# Patient Record
Sex: FEMALE | Race: WHITE | Employment: OTHER | URBAN - METROPOLITAN AREA
[De-identification: names, ages, dates, MRNs, and addresses within clinical notes are randomized per-mention and may not be internally consistent; named-entity substitution may affect disease eponyms.]

---

## 2017-01-11 ENCOUNTER — TELEPHONE (OUTPATIENT)
Dept: DERMATOLOGY | Age: 82
End: 2017-01-11

## 2017-01-25 ENCOUNTER — OFFICE VISIT (OUTPATIENT)
Dept: INTERNAL MEDICINE CLINIC | Age: 82
End: 2017-01-25

## 2017-01-25 VITALS
RESPIRATION RATE: 16 BRPM | HEIGHT: 61 IN | WEIGHT: 121 LBS | SYSTOLIC BLOOD PRESSURE: 130 MMHG | HEART RATE: 74 BPM | DIASTOLIC BLOOD PRESSURE: 56 MMHG | BODY MASS INDEX: 22.84 KG/M2

## 2017-01-25 DIAGNOSIS — E78.5 HYPERLIPIDEMIA, UNSPECIFIED HYPERLIPIDEMIA TYPE: ICD-10-CM

## 2017-01-25 DIAGNOSIS — I87.2 VENOUS INSUFFICIENCY OF BOTH LOWER EXTREMITIES: ICD-10-CM

## 2017-01-25 DIAGNOSIS — T14.8XXA SKIN ABRASION: ICD-10-CM

## 2017-01-25 DIAGNOSIS — I25.10 CORONARY ARTERY DISEASE INVOLVING NATIVE CORONARY ARTERY OF NATIVE HEART WITHOUT ANGINA PECTORIS: ICD-10-CM

## 2017-01-25 DIAGNOSIS — E11.649 TYPE 2 DIABETES MELLITUS WITH HYPOGLYCEMIA WITHOUT COMA, WITHOUT LONG-TERM CURRENT USE OF INSULIN (HCC): ICD-10-CM

## 2017-01-25 DIAGNOSIS — I73.9 PVD (PERIPHERAL VASCULAR DISEASE) (HCC): ICD-10-CM

## 2017-01-25 DIAGNOSIS — E11.59 TYPE 2 DIABETES MELLITUS WITH VASCULAR DISEASE (HCC): ICD-10-CM

## 2017-01-25 DIAGNOSIS — B35.1 ONYCHOMYCOSIS: ICD-10-CM

## 2017-01-25 DIAGNOSIS — L30.9 DERMATITIS: ICD-10-CM

## 2017-01-25 DIAGNOSIS — I10 BENIGN ESSENTIAL HTN: ICD-10-CM

## 2017-01-25 DIAGNOSIS — E11.40 TYPE 2 DIABETES, CONTROLLED, WITH NEUROPATHY (HCC): Primary | ICD-10-CM

## 2017-01-25 PROCEDURE — G8428 CUR MEDS NOT DOCUMENT: HCPCS | Performed by: INTERNAL MEDICINE

## 2017-01-25 PROCEDURE — G8419 CALC BMI OUT NRM PARAM NOF/U: HCPCS | Performed by: INTERNAL MEDICINE

## 2017-01-25 PROCEDURE — 4040F PNEUMOC VAC/ADMIN/RCVD: CPT | Performed by: INTERNAL MEDICINE

## 2017-01-25 PROCEDURE — 1036F TOBACCO NON-USER: CPT | Performed by: INTERNAL MEDICINE

## 2017-01-25 PROCEDURE — 1123F ACP DISCUSS/DSCN MKR DOCD: CPT | Performed by: INTERNAL MEDICINE

## 2017-01-25 PROCEDURE — G8598 ASA/ANTIPLAT THER USED: HCPCS | Performed by: INTERNAL MEDICINE

## 2017-01-25 PROCEDURE — 1090F PRES/ABSN URINE INCON ASSESS: CPT | Performed by: INTERNAL MEDICINE

## 2017-01-25 PROCEDURE — G8483 FLU IMM NO ADMIN DOC REA: HCPCS | Performed by: INTERNAL MEDICINE

## 2017-01-25 PROCEDURE — 99214 OFFICE O/P EST MOD 30 MIN: CPT | Performed by: INTERNAL MEDICINE

## 2017-01-25 RX ORDER — TRIAMCINOLONE ACETONIDE 1 MG/G
CREAM TOPICAL
Qty: 60 G | Refills: 1 | Status: SHIPPED | OUTPATIENT
Start: 2017-01-25 | End: 2017-02-24 | Stop reason: SDUPTHER

## 2017-01-25 RX ORDER — TRIAMCINOLONE ACETONIDE 1 MG/G
CREAM TOPICAL
Qty: 80 G | Refills: 0 | Status: SHIPPED | OUTPATIENT
Start: 2017-01-25 | End: 2017-04-26 | Stop reason: ALTCHOICE

## 2017-01-26 ENCOUNTER — OFFICE VISIT (OUTPATIENT)
Dept: CARDIOTHORACIC SURGERY | Age: 82
End: 2017-01-26

## 2017-01-26 VITALS
WEIGHT: 119 LBS | DIASTOLIC BLOOD PRESSURE: 60 MMHG | SYSTOLIC BLOOD PRESSURE: 132 MMHG | BODY MASS INDEX: 22.47 KG/M2 | HEIGHT: 61 IN

## 2017-01-26 DIAGNOSIS — Z48.812 AFTERCARE FOLLOWING SURGERY OF THE CIRCULATORY SYSTEM: Primary | ICD-10-CM

## 2017-01-26 PROCEDURE — 1090F PRES/ABSN URINE INCON ASSESS: CPT | Performed by: SURGERY

## 2017-01-26 PROCEDURE — G8598 ASA/ANTIPLAT THER USED: HCPCS | Performed by: SURGERY

## 2017-01-26 PROCEDURE — 99212 OFFICE O/P EST SF 10 MIN: CPT | Performed by: SURGERY

## 2017-01-26 PROCEDURE — 1123F ACP DISCUSS/DSCN MKR DOCD: CPT | Performed by: SURGERY

## 2017-01-26 PROCEDURE — 1036F TOBACCO NON-USER: CPT | Performed by: SURGERY

## 2017-01-26 PROCEDURE — G8483 FLU IMM NO ADMIN DOC REA: HCPCS | Performed by: SURGERY

## 2017-01-26 PROCEDURE — G8419 CALC BMI OUT NRM PARAM NOF/U: HCPCS | Performed by: SURGERY

## 2017-01-26 PROCEDURE — 4040F PNEUMOC VAC/ADMIN/RCVD: CPT | Performed by: SURGERY

## 2017-01-26 PROCEDURE — G8427 DOCREV CUR MEDS BY ELIG CLIN: HCPCS | Performed by: SURGERY

## 2017-02-01 ENCOUNTER — OFFICE VISIT (OUTPATIENT)
Dept: DERMATOLOGY | Age: 82
End: 2017-02-01

## 2017-02-01 DIAGNOSIS — L29.9 PRURITUS: Primary | ICD-10-CM

## 2017-02-01 PROCEDURE — 1123F ACP DISCUSS/DSCN MKR DOCD: CPT | Performed by: DERMATOLOGY

## 2017-02-01 PROCEDURE — G8419 CALC BMI OUT NRM PARAM NOF/U: HCPCS | Performed by: DERMATOLOGY

## 2017-02-01 PROCEDURE — 4040F PNEUMOC VAC/ADMIN/RCVD: CPT | Performed by: DERMATOLOGY

## 2017-02-01 PROCEDURE — 99213 OFFICE O/P EST LOW 20 MIN: CPT | Performed by: DERMATOLOGY

## 2017-02-01 PROCEDURE — G8598 ASA/ANTIPLAT THER USED: HCPCS | Performed by: DERMATOLOGY

## 2017-02-01 PROCEDURE — 1090F PRES/ABSN URINE INCON ASSESS: CPT | Performed by: DERMATOLOGY

## 2017-02-01 PROCEDURE — 1036F TOBACCO NON-USER: CPT | Performed by: DERMATOLOGY

## 2017-02-01 PROCEDURE — G8427 DOCREV CUR MEDS BY ELIG CLIN: HCPCS | Performed by: DERMATOLOGY

## 2017-02-01 PROCEDURE — G8483 FLU IMM NO ADMIN DOC REA: HCPCS | Performed by: DERMATOLOGY

## 2017-02-06 ENCOUNTER — TELEPHONE (OUTPATIENT)
Dept: INTERNAL MEDICINE CLINIC | Age: 82
End: 2017-02-06

## 2017-02-07 RX ORDER — IBANDRONATE SODIUM 150 MG/1
150 TABLET, FILM COATED ORAL
Qty: 1 TABLET | Refills: 0 | Status: SHIPPED | OUTPATIENT
Start: 2017-02-07 | End: 2017-04-26

## 2017-02-15 ENCOUNTER — OFFICE VISIT (OUTPATIENT)
Dept: ORTHOPEDIC SURGERY | Age: 82
End: 2017-02-15

## 2017-02-15 VITALS — WEIGHT: 119 LBS | RESPIRATION RATE: 16 BRPM | HEIGHT: 61 IN | BODY MASS INDEX: 22.47 KG/M2

## 2017-02-15 DIAGNOSIS — B35.1 ONYCHOMYCOSIS: Primary | ICD-10-CM

## 2017-02-15 PROCEDURE — 1123F ACP DISCUSS/DSCN MKR DOCD: CPT | Performed by: ORTHOPAEDIC SURGERY

## 2017-02-15 PROCEDURE — G8427 DOCREV CUR MEDS BY ELIG CLIN: HCPCS | Performed by: ORTHOPAEDIC SURGERY

## 2017-02-15 PROCEDURE — 4040F PNEUMOC VAC/ADMIN/RCVD: CPT | Performed by: ORTHOPAEDIC SURGERY

## 2017-02-15 PROCEDURE — G8483 FLU IMM NO ADMIN DOC REA: HCPCS | Performed by: ORTHOPAEDIC SURGERY

## 2017-02-15 PROCEDURE — 99212 OFFICE O/P EST SF 10 MIN: CPT | Performed by: ORTHOPAEDIC SURGERY

## 2017-02-15 PROCEDURE — 1090F PRES/ABSN URINE INCON ASSESS: CPT | Performed by: ORTHOPAEDIC SURGERY

## 2017-02-15 PROCEDURE — G8419 CALC BMI OUT NRM PARAM NOF/U: HCPCS | Performed by: ORTHOPAEDIC SURGERY

## 2017-02-15 PROCEDURE — 1036F TOBACCO NON-USER: CPT | Performed by: ORTHOPAEDIC SURGERY

## 2017-02-15 PROCEDURE — G8598 ASA/ANTIPLAT THER USED: HCPCS | Performed by: ORTHOPAEDIC SURGERY

## 2017-02-23 ENCOUNTER — TELEPHONE (OUTPATIENT)
Dept: CARDIOTHORACIC SURGERY | Age: 82
End: 2017-02-23

## 2017-02-24 ENCOUNTER — OFFICE VISIT (OUTPATIENT)
Dept: INTERNAL MEDICINE CLINIC | Age: 82
End: 2017-02-24

## 2017-02-24 ENCOUNTER — HOSPITAL ENCOUNTER (OUTPATIENT)
Dept: VASCULAR LAB | Age: 82
Discharge: HOME | End: 2017-02-24
Attending: INTERNAL MEDICINE | Admitting: INTERNAL MEDICINE

## 2017-02-24 VITALS
WEIGHT: 122 LBS | DIASTOLIC BLOOD PRESSURE: 66 MMHG | SYSTOLIC BLOOD PRESSURE: 134 MMHG | BODY MASS INDEX: 23.05 KG/M2 | RESPIRATION RATE: 16 BRPM | HEART RATE: 74 BPM

## 2017-02-24 DIAGNOSIS — R60.0 LOCALIZED EDEMA: ICD-10-CM

## 2017-02-24 DIAGNOSIS — R60.0 LEG EDEMA, LEFT: Primary | ICD-10-CM

## 2017-02-24 DIAGNOSIS — I87.2 VENOUS INSUFFICIENCY OF BOTH LOWER EXTREMITIES: ICD-10-CM

## 2017-02-24 PROCEDURE — G8483 FLU IMM NO ADMIN DOC REA: HCPCS | Performed by: INTERNAL MEDICINE

## 2017-02-24 PROCEDURE — G8598 ASA/ANTIPLAT THER USED: HCPCS | Performed by: INTERNAL MEDICINE

## 2017-02-24 PROCEDURE — G8427 DOCREV CUR MEDS BY ELIG CLIN: HCPCS | Performed by: INTERNAL MEDICINE

## 2017-02-24 PROCEDURE — 1123F ACP DISCUSS/DSCN MKR DOCD: CPT | Performed by: INTERNAL MEDICINE

## 2017-02-24 PROCEDURE — 4040F PNEUMOC VAC/ADMIN/RCVD: CPT | Performed by: INTERNAL MEDICINE

## 2017-02-24 PROCEDURE — 99214 OFFICE O/P EST MOD 30 MIN: CPT | Performed by: INTERNAL MEDICINE

## 2017-02-24 PROCEDURE — 1090F PRES/ABSN URINE INCON ASSESS: CPT | Performed by: INTERNAL MEDICINE

## 2017-02-24 PROCEDURE — G8420 CALC BMI NORM PARAMETERS: HCPCS | Performed by: INTERNAL MEDICINE

## 2017-02-24 PROCEDURE — 1036F TOBACCO NON-USER: CPT | Performed by: INTERNAL MEDICINE

## 2017-02-24 RX ORDER — POTASSIUM CHLORIDE 20 MEQ/1
20 TABLET, EXTENDED RELEASE ORAL DAILY
Qty: 2 TABLET | Refills: 2 | Status: SHIPPED | OUTPATIENT
Start: 2017-02-24 | End: 2017-02-24 | Stop reason: CLARIF

## 2017-02-24 RX ORDER — FUROSEMIDE 20 MG/1
20 TABLET ORAL DAILY
Qty: 2 TABLET | Refills: 2 | Status: SHIPPED | OUTPATIENT
Start: 2017-02-24 | End: 2017-02-24 | Stop reason: CLARIF

## 2017-02-24 RX ORDER — POTASSIUM CHLORIDE 20 MEQ/1
20 TABLET, EXTENDED RELEASE ORAL DAILY
Qty: 30 TABLET | Refills: 0 | Status: SHIPPED | OUTPATIENT
Start: 2017-02-24 | End: 2017-03-22 | Stop reason: SDUPTHER

## 2017-02-24 RX ORDER — FUROSEMIDE 20 MG/1
20 TABLET ORAL DAILY
Qty: 30 TABLET | Refills: 0 | Status: SHIPPED | OUTPATIENT
Start: 2017-02-24 | End: 2017-03-22 | Stop reason: SDUPTHER

## 2017-02-24 RX ORDER — FUROSEMIDE 20 MG/1
20 TABLET ORAL DAILY
Qty: 2 TABLET | Refills: 2 | Status: SHIPPED | OUTPATIENT
Start: 2017-02-24 | End: 2017-02-24 | Stop reason: SDUPTHER

## 2017-02-24 RX ORDER — POTASSIUM CHLORIDE 20 MEQ/1
20 TABLET, EXTENDED RELEASE ORAL DAILY
Qty: 2 TABLET | Refills: 2 | Status: SHIPPED | OUTPATIENT
Start: 2017-02-24 | End: 2017-02-24 | Stop reason: SDUPTHER

## 2017-02-28 ENCOUNTER — TELEPHONE (OUTPATIENT)
Dept: INTERNAL MEDICINE CLINIC | Age: 82
End: 2017-02-28

## 2017-03-02 ENCOUNTER — PROCEDURE VISIT (OUTPATIENT)
Dept: CARDIOTHORACIC SURGERY | Age: 82
End: 2017-03-02

## 2017-03-02 ENCOUNTER — OFFICE VISIT (OUTPATIENT)
Dept: VASCULAR SURGERY | Age: 82
End: 2017-03-02

## 2017-03-02 VITALS
SYSTOLIC BLOOD PRESSURE: 136 MMHG | BODY MASS INDEX: 22.47 KG/M2 | HEIGHT: 61 IN | WEIGHT: 119 LBS | DIASTOLIC BLOOD PRESSURE: 70 MMHG

## 2017-03-02 DIAGNOSIS — Z48.812 AFTERCARE FOLLOWING SURGERY OF THE CIRCULATORY SYSTEM: Primary | ICD-10-CM

## 2017-03-02 DIAGNOSIS — I73.9 PVD (PERIPHERAL VASCULAR DISEASE) (HCC): ICD-10-CM

## 2017-03-02 PROCEDURE — G8427 DOCREV CUR MEDS BY ELIG CLIN: HCPCS | Performed by: SURGERY

## 2017-03-02 PROCEDURE — G8598 ASA/ANTIPLAT THER USED: HCPCS | Performed by: SURGERY

## 2017-03-02 PROCEDURE — 1090F PRES/ABSN URINE INCON ASSESS: CPT | Performed by: SURGERY

## 2017-03-02 PROCEDURE — 4040F PNEUMOC VAC/ADMIN/RCVD: CPT | Performed by: SURGERY

## 2017-03-02 PROCEDURE — G8483 FLU IMM NO ADMIN DOC REA: HCPCS | Performed by: SURGERY

## 2017-03-02 PROCEDURE — 1036F TOBACCO NON-USER: CPT | Performed by: SURGERY

## 2017-03-02 PROCEDURE — 93926 LOWER EXTREMITY STUDY: CPT | Performed by: SURGERY

## 2017-03-02 PROCEDURE — 1123F ACP DISCUSS/DSCN MKR DOCD: CPT | Performed by: SURGERY

## 2017-03-02 PROCEDURE — G8419 CALC BMI OUT NRM PARAM NOF/U: HCPCS | Performed by: SURGERY

## 2017-03-02 PROCEDURE — 99212 OFFICE O/P EST SF 10 MIN: CPT | Performed by: SURGERY

## 2017-03-09 DIAGNOSIS — I25.10 CORONARY ARTERY DISEASE INVOLVING NATIVE CORONARY ARTERY OF NATIVE HEART WITHOUT ANGINA PECTORIS: ICD-10-CM

## 2017-03-09 DIAGNOSIS — E11.649 TYPE 2 DIABETES MELLITUS WITH HYPOGLYCEMIA WITHOUT COMA, WITHOUT LONG-TERM CURRENT USE OF INSULIN (HCC): ICD-10-CM

## 2017-03-09 DIAGNOSIS — R20.2 RIGHT HAND PARESTHESIA: ICD-10-CM

## 2017-03-09 DIAGNOSIS — I10 BENIGN ESSENTIAL HTN: ICD-10-CM

## 2017-03-09 DIAGNOSIS — E78.5 HYPERLIPIDEMIA, UNSPECIFIED HYPERLIPIDEMIA TYPE: ICD-10-CM

## 2017-03-09 DIAGNOSIS — E11.40 TYPE 2 DIABETES, CONTROLLED, WITH NEUROPATHY (HCC): ICD-10-CM

## 2017-03-09 DIAGNOSIS — E11.59 TYPE 2 DIABETES MELLITUS WITH VASCULAR DISEASE (HCC): ICD-10-CM

## 2017-03-09 LAB
A/G RATIO: 2.3 (ref 1.1–2.2)
ALBUMIN SERPL-MCNC: 4.4 G/DL (ref 3.4–5)
ALP BLD-CCNC: 44 U/L (ref 40–129)
ALT SERPL-CCNC: 14 U/L (ref 10–40)
ANION GAP SERPL CALCULATED.3IONS-SCNC: 15 MMOL/L (ref 3–16)
AST SERPL-CCNC: 19 U/L (ref 15–37)
BASOPHILS ABSOLUTE: 0 K/UL (ref 0–0.2)
BASOPHILS RELATIVE PERCENT: 0.6 %
BILIRUB SERPL-MCNC: 0.6 MG/DL (ref 0–1)
BUN BLDV-MCNC: 19 MG/DL (ref 7–20)
CALCIUM SERPL-MCNC: 10.5 MG/DL (ref 8.3–10.6)
CHLORIDE BLD-SCNC: 100 MMOL/L (ref 99–110)
CHOLESTEROL, TOTAL: 132 MG/DL (ref 0–199)
CO2: 26 MMOL/L (ref 21–32)
CREAT SERPL-MCNC: 0.8 MG/DL (ref 0.6–1.2)
EOSINOPHILS ABSOLUTE: 0 K/UL (ref 0–0.6)
EOSINOPHILS RELATIVE PERCENT: 0.7 %
GFR AFRICAN AMERICAN: >60
GFR NON-AFRICAN AMERICAN: >60
GLOBULIN: 1.9 G/DL
GLUCOSE BLD-MCNC: 181 MG/DL (ref 70–99)
HCT VFR BLD CALC: 41.8 % (ref 36–48)
HDLC SERPL-MCNC: 71 MG/DL (ref 40–60)
HEMOGLOBIN: 13.2 G/DL (ref 12–16)
LDL CHOLESTEROL CALCULATED: 38 MG/DL
LYMPHOCYTES ABSOLUTE: 1.6 K/UL (ref 1–5.1)
LYMPHOCYTES RELATIVE PERCENT: 25.9 %
MCH RBC QN AUTO: 29.8 PG (ref 26–34)
MCHC RBC AUTO-ENTMCNC: 31.7 G/DL (ref 31–36)
MCV RBC AUTO: 94.3 FL (ref 80–100)
MONOCYTES ABSOLUTE: 0.5 K/UL (ref 0–1.3)
MONOCYTES RELATIVE PERCENT: 7.5 %
NEUTROPHILS ABSOLUTE: 4.1 K/UL (ref 1.7–7.7)
NEUTROPHILS RELATIVE PERCENT: 65.3 %
PDW BLD-RTO: 14.1 % (ref 12.4–15.4)
PLATELET # BLD: 184 K/UL (ref 135–450)
PMV BLD AUTO: 8.5 FL (ref 5–10.5)
POTASSIUM SERPL-SCNC: 4.8 MMOL/L (ref 3.5–5.1)
RBC # BLD: 4.43 M/UL (ref 4–5.2)
SODIUM BLD-SCNC: 141 MMOL/L (ref 136–145)
TOTAL PROTEIN: 6.3 G/DL (ref 6.4–8.2)
TRIGL SERPL-MCNC: 117 MG/DL (ref 0–150)
TSH SERPL DL<=0.05 MIU/L-ACNC: 2.2 UIU/ML (ref 0.27–4.2)
VLDLC SERPL CALC-MCNC: 23 MG/DL
WBC # BLD: 6.2 K/UL (ref 4–11)

## 2017-03-10 LAB
ESTIMATED AVERAGE GLUCOSE: 151.3 MG/DL
HBA1C MFR BLD: 6.9 %

## 2017-03-13 ENCOUNTER — TELEPHONE (OUTPATIENT)
Dept: INTERNAL MEDICINE CLINIC | Age: 82
End: 2017-03-13

## 2017-03-20 RX ORDER — METHOCARBAMOL 500 MG/1
TABLET, FILM COATED ORAL
Qty: 30 TABLET | Refills: 2 | Status: SHIPPED | OUTPATIENT
Start: 2017-03-20 | End: 2017-06-27 | Stop reason: SDUPTHER

## 2017-03-22 RX ORDER — POTASSIUM CHLORIDE 1500 MG/1
TABLET, EXTENDED RELEASE ORAL
Qty: 30 TABLET | Refills: 5 | Status: SHIPPED | OUTPATIENT
Start: 2017-03-22 | End: 2017-04-26 | Stop reason: ALTCHOICE

## 2017-03-22 RX ORDER — FUROSEMIDE 20 MG/1
TABLET ORAL
Qty: 30 TABLET | Refills: 5 | Status: SHIPPED | OUTPATIENT
Start: 2017-03-22 | End: 2017-10-24

## 2017-03-24 ENCOUNTER — TELEPHONE (OUTPATIENT)
Dept: INTERNAL MEDICINE CLINIC | Age: 82
End: 2017-03-24

## 2017-03-29 ENCOUNTER — TELEPHONE (OUTPATIENT)
Dept: INTERNAL MEDICINE CLINIC | Age: 82
End: 2017-03-29

## 2017-04-13 ENCOUNTER — OFFICE VISIT (OUTPATIENT)
Dept: DERMATOLOGY | Age: 82
End: 2017-04-13

## 2017-04-13 DIAGNOSIS — L29.9 PRURITUS: ICD-10-CM

## 2017-04-13 DIAGNOSIS — D69.2 SOLAR PURPURA (HCC): ICD-10-CM

## 2017-04-13 DIAGNOSIS — L98.9 SKIN LESION OF FOOT: Primary | ICD-10-CM

## 2017-04-13 DIAGNOSIS — L60.3 NAIL DYSTROPHY: ICD-10-CM

## 2017-04-13 DIAGNOSIS — L82.1 SK (SEBORRHEIC KERATOSIS): ICD-10-CM

## 2017-04-13 PROCEDURE — G8427 DOCREV CUR MEDS BY ELIG CLIN: HCPCS | Performed by: DERMATOLOGY

## 2017-04-13 PROCEDURE — 1123F ACP DISCUSS/DSCN MKR DOCD: CPT | Performed by: DERMATOLOGY

## 2017-04-13 PROCEDURE — 99213 OFFICE O/P EST LOW 20 MIN: CPT | Performed by: DERMATOLOGY

## 2017-04-13 PROCEDURE — 4040F PNEUMOC VAC/ADMIN/RCVD: CPT | Performed by: DERMATOLOGY

## 2017-04-13 PROCEDURE — G8598 ASA/ANTIPLAT THER USED: HCPCS | Performed by: DERMATOLOGY

## 2017-04-13 PROCEDURE — G8419 CALC BMI OUT NRM PARAM NOF/U: HCPCS | Performed by: DERMATOLOGY

## 2017-04-13 PROCEDURE — 1036F TOBACCO NON-USER: CPT | Performed by: DERMATOLOGY

## 2017-04-13 PROCEDURE — 1090F PRES/ABSN URINE INCON ASSESS: CPT | Performed by: DERMATOLOGY

## 2017-04-26 ENCOUNTER — OFFICE VISIT (OUTPATIENT)
Dept: INTERNAL MEDICINE CLINIC | Age: 82
End: 2017-04-26

## 2017-04-26 VITALS
RESPIRATION RATE: 12 BRPM | SYSTOLIC BLOOD PRESSURE: 142 MMHG | HEIGHT: 61 IN | HEART RATE: 68 BPM | WEIGHT: 122 LBS | DIASTOLIC BLOOD PRESSURE: 62 MMHG | BODY MASS INDEX: 23.03 KG/M2

## 2017-04-26 DIAGNOSIS — E11.59 TYPE 2 DIABETES MELLITUS WITH VASCULAR DISEASE (HCC): ICD-10-CM

## 2017-04-26 DIAGNOSIS — I10 BENIGN ESSENTIAL HTN: ICD-10-CM

## 2017-04-26 DIAGNOSIS — I73.9 PVD (PERIPHERAL VASCULAR DISEASE) (HCC): ICD-10-CM

## 2017-04-26 DIAGNOSIS — E11.40 TYPE 2 DIABETES, CONTROLLED, WITH NEUROPATHY (HCC): Primary | ICD-10-CM

## 2017-04-26 DIAGNOSIS — I87.2 VENOUS INSUFFICIENCY OF BOTH LOWER EXTREMITIES: ICD-10-CM

## 2017-04-26 DIAGNOSIS — E11.42 DIABETIC PERIPHERAL NEUROPATHY (HCC): ICD-10-CM

## 2017-04-26 DIAGNOSIS — I25.10 CORONARY ARTERY DISEASE INVOLVING NATIVE CORONARY ARTERY OF NATIVE HEART WITHOUT ANGINA PECTORIS: ICD-10-CM

## 2017-04-26 PROCEDURE — 1036F TOBACCO NON-USER: CPT | Performed by: INTERNAL MEDICINE

## 2017-04-26 PROCEDURE — 99214 OFFICE O/P EST MOD 30 MIN: CPT | Performed by: INTERNAL MEDICINE

## 2017-04-26 PROCEDURE — 4040F PNEUMOC VAC/ADMIN/RCVD: CPT | Performed by: INTERNAL MEDICINE

## 2017-04-26 PROCEDURE — G8427 DOCREV CUR MEDS BY ELIG CLIN: HCPCS | Performed by: INTERNAL MEDICINE

## 2017-04-26 PROCEDURE — 1090F PRES/ABSN URINE INCON ASSESS: CPT | Performed by: INTERNAL MEDICINE

## 2017-04-26 PROCEDURE — 1123F ACP DISCUSS/DSCN MKR DOCD: CPT | Performed by: INTERNAL MEDICINE

## 2017-04-26 PROCEDURE — G8420 CALC BMI NORM PARAMETERS: HCPCS | Performed by: INTERNAL MEDICINE

## 2017-04-26 PROCEDURE — G8598 ASA/ANTIPLAT THER USED: HCPCS | Performed by: INTERNAL MEDICINE

## 2017-05-03 RX ORDER — LISINOPRIL 10 MG/1
TABLET ORAL
Qty: 30 TABLET | Refills: 4 | Status: SHIPPED | OUTPATIENT
Start: 2017-05-03 | End: 2017-07-26 | Stop reason: SDUPTHER

## 2017-06-07 RX ORDER — GLIMEPIRIDE 4 MG/1
TABLET ORAL
Qty: 90 TABLET | Refills: 0 | Status: SHIPPED | OUTPATIENT
Start: 2017-06-07 | End: 2017-09-02 | Stop reason: SDUPTHER

## 2017-06-08 ENCOUNTER — PROCEDURE VISIT (OUTPATIENT)
Dept: VASCULAR SURGERY | Age: 82
End: 2017-06-08

## 2017-06-08 DIAGNOSIS — Z48.812 ENCOUNTER FOR POSTOPERATIVE SURVEILLANCE OF VASCULAR BYPASS SURGERY: Primary | ICD-10-CM

## 2017-06-08 PROCEDURE — 93926 LOWER EXTREMITY STUDY: CPT | Performed by: SURGERY

## 2017-06-15 ENCOUNTER — OFFICE VISIT (OUTPATIENT)
Dept: ORTHOPEDIC SURGERY | Age: 82
End: 2017-06-15

## 2017-06-15 VITALS
WEIGHT: 122 LBS | HEIGHT: 61 IN | BODY MASS INDEX: 23.03 KG/M2 | HEART RATE: 82 BPM | DIASTOLIC BLOOD PRESSURE: 72 MMHG | SYSTOLIC BLOOD PRESSURE: 167 MMHG | TEMPERATURE: 97.8 F

## 2017-06-15 DIAGNOSIS — M25.562 ACUTE PAIN OF LEFT KNEE: Primary | ICD-10-CM

## 2017-06-15 DIAGNOSIS — M17.12 ARTHRITIS OF LEFT KNEE: ICD-10-CM

## 2017-06-15 PROCEDURE — G8427 DOCREV CUR MEDS BY ELIG CLIN: HCPCS | Performed by: PHYSICIAN ASSISTANT

## 2017-06-15 PROCEDURE — G8420 CALC BMI NORM PARAMETERS: HCPCS | Performed by: PHYSICIAN ASSISTANT

## 2017-06-15 PROCEDURE — 4040F PNEUMOC VAC/ADMIN/RCVD: CPT | Performed by: PHYSICIAN ASSISTANT

## 2017-06-15 PROCEDURE — 99213 OFFICE O/P EST LOW 20 MIN: CPT | Performed by: PHYSICIAN ASSISTANT

## 2017-06-15 PROCEDURE — G8598 ASA/ANTIPLAT THER USED: HCPCS | Performed by: PHYSICIAN ASSISTANT

## 2017-06-15 PROCEDURE — 1123F ACP DISCUSS/DSCN MKR DOCD: CPT | Performed by: PHYSICIAN ASSISTANT

## 2017-06-15 PROCEDURE — 1090F PRES/ABSN URINE INCON ASSESS: CPT | Performed by: PHYSICIAN ASSISTANT

## 2017-06-15 PROCEDURE — 1036F TOBACCO NON-USER: CPT | Performed by: PHYSICIAN ASSISTANT

## 2017-06-15 PROCEDURE — 20610 DRAIN/INJ JOINT/BURSA W/O US: CPT | Performed by: PHYSICIAN ASSISTANT

## 2017-06-16 ENCOUNTER — TELEPHONE (OUTPATIENT)
Dept: ORTHOPEDIC SURGERY | Age: 82
End: 2017-06-16

## 2017-06-19 PROBLEM — M17.12 ARTHRITIS OF LEFT KNEE: Status: ACTIVE | Noted: 2017-06-19

## 2017-06-21 ENCOUNTER — TELEPHONE (OUTPATIENT)
Dept: ORTHOPEDIC SURGERY | Age: 82
End: 2017-06-21

## 2017-06-27 ENCOUNTER — NURSE ONLY (OUTPATIENT)
Dept: INTERNAL MEDICINE CLINIC | Age: 82
End: 2017-06-27

## 2017-06-27 DIAGNOSIS — R82.81 PYURIA: Primary | ICD-10-CM

## 2017-06-27 LAB
BILIRUBIN, POC: ABNORMAL
BLOOD URINE, POC: ABNORMAL
CLARITY, POC: ABNORMAL
COLOR, POC: YELLOW
GLUCOSE URINE, POC: ABNORMAL
KETONES, POC: ABNORMAL
LEUKOCYTE EST, POC: ABNORMAL
NITRITE, POC: ABNORMAL
PH, POC: 6.5
PROTEIN, POC: ABNORMAL
SPECIFIC GRAVITY, POC: 1.01
UROBILINOGEN, POC: ABNORMAL

## 2017-06-27 PROCEDURE — 81002 URINALYSIS NONAUTO W/O SCOPE: CPT | Performed by: INTERNAL MEDICINE

## 2017-06-27 RX ORDER — NITROFURANTOIN 25; 75 MG/1; MG/1
100 CAPSULE ORAL 2 TIMES DAILY
Qty: 20 CAPSULE | Refills: 0 | Status: SHIPPED | OUTPATIENT
Start: 2017-06-27 | End: 2017-06-27

## 2017-06-27 RX ORDER — NITROFURANTOIN 25; 75 MG/1; MG/1
100 CAPSULE ORAL 2 TIMES DAILY
Qty: 20 CAPSULE | Refills: 0 | Status: SHIPPED | OUTPATIENT
Start: 2017-06-27 | End: 2017-07-07

## 2017-06-27 RX ORDER — METHOCARBAMOL 500 MG/1
TABLET, FILM COATED ORAL
Qty: 30 TABLET | Refills: 1 | Status: SHIPPED | OUTPATIENT
Start: 2017-06-27 | End: 2017-10-24 | Stop reason: ALTCHOICE

## 2017-06-29 LAB
ORGANISM: ABNORMAL
URINE CULTURE, ROUTINE: ABNORMAL

## 2017-07-05 ENCOUNTER — TELEPHONE (OUTPATIENT)
Dept: INTERNAL MEDICINE CLINIC | Age: 82
End: 2017-07-05

## 2017-07-05 DIAGNOSIS — N39.0 URINARY TRACT INFECTION, SITE UNSPECIFIED: Primary | ICD-10-CM

## 2017-07-05 LAB
BILIRUBIN, POC: NORMAL
BLOOD URINE, POC: NORMAL
CLARITY, POC: CLEAR
COLOR, POC: YELLOW
GLUCOSE URINE, POC: NORMAL
KETONES, POC: NORMAL
LEUKOCYTE EST, POC: NORMAL
NITRITE, POC: NORMAL
PH, POC: 5
PROTEIN, POC: NORMAL
SPECIFIC GRAVITY, POC: 1.01
UROBILINOGEN, POC: 0.2

## 2017-07-05 PROCEDURE — 81002 URINALYSIS NONAUTO W/O SCOPE: CPT | Performed by: INTERNAL MEDICINE

## 2017-07-06 ENCOUNTER — OFFICE VISIT (OUTPATIENT)
Dept: VASCULAR SURGERY | Age: 82
End: 2017-07-06

## 2017-07-06 VITALS
WEIGHT: 119 LBS | BODY MASS INDEX: 22.47 KG/M2 | SYSTOLIC BLOOD PRESSURE: 130 MMHG | DIASTOLIC BLOOD PRESSURE: 80 MMHG | HEIGHT: 61 IN

## 2017-07-06 DIAGNOSIS — Z48.812 AFTERCARE FOLLOWING SURGERY OF THE CIRCULATORY SYSTEM: ICD-10-CM

## 2017-07-06 DIAGNOSIS — Z48.812 ENCOUNTER FOR POSTOPERATIVE SURVEILLANCE OF VASCULAR BYPASS SURGERY: Primary | ICD-10-CM

## 2017-07-06 DIAGNOSIS — I70.201 FEMORAL ARTERY STENOSIS, RIGHT (HCC): ICD-10-CM

## 2017-07-06 PROCEDURE — G8598 ASA/ANTIPLAT THER USED: HCPCS | Performed by: SURGERY

## 2017-07-06 PROCEDURE — 4040F PNEUMOC VAC/ADMIN/RCVD: CPT | Performed by: SURGERY

## 2017-07-06 PROCEDURE — 1123F ACP DISCUSS/DSCN MKR DOCD: CPT | Performed by: SURGERY

## 2017-07-06 PROCEDURE — 99212 OFFICE O/P EST SF 10 MIN: CPT | Performed by: SURGERY

## 2017-07-06 PROCEDURE — 1036F TOBACCO NON-USER: CPT | Performed by: SURGERY

## 2017-07-06 PROCEDURE — 1090F PRES/ABSN URINE INCON ASSESS: CPT | Performed by: SURGERY

## 2017-07-06 PROCEDURE — G8420 CALC BMI NORM PARAMETERS: HCPCS | Performed by: SURGERY

## 2017-07-06 PROCEDURE — G8427 DOCREV CUR MEDS BY ELIG CLIN: HCPCS | Performed by: SURGERY

## 2017-07-07 LAB — URINE CULTURE, ROUTINE: NORMAL

## 2017-07-12 ENCOUNTER — OFFICE VISIT (OUTPATIENT)
Dept: INTERNAL MEDICINE CLINIC | Age: 82
End: 2017-07-12

## 2017-07-12 VITALS
DIASTOLIC BLOOD PRESSURE: 54 MMHG | BODY MASS INDEX: 22.22 KG/M2 | SYSTOLIC BLOOD PRESSURE: 139 MMHG | OXYGEN SATURATION: 96 % | WEIGHT: 117.6 LBS | HEART RATE: 69 BPM

## 2017-07-12 DIAGNOSIS — S41.112A SKIN TEAR OF UPPER ARM WITHOUT COMPLICATION, LEFT, INITIAL ENCOUNTER: Primary | ICD-10-CM

## 2017-07-12 PROCEDURE — 99213 OFFICE O/P EST LOW 20 MIN: CPT | Performed by: NURSE PRACTITIONER

## 2017-07-12 PROCEDURE — 1123F ACP DISCUSS/DSCN MKR DOCD: CPT | Performed by: NURSE PRACTITIONER

## 2017-07-12 PROCEDURE — 1036F TOBACCO NON-USER: CPT | Performed by: NURSE PRACTITIONER

## 2017-07-12 PROCEDURE — G8420 CALC BMI NORM PARAMETERS: HCPCS | Performed by: NURSE PRACTITIONER

## 2017-07-12 PROCEDURE — 1090F PRES/ABSN URINE INCON ASSESS: CPT | Performed by: NURSE PRACTITIONER

## 2017-07-12 PROCEDURE — G8598 ASA/ANTIPLAT THER USED: HCPCS | Performed by: NURSE PRACTITIONER

## 2017-07-12 PROCEDURE — G8428 CUR MEDS NOT DOCUMENT: HCPCS | Performed by: NURSE PRACTITIONER

## 2017-07-12 PROCEDURE — 4040F PNEUMOC VAC/ADMIN/RCVD: CPT | Performed by: NURSE PRACTITIONER

## 2017-07-26 ENCOUNTER — OFFICE VISIT (OUTPATIENT)
Dept: INTERNAL MEDICINE CLINIC | Age: 82
End: 2017-07-26

## 2017-07-26 VITALS
DIASTOLIC BLOOD PRESSURE: 50 MMHG | WEIGHT: 117 LBS | HEART RATE: 80 BPM | SYSTOLIC BLOOD PRESSURE: 130 MMHG | BODY MASS INDEX: 21.53 KG/M2 | HEIGHT: 62 IN | RESPIRATION RATE: 16 BRPM

## 2017-07-26 DIAGNOSIS — E78.5 HYPERLIPIDEMIA, UNSPECIFIED HYPERLIPIDEMIA TYPE: ICD-10-CM

## 2017-07-26 DIAGNOSIS — I25.10 CORONARY ARTERY DISEASE INVOLVING NATIVE CORONARY ARTERY OF NATIVE HEART WITHOUT ANGINA PECTORIS: ICD-10-CM

## 2017-07-26 DIAGNOSIS — E11.59 TYPE 2 DIABETES MELLITUS WITH VASCULAR DISEASE (HCC): Primary | ICD-10-CM

## 2017-07-26 DIAGNOSIS — I10 BENIGN ESSENTIAL HTN: ICD-10-CM

## 2017-07-26 DIAGNOSIS — E11.42 DIABETIC PERIPHERAL NEUROPATHY (HCC): ICD-10-CM

## 2017-07-26 DIAGNOSIS — E11.40 TYPE 2 DIABETES, CONTROLLED, WITH NEUROPATHY (HCC): ICD-10-CM

## 2017-07-26 DIAGNOSIS — I87.2 VENOUS INSUFFICIENCY OF BOTH LOWER EXTREMITIES: ICD-10-CM

## 2017-07-26 DIAGNOSIS — I73.9 PVD (PERIPHERAL VASCULAR DISEASE) (HCC): ICD-10-CM

## 2017-07-26 LAB
A/G RATIO: 2.3 (ref 1.1–2.2)
ALBUMIN SERPL-MCNC: 4.1 G/DL (ref 3.4–5)
ALP BLD-CCNC: 55 U/L (ref 40–129)
ALT SERPL-CCNC: 14 U/L (ref 10–40)
ANION GAP SERPL CALCULATED.3IONS-SCNC: 16 MMOL/L (ref 3–16)
AST SERPL-CCNC: 17 U/L (ref 15–37)
BILIRUB SERPL-MCNC: 0.6 MG/DL (ref 0–1)
BUN BLDV-MCNC: 12 MG/DL (ref 7–20)
CALCIUM SERPL-MCNC: 9.6 MG/DL (ref 8.3–10.6)
CHLORIDE BLD-SCNC: 102 MMOL/L (ref 99–110)
CHOLESTEROL, TOTAL: 89 MG/DL (ref 0–199)
CO2: 26 MMOL/L (ref 21–32)
CREAT SERPL-MCNC: 1 MG/DL (ref 0.6–1.2)
GFR AFRICAN AMERICAN: >60
GFR NON-AFRICAN AMERICAN: 52
GLOBULIN: 1.8 G/DL
GLUCOSE BLD-MCNC: 166 MG/DL (ref 70–99)
HDLC SERPL-MCNC: 58 MG/DL (ref 40–60)
LDL CHOLESTEROL CALCULATED: 7 MG/DL
POTASSIUM SERPL-SCNC: 5.3 MMOL/L (ref 3.5–5.1)
SODIUM BLD-SCNC: 144 MMOL/L (ref 136–145)
TOTAL PROTEIN: 5.9 G/DL (ref 6.4–8.2)
TRIGL SERPL-MCNC: 122 MG/DL (ref 0–150)
VLDLC SERPL CALC-MCNC: 24 MG/DL

## 2017-07-26 PROCEDURE — 4040F PNEUMOC VAC/ADMIN/RCVD: CPT | Performed by: INTERNAL MEDICINE

## 2017-07-26 PROCEDURE — G8420 CALC BMI NORM PARAMETERS: HCPCS | Performed by: INTERNAL MEDICINE

## 2017-07-26 PROCEDURE — 1036F TOBACCO NON-USER: CPT | Performed by: INTERNAL MEDICINE

## 2017-07-26 PROCEDURE — 1090F PRES/ABSN URINE INCON ASSESS: CPT | Performed by: INTERNAL MEDICINE

## 2017-07-26 PROCEDURE — G8598 ASA/ANTIPLAT THER USED: HCPCS | Performed by: INTERNAL MEDICINE

## 2017-07-26 PROCEDURE — 99214 OFFICE O/P EST MOD 30 MIN: CPT | Performed by: INTERNAL MEDICINE

## 2017-07-26 PROCEDURE — 1123F ACP DISCUSS/DSCN MKR DOCD: CPT | Performed by: INTERNAL MEDICINE

## 2017-07-26 PROCEDURE — G8428 CUR MEDS NOT DOCUMENT: HCPCS | Performed by: INTERNAL MEDICINE

## 2017-07-27 LAB
CREATININE URINE: 45.9 MG/DL (ref 28–259)
ESTIMATED AVERAGE GLUCOSE: 134.1 MG/DL
HBA1C MFR BLD: 6.3 %
MICROALBUMIN UR-MCNC: <1.2 MG/DL
MICROALBUMIN/CREAT UR-RTO: NORMAL MG/G (ref 0–30)

## 2017-07-31 ENCOUNTER — TELEPHONE (OUTPATIENT)
Dept: INTERNAL MEDICINE CLINIC | Age: 82
End: 2017-07-31

## 2017-08-21 RX ORDER — LISINOPRIL 10 MG/1
TABLET ORAL
Qty: 90 TABLET | Refills: 3 | Status: SHIPPED | OUTPATIENT
Start: 2017-08-21 | End: 2018-01-05 | Stop reason: DRUGHIGH

## 2017-08-29 ENCOUNTER — OFFICE VISIT (OUTPATIENT)
Dept: ORTHOPEDIC SURGERY | Age: 82
End: 2017-08-29

## 2017-08-29 VITALS — WEIGHT: 117 LBS | HEIGHT: 62 IN | RESPIRATION RATE: 14 BRPM | BODY MASS INDEX: 21.53 KG/M2

## 2017-08-29 DIAGNOSIS — M65.331 TRIGGER MIDDLE FINGER OF RIGHT HAND: Primary | ICD-10-CM

## 2017-08-29 PROCEDURE — 4040F PNEUMOC VAC/ADMIN/RCVD: CPT | Performed by: ORTHOPAEDIC SURGERY

## 2017-08-29 PROCEDURE — G8598 ASA/ANTIPLAT THER USED: HCPCS | Performed by: ORTHOPAEDIC SURGERY

## 2017-08-29 PROCEDURE — 1090F PRES/ABSN URINE INCON ASSESS: CPT | Performed by: ORTHOPAEDIC SURGERY

## 2017-08-29 PROCEDURE — 1123F ACP DISCUSS/DSCN MKR DOCD: CPT | Performed by: ORTHOPAEDIC SURGERY

## 2017-08-29 PROCEDURE — G8427 DOCREV CUR MEDS BY ELIG CLIN: HCPCS | Performed by: ORTHOPAEDIC SURGERY

## 2017-08-29 PROCEDURE — G8420 CALC BMI NORM PARAMETERS: HCPCS | Performed by: ORTHOPAEDIC SURGERY

## 2017-08-29 PROCEDURE — 99213 OFFICE O/P EST LOW 20 MIN: CPT | Performed by: ORTHOPAEDIC SURGERY

## 2017-08-29 PROCEDURE — 1036F TOBACCO NON-USER: CPT | Performed by: ORTHOPAEDIC SURGERY

## 2017-09-07 ENCOUNTER — TELEPHONE (OUTPATIENT)
Dept: ORTHOPEDIC SURGERY | Age: 82
End: 2017-09-07

## 2017-09-12 ENCOUNTER — OFFICE VISIT (OUTPATIENT)
Dept: ORTHOPEDIC SURGERY | Age: 82
End: 2017-09-12

## 2017-09-12 VITALS — WEIGHT: 117 LBS | HEIGHT: 62 IN | BODY MASS INDEX: 21.53 KG/M2 | RESPIRATION RATE: 16 BRPM

## 2017-09-12 DIAGNOSIS — G56.01 CARPAL TUNNEL SYNDROME, RIGHT: Primary | ICD-10-CM

## 2017-09-12 PROCEDURE — G8427 DOCREV CUR MEDS BY ELIG CLIN: HCPCS | Performed by: ORTHOPAEDIC SURGERY

## 2017-09-12 PROCEDURE — G8420 CALC BMI NORM PARAMETERS: HCPCS | Performed by: ORTHOPAEDIC SURGERY

## 2017-09-12 PROCEDURE — G8598 ASA/ANTIPLAT THER USED: HCPCS | Performed by: ORTHOPAEDIC SURGERY

## 2017-09-12 PROCEDURE — 1123F ACP DISCUSS/DSCN MKR DOCD: CPT | Performed by: ORTHOPAEDIC SURGERY

## 2017-09-12 PROCEDURE — 99213 OFFICE O/P EST LOW 20 MIN: CPT | Performed by: ORTHOPAEDIC SURGERY

## 2017-09-12 PROCEDURE — 4040F PNEUMOC VAC/ADMIN/RCVD: CPT | Performed by: ORTHOPAEDIC SURGERY

## 2017-09-12 PROCEDURE — 1036F TOBACCO NON-USER: CPT | Performed by: ORTHOPAEDIC SURGERY

## 2017-09-12 PROCEDURE — 1090F PRES/ABSN URINE INCON ASSESS: CPT | Performed by: ORTHOPAEDIC SURGERY

## 2017-09-14 ENCOUNTER — PROCEDURE VISIT (OUTPATIENT)
Dept: VASCULAR SURGERY | Age: 82
End: 2017-09-14

## 2017-09-14 DIAGNOSIS — Z48.812 ENCOUNTER FOR POSTOPERATIVE SURVEILLANCE OF VASCULAR BYPASS SURGERY: Primary | ICD-10-CM

## 2017-10-04 ENCOUNTER — OFFICE VISIT (OUTPATIENT)
Dept: ORTHOPEDIC SURGERY | Age: 82
End: 2017-10-04

## 2017-10-04 VITALS
HEIGHT: 62 IN | SYSTOLIC BLOOD PRESSURE: 163 MMHG | WEIGHT: 117 LBS | DIASTOLIC BLOOD PRESSURE: 67 MMHG | BODY MASS INDEX: 21.53 KG/M2 | HEART RATE: 76 BPM

## 2017-10-04 DIAGNOSIS — M65.331 TRIGGER MIDDLE FINGER OF RIGHT HAND: Primary | ICD-10-CM

## 2017-10-04 PROCEDURE — 1036F TOBACCO NON-USER: CPT | Performed by: ORTHOPAEDIC SURGERY

## 2017-10-04 PROCEDURE — 20550 NJX 1 TENDON SHEATH/LIGAMENT: CPT | Performed by: ORTHOPAEDIC SURGERY

## 2017-10-04 PROCEDURE — 4040F PNEUMOC VAC/ADMIN/RCVD: CPT | Performed by: ORTHOPAEDIC SURGERY

## 2017-10-04 PROCEDURE — G8420 CALC BMI NORM PARAMETERS: HCPCS | Performed by: ORTHOPAEDIC SURGERY

## 2017-10-04 PROCEDURE — 99214 OFFICE O/P EST MOD 30 MIN: CPT | Performed by: ORTHOPAEDIC SURGERY

## 2017-10-04 PROCEDURE — 1090F PRES/ABSN URINE INCON ASSESS: CPT | Performed by: ORTHOPAEDIC SURGERY

## 2017-10-04 PROCEDURE — G8427 DOCREV CUR MEDS BY ELIG CLIN: HCPCS | Performed by: ORTHOPAEDIC SURGERY

## 2017-10-04 PROCEDURE — G8483 FLU IMM NO ADMIN DOC REA: HCPCS | Performed by: ORTHOPAEDIC SURGERY

## 2017-10-04 PROCEDURE — 1123F ACP DISCUSS/DSCN MKR DOCD: CPT | Performed by: ORTHOPAEDIC SURGERY

## 2017-10-04 PROCEDURE — G8598 ASA/ANTIPLAT THER USED: HCPCS | Performed by: ORTHOPAEDIC SURGERY

## 2017-10-04 NOTE — MR AVS SNAPSHOT
After Visit Summary             Justina Amor   10/4/2017 9:45 AM   Office Visit    Description:  Female : 1929   Provider:  Anyi Mendez MD   Department:  [facility]              Your Follow-Up and Future Appointments         Below is a list of your follow-up and future appointments. This may not be a complete list as you may have made appointments directly with providers that we are not aware of or your providers may have made some for you. Please call your providers to confirm appointments. It is important to keep your appointments. Please bring your current insurance card, photo ID, co-pay, and all medication bottles to your appointment. If self-pay, payment is expected at the time of service. Your To-Do List     Future Appointments Provider Department Dept Phone    10/5/2017 2:30 PM SCHEDULE, [facility] 2 [facility] [address] & [phone]    10/5/2017 3:30 PM Tomer Barrios MD [address] [place] [address] & [phone]    Please arrive 15 minutes prior to appointment, bring photo ID and insurance card. 10/24/2017 1:00 PM Mallory Adams, [address] [place] [address] Care [phone]    Please arrive 15 minutes prior to appointment, bring photo ID and insurance card.          Information from Your Visit        Department     Name Address Phone Fax    Gama Yang [phone] [address] [phone]      Vital Signs     Blood Pressure Pulse Height Weight Body Mass Index Smoking Status    163/67 76 5' 1.5\" (1.562 m) 117 lb (53.1 kg) 21.75 kg/m2 Never Smoker         Medications and Orders      Your Current Medications Are              glimepiride (AMARYL) 4 MG tablet TAKE ONE TABLET BY MOUTH EVERY MORNING BEFORE BREAKFAST    lisinopril (PRINIVIL;ZESTRIL) 10 MG tablet TAKE ONE TABLET BY MOUTH DAILY    VOLTAREN 1 % GEL Apply 2 g topically 4 times daily

## 2017-10-04 NOTE — PROGRESS NOTES
Ms. Andria Jeffery returns today in follow-up of her previously treated  right Middle Finger Trigger Finger. She was last seen in August, 2017 by  Dr. Adry Sanz  at which time she was treated with Conservative Measures. She experienced minimal relief of her initial symptoms. She  has noticed symptom worsening over the last several months. She returns today with Worsened symptoms of right Middle Finger Stenosing Tenosynovitis, requesting further treatment. She also presents today regarding right sided symptoms for which I treated her approximately 18 months ago with injection. A history of antecedent trauma or injury is Absent. She reports symptoms to include mild numbness & tingling in the Median Innervated Digits. Hand symptoms do not Frequently awaken her from sleep. She reports mild pain located in the palmar right wrist. Symptoms show no change over time. The patient's , past medical history, medications, allergies,  family history, social history, and review of systems have been reviewed and are recorded in the chart. Physical Exam:  Vitals  BP: (!) 163/67  Pulse: 76  Height: 5' 1.5\" (156.2 cm)  Weight: 117 lb (53.1 kg)  Ms. Andria Jeffery appears well, she is in no apparent distress, she demonstrates appropriate mood & affect. Skin: Skin color, texture, turgor normal. No rashes or lesions bilaterally  Digital range of motion is mildly osteoarthritis and stiff and painful in the Middle Finger  on the Right, normal on the Left. There is moderate triggering at the A1 pulley of the symptomatic right Middle Finger. Wrist range of motion is full and equal bilateral otherwise normal bilaterally  There is no evidence of gross joint instability bilaterally.   Sensation is subjectively mildly tingling on the Right, normal on the Left  Vascular examination reveals normal and good capillary refill bilaterally  Swelling is mild in the symptomatic digit(s) on the Right, normal on the Left  Muscular

## 2017-10-04 NOTE — Clinical Note
Dear  Fabiana Calvert, DO,  Thank you very much for your referral or Ms. Salvador Garnett to me for evaluation and treatment of her Hand & Wrist condition. I appreciate your confidence in me and thank you for allowing me the opportunity to care for your patients. If I can be of any further assistance to you on this or any other patient, please do not hesitate to contact me. Sincerely,  Alejandra Dumont.  Harvey Haynes MD

## 2017-10-04 NOTE — PATIENT INSTRUCTIONS
Information & Instructions   After Finger, Hand, Wrist, or Elbow Injection    Renuka Abdul MD    You have received an injection of local anesthetic (Bupivicaine without Epinephrine) for comfort & a steroid (Kenalog) for its strong anti-inflammatory effects. In order to give the medication a chance to reduce your inflammation and discomfort, it is recommended that you take it easy for a day or so. You may use your hand and arm as you feel comfortable, but you should avoid highly strenuous activity and heavy use for several days. Relief from the injection will often not begin for several days, and you may not feel full relief for up to one month. It is not uncommon to experience some local discomfort or pain at or around the injection site for a few days. To relieve these symptoms you may do the following if you feel necessary:       Apply ice to the affected area 20 minutes on and 20 minutes off. Do not apply ice directly to the skin. Use a thin layer (T-shirt, pillowcase, towel, etc.) to protect the skin. - If allowed by your other medical physicians, you may take -     2 Tylenol extra strength tablets every 4-6 hours       1-2 Aleve tablets twice a day     2-3 Advil tablets two to three times a day    If you are diabetic, the steroid medication may increase your blood sugar, so you are advised to monitor your sugar more closely so you can adjust it accordingly for a few days following your injection. If you need assistance with the control of you blood sugar, please contact you primary care physician for further advice. I will request that you please call the office one month after your injection at [phone] if you have not experienced relief of your symptoms (unless I have instructed you otherwise). If your injection has given you good relief of you symptoms as expected, then you only need to call the office if your symptoms return.

## 2017-10-12 ENCOUNTER — PROCEDURE VISIT (OUTPATIENT)
Dept: VASCULAR SURGERY | Age: 82
End: 2017-10-12

## 2017-10-12 DIAGNOSIS — Z48.812 ENCOUNTER FOR POSTOPERATIVE SURVEILLANCE OF VASCULAR BYPASS SURGERY: Primary | ICD-10-CM

## 2017-10-12 PROCEDURE — 93926 LOWER EXTREMITY STUDY: CPT | Performed by: SURGERY

## 2017-10-24 ENCOUNTER — OFFICE VISIT (OUTPATIENT)
Dept: INTERNAL MEDICINE CLINIC | Age: 82
End: 2017-10-24

## 2017-10-24 VITALS
BODY MASS INDEX: 20.61 KG/M2 | WEIGHT: 112 LBS | HEIGHT: 62 IN | HEART RATE: 74 BPM | SYSTOLIC BLOOD PRESSURE: 178 MMHG | DIASTOLIC BLOOD PRESSURE: 66 MMHG | RESPIRATION RATE: 16 BRPM

## 2017-10-24 DIAGNOSIS — E11.42 DIABETIC PERIPHERAL NEUROPATHY (HCC): ICD-10-CM

## 2017-10-24 DIAGNOSIS — I10 BENIGN ESSENTIAL HTN: ICD-10-CM

## 2017-10-24 DIAGNOSIS — E11.59 TYPE 2 DIABETES MELLITUS WITH VASCULAR DISEASE (HCC): Primary | ICD-10-CM

## 2017-10-24 DIAGNOSIS — E11.40 TYPE 2 DIABETES, CONTROLLED, WITH NEUROPATHY (HCC): ICD-10-CM

## 2017-10-24 DIAGNOSIS — E78.5 HYPERLIPIDEMIA, UNSPECIFIED HYPERLIPIDEMIA TYPE: ICD-10-CM

## 2017-10-24 DIAGNOSIS — I25.10 CORONARY ARTERY DISEASE INVOLVING NATIVE CORONARY ARTERY OF NATIVE HEART WITHOUT ANGINA PECTORIS: ICD-10-CM

## 2017-10-24 DIAGNOSIS — I73.9 PVD (PERIPHERAL VASCULAR DISEASE) (HCC): ICD-10-CM

## 2017-10-24 LAB
A/G RATIO: 2.1 (ref 1.1–2.2)
ALBUMIN SERPL-MCNC: 4.1 G/DL (ref 3.4–5)
ALP BLD-CCNC: 55 U/L (ref 40–129)
ALT SERPL-CCNC: 13 U/L (ref 10–40)
ANION GAP SERPL CALCULATED.3IONS-SCNC: 17 MMOL/L (ref 3–16)
AST SERPL-CCNC: 18 U/L (ref 15–37)
BILIRUB SERPL-MCNC: 0.4 MG/DL (ref 0–1)
BUN BLDV-MCNC: 16 MG/DL (ref 7–20)
CALCIUM SERPL-MCNC: 9.8 MG/DL (ref 8.3–10.6)
CHLORIDE BLD-SCNC: 97 MMOL/L (ref 99–110)
CHOLESTEROL, TOTAL: 151 MG/DL (ref 0–199)
CO2: 26 MMOL/L (ref 21–32)
CREAT SERPL-MCNC: 0.7 MG/DL (ref 0.6–1.2)
GFR AFRICAN AMERICAN: >60
GFR NON-AFRICAN AMERICAN: >60
GLOBULIN: 2 G/DL
GLUCOSE BLD-MCNC: 151 MG/DL (ref 70–99)
HDLC SERPL-MCNC: 61 MG/DL (ref 40–60)
LDL CHOLESTEROL CALCULATED: 48 MG/DL
POTASSIUM SERPL-SCNC: 4.6 MMOL/L (ref 3.5–5.1)
SODIUM BLD-SCNC: 140 MMOL/L (ref 136–145)
TOTAL PROTEIN: 6.1 G/DL (ref 6.4–8.2)
TRIGL SERPL-MCNC: 210 MG/DL (ref 0–150)
VLDLC SERPL CALC-MCNC: 42 MG/DL

## 2017-10-24 PROCEDURE — 1123F ACP DISCUSS/DSCN MKR DOCD: CPT | Performed by: INTERNAL MEDICINE

## 2017-10-24 PROCEDURE — 4040F PNEUMOC VAC/ADMIN/RCVD: CPT | Performed by: INTERNAL MEDICINE

## 2017-10-24 PROCEDURE — 99214 OFFICE O/P EST MOD 30 MIN: CPT | Performed by: INTERNAL MEDICINE

## 2017-10-24 PROCEDURE — G8483 FLU IMM NO ADMIN DOC REA: HCPCS | Performed by: INTERNAL MEDICINE

## 2017-10-24 PROCEDURE — 1036F TOBACCO NON-USER: CPT | Performed by: INTERNAL MEDICINE

## 2017-10-24 PROCEDURE — G8598 ASA/ANTIPLAT THER USED: HCPCS | Performed by: INTERNAL MEDICINE

## 2017-10-24 PROCEDURE — G8420 CALC BMI NORM PARAMETERS: HCPCS | Performed by: INTERNAL MEDICINE

## 2017-10-24 PROCEDURE — G8428 CUR MEDS NOT DOCUMENT: HCPCS | Performed by: INTERNAL MEDICINE

## 2017-10-24 PROCEDURE — 1090F PRES/ABSN URINE INCON ASSESS: CPT | Performed by: INTERNAL MEDICINE

## 2017-10-24 NOTE — PATIENT INSTRUCTIONS
Check blood pressure twice daily and record. Please bring these recordings into the office in 2 weeks. We will determine whether or not we need to adjust the lisinopril      Patient Education        Learning About High Blood Pressure  What is high blood pressure? Blood pressure is a measure of how hard the blood pushes against the walls of your arteries. It's normal for blood pressure to go up and down throughout the day, but if it stays up, you have high blood pressure. Another name for high blood pressure is hypertension. Two numbers tell you your blood pressure. The first number is the systolic pressure. It shows how hard the blood pushes when your heart is pumping. The second number is the diastolic pressure. It shows how hard the blood pushes between heartbeats, when your heart is relaxed and filling with blood. A blood pressure of less than 120/80 (say \"120 over 80\") is ideal for an adult. High blood pressure is 140/90 or higher. You have high blood pressure if your top number is 140 or higher or your bottom number is 90 or higher, or both. Many people fall into the category in between, called prehypertension. People with prehypertension need to make lifestyle changes to bring their blood pressure down and help prevent or delay high blood pressure. What happens when you have high blood pressure? · Blood flows through your arteries with too much force. Over time, this damages the walls of your arteries. But you can't feel it. High blood pressure usually doesn't cause symptoms. · Fat and calcium start to build up in your arteries. This buildup is called plaque. Plaque makes your arteries narrower and stiffer. Blood can't flow through them as easily. · This lack of good blood flow starts to damage some of the organs in your body. This can lead to problems such as coronary artery disease and heart attack, heart failure, stroke, kidney failure, and eye damage.   How can you prevent high blood pressure? · Stay at a healthy weight. · Try to limit how much sodium you eat to less than 2,300 milligrams (mg) a day. If you limit your sodium to 1,500 mg a day, you can lower your blood pressure even more. ¨ Buy foods that are labeled \"unsalted,\" \"sodium-free,\" or \"low-sodium. \" Foods labeled \"reduced-sodium\" and \"light sodium\" may still have too much sodium. ¨ Flavor your food with garlic, lemon juice, onion, vinegar, herbs, and spices instead of salt. Do not use soy sauce, steak sauce, onion salt, garlic salt, mustard, or ketchup on your food. ¨ Use less salt (or none) when recipes call for it. You can often use half the salt a recipe calls for without losing flavor. · Be physically active. Get at least 30 minutes of exercise on most days of the week. Walking is a good choice. You also may want to do other activities, such as running, swimming, cycling, or playing tennis or team sports. · Limit alcohol to 2 drinks a day for men and 1 drink a day for women. · Eat plenty of fruits, vegetables, and low-fat dairy products. Eat less saturated and total fats. How is high blood pressure treated? · Your doctor will suggest making lifestyle changes. For example, your doctor may ask you to eat healthy foods, quit smoking, lose extra weight, and be more active. · If lifestyle changes don't help enough or your blood pressure is very high, you will have to take medicine every day. Follow-up care is a key part of your treatment and safety. Be sure to make and go to all appointments, and call your doctor if you are having problems. It's also a good idea to know your test results and keep a list of the medicines you take. Where can you learn more? Go to https://salvador.Eventbrite. org and sign in to your InterviewBest account. Enter P501 in the Snoobe box to learn more about \"Learning About High Blood Pressure. \"     If you do not have an account, please click on the \"Sign Up Now\" link.   Current as of: March 23, 2016  Content Version: 11.3  © 7267-2894 [employer]. Care instructions adapted under license by [facility] ([facility]). If you have questions about a medical condition or this instruction, always ask your healthcare professional. [address] any warranty or liability for your use of this information.

## 2017-10-24 NOTE — PROGRESS NOTES
[facility]) Physicians  Internal Medicine  Patient Encounter  Fabiana Calvert D.O., [facility]        Chief Complaint   Patient presents with   Consuelo Brannon    Medication Check       HPI: 80 y.o. female seen today for follow up regarding the status of her current chronic medical problems noted below along with a medication review. There've been no new changes and she offers no new concerns today. She struggles caring for her son who has schizophrenia. She has seen Dr. Gabriel Forward for right CTS. Diabetes Mellitus Type II, Follow-up--   Lab Results   Component Value Date    LABA1C 6.3 07/26/2017      Lab Results   Component Value Date    .1 07/26/2017   Patient feels like she is doing well with her diabetes. She denies any problems with hypoglycemia. Patient is on glimepiride 4 mg daily and metformin 500 mg 3 times a day. She denies any adverse effects from the latter. She does try to adhere to low carb diet except she cheats with ice cream and cookies at night occasionally. Checks sugars at home:Yes. AM--120-170. If she eats ice cream at night, AM sugars will increase. Before bed-- 120-130. Last Eye Exam: 2016. She denies any new visual disturbances. U.Microalbumin/Cr: 7/27/2017  Pt is on ACEI-- Lisinopril. Complications-- Neuropathy (EMG-NCS proven), PVD. She denies any worsening numbness, tingling or burning though she does have chronic symptoms in the right leg. Insulin Treated? No.    ASA: Yes. Tobacco: No  + Statin   LAST LDL-- 7      Hyperlipidemia:    Lab Results   Component Value Date    LDLCALC 7 07/26/2017   Pt states she stopped her Lipitor. She did not discuss this with our office. She states she was told that was OK by another doctor. Her last LDL measurement was 7. This may be way too low. A dose reduction would be warranted. She denies any new development of myalgias, weakness, or ulcer cramps. HTN-- her current regimen includes lisinopril 10 mg daily.  She denies any problems with lightheadedness, syncope, headaches, or new visual disturbances. She further denies any episodes of unilateral weakness or other stroke symptoms. PVDvenous insufficiency--    Previously---> She is S/P Right LE vascular bypass surgery 8/31/2015. Pt was recently taken for angioplasty for graft threatening inflow disease of the Right SFA. She had angioplasty with TERE 7/20/2016. She recently had a Aortogram with BL runoff and SFA angioplasty, Right YOLANDA angioplasty with stent due to threatened Right Fem-pop bypass. She states the right foot still feels \"dead. \" She reports numbness. She does not get as much cramping. She denies claudication and the swelling is better. She is not wearing her compression stockings. She is happy. The right leg still bothers her with burning and pain. She had LE arterial doppler 10/12/2017--  Conclusions        Summary        Right:    The ankle-brachial index suggests mild arterial insufficiency at rest.    Widely patent superficial femoral to posterior tibial bypass graft with    velocities above the threshold associated with impending graft failure.    Elevated velocities in the inflow vessel suggests a >50% stenosis.        Recommendations        In comparison to the previous exam dated 6/8/2017, there are no significant    changes.            CAD-- Patient denies any episodes of chest pain, shortness of breath, palpitations, or syncopal.  The details of her history of coronary disease are vague. Ejection fraction on echo July 27, 2015 was 55-60%.        Past Medical History:   Diagnosis Date    Anxiety     Arthritis     At risk for falls     CAD (coronary artery disease)     CTS (carpal tunnel syndrome)     Bilateral, EMG-NCS    Fractures     (L) Hip Fx 4/25/98, L1 fracture from fall 10-31-06    Hypertension     Mitral regurgitation     Osteopenia     Osteoporosis     PAD (peripheral artery disease) ([facility] [address]     Right LE ischemia    Peripheral neuropathy ([facility] [address] 6/1/2015    Peripheral vascular disease (HCC)     bilateral lower extremities with edema    Spinal stenosis     L4-5    Type 2 diabetes mellitus (HCC)     Urethral stricture 5 years ago    Urgency of urination        Review of Systems - As per HPI      OBJECTIVE:  BP (!) 178/66   Pulse 74   Resp 16   Ht 5' 1.5\" (1.562 m)   Wt 112 lb (50.8 kg)   BMI 20.82 kg/m²    BP Readings from Last 3 Encounters:   10/24/17 (!) 178/66   10/04/17 (!) 163/67   07/26/17 (!) 130/50       GEN: NAD, A&O  HEENT: FLACA, EOMI, Anicteric, Oral cavity Clear. TM's NL. Throat is NL. NECK: Supple. No thyromegaly or nodules. No JVD. No soft tissue masses. LYMPH: No C/SC nodes. CV:  Regular rhythm. Rate is NL. No ectopy. 1/6 systolic murmur  PULM: CTA, no wheezing or crackles. EXT:  No pitting LE edema noted on today's exam.    GI: Abdomen is soft, NT, BS+, No hepatomegaly. No masses. NEURO: No lateralizing deficits. CN 2-12   VASC:  Pedal pulses palpable BL.  R>L carotid bruit (Likely reflective of External carotid stenosis) seen on doppler. Carotid upstrokes 2+. SKIN: No rashes       ASSESSMENT/PLAN:    1. 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   - Lipid Panel  - Comprehensive Metabolic Panel  - Hemoglobin A1C    2. Type 2 diabetes, controlled, with neuropathy ([facility] [address]  As above  - Hemoglobin A1C    3. PVD (peripheral vascular disease) (HCC)  Condition is stable. No signs of claudication. Continue aspirin  Follow-up with vascular surgery as scheduled  - Lipid Panel    4. Benign essential HTN  Condition is labile. Home blood pressure monitoring twice daily  Consider increasing lisinopril to 20 mg daily.   Patient does not want to add any additional medications  Counseled on the risks of uncontrolled high blood pressure  - Lipid Panel  - Comprehensive

## 2017-10-25 ENCOUNTER — TELEPHONE (OUTPATIENT)
Dept: VASCULAR SURGERY | Age: 82
End: 2017-10-25

## 2017-10-25 LAB
ESTIMATED AVERAGE GLUCOSE: 148.5 MG/DL
HBA1C MFR BLD: 6.8 %

## 2017-10-25 NOTE — TELEPHONE ENCOUNTER
I spoke to patient and told her that per Dr Teresa Yepez there is no change in graft scan result. RTO after 1/12/17 for scan & OV.

## 2017-11-10 ENCOUNTER — OFFICE VISIT (OUTPATIENT)
Dept: INTERNAL MEDICINE CLINIC | Age: 82
End: 2017-11-10

## 2017-11-10 VITALS
OXYGEN SATURATION: 95 % | BODY MASS INDEX: 21.52 KG/M2 | WEIGHT: 114 LBS | DIASTOLIC BLOOD PRESSURE: 62 MMHG | SYSTOLIC BLOOD PRESSURE: 170 MMHG | HEART RATE: 72 BPM | HEIGHT: 61 IN

## 2017-11-10 DIAGNOSIS — I10 BENIGN ESSENTIAL HTN: Primary | ICD-10-CM

## 2017-11-10 PROCEDURE — G8598 ASA/ANTIPLAT THER USED: HCPCS | Performed by: INTERNAL MEDICINE

## 2017-11-10 PROCEDURE — 99213 OFFICE O/P EST LOW 20 MIN: CPT | Performed by: INTERNAL MEDICINE

## 2017-11-10 PROCEDURE — 1090F PRES/ABSN URINE INCON ASSESS: CPT | Performed by: INTERNAL MEDICINE

## 2017-11-10 PROCEDURE — G8420 CALC BMI NORM PARAMETERS: HCPCS | Performed by: INTERNAL MEDICINE

## 2017-11-10 PROCEDURE — 1036F TOBACCO NON-USER: CPT | Performed by: INTERNAL MEDICINE

## 2017-11-10 PROCEDURE — G8427 DOCREV CUR MEDS BY ELIG CLIN: HCPCS | Performed by: INTERNAL MEDICINE

## 2017-11-10 PROCEDURE — 1123F ACP DISCUSS/DSCN MKR DOCD: CPT | Performed by: INTERNAL MEDICINE

## 2017-11-10 PROCEDURE — 3288F FALL RISK ASSESSMENT DOCD: CPT | Performed by: INTERNAL MEDICINE

## 2017-11-10 PROCEDURE — 4040F PNEUMOC VAC/ADMIN/RCVD: CPT | Performed by: INTERNAL MEDICINE

## 2017-11-10 PROCEDURE — G8510 SCR DEP NEG, NO PLAN REQD: HCPCS | Performed by: INTERNAL MEDICINE

## 2017-11-10 PROCEDURE — G8483 FLU IMM NO ADMIN DOC REA: HCPCS | Performed by: INTERNAL MEDICINE

## 2017-11-10 RX ORDER — LISINOPRIL 20 MG/1
20 TABLET ORAL DAILY
Qty: 90 TABLET | Refills: 1 | Status: SHIPPED | OUTPATIENT
Start: 2017-11-10 | End: 2018-03-13 | Stop reason: DRUGHIGH

## 2017-11-10 ASSESSMENT — PATIENT HEALTH QUESTIONNAIRE - PHQ9
SUM OF ALL RESPONSES TO PHQ QUESTIONS 1-9: 0
1. LITTLE INTEREST OR PLEASURE IN DOING THINGS: 0
SUM OF ALL RESPONSES TO PHQ9 QUESTIONS 1 & 2: 0
2. FEELING DOWN, DEPRESSED OR HOPELESS: 0

## 2017-11-10 NOTE — PATIENT INSTRUCTIONS
Patient Education        DASH Diet: Care Instructions  Your Care Instructions  The DASH diet is an eating plan that can help lower your blood pressure. DASH stands for Dietary Approaches to Stop Hypertension. Hypertension is high blood pressure. The DASH diet focuses on eating foods that are high in calcium, potassium, and magnesium. These nutrients can lower blood pressure. The foods that are highest in these nutrients are fruits, vegetables, low-fat dairy products, nuts, seeds, and legumes. But taking calcium, potassium, and magnesium supplements instead of eating foods that are high in those nutrients does not have the same effect. The DASH diet also includes whole grains, fish, and poultry. The DASH diet is one of several lifestyle changes your doctor may recommend to lower your high blood pressure. Your doctor may also want you to decrease the amount of sodium in your diet. Lowering sodium while following the DASH diet can lower blood pressure even further than just the DASH diet alone. Follow-up care is a key part of your treatment and safety. Be sure to make and go to all appointments, and call your doctor if you are having problems. It's also a good idea to know your test results and keep a list of the medicines you take. How can you care for yourself at home? Following the DASH diet  · Eat 4 to 5 servings of fruit each day. A serving is 1 medium-sized piece of fruit, ½ cup chopped or canned fruit, 1/4 cup dried fruit, or 4 ounces (½ cup) of fruit juice. Choose fruit more often than fruit juice. · Eat 4 to 5 servings of vegetables each day. A serving is 1 cup of lettuce or raw leafy vegetables, ½ cup of chopped or cooked vegetables, or 4 ounces (½ cup) of vegetable juice. Choose vegetables more often than vegetable juice. · Get 2 to 3 servings of low-fat and fat-free dairy each day. A serving is 8 ounces of milk, 1 cup of yogurt, or 1 ½ ounces of cheese. · Eat 6 to 8 servings of grains each day.  A using beans and peas. Add garbanzo or kidney beans to salads. Make burritos and tacos with mashed kaplan beans or black beans. Where can you learn more? Go to https://Vir2usdeboraheb.YellowSchedule. org and sign in to your Tugg account. Enter G434 in the Volvant box to learn more about \"DASH Diet: Care Instructions. \"     If you do not have an account, please click on the \"Sign Up Now\" link. Current as of: April 3, 2017  Content Version: 11.3  © 2954-1215 [employer] Inc[employer]. Care instructions adapted under license by [facility] ([facility]). If you have questions about a medical condition or this instruction, always ask your healthcare professional. [address] any warranty or liability for your use of this information.

## 2017-11-10 NOTE — PROGRESS NOTES
[facility]) Physicians  Internal Medicine  Patient Encounter  Camden Thompson D.O., [facility]        Chief Complaint   Patient presents with    Follow-up       HPI: 80 y.o. female for F/U regarding her HTN. Home readings are elevated 140's-170's. She denies HA's, dizziness. She was against making a change in medication, but is now willing. She is on Lisinopril 10 mg daily. She has been on Amoxicillin for a dental infection. She finished the medication. She feels better. She is under the care of oral surgeon. She thinks her sugars were higher during this infection--160's. Sugars are better--110-130's. She feels she is doing well for 81 yo. Past Medical History:   Diagnosis Date    Anxiety     Arthritis     At risk for falls     CAD (coronary artery disease)     CTS (carpal tunnel syndrome)     Bilateral, EMG-NCS    Fractures     (L) Hip Fx 4/25/98, L1 fracture from fall 10-31-06    Hypertension     Mitral regurgitation     Osteopenia     Osteoporosis     [facility]AD (peripheral artery disease) ([facility]     Right LE ischemia    Peripheral neuropathy ([address] 6/1/2015    Peripheral vascular disease ([address]     bilateral lower extremities with edema    Spinal stenosis     L4-5    Type 2 diabetes mellitus (HCC)     Urethral stricture 5 years ago    Urgency of urination        Review of Systems - As per HPI      OBJECTIVE:  BP (!) 170/62   Pulse 72   Ht 5' 1\" (1.549 m)   Wt 114 lb (51.7 kg)   SpO2 95%   BMI 21.54 kg/m²   GEN: NAD, A&O, Non-toxic  CV: S1 S2 NL, RRR. No murmurs, clicks or rubs. PULM: CTA, symmentric air exchange  EXT: No edema. ASSESSMENT[de-identified]  Eden Murillo was seen today for follow-up. Diagnoses and all orders for this visit:    Benign essential HTN    Other orders  -     lisinopril (PRINIVIL;ZESTRIL) 20 MG tablet; Take 1 tablet by mouth daily        Additional Plan:  1.  May need CCB such as Norvasc 2.5-5 mg.    2. Counseled on S/S hypotension      Discussed medications with patient who voiced understanding of their use, indication and potential side effects. Pt also understands the above recommendations. All questions answered.

## 2017-12-04 ENCOUNTER — TELEPHONE (OUTPATIENT)
Dept: ORTHOPEDIC SURGERY | Age: 82
End: 2017-12-04

## 2018-01-03 ENCOUNTER — OFFICE VISIT (OUTPATIENT)
Dept: ORTHOPEDIC SURGERY | Age: 83
End: 2018-01-03

## 2018-01-03 VITALS
BODY MASS INDEX: 21.52 KG/M2 | WEIGHT: 114 LBS | DIASTOLIC BLOOD PRESSURE: 71 MMHG | RESPIRATION RATE: 16 BRPM | SYSTOLIC BLOOD PRESSURE: 157 MMHG | HEART RATE: 68 BPM | HEIGHT: 61 IN

## 2018-01-03 DIAGNOSIS — M19.049 LOCALIZED PRIMARY CARPOMETACARPAL OSTEOARTHROSIS, UNSPECIFIED LATERALITY: ICD-10-CM

## 2018-01-03 DIAGNOSIS — G56.01 CARPAL TUNNEL SYNDROME, RIGHT: Primary | ICD-10-CM

## 2018-01-03 PROCEDURE — 1090F PRES/ABSN URINE INCON ASSESS: CPT | Performed by: ORTHOPAEDIC SURGERY

## 2018-01-03 PROCEDURE — 99214 OFFICE O/P EST MOD 30 MIN: CPT | Performed by: ORTHOPAEDIC SURGERY

## 2018-01-03 PROCEDURE — 20526 THER INJECTION CARP TUNNEL: CPT | Performed by: ORTHOPAEDIC SURGERY

## 2018-01-03 PROCEDURE — G8599 NO ASA/ANTIPLAT THER USE RNG: HCPCS | Performed by: ORTHOPAEDIC SURGERY

## 2018-01-03 PROCEDURE — G8483 FLU IMM NO ADMIN DOC REA: HCPCS | Performed by: ORTHOPAEDIC SURGERY

## 2018-01-03 PROCEDURE — 4040F PNEUMOC VAC/ADMIN/RCVD: CPT | Performed by: ORTHOPAEDIC SURGERY

## 2018-01-03 PROCEDURE — G8427 DOCREV CUR MEDS BY ELIG CLIN: HCPCS | Performed by: ORTHOPAEDIC SURGERY

## 2018-01-03 PROCEDURE — 1036F TOBACCO NON-USER: CPT | Performed by: ORTHOPAEDIC SURGERY

## 2018-01-03 PROCEDURE — 1123F ACP DISCUSS/DSCN MKR DOCD: CPT | Performed by: ORTHOPAEDIC SURGERY

## 2018-01-03 PROCEDURE — G8420 CALC BMI NORM PARAMETERS: HCPCS | Performed by: ORTHOPAEDIC SURGERY

## 2018-01-05 ENCOUNTER — TELEPHONE (OUTPATIENT)
Dept: INTERNAL MEDICINE CLINIC | Age: 83
End: 2018-01-05

## 2018-01-05 NOTE — TELEPHONE ENCOUNTER
Per pt. She is on Lisinopril 20mg but BP still goes up to 150-160's. Pt states she feels her pulse was normal but she can't remember the exact number. Pt states she feels like the increase of Lisinopril is causing her to feel off balance.

## 2018-01-10 ENCOUNTER — TELEPHONE (OUTPATIENT)
Dept: INTERNAL MEDICINE CLINIC | Age: 83
End: 2018-01-10

## 2018-01-10 ENCOUNTER — OFFICE VISIT (OUTPATIENT)
Dept: INTERNAL MEDICINE CLINIC | Age: 83
End: 2018-01-10

## 2018-01-10 VITALS
SYSTOLIC BLOOD PRESSURE: 170 MMHG | HEART RATE: 82 BPM | WEIGHT: 112 LBS | DIASTOLIC BLOOD PRESSURE: 66 MMHG | BODY MASS INDEX: 21.16 KG/M2

## 2018-01-10 DIAGNOSIS — I73.9 PVD (PERIPHERAL VASCULAR DISEASE) (HCC): ICD-10-CM

## 2018-01-10 DIAGNOSIS — I10 BENIGN ESSENTIAL HTN: Primary | ICD-10-CM

## 2018-01-10 PROCEDURE — 1123F ACP DISCUSS/DSCN MKR DOCD: CPT | Performed by: INTERNAL MEDICINE

## 2018-01-10 PROCEDURE — G8599 NO ASA/ANTIPLAT THER USE RNG: HCPCS | Performed by: INTERNAL MEDICINE

## 2018-01-10 PROCEDURE — 4040F PNEUMOC VAC/ADMIN/RCVD: CPT | Performed by: INTERNAL MEDICINE

## 2018-01-10 PROCEDURE — G8420 CALC BMI NORM PARAMETERS: HCPCS | Performed by: INTERNAL MEDICINE

## 2018-01-10 PROCEDURE — 1090F PRES/ABSN URINE INCON ASSESS: CPT | Performed by: INTERNAL MEDICINE

## 2018-01-10 PROCEDURE — G8483 FLU IMM NO ADMIN DOC REA: HCPCS | Performed by: INTERNAL MEDICINE

## 2018-01-10 PROCEDURE — 1036F TOBACCO NON-USER: CPT | Performed by: INTERNAL MEDICINE

## 2018-01-10 PROCEDURE — 99213 OFFICE O/P EST LOW 20 MIN: CPT | Performed by: INTERNAL MEDICINE

## 2018-01-10 PROCEDURE — G8427 DOCREV CUR MEDS BY ELIG CLIN: HCPCS | Performed by: INTERNAL MEDICINE

## 2018-01-10 RX ORDER — AMLODIPINE BESYLATE 2.5 MG/1
2.5 TABLET ORAL DAILY
Qty: 30 TABLET | Refills: 3 | Status: SHIPPED | OUTPATIENT
Start: 2018-01-10 | End: 2018-02-12 | Stop reason: SINTOL

## 2018-01-10 NOTE — PATIENT INSTRUCTIONS
Continue monitoring her blood pressure at home. Bring her blood pressure monitor to your next visit please    Please reschedule your appointment with your vascular specialist, Dr. Manuel Reddy      Patient Education        DASH Diet: Care Instructions  Your Care Instructions    The DASH diet is an eating plan that can help lower your blood pressure. DASH stands for Dietary Approaches to Stop Hypertension. Hypertension is high blood pressure. The DASH diet focuses on eating foods that are high in calcium, potassium, and magnesium. These nutrients can lower blood pressure. The foods that are highest in these nutrients are fruits, vegetables, low-fat dairy products, nuts, seeds, and legumes. But taking calcium, potassium, and magnesium supplements instead of eating foods that are high in those nutrients does not have the same effect. The DASH diet also includes whole grains, fish, and poultry. The DASH diet is one of several lifestyle changes your doctor may recommend to lower your high blood pressure. Your doctor may also want you to decrease the amount of sodium in your diet. Lowering sodium while following the DASH diet can lower blood pressure even further than just the DASH diet alone. Follow-up care is a key part of your treatment and safety. Be sure to make and go to all appointments, and call your doctor if you are having problems. It's also a good idea to know your test results and keep a list of the medicines you take. How can you care for yourself at home? Following the DASH diet  · Eat 4 to 5 servings of fruit each day. A serving is 1 medium-sized piece of fruit, ½ cup chopped or canned fruit, 1/4 cup dried fruit, or 4 ounces (½ cup) of fruit juice. Choose fruit more often than fruit juice. · Eat 4 to 5 servings of vegetables each day. A serving is 1 cup of lettuce or raw leafy vegetables, ½ cup of chopped or cooked vegetables, or 4 ounces (½ cup) of vegetable juice.  Choose vegetables more often than

## 2018-01-12 NOTE — TELEPHONE ENCOUNTER
Pt has been informed that she needs to be seen in the office in order to receive a controlled substance if indicated.   Pt states she is feeling better today and will call to schedule an appt

## 2018-01-19 ENCOUNTER — TELEPHONE (OUTPATIENT)
Dept: INTERNAL MEDICINE CLINIC | Age: 83
End: 2018-01-19

## 2018-01-19 NOTE — TELEPHONE ENCOUNTER
Patient called stating that the past 2 days she has felt very off balance and \"not right\". She also is feeling very sleepy. She wants to know if it could be the new bp medication that Dr Shruthi Plata started her on. Her BP isn't changing, it is averaging 155/79. She has been resting most of the day. Please call her back.

## 2018-02-08 ENCOUNTER — OFFICE VISIT (OUTPATIENT)
Dept: VASCULAR SURGERY | Age: 83
End: 2018-02-08

## 2018-02-08 ENCOUNTER — PROCEDURE VISIT (OUTPATIENT)
Dept: VASCULAR SURGERY | Age: 83
End: 2018-02-08

## 2018-02-08 VITALS
SYSTOLIC BLOOD PRESSURE: 152 MMHG | HEIGHT: 61 IN | DIASTOLIC BLOOD PRESSURE: 68 MMHG | BODY MASS INDEX: 21.14 KG/M2 | WEIGHT: 112 LBS

## 2018-02-08 DIAGNOSIS — Z48.812 ENCOUNTER FOR POSTOPERATIVE SURVEILLANCE OF VASCULAR BYPASS SURGERY: Primary | ICD-10-CM

## 2018-02-08 PROCEDURE — 1090F PRES/ABSN URINE INCON ASSESS: CPT | Performed by: SURGERY

## 2018-02-08 PROCEDURE — G8483 FLU IMM NO ADMIN DOC REA: HCPCS | Performed by: SURGERY

## 2018-02-08 PROCEDURE — 4040F PNEUMOC VAC/ADMIN/RCVD: CPT | Performed by: SURGERY

## 2018-02-08 PROCEDURE — 1036F TOBACCO NON-USER: CPT | Performed by: SURGERY

## 2018-02-08 PROCEDURE — 1123F ACP DISCUSS/DSCN MKR DOCD: CPT | Performed by: SURGERY

## 2018-02-08 PROCEDURE — G8427 DOCREV CUR MEDS BY ELIG CLIN: HCPCS | Performed by: SURGERY

## 2018-02-08 PROCEDURE — G8420 CALC BMI NORM PARAMETERS: HCPCS | Performed by: SURGERY

## 2018-02-08 PROCEDURE — 93926 LOWER EXTREMITY STUDY: CPT | Performed by: SURGERY

## 2018-02-08 PROCEDURE — 99212 OFFICE O/P EST SF 10 MIN: CPT | Performed by: SURGERY

## 2018-02-08 PROCEDURE — G8599 NO ASA/ANTIPLAT THER USE RNG: HCPCS | Performed by: SURGERY

## 2018-02-08 NOTE — PROGRESS NOTES
Follow up appt S/P R SFA angioplasty, R iliac stent and L iliac stent 2 years ago for inflow stenoses S/P R SFA-BKpop bypass  Overall same complaints of nonspecific leg pain and \"lumpy-bumpies\". Also swelling controlled \"if\" she wears her stockings. No claudication, rest pain or tissue loss. R foot feels stiff. EXAM:  Palpable R graft pulse at knee with strong R DP pulse; higher pitched bruit in proximal thigh    B feet well perfused. Normal B femoral pulses    Trace to 1+ edema B ankle and feet    GSS today - nl velocities throughout graft. SFA velocity Z2 298 cm/sec    A/P: S/P endovascular treatment diffuse R SFA stenosis - focal recurrent stenosis without graft flow abnormalities. Patent graft with DGV > 45 cm/sec   Continue stockings. F/U 6 months with GSS as scheduled. RTO earlier if any problems or new symptoms. May need to consider reintervention in future.

## 2018-02-12 ENCOUNTER — OFFICE VISIT (OUTPATIENT)
Dept: INTERNAL MEDICINE CLINIC | Age: 83
End: 2018-02-12

## 2018-02-12 VITALS
SYSTOLIC BLOOD PRESSURE: 170 MMHG | RESPIRATION RATE: 16 BRPM | BODY MASS INDEX: 21.14 KG/M2 | HEART RATE: 80 BPM | DIASTOLIC BLOOD PRESSURE: 60 MMHG | WEIGHT: 112 LBS | HEIGHT: 61 IN

## 2018-02-12 DIAGNOSIS — I65.23 CAROTID STENOSIS, ASYMPTOMATIC, BILATERAL: ICD-10-CM

## 2018-02-12 DIAGNOSIS — E78.5 HYPERLIPIDEMIA, UNSPECIFIED HYPERLIPIDEMIA TYPE: ICD-10-CM

## 2018-02-12 DIAGNOSIS — E53.8 B12 DEFICIENCY: ICD-10-CM

## 2018-02-12 DIAGNOSIS — E11.42 DIABETIC PERIPHERAL NEUROPATHY (HCC): ICD-10-CM

## 2018-02-12 DIAGNOSIS — I25.10 CORONARY ARTERY DISEASE INVOLVING NATIVE CORONARY ARTERY OF NATIVE HEART WITHOUT ANGINA PECTORIS: ICD-10-CM

## 2018-02-12 DIAGNOSIS — E11.59 TYPE 2 DIABETES MELLITUS WITH VASCULAR DISEASE (HCC): ICD-10-CM

## 2018-02-12 DIAGNOSIS — F51.04 PSYCHOPHYSIOLOGICAL INSOMNIA: ICD-10-CM

## 2018-02-12 DIAGNOSIS — E55.9 VITAMIN D DEFICIENCY: ICD-10-CM

## 2018-02-12 DIAGNOSIS — E11.40 TYPE 2 DIABETES, CONTROLLED, WITH NEUROPATHY (HCC): ICD-10-CM

## 2018-02-12 DIAGNOSIS — I10 BENIGN ESSENTIAL HTN: Primary | ICD-10-CM

## 2018-02-12 DIAGNOSIS — I73.9 PVD (PERIPHERAL VASCULAR DISEASE) (HCC): ICD-10-CM

## 2018-02-12 LAB
BASOPHILS ABSOLUTE: 0 K/UL (ref 0–0.2)
BASOPHILS RELATIVE PERCENT: 0.6 %
EOSINOPHILS ABSOLUTE: 0 K/UL (ref 0–0.6)
EOSINOPHILS RELATIVE PERCENT: 0.4 %
HCT VFR BLD CALC: 41.7 % (ref 36–48)
HEMOGLOBIN: 14 G/DL (ref 12–16)
LYMPHOCYTES ABSOLUTE: 1.5 K/UL (ref 1–5.1)
LYMPHOCYTES RELATIVE PERCENT: 21.5 %
MCH RBC QN AUTO: 31.2 PG (ref 26–34)
MCHC RBC AUTO-ENTMCNC: 33.5 G/DL (ref 31–36)
MCV RBC AUTO: 93.1 FL (ref 80–100)
MONOCYTES ABSOLUTE: 0.4 K/UL (ref 0–1.3)
MONOCYTES RELATIVE PERCENT: 6 %
NEUTROPHILS ABSOLUTE: 5.1 K/UL (ref 1.7–7.7)
NEUTROPHILS RELATIVE PERCENT: 71.5 %
PDW BLD-RTO: 14.1 % (ref 12.4–15.4)
PLATELET # BLD: 208 K/UL (ref 135–450)
PMV BLD AUTO: 8.5 FL (ref 5–10.5)
RBC # BLD: 4.48 M/UL (ref 4–5.2)
WBC # BLD: 7.1 K/UL (ref 4–11)

## 2018-02-12 PROCEDURE — 99214 OFFICE O/P EST MOD 30 MIN: CPT | Performed by: INTERNAL MEDICINE

## 2018-02-12 PROCEDURE — 1123F ACP DISCUSS/DSCN MKR DOCD: CPT | Performed by: INTERNAL MEDICINE

## 2018-02-12 PROCEDURE — 1090F PRES/ABSN URINE INCON ASSESS: CPT | Performed by: INTERNAL MEDICINE

## 2018-02-12 PROCEDURE — 4040F PNEUMOC VAC/ADMIN/RCVD: CPT | Performed by: INTERNAL MEDICINE

## 2018-02-12 PROCEDURE — G8599 NO ASA/ANTIPLAT THER USE RNG: HCPCS | Performed by: INTERNAL MEDICINE

## 2018-02-12 PROCEDURE — 1036F TOBACCO NON-USER: CPT | Performed by: INTERNAL MEDICINE

## 2018-02-12 PROCEDURE — G8483 FLU IMM NO ADMIN DOC REA: HCPCS | Performed by: INTERNAL MEDICINE

## 2018-02-12 PROCEDURE — G8420 CALC BMI NORM PARAMETERS: HCPCS | Performed by: INTERNAL MEDICINE

## 2018-02-12 PROCEDURE — G8427 DOCREV CUR MEDS BY ELIG CLIN: HCPCS | Performed by: INTERNAL MEDICINE

## 2018-02-12 RX ORDER — LISINOPRIL 40 MG/1
40 TABLET ORAL DAILY
Qty: 30 TABLET | Refills: 3 | Status: SHIPPED | OUTPATIENT
Start: 2018-02-12 | End: 2018-03-13 | Stop reason: DRUGHIGH

## 2018-02-12 NOTE — PROGRESS NOTES
[facility]) Physicians  Internal Medicine  Patient Encounter  Buzz Kaur D.O., [facility]        Chief Complaint   Patient presents with   Tatyana De La Fuente       HPI: 80 y.o. female seen today for follow up regarding the status of her current chronic medical problems noted below along with a medication review. HTN-- Patient's blood pressure frequently accelerated's when she is anxious and nervous which is frequent. A lot of her stress comes from caring for her son who has bipolar disorder and lives at home. She was started on Norvasc 2.5 mg daily along with her lisinopril 20 g daily. Surprisingly she has been compliant with her medications. Blood pressure at home in the month of January showed systolic readings in the 975N and 160s. Her blood pressure remains about the same to this early part of February. She states she does not tolerate the medication thinking that she is off balance due to the medications. She denies any other stroke symptoms. Health Maintenance overdue  Prevnar-- refused  Tdap-- refused      Diabetes Mellitus Type II, Follow-up--   Lab Results   Component Value Date    LABA1C 6.8 10/24/2017      Lab Results   Component Value Date    .5 10/24/2017   Patient states she is taking her metformin 500 mg 3 times a day. She is also on glimepiride 4 mg 1 daily. She denies any polyuria, polydipsia, weight changes. She is checking her sugars. AM-- 150's. In the evening-- 120's. She will go into 80's occasionally. She denies hypoglycemia. She does not adhere to low sugar diet. Last Eye Exam: 2016. She denies any new visual disturbances. U.Microalbumin/Cr: 7/27/2017  Pt is on ACEI-- Lisinopril. No cough  Complications-- Neuropathy (EMG-NCS proven), PVD. Insulin Treated? No.    ASA: Yes.     Tobacco: No  + Statin   LAST LDL-- 48       Hyperlipidemia:    Lab Results   Component Value Date    LDLCALC 48 10/24/2017   Patient is no longer on statin therapy though statin therapy isn't indicated for this patient. She denies any myalgias from the medication. She was told by another doctor that it was okay to stop. It's not clear that doctor was. She is due for lab. PVDvenous insufficiency--    Previously---> She is S/P Right LE vascular bypass surgery 8/31/2015. Pt was recently taken for angioplasty for graft threatening inflow disease of the Right SFA. She had angioplasty with TERE 7/20/2016. She recently had a Aortogram with BL runoff and SFA angioplasty, Right YOLANDA angioplasty with stent due to threatened Right Fem-pop bypass. She states the right foot still feels \"dead. \" She reports numbness. She does not get as much cramping. She was recently seen by Dr. Rupa Aleman for a vascular surgery follow-up. History of right SFA angioplasty with right iliac stent and left iliac stent  Status post right SFA-pop bypass  His note was reviewed in the electronic medical record. He indicated that her right graft pulse was palpable at the knee as well as the dorsalis pedis region. She had a high-pitched bruit in the proximal thigh. Both feet were well perfused. No new changes were made. She'll be seen in 6 months. Unfortunately, she is NOT wearing the compression stockings. She denies any claudication at rest or with exertion. She continues to have some swelling consistent with venous insufficiency. CAD-- Patient is vague with regards to the history of coronary artery disease. Nonetheless she denies any angina, orthopnea, increasing fatigue. Carotid stenosis-- Last doppler 11/2016.  50-69% stenosis on the right.  >50% ECA.   <50% Left ICA. Also, she has additional complaints of poor sleep. She is stressed over her son's health. Her mind races.   She does not want medication     Past Medical History:   Diagnosis Date    Anxiety     Arthritis     At risk for falls     CAD (coronary artery disease)     CTS (carpal tunnel syndrome)     Bilateral, EMG-NCS    Fractures (L) Hip Fx 4/25/98, L1 fracture from fall 10-31-06    Hypertension     Mitral regurgitation     Osteopenia     Osteoporosis     PAD (peripheral artery disease) (HCC)     Right LE ischemia    Peripheral neuropathy (HCC) 6/1/2015    Peripheral vascular disease ([facility] [address]     bilateral lower extremities with edema    Podagra 01/09/2017    Dr. Delfina Hawthorne Spinal stenosis     L4-5    Type 2 diabetes mellitus ([facility] [address]     Urethral stricture 5 years ago    Urgency of urination        Review of Systems - As per HPI      OBJECTIVE:  BP (!) 170/60   Pulse 80   Resp 16   Ht 5' 1\" (1.549 m)   Wt 112 lb (50.8 kg)   BMI 21.16 kg/m²    BP Readings from Last 3 Encounters:   02/12/18 (!) 170/60   02/08/18 (!) 152/68   01/10/18 (!) 170/66     170/60 BL on recheck    GEN: NAD, A&O  HEENT: FLACA, EOMI, Anicteric, Oral cavity Clear. TM's NL. Throat is NL. Mucous members are moist.  NECK: Supple. No thyromegaly or nodules. No JVD. No soft tissue masses Located in the anterior or posterior neck. LYMPH: No C/SC nodes. CV:  Regular rhythm. Rate is NL. No ectopy. 1/6 systolic murmur  PULM: CTA  EXT:  No edema  GI: Abdomen is soft, NT, BS+, No hepatomegaly. No splenomegaly. No masses. NEURO: No lateralizing deficits. VASC:  Pedal pulses palpable BL. Right carotid calm bruit (likely from external carotid artery stenosis). Carotid upstrokes 2+. SKIN: No rashes  PSYCH: Hurried speech, tangential thoughts. ASSESSMENT/PLAN:    1. Benign essential HTN  Condition remains uncontrolled  Patient does not like taking the amlodipine  Increase lisinopril to 40 mg daily  Return in 4 weeks to recheck blood pressure readings  Systolic blood pressure goal is 130150  Encouraged patient to follow a no added salt diet  - lisinopril (PRINIVIL;ZESTRIL) 40 MG tablet; Take 1 tablet by mouth daily  Dispense: 30 tablet; Refill: 3  - Comprehensive Metabolic Panel  - CBC Auto Differential  - TSH without Reflex    2.  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 Due for lab  Her home blood sugar readings have been trending up  Counseled patient on a sensible low simple sugar diet  Continue current medications while watching for hypoglycemia  - Comprehensive Metabolic Panel  - Hemoglobin A1C    3. PVD (peripheral vascular disease) (HCC)  Condition is stable without signs of claudication. Follow-up with Dr. Makenna Brunner in 6 months as scheduled  She will have repeat Dopplers  - Lipid Panel    4. Type 2 diabetes, controlled, with neuropathy ([facility] [address]  As above  - Comprehensive Metabolic Panel  - Hemoglobin A1C    5. Diabetic peripheral neuropathy [facility]  Continue tight diabetic control while watching for hypoglycemia. Goal A1c less than 8%. 6. Coronary artery disease involving native coronary artery of native heart without angina pectoris  Condition is stable without signs of angina or cardiac decompensation  Continue cardiovascular risk factor management. - Lipid Panel    7. B12 deficiency    - VITAMIN B12    8. Psychophysiological insomnia  Continue to observe  Consider Remeron    9. Hyperlipidemia, unspecified hyperlipidemia type  Condition is controlled without statin. Consider starting low-dose statin for cardiovascular risk reduction  - Comprehensive Metabolic Panel  - TSH without Reflex  - Lipid Panel    10. Carotid stenosis, asymptomatic, bilateral  Condition stability is uncertain. Overdue for carotid Doppler  - Ultrasound carotid doppler; Future    11.  Vitamin D deficiency    - Vitamin D 25 Hydroxy

## 2018-02-13 LAB
A/G RATIO: 2.1 (ref 1.1–2.2)
ALBUMIN SERPL-MCNC: 4.1 G/DL (ref 3.4–5)
ALP BLD-CCNC: 50 U/L (ref 40–129)
ALT SERPL-CCNC: 11 U/L (ref 10–40)
ANION GAP SERPL CALCULATED.3IONS-SCNC: 15 MMOL/L (ref 3–16)
AST SERPL-CCNC: 16 U/L (ref 15–37)
BILIRUB SERPL-MCNC: 0.4 MG/DL (ref 0–1)
BUN BLDV-MCNC: 16 MG/DL (ref 7–20)
CALCIUM SERPL-MCNC: 9.9 MG/DL (ref 8.3–10.6)
CHLORIDE BLD-SCNC: 101 MMOL/L (ref 99–110)
CHOLESTEROL, TOTAL: 168 MG/DL (ref 0–199)
CO2: 27 MMOL/L (ref 21–32)
CREAT SERPL-MCNC: 0.8 MG/DL (ref 0.6–1.2)
ESTIMATED AVERAGE GLUCOSE: 159.9 MG/DL
GFR AFRICAN AMERICAN: >60
GFR NON-AFRICAN AMERICAN: >60
GLOBULIN: 2 G/DL
GLUCOSE BLD-MCNC: 139 MG/DL (ref 70–99)
HBA1C MFR BLD: 7.2 %
HDLC SERPL-MCNC: 68 MG/DL (ref 40–60)
LDL CHOLESTEROL CALCULATED: 69 MG/DL
POTASSIUM SERPL-SCNC: 4.2 MMOL/L (ref 3.5–5.1)
SODIUM BLD-SCNC: 143 MMOL/L (ref 136–145)
TOTAL PROTEIN: 6.1 G/DL (ref 6.4–8.2)
TRIGL SERPL-MCNC: 157 MG/DL (ref 0–150)
TSH SERPL DL<=0.05 MIU/L-ACNC: 2.25 UIU/ML (ref 0.27–4.2)
VITAMIN B-12: 556 PG/ML (ref 211–911)
VITAMIN D 25-HYDROXY: 34 NG/ML
VLDLC SERPL CALC-MCNC: 31 MG/DL

## 2018-02-14 ENCOUNTER — HOSPITAL ENCOUNTER (OUTPATIENT)
Dept: VASCULAR LAB | Age: 83
Discharge: HOME | End: 2018-02-14
Attending: INTERNAL MEDICINE | Admitting: INTERNAL MEDICINE

## 2018-02-14 DIAGNOSIS — I65.23 OCCLUSION AND STENOSIS OF BILATERAL CAROTID ARTERIES: ICD-10-CM

## 2018-03-05 ENCOUNTER — TELEPHONE (OUTPATIENT)
Dept: INTERNAL MEDICINE CLINIC | Age: 83
End: 2018-03-05

## 2018-03-05 NOTE — TELEPHONE ENCOUNTER
Pt wants to ask Dr Uma Daigle about a med that her son is taking it is the generic Seroquel 50 mg. He was in the hospital and they sent him home with this med. Pt's son has a new pcp but will not be seen til 4-20. Please advise.

## 2018-03-13 ENCOUNTER — OFFICE VISIT (OUTPATIENT)
Dept: INTERNAL MEDICINE CLINIC | Age: 83
End: 2018-03-13

## 2018-03-13 VITALS
BODY MASS INDEX: 21.73 KG/M2 | DIASTOLIC BLOOD PRESSURE: 60 MMHG | RESPIRATION RATE: 16 BRPM | HEART RATE: 66 BPM | SYSTOLIC BLOOD PRESSURE: 180 MMHG | WEIGHT: 115 LBS

## 2018-03-13 DIAGNOSIS — I10 BENIGN ESSENTIAL HTN: Primary | ICD-10-CM

## 2018-03-13 PROCEDURE — G8420 CALC BMI NORM PARAMETERS: HCPCS | Performed by: INTERNAL MEDICINE

## 2018-03-13 PROCEDURE — G8483 FLU IMM NO ADMIN DOC REA: HCPCS | Performed by: INTERNAL MEDICINE

## 2018-03-13 PROCEDURE — 1090F PRES/ABSN URINE INCON ASSESS: CPT | Performed by: INTERNAL MEDICINE

## 2018-03-13 PROCEDURE — 99213 OFFICE O/P EST LOW 20 MIN: CPT | Performed by: INTERNAL MEDICINE

## 2018-03-13 PROCEDURE — 4040F PNEUMOC VAC/ADMIN/RCVD: CPT | Performed by: INTERNAL MEDICINE

## 2018-03-13 PROCEDURE — G8599 NO ASA/ANTIPLAT THER USE RNG: HCPCS | Performed by: INTERNAL MEDICINE

## 2018-03-13 PROCEDURE — G8427 DOCREV CUR MEDS BY ELIG CLIN: HCPCS | Performed by: INTERNAL MEDICINE

## 2018-03-13 PROCEDURE — 1123F ACP DISCUSS/DSCN MKR DOCD: CPT | Performed by: INTERNAL MEDICINE

## 2018-03-13 PROCEDURE — 1036F TOBACCO NON-USER: CPT | Performed by: INTERNAL MEDICINE

## 2018-03-13 RX ORDER — NIFEDIPINE 30 MG/1
30 TABLET, EXTENDED RELEASE ORAL DAILY
Qty: 30 TABLET | Refills: 3 | Status: SHIPPED | OUTPATIENT
Start: 2018-03-13 | End: 2018-10-01 | Stop reason: ALTCHOICE

## 2018-03-13 RX ORDER — LISINOPRIL 20 MG/1
20 TABLET ORAL DAILY
Qty: 30 TABLET | Refills: 5 | Status: SHIPPED | OUTPATIENT
Start: 2018-03-13 | End: 2018-05-08 | Stop reason: DRUGHIGH

## 2018-03-13 RX ORDER — LISINOPRIL 20 MG/1
20 TABLET ORAL DAILY
COMMUNITY
End: 2018-03-13 | Stop reason: SDUPTHER

## 2018-03-16 ENCOUNTER — TELEPHONE (OUTPATIENT)
Dept: INTERNAL MEDICINE CLINIC | Age: 83
End: 2018-03-16

## 2018-05-08 ENCOUNTER — OFFICE VISIT (OUTPATIENT)
Dept: INTERNAL MEDICINE CLINIC | Age: 83
End: 2018-05-08

## 2018-05-08 VITALS
HEART RATE: 74 BPM | SYSTOLIC BLOOD PRESSURE: 126 MMHG | DIASTOLIC BLOOD PRESSURE: 60 MMHG | WEIGHT: 116 LBS | BODY MASS INDEX: 20.55 KG/M2 | HEIGHT: 63 IN | RESPIRATION RATE: 12 BRPM

## 2018-05-08 DIAGNOSIS — I73.9 PVD (PERIPHERAL VASCULAR DISEASE) (HCC): ICD-10-CM

## 2018-05-08 DIAGNOSIS — E11.42 DIABETIC PERIPHERAL NEUROPATHY (HCC): ICD-10-CM

## 2018-05-08 DIAGNOSIS — E78.5 HYPERLIPIDEMIA, UNSPECIFIED HYPERLIPIDEMIA TYPE: ICD-10-CM

## 2018-05-08 DIAGNOSIS — I25.10 CORONARY ARTERY DISEASE INVOLVING NATIVE CORONARY ARTERY OF NATIVE HEART WITHOUT ANGINA PECTORIS: ICD-10-CM

## 2018-05-08 DIAGNOSIS — E11.59 TYPE 2 DIABETES MELLITUS WITH VASCULAR DISEASE (HCC): Primary | ICD-10-CM

## 2018-05-08 DIAGNOSIS — E53.8 B12 DEFICIENCY: ICD-10-CM

## 2018-05-08 DIAGNOSIS — E11.40 TYPE 2 DIABETES, CONTROLLED, WITH NEUROPATHY (HCC): ICD-10-CM

## 2018-05-08 DIAGNOSIS — I10 BENIGN ESSENTIAL HTN: ICD-10-CM

## 2018-05-08 LAB
A/G RATIO: 2.5 (ref 1.1–2.2)
ALBUMIN SERPL-MCNC: 4.2 G/DL (ref 3.4–5)
ALP BLD-CCNC: 47 U/L (ref 40–129)
ALT SERPL-CCNC: 11 U/L (ref 10–40)
ANION GAP SERPL CALCULATED.3IONS-SCNC: 16 MMOL/L (ref 3–16)
AST SERPL-CCNC: 16 U/L (ref 15–37)
BASOPHILS ABSOLUTE: 0 K/UL (ref 0–0.2)
BASOPHILS RELATIVE PERCENT: 0.8 %
BILIRUB SERPL-MCNC: 0.6 MG/DL (ref 0–1)
BUN BLDV-MCNC: 15 MG/DL (ref 7–20)
CALCIUM SERPL-MCNC: 9.1 MG/DL (ref 8.3–10.6)
CHLORIDE BLD-SCNC: 103 MMOL/L (ref 99–110)
CHOLESTEROL, TOTAL: 122 MG/DL (ref 0–199)
CO2: 26 MMOL/L (ref 21–32)
CREAT SERPL-MCNC: 0.7 MG/DL (ref 0.6–1.2)
EOSINOPHILS ABSOLUTE: 0.1 K/UL (ref 0–0.6)
EOSINOPHILS RELATIVE PERCENT: 1.1 %
FOLATE: >20 NG/ML (ref 4.78–24.2)
GFR AFRICAN AMERICAN: >60
GFR NON-AFRICAN AMERICAN: >60
GLOBULIN: 1.7 G/DL
GLUCOSE BLD-MCNC: 153 MG/DL (ref 70–99)
HCT VFR BLD CALC: 39.3 % (ref 36–48)
HDLC SERPL-MCNC: 70 MG/DL (ref 40–60)
HEMOGLOBIN: 13.5 G/DL (ref 12–16)
LDL CHOLESTEROL CALCULATED: 33 MG/DL
LYMPHOCYTES ABSOLUTE: 1.5 K/UL (ref 1–5.1)
LYMPHOCYTES RELATIVE PERCENT: 26.5 %
MCH RBC QN AUTO: 31.5 PG (ref 26–34)
MCHC RBC AUTO-ENTMCNC: 34.2 G/DL (ref 31–36)
MCV RBC AUTO: 92.1 FL (ref 80–100)
MONOCYTES ABSOLUTE: 0.4 K/UL (ref 0–1.3)
MONOCYTES RELATIVE PERCENT: 7.7 %
NEUTROPHILS ABSOLUTE: 3.7 K/UL (ref 1.7–7.7)
NEUTROPHILS RELATIVE PERCENT: 63.9 %
PDW BLD-RTO: 14.1 % (ref 12.4–15.4)
PLATELET # BLD: 177 K/UL (ref 135–450)
PMV BLD AUTO: 8.4 FL (ref 5–10.5)
POTASSIUM SERPL-SCNC: 4.7 MMOL/L (ref 3.5–5.1)
RBC # BLD: 4.27 M/UL (ref 4–5.2)
SODIUM BLD-SCNC: 145 MMOL/L (ref 136–145)
TOTAL PROTEIN: 5.9 G/DL (ref 6.4–8.2)
TRIGL SERPL-MCNC: 95 MG/DL (ref 0–150)
VITAMIN B-12: 569 PG/ML (ref 211–911)
VLDLC SERPL CALC-MCNC: 19 MG/DL
WBC # BLD: 5.8 K/UL (ref 4–11)

## 2018-05-08 PROCEDURE — 1036F TOBACCO NON-USER: CPT | Performed by: INTERNAL MEDICINE

## 2018-05-08 PROCEDURE — 4040F PNEUMOC VAC/ADMIN/RCVD: CPT | Performed by: INTERNAL MEDICINE

## 2018-05-08 PROCEDURE — G8599 NO ASA/ANTIPLAT THER USE RNG: HCPCS | Performed by: INTERNAL MEDICINE

## 2018-05-08 PROCEDURE — 1123F ACP DISCUSS/DSCN MKR DOCD: CPT | Performed by: INTERNAL MEDICINE

## 2018-05-08 PROCEDURE — 1090F PRES/ABSN URINE INCON ASSESS: CPT | Performed by: INTERNAL MEDICINE

## 2018-05-08 PROCEDURE — G8427 DOCREV CUR MEDS BY ELIG CLIN: HCPCS | Performed by: INTERNAL MEDICINE

## 2018-05-08 PROCEDURE — 99214 OFFICE O/P EST MOD 30 MIN: CPT | Performed by: INTERNAL MEDICINE

## 2018-05-08 PROCEDURE — G8420 CALC BMI NORM PARAMETERS: HCPCS | Performed by: INTERNAL MEDICINE

## 2018-05-08 RX ORDER — LISINOPRIL 20 MG/1
40 TABLET ORAL DAILY
Qty: 60 TABLET | Refills: 5 | Status: SHIPPED | OUTPATIENT
Start: 2018-05-08 | End: 2018-07-12 | Stop reason: DRUGHIGH

## 2018-05-09 LAB
ESTIMATED AVERAGE GLUCOSE: 139.9 MG/DL
HBA1C MFR BLD: 6.5 %

## 2018-05-16 ENCOUNTER — OFFICE VISIT (OUTPATIENT)
Dept: ORTHOPEDIC SURGERY | Age: 83
End: 2018-05-16

## 2018-05-16 VITALS
HEIGHT: 61 IN | SYSTOLIC BLOOD PRESSURE: 156 MMHG | HEART RATE: 64 BPM | WEIGHT: 114 LBS | BODY MASS INDEX: 21.52 KG/M2 | DIASTOLIC BLOOD PRESSURE: 72 MMHG

## 2018-05-16 DIAGNOSIS — M65.331 TRIGGER MIDDLE FINGER OF RIGHT HAND: Primary | ICD-10-CM

## 2018-05-16 PROCEDURE — G8420 CALC BMI NORM PARAMETERS: HCPCS | Performed by: ORTHOPAEDIC SURGERY

## 2018-05-16 PROCEDURE — 4040F PNEUMOC VAC/ADMIN/RCVD: CPT | Performed by: ORTHOPAEDIC SURGERY

## 2018-05-16 PROCEDURE — G8599 NO ASA/ANTIPLAT THER USE RNG: HCPCS | Performed by: ORTHOPAEDIC SURGERY

## 2018-05-16 PROCEDURE — 20550 NJX 1 TENDON SHEATH/LIGAMENT: CPT | Performed by: ORTHOPAEDIC SURGERY

## 2018-05-16 PROCEDURE — G8427 DOCREV CUR MEDS BY ELIG CLIN: HCPCS | Performed by: ORTHOPAEDIC SURGERY

## 2018-05-16 PROCEDURE — 99214 OFFICE O/P EST MOD 30 MIN: CPT | Performed by: ORTHOPAEDIC SURGERY

## 2018-05-16 PROCEDURE — 1123F ACP DISCUSS/DSCN MKR DOCD: CPT | Performed by: ORTHOPAEDIC SURGERY

## 2018-05-16 PROCEDURE — 1036F TOBACCO NON-USER: CPT | Performed by: ORTHOPAEDIC SURGERY

## 2018-05-16 PROCEDURE — 1090F PRES/ABSN URINE INCON ASSESS: CPT | Performed by: ORTHOPAEDIC SURGERY

## 2018-06-19 ENCOUNTER — TELEPHONE (OUTPATIENT)
Dept: INTERNAL MEDICINE CLINIC | Age: 83
End: 2018-06-19

## 2018-06-21 ENCOUNTER — OFFICE VISIT (OUTPATIENT)
Dept: INTERNAL MEDICINE CLINIC | Age: 83
End: 2018-06-21

## 2018-06-21 VITALS
SYSTOLIC BLOOD PRESSURE: 168 MMHG | BODY MASS INDEX: 20.97 KG/M2 | HEART RATE: 60 BPM | DIASTOLIC BLOOD PRESSURE: 68 MMHG | WEIGHT: 111 LBS

## 2018-06-21 DIAGNOSIS — I10 BENIGN ESSENTIAL HTN: ICD-10-CM

## 2018-06-21 DIAGNOSIS — R26.89 BALANCE PROBLEM: ICD-10-CM

## 2018-06-21 DIAGNOSIS — H81.13 BENIGN PAROXYSMAL POSITIONAL VERTIGO DUE TO BILATERAL VESTIBULAR DISORDER: Primary | ICD-10-CM

## 2018-06-21 PROCEDURE — 99213 OFFICE O/P EST LOW 20 MIN: CPT | Performed by: INTERNAL MEDICINE

## 2018-06-21 PROCEDURE — 1123F ACP DISCUSS/DSCN MKR DOCD: CPT | Performed by: INTERNAL MEDICINE

## 2018-06-21 PROCEDURE — 4040F PNEUMOC VAC/ADMIN/RCVD: CPT | Performed by: INTERNAL MEDICINE

## 2018-06-21 PROCEDURE — 1036F TOBACCO NON-USER: CPT | Performed by: INTERNAL MEDICINE

## 2018-06-21 PROCEDURE — G8420 CALC BMI NORM PARAMETERS: HCPCS | Performed by: INTERNAL MEDICINE

## 2018-06-21 PROCEDURE — G8599 NO ASA/ANTIPLAT THER USE RNG: HCPCS | Performed by: INTERNAL MEDICINE

## 2018-06-21 PROCEDURE — G8427 DOCREV CUR MEDS BY ELIG CLIN: HCPCS | Performed by: INTERNAL MEDICINE

## 2018-06-21 PROCEDURE — 1090F PRES/ABSN URINE INCON ASSESS: CPT | Performed by: INTERNAL MEDICINE

## 2018-06-21 RX ORDER — MECLIZINE HYDROCHLORIDE 25 MG/1
25 TABLET ORAL 3 TIMES DAILY PRN
Qty: 30 TABLET | Refills: 1 | Status: SHIPPED | OUTPATIENT
Start: 2018-06-21 | End: 2018-08-09

## 2018-06-28 DIAGNOSIS — I10 BENIGN ESSENTIAL HTN: ICD-10-CM

## 2018-06-28 RX ORDER — LISINOPRIL 20 MG/1
TABLET ORAL
Qty: 60 TABLET | Refills: 4 | Status: SHIPPED | OUTPATIENT
Start: 2018-06-28 | End: 2019-04-28 | Stop reason: SDUPTHER

## 2018-07-12 ENCOUNTER — OFFICE VISIT (OUTPATIENT)
Dept: INTERNAL MEDICINE CLINIC | Age: 83
End: 2018-07-12

## 2018-07-12 VITALS
HEART RATE: 60 BPM | DIASTOLIC BLOOD PRESSURE: 60 MMHG | WEIGHT: 113 LBS | BODY MASS INDEX: 21.35 KG/M2 | SYSTOLIC BLOOD PRESSURE: 138 MMHG | RESPIRATION RATE: 16 BRPM

## 2018-07-12 DIAGNOSIS — R42 DIZZINESS AND GIDDINESS: ICD-10-CM

## 2018-07-12 DIAGNOSIS — R26.89 BALANCE PROBLEM: Primary | ICD-10-CM

## 2018-07-12 PROCEDURE — G8599 NO ASA/ANTIPLAT THER USE RNG: HCPCS | Performed by: INTERNAL MEDICINE

## 2018-07-12 PROCEDURE — 1036F TOBACCO NON-USER: CPT | Performed by: INTERNAL MEDICINE

## 2018-07-12 PROCEDURE — 4040F PNEUMOC VAC/ADMIN/RCVD: CPT | Performed by: INTERNAL MEDICINE

## 2018-07-12 PROCEDURE — 1101F PT FALLS ASSESS-DOCD LE1/YR: CPT | Performed by: INTERNAL MEDICINE

## 2018-07-12 PROCEDURE — G8427 DOCREV CUR MEDS BY ELIG CLIN: HCPCS | Performed by: INTERNAL MEDICINE

## 2018-07-12 PROCEDURE — 99213 OFFICE O/P EST LOW 20 MIN: CPT | Performed by: INTERNAL MEDICINE

## 2018-07-12 PROCEDURE — 1123F ACP DISCUSS/DSCN MKR DOCD: CPT | Performed by: INTERNAL MEDICINE

## 2018-07-12 PROCEDURE — G8420 CALC BMI NORM PARAMETERS: HCPCS | Performed by: INTERNAL MEDICINE

## 2018-07-12 PROCEDURE — 1090F PRES/ABSN URINE INCON ASSESS: CPT | Performed by: INTERNAL MEDICINE

## 2018-07-12 NOTE — PROGRESS NOTES
[facility]) Physicians  Internal Medicine  Patient Encounter  Román Edwards D.O., Quique Powell        Chief Complaint   Patient presents with    Follow-up     Continues to have intermittent episodes of dizziness. HPI: 80 y.o. female seen again today for complaints of dizziness. She was seen on 6/21/2018. Patient thought that her dizziness was related to her blood pressure and blood pressure medication. As she usually does she ultra the dose of her lisinopril. She describes the dizziness as feeling off balance. She denies spinning. Patient feels she staggers and has to hold on to something to help prevent from falling. She has seen ENT in the past and was given [place] exercises to help. These did help in the past.  Exacerbating factors include extending her neck backwards. Echo seen in [place] exercises were tried again back in June. We discussed the need for possible neurology. She states the Meclizine helped taking them three times daily but she continues to have symptoms when she extends her neck backwards. She was feeling off balance 4-5 times daily, but the medication decreased that to 2 times daily. Today she states she is fine-- even extending her neck backwards causes no problems. She does not want to go to therapy. Carotid doppler in Feb showed no worsening-- 50-69% stenosis on the right, <50% on the left. Vertebral arteries showed antegrade flow. She is doing the Stellarray exercise which she feels are helping.     She wants to finish out her pills and then she will consider a referral.        Past Medical History:   Diagnosis Date    Anxiety     Arthritis     At risk for falls     CAD (coronary artery disease)     CTS (carpal tunnel syndrome)     Bilateral, EMG-NCS    Fractures     (L) Hip Fx 4/25/98, L1 fracture from fall 10-31-06    Hypertension     Mitral regurgitation     Osteopenia     Osteoporosis     PAD (peripheral artery disease) (HCC)     Right LE

## 2018-07-12 NOTE — PATIENT INSTRUCTIONS
Please call the office to let us know how your balance is doing after you have finished the pills. You may need to see neurology and have further imaging of the neck arteries and brain arteries  Patient Education        Dizziness: Care Instructions  Your Care Instructions  Dizziness is the feeling of unsteadiness or fuzziness in your head. It is different than having vertigo, which is a feeling that the room is spinning or that you are moving or falling. It is also different from lightheadedness, which is the feeling that you are about to faint. It can be hard to know what causes dizziness. Some people feel dizzy when they have migraine headaches. Sometimes bouts of flu can make you feel dizzy. Some medical conditions, such as heart problems or high blood pressure, can make you feel dizzy. Many medicines can cause dizziness, including medicines for high blood pressure, pain, or anxiety. If a medicine causes your symptoms, your doctor may recommend that you stop or change the medicine. If it is a problem with your heart, you may need medicine to help your heart work better. If there is no clear reason for your symptoms, your doctor may suggest watching and waiting for a while to see if the dizziness goes away on its own. Follow-up care is a key part of your treatment and safety. Be sure to make and go to all appointments, and call your doctor if you are having problems. It's also a good idea to know your test results and keep a list of the medicines you take. How can you care for yourself at home? · If your doctor recommends or prescribes medicine, take it exactly as directed. Call your doctor if you think you are having a problem with your medicine. · Do not drive while you feel dizzy. · Try to prevent falls. Steps you can take include:  ¨ Using nonskid mats, adding grab bars near the tub, and using night-lights. ¨ Clearing your home so that walkways are free of anything you might trip on.   [facility]ley [facility] [employer] family

## 2018-08-09 ENCOUNTER — OFFICE VISIT (OUTPATIENT)
Dept: INTERNAL MEDICINE CLINIC | Age: 83
End: 2018-08-09

## 2018-08-09 VITALS
DIASTOLIC BLOOD PRESSURE: 60 MMHG | RESPIRATION RATE: 12 BRPM | SYSTOLIC BLOOD PRESSURE: 146 MMHG | HEIGHT: 63 IN | WEIGHT: 119 LBS | HEART RATE: 78 BPM | BODY MASS INDEX: 21.09 KG/M2

## 2018-08-09 DIAGNOSIS — E11.59 TYPE 2 DIABETES MELLITUS WITH VASCULAR DISEASE (HCC): Primary | ICD-10-CM

## 2018-08-09 DIAGNOSIS — I25.10 CORONARY ARTERY DISEASE INVOLVING NATIVE CORONARY ARTERY OF NATIVE HEART WITHOUT ANGINA PECTORIS: ICD-10-CM

## 2018-08-09 DIAGNOSIS — S76.011A MUSCLE STRAIN OF RIGHT GLUTEAL REGION, INITIAL ENCOUNTER: ICD-10-CM

## 2018-08-09 DIAGNOSIS — I34.0 MITRAL VALVE INSUFFICIENCY, UNSPECIFIED ETIOLOGY: ICD-10-CM

## 2018-08-09 DIAGNOSIS — E11.42 DIABETIC PERIPHERAL NEUROPATHY (HCC): ICD-10-CM

## 2018-08-09 DIAGNOSIS — R06.02 SOB (SHORTNESS OF BREATH) ON EXERTION: ICD-10-CM

## 2018-08-09 DIAGNOSIS — E78.5 HYPERLIPIDEMIA, UNSPECIFIED HYPERLIPIDEMIA TYPE: ICD-10-CM

## 2018-08-09 DIAGNOSIS — I10 BENIGN ESSENTIAL HTN: ICD-10-CM

## 2018-08-09 DIAGNOSIS — E11.649 TYPE 2 DIABETES MELLITUS WITH HYPOGLYCEMIA WITHOUT COMA, WITHOUT LONG-TERM CURRENT USE OF INSULIN (HCC): ICD-10-CM

## 2018-08-09 DIAGNOSIS — I73.9 PAD (PERIPHERAL ARTERY DISEASE) (HCC): ICD-10-CM

## 2018-08-09 DIAGNOSIS — E11.40 TYPE 2 DIABETES, CONTROLLED, WITH NEUROPATHY (HCC): ICD-10-CM

## 2018-08-09 PROBLEM — B35.1 ONYCHOMYCOSIS: Status: RESOLVED | Noted: 2017-02-15 | Resolved: 2018-08-09

## 2018-08-09 PROCEDURE — 1090F PRES/ABSN URINE INCON ASSESS: CPT | Performed by: INTERNAL MEDICINE

## 2018-08-09 PROCEDURE — 1036F TOBACCO NON-USER: CPT | Performed by: INTERNAL MEDICINE

## 2018-08-09 PROCEDURE — 4040F PNEUMOC VAC/ADMIN/RCVD: CPT | Performed by: INTERNAL MEDICINE

## 2018-08-09 PROCEDURE — G8420 CALC BMI NORM PARAMETERS: HCPCS | Performed by: INTERNAL MEDICINE

## 2018-08-09 PROCEDURE — 1123F ACP DISCUSS/DSCN MKR DOCD: CPT | Performed by: INTERNAL MEDICINE

## 2018-08-09 PROCEDURE — G8427 DOCREV CUR MEDS BY ELIG CLIN: HCPCS | Performed by: INTERNAL MEDICINE

## 2018-08-09 PROCEDURE — G8599 NO ASA/ANTIPLAT THER USE RNG: HCPCS | Performed by: INTERNAL MEDICINE

## 2018-08-09 PROCEDURE — 99214 OFFICE O/P EST MOD 30 MIN: CPT | Performed by: INTERNAL MEDICINE

## 2018-08-09 PROCEDURE — 1101F PT FALLS ASSESS-DOCD LE1/YR: CPT | Performed by: INTERNAL MEDICINE

## 2018-08-09 RX ORDER — GLIMEPIRIDE 4 MG/1
2 TABLET ORAL EVERY MORNING
Qty: 30 TABLET | Refills: 3
Start: 2018-08-09 | End: 2018-09-04 | Stop reason: SDUPTHER

## 2018-08-09 NOTE — PATIENT INSTRUCTIONS
is not getting enough fuel. If you have diabetes, your blood sugar can go too low if you take too much of some diabetes medicines. It can also go too low if you miss a meal. And it can happen if you exercise too hard without eating enough food. Some medicines used to treat other health problems can cause low blood sugar too. What are the symptoms? Symptoms of low blood sugar can start quickly. It may take just 10 to 15 minutes. If you have had diabetes for many years, you may not realize that your blood sugar is low until it drops very low. · If your blood sugar level drops below 70 (mild low blood sugar), you may feel tired, anxious, dizzy, weak, shaky, or sweaty. You may have a fast heartbeat or blurry vision. · If your blood sugar level continues to drop (usually below 40), your behavior may change. You may feel more irritable. You may find it hard to concentrate or talk. And you may feel unsteady when you stand or walk. You may become too weak or confused to eat something with sugar to raise your blood sugar level. · If your blood sugar level drops very low (usually below 20), you may pass out (lose consciousness). Or you may have a seizure or stroke. If you have symptoms of severe low blood sugar, you need to get medical care right away. If you had a low blood sugar level during the night, you may wake up tired or with a headache. Or you may sweat so much during the night that your pajamas or sheets are damp when you wake up. How is low blood sugar treated? You can treat low blood sugar by eating or drinking something that has 15 grams of carbohydrate. These should be quick-sugar foods. Check your blood sugar level again 15 minutes after having a quick-sugar food to make sure your level is getting back to your target range.   Here are examples of quick-sugar foods that have 15 grams of carbohydrate:  · 3 to 4 glucose tablets  · 1 tube of glucose gel  · Hard candy (such as 3 Jolly Ranchers or 5 to 7 Life Savers)  · 1 tablespoon honey  · 2 tablespoons of raisins  · ½ cup to ¾ cup (4 to 6 ounces) of fruit juice or regular (not diet) soda  · 1 tablespoon of sugar  · 1 cup of fat-free milk  If you have problems with severe low blood sugar, someone else may have to give you a shot of glucagon. This is a hormone that raises blood sugar levels quickly. How can you prevent low blood sugar? You can take steps to prevent low blood sugar. · Follow your treatment plan. Take your insulin or other diabetes medicine exactly as your doctor prescribed it. Talk with your doctor if you're having low blood sugar often. Your medicine may need to be adjusted if it's causing your low blood sugar. · Check your blood sugar levels often. This helps you find early changes before an emergency happens. · Keep a quick-sugar food with you in case your blood sugar level drops low. · Eat small meals more often so that you don't get too hungry between meals. Don't skip meals. · Balance extra exercise with eating more. Check your blood sugar and learn how it changes after exercise. If your blood sugar stays at a normal level, you may not need to eat after you exercise. · Limit how much alcohol you drink. Alcohol can make low blood sugar go even lower. Don't drink alcohol if you have problems recognizing the early signs of low blood sugar. · Keep a diary of your symptoms. This helps you learn when changes in your body may signal low blood sugar. And keep track of how often you have low blood sugar, including when you last ate and what you ate. This will help you learn what causes your blood sugar to drop. · Learn about diabetes and low blood sugar. Support groups or a diabetes education center can help you understand how medicines, diet, and exercise affect your blood sugar levels. Since low blood sugar levels can quickly become an emergency, be sure to wear medical alert jewelry, such as a medical alert bracelet.  This is to let people know you have diabetes so they can get help for you. You can buy this at most drugstores. And make sure your family, friends, and coworkers know the symptoms of low blood sugar. Teach them what to do to get your sugar level up. Follow-up care is a key part of your treatment and safety. Be sure to make and go to all appointments, and call your doctor if you are having problems. It's also a good idea to know your test results and keep a list of the medicines you take. Where can you learn more? Go to [email or web address] org and sign in to your [employer] account. Enter B991 in the Peerlyst box to learn more about \"Learning About Low Blood Sugar (Hypoglycemia) in Diabetes. \"     If you do not have an account, please click on the \"Sign Up Now\" link. Current as of: December 7, 2017  Content Version: 11.7  © 5579-1063 [employer]. Care instructions adapted under license by [facility] ([facility]). If you have questions about a medical condition or this instruction, always ask your healthcare professional. [place] [address] any warranty or liability for your use of this information.

## 2018-08-10 ENCOUNTER — TELEPHONE (OUTPATIENT)
Dept: ORTHOPEDIC SURGERY | Age: 83
End: 2018-08-10

## 2018-08-15 ENCOUNTER — OFFICE VISIT (OUTPATIENT)
Dept: ORTHOPEDIC SURGERY | Age: 83
End: 2018-08-15

## 2018-08-15 VITALS
HEART RATE: 67 BPM | HEIGHT: 63 IN | DIASTOLIC BLOOD PRESSURE: 83 MMHG | WEIGHT: 119 LBS | SYSTOLIC BLOOD PRESSURE: 173 MMHG | BODY MASS INDEX: 21.09 KG/M2

## 2018-08-15 DIAGNOSIS — M79.641 PAIN OF RIGHT HAND: Primary | ICD-10-CM

## 2018-08-15 PROCEDURE — 4040F PNEUMOC VAC/ADMIN/RCVD: CPT | Performed by: ORTHOPAEDIC SURGERY

## 2018-08-15 PROCEDURE — 1101F PT FALLS ASSESS-DOCD LE1/YR: CPT | Performed by: ORTHOPAEDIC SURGERY

## 2018-08-15 PROCEDURE — 1123F ACP DISCUSS/DSCN MKR DOCD: CPT | Performed by: ORTHOPAEDIC SURGERY

## 2018-08-15 PROCEDURE — 1090F PRES/ABSN URINE INCON ASSESS: CPT | Performed by: ORTHOPAEDIC SURGERY

## 2018-08-15 PROCEDURE — G8599 NO ASA/ANTIPLAT THER USE RNG: HCPCS | Performed by: ORTHOPAEDIC SURGERY

## 2018-08-15 PROCEDURE — 1036F TOBACCO NON-USER: CPT | Performed by: ORTHOPAEDIC SURGERY

## 2018-08-15 PROCEDURE — G8420 CALC BMI NORM PARAMETERS: HCPCS | Performed by: ORTHOPAEDIC SURGERY

## 2018-08-15 PROCEDURE — 20526 THER INJECTION CARP TUNNEL: CPT | Performed by: ORTHOPAEDIC SURGERY

## 2018-08-15 PROCEDURE — 99214 OFFICE O/P EST MOD 30 MIN: CPT | Performed by: ORTHOPAEDIC SURGERY

## 2018-08-15 PROCEDURE — G8427 DOCREV CUR MEDS BY ELIG CLIN: HCPCS | Performed by: ORTHOPAEDIC SURGERY

## 2018-08-15 NOTE — PROGRESS NOTES
Ms. 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 She was last seen in May, 2018 at which time she was treated with Steroid Injection. She experienced moderate relief of her initial symptoms. She  has noticed symptoms of right hand pain and some sensory symptoms in the median innervated digits over the last several weeks. She returns today with Worsened symptoms of what may represent right Carpal Tunnel Syndrome, requesting further treatment. The patient's , past medical history, medications, allergies,  family history, social history, and review of systems have been reviewed and are recorded in the chart. Physical Exam:  Vitals  BP: (!) 173/83  Pulse: 67  Height: 5' 3\" (160 cm)  Weight: 119 lb (54 kg)  Ms. Brannon Yu appears well, she is in no apparent distress, she demonstrates appropriate mood & affect. Skin, bilaterally: Skin color, texture, turgor normal. No rashes or lesions. Digital range of motion is limited by distal osteoarthritis with some proximal interphalangeal joint contracture of the right  Middle Finger . Wrist range of motion is equal bilateral.   There is no evidence of gross joint instability bilaterally. Sensation is subjectively tingling in the Median Innervated Digits and objectively present in the same distribution bilaterally. Vascular examination reveals normal and good capillary refill bilaterally. Swelling is mild in the distal volar forearm on the Right, greater than Left. Muscular strength is clinically appropriate bilaterally. Examination for Stenosing Tenosynovitis demonstrates no evidence of tenderness, thickening or nodularity at the A-1 pulleys of the digits bilaterally. There no palpable triggering or any finger or thumb. Examination for Carpal Tunnel Syndrome shows Carpal Tunnel Compression Test to be mildly positive on the right & negative  on the left.   The patient displays mild baseline

## 2018-08-16 ENCOUNTER — OFFICE VISIT (OUTPATIENT)
Dept: VASCULAR SURGERY | Age: 83
End: 2018-08-16

## 2018-08-16 ENCOUNTER — PROCEDURE VISIT (OUTPATIENT)
Dept: VASCULAR SURGERY | Age: 83
End: 2018-08-16

## 2018-08-16 VITALS
SYSTOLIC BLOOD PRESSURE: 138 MMHG | HEIGHT: 63 IN | BODY MASS INDEX: 20.38 KG/M2 | WEIGHT: 115 LBS | DIASTOLIC BLOOD PRESSURE: 70 MMHG

## 2018-08-16 DIAGNOSIS — Z48.812 ENCOUNTER FOR POSTOPERATIVE SURVEILLANCE OF VASCULAR BYPASS SURGERY: Primary | ICD-10-CM

## 2018-08-16 DIAGNOSIS — I73.9 PAD (PERIPHERAL ARTERY DISEASE) (HCC): ICD-10-CM

## 2018-08-16 PROCEDURE — 4040F PNEUMOC VAC/ADMIN/RCVD: CPT | Performed by: SURGERY

## 2018-08-16 PROCEDURE — G8599 NO ASA/ANTIPLAT THER USE RNG: HCPCS | Performed by: SURGERY

## 2018-08-16 PROCEDURE — 1123F ACP DISCUSS/DSCN MKR DOCD: CPT | Performed by: SURGERY

## 2018-08-16 PROCEDURE — G8420 CALC BMI NORM PARAMETERS: HCPCS | Performed by: SURGERY

## 2018-08-16 PROCEDURE — 93926 LOWER EXTREMITY STUDY: CPT | Performed by: SURGERY

## 2018-08-16 PROCEDURE — 1090F PRES/ABSN URINE INCON ASSESS: CPT | Performed by: SURGERY

## 2018-08-16 PROCEDURE — G8427 DOCREV CUR MEDS BY ELIG CLIN: HCPCS | Performed by: SURGERY

## 2018-08-16 PROCEDURE — 1101F PT FALLS ASSESS-DOCD LE1/YR: CPT | Performed by: SURGERY

## 2018-08-16 PROCEDURE — 1036F TOBACCO NON-USER: CPT | Performed by: SURGERY

## 2018-08-16 PROCEDURE — 99212 OFFICE O/P EST SF 10 MIN: CPT | Performed by: SURGERY

## 2018-08-23 ENCOUNTER — HOSPITAL ENCOUNTER (OUTPATIENT)
Dept: NON INVASIVE DIAGNOSTICS | Age: 83
Discharge: HOME | End: 2018-08-23
Payer: MEDICARE

## 2018-08-23 DIAGNOSIS — I34.0 MITRAL VALVE INSUFFICIENCY, UNSPECIFIED ETIOLOGY: ICD-10-CM

## 2018-08-23 LAB
LV EF: 58 %
LVEF MODALITY: NORMAL

## 2018-08-23 PROCEDURE — 93306 TTE W/DOPPLER COMPLETE: CPT

## 2018-08-24 DIAGNOSIS — I34.0 SEVERE MITRAL REGURGITATION: Primary | ICD-10-CM

## 2018-08-24 DIAGNOSIS — I25.10 CORONARY ARTERY DISEASE INVOLVING NATIVE CORONARY ARTERY OF NATIVE HEART WITHOUT ANGINA PECTORIS: ICD-10-CM

## 2018-09-04 DIAGNOSIS — E11.59 TYPE 2 DIABETES MELLITUS WITH VASCULAR DISEASE (HCC): ICD-10-CM

## 2018-09-04 RX ORDER — GLIMEPIRIDE 4 MG/1
TABLET ORAL
Qty: 90 TABLET | Refills: 0 | Status: SHIPPED | OUTPATIENT
Start: 2018-09-04 | End: 2018-12-05 | Stop reason: SDUPTHER

## 2018-09-14 ENCOUNTER — TELEPHONE (OUTPATIENT)
Dept: INTERNAL MEDICINE CLINIC | Age: 83
End: 2018-09-14

## 2018-09-17 NOTE — TELEPHONE ENCOUNTER
Blood work in August showed no evidence of anemia. At this point there is no clinical indication to check an iron level. If her iron were deficient we would expect to see some iron deficiency anemia. Happy to have her in for an evaluation.

## 2018-09-27 ENCOUNTER — OFFICE VISIT (OUTPATIENT)
Dept: INTERNAL MEDICINE CLINIC | Age: 83
End: 2018-09-27
Payer: MEDICARE

## 2018-09-27 VITALS
SYSTOLIC BLOOD PRESSURE: 170 MMHG | WEIGHT: 114 LBS | OXYGEN SATURATION: 98 % | DIASTOLIC BLOOD PRESSURE: 64 MMHG | BODY MASS INDEX: 20.19 KG/M2 | HEART RATE: 68 BPM

## 2018-09-27 DIAGNOSIS — I34.0 SEVERE MITRAL REGURGITATION: ICD-10-CM

## 2018-09-27 DIAGNOSIS — R53.83 FATIGUE, UNSPECIFIED TYPE: ICD-10-CM

## 2018-09-27 DIAGNOSIS — R19.8 INCREASED ABDOMINAL GIRTH: Primary | ICD-10-CM

## 2018-09-27 DIAGNOSIS — R19.8 INCREASED ABDOMINAL GIRTH: ICD-10-CM

## 2018-09-27 LAB
ALBUMIN SERPL-MCNC: 4.1 G/DL (ref 3.4–5)
ALP BLD-CCNC: 57 U/L (ref 40–129)
ALT SERPL-CCNC: 13 U/L (ref 10–40)
AMYLASE: 67 U/L (ref 25–115)
ANION GAP SERPL CALCULATED.3IONS-SCNC: 13 MMOL/L (ref 3–16)
AST SERPL-CCNC: 18 U/L (ref 15–37)
BASOPHILS ABSOLUTE: 0.1 K/UL (ref 0–0.2)
BASOPHILS RELATIVE PERCENT: 0.9 %
BILIRUB SERPL-MCNC: 0.5 MG/DL (ref 0–1)
BILIRUBIN DIRECT: <0.2 MG/DL (ref 0–0.3)
BILIRUBIN, INDIRECT: ABNORMAL MG/DL (ref 0–1)
BUN BLDV-MCNC: 12 MG/DL (ref 7–20)
CALCIUM SERPL-MCNC: 9.7 MG/DL (ref 8.3–10.6)
CHLORIDE BLD-SCNC: 101 MMOL/L (ref 99–110)
CO2: 28 MMOL/L (ref 21–32)
CREAT SERPL-MCNC: 0.7 MG/DL (ref 0.6–1.2)
EOSINOPHILS ABSOLUTE: 0.1 K/UL (ref 0–0.6)
EOSINOPHILS RELATIVE PERCENT: 1.1 %
GFR AFRICAN AMERICAN: >60
GFR NON-AFRICAN AMERICAN: >60
GLUCOSE BLD-MCNC: 184 MG/DL (ref 70–99)
HCT VFR BLD CALC: 38.3 % (ref 36–48)
HEMOGLOBIN: 12.9 G/DL (ref 12–16)
LIPASE: 31 U/L (ref 13–60)
LYMPHOCYTES ABSOLUTE: 1.5 K/UL (ref 1–5.1)
LYMPHOCYTES RELATIVE PERCENT: 23.8 %
MCH RBC QN AUTO: 31.6 PG (ref 26–34)
MCHC RBC AUTO-ENTMCNC: 33.7 G/DL (ref 31–36)
MCV RBC AUTO: 93.7 FL (ref 80–100)
MONOCYTES ABSOLUTE: 0.5 K/UL (ref 0–1.3)
MONOCYTES RELATIVE PERCENT: 8 %
NEUTROPHILS ABSOLUTE: 4.1 K/UL (ref 1.7–7.7)
NEUTROPHILS RELATIVE PERCENT: 66.2 %
PDW BLD-RTO: 14 % (ref 12.4–15.4)
PLATELET # BLD: 206 K/UL (ref 135–450)
PMV BLD AUTO: 8.4 FL (ref 5–10.5)
POTASSIUM SERPL-SCNC: 4.7 MMOL/L (ref 3.5–5.1)
RBC # BLD: 4.08 M/UL (ref 4–5.2)
SODIUM BLD-SCNC: 142 MMOL/L (ref 136–145)
TOTAL PROTEIN: 5.9 G/DL (ref 6.4–8.2)
WBC # BLD: 6.1 K/UL (ref 4–11)

## 2018-09-27 PROCEDURE — 1036F TOBACCO NON-USER: CPT | Performed by: INTERNAL MEDICINE

## 2018-09-27 PROCEDURE — 1123F ACP DISCUSS/DSCN MKR DOCD: CPT | Performed by: INTERNAL MEDICINE

## 2018-09-27 PROCEDURE — 4040F PNEUMOC VAC/ADMIN/RCVD: CPT | Performed by: INTERNAL MEDICINE

## 2018-09-27 PROCEDURE — 99214 OFFICE O/P EST MOD 30 MIN: CPT | Performed by: INTERNAL MEDICINE

## 2018-09-27 PROCEDURE — 1090F PRES/ABSN URINE INCON ASSESS: CPT | Performed by: INTERNAL MEDICINE

## 2018-09-27 PROCEDURE — G8420 CALC BMI NORM PARAMETERS: HCPCS | Performed by: INTERNAL MEDICINE

## 2018-09-27 PROCEDURE — G8427 DOCREV CUR MEDS BY ELIG CLIN: HCPCS | Performed by: INTERNAL MEDICINE

## 2018-09-27 PROCEDURE — 1101F PT FALLS ASSESS-DOCD LE1/YR: CPT | Performed by: INTERNAL MEDICINE

## 2018-09-27 PROCEDURE — G8599 NO ASA/ANTIPLAT THER USE RNG: HCPCS | Performed by: INTERNAL MEDICINE

## 2018-09-27 NOTE — PROGRESS NOTES
regurgitation     Osteopenia     Osteoporosis     PAD (peripheral artery disease) ([facility])     Right LE ischemia    Peripheral neuropathy 6/1/2015    Peripheral vascular disease ([facility])     bilateral lower extremities with edema    Podagra 01/09/2017    Dr. Mulugeta Varner Spinal stenosis     L4-5    Type 2 diabetes mellitus ([facility] [address]     Urethral stricture 5 years ago    Urgency of urination          MEDICATIONS:  Prior to Visit Medications    Medication Sig Taking? Authorizing Provider   metFORMIN (GLUCOPHAGE) 500 MG tablet TAKE ONE TABLET BY MOUTH THREE TIMES A DAY WITH MEALS Yes Paulo Forman DO   glimepiride (AMARYL) 4 MG tablet TAKE ONE TABLET BY MOUTH EVERY MORNING BEFORE BREAKFAST Yes Paulo Forman DO   lisinopril (PRINIVIL;ZESTRIL) 20 MG tablet TAKE ONE TABLET BY MOUTH DAILY Yes Paulo Forman DO   NIFEdipine (PROCARDIA XL) 30 MG extended release tablet Take 1 tablet by mouth daily Yes Paulo Forman DO   glucose blood VI test strips (ONE TOUCH ULTRA TEST) strip Test twice daily, dx E11.9 Yes Paulo Forman DO   Cholecalciferol (VITAMIN D3) 2000 UNITS CAPS Take 1 capsule by mouth daily Yes Historical Provider, MD   aspirin EC 81 MG EC tablet Take 1 tablet by mouth daily Yes Paulo Forman DO   Lite Touch Lancets MISC by Does not apply route. Use twice daily when testing blood sugars. Yes Noelle Larkin MD           Review of Systems - As per HPI  GEN: Pt denies fever, chills or night sweats. Appetite good. She has lost a few pounds. HEENT: Pt denies visual and auditory changes, epistaxis, upper respiratory symptoms and dysphagia  CV: Pt denies CP, SOB, CHRISTIANSON, orthopnea palpitations, LE swelling, and claudication. PULM: Pt denies cough, wheezing or sputum production  GI: Pt denies N/V/D, heart burn, rectal bleeding, or melena.  + Increased abd girth. NEURO: Pt denies unilateral weakness, paresthesia and dizziness.     OBJECTIVE:  BP (!) 170/64   Pulse 68   Wt 114 lb (51.7 kg)   SpO2 98%   BMI 20.19

## 2018-09-27 NOTE — PATIENT INSTRUCTIONS
Patient Education        Fatigue: Care Instructions  Your Care Instructions    Fatigue is a feeling of tiredness, exhaustion, or lack of energy. You may feel fatigue because of too much or not enough activity. It can also come from stress, lack of sleep, boredom, and poor diet. Many medical problems, such as viral infections, can cause fatigue. Emotional problems, especially depression, are often the cause of fatigue. Fatigue is most often a symptom of another problem. Treatment for fatigue depends on the cause. For example, if you have fatigue because you have a certain health problem, treating this problem also treats your fatigue. If depression or anxiety is the cause, treatment may help. Follow-up care is a key part of your treatment and safety. Be sure to make and go to all appointments, and call your doctor if you are having problems. It's also a good idea to know your test results and keep a list of the medicines you take. How can you care for yourself at home? · Get regular exercise. But don't overdo it. Go back and forth between rest and exercise. · Get plenty of rest.  · Eat a healthy diet. Do not skip meals, especially breakfast.  · Reduce your use of caffeine, tobacco, and alcohol. Caffeine is most often found in coffee, tea, cola drinks, and chocolate. · Limit medicines that can cause fatigue. This includes tranquilizers and cold and allergy medicines. When should you call for help? Watch closely for changes in your health, and be sure to contact your doctor if:    · You have new symptoms such as fever or a rash.     · Your fatigue gets worse.     · You have been feeling down, depressed, or hopeless. Or you may have lost interest in things that you usually enjoy.     · You are not getting better as expected. Where can you learn more? Go to [email or web address] org and sign in to your [place] account.  Enter N929 in the Glook[employer] box to learn more about \"Fatigue: Care Instructions. \"     If you do not have an account, please click on the \"Sign Up Now\" link. Current as of: November 20, 2017  Content Version: 11.7  © 7230-0306 [employer]. Care instructions adapted under license by [facility] ([facility]). If you have questions about a medical condition or this instruction, always ask your healthcare professional. [address] any warranty or liability for your use of this information. Patient Education        Mitral Valve Regurgitation: Care Instructions  Your Care Instructions    The mitral valve lets blood flow from the upper to lower areas of the heart. Mitral valve regurgitation occurs when the valve cannot close all the way and blood backs up (regurgitates) into the upper area of the heart. This causes the heart to work harder to pump the extra blood. Mild regurgitation causes few problems. Many people have it for many years without having problems. But if the regurgitation is severe, it can weaken the heart and lead to heart failure. The causes of mitral valve regurgitation include a heart attack, heart infection (endocarditis), mitral valve prolapse, cardiomyopathy, calcium buildup in the heart, the weight-loss medicine Fen-Phen, and diseases such as lupus and rheumatoid arthritis. Some people are born with the valve problem. Your doctor may just want to watch your health closely if you have mild mitral valve regurgitation. You may take medicine to treat a problem that is causing the regurgitation. Or you may take medicine for other health problems that are caused by mitral regurgitation. For more severe disease, the valve may need to be repaired or replaced. Follow-up care is a key part of your treatment and safety. Be sure to make and go to all appointments, and call your doctor if you are having problems. It's also a good idea to know your test results and keep a list of the medicines you take.   How can you care for

## 2018-10-01 ENCOUNTER — OFFICE VISIT (OUTPATIENT)
Dept: CARDIOLOGY CLINIC | Age: 83
End: 2018-10-01
Payer: MEDICARE

## 2018-10-01 VITALS
DIASTOLIC BLOOD PRESSURE: 64 MMHG | HEART RATE: 76 BPM | WEIGHT: 115.6 LBS | BODY MASS INDEX: 21.83 KG/M2 | SYSTOLIC BLOOD PRESSURE: 160 MMHG | HEIGHT: 61 IN

## 2018-10-01 DIAGNOSIS — I25.10 CORONARY ARTERY DISEASE INVOLVING NATIVE CORONARY ARTERY OF NATIVE HEART WITHOUT ANGINA PECTORIS: Primary | ICD-10-CM

## 2018-10-01 DIAGNOSIS — I34.0 MITRAL VALVE INSUFFICIENCY, UNSPECIFIED ETIOLOGY: ICD-10-CM

## 2018-10-01 DIAGNOSIS — I10 BENIGN ESSENTIAL HTN: ICD-10-CM

## 2018-10-01 PROCEDURE — 1101F PT FALLS ASSESS-DOCD LE1/YR: CPT | Performed by: INTERNAL MEDICINE

## 2018-10-01 PROCEDURE — 93000 ELECTROCARDIOGRAM COMPLETE: CPT | Performed by: INTERNAL MEDICINE

## 2018-10-01 PROCEDURE — G8427 DOCREV CUR MEDS BY ELIG CLIN: HCPCS | Performed by: INTERNAL MEDICINE

## 2018-10-01 PROCEDURE — 1123F ACP DISCUSS/DSCN MKR DOCD: CPT | Performed by: INTERNAL MEDICINE

## 2018-10-01 PROCEDURE — G8420 CALC BMI NORM PARAMETERS: HCPCS | Performed by: INTERNAL MEDICINE

## 2018-10-01 PROCEDURE — 99204 OFFICE O/P NEW MOD 45 MIN: CPT | Performed by: INTERNAL MEDICINE

## 2018-10-01 PROCEDURE — G8599 NO ASA/ANTIPLAT THER USE RNG: HCPCS | Performed by: INTERNAL MEDICINE

## 2018-10-01 PROCEDURE — 1036F TOBACCO NON-USER: CPT | Performed by: INTERNAL MEDICINE

## 2018-10-01 PROCEDURE — 4040F PNEUMOC VAC/ADMIN/RCVD: CPT | Performed by: INTERNAL MEDICINE

## 2018-10-01 PROCEDURE — G8483 FLU IMM NO ADMIN DOC REA: HCPCS | Performed by: INTERNAL MEDICINE

## 2018-10-01 PROCEDURE — 1090F PRES/ABSN URINE INCON ASSESS: CPT | Performed by: INTERNAL MEDICINE

## 2018-10-01 ASSESSMENT — ENCOUNTER SYMPTOMS
GASTROINTESTINAL NEGATIVE: 1
CHOKING: 0
APNEA: 0
ALLERGIC/IMMUNOLOGIC NEGATIVE: 1
CHEST TIGHTNESS: 0
SHORTNESS OF BREATH: 0
COUGH: 0

## 2018-10-01 NOTE — PROGRESS NOTES
EVERY MORNING BEFORE BREAKFAST 90 tablet 0    lisinopril (PRINIVIL;ZESTRIL) 20 MG tablet TAKE ONE TABLET BY MOUTH DAILY 60 tablet 4    NIFEdipine (PROCARDIA XL) 30 MG extended release tablet Take 1 tablet by mouth daily 30 tablet 3    glucose blood VI test strips (ONE TOUCH ULTRA TEST) strip Test twice daily, dx E11.9 100 strip 4    Cholecalciferol (VITAMIN D3) 2000 UNITS CAPS Take 1 capsule by mouth daily      aspirin EC 81 MG EC tablet Take 1 tablet by mouth daily 30 tablet 3    Lite Touch Lancets MISC by Does not apply route. Use twice daily when testing blood sugars. 100 each 5     No current facility-administered medications for this visit. There were no vitals filed for this visit. Echo 7/27/15  Conclusions      Summary   Normal left ventricle size, wall thickness and systolic function with an   estimated ejection fraction of 55-60%.   No regional wall motion abnormalities are seen.   Moderate mitral regurgitation is present.  Bridget Ilir is moderate tricuspid regurgitation with RVSP estimated at 42 mmHg.      Signature      ------------------------------------------------------------------   Electronically signed by Mary Beth Holcomb MD (Interpreting   [id number]) on 07/27/2015 at 11:38 AM   ------------------------------------------------------------------    Echo 8/23/18  Summary   Concentric left ventricular hypertrophy. The left ventricle appears normal   in size. Normal left ventricular systolic function with an estimated   ejection fraction of 55-60%.  No regional wall motion abnormalities are seen.   Diastolic filling parameters suggests grade I diastolic dysfunction .   Bi-atrial enlargement.   Severe mitral regurgitation.   Trace aortic insufficiency.   Mild pulmonic insufficiency.   Moderate tricuspid regurgitation.   Estimated pulmonary artery systolic pressure is 32 mmHg assuming a right   atrial pressure of 3

## 2018-10-12 ENCOUNTER — TELEPHONE (OUTPATIENT)
Dept: INTERNAL MEDICINE CLINIC | Age: 83
End: 2018-10-12

## 2018-10-24 ENCOUNTER — TELEPHONE (OUTPATIENT)
Dept: CARDIOLOGY CLINIC | Age: 83
End: 2018-10-24

## 2018-10-24 NOTE — TELEPHONE ENCOUNTER
Pt calling to report that she has to have 3 teeth removed on 11/5 by Dr Ector Alcala    Pt is calling upon advise from the dentist to inform him    She takes a an 81 mg ASA daily    Please call her at [phone] to advise

## 2018-10-25 ENCOUNTER — TELEPHONE (OUTPATIENT)
Dept: INTERNAL MEDICINE CLINIC | Age: 83
End: 2018-10-25

## 2018-10-25 DIAGNOSIS — I10 BENIGN ESSENTIAL HTN: Primary | ICD-10-CM

## 2018-10-30 RX ORDER — AMOXICILLIN 500 MG/1
2000 CAPSULE ORAL ONCE
Qty: 4 CAPSULE | Refills: 0 | Status: SHIPPED | OUTPATIENT
Start: 2018-10-30 | End: 2018-10-30

## 2018-11-13 ENCOUNTER — TELEPHONE (OUTPATIENT)
Dept: INTERNAL MEDICINE CLINIC | Age: 83
End: 2018-11-13

## 2018-11-15 ENCOUNTER — HOSPITAL ENCOUNTER (OUTPATIENT)
Age: 83
Discharge: HOME | End: 2018-11-15
Payer: MEDICARE

## 2018-11-15 ENCOUNTER — OFFICE VISIT (OUTPATIENT)
Dept: INTERNAL MEDICINE CLINIC | Age: 83
End: 2018-11-15
Payer: MEDICARE

## 2018-11-15 ENCOUNTER — HOSPITAL ENCOUNTER (OUTPATIENT)
Dept: GENERAL RADIOLOGY | Age: 83
Discharge: HOME | End: 2018-11-15
Payer: MEDICARE

## 2018-11-15 VITALS
DIASTOLIC BLOOD PRESSURE: 70 MMHG | WEIGHT: 114 LBS | BODY MASS INDEX: 21.54 KG/M2 | SYSTOLIC BLOOD PRESSURE: 144 MMHG | HEART RATE: 90 BPM

## 2018-11-15 DIAGNOSIS — M79.671 RIGHT FOOT PAIN: Primary | ICD-10-CM

## 2018-11-15 DIAGNOSIS — M77.41 METATARSALGIA OF RIGHT FOOT: ICD-10-CM

## 2018-11-15 DIAGNOSIS — M79.671 RIGHT FOOT PAIN: ICD-10-CM

## 2018-11-15 PROCEDURE — 1036F TOBACCO NON-USER: CPT | Performed by: INTERNAL MEDICINE

## 2018-11-15 PROCEDURE — G8483 FLU IMM NO ADMIN DOC REA: HCPCS | Performed by: INTERNAL MEDICINE

## 2018-11-15 PROCEDURE — G8599 NO ASA/ANTIPLAT THER USE RNG: HCPCS | Performed by: INTERNAL MEDICINE

## 2018-11-15 PROCEDURE — 1123F ACP DISCUSS/DSCN MKR DOCD: CPT | Performed by: INTERNAL MEDICINE

## 2018-11-15 PROCEDURE — G8420 CALC BMI NORM PARAMETERS: HCPCS | Performed by: INTERNAL MEDICINE

## 2018-11-15 PROCEDURE — 1101F PT FALLS ASSESS-DOCD LE1/YR: CPT | Performed by: INTERNAL MEDICINE

## 2018-11-15 PROCEDURE — 1090F PRES/ABSN URINE INCON ASSESS: CPT | Performed by: INTERNAL MEDICINE

## 2018-11-15 PROCEDURE — 4040F PNEUMOC VAC/ADMIN/RCVD: CPT | Performed by: INTERNAL MEDICINE

## 2018-11-15 PROCEDURE — 73630 X-RAY EXAM OF FOOT: CPT

## 2018-11-15 PROCEDURE — G8428 CUR MEDS NOT DOCUMENT: HCPCS | Performed by: INTERNAL MEDICINE

## 2018-11-15 PROCEDURE — 99213 OFFICE O/P EST LOW 20 MIN: CPT | Performed by: INTERNAL MEDICINE

## 2018-11-28 ENCOUNTER — OFFICE VISIT (OUTPATIENT)
Dept: ORTHOPEDIC SURGERY | Age: 83
End: 2018-11-28
Payer: MEDICARE

## 2018-11-28 VITALS — SYSTOLIC BLOOD PRESSURE: 152 MMHG | RESPIRATION RATE: 16 BRPM | DIASTOLIC BLOOD PRESSURE: 72 MMHG | HEART RATE: 65 BPM

## 2018-11-28 DIAGNOSIS — M65.331 TRIGGER MIDDLE FINGER OF RIGHT HAND: Primary | ICD-10-CM

## 2018-11-28 PROCEDURE — 4040F PNEUMOC VAC/ADMIN/RCVD: CPT | Performed by: ORTHOPAEDIC SURGERY

## 2018-11-28 PROCEDURE — G8599 NO ASA/ANTIPLAT THER USE RNG: HCPCS | Performed by: ORTHOPAEDIC SURGERY

## 2018-11-28 PROCEDURE — G8420 CALC BMI NORM PARAMETERS: HCPCS | Performed by: ORTHOPAEDIC SURGERY

## 2018-11-28 PROCEDURE — 1123F ACP DISCUSS/DSCN MKR DOCD: CPT | Performed by: ORTHOPAEDIC SURGERY

## 2018-11-28 PROCEDURE — 1090F PRES/ABSN URINE INCON ASSESS: CPT | Performed by: ORTHOPAEDIC SURGERY

## 2018-11-28 PROCEDURE — G8427 DOCREV CUR MEDS BY ELIG CLIN: HCPCS | Performed by: ORTHOPAEDIC SURGERY

## 2018-11-28 PROCEDURE — 99214 OFFICE O/P EST MOD 30 MIN: CPT | Performed by: ORTHOPAEDIC SURGERY

## 2018-11-28 PROCEDURE — 1101F PT FALLS ASSESS-DOCD LE1/YR: CPT | Performed by: ORTHOPAEDIC SURGERY

## 2018-11-28 PROCEDURE — 1036F TOBACCO NON-USER: CPT | Performed by: ORTHOPAEDIC SURGERY

## 2018-11-28 PROCEDURE — 20550 NJX 1 TENDON SHEATH/LIGAMENT: CPT | Performed by: ORTHOPAEDIC SURGERY

## 2018-11-28 PROCEDURE — G8483 FLU IMM NO ADMIN DOC REA: HCPCS | Performed by: ORTHOPAEDIC SURGERY

## 2018-11-28 NOTE — LETTER
? Anesthetic and/or Medical Risks  ? 4. I have also been informed by the informing physician that there are other risks from both known and unknown causes that are attendant to the performance of any surgical procedure. I am aware that the practice of medicine and surgery is not an exact science, and that no guarantees have been made to me concerning the results of the operation and/or procedure(s). 5. I   CONSENT / REFUSE CONSENT  (strike the phrase that does not apply) to the taking of photographs before, during and/or after the operation or procedure for scientific/educational purposes. 6. I consent to the administration of anesthesia and to the use of such anesthetics as may be deemed advisable by the anesthesiologist who has been engaged by me or my physician. 7. I certify that I have read and understand the above consent to operation and/or other procedure(s); that the explanations therein referred to were made to me by the informing physician in advance of my signing this consent; that all blanks or statements requiring insertion or completion were filled in and inapplicable paragraphs, if any, were stricken before I signed; and that all questions asked by me about the operation and/or procedure(s) which I have consented to have been fully answered in a satisfactory manner.               _______________________  Witness        Signature Of Patient          Trisha Bertrand. Waunita Goodell      _______________________  Informing Physician         Signature of Informing Physician           If patient is unable to sign or is a minor, complete one of the following:    (A)  Patient is a minor   years of age. (B)  Patient is unable to sign because: The undersigned represents that he or she is duly authorized to execute this consent for and on behalf of the above named patient.                Witness               o  Parent  o  Guardian   o  Spouse       o  Other (specify)

## 2018-12-05 DIAGNOSIS — E11.59 TYPE 2 DIABETES MELLITUS WITH VASCULAR DISEASE (HCC): ICD-10-CM

## 2018-12-05 RX ORDER — GLIMEPIRIDE 4 MG/1
TABLET ORAL
Qty: 90 TABLET | Refills: 0 | Status: SHIPPED | OUTPATIENT
Start: 2018-12-05 | End: 2019-02-23 | Stop reason: SDUPTHER

## 2019-01-08 ENCOUNTER — OFFICE VISIT (OUTPATIENT)
Dept: DERMATOLOGY | Age: 84
End: 2019-01-08
Payer: MEDICARE

## 2019-01-08 DIAGNOSIS — L82.1 SK (SEBORRHEIC KERATOSIS): ICD-10-CM

## 2019-01-08 DIAGNOSIS — S81.812A SKIN TEAR OF LEFT LOWER LEG WITHOUT COMPLICATION, INITIAL ENCOUNTER: Primary | ICD-10-CM

## 2019-01-08 DIAGNOSIS — D69.2 PURPURA (HCC): ICD-10-CM

## 2019-01-08 PROCEDURE — G8427 DOCREV CUR MEDS BY ELIG CLIN: HCPCS | Performed by: DERMATOLOGY

## 2019-01-08 PROCEDURE — 1123F ACP DISCUSS/DSCN MKR DOCD: CPT | Performed by: DERMATOLOGY

## 2019-01-08 PROCEDURE — 99213 OFFICE O/P EST LOW 20 MIN: CPT | Performed by: DERMATOLOGY

## 2019-01-08 PROCEDURE — 1101F PT FALLS ASSESS-DOCD LE1/YR: CPT | Performed by: DERMATOLOGY

## 2019-01-08 PROCEDURE — 4040F PNEUMOC VAC/ADMIN/RCVD: CPT | Performed by: DERMATOLOGY

## 2019-01-08 PROCEDURE — G8420 CALC BMI NORM PARAMETERS: HCPCS | Performed by: DERMATOLOGY

## 2019-01-08 PROCEDURE — 1036F TOBACCO NON-USER: CPT | Performed by: DERMATOLOGY

## 2019-01-08 PROCEDURE — 1090F PRES/ABSN URINE INCON ASSESS: CPT | Performed by: DERMATOLOGY

## 2019-01-08 PROCEDURE — G8483 FLU IMM NO ADMIN DOC REA: HCPCS | Performed by: DERMATOLOGY

## 2019-01-08 PROCEDURE — G8599 NO ASA/ANTIPLAT THER USE RNG: HCPCS | Performed by: DERMATOLOGY

## 2019-01-17 ENCOUNTER — PROCEDURE VISIT (OUTPATIENT)
Dept: VASCULAR SURGERY | Age: 84
End: 2019-01-17
Payer: MEDICARE

## 2019-01-17 DIAGNOSIS — Z48.812 ENCOUNTER FOR POSTOPERATIVE SURVEILLANCE OF VASCULAR BYPASS SURGERY: ICD-10-CM

## 2019-01-17 DIAGNOSIS — I65.23 BILATERAL CAROTID ARTERY STENOSIS: Primary | ICD-10-CM

## 2019-01-17 DIAGNOSIS — I73.9 PAD (PERIPHERAL ARTERY DISEASE) (HCC): ICD-10-CM

## 2019-01-17 PROCEDURE — 93926 LOWER EXTREMITY STUDY: CPT | Performed by: SURGERY

## 2019-01-17 PROCEDURE — 93880 EXTRACRANIAL BILAT STUDY: CPT | Performed by: SURGERY

## 2019-01-22 ENCOUNTER — TELEPHONE (OUTPATIENT)
Dept: INTERNAL MEDICINE CLINIC | Age: 84
End: 2019-01-22

## 2019-01-23 ENCOUNTER — TELEPHONE (OUTPATIENT)
Dept: VASCULAR SURGERY | Age: 84
End: 2019-01-23

## 2019-01-25 ENCOUNTER — TELEPHONE (OUTPATIENT)
Dept: INTERNAL MEDICINE CLINIC | Age: 84
End: 2019-01-25

## 2019-01-28 ENCOUNTER — TELEPHONE (OUTPATIENT)
Dept: INTERNAL MEDICINE CLINIC | Age: 84
End: 2019-01-28

## 2019-01-29 ENCOUNTER — OFFICE VISIT (OUTPATIENT)
Dept: INTERNAL MEDICINE CLINIC | Age: 84
End: 2019-01-29
Payer: MEDICARE

## 2019-01-29 VITALS
DIASTOLIC BLOOD PRESSURE: 74 MMHG | HEART RATE: 84 BPM | BODY MASS INDEX: 21.92 KG/M2 | SYSTOLIC BLOOD PRESSURE: 150 MMHG | WEIGHT: 116 LBS

## 2019-01-29 DIAGNOSIS — M54.2 NECK PAIN ON RIGHT SIDE: ICD-10-CM

## 2019-01-29 DIAGNOSIS — I65.23 CAROTID STENOSIS, ASYMPTOMATIC, BILATERAL: ICD-10-CM

## 2019-01-29 DIAGNOSIS — E11.59 TYPE 2 DIABETES MELLITUS WITH VASCULAR DISEASE (HCC): ICD-10-CM

## 2019-01-29 DIAGNOSIS — E11.42 DIABETIC PERIPHERAL NEUROPATHY (HCC): ICD-10-CM

## 2019-01-29 DIAGNOSIS — I67.9 CEREBROVASCULAR DISEASE: ICD-10-CM

## 2019-01-29 DIAGNOSIS — E78.5 HYPERLIPIDEMIA, UNSPECIFIED HYPERLIPIDEMIA TYPE: ICD-10-CM

## 2019-01-29 DIAGNOSIS — F41.9 ANXIETY DISORDER, UNSPECIFIED TYPE: ICD-10-CM

## 2019-01-29 DIAGNOSIS — I10 ACCELERATED HYPERTENSION: ICD-10-CM

## 2019-01-29 DIAGNOSIS — R26.89 BALANCE PROBLEM: Primary | ICD-10-CM

## 2019-01-29 PROCEDURE — 1036F TOBACCO NON-USER: CPT | Performed by: INTERNAL MEDICINE

## 2019-01-29 PROCEDURE — 1101F PT FALLS ASSESS-DOCD LE1/YR: CPT | Performed by: INTERNAL MEDICINE

## 2019-01-29 PROCEDURE — G8427 DOCREV CUR MEDS BY ELIG CLIN: HCPCS | Performed by: INTERNAL MEDICINE

## 2019-01-29 PROCEDURE — 4040F PNEUMOC VAC/ADMIN/RCVD: CPT | Performed by: INTERNAL MEDICINE

## 2019-01-29 PROCEDURE — G8483 FLU IMM NO ADMIN DOC REA: HCPCS | Performed by: INTERNAL MEDICINE

## 2019-01-29 PROCEDURE — G8420 CALC BMI NORM PARAMETERS: HCPCS | Performed by: INTERNAL MEDICINE

## 2019-01-29 PROCEDURE — 99214 OFFICE O/P EST MOD 30 MIN: CPT | Performed by: INTERNAL MEDICINE

## 2019-01-29 PROCEDURE — G8599 NO ASA/ANTIPLAT THER USE RNG: HCPCS | Performed by: INTERNAL MEDICINE

## 2019-01-29 PROCEDURE — 1123F ACP DISCUSS/DSCN MKR DOCD: CPT | Performed by: INTERNAL MEDICINE

## 2019-01-29 PROCEDURE — 1090F PRES/ABSN URINE INCON ASSESS: CPT | Performed by: INTERNAL MEDICINE

## 2019-01-30 ENCOUNTER — TELEPHONE (OUTPATIENT)
Dept: INTERNAL MEDICINE CLINIC | Age: 84
End: 2019-01-30

## 2019-02-06 ENCOUNTER — OFFICE VISIT (OUTPATIENT)
Dept: CARDIOLOGY CLINIC | Age: 84
End: 2019-02-06
Payer: MEDICARE

## 2019-02-06 VITALS
WEIGHT: 113.8 LBS | DIASTOLIC BLOOD PRESSURE: 65 MMHG | SYSTOLIC BLOOD PRESSURE: 120 MMHG | HEART RATE: 68 BPM | BODY MASS INDEX: 21.5 KG/M2

## 2019-02-06 DIAGNOSIS — I34.0 MITRAL VALVE INSUFFICIENCY, UNSPECIFIED ETIOLOGY: ICD-10-CM

## 2019-02-06 DIAGNOSIS — I25.10 CORONARY ARTERY DISEASE INVOLVING NATIVE CORONARY ARTERY OF NATIVE HEART WITHOUT ANGINA PECTORIS: Primary | ICD-10-CM

## 2019-02-06 DIAGNOSIS — I10 ESSENTIAL HYPERTENSION: ICD-10-CM

## 2019-02-06 PROCEDURE — G8428 CUR MEDS NOT DOCUMENT: HCPCS | Performed by: INTERNAL MEDICINE

## 2019-02-06 PROCEDURE — 1090F PRES/ABSN URINE INCON ASSESS: CPT | Performed by: INTERNAL MEDICINE

## 2019-02-06 PROCEDURE — 1123F ACP DISCUSS/DSCN MKR DOCD: CPT | Performed by: INTERNAL MEDICINE

## 2019-02-06 PROCEDURE — 99214 OFFICE O/P EST MOD 30 MIN: CPT | Performed by: INTERNAL MEDICINE

## 2019-02-06 PROCEDURE — 1036F TOBACCO NON-USER: CPT | Performed by: INTERNAL MEDICINE

## 2019-02-06 PROCEDURE — G8483 FLU IMM NO ADMIN DOC REA: HCPCS | Performed by: INTERNAL MEDICINE

## 2019-02-06 PROCEDURE — 4040F PNEUMOC VAC/ADMIN/RCVD: CPT | Performed by: INTERNAL MEDICINE

## 2019-02-06 PROCEDURE — G8420 CALC BMI NORM PARAMETERS: HCPCS | Performed by: INTERNAL MEDICINE

## 2019-02-06 PROCEDURE — 1101F PT FALLS ASSESS-DOCD LE1/YR: CPT | Performed by: INTERNAL MEDICINE

## 2019-02-06 PROCEDURE — G8599 NO ASA/ANTIPLAT THER USE RNG: HCPCS | Performed by: INTERNAL MEDICINE

## 2019-02-06 RX ORDER — AMOXICILLIN 500 MG/1
2000 CAPSULE ORAL ONCE
Qty: 4 CAPSULE | Refills: 2 | Status: SHIPPED | OUTPATIENT
Start: 2019-02-06 | End: 2019-02-06

## 2019-02-06 ASSESSMENT — ENCOUNTER SYMPTOMS
COUGH: 0
CHEST TIGHTNESS: 0
GASTROINTESTINAL NEGATIVE: 1
ALLERGIC/IMMUNOLOGIC NEGATIVE: 1
SHORTNESS OF BREATH: 0
CHOKING: 0
APNEA: 0

## 2019-02-11 ENCOUNTER — HOSPITAL ENCOUNTER (OUTPATIENT)
Dept: GENERAL RADIOLOGY | Age: 84
Discharge: HOME | End: 2019-02-11
Payer: MEDICARE

## 2019-02-11 ENCOUNTER — HOSPITAL ENCOUNTER (OUTPATIENT)
Dept: MRI IMAGING | Age: 84
Discharge: HOME | End: 2019-02-11
Payer: MEDICARE

## 2019-02-11 DIAGNOSIS — R26.89 BALANCE PROBLEM: ICD-10-CM

## 2019-02-11 DIAGNOSIS — I67.9 CEREBROVASCULAR DISEASE: ICD-10-CM

## 2019-02-11 DIAGNOSIS — M54.2 NECK PAIN ON RIGHT SIDE: ICD-10-CM

## 2019-02-11 PROCEDURE — 72040 X-RAY EXAM NECK SPINE 2-3 VW: CPT

## 2019-02-11 PROCEDURE — 70551 MRI BRAIN STEM W/O DYE: CPT

## 2019-02-12 ENCOUNTER — TELEPHONE (OUTPATIENT)
Dept: INTERNAL MEDICINE CLINIC | Age: 84
End: 2019-02-12

## 2019-02-15 ENCOUNTER — TELEPHONE (OUTPATIENT)
Dept: INTERNAL MEDICINE CLINIC | Age: 84
End: 2019-02-15

## 2019-02-19 ENCOUNTER — OFFICE VISIT (OUTPATIENT)
Dept: INTERNAL MEDICINE CLINIC | Age: 84
End: 2019-02-19
Payer: MEDICARE

## 2019-02-19 VITALS — SYSTOLIC BLOOD PRESSURE: 140 MMHG | DIASTOLIC BLOOD PRESSURE: 56 MMHG | HEART RATE: 60 BPM

## 2019-02-19 DIAGNOSIS — I34.0 MITRAL VALVE INSUFFICIENCY, UNSPECIFIED ETIOLOGY: Primary | ICD-10-CM

## 2019-02-19 DIAGNOSIS — M43.12 SPONDYLOLISTHESIS OF CERVICAL REGION: ICD-10-CM

## 2019-02-19 DIAGNOSIS — M50.30 DDD (DEGENERATIVE DISC DISEASE), CERVICAL: ICD-10-CM

## 2019-02-19 PROCEDURE — 1123F ACP DISCUSS/DSCN MKR DOCD: CPT | Performed by: INTERNAL MEDICINE

## 2019-02-19 PROCEDURE — 4040F PNEUMOC VAC/ADMIN/RCVD: CPT | Performed by: INTERNAL MEDICINE

## 2019-02-19 PROCEDURE — G8420 CALC BMI NORM PARAMETERS: HCPCS | Performed by: INTERNAL MEDICINE

## 2019-02-19 PROCEDURE — G8599 NO ASA/ANTIPLAT THER USE RNG: HCPCS | Performed by: INTERNAL MEDICINE

## 2019-02-19 PROCEDURE — G8483 FLU IMM NO ADMIN DOC REA: HCPCS | Performed by: INTERNAL MEDICINE

## 2019-02-19 PROCEDURE — 1036F TOBACCO NON-USER: CPT | Performed by: INTERNAL MEDICINE

## 2019-02-19 PROCEDURE — 99213 OFFICE O/P EST LOW 20 MIN: CPT | Performed by: INTERNAL MEDICINE

## 2019-02-19 PROCEDURE — 1101F PT FALLS ASSESS-DOCD LE1/YR: CPT | Performed by: INTERNAL MEDICINE

## 2019-02-19 PROCEDURE — G8428 CUR MEDS NOT DOCUMENT: HCPCS | Performed by: INTERNAL MEDICINE

## 2019-02-19 PROCEDURE — 1090F PRES/ABSN URINE INCON ASSESS: CPT | Performed by: INTERNAL MEDICINE

## 2019-02-23 DIAGNOSIS — E11.59 TYPE 2 DIABETES MELLITUS WITH VASCULAR DISEASE (HCC): ICD-10-CM

## 2019-02-25 ENCOUNTER — TELEPHONE (OUTPATIENT)
Dept: ORTHOPEDIC SURGERY | Age: 84
End: 2019-02-25

## 2019-02-25 RX ORDER — GLIMEPIRIDE 4 MG/1
TABLET ORAL
Qty: 90 TABLET | Refills: 1 | Status: SHIPPED | OUTPATIENT
Start: 2019-02-25 | End: 2019-07-08 | Stop reason: SDUPTHER

## 2019-03-07 ENCOUNTER — TELEPHONE (OUTPATIENT)
Dept: ORTHOPEDIC SURGERY | Age: 84
End: 2019-03-07

## 2019-03-26 ENCOUNTER — OFFICE VISIT (OUTPATIENT)
Dept: INTERNAL MEDICINE CLINIC | Age: 84
End: 2019-03-26
Payer: MEDICARE

## 2019-03-26 VITALS
HEIGHT: 61 IN | DIASTOLIC BLOOD PRESSURE: 60 MMHG | SYSTOLIC BLOOD PRESSURE: 120 MMHG | RESPIRATION RATE: 12 BRPM | HEART RATE: 66 BPM | BODY MASS INDEX: 21.52 KG/M2 | WEIGHT: 114 LBS

## 2019-03-26 DIAGNOSIS — G56.01 RIGHT CARPAL TUNNEL SYNDROME: ICD-10-CM

## 2019-03-26 DIAGNOSIS — M65.331 TRIGGER MIDDLE FINGER OF RIGHT HAND: ICD-10-CM

## 2019-03-26 DIAGNOSIS — I34.0 SEVERE MITRAL REGURGITATION: ICD-10-CM

## 2019-03-26 DIAGNOSIS — I25.10 CORONARY ARTERY DISEASE INVOLVING NATIVE CORONARY ARTERY OF NATIVE HEART WITHOUT ANGINA PECTORIS: ICD-10-CM

## 2019-03-26 DIAGNOSIS — E11.40 TYPE 2 DIABETES, CONTROLLED, WITH NEUROPATHY (HCC): ICD-10-CM

## 2019-03-26 DIAGNOSIS — E11.59 TYPE 2 DIABETES MELLITUS WITH VASCULAR DISEASE (HCC): ICD-10-CM

## 2019-03-26 DIAGNOSIS — Z01.818 PREOP EXAM FOR INTERNAL MEDICINE: Primary | ICD-10-CM

## 2019-03-26 DIAGNOSIS — I10 BENIGN ESSENTIAL HTN: ICD-10-CM

## 2019-03-26 DIAGNOSIS — I73.9 PAD (PERIPHERAL ARTERY DISEASE) (HCC): ICD-10-CM

## 2019-03-26 PROCEDURE — 4040F PNEUMOC VAC/ADMIN/RCVD: CPT | Performed by: INTERNAL MEDICINE

## 2019-03-26 PROCEDURE — G8483 FLU IMM NO ADMIN DOC REA: HCPCS | Performed by: INTERNAL MEDICINE

## 2019-03-26 PROCEDURE — 1036F TOBACCO NON-USER: CPT | Performed by: INTERNAL MEDICINE

## 2019-03-26 PROCEDURE — 1090F PRES/ABSN URINE INCON ASSESS: CPT | Performed by: INTERNAL MEDICINE

## 2019-03-26 PROCEDURE — 99214 OFFICE O/P EST MOD 30 MIN: CPT | Performed by: INTERNAL MEDICINE

## 2019-03-26 PROCEDURE — G8420 CALC BMI NORM PARAMETERS: HCPCS | Performed by: INTERNAL MEDICINE

## 2019-03-26 PROCEDURE — G8599 NO ASA/ANTIPLAT THER USE RNG: HCPCS | Performed by: INTERNAL MEDICINE

## 2019-03-26 PROCEDURE — 1123F ACP DISCUSS/DSCN MKR DOCD: CPT | Performed by: INTERNAL MEDICINE

## 2019-03-26 PROCEDURE — G8427 DOCREV CUR MEDS BY ELIG CLIN: HCPCS | Performed by: INTERNAL MEDICINE

## 2019-03-28 ENCOUNTER — ANESTHESIA EVENT (OUTPATIENT)
Dept: OPERATING ROOM | Age: 84
End: 2019-03-28
Payer: MEDICARE

## 2019-03-29 ENCOUNTER — ANESTHESIA (OUTPATIENT)
Dept: OPERATING ROOM | Age: 84
End: 2019-03-29
Payer: MEDICARE

## 2019-03-29 ENCOUNTER — HOSPITAL ENCOUNTER (OUTPATIENT)
Age: 84
Setting detail: OUTPATIENT SURGERY
Discharge: HOME | End: 2019-03-29
Attending: ORTHOPAEDIC SURGERY | Admitting: ORTHOPAEDIC SURGERY
Payer: MEDICARE

## 2019-03-29 ENCOUNTER — TELEPHONE (OUTPATIENT)
Dept: ORTHOPEDIC SURGERY | Age: 84
End: 2019-03-29

## 2019-03-29 VITALS — SYSTOLIC BLOOD PRESSURE: 116 MMHG | OXYGEN SATURATION: 100 % | DIASTOLIC BLOOD PRESSURE: 56 MMHG

## 2019-03-29 VITALS
HEART RATE: 82 BPM | BODY MASS INDEX: 22.28 KG/M2 | HEIGHT: 61 IN | TEMPERATURE: 97 F | DIASTOLIC BLOOD PRESSURE: 61 MMHG | SYSTOLIC BLOOD PRESSURE: 131 MMHG | WEIGHT: 118 LBS | OXYGEN SATURATION: 97 % | RESPIRATION RATE: 18 BRPM

## 2019-03-29 LAB
GLUCOSE BLD-MCNC: 143 MG/DL (ref 70–99)
GLUCOSE BLD-MCNC: 150 MG/DL (ref 70–99)
PERFORMED ON: ABNORMAL
PERFORMED ON: ABNORMAL

## 2019-03-29 PROCEDURE — 7100000000 HC PACU RECOVERY - FIRST 15 MIN: Performed by: ORTHOPAEDIC SURGERY

## 2019-03-29 PROCEDURE — 3700000000 HC ANESTHESIA ATTENDED CARE: Performed by: ORTHOPAEDIC SURGERY

## 2019-03-29 PROCEDURE — 7100000001 HC PACU RECOVERY - ADDTL 15 MIN: Performed by: ORTHOPAEDIC SURGERY

## 2019-03-29 PROCEDURE — 7100000011 HC PHASE II RECOVERY - ADDTL 15 MIN: Performed by: ORTHOPAEDIC SURGERY

## 2019-03-29 PROCEDURE — 2500000003 HC RX 250 WO HCPCS: Performed by: NURSE ANESTHETIST, CERTIFIED REGISTERED

## 2019-03-29 PROCEDURE — 7100000010 HC PHASE II RECOVERY - FIRST 15 MIN: Performed by: ORTHOPAEDIC SURGERY

## 2019-03-29 PROCEDURE — 2580000003 HC RX 258: Performed by: ANESTHESIOLOGY

## 2019-03-29 PROCEDURE — 3600000005 HC SURGERY LEVEL 5 BASE: Performed by: ORTHOPAEDIC SURGERY

## 2019-03-29 PROCEDURE — 6360000002 HC RX W HCPCS: Performed by: NURSE ANESTHETIST, CERTIFIED REGISTERED

## 2019-03-29 PROCEDURE — 3700000001 HC ADD 15 MINUTES (ANESTHESIA): Performed by: ORTHOPAEDIC SURGERY

## 2019-03-29 PROCEDURE — 2580000003 HC RX 258: Performed by: ORTHOPAEDIC SURGERY

## 2019-03-29 PROCEDURE — 2500000003 HC RX 250 WO HCPCS: Performed by: ORTHOPAEDIC SURGERY

## 2019-03-29 PROCEDURE — 3600000015 HC SURGERY LEVEL 5 ADDTL 15MIN: Performed by: ORTHOPAEDIC SURGERY

## 2019-03-29 PROCEDURE — 2709999900 HC NON-CHARGEABLE SUPPLY: Performed by: ORTHOPAEDIC SURGERY

## 2019-03-29 RX ORDER — FENTANYL CITRATE 50 UG/ML
25 INJECTION, SOLUTION INTRAMUSCULAR; INTRAVENOUS EVERY 5 MIN PRN
Status: DISCONTINUED | OUTPATIENT
Start: 2019-03-29 | End: 2019-03-29 | Stop reason: HOSPADM

## 2019-03-29 RX ORDER — ONDANSETRON 2 MG/ML
4 INJECTION INTRAMUSCULAR; INTRAVENOUS
Status: DISCONTINUED | OUTPATIENT
Start: 2019-03-29 | End: 2019-03-29 | Stop reason: HOSPADM

## 2019-03-29 RX ORDER — PROPOFOL 10 MG/ML
INJECTION, EMULSION INTRAVENOUS CONTINUOUS PRN
Status: DISCONTINUED | OUTPATIENT
Start: 2019-03-29 | End: 2019-03-29 | Stop reason: SDUPTHER

## 2019-03-29 RX ORDER — OXYCODONE HYDROCHLORIDE AND ACETAMINOPHEN 5; 325 MG/1; MG/1
2 TABLET ORAL PRN
Status: DISCONTINUED | OUTPATIENT
Start: 2019-03-29 | End: 2019-03-29 | Stop reason: HOSPADM

## 2019-03-29 RX ORDER — MAGNESIUM HYDROXIDE 1200 MG/15ML
LIQUID ORAL CONTINUOUS PRN
Status: COMPLETED | OUTPATIENT
Start: 2019-03-29 | End: 2019-03-29

## 2019-03-29 RX ORDER — OXYCODONE HYDROCHLORIDE AND ACETAMINOPHEN 5; 325 MG/1; MG/1
1 TABLET ORAL PRN
Status: DISCONTINUED | OUTPATIENT
Start: 2019-03-29 | End: 2019-03-29 | Stop reason: HOSPADM

## 2019-03-29 RX ORDER — SODIUM CHLORIDE 0.9 % (FLUSH) 0.9 %
10 SYRINGE (ML) INJECTION EVERY 12 HOURS SCHEDULED
Status: DISCONTINUED | OUTPATIENT
Start: 2019-03-29 | End: 2019-03-29 | Stop reason: HOSPADM

## 2019-03-29 RX ORDER — SODIUM CHLORIDE 9 MG/ML
INJECTION, SOLUTION INTRAVENOUS CONTINUOUS
Status: DISCONTINUED | OUTPATIENT
Start: 2019-03-29 | End: 2019-03-29 | Stop reason: HOSPADM

## 2019-03-29 RX ORDER — SODIUM CHLORIDE 0.9 % (FLUSH) 0.9 %
10 SYRINGE (ML) INJECTION PRN
Status: DISCONTINUED | OUTPATIENT
Start: 2019-03-29 | End: 2019-03-29 | Stop reason: HOSPADM

## 2019-03-29 RX ORDER — LIDOCAINE HYDROCHLORIDE 20 MG/ML
INJECTION, SOLUTION INFILTRATION; PERINEURAL PRN
Status: DISCONTINUED | OUTPATIENT
Start: 2019-03-29 | End: 2019-03-29 | Stop reason: SDUPTHER

## 2019-03-29 RX ORDER — PROPOFOL 10 MG/ML
INJECTION, EMULSION INTRAVENOUS PRN
Status: DISCONTINUED | OUTPATIENT
Start: 2019-03-29 | End: 2019-03-29 | Stop reason: SDUPTHER

## 2019-03-29 RX ADMIN — PROPOFOL 100 MCG/KG/MIN: 10 INJECTION, EMULSION INTRAVENOUS at 09:02

## 2019-03-29 RX ADMIN — PROPOFOL 40 MG: 10 INJECTION, EMULSION INTRAVENOUS at 09:02

## 2019-03-29 RX ADMIN — LIDOCAINE HYDROCHLORIDE 40 MG: 20 INJECTION, SOLUTION INFILTRATION; PERINEURAL at 09:02

## 2019-03-29 RX ADMIN — SODIUM CHLORIDE: 9 INJECTION, SOLUTION INTRAVENOUS at 08:13

## 2019-03-29 ASSESSMENT — PULMONARY FUNCTION TESTS
PIF_VALUE: 0
PIF_VALUE: 1
PIF_VALUE: 0

## 2019-03-29 ASSESSMENT — PAIN DESCRIPTION - FREQUENCY: FREQUENCY: CONTINUOUS

## 2019-03-29 ASSESSMENT — PAIN SCALES - GENERAL
PAINLEVEL_OUTOF10: 0
PAINLEVEL_OUTOF10: 0
PAINLEVEL_OUTOF10: 1

## 2019-03-29 ASSESSMENT — PAIN DESCRIPTION - DESCRIPTORS
DESCRIPTORS: ACHING
DESCRIPTORS: SORE

## 2019-03-29 ASSESSMENT — PAIN DESCRIPTION - LOCATION
LOCATION: HAND;WRIST
LOCATION: HAND;WRIST

## 2019-03-29 ASSESSMENT — PAIN - FUNCTIONAL ASSESSMENT
PAIN_FUNCTIONAL_ASSESSMENT: 0-10
PAIN_FUNCTIONAL_ASSESSMENT: PREVENTS OR INTERFERES SOME ACTIVE ACTIVITIES AND ADLS

## 2019-03-29 ASSESSMENT — PAIN DESCRIPTION - PAIN TYPE: TYPE: CHRONIC PAIN

## 2019-03-29 ASSESSMENT — PAIN DESCRIPTION - ORIENTATION: ORIENTATION: RIGHT

## 2019-03-29 ASSESSMENT — LIFESTYLE VARIABLES: SMOKING_STATUS: 0

## 2019-03-29 ASSESSMENT — ENCOUNTER SYMPTOMS: SHORTNESS OF BREATH: 0

## 2019-03-30 ENCOUNTER — HOSPITAL ENCOUNTER (EMERGENCY)
Age: 84
Discharge: HOME | End: 2019-03-30
Attending: EMERGENCY MEDICINE
Payer: MEDICARE

## 2019-03-30 VITALS
HEART RATE: 97 BPM | RESPIRATION RATE: 18 BRPM | TEMPERATURE: 97.5 F | OXYGEN SATURATION: 95 % | SYSTOLIC BLOOD PRESSURE: 102 MMHG | DIASTOLIC BLOOD PRESSURE: 73 MMHG

## 2019-03-30 DIAGNOSIS — M79.641 RIGHT HAND PAIN: Primary | ICD-10-CM

## 2019-03-30 PROCEDURE — 6370000000 HC RX 637 (ALT 250 FOR IP): Performed by: EMERGENCY MEDICINE

## 2019-03-30 PROCEDURE — 99283 EMERGENCY DEPT VISIT LOW MDM: CPT

## 2019-03-30 RX ORDER — GINSENG 100 MG
CAPSULE ORAL
Status: DISCONTINUED
Start: 2019-03-30 | End: 2019-03-30 | Stop reason: HOSPADM

## 2019-03-30 RX ORDER — BACITRACIN ZINC AND POLYMYXIN B SULFATE 500; 1000 [USP'U]/G; [USP'U]/G
OINTMENT TOPICAL ONCE
Status: COMPLETED | OUTPATIENT
Start: 2019-03-30 | End: 2019-03-30

## 2019-03-30 RX ADMIN — BACITRACIN ZINC AND POLYMYXIN B SULFATE: 500; 10000 OINTMENT TOPICAL at 13:42

## 2019-03-30 ASSESSMENT — PAIN DESCRIPTION - DESCRIPTORS: DESCRIPTORS: ACHING

## 2019-03-30 ASSESSMENT — PAIN DESCRIPTION - ORIENTATION: ORIENTATION: LEFT

## 2019-03-30 ASSESSMENT — PAIN DESCRIPTION - PAIN TYPE: TYPE: ACUTE PAIN

## 2019-03-30 ASSESSMENT — PAIN DESCRIPTION - LOCATION: LOCATION: HAND

## 2019-03-30 ASSESSMENT — PAIN SCALES - GENERAL: PAINLEVEL_OUTOF10: 5

## 2019-03-30 NOTE — ED PROVIDER NOTES
[address] [facility] [address] ENCOUNTER          ATTENDING PHYSICIAN NOTE       Date of evaluation: 3/30/2019    Chief Complaint     Hand Pain    History of Present Illness     Zarina Garcia is a 80 y.o. female who presents with a chief complaint of hand pain. Patient had a right carpal tunnel release yesterday as well as right middle finger trigger release yesterday. This was done as an outpatient surgery. Patient states she felt fine when she was discharged but in the late evening she noticed that she was having increased pain and swelling of her right hand. This morning she continued to have increased pain and swelling and went to urgent care. At urgent care they felt the dressing was too tight and they rewrapped it. They told her that she needed to come here because they were concerned about infection. Patient is now very concerned about infection, states \"you don't know my medical history, I always get amoxicillin with all of my surgeries\". Patient denies fevers. States her pain is already very much improved, is now only mild. Denies numbness or tingling of her right hand or right finger. Review of Systems     Review of Systems - all other systems reviewed and negative unless stated otherwise in HPI    Past Medical, Surgical, Family, and Social History     She has a past medical history of Anxiety, Arthritis, At risk for falls, CAD (coronary artery disease), CTS (carpal tunnel syndrome), Fractures, Hypertension, Mitral regurgitation, Osteopenia, Osteoporosis, PAD (peripheral artery disease) ([address] Peripheral neuropathy, Peripheral vascular disease ([address] Podagra, Spinal stenosis, Type 2 diabetes mellitus ([address] Urethral stricture, and Urgency of urination. She has a past surgical history that includes Tonsillectomy; Appendectomy; Cholecystectomy (1978); Colonoscopy (8/31/99); Hysterectomy (1980s); Total hip arthroplasty (4/25/98); Kyphosis surgery (11-7-06);  Wrist fracture surgery (2008); Cataract removal with implant ([id number]); eye surgery (Bilateral); IR Femoral Popliteal Bypass Graft (Right, 8-); and Carpal tunnel release (Right, 3/29/2019). Her family history includes Hypertension in her father; Stroke in her father. She reports that she has never smoked. She has never used smokeless tobacco. She reports that she does not drink alcohol or use drugs. Medications     Previous Medications    ASPIRIN EC 81 MG EC TABLET    Take 1 tablet by mouth daily    CHOLECALCIFEROL (VITAMIN D3) 2000 UNITS CAPS    Take 1 capsule by mouth daily    GLIMEPIRIDE (AMARYL) 4 MG TABLET    TAKE ONE TABLET BY MOUTH EVERY MORNING BEFORE BREAKFAST    GLUCOSE BLOOD VI TEST STRIPS (ONE TOUCH ULTRA TEST) STRIP    Test twice daily, dx E11.9    LISINOPRIL (PRINIVIL;ZESTRIL) 20 MG TABLET    TAKE ONE TABLET BY MOUTH DAILY    LITE TOUCH LANCETS MISC    by Does not apply route. Use twice daily when testing blood sugars. METFORMIN (GLUCOPHAGE) 500 MG TABLET    TAKE ONE TABLET BY MOUTH THREE TIMES A DAY WITH MEALS       Allergies     She is allergic to aricept [donepezil hydrochloride]; doxycycline; and ketorolac tromethamine. Physical Exam     INITIAL VITALS: BP: 102/73, Temp: 97.5 °F (36.4 °C), Pulse: 97, Resp: 18, SpO2: 95 %    Physical Exam   Constitutional: She is oriented to person, place, and time. She appears well-developed and well-nourished. No distress. Pulmonary/Chest:   Unlabored breathing   Musculoskeletal: Normal range of motion. She exhibits no edema, tenderness or deformity. R hand wrapped in dressing, when unwrapped two small incisions noted, no drainage or surrounding erythema. Stitches in place. R proximal middle finger with swelling but good cap refill proximally and distally, able to move finger and wrist, normal sensation throughout.,   Neurological: She is alert and oriented to person, place, and time. Skin: Skin is warm and dry. No rash noted. She is not diaphoretic. No erythema. Psychiatric: She has a normal mood and affect. Her behavior is normal.         Diagnostic Results     EKG   none    RADIOLOGY:  No orders to display       LABS:   No results found for this visit on 03/30/19. ED BEDSIDE ULTRASOUND:  none    RECENT VITALS:  BP: 102/73,Temp: 97.5 °F (36.4 °C), Pulse: 97, Resp: 18, SpO2: 95 %     Procedures     none    ED Course     Nursing Notes, Past Medical Hx, Past Surgical Hx, Social Hx,Allergies, and Family Hx were reviewed. The patient was given the following medications:  Orders Placed This Encounter   Medications    bacitracin-polymyxin b (POLYSPORIN) ointment       CONSULTS:  None    MEDICAL DECISIONMAKING / ASSESSMENT / Armando Mclean is a 80 y.o. female who presents with a chief complaint of hand pain. Initial exam reveals a well-appearing female in no acute distress with normal vitals, afebrile. Physical exam remarkable for incisions with stitches intact on right hand, neurovascularly intact right hand and right finger. I spoke with on call orthopedics for Dr Bhargav Villavicencio and reviewed images with them and they agreed that patient does not appear infected. Patient will be rewrapped and sent out without antibiotics for outpatient follow-up. Clinical Impression     1.  Right hand pain        Disposition     PATIENT REFERRED TO:  Ana Fonseca MD  [address] [place] [address] Drive  [address] [place] [address]  [phone]      at your scheduled post op appointment      DISCHARGE MEDICATIONS:  New Prescriptions    No medications on file       DISPOSITION Decision To Discharge 03/30/2019 01:29:56 PM       Cedrick Mar MD  03/30/19 [id number]

## 2019-04-01 ENCOUNTER — TELEPHONE (OUTPATIENT)
Dept: INTERNAL MEDICINE CLINIC | Age: 84
End: 2019-04-01

## 2019-04-01 NOTE — TELEPHONE ENCOUNTER
Patient is calling to request some anxiety medication and would like a call from either Reynaldo Smith or Dr. Selwyn Ortega to discuss. She states there are things going on at home that is causing some anxiety and feels she needs something just to get her through a few days. She is requesting Diazepam 5mg x 5 days. She does not want anything stronger Diazepam 5mg. Patient can be reached at the number provided to advise.

## 2019-04-04 ENCOUNTER — OFFICE VISIT (OUTPATIENT)
Dept: ORTHOPEDIC SURGERY | Age: 84
End: 2019-04-04
Payer: MEDICARE

## 2019-04-04 VITALS — HEIGHT: 61 IN | WEIGHT: 118 LBS | BODY MASS INDEX: 22.28 KG/M2

## 2019-04-04 DIAGNOSIS — M47.812 ARTHROPATHY OF CERVICAL FACET JOINT: Primary | ICD-10-CM

## 2019-04-04 PROCEDURE — G8427 DOCREV CUR MEDS BY ELIG CLIN: HCPCS | Performed by: NURSE PRACTITIONER

## 2019-04-04 PROCEDURE — 99213 OFFICE O/P EST LOW 20 MIN: CPT | Performed by: NURSE PRACTITIONER

## 2019-04-04 PROCEDURE — G8420 CALC BMI NORM PARAMETERS: HCPCS | Performed by: NURSE PRACTITIONER

## 2019-04-04 PROCEDURE — 1090F PRES/ABSN URINE INCON ASSESS: CPT | Performed by: NURSE PRACTITIONER

## 2019-04-04 NOTE — PROGRESS NOTES
History of present illness:   Ms. Princess Morales is a pleasant 80 y.o. old female here today for evaluation regarding her neck pain which is right sided. She reports some pain extending into her right trapezial musculature. She reports his symptoms have been ongoing for about 2 months now. She notes numerous issues with neck pain in the past which has largely resolved with conservative treatment measures. She tries Motrin with some relief. She is also given performing Rieki with some relief. She rates her pain 5/10 at rest and reports the symptoms increase at night. She reports her symptoms have improved today. She has attended formal therapy and past and has been doing her home exercise program.  She tells that she has not mentioned further medications or invasive interventions. She denies numbness, tingling, or weakness of either upper extremity. She did recently undergo right carpal tunnel release with Dr. Alma Jones. [facility] Past history:   Her past medical, surgical, social history has been reviewed. Her  medications and allergies were reviewed. Review of symptoms:   Pertinent items are noted in HPI. Review of systems reviewed from Patient History Form dated on 1/3/19 and available in the patient's chart under the Media tab. Physical examination:   Ms. Kenneth Liu most recent vitals:  Vitals  Height: 5' 1\" (154.9 cm)  Weight: 118 lb (53.5 kg)  Body mass index is 22.3 kg/m². General Exam:  She is well-developed and well-nourished, is in no obvious discomfort and alert and oriented to person, place, and time. She demonstrates appropriate mood and affect. She walks with a normal gait. HEENT:   Her cervical flexion, extension, and axial rotation are moderately reduced with pain. Her skin is warm and dry. Her has no tenderness over her cervical spine and no obvious muscle spasm.  The skin over her cervical spine is normal without surgical scar.     Upper Extremities:  She has 5/5 strength of her interosseous muscles, wrist dorsiflexors and volarflexors, biceps, triceps, deltoids, and internal and external rotators of her shoulders, bilaterally. Her biceps, triceps, bracheoradialis, quadriceps and achilles reflexes are 2+, bilaterally. Sensation is intact to light touchfrom C6 to C8. She has no clonus and negative Rahman's bilaterally. Imaging:   I reviewed 2 views of the cervical spine obtained previously in the office today. They show moderate multilevel spondylosis with degenerative grade 1 anterolisthesis of C4 on C5 and C5 on C6. Assessment:   Cervical spondylosis    Plan:   We discussed treatment options in the office today. I recommend patient try chiropractic care. She'll follow-up on an as-needed basis.

## 2019-04-08 ENCOUNTER — OFFICE VISIT (OUTPATIENT)
Dept: ORTHOPEDIC SURGERY | Age: 84
End: 2019-04-08

## 2019-04-08 VITALS — RESPIRATION RATE: 16 BRPM | BODY MASS INDEX: 22.28 KG/M2 | WEIGHT: 118 LBS | HEIGHT: 61 IN

## 2019-04-08 DIAGNOSIS — G56.01 CARPAL TUNNEL SYNDROME, RIGHT: Primary | ICD-10-CM

## 2019-04-08 PROCEDURE — 99024 POSTOP FOLLOW-UP VISIT: CPT | Performed by: ORTHOPAEDIC SURGERY

## 2019-04-08 NOTE — PROGRESS NOTES
Ms. Thi Zelaya returns today in follow-up of her recent right Carpal Tunnel Release & Middle Finger trigger finger release done approximately 1 week ago. She has done well noting mild discomfort and no other reported complications. She notes pre-operative symptoms to be Fully Resolved at this time. Physical Exam:  Skin incision is healing well, no significant drainage, no significant erythema. Digital range of motion is full and equal bilateral.  Wrist range of motion is full and equal bilateral.  Sensation is Improved from preoperatvely  Vascular examination reveals normal and good capillary refill. Swelling is minimal.  There is no clinical evidence of persistant Median Nerve compression, no residual triggering    Impression:  Ms. Thi Zelaya is doing well after recent right Carpal Tunnel Release  & Middle Finger trigger finger release     Plan:  Ms. Thi Zelaya is instructed in work on Active & Passive range of motion of the digits, wrist, & elbow. These modalities were specifically demonstrated to her today. We discussed the appropriateness of gradual resumption of use of the operated hand and the return to normal use as comfort allows. She is given instructions regarding management of the fresh surgical incision and progressive use of desensitization and tissue massage techniques. We discussed the appropriate expectations and timeline for symptom improvement. She is provided a written patient instruction sheet titled: Postoperative Instructions After Carpal Tunnel Release. I have asked Ms. Thi Zelaya to follow-up with me in 1 week for suture removal.  Further, she may schedule an appointment for approximately 2-4 weeks from now, or contact me by telephone over the next 2-4 weeks if her symptoms have not fully resolved or if she has not regained full & painless return of function after sutures have been removed.       She is also specifically instructed to return to the office or call for an appointment sooner if her symptoms are changing or worsening prior to that time.

## 2019-04-08 NOTE — PATIENT INSTRUCTIONS
Postoperative Instructions After Carpal Tunnel Release    Dr. Tammy Sanz        1. After bandages are removed one week from surgery, you may chose to wear a small bandage over the incision if you wish, though you do not need to. 2. Keep incision dry until sutures are removed or it has been 14 days since your surgery. Thereafter, you may wash with mild soap and water and shower normally. 3. IF YOU HAVE DISSOLVABLE SUTURES:  Once your stiches have fully disappeared, you should begin gently massaging the incision with Vitamin E (may use Vitamin E lotion or contents of Vitamin E capsule). IF YOU HAVE NON-DISSOLVABLE SUTURES (Black plastic): Keep stitches dry. Do Not apply any ointment or lotion to incision site. Schedule appointment for 14 or more days after the date of your surgery for suture removal visit. After your stitches are removed, you should begin gently massaging the incision with Vitamin E (may use Vitamin E lotion or contents of Vitamin E capsule). 4. Work hard on motion of the fingers and wrist, straightening each finger fully and bending each finger fully, bending wrist forward and bending wrist backwards. Do not be concerned if you experience discomfort. This will not damage the surgery. 5. You may begin using the hand as it feels comfortable beginning 12-14 days from the day of surgery. You may not feel entirely comfortable gripping or lifting heavy objects for several weeks. 6. You may expect to see some skin peel off around the incision. You may be left with a small area of pink baby skin. This is quite normal.    Thank you for choosing [facility]) Physicians for your Hand and Upper Extremity needs. If we can be of any further assistance to you, please do not hesitate to contact us.     Office Phone Number:  [phone]  or  [phone]

## 2019-04-08 NOTE — Clinical Note
Dear  Alvino Negrete, DO,Thank you very much for your referral or Ms. Brayden Carr to me for evaluation and treatment of her Hand & Wrist condition. I appreciate your confidence in me and thank you for allowing me the opportunity to care for your patients. If I can be of any further assistance to you on this or any other patient, please do not hesitate to contact me. Sincerely,Vin Carter MD

## 2019-04-23 ENCOUNTER — TELEPHONE (OUTPATIENT)
Dept: ORTHOPEDIC SURGERY | Age: 84
End: 2019-04-23

## 2019-04-23 NOTE — TELEPHONE ENCOUNTER
Pt has concerns with redness at her incision and wrist pain increasing over the last two days   pls call pt to advise

## 2019-04-26 ENCOUNTER — OFFICE VISIT (OUTPATIENT)
Dept: ORTHOPEDIC SURGERY | Age: 84
End: 2019-04-26

## 2019-04-26 VITALS — HEIGHT: 61 IN | BODY MASS INDEX: 22.28 KG/M2 | WEIGHT: 118 LBS | RESPIRATION RATE: 16 BRPM

## 2019-04-26 DIAGNOSIS — G56.01 CARPAL TUNNEL SYNDROME OF RIGHT WRIST: Primary | ICD-10-CM

## 2019-04-26 PROCEDURE — 99024 POSTOP FOLLOW-UP VISIT: CPT | Performed by: ORTHOPAEDIC SURGERY

## 2019-04-26 NOTE — Clinical Note
Dear  Adrianne Dumont, DO,Thank you very much for your referral or Ms. Cachorro Montgomery to me for evaluation and treatment of her Hand & Wrist condition. I appreciate your confidence in me and thank you for allowing me the opportunity to care for your patients. If I can be of any further assistance to you on this or any other patient, please do not hesitate to contact me. Sincerely,Vin Ling MD

## 2019-04-27 NOTE — PATIENT INSTRUCTIONS
Thank you for choosing [facility]) Physicians for your Hand and Upper Extremity needs. If we can be of any further assistance to you, please do not hesitate to contact us.     Office Phone Number:  [phone]  or  [phone]

## 2019-04-27 NOTE — PROGRESS NOTES
Ms. Rochelle Henry returns today in follow-up of her recent right Carpal Tunnel Release & Middle Finger trigger finger release  done approximately 1 month ago. She has done well noting mild discomfort and no other reported complications. She presents about some mild residual discomfort in both surgical locations, recently worsened with her increased activity and usage. She notes pre-operative symptoms to be Fully Resolved at this time. Physical Exam:  Skin incision is healing well, no significant drainage. Prior incisions are fully healed, non tender and with minimal  hypersensitivity. Digital range of motion is limited only by her mild osteoarthritis . Wrist range of motion is equal bilateral.  Sensation is Improved from preoperatvely  Vascular examination reveals normal and good capillary refill. Swelling is minimal, although there is still some typical  Thickening about the incisional sites  There is no clinical evidence of persistant Median Nerve compression. There is no clinical evidence of residual stenosing tenosynovitis. There is no erythema, drainage, or sign of infection. Impression:  Ms. Rochelle Henry is doing well after recent right Carpal Tunnel Release & trigger finger release . Plan:  Ms. Rochelle Henry is instructed in work on Active & Passive range of motion of the digits, wrist, & elbow. These modalities were specifically demonstrated to her today. We discussed the appropriateness of gradual resumption of use of the operated hand and the return to normal use as comfort allows. She is given instructions regarding management of the fresh surgical incision and progressive use of desensitization and tissue massage techniques. We discussed the appropriate expectations and timeline for symptom improvement. She is provided a written patient instruction sheet titled: Postoperative Instructions After Carpal Tunnel Release. I have asked Ms. Rochelle Henry to follow-up with me in 1 week for suture removal.  Further, she may schedule an appointment for approximately 2-4 weeks from now, or contact me by telephone over the next 2-4 weeks if her symptoms have not fully resolved or if she has not regained full & painless return of function after sutures have been removed. She is also specifically instructed to return to the office or call for an appointment sooner if her symptoms are changing or worsening prior to that time.

## 2019-04-28 DIAGNOSIS — I10 BENIGN ESSENTIAL HTN: ICD-10-CM

## 2019-04-29 RX ORDER — LISINOPRIL 20 MG/1
TABLET ORAL
Qty: 30 TABLET | Refills: 3 | Status: SHIPPED | OUTPATIENT
Start: 2019-04-29 | End: 2019-08-23 | Stop reason: SDUPTHER

## 2019-06-14 ENCOUNTER — TELEPHONE (OUTPATIENT)
Dept: ORTHOPEDIC SURGERY | Age: 84
End: 2019-06-14

## 2019-06-14 NOTE — TELEPHONE ENCOUNTER
Patient called stating she's had pain at the incision points where she had surgery on her right wrist/hand. Patient says when she wears a compression glove she can move her hand around, when she takes it off its difficult to move her hand around due to the pain. Patient states she hasn't really tried anything other than ibuprofen every now and again. Patient says the incision site feels hard. Is this normal healing process. [employer] telephone is out of order. The phone company is supposed to come today between 12:00 and 4:00 pm.  Please give patient a call at [phone] Monday of next week.

## 2019-06-19 ENCOUNTER — OFFICE VISIT (OUTPATIENT)
Dept: INTERNAL MEDICINE CLINIC | Age: 84
End: 2019-06-19
Payer: MEDICARE

## 2019-06-19 ENCOUNTER — TELEPHONE (OUTPATIENT)
Dept: INTERNAL MEDICINE CLINIC | Age: 84
End: 2019-06-19

## 2019-06-19 VITALS
DIASTOLIC BLOOD PRESSURE: 66 MMHG | WEIGHT: 111 LBS | SYSTOLIC BLOOD PRESSURE: 130 MMHG | HEART RATE: 76 BPM | TEMPERATURE: 98.3 F | BODY MASS INDEX: 20.97 KG/M2

## 2019-06-19 DIAGNOSIS — J01.00 ACUTE MAXILLARY SINUSITIS, RECURRENCE NOT SPECIFIED: Primary | ICD-10-CM

## 2019-06-19 PROCEDURE — 1090F PRES/ABSN URINE INCON ASSESS: CPT | Performed by: INTERNAL MEDICINE

## 2019-06-19 PROCEDURE — 99213 OFFICE O/P EST LOW 20 MIN: CPT | Performed by: INTERNAL MEDICINE

## 2019-06-19 PROCEDURE — G8427 DOCREV CUR MEDS BY ELIG CLIN: HCPCS | Performed by: INTERNAL MEDICINE

## 2019-06-19 PROCEDURE — G8420 CALC BMI NORM PARAMETERS: HCPCS | Performed by: INTERNAL MEDICINE

## 2019-06-19 PROCEDURE — 4040F PNEUMOC VAC/ADMIN/RCVD: CPT | Performed by: INTERNAL MEDICINE

## 2019-06-19 PROCEDURE — 1123F ACP DISCUSS/DSCN MKR DOCD: CPT | Performed by: INTERNAL MEDICINE

## 2019-06-19 PROCEDURE — 1036F TOBACCO NON-USER: CPT | Performed by: INTERNAL MEDICINE

## 2019-06-19 PROCEDURE — G8599 NO ASA/ANTIPLAT THER USE RNG: HCPCS | Performed by: INTERNAL MEDICINE

## 2019-06-19 RX ORDER — ECHINACEA PURPUREA EXTRACT 125 MG
3 TABLET ORAL 3 TIMES DAILY
Qty: 1 BOTTLE | Refills: 3 | Status: SHIPPED | OUTPATIENT
Start: 2019-06-19 | End: 2020-08-28 | Stop reason: ALTCHOICE

## 2019-06-19 RX ORDER — FLUTICASONE PROPIONATE 50 MCG
2 SPRAY, SUSPENSION (ML) NASAL NIGHTLY
Qty: 1 BOTTLE | Refills: 0 | Status: SHIPPED | OUTPATIENT
Start: 2019-06-19 | End: 2019-08-14 | Stop reason: ALTCHOICE

## 2019-06-19 RX ORDER — LEVOFLOXACIN 500 MG/1
500 TABLET, FILM COATED ORAL DAILY
Qty: 10 TABLET | Refills: 0 | Status: SHIPPED | OUTPATIENT
Start: 2019-06-19 | End: 2019-06-29

## 2019-06-19 NOTE — PROGRESS NOTES
[facility]) Physicians  Internal Medicine  Patient Encounter  Itzel Oconnell D.O., [place]        Chief Complaint   Patient presents with    Sinus Problem       HPI: 80 y.o. female with multiple medical problems seen today with complaint of nasal congestion that  Started 1 week ago. She used an OTC product. She thought she was doing better, but nasal congestion has worsened. She states her nasal discharge is yellowish-green and thick. She states her sinus hurt. She reports sinus pressure. She denies sneezing, runny nose, sore throat. She has used a Andi's nasal inhaler. She states she has used Levaquin which worked well. He is allergic to Doxycycline. Past Medical History:   Diagnosis Date    Anxiety     Arthritis     At risk for falls     CAD (coronary artery disease)     CTS (carpal tunnel syndrome)     Bilateral, EMG-NCS    Fractures     (L) Hip Fx 4/25/98, L1 fracture from fall 10-31-06    Hypertension     Mitral regurgitation     Osteopenia     Osteoporosis     [facility]per[facility] disease) ([facility])     Right LE ischemia    Peripheral neuropathy 6/1/2015    Peripheral vascular disease ([facility] [address]     bilateral lower extremities with edema    Podagra 01/09/2017    Dr. Herrera Speak Spinal stenosis     L4-5    Type 2 diabetes mellitus ([facility] [address]     Urethral stricture 5 years ago    Urgency of urination          MEDICATIONS:  Prior to Visit Medications    Medication Sig Taking?  Authorizing Provider   lisinopril (PRINIVIL;ZESTRIL) 20 MG tablet TAKE ONE TABLET BY MOUTH DAILY Yes Paulo Forman DO   glimepiride (AMARYL) 4 MG tablet TAKE ONE TABLET BY MOUTH EVERY MORNING BEFORE BREAKFAST Yes Paulo Forman DO   metFORMIN (GLUCOPHAGE) 500 MG tablet TAKE ONE TABLET BY MOUTH THREE TIMES A DAY WITH MEALS Yes Paulo Forman DO   Cholecalciferol (VITAMIN D3) 2000 UNITS CAPS Take 1 capsule by mouth daily Yes Historical Provider, MD   aspirin EC 81 MG EC tablet Take 1 tablet by mouth daily Yes Paulo ALEMAN

## 2019-06-19 NOTE — PATIENT INSTRUCTIONS
Monitor your sugars closely as Levaquin (levofloxacin) can interact with the glimepiride and make your sugars lower  Patient Education        Sinusitis: Care Instructions  Your Care Instructions    Sinusitis is an infection of the lining of the sinus cavities in your head. Sinusitis often follows a cold. It causes pain and pressure in your head and face. In most cases, sinusitis gets better on its own in 1 to 2 weeks. But some mild symptoms may last for several weeks. Sometimes antibiotics are needed. Follow-up care is a key part of your treatment and safety. Be sure to make and go to all appointments, and call your doctor if you are having problems. It's also a good idea to know your test results and keep a list of the medicines you take. How can you care for yourself at home? · Take an over-the-counter pain medicine, such as acetaminophen (Tylenol), ibuprofen (Advil, Motrin), or naproxen (Aleve). Read and follow all instructions on the label. · If the doctor prescribed antibiotics, take them as directed. Do not stop taking them just because you feel better. You need to take the full course of antibiotics. · Be careful when taking over-the-counter cold or flu medicines and Tylenol at the same time. Many of these medicines have acetaminophen, which is Tylenol. Read the labels to make sure that you are not taking more than the recommended dose. Too much acetaminophen (Tylenol) can be harmful. · Breathe warm, moist air from a steamy shower, a hot bath, or a sink filled with hot water. Avoid cold, dry air. Using a humidifier in your home may help. Follow the directions for cleaning the machine. · Use saline (saltwater) nasal washes to help keep your nasal passages open and wash out mucus and bacteria. You can buy saline nose drops at a grocery store or drugstore. Or you can make your own at home by adding 1 teaspoon of salt and 1 teaspoon of baking soda to 2 cups of distilled water.  If you make your own, fill a bulb syringe with the solution, insert the tip into your nostril, and squeeze gently. Alyssa Canard your nose. · Put a hot, wet towel or a warm gel pack on your face 3 or 4 times a day for 5 to 10 minutes each time. · Try a decongestant nasal spray like oxymetazoline (Afrin). Do not use it for more than 3 days in a row. Using it for more than 3 days can make your congestion worse. When should you call for help? Call your doctor now or seek immediate medical care if:    · You have new or worse swelling or redness in your face or around your eyes.     · You have a new or higher fever.    Watch closely for changes in your health, and be sure to contact your doctor if:    · You have new or worse facial pain.     · The mucus from your nose becomes thicker (like pus) or has new blood in it.     · You are not getting better as expected. Where can you learn more? Go to [email or web address]://[email or web address].[employer]. org and sign in to your [employer] account. Enter N604 in the [employer] box to learn more about \"Sinusitis: Care Instructions. \"     If you do not have an account, please click on the \"Sign Up Now\" link. Current as of: October 21, 2018  Content Version: 12.0  © 1780-7470 Healthwise, Incorporated. Care instructions adapted under license by [facility] ([facility]). If you have questions about a medical condition or this instruction, always ask your healthcare professional. [place] [address] any warranty or liability for your use of this information.

## 2019-06-25 ENCOUNTER — TELEPHONE (OUTPATIENT)
Dept: INTERNAL MEDICINE CLINIC | Age: 84
End: 2019-06-25

## 2019-06-28 ENCOUNTER — TELEPHONE (OUTPATIENT)
Dept: INTERNAL MEDICINE CLINIC | Age: 84
End: 2019-06-28

## 2019-07-08 ENCOUNTER — OFFICE VISIT (OUTPATIENT)
Dept: INTERNAL MEDICINE CLINIC | Age: 84
End: 2019-07-08
Payer: MEDICARE

## 2019-07-08 VITALS
BODY MASS INDEX: 20.96 KG/M2 | RESPIRATION RATE: 14 BRPM | OXYGEN SATURATION: 97 % | DIASTOLIC BLOOD PRESSURE: 78 MMHG | SYSTOLIC BLOOD PRESSURE: 146 MMHG | HEIGHT: 61 IN | HEART RATE: 72 BPM | WEIGHT: 111 LBS

## 2019-07-08 DIAGNOSIS — M54.81 OCCIPITAL NEURALGIA OF RIGHT SIDE: ICD-10-CM

## 2019-07-08 DIAGNOSIS — E11.40 TYPE 2 DIABETES, CONTROLLED, WITH NEUROPATHY (HCC): ICD-10-CM

## 2019-07-08 DIAGNOSIS — E11.59 TYPE 2 DIABETES MELLITUS WITH VASCULAR DISEASE (HCC): ICD-10-CM

## 2019-07-08 DIAGNOSIS — I34.0 SEVERE MITRAL REGURGITATION: ICD-10-CM

## 2019-07-08 DIAGNOSIS — E78.5 HYPERLIPIDEMIA, UNSPECIFIED HYPERLIPIDEMIA TYPE: ICD-10-CM

## 2019-07-08 DIAGNOSIS — I10 BENIGN ESSENTIAL HTN: ICD-10-CM

## 2019-07-08 DIAGNOSIS — M54.2 NECK PAIN ON RIGHT SIDE: Primary | ICD-10-CM

## 2019-07-08 DIAGNOSIS — I73.9 PAD (PERIPHERAL ARTERY DISEASE) (HCC): ICD-10-CM

## 2019-07-08 DIAGNOSIS — E11.649 TYPE 2 DIABETES MELLITUS WITH HYPOGLYCEMIA WITHOUT COMA, WITHOUT LONG-TERM CURRENT USE OF INSULIN (HCC): ICD-10-CM

## 2019-07-08 PROCEDURE — G8427 DOCREV CUR MEDS BY ELIG CLIN: HCPCS | Performed by: INTERNAL MEDICINE

## 2019-07-08 PROCEDURE — 1090F PRES/ABSN URINE INCON ASSESS: CPT | Performed by: INTERNAL MEDICINE

## 2019-07-08 PROCEDURE — G8599 NO ASA/ANTIPLAT THER USE RNG: HCPCS | Performed by: INTERNAL MEDICINE

## 2019-07-08 PROCEDURE — 1123F ACP DISCUSS/DSCN MKR DOCD: CPT | Performed by: INTERNAL MEDICINE

## 2019-07-08 PROCEDURE — 99214 OFFICE O/P EST MOD 30 MIN: CPT | Performed by: INTERNAL MEDICINE

## 2019-07-08 PROCEDURE — 4040F PNEUMOC VAC/ADMIN/RCVD: CPT | Performed by: INTERNAL MEDICINE

## 2019-07-08 PROCEDURE — 1036F TOBACCO NON-USER: CPT | Performed by: INTERNAL MEDICINE

## 2019-07-08 PROCEDURE — G8420 CALC BMI NORM PARAMETERS: HCPCS | Performed by: INTERNAL MEDICINE

## 2019-07-08 RX ORDER — GLIMEPIRIDE 4 MG/1
2 TABLET ORAL
Qty: 45 TABLET | Refills: 0 | Status: SHIPPED | OUTPATIENT
Start: 2019-07-08 | End: 2019-08-06

## 2019-07-08 RX ORDER — TIZANIDINE 2 MG/1
2 TABLET ORAL NIGHTLY PRN
Qty: 10 TABLET | Refills: 0 | Status: SHIPPED | OUTPATIENT
Start: 2019-07-08 | End: 2019-07-08 | Stop reason: CLARIF

## 2019-07-08 RX ORDER — METHOCARBAMOL 500 MG/1
250-500 TABLET, FILM COATED ORAL NIGHTLY
Qty: 7 TABLET | Refills: 0 | Status: SHIPPED | OUTPATIENT
Start: 2019-07-08 | End: 2019-07-15

## 2019-07-08 ASSESSMENT — PATIENT HEALTH QUESTIONNAIRE - PHQ9
2. FEELING DOWN, DEPRESSED OR HOPELESS: 0
SUM OF ALL RESPONSES TO PHQ9 QUESTIONS 1 & 2: 0
SUM OF ALL RESPONSES TO PHQ QUESTIONS 1-9: 0
1. LITTLE INTEREST OR PLEASURE IN DOING THINGS: 0
SUM OF ALL RESPONSES TO PHQ QUESTIONS 1-9: 0

## 2019-07-08 NOTE — PROGRESS NOTES
[facility]) Physicians  Internal Medicine  Patient Encounter  Monster Hartmann D.O., [facility]        Chief Complaint   Patient presents with    3 Month Follow-Up    Diabetes    Hypertension       HPI: 80 y.o. female seen today requesting a checkup regarding the status of her current chronic medical problems listed below along with a medication review. She was recently seen on 6/19/2019 for a sinus infection. Patient has reported she is doing much better. She states she is struggling to sleep at night. She states she is awakened by right sided posterior head and neck pain. She states she has pain there all the time. She denies spasm. She has increased pain with neck rotation to the right. She has had neck PT. She has had C-Spine X-rays-- DDD/DJD. She has an appointment with Dr. Elli Moreno 8/14/2019. She has tried cold and heat without much relief. She has tried Aspercreme. She has aching now. She does not want Gabapentin. PAD/venous insufficiency--Patient has seen Dr. Juany Newman. Her last lower extremity arterial evaluation and carotid study Doppler were done on 1/17/2019. There were no significant changes. Her right carotid showed a 50 to 69% stenosis. The external carotid artery showed greater than 50% stenosis. Her left internal carotid artery showed less than 50% stenosis. She has a history of femoral popliteal bypass graft on the right on 8/31/2015. She denies any new claudication or wounds on her feet. She continues have problems with 70 swelling that worsens through the day and is improved by morning. She stopped wearing her compression stockings sometime ago. Type 2 diabetes--Patient remains on a low-dose of metformin 500 mg 3 times daily along with Denzel Blazer Keo 4 mg daily. She has a symptoms of hypoglycemia once weekly. Her sugar is 59-70. Her last A1c was 6.8% indicating excellent control for this 80year-old.     Severe mitral regurgitation--Patient was seen by her cardiologist, Dr. Polina Bates in February 2019. Her last echocardiogram was 8/23/2018 which showed an ejection fraction was 29-31%, grade 1 diastolic dysfunction, and severe mitral regurgitation. Right ventricular systolic pressure was 32 mmHg. Radiologist wanted to observe for now since she was asymptomatic. Interval Hx---->  She denies palpitations. She denies SOB. CAD--History of coronary artery disease but it is unclear as to where this was diagnosed and how this was diagnosed. She is never had a stent placed. She has not had bypass surgery. She denies CP, SOB, palpitations. HTN--Patient has a history of labile blood pressure. No HA's. Past Medical History:   Diagnosis Date    Anxiety     Arthritis     At risk for falls     CAD (coronary artery disease)     CTS (carpal tunnel syndrome)     Bilateral, EMG-NCS    DDD (degenerative disc disease), cervical     Fractures     (L) Hip Fx 4/25/98, L1 fracture from fall 10-31-06    Hypertension     Mitral regurgitation     Osteopenia     Osteoporosis     PAD (peripheral artery disease) ([facility])     Right LE ischemia    Peripheral neuropathy 6/1/2015    Peripheral vascular disease ([facility] [address]     bilateral lower extremities with edema    Podagra 01/09/2017    Dr. Yvonne lBancas Spinal stenosis     L4-5    Type 2 diabetes mellitus ([facility] [address]     Urethral stricture 5 years ago    Urgency of urination          MEDICATIONS:  Prior to Visit Medications    Medication Sig Taking?  Authorizing Provider   glimepiride (AMARYL) 4 MG tablet Take 1 tablet by mouth every morning (before breakfast) Yes Paulo Forman, DO   sodium chloride (OCEAN) 0.65 % nasal spray 3 sprays by Nasal route three times daily Yes Paulo Forman DO   fluticasone (FLONASE) 50 MCG/ACT nasal spray 2 sprays by Nasal route nightly Yes Paulo Forman DO   blood glucose test strips (ONE TOUCH ULTRA TEST) strip TEST TWICE DAILY Yes Paulo Forman DO   lisinopril (PRINIVIL;ZESTRIL) 20 MG tablet TAKE ONE TABLET BY MOUTH DAILY Yes Paulo Forman DO   metFORMIN (GLUCOPHAGE) 500 MG tablet TAKE ONE TABLET BY MOUTH THREE TIMES A DAY WITH MEALS Yes Paulo Forman DO   Cholecalciferol (VITAMIN D3) 2000 UNITS CAPS Take 1 capsule by mouth daily Yes Historical Provider, MD   aspirin EC 81 MG EC tablet Take 1 tablet by mouth daily Yes Paulo Forman DO   Lite Touch Lancets MISC by Does not apply route. Use twice daily when testing blood sugars. Yes Catrachita Arteaga MD           Review of Systems - As per HPI      OBJECTIVE:  BP (!) 146/78   Pulse 72   Resp 14   Ht 5' 1\" (1.549 m)   Wt 111 lb (50.3 kg)   SpO2 97%   BMI 20.97 kg/m²   GEN: NAD, A&O, Non-toxic  HEENT: NC/AT, FLACA, EOMI, Oral cavity Clear,  TM's NL, Nasal cavity clear. NECK: Supple. No thyromegaly. No JVD. LYMPH: No C/SC nodes. CV: S1 S2 NL, RRR.  3/6 systolic murmur heard best at the apex. PULM: CTA, symmentric air exchange  EXT: + Bilateral lower extremity pitting edema with compression stockings in place. GI: Abdomen is soft, NT, BS+, No hepatomegaly. NEURO: No focal or lateralizing deficits. VASC:  No carotid bruits. Pulses symmetric  MS:  Tenderness with palpation of the cervical paraspinals, particularly right paraspinals and right occipital groove. Increased soft tissue tone and spasm noted. Rotation to the right active and passive is limited. ASSESSMENT/PLAN:    1. Neck pain on right side  This is a new problem and likely relates to cervical degenerative disc disease and degenerative arthritis  She has a follow-up appointment with a spine surgeon. She probably needs a physical medicine rehabilitation specialist, physical therapy, and possible facet/epidural injections  Light stretching exercises  Will start a muscle relaxant nightly cautiously. - methocarbamol (ROBAXIN) 500 MG tablet; Take 0.5-1 tablets by mouth nightly for 7 days  Dispense: 7 tablet; Refill: 0    2.  Occipital neuralgia of right side  Patient has

## 2019-07-16 ENCOUNTER — TELEPHONE (OUTPATIENT)
Dept: INTERNAL MEDICINE CLINIC | Age: 84
End: 2019-07-16

## 2019-07-16 NOTE — TELEPHONE ENCOUNTER
Patient is experiencing pain in her neck. She states Dr. Mary Duenas is aware. She is asking how many IBU she can safely take per day. She is not use to taking so much pain medication and does not want to take too much. Please contact patient at the number provided to advise.

## 2019-07-17 RX ORDER — METHYLPREDNISOLONE 4 MG/1
4 TABLET ORAL SEE ADMIN INSTRUCTIONS
Qty: 1 KIT | Refills: 1 | Status: SHIPPED | OUTPATIENT
Start: 2019-07-17 | End: 2019-07-23

## 2019-07-17 NOTE — TELEPHONE ENCOUNTER
Patient is in agreement.  Please send Medrol Dose Davis to [facility] [address], [address]

## 2019-07-18 ENCOUNTER — PROCEDURE VISIT (OUTPATIENT)
Dept: VASCULAR SURGERY | Age: 84
End: 2019-07-18
Payer: MEDICARE

## 2019-07-18 DIAGNOSIS — I73.9 PVD (PERIPHERAL VASCULAR DISEASE) (HCC): ICD-10-CM

## 2019-07-18 DIAGNOSIS — I65.23 BILATERAL CAROTID ARTERY STENOSIS: Primary | ICD-10-CM

## 2019-07-18 DIAGNOSIS — Z48.812 ENCOUNTER FOR POSTOPERATIVE SURVEILLANCE OF VASCULAR BYPASS SURGERY: ICD-10-CM

## 2019-07-18 PROCEDURE — 93925 LOWER EXTREMITY STUDY: CPT | Performed by: SURGERY

## 2019-07-18 PROCEDURE — 93880 EXTRACRANIAL BILAT STUDY: CPT | Performed by: SURGERY

## 2019-07-30 ENCOUNTER — TELEPHONE (OUTPATIENT)
Dept: VASCULAR SURGERY | Age: 84
End: 2019-07-30

## 2019-07-30 NOTE — TELEPHONE ENCOUNTER
I spoke to patient and told her that per Dr. Amilcar Lazcano the graft scans and her carotid scan both look good. RTO 1 year for carotid scan and 6 months for graft scans.

## 2019-08-06 ENCOUNTER — TELEPHONE (OUTPATIENT)
Dept: INTERNAL MEDICINE CLINIC | Age: 84
End: 2019-08-06

## 2019-08-06 DIAGNOSIS — E11.59 TYPE 2 DIABETES MELLITUS WITH VASCULAR DISEASE (HCC): ICD-10-CM

## 2019-08-06 RX ORDER — GLIMEPIRIDE 2 MG/1
2 TABLET ORAL
Qty: 90 TABLET | Refills: 0 | OUTPATIENT
Start: 2019-08-06 | End: 2020-09-18 | Stop reason: SDUPTHER

## 2019-08-14 ENCOUNTER — OFFICE VISIT (OUTPATIENT)
Dept: ORTHOPEDIC SURGERY | Age: 84
End: 2019-08-14
Payer: MEDICARE

## 2019-08-14 VITALS
HEIGHT: 61 IN | BODY MASS INDEX: 20.58 KG/M2 | WEIGHT: 109 LBS | HEART RATE: 60 BPM | SYSTOLIC BLOOD PRESSURE: 139 MMHG | DIASTOLIC BLOOD PRESSURE: 72 MMHG

## 2019-08-14 DIAGNOSIS — M47.812 SPONDYLOSIS OF CERVICAL REGION WITHOUT MYELOPATHY OR RADICULOPATHY: ICD-10-CM

## 2019-08-14 DIAGNOSIS — M47.812 ARTHROPATHY OF CERVICAL FACET JOINT: Primary | ICD-10-CM

## 2019-08-14 PROCEDURE — 1036F TOBACCO NON-USER: CPT | Performed by: PHYSICIAN ASSISTANT

## 2019-08-14 PROCEDURE — 1090F PRES/ABSN URINE INCON ASSESS: CPT | Performed by: PHYSICIAN ASSISTANT

## 2019-08-14 PROCEDURE — G8420 CALC BMI NORM PARAMETERS: HCPCS | Performed by: PHYSICIAN ASSISTANT

## 2019-08-14 PROCEDURE — 4040F PNEUMOC VAC/ADMIN/RCVD: CPT | Performed by: PHYSICIAN ASSISTANT

## 2019-08-14 PROCEDURE — G8599 NO ASA/ANTIPLAT THER USE RNG: HCPCS | Performed by: PHYSICIAN ASSISTANT

## 2019-08-14 PROCEDURE — 1123F ACP DISCUSS/DSCN MKR DOCD: CPT | Performed by: PHYSICIAN ASSISTANT

## 2019-08-14 PROCEDURE — 99212 OFFICE O/P EST SF 10 MIN: CPT | Performed by: PHYSICIAN ASSISTANT

## 2019-08-14 PROCEDURE — G8427 DOCREV CUR MEDS BY ELIG CLIN: HCPCS | Performed by: PHYSICIAN ASSISTANT

## 2019-08-14 RX ORDER — IBUPROFEN 200 MG
400 TABLET ORAL EVERY 8 HOURS PRN
COMMUNITY
End: 2021-04-20 | Stop reason: ALTCHOICE

## 2019-08-14 NOTE — PROGRESS NOTES
History of present illness:   Ms. Alpa Camarillo is a pleasant 80 y.o. old female here for follow up regarding her neck pain which is right sided. She was previously seen by Sedrick Carter CNP for this pain. She reports some pain extending into her right trapezial musculature. She reports her symptoms have waxed and waned over the past 8 months. She rates her pain 4/10 at rest and reports the symptoms increase at night. She reports her symptoms have improved today and describes it as aching. She has been doing her home exercise program and attended physical therapy in the distant past. She denies numbness, tingling, or weakness of either upper extremity. She denies gait instability or bowel/bladder dysfunction. She has tried oral steroids 2 weeks ago which decreased her pain. Physical examination:   Ms. Aleisha Beavers most recent vitals:  Vitals  BP: 139/72  Pulse: 60  Height: 5' 1\" (154.9 cm)  Weight: 109 lb (49.4 kg)  Body mass index is 20.6 kg/m². General Exam:  She is well-developed and well-nourished, is in no obvious discomfort and alert and oriented to person, place, and time. She demonstrates appropriate mood and affect. She walks with a normal gait. HEENT:   Her cervical flexion, extension, and axial rotation are mildly reduced with pain. Her skin is warm and dry. Her has no tenderness over her cervical spine and no obvious muscle spasm. The skin over her cervical spine is normal without surgical scar. Upper Extremities:  She has 5/5 strength of her interosseous muscles, wrist dorsiflexors and volarflexors, biceps, triceps, deltoids, and internal and external rotators of her shoulders, bilaterally. Her biceps, triceps, bracheoradialis, quadriceps and achilles reflexes are 2+, bilaterally. Sensation is intact to light touchfrom C6 to C8. She has no clonus and negative Rahman's bilaterally. Imaging:   I reviewed 2 views of the cervical spine from 2/11/19.   They show moderate multilevel spondylosis with degenerative grade 1 anterolisthesis of C4 on C5 and C5 on C6. Assessment:   Cervical spondylosis  Grade I spondylolisthesis C4 on C5 and C5 on C6    Plan:   We discussed treatment options in the office today. She would like to proceed with home traction unit and tumeric. She may consider chiropractic care. She will return to the office if her pain plateaus to an unacceptable level to consider cervical MRI.     Shyann Harley PA-C

## 2019-08-19 ENCOUNTER — OFFICE VISIT (OUTPATIENT)
Dept: CARDIOLOGY CLINIC | Age: 84
End: 2019-08-19
Payer: MEDICARE

## 2019-08-19 VITALS
WEIGHT: 111.8 LBS | HEART RATE: 60 BPM | SYSTOLIC BLOOD PRESSURE: 110 MMHG | DIASTOLIC BLOOD PRESSURE: 70 MMHG | BODY MASS INDEX: 21.12 KG/M2

## 2019-08-19 DIAGNOSIS — E78.5 HYPERLIPIDEMIA, UNSPECIFIED HYPERLIPIDEMIA TYPE: ICD-10-CM

## 2019-08-19 DIAGNOSIS — I73.9 PAD (PERIPHERAL ARTERY DISEASE) (HCC): ICD-10-CM

## 2019-08-19 DIAGNOSIS — I34.0 SEVERE MITRAL REGURGITATION: ICD-10-CM

## 2019-08-19 DIAGNOSIS — I10 ESSENTIAL HYPERTENSION: Primary | ICD-10-CM

## 2019-08-19 PROCEDURE — G8599 NO ASA/ANTIPLAT THER USE RNG: HCPCS | Performed by: INTERNAL MEDICINE

## 2019-08-19 PROCEDURE — 99213 OFFICE O/P EST LOW 20 MIN: CPT | Performed by: INTERNAL MEDICINE

## 2019-08-19 PROCEDURE — 4040F PNEUMOC VAC/ADMIN/RCVD: CPT | Performed by: INTERNAL MEDICINE

## 2019-08-19 PROCEDURE — 1123F ACP DISCUSS/DSCN MKR DOCD: CPT | Performed by: INTERNAL MEDICINE

## 2019-08-19 PROCEDURE — 1036F TOBACCO NON-USER: CPT | Performed by: INTERNAL MEDICINE

## 2019-08-19 PROCEDURE — 1090F PRES/ABSN URINE INCON ASSESS: CPT | Performed by: INTERNAL MEDICINE

## 2019-08-19 PROCEDURE — G8420 CALC BMI NORM PARAMETERS: HCPCS | Performed by: INTERNAL MEDICINE

## 2019-08-19 PROCEDURE — G8427 DOCREV CUR MEDS BY ELIG CLIN: HCPCS | Performed by: INTERNAL MEDICINE

## 2019-08-19 ASSESSMENT — ENCOUNTER SYMPTOMS
ALLERGIC/IMMUNOLOGIC NEGATIVE: 1
APNEA: 0
SHORTNESS OF BREATH: 0
GASTROINTESTINAL NEGATIVE: 1
COUGH: 0
CHOKING: 0
CHEST TIGHTNESS: 0

## 2019-08-19 NOTE — PROGRESS NOTES
None     Emotionally abused: None     Physically abused: None     Forced sexual activity: None   Other Topics Concern    None   Social History Narrative    None       Family History   Problem Relation Age of Onset    Stroke Father     Hypertension Father         has a past medical history of Anxiety, Arthritis, At risk for falls, CAD (coronary artery disease), CTS (carpal tunnel syndrome), DDD (degenerative disc disease), cervical, Fractures, Hypertension, Mitral regurgitation, Neuropathy, Osteopenia, Osteoporosis, PAD (peripheral artery disease) ([address] Peripheral neuropathy, Peripheral vascular disease ([address] Podagra, Spinal stenosis, Type 2 diabetes mellitus ([address] Urethral stricture, and Urgency of urination. Review of Systems   Constitutional: Positive for activity change and fatigue. Negative for appetite change, chills, diaphoresis, fever and unexpected weight change. HENT: Negative. Respiratory: Negative for apnea, cough, choking, chest tightness and shortness of breath. Cardiovascular: Negative for chest pain, palpitations and leg swelling. Gastrointestinal: Negative. Endocrine: Negative. Genitourinary: Negative. Musculoskeletal: Negative. Skin: Negative. Allergic/Immunologic: Negative. Neurological: Negative. Hematological: Negative. Psychiatric/Behavioral: Negative. Objective:   Physical Exam   Constitutional: She is oriented to person, place, and time. She appears well-developed and well-nourished. HENT:   Head: Normocephalic and atraumatic. Eyes: Pupils are equal, round, and reactive to light. Conjunctivae and EOM are normal. Right eye exhibits no discharge. Left eye exhibits no discharge. No scleral icterus. Neck: Normal range of motion. Neck supple. No tracheal deviation present. No thyromegaly present. Cardiovascular: Normal rate and regular rhythm. Exam reveals no gallop and no friction rub. Murmur heard.   III/VI HSM   Pulmonary/Chest:

## 2019-08-23 DIAGNOSIS — I10 BENIGN ESSENTIAL HTN: ICD-10-CM

## 2019-08-26 RX ORDER — LISINOPRIL 20 MG/1
TABLET ORAL
Qty: 90 TABLET | Refills: 2 | Status: SHIPPED | OUTPATIENT
Start: 2019-08-26 | End: 2020-06-10

## 2019-08-30 ENCOUNTER — TELEPHONE (OUTPATIENT)
Dept: INTERNAL MEDICINE CLINIC | Age: 84
End: 2019-08-30

## 2019-08-30 NOTE — TELEPHONE ENCOUNTER
Patient is requesting something please be prescribed for the following:    URI TRIAGE    When did the symptoms start? 1 month(s) ago  Blowing out or coughing up any colored mucus? No but it was green  Fevers? none  Sore throat? no  SOB/wheezing/chest tightness? yes - Has a little SOB but no chest tightness  GI symptoms? no    Other symptoms? L side of her face hurts, hoarse, head congestion, just L side pain   Treatment tried so far? Vicks Inhaler, Ice and Heat  She states she doesn't really want a Steroid prescribed  She states that she is better in the am because \"she doctors it, but then in the evening it gets worse. \"    Appointment offered and Declined due to transportation. Her help comes to the house on Monday, and if necessary, she can schedule then, but wants something prescribed prior. She is requesting the medication that was prescribed in the past for a Sinus Infection please be prescribed again.     [address]

## 2019-10-03 ENCOUNTER — CARE COORDINATION (OUTPATIENT)
Dept: CARE COORDINATION | Age: 84
End: 2019-10-03

## 2019-10-22 ENCOUNTER — OFFICE VISIT (OUTPATIENT)
Dept: INTERNAL MEDICINE CLINIC | Age: 84
End: 2019-10-22
Payer: MEDICARE

## 2019-10-22 VITALS
BODY MASS INDEX: 21.14 KG/M2 | SYSTOLIC BLOOD PRESSURE: 156 MMHG | WEIGHT: 112 LBS | HEIGHT: 61 IN | DIASTOLIC BLOOD PRESSURE: 60 MMHG | RESPIRATION RATE: 12 BRPM | HEART RATE: 76 BPM

## 2019-10-22 DIAGNOSIS — E11.42 DIABETIC PERIPHERAL NEUROPATHY (HCC): ICD-10-CM

## 2019-10-22 DIAGNOSIS — I25.10 CORONARY ARTERY DISEASE INVOLVING NATIVE CORONARY ARTERY OF NATIVE HEART WITHOUT ANGINA PECTORIS: ICD-10-CM

## 2019-10-22 DIAGNOSIS — I34.0 SEVERE MITRAL REGURGITATION: ICD-10-CM

## 2019-10-22 DIAGNOSIS — I87.2 VENOUS INSUFFICIENCY OF BOTH LOWER EXTREMITIES: ICD-10-CM

## 2019-10-22 DIAGNOSIS — I10 BENIGN ESSENTIAL HTN: ICD-10-CM

## 2019-10-22 DIAGNOSIS — E11.59 TYPE 2 DIABETES MELLITUS WITH VASCULAR DISEASE (HCC): Primary | ICD-10-CM

## 2019-10-22 DIAGNOSIS — E11.40 TYPE 2 DIABETES, CONTROLLED, WITH NEUROPATHY (HCC): ICD-10-CM

## 2019-10-22 DIAGNOSIS — I73.9 PAD (PERIPHERAL ARTERY DISEASE) (HCC): ICD-10-CM

## 2019-10-22 PROCEDURE — 4040F PNEUMOC VAC/ADMIN/RCVD: CPT | Performed by: INTERNAL MEDICINE

## 2019-10-22 PROCEDURE — 1036F TOBACCO NON-USER: CPT | Performed by: INTERNAL MEDICINE

## 2019-10-22 PROCEDURE — G8427 DOCREV CUR MEDS BY ELIG CLIN: HCPCS | Performed by: INTERNAL MEDICINE

## 2019-10-22 PROCEDURE — G8483 FLU IMM NO ADMIN DOC REA: HCPCS | Performed by: INTERNAL MEDICINE

## 2019-10-22 PROCEDURE — G8599 NO ASA/ANTIPLAT THER USE RNG: HCPCS | Performed by: INTERNAL MEDICINE

## 2019-10-22 PROCEDURE — 1123F ACP DISCUSS/DSCN MKR DOCD: CPT | Performed by: INTERNAL MEDICINE

## 2019-10-22 PROCEDURE — 1090F PRES/ABSN URINE INCON ASSESS: CPT | Performed by: INTERNAL MEDICINE

## 2019-10-22 PROCEDURE — G8420 CALC BMI NORM PARAMETERS: HCPCS | Performed by: INTERNAL MEDICINE

## 2019-10-22 PROCEDURE — 99214 OFFICE O/P EST MOD 30 MIN: CPT | Performed by: INTERNAL MEDICINE

## 2019-10-24 ENCOUNTER — TELEPHONE (OUTPATIENT)
Dept: CARDIOLOGY CLINIC | Age: 84
End: 2019-10-24

## 2019-10-25 DIAGNOSIS — I34.0 SEVERE MITRAL REGURGITATION: Primary | ICD-10-CM

## 2019-10-28 ENCOUNTER — TELEPHONE (OUTPATIENT)
Dept: INTERNAL MEDICINE CLINIC | Age: 84
End: 2019-10-28

## 2019-11-04 ENCOUNTER — HOSPITAL ENCOUNTER (OUTPATIENT)
Dept: NON INVASIVE DIAGNOSTICS | Age: 84
Discharge: HOME | End: 2019-11-04
Payer: MEDICARE

## 2019-11-04 DIAGNOSIS — I34.0 SEVERE MITRAL REGURGITATION: ICD-10-CM

## 2019-11-04 LAB
LV EF: 58 %
LVEF MODALITY: NORMAL

## 2019-11-04 PROCEDURE — 93306 TTE W/DOPPLER COMPLETE: CPT

## 2019-11-15 ENCOUNTER — CARE COORDINATION (OUTPATIENT)
Dept: CARE COORDINATION | Age: 84
End: 2019-11-15

## 2019-11-16 DIAGNOSIS — E11.59 TYPE 2 DIABETES MELLITUS WITH VASCULAR DISEASE (HCC): ICD-10-CM

## 2019-11-18 ENCOUNTER — OFFICE VISIT (OUTPATIENT)
Dept: ORTHOPEDIC SURGERY | Age: 84
End: 2019-11-18
Payer: MEDICARE

## 2019-11-18 VITALS
RESPIRATION RATE: 16 BRPM | BODY MASS INDEX: 21.14 KG/M2 | HEART RATE: 58 BPM | TEMPERATURE: 98.6 F | SYSTOLIC BLOOD PRESSURE: 146 MMHG | HEIGHT: 61 IN | WEIGHT: 112 LBS | DIASTOLIC BLOOD PRESSURE: 61 MMHG

## 2019-11-18 DIAGNOSIS — M48.061 LUMBAR FORAMINAL STENOSIS: ICD-10-CM

## 2019-11-18 DIAGNOSIS — M25.551 RIGHT HIP PAIN: Primary | ICD-10-CM

## 2019-11-18 PROCEDURE — G8483 FLU IMM NO ADMIN DOC REA: HCPCS | Performed by: PHYSICIAN ASSISTANT

## 2019-11-18 PROCEDURE — 1090F PRES/ABSN URINE INCON ASSESS: CPT | Performed by: PHYSICIAN ASSISTANT

## 2019-11-18 PROCEDURE — G8427 DOCREV CUR MEDS BY ELIG CLIN: HCPCS | Performed by: PHYSICIAN ASSISTANT

## 2019-11-18 PROCEDURE — G8599 NO ASA/ANTIPLAT THER USE RNG: HCPCS | Performed by: PHYSICIAN ASSISTANT

## 2019-11-18 PROCEDURE — 1036F TOBACCO NON-USER: CPT | Performed by: PHYSICIAN ASSISTANT

## 2019-11-18 PROCEDURE — 1123F ACP DISCUSS/DSCN MKR DOCD: CPT | Performed by: PHYSICIAN ASSISTANT

## 2019-11-18 PROCEDURE — G8420 CALC BMI NORM PARAMETERS: HCPCS | Performed by: PHYSICIAN ASSISTANT

## 2019-11-18 PROCEDURE — 4040F PNEUMOC VAC/ADMIN/RCVD: CPT | Performed by: PHYSICIAN ASSISTANT

## 2019-11-18 PROCEDURE — 99214 OFFICE O/P EST MOD 30 MIN: CPT | Performed by: PHYSICIAN ASSISTANT

## 2019-11-18 RX ORDER — GLIMEPIRIDE 2 MG/1
TABLET ORAL
Qty: 90 TABLET | Refills: 3 | Status: SHIPPED
Start: 2019-11-18 | End: 2020-03-02 | Stop reason: SDUPTHER

## 2019-12-10 ENCOUNTER — CARE COORDINATION (OUTPATIENT)
Dept: CARE COORDINATION | Age: 84
End: 2019-12-10

## 2019-12-16 ENCOUNTER — OFFICE VISIT (OUTPATIENT)
Dept: ORTHOPEDIC SURGERY | Age: 84
End: 2019-12-16
Payer: MEDICARE

## 2019-12-16 VITALS — HEIGHT: 61 IN | BODY MASS INDEX: 21.14 KG/M2 | RESPIRATION RATE: 16 BRPM | WEIGHT: 112 LBS

## 2019-12-16 DIAGNOSIS — L60.9 FINGERNAIL PROBLEM: ICD-10-CM

## 2019-12-16 PROCEDURE — 1090F PRES/ABSN URINE INCON ASSESS: CPT | Performed by: PHYSICIAN ASSISTANT

## 2019-12-16 PROCEDURE — G8599 NO ASA/ANTIPLAT THER USE RNG: HCPCS | Performed by: PHYSICIAN ASSISTANT

## 2019-12-16 PROCEDURE — 4040F PNEUMOC VAC/ADMIN/RCVD: CPT | Performed by: PHYSICIAN ASSISTANT

## 2019-12-16 PROCEDURE — G8420 CALC BMI NORM PARAMETERS: HCPCS | Performed by: PHYSICIAN ASSISTANT

## 2019-12-16 PROCEDURE — G8427 DOCREV CUR MEDS BY ELIG CLIN: HCPCS | Performed by: PHYSICIAN ASSISTANT

## 2019-12-16 PROCEDURE — 99214 OFFICE O/P EST MOD 30 MIN: CPT | Performed by: PHYSICIAN ASSISTANT

## 2019-12-16 PROCEDURE — 1123F ACP DISCUSS/DSCN MKR DOCD: CPT | Performed by: PHYSICIAN ASSISTANT

## 2019-12-16 PROCEDURE — 1036F TOBACCO NON-USER: CPT | Performed by: PHYSICIAN ASSISTANT

## 2019-12-16 PROCEDURE — G8483 FLU IMM NO ADMIN DOC REA: HCPCS | Performed by: PHYSICIAN ASSISTANT

## 2020-02-20 ENCOUNTER — HOSPITAL ENCOUNTER (OUTPATIENT)
Dept: VASCULAR LAB | Age: 85
Discharge: HOME | End: 2020-02-20
Payer: MEDICARE

## 2020-02-20 PROCEDURE — 93925 LOWER EXTREMITY STUDY: CPT

## 2020-02-28 ENCOUNTER — TELEPHONE (OUTPATIENT)
Dept: VASCULAR SURGERY | Age: 85
End: 2020-02-28

## 2020-02-28 NOTE — TELEPHONE ENCOUNTER
----- Message from Bora Lopes MD sent at 2/21/2020 12:28 PM EST -----  Should come in to see me. Havent seen in over 1 year. Scan shows slight change in inflow. Me  ----- Message -----  From: Kamille Hernandes MA  Sent: 2/20/2020   2:35 PM EST  To: Bora Lopes MD    See graft scan results.  Do you want her in ????

## 2020-02-28 NOTE — TELEPHONE ENCOUNTER
I spoke to Ms Warren Forget to tell her that per Dr. Issa Gavin he would like to see her back in the office to discuss graft scan results. I told her the numbers have decreased a bit. She chooses not to come in she said Jorge Santamaria is 80 yrs old and I'm not gonna worry about it\" Her legs have bothered her for a long time and she has arthritis. She doesn't want anymore procedures. I told her to call if she changes her mind.

## 2020-03-02 ENCOUNTER — OFFICE VISIT (OUTPATIENT)
Dept: INTERNAL MEDICINE CLINIC | Age: 85
End: 2020-03-02
Payer: MEDICARE

## 2020-03-02 VITALS
BODY MASS INDEX: 21.14 KG/M2 | SYSTOLIC BLOOD PRESSURE: 130 MMHG | RESPIRATION RATE: 12 BRPM | DIASTOLIC BLOOD PRESSURE: 50 MMHG | HEIGHT: 61 IN | WEIGHT: 112 LBS | HEART RATE: 60 BPM

## 2020-03-02 PROCEDURE — 1123F ACP DISCUSS/DSCN MKR DOCD: CPT | Performed by: INTERNAL MEDICINE

## 2020-03-02 PROCEDURE — 99214 OFFICE O/P EST MOD 30 MIN: CPT | Performed by: INTERNAL MEDICINE

## 2020-03-02 PROCEDURE — 1036F TOBACCO NON-USER: CPT | Performed by: INTERNAL MEDICINE

## 2020-03-02 PROCEDURE — G8483 FLU IMM NO ADMIN DOC REA: HCPCS | Performed by: INTERNAL MEDICINE

## 2020-03-02 PROCEDURE — G8427 DOCREV CUR MEDS BY ELIG CLIN: HCPCS | Performed by: INTERNAL MEDICINE

## 2020-03-02 PROCEDURE — G8420 CALC BMI NORM PARAMETERS: HCPCS | Performed by: INTERNAL MEDICINE

## 2020-03-02 PROCEDURE — 1090F PRES/ABSN URINE INCON ASSESS: CPT | Performed by: INTERNAL MEDICINE

## 2020-03-02 PROCEDURE — 4040F PNEUMOC VAC/ADMIN/RCVD: CPT | Performed by: INTERNAL MEDICINE

## 2020-03-02 NOTE — PROGRESS NOTES
[facility]) Physicians  Internal Medicine  Patient Encounter  Monica Peguero D.O., [facility]        Chief Complaint   Patient presents with    Medication Check    Check-Up       HPI: 80 y.o. female with multiple complicated medical problems seen today for a routine checkup regarding the status of current issues listed below along with a medication review. She states she has been seeing a chiropractor for lumbar radiculopathy. She has had some dry needling. She has had some relief. She will be going back for recurrent symptoms. PAD/venous insufficiency--Previous history ----> patient has seen Dr. Eduardo Abdi. Her last lower extremity arterial evaluation and carotid study Doppler were done on 1/17/2019. There were no significant changes. Her right carotid showed a 50 to 69% stenosis. The external carotid artery showed greater than 50% stenosis. Her left internal carotid artery showed less than 50% stenosis. She has a history of femoral popliteal bypass graft on the right on 8/31/2015. Interval history----> Patient continues to deny any new symptoms of claudication. She denies any new foot wounds. She does have problems with edema in both lower extremities from venous insufficiency. She had a doppler 2/20/2020-- reviewed. She has refused to go back to Dr. Eduardo Abdi to discuss results as they recommend. Impressions   Right Impression   There is an ankle/brachial index of 0.94 on the right (,  mmHg). The absence of tri-phasic waveforms at the common femoral artery level may   indicate aorto-iliac inflow disease. There is atherosclerotic plaque involving the mid superficial femoral artery   (proximal to the graft) with significantly elevated velocities, consistent   with >50% stenosis (velocity went from 132 to 287 cm/sec).    There is a patent femoral-popliteal graft with the following velocities:   Inflow 151 cm/sec multiphasic   Prox anastomosis 148 cm/sec multiphasic and turbulent 1.01 cm   Nissa elke 94.4 cm/sec multiphasic 0.35 cm   Prox graft 86.6 cm/sec multiphasic 0.45 cm   Mid graft 76.8 cm/sec multiphasic 0.49 cm   Distal graft 95.6 cm/sec multiphasic 0.39 cm   Distal elke 88.5 cm/sec multiphasic 0.37 cm   Outflow 123 cm/sec multiphasic 0.23 cm   There is atherosclerotic plaque involving the popliteal artery with no   significantly elevated velocities, consistent with <50% stenosis. The mid to distal posterior tibial artery appears occluded. The anterior tibial and peroneal arteries are patent. Due to the presence of calcific shadowing, the percent stenosis may be   Underestimated. Left Impression   There is an ankle/brachial index of 0.56 on the left (DP 90, PT N/C). The absence of tri-phasic waveforms at the common femoral artery level may   indicate aorto-iliac inflow disease. There is turbulent flow in the common femoral artery. There is atherosclerotic plaque involving a large area of the distal   superficial femoral artery with significantly elevated velocities, consistent   with >50% stenosis (stenosis #1 velocity went from 57.2 to 136 cm/sec, and   stenosis #2 velocity went from 52 to 221 cm/sec). There is atherosclerotic plaque involving the popliteal artery with no   significantly elevated velocities, consistent with <50% stenosis. The posterior tibial, anterior tibial, and peroneal arteries are patent. Due to the presence of calcific shadowing, the percent stenosis may be   underestimated.    Compared to the exam done done on 7/18/19, the right BUZZ is slightly decreased   from 1.1 to 0.94 and the left BUZZ significantly decreased from 1.05 to 0.56.       Conclusions        Summary        Normal right BUZZ.    Abnormal left BUZZ consistent with claudication.        Right mid-SFA stenosis above graft origin measuring greater than 50%.    Widely patent R SFA-popliteal bypass with DGV above the threshold associated    with threatened graft patency.        L SFA and July 2019 in the left leg was 1.05. The BUZZ is now 0.56 suggesting significant deterioration of her arterial disease. She is experiencing some claudication as well  Recommend that she follow-up with her vascular surgeon which she indicates she is not going to do. Patient was warned of the potential consequences of untreated left lower extremity ischemia  - Lipid Panel; Future    5. Venous insufficiency of both lower extremities  Condition is well controlled with compression stockings    6. Severe mitral regurgitation  Condition is stable at least symptomatically. Patient denies any symptoms suggestive of congestive heart failure  Follow-up with cardiology    7. Coronary artery disease involving native coronary artery of native heart without angina pectoris  Condition is stable without signs of anginal equivalents  Continue to follow-up with cardiology  Continue a baby aspirin daily  - Comprehensive Metabolic Panel; Future  - Lipid Panel; Future    8. Diabetic peripheral neuropathy (HCC)  Condition is stable without subjective complaints of worsening dysesthesias in the feet. This note was generated completely or in part utilizing Dragon dictation speech recognition software. Occasionally, words are mistranscribed and despite editing, the text may contain inaccuracies due to incorrect word recognition.   If further clarification is needed please contact the office at [phone]

## 2020-03-12 ENCOUNTER — OFFICE VISIT (OUTPATIENT)
Dept: CARDIOLOGY CLINIC | Age: 85
End: 2020-03-12
Payer: MEDICARE

## 2020-03-12 VITALS
WEIGHT: 112 LBS | DIASTOLIC BLOOD PRESSURE: 70 MMHG | BODY MASS INDEX: 21.16 KG/M2 | SYSTOLIC BLOOD PRESSURE: 130 MMHG | HEART RATE: 64 BPM

## 2020-03-12 PROCEDURE — G8420 CALC BMI NORM PARAMETERS: HCPCS | Performed by: INTERNAL MEDICINE

## 2020-03-12 PROCEDURE — G8427 DOCREV CUR MEDS BY ELIG CLIN: HCPCS | Performed by: INTERNAL MEDICINE

## 2020-03-12 PROCEDURE — G8483 FLU IMM NO ADMIN DOC REA: HCPCS | Performed by: INTERNAL MEDICINE

## 2020-03-12 PROCEDURE — 4040F PNEUMOC VAC/ADMIN/RCVD: CPT | Performed by: INTERNAL MEDICINE

## 2020-03-12 PROCEDURE — 93000 ELECTROCARDIOGRAM COMPLETE: CPT | Performed by: INTERNAL MEDICINE

## 2020-03-12 PROCEDURE — 99214 OFFICE O/P EST MOD 30 MIN: CPT | Performed by: INTERNAL MEDICINE

## 2020-03-12 PROCEDURE — 1090F PRES/ABSN URINE INCON ASSESS: CPT | Performed by: INTERNAL MEDICINE

## 2020-03-12 PROCEDURE — 1123F ACP DISCUSS/DSCN MKR DOCD: CPT | Performed by: INTERNAL MEDICINE

## 2020-03-12 PROCEDURE — 1036F TOBACCO NON-USER: CPT | Performed by: INTERNAL MEDICINE

## 2020-03-12 ASSESSMENT — ENCOUNTER SYMPTOMS
CHEST TIGHTNESS: 0
COUGH: 0
APNEA: 0
GASTROINTESTINAL NEGATIVE: 1
SHORTNESS OF BREATH: 0
ALLERGIC/IMMUNOLOGIC NEGATIVE: 1
CHOKING: 0

## 2020-03-12 NOTE — PROGRESS NOTES
Subjective:      Patient ID: Barrett Abdullahi is a 80 y.o. female    CC: Fatigue, Mitral regurgitation, htn, cad    HPI:  Mahin Huff is an 80year old female with HX of CAD, HTN, PAD, and Mitral regurgitation. She is here today to establish care and refered by Dr. Amita Bolivar. Last Echo on 8/23/18 shows estimated ejection fraction of 55-60%. No regional wall motion abnormalities are seen.   Diastolic filling parameters suggests grade I diastolic dysfunction. Bi-atrial enlargement. severe mitral regurgitation. This is a change from 2015 echo that showed moderate MR. Patient feels well and has no chest pain nor SOB. No orthopnea nor PND. She states that she is not quite as active as she used to be and has some fatigue    Allergies   Allergen Reactions    Aricept [Donepezil Hydrochloride] Other (See Comments)     intolerant    Doxycycline Nausea Only    Ketorolac Tromethamine Other (See Comments)     Pt unable to recall reaction       Social History     Socioeconomic History    Marital status:       Spouse name: None    Number of children: None    Years of education: None    Highest education level: None   Occupational History    None   Social Needs    Financial resource strain: None    Food insecurity     Worry: None     Inability: None    Transportation needs     Medical: None     Non-medical: None   Tobacco Use    Smoking status: Never Smoker    Smokeless tobacco: Never Used   Substance and Sexual Activity    Alcohol use: No    Drug use: No    Sexual activity: Not Currently   Lifestyle    Physical activity     Days per week: None     Minutes per session: None    Stress: None   Relationships    Social connections     Talks on phone: None     Gets together: None     Attends Jew service: None     Active member of club or organization: None     Attends meetings of clubs or organizations: None     Relationship status: None    Intimate partner violence     Fear of current or ex partner: None Emotionally abused: None     Physically abused: None     Forced sexual activity: None   Other Topics Concern    None   Social History Narrative    None       Family History   Problem Relation Age of Onset    Stroke Father     Hypertension Father         has a past medical history of Anxiety, Arthritis, At risk for falls, CAD (coronary artery disease), CTS (carpal tunnel syndrome), DDD (degenerative disc disease), cervical, Fractures, Hypertension, Mitral regurgitation, Neuropathy, Osteopenia, Osteoporosis, PAD (peripheral artery disease) ([address] Peripheral neuropathy, Peripheral vascular disease ([address] Podagra, Spinal stenosis, Type 2 diabetes mellitus ([address] Urethral stricture, and Urgency of urination. Review of Systems   Constitutional: Positive for activity change and fatigue. Negative for appetite change, chills, diaphoresis, fever and unexpected weight change. HENT: Negative. Respiratory: Negative for apnea, cough, choking, chest tightness and shortness of breath. Cardiovascular: Negative for chest pain, palpitations and leg swelling. Gastrointestinal: Negative. Endocrine: Negative. Genitourinary: Negative. Musculoskeletal: Negative. Skin: Negative. Allergic/Immunologic: Negative. Neurological: Negative. Hematological: Negative. Psychiatric/Behavioral: Negative. Objective:   Physical Exam   Constitutional: She is oriented to person, place, and time. She appears well-developed and well-nourished. HENT:   Head: Normocephalic and atraumatic. Eyes: Pupils are equal, round, and reactive to light. Conjunctivae and EOM are normal. Right eye exhibits no discharge. Left eye exhibits no discharge. No scleral icterus. Neck: Normal range of motion. Neck supple. No tracheal deviation present. No thyromegaly present. Cardiovascular: Normal rate and regular rhythm. Exam reveals no gallop and no friction rub. Murmur heard.   III/VI HSM   Pulmonary/Chest: Effort normal mmHg.      Signature      ------------------------------------------------------------------   Electronically signed by Pierre Becker MD (Interpreting   [email or web address]) on 07/27/2015 at 11:38 AM   ------------------------------------------------------------------    Echo 8/23/18  Summary   Concentric left ventricular hypertrophy. The left ventricle appears normal   in size. Normal left ventricular systolic function with an estimated   ejection fraction of 55-60%. No regional wall motion abnormalities are seen.   Diastolic filling parameters suggests grade I diastolic dysfunction .   Bi-atrial enlargement.   Severe mitral regurgitation.   Trace aortic insufficiency.   Mild pulmonic insufficiency.   Moderate tricuspid regurgitation.   Estimated pulmonary artery systolic pressure is 32 mmHg assuming a right   atrial pressure of 3 mmHg.      Signature      ------------------------------------------------------------------   Electronically signed by Basilio Weston MD   (Interpreting physician) on 08/23/2018 at 12:30 PM   ------------------------------------------------------------------  Assessment:       Diagnosis Orders   1. Chest pain, unspecified type  EKG 12 Lead   2. Essential hypertension     3. Severe mitral regurgitation               Plan:      CAD- Lipid panel 7/8/19 LDL 68, HDL 61   HTN- Lisinopril and controlled. Severe MR:  Clinically unchanged. shes taking 10 mg lisinopril and should take 20 mg dialy  PAD stable. HLP:  Controlled with diet  Clinically stable. Recheck echo in November. Mitral valve insufficiency:  Continue lisinopril and follow clinically for now. Can repeat echo to surveil progression of MV regurgitation. clinincally stable with no overt Hf. The patient is inclined to have medical therapy only as she cites her age as a limiting factor. Dental prophylaxis.

## 2020-05-14 ENCOUNTER — TELEPHONE (OUTPATIENT)
Dept: ORTHOPEDIC SURGERY | Age: 85
End: 2020-05-14

## 2020-05-14 NOTE — TELEPHONE ENCOUNTER
Patient has some questions about her carpal tunnel that Dr Telly Watson did sx on.   She can be reached at [phone]

## 2020-05-15 ENCOUNTER — OFFICE VISIT (OUTPATIENT)
Dept: INTERNAL MEDICINE CLINIC | Age: 85
End: 2020-05-15
Payer: MEDICARE

## 2020-05-15 ENCOUNTER — TELEPHONE (OUTPATIENT)
Dept: INTERNAL MEDICINE CLINIC | Age: 85
End: 2020-05-15

## 2020-05-15 VITALS
WEIGHT: 112.6 LBS | BODY MASS INDEX: 21.26 KG/M2 | DIASTOLIC BLOOD PRESSURE: 70 MMHG | TEMPERATURE: 98.1 F | HEIGHT: 61 IN | SYSTOLIC BLOOD PRESSURE: 148 MMHG

## 2020-05-15 PROCEDURE — G8420 CALC BMI NORM PARAMETERS: HCPCS | Performed by: HOSPITALIST

## 2020-05-15 PROCEDURE — 1090F PRES/ABSN URINE INCON ASSESS: CPT | Performed by: HOSPITALIST

## 2020-05-15 PROCEDURE — 4040F PNEUMOC VAC/ADMIN/RCVD: CPT | Performed by: HOSPITALIST

## 2020-05-15 PROCEDURE — 1036F TOBACCO NON-USER: CPT | Performed by: HOSPITALIST

## 2020-05-15 PROCEDURE — 1123F ACP DISCUSS/DSCN MKR DOCD: CPT | Performed by: HOSPITALIST

## 2020-05-15 PROCEDURE — 99213 OFFICE O/P EST LOW 20 MIN: CPT | Performed by: HOSPITALIST

## 2020-05-15 PROCEDURE — G8427 DOCREV CUR MEDS BY ELIG CLIN: HCPCS | Performed by: HOSPITALIST

## 2020-05-15 RX ORDER — AMOXICILLIN AND CLAVULANATE POTASSIUM 500; 125 MG/1; MG/1
1 TABLET, FILM COATED ORAL 2 TIMES DAILY
Qty: 28 TABLET | Refills: 0 | Status: SHIPPED
Start: 2020-05-15 | End: 2020-05-15 | Stop reason: CLARIF

## 2020-05-15 RX ORDER — AMOXICILLIN AND CLAVULANATE POTASSIUM 500; 125 MG/1; MG/1
1 TABLET, FILM COATED ORAL 3 TIMES DAILY
Qty: 14 TABLET | Refills: 0 | Status: SHIPPED | OUTPATIENT
Start: 2020-05-15 | End: 2020-05-22

## 2020-05-15 ASSESSMENT — PATIENT HEALTH QUESTIONNAIRE - PHQ9
SUM OF ALL RESPONSES TO PHQ QUESTIONS 1-9: 0
SUM OF ALL RESPONSES TO PHQ9 QUESTIONS 1 & 2: 0
SUM OF ALL RESPONSES TO PHQ QUESTIONS 1-9: 0
2. FEELING DOWN, DEPRESSED OR HOPELESS: 0
1. LITTLE INTEREST OR PLEASURE IN DOING THINGS: 0

## 2020-05-15 NOTE — PROGRESS NOTES
Follow Up Visit Established Patient Visit    Patient:  Vignesh Lopez                                               : 1929  Age: 80 y.o. MRN: [id number]  Date : 5/15/2020    Vignesh Lopez is a 80 y.o. female who presents for : Possible right hand cellulitis    Chief Complaint   Patient presents with    Cellulitis     right hand by thumb, red, painful, itches, ongoing since wednesday      Started to have right hand swelling and redness at the base of her thumb about 3 days ago. Erythema is spreading in the last 24 hours. She does not report fever/chills. No dizziness/lightheadedness. No nausea, no vomiting, no diarrhea. Tried to soak her thumb in warm water with Epsom salt without symptom improvement. She contacted her primary care physician, Dr. Irwin Crandall, who recommended her to be evaluated at our \"[facility]\".     Past Medical History:   Diagnosis Date    Anxiety     Arthritis     At risk for falls     CAD (coronary artery disease)     CTS (carpal tunnel syndrome)     Bilateral, EMG-NCS    DDD (degenerative disc disease), cervical     Fractures     (L) Hip Fx 98, L1 fracture from fall 10-31-06    Hypertension     Mitral regurgitation     Neuropathy     Osteopenia     Osteoporosis     PAD (peripheral artery disease) (HCC)     Right LE ischemia    Peripheral neuropathy 2015    Peripheral vascular disease ([address]     bilateral lower extremities with edema    Podagra 2017    Dr. Jay Soria Spinal stenosis     L4-5    Type 2 diabetes mellitus ([address]     Urethral stricture 5 years ago    Urgency of urination        Past Surgical History:   Procedure Laterality Date    APPENDECTOMY      CARPAL TUNNEL RELEASE Right 3/29/2019    RIGHT CARPAL TUNNEL RELEASE AND RIGHT MIDDLE FINGER TRIGGER FINGER RELEASE performed by Randolph Luna MD at [address] [place], [place] [id number]    LEFT EYE EYE CATARACT PHACOEMULSIFICATION [facility] [address]  COLONOSCOPY  8/31/99    diverticulosis    EYE SURGERY Bilateral     bilateral cataract removed    HYSTERECTOMY  1980s    sallie    IR FEMORAL POPLITEAL BYPASS GRAFT Right 8-    KYPHOSIS SURGERY  11-7-06    L1, (fractured after a fall)    TONSILLECTOMY      TOTAL HIP ARTHROPLASTY  4/25/98    (L) THR    WRIST FRACTURE SURGERY  2008    left wrist       Current Outpatient Medications   Medication Sig Dispense Refill    amoxicillin-clavulanate (AUGMENTIN) 500-125 MG per tablet Take 1 tablet by mouth 3 times daily for 7 days 14 tablet 0    metFORMIN (GLUCOPHAGE) 500 MG tablet Take 500 mg by mouth 2 times daily (with meals)      lisinopril (PRINIVIL;ZESTRIL) 20 MG tablet TAKE ONE TABLET BY MOUTH DAILY 90 tablet 2    ibuprofen (ADVIL;MOTRIN) 200 MG tablet Take 400 mg by mouth every 8 hours as needed for Pain      glimepiride (AMARYL) 2 MG tablet Take 1 tablet by mouth every morning (before breakfast) 90 tablet 0    sodium chloride (OCEAN) 0.65 % nasal spray 3 sprays by Nasal route three times daily 1 Bottle 3    blood glucose test strips (ONE TOUCH ULTRA TEST) strip TEST TWICE DAILY 100 strip 3    Cholecalciferol (VITAMIN D3) 2000 UNITS CAPS Take 1 capsule by mouth daily      aspirin EC 81 MG EC tablet Take 1 tablet by mouth daily 30 tablet 3    Lite Touch Lancets MISC by Does not apply route. Use twice daily when testing blood sugars. 100 each 5     No current facility-administered medications for this visit. BP (!) 148/70 (Site: Right Upper Arm)   Temp 98.1 °F (36.7 °C)   Ht 5' 1\" (1.549 m)   Wt 112 lb 9.6 oz (51.1 kg)   BMI 21.28 kg/m²       Physical Exam  Vitals signs reviewed. Constitutional:       Comments: Thin looking  female, appears appropriate for her stated age. Does not appear to be in acute distress. Pleasant and cooperative with examination. HENT:      Head: Normocephalic and atraumatic.       Nose: Nose normal.      Mouth/Throat:      Mouth: Mucous membranes are moist.      Pharynx: Oropharynx is clear. No oropharyngeal exudate or posterior oropharyngeal erythema. Eyes:      General: No scleral icterus. Extraocular Movements: Extraocular movements intact. Pupils: Pupils are equal, round, and reactive to light. Neck:      Musculoskeletal: Normal range of motion and neck supple. Cardiovascular:      Rate and Rhythm: Normal rate and regular rhythm. Pulses: Normal pulses. Heart sounds: Normal heart sounds. Pulmonary:      Effort: Pulmonary effort is normal.      Breath sounds: Normal breath sounds. Abdominal:      General: Abdomen is flat. Bowel sounds are normal.      Palpations: Abdomen is soft. Skin:     General: Skin is warm. Comments: There is mild swelling and erythema over dorsal surface of her right thumb and the medial surface of her wrist.  + Mild elevation of local temperature to palpation in comparison with the left hand. No signs of local abscess were noted   Neurological:      General: No focal deficit present. Psychiatric:         Mood and Affect: Mood normal.                   Assessment/ plan:     Right hand cellulitis  Augmentin 500 mg 1 tablet orally twice a day for 7 days    Return if symptoms worsen or fail to improve.     Carmen Barclay MD

## 2020-05-15 NOTE — TELEPHONE ENCOUNTER
Patient is calling regarding a bump that started on top of her right thumb 2 days ago. She has been soaked it warm water and dial liquid soap. Today it is swollen, pink, painful and has spread to her wrist.    Please contact patient to advise.

## 2020-05-18 ENCOUNTER — TELEPHONE (OUTPATIENT)
Dept: INTERNAL MEDICINE CLINIC | Age: 85
End: 2020-05-18

## 2020-06-03 ENCOUNTER — OFFICE VISIT (OUTPATIENT)
Dept: ORTHOPEDIC SURGERY | Age: 85
End: 2020-06-03
Payer: MEDICARE

## 2020-06-03 VITALS — WEIGHT: 112 LBS | RESPIRATION RATE: 18 BRPM | BODY MASS INDEX: 21.14 KG/M2 | HEIGHT: 61 IN

## 2020-06-03 PROCEDURE — 99213 OFFICE O/P EST LOW 20 MIN: CPT | Performed by: ORTHOPAEDIC SURGERY

## 2020-06-03 PROCEDURE — 4040F PNEUMOC VAC/ADMIN/RCVD: CPT | Performed by: ORTHOPAEDIC SURGERY

## 2020-06-03 PROCEDURE — G8427 DOCREV CUR MEDS BY ELIG CLIN: HCPCS | Performed by: ORTHOPAEDIC SURGERY

## 2020-06-03 PROCEDURE — 11730 AVULSION NAIL PLATE SIMPLE 1: CPT | Performed by: ORTHOPAEDIC SURGERY

## 2020-06-03 PROCEDURE — G8420 CALC BMI NORM PARAMETERS: HCPCS | Performed by: ORTHOPAEDIC SURGERY

## 2020-06-03 PROCEDURE — 1090F PRES/ABSN URINE INCON ASSESS: CPT | Performed by: ORTHOPAEDIC SURGERY

## 2020-06-03 PROCEDURE — 1036F TOBACCO NON-USER: CPT | Performed by: ORTHOPAEDIC SURGERY

## 2020-06-03 PROCEDURE — 1123F ACP DISCUSS/DSCN MKR DOCD: CPT | Performed by: ORTHOPAEDIC SURGERY

## 2020-06-10 RX ORDER — LISINOPRIL 20 MG/1
TABLET ORAL
Qty: 90 TABLET | Refills: 1 | Status: SHIPPED | OUTPATIENT
Start: 2020-06-10 | End: 2020-12-04

## 2020-06-22 RX ORDER — BLOOD SUGAR DIAGNOSTIC
STRIP MISCELLANEOUS
Qty: 100 STRIP | Refills: 3 | Status: SHIPPED | OUTPATIENT
Start: 2020-06-22 | End: 2021-07-01

## 2020-06-22 NOTE — TELEPHONE ENCOUNTER
Last appointment: 3/2/2020  Next appointment: Visit date not found  Last refill: 05/28/2019 # 100 with 3 refills

## 2020-07-13 ENCOUNTER — TELEPHONE (OUTPATIENT)
Dept: INTERNAL MEDICINE CLINIC | Age: 85
End: 2020-07-13

## 2020-07-13 NOTE — TELEPHONE ENCOUNTER
She can reduce her metformin to 1 time per day. Follow-up in the next few days if symptoms persist or sooner if they get worse.

## 2020-07-13 NOTE — TELEPHONE ENCOUNTER
Patient called because she is diabetic and lately (last 2 weeks) her sugar has been ranging from  instead of her normal 135-145 range. She has been feeling off-balance and bumping into stuff causing her to bruise due to blood thinners she takes. She is thinking about changing her Metformin to 1 x a day instead of 2 x a day but she knows Dr. Danita Mooney gets upset when she \"plays doctor\" with her medications. I told her I would send a message to the pool to address and to continue taking 2 Metformin a day until she hears back from our office. She states her arthritis is really bad lately also. She was not sure if her off-balance feeling has anything to do with her sugars being what she considers too low. Please call at number provided to discuss. Thanks.

## 2020-08-11 ENCOUNTER — OFFICE VISIT (OUTPATIENT)
Dept: INTERNAL MEDICINE CLINIC | Age: 85
End: 2020-08-11
Payer: MEDICARE

## 2020-08-11 VITALS
DIASTOLIC BLOOD PRESSURE: 60 MMHG | BODY MASS INDEX: 20.22 KG/M2 | WEIGHT: 107 LBS | SYSTOLIC BLOOD PRESSURE: 140 MMHG | HEART RATE: 58 BPM | RESPIRATION RATE: 12 BRPM | OXYGEN SATURATION: 98 %

## 2020-08-11 PROCEDURE — 1090F PRES/ABSN URINE INCON ASSESS: CPT | Performed by: INTERNAL MEDICINE

## 2020-08-11 PROCEDURE — 3288F FALL RISK ASSESSMENT DOCD: CPT | Performed by: INTERNAL MEDICINE

## 2020-08-11 PROCEDURE — 1036F TOBACCO NON-USER: CPT | Performed by: INTERNAL MEDICINE

## 2020-08-11 PROCEDURE — G8427 DOCREV CUR MEDS BY ELIG CLIN: HCPCS | Performed by: INTERNAL MEDICINE

## 2020-08-11 PROCEDURE — 4040F PNEUMOC VAC/ADMIN/RCVD: CPT | Performed by: INTERNAL MEDICINE

## 2020-08-11 PROCEDURE — G8420 CALC BMI NORM PARAMETERS: HCPCS | Performed by: INTERNAL MEDICINE

## 2020-08-11 PROCEDURE — 99214 OFFICE O/P EST MOD 30 MIN: CPT | Performed by: INTERNAL MEDICINE

## 2020-08-11 PROCEDURE — 1123F ACP DISCUSS/DSCN MKR DOCD: CPT | Performed by: INTERNAL MEDICINE

## 2020-08-11 NOTE — PROGRESS NOTES
Lab Results   Component Value Date    LABA1C 6.2 03/02/2020     Lab Results   Component Value Date    .2 03/02/2020        CAD /severe mitral regurgitation--she is under the care of cardiology, Dr. Anjelica Rodas. She was seen 3/12/2020. She will be seen in September. She denies SOB, palpitations. HTN--Patient has a history of labile blood pressure. She denies CP, SOB, dizziness, syncope. She denies HA's, episodes of unilateral weakness or paresthesias. Past Medical History:   Diagnosis Date    Anxiety     Arthritis     At risk for falls     CAD (coronary artery disease)     CTS (carpal tunnel syndrome)     Bilateral, EMG-NCS    DDD (degenerative disc disease), cervical     Fractures     (L) Hip Fx 4/25/98, L1 fracture from fall 10-31-06    Hypertension     Mitral regurgitation     Neuropathy     Osteopenia     Osteoporosis     PAD (peripheral artery disease) (HCC)     Right LE ischemia    Peripheral neuropathy 6/1/2015    Peripheral vascular disease ([address] [address]     bilateral lower extremities with edema    Podagra 01/09/2017    Dr. Severiano Laws Spinal stenosis     L4-5    Type 2 diabetes mellitus ([address] [address]     Urethral stricture 5 years ago    Urgency of urination            Medication Sig   lisinopril (PRINIVIL;ZESTRIL) 20 MG tablet TAKE ONE TABLET BY MOUTH DAILY   metFORMIN (GLUCOPHAGE) 500 MG tablet Take 500 mg by mouth 2 times daily (with meals)   glimepiride (AMARYL) 2 MG tablet Take 1 tablet by mouth every morning (before breakfast)   Cholecalciferol (VITAMIN D3) 2000 UNITS CAPS Take 1 capsule by mouth daily   aspirin EC 81 MG EC tablet Take 1 tablet by mouth daily   blood glucose test strips (ONETOUCH ULTRA) strip TEST BLOOD SUGAR TWO TIMES A DAY   ibuprofen (ADVIL;MOTRIN) 200 MG tablet Take 400 mg by mouth every 8 hours as needed for Pain   sodium chloride (OCEAN) 0.65 % nasal spray 3 sprays by Nasal route three times daily   Lite Touch Lancets MISC by Does not apply route.  Use twice

## 2020-08-13 DIAGNOSIS — I73.9 PAD (PERIPHERAL ARTERY DISEASE) (HCC): ICD-10-CM

## 2020-08-13 DIAGNOSIS — M85.80 OSTEOPENIA, UNSPECIFIED LOCATION: ICD-10-CM

## 2020-08-13 DIAGNOSIS — E78.5 HYPERLIPIDEMIA, UNSPECIFIED HYPERLIPIDEMIA TYPE: ICD-10-CM

## 2020-08-13 DIAGNOSIS — I25.10 CORONARY ARTERY DISEASE INVOLVING NATIVE CORONARY ARTERY OF NATIVE HEART WITHOUT ANGINA PECTORIS: ICD-10-CM

## 2020-08-13 DIAGNOSIS — E11.59 TYPE 2 DIABETES MELLITUS WITH VASCULAR DISEASE (HCC): ICD-10-CM

## 2020-08-13 LAB
A/G RATIO: 1.7 (ref 1.1–2.2)
ALBUMIN SERPL-MCNC: 3.9 G/DL (ref 3.4–5)
ALP BLD-CCNC: 60 U/L (ref 40–129)
ALT SERPL-CCNC: 8 U/L (ref 10–40)
ANION GAP SERPL CALCULATED.3IONS-SCNC: 14 MMOL/L (ref 3–16)
AST SERPL-CCNC: 14 U/L (ref 15–37)
BASOPHILS ABSOLUTE: 0 K/UL (ref 0–0.2)
BASOPHILS RELATIVE PERCENT: 0.6 %
BILIRUB SERPL-MCNC: 0.6 MG/DL (ref 0–1)
BUN BLDV-MCNC: 11 MG/DL (ref 7–20)
CALCIUM SERPL-MCNC: 9.2 MG/DL (ref 8.3–10.6)
CHLORIDE BLD-SCNC: 105 MMOL/L (ref 99–110)
CHOLESTEROL, TOTAL: 117 MG/DL (ref 0–199)
CO2: 24 MMOL/L (ref 21–32)
CREAT SERPL-MCNC: 0.8 MG/DL (ref 0.6–1.2)
EOSINOPHILS ABSOLUTE: 0.1 K/UL (ref 0–0.6)
EOSINOPHILS RELATIVE PERCENT: 1.6 %
GFR AFRICAN AMERICAN: >60
GFR NON-AFRICAN AMERICAN: >60
GLOBULIN: 2.3 G/DL
GLUCOSE BLD-MCNC: 125 MG/DL (ref 70–99)
HCT VFR BLD CALC: 34.3 % (ref 36–48)
HDLC SERPL-MCNC: 56 MG/DL (ref 40–60)
HEMOGLOBIN: 11.7 G/DL (ref 12–16)
LDL CHOLESTEROL CALCULATED: 43 MG/DL
LYMPHOCYTES ABSOLUTE: 1.5 K/UL (ref 1–5.1)
LYMPHOCYTES RELATIVE PERCENT: 27.2 %
MCH RBC QN AUTO: 31.2 PG (ref 26–34)
MCHC RBC AUTO-ENTMCNC: 34.2 G/DL (ref 31–36)
MCV RBC AUTO: 91.2 FL (ref 80–100)
MONOCYTES ABSOLUTE: 0.5 K/UL (ref 0–1.3)
MONOCYTES RELATIVE PERCENT: 10 %
NEUTROPHILS ABSOLUTE: 3.3 K/UL (ref 1.7–7.7)
NEUTROPHILS RELATIVE PERCENT: 60.6 %
PDW BLD-RTO: 14 % (ref 12.4–15.4)
PLATELET # BLD: 162 K/UL (ref 135–450)
PMV BLD AUTO: 8 FL (ref 5–10.5)
POTASSIUM SERPL-SCNC: 4.8 MMOL/L (ref 3.5–5.1)
RBC # BLD: 3.76 M/UL (ref 4–5.2)
SODIUM BLD-SCNC: 143 MMOL/L (ref 136–145)
TOTAL PROTEIN: 6.2 G/DL (ref 6.4–8.2)
TRIGL SERPL-MCNC: 90 MG/DL (ref 0–150)
TSH SERPL DL<=0.05 MIU/L-ACNC: 3.11 UIU/ML (ref 0.27–4.2)
VITAMIN D 25-HYDROXY: 31.3 NG/ML
VLDLC SERPL CALC-MCNC: 18 MG/DL
WBC # BLD: 5.4 K/UL (ref 4–11)

## 2020-08-14 LAB
ESTIMATED AVERAGE GLUCOSE: 142.7 MG/DL
HBA1C MFR BLD: 6.6 %

## 2020-08-27 ENCOUNTER — TELEPHONE (OUTPATIENT)
Dept: INTERNAL MEDICINE CLINIC | Age: 85
End: 2020-08-27

## 2020-08-27 NOTE — TELEPHONE ENCOUNTER
Call was transferred from the call center. Patient refused to give the call center her name stating she only wanted to speak to José Miguel Hampton. Patient is calling to speak to José Miguel Hampton. She states it is not an emergency. She just wants to speak to José Miguel Hampton.

## 2020-08-28 ENCOUNTER — OFFICE VISIT (OUTPATIENT)
Dept: INTERNAL MEDICINE CLINIC | Age: 85
End: 2020-08-28
Payer: MEDICARE

## 2020-08-28 VITALS
DIASTOLIC BLOOD PRESSURE: 60 MMHG | HEART RATE: 76 BPM | SYSTOLIC BLOOD PRESSURE: 125 MMHG | WEIGHT: 107 LBS | TEMPERATURE: 98.7 F | BODY MASS INDEX: 20.22 KG/M2

## 2020-08-28 PROCEDURE — 1036F TOBACCO NON-USER: CPT | Performed by: INTERNAL MEDICINE

## 2020-08-28 PROCEDURE — 4040F PNEUMOC VAC/ADMIN/RCVD: CPT | Performed by: INTERNAL MEDICINE

## 2020-08-28 PROCEDURE — 1090F PRES/ABSN URINE INCON ASSESS: CPT | Performed by: INTERNAL MEDICINE

## 2020-08-28 PROCEDURE — G8427 DOCREV CUR MEDS BY ELIG CLIN: HCPCS | Performed by: INTERNAL MEDICINE

## 2020-08-28 PROCEDURE — 99213 OFFICE O/P EST LOW 20 MIN: CPT | Performed by: INTERNAL MEDICINE

## 2020-08-28 PROCEDURE — G8420 CALC BMI NORM PARAMETERS: HCPCS | Performed by: INTERNAL MEDICINE

## 2020-08-28 PROCEDURE — 1123F ACP DISCUSS/DSCN MKR DOCD: CPT | Performed by: INTERNAL MEDICINE

## 2020-08-28 RX ORDER — AMOXICILLIN AND CLAVULANATE POTASSIUM 875; 125 MG/1; MG/1
1 TABLET, FILM COATED ORAL 2 TIMES DAILY
Qty: 14 TABLET | Refills: 0 | Status: SHIPPED | OUTPATIENT
Start: 2020-08-28 | End: 2020-09-03 | Stop reason: ALTCHOICE

## 2020-08-28 NOTE — PROGRESS NOTES
[facility]) Physicians  Internal Medicine  Patient Encounter  Amado Ferreira D.O., Kana Feliciano        Chief Complaint   Patient presents with    Wound Check       HPI: 80 y.o. female seen urgently today with complaint of a leg injury. Patient states 2 weeks ago she injured her left shin on her recliner. She states there was a little bit of bleeding from the wound where the skin was pulled off. She also had some bruising. The left ankle has been a little bit more swollen. She also reports a small sore on the top of her left great toe. She does not recall any injury to the toe. There is some redness surrounding the wound. She denies any fever, chills, sweats. She denies any spreading redness. She denies much pain. She has been putting \"home grown honey\" along with Polysporin on the wound. She does not feel it is getting any better. Patient does have a history of peripheral arterial disease and venous insufficiency.     Past Medical History:   Diagnosis Date    Anxiety     Arthritis     At risk for falls     CAD (coronary artery disease)     CTS (carpal tunnel syndrome)     Bilateral, EMG-NCS    DDD (degenerative disc disease), cervical     Fractures     (L) Hip Fx 4/25/98, L1 fracture from fall 10-31-06    Hypertension     Mitral regurgitation     Neuropathy     Osteopenia     Osteoporosis     PAD (peripheral artery disease) (HCC)     Right LE ischemia    Peripheral neuropathy 6/1/2015    Peripheral vascular disease ([facility]     bilateral lower extremities with edema    Podagra 01/09/2017    Dr. Gustavo Macias Spinal stenosis     L4-5    Type 2 diabetes mellitus ([facility]     Urethral stricture 5 years ago    Urgency of urination          MEDICATIONS:  Medication Sig   lisinopril (PRINIVIL;ZESTRIL) 20 MG tablet TAKE ONE TABLET BY MOUTH DAILY   metFORMIN (GLUCOPHAGE) 500 MG tablet Take 500 mg by mouth 2 times daily (with meals)   glimepiride (AMARYL) 2 MG tablet Take 1 tablet by mouth every morning (before breakfast)   Cholecalciferol (VITAMIN D3) 2000 UNITS CAPS Take 1 capsule by mouth daily   aspirin EC 81 MG EC tablet Take 1 tablet by mouth daily   blood glucose test strips (ONETOUCH ULTRA) strip TEST BLOOD SUGAR TWO TIMES A DAY   ibuprofen (ADVIL;MOTRIN) 200 MG tablet Take 400 mg by mouth every 8 hours as needed for Pain   Lite Touch Lancets MISC by Does not apply route. Use twice daily when testing blood sugars. Review of Systems - As per HPI      OBJECTIVE:  /60   Pulse 76   Temp 98.7 °F (37.1 °C)   Wt 107 lb (48.5 kg)   BMI 20.22 kg/m²   GEN: NAD, A&O, Non-toxic  SKIN: Left lower anterior leg with evidence of resolving contusion/ecchymosis. There is a small 1.2 cm healing wound. There appears to be eschar formation. There is no induration or purulent drainage. See picture below. The dorsal portion of the left great toe reveals a 3 mm circular erosion with surrounding erythema. There is no drainage or discharge. There is no tenderness. ASSESSMENT[de-identified]  Toni Lorenzo was seen today for wound check. Diagnoses and all orders for this visit:    Traumatic leg ulcer, left, limited to breakdown of skin (HCC)  -     mupirocin (BACTROBAN) 2 % ointment; Apply 2 times daily for 10 days or until wound is healed. -     amoxicillin-clavulanate (AUGMENTIN) 875-125 MG per tablet; Take 1 tablet by mouth 2 times daily for 7 days    Toe ulcer, left, limited to breakdown of skin (HCC)  -     mupirocin (BACTROBAN) 2 % ointment; Apply 2 times daily for 10 days or until wound is healed. -     amoxicillin-clavulanate (AUGMENTIN) 875-125 MG per tablet; Take 1 tablet by mouth 2 times daily for 7 days        Additional Plan:  1. Bactroban ointment twice daily to both lesions  2. Advised patient to keep the wounds clean and dry with soap and water  3. Short course of Augmentin 875 twice daily  4.   She will be seeing her vascular specialist for further evaluation of her peripheral arterial disease which was already scheduled. Discussed medications with patient who voiced understanding of their use, indication and potential side effects. Pt also understands the above recommendations. All questions answered. This note was generated completely or in part utilizing Dragon dictation speech recognition software. Occasionally, words are mistranscribed and despite editing, the text may contain inaccuracies due to incorrect word recognition.   If further clarification is needed please contact the office at [phone]       Electronically signed    Yesica Hoffman D.O.

## 2020-09-01 ENCOUNTER — PROCEDURE VISIT (OUTPATIENT)
Dept: VASCULAR SURGERY | Age: 85
End: 2020-09-01
Payer: MEDICARE

## 2020-09-01 DIAGNOSIS — I73.9 PERIPHERAL VASCULAR DISEASE (HCC): Primary | ICD-10-CM

## 2020-09-01 PROCEDURE — 93926 LOWER EXTREMITY STUDY: CPT | Performed by: SURGERY

## 2020-09-03 ENCOUNTER — OFFICE VISIT (OUTPATIENT)
Dept: INTERNAL MEDICINE CLINIC | Age: 85
End: 2020-09-03
Payer: MEDICARE

## 2020-09-03 VITALS
TEMPERATURE: 97.7 F | SYSTOLIC BLOOD PRESSURE: 162 MMHG | HEIGHT: 60 IN | BODY MASS INDEX: 20.9 KG/M2 | DIASTOLIC BLOOD PRESSURE: 44 MMHG

## 2020-09-03 PROCEDURE — 4040F PNEUMOC VAC/ADMIN/RCVD: CPT | Performed by: INTERNAL MEDICINE

## 2020-09-03 PROCEDURE — 1036F TOBACCO NON-USER: CPT | Performed by: INTERNAL MEDICINE

## 2020-09-03 PROCEDURE — 99212 OFFICE O/P EST SF 10 MIN: CPT | Performed by: INTERNAL MEDICINE

## 2020-09-03 PROCEDURE — 1090F PRES/ABSN URINE INCON ASSESS: CPT | Performed by: INTERNAL MEDICINE

## 2020-09-03 PROCEDURE — G8420 CALC BMI NORM PARAMETERS: HCPCS | Performed by: INTERNAL MEDICINE

## 2020-09-03 PROCEDURE — 1123F ACP DISCUSS/DSCN MKR DOCD: CPT | Performed by: INTERNAL MEDICINE

## 2020-09-03 PROCEDURE — G8427 DOCREV CUR MEDS BY ELIG CLIN: HCPCS | Performed by: INTERNAL MEDICINE

## 2020-09-03 NOTE — PROGRESS NOTES
[facility]) Physicians  Internal Medicine  Patient Encounter  Tania Falk D.O., [facility]        Chief Complaint   Patient presents with    Wound Check       HPI: 80 y.o. female seen today for a short interval follow-up regarding a left shin wound and left toe ulcer. Both of these wounds were superficial and limited to skin breakdown. Anterior tibial lesion was traumatic. She has been using Bactroban ointment. She feels the wounds are improving but not resolved yet    She has since been seen for repeat vascular studies on 9/1/2020. The right BUZZ was 0.94 and the left BUZZ was 0.63. Further evaluation showed elevated velocities in the mid distal CFA consistent with greater than 50% stenosis. There were also elevated velocities in the distal SFA consistent with greater than 50% stenosis. There were no significant changes compared to previous Doppler      Past Medical History:   Diagnosis Date    Anxiety     Arthritis     At risk for falls     CAD (coronary artery disease)     CTS (carpal tunnel syndrome)     Bilateral, EMG-NCS    DDD (degenerative disc disease), cervical     Fractures     (L) Hip Fx 4/25/98, L1 fracture from fall 10-31-06    Hypertension     Mitral regurgitation     Neuropathy     Osteopenia     Osteoporosis     PAD (peripheral artery disease) (HCC)     Right LE ischemia    Peripheral neuropathy 6/1/2015    Peripheral vascular disease ([address]     bilateral lower extremities with edema    Podagra 01/09/2017    Dr. Ron Argueta Spinal stenosis     L4-5    Type 2 diabetes mellitus ([address]     Urethral stricture 5 years ago    Urgency of urination          MEDICATIONS:  Prior to Visit Medications    Medication Sig Taking? Authorizing Provider   mupirocin (BACTROBAN) 2 % ointment Apply 2 times daily for 10 days or until wound is healed.  Yes Paulo Forman, DO   lisinopril (PRINIVIL;ZESTRIL) 20 MG tablet TAKE ONE TABLET BY MOUTH DAILY Yes Paulo Forman, DO   metFORMIN (GLUCOPHAGE) 500 MG tablet Take 500 mg by mouth 2 times daily (with meals) Yes Historical Provider, MD   glimepiride (AMARYL) 2 MG tablet Take 1 tablet by mouth every morning (before breakfast) Yes Paulo Forman, DO   Cholecalciferol (VITAMIN D3) 2000 UNITS CAPS Take 1 capsule by mouth daily Yes Historical Provider, MD   aspirin EC 81 MG EC tablet Take 1 tablet by mouth daily Yes Paulo Forman, DO   blood glucose test strips (ONETOUCH ULTRA) strip TEST BLOOD SUGAR TWO TIMES A DAY  Paulo Forman DO   ibuprofen (ADVIL;MOTRIN) 200 MG tablet Take 400 mg by mouth every 8 hours as needed for Pain  Historical Provider, MD   Lite Touch Lancets MISC by Does not apply route. Use twice daily when testing blood sugars. Kiley Hook MD           Review of Systems - As per HPI      OBJECTIVE:  BP (!) 162/44   Temp 97.7 °F (36.5 °C)   Ht 5' (1.524 m)   BMI 20.90 kg/m²   GEN: NAD, A&O, Non-toxic  SKIN: Left anterior tibial region with traumatic ulcer now with eschar. The wound is smaller. The edges are filling in. The left dorsal great toe lesion also appears improved. There is a small amount of eschar noted as well. NOW      NOW        PREVIOUS      PREVIOUS      PREVIOUS        ASSESSMENT[de-identified]  Mirlande Alcantara was seen today for wound check. Diagnoses and all orders for this visit:    Traumatic leg ulcer, left, limited to breakdown of skin (HCC)    Toe ulcer, left, limited to breakdown of skin ([address]        Additional Plan:  1. We will have the patient start using Vaseline daily. She is to keep the wound clean and dry. 2.  Advised patient to call should the wound healing stall. Discussed medications with patient who voiced understanding of their use, indication and potential side effects. Pt also understands the above recommendations. All questions answered. This note was generated completely or in part utilizing Dragon dictation speech recognition software.   Occasionally, words are mistranscribed and despite editing, the text may contain inaccuracies due to incorrect word recognition.   If further clarification is needed please contact the office at [phone]       Electronically signed    Gustavo Jones D.O.

## 2020-09-08 ENCOUNTER — OFFICE VISIT (OUTPATIENT)
Dept: CARDIOLOGY CLINIC | Age: 85
End: 2020-09-08
Payer: MEDICARE

## 2020-09-08 VITALS
BODY MASS INDEX: 21.83 KG/M2 | SYSTOLIC BLOOD PRESSURE: 128 MMHG | HEART RATE: 83 BPM | DIASTOLIC BLOOD PRESSURE: 70 MMHG | WEIGHT: 111.8 LBS | TEMPERATURE: 98.9 F

## 2020-09-08 PROCEDURE — 99214 OFFICE O/P EST MOD 30 MIN: CPT | Performed by: INTERNAL MEDICINE

## 2020-09-08 PROCEDURE — 1123F ACP DISCUSS/DSCN MKR DOCD: CPT | Performed by: INTERNAL MEDICINE

## 2020-09-08 PROCEDURE — 1090F PRES/ABSN URINE INCON ASSESS: CPT | Performed by: INTERNAL MEDICINE

## 2020-09-08 PROCEDURE — 4040F PNEUMOC VAC/ADMIN/RCVD: CPT | Performed by: INTERNAL MEDICINE

## 2020-09-08 PROCEDURE — 1036F TOBACCO NON-USER: CPT | Performed by: INTERNAL MEDICINE

## 2020-09-08 PROCEDURE — G8420 CALC BMI NORM PARAMETERS: HCPCS | Performed by: INTERNAL MEDICINE

## 2020-09-08 PROCEDURE — G8427 DOCREV CUR MEDS BY ELIG CLIN: HCPCS | Performed by: INTERNAL MEDICINE

## 2020-09-08 ASSESSMENT — ENCOUNTER SYMPTOMS
GASTROINTESTINAL NEGATIVE: 1
APNEA: 0
CHOKING: 0
CHEST TIGHTNESS: 0
COUGH: 0
ALLERGIC/IMMUNOLOGIC NEGATIVE: 1
SHORTNESS OF BREATH: 0

## 2020-09-08 NOTE — PROGRESS NOTES
Subjective:      Patient ID: Tejal Almodovar is a 80 y.o. female    CC: Fatigue, Mitral regurgitation, htn, cad    HPI:  Brenton Winter is an 80year old female with HX of CAD, HTN, PAD, and Mitral regurgitation. She is here today to establish care and refered by Dr. Live Jensen. Last Echo on 8/23/18 shows estimated ejection fraction of 55-60%. No regional wall motion abnormalities are seen.   Diastolic filling parameters suggests grade I diastolic dysfunction. Bi-atrial enlargement. severe mitral regurgitation. This is a change from 2015 echo that showed moderate MR. Patient feels well and has no chest pain nor SOB. No orthopnea nor PND. She states that she is not quite as active as she used to be and has some fatigue. doing well. Allergies   Allergen Reactions    Aricept [Donepezil Hydrochloride] Other (See Comments)     intolerant    Doxycycline Nausea Only    Ketorolac Tromethamine Other (See Comments)     Pt unable to recall reaction       Social History     Socioeconomic History    Marital status:       Spouse name: None    Number of children: None    Years of education: None    Highest education level: None   Occupational History    None   Social Needs    Financial resource strain: None    Food insecurity     Worry: None     Inability: None    Transportation needs     Medical: None     Non-medical: None   Tobacco Use    Smoking status: Never Smoker    Smokeless tobacco: Never Used   Substance and Sexual Activity    Alcohol use: No    Drug use: No    Sexual activity: Not Currently   Lifestyle    Physical activity     Days per week: None     Minutes per session: None    Stress: None   Relationships    Social connections     Talks on phone: None     Gets together: None     Attends Orthodox service: None     Active member of club or organization: None     Attends meetings of clubs or organizations: None     Relationship status: None    Intimate partner violence     Fear of current or ex partner: None     Emotionally abused: None     Physically abused: None     Forced sexual activity: None   Other Topics Concern    None   Social History Narrative    None       Family History   Problem Relation Age of Onset    Stroke Father     Hypertension Father         has a past medical history of Anxiety, Arthritis, At risk for falls, CAD (coronary artery disease), CTS (carpal tunnel syndrome), DDD (degenerative disc disease), cervical, Fractures, Hypertension, Mitral regurgitation, Neuropathy, Osteopenia, Osteoporosis, PAD (peripheral artery disease) ([address] Peripheral neuropathy, Peripheral vascular disease ([address] Podagra, Spinal stenosis, Type 2 diabetes mellitus ([address] Urethral stricture, and Urgency of urination. Review of Systems   Constitutional: Positive for activity change and fatigue. Negative for appetite change, chills, diaphoresis, fever and unexpected weight change. HENT: Negative. Respiratory: Negative for apnea, cough, choking, chest tightness and shortness of breath. Cardiovascular: Negative for chest pain, palpitations and leg swelling. Gastrointestinal: Negative. Endocrine: Negative. Genitourinary: Negative. Musculoskeletal: Negative. Skin: Negative. Allergic/Immunologic: Negative. Neurological: Negative. Hematological: Negative. Psychiatric/Behavioral: Negative. Objective:   Physical Exam   Constitutional: She is oriented to person, place, and time. She appears well-developed and well-nourished. HENT:   Head: Normocephalic and atraumatic. Eyes: Pupils are equal, round, and reactive to light. Conjunctivae and EOM are normal. Right eye exhibits no discharge. Left eye exhibits no discharge. No scleral icterus. Neck: Normal range of motion. Neck supple. No tracheal deviation present. No thyromegaly present. Cardiovascular: Normal rate and regular rhythm. Exam reveals no gallop and no friction rub. Murmur heard.   III/VI HSM Pulmonary/Chest: Effort normal and breath sounds normal. No respiratory distress. She has no wheezes. She has no rales. Abdominal: Soft. Bowel sounds are normal. She exhibits no distension. There is no abdominal tenderness. Musculoskeletal: Normal range of motion. General: No tenderness, deformity or edema. Neurological: She is alert and oriented to person, place, and time. No cranial nerve deficit. Coordination normal.   Skin: Skin is warm and dry. No rash noted. No erythema. No pallor. Psychiatric: She has a normal mood and affect. Her behavior is normal. Judgment and thought content normal.       Current Outpatient Medications   Medication Sig Dispense Refill    blood glucose test strips (ONETOUCH ULTRA) strip TEST BLOOD SUGAR TWO TIMES A  strip 3    lisinopril (PRINIVIL;ZESTRIL) 20 MG tablet TAKE ONE TABLET BY MOUTH DAILY 90 tablet 1    metFORMIN (GLUCOPHAGE) 500 MG tablet Take 500 mg by mouth 2 times daily (with meals)      ibuprofen (ADVIL;MOTRIN) 200 MG tablet Take 400 mg by mouth every 8 hours as needed for Pain      glimepiride (AMARYL) 2 MG tablet Take 1 tablet by mouth every morning (before breakfast) 90 tablet 0    Cholecalciferol (VITAMIN D3) 2000 UNITS CAPS Take 1 capsule by mouth daily      aspirin EC 81 MG EC tablet Take 1 tablet by mouth daily 30 tablet 3    Lite Touch Lancets MISC by Does not apply route. Use twice daily when testing blood sugars. 100 each 5     No current facility-administered medications for this visit.         Vitals:    09/08/20 1108   BP: 128/70   Pulse: 83   Temp: 98.9 °F (37.2 °C)        Echo 7/27/15  Conclusions      Summary   Normal left ventricle size, wall thickness and systolic function with an   estimated ejection fraction of 55-60%.   No regional wall motion abnormalities are seen.   Moderate mitral regurgitation is present.  Anahi Cool is moderate tricuspid regurgitation with RVSP estimated at 42

## 2020-09-10 DIAGNOSIS — D64.9 ANEMIA, UNSPECIFIED TYPE: ICD-10-CM

## 2020-09-10 LAB
BASOPHILS ABSOLUTE: 0 K/UL (ref 0–0.2)
BASOPHILS RELATIVE PERCENT: 1 %
EOSINOPHILS ABSOLUTE: 0.1 K/UL (ref 0–0.6)
EOSINOPHILS RELATIVE PERCENT: 2.2 %
FERRITIN: 83.5 NG/ML (ref 15–150)
FOLATE: >20 NG/ML (ref 4.78–24.2)
HCT VFR BLD CALC: 35.9 % (ref 36–48)
HEMOGLOBIN: 12 G/DL (ref 12–16)
IRON SATURATION: 30 % (ref 15–50)
IRON: 75 UG/DL (ref 37–145)
LYMPHOCYTES ABSOLUTE: 1.4 K/UL (ref 1–5.1)
LYMPHOCYTES RELATIVE PERCENT: 30.6 %
MCH RBC QN AUTO: 30.8 PG (ref 26–34)
MCHC RBC AUTO-ENTMCNC: 33.5 G/DL (ref 31–36)
MCV RBC AUTO: 92.1 FL (ref 80–100)
MONOCYTES ABSOLUTE: 0.5 K/UL (ref 0–1.3)
MONOCYTES RELATIVE PERCENT: 10.3 %
NEUTROPHILS ABSOLUTE: 2.6 K/UL (ref 1.7–7.7)
NEUTROPHILS RELATIVE PERCENT: 55.9 %
PDW BLD-RTO: 14.9 % (ref 12.4–15.4)
PLATELET # BLD: 168 K/UL (ref 135–450)
PMV BLD AUTO: 8.5 FL (ref 5–10.5)
RBC # BLD: 3.89 M/UL (ref 4–5.2)
TOTAL IRON BINDING CAPACITY: 246 UG/DL (ref 260–445)
VITAMIN B-12: 272 PG/ML (ref 211–911)
WBC # BLD: 4.6 K/UL (ref 4–11)

## 2020-09-16 ENCOUNTER — OFFICE VISIT (OUTPATIENT)
Dept: VASCULAR SURGERY | Age: 85
End: 2020-09-16
Payer: MEDICARE

## 2020-09-16 VITALS
TEMPERATURE: 98.4 F | WEIGHT: 110 LBS | HEIGHT: 60 IN | BODY MASS INDEX: 21.6 KG/M2 | SYSTOLIC BLOOD PRESSURE: 140 MMHG | DIASTOLIC BLOOD PRESSURE: 78 MMHG

## 2020-09-16 PROCEDURE — 1123F ACP DISCUSS/DSCN MKR DOCD: CPT | Performed by: SURGERY

## 2020-09-16 PROCEDURE — G8427 DOCREV CUR MEDS BY ELIG CLIN: HCPCS | Performed by: SURGERY

## 2020-09-16 PROCEDURE — 99214 OFFICE O/P EST MOD 30 MIN: CPT | Performed by: SURGERY

## 2020-09-16 PROCEDURE — 1036F TOBACCO NON-USER: CPT | Performed by: SURGERY

## 2020-09-16 PROCEDURE — G8420 CALC BMI NORM PARAMETERS: HCPCS | Performed by: SURGERY

## 2020-09-16 PROCEDURE — 4040F PNEUMOC VAC/ADMIN/RCVD: CPT | Performed by: SURGERY

## 2020-09-16 PROCEDURE — 1090F PRES/ABSN URINE INCON ASSESS: CPT | Performed by: SURGERY

## 2020-09-16 NOTE — PROGRESS NOTES
Subjective:      Patient ID: Mela Trujillo is a 80 y.o. female. HPI Well known pt S/P R SFA-BKpop bypass with later iliac stent and SFA angioplasty presents with nonhealing L shin wound and small nonhealing ulcer L great toe. No rest pain. Duration 1-2 months. Treating with self created honey-vinegar ointment.      Past Medical History:   Diagnosis Date    Anxiety     Arthritis     At risk for falls     CAD (coronary artery disease)     CTS (carpal tunnel syndrome)     Bilateral, EMG-NCS    DDD (degenerative disc disease), cervical     Fractures     (L) Hip Fx 4/25/98, L1 fracture from fall 10-31-06    Hypertension     Mitral regurgitation     Neuropathy     Osteopenia     Osteoporosis     [facility])     Right LE ischemia    Peripheral neuropathy 6/1/2015    Peripheral vascular disease ([facility]     bilateral lower extremities with edema    Podagra 01/09/2017    Dr. Jacquie Hernandez Spinal stenosis     L4-5    Type 2 diabetes mellitus ([facility]     Urethral stricture 5 years ago    Urgency of urination      Past Surgical History:   Procedure Laterality Date    APPENDECTOMY      CARPAL TUNNEL RELEASE Right 3/29/2019    RIGHT CARPAL TUNNEL RELEASE AND RIGHT MIDDLE FINGER TRIGGER FINGER RELEASE performed by Samantha Amor MD at [address]  [facility]    LEFT EYE EYE CATARACT PHACOEMULSIFICATION INTRAOCULAR LENS    CHOLECYSTECTOMY  1978    COLONOSCOPY  8/31/99    diverticulosis    EYE SURGERY Bilateral     bilateral cataract removed    HYSTERECTOMY  1980s    sallie    IR FEMORAL POPLITEAL BYPASS GRAFT Right 8-    KYPHOSIS SURGERY  11-7-06    L1, (fractured after a fall)    TONSILLECTOMY      TOTAL HIP ARTHROPLASTY  4/25/98    (L) THR    WRIST FRACTURE SURGERY  2008    left wrist     Allergies   Allergen Reactions    Aricept [Donepezil Hydrochloride] Other (See Comments)     intolerant    Doxycycline Nausea Only    Ketorolac Tromethamine Other on file     Relationship status: Not on file    Intimate partner violence     Fear of current or ex partner: Not on file     Emotionally abused: Not on file     Physically abused: Not on file     Forced sexual activity: Not on file   Other Topics Concern    Not on file   Social History Narrative    Not on file     Family History   Problem Relation Age of Onset    Stroke Father     Hypertension Father        Review of Systems  Unremarkable as reviewed personally by this MD    Objective:   Physical Exam  Nursing note reviewed. Constitutional:       Appearance: Normal appearance. HENT:      Head: Normocephalic and atraumatic. Nose: Nose normal.      Mouth/Throat:      Mouth: Mucous membranes are moist.      Pharynx: Oropharynx is clear. Eyes:      Extraocular Movements: Extraocular movements intact. Conjunctiva/sclera: Conjunctivae normal.   Neck:      Musculoskeletal: Normal range of motion and neck supple. Cardiovascular:      Rate and Rhythm: Normal rate and regular rhythm. Heart sounds: Normal heart sounds. Pulmonary:      Effort: Pulmonary effort is normal.      Breath sounds: Normal breath sounds. Abdominal:      General: Abdomen is flat. Bowel sounds are normal.      Palpations: Abdomen is soft. There is no mass. Skin:     General: Skin is warm and dry. Capillary Refill: Capillary refill takes more than 3 seconds. Comments: L anterior shin superficial ulcer 5 mm x 3 mm)  Dorsum L great toe - superficial 3x3 mm without deep penetration. Neurological:      General: No focal deficit present. Mental Status: She is alert and oriented to person, place, and time. Cranial Nerves: No cranial nerve deficit. Sensory: No sensory deficit. Motor: No weakness. Coordination: Coordination normal.   Psychiatric:         Mood and Affect: Mood normal.         Behavior: Behavior normal.         Thought Content:  Thought content normal.         Judgment: Judgment normal.      Comments: Remains argumentative       Pulses:   R bruit  L bruit   2   carotid 2    2   brachial 2    2   radial 2    2   femoral 2 +   0   popliteal 0    0   posterior tibial 0    2   dorsalis pedis 0    2   bypass graft na      Arterial scan LLE   Impressions    Right Impression    The right BUZZ is . 94 ( mmHg,  mmHg)    Left Impression    The left BUZZ is . 63 ( mmHg, DP >200)         There is normal, multiphasic flow identified in the proximal common femoral    artery, suggesting no evidence of hemodynamically significant aorto-iliac    inflow disease. Elevated velocities are noted in the mid-distal common femoral artery (170    cm/sec - 390 cm/sec), consistent with >50% stenosis. There is atherosclerotic plaque involving the superficial femoral and    popliteal arteries. Two areas of elevated velocities are noted in the distal superficial femoral    artery (77 cm/sec to 377 cm/sec and 71 cm/sec to 208 cm/sec). This is    consistent with >50% stenosis. The tibial vessels are patent to the level of the ankle.         In comparison to previous study, the BUZZ is slightly higher and there is no    significant change in the Duplex scan.         Conclusions          Summary          There is evidence of moderate arterial insufficiency of the left leg with an     BUZZ of 0.63.     There is >50% stenosis of the distal common femoral artery and >50% stenosis     in two areas of the superficial femoral artery.          Signature          ------------------------------------------------------------------     Electronically signed by Francisco France MD (Interpreting     physician) on 09/03/2020 at 11:50 AM     ------------------------------------------------------------------         Blood Pressure:Right arm 158/ mmHg. Left arm 158/ mmHg. Patient Status:Routine. [address] [facility] V[facility].     Technical Quality:Adequate visualization.         Risk Factors History    +------------------+----------+----------------------------------+    ! Diagnosis         !Date      !Comments                          !    +------------------+----------+----------------------------------+    ! Previous Procedure!08/01/2015! Right Fem-Pop bypass graft        !    +------------------+----------+----------------------------------+    ! Previous Procedure! 12/05/2016! Right SFA angioplasty, iliac stent! +------------------+----------+----------------------------------+         Velocities are measured in cm/s ; Diameters are measured in mm         LE Duplex Measurements         +-------------------++-----+-----+----------++-----+-----+-----------+    !                   ! ! Right!     ! Left      !!     !     !           !    +-------------------++-----+-----+----------++-----+-----+-----------+    ! Location           !!PSV  !Ratio! Wave Desc. !!PSV  !Ratio! Wave Desc. !    +-------------------++-----+-----+----------++-----+-----+-----------+    ! Prox Common Femoral!!     !     !          !!169.9!     !Multiphasic!    +-------------------++-----+-----+----------++-----+-----+-----------+    ! Mid Common Femoral !!     !     !          !!390.1!     !Multiphasic!    +-------------------++-----+-----+----------++-----+-----+-----------+    ! Dist Common Femoral!!     !     !          !!778. 1!     !Monophasic !    +-------------------++-----+-----+----------++-----+-----+-----------+    ! PFA                !!     !     !          !!120  !     !Multiphasic!    +-------------------++-----+-----+----------++-----+-----+-----------+    ! Prox SFA           !!     !     !          !!148  !0.4  !Monophasic !    +-------------------++-----+-----+----------++-----+-----+-----------+    ! Mid SFA            !!     !     !          !!120.2!0.81 ! Monophasic !    +-------------------++-----+-----+----------++-----+-----+-----------+    ! Dist SFA           !!     !     !          !!273.9!6.75 ! Monophasic ! +-------------------++-----+-----+----------++-----+-----+-----------+    ! Prox Popliteal     !!     !     !          !!19   !0.05 ! Monophasic !    +-------------------++-----+-----+----------++-----+-----+-----------+    ! Dist Popliteal     !!     !     !          !!24.4 !1.28 ! Monophasic !    +-------------------++-----+-----+----------++-----+-----+-----------+    ! Mid PTA            !!     !     !          !!48.8 ! 2    !Monophasic !    +-------------------++-----+-----+----------++-----+-----+-----------+    ! Dist PTA           !!     !     !          !!73.3 !1.5  !Monophasic !    +-------------------++-----+-----+----------++-----+-----+-----------+    ! Dist KODI           !!     !     !          !!33.7 !1.38 ! Monophasic !    +-------------------++-----+-----+----------++-----+-----+-----------+    ! Mid Peroneal       !!     !     !          !!28.8 !1.18 ! Monophasic !    +-------------------++-----+-----+----------++-----+-----+-----------+             Assessment:      1) S/P R SFA-pop bypass. Clinically widely patent and by last GSS  2) Nonhealing L leg and toe wound with multilevel arterial disease  3) DM      Plan:      Suggested angio but pt is reluctant to proceed directly to imaging. Agreed to get laser doppler first - if abnl then will consider further workup. Will schedule and call with results for plan. Continue current approach.         Kae Edward MD

## 2020-09-18 ENCOUNTER — TELEPHONE (OUTPATIENT)
Dept: INTERNAL MEDICINE CLINIC | Age: 85
End: 2020-09-18

## 2020-09-18 RX ORDER — GLIMEPIRIDE 2 MG/1
2 TABLET ORAL
Qty: 90 TABLET | Refills: 0 | Status: SHIPPED
Start: 2020-09-18 | End: 2020-10-07 | Stop reason: DRUGHIGH

## 2020-09-18 NOTE — TELEPHONE ENCOUNTER
Patient is requesting to speak with Israel Pate regarding reducing Amaryl from 4 mg to 2 mg since sugar readings are better. Patient also states L leg is doing better. Please contact patient @ phone # provided.

## 2020-09-18 NOTE — TELEPHONE ENCOUNTER
----- Message from Camilakalyani Winter. sent at 9/18/2020  9:27 AM EDT -----  Subject: Message to Provider    QUESTIONS  Information for Provider? Amaryl from 4 to 2 Mg sugar reading have been   lower   would like for Chung Lancaster to call back. Left leg is doing well  ---------------------------------------------------------------------------  --------------  CALL BACK INFO  What is the best way for the office to contact you? OK to leave message on   voicemail  Preferred Call Back Phone Number? [phone]  ---------------------------------------------------------------------------  --------------  SCRIPT ANSWERS  Relationship to Patient?  Self

## 2020-09-21 ENCOUNTER — HOSPITAL ENCOUNTER (OUTPATIENT)
Dept: VASCULAR LAB | Age: 85
Discharge: HOME | End: 2020-09-21
Payer: MEDICARE

## 2020-09-21 PROCEDURE — 93923 UPR/LXTR ART STDY 3+ LVLS: CPT

## 2020-09-25 ENCOUNTER — TELEPHONE (OUTPATIENT)
Dept: VASCULAR SURGERY | Age: 85
End: 2020-09-25

## 2020-09-25 NOTE — TELEPHONE ENCOUNTER
I spoke to patient and told her that per Dr. Albertha Snellen the laser doppler shows adequate blood flow to heal leg and foot wound. She said that she is seeing improvement already because she is putting her \"tonic solution\" on it. She mixes honey and apple cider vinegar in her tonic water and drinks it everyday.

## 2020-10-07 ENCOUNTER — TELEPHONE (OUTPATIENT)
Dept: INTERNAL MEDICINE CLINIC | Age: 85
End: 2020-10-07

## 2020-10-07 RX ORDER — GLIMEPIRIDE 1 MG/1
1 TABLET ORAL
Qty: 30 TABLET | Refills: 5 | Status: SHIPPED | OUTPATIENT
Start: 2020-10-07 | End: 2021-04-12

## 2020-10-07 NOTE — TELEPHONE ENCOUNTER
She needs to stop trying to figure this out on her own. I do not want to change the lisinopril at all. The glimepiride will not affect the lisinopril and vice versa. Do not change the lisinopril. Okay to decrease the glimepiride to 1 mg.

## 2020-10-07 NOTE — TELEPHONE ENCOUNTER
Patient is requesting to speak with Rohit Lewis about getting a prescription for Amaryl in a lower dose. She wants Amaryl 1 mg  And also would like to know if Dr. Kevin Loyd feels she should have her dosage of Lisinopril lowered 10mg. She states the Lisinopril affects the Amaryl and would like this lowered according \"to her smart phone if Dr. Kevin Loyd is in agreement. \"  She states she is \"eating too much sugar for her counts to be 110, 108 fasting in the morning. \"  She would like to try this for a month. She also states she is currently taking Metformin 500 mg once daily in her medication regimen. Please contact patient @ phone # provided with Dr. Alistair Mckeon recommendation.

## 2020-11-05 ENCOUNTER — TELEPHONE (OUTPATIENT)
Dept: INTERNAL MEDICINE CLINIC | Age: 85
End: 2020-11-05

## 2020-11-05 LAB
BILIRUBIN, POC: NORMAL
BLOOD URINE, POC: NORMAL
CLARITY, POC: NORMAL
COLOR, POC: YELLOW
GLUCOSE URINE, POC: NORMAL
KETONES, POC: NORMAL
LEUKOCYTE EST, POC: NORMAL
NITRITE, POC: NORMAL
PH, POC: 7.5
PROTEIN, POC: NORMAL
SPECIFIC GRAVITY, POC: 1.02
UROBILINOGEN, POC: 0.2

## 2020-11-05 PROCEDURE — 81002 URINALYSIS NONAUTO W/O SCOPE: CPT | Performed by: INTERNAL MEDICINE

## 2020-11-05 RX ORDER — CEFUROXIME AXETIL 250 MG/1
250 TABLET ORAL 2 TIMES DAILY
Qty: 14 TABLET | Refills: 0 | Status: SHIPPED | OUTPATIENT
Start: 2020-11-05 | End: 2020-11-12

## 2020-11-05 NOTE — TELEPHONE ENCOUNTER
----- Message from Kapil Marie sent at 11/5/2020  9:14 AM EST -----  Subject: Message to Provider    QUESTIONS  Information for Provider? patient requesting to speak to live please   advise no details given   ---------------------------------------------------------------------------  --------------  CALL BACK INFO  What is the best way for the office to contact you? OK to leave message on   voicemail  Do not leave any message   patient will call back for answer  Preferred Call Back Phone Number? [phone]  ---------------------------------------------------------------------------  --------------  SCRIPT ANSWERS  Relationship to Patient?  Self

## 2020-11-05 NOTE — TELEPHONE ENCOUNTER
Call center called back because patient's helper is getting ready to leave for the day. Julita Gaines checked with Daniele Donis and she said if they want to drop off a urine sample better to have it than not. Relayed message to patient via Call Center.

## 2020-11-08 LAB
ORGANISM: ABNORMAL
URINE CULTURE, ROUTINE: ABNORMAL

## 2020-11-09 LAB
CATARACTS: NORMAL
DIABETIC RETINOPATHY: NORMAL
GLAUCOMA: NORMAL
INTRAOCULAR PRESSURE LEFT EYE: 12
INTRAOCULAR PRESSURE RIGHT EYE: 16
VISUAL ACUITY DISTANCE LEFT EYE: NORMAL
VISUAL ACUITY DISTANCE RIGHT EYE: NORMAL

## 2020-11-19 ENCOUNTER — TELEPHONE (OUTPATIENT)
Dept: INTERNAL MEDICINE CLINIC | Age: 85
End: 2020-11-19

## 2020-11-19 NOTE — TELEPHONE ENCOUNTER
Patient treated for urinary frequency last week, positive for infection. Patient completed antibiotic therapy and felt better. Symptoms returned three days later with more of a \"cystitis feeling\" with some pain.

## 2020-11-19 NOTE — TELEPHONE ENCOUNTER
Patient is experiencing pain, frequency and urgency in urination x 2 days. Patient is currently taking OTC products that gives some relief but not enough. Please contact patient to advise.

## 2020-11-21 DIAGNOSIS — N39.0 URINARY TRACT INFECTION WITHOUT HEMATURIA, SITE UNSPECIFIED: ICD-10-CM

## 2020-11-21 LAB
BACTERIA: ABNORMAL /HPF
BILIRUBIN URINE: NEGATIVE
BLOOD, URINE: ABNORMAL
CLARITY: ABNORMAL
COLOR: YELLOW
EPITHELIAL CELLS, UA: 1 /HPF (ref 0–5)
GLUCOSE URINE: NEGATIVE MG/DL
HYALINE CASTS: 1 /LPF (ref 0–8)
KETONES, URINE: NEGATIVE MG/DL
LEUKOCYTE ESTERASE, URINE: ABNORMAL
MICROSCOPIC EXAMINATION: YES
NITRITE, URINE: POSITIVE
PH UA: 7 (ref 5–8)
PROTEIN UA: ABNORMAL MG/DL
RBC UA: 4 /HPF (ref 0–4)
SPECIFIC GRAVITY UA: 1.02 (ref 1–1.03)
URINE TYPE: ABNORMAL
UROBILINOGEN, URINE: 0.2 E.U./DL
WBC UA: 559 /HPF (ref 0–5)

## 2020-11-22 RX ORDER — CIPROFLOXACIN 250 MG/1
250 TABLET, FILM COATED ORAL 2 TIMES DAILY
Qty: 14 TABLET | Refills: 0 | Status: SHIPPED | OUTPATIENT
Start: 2020-11-22 | End: 2020-11-29

## 2020-11-24 LAB
ORGANISM: ABNORMAL
URINE CULTURE, ROUTINE: ABNORMAL

## 2020-12-02 ENCOUNTER — TELEPHONE (OUTPATIENT)
Dept: INTERNAL MEDICINE CLINIC | Age: 85
End: 2020-12-02

## 2020-12-02 RX ORDER — MECLIZINE HCL 12.5 MG/1
12.5 TABLET ORAL 3 TIMES DAILY PRN
Qty: 30 TABLET | Refills: 1 | Status: SHIPPED | OUTPATIENT
Start: 2020-12-02 | End: 2022-03-17 | Stop reason: ALTCHOICE

## 2020-12-02 NOTE — TELEPHONE ENCOUNTER
Per pt. She states she feels better but things are spinning. (dancing). She is taking Dramamine. Per pt things are not \"dancing around\"as fast.   Pt says since she ate peanut butter crackers and a banana and says that helped. She has taken another dose of Dramamine. Pt feels this is Vertigo again.    No other symptopms   T 98.4  /68

## 2020-12-02 NOTE — TELEPHONE ENCOUNTER
Patient states that last night she became very dizzy, vomited, then took a dramamine and slept for 8 solid hours. She woke up and everything is still spinning. She states that every 3-4 years she has a bout with Vertigo, and in the past has taken dramamine and it's helped. But with only 1 dose, no improvement so far. Patient denies:  Stomach Pain, Chest Pain, Fever. She states she is still nauseous, and everything is spinning. She wants to know if Dr. eJssica Barnes recommends she continue taking Dramamine, or is there something OTC that's better. She states she feels \"Yucky overall\" but doesn't feel this merits a trip to the ER.     Please contact patient @ phone # provided with Dr. Bennie dimas

## 2020-12-04 RX ORDER — LISINOPRIL 20 MG/1
TABLET ORAL
Qty: 90 TABLET | Refills: 1 | Status: SHIPPED | OUTPATIENT
Start: 2020-12-04 | End: 2021-05-11

## 2020-12-30 ENCOUNTER — TELEPHONE (OUTPATIENT)
Dept: INTERNAL MEDICINE CLINIC | Age: 85
End: 2020-12-30

## 2020-12-30 NOTE — TELEPHONE ENCOUNTER
Patient is calling regarding a noise she hears in her head in the morning. Sounds like a cricket. It usually only last about 30 minutes. Patient states this has been ongoing for several months. She is wanting to know if she should consult her ENT or should she see Dr. Zi Osullivan for this.

## 2021-02-20 DIAGNOSIS — E11.59 TYPE 2 DIABETES MELLITUS WITH VASCULAR DISEASE (HCC): ICD-10-CM

## 2021-02-22 RX ORDER — GLIMEPIRIDE 2 MG/1
TABLET ORAL
Qty: 90 TABLET | Refills: 0 | Status: SHIPPED | OUTPATIENT
Start: 2021-02-22 | End: 2021-04-17

## 2021-04-09 ENCOUNTER — TELEPHONE (OUTPATIENT)
Dept: INTERNAL MEDICINE CLINIC | Age: 86
End: 2021-04-09

## 2021-04-09 NOTE — TELEPHONE ENCOUNTER
Last appointment: 9/3/2020  Next appointment: Visit date not found  Last refill: 02/22/2021 90 and 0

## 2021-04-11 DIAGNOSIS — E11.59 TYPE 2 DIABETES MELLITUS WITH VASCULAR DISEASE (HCC): ICD-10-CM

## 2021-04-12 RX ORDER — GLIMEPIRIDE 1 MG/1
TABLET ORAL
Qty: 30 TABLET | Refills: 0 | Status: SHIPPED | OUTPATIENT
Start: 2021-04-12 | End: 2021-05-11

## 2021-04-12 RX ORDER — GLIMEPIRIDE 2 MG/1
TABLET ORAL
Qty: 90 TABLET | Refills: 0 | OUTPATIENT
Start: 2021-04-12

## 2021-04-17 ENCOUNTER — HOSPITAL ENCOUNTER (INPATIENT)
Age: 86
LOS: 1 days | Discharge: HOME | DRG: 069 | End: 2021-04-18
Attending: EMERGENCY MEDICINE | Admitting: INTERNAL MEDICINE
Payer: MEDICARE

## 2021-04-17 ENCOUNTER — APPOINTMENT (OUTPATIENT)
Dept: CT IMAGING | Age: 86
DRG: 069 | End: 2021-04-17
Payer: MEDICARE

## 2021-04-17 DIAGNOSIS — G45.9 TIA (TRANSIENT ISCHEMIC ATTACK): Primary | ICD-10-CM

## 2021-04-17 LAB
ANION GAP SERPL CALCULATED.3IONS-SCNC: 10 MMOL/L (ref 3–16)
BASOPHILS ABSOLUTE: 0 K/UL (ref 0–0.2)
BASOPHILS RELATIVE PERCENT: 0.6 %
BILIRUBIN URINE: NEGATIVE
BLOOD, URINE: NEGATIVE
BUN BLDV-MCNC: 23 MG/DL (ref 7–20)
CALCIUM SERPL-MCNC: 9.7 MG/DL (ref 8.3–10.6)
CHLORIDE BLD-SCNC: 103 MMOL/L (ref 99–110)
CLARITY: CLEAR
CO2: 25 MMOL/L (ref 21–32)
COLOR: YELLOW
CREAT SERPL-MCNC: 0.9 MG/DL (ref 0.6–1.2)
EOSINOPHILS ABSOLUTE: 0.1 K/UL (ref 0–0.6)
EOSINOPHILS RELATIVE PERCENT: 1.2 %
EPITHELIAL CELLS, UA: NORMAL /HPF (ref 0–5)
GFR AFRICAN AMERICAN: >60
GFR NON-AFRICAN AMERICAN: 59
GLUCOSE BLD-MCNC: 135 MG/DL (ref 70–99)
GLUCOSE BLD-MCNC: 148 MG/DL (ref 70–99)
GLUCOSE URINE: NEGATIVE MG/DL
HCT VFR BLD CALC: 38.7 % (ref 36–48)
HEMOGLOBIN: 13.1 G/DL (ref 12–16)
KETONES, URINE: NEGATIVE MG/DL
LEUKOCYTE ESTERASE, URINE: ABNORMAL
LYMPHOCYTES ABSOLUTE: 1.6 K/UL (ref 1–5.1)
LYMPHOCYTES RELATIVE PERCENT: 28.1 %
MCH RBC QN AUTO: 31.2 PG (ref 26–34)
MCHC RBC AUTO-ENTMCNC: 33.9 G/DL (ref 31–36)
MCV RBC AUTO: 92.1 FL (ref 80–100)
MICROSCOPIC EXAMINATION: YES
MONOCYTES ABSOLUTE: 0.5 K/UL (ref 0–1.3)
MONOCYTES RELATIVE PERCENT: 8 %
NEUTROPHILS ABSOLUTE: 3.6 K/UL (ref 1.7–7.7)
NEUTROPHILS RELATIVE PERCENT: 62.1 %
NITRITE, URINE: NEGATIVE
PDW BLD-RTO: 14.3 % (ref 12.4–15.4)
PERFORMED ON: ABNORMAL
PH UA: 7 (ref 5–8)
PLATELET # BLD: 163 K/UL (ref 135–450)
PMV BLD AUTO: 8.4 FL (ref 5–10.5)
POTASSIUM REFLEX MAGNESIUM: 5.3 MMOL/L (ref 3.5–5.1)
PROTEIN UA: NEGATIVE MG/DL
RBC # BLD: 4.2 M/UL (ref 4–5.2)
RBC UA: NORMAL /HPF (ref 0–4)
SODIUM BLD-SCNC: 138 MMOL/L (ref 136–145)
SPECIFIC GRAVITY UA: 1.01 (ref 1–1.03)
TROPONIN: <0.01 NG/ML
URINE TYPE: ABNORMAL
UROBILINOGEN, URINE: 0.2 E.U./DL
WBC # BLD: 5.8 K/UL (ref 4–11)
WBC UA: NORMAL /HPF (ref 0–5)

## 2021-04-17 PROCEDURE — 93005 ELECTROCARDIOGRAM TRACING: CPT | Performed by: EMERGENCY MEDICINE

## 2021-04-17 PROCEDURE — 85025 COMPLETE CBC W/AUTO DIFF WBC: CPT

## 2021-04-17 PROCEDURE — 99284 EMERGENCY DEPT VISIT MOD MDM: CPT

## 2021-04-17 PROCEDURE — 81001 URINALYSIS AUTO W/SCOPE: CPT

## 2021-04-17 PROCEDURE — 80048 BASIC METABOLIC PNL TOTAL CA: CPT

## 2021-04-17 PROCEDURE — 84484 ASSAY OF TROPONIN QUANT: CPT

## 2021-04-17 PROCEDURE — 70450 CT HEAD/BRAIN W/O DYE: CPT

## 2021-04-17 PROCEDURE — 70496 CT ANGIOGRAPHY HEAD: CPT

## 2021-04-17 PROCEDURE — 70498 CT ANGIOGRAPHY NECK: CPT

## 2021-04-17 PROCEDURE — 6360000004 HC RX CONTRAST MEDICATION: Performed by: STUDENT IN AN ORGANIZED HEALTH CARE EDUCATION/TRAINING PROGRAM

## 2021-04-17 PROCEDURE — 1200000000 HC SEMI PRIVATE

## 2021-04-17 RX ORDER — INSULIN LISPRO 100 [IU]/ML
0-3 INJECTION, SOLUTION INTRAVENOUS; SUBCUTANEOUS NIGHTLY
Status: DISCONTINUED | OUTPATIENT
Start: 2021-04-18 | End: 2021-04-18 | Stop reason: HOSPADM

## 2021-04-17 RX ORDER — SODIUM CHLORIDE 9 MG/ML
25 INJECTION, SOLUTION INTRAVENOUS PRN
Status: DISCONTINUED | OUTPATIENT
Start: 2021-04-17 | End: 2021-04-18 | Stop reason: HOSPADM

## 2021-04-17 RX ORDER — CLOPIDOGREL BISULFATE 75 MG/1
75 TABLET ORAL DAILY
Status: DISCONTINUED | OUTPATIENT
Start: 2021-04-18 | End: 2021-04-18 | Stop reason: HOSPADM

## 2021-04-17 RX ORDER — SODIUM CHLORIDE 0.9 % (FLUSH) 0.9 %
5-40 SYRINGE (ML) INJECTION PRN
Status: DISCONTINUED | OUTPATIENT
Start: 2021-04-17 | End: 2021-04-18 | Stop reason: HOSPADM

## 2021-04-17 RX ORDER — VITAMIN B COMPLEX
2000 TABLET ORAL DAILY
Status: DISCONTINUED | OUTPATIENT
Start: 2021-04-18 | End: 2021-04-18 | Stop reason: HOSPADM

## 2021-04-17 RX ORDER — NICOTINE POLACRILEX 4 MG
15 LOZENGE BUCCAL PRN
Status: DISCONTINUED | OUTPATIENT
Start: 2021-04-17 | End: 2021-04-18 | Stop reason: HOSPADM

## 2021-04-17 RX ORDER — POLYETHYLENE GLYCOL 3350 17 G/17G
17 POWDER, FOR SOLUTION ORAL DAILY PRN
Status: DISCONTINUED | OUTPATIENT
Start: 2021-04-17 | End: 2021-04-18 | Stop reason: HOSPADM

## 2021-04-17 RX ORDER — ONDANSETRON 2 MG/ML
4 INJECTION INTRAMUSCULAR; INTRAVENOUS EVERY 6 HOURS PRN
Status: DISCONTINUED | OUTPATIENT
Start: 2021-04-17 | End: 2021-04-18 | Stop reason: HOSPADM

## 2021-04-17 RX ORDER — DEXTROSE MONOHYDRATE 50 MG/ML
100 INJECTION, SOLUTION INTRAVENOUS PRN
Status: DISCONTINUED | OUTPATIENT
Start: 2021-04-17 | End: 2021-04-18 | Stop reason: HOSPADM

## 2021-04-17 RX ORDER — LISINOPRIL 20 MG/1
20 TABLET ORAL DAILY
Status: DISCONTINUED | OUTPATIENT
Start: 2021-04-18 | End: 2021-04-18 | Stop reason: HOSPADM

## 2021-04-17 RX ORDER — PROMETHAZINE HYDROCHLORIDE 12.5 MG/1
12.5 TABLET ORAL EVERY 6 HOURS PRN
Status: DISCONTINUED | OUTPATIENT
Start: 2021-04-17 | End: 2021-04-18 | Stop reason: HOSPADM

## 2021-04-17 RX ORDER — DEXTROSE MONOHYDRATE 25 G/50ML
12.5 INJECTION, SOLUTION INTRAVENOUS PRN
Status: DISCONTINUED | OUTPATIENT
Start: 2021-04-17 | End: 2021-04-18 | Stop reason: HOSPADM

## 2021-04-17 RX ORDER — GLIPIZIDE 5 MG/1
2.5 TABLET ORAL
Status: DISCONTINUED | OUTPATIENT
Start: 2021-04-18 | End: 2021-04-18 | Stop reason: HOSPADM

## 2021-04-17 RX ORDER — INSULIN LISPRO 100 [IU]/ML
0-6 INJECTION, SOLUTION INTRAVENOUS; SUBCUTANEOUS
Status: DISCONTINUED | OUTPATIENT
Start: 2021-04-18 | End: 2021-04-18 | Stop reason: HOSPADM

## 2021-04-17 RX ORDER — SODIUM CHLORIDE 0.9 % (FLUSH) 0.9 %
5-40 SYRINGE (ML) INJECTION EVERY 12 HOURS SCHEDULED
Status: DISCONTINUED | OUTPATIENT
Start: 2021-04-18 | End: 2021-04-18 | Stop reason: HOSPADM

## 2021-04-17 RX ORDER — ROSUVASTATIN CALCIUM 20 MG/1
40 TABLET, COATED ORAL NIGHTLY
Status: DISCONTINUED | OUTPATIENT
Start: 2021-04-18 | End: 2021-04-18 | Stop reason: HOSPADM

## 2021-04-17 RX ORDER — ASPIRIN 81 MG/1
81 TABLET ORAL DAILY
Status: DISCONTINUED | OUTPATIENT
Start: 2021-04-18 | End: 2021-04-18 | Stop reason: HOSPADM

## 2021-04-17 RX ADMIN — IOPAMIDOL 80 ML: 755 INJECTION, SOLUTION INTRAVENOUS at 20:30

## 2021-04-17 ASSESSMENT — ENCOUNTER SYMPTOMS
DIARRHEA: 0
COUGH: 0
VOMITING: 0
NAUSEA: 0
ABDOMINAL PAIN: 0
SHORTNESS OF BREATH: 0
BACK PAIN: 0
PHOTOPHOBIA: 0

## 2021-04-17 NOTE — ED PROVIDER NOTES
Skin: Negative for pallor. Neurological: Positive for tremors, speech difficulty and weakness. Negative for dizziness, light-headedness, numbness and headaches. Psychiatric/Behavioral: Negative for agitation and confusion. Past Medical, Surgical, Family, and Social History     She has a past medical history of Anxiety, Arthritis, At risk for falls, CAD (coronary artery disease), CTS (carpal tunnel syndrome), DDD (degenerative disc disease), cervical, Fractures, Hypertension, Mitral regurgitation, Neuropathy, Osteopenia, Osteoporosis, PAD (peripheral artery disease) ([address] Peripheral neuropathy, Peripheral vascular disease ([address] Podagra, Spinal stenosis, Type 2 diabetes mellitus ([address] Urethral stricture, and Urgency of urination. She has a past surgical history that includes Tonsillectomy; Appendectomy; Cholecystectomy (1978); Colonoscopy (8/31/99); Hysterectomy (1980s); Total hip arthroplasty (4/25/98); Kyphosis surgery (11-7-06); Wrist fracture surgery (2008); Cataract removal with implant (118421); eye surgery (Bilateral); IR Femoral Popliteal Bypass Graft (Right, 8-); and Carpal tunnel release (Right, 3/29/2019). Her family history includes Hypertension in her father; Stroke in her father. She reports that she has never smoked. She has never used smokeless tobacco. She reports that she does not drink alcohol or use drugs.     Medications     Previous Medications    ASPIRIN EC 81 MG EC TABLET    Take 1 tablet by mouth daily    BLOOD GLUCOSE TEST STRIPS (ONETOUCH ULTRA) STRIP    TEST BLOOD SUGAR TWO TIMES A DAY    CHOLECALCIFEROL (VITAMIN D3) 2000 UNITS CAPS    Take 1 capsule by mouth daily    GLIMEPIRIDE (AMARYL) 1 MG TABLET    TAKE ONE TABLET BY MOUTH EVERY MORNING BEFORE BREAKFAST    IBUPROFEN (ADVIL;MOTRIN) 200 MG TABLET    Take 400 mg by mouth every 8 hours as needed for Pain    LISINOPRIL (PRINIVIL;ZESTRIL) 20 MG TABLET    TAKE ONE TABLET BY MOUTH DAILY    LITE TOUCH LANCETS MISC    by Does not apply route. Use twice daily when testing blood sugars. MECLIZINE (ANTIVERT) 12.5 MG TABLET    Take 1 tablet by mouth 3 times daily as needed for Dizziness (vertigo)    METFORMIN (GLUCOPHAGE) 500 MG TABLET    TAKE ONE TABLET BY MOUTH THREE TIMES A DAY WITH A MEAL    TURMERIC 450 MG CAPS    Take 450 mg by mouth daily       Allergies     She is allergic to aricept [donepezil hydrochloride]; doxycycline; and ketorolac tromethamine. Physical Exam     INITIAL VITALS: BP: (!) 210/85, Temp: 97.8 °F (36.6 °C), Pulse: 65, Resp: 20, SpO2: 99 %   Physical Exam  Constitutional:       Appearance: Normal appearance. Comments: Elderly female sitting in bed in no acute distress. HENT:      Head: Normocephalic and atraumatic. Nose: No congestion. Mouth/Throat:      Mouth: Mucous membranes are moist.   Eyes:      Extraocular Movements: Extraocular movements intact. Conjunctiva/sclera: Conjunctivae normal.      Pupils: Pupils are equal, round, and reactive to light. Neck:      Musculoskeletal: Neck supple. No muscular tenderness. Cardiovascular:      Rate and Rhythm: Normal rate and regular rhythm. Pulses: Normal pulses. Heart sounds: Normal heart sounds. Pulmonary:      Effort: Pulmonary effort is normal. No respiratory distress. Breath sounds: Normal breath sounds. No wheezing. Abdominal:      General: There is no distension. Palpations: Abdomen is soft. Tenderness: There is no abdominal tenderness. Musculoskeletal:      Comments: Minimal lower extremity swelling. Skin:     General: Skin is warm and dry. Neurological:      Mental Status: She is alert. Comments: Alert and oriented x3. Full strength in all extremities. Sensation intact. No pronator drift. Normal finger-nose. Cranial nerves intact. No slurred speech.          DiagnosticResults     EKG   Interpreted in conjunction with emergencydepartment physician No att. providers found  Rhythm: Sinus bradycardia  Rate: 55  Axis: Normal  Ectopy: None  Sinus bradycardia with no ST segment elevations. No T wave inversions. Unchanged from prior EKG. RADIOLOGY:  CTA HEAD W CONTRAST   Final Result      1. No aneurysms, vascular occlusions, or intracranial stenoses identified. 2.  50% atherosclerotic stenosis of left internal carotid artery origin. CTA NECK W CONTRAST   Final Result      1. No aneurysms, vascular occlusions, or intracranial stenoses identified. 2.  50% atherosclerotic stenosis of left internal carotid artery origin. CT HEAD WO CONTRAST   Final Result      1. No acute intracranial process. 2.  Chronic left maxillary sinusitis.           LABS:   Results for orders placed or performed during the hospital encounter of 04/17/21   CBC Auto Differential   Result Value Ref Range    WBC 5.8 4.0 - 11.0 K/uL    RBC 4.20 4.00 - 5.20 M/uL    Hemoglobin 13.1 12.0 - 16.0 g/dL    Hematocrit 38.7 36.0 - 48.0 %    MCV 92.1 80.0 - 100.0 fL    MCH 31.2 26.0 - 34.0 pg    MCHC 33.9 31.0 - 36.0 g/dL    RDW 14.3 12.4 - 15.4 %    Platelets 843 664 - 409 K/uL    MPV 8.4 5.0 - 10.5 fL    Neutrophils % 62.1 %    Lymphocytes % 28.1 %    Monocytes % 8.0 %    Eosinophils % 1.2 %    Basophils % 0.6 %    Neutrophils Absolute 3.6 1.7 - 7.7 K/uL    Lymphocytes Absolute 1.6 1.0 - 5.1 K/uL    Monocytes Absolute 0.5 0.0 - 1.3 K/uL    Eosinophils Absolute 0.1 0.0 - 0.6 K/uL    Basophils Absolute 0.0 0.0 - 0.2 K/uL   Basic Metabolic Panel w/ Reflex to MG   Result Value Ref Range    Sodium 138 136 - 145 mmol/L    Potassium reflex Magnesium 5.3 (H) 3.5 - 5.1 mmol/L    Chloride 103 99 - 110 mmol/L    CO2 25 21 - 32 mmol/L    Anion Gap 10 3 - 16    Glucose 148 (H) 70 - 99 mg/dL    BUN 23 (H) 7 - 20 mg/dL    CREATININE 0.9 0.6 - 1.2 mg/dL    GFR Non- 59 (A) >60    GFR African American >60 >60    Calcium 9.7 8.3 - 10.6 mg/dL   Troponin   Result Value Ref Range    Troponin <0.01 <0.01 ng/mL Urinalysis, reflex to microscopic   Result Value Ref Range    Color, UA Yellow Straw/Yellow    Clarity, UA Clear Clear    Glucose, Ur Negative Negative mg/dL    Bilirubin Urine Negative Negative    Ketones, Urine Negative Negative mg/dL    Specific Gravity, UA 1.015 1.005 - 1.030    Blood, Urine Negative Negative    pH, UA 7.0 5.0 - 8.0    Protein, UA Negative Negative mg/dL    Urobilinogen, Urine 0.2 <2.0 E.U./dL    Nitrite, Urine Negative Negative    Leukocyte Esterase, Urine SMALL (A) Negative    Microscopic Examination YES     Urine Type NotGiven    Microscopic Urinalysis   Result Value Ref Range    WBC, UA 0-2 0 - 5 /HPF    RBC, UA 0-2 0 - 4 /HPF    Epithelial Cells, UA 0-1 0 - 5 /HPF   POCT Glucose   Result Value Ref Range    POC Glucose 135 (H) 70 - 99 mg/dl    Performed on ACCU-CHEK        ED BEDSIDE ULTRASOUND:  None    RECENT VITALS:  BP: (!) 181/68, Temp: 97.8 °F (36.6 °C), Pulse: 68,Resp: 20, SpO2: 100 %     Procedures     None    ED Course     Nursing Notes, Past Medical Hx, Past Surgical Hx, Social Hx, Allergies, and Family Hx were reviewed. The patient was given the followingmedications:  Orders Placed This Encounter   Medications    iopamidol (ISOVUE-370) 76 % injection 80 mL       CONSULTS:  Fior Recinos / KELLY / Eder Dena is a 80 y.o. female who presented for evaluation after transient neurologic symptoms. On my evaluation, she has no neurologic deficits. Her care assistant is at bedside and also states that she is at her baseline. The patient is functional and makes all of her own decisions. Her history is concerning for a TIA. CT and CT angiogram were performed. CT showed no evidence of new stroke. She does have evidence of carotid stenosis on the left at about 50%. Other labs were nonrevealing. Her troponin was less than 0.01. Her EKG was nonischemic. Urinalysis showed no evidence of infection.   Her renal panel showed a mildly elevated glucose at 148 but was otherwise unremarkable. CBC was normal.    I had a long conversation with the patient at bedside. She originally not want to stay to have an MRI and further restratification. Eventually, was able to persuade her to stay to have her MRI on Monday. At this time, she is willing to be admitted for TIA evaluation/work-up. She was admitted in stable condition. This patient was also evaluated by the attending physician. All care plans werediscussed and agreed upon. Clinical Impression     1. TIA (transient ischemic attack)        Disposition     PATIENT REFERRED TO:  No follow-up provider specified.     DISCHARGE MEDICATIONS:  New Prescriptions    No medications on file       DISPOSITION  -admit       Patricia Arnold MD  Resident  04/17/21 [id number]

## 2021-04-18 ENCOUNTER — APPOINTMENT (OUTPATIENT)
Dept: MRI IMAGING | Age: 86
DRG: 069 | End: 2021-04-18
Payer: MEDICARE

## 2021-04-18 VITALS
HEIGHT: 61 IN | WEIGHT: 120 LBS | TEMPERATURE: 97.2 F | DIASTOLIC BLOOD PRESSURE: 69 MMHG | SYSTOLIC BLOOD PRESSURE: 161 MMHG | OXYGEN SATURATION: 100 % | BODY MASS INDEX: 22.66 KG/M2 | HEART RATE: 53 BPM | RESPIRATION RATE: 16 BRPM

## 2021-04-18 LAB
ANION GAP SERPL CALCULATED.3IONS-SCNC: 9 MMOL/L (ref 3–16)
BUN BLDV-MCNC: 20 MG/DL (ref 7–20)
CALCIUM SERPL-MCNC: 9.2 MG/DL (ref 8.3–10.6)
CHLORIDE BLD-SCNC: 106 MMOL/L (ref 99–110)
CHOLESTEROL, TOTAL: 140 MG/DL (ref 0–199)
CO2: 24 MMOL/L (ref 21–32)
CREAT SERPL-MCNC: 0.8 MG/DL (ref 0.6–1.2)
EKG ATRIAL RATE: 55 BPM
EKG DIAGNOSIS: NORMAL
EKG P-R INTERVAL: 188 MS
EKG Q-T INTERVAL: 410 MS
EKG QRS DURATION: 108 MS
EKG QTC CALCULATION (BAZETT): 392 MS
EKG R AXIS: 88 DEGREES
EKG T AXIS: 64 DEGREES
EKG VENTRICULAR RATE: 55 BPM
ESTIMATED AVERAGE GLUCOSE: 148.5 MG/DL
GFR AFRICAN AMERICAN: >60
GFR NON-AFRICAN AMERICAN: >60
GLUCOSE BLD-MCNC: 125 MG/DL (ref 70–99)
GLUCOSE BLD-MCNC: 132 MG/DL (ref 70–99)
GLUCOSE BLD-MCNC: 132 MG/DL (ref 70–99)
GLUCOSE BLD-MCNC: 257 MG/DL (ref 70–99)
HBA1C MFR BLD: 6.8 %
HDLC SERPL-MCNC: 48 MG/DL (ref 40–60)
LDL CHOLESTEROL CALCULATED: 63 MG/DL
PERFORMED ON: ABNORMAL
POTASSIUM REFLEX MAGNESIUM: 4.9 MMOL/L (ref 3.5–5.1)
SODIUM BLD-SCNC: 139 MMOL/L (ref 136–145)
TRIGL SERPL-MCNC: 143 MG/DL (ref 0–150)
VLDLC SERPL CALC-MCNC: 29 MG/DL

## 2021-04-18 PROCEDURE — 36415 COLL VENOUS BLD VENIPUNCTURE: CPT

## 2021-04-18 PROCEDURE — 97166 OT EVAL MOD COMPLEX 45 MIN: CPT

## 2021-04-18 PROCEDURE — 83036 HEMOGLOBIN GLYCOSYLATED A1C: CPT

## 2021-04-18 PROCEDURE — 97165 OT EVAL LOW COMPLEX 30 MIN: CPT

## 2021-04-18 PROCEDURE — 6360000002 HC RX W HCPCS: Performed by: INTERNAL MEDICINE

## 2021-04-18 PROCEDURE — 80048 BASIC METABOLIC PNL TOTAL CA: CPT

## 2021-04-18 PROCEDURE — 97161 PT EVAL LOW COMPLEX 20 MIN: CPT

## 2021-04-18 PROCEDURE — 6370000000 HC RX 637 (ALT 250 FOR IP): Performed by: INTERNAL MEDICINE

## 2021-04-18 PROCEDURE — 97116 GAIT TRAINING THERAPY: CPT

## 2021-04-18 PROCEDURE — 97530 THERAPEUTIC ACTIVITIES: CPT

## 2021-04-18 PROCEDURE — 80061 LIPID PANEL: CPT

## 2021-04-18 PROCEDURE — 70551 MRI BRAIN STEM W/O DYE: CPT

## 2021-04-18 RX ORDER — SODIUM CHLORIDE 9 MG/ML
INJECTION, SOLUTION INTRAVENOUS CONTINUOUS
Status: DISCONTINUED | OUTPATIENT
Start: 2021-04-18 | End: 2021-04-18 | Stop reason: HOSPADM

## 2021-04-18 RX ORDER — ROSUVASTATIN CALCIUM 40 MG/1
40 TABLET, COATED ORAL NIGHTLY
Qty: 30 TABLET | Refills: 3 | Status: SHIPPED | OUTPATIENT
Start: 2021-04-18 | End: 2021-04-20

## 2021-04-18 RX ADMIN — Medication 2000 UNITS: at 10:22

## 2021-04-18 RX ADMIN — LISINOPRIL 20 MG: 20 TABLET ORAL at 10:22

## 2021-04-18 RX ADMIN — ENOXAPARIN SODIUM 30 MG: 30 INJECTION SUBCUTANEOUS at 10:24

## 2021-04-18 RX ADMIN — ASPIRIN 81 MG: 81 TABLET, COATED ORAL at 10:22

## 2021-04-18 RX ADMIN — GLIPIZIDE 2.5 MG: 5 TABLET ORAL at 10:23

## 2021-04-18 RX ADMIN — CLOPIDOGREL BISULFATE 75 MG: 75 TABLET ORAL at 10:23

## 2021-04-18 ASSESSMENT — PAIN SCALES - GENERAL: PAINLEVEL_OUTOF10: 0

## 2021-04-18 NOTE — H&P
Hospital Medicine History & Physical      PCP: Nevin Mireles DO    Date of Admission: 4/17/2021    Date of Service: Pt seen/examined on 4/17/2021 and Admitted to Inpatient with expected LOS greater than two midnights due to medical therapy. Chief Complaint: Word finding difficulty      History Of Present Illness:     80 y.o. female who presents with complaints of slurred speech, difficulty using her right hand and unsteady gait. The symptoms occurred around 5:50 PM today. Patient was last seen by her caregiver at 1 PM when she had clear speech and no other neuro deficits. 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 According to the patient her symptoms completely resolved by the time she arrived in ED around 7 PM.  Patient denies fever, chills, chest pain, shortness of breath, lightheadedness or dizziness, vertigo, focal motor or sensory deficits or ataxic movements. Denies prior history of TIA/CVA    Initially patient was reluctant to stay till Monday to get the MRI and other work-up as she has to take care of her son, but ultimately was able to convince her to stay.     Past Medical History:          Diagnosis Date    Anxiety     Arthritis     At risk for falls     CAD (coronary artery disease)     CTS (carpal tunnel syndrome)     Bilateral, EMG-NCS    DDD (degenerative disc disease), cervical     Fractures     (L) Hip Fx 4/25/98, L1 fracture from fall 10-31-06    Hypertension     Mitral regurgitation     Neuropathy     Osteopenia     Osteoporosis     PAD (peripheral artery disease) ([facility])     Right LE ischemia    Peripheral neuropathy 6/1/2015    Peripheral vascular disease ([address]     bilateral lower extremities with edema    Podagra 01/09/2017    Dr. Elizbeth Lundborg Spinal stenosis     L4-5    Type 2 diabetes mellitus ([address]     Urethral stricture 5 A DAY 6/22/20   DO Juliana Lozada Touch Lancets MISC by Does not apply route. Use twice daily when testing blood sugars. 12/18/13   Glenroy Mullins MD       Allergies:  Aricept Carlus Carrillo hydrochloride], Doxycycline, and Ketorolac tromethamine    Social History:      TOBACCO:   reports that she has never smoked. She has never used smokeless tobacco.  ETOH:   reports no history of alcohol use. E-Cigarettes/Vaping Use     Questions Responses    E-Cigarette/Vaping Use     Start Date     Passive Exposure     Quit Date     Counseling Given     Comments           Family History:    Reviewed in detail and negative for DM, CAD, Cancer, CVA. Positive as follows:        Problem Relation Age of Onset    Stroke Father     Hypertension Father        REVIEW OF SYSTEMS:   Pertinent positives as noted in the HPI. All other systems reviewed and negative. PHYSICAL EXAM PERFORMED:    BP (!) 181/68   Pulse 68   Temp 97.8 °F (36.6 °C) (Oral)   Resp 20   Ht 5' 1\" (1.549 m)   Wt 120 lb (54.4 kg)   SpO2 100%   Breastfeeding No   BMI 22.67 kg/m²     General appearance:  No apparent distress, appears stated age and cooperative. HEENT:  Normal cephalic, atraumatic without obvious deformity. Pupils equal, round, and reactive to light. Extra ocular muscles intact. Conjunctivae/corneas clear. Neck: Supple, with full range of motion. No jugular venous distention. Trachea midline. Respiratory:  Normal respiratory effort. Clear to auscultation, bilaterally without Rales/Wheezes/Rhonchi. Cardiovascular:  Regular rate and rhythm with normal S1/S2 without murmurs, rubs or gallops. Abdomen: Soft, non-tender, non-distended with normal bowel sounds. Musculoskeletal:  No clubbing, cyanosis or edema bilaterally. Full range of motion without deformity. Skin: Skin color, texture, turgor normal.  No rashes or lesions. Neurologic:  Neurovascularly intact without any focal sensory/motor deficits.  Cranial nerves: II-XII intact, grossly non-focal.  Psychiatric:  Alert and oriented, thought content appropriate, normal insight  Capillary Refill: Brisk,< 3 seconds   Peripheral Pulses: +2 palpable, equal bilaterally       Labs:     Recent Labs     04/17/21 1943   WBC 5.8   HGB 13.1   HCT 38.7        Recent Labs     04/17/21 1943      K 5.3*      CO2 25   BUN 23*   CREATININE 0.9   CALCIUM 9.7     No results for input(s): AST, ALT, BILIDIR, BILITOT, ALKPHOS in the last 72 hours. No results for input(s): INR in the last 72 hours. Recent Labs     04/17/21 1943   TROPONINI <0.01       Urinalysis:      Lab Results   Component Value Date    NITRU Negative 04/17/2021    WBCUA 0-2 04/17/2021    BACTERIA 4+ 11/21/2020    RBCUA 0-2 04/17/2021    BLOODU Negative 04/17/2021    SPECGRAV 1.015 04/17/2021    GLUCOSEU Negative 04/17/2021       Radiology:     CXR: I have reviewed the CXR with the following interpretation: N/A  EKG:  I have reviewed the EKG with the following interpretation: Sinus bradycardia, VR = 55, no acute ST changes    CTA HEAD W CONTRAST   Final Result      1. No aneurysms, vascular occlusions, or intracranial stenoses identified. 2.  50% atherosclerotic stenosis of left internal carotid artery origin. CTA NECK W CONTRAST   Final Result      1. No aneurysms, vascular occlusions, or intracranial stenoses identified. 2.  50% atherosclerotic stenosis of left internal carotid artery origin. CT HEAD WO CONTRAST   Final Result      1. No acute intracranial process. 2.  Chronic left maxillary sinusitis.           ASSESSMENT:    Active Hospital Problems    Diagnosis Date Noted    TIA (transient ischemic attack) [G45.9] 04/17/2021   #History of CAD  #Essential hypertension with initial elevation of blood pressure  #PAD  #DM-2    PLAN:  -Neurochecks every 4 hourly  -Bedside swallow evaluation and start on diet  -Continue on aspirin, start on statins  -MRI brain without contrast and echocardiogram as ordered  -Blood pressure control, continue home doses of lisinopril  -Hold Metformin as patient got contrast for CTA head and neck  -Continue on Amaryl + low-dose SSI  -Check A1c and lipid panel  -PT/OT/SLP  -Supportive therapy      DVT Prophylaxis: Lovenox  Diet: DIET CARB CONTROL; Carb Control: 4 carb choices (60 gms)/meal; Low Sodium (2 GM)  Code Status: Full Code    PT/OT Eval Status: As tolerated    Dispo -GMF with telemetry       Levonne Collet, MD    Thank you Lien Restrepo DO for the opportunity to be involved in this patient's care. If you have any questions or concerns please feel free to contact me at [phone].

## 2021-04-18 NOTE — PROGRESS NOTES
Patient is A/O x4, VSS - BP is high, and denies pain at this time. Patient is tolerating PO liquids well. Patient needs to be encouraged to drink water and complains of dry mouth. Patient ambulates to the bathroom to void, using a walker/gait belt and x1 assist.  Patient's NIH - 0. Fall precautions in place - bed alarm engaged. Will continue to monitor and assess patient.

## 2021-04-18 NOTE — PROGRESS NOTES
Occupational Therapy   Occupational Therapy Initial Assessment/Tx Note  Date: 2021   Patient Name: Jewels Lazo  MRN: [id number]     : 1929    Date of Service: 2021    Discharge Recommendations: Jewels Lazo scored a 19/24 on the AM-PAC ADL Inpatient form. Current research shows that an AM-PAC score of 17 or less is typically not associated with a discharge to the patient's home setting. Based on the patient's AM-PAC score and their current ADL deficits, it is recommended that the patient have 3-5 sessions per week of Occupational Therapy at d/c to increase the patient's independence. Please see assessment section for further patient specific details. If patient discharges prior to next session this note will serve as a discharge summary. Please see below for the latest assessment towards goals. OT Equipment Recommendations  Equipment Needed: No  Other: Patient has adequate equipment at home    Assessment   Performance deficits / Impairments: Decreased functional mobility ; Decreased safe awareness;Decreased balance;Decreased ADL status  Assessment: Patient appears to be functioning at/near baseline levels according to report. Patient benefits from hand held assistance during functional mobility and ADL tasks for stability and safety, however, is not agreeable to utilizing a walker - though pt does have one at home. Pt presenting with distractibility and some confusinion throughout, bu redirectable with a prompt. Patient reporting she comfortable with discharge home, doesn't have any concerns regarding the transition home, and does not require [address] [place] [address]. Will follow patient while IP.   Treatment Diagnosis: Impaired ADL tasks, transfers, and ADL endurance  Prognosis: Fair  Decision Making: Medium Complexity  OT Education: OT Role;Plan of Care  REQUIRES OT FOLLOW UP: Yes  Activity Tolerance  Activity Tolerance: Pt verbalized feeling \"back to normal\", but declining participation in ongoing activity with OT. Safety Devices  Safety Devices in place: Yes  Type of devices: Left in bed;Nurse notified;Call light within reach; Bed alarm in place           Patient Diagnosis(es): The encounter diagnosis was TIA (transient ischemic attack). has a past medical history of Anxiety, Arthritis, At risk for falls, CAD (coronary artery disease), CTS (carpal tunnel syndrome), DDD (degenerative disc disease), cervical, Fractures, Hypertension, Mitral regurgitation, Neuropathy, Osteopenia, Osteoporosis, PAD (peripheral artery disease) ([address] Peripheral neuropathy, Peripheral vascular disease ([address] Podagra, Spinal stenosis, Type 2 diabetes mellitus ([address] Urethral stricture, and Urgency of urination. has a past surgical history that includes Tonsillectomy; Appendectomy; Cholecystectomy (1978); Colonoscopy (8/31/99); Hysterectomy (1980s); Total hip arthroplasty (4/25/98); Kyphosis surgery (11-7-06); Wrist fracture surgery (2008); Cataract removal with implant (715334); eye surgery (Bilateral); IR Femoral Popliteal Bypass Graft (Right, 8-); and Carpal tunnel release (Right, 3/29/2019). Treatment Diagnosis: Impaired ADL tasks, transfers, and ADL endurance      Restrictions  Position Activity Restriction  Other position/activity restrictions: up as tolerated    Subjective   General  Chart Reviewed: Yes  Patient assessed for rehabilitation services?: Yes  Additional Pertinent Hx: Pt is a 80 y.o. female adm 4/17 with TIA. Pt presented to ED for evaluation of difficulty speaking. Around 6 PM, the patient states that she called her caregiver and tried to tell her that she was having difficulty walking, using her hand, and speaking. Her caregiver went to her home. By the time she arrived, her symptoms had improved    CTA neck: 50% atherosclerotic stenosis of left internal carotid artery origin  CTA head: neg. CT head: neg. MRI head: neg.   PMH: arthritis, CAD, DDD, HTN, DM, anxiety, osteoporosis, PAD, PVD, spinal stenosis, R fem pop bypass, L THR  Family / Caregiver Present: No  Referring Practitioner: Justino  Diagnosis: TIA  Subjective  Subjective: Patient a little restless upon arrival, fidgeting with blanket and talking about \"bugs on the blanket\". Patient with difficulty shifting to other topics. Agreeable to participate in OT eval with encouragement and redirection. \"I feel fine now\". Patient Currently in Pain: Denies  Vital Signs  Patient Currently in Pain: Denies  Social/Functional History  Social/Functional History  Lives With: Son  Type of Home: House  Home Layout: Multi-level, 1/2 bath on main level, Bed/Bath upstairs, Laundry in basement(has stairlift to 2nd floor)  Home Access: Ramped entrance  Bathroom Shower/Tub: Tub/Shower unit  Bathroom Toilet: Handicap height  Bathroom Equipment: 3-in-1 commode, Shower chair, Grab bars in shower, Grab bars around toilet  Home Equipment: [address], Rolling walker, Cane(transport chair)  ADL Assistance: (sponge bathes or get down into tub)  Homemaking Assistance: Needs assistance(caregivers assist with cleaning, grocery shopping, and some laundry; pt does a lot of her own laundry and cooking)  Ambulation Assistance: Independent(without AD)  Transfer Assistance: Independent  Active : No(caregivers transport)  [address]: reading, watching TV  Additional Comments: Denies recent falls. 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 \"I don't do anything too physical but I do push him in the wheelchair sometimes. \"  Has caregivers 6 days/week, 4 hours/day. Objective                 ADL  Grooming: Contact guard assistance(hand held assist, patient reaching out during transfers)  Toileting: Contact guard assistance(HHA)  Additional Comments: Patient reaching out and furniture surfing during functional transfers.  RN reports patient declined using RW earlier

## 2021-04-18 NOTE — PROGRESS NOTES
Speech Language Pathology    Order received and chart reviewed. D/W RN and MD. Pt with all neuro symptoms resolved and MRI (-) for acute CVA. Per RN, pt passed YSS and started on general diet w/o concerns for dysphagia or aspiration. Pt w/o hx significant for dysphagia. MD indicated to d/c order at this time. Team will reconsult SLP if evaluation indicated.     Isabel Faye MA CCC-SLP; [id number]  Speech-Language Pathologist  Pager # [phone]  Phone # [phone]  Office # [phone]

## 2021-04-18 NOTE — PROGRESS NOTES
Physical Therapy    Facility/Department: [facility] 5T ORTHO/NEURO  Initial Assessment and Treatment    NAME: Johnathon Miranda  : 1929  MRN: [id number]    Date of Service: 2021    Discharge Recommendations:    Johnathon Miranda scored a 18/24 on the AM-PAC short mobility form. Current research shows that an AM-PAC score of 18 or greater is typically associated with a discharge to the patient's home setting. Based on the patient's AM-PAC score and their current functional mobility deficits, it is recommended that the patient have 2-3 sessions per week of Physical Therapy at d/c to increase the patient's independence. At this time, this patient demonstrates the endurance and safety to discharge home with home PT and a follow up treatment frequency of 2-3x/wk. Please see assessment section for further patient specific details. If patient discharges prior to next session this note will serve as a discharge summary. Please see below for the latest assessment towards goals. PT Equipment Recommendations  Equipment Needed: No    Assessment   Body structures, Functions, Activity limitations: Decreased functional mobility ; Decreased safe awareness;Decreased cognition;Decreased balance  Assessment: Pt likely close to baseline. Pt reports normally independent with ADLs and ambulation without AD. Pt currently needing CG/SBA without AD however occasionally reaches out for UE support. Denies feeling weak or wobbly. Discussed with pt using walker initially at home - pt verbalized understanding. Pt plans to return home and has no concerns about managing. Rec cont skilled PT to maximize mobility and independence  Treatment Diagnosis: impaired functional mobility 2/2 decreased balance  Decision Making: Low Complexity  PT Education: Goals;PT Role;Plan of Care;General Safety; Functional Mobility Training  Patient Education: Pt verbalized understanding but may need reinforcement  REQUIRES PT FOLLOW UP: Yes       Patient Diagnosis(es): The encounter diagnosis was TIA (transient ischemic attack). has a past medical history of Anxiety, Arthritis, At risk for falls, CAD (coronary artery disease), CTS (carpal tunnel syndrome), DDD (degenerative disc disease), cervical, Fractures, Hypertension, Mitral regurgitation, Neuropathy, Osteopenia, Osteoporosis, PAD (peripheral artery disease) ([address] Peripheral neuropathy, Peripheral vascular disease ([address] Podagra, Spinal stenosis, Type 2 diabetes mellitus ([address] Urethral stricture, and Urgency of urination. has a past surgical history that includes Tonsillectomy; Appendectomy; Cholecystectomy (1978); Colonoscopy (8/31/99); Hysterectomy (1980s); Total hip arthroplasty (4/25/98); Kyphosis surgery (11-7-06); Wrist fracture surgery (2008); Cataract removal with implant ([id number]); eye surgery (Bilateral); IR Femoral Popliteal Bypass Graft (Right, 8-); and Carpal tunnel release (Right, 3/29/2019). Restrictions  Position Activity Restriction  Other position/activity restrictions: up as tolerated  Vision/Hearing  Vision: Within Functional Limits  Hearing: Within functional limits     Subjective  General  Chart Reviewed: Yes  Additional Pertinent Hx: Pt is a 80 y.o. female adm 4/17 with TIA. Pt presented to ED for evaluation of difficulty speaking. Around 6 PM, the patient states that she called her caregiver and tried to tell her that she was having difficulty walking, using her hand, and speaking. Her caregiver went to her home. By the time she arrived, her symptoms had improved    CTA neck: 50% atherosclerotic stenosis of left internal carotid artery origin  CTA head: neg. CT head: neg. MRI head: neg. PMH: arthritis, CAD, DDD, HTN, DM, anxiety, osteoporosis, PAD, PVD, spinal stenosis, R fem pop bypass, L THR  Referring Practitioner: Nikkie Avina MD  Referral Date : 04/18/21  Subjective  Subjective: Pt found supine. Agreeable to PT.   Very talkative and pleasant  Pain mobility, transfers, gt, pt education          Plan   Plan  Times per week: 2-5  Times per day: Daily  Current Treatment Recommendations: Functional Mobility Training, Gait Training, Balance Training, Endurance Training, Patient/Caregiver Education & Training, Safety Education & Training  Safety Devices  Type of devices: Call light within reach, Chair alarm in place, Nurse notified, Left in chair    G-Code       OutComes Score                                                  AM-PAC Score  AM-PAC Inpatient Mobility Raw Score : 18 (04/18/21 1302)  AM-PAC Inpatient T-Scale Score : 43.63 (04/18/21 1302)  Mobility Inpatient CMS 0-100% Score: 46.58 (04/18/21 1302)  Mobility Inpatient CMS G-Code Modifier : CK (04/18/21 1302)          Goals  Short term goals  Time Frame for Short term goals: By discharge  Short term goal 1: Sup to sit independent  Short term goal 2: Pt will transfer sit to stand supervision  Short term goal 3: Pt will amb >100' with LRAD supervision       Therapy Time   Individual Concurrent Group Co-treatment   Time In 1143         Time Out 1227         Minutes 44              Timed Code Treatment Minutes: 29      Total Treatment Minutes:  Susanne [phone], PT

## 2021-04-18 NOTE — PROGRESS NOTES
Notified by [address] [place] [address] that patient had 6 beats of Vtach. Assessed patient who denies symptoms at this time. Will continue to monitor and assess patient.

## 2021-04-18 NOTE — DISCHARGE SUMMARY
Hospitalist Discharge Summary    Patient ID:  Beau Allen  [id number]  29 y.o.  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 ([address]    PAD (peripheral artery disease) ([facility])    Benign essential HTN    Lumbar foraminal stenosis    DDD (degenerative disc disease), lumbar    Lumbar spinal stenosis    Type 2 diabetes, controlled, with neuropathy ([address]    Severe mitral regurgitation    Venous insufficiency of both lower extremities    Hyperlipidemia    Hip arthritis    Status post carmen arthroplasty replacement of left hip 1999    Femoral artery stenosis, right ([address]    Osteoporosis    Osteopenia    Femoral-popliteal bypass graft occlusion, left ([facility])    Pain due to onychomycosis of nail    Coronary artery disease involving native coronary artery of native heart without angina pectoris    Diabetic peripheral neuropathy ([facility])    Arthritis of left knee    Trigger middle finger of right hand    Right carpal tunnel syndrome    Vitamin D deficiency    Carotid stenosis, asymptomatic, bilateral    Fingernail problem    TIA (transient ischemic attack)       Discharge Diagnoses: Active Problems:    TIA (transient ischemic attack)  Resolved Problems:    * No resolved hospital problems. *      Code Status:  Full Code    Condition:  Stable    Discharge Diet: Diet:  DIET CARB CONTROL; Carb Control: 4 carb choices (60 gms)/meal; Low Sodium (2 GM)    PCP to do list: Follow for improvement of symptoms    Hospital Course: 80-year-old female presented to the hospital complaint of slurred speech, difficulty using her right hand and unsteady gait.  Patient symptoms had completely resolved by the time she came to the hospital. She was Procedures: none    Assessment on Discharge: Stable, improved     Discharge ROS:  A complete review of systems was asked and negative except for none    Discharge Exam:  BP (!) 161/69   Pulse 53   Temp 97.2 °F (36.2 °C) (Oral)   Resp 16   Ht 5' 1\" (1.549 m)   Wt 120 lb (54.4 kg)   SpO2 100%   Breastfeeding No   BMI 22.67 kg/m²     Gen: NAD  HEENT: NC/AT, moist mucous membranes, no oropharyngeal erythema or exudate  Neck: supple, trachea midline, no anterior cervical or SC LAD  Heart:  Normal s1/s2, RRR, no murmurs, gallops, or rubs. no leg edema  Lungs:  CTA bilaterally, no wheeze,no rales or rhonchi, no use of accessory muscles  Abd: bowel sounds present, soft, nontender, nondistended, no masses  Extrem:  No clubbing, cyanosis,  no edema  Skin: no lesion or masses  Psych:  A & O x3  Neuro: grossly intact, moves all four extremities    Pertinent Studies During Hospital Stay:  Radiology:  Ct Head Wo Contrast    Result Date: 4/17/2021  PROCEDURE: CT head without contrast INDICATION: TIA; COMPARISON: 2/11/2019 TECHNIQUE: CT head was performed without contrast according to standard protocol. Axial images and multiplanar reformatted images reviewed. Up-to-date CT equipment and radiation dose reduction techniques were employed. FINDINGS: No acute intra- or extra-axial fluid collections are identified. There is mild cerebral volume loss with associated ventricular dilatation. The basilar cisterns are patent. No mass effect or midline shift is seen. The gray-white matter differentiation is  normal. Mild periventricular white matter hypoattenuation is indicative of chronic small vessel ischemic disease. There is chronic left maxillary sinusitis with complete opacification of the left maxillary sinus and periostitis, new compared to 2019. The orbits and mastoids appear normal. No acute fracture is identified. 1.  No acute intracranial process. 2.  Chronic left maxillary sinusitis.     Cta Neck W Contrast    Result Date: 4/17/2021  PROCEDURE: 1.  CT ANGIOGRAPHY HEAD WITH CONTRAST 2.  CT ANGIOGRAPHY NECK WITH CONTRAST INDICATION: TIA COMPARISON: CT head without contrast from the same day TECHNIQUE: CT angiogram of the head and neck was performed with contrast according to standard protocol. Axial images, multiplanar reformatted images, and maximum intensity projection images were reviewed for CT angiographic technique. Up-to-date CT equipment and radiation dose reduction techniques were employed. IV contrast: 80 mL Isovue 370 FINDINGS: Non-angiographic findings: No soft tissue abnormalities are identified in the neck. There is grade 1 anterolisthesis of C4 on C5. CTA Neck: There is atherosclerotic disease of the aortic arch. The configuration of the brachiocephalic vessels is typical. The innominate artery and both subclavian arteries appear normal. There is atherosclerotic disease at the right carotid bifurcation extending to the origin of the right internal carotid artery resulting in less than 50% stenosis by NASCET criteria. There is atherosclerotic disease at the left carotid bifurcation extending to the origin of the left internal carotid artery resulting in  approximately 50% stenosis by NASCET criteria. There is mild atherosclerotic calcification of the cervical internal carotid arteries. The cervical vertebral arteries appear normal. CTA Head: The distal internal carotid arteries appear normal. The anterior and middle cerebral arteries appear normal. The distal vertebral arteries appear normal. The basilar artery and posterior cerebral arteries appear normal. No aneurysms, vascular occlusions,  or intracranial stenoses are identified. 1.  No aneurysms, vascular occlusions, or intracranial stenoses identified. 2.  50% atherosclerotic stenosis of left internal carotid artery origin.      Cta Head W Contrast    Result Date: 4/17/2021  PROCEDURE: 1.  CT ANGIOGRAPHY HEAD WITH CONTRAST 2.  CT ANGIOGRAPHY NECK WITH CONTRAST INDICATION: TIA COMPARISON: CT head without contrast from the same day TECHNIQUE: CT angiogram of the head and neck was performed with contrast according to standard protocol. Axial images, multiplanar reformatted images, and maximum intensity projection images were reviewed for CT angiographic technique. Up-to-date CT equipment and radiation dose reduction techniques were employed. IV contrast: 80 mL Isovue 370 FINDINGS: Non-angiographic findings: No soft tissue abnormalities are identified in the neck. There is grade 1 anterolisthesis of C4 on C5. CTA Neck: There is atherosclerotic disease of the aortic arch. The configuration of the brachiocephalic vessels is typical. The innominate artery and both subclavian arteries appear normal. There is atherosclerotic disease at the right carotid bifurcation extending to the origin of the right internal carotid artery resulting in less than 50% stenosis by NASCET criteria. There is atherosclerotic disease at the left carotid bifurcation extending to the origin of the left internal carotid artery resulting in  approximately 50% stenosis by NASCET criteria. There is mild atherosclerotic calcification of the cervical internal carotid arteries. The cervical vertebral arteries appear normal. CTA Head: The distal internal carotid arteries appear normal. The anterior and middle cerebral arteries appear normal. The distal vertebral arteries appear normal. The basilar artery and posterior cerebral arteries appear normal. No aneurysms, vascular occlusions,  or intracranial stenoses are identified. 1.  No aneurysms, vascular occlusions, or intracranial stenoses identified. 2.  50% atherosclerotic stenosis of left internal carotid artery origin. Mri Brain Without Contrast    Result Date: 4/18/2021  EXAM: MRI BRAIN WO CONTRAST INDICATION: Mental status changes, TIA COMPARISON: CT April 17, MRI 2019 TECHNIQUE: Multiplanar, multisequence MR imaging of the head obtained. IV contrast: None. FINDINGS: MIDLINE STRUCTURES: The sella turcica and pituitary gland are normal. Craniovertebral alignment and cerebellar tonsils are normal. VENTRICLES: Ventricles and sulci are mildly prominent. INTRACRANIAL HEMORRHAGE: None. BRAIN PARENCHYMA: No midline shift or mass effect. Mild white matter disease. No diffusion restriction. INTRACRANIAL VASCULATURE: Major intracranial vessels are grossly patent. ORBITS: Normal. BONE MARROW: Bone marrow signal within normal limits. VISUALIZED CERVICAL SPINE: Normal PARANASAL SINUSES/MASTOID BONES: Mucosal thickening and opacification left maxillary sinus. Small right maxillary sinus mucus retention cyst. EXTRACRANIAL SOFT TISSUES: Normal     1. No acute abnormality. No evidence of early ischemic injury. 2. Mild cortical atrophy. Last Labs on Discharge:     Recent Results (from the past 24 hour(s))   POCT Glucose    Collection Time: 04/17/21  7:14 PM   Result Value Ref Range    POC Glucose 135 (H) 70 - 99 mg/dl    Performed on ACCU-CHEK    EKG 12 Lead    Collection Time: 04/17/21  7:17 PM   Result Value Ref Range    Ventricular Rate 55 BPM    Atrial Rate 55 BPM    P-R Interval 188 ms    QRS Duration 108 ms    Q-T Interval 410 ms    QTc Calculation (Bazett) 392 ms    R Axis 88 degrees    T Axis 64 degrees    Diagnosis       EKG performed in ER and to be interpreted by ER physician. Confirmed by MD, ER ([id number]),  [address] [place] [address] Chelle Gamezwillian Sujata ([id number]) on 4/18/2021 7:38:59 AM   CBC Auto Differential    Collection Time: 04/17/21  7:43 PM   Result Value Ref Range    WBC 5.8 4.0 - 11.0 K/uL    RBC 4.20 4.00 - 5.20 M/uL    Hemoglobin 13.1 12.0 - 16.0 g/dL    Hematocrit 38.7 36.0 - 48.0 %    MCV 92.1 80.0 - 100.0 fL    MCH 31.2 26.0 - 34.0 pg    MCHC 33.9 31.0 - 36.0 g/dL    RDW 14.3 12.4 - 15.4 %    Platelets 348 797 - 676 K/uL    MPV 8.4 5.0 - 10.5 fL    Neutrophils % 62.1 %    Lymphocytes % 28.1 %    Monocytes % 8.0 %    Eosinophils % 1.2 %    Basophils % 0.6 % Neutrophils Absolute 3.6 1.7 - 7.7 K/uL    Lymphocytes Absolute 1.6 1.0 - 5.1 K/uL    Monocytes Absolute 0.5 0.0 - 1.3 K/uL    Eosinophils Absolute 0.1 0.0 - 0.6 K/uL    Basophils Absolute 0.0 0.0 - 0.2 K/uL   Basic Metabolic Panel w/ Reflex to MG    Collection Time: 04/17/21  7:43 PM   Result Value Ref Range    Sodium 138 136 - 145 mmol/L    Potassium reflex Magnesium 5.3 (H) 3.5 - 5.1 mmol/L    Chloride 103 99 - 110 mmol/L    CO2 25 21 - 32 mmol/L    Anion Gap 10 3 - 16    Glucose 148 (H) 70 - 99 mg/dL    BUN 23 (H) 7 - 20 mg/dL    CREATININE 0.9 0.6 - 1.2 mg/dL    GFR Non- 59 (A) >60    GFR African American >60 >60    Calcium 9.7 8.3 - 10.6 mg/dL   Troponin    Collection Time: 04/17/21  7:43 PM   Result Value Ref Range    Troponin <0.01 <0.01 ng/mL   Urinalysis, reflex to microscopic    Collection Time: 04/17/21  8:11 PM   Result Value Ref Range    Color, UA Yellow Straw/Yellow    Clarity, UA Clear Clear    Glucose, Ur Negative Negative mg/dL    Bilirubin Urine Negative Negative    Ketones, Urine Negative Negative mg/dL    Specific Gravity, UA 1.015 1.005 - 1.030    Blood, Urine Negative Negative    pH, UA 7.0 5.0 - 8.0    Protein, UA Negative Negative mg/dL    Urobilinogen, Urine 0.2 <2.0 E.U./dL    Nitrite, Urine Negative Negative    Leukocyte Esterase, Urine SMALL (A) Negative    Microscopic Examination YES     Urine Type NotGiven    Microscopic Urinalysis    Collection Time: 04/17/21  8:11 PM   Result Value Ref Range    WBC, UA 0-2 0 - 5 /HPF    RBC, UA 0-2 0 - 4 /HPF    Epithelial Cells, UA 0-1 0 - 5 /HPF   POCT Glucose    Collection Time: 04/18/21 12:57 AM   Result Value Ref Range    POC Glucose 125 (H) 70 - 99 mg/dl    Performed on ACCU-CHEK    Hemoglobin A1c    Collection Time: 04/18/21  6:32 AM   Result Value Ref Range    Hemoglobin A1C 6.8 See comment %    eAG 148.5 mg/dL   Basic Metabolic Panel w/ Reflex to MG    Collection Time: 04/18/21  6:33 AM   Result Value Ref Range    Sodium 139 136 - 145 mmol/L    Potassium reflex Magnesium 4.9 3.5 - 5.1 mmol/L    Chloride 106 99 - 110 mmol/L    CO2 24 21 - 32 mmol/L    Anion Gap 9 3 - 16    Glucose 132 (H) 70 - 99 mg/dL    BUN 20 7 - 20 mg/dL    CREATININE 0.8 0.6 - 1.2 mg/dL    GFR Non-African American >60 >60    GFR African American >60 >60    Calcium 9.2 8.3 - 10.6 mg/dL   POCT Glucose    Collection Time: 04/18/21  7:17 AM   Result Value Ref Range    POC Glucose 132 (H) 70 - 99 mg/dl    Performed on ACCU-CHEK    POCT Glucose    Collection Time: 04/18/21 11:30 AM   Result Value Ref Range    POC Glucose 257 (H) 70 - 99 mg/dl    Performed on ACCU-CHEK          Follow up: with Sina Bird DO    Note that over 30 minutes was spent in preparing discharge papers, discussing discharge with patient, medication review, etc.    Thank you Sina Bird DO for the opportunity to be involved in this patient's care. If you have any questions or concerns please feel free to contact me at [phone].     Electronically signed by Candelaria Valdez MD on 4/18/2021 at 2:20 PM

## 2021-04-18 NOTE — PROGRESS NOTES
Stroke Admission    I agree as the admission nurse that I have completed a thorough stroke assessment and completed the admission on the patient. ALL assessment areas listed below have been addressed and completed. Presentation: TIA    Handoff assessment completed with ED,RN. Current NIHSS 0.     [x]   Education Assessment  [x]   Individualized Stroke/TIA Education template added, including patient specific risk factors: Hypertension and Diabetes  [x]   Individualized Stroke/TIA Care Plan template added  [x]   Bedside swallow screen completed using the [employer] Protocol, and documented PRIOR to any PO meds, food or drink: Pass  [x]   VTE Prophylaxis: SCDs ordered/addressed; SCDs: N/A Lovenox           (As a reminder, ASA, Plavix and TPA are not VTE prophylaxis.)  [x]   Stroke education booklet given, and education initiated with patient and/or caregiver      Nurse eSignature: Electronically signed by Naseem Hammond RN on 4/18/21 at 3:09 AM EDT

## 2021-04-18 NOTE — PROGRESS NOTES
List of Home Medications the patient is currently taking is complete. Home Medication list in EPIC updated to reflect changes noted below. Source of medications in list is conversation with patient caregiver. The following medications were added to admission medication list:  Turmeric 450 mg QD    The following medications were removed from admission medication list:  Glimepiride 2 mg QAM    The following medication instructions/doses were adjusted to reflect how patient is taking:  Metformin 500 mg QD - changed from 500 mg TID per caregiver. Please call with questions.   David Ken - PharmD Candidate [id number] [place] [id number]  4/17/2021 9:18 PM      Updated medication list 4/17/2021  Medication Sig    Turmeric 450 MG CAPS Take 450 mg by mouth daily    glimepiride (AMARYL) 1 MG tablet TAKE ONE TABLET BY MOUTH EVERY MORNING BEFORE BREAKFAST    metFORMIN (GLUCOPHAGE) 500 MG tablet TAKE ONE TABLET BY MOUTH THREE TIMES A DAY WITH A MEAL (Patient taking differently: Take 500 mg by mouth daily )    lisinopril (PRINIVIL;ZESTRIL) 20 MG tablet TAKE ONE TABLET BY MOUTH DAILY    meclizine (ANTIVERT) 12.5 MG tablet Take 1 tablet by mouth 3 times daily as needed for Dizziness (vertigo)    ibuprofen (ADVIL;MOTRIN) 200 MG tablet Take 400 mg by mouth every 8 hours as needed for Pain    Cholecalciferol (VITAMIN D3) 2000 UNITS CAPS Take 1 capsule by mouth daily    aspirin EC 81 MG EC tablet Take 1 tablet by mouth daily

## 2021-04-18 NOTE — PLAN OF CARE
Problem: Falls - Risk of:  Goal: Will remain free from falls  Description: Will remain free from falls  Outcome: Ongoing  Note: Patient will remain free from falls. Patient will use call light to notify staff of needs prior to exiting the bed. Patient's bed will remain in lowest position with wheels locked and bed alarm engaged. Problem: ACTIVITY INTOLERANCE/IMPAIRED MOBILITY  Goal: Mobility/activity is maintained at optimum level for patient  Outcome: Ongoing  Note: Patient's mobility will remain at baseline. Patient will ambulate with walker, GB, and x1 CGA.

## 2021-04-19 ENCOUNTER — CARE COORDINATION (OUTPATIENT)
Dept: CASE MANAGEMENT | Age: 86
End: 2021-04-19

## 2021-04-19 ENCOUNTER — TELEPHONE (OUTPATIENT)
Dept: INTERNAL MEDICINE CLINIC | Age: 86
End: 2021-04-19

## 2021-04-19 NOTE — TELEPHONE ENCOUNTER
[facility] Transitions Initial Follow Up Call    Outreach made within 2 business days of discharge: Yes    Patient: Nela Rainey Patient : 1929   MRN: [id number]  Reason for Admission: There are no discharge diagnoses documented for the most recent discharge. Discharge Date: 21       Spoke with: pt    Discharge department/facility:     [facility] Interactive Patient Contact:  Was patient able to fill all prescriptions: Yes  Was patient instructed to bring all medications to the follow-up visit: Yes  Is patient taking all medications as directed in the discharge summary?  Yes  Does patient understand their discharge instructions: Yes  Does patient have questions or concerns that need addressed prior to 7-14 day follow up office visit: no    Scheduled appointment with PCP within 7-14 days pt scheduled for follow up 21    Follow Up  Future Appointments   Date Time Provider Gilbert Frey   2021  2:15 PM Chika Torre DO [facility]   2021  1:00 PM MD Taylor Mckeon [facility]   2021 11:00 AM DO OCTAVIO Irvindennis , [address]

## 2021-04-19 NOTE — TELEPHONE ENCOUNTER
Patient went to [facility] ER having presented with a TIA. She states she was admitted Saturday 04- and discharged 04-. She wasn't \"told to follow up with Dr. Rosemarie Flores but I told her she needed to. \" The only thing available is tomorrow for a HFU. Is it OK for patient to schedule this soon after discharge? Patient wants Dr. Rosemarie Flores to know the following, but doesn't feel like repeating it all over again to him, as it makes her weary. She states she is currently still confused, and has difficulty finding the words she wants to say, and can walk but it is trying for her. Patient also states she had a really bad/horrible experience there while being admitted. She states \"her room was peculiar, and there were bugs on her food tray, really little ones in a web on the side of her tray, and also there was the same exact thing on her sheets in a small spot. \"     Can patient schedule a HFU tomorrow? Please advise. Please contact patient @ phone # provided.

## 2021-04-19 NOTE — CARE COORDINATION
[facility] Transitions Initial Follow Up Call    Call within 2 business days of discharge: Yes    Patient:  Emre Mena  Patient :  1929  MRN:  [id number]   Reason for Admission:  covid 19   Discharge Date:  21  RARS: 7      CTC attempt to reach Pt regarding recent hospital discharge. CTC unable to leave voice recording with call back number requesting a call back / no answer. Follow up appointments:    Future Appointments   Date Time Provider Gilbert Frey   [facility]21  [facility]   2021 11:00 AM DO OCTAVIO Lozada [facility]       COLE CurtisN, RN  Care Transition Coordinator  Contact Number:  [phone]

## 2021-04-20 ENCOUNTER — CARE COORDINATION (OUTPATIENT)
Dept: CASE MANAGEMENT | Age: 86
End: 2021-04-20

## 2021-04-20 ENCOUNTER — OFFICE VISIT (OUTPATIENT)
Dept: INTERNAL MEDICINE CLINIC | Age: 86
End: 2021-04-20
Payer: MEDICARE

## 2021-04-20 VITALS — SYSTOLIC BLOOD PRESSURE: 140 MMHG | WEIGHT: 110 LBS | DIASTOLIC BLOOD PRESSURE: 74 MMHG | BODY MASS INDEX: 20.78 KG/M2

## 2021-04-20 DIAGNOSIS — I34.0 SEVERE MITRAL REGURGITATION: ICD-10-CM

## 2021-04-20 DIAGNOSIS — E11.59 TYPE 2 DIABETES MELLITUS WITH VASCULAR DISEASE (HCC): ICD-10-CM

## 2021-04-20 DIAGNOSIS — G45.9 TIA (TRANSIENT ISCHEMIC ATTACK): Primary | ICD-10-CM

## 2021-04-20 DIAGNOSIS — I10 BENIGN ESSENTIAL HTN: ICD-10-CM

## 2021-04-20 DIAGNOSIS — I73.9 PAD (PERIPHERAL ARTERY DISEASE) (HCC): ICD-10-CM

## 2021-04-20 DIAGNOSIS — I25.10 CORONARY ARTERY DISEASE INVOLVING NATIVE CORONARY ARTERY OF NATIVE HEART WITHOUT ANGINA PECTORIS: ICD-10-CM

## 2021-04-20 DIAGNOSIS — E11.40 TYPE 2 DIABETES, CONTROLLED, WITH NEUROPATHY (HCC): ICD-10-CM

## 2021-04-20 DIAGNOSIS — J32.0 CHRONIC MAXILLARY SINUSITIS: ICD-10-CM

## 2021-04-20 PROCEDURE — 1111F DSCHRG MED/CURRENT MED MERGE: CPT | Performed by: INTERNAL MEDICINE

## 2021-04-20 PROCEDURE — 99496 TRANSJ CARE MGMT HIGH F2F 7D: CPT | Performed by: INTERNAL MEDICINE

## 2021-04-20 RX ORDER — ATORVASTATIN CALCIUM 10 MG/1
10 TABLET, FILM COATED ORAL DAILY
Qty: 30 TABLET | Refills: 3 | Status: SHIPPED | OUTPATIENT
Start: 2021-04-20 | End: 2022-04-25

## 2021-04-20 RX ORDER — AMLODIPINE BESYLATE 2.5 MG/1
2.5 TABLET ORAL DAILY
Qty: 30 TABLET | Refills: 3 | Status: SHIPPED | OUTPATIENT
Start: 2021-04-20 | End: 2021-08-24

## 2021-04-20 NOTE — PROGRESS NOTES
[facility]) Physicians  Internal Medicine  Patient Encounter  Nevin Mireles D.O., [address] [facility] or Hospital Follow Up      Jesse Cabrales   YOB: 1929    Date of Office Visit:  4/20/2021  Date of Hospital Admission: 4/17/21  Date of Hospital Discharge: 4/18/21  Risk of hospital readmission (high >=14%.  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 ([address]    PAD (peripheral artery disease) ([address]    Benign essential HTN    Lumbar foraminal stenosis    DDD (degenerative disc disease), lumbar    Lumbar spinal stenosis    Type 2 diabetes, controlled, with neuropathy ([address]    Severe mitral regurgitation    Venous insufficiency of both lower extremities    Hyperlipidemia    Hip arthritis    Status post carmen arthroplasty replacement of left hip 1999    Femoral artery stenosis, right ([address]    Osteoporosis    Osteopenia    Femoral-popliteal bypass graft occlusion, left ([address]    Pain due to onychomycosis of nail    Coronary artery disease involving native coronary artery of native heart without angina pectoris    Diabetic peripheral neuropathy (HCC)    Arthritis of left knee    Trigger middle finger of right hand    Right carpal tunnel syndrome    Vitamin D deficiency    Carotid stenosis, asymptomatic, bilateral    Fingernail problem    TIA (transient ischemic attack)       Allergies   Allergen Reactions    Aricept [Donepezil Hydrochloride] Other (See Comments)     intolerant    Doxycycline Nausea Only    Ketorolac Tromethamine Other (See Comments)     Pt unable to recall reaction       Medications listed as ordered at the time of discharge from hospital   [facility], [address] [place] [address] Medication Instructions MICHOACANO:    Printed on:04/20/21 8871   Medication Information                      amLODIPine (NORVASC) 2.5 MG tablet  Take 1 tablet by mouth daily             aspirin EC 81 MG EC tablet  Take 1 tablet by mouth daily             atorvastatin (LIPITOR) 10 MG tablet  Take 1 tablet by mouth daily             blood glucose test strips (ONETOUCH ULTRA) strip  TEST BLOOD SUGAR TWO TIMES A DAY             Cholecalciferol (VITAMIN D3) 2000 UNITS CAPS  Take 1 capsule by mouth daily             glimepiride (AMARYL) 1 MG tablet  TAKE ONE TABLET BY MOUTH EVERY MORNING BEFORE BREAKFAST             lisinopril (PRINIVIL;ZESTRIL) 20 MG tablet  TAKE ONE TABLET BY MOUTH DAILY             Lite Touch Lancets MISC  by Does not apply route. Use twice daily when testing blood sugars.              meclizine (ANTIVERT) 12.5 MG tablet  Take 1 tablet by mouth 3 times daily as needed for Dizziness (vertigo)             metFORMIN (GLUCOPHAGE) 500 MG tablet  TAKE ONE TABLET BY MOUTH THREE TIMES A DAY WITH A MEAL             Turmeric 450 MG CAPS  Take 450 mg by mouth daily                   Medications marked \"taking\" at this time  Medication Sig    atorvastatin (LIPITOR) 10 MG tablet Take 1 tablet by mouth daily    Turmeric 450 MG CAPS Take 450 mg by mouth daily    glimepiride (AMARYL) 1 MG tablet TAKE ONE TABLET BY MOUTH EVERY MORNING BEFORE BREAKFAST    metFORMIN (GLUCOPHAGE) 500 MG tablet TAKE ONE TABLET BY MOUTH THREE TIMES A DAY WITH A MEAL (Patient taking differently: Take 500 mg by mouth daily )    lisinopril (PRINIVIL;ZESTRIL) 20 MG tablet TAKE ONE TABLET BY MOUTH DAILY    Cholecalciferol (VITAMIN D3) 2000 UNITS CAPS Take 1 capsule by mouth daily    aspirin EC 81 MG EC tablet Take 1 tablet by mouth daily Medications patient taking as of now reconciled against medications ordered at time of hospital discharge: Yes    Chief Complaint   Patient presents with   [address] from Hospital       History of Present illness - Follow up of Hospital diagnosis(es): TIA    Inpatient course: Discharge summary reviewed- see chart. This is a 70-year-old female with multiple medical problems including hypertension, type 2 diabetes, lumbar spinal stenosis, peripheral vascular disease, lower extremity arterial ischemia, mitral regurgitation, coronary artery disease and peripheral neuropathy. Patient had presented to the emergency department with slurred speech and difficulty using her right hand. She was off balance. By the time she got to the hospital her symptoms had resolved. Her blood pressure was 200/85 which was quite accelerated for her. Initial CT scan of the brain showed no acute findings other than chronic left maxillary sinusitis with complete opacification of the left maxillary sinus and periostitis. CTA of the neck showed 50% stenosis of the left internal carotid artery. Intracerebral arteries were unremarkable. Patient wanted to be discharged however she agreed to be admitted for further evaluation. Her MRI the brain showed no acute findings. She did have mild cortical atrophy. Patient was discharged on Crestor 40 mg. No other additional medications were changed. She states she was having problems with \"both hands. \"  Her private aide indicates she was having problems with the right. Her aide states her face was \"crooked. \"  She states the left side of the face was droopy. The latter was better by the time she got to the ER. Her words were slurred, but she WAS able to to understand the words. Her face looked twisted, but resolved after several minutes. Interval history/Current status: She states her BP has been OK since the hospital.  She is on ASA. She did not start the Crestor.   Her BP has been running 150's/66-70. She is using Vicks nasal inhaler to help with chronic congestion. She denies CP, SOB. She sees Dr. Harinder Linder. A comprehensive review of systems was negative except for what was noted in the HPI. Physical Exam    Vitals:    04/20/21 1423 04/20/21 1510 04/20/21 1511   BP: (!) 154/84 (!) 150/60 (!) 140/74   Weight: 110 lb (49.9 kg)       Body mass index is 20.78 kg/m². Wt Readings from Last 3 Encounters:   04/20/21 110 lb (49.9 kg)   04/17/21 120 lb (54.4 kg)   09/16/20 110 lb (49.9 kg)     BP Readings from Last 3 Encounters:   04/20/21 (!) 140/74   04/18/21 (!) 161/69   09/16/20 (!) 140/78      GEN:  80 y.o. female who is in NAD, A&O. She appears stated age and well nourished. She is cooperative and pleasant. HEAD:  NC/AT, no lesions. EYES:  BOUBACAR, EOMI, No scleral icterus or conjunctival injection or discharge. Visual fields in tact to confrontation. NECK:  Supple. Full ROM. Trachea is midline. No increased JVD. No thyromegaly or nodules. No masses  LYMPH: No C/SC/A/F nodes  CARDIAC:  S1S2 NL. Regular rhythm. 4-9/8 systolic murmur heard best at the apex and along the left sternal border. VASC:  Pedal pulses 2/4. Carotid upstrokes 2+. No bruits noted. PULM:  Lungs are CTA. Symmetric breath sounds noted. AP Diameter NL. GI:  Abdomen is soft and nontender. No distension. No organomegaly. No masses. No pulsatile masses. EXT:  No Cyanosis or clubbing. No edema. SKIN: Warm and dry, normal turgor, no rash or lesions of concern. NEURO:  Cranial nerves 2-12 are NL. Speech fluent and coherent. Strength is 5/5 in all muscle groups. No sensory deficits. No focal or lateralizing deficits. Reflexes 2/4 and symmetric. Gait is normal.  MS: Increased thoracic kyphosis. PSYCH: Anxious as usual.  Hurried speech        Assessment/Plan:  1.  TIA (transient ischemic attack)  Based on the private caregivers description, I think Jer Bhagat did in fact have a TIA. Continue baby aspirin  Tighten up blood pressure control  Add amlodipine 2.5 mg daily  Add statin therapy  May need cardiac monitor  Follow-up with cardiology  - OK DISCHARGE MEDS RECONCILED W/ CURRENT OUTPATIENT MED LIST  - atorvastatin (LIPITOR) 10 MG tablet; Take 1 tablet by mouth daily  Dispense: 30 tablet; Refill: 3  - Lipid Panel; Future  - Hepatic Function Panel; Future    2. Type 2 diabetes, controlled, with neuropathy ([address]  Condition is well controlled based on her A1c of 6.8%    3. PAD (peripheral artery disease) (HCC)  Asymptomatic at this time  She sees Dr. Delicia Nunez    4. Severe mitral regurgitation  Condition is asymptomatic  Follow-up with cardiology    5. Benign essential HTN  Blood pressure is uncontrolled  Start a low-dose of amlodipine  Continue lisinopril  Monitor for hypotension  - amLODIPine (NORVASC) 2.5 MG tablet; Take 1 tablet by mouth daily  Dispense: 30 tablet; Refill: 3    6. Coronary artery disease  Condition is stable  May need cardiac monitor given her recent TIA  Follow-up with cardiology  Start statin    7.  Chronic maxillary sinusitis  Follow-up with ENT  - External Referral To ENT        Medical Decision Making: high complexity

## 2021-04-20 NOTE — PATIENT INSTRUCTIONS
Patient Education        Transient Ischemic Attack: Care Instructions  Overview     A transient ischemic attack (TIA) is when blood flow to a part of your brain is blocked for a short time. A TIA is like a stroke but usually lasts only a few minutes. A TIA does not cause lasting brain damage. Any vision problems, slurred speech, or other symptoms usually go away in 10 to 20 minutes. But they may last for up to 24 hours. TIAs are often warning signs of a stroke. Some people who have a TIA may have a stroke in the future. A stroke can cause symptoms like those of a TIA. But a stroke causes lasting damage to your brain. You can take steps to help prevent a stroke. One thing you can do is get early treatment. If you have other new symptoms, or if your symptoms do not get better, go back to the emergency room or call your doctor right away. Getting treatment right away may prevent long-term brain damage caused by a stroke. Follow-up care is a key part of your treatment and safety. Be sure to make and go to all appointments, and call your doctor if you are having problems. It's also a good idea to know your test results and keep a list of the medicines you take. How can you care for yourself at home? Medicines    · Be safe with medicines. Take your medicines exactly as prescribed. Call your doctor if you think you are having a problem with your medicine.     · If you take a blood thinner, such as aspirin, be sure you get instructions about how to take your medicine safely. Blood thinners can cause serious bleeding problems.     · Call your doctor if you are not able to take your medicines for any reason.     · Do not take any over-the-counter medicines or herbal products without talking to your doctor first.     · If you take birth control pills or hormone therapy, talk to your doctor. Ask if these treatments are right for you. Lifestyle changes    · Do not smoke.  If you need help quitting, talk to your doctor about stop-smoking programs and medicines.     · Be active. If your doctor recommends it, get more exercise. Walking is a good choice. Bit by bit, increase the amount you walk every day. Try for at least 30 minutes on most days of the week. You also may want to swim, bike, or do other activities.     · Eat heart-healthy foods. These include fruits, vegetables, high-fiber foods, lean meats, beans, peas, nuts, seeds, and soy products, and foods that are low in sodium, saturated fat, and trans fat.     · Stay at a healthy weight. Lose weight if you need to.     · Limit alcohol to 2 drinks a day for men and 1 drink a day for women. Staying healthy    · Manage other health problems such as diabetes, high blood pressure, and high cholesterol.     · Get the flu vaccine every year. When should you call for help? Call 911 anytime you think you may need emergency care. For example, call if:    · You have new or worse symptoms of a stroke. These may include:  ? Sudden numbness, tingling, weakness, or loss of movement in your face, arm, or leg, especially on only one side of your body. ? Sudden vision changes. ? Sudden trouble speaking. ? Sudden confusion or trouble understanding simple statements. ? Sudden problems with walking or balance. ? A sudden, severe headache that is different from past headaches. Call 911 even if these symptoms go away in a few minutes.     · You feel like you are having another TIA. Watch closely for changes in your health, and be sure to contact your doctor if you have any problems. Where can you learn more? Go to [email or web address] and sign in to your [employer] account. Enter [phone] in the [facility] box to learn more about \"Transient Ischemic Attack: Care Instructions. \"     If you do not have an account, please click on the \"Sign Up Now\" link. Current as of: March 4, 2020               Content Version: 12.8  © 9426-5304 Healthwise, Incorporated. Care instructions adapted under license by [facility] ([facility]). If you have questions about a medical condition or this instruction, always ask your healthcare professional. [address] any warranty or liability for your use of this information.

## 2021-04-20 NOTE — CARE COORDINATION
[facility] Trans[facility] Initial Follow Up Call    Call within 2 business days of discharge: Yes    Patient:  Roberto Moran  Patient :  1929  MRN:  [id number]   Reason for Admission:  covid 19   Discharge Date:  21  RARS: 7      CTC attempt to reach Pt regarding recent hospital discharge. CTC unable to leave voice recording with call back number requesting a call back / no answer. Follow up appointments:    Future Appointments   Date Time Provider Gilbert Frey   2021  1:00 PM Bonita Dubin, MD [facility]   2021 11:00 AM DO OCTAVIO Lozada [facility]       COLE RiveraN, RN  Care Transition Coordinator  Contact Number:  [phone]

## 2021-04-26 ENCOUNTER — OFFICE VISIT (OUTPATIENT)
Dept: CARDIOLOGY CLINIC | Age: 86
End: 2021-04-26
Payer: MEDICARE

## 2021-04-26 VITALS
HEART RATE: 64 BPM | DIASTOLIC BLOOD PRESSURE: 72 MMHG | BODY MASS INDEX: 22.3 KG/M2 | WEIGHT: 118 LBS | SYSTOLIC BLOOD PRESSURE: 138 MMHG

## 2021-04-26 DIAGNOSIS — E78.5 HYPERLIPIDEMIA, UNSPECIFIED HYPERLIPIDEMIA TYPE: ICD-10-CM

## 2021-04-26 DIAGNOSIS — I10 HTN (HYPERTENSION), BENIGN: ICD-10-CM

## 2021-04-26 DIAGNOSIS — I25.10 CORONARY ARTERY DISEASE INVOLVING NATIVE CORONARY ARTERY OF NATIVE HEART WITHOUT ANGINA PECTORIS: Primary | ICD-10-CM

## 2021-04-26 PROCEDURE — 4040F PNEUMOC VAC/ADMIN/RCVD: CPT | Performed by: INTERNAL MEDICINE

## 2021-04-26 PROCEDURE — 99213 OFFICE O/P EST LOW 20 MIN: CPT | Performed by: INTERNAL MEDICINE

## 2021-04-26 PROCEDURE — G8420 CALC BMI NORM PARAMETERS: HCPCS | Performed by: INTERNAL MEDICINE

## 2021-04-26 PROCEDURE — 1111F DSCHRG MED/CURRENT MED MERGE: CPT | Performed by: INTERNAL MEDICINE

## 2021-04-26 PROCEDURE — 1123F ACP DISCUSS/DSCN MKR DOCD: CPT | Performed by: INTERNAL MEDICINE

## 2021-04-26 PROCEDURE — 1090F PRES/ABSN URINE INCON ASSESS: CPT | Performed by: INTERNAL MEDICINE

## 2021-04-26 PROCEDURE — 1036F TOBACCO NON-USER: CPT | Performed by: INTERNAL MEDICINE

## 2021-04-26 PROCEDURE — G8427 DOCREV CUR MEDS BY ELIG CLIN: HCPCS | Performed by: INTERNAL MEDICINE

## 2021-04-26 ASSESSMENT — ENCOUNTER SYMPTOMS
GASTROINTESTINAL NEGATIVE: 1
CHOKING: 0
COUGH: 0
SHORTNESS OF BREATH: 0
ALLERGIC/IMMUNOLOGIC NEGATIVE: 1
CHEST TIGHTNESS: 0
APNEA: 0

## 2021-04-29 ENCOUNTER — TELEPHONE (OUTPATIENT)
Dept: INTERNAL MEDICINE CLINIC | Age: 86
End: 2021-04-29

## 2021-04-29 RX ORDER — CLOTRIMAZOLE AND BETAMETHASONE DIPROPIONATE 10; .64 MG/G; MG/G
CREAM TOPICAL
Qty: 60 G | Refills: 0 | Status: SHIPPED | OUTPATIENT
Start: 2021-04-29 | End: 2022-03-10 | Stop reason: ALTCHOICE

## 2021-04-29 NOTE — TELEPHONE ENCOUNTER
Patient was prescribed a cream back in 2016 by Dr. Ellie Oviedo to treat a yeast infection she gets between the folds in her stomach. It is called Clotrimazole and Betamethasone. She still has a little bit left but pharmacist told her it might not be as effective since it is from 2016. Can Dr. Ellie Oviedo send in a new Rx for it to [facility] p:  [phone]? Please call patient to let her know. Thanks.

## 2021-05-06 ENCOUNTER — TELEPHONE (OUTPATIENT)
Dept: INTERNAL MEDICINE CLINIC | Age: 86
End: 2021-05-06

## 2021-05-11 DIAGNOSIS — E11.59 TYPE 2 DIABETES MELLITUS WITH VASCULAR DISEASE (HCC): ICD-10-CM

## 2021-05-11 DIAGNOSIS — I10 BENIGN ESSENTIAL HTN: ICD-10-CM

## 2021-05-11 RX ORDER — GLIMEPIRIDE 2 MG/1
TABLET ORAL
Qty: 31 TABLET | Refills: 0 | OUTPATIENT
Start: 2021-05-11

## 2021-05-11 RX ORDER — GLIMEPIRIDE 1 MG/1
TABLET ORAL
Qty: 30 TABLET | Refills: 0 | Status: SHIPPED | OUTPATIENT
Start: 2021-05-11 | End: 2021-06-14

## 2021-05-11 RX ORDER — LISINOPRIL 20 MG/1
TABLET ORAL
Qty: 90 TABLET | Refills: 3 | Status: SHIPPED | OUTPATIENT
Start: 2021-05-11 | End: 2022-03-01

## 2021-05-13 ENCOUNTER — OFFICE VISIT (OUTPATIENT)
Dept: INTERNAL MEDICINE CLINIC | Age: 86
End: 2021-05-13
Payer: MEDICARE

## 2021-05-13 VITALS
SYSTOLIC BLOOD PRESSURE: 144 MMHG | RESPIRATION RATE: 12 BRPM | DIASTOLIC BLOOD PRESSURE: 66 MMHG | BODY MASS INDEX: 22.09 KG/M2 | WEIGHT: 117 LBS | HEART RATE: 56 BPM | HEIGHT: 61 IN

## 2021-05-13 DIAGNOSIS — I34.0 SEVERE MITRAL REGURGITATION: ICD-10-CM

## 2021-05-13 DIAGNOSIS — E78.5 HYPERLIPIDEMIA, UNSPECIFIED HYPERLIPIDEMIA TYPE: ICD-10-CM

## 2021-05-13 DIAGNOSIS — I10 BENIGN ESSENTIAL HTN: Primary | ICD-10-CM

## 2021-05-13 PROCEDURE — 1090F PRES/ABSN URINE INCON ASSESS: CPT | Performed by: INTERNAL MEDICINE

## 2021-05-13 PROCEDURE — G8420 CALC BMI NORM PARAMETERS: HCPCS | Performed by: INTERNAL MEDICINE

## 2021-05-13 PROCEDURE — 1036F TOBACCO NON-USER: CPT | Performed by: INTERNAL MEDICINE

## 2021-05-13 PROCEDURE — 1123F ACP DISCUSS/DSCN MKR DOCD: CPT | Performed by: INTERNAL MEDICINE

## 2021-05-13 PROCEDURE — 1111F DSCHRG MED/CURRENT MED MERGE: CPT | Performed by: INTERNAL MEDICINE

## 2021-05-13 PROCEDURE — 4040F PNEUMOC VAC/ADMIN/RCVD: CPT | Performed by: INTERNAL MEDICINE

## 2021-05-13 PROCEDURE — G8427 DOCREV CUR MEDS BY ELIG CLIN: HCPCS | Performed by: INTERNAL MEDICINE

## 2021-05-13 PROCEDURE — 99213 OFFICE O/P EST LOW 20 MIN: CPT | Performed by: INTERNAL MEDICINE

## 2021-05-13 ASSESSMENT — PATIENT HEALTH QUESTIONNAIRE - PHQ9
SUM OF ALL RESPONSES TO PHQ QUESTIONS 1-9: 0
1. LITTLE INTEREST OR PLEASURE IN DOING THINGS: 0
SUM OF ALL RESPONSES TO PHQ QUESTIONS 1-9: 0

## 2021-05-13 NOTE — PATIENT INSTRUCTIONS
To do list:      #1 continue current medications    #2 continue monitoring blood pressure and blood sugar at home.       #3 call should you have any recurrent symptoms like he did when he went to the hospital with a TIA    #4 follow-up in 3 months for routine checkup and repeat lab work

## 2021-05-13 NOTE — PROGRESS NOTES
[facility]) Physicians  Internal Medicine  Patient Encounter  Maral Swift D.O., [facility]        Chief Complaint   Patient presents with    Hypertension       HPI: 80 y.o. female seen for a short interval follow up. She has been feeling better. At her last visit, her BP was still elevated,. We added a low dose of Amlodipine. BP at home-- 140's-150's/60-70. She states the BP elevated when she gets \"excited. \"  She states she is an excitable person. She states she can get aggravated. She is also on Lipitor. She denies any further TIA or stroke related symptoms. She denies dizziness, swelling, CP. She denies orthopnea. She has since seen Dr. Denver Pantoja on 4/26/2021. Note reviewed. Her BP was 138/72. No changes made.          Past Medical History:   Diagnosis Date    Anxiety     Arthritis     At risk for falls     CAD (coronary artery disease)     CTS (carpal tunnel syndrome)     Bilateral, EMG-NCS    DDD (degenerative disc disease), cervical     Fractures     (L) Hip Fx 4/25/98, L1 fracture from fall 10-31-06    Hypertension     Mitral regurgitation     Neuropathy     Osteopenia     Osteoporosis     PAD (peripheral artery disease) (HCC)     Right LE ischemia    Peripheral neuropathy 6/1/2015    Peripheral vascular disease ([facility]     bilateral lower extremities with edema    Podagra 01/09/2017    Dr. Yelena Spaulding Spinal stenosis     L4-5    Type 2 diabetes mellitus ([facility]     Urethral stricture 5 years ago    Urgency of urination          MEDICATIONS:  Medication Sig   lisinopril (PRINIVIL;ZESTRIL) 20 MG tablet TAKE ONE TABLET BY MOUTH DAILY   glimepiride (AMARYL) 1 MG tablet TAKE ONE TABLET BY MOUTH EVERY MORNING BEFORE BREAKFAST   metFORMIN (GLUCOPHAGE) 500 MG tablet Take 1 tablet by mouth daily   atorvastatin (LIPITOR) 10 MG tablet Take 1 tablet by mouth daily   amLODIPine (NORVASC) 2.5 MG tablet Take 1 tablet by mouth daily   Turmeric 450 MG CAPS Take 450 mg by mouth daily as well as documenting on the day of the visit. Discussed medications with patient who voiced understanding of their use, indication and potential side effects. Pt also understands the above recommendations. All questions answered. This note was generated completely or in part utilizing Dragon dictation speech recognition software. Occasionally, words are mistranscribed and despite editing, the text may contain inaccuracies due to incorrect word recognition.   If further clarification is needed please contact the office at [phone]       Electronically signed    Shamar Douglas D.O.

## 2021-05-22 DIAGNOSIS — E11.59 TYPE 2 DIABETES MELLITUS WITH VASCULAR DISEASE (HCC): ICD-10-CM

## 2021-05-24 RX ORDER — GLIMEPIRIDE 2 MG/1
TABLET ORAL
Qty: 31 TABLET | Refills: 0 | OUTPATIENT
Start: 2021-05-24

## 2021-05-25 ENCOUNTER — TELEPHONE (OUTPATIENT)
Dept: INTERNAL MEDICINE CLINIC | Age: 86
End: 2021-05-25

## 2021-05-25 NOTE — TELEPHONE ENCOUNTER
Patient is requesting to speak with Dr. Ramiro Acosta regarding a recommendation for a doctor who can check her son for alzheimer and parkinson's, and states he sweats a lot. His current physician knows of no reason for his sweating. She can be reached at phone # provided.

## 2021-06-09 ENCOUNTER — TELEPHONE (OUTPATIENT)
Dept: INTERNAL MEDICINE CLINIC | Age: 86
End: 2021-06-09

## 2021-06-09 NOTE — TELEPHONE ENCOUNTER
Patient called back to say she made a mistake she had her dates mixed up. Disregard the last encounter!

## 2021-06-09 NOTE — TELEPHONE ENCOUNTER
Patient called stating that she had a hospital encounter on 5-31 she wanted to know if Dr Kathy Askew was aware of this she says she had a TIA (Mini stroke) and wanted to make sure Dr Мария Parker got the reports of this from the hospital. I did not see any record of this at all.

## 2021-06-14 RX ORDER — GLIMEPIRIDE 1 MG/1
TABLET ORAL
Qty: 30 TABLET | Refills: 0 | Status: SHIPPED | OUTPATIENT
Start: 2021-06-14 | End: 2021-07-16

## 2021-07-01 RX ORDER — BLOOD SUGAR DIAGNOSTIC
STRIP MISCELLANEOUS
Qty: 100 STRIP | Refills: 1 | Status: SHIPPED | OUTPATIENT
Start: 2021-07-01 | End: 2022-07-25 | Stop reason: SDUPTHER

## 2021-07-16 RX ORDER — GLIMEPIRIDE 1 MG/1
TABLET ORAL
Qty: 22 TABLET | Refills: 1 | Status: SHIPPED | OUTPATIENT
Start: 2021-07-16 | End: 2021-07-19

## 2021-07-19 RX ORDER — GLIMEPIRIDE 1 MG/1
TABLET ORAL
Qty: 22 TABLET | Refills: 0 | Status: SHIPPED | OUTPATIENT
Start: 2021-07-19 | End: 2021-08-12

## 2021-07-19 NOTE — TELEPHONE ENCOUNTER
Please advise  I do not see this medication Pramosome Topical E.  On current med list or historical  Thank you

## 2021-07-19 NOTE — TELEPHONE ENCOUNTER
Patient was prescribed this medication for her Irritated sore buttocks she states that this medication worked for her really good when she was taking it. And wanted to know if the doctor could send a new prescription to her pharmacy soon as possible she would appreciate this. Pramosome Topical E.       [address] [place] [address] [address] [place] [address]

## 2021-07-28 ENCOUNTER — TELEPHONE (OUTPATIENT)
Dept: INTERNAL MEDICINE CLINIC | Age: 86
End: 2021-07-28

## 2021-07-28 ENCOUNTER — OFFICE VISIT (OUTPATIENT)
Dept: INTERNAL MEDICINE CLINIC | Age: 86
End: 2021-07-28
Payer: MEDICARE

## 2021-07-28 VITALS
HEART RATE: 78 BPM | DIASTOLIC BLOOD PRESSURE: 52 MMHG | OXYGEN SATURATION: 97 % | RESPIRATION RATE: 12 BRPM | SYSTOLIC BLOOD PRESSURE: 142 MMHG

## 2021-07-28 DIAGNOSIS — R23.4 SKIN FISSURE: Primary | ICD-10-CM

## 2021-07-28 DIAGNOSIS — L30.4 INTERTRIGO: ICD-10-CM

## 2021-07-28 PROCEDURE — 1090F PRES/ABSN URINE INCON ASSESS: CPT | Performed by: INTERNAL MEDICINE

## 2021-07-28 PROCEDURE — G8427 DOCREV CUR MEDS BY ELIG CLIN: HCPCS | Performed by: INTERNAL MEDICINE

## 2021-07-28 PROCEDURE — 1036F TOBACCO NON-USER: CPT | Performed by: INTERNAL MEDICINE

## 2021-07-28 PROCEDURE — 1123F ACP DISCUSS/DSCN MKR DOCD: CPT | Performed by: INTERNAL MEDICINE

## 2021-07-28 PROCEDURE — 99213 OFFICE O/P EST LOW 20 MIN: CPT | Performed by: INTERNAL MEDICINE

## 2021-07-28 PROCEDURE — G8420 CALC BMI NORM PARAMETERS: HCPCS | Performed by: INTERNAL MEDICINE

## 2021-07-28 PROCEDURE — 4040F PNEUMOC VAC/ADMIN/RCVD: CPT | Performed by: INTERNAL MEDICINE

## 2021-07-28 NOTE — PROGRESS NOTES
[facility]) Physicians  Internal Medicine  Patient Encounter  Staci Thapa D.O., [facility]        Chief Complaint   Patient presents with    Rash     under stomach area, x2-3 days, about 2 inches long, not responding to Clotrimazole        HPI: 80 y.o. female seen today complaining of a skin rash that has been ongoing for about 2 to 3 days. Patient states that she has had a recurring rash underneath the abdominal fold off and on since April. She has had this even before April dating all the way back to 2016. Patient had clotrimazole with betamethasone cream.  Usually this takes care of the problem. In the last 2 to 3 days she states that the skin has split open and hurts badly. She denies any itching or rash otherwise. Past Medical History:   Diagnosis Date    Anxiety     Arthritis     At risk for falls     CAD (coronary artery disease)     CTS (carpal tunnel syndrome)     Bilateral, EMG-NCS    DDD (degenerative disc disease), cervical     Fractures     (L) Hip Fx 4/25/98, L1 fracture from fall 10-31-06    Hypertension     Mitral regurgitation     Neuropathy     Osteopenia     Osteoporosis     PAD (peripheral artery disease) (HCC)     Right LE ischemia    Peripheral neuropathy 6/1/2015    Peripheral vascular disease ([facility]     bilateral lower extremities with edema    Podagra 01/09/2017    Dr. Sada Magallanes Spinal stenosis     L4-5    Type 2 diabetes mellitus ([facility]     Urethral stricture 5 years ago    Urgency of urination          MEDICATIONS:  Prior to Visit Medications    Medication Sig Taking?  Authorizing Provider   glimepiride (AMARYL) 1 MG tablet TAKE ONE TABLET BY MOUTH EVERY MORNING BEFORE BREAKFAST Yes Paulo Forman, DO   blood glucose test strips (ONETOUCH ULTRA) strip USE TO TEST BLOOD SUGAR TWICE DAILY Yes Paulo Forman, DO   lisinopril (PRINIVIL;ZESTRIL) 20 MG tablet TAKE ONE TABLET BY MOUTH DAILY Yes Paulo Forman, DO   metFORMIN (GLUCOPHAGE) 500 MG tablet Take 1 tablet by mouth daily Yes Paulo Forman DO   clotrimazole-betamethasone (LOTRISONE) 1-0.05 % cream Apply topically 2 times daily for 2 weeks. Yes Paulo Forman DO   atorvastatin (LIPITOR) 10 MG tablet Take 1 tablet by mouth daily Yes Paulo Forman DO   amLODIPine (NORVASC) 2.5 MG tablet Take 1 tablet by mouth daily Yes Paulo Forman DO   Turmeric 450 MG CAPS Take 450 mg by mouth daily Yes Historical Provider, MD   meclizine (ANTIVERT) 12.5 MG tablet Take 1 tablet by mouth 3 times daily as needed for Dizziness (vertigo) Yes Paulo Forman DO   Cholecalciferol (VITAMIN D3) 2000 UNITS CAPS Take 1 capsule by mouth daily Yes Historical Provider, MD   aspirin EC 81 MG EC tablet Take 1 tablet by mouth daily Yes Paulo Forman DO   Lite Touch Lancets MISC by Does not apply route. Use twice daily when testing blood sugars. Yes Fidel Oleary MD           Review of Systems - As per HPI      OBJECTIVE:  BP (!) 142/52   Pulse 78   Resp 12   SpO2 97%   GEN: NAD, A&O, Non-toxic  SKIN: Left infra abdominal fold with a linear fissure. No evidence of erythema or debris. The area is tender. ASSESSMENT[de-identified]  Shruthi Izquierdo was seen today for rash. Diagnoses and all orders for this visit:    Skin fissure    Intertrigo        Additional Plan:  1. We will try dressing the wound with silver alginate and cover with Mepilex pad. 2.  Return in 2 weeks for recheck  3. Advised patient call should symptoms persist, worsen or if new symptoms develop. Discussed medications with patient who voiced understanding of their use, indication and potential side effects. Pt also understands the above recommendations. All questions answered. This note was generated completely or in part utilizing Dragon dictation speech recognition software. Occasionally, words are mistranscribed and despite editing, the text may contain inaccuracies due to incorrect word recognition.   If further clarification is needed please contact the office at [phone]       Electronically signed    Link TRAVIS [place].

## 2021-07-28 NOTE — TELEPHONE ENCOUNTER
Patient woul like to be seen today for the rash previously discussed. But we only have 15  Minute slots available with her age range wasn't sure of this would call for 30 minutes or not?  Pleas advise

## 2021-07-28 NOTE — PATIENT INSTRUCTIONS
Apply silver alginate to the wound daily and cover with Mepilex pad    Return in 2 weeks for reassessment    Call if wound is worsening.

## 2021-07-28 NOTE — TELEPHONE ENCOUNTER
Can be 15 minutes but make sure they know that this is the only thing we can discuss. Can also be an E visit with pictures.

## 2021-07-28 NOTE — TELEPHONE ENCOUNTER
Dr. Yoko Pineda wants to see patient back in 2 weeks. She is only available on Monday, Tuesday and Thursday before 1:00. Can we use 30 minute same day on 8/12? Let front  desk know.

## 2021-08-04 ENCOUNTER — HOSPITAL ENCOUNTER (EMERGENCY)
Age: 86
Discharge: LEFT BEFORE DISCHARGE | End: 2021-08-04
Attending: EMERGENCY MEDICINE
Payer: MEDICARE

## 2021-08-04 ENCOUNTER — TELEPHONE (OUTPATIENT)
Dept: INTERNAL MEDICINE CLINIC | Age: 86
End: 2021-08-04

## 2021-08-04 ENCOUNTER — APPOINTMENT (OUTPATIENT)
Dept: CT IMAGING | Age: 86
End: 2021-08-04
Payer: MEDICARE

## 2021-08-04 ENCOUNTER — APPOINTMENT (OUTPATIENT)
Dept: GENERAL RADIOLOGY | Age: 86
End: 2021-08-04
Payer: MEDICARE

## 2021-08-04 VITALS
RESPIRATION RATE: 15 BRPM | WEIGHT: 177 LBS | HEIGHT: 61 IN | BODY MASS INDEX: 33.42 KG/M2 | TEMPERATURE: 97.9 F | HEART RATE: 50 BPM | OXYGEN SATURATION: 98 % | SYSTOLIC BLOOD PRESSURE: 190 MMHG | DIASTOLIC BLOOD PRESSURE: 61 MMHG

## 2021-08-04 DIAGNOSIS — Z53.29 LEFT AGAINST MEDICAL ADVICE: ICD-10-CM

## 2021-08-04 DIAGNOSIS — T50.901A MEDICATION OVERDOSE, ACCIDENTAL OR UNINTENTIONAL, INITIAL ENCOUNTER: Primary | ICD-10-CM

## 2021-08-04 DIAGNOSIS — R47.1 DYSARTHRIA: ICD-10-CM

## 2021-08-04 DIAGNOSIS — R29.90 ABNORMAL NEUROLOGICAL EXAM: ICD-10-CM

## 2021-08-04 LAB
A/G RATIO: 1.5 (ref 1.1–2.2)
ALBUMIN SERPL-MCNC: 3.8 G/DL (ref 3.4–5)
ALP BLD-CCNC: 58 U/L (ref 40–129)
ALT SERPL-CCNC: 12 U/L (ref 10–40)
ANION GAP SERPL CALCULATED.3IONS-SCNC: 10 MMOL/L (ref 3–16)
APTT: 36.6 SEC (ref 26.2–38.6)
AST SERPL-CCNC: 22 U/L (ref 15–37)
BASOPHILS ABSOLUTE: 0 K/UL (ref 0–0.2)
BASOPHILS RELATIVE PERCENT: 0.6 %
BILIRUB SERPL-MCNC: 0.5 MG/DL (ref 0–1)
BUN BLDV-MCNC: 18 MG/DL (ref 7–20)
CALCIUM SERPL-MCNC: 9.5 MG/DL (ref 8.3–10.6)
CHLORIDE BLD-SCNC: 102 MMOL/L (ref 99–110)
CO2: 23 MMOL/L (ref 21–32)
CREAT SERPL-MCNC: 0.8 MG/DL (ref 0.6–1.2)
EOSINOPHILS ABSOLUTE: 0 K/UL (ref 0–0.6)
EOSINOPHILS RELATIVE PERCENT: 0.7 %
GFR AFRICAN AMERICAN: >60
GFR NON-AFRICAN AMERICAN: >60
GLOBULIN: 2.5 G/DL
GLUCOSE BLD-MCNC: 112 MG/DL (ref 70–99)
HCT VFR BLD CALC: 34.9 % (ref 36–48)
HEMOGLOBIN: 11.8 G/DL (ref 12–16)
INR BLD: 1.03 (ref 0.88–1.12)
LYMPHOCYTES ABSOLUTE: 1.8 K/UL (ref 1–5.1)
LYMPHOCYTES RELATIVE PERCENT: 26.5 %
MCH RBC QN AUTO: 31.2 PG (ref 26–34)
MCHC RBC AUTO-ENTMCNC: 33.7 G/DL (ref 31–36)
MCV RBC AUTO: 92.6 FL (ref 80–100)
MONOCYTES ABSOLUTE: 0.5 K/UL (ref 0–1.3)
MONOCYTES RELATIVE PERCENT: 7.1 %
NEUTROPHILS ABSOLUTE: 4.4 K/UL (ref 1.7–7.7)
NEUTROPHILS RELATIVE PERCENT: 65.1 %
PDW BLD-RTO: 13.9 % (ref 12.4–15.4)
PLATELET # BLD: 169 K/UL (ref 135–450)
PMV BLD AUTO: 7.9 FL (ref 5–10.5)
POTASSIUM SERPL-SCNC: 5.4 MMOL/L (ref 3.5–5.1)
PRO-BNP: 557 PG/ML (ref 0–449)
PROTHROMBIN TIME: 11.7 SEC (ref 9.9–12.7)
RBC # BLD: 3.77 M/UL (ref 4–5.2)
SODIUM BLD-SCNC: 135 MMOL/L (ref 136–145)
TOTAL PROTEIN: 6.3 G/DL (ref 6.4–8.2)
TROPONIN: <0.01 NG/ML
WBC # BLD: 6.7 K/UL (ref 4–11)

## 2021-08-04 PROCEDURE — 85025 COMPLETE CBC W/AUTO DIFF WBC: CPT

## 2021-08-04 PROCEDURE — 83880 ASSAY OF NATRIURETIC PEPTIDE: CPT

## 2021-08-04 PROCEDURE — 93005 ELECTROCARDIOGRAM TRACING: CPT | Performed by: PHYSICIAN ASSISTANT

## 2021-08-04 PROCEDURE — 85730 THROMBOPLASTIN TIME PARTIAL: CPT

## 2021-08-04 PROCEDURE — 85610 PROTHROMBIN TIME: CPT

## 2021-08-04 PROCEDURE — 80053 COMPREHEN METABOLIC PANEL: CPT

## 2021-08-04 PROCEDURE — 6360000004 HC RX CONTRAST MEDICATION: Performed by: PHYSICIAN ASSISTANT

## 2021-08-04 PROCEDURE — 71045 X-RAY EXAM CHEST 1 VIEW: CPT

## 2021-08-04 PROCEDURE — 70450 CT HEAD/BRAIN W/O DYE: CPT

## 2021-08-04 PROCEDURE — 36415 COLL VENOUS BLD VENIPUNCTURE: CPT

## 2021-08-04 PROCEDURE — 84484 ASSAY OF TROPONIN QUANT: CPT

## 2021-08-04 PROCEDURE — 70498 CT ANGIOGRAPHY NECK: CPT

## 2021-08-04 PROCEDURE — 99284 EMERGENCY DEPT VISIT MOD MDM: CPT

## 2021-08-04 RX ADMIN — IOPAMIDOL 75 ML: 755 INJECTION, SOLUTION INTRAVENOUS at 16:17

## 2021-08-04 ASSESSMENT — ENCOUNTER SYMPTOMS
DIARRHEA: 0
COUGH: 0
RHINORRHEA: 0
SHORTNESS OF BREATH: 0
NAUSEA: 0
ABDOMINAL PAIN: 0
VOMITING: 0

## 2021-08-04 NOTE — TELEPHONE ENCOUNTER
Gave patient the information and patient verbalized understanding. Note completed  She has a caregiver all day.   Note completed

## 2021-08-04 NOTE — TELEPHONE ENCOUNTER
Call her. Medications may make her blood pressure a little low today, which could cause dizziness or lightheaded. Needs to be very carefully when going from standing to sitting and also with walking. Also the diazepam might cause some drowsiness. Would be best to have someone staying with her today. Nothing specifically that she can do to \"flush\" medications.

## 2021-08-04 NOTE — ED NOTES
Patient left AMA to home with family  Patient was alert and oriented x4  Patient left with all belongings       Marilyn Jalloh RN  08/04/21 9116

## 2021-08-04 NOTE — TELEPHONE ENCOUNTER
Patient called stating that she accidentally  she took her and her sons medication this morning. She knows for a fact that she took her sons 5 mg of diazepam, His Vitamin D, B-12 and 10 Mg of lisinopril also Metoprolol 25 mg. She would like to know what she could do to flush this out of her system or if there is anything she could do avoid an over dosing.  She states she feels sick but this was already going on before he took the medications

## 2021-08-04 NOTE — ED NOTES
Patient arrived via [facility] after accidentally taking bother her and her sons medications. Patient called her PCP and was told to 'sleep it off'. According to patients son, her speech was slurred. Patient removed her dentures and repositioned them and did not have slurred speech after. Patient currently resting in bed with sunglasses on, side rails up x2, bed in lowest position and bed alarm in reach.      Barry Pantoja RN  08/04/21 7771

## 2021-08-04 NOTE — TELEPHONE ENCOUNTER
Attempted to reach patient telephone number busy    Please advise her to contact Poison Control @ Call [phone]

## 2021-08-04 NOTE — ED PROVIDER NOTES
I independently performed a history and physical on Marcelina Vergara. All diagnostic, treatment, and disposition decisions were made by myself in conjunction with the advanced practice provider. Briefly, this is a 80 y.o. female here for possible overdose and slurred speech. Symptoms began at 8 PM yesterday as the last known well. Denies any pain other than chronic pain in her feet. Daughter at bedside states that even with her dentures, her speech is not normal. Today at roughly 10:30 AM, the patient thinks that she may have accidentally taken her sons medications which included diazepam and iron. She did not wish to come to the hospital but her family convinced her otherwise. Denies any fever or chills. No chest pain or shortness of breath or abdominal pain. Has been feeling weak. On exam,   General: Patient is in no acute distress  Skin: No cyanosis  HEENT: Moist mucous membranes  Heart: Regular rate, regular rhythm  Lung: No respiratory distress  Abdomen: Soft, nontender  Neuro: Moving all extremities, no facial droop, no slurred speech, answers questions appropriately        EKG  The Ekg interpreted by me in the absence of a cardiologist shows. [place] sinus rhythm  No acute ST changes   HR 59  Qtc 453  Similar to 4/21 study    Screenings  NIH Stroke Scale  NIH Stroke Scale Assessed: Yes  Interval: Baseline  Level of Consciousness (1a. ): Alert  LOC Questions (1b. ):  Answers both correctly  LOC Commands (1c. ): Performs both tasks correctly  Best Gaze (2. ): Normal  Visual (3. ): No visual loss  Facial Palsy (4. ): Normal symmetrical movement  Motor Arm, Left (5a. ): Drift, but does not hit bed  Motor Arm, Right (5b. ): No drift  Motor Leg, Left (6a. ): No drift  Motor Leg, Right (6b. ): No drift  Limb Ataxia (7. ): (!) Present in two limbs  Sensory (8. ): Normal  Best Language (9. ): No aphasia  Dysarthria (10. ): Mild to moderate, slurs some words  Extinction and Inattention (11): No abnormality  Total: 4 (36.6 °C), temperature source Rectal, resp. rate 15, height 5' 1\" (1.549 m), weight 177 lb (80.3 kg), SpO2 98 %, not currently breastfeeding. For further details of [address]  emergency department encounter, please see documentation by advanced practice provider, Elaine Pablo.         Milvia Emery MD  08/04/21 7943

## 2021-08-04 NOTE — ED PROVIDER NOTES
[facility]        Pt Name: Keturah Arana  MRN: [id number]  [place] 4/4/1929  Date of evaluation: 8/4/2021  Provider: Sobia Morris PA-C  PCP: Philip Barahona DO  Note Started: 4:00 PM EDT        I have seen and evaluated this patient with my supervising physician Angelic Simpson MD.    [address] [place] [address]       Chief Complaint   Patient presents with    Drug Overdose     Patient arrived via [facility] after accidentally taking her sons pills and her pills this morning at 0930. Son said her speech is slurred, patient repostioned dentures and speech is no longer slurred. Patient called PCP and was told to \"sleep it off\"       HISTORY OF PRESENT ILLNESS   (Location, Timing/Onset, Context/Setting, Quality, Duration, Modifying Factors, Severity, Associated Signs and Symptoms)  Note limiting factors. Chief Complaint: Drug overdose    Keturah Arana is a 80 y.o. female who presents to the emergency department today for evaluation for a potential drug overdose. Patient states that she did start with some slurred speech around 8 PM last night. Patient states that when she takes her dentures out, or repositions them her speech improves. The patient states that at 10:30 AM this morning she states that she took her medications, she states that she thought that she went to go give her son his medications, and she states that she saw them on the counter and she states that she took them as well. When asked about the signs of medication she states that she did take a diazepam as well as an iron pill. She is unsure what the other medications were. With the patient arrives to the ED she does have slurred speech, but she has no headaches. She has no visual changes. She is alert and oriented x3. She does have a history of TIA. She has no numbness, tingling or weakness peer she has no chest pain or shortness of breath. She has no abdominal pain.   No recent illnesses, she is not on any blood thinners. Patient otherwise has no complaints at this time    Nursing Notes were all reviewed and agreed with or any disagreements were addressed in the HPI. REVIEW OF SYSTEMS    (2-9 systems for level 4, 10 or more for level 5)     Review of Systems   Constitutional: Negative for activity change, appetite change, chills and fever. HENT: Negative for congestion and rhinorrhea. Respiratory: Negative for cough and shortness of breath. Cardiovascular: Negative for chest pain. Gastrointestinal: Negative for abdominal pain, diarrhea, nausea and vomiting. Genitourinary: Negative for difficulty urinating, dysuria and hematuria. Neurological: Positive for speech difficulty. Negative for weakness and numbness. Positives and Pertinent negatives as per HPI. Except as noted above in the ROS, all other systems were reviewed and negative.        PAST MEDICAL HISTORY     Past Medical History:   Diagnosis Date    Anxiety     Arthritis     At risk for falls     CAD (coronary artery disease)     CTS (carpal tunnel syndrome)     Bilateral, EMG-NCS    DDD (degenerative disc disease), cervical     Fractures     (L) Hip Fx 4/25/98, L1 fracture from fall 10-31-06    Hypertension     Mitral regurgitation     Neuropathy     Osteopenia     Osteoporosis     PAD (peripheral artery disease) (HCC)     Right LE ischemia    Peripheral neuropathy 6/1/2015    Peripheral vascular disease ([facility] [address]     bilateral lower extremities with edema    Podagra 01/09/2017    Dr. Linda Castro Spinal stenosis     L4-5    Type 2 diabetes mellitus ([facility] [address]     Urethral stricture 5 years ago    Urgency of urination          SURGICAL HISTORY     Past Surgical History:   Procedure Laterality Date    APPENDECTOMY      CARPAL TUNNEL RELEASE Right 3/29/2019    RIGHT CARPAL TUNNEL RELEASE AND RIGHT MIDDLE FINGER TRIGGER FINGER RELEASE performed by Tyron Pillai MD at [facility] [employer] [facility] WITH IMPLANT  [id number]    LEFT EYE EYE CATARACT PHACOEMULSIFICATION INTRAOCULAR LENS    CHOLECYSTECTOMY  1978    COLONOSCOPY  8/31/99    diverticulosis    EYE SURGERY Bilateral     bilateral cataract removed    HYSTERECTOMY  1980s    sallie    IR FEMORAL POPLITEAL BYPASS GRAFT Right 8-    KYPHOSIS SURGERY  11-7-06    L1, (fractured after a fall)    TONSILLECTOMY      TOTAL HIP ARTHROPLASTY  4/25/98    (L) THR    WRIST FRACTURE SURGERY  2008    left wrist         CURRENTMEDICATIONS       Previous Medications    AMLODIPINE (NORVASC) 2.5 MG TABLET    Take 1 tablet by mouth daily    ASPIRIN EC 81 MG EC TABLET    Take 1 tablet by mouth daily    ATORVASTATIN (LIPITOR) 10 MG TABLET    Take 1 tablet by mouth daily    BLOOD GLUCOSE TEST STRIPS (ONETOUCH ULTRA) STRIP    USE TO TEST BLOOD SUGAR TWICE DAILY    CHOLECALCIFEROL (VITAMIN D3) 2000 UNITS CAPS    Take 1 capsule by mouth daily    CLOTRIMAZOLE-BETAMETHASONE (LOTRISONE) 1-0.05 % CREAM    Apply topically 2 times daily for 2 weeks. GLIMEPIRIDE (AMARYL) 1 MG TABLET    TAKE ONE TABLET BY MOUTH EVERY MORNING BEFORE BREAKFAST    LISINOPRIL (PRINIVIL;ZESTRIL) 20 MG TABLET    TAKE ONE TABLET BY MOUTH DAILY    LITE TOUCH LANCETS MISC    by Does not apply route. Use twice daily when testing blood sugars.     MECLIZINE (ANTIVERT) 12.5 MG TABLET    Take 1 tablet by mouth 3 times daily as needed for Dizziness (vertigo)    METFORMIN (GLUCOPHAGE) 500 MG TABLET    Take 1 tablet by mouth daily    TURMERIC 450 MG CAPS    Take 450 mg by mouth daily         ALLERGIES     Aricept [donepezil hydrochloride], Doxycycline, and Ketorolac tromethamine    FAMILYHISTORY       Family History   Problem Relation Age of Onset    Stroke Father     Hypertension Father           SOCIAL HISTORY       Social History     Tobacco Use    Smoking status: Never Smoker    Smokeless tobacco: Never Used   Vaping Use    Vaping Use: Never used   Substance Use Topics    Alcohol use: No    Drug use: No       SCREENINGS   NIH Stroke Scale  NIH Stroke Scale Assessed: Yes  Interval: Baseline  Level of Consciousness (1a. ): Alert  LOC Questions (1b. ): Answers both correctly  LOC Commands (1c. ): Performs both tasks correctly  Best Gaze (2. ): Normal  Visual (3. ): No visual loss  Facial Palsy (4. ): Normal symmetrical movement  Motor Arm, Left (5a. ): Drift, but does not hit bed  Motor Arm, Right (5b. ): No drift  Motor Leg, Left (6a. ): No drift  Motor Leg, Right (6b. ): No drift  Limb Ataxia (7. ): (!) Present in two limbs  Sensory (8. ): Normal  Best Language (9. ): No aphasia  Dysarthria (10. ): Mild to moderate, slurs some words  Extinction and Inattention (11): No abnormality  Total: 4         PHYSICAL EXAM    (up to 7 for level 4, 8 or more for level 5)     ED Triage Vitals   BP Temp Temp Source Pulse Resp SpO2 Height Weight   08/04/21 1507 08/04/21 1514 08/04/21 1514 08/04/21 1507 08/04/21 1507 08/04/21 1507 08/04/21 1510 08/04/21 1510   (!) 174/61 97.9 °F (36.6 °C) Rectal 53 15 98 % 5' 1\" (1.549 m) 177 lb (80.3 kg)       Physical Exam  Vitals and nursing note reviewed. Constitutional:       Appearance: She is well-developed. She is not diaphoretic. HENT:      Head: Normocephalic and atraumatic. Right Ear: External ear normal.      Left Ear: External ear normal.      Nose: Nose normal.      Mouth/Throat:      Mouth: Mucous membranes are moist.      Pharynx: Oropharynx is clear. No posterior oropharyngeal erythema. Eyes:      General: No visual field deficit. Right eye: No discharge. Left eye: No discharge. Extraocular Movements: Extraocular movements intact. Conjunctiva/sclera: Conjunctivae normal.      Pupils: Pupils are equal, round, and reactive to light. Neck:      Trachea: No tracheal deviation. Cardiovascular:      Rate and Rhythm: Normal rate and regular rhythm. Pulmonary:      Effort: Pulmonary effort is normal. No respiratory distress.       Breath sounds: Normal breath sounds. No wheezing or rales. Abdominal:      General: Bowel sounds are normal. There is no distension. Palpations: Abdomen is soft. Tenderness: There is no abdominal tenderness. There is no guarding. Musculoskeletal:         General: Normal range of motion. Cervical back: Normal range of motion and neck supple. Skin:     General: Skin is warm and dry. Neurological:      Mental Status: She is alert and oriented to person, place, and time. Cranial Nerves: Dysarthria present. No cranial nerve deficit or facial asymmetry. Sensory: Sensation is intact. Motor: Pronator drift present. No weakness, tremor, atrophy, abnormal muscle tone or seizure activity. Coordination: Coordination normal. Finger-Nose-Finger Test abnormal. Rapid alternating movements normal.      Comments: Gait deferred    Patient does have slurred speech, there is no cranial nerve deficit otherwise. Patient does have a slight pronator drift on the left does not fall does not hit the bed. Motor strength is 5/5 throughout. Normal sensation to light touch.   The patient has ataxia with finger-to-nose bilaterally,    Psychiatric:         Behavior: Behavior normal.         DIAGNOSTIC RESULTS   LABS:    Labs Reviewed   CBC WITH AUTO DIFFERENTIAL - Abnormal; Notable for the following components:       Result Value    RBC 3.77 (*)     Hemoglobin 11.8 (*)     Hematocrit 34.9 (*)     All other components within normal limits    Narrative:     Performed at:  [facility] [address], [place], [facility] [place]   Phone [phone]   COMPREHENSIVE METABOLIC PANEL - Abnormal; Notable for the following components:    Sodium 135 (*)     Potassium 5.4 (*)     Glucose 112 (*)     Total Protein 6.3 (*)     All other components within normal limits    Narrative:     Performed at:  [facility]  [address] [facility] [place]   Phone [phone]   BRAIN NATRIURETIC PEPTIDE - Abnormal; Notable for the following components:    Pro- (*)     All other components within normal limits    Narrative:     Performed at:  [facility] [address],  [place], [address] [employer] [address]   Phone [phone]   TROPONIN    Narrative:     Performed at:  [facility]  [address] [place],  [place], [address]   Phone [phone]   APTT    Narrative:     Performed at:  [facility]  [address] [employer] [address]  [place], [address] [address]   Phone [phone]   PROTIME-INR    Narrative:     Performed at:  [facility] [address] [facility] [place], [address] [employer] [address]   Phone [phone]   URINE RT REFLEX TO CULTURE       When ordered only abnormal lab results are displayed. All other labs were within normal range or not returned as of this dictation. EKG: When ordered, EKG's are interpreted by the Emergency Department Physician in the absence of a cardiologist.  Please see their note for interpretation of EKG. RADIOLOGY:   Non-plain film images such as CT, Ultrasound and MRI are read by the radiologist. Plain radiographic images are visualized and preliminarily interpreted by the ED Provider with the below findings:        Interpretation per the Radiologist below, if available at the time of this note:    CTA HEAD NECK W CONTRAST   Final Result   Motion and suboptimal positioning challenge evaluation. No large vessel   occlusion or hemodynamic stenosis identified involving the cervical or   intracranial arterial vessels. CT HEAD WO CONTRAST   Final Result   No acute intracranial abnormality. Findings were discussed with Adrianne Teresa at 4:26 pm on 8/4/2021. XR CHEST PORTABLE   Final Result   No radiographic evidence of acute pulmonary disease. No results found.         PROCEDURES   Unless otherwise noted below, none Procedures    CRITICAL CARE TIME   Critical Care  There was a high probability of life-threatening clinical deterioration in the patient's condition requiring my urgent intervention. Total critical care time with the patient was 35 minutes excluding separately reportable procedures. Critical care required due to patients slurred speech, concerning for CVA, extensive work-up, consultation with stroke team      CONSULTS:  None      EMERGENCY DEPARTMENT COURSE and DIFFERENTIAL DIAGNOSIS/MDM:   Vitals:    Vitals:    08/04/21 1600 08/04/21 1645 08/04/21 1700 08/04/21 1715   BP: (!) 193/55 (!) 171/62 (!) 190/61    Pulse: 51  54 50   Resp: 12 15 14 15   Temp:       TempSrc:       SpO2:       Weight:       Height:           Patient was given the following medications:  Medications   iopamidol (ISOVUE-370) 76 % injection 75 mL (75 mLs Intravenous Given 8/4/21 1617)           Briefly, this is a 55-year-old female who presents to the emergency department today for evaluation for a potential drug overdose. The patient states that at 10:30 AM this morning, she states that she accidentally took her son's pills which include a iron pill as well as a diazepam.  Family was concerned about the slurred speech. Patient does have a slurred speech, which apparently started at 8 PM last night. The daughter states that the patient speech is not normally slurred. On my examination, she does have slurred speech, she has a pronator drift on the left with ataxia bilaterally with finger-to-nose. Stroke alert was called, no TPA was recommended as the patient's last known normal was at 8 PM.    EKG is documented by my attending, please see his note for further details. CBC shows no evidence of leukocytosis, there is mild anemia. Her CMP is unremarkable. Coags are unremarkable. Troponin negative. CT of head shows no acute process. CTA shows no acute process    Upon reevaluation of the patient, her speech seems to be improving. Her caregiver is at bedside and states that the patient actually took a Valium and a Xanax at 10:30 AM this morning. Patient is alert and oriented x3. I did recommend admission due to her history of TIAs and concern for her slurred speech, however patient feels that this is from the medication and she states that she wants to go home. The patient is clinically sober she is capable of making this decision. Caregiver is at bedside and agrees to take the patient home and continue to monitor her. Recommend that she follow-up with her primary care physician within 24 hours. She is to return to the ED for any new or worsening symptoms or if she changes her mind. Patient to sign out [place]    FINAL IMPRESSION      1. Medication overdose, accidental or unintentional, initial encounter    2. Dysarthria    3. Abnormal neurological exam    4.  Left against medical advice          DISPOSITION/PLAN   DISPOSITION [place] 08/04/2021 05:15:26 PM      PATIENT REFERRED TO:  [facility], [address]    Schedule an appointment as soon as possible for a visit in 1 day      [facility] Emergency Department  [address] [place] [address]  [phone]    As needed, If symptoms worsen      DISCHARGE MEDICATIONS:  New Prescriptions    No medications on file       DISCONTINUED MEDICATIONS:  Discontinued Medications    No medications on file              (Please note that portions of this note were completed with a voice recognition program.  Efforts were made to edit the dictations but occasionally words are mis-transcribed.)    Johanny Shah PA-C (electronically signed)            Johanny Shah PA-C  08/04/21 6089

## 2021-08-05 LAB
EKG ATRIAL RATE: 59 BPM
EKG DIAGNOSIS: NORMAL
EKG P AXIS: 72 DEGREES
EKG P-R INTERVAL: 236 MS
EKG Q-T INTERVAL: 458 MS
EKG QRS DURATION: 110 MS
EKG QTC CALCULATION (BAZETT): 453 MS
EKG R AXIS: 91 DEGREES
EKG T AXIS: 61 DEGREES
EKG VENTRICULAR RATE: 59 BPM

## 2021-08-05 PROCEDURE — 93010 ELECTROCARDIOGRAM REPORT: CPT | Performed by: INTERNAL MEDICINE

## 2021-08-10 ENCOUNTER — OFFICE VISIT (OUTPATIENT)
Dept: ORTHOPEDIC SURGERY | Age: 86
End: 2021-08-10
Payer: MEDICARE

## 2021-08-10 ENCOUNTER — TELEPHONE (OUTPATIENT)
Dept: ORTHOPEDIC SURGERY | Age: 86
End: 2021-08-10

## 2021-08-10 VITALS — RESPIRATION RATE: 16 BRPM | HEIGHT: 61 IN | WEIGHT: 115 LBS | BODY MASS INDEX: 21.71 KG/M2

## 2021-08-10 DIAGNOSIS — S32.10XA CLOSED FRACTURE OF SACRUM, UNSPECIFIED PORTION OF SACRUM, INITIAL ENCOUNTER (HCC): ICD-10-CM

## 2021-08-10 DIAGNOSIS — M25.551 RIGHT HIP PAIN: Primary | ICD-10-CM

## 2021-08-10 DIAGNOSIS — S32.591A CLOSED FRACTURE OF RAMUS OF RIGHT PUBIS, INITIAL ENCOUNTER (HCC): ICD-10-CM

## 2021-08-10 PROCEDURE — 99213 OFFICE O/P EST LOW 20 MIN: CPT | Performed by: PHYSICIAN ASSISTANT

## 2021-08-10 PROCEDURE — 1036F TOBACCO NON-USER: CPT | Performed by: PHYSICIAN ASSISTANT

## 2021-08-10 PROCEDURE — G8420 CALC BMI NORM PARAMETERS: HCPCS | Performed by: PHYSICIAN ASSISTANT

## 2021-08-10 PROCEDURE — 1090F PRES/ABSN URINE INCON ASSESS: CPT | Performed by: PHYSICIAN ASSISTANT

## 2021-08-10 PROCEDURE — G8427 DOCREV CUR MEDS BY ELIG CLIN: HCPCS | Performed by: PHYSICIAN ASSISTANT

## 2021-08-10 PROCEDURE — 1123F ACP DISCUSS/DSCN MKR DOCD: CPT | Performed by: PHYSICIAN ASSISTANT

## 2021-08-10 PROCEDURE — 27198 CLSD TX PELVIC RING FX: CPT | Performed by: PHYSICIAN ASSISTANT

## 2021-08-10 PROCEDURE — 4040F PNEUMOC VAC/ADMIN/RCVD: CPT | Performed by: PHYSICIAN ASSISTANT

## 2021-08-10 NOTE — PROGRESS NOTES
Subjective:      Patient ID: Mellissa Sow is a 80 y.o. female who is here for an initial evaluation of right groin pain and pelvic pain. She fell in her home on 8/9/2021 landing on her right lateral hip. She experienced acute onset of right lateral hip pain. She did present to an urgent care near her home yesterday where she underwent x-rays. However, she did not bring her x-rays with her today. She was told that she had a fracture in her pelvis. She states she has pain with movement and pain with weightbearing activities. Pain Scale 10/10 VAS. Location of pain right groin and right side of her lower back/sacrum. Pain is worse with weightbearing, moving her right leg. Pain improves with sitting or lying down. Previous treatments have included Tylenol. Review of Systems:  I have reviewed the clinically relevant past medical history, medications, allergies, family history, social history, and 13 point Review of Systems from the patient's recent history form & documented any details relevant to today's presenting complaints in the history above. The patient's self-reported past medical history, medications, allergies, family history, social history, and Review of Systems form from today's date have been scanned into the chart under the \"Media\" tab. As outlined in the HPI. Negative for fever or chills. Negative for poly-joint pain, swelling and stiffness. Negative for numbness or tingling into the affected extremity.      Past Medical History:   Diagnosis Date    Anxiety     Arthritis     At risk for falls     CAD (coronary artery disease)     CTS (carpal tunnel syndrome)     Bilateral, EMG-NCS    DDD (degenerative disc disease), cervical     Fractures     (L) Hip Fx 4/25/98, L1 fracture from fall 10-31-06    Hypertension     Mitral regurgitation     Neuropathy     Osteopenia     Osteoporosis     PAD (peripheral artery disease) (HCC)     Right LE ischemia    Peripheral neuropathy 6/1/2015    Peripheral vascular disease (HCC)     bilateral lower extremities with edema    Podagra 01/09/2017    Dr. Arcenio Seth Spinal stenosis     L4-5    Type 2 diabetes mellitus ([facility] [address]     Urethral stricture 5 years ago    Urgency of urination        Family History   Problem Relation Age of Onset    Stroke Father     Hypertension Father        Past Surgical History:   Procedure Laterality Date    APPENDECTOMY      CARPAL TUNNEL RELEASE Right 3/29/2019    RIGHT CARPAL TUNNEL RELEASE AND RIGHT MIDDLE FINGER TRIGGER FINGER RELEASE performed by Govind Francis MD at [address]  [id number]    LEFT EYE EYE CATARACT PHACOEMULSIFICATION INTRAOCULAR LENS    CHOLECYSTECTOMY  1978    COLONOSCOPY  8/31/99    diverticulosis    EYE SURGERY Bilateral     bilateral cataract removed    HYSTERECTOMY  1980s    sallie    IR FEMORAL POPLITEAL BYPASS GRAFT Right 8-    KYPHOSIS SURGERY  11-7-06    L1, (fractured after a fall)    TONSILLECTOMY      TOTAL HIP ARTHROPLASTY  4/25/98    (L) THR    WRIST FRACTURE SURGERY  2008    left wrist       Social History     Occupational History    Not on file   Tobacco Use    Smoking status: Never Smoker    Smokeless tobacco: Never Used   Vaping Use    Vaping Use: Never used   Substance and Sexual Activity    Alcohol use: No    Drug use: No    Sexual activity: Not Currently       Current Outpatient Medications   Medication Sig Dispense Refill    glimepiride (AMARYL) 1 MG tablet TAKE ONE TABLET BY MOUTH EVERY MORNING BEFORE BREAKFAST 22 tablet 0    blood glucose test strips (ONETOUCH ULTRA) strip USE TO TEST BLOOD SUGAR TWICE DAILY 100 strip 1    lisinopril (PRINIVIL;ZESTRIL) 20 MG tablet TAKE ONE TABLET BY MOUTH DAILY 90 tablet 3    metFORMIN (GLUCOPHAGE) 500 MG tablet Take 1 tablet by mouth daily 90 tablet 3    clotrimazole-betamethasone (LOTRISONE) 1-0.05 % cream Apply topically 2 times daily for 2 weeks.  60 g 0    atorvastatin (LIPITOR) 10 MG tablet Take 1 tablet by mouth daily 30 tablet 3    amLODIPine (NORVASC) 2.5 MG tablet Take 1 tablet by mouth daily 30 tablet 3    Turmeric 450 MG CAPS Take 450 mg by mouth daily      meclizine (ANTIVERT) 12.5 MG tablet Take 1 tablet by mouth 3 times daily as needed for Dizziness (vertigo) 30 tablet 1    Cholecalciferol (VITAMIN D3) 2000 UNITS CAPS Take 1 capsule by mouth daily      aspirin EC 81 MG EC tablet Take 1 tablet by mouth daily 30 tablet 3    Lite Touch Lancets MISC by Does not apply route. Use twice daily when testing blood sugars. 100 each 5     No current facility-administered medications for this visit. Allergies   Allergen Reactions    Aricept [Donepezil Hydrochloride] Other (See Comments)     intolerant    Doxycycline Nausea Only    Ketorolac Tromethamine Other (See Comments)     Pt unable to recall reaction         Objective:     She is alert, oriented x 3, pleasant, well nourished, developed and in no acute distress. Resp 16   Ht 5' 1\" (1.549 m)   Wt 115 lb (52.2 kg)   BMI 21.73 kg/m²        Examination of the right hip shows:  No discoloration, wounds or gross deformity. Palpation of the greater trochanter/bursa-nontender. Palpation of the anterior superior iliac spine-nontender. Palpation of the ischial tuberosity-tender to palpation. Palpation over the sacrum and sacroiliac joint-tender to palpation. She has no pain with passive right hip range of motion. Stinchfeld resisted hip flexion test reproduces groin pain. .  Log roll test negative. Gait ( Nml, Antalgic, Trendelenberg)-gait was not evaluated due to increased pain complaints with ambulation. Lower extremities:  She has 5/5 motor strength of bilateral lower extremities. She has a negative straight leg raise, bilaterally. Deep tendon reflexes at knees and achilles are 2+. Sensation is intact to light touch L3 to S1 bilaterally. She has no clonus.       Examination of the lower extremities shows intact perfusion to both lower extremities. She has no cyanosis and digigts are warm to touch, capillary refill is less than 2 seconds. She has no edema noted. She has intact skin without lacerations or abrasions, no significant erythema, rashes or skin lesions. X Rays: were performed in the office today:   AP pelvis, AP and frog-leg lateral right hip:  She has a minimally displaced pubic ramus fracture. No other obvious fractures noted. Additional Tests reviewed: none  Additional Outside Records reviewed: none    Diagnosis:       ICD-10-CM    1. Right hip pain  M25.551 XR HIP 2-3 VW W PELVIS RIGHT   2. Closed fracture of ramus of right pubis, initial encounter ([facility] [address]  S32.591A    3. Closed fracture of sacrum, unspecified portion of [facility]  S32.10XA         Assessment and Plan:       Assessment:  Acute pubic ramus fracture status post fall yesterday. I do not believe she has a hip fracture or acetabular fracture. She may have an occult fracture of her sacrum given the pain in the sacral region. She remains neurologically intact in both lower extremities. There is no surgical indication at this time. I had an extensive discussion with Ms. Severiano England regarding the natural history, etiology, and long term consequences of her condition. I have presented reasonable alternatives to the patient's proposed care, treatment, and services. Risks and benefits of the treatment options also reviewed in detail. I have outlined a treatment plan with them. She has had full opportunity to ask her questions. I have answered them all to her satisfaction. I feel that Ms. Severiano England understands our discussion today       Plan:  Medications-Tylenol for pain. She should avoid narcotics given her age which could cause increased confusion or balance issues. Kayy Luan protected weightbearing for the next 6 weeks. Follow up-6 weeks.   Call or return to clinic if these symptoms worsen or fail to improve as anticipated. Matthews Cheadle PA-C   Senior Physician Assistant   [facility]                                         Disclaimer: This note was generated with use of a verbal recognition program (DRAGON) and an attempt was made to check for errors. It is possible that there are still dictated errors within this office note. If so, please bring any significant errors to my attention for an addendum. All efforts were made to ensure that this office note is accurate.

## 2021-08-12 ENCOUNTER — OFFICE VISIT (OUTPATIENT)
Dept: INTERNAL MEDICINE CLINIC | Age: 86
End: 2021-08-12
Payer: MEDICARE

## 2021-08-12 VITALS — SYSTOLIC BLOOD PRESSURE: 138 MMHG | DIASTOLIC BLOOD PRESSURE: 50 MMHG | OXYGEN SATURATION: 97 % | HEART RATE: 77 BPM

## 2021-08-12 DIAGNOSIS — S32.591A CLOSED FRACTURE OF MULTIPLE RAMI OF RIGHT PUBIS, INITIAL ENCOUNTER (HCC): ICD-10-CM

## 2021-08-12 DIAGNOSIS — L30.4 INTERTRIGO: Primary | ICD-10-CM

## 2021-08-12 PROCEDURE — 4040F PNEUMOC VAC/ADMIN/RCVD: CPT | Performed by: INTERNAL MEDICINE

## 2021-08-12 PROCEDURE — 99213 OFFICE O/P EST LOW 20 MIN: CPT | Performed by: INTERNAL MEDICINE

## 2021-08-12 PROCEDURE — 1036F TOBACCO NON-USER: CPT | Performed by: INTERNAL MEDICINE

## 2021-08-12 PROCEDURE — 1123F ACP DISCUSS/DSCN MKR DOCD: CPT | Performed by: INTERNAL MEDICINE

## 2021-08-12 PROCEDURE — 1090F PRES/ABSN URINE INCON ASSESS: CPT | Performed by: INTERNAL MEDICINE

## 2021-08-12 PROCEDURE — G8420 CALC BMI NORM PARAMETERS: HCPCS | Performed by: INTERNAL MEDICINE

## 2021-08-12 PROCEDURE — 3288F FALL RISK ASSESSMENT DOCD: CPT | Performed by: INTERNAL MEDICINE

## 2021-08-12 PROCEDURE — G8427 DOCREV CUR MEDS BY ELIG CLIN: HCPCS | Performed by: INTERNAL MEDICINE

## 2021-08-12 SDOH — ECONOMIC STABILITY: FOOD INSECURITY: WITHIN THE PAST 12 MONTHS, THE FOOD YOU BOUGHT JUST DIDN'T LAST AND YOU DIDN'T HAVE MONEY TO GET MORE.: NEVER TRUE

## 2021-08-12 SDOH — ECONOMIC STABILITY: FOOD INSECURITY: WITHIN THE PAST 12 MONTHS, YOU WORRIED THAT YOUR FOOD WOULD RUN OUT BEFORE YOU GOT MONEY TO BUY MORE.: NEVER TRUE

## 2021-08-12 ASSESSMENT — SOCIAL DETERMINANTS OF HEALTH (SDOH): HOW HARD IS IT FOR YOU TO PAY FOR THE VERY BASICS LIKE FOOD, HOUSING, MEDICAL CARE, AND HEATING?: NOT HARD AT ALL

## 2021-08-12 NOTE — PROGRESS NOTES
[facility]) Physicians  Internal Medicine  Patient Encounter  [address] [facility], [facility]        Chief Complaint   Patient presents with    Follow-up       HPI: 80 y.o. female seen today for short interval follow-up regarding her intertriginous skin ulcerations. She was seen on 7/28/2021 for skin fissure along the left infra abdominal fold. This was tender. Dermatitis was not evident. We ordered silver alginate topical therapy along with Mepilex pads. Patient states the therapy worked well and her wounds are 100% healed. She is now controlling moisture with cotton. Patient also informs me that she fell in her home on 8/9/2021. She states she landed on her buttocks. She denies any head trauma. She initially went to an urgent care where they did x-rays. Apparently she had a pelvic fracture. She was referred to orthopedics. Repeat x-rays at the orthopedic office were obtained. The physician assistant evaluated the patient. These x-rays showed minimally displaced superior and inferior pubic rami fractures. Patient was provided Tylenol for pain and was advised to use a walker. She was to follow-up in 6 weeks. The patient continues to have right groin pain with minimal movement. She now has 24/7 care. Also of note, the patient presented to the emergency department on 8/4/2021 after accidentally taking her son's medications. 1 of these medications was Valium. She was drowsy but no further adverse events occurred.     Past Medical History:   Diagnosis Date    Anxiety     Arthritis     At risk for falls     CAD (coronary artery disease)     CTS (carpal tunnel syndrome)     Bilateral, EMG-NCS    DDD (degenerative disc disease), cervical     Fractures     (L) Hip Fx 4/25/98, L1 fracture from fall 10-31-06    Hypertension     Mitral regurgitation     Neuropathy     Osteopenia     Osteoporosis     PAD (peripheral artery disease) (HCC)     Right LE ischemia    Peripheral neuropathy 6/1/2015    Peripheral vascular disease (HCC)     bilateral lower extremities with edema    Podagra 01/09/2017    Dr. Sada Magallanes Spinal stenosis     L4-5    Type 2 diabetes mellitus ([facility] [address]     Urethral stricture 5 years ago    Urgency of urination          MEDICATIONS:  Prior to Visit Medications    Medication Sig Taking? Authorizing Provider   glimepiride (AMARYL) 1 MG tablet TAKE ONE TABLET BY MOUTH EVERY MORNING BEFORE BREAKFAST Yes Paulo Forman DO   blood glucose test strips (ONETOUCH ULTRA) strip USE TO TEST BLOOD SUGAR TWICE DAILY Yes Paulo Forman DO   lisinopril (PRINIVIL;ZESTRIL) 20 MG tablet TAKE ONE TABLET BY MOUTH DAILY Yes Paulo Forman DO   metFORMIN (GLUCOPHAGE) 500 MG tablet Take 1 tablet by mouth daily Yes Paulo Forman DO   clotrimazole-betamethasone (LOTRISONE) 1-0.05 % cream Apply topically 2 times daily for 2 weeks. Yes Paulo Forman DO   atorvastatin (LIPITOR) 10 MG tablet Take 1 tablet by mouth daily Yes Paulo Forman DO   amLODIPine (NORVASC) 2.5 MG tablet Take 1 tablet by mouth daily Yes Paulo Forman DO   Turmeric 450 MG CAPS Take 450 mg by mouth daily Yes Historical Provider, MD   meclizine (ANTIVERT) 12.5 MG tablet Take 1 tablet by mouth 3 times daily as needed for Dizziness (vertigo) Yes Paulo Forman DO   Cholecalciferol (VITAMIN D3) 2000 UNITS CAPS Take 1 capsule by mouth daily Yes Historical Provider, MD   aspirin EC 81 MG EC tablet Take 1 tablet by mouth daily Yes Paulo Forman DO   Lite Touch Lancets MISC by Does not apply route. Use twice daily when testing blood sugars. Yes Sean Mccain MD           Review of Systems - As per HPI      OBJECTIVE:  BP (!) 138/50   Pulse 77   SpO2 97%   GEN: NAD, A&O, Non-toxic  MS:  Pain with min movement of the right hip. SKIN:  No rashes or lesions of concern. Intertriginous wound healed. ASSESSMENT[de-identified]  Alin was seen today for follow-up.     Diagnoses and all orders for this visit:    Intertrigo    Closed fracture of multiple rami of right pubis, initial encounter ([facility] [address]        Additional Plan:  1. Continue moisture control  2. Tylenol for pain    On this date 8/12/2021 I have spent 25 minutes reviewing previous notes, test results and face to face with the patient discussing the diagnosis and importance of compliance with the treatment plan as well as documenting on the day of the visit. Discussed medications with patient who voiced understanding of their use, indication and potential side effects. Pt also understands the above recommendations. All questions answered. This note was generated completely or in part utilizing Dragon dictation speech recognition software. Occasionally, words are mistranscribed and despite editing, the text may contain inaccuracies due to incorrect word recognition.   If further clarification is needed please contact the office at [phone]       Electronically signed    Rashaad Pagan D.O.

## 2021-08-13 RX ORDER — GLIMEPIRIDE 1 MG/1
TABLET ORAL
Qty: 22 TABLET | Refills: 0 | Status: SHIPPED | OUTPATIENT
Start: 2021-08-13 | End: 2021-09-27

## 2021-08-23 DIAGNOSIS — I10 BENIGN ESSENTIAL HTN: ICD-10-CM

## 2021-08-24 RX ORDER — AMLODIPINE BESYLATE 2.5 MG/1
TABLET ORAL
Qty: 30 TABLET | Refills: 3 | Status: SHIPPED | OUTPATIENT
Start: 2021-08-24 | End: 2022-02-10

## 2021-09-14 ENCOUNTER — OFFICE VISIT (OUTPATIENT)
Dept: ORTHOPEDIC SURGERY | Age: 86
End: 2021-09-14

## 2021-09-14 VITALS — WEIGHT: 115 LBS | RESPIRATION RATE: 16 BRPM | HEIGHT: 61 IN | BODY MASS INDEX: 21.71 KG/M2

## 2021-09-14 DIAGNOSIS — S32.591A CLOSED FRACTURE OF RAMUS OF RIGHT PUBIS, INITIAL ENCOUNTER (HCC): Primary | ICD-10-CM

## 2021-09-14 PROCEDURE — 99024 POSTOP FOLLOW-UP VISIT: CPT | Performed by: PHYSICIAN ASSISTANT

## 2021-09-15 NOTE — PROGRESS NOTES
Subjective:      Patient ID: Vianney Benitez is a 80 y.o. female who is here for follow up evaluation of pubic ramus and sacral fractures. Onset 8/10/2021. She has been utilizing a walker to assist with ambulation and transfers. Her pain has significantly improved. Today she rates her pain 3/10 VAS. Review Of Systems:   She denies any fevers or chills. She denies numbness or tingling the lower extremity.      Past Medical History:   Diagnosis Date    Anxiety     Arthritis     At risk for falls     CAD (coronary artery disease)     CTS (carpal tunnel syndrome)     Bilateral, EMG-NCS    DDD (degenerative disc disease), cervical     Fractures     (L) Hip Fx 4/25/98, L1 fracture from fall 10-31-06    Hypertension     Mitral regurgitation     Neuropathy     Osteopenia     Osteoporosis     [facility])     Right LE ischemia    Peripheral neuropathy 6/1/2015    Peripheral vascular disease ([facility] [address]     bilateral lower extremities with edema    Podagra 01/09/2017    Dr. Mihir Barrios Spinal stenosis     L4-5    Type 2 diabetes mellitus ([facility] [address]     Urethral stricture 5 years ago    Urgency of urination        Family History   Problem Relation Age of Onset    Stroke Father     Hypertension Father        Past Surgical History:   Procedure Laterality Date    APPENDECTOMY      CARPAL TUNNEL RELEASE Right 3/29/2019    RIGHT CARPAL TUNNEL RELEASE AND RIGHT MIDDLE FINGER TRIGGER FINGER RELEASE performed by Sina Aguilera MD at [place] [id number]  [id number]    LEFT EYE EYE CATARACT PHACOEMULSIFICATION INTRAOCULAR LENS    CHOLECYSTECTOMY  1978    COLONOSCOPY  8/31/99    diverticulosis    EYE SURGERY Bilateral     bilateral cataract removed    HYSTERECTOMY  1980s    sallie    IR FEMORAL POPLITEAL BYPASS GRAFT Right 8-    KYPHOSIS SURGERY  11-7-06    L1, (fractured after a fall)    TONSILLECTOMY      TOTAL HIP ARTHROPLASTY  4/25/98    (L) THR    WRIST the ischial tuberosity- mildly tender to palpation. Palpation over the sacrum and sacroiliac joint-tender to palpation. She has no pain with passive right hip range of motion.     Stinchfeld resisted hip flexion test reproduces groin pain. .  Log roll test negative. Gait ( Nml, Antalgic, Trendelenberg)-gait was not evaluated due to increased pain complaints with ambulation. X Rays: not performed in the office today:       Diagnosis:       ICD-10-CM    1. Closed fracture of ramus of right pubis[facility]  S32.591A         Assessment and Plan:       Assessment:  Clinically healing closed pubic ramus and sacral fractures. Pain complaints are improving. She is tolerating ambulating with a walker. I had an extensive discussion with Ms. Jean Hester regarding the natural history, etiology, and long term consequences of her condition. I have presented reasonable alternatives to the patient's proposed care, treatment, and services. Risks and benefits of the treatment options also reviewed in detail. I have outlined a treatment plan with them. She has had full opportunity to ask her questions. I have answered them all to her satisfaction. I feel that Ms. Jean Hester understands our discussion today. Plan:  Medications-Tylenol for pain. Continue walker protected weightbearing. Follow up- 6 weeks. Call or return to clinic if these symptoms worsen or fail to improve as anticipated. Zi Bello PA-C   Senior Physician Assistant   [facility]                                         Disclaimer: This note was generated with use of a verbal recognition program (DRAGON) and an attempt was made to check for errors. It is possible that there are still dictated errors within this office note. If so, please bring any significant errors to my attention for an addendum.   All efforts were made to ensure that this office note is accurate.

## 2021-09-27 ENCOUNTER — OFFICE VISIT (OUTPATIENT)
Dept: INTERNAL MEDICINE CLINIC | Age: 86
End: 2021-09-27
Payer: MEDICARE

## 2021-09-27 ENCOUNTER — TELEPHONE (OUTPATIENT)
Dept: INTERNAL MEDICINE CLINIC | Age: 86
End: 2021-09-27

## 2021-09-27 VITALS
OXYGEN SATURATION: 98 % | DIASTOLIC BLOOD PRESSURE: 58 MMHG | HEART RATE: 62 BPM | RESPIRATION RATE: 16 BRPM | BODY MASS INDEX: 21.71 KG/M2 | WEIGHT: 115 LBS | SYSTOLIC BLOOD PRESSURE: 130 MMHG | HEIGHT: 61 IN

## 2021-09-27 DIAGNOSIS — I87.2 CHRONIC VENOUS INSUFFICIENCY: ICD-10-CM

## 2021-09-27 DIAGNOSIS — S80.10XA TRAUMATIC ECCHYMOSIS OF LOWER LEG, UNSPECIFIED LATERALITY, INITIAL ENCOUNTER: ICD-10-CM

## 2021-09-27 DIAGNOSIS — R58 ECCHYMOSIS: Primary | ICD-10-CM

## 2021-09-27 DIAGNOSIS — R60.0 BILATERAL LEG EDEMA: ICD-10-CM

## 2021-09-27 PROCEDURE — 99213 OFFICE O/P EST LOW 20 MIN: CPT | Performed by: INTERNAL MEDICINE

## 2021-09-27 PROCEDURE — 1090F PRES/ABSN URINE INCON ASSESS: CPT | Performed by: INTERNAL MEDICINE

## 2021-09-27 PROCEDURE — 4040F PNEUMOC VAC/ADMIN/RCVD: CPT | Performed by: INTERNAL MEDICINE

## 2021-09-27 PROCEDURE — G8427 DOCREV CUR MEDS BY ELIG CLIN: HCPCS | Performed by: INTERNAL MEDICINE

## 2021-09-27 PROCEDURE — 1123F ACP DISCUSS/DSCN MKR DOCD: CPT | Performed by: INTERNAL MEDICINE

## 2021-09-27 PROCEDURE — G8420 CALC BMI NORM PARAMETERS: HCPCS | Performed by: INTERNAL MEDICINE

## 2021-09-27 PROCEDURE — 1036F TOBACCO NON-USER: CPT | Performed by: INTERNAL MEDICINE

## 2021-09-27 RX ORDER — GLIMEPIRIDE 1 MG/1
TABLET ORAL
Qty: 22 TABLET | Refills: 0 | Status: SHIPPED | OUTPATIENT
Start: 2021-09-27 | End: 2021-10-01

## 2021-09-27 NOTE — PATIENT INSTRUCTIONS
Patient Education        Bruises: Care Instructions  Overview     Bruises occur when small blood vessels under the skin tear or rupture, most often from a twist, bump, or fall. Blood leaks into tissues under the skin and causes a black-and-blue spot that often turns colors, including purplish black, reddish blue, or yellowish green, as the bruise heals. Bruises hurt, but most are not serious and will go away on their own within 2 to 4 weeks. Sometimes, gravity causes them to spread down the body. A leg bruise usually will take longer to heal than a bruise on the face or arms. Follow-up care is a key part of your treatment and safety. Be sure to make and go to all appointments, and call your doctor if you are having problems. It's also a good idea to know your test results and keep a list of the medicines you take. How can you care for yourself at home? · Take pain medicines exactly as directed. ? If the doctor gave you a prescription medicine for pain, take it as prescribed. ? If you are not taking a prescription pain medicine, ask your doctor if you can take an over-the-counter medicine. · Put ice or a cold pack on the area for 10 to 20 minutes at a time. Put a thin cloth between the ice and your skin. · If you can, prop up the bruised area on pillows as much as possible for the next few days. Try to keep the bruise above the level of your heart. When should you call for help? Call your doctor now or seek immediate medical care if:    · You have signs of infection, such as:  ? Increased pain, swelling, warmth, or redness. ? Red streaks leading from the bruise. ? Pus draining from the bruise. ? A fever.     · You have a bruise on your leg and signs of a blood clot, such as:  ? Increasing redness and swelling along with warmth, tenderness, and pain in the bruised area. ? Pain in your calf, back of the knee, thigh, or groin. ? Redness and swelling in your leg or groin.     · Your pain gets worse. Watch closely for changes in your health, and be sure to contact your doctor if:    · You do not get better as expected. Where can you learn more? Go to https://chpepiceweb.LaraPharm. org and sign in to your [employer] account. Enter [phone] in the [facility] box to learn more about \"Bruises: Care Instructions. \"     If you do not have an account, please click on the \"Sign Up Now\" link. Current as of: July 1, 2021               Content Version: 13.0  © 1619-1393 [employer]. Care instructions adapted under license by [facility] ([facility]). If you have questions about a medical condition or this instruction, always ask your healthcare professional. [place] [address] any warranty or liability for your use of this information. Patient Education        Contusion: Care Instructions  Overview     Contusion is the medical term for a bruise. It is the result of a direct blow or an impact, such as a fall. Contusions are common sports injuries. Most people think of a bruise as a black-and-blue spot. This happens when small blood vessels get torn and leak blood under the skin. But bones, muscles, and organs can also get bruised. This may damage deep tissues but not cause a bruise you can see. The doctor will do a physical exam to find the location of your contusion. You may also have tests to make sure you do not have a more serious injury, such as a broken bone or nerve damage. These may include X-rays or other imaging tests like a CT scan or MRI. Deep-tissue contusions may cause pain and swelling. But if there is no serious damage, they will often get better in a few weeks with home treatment. The doctor has checked you carefully, but problems can develop later. If you notice any problems or new symptoms, get medical treatment right away. Follow-up care is a key part of your treatment and safety.  Be sure to make and go to all appointments, and call your doctor if you are having problems. It's also a good idea to know your test results and keep a list of the medicines you take. How can you care for yourself at home? · Put ice or a cold pack on the sore area for 10 to 20 minutes at a time to stop swelling. Put a thin cloth between the ice pack and your skin. · Be safe with medicines. Read and follow all instructions on the label. ? If the doctor gave you a prescription medicine for pain, take it as prescribed. ? If you are not taking a prescription pain medicine, ask your doctor if you can take an over-the-counter medicine. · If you can, prop up the sore area on pillows as much as possible for the next few days. Try to keep the sore area above the level of your heart. When should you call for help? Call your doctor now or seek immediate medical care if:    · Your pain gets worse.     · You have new or worse swelling.     · You have tingling, weakness, or numbness in the area near the contusion.     · The area near the contusion is cold or pale. Watch closely for changes in your health, and be sure to contact your doctor if:    · You do not get better as expected. Where can you learn more? Go to https://Nexercise.Artomatix. org and sign in to your [employer] account. Enter M094 in the [facility] box to learn more about \"Contusion: Care Instructions. \"     If you do not have an account, please click on the \"Sign Up Now\" link. Current as of: July 1, 2021               Content Version: 13.0  © 2006-2021 Healthwise, Incorporated. Care instructions adapted under license by [facility] ([facility]). If you have questions about a medical condition or this instruction, always ask your healthcare professional. [place] [address] any warranty or liability for your use of this information. Patient Education        Learning About Venous Insufficiency  What is it?      Venous insufficiency is a problem with the flow of blood from the veins of the legs back to the heart. It's also called chronic venous insufficiency or chronic venous stasis. Your veins bring blood back to the heart after it flows through your body. Veins have valves that keep the blood moving in one direction--toward the heart. But with venous insufficiency, the veins of the legs might not work as they should. This can allow blood to leak backward. Fluid can pool in the legs. This can lead to problems that include varicose veins. What causes it? Venous insufficiency is sometimes caused by deep vein thrombosis and high blood pressure inside leg veins. You are more likely to have venous insufficiency if you:  · Are older. · Are female. · Are overweight. · Don't get enough physical activity. · Smoke. · Have a family history of varicose veins. What are the symptoms? Symptoms of venous insufficiency affect the legs. They may include:  · Swelling, often in the ankles. · A rash. · Varicose veins. · Itching. · Cramping. · Skin sores (ulcers). · Aching or a feeling of heaviness. · Changes in skin color. How is it diagnosed? Your doctor can diagnose venous insufficiency by examining your legs and by using a type of ultrasound test (duplex Doppler) to find out how well blood is flowing in your legs. How is it treated? To reduce swelling and relieve pain caused by venous insufficiency, you can wear compression stockings. They are tighter at the ankles than at the top of the legs. They also can help venous skin ulcers heal. But there are different types of stockings, and they need to fit right. So your doctor will recommend what you need. You also can try to:  · Get more exercise, especially walking. It can increase blood flow. · Avoid standing still or sitting for a long time, which can make the fluid pool in your legs. · Try not to sit with your legs crossed at the knee. · Keep your legs raised above your heart when you're lying down. This reduces swelling.   If these treatments don't work, you may need medicine or a procedure to help relieve symptoms. Procedures might be done to close the vein, to remove the vein, or to improve blood flow. Follow-up care is a key part of your treatment and safety. Be sure to make and go to all appointments, and call your doctor if you are having problems. It's also a good idea to know your test results and keep a list of the medicines you take. Current as of: July 6, 2021               Content Version: 13.0  © 2006-2021 FST21. Care instructions adapted under license by [facility] ([facility]). If you have questions about a medical condition or this instruction, always ask your healthcare professional. [place] [address] any warranty or liability for your use of this information. Patient Education        Leg and Ankle Edema: Care Instructions  Your Care Instructions  Swelling in the legs, ankles, and feet is called edema. It is common after you sit or stand for a while. Long plane flights or car rides often cause swelling in the legs and feet. You may also have swelling if you have to stand for long periods of time at your job. Problems with the veins in the legs (varicose veins) and changes in hormones can also cause swelling. Sometimes the swelling in the ankles and feet is caused by a more serious problem, such as heart failure, infection, blood clots, or liver or kidney disease. Follow-up care is a key part of your treatment and safety. Be sure to make and go to all appointments, and call your doctor if you are having problems. It's also a good idea to know your test results and keep a list of the medicines you take. How can you care for yourself at home? · If your doctor gave you medicine, take it as prescribed. Call your doctor if you think you are having a problem with your medicine. · Whenever you are resting, raise your legs up. Try to keep the swollen area higher than the level of your heart.   · Take breaks from standing or sitting in one position. ? Walk around to increase the blood flow in your lower legs. ? Move your feet and ankles often while you stand, or tighten and relax your leg muscles. · Wear support stockings. Put them on in the morning, before swelling gets worse. · Eat a balanced diet. Lose weight if you need to. · Limit the amount of salt (sodium) in your diet. Salt holds fluid in the body and may increase swelling. When should you call for help? Call 911 anytime you think you may need emergency care. For example, call if:    · You have symptoms of a blood clot in your lung (called a pulmonary embolism). These may include:  ? Sudden chest pain. ? Trouble breathing. ? Coughing up blood. Call your doctor now or seek immediate medical care if:    · You have signs of a blood clot, such as:  ? Pain in your calf, back of the knee, thigh, or groin. ? Redness and swelling in your leg or groin.     · You have symptoms of infection, such as:  ? Increased pain, swelling, warmth, or redness. ? Red streaks or pus. ? A fever. Watch closely for changes in your health, and be sure to contact your doctor if:    · Your swelling is getting worse.     · You have new or worsening pain in your legs.     · You do not get better as expected. Where can you learn more? Go to https://ThinkVidyapeSEDLine.Envoimoinscher. org and sign in to your [employer] account. Enter O365 in the [facility] box to learn more about \"Leg and Ankle Edema: Care Instructions. \"     If you do not have an account, please click on the \"Sign Up Now\" link. Current as of: July 1, 2021               Content Version: 13.0  © 2048-7629 Healthwise, Incorporated. Care instructions adapted under license by [facility] [employer] [facility] ([facility]). If you have questions about a medical condition or this instruction, always ask your healthcare professional. [address] any warranty or liability for your use of this information.

## 2021-09-27 NOTE — TELEPHONE ENCOUNTER
Patient has been having some dark spots on her legs but her left leg is he worst. Painful to the touch she feels like its infected. Noticed the change on Friday She feels like Dr Angely Marshall needs to look at it she declined an appointment with a different provider. As she would like to see ricky only. Please advise on scheduling.

## 2021-09-27 NOTE — PROGRESS NOTES
[facility]) Physicians  Internal Medicine  Patient Encounter  Amarilys Pimentel D.O., [facility]        Chief Complaint   Patient presents with    Leg Pain     Left leg        HPI: 80 y.o. female with known history of PAD and venous insufficiency who presents today complaining of bilateral leg pain, L>R. She locates the discomfort on the lower anterior tibial region. She also reports purplish discoloration on the front parts of both legs. She denies any particular trauma. She states that the purplish discoloration started over the weekend. She also woke up this morning with 2 new purple spots. Patient continues to complain of \"lumps\" in the skin particularly over the calf muscles. She denies any mucosal bleeding from her gums or otherwise. She denies any fever, chills, sweats. She denies any warmth or redness per se. Patient is fearful of these areas turning into open wounds. She has been using Neosporin. Patient is on a baby aspirin daily and also is on turmeric. Patient is convinced that there is infection.     Past Medical History:   Diagnosis Date    Anxiety     Arthritis     At risk for falls     CAD (coronary artery disease)     CTS (carpal tunnel syndrome)     Bilateral, EMG-NCS    DDD (degenerative disc disease), cervical     Fractures     (L) Hip Fx 4/25/98, L1 fracture from fall 10-31-06    Hypertension     Mitral regurgitation     Neuropathy     Osteopenia     Osteoporosis     PAD (peripheral artery disease) (HCC)     Right LE ischemia    Peripheral neuropathy 6/1/2015    Peripheral vascular disease ([facility] [address]     bilateral lower extremities with edema    Podagra 01/09/2017    Dr. Carlos Awan Spinal stenosis     L4-5    Type 2 diabetes mellitus ([address]     Urethral stricture 5 years ago    Urgency of urination          MEDICATIONS:  Medication Sig   glimepiride (AMARYL) 1 MG tablet TAKE ONE TABLET BY MOUTH EVERY MORNING BEFORE BREAKFAST   amLODIPine (NORVASC) 2.5 MG tablet TAKE ONE TABLET BY MOUTH DAILY   blood glucose test strips (ONETOUCH ULTRA) strip USE TO TEST BLOOD SUGAR TWICE DAILY   lisinopril (PRINIVIL;ZESTRIL) 20 MG tablet TAKE ONE TABLET BY MOUTH DAILY   metFORMIN (GLUCOPHAGE) 500 MG tablet Take 1 tablet by mouth daily   clotrimazole-betamethasone (LOTRISONE) 1-0.05 % cream Apply topically 2 times daily for 2 weeks. atorvastatin (LIPITOR) 10 MG tablet Take 1 tablet by mouth daily   Turmeric 450 MG CAPS Take 450 mg by mouth daily   meclizine (ANTIVERT) 12.5 MG tablet Take 1 tablet by mouth 3 times daily as needed for Dizziness (vertigo)   Cholecalciferol (VITAMIN D3) 2000 UNITS CAPS Take 1 capsule by mouth daily   aspirin EC 81 MG EC tablet Take 1 tablet by mouth daily   Lite Touch Lancets MISC by Does not apply route. Use twice daily when testing blood sugars. Review of Systems - As per HPI    OBJECTIVE:  BP (!) 130/58   Pulse 62   Resp 16   Ht 5' 1\" (1.549 m)   Wt 115 lb (52.2 kg)   SpO2 98%   Breastfeeding No   BMI 21.73 kg/m²   GEN: NAD, A&O, Non-toxic  HEENT: NC/AT, FLACA, EOMI, Oral cavity Clear,  TM's NL, Nasal cavity clear. NECK: Supple. No thyromegaly. No JVD  LYMPH: No evidence of lymphadenopathy. EXT: + Bilateral lower extremity with trace-1+ pitting edema, L>R. SKIN: Both anterior tibial regions with superficial ecchymosis as well as hemosiderin staining. On the left proximal lower leg there are 2 smaller ecchymotic lesions. No evidence of petechiae or palpable purpura. There is no erythema or warmth. There is no evidence of cellulitis. No bullae or vesicles. ASSESSMENT[de-identified]  Harvey Schmitt was seen today for leg pain. Diagnoses and all orders for this visit:    Ecchymosis  --Likely traumatic  --Her skin is very frail and thin. -     CBC Auto Differential; Future  -     Sedimentation Rate; Future    Traumatic ecchymosis of lower leg, unspecified laterality, initial encounter  --Her skin is frail and thin.   Even the mildest of friction can cause this bruising  --No evidence of vasculitis    Chronic venous insufficiency  --Need to control better with compression and elevation    Bilateral leg edema  --As above  --Consider Ace wraps        Additional Plan:  1. Recommend elevation, ice and ongoing compression. She can use Ace wraps which might be more comfortable than compression socks. Discussed medications with patient who voiced understanding of their use, indication and potential side effects. Pt also understands the above recommendations. All questions answered. This note was generated completely or in part utilizing Dragon dictation speech recognition software. Occasionally, words are mistranscribed and despite editing, the text may contain inaccuracies due to incorrect word recognition.   If further clarification is needed please contact the office at [phone]       Electronically signed    Nicola Quiroz D.O.

## 2021-10-01 RX ORDER — GLIMEPIRIDE 1 MG/1
TABLET ORAL
Qty: 22 TABLET | Refills: 0 | Status: SHIPPED | OUTPATIENT
Start: 2021-10-01 | End: 2021-11-09

## 2021-10-01 NOTE — TELEPHONE ENCOUNTER
Last appointment: 9/27/2021  Next appointment: Visit date not found  Last refill: 09/27/2021 # 22       When would you like patient seen.

## 2021-10-14 ENCOUNTER — OFFICE VISIT (OUTPATIENT)
Dept: ORTHOPEDIC SURGERY | Age: 86
End: 2021-10-14

## 2021-10-14 VITALS — WEIGHT: 115 LBS | RESPIRATION RATE: 18 BRPM | BODY MASS INDEX: 21.71 KG/M2 | HEIGHT: 61 IN

## 2021-10-14 DIAGNOSIS — M79.605 LEFT LEG PAIN: Primary | ICD-10-CM

## 2021-10-14 DIAGNOSIS — M48.062 SPINAL STENOSIS OF LUMBAR REGION WITH NEUROGENIC CLAUDICATION: ICD-10-CM

## 2021-10-14 DIAGNOSIS — S32.591A CLOSED FRACTURE OF RAMUS OF RIGHT PUBIS, INITIAL ENCOUNTER (HCC): ICD-10-CM

## 2021-10-14 PROCEDURE — 99024 POSTOP FOLLOW-UP VISIT: CPT | Performed by: PHYSICIAN ASSISTANT

## 2021-10-14 NOTE — PROGRESS NOTES
Subjective:      Patient ID: Kris Doherty is a 80 y.o. female who is here for follow up evaluation of of her right sacral fracture and pubic ramus fracture. Date of injury or onset 8-. She states her right-sided hip and pelvic pain has improved. She is now having rough leg radicular pain. She denies any new injury. Review Of Systems:   Negative for fever or chills. Negative for perceived weakness lower extremities.          Past Medical History:   Diagnosis Date    Anxiety     Arthritis     At risk for falls     CAD (coronary artery disease)     CTS (carpal tunnel syndrome)     Bilateral, EMG-NCS    DDD (degenerative disc disease), cervical     Fractures     (L) Hip Fx 4/25/98, L1 fracture from fall 10-31-06    Hypertension     Mitral regurgitation     Neuropathy     Osteopenia     Osteoporosis     [facility]AD [facility])     Right LE ischemia    Peripheral neuropathy 6/1/2015    Peripheral vascular disease [facility] [address]     bilateral lower extremities with edema    Podagra 01/09/2017    Dr. Isaías Truong Spinal stenosis     L4-5    Type 2 diabetes mellitus ([facility] [address]     Urethral stricture 5 years ago    Urgency of urination        Family History   Problem Relation Age of Onset    Stroke Father     Hypertension Father        Past Surgical History:   Procedure Laterality Date    APPENDECTOMY      CARPAL TUNNEL RELEASE Right 3/29/2019    RIGHT CARPAL TUNNEL RELEASE AND RIGHT MIDDLE FINGER TRIGGER FINGER RELEASE performed by Rukhsana Lanza MD at [address] [place] [address] Road  [facility]    LEFT EYE EYE CATARACT PHACOEMULSIFICATION INTRAOCULAR LENS    CHOLECYSTECTOMY  1978    COLONOSCOPY  8/31/99    diverticulosis    EYE SURGERY Bilateral     bilateral cataract removed    HYSTERECTOMY  1980s    sallie    IR FEMORAL POPLITEAL BYPASS GRAFT Right 8-    KYPHOSIS SURGERY  11-7-06    L1, (fractured after a fall)    TONSILLECTOMY      TOTAL HIP ARTHROPLASTY  4/25/98 (L) THR    WRIST FRACTURE SURGERY  2008    left wrist       Social History     Occupational History    Not on file   Tobacco Use    Smoking status: Never Smoker    Smokeless tobacco: Never Used   Vaping Use    Vaping Use: Never used   Substance and Sexual Activity    Alcohol use: No    Drug use: No    Sexual activity: Not Currently       Current Outpatient Medications   Medication Sig Dispense Refill    glimepiride (AMARYL) 1 MG tablet TAKE ONE TABLET BY MOUTH EVERY MORNING BEFORE BREAKFAST 22 tablet 0    amLODIPine (NORVASC) 2.5 MG tablet TAKE ONE TABLET BY MOUTH DAILY 30 tablet 3    blood glucose test strips (ONETOUCH ULTRA) strip USE TO TEST BLOOD SUGAR TWICE DAILY 100 strip 1    lisinopril (PRINIVIL;ZESTRIL) 20 MG tablet TAKE ONE TABLET BY MOUTH DAILY 90 tablet 3    metFORMIN (GLUCOPHAGE) 500 MG tablet Take 1 tablet by mouth daily 90 tablet 3    clotrimazole-betamethasone (LOTRISONE) 1-0.05 % cream Apply topically 2 times daily for 2 weeks. 60 g 0    atorvastatin (LIPITOR) 10 MG tablet Take 1 tablet by mouth daily 30 tablet 3    Turmeric 450 MG CAPS Take 450 mg by mouth daily      meclizine (ANTIVERT) 12.5 MG tablet Take 1 tablet by mouth 3 times daily as needed for Dizziness (vertigo) 30 tablet 1    Cholecalciferol (VITAMIN D3) 2000 UNITS CAPS Take 1 capsule by mouth daily      aspirin EC 81 MG EC tablet Take 1 tablet by mouth daily 30 tablet 3    Lite Touch Lancets MISC by Does not apply route. Use twice daily when testing blood sugars. 100 each 5     No current facility-administered medications for this visit. Objective:     She is alert, oriented x 3, pleasant, well nourished, developed and in no   acute distress. Resp 18   Ht 5' 1\" (1.549 m)   Wt 115 lb (52.2 kg)   BMI 21.73 kg/m²      Examination of the right hip shows:  No discoloration, wounds or gross deformity. Palpation of the greater trochanter/bursa-nontender.   Palpation of the anterior superior iliac spine-nontender. Palpation of the ischial tuberosity- mildly tender to palpation. Palpation over the sacrum and sacroiliac joint-tender to palpation. She has no pain with passive right hip range of motion. Examination of the Lumbar spine shows:  Deformity Absent . Soft Tissue Swelling Absent . Soft Tissue Tenderness Absent . Midline Bone Tenderness Absent . Paraspinal Muscular Spasm Absent . Previous Incisions Absent . Erythema Absent . Lumbar Flexion does not produce pain. Lumbar Extension does not produce pain. Lower extremities:  She has 5/5 motor strength of bilateral lower extremities. She has a negative straight leg raise, bilaterally. Deep tendon reflexes at knees and achilles are 2+. Sensation is intact to light touch L3 to S1 bilaterally. She has no clonus. Examination of the lower extremities shows intact perfusion to both lower extremities. She has no cyanosis and digigts are warm to touch, capillary refill is less than 2 seconds. She has no edema noted. She has intact skin without lacerations or abrasions, no significant erythema, rashes or skin lesions. X Rays: performed in the office today:   AP Pelvis:  Healed pubic ramus fracture and sacral fracture. AP and Lateral Lumbar Spine Radiographs: There is marked Degenerative Disc Disease. There is marked Facet Arthropathy. Spondylolisthesis at L4-5 and L5-S1. Diagnosis:       ICD-10-CM    1. Left leg pain  M79.605 XR PELVIS (1-2 VIEWS)     XR LUMBAR SPINE (2-3 VIEWS)   2. Closed fracture of ramus of right pubis, initial encounter [facility])  S32.591A External Referral To Physical Therapy   3. Spinal stenosis of lumbar region with neurogenic claudication  M48.062 External Referral To Physical Therapy        Assessment and Plan:       Assessment:  Clinically and radiographically healed pubic ramus fracture and sacral fracture.   Lumbar spondylolisthesis most likely causing some degree of lumbar stenosis. I had an extensive discussion with Ms. Anali Whalen regarding the natural history, etiology, and long term consequences of her condition. I have presented reasonable alternatives to the patient's proposed care, treatment, and services. Risks and benefits of the treatment options also reviewed in detail. I have outlined a treatment plan with them. She has had full opportunity to ask her questions. I have answered them all to her satisfaction. I feel that Ms. Anali Whalen understands our discussion today. Plan:  Medications-no new medications. PT-she is willing to try home physical therapy. Follow up:   Call or return to clinic if these symptoms worsen or fail to improve as anticipated. Jesse Johnson PA-C   Senior Physician Assistant   [facility]                                         Disclaimer: This note was generated with use of a verbal recognition program (DRAGON) and an attempt was made to check for errors. It is possible that there are still dictated errors within this office note. If so, please bring any significant errors to my attention for an addendum. All efforts were made to ensure that this office note is accurate.

## 2021-10-15 ENCOUNTER — TELEPHONE (OUTPATIENT)
Dept: ORTHOPEDIC SURGERY | Age: 86
End: 2021-10-15

## 2021-10-15 NOTE — TELEPHONE ENCOUNTER
Medical Facility Question     Facility Name: [facility] Number: [phone]  Request or Information: Patient Ross Knight was seen yesterday by Robert Caceres she stated that she was would like  to get Massage Therapy instead of Physical Therapy but she is stlll having some pain. The patient stated she would require on her own to try to get massage therapy but do she still need to continue with her Physical Therapy.

## 2021-10-18 NOTE — TELEPHONE ENCOUNTER
I called William Elam and she was kicked out of Marcelina's house so [facility] will not be back. Alin on wants massages.

## 2021-11-01 ENCOUNTER — TELEPHONE (OUTPATIENT)
Dept: INTERNAL MEDICINE CLINIC | Age: 86
End: 2021-11-01

## 2021-11-01 NOTE — TELEPHONE ENCOUNTER
Based on the description, she may need to go to the ER to evaluate for cellulitis.   Otherwise yes, she needs to be evaluated

## 2021-11-01 NOTE — TELEPHONE ENCOUNTER
Patient states that she has a \"blood blister on her leg that Dr. John Bridges saw 2 weeks ago, at which time wasn't infected. She states it is now draining and broken, and is oozing a yellow fluid. She states that her leg is painful all around it, and wants to know if Dr. John Bridges now feels she should schedule an appointment to address this. Appointment offered, and declined. Patient wanted me to send a message to see if there is any  recommendations he may have first.  Please contact patient @ phone # provided.

## 2021-11-02 ENCOUNTER — OFFICE VISIT (OUTPATIENT)
Dept: INTERNAL MEDICINE CLINIC | Age: 86
End: 2021-11-02
Payer: MEDICARE

## 2021-11-02 VITALS
WEIGHT: 108 LBS | DIASTOLIC BLOOD PRESSURE: 58 MMHG | HEART RATE: 66 BPM | BODY MASS INDEX: 20.41 KG/M2 | SYSTOLIC BLOOD PRESSURE: 142 MMHG | OXYGEN SATURATION: 98 % | TEMPERATURE: 98 F

## 2021-11-02 DIAGNOSIS — I73.9 PAD (PERIPHERAL ARTERY DISEASE) (HCC): ICD-10-CM

## 2021-11-02 DIAGNOSIS — L97.921 ULCER OF LEFT LOWER EXTREMITY, LIMITED TO BREAKDOWN OF SKIN (HCC): Primary | ICD-10-CM

## 2021-11-02 DIAGNOSIS — L03.116 CELLULITIS OF LEFT LOWER EXTREMITY: ICD-10-CM

## 2021-11-02 PROCEDURE — 1123F ACP DISCUSS/DSCN MKR DOCD: CPT | Performed by: INTERNAL MEDICINE

## 2021-11-02 PROCEDURE — 99214 OFFICE O/P EST MOD 30 MIN: CPT | Performed by: INTERNAL MEDICINE

## 2021-11-02 PROCEDURE — 1036F TOBACCO NON-USER: CPT | Performed by: INTERNAL MEDICINE

## 2021-11-02 PROCEDURE — 1090F PRES/ABSN URINE INCON ASSESS: CPT | Performed by: INTERNAL MEDICINE

## 2021-11-02 PROCEDURE — G8427 DOCREV CUR MEDS BY ELIG CLIN: HCPCS | Performed by: INTERNAL MEDICINE

## 2021-11-02 PROCEDURE — 4040F PNEUMOC VAC/ADMIN/RCVD: CPT | Performed by: INTERNAL MEDICINE

## 2021-11-02 PROCEDURE — G8420 CALC BMI NORM PARAMETERS: HCPCS | Performed by: INTERNAL MEDICINE

## 2021-11-02 PROCEDURE — G8483 FLU IMM NO ADMIN DOC REA: HCPCS | Performed by: INTERNAL MEDICINE

## 2021-11-02 RX ORDER — CEPHALEXIN 500 MG/1
500 CAPSULE ORAL 3 TIMES DAILY
Qty: 21 CAPSULE | Refills: 0 | Status: SHIPPED | OUTPATIENT
Start: 2021-11-02 | End: 2021-11-09

## 2021-11-02 NOTE — PROGRESS NOTES
Reno Davalos   :  1929    ASSESSMENT/PLAN:   Ulcer of left lower extremity, limited to breakdown of skin ([address]  Cellulitis of left lower extremity  PAD (peripheral artery disease) (HCC)  Ulcer of lower extremity with significant PAD. Early cellulitis, mild. Keflex  500 mg tid x 7. [facility] and schedule follow-up. Patient also instructed to return to Dr. Yassine Morales. Nay need to consider revascularization if fails to heal. Had recommended angiogram last year but patient declined. SUBJECTIVE:  80 y.o. female established patient here for:   Chief Complaint   Patient presents with    Wound Check     Possible cellulitis in lower left leg     Is here today with her caregiver, Zen Velazco. She had developed traumatic hematoma on her left lower extremity for which she saw Dr. Serg Granda a few weeks ago. Labs were done to rule out vasculitis. Ultimately this area unroofed and she developed an ulcer. She was seen by wound specialist at [facility] last week. Prescribed  Regenecare wound gel. She notes that in the last several he is having more yellowish drainage and has significant pain around the area. Using polysporin around the wound. She is also concerned because she continues to get new small bruises/hematomas on the lower extremities. She has very thin skin and is also on an aspirin a day for her peripheral arterial disease. Review of Systems   Constitutional: Negative for chills and fever.        Outpatient Medications Marked as Taking for the 21 encounter (Office Visit) with Jerrell Mckinley MD   Medication Sig Dispense Refill    glimepiride (AMARYL) 1 MG tablet TAKE ONE TABLET BY MOUTH EVERY MORNING BEFORE BREAKFAST 22 tablet 0    amLODIPine (NORVASC) 2.5 MG tablet TAKE ONE TABLET BY MOUTH DAILY 30 tablet 3    lisinopril (PRINIVIL;ZESTRIL) 20 MG tablet TAKE ONE TABLET BY MOUTH DAILY 90 tablet 3    metFORMIN (GLUCOPHAGE) 500 MG tablet Take 1 tablet by mouth daily 90 tablet 3    atorvastatin (LIPITOR) 10 MG tablet Take 1 tablet by mouth daily 30 tablet 3    Turmeric 450 MG CAPS Take 450 mg by mouth daily      Cholecalciferol (VITAMIN D3) 2000 UNITS CAPS Take 1 capsule by mouth daily      aspirin EC 81 MG EC tablet Take 1 tablet by mouth daily 30 tablet 3       OBJECTIVE:  Vitals:    11/02/21 0954 11/02/21 1038   BP: (!) 152/58 (!) 142/58   Site: Right Upper Arm    Position: Sitting    Cuff Size: Medium Adult    Pulse: 66    Temp: 98 °F (36.7 °C)    SpO2: 98%    Weight: 108 lb (49 kg)      Physical Exam  Constitutional:       General: She is not in acute distress. Appearance: Normal appearance. She is not ill-appearing. Cardiovascular:      Comments: No DP or PT pulses on left, but foot is warm. Skin:     Comments: Anterior left lower leg with 2x3 cm ulcer, as per photo. Surrounding area faintly red, warm and very tender   Neurological:      Mental Status: She is alert. This note was generated completely or in part utilizing Dragon dictation speech recognition software. Occasionally, words are mistranscribed and despite editing, the text may contain inaccuracies due to incorrect word recognition.   If further clarification is needed please contact the office at [phone]  --Gregory Khan MD

## 2021-11-03 ENCOUNTER — PROCEDURE VISIT (OUTPATIENT)
Dept: VASCULAR SURGERY | Age: 86
End: 2021-11-03
Payer: MEDICARE

## 2021-11-03 ENCOUNTER — OFFICE VISIT (OUTPATIENT)
Dept: VASCULAR SURGERY | Age: 86
End: 2021-11-03
Payer: MEDICARE

## 2021-11-03 VITALS
SYSTOLIC BLOOD PRESSURE: 162 MMHG | HEIGHT: 61 IN | BODY MASS INDEX: 20.2 KG/M2 | WEIGHT: 107 LBS | DIASTOLIC BLOOD PRESSURE: 70 MMHG

## 2021-11-03 DIAGNOSIS — I70.222 ATHEROSCLEROSIS OF NATIVE ARTERIES OF EXTREMITIES WITH REST PAIN, LEFT LEG (HCC): ICD-10-CM

## 2021-11-03 DIAGNOSIS — I73.9 PVD (PERIPHERAL VASCULAR DISEASE) WITH CLAUDICATION (HCC): ICD-10-CM

## 2021-11-03 DIAGNOSIS — I70.299 ATHEROSCLEROSIS OF ARTERY OF EXTREMITY WITH ULCERATION (HCC): ICD-10-CM

## 2021-11-03 DIAGNOSIS — I70.229 ATHEROSCLEROSIS OF ARTERY OF EXTREMITY WITH REST PAIN (HCC): Primary | ICD-10-CM

## 2021-11-03 DIAGNOSIS — Z98.890 HISTORY OF FEMOROPOPLITEAL BYPASS: ICD-10-CM

## 2021-11-03 DIAGNOSIS — L97.909 ATHEROSCLEROSIS OF ARTERY OF EXTREMITY WITH ULCERATION (HCC): ICD-10-CM

## 2021-11-03 PROCEDURE — 1036F TOBACCO NON-USER: CPT | Performed by: SURGERY

## 2021-11-03 PROCEDURE — 93925 LOWER EXTREMITY STUDY: CPT | Performed by: SURGERY

## 2021-11-03 PROCEDURE — 99214 OFFICE O/P EST MOD 30 MIN: CPT | Performed by: SURGERY

## 2021-11-03 PROCEDURE — G8483 FLU IMM NO ADMIN DOC REA: HCPCS | Performed by: SURGERY

## 2021-11-03 PROCEDURE — G8427 DOCREV CUR MEDS BY ELIG CLIN: HCPCS | Performed by: SURGERY

## 2021-11-03 PROCEDURE — 4040F PNEUMOC VAC/ADMIN/RCVD: CPT | Performed by: SURGERY

## 2021-11-03 PROCEDURE — 1123F ACP DISCUSS/DSCN MKR DOCD: CPT | Performed by: SURGERY

## 2021-11-03 PROCEDURE — G8420 CALC BMI NORM PARAMETERS: HCPCS | Performed by: SURGERY

## 2021-11-03 PROCEDURE — 1090F PRES/ABSN URINE INCON ASSESS: CPT | Performed by: SURGERY

## 2021-11-03 NOTE — Clinical Note
Simon Culver was in the office today for vascular evaluation of her new left shin wound which is being managed at the [facility] wound care center. She clearly has left leg arterial disease with a widely patent right leg bypass. I believe further evaluation and possible treatment will facilitate healing of this painful wound however she is in her usual state of mine resisting further recommendations. I have left in her hands and she will contact us should she decide to pursue this. Please give me a call if you have any questions or suggestions.     Ciara Magallanes

## 2021-11-03 NOTE — PROGRESS NOTES
Subjective:      Patient ID: Yuni Newman is a 80 y.o. female. HPI Well known pt S/P R SFA-BKpop bypass with later iliac stent and SFA angioplasty presents with nonhealing L shin wound of several weeks duration. C/O pain around this area - worse at HS. No classic rest pain. Managed through [facility] with local care.      Past Medical History:   Diagnosis Date    Anxiety     Arthritis     At risk for falls     CAD (coronary artery disease)     CTS (carpal tunnel syndrome)     Bilateral, EMG-NCS    DDD (degenerative disc disease), cervical     Fractures     (L) Hip Fx 4/25/98, L1 fracture from fall 10-31-06    Hypertension     Mitral regurgitation     Neuropathy     Osteopenia     Osteoporosis     [facility])     Right LE ischemia    Peripheral neuropathy 6/1/2015    Peripheral vascular disease [address] [address]     bilateral lower extremities with edema    Podagra 01/09/2017    Dr. Tameka Dotson Spinal stenosis     L4-5    Type 2 diabetes mellitus ([facility] [address]     Urethral stricture 5 years ago    Urgency of urination      Past Surgical History:   Procedure Laterality Date    APPENDECTOMY      CARPAL TUNNEL RELEASE Right 3/29/2019    RIGHT CARPAL TUNNEL RELEASE AND RIGHT MIDDLE FINGER TRIGGER FINGER RELEASE performed by Aguilar Sherman MD at [address]  [id number]    LEFT EYE EYE CATARACT PHACOEMULSIFICATION INTRAOCULAR LENS    CHOLECYSTECTOMY  1978    COLONOSCOPY  8/31/99    diverticulosis    EYE SURGERY Bilateral     bilateral cataract removed    HYSTERECTOMY  1980s    sallie    IR FEMORAL POPLITEAL BYPASS GRAFT Right 8-    KYPHOSIS SURGERY  11-7-06    L1, (fractured after a fall)    TONSILLECTOMY      TOTAL HIP ARTHROPLASTY  4/25/98    (L) THR    WRIST FRACTURE SURGERY  2008    left wrist     Allergies   Allergen Reactions    Aricept [Donepezil Hydrochloride] Other (See Comments)     intolerant    Doxycycline Nausea Only    Ketorolac Tromethamine Other (See Comments)     Pt unable to recall reaction     Current Outpatient Medications   Medication Sig Dispense Refill    cephALEXin (KEFLEX) 500 MG capsule Take 1 capsule by mouth 3 times daily for 7 days 21 capsule 0    glimepiride (AMARYL) 1 MG tablet TAKE ONE TABLET BY MOUTH EVERY MORNING BEFORE BREAKFAST 22 tablet 0    amLODIPine (NORVASC) 2.5 MG tablet TAKE ONE TABLET BY MOUTH DAILY 30 tablet 3    blood glucose test strips (ONETOUCH ULTRA) strip USE TO TEST BLOOD SUGAR TWICE DAILY 100 strip 1    lisinopril (PRINIVIL;ZESTRIL) 20 MG tablet TAKE ONE TABLET BY MOUTH DAILY 90 tablet 3    metFORMIN (GLUCOPHAGE) 500 MG tablet Take 1 tablet by mouth daily 90 tablet 3    clotrimazole-betamethasone (LOTRISONE) 1-0.05 % cream Apply topically 2 times daily for 2 weeks. 60 g 0    atorvastatin (LIPITOR) 10 MG tablet Take 1 tablet by mouth daily 30 tablet 3    Turmeric 450 MG CAPS Take 450 mg by mouth daily      meclizine (ANTIVERT) 12.5 MG tablet Take 1 tablet by mouth 3 times daily as needed for Dizziness (vertigo) 30 tablet 1    Cholecalciferol (VITAMIN D3) 2000 UNITS CAPS Take 1 capsule by mouth daily      aspirin EC 81 MG EC tablet Take 1 tablet by mouth daily 30 tablet 3    Lite Touch Lancets MISC by Does not apply route. Use twice daily when testing blood sugars. 100 each 5     No current facility-administered medications for this visit. Social History     Socioeconomic History    Marital status:       Spouse name: Not on file    Number of children: Not on file    Years of education: Not on file    Highest education level: Not on file   Occupational History    Not on file   Tobacco Use    Smoking status: Never Smoker    Smokeless tobacco: Never Used   Vaping Use    Vaping Use: Never used   Substance and Sexual Activity    Alcohol use: No    Drug use: No    Sexual activity: Not Currently   Other Topics Concern    Not on file   Social History Narrative    Not on file     Social Capillary Refill: Capillary refill takes more than 3 seconds. Comments: L anterior shin superficial ulcer 3 cm x 2 cm)  Elevation pallor L foot with mild dependent rubor   Neurological:      General: No focal deficit present. Mental Status: She is alert and oriented to person, place, and time. Cranial Nerves: No cranial nerve deficit. Sensory: No sensory deficit. Motor: No weakness. Coordination: Coordination normal.   Psychiatric:         Mood and Affect: Mood normal.         Behavior: Behavior normal.         Thought Content: Thought content normal.         Judgment: Judgment normal.      Comments: Remains argumentative       Pulses:   R bruit  L bruit   2   carotid 2    2   brachial 2    2   radial 2    2   femoral 1    0   popliteal 0    0   posterior tibial 0    2   dorsalis pedis 0    2   bypass graft na      Arterial scan today 11/3/2021  Rt BUZZ 0.89 Lt BUZZ NC  R fem-pop widely patent  L CFA >50% stenosis with distal SFA occlusion    Assessment:      1) S/P R SFA-pop bypass. Clinically widely patent and by GSS today  2) Nonhealing L leg wound with multilevel arterial disease  3) DM      Plan:      Suggested angio but pt is reluctant to proceed directly to imaging. Will continue local care per [facility] and contact us should she wish to proceed with diagnostic angiogram & possible intervention.     Gosia Baeza MD

## 2021-11-05 ENCOUNTER — OFFICE VISIT (OUTPATIENT)
Dept: ORTHOPEDIC SURGERY | Age: 86
End: 2021-11-05
Payer: MEDICARE

## 2021-11-05 VITALS — BODY MASS INDEX: 20.2 KG/M2 | HEIGHT: 61 IN | WEIGHT: 107 LBS | RESPIRATION RATE: 16 BRPM

## 2021-11-05 DIAGNOSIS — L60.8 NAIL DEFORMITY: Primary | ICD-10-CM

## 2021-11-05 PROCEDURE — G8483 FLU IMM NO ADMIN DOC REA: HCPCS | Performed by: ORTHOPAEDIC SURGERY

## 2021-11-05 PROCEDURE — 1036F TOBACCO NON-USER: CPT | Performed by: ORTHOPAEDIC SURGERY

## 2021-11-05 PROCEDURE — 1123F ACP DISCUSS/DSCN MKR DOCD: CPT | Performed by: ORTHOPAEDIC SURGERY

## 2021-11-05 PROCEDURE — 4040F PNEUMOC VAC/ADMIN/RCVD: CPT | Performed by: ORTHOPAEDIC SURGERY

## 2021-11-05 PROCEDURE — 1090F PRES/ABSN URINE INCON ASSESS: CPT | Performed by: ORTHOPAEDIC SURGERY

## 2021-11-05 PROCEDURE — G8428 CUR MEDS NOT DOCUMENT: HCPCS | Performed by: ORTHOPAEDIC SURGERY

## 2021-11-05 PROCEDURE — G8420 CALC BMI NORM PARAMETERS: HCPCS | Performed by: ORTHOPAEDIC SURGERY

## 2021-11-05 PROCEDURE — 99213 OFFICE O/P EST LOW 20 MIN: CPT | Performed by: ORTHOPAEDIC SURGERY

## 2021-11-05 NOTE — Clinical Note
Dear  Megan Banks, DO,    Thank you very much for your referral or Ms. Larisa Mullins to me for evaluation and treatment of her Hand & Wrist condition. I appreciate your confidence in me and thank you for allowing me the opportunity to care for your patients. If I can be of any further assistance to you on this or any other patient, please do not hesitate to contact me. Sincerely,    Hang Iverson.  Jacqueline Gonzalez MD

## 2021-11-06 NOTE — PROGRESS NOTES
Ms. Jeanine Manuel  is seen today regarding right Ring Finger symptoms beginning 3 months ago. She reports There is no history of injuring her right Ring Finger previously. She reports developing loosening of the nail plate with eventual shedding of the nail. She has partially regrown the nail but is concerned regarding it's appearance. She reports no pain located in the Ring Finger, no tenderness of the wrist or elbow. She notes today, no neurologic symptoms in the Ring Finger. Symptoms show no change over time. I have today reviewed with Jeanine Manuel the clinically relevant, past medical history, medications, allergies,  family history, social history, and Review Of Systems & I have documented any details relevant to today's presenting complaints in my history above. Ms. Preston Cross self-reported past medical history, medications, allergies,  family history, social history, and Review Of Systems have been scanned into the chart under the \"Media\" tab. Physical Exam:  Ms. Preston Cross most recent vitals:  Vitals  Resp: 16  Height: 5' 1\" (154.9 cm)  Weight: 107 lb (48.5 kg)    She is well nourished, oriented to person, place & time. She demonstrates appropriate mood and affect as well as normal gait and station. Skin: Normal in appearance and Normal Texture. There is no erythema and drainage located at the Distal aspect of the Ring Finger on the Left, normal on the Right. There is no evidence of proximal spread or lymphangitis. No other digit shows sign of infection bilaterally. Digital range of motion is limited by Osteoarthritis in the Whole Hand bilaterally  Wrist range of motion is without significant limitation bilaterally  There is no evidence of gross joint instability bilaterally. Muscular strength is clinically appropriate bilaterally. Sensation is subjectively present in the Ring Finger , objectively present.   Sensation to uninvolved digits is objectively present bilaterally. Vascular examination reveals adequate capillary refill bilaterally. Swelling is absent in the  Ring Finger on the Left, normal on the Right  No pain is elicited with palpation of the Distal region of the Ring Finger. The base of the hand & wrist are not tender to palpation on the Right, normal on the Left  There is not evidence of fluctuance, fluid collection or  deep contained infection on examination today      Impression:  Ms. 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 She  presents requesting further treatment. Plan:      I have had a thorough discussion with Ms. Ashlyn Bird regarding the treatment options available for her nail deformity of the  right Ring Finger  which is NOT causing her significant symptoms or difficulty. Based upon our current discussion and a reasonable understating of the options available to her, Ms. Ashlyn Bird has selected to proceed with a conservative plan of treatment consisting of: the use of cautious observation. Ms. Ashlyn Bird  voiced an appropriate understanding of our discussion, the options available to her, and of the expectations of her selected  treatment. I have asked Ms. Ashlyn Bird  to feel free to contact me or schedule a follow-up appointment at any time that she feels the need for any further evaluation or treatment for her upper extremitiy condition. If she feels that she continues to be feeling and functioning well, she may choose not to seek any further follow-up or treatment at her discretion. I will remain available to continue her care at any time in the future.

## 2021-11-09 RX ORDER — GLIMEPIRIDE 1 MG/1
TABLET ORAL
Qty: 22 TABLET | Refills: 0 | Status: SHIPPED | OUTPATIENT
Start: 2021-11-09 | End: 2021-12-07

## 2021-11-09 NOTE — TELEPHONE ENCOUNTER
Last appointment: 9/27/2021  Next appointment: Visit date not found  Last refill: 10/1/2021  no return date given at last office visit

## 2021-11-12 ENCOUNTER — TELEPHONE (OUTPATIENT)
Dept: INTERNAL MEDICINE CLINIC | Age: 86
End: 2021-11-12

## 2021-11-12 NOTE — TELEPHONE ENCOUNTER
Patient called stating that she completed her antibiotic and her legs are not better she would like to have the doctor take a look at her legs if possible. She came in to see Dr Nicolas Noguera recently and she wanted something to actually put on the wounds but she was only prescribed an antibiotic. Please advised on how to proceed?  She says there is no circulation in the left leg still yellow\green in color she would like Dr Tyree Mcconnell to see this states it Is a throbbing pain

## 2021-11-12 NOTE — TELEPHONE ENCOUNTER
You can make her another appointment to come in and I can reevaluate the wound. The bottom line is that the wound is going to heal poorly due to the lack of circulation in the left leg. Her vascular specialist has recommended intervention. She is not listening to his recommendation. I am okay to to reevaluate here in the office.

## 2021-11-15 ENCOUNTER — OFFICE VISIT (OUTPATIENT)
Dept: INTERNAL MEDICINE CLINIC | Age: 86
End: 2021-11-15
Payer: MEDICARE

## 2021-11-15 VITALS
SYSTOLIC BLOOD PRESSURE: 158 MMHG | BODY MASS INDEX: 20.2 KG/M2 | OXYGEN SATURATION: 95 % | RESPIRATION RATE: 12 BRPM | WEIGHT: 107 LBS | DIASTOLIC BLOOD PRESSURE: 78 MMHG | HEART RATE: 72 BPM | HEIGHT: 61 IN

## 2021-11-15 DIAGNOSIS — L97.912 SKIN ULCER OF RIGHT LOWER LEG WITH FAT LAYER EXPOSED (HCC): Primary | ICD-10-CM

## 2021-11-15 DIAGNOSIS — I73.9 PAD (PERIPHERAL ARTERY DISEASE) (HCC): ICD-10-CM

## 2021-11-15 PROCEDURE — 99213 OFFICE O/P EST LOW 20 MIN: CPT | Performed by: INTERNAL MEDICINE

## 2021-11-15 PROCEDURE — 1090F PRES/ABSN URINE INCON ASSESS: CPT | Performed by: INTERNAL MEDICINE

## 2021-11-15 PROCEDURE — G8483 FLU IMM NO ADMIN DOC REA: HCPCS | Performed by: INTERNAL MEDICINE

## 2021-11-15 PROCEDURE — 4040F PNEUMOC VAC/ADMIN/RCVD: CPT | Performed by: INTERNAL MEDICINE

## 2021-11-15 PROCEDURE — 1123F ACP DISCUSS/DSCN MKR DOCD: CPT | Performed by: INTERNAL MEDICINE

## 2021-11-15 PROCEDURE — G8427 DOCREV CUR MEDS BY ELIG CLIN: HCPCS | Performed by: INTERNAL MEDICINE

## 2021-11-15 PROCEDURE — G8420 CALC BMI NORM PARAMETERS: HCPCS | Performed by: INTERNAL MEDICINE

## 2021-11-15 PROCEDURE — 1036F TOBACCO NON-USER: CPT | Performed by: INTERNAL MEDICINE

## 2021-11-15 NOTE — Clinical Note
Saw Floyd Stauffer today. I talked her into moving forward with the angiogram.  I told her it was a necessity. I think she was fearful of having the procedure done before the wound is actually healed. I told her that the wound may not heal until we do the angiogram and treat her arterial disease. I referred her to [facility] wound care for additional debridement of the wound as there now appears to be some biofilm forming.   Thanks, Cecille Pearson

## 2021-11-15 NOTE — PROGRESS NOTES
[facility]) Physicians  Internal Medicine  Patient Encounter  Melissa Winston D.O., [place]        Chief Complaint   Patient presents with    Leg Pain     x1 month left leg has a sore, poor blood flow, green and yellow, burning       HPI: 80 y.o. female seen again today regarding a left lower leg wound that has not had good healing. She was seen by my astute partner Dr. Amaury West who addressed a left lower extremity ulcer and some cellulitis back on 11/2/2021. Patient was treated with some local wound care as well as an antibiotic. She was instructed to return to her vascular specialist regarding the known peripheral arterial disease. She did as she was told and saw Dr. Martita Alfaro on 11/3/2021. She underwent lower extremity arterial evaluation. Her right BUZZ was 0.89. She does have a history of vascular bypass on the right. An BUZZ could not be obtained due to noncompressible arteries on the left. She was found to have elevated velocities of the proximal and mid superficial femoral artery suggesting a greater than 50% stenosis with occlusion of the distal superficial femoral artery and occlusion of the distal anterior tibial artery. The right femoral-popliteal bypass was patent. Her vascular surgeon address the nonhealing left leg wound. He suggested an angiogram but patient is reluctant to proceed. We will continue local care per [facility]. He invited her back if she wanted to proceed. Patient states that the wound is not healing and there is now a yellowish-green drainage. She denies any fever. The redness Dr. Amaury West identified is better. Pain is less. Patient states that she wanted my opinion on whether or not to proceed with the angiogram before the wound has healed.       Past Medical History:   Diagnosis Date    Anxiety     Arthritis     At risk for falls     CAD (coronary artery disease)     CTS (carpal tunnel syndrome)     Bilateral, EMG-NCS    DDD (degenerative disc 5' 1\" (1.549 m)   Wt 107 lb (48.5 kg)   SpO2 95%   BMI 20.22 kg/m²   VASC: Poor pedal pulses left foot. SKIN: Right lower leg with an oval-shaped 3 x 2 cm ulceration with evidence of a yellowish-green biofilm. No surrounding erythema or induration. The size is about the same. ASSESSMENT[de-identified]  Giovanni Mccray was seen today for leg pain. Diagnoses and all orders for this visit:    Skin ulcer of right lower leg with fat layer exposed ([address]  --Likely ischemic. --Needs wound care with a debriding agent such as Medihoney. -     [place]    PAD (peripheral artery disease) [facility])  -     [facility]        Additional Plan:  1. Refer to wound care center at [facility]  2. Refer back to vascular to proceed with lower extremity angiogram to complete ASAP        Discussed medications with patient who voiced understanding of their use, indication and potential side effects. Pt also understands the above recommendations. All questions answered. This note was generated completely or in part utilizing Dragon dictation speech recognition software. Occasionally, words are mistranscribed and despite editing, the text may contain inaccuracies due to incorrect word recognition.   If further clarification is needed please contact the office at [phone]       Electronically signed    Renita Steele D.O.

## 2021-11-16 ENCOUNTER — TELEPHONE (OUTPATIENT)
Dept: INTERNAL MEDICINE CLINIC | Age: 86
End: 2021-11-16

## 2021-11-16 NOTE — TELEPHONE ENCOUNTER
Patient saw Dr. Antionette Garcia yesterday and the medication he used on her leg wound worked wonderfully and she would like to find out what it was so she can get some or have Dr. Antionette Garcia prescribe it for her. Please call to let her know.

## 2021-11-18 ENCOUNTER — OFFICE VISIT (OUTPATIENT)
Dept: CARDIOLOGY CLINIC | Age: 86
End: 2021-11-18
Payer: MEDICARE

## 2021-11-18 VITALS
DIASTOLIC BLOOD PRESSURE: 66 MMHG | WEIGHT: 108.4 LBS | BODY MASS INDEX: 20.48 KG/M2 | SYSTOLIC BLOOD PRESSURE: 138 MMHG | HEART RATE: 90 BPM

## 2021-11-18 DIAGNOSIS — L08.9 LOCAL SKIN INFECTION: ICD-10-CM

## 2021-11-18 DIAGNOSIS — E78.5 HYPERLIPIDEMIA, UNSPECIFIED HYPERLIPIDEMIA TYPE: ICD-10-CM

## 2021-11-18 DIAGNOSIS — I10 HTN (HYPERTENSION), BENIGN: Primary | ICD-10-CM

## 2021-11-18 DIAGNOSIS — I34.0 SEVERE MITRAL REGURGITATION: ICD-10-CM

## 2021-11-18 DIAGNOSIS — I73.9 PAD (PERIPHERAL ARTERY DISEASE) (HCC): ICD-10-CM

## 2021-11-18 PROCEDURE — 4040F PNEUMOC VAC/ADMIN/RCVD: CPT | Performed by: INTERNAL MEDICINE

## 2021-11-18 PROCEDURE — G8427 DOCREV CUR MEDS BY ELIG CLIN: HCPCS | Performed by: INTERNAL MEDICINE

## 2021-11-18 PROCEDURE — 1123F ACP DISCUSS/DSCN MKR DOCD: CPT | Performed by: INTERNAL MEDICINE

## 2021-11-18 PROCEDURE — 1090F PRES/ABSN URINE INCON ASSESS: CPT | Performed by: INTERNAL MEDICINE

## 2021-11-18 PROCEDURE — G8420 CALC BMI NORM PARAMETERS: HCPCS | Performed by: INTERNAL MEDICINE

## 2021-11-18 PROCEDURE — G8483 FLU IMM NO ADMIN DOC REA: HCPCS | Performed by: INTERNAL MEDICINE

## 2021-11-18 PROCEDURE — 99214 OFFICE O/P EST MOD 30 MIN: CPT | Performed by: INTERNAL MEDICINE

## 2021-11-18 PROCEDURE — 1036F TOBACCO NON-USER: CPT | Performed by: INTERNAL MEDICINE

## 2021-11-18 RX ORDER — CEPHALEXIN 500 MG/1
500 CAPSULE ORAL 3 TIMES DAILY
Qty: 42 CAPSULE | Refills: 0 | Status: SHIPPED | OUTPATIENT
Start: 2021-11-18 | End: 2021-12-06 | Stop reason: ALTCHOICE

## 2021-11-18 ASSESSMENT — ENCOUNTER SYMPTOMS
CHOKING: 0
SHORTNESS OF BREATH: 0
CHEST TIGHTNESS: 0
COUGH: 0
ALLERGIC/IMMUNOLOGIC NEGATIVE: 1
GASTROINTESTINAL NEGATIVE: 1
APNEA: 0

## 2021-11-18 NOTE — PROGRESS NOTES
Subjective:      Patient ID: Sally Cortes is a 80 y.o. female    CC: Fatigue, Mitral regurgitation, htn, cad    HPI:  Jerrell Huerta is an 80year old female with HX of CAD, HTN, PAD, and Mitral regurgitation. . Last Echo. No regional wall motion abnormalities are seen.   Diastolic filling parameters suggests grade I diastolic dysfunction. Bi-atrial enlargement. severe mitral regurgitation. This is a change from 2015 echo that showed moderate MR. Patient feels well and has no chest pain nor SOB. No orthopnea nor PND. She states that she is not quite as active as she used to be and has some fatigue. doing well. Has some LE edema but has TIA and was recently in the hospital with 50% left ICA stenosis that is unchanged from prior. Allergies   Allergen Reactions    Aricept [Donepezil Hydrochloride] Other (See Comments)     intolerant    Doxycycline Nausea Only    Ketorolac Tromethamine Other (See Comments)     Pt unable to recall reaction       Social History     Socioeconomic History    Marital status:      Spouse name: None    Number of children: None    Years of education: None    Highest education level: None   Occupational History    None   Tobacco Use    Smoking status: Never Smoker    Smokeless tobacco: Never Used   Vaping Use    Vaping Use: Never used   Substance and Sexual Activity    Alcohol use: No    Drug use: No    Sexual activity: Not Currently   Other Topics Concern    None   Social History Narrative    None     Social Determinants of Health     Financial Resource Strain: Low Risk     Difficulty of Paying Living Expenses: Not hard at all   Food Insecurity: No Food Insecurity    Worried About Running Out of Food in the Last Year: Never true    [place] of Food in the Last Year: Never true   Transportation Needs:     Lack of Transportation (Medical): Not on file    Lack of Transportation (Non-Medical):  Not on file   Physical Activity:     Days of Exercise per Week: Not on file   [employer] of Exercise per Session: Not on file   Stress:     Feeling of Stress : Not on file   Social Connections:     Frequency of Communication with Friends and Family: Not on file    Frequency of Social Gatherings with Friends and Family: Not on file    Attends [employer] Services: Not on file    Active Member of Clubs or Organizations: Not on file    Attends Club or Organization Meetings: Not on file    Marital Status: Not on file   Intimate Partner Violence:     Fear of Current or Ex-Partner: Not on file    Emotionally Abused: Not on file    Physically Abused: Not on file    Sexually Abused: Not on file   Housing Stability:     Unable to Pay for Housing in the Last Year: Not on file    Number of [place] in the Last Year: Not on file    Unstable Housing in the Last Year: Not on file       Family History   Problem Relation Age of Onset    Stroke Father     Hypertension Father         has a past medical history of Anxiety, Arthritis, At risk for falls, CAD (coronary artery disease), CTS (carpal tunnel syndrome), DDD (degenerative disc disease), cervical, Fractures, Hypertension, Mitral regurgitation, Neuropathy, Osteopenia, Osteoporosis, PAD (peripheral artery disease) ([address] Peripheral neuropathy, Peripheral vascular disease ([address] Podagra, Spinal stenosis, Type 2 diabetes mellitus ([address] Urethral stricture, and Urgency of urination. Review of Systems   Constitutional: Positive for activity change and fatigue. Negative for appetite change, chills, diaphoresis, fever and unexpected weight change. HENT: Negative. Respiratory: Negative for apnea, cough, choking, chest tightness and shortness of breath. Cardiovascular: Negative for chest pain, palpitations and leg swelling. Gastrointestinal: Negative. Endocrine: Negative. Genitourinary: Negative. Musculoskeletal: Negative. Skin: Negative. Allergic/Immunologic: Negative. Neurological: Negative. Hematological: Negative. Psychiatric/Behavioral: Negative. Vitals:    04/26/21 1314   BP: 138/72   Pulse: 64          Objective:   Physical Exam  Constitutional:       Appearance: She is well-developed. HENT:      Head: Normocephalic and atraumatic. Eyes:      General: No scleral icterus. Right eye: No discharge. Left eye: No discharge. Conjunctiva/sclera: Conjunctivae normal.      Pupils: Pupils are equal, round, and reactive to light. Neck:      Thyroid: No thyromegaly. Trachea: No tracheal deviation. Cardiovascular:      Rate and Rhythm: Normal rate and regular rhythm. Heart sounds: Murmur heard. No friction rub. No gallop. Comments: III/VI HSM  Pulmonary:      Effort: Pulmonary effort is normal. No respiratory distress. Breath sounds: Normal breath sounds. No wheezing or rales. Abdominal:      General: Bowel sounds are normal. There is no distension. Palpations: Abdomen is soft. Tenderness: There is no abdominal tenderness. Musculoskeletal:         General: No tenderness or deformity. Normal range of motion. Cervical back: Normal range of motion and neck supple. Skin:     General: Skin is warm and dry. Coloration: Skin is not pale. Findings: No erythema or rash. Neurological:      Mental Status: She is alert and oriented to person, place, and time. Cranial Nerves: No cranial nerve deficit. Coordination: Coordination normal.   Psychiatric:         Behavior: Behavior normal.         Thought Content:  Thought content normal.         Judgment: Judgment normal.         Current Outpatient Medications   Medication Sig Dispense Refill    glimepiride (AMARYL) 1 MG tablet TAKE ONE TABLET BY MOUTH EVERY MORNING BEFORE BREAKFAST 22 tablet 0    amLODIPine (NORVASC) 2.5 MG tablet TAKE ONE TABLET BY MOUTH DAILY 30 tablet 3    blood glucose test strips (ONETOUCH ULTRA) strip USE TO TEST BLOOD SUGAR TWICE DAILY 100 strip 1 pulmonary artery systolic pressure is 32 mmHg assuming a right   atrial pressure of 3 mmHg.      Signature      ------------------------------------------------------------------   Electronically signed by Michael Issa MD   (Interpreting physician) on 08/23/2018 at 12:30 PM   ------------------------------------------------------------------  Assessment:       Diagnosis Orders   1. HTN (hypertension), benign     2. Hyperlipidemia, unspecified hyperlipidemia type     3. PAD (peripheral artery disease) ([address]     4. Local skin infection     5. Severe mitral regurgitation            Plan:      CAD- Lipid panel 8/13/20 LDL 43, HDL 61   HTN- Lisinopril and controlled. Severe MR:  Clinically unchanged. shes taking 10 mg lisinopril PAD stable. HLP:  Controlled with diet but started on 10 mg atorva for 50% ICA stenosis. Infected wound on left pretibial surface with some improvement in infection with keflex   willl represcribe.

## 2021-11-22 ENCOUNTER — HOSPITAL ENCOUNTER (OUTPATIENT)
Dept: WOUND CARE | Age: 86
Discharge: HOME | End: 2021-11-22
Payer: MEDICARE

## 2021-11-22 VITALS
DIASTOLIC BLOOD PRESSURE: 73 MMHG | TEMPERATURE: 97.3 F | WEIGHT: 119.05 LBS | HEART RATE: 65 BPM | BODY MASS INDEX: 22.49 KG/M2 | SYSTOLIC BLOOD PRESSURE: 186 MMHG

## 2021-11-22 DIAGNOSIS — E11.51 DIABETES MELLITUS TYPE 2 WITH PERIPHERAL ARTERY DISEASE (HCC): ICD-10-CM

## 2021-11-22 DIAGNOSIS — S81.802A OPEN WOUND OF LOWER LEG, LEFT, INITIAL ENCOUNTER: Primary | ICD-10-CM

## 2021-11-22 PROCEDURE — 11042 DBRDMT SUBQ TIS 1ST 20SQCM/<: CPT | Performed by: SPECIALIST

## 2021-11-22 PROCEDURE — 11042 DBRDMT SUBQ TIS 1ST 20SQCM/<: CPT

## 2021-11-22 PROCEDURE — 99214 OFFICE O/P EST MOD 30 MIN: CPT

## 2021-11-22 PROCEDURE — 6370000000 HC RX 637 (ALT 250 FOR IP): Performed by: SPECIALIST

## 2021-11-22 PROCEDURE — 99202 OFFICE O/P NEW SF 15 MIN: CPT | Performed by: SPECIALIST

## 2021-11-22 RX ORDER — LIDOCAINE HYDROCHLORIDE 40 MG/ML
SOLUTION TOPICAL ONCE
Status: COMPLETED | OUTPATIENT
Start: 2021-11-22 | End: 2021-11-22

## 2021-11-22 RX ADMIN — LIDOCAINE HYDROCHLORIDE: 40 SOLUTION TOPICAL at 09:45

## 2021-11-22 ASSESSMENT — PAIN DESCRIPTION - PAIN TYPE: TYPE: ACUTE PAIN

## 2021-11-22 ASSESSMENT — PAIN SCALES - GENERAL: PAINLEVEL_OUTOF10: 0

## 2021-11-22 NOTE — PROGRESS NOTES
[address]  Progress Note and Procedure Note      Kenneth Sosa  MEDICAL RECORD NUMBER:  [id number]  AGE: 80 y.o. GENDER: female  : 1929  EPISODE DATE:  2021      Subjective:     Chief Complaint   Patient presents with    Wound Check     initial         HISTORY of PRESENT ILLNESS HPI     Kenneth Sosa is a 80 y.o. female who presents today for wound evaluation. History of Wound Context: Patient was referred by primary care physician for nonhealing wound left anterior knee. Apparently this began as a blood blister which opened up. She had developed a traumatic hematoma in the recent past in her left lower extremity and I believe this was unroofed but with subsequent development of a wound. She has been putting Medihoney on the wound for short period of time. Wound is painful times. Patient has documented peripheral artery disease recently seen by Dr. Mynor Quigley previously done a femoropopliteal bypass on the right lower extremity. He suggested an arteriogram be performed on the left lower extremity where the wound is located at this juncture patient is reluctant to proceed. Denies any claudication symptoms or rest pain.   She is a diabetic  Wound Pain Timing/Severity: constant  Quality of pain: burning, throbbing  Severity:  3 / 10   Modifying Factors: None  Associated Signs/Symptoms: pain    Wound Identification:  Wound Type: venous and arterial  Contributing Factors: edema, venous stasis and arterial insufficiency    Acute Wound: N/A not an acute wound        PAST MEDICAL HISTORY        Diagnosis Date    Anxiety     Arthritis     At risk for falls     CAD (coronary artery disease)     CTS (carpal tunnel syndrome)     Bilateral, EMG-NCS    DDD (degenerative disc disease), cervical     Fractures     (L) Hip Fx 98, L1 fracture from fall 10-31-06    Hyperlipidemia     Hypertension     Mitral regurgitation     Neuropathy     Osteopenia     Osteoporosis     PAD (peripheral artery disease) ([facility] [address]     Right LE ischemia    Peripheral neuropathy 6/1/2015    Peripheral vascular disease (HCC)     bilateral lower extremities with edema    Podagra 01/09/2017    Dr. Susan Cortés Spinal stenosis     L4-5    Type 2 diabetes mellitus ([facility] [address]     Urethral stricture 5 years ago    Urgency of urination        PAST SURGICAL HISTORY    Past Surgical History:   Procedure Laterality Date    APPENDECTOMY      CARPAL TUNNEL RELEASE Right 3/29/2019    RIGHT CARPAL TUNNEL RELEASE AND RIGHT MIDDLE FINGER TRIGGER FINGER RELEASE performed by Nitin Huertas MD at [address] [place] [address]    LEFT EYE EYE CATARACT PHACOEMULSIFICATION INTRAOCULAR LENS    CHOLECYSTECTOMY  1978    COLONOSCOPY  8/31/99    diverticulosis    EYE SURGERY Bilateral     bilateral cataract removed    HYSTERECTOMY  1980s    sallie    IR FEMORAL POPLITEAL BYPASS GRAFT Right 8-    KYPHOSIS SURGERY  11-7-06    L1, (fractured after a fall)    TONSILLECTOMY      TOTAL HIP ARTHROPLASTY  4/25/98    (L) THR    WRIST FRACTURE SURGERY  2008    left wrist       FAMILY HISTORY    Family History   Problem Relation Age of Onset    Stroke Father     Hypertension Father        SOCIAL HISTORY    Social History     Tobacco Use    Smoking status: Never Smoker    Smokeless tobacco: Never Used   Vaping Use    Vaping Use: Never used   Substance Use Topics    Alcohol use: No    Drug use: No       ALLERGIES    Allergies   Allergen Reactions    Aricept [Donepezil Hydrochloride] Other (See Comments)     intolerant    Doxycycline Nausea Only    Ketorolac Tromethamine Other (See Comments)     Pt unable to recall reaction       MEDICATIONS    Current Outpatient Medications on File Prior to Encounter   Medication Sig Dispense Refill    cephALEXin (KEFLEX) 500 MG capsule Take 1 capsule by mouth 3 times daily 42 capsule 0    glimepiride (AMARYL) 1 MG tablet TAKE ONE TABLET BY MOUTH EVERY MORNING BEFORE BREAKFAST 22 tablet 0    amLODIPine (NORVASC) 2.5 MG tablet TAKE ONE TABLET BY MOUTH DAILY 30 tablet 3    lisinopril (PRINIVIL;ZESTRIL) 20 MG tablet TAKE ONE TABLET BY MOUTH DAILY 90 tablet 3    metFORMIN (GLUCOPHAGE) 500 MG tablet Take 1 tablet by mouth daily 90 tablet 3    atorvastatin (LIPITOR) 10 MG tablet Take 1 tablet by mouth daily 30 tablet 3    Cholecalciferol (VITAMIN D3) 2000 UNITS CAPS Take 1 capsule by mouth daily      aspirin EC 81 MG EC tablet Take 1 tablet by mouth daily 30 tablet 3    blood glucose test strips (ONETOUCH ULTRA) strip USE TO TEST BLOOD SUGAR TWICE DAILY 100 strip 1    clotrimazole-betamethasone (LOTRISONE) 1-0.05 % cream Apply topically 2 times daily for 2 weeks. 60 g 0    Turmeric 450 MG CAPS Take 450 mg by mouth daily      meclizine (ANTIVERT) 12.5 MG tablet Take 1 tablet by mouth 3 times daily as needed for Dizziness (vertigo) 30 tablet 1    Lite Touch Lancets MISC by Does not apply route. Use twice daily when testing blood sugars. 100 each 5     No current facility-administered medications on file prior to encounter.        REVIEW OF SYSTEMS    Constitutional: negative for chills and fevers  Respiratory: negative for shortness of breath  Cardiovascular: negative for chest pain, claudication and lower extremity edema  Gastrointestinal: negative for abdominal pain  Musculoskeletal:negative for muscle weakness      Last T0H if applicable:   Hemoglobin A1C   Date Value Ref Range Status   04/18/2021 6.8 See comment % Final     Comment:     Comment:  Diagnosis of Diabetes: > or = 6.5%  Increased risk of diabetes (Prediabetes): 5.7-6.4%  Glycemic Control: Nonpregnant Adults: <7.0%                    Pregnant: <6.0%           Objective:      BP (!) 186/73   Pulse 65   Temp 97.3 °F (36.3 °C)   Wt 119 lb 0.8 oz (54 kg)   BMI 22.49 kg/m²     Wt Readings from Last 3 Encounters:   11/22/21 119 lb 0.8 oz (54 kg)   11/18/21 108 lb 6.4 oz (49.2 kg)   11/15/21 107 lb (48.5 kg) PHYSICAL EXAM    General Appearance: alert and oriented to person, place and time and in no acute distress  Head: normocephalic and atraumatic  Pulmonary/Chest: clear to auscultation bilaterally- no wheezes, rales or rhonchi, normal air movement, no respiratory distress  Cardiovascular: normal rate, normal S1 and S2, no gallops and no carotid bruits  Abdomen: soft, non-tender, non-distended, normal bowel sounds, no masses or organomegaly  Extremities: no cyanosis, clubbing or edema and full-thickness wound left anterior shin containing fibrin and slough; periwound hemosiderin deposition  Unable to palpate popliteal, dorsalis pedis or posterior tibial artery left lower extremity        Assessment:     No diagnosis found. Procedure Note  Indications:  Based on my examination of this patient's wound(s) today, sharp excision is required to promote healing and evaluate the extent healing. Performed by: Sai Patel MD    Consent obtained: Yes    Time out taken:  Yes    Pain Control: Anesthetic  Anesthetic: 4% Lidocaine Liquid Topical       Debridement:Excisional Debridement    Using curette the wound(s) was/were sharply debrided down through and including the removal of epidermis, dermis and subcutaneous tissue. Devitalized Tissue Debrided:  fibrin and slough    Pre Debridement Measurements:  Are located in the Wound Documentation Flow Sheet    Wound #: 1     Post  Debridement Measurements:  Wound 11/22/21 Leg Left;  Lower #1 (Active)   Wound Image   11/22/21 0924   Wound Etiology Arterial 11/22/21 0924   Wound Cleansed Cleansed with saline 11/22/21 0924   Wound Length (cm) 2 cm 11/22/21 0924   Wound Width (cm) 1.4 cm 11/22/21 0924   Wound Depth (cm) 0.1 cm 11/22/21 0924   Wound Surface Area (cm^2) 2.8 cm^2 11/22/21 0924   Wound Volume (cm^3) 0.28 cm^3 11/22/21 0924   Post-Procedure Length (cm) 2.1 cm 11/22/21 0955   Post-Procedure Width (cm) 1.5 cm 11/22/21 0955   Post-Procedure Depth (cm) 0.3 cm 11/22/21 0955   Post-Procedure Surface Area (cm^2) 3.15 cm^2 11/22/21 0955   Post-Procedure Volume (cm^3) 0.945 cm^3 11/22/21 0955   Distance Tunneling (cm) 0 cm 11/22/21 0924   Undermining Maxium Distance (cm) 0 11/22/21 0924   Wound Assessment Fibrin 11/22/21 0924   Drainage Amount Small 11/22/21 0924   Drainage Description Yellow 11/22/21 0924   Odor None 11/22/21 0924   Nissa-wound Assessment Ecchymosis; Edematous 11/22/21 0924   Margins Defined edges 11/22/21 0924   Wound Thickness Description not for Pressure Injury Full thickness 11/22/21 0924   Number of days: 0          Percent of Wound Debrided: 100%    Total Surface Area Debrided:  3 sq cm     Bleeding:  Minimal    Hemostasis Achieved:  by pressure    Procedural Pain:  2  / 10     Post Procedural Pain:  0 / 10     Response to treatment:  With complaints of pain. Plan:       Treatment Note please see attached Discharge Instructions  Probable initial inciting event causing wound was that of a hematoma that was unroofed. Her peripheral artery disease and diabetes is further made wound healing difficult. We will try to obtain Santyl to help debride the wound. New Medication(s) at this visit:   New Prescriptions    No medications on file       Other orders at this visit: No orders of the defined types were placed in this encounter. Weight Management: No. N/A    Smoking Cessation: Counseling given: Not Answered        Discharge Instructions          [facility] Physician Orders and Discharge Instructions  [address] [place] [address]  Telephone: [phone]  NAME:  Jessica Pizarro  YOB: 1929  MEDICAL RECORD NUMBER:  [id number]  DATE:  11/22/2021    Wash hands with soap and water prior to and after every dressing change. Wound Cleansing:   · Do not scrub or use excessive force. · With each dressing change, rinse wounds with 0.9% Saline.  (May use wound wash or soft contact solution. Both can be purchased at a local drug store). · If unable to obtain saline, may use a gentle soap and water. · Keep wounds dry in the shower unless otherwise instructed by the physician. · For wounds on lower legs, cast covers can be purchased at local drug stores, so that you may shower and keep the wound(s) dry. []  Vashe Wash solution instructions (if prescribed): Apply enough Vashe to soak a piece of gauze and place on wound bed for 5-10 minutes. DO NOT rinse after Vashe has been applied. Follow dressing application as instructed below. Nissa wound Topical Treatments:  Do not apply lotions, creams, or ointments to the skin around the wound bed unless directed as followed:     [] Apply around the wound: [] moisturizing lotion [] Antifungal ointment [] No-Sting barrier film [] Zinc paste [] Other:       Dressings:           Wound Location: left lower leg      Apply Primary Dressing to wound: Other: santyl-  apply nickel size thickness  ( use medihoney until galdino is obtained)  bitHound and Secure with:     Cover and Secure with: 4X4 gauze pad  Conforming roll gauze   Avoid contact of tape with skin if possible.  When to change Dressing: [x] Daily [] Every Other Day [] Once a week  [] Three times per week: [] Monday, Wednesday, Friday [] Tuesday, Thursday, Saturday  [] Do Not Change Dressing [] Other:        [x]DME/Wound Dressing Supplies ordered at this visit: []Yes []No  o Supplies Provided by:   o Please call them directly to reorder supplies when you run out.  o CONTINUE TO USE THE SUPPLIES YOU HAVE AVAILABLE. IT IS MOST IMPORTANT TO KEEP THE WOUND COVERED AT ALL TIMES. Edema Control:     [x] Elevate leg(s) above the level of the heart for 30 minutes 4-5 times a day and/or when sitting. [x] Avoid prolonged standing in one place. Dietary:  Important dietary reminders:  1. Increase Protein intake (i.e. Lean meats, fish, eggs, legumes, and yogurt)  2.  No added salt  3. If diabetic, follow a diabetic diet and check glucose prior to meals or as instructed by your physician. Dietary Supplements:  [] Octaviano  [] 30ml ProStat  [] EnsureEnlive [] Ensure Max/Premier  [] Other:    If you are still having pain after you go home:   For wounds on lower legs or arms, elevate the affected limb.  Use over-the-counter medications you would normally use for pain as permitted by your primary care doctor.  For persistent pain not relieved by the above interventions, please call your primary care doctor. Return Appointment:   Return Appointment: With Dr. Jose Mcelroy  in  [facility])  o Scheduled weekly until (Date):      [] Return Appointment for a Wound Assessment with a nurse on:     o All other future appointments:  Future Appointments   Date Time Provider Gilbert Frey   6/6/2022  1:00 PM Martín Leigh MD [facility]   -       [x] Orders placed during your visit: No orders of the defined types were placed in this encounter. Your nurse  is:  isra     Electronically signed by Carie Miles RN on 11/22/2021 at [facility] Information: Should you experience any significant changes in your wound(s) or have questions about your wound care, please contact the [address] [place] [address] at [phone]. We are open from 8:00am - 4:30p Monday thru Friday except for Wednesdays which we are closed. Please give us 24-48 hours to return your call. Call your doctor now or seek immediate medical care if:    · You have symptoms of infection, such as:  ? Increased pain, swelling, warmth, or redness. ? Red streaks leading from the area. ? Pus draining from the area. ? A fever.         Physician for this visit and orders: Yogesh Norton MD    [] Patient unable to sign Discharge Instructions given to ECF/Transportation/POA        Electronically signed by Yogesh Norton MD on 11/22/2021 at 10:27 AM

## 2021-11-29 ENCOUNTER — HOSPITAL ENCOUNTER (OUTPATIENT)
Dept: WOUND CARE | Age: 86
Discharge: HOME | End: 2021-11-29
Payer: MEDICARE

## 2021-11-29 VITALS
HEART RATE: 69 BPM | TEMPERATURE: 97.6 F | RESPIRATION RATE: 16 BRPM | SYSTOLIC BLOOD PRESSURE: 176 MMHG | DIASTOLIC BLOOD PRESSURE: 74 MMHG

## 2021-11-29 DIAGNOSIS — S81.002D OPEN WOUND OF LEFT KNEE, SUBSEQUENT ENCOUNTER: Primary | ICD-10-CM

## 2021-11-29 DIAGNOSIS — S81.002A OPEN WOUND OF LEFT KNEE, INITIAL ENCOUNTER: ICD-10-CM

## 2021-11-29 PROCEDURE — 11042 DBRDMT SUBQ TIS 1ST 20SQCM/<: CPT | Performed by: SPECIALIST

## 2021-11-29 PROCEDURE — 11042 DBRDMT SUBQ TIS 1ST 20SQCM/<: CPT

## 2021-11-29 PROCEDURE — 6370000000 HC RX 637 (ALT 250 FOR IP): Performed by: SPECIALIST

## 2021-11-29 RX ORDER — BACITRACIN, NEOMYCIN, POLYMYXIN B 400; 3.5; 5 [USP'U]/G; MG/G; [USP'U]/G
OINTMENT TOPICAL ONCE
Status: CANCELLED | OUTPATIENT
Start: 2021-11-29 | End: 2021-11-29

## 2021-11-29 RX ORDER — LIDOCAINE HYDROCHLORIDE 20 MG/ML
JELLY TOPICAL ONCE
Status: CANCELLED | OUTPATIENT
Start: 2021-11-29 | End: 2021-11-29

## 2021-11-29 RX ORDER — LIDOCAINE 40 MG/G
CREAM TOPICAL ONCE
Status: CANCELLED | OUTPATIENT
Start: 2021-11-29 | End: 2021-11-29

## 2021-11-29 RX ORDER — BETAMETHASONE DIPROPIONATE 0.05 %
OINTMENT (GRAM) TOPICAL ONCE
Status: CANCELLED | OUTPATIENT
Start: 2021-11-29 | End: 2021-11-29

## 2021-11-29 RX ORDER — GINSENG 100 MG
CAPSULE ORAL ONCE
Status: CANCELLED | OUTPATIENT
Start: 2021-11-29 | End: 2021-11-29

## 2021-11-29 RX ORDER — CLOBETASOL PROPIONATE 0.5 MG/G
OINTMENT TOPICAL ONCE
Status: CANCELLED | OUTPATIENT
Start: 2021-11-29 | End: 2021-11-29

## 2021-11-29 RX ORDER — GENTAMICIN SULFATE 1 MG/G
OINTMENT TOPICAL ONCE
Status: CANCELLED | OUTPATIENT
Start: 2021-11-29 | End: 2021-11-29

## 2021-11-29 RX ORDER — LIDOCAINE HYDROCHLORIDE 40 MG/ML
2.5 SOLUTION TOPICAL ONCE
Status: COMPLETED | OUTPATIENT
Start: 2021-11-29 | End: 2021-11-29

## 2021-11-29 RX ORDER — BACITRACIN ZINC AND POLYMYXIN B SULFATE 500; 1000 [USP'U]/G; [USP'U]/G
OINTMENT TOPICAL ONCE
Status: CANCELLED | OUTPATIENT
Start: 2021-11-29 | End: 2021-11-29

## 2021-11-29 RX ORDER — LIDOCAINE 50 MG/G
OINTMENT TOPICAL ONCE
Status: CANCELLED | OUTPATIENT
Start: 2021-11-29 | End: 2021-11-29

## 2021-11-29 RX ORDER — LIDOCAINE HYDROCHLORIDE 40 MG/ML
SOLUTION TOPICAL ONCE
Status: CANCELLED | OUTPATIENT
Start: 2021-11-29 | End: 2021-11-29

## 2021-11-29 RX ADMIN — COLLAGENASE SANTYL: 250 OINTMENT TOPICAL at 10:00

## 2021-11-29 RX ADMIN — LIDOCAINE HYDROCHLORIDE 2.5 ML: 40 SOLUTION TOPICAL at 09:54

## 2021-11-29 NOTE — PROGRESS NOTES
[facility] [place]  [place] Note and Procedure Note      Chelsea Figueroa  MEDICAL RECORD NUMBER:  [id number]  AGE: 80 y.o. GENDER: female  : 1929  EPISODE DATE:  2021    Subjective:     Chief Complaint   Patient presents with    Wound Check     left lower leg         HISTORY of PRESENT ILLNESS HPI     Chelsea Figueroa is a 80 y.o. female who presents today for wound/ulcer evaluation. History of Wound Context: Patient was referred by primary care physician for nonhealing wound left anterior knee. Apparently this began as a blood blister which opened up. She had developed a traumatic hematoma in the recent past in her left lower extremity and I believe this was unroofed but with subsequent development of a wound. She has been putting Medihoney on the wound for short period of time. Wound is painful times. Patient has documented peripheral artery disease recently seen by Dr. Nikki Garrett previously done a femoropopliteal bypass on the right lower extremity. He suggested an arteriogram be performed on the left lower extremity where the wound is located at this juncture patient is reluctant to proceed. Denies any claudication symptoms or rest pain.   She is a diabetic    Pain Assessment:  Wound/Ulcer Pain Timing/Severity: intermittent  Quality of pain: throbbing  Severity:  2 / 10   Modifying Factors: None  Associated Signs/Symptoms: pain    Ulcer Identification:  Ulcer Type: venous and arterial  Contributing Factors: edema, venous stasis and arterial insufficiency    Objective:      BP (!) 176/74   Pulse 69   Temp 97.6 °F (36.4 °C) (Temporal)   Resp 16     Wt Readings from Last 3 Encounters:   21 119 lb 0.8 oz (54 kg)   21 108 lb 6.4 oz (49.2 kg)   11/15/21 107 lb (48.5 kg)       PHYSICAL EXAM    Extremities: no cyanosis, clubbing or edema and full-thickness wound left anterior shin containing fibrin and slough minimal granulation tissue, periwound hemosiderin deposition. Assessment:      No diagnosis found. Procedure Note  Indications:  Based on my examination of this patient's wound(s) today, sharp excision is required to promote healing and evaluate the extent healing. Performed by: Tara Gaviria MD    Consent obtained? Yes    Time out taken: Yes    Pain Control: Anesthetic: 4% Lidocaine Liquid Topical     Debridement:Excisional Debridement    Using curette the wound was sharply debrided    down through and including the removal of  epidermis, dermis and subcutaneous tissue. Devitalized Tissue Debrided:  fibrin and slough      Pre Debridement Measurements:  Are located in the Wound Documentation Flow Sheet   Wound #: 1     Post  Debridement Measurements:  Wound 11/22/21 Leg Left; Lower #1 (Active)   Wound Image   11/22/21 0924   Wound Etiology Arterial 11/22/21 0924   Wound Cleansed Cleansed with saline 11/29/21 0954   Dressing/Treatment Pharmaceutical agent (see MAR) 11/29/21 1013   Wound Length (cm) 2.1 cm 11/29/21 0954   Wound Width (cm) 1.5 cm 11/29/21 0954   Wound Depth (cm) 0.1 cm 11/29/21 0954   Wound Surface Area (cm^2) 3.15 cm^2 11/29/21 0954   Change in Wound Size % (l*w) -12.5 11/29/21 0954   Wound Volume (cm^3) 0.315 cm^3 11/29/21 0954   Wound Healing % -12 11/29/21 0954   Post-Procedure Length (cm) 2.2 cm 11/29/21 1013   Post-Procedure Width (cm) 1.6 cm 11/29/21 1013   Post-Procedure Depth (cm) 0.3 cm 11/29/21 1013   Post-Procedure Surface Area (cm^2) 3.52 cm^2 11/29/21 1013   Post-Procedure Volume (cm^3) 1. 056 cm^3 11/29/21 1013   Distance Tunneling (cm) 0 cm 11/29/21 0954   Undermining Maxium Distance (cm) 0 11/29/21 0954   Wound Assessment Slough 11/29/21 0954   Drainage Amount Small 11/29/21 0954   Drainage Description Yellow 11/29/21 0954   Odor None 11/29/21 0954   Nissa-wound Assessment Ecchymosis; Edematous 11/29/21 0954   Margins Defined edges 11/29/21 0954   Wound Thickness Description not for Pressure Injury Full thickness 11/29/21 1666   Number of days: 7          Percent of Wound Debrided: 100%    Total Surface Area Debrided:  3,5 sq cm    Diabetic/Pressure/Non Pressure Ulcers only:  Ulcer: Non-Pressure ulcer, fat layer exposed    Bleeding: Minimal    Hemostasis Achieved: by pressure    Procedural Pain: 0  / 10     Post Procedural Pain: 0 / 10     Response to treatment:  Well tolerated by patient. She is to continue with her daily Santyl applications to the wound        Plan:     Treatment Note: Please see attached Discharge Instructions. These instructions were given and signed by the patient or POA    New Medication(s) at this visit:   New Prescriptions    No medications on file       Other orders at this visit: No orders of the defined types were placed in this encounter. Discharge [address] [facility] Physician Orders and Discharge Instructions  [address] [place] [address] [address] [place] [address]  Telephone: [phone]  NAME:  Edy Holt  YOB: 1929  MEDICAL RECORD NUMBER:  [id number]  DATE:  11/29/2021    Wash hands with soap and water prior to and after every dressing change. Wound Cleansing:   · Do not scrub or use excessive force. · With each dressing change, rinse wounds with 0.9% Saline. (May use wound wash or soft contact solution. Both can be purchased at a local drug store). · If unable to obtain saline, may use a gentle soap and water. · Keep wounds dry in the shower unless otherwise instructed by the physician. · For wounds on lower legs, cast covers can be purchased at local drug stores, so that you may shower and keep the wound(s) dry. []  Vashe Wash solution instructions (if prescribed): Apply enough Vashe to soak a piece of gauze and place on wound bed for 5-10 minutes. DO NOT rinse after Vashe has been applied. Follow dressing application as instructed below.     Nissa wound Topical Treatments:  Do not apply lotions, creams, or ointments to the skin around the wound bed unless directed as followed:     [] Apply around the wound: [] moisturizing lotion [] Antifungal ointment [] No-Sting barrier film [] Zinc paste [] Other:       Dressings:           Wound Location: left lower leg wound      Apply Primary Dressing to wound:       Pharmaceutical medication santly- nickel size thickness     Cover and Secure with:     Cover and Secure with: 2X2 gauze pad  Conforming roll gauze   Avoid contact of tape with skin if possible.  When to change Dressing: [x] Daily [] Every Other Day [] Once a week  [] Three times per week: [] Monday, Wednesday, Friday [] Tuesday, Thursday, Saturday  [] Do Not Change Dressing [] Other       Dietary:  Important dietary reminders:  1. Increase Protein intake (i.e. Lean meats, fish, eggs, legumes, and yogurt)  2. No added salt  3. If diabetic, follow a diabetic diet and check glucose prior to meals or as instructed by your physician. Dietary Supplements:  [] Octaviano  [] 30ml ProStat  [] EnsureEnlive [] Ensure Max/Premier  [] Other:    If you are still having pain after you go home:   For wounds on lower legs or arms, elevate the affected limb.  Use over-the-counter medications you would normally use for pain as permitted by your primary care doctor.  For persistent pain not relieved by the above interventions, please call your primary care doctor. Return Appointment:   Return Appointment: With Dr. Ruth Hernandez  in  [address] [place] [address])  o Scheduled weekly until (Date):      [] Return Appointment for a Wound Assessment with a nurse on:     o All other future appointments:  Future Appointments   Date Time Provider Gilbert Frey   6/6/2022  1:00 PM MD [facility]   -       [x] Orders placed during your visit: No orders of the defined types were placed in this encounter.       Your nurse  isMike Torres     Electronically signed by Latia Castro RN on 11/29/2021 at 10:15 AM Wound Care Center Information: Should you experience any significant changes in your wound(s) or have questions about your wound care, please contact the [address] [place] [address] at [phone]. We are open from 8:00am - 4:30p Monday thru Friday except for Wednesdays which we are closed. Please give us 24-48 hours to return your call. Call your doctor now or seek immediate medical care if:    · You have symptoms of infection, such as:  ? Increased pain, swelling, warmth, or redness. ? Red streaks leading from the area. ? Pus draining from the area. ? A fever.         Physician for this visit and orders: Sai Patel MD    [] Patient unable to sign Discharge Instructions given to ECF/Transportation/POA        Electronically signed by Sai Patel MD on 11/29/2021 at 10:27 AM

## 2021-11-29 NOTE — PLAN OF CARE
[address] [address]:     [employer] [id number] [address] p: [phone] [phone]     Ordering Center: The [address] Carlos Jones    Patient Information:      Arvid Odor  [address]   [phone]   : 1929  AGE: 80 y.o. GENDER: female   TODAYS DATE:  2021    Insurance:      PRIMARY INSURANCE:  Plan: MEDICARE PART A AND B  Coverage: MEDICARE  Effective Date: 9/3/2018  [id number] - (Medicare)    SECONDARY INSURANCE:  Plan:   Coverage:   Effective Date:   [unfilled]    [unfilled]   [unfilled]     Patient Wound Information:      No diagnosis found. WOUNDS REQUIRING DRESSING SUPPLIES:     Wound 21 Leg Left; Lower #1 (Active)   Wound Image   21 0924   Wound Etiology Arterial 21 0924   Wound Cleansed Cleansed with saline 21 0954   Dressing/Treatment Pharmaceutical agent (see MAR) 21 1013   Wound Length (cm) 2.1 cm 21 0954   Wound Width (cm) 1.5 cm 21 0954   Wound Depth (cm) 0.1 cm 21 0954   Wound Surface Area (cm^2) 3.15 cm^2 21 0954   Change in Wound Size % (l*w) -12.5 21 0954   Wound Volume (cm^3) 0.315 cm^3 21 0954   Wound Healing % -12 21 0954   Post-Procedure Length (cm) 2.2 cm 21 1013   Post-Procedure Width (cm) 1.6 cm 21 1013   Post-Procedure Depth (cm) 0.3 cm 21 1013   Post-Procedure Surface Area (cm^2) 3.52 cm^2 21 1013   Post-Procedure Volume (cm^3) 1. 056 cm^3 21 1013   Distance Tunneling (cm) 0 cm 21 0954   Undermining Maxium Distance (cm) 0 21 09   Wound Assessment Slough 21   Drainage Amount Small 21   Drainage Description Yellow 21   Odor None 21   Nissa-wound Assessment Ecchymosis; Edematous 21   Margins Defined edges 11/29/21 0954   Wound Thickness Description not for Pressure Injury Full thickness 11/29/21 0954   Number of days: 7          Supplies Requested :      WOUND #: 1   PRIMARY DRESSING:  None   Cover and Secure with: 4X4 gauze pad  Conforming roll gauze     FREQUENCY OF DRESSING CHANGES:  Daily         ADDITIONAL ITEMS:  [] Gloves Small  [] Gloves Medium [] Gloves Large [] Gloves XLarge  [] Tape 1\" [x] Tape 2\" [] Tape 3\"  [] Medipore Tape  [x] Saline- bullets  [] Skin Prep   [] Adhesive Remover   [] Cotton Tip Applicators   [] Other:    Patient Wound(s) Debrided: [x] Yes   [] No    Debridement Date: 11/29/2021    Debribement Type: Excisional/Sharp    Patient currently being seen by Home Health: [] Yes   [x] No    Duration for needed supplies:  [x]10  []30  []60  []90 Days    Provider Information:      PROVIDER'S NAME/NPI: Eliu Simmons MD,  NPI: [id number]    I give permission to coordinate the care for this patient   assisting with verbal orders: Asher Marina RN 11/29/2021

## 2021-12-06 ENCOUNTER — HOSPITAL ENCOUNTER (OUTPATIENT)
Dept: WOUND CARE | Age: 86
Discharge: HOME | End: 2021-12-06
Payer: MEDICARE

## 2021-12-06 VITALS
SYSTOLIC BLOOD PRESSURE: 168 MMHG | RESPIRATION RATE: 16 BRPM | TEMPERATURE: 97 F | HEART RATE: 54 BPM | DIASTOLIC BLOOD PRESSURE: 58 MMHG

## 2021-12-06 DIAGNOSIS — S81.002D OPEN WOUND OF LEFT KNEE, SUBSEQUENT ENCOUNTER: Primary | ICD-10-CM

## 2021-12-06 DIAGNOSIS — S81.002A OPEN WOUND OF LEFT KNEE, INITIAL ENCOUNTER: ICD-10-CM

## 2021-12-06 PROCEDURE — 11042 DBRDMT SUBQ TIS 1ST 20SQCM/<: CPT | Performed by: SPECIALIST

## 2021-12-06 PROCEDURE — 11042 DBRDMT SUBQ TIS 1ST 20SQCM/<: CPT

## 2021-12-06 PROCEDURE — 6370000000 HC RX 637 (ALT 250 FOR IP): Performed by: SPECIALIST

## 2021-12-06 RX ORDER — LIDOCAINE 40 MG/G
CREAM TOPICAL ONCE
Status: CANCELLED | OUTPATIENT
Start: 2021-12-06 | End: 2021-12-06

## 2021-12-06 RX ORDER — LIDOCAINE 50 MG/G
OINTMENT TOPICAL ONCE
Status: CANCELLED | OUTPATIENT
Start: 2021-12-06 | End: 2021-12-06

## 2021-12-06 RX ORDER — LIDOCAINE HYDROCHLORIDE 40 MG/ML
SOLUTION TOPICAL ONCE
Status: COMPLETED | OUTPATIENT
Start: 2021-12-06 | End: 2021-12-06

## 2021-12-06 RX ORDER — BACITRACIN ZINC AND POLYMYXIN B SULFATE 500; 1000 [USP'U]/G; [USP'U]/G
OINTMENT TOPICAL ONCE
Status: CANCELLED | OUTPATIENT
Start: 2021-12-06 | End: 2021-12-06

## 2021-12-06 RX ORDER — BETAMETHASONE DIPROPIONATE 0.05 %
OINTMENT (GRAM) TOPICAL ONCE
Status: CANCELLED | OUTPATIENT
Start: 2021-12-06 | End: 2021-12-06

## 2021-12-06 RX ORDER — CLOBETASOL PROPIONATE 0.5 MG/G
OINTMENT TOPICAL ONCE
Status: CANCELLED | OUTPATIENT
Start: 2021-12-06 | End: 2021-12-06

## 2021-12-06 RX ORDER — GINSENG 100 MG
CAPSULE ORAL ONCE
Status: CANCELLED | OUTPATIENT
Start: 2021-12-06 | End: 2021-12-06

## 2021-12-06 RX ORDER — LIDOCAINE HYDROCHLORIDE 20 MG/ML
JELLY TOPICAL ONCE
Status: CANCELLED | OUTPATIENT
Start: 2021-12-06 | End: 2021-12-06

## 2021-12-06 RX ORDER — GENTAMICIN SULFATE 1 MG/G
OINTMENT TOPICAL ONCE
Status: CANCELLED | OUTPATIENT
Start: 2021-12-06 | End: 2021-12-06

## 2021-12-06 RX ORDER — LIDOCAINE HYDROCHLORIDE 40 MG/ML
SOLUTION TOPICAL ONCE
Status: CANCELLED | OUTPATIENT
Start: 2021-12-06 | End: 2021-12-06

## 2021-12-06 RX ORDER — BACITRACIN, NEOMYCIN, POLYMYXIN B 400; 3.5; 5 [USP'U]/G; MG/G; [USP'U]/G
OINTMENT TOPICAL ONCE
Status: CANCELLED | OUTPATIENT
Start: 2021-12-06 | End: 2021-12-06

## 2021-12-06 RX ADMIN — LIDOCAINE HYDROCHLORIDE: 40 SOLUTION TOPICAL at 09:47

## 2021-12-06 ASSESSMENT — PAIN SCALES - GENERAL: PAINLEVEL_OUTOF10: 6

## 2021-12-06 ASSESSMENT — PAIN DESCRIPTION - PAIN TYPE: TYPE: ACUTE PAIN

## 2021-12-06 ASSESSMENT — PAIN DESCRIPTION - ORIENTATION: ORIENTATION: LEFT

## 2021-12-06 ASSESSMENT — PAIN DESCRIPTION - DESCRIPTORS: DESCRIPTORS: THROBBING

## 2021-12-06 ASSESSMENT — PAIN DESCRIPTION - LOCATION: LOCATION: LEG

## 2021-12-06 ASSESSMENT — PAIN DESCRIPTION - FREQUENCY: FREQUENCY: INTERMITTENT

## 2021-12-06 NOTE — PROGRESS NOTES
1227 [facility] [place]  Progress Note and Procedure Note      Yari Baum  MEDICAL RECORD NUMBER:  [id number]  AGE: 80 y.o. GENDER: female  : 1929  EPISODE DATE:  2021    Subjective:     Chief Complaint   Patient presents with    Wound Check     left leg         HISTORY of PRESENT ILLNESS HPI     Yari Baum is a 80 y.o. female who presents today for wound/ulcer evaluation. History of Wound Context: Patient was referred by primary care physician for nonhealing wound left anterior knee.  Apparently this began as a blood blister which opened up.  She had developed a traumatic hematoma in the recent past in her left lower extremity and I believe this was unroofed but with subsequent development of a wound. She has been putting Medihoney on the wound for short period of time.  Wound is painful times. Cristian Cabrales has documented peripheral artery disease recently seen by Dr. Geneva Reed done a femoropopliteal bypass on the right lower extremity.  He suggested an arteriogram be performed on the left lower extremity. At this juncture patient is reluctant to proceed.  She is a diabetic    Pain Assessment:  Wound/Ulcer Pain Timing/Severity: intermittent  Quality of pain: burning  Severity:  2 / 10   Modifying Factors: Pain worsens with rest  Associated Signs/Symptoms: pain    Ulcer Identification:  Ulcer Type: venous and arterial  Contributing Factors: edema, venous stasis and arterial insufficiency    Objective:      BP (!) 168/58   Pulse 54   Temp 97 °F (36.1 °C)   Resp 16     Wt Readings from Last 3 Encounters:   21 119 lb 0.8 oz (54 kg)   21 108 lb 6.4 oz (49.2 kg)   11/15/21 107 lb (48.5 kg)       PHYSICAL EXAM    Extremities: no cyanosis, clubbing or edema, full-thickness wound left anterior shin containing fibrin and slough, buds of granulation tissue present, periwound hemosiderin deposition and varicosities    Assessment:      1.  Open wound of left knee, subsequent encounter    2. Open wound of left knee, initial encounter         Procedure Note  Indications:  Based on my examination of this patient's wound(s) today, sharp excision is required to promote healing and evaluate the extent healing. Performed by: Bunny Tipton MD    Consent obtained? Yes    Time out taken: Yes    Pain Control: Anesthetic: 4% Lidocaine Liquid Topical     Debridement:Excisional Debridement    Using curette the wound was sharply debrided    down through and including the removal of  epidermis, dermis and subcutaneous tissue. Devitalized Tissue Debrided:  fibrin and slough      Pre Debridement Measurements:  Are located in the Wound Documentation Flow Sheet   Wound #: 1     Post  Debridement Measurements:  Wound 11/22/21 Leg Left;  Lower #1 (Active)   Wound Image   11/22/21 0924   Wound Etiology Arterial 11/22/21 0924   Wound Cleansed Cleansed with saline 12/06/21 0947   Dressing/Treatment Collagen 12/06/21 1015   Wound Length (cm) 2 cm 12/06/21 0947   Wound Width (cm) 1.9 cm 12/06/21 0947   Wound Depth (cm) 0.1 cm 12/06/21 0947   Wound Surface Area (cm^2) 3.8 cm^2 12/06/21 0947   Change in Wound Size % (l*w) -35.71 12/06/21 0947   Wound Volume (cm^3) 0.38 cm^3 12/06/21 0947   Wound Healing % -36 12/06/21 0947   Post-Procedure Length (cm) 2.3 cm 12/06/21 1004   Post-Procedure Width (cm) 2 cm 12/06/21 1004   Post-Procedure Depth (cm) 0.3 cm 12/06/21 1004   Post-Procedure Surface Area (cm^2) 4.6 cm^2 12/06/21 1004   Post-Procedure Volume (cm^3) 1.38 cm^3 12/06/21 1004   Distance Tunneling (cm) 0 cm 11/29/21 0954   Undermining Maxium Distance (cm) 0 11/29/21 0954   Wound Assessment Slough; Wheelwright/red 12/06/21 0947   Drainage Amount Scant 12/06/21 0947   Drainage Description Yellow 12/06/21 0947   Odor None 12/06/21 0947   Nissa-wound Assessment Hemosiderin staining (brown yellow) 12/06/21 0947   Margins Defined edges 12/06/21 0947   Wound Thickness Description not for Pressure Injury Full thickness 12/06/21 0947   Number of days: 14          Percent of Wound Debrided: 100%    Total Surface Area Debrided:  4.6 sq cm    Diabetic/Pressure/Non Pressure Ulcers only:  Ulcer: Non-Pressure ulcer, fat layer exposed    Bleeding: Minimal    Hemostasis Achieved: by pressure    Procedural Pain: 4  / 10     Post Procedural Pain: 0 / 10     Response to treatment:  Poorly tolerated by patient., With complaints of pain. Wound appearance is less slough and fibrin. Will discontinue Santyl and begin applying primary collagen dressing. Plan:     Treatment Note: Please see attached Discharge Instructions. These instructions were given and signed by the patient or POA    New Medication(s) at this visit:   New Prescriptions    No medications on file       Other orders at this visit:   Orders Placed This Encounter   Procedures   [address] [address] [facility] Protocol       Discharge Instructions          [address] [facility] Physician Orders and Discharge Instructions  [address] [place] [address] [address] [place] [address]. [place] [address]  Telephone: [phone]  NAME:  Niko Estrada  YOB: 1929  MEDICAL RECORD NUMBER:  [id number]  DATE:  12/6/2021    Wash hands with soap and water prior to and after every dressing change. Wound Cleansing:   · Do not scrub or use excessive force. · With each dressing change, rinse wounds with 0.9% Saline. (May use wound wash or soft contact solution. Both can be purchased at a local drug store). · If unable to obtain saline, may use a gentle soap and water. · Keep wounds dry in the shower unless otherwise instructed by the physician. · For wounds on lower legs, cast covers can be purchased at local drug stores, so that you may shower and keep the wound(s) dry. []  Vashe Wash solution instructions (if prescribed): Apply enough Vashe to soak a piece of gauze and place on wound bed for 5-10 minutes.  DO NOT rinse after Vashe has been applied. Follow dressing application as instructed below. Nissa wound Topical Treatments:  Do not apply lotions, creams, or ointments to the skin around the wound bed unless directed as followed:     [] Apply around the wound: [] moisturizing lotion [] Antifungal ointment [] No-Sting barrier film [] Zinc paste [] Other:       Dressings:           Wound Location:  Left lower leg       Apply Primary Dressing to wound:       Collagen      Cover and Secure with:     Cover and Secure with: 4X4 gauze pad  Conforming roll gauze   Avoid contact of tape with skin if possible.  When to change Dressing: [] Daily [] Every Other Day [] Once a week  [x] Three times per week: [x] Monday, Wednesday, Friday [] Tuesday, Thursday, Saturday  [] Do Not Change Dressing [] Other:       Edema Control:  Apply: [] Compression Stocking  [] Left Lower Leg [] Right Lower Leg     [x]  Spandagrip:Strength: Low compression 5-10 mm/Hg    [x] Left Lower Leg  [] Right Lower Leg         Apply every morning immediately when getting up. They should be applied to affected leg(s) from mid foot to knee making sure to cover the heel. Remove every night before going to bed. [x] Elevate leg(s) above the level of the heart for 30 minutes 4-5 times a day and/or when sitting. [x] Avoid prolonged standing in one place. Dietary:  Important dietary reminders:  1. Increase Protein intake (i.e. Lean meats, fish, eggs, legumes, and yogurt)  2. No added salt  3. If diabetic, follow a diabetic diet and check glucose prior to meals or as instructed by your physician. Dietary Supplements:  [] Octaviano  [] 30ml ProStat  [] EnsureEnlive [] Ensure Max/Premier  [] Other:    If you are still having pain after you go home:   For wounds on lower legs or arms, elevate the affected limb.  Use over-the-counter medications you would normally use for pain as permitted by your primary care doctor.    For persistent pain not relieved by the above interventions, please call your primary care doctor. Return Appointment:   Return Appointment: With Dr. Thais Ceron  in  [facility])  o Scheduled weekly until (Date):      [] Return Appointment for a Wound Assessment with a nurse on:     o All other future appointments:  Future Appointments   Date Time Provider Gilbert Frey   6/6/2022  1:00 PM Severo Bills, MD [facility]   -       [x] Orders placed during your visit:   Orders Placed This Encounter   Procedures    Initiate Outpatient Wound Care Protocol       Your nurse  is:  You Mathur     Electronically signed by Larry Hernandez RN on 12/6/2021 at 10:06 AM     [address] Information: Should you experience any significant changes in your wound(s) or have questions about your wound care, please contact the [address] [place] [address] at [phone]. We are open from 8:00am - 4:30p Monday thru Friday except for Wednesdays which we are closed. Please give us 24-48 hours to return your call. Call your doctor now or seek immediate medical care if:    · You have symptoms of infection, such as:  ? Increased pain, swelling, warmth, or redness. ? Red streaks leading from the area. ? Pus draining from the area. ? A fever.         Physician for this visit and orders: Horacio Ayala MD    [] Patient unable to sign Discharge Instructions given to ECF/Transportation/POA        Electronically signed by Horacio Ayala MD on 12/6/2021 at 10:42 AM

## 2021-12-07 RX ORDER — GLIMEPIRIDE 1 MG/1
TABLET ORAL
Qty: 22 TABLET | Refills: 0 | Status: SHIPPED | OUTPATIENT
Start: 2021-12-07 | End: 2022-01-06

## 2021-12-13 ENCOUNTER — HOSPITAL ENCOUNTER (OUTPATIENT)
Dept: WOUND CARE | Age: 86
Discharge: HOME | End: 2021-12-13
Payer: MEDICARE

## 2021-12-13 VITALS
RESPIRATION RATE: 16 BRPM | SYSTOLIC BLOOD PRESSURE: 176 MMHG | TEMPERATURE: 97.5 F | DIASTOLIC BLOOD PRESSURE: 74 MMHG | HEART RATE: 101 BPM

## 2021-12-13 DIAGNOSIS — S81.002A OPEN WOUND OF LEFT KNEE, INITIAL ENCOUNTER: Primary | ICD-10-CM

## 2021-12-13 DIAGNOSIS — S81.802S OPEN WOUND OF LEFT LOWER LEG, SEQUELA: ICD-10-CM

## 2021-12-13 PROCEDURE — 11042 DBRDMT SUBQ TIS 1ST 20SQCM/<: CPT

## 2021-12-13 PROCEDURE — 11042 DBRDMT SUBQ TIS 1ST 20SQCM/<: CPT | Performed by: SPECIALIST

## 2021-12-13 PROCEDURE — 6370000000 HC RX 637 (ALT 250 FOR IP): Performed by: SPECIALIST

## 2021-12-13 RX ORDER — BETAMETHASONE DIPROPIONATE 0.05 %
OINTMENT (GRAM) TOPICAL ONCE
Status: CANCELLED | OUTPATIENT
Start: 2021-12-13 | End: 2021-12-13

## 2021-12-13 RX ORDER — BACITRACIN, NEOMYCIN, POLYMYXIN B 400; 3.5; 5 [USP'U]/G; MG/G; [USP'U]/G
OINTMENT TOPICAL ONCE
Status: CANCELLED | OUTPATIENT
Start: 2021-12-13 | End: 2021-12-13

## 2021-12-13 RX ORDER — LIDOCAINE HYDROCHLORIDE 20 MG/ML
JELLY TOPICAL ONCE
Status: CANCELLED | OUTPATIENT
Start: 2021-12-13 | End: 2021-12-13

## 2021-12-13 RX ORDER — GINSENG 100 MG
CAPSULE ORAL ONCE
Status: CANCELLED | OUTPATIENT
Start: 2021-12-13 | End: 2021-12-13

## 2021-12-13 RX ORDER — BACITRACIN ZINC AND POLYMYXIN B SULFATE 500; 1000 [USP'U]/G; [USP'U]/G
OINTMENT TOPICAL ONCE
Status: CANCELLED | OUTPATIENT
Start: 2021-12-13 | End: 2021-12-13

## 2021-12-13 RX ORDER — GENTAMICIN SULFATE 1 MG/G
OINTMENT TOPICAL ONCE
Status: CANCELLED | OUTPATIENT
Start: 2021-12-13 | End: 2021-12-13

## 2021-12-13 RX ORDER — LIDOCAINE 40 MG/G
CREAM TOPICAL ONCE
Status: CANCELLED | OUTPATIENT
Start: 2021-12-13 | End: 2021-12-13

## 2021-12-13 RX ORDER — LIDOCAINE HYDROCHLORIDE 40 MG/ML
SOLUTION TOPICAL ONCE
Status: CANCELLED | OUTPATIENT
Start: 2021-12-13 | End: 2021-12-13

## 2021-12-13 RX ORDER — LIDOCAINE 50 MG/G
OINTMENT TOPICAL ONCE
Status: CANCELLED | OUTPATIENT
Start: 2021-12-13 | End: 2021-12-13

## 2021-12-13 RX ORDER — CLOBETASOL PROPIONATE 0.5 MG/G
OINTMENT TOPICAL ONCE
Status: CANCELLED | OUTPATIENT
Start: 2021-12-13 | End: 2021-12-13

## 2021-12-13 RX ORDER — LIDOCAINE HYDROCHLORIDE 40 MG/ML
SOLUTION TOPICAL ONCE
Status: COMPLETED | OUTPATIENT
Start: 2021-12-13 | End: 2021-12-13

## 2021-12-13 RX ADMIN — LIDOCAINE HYDROCHLORIDE: 40 SOLUTION TOPICAL at 10:07

## 2021-12-13 ASSESSMENT — PAIN DESCRIPTION - PAIN TYPE: TYPE: ACUTE PAIN

## 2021-12-13 ASSESSMENT — PAIN DESCRIPTION - PROGRESSION: CLINICAL_PROGRESSION: NOT CHANGED

## 2021-12-13 ASSESSMENT — PAIN DESCRIPTION - LOCATION: LOCATION: LEG

## 2021-12-13 ASSESSMENT — PAIN DESCRIPTION - ONSET: ONSET: ON-GOING

## 2021-12-13 ASSESSMENT — PAIN DESCRIPTION - FREQUENCY: FREQUENCY: INTERMITTENT

## 2021-12-13 ASSESSMENT — PAIN SCALES - GENERAL: PAINLEVEL_OUTOF10: 6

## 2021-12-13 ASSESSMENT — PAIN DESCRIPTION - DESCRIPTORS: DESCRIPTORS: SHARP

## 2021-12-13 ASSESSMENT — PAIN DESCRIPTION - ORIENTATION: ORIENTATION: LEFT

## 2021-12-13 NOTE — PROGRESS NOTES
[facility]27 [facility]  Progress Note and Procedure Note      Eber Aguirre  MEDICAL RECORD NUMBER:  [id number]  AGE: 80 y.o. GENDER: female  : 1929  EPISODE DATE:  2021    Subjective:     Chief Complaint   Patient presents with    Wound Check     left lower leg         HISTORY of PRESENT ILLNESS HPI     Eber Aguirre is a 80 y.o. female who presents today for wound/ulcer evaluation. History of Wound Context: Patient was referred by primary care physician for nonhealing wound left anterior knee.  Apparently this began as a blood blister which opened up.  She had developed a traumatic hematoma in the recent past in her left lower extremity and I believe this was unroofed but with subsequent development of a wound. She had been putting Medihoney on the wound for short period of time.  Wound is painful times. Cass Glass has documented peripheral artery disease recently seen by Dr. Bora Carr done a femoropopliteal bypass on the right lower extremity.  He suggested an arteriogram be performed on the left lower extremity. At this juncture patient is reluctant to proceed.  She is a diabetic. She complains of pain around the wound continues with collagen dressing changes    Pain Assessment:  Wound/Ulcer Pain Timing/Severity: intermittent  Quality of pain: burning  Severity:  2 / 10   Modifying Factors: Pain is worse at rest  Associated Signs/Symptoms: pain    Ulcer Identification:  Ulcer Type: venous and arterial  Contributing Factors: edema, venous stasis and arterial insufficiency    Objective:      BP (!) 176/74   Pulse 101   Temp 97.5 °F (36.4 °C) (Temporal)   Resp 16     Wt Readings from Last 3 Encounters:   21 119 lb 0.8 oz (54 kg)   21 108 lb 6.4 oz (49.2 kg)   11/15/21 107 lb (48.5 kg)       PHYSICAL EXAM    Extremities: no cyanosis, clubbing or edema and full-thickness wound left anterior shin containing fibrin and slough.   Receiving amounts of granulation tissue present: Periwound with hemosiderin deposition and varicosities    Assessment:      1. Open wound of left knee, initial encounter    2. Open wound of left lower leg, sequela         Procedure Note  Indications:  Based on my examination of this patient's wound(s) today, sharp excision is required to promote healing and evaluate the extent healing. Performed by: John Medina MD    Consent obtained? Yes    Time out taken: Yes    Pain Control: Anesthetic: 4% Lidocaine Liquid Topical     Debridement:Excisional Debridement    Using curette the wound was sharply debrided    down through and including the removal of  epidermis, dermis and subcutaneous tissue. Devitalized Tissue Debrided:  fibrin and slough      Pre Debridement Measurements:  Are located in the Wound Documentation Flow Sheet   Wound #: 1     Post  Debridement Measurements:  Wound 11/22/21 Leg Left;  Lower #1 (Active)   Wound Image   11/22/21 0924   Wound Etiology Arterial 11/22/21 0924   Wound Cleansed Cleansed with saline 12/13/21 1004   Dressing/Treatment Collagen 12/13/21 1028   Wound Length (cm) 2.4 cm 12/13/21 1004   Wound Width (cm) 1.5 cm 12/13/21 1004   Wound Depth (cm) 0.1 cm 12/13/21 1004   Wound Surface Area (cm^2) 3.6 cm^2 12/13/21 1004   Change in Wound Size % (l*w) -28.57 12/13/21 1004   Wound Volume (cm^3) 0.36 cm^3 12/13/21 1004   Wound Healing % -29 12/13/21 1004   Post-Procedure Length (cm) 2.5 cm 12/13/21 1028   Post-Procedure Width (cm) 1.6 cm 12/13/21 1028   Post-Procedure Depth (cm) 0.3 cm 12/13/21 1028   Post-Procedure Surface Area (cm^2) 4 cm^2 12/13/21 1028   Post-Procedure Volume (cm^3) 1.2 cm^3 12/13/21 1028   Distance Tunneling (cm) 0 cm 12/13/21 1004   Undermining Maxium Distance (cm) 0 12/13/21 1004   Wound Assessment [place]/red; [facility] 12/13/21 1004   Drainage Amount Scant 12/13/21 1004   Drainage Description Yellow 12/13/21 1004   Odor None 12/13/21 1004   Nissa-wound Assessment Hemosiderin staining (brown yellow) 12/13/21 1004   Margins Defined edges 12/13/21 1004   Wound Thickness Description not for Pressure Injury Full thickness 12/13/21 1004   Number of days: 21          Percent of Wound Debrided: 100%    Total Surface Area Debrided:  4 sq cm    Diabetic/Pressure/Non Pressure Ulcers only:  Ulcer: Non-Pressure ulcer, fat layer exposed    Bleeding: Minimal    Hemostasis Achieved: by pressure    Procedural Pain: 0  / 10     Post Procedural Pain: 0 / 10     Response to treatment:  Well tolerated by patient. Will consider skin laser perfusion study to further discern whether arterial disease is significantly interfering with wound healing. It may be necessary for Dr. Hoang Yee to perform an arteriogram        Plan:     Treatment Note: Please see attached Discharge Instructions. These instructions were given and signed by the patient or POA    New Medication(s) at this visit:   New Prescriptions    No medications on file       Other orders at this visit:   Orders Placed This Encounter   Procedures   [address] [address] [facility] Beltran Protocol       Discharge Instructions          [facility] Physician Orders and Discharge Instructions  [address] [place]. [address]  Telephone: [phone]  NAME:  Rodney Watson  YOB: 1929  MEDICAL RECORD NUMBER:  [id number]  DATE:  12/13/2021    Wash hands with soap and water prior to and after every dressing change. Wound Cleansing:   · Do not scrub or use excessive force. · With each dressing change, rinse wounds with 0.9% Saline. (May use wound wash or soft contact solution. Both can be purchased at a local drug store). · If unable to obtain saline, may use a gentle soap and water. · Keep wounds dry in the shower unless otherwise instructed by the physician. · For wounds on lower legs, cast covers can be purchased at local drug stores, so that you may shower and keep the wound(s) dry.     [] Vashe Wash solution instructions (if prescribed): Apply enough Vashe to soak a piece of gauze and place on wound bed for 5-10 minutes. DO NOT rinse after Vashe has been applied. Follow dressing application as instructed below. Nissa wound Topical Treatments:  Do not apply lotions, creams, or ointments to the skin around the wound bed unless directed as followed:     [] Apply around the wound: [] moisturizing lotion [] Antifungal ointment [] No-Sting barrier film [] Zinc paste [] Other:       Dressings:           Wound Location: left lower leg    Apply Primary Dressing to wound:       Collagen      Cover and Secure with:     Cover and Secure with: 4X4 gauze pad  Conforming roll gauze   Avoid contact of tape with skin if possible.  When to change Dressing: [] Daily [] Every Other Day [] Once a week  [x] Three times per week: [x] Monday, Wednesday, Friday [] Tuesday, Thursday, Saturday  [] Do Not Change Dressing [] Other:     Edema Control:  Apply: [] Compression Stocking  [] Left Lower Leg [] Right Lower Leg     [x]  Spandagrip:Strength: Low compression 5-10 mm/Hg    [x] Left Lower Leg  [] Right Lower Leg        Apply every morning immediately when getting up. They should be applied to affected leg(s) from mid foot to knee making sure to cover the heel. Remove every night before going to bed. [x] Elevate leg(s) above the level of the heart for 30 minutes 4-5 times a day and/or when sitting. [x] Avoid prolonged standing in one place. Dietary:  Important dietary reminders:  1. Increase Protein intake (i.e. Lean meats, fish, eggs, legumes, and yogurt)  2. No added salt  3. If diabetic, follow a diabetic diet and check glucose prior to meals or as instructed by your physician. Dietary Supplements:  [] Octaviano  [] 30ml ProStat  [] EnsureEnlive [] Ensure Max/Premier  [] Other:    If you are still having pain after you go home:   For wounds on lower legs or arms, elevate the affected limb.    Use over-the-counter medications you would normally use for pain as permitted by your primary care doctor.  For persistent pain not relieved by the above interventions, please call your primary care doctor. Return Appointment:   Return Appointment: With Dr. Oz Valles  in  1 [facility])  o Scheduled weekly until (Date):      [] Return Appointment for a Wound Assessment with a nurse on:     o All other future appointments:  Future Appointments   Date Time Provider Gilbert Frey   6/6/2022  1:00 PM Vanessa Barfield MD [facility]   -       [x] Orders placed during your visit:   Orders Placed This Encounter   Procedures    Initiate Outpatient Wound Care Protocol       Your nurse  is:  Rajeev Charles     Electronically signed by Kevon Saxena RN on 12/13/2021 at 10:32 AM     [address] [facility] Information: Should you experience any significant changes in your wound(s) or have questions about your wound care, please contact the [address] [place] [address] at [phone]. We are open from 8:00am - 4:30p Monday thru Friday except for Wednesdays which we are closed. Please give us 24-48 hours to return your call. Call your doctor now or seek immediate medical care if:    · You have symptoms of infection, such as:  ? Increased pain, swelling, warmth, or redness. ? Red streaks leading from the area. ? Pus draining from the area. ? A fever.         Physician for this visit and orders: Marnie Whitten MD    [] Patient unable to sign Discharge Instructions given to ECF/Transportation/POA        Electronically signed by Marnie Whitten MD on 12/13/2021 at 10:52 AM

## 2021-12-20 ENCOUNTER — HOSPITAL ENCOUNTER (OUTPATIENT)
Dept: WOUND CARE | Age: 86
Discharge: HOME | End: 2021-12-20
Payer: MEDICARE

## 2021-12-20 VITALS
SYSTOLIC BLOOD PRESSURE: 166 MMHG | HEART RATE: 80 BPM | RESPIRATION RATE: 16 BRPM | DIASTOLIC BLOOD PRESSURE: 70 MMHG | TEMPERATURE: 97.3 F

## 2021-12-20 DIAGNOSIS — S81.802S OPEN WOUND OF LEFT LOWER LEG, SEQUELA: ICD-10-CM

## 2021-12-20 DIAGNOSIS — S81.002D OPEN WOUND OF LEFT KNEE, SUBSEQUENT ENCOUNTER: Primary | ICD-10-CM

## 2021-12-20 DIAGNOSIS — S81.002A OPEN WOUND OF LEFT KNEE, INITIAL ENCOUNTER: ICD-10-CM

## 2021-12-20 PROCEDURE — 6370000000 HC RX 637 (ALT 250 FOR IP): Performed by: SPECIALIST

## 2021-12-20 PROCEDURE — 11042 DBRDMT SUBQ TIS 1ST 20SQCM/<: CPT

## 2021-12-20 PROCEDURE — 11042 DBRDMT SUBQ TIS 1ST 20SQCM/<: CPT | Performed by: SPECIALIST

## 2021-12-20 RX ORDER — LIDOCAINE HYDROCHLORIDE 20 MG/ML
JELLY TOPICAL ONCE
Status: CANCELLED | OUTPATIENT
Start: 2021-12-20 | End: 2021-12-20

## 2021-12-20 RX ORDER — LIDOCAINE HYDROCHLORIDE 40 MG/ML
SOLUTION TOPICAL ONCE
Status: CANCELLED | OUTPATIENT
Start: 2021-12-20 | End: 2021-12-20

## 2021-12-20 RX ORDER — GINSENG 100 MG
CAPSULE ORAL ONCE
Status: CANCELLED | OUTPATIENT
Start: 2021-12-20 | End: 2021-12-20

## 2021-12-20 RX ORDER — LIDOCAINE 40 MG/G
CREAM TOPICAL ONCE
Status: CANCELLED | OUTPATIENT
Start: 2021-12-20 | End: 2021-12-20

## 2021-12-20 RX ORDER — BACITRACIN ZINC AND POLYMYXIN B SULFATE 500; 1000 [USP'U]/G; [USP'U]/G
OINTMENT TOPICAL ONCE
Status: CANCELLED | OUTPATIENT
Start: 2021-12-20 | End: 2021-12-20

## 2021-12-20 RX ORDER — GENTAMICIN SULFATE 1 MG/G
OINTMENT TOPICAL ONCE
Status: CANCELLED | OUTPATIENT
Start: 2021-12-20 | End: 2021-12-20

## 2021-12-20 RX ORDER — LIDOCAINE HYDROCHLORIDE 40 MG/ML
SOLUTION TOPICAL ONCE
Status: COMPLETED | OUTPATIENT
Start: 2021-12-20 | End: 2021-12-20

## 2021-12-20 RX ORDER — LIDOCAINE 50 MG/G
OINTMENT TOPICAL ONCE
Status: CANCELLED | OUTPATIENT
Start: 2021-12-20 | End: 2021-12-20

## 2021-12-20 RX ORDER — BACITRACIN, NEOMYCIN, POLYMYXIN B 400; 3.5; 5 [USP'U]/G; MG/G; [USP'U]/G
OINTMENT TOPICAL ONCE
Status: CANCELLED | OUTPATIENT
Start: 2021-12-20 | End: 2021-12-20

## 2021-12-20 RX ORDER — BETAMETHASONE DIPROPIONATE 0.05 %
OINTMENT (GRAM) TOPICAL ONCE
Status: CANCELLED | OUTPATIENT
Start: 2021-12-20 | End: 2021-12-20

## 2021-12-20 RX ORDER — CLOBETASOL PROPIONATE 0.5 MG/G
OINTMENT TOPICAL ONCE
Status: CANCELLED | OUTPATIENT
Start: 2021-12-20 | End: 2021-12-20

## 2021-12-20 RX ADMIN — COLLAGENASE SANTYL: 250 OINTMENT TOPICAL at 10:50

## 2021-12-20 RX ADMIN — LIDOCAINE HYDROCHLORIDE: 40 SOLUTION TOPICAL at 10:18

## 2021-12-20 ASSESSMENT — PAIN DESCRIPTION - PAIN TYPE: TYPE: ACUTE PAIN

## 2021-12-20 ASSESSMENT — PAIN DESCRIPTION - ORIENTATION: ORIENTATION: LEFT

## 2021-12-20 ASSESSMENT — PAIN DESCRIPTION - LOCATION: LOCATION: LEG

## 2021-12-20 ASSESSMENT — PAIN DESCRIPTION - DESCRIPTORS: DESCRIPTORS: BURNING

## 2021-12-20 ASSESSMENT — PAIN DESCRIPTION - FREQUENCY: FREQUENCY: INTERMITTENT

## 2021-12-20 NOTE — PROGRESS NOTES
1227 [facility] [place]  Progress Note and Procedure Note      Kris Doherty  MEDICAL RECORD NUMBER:  [id number]  AGE: 80 y.o. GENDER: female  : 1929  EPISODE DATE:  2021    Subjective:     Chief Complaint   Patient presents with    Wound Check     left leg         HISTORY of PRESENT ILLNESS HPI     Kris Doherty is a 80 y.o. female who presents today for wound/ulcer evaluation. History of Wound Context: Patient was referred by primary care physician for nonhealing wound left anterior knee.  Apparently this began as a blood blister which opened up.  She had developed a traumatic hematoma in the recent past in her left lower extremity and I believe this was unroofed but with subsequent development of a wound. She had been putting Medihoney on the wound for short period of time.  Wound is painful times. Zoran Quach has documented peripheral artery disease recently seen by Dr. Viera Fraction done a femoropopliteal bypass on the right lower extremity.  He suggested an arteriogram be performed on the left lower extremity. At this juncture patient is reluctant to proceed.  She is a diabetic.   She complains of pain around the wound continues with collagen dressing changes    Pain Assessment:  Wound/Ulcer Pain Timing/Severity: intermittent  Quality of pain: burning  Severity:  2 / 10   Modifying Factors: None  Associated Signs/Symptoms: pain    Ulcer Identification:  Ulcer Type: venous and arterial  Contributing Factors: edema, venous stasis and arterial insufficiency    Objective:      BP (!) 166/70   Pulse 80   Temp 97.3 °F (36.3 °C) (Temporal)   Resp 16     Wt Readings from Last 3 Encounters:   21 119 lb 0.8 oz (54 kg)   21 108 lb 6.4 oz (49.2 kg)   11/15/21 107 lb (48.5 kg)       PHYSICAL EXAM    Extremities: no cyanosis, clubbing or edema and thickness wound left anterior shin with fibrin slough buds of granulation tissue and minimal epithelialization at the margins of the wound.  Periwound hemosiderin deposition with varicosities    Assessment:      1. Open wound of left knee, subsequent encounter    2. Open wound of left knee, initial encounter    3. Open wound of left lower leg, sequela         Procedure Note  Indications:  Based on my examination of this patient's wound(s) today, sharp excision is required to promote healing and evaluate the extent healing. Performed by: Judi Gupta MD    Consent obtained? Yes    Time out taken: Yes    Pain Control: Anesthetic: 4% Lidocaine Liquid Topical     Debridement:Excisional Debridement    Using curette the wound was sharply debrided    down through and including the removal of  epidermis, dermis and subcutaneous tissue. Devitalized Tissue Debrided:  fibrin and slough      Pre Debridement Measurements:  Are located in the Wound Documentation Flow Sheet   Wound #: 1     Post  Debridement Measurements:  Wound 11/22/21 Leg Left; Lower #1 (Active)   Wound Image   11/22/21 0924   Wound Etiology Arterial 11/22/21 0924   Wound Cleansed Cleansed with saline 12/20/21 1019   Dressing/Treatment Pharmaceutical agent (see MAR) 12/20/21 1050   Wound Length (cm) 2.1 cm 12/20/21 1019   Wound Width (cm) 1.5 cm 12/20/21 1019   Wound Depth (cm) 0.1 cm 12/20/21 1019   Wound Surface Area (cm^2) 3.15 cm^2 12/20/21 1019   Change in Wound Size % (l*w) -12.5 12/20/21 1019   Wound Volume (cm^3) 0.315 cm^3 12/20/21 1019   Wound Healing % -12 12/20/21 1019   Post-Procedure Length (cm) 2.2 cm 12/20/21 1037   Post-Procedure Width (cm) 1.6 cm 12/20/21 1037   Post-Procedure Depth (cm) 0.3 cm 12/20/21 1037   Post-Procedure Surface Area (cm^2) 3.52 cm^2 12/20/21 1037   Post-Procedure Volume (cm^3) 1. 056 cm^3 12/20/21 1037   Distance Tunneling (cm) 0 cm 12/20/21 1019   Undermining Maxium Distance (cm) 0 12/20/21 1019   Wound Assessment Fibrinous 12/20/21 1019   Drainage Amount Scant 12/20/21 1019   Drainage Description Yellow 12/20/21 1019   Odor None 12/20/21 1019   Nissa-wound Assessment Hemosiderin staining (brown yellow) 12/20/21 1019   Margins Defined edges 12/20/21 1019   Wound Thickness Description not for Pressure Injury Full thickness 12/20/21 1019   Number of days: 28          Percent of Wound Debrided: 100%    Total Surface Area Debrided:  3.5 sq cm    Diabetic/Pressure/Non Pressure Ulcers only:  Ulcer: Non-Pressure ulcer, fat layer exposed    Bleeding: Minimal    Hemostasis Achieved: by pressure    Procedural Pain: 6  / 10     Post Procedural Pain: 0 / 10     Response to treatment:  Poorly tolerated by patient., With complaints of pain. We will proceed with a laser skin perfusion study as it might be necessary to in fact have Dr. Walter Du perform an angio in order to heal this wound        Plan:     Treatment Note: Please see attached Discharge Instructions. These instructions were given and signed by the patient or POA    New Medication(s) at this visit:   New Prescriptions    No medications on file       Other orders at this visit:   Orders Placed This Encounter   Procedures   Daly Griffin [address]       Discharge Instructions          [address] [facility] Physician Orders and Discharge Instructions  [address] [place] [address]  Telephone: [phone]  NAME:  Sohan Patel  YOB: 1929  MEDICAL RECORD NUMBER:  [id number]  DATE:  12/20/2021    Wash hands with soap and water prior to and after every dressing change. Wound Cleansing:   · Do not scrub or use excessive force. · With each dressing change, rinse wounds with 0.9% Saline. (May use wound wash or soft contact solution. Both can be purchased at a local drug store). · If unable to obtain saline, may use a gentle soap and water. · Keep wounds dry in the shower unless otherwise instructed by the physician.   · For wounds on lower legs, cast covers can be purchased at local drug stores, so that you may shower and keep the wound(s) dry. []  Vashe Wash solution instructions (if prescribed): Apply enough Vashe to soak a piece of gauze and place on wound bed for 5-10 minutes. DO NOT rinse after Vashe has been applied. Follow dressing application as instructed below. Nissa wound Topical Treatments:  Do not apply lotions, creams, or ointments to the skin around the wound bed unless directed as followed:     [] Apply around the wound: [] moisturizing lotion [] Antifungal ointment [] No-Sting barrier film [] Zinc paste [] Other:       Dressings:           Wound Location: left lower leg wound      Apply Primary Dressing to wound:       Pharmaceutical medication santyl- nickel size thickness     Cover and Secure with:     Cover and Secure with: 2X2 gauze pad- slightly moisten, they dry gauze, conforming roll gauze   Avoid contact of tape with skin if possible.  When to change Dressing: [x] Daily [] Every Other Day [] Once a week  [] Three times per week: [] Monday, Wednesday, Friday [] Tuesday, Thursday, Saturday  [] Do Not Change Dressing [] Other:       Edema Control:     [x] Elevate leg(s) above the level of the heart for 30 minutes 4-5 times a day and/or when sitting. [x] Avoid prolonged standing in one place. Dietary:  Important dietary reminders:  1. Increase Protein intake (i.e. Lean meats, fish, eggs, legumes, and yogurt)  2. No added salt  3. If diabetic, follow a diabetic diet and check glucose prior to meals or as instructed by your physician. Dietary Supplements:  [] Octaviano  [] 30ml ProStat  [] EnsureEnlive [] Ensure Max/Premier  [] Other:    If you are still having pain after you go home:   For wounds on lower legs or arms, elevate the affected limb.  Use over-the-counter medications you would normally use for pain as permitted by your primary care doctor.    For persistent pain not relieved by the above interventions, please call your

## 2021-12-23 ENCOUNTER — HOSPITAL ENCOUNTER (OUTPATIENT)
Dept: VASCULAR LAB | Age: 86
Discharge: HOME | End: 2021-12-23
Payer: MEDICARE

## 2021-12-23 PROCEDURE — 93923 UPR/LXTR ART STDY 3+ LVLS: CPT

## 2021-12-27 ENCOUNTER — HOSPITAL ENCOUNTER (OUTPATIENT)
Dept: WOUND CARE | Age: 86
Discharge: HOME | End: 2021-12-27
Payer: MEDICARE

## 2021-12-27 VITALS
DIASTOLIC BLOOD PRESSURE: 64 MMHG | HEART RATE: 61 BPM | TEMPERATURE: 96.7 F | RESPIRATION RATE: 16 BRPM | SYSTOLIC BLOOD PRESSURE: 185 MMHG

## 2021-12-27 DIAGNOSIS — S81.002A OPEN WOUND OF LEFT KNEE, INITIAL ENCOUNTER: Primary | ICD-10-CM

## 2021-12-27 PROCEDURE — 6370000000 HC RX 637 (ALT 250 FOR IP): Performed by: SPECIALIST

## 2021-12-27 PROCEDURE — 99213 OFFICE O/P EST LOW 20 MIN: CPT

## 2021-12-27 PROCEDURE — 99212 OFFICE O/P EST SF 10 MIN: CPT | Performed by: SPECIALIST

## 2021-12-27 RX ORDER — LIDOCAINE 40 MG/G
CREAM TOPICAL ONCE
Status: CANCELLED | OUTPATIENT
Start: 2021-12-27 | End: 2021-12-27

## 2021-12-27 RX ORDER — BACITRACIN, NEOMYCIN, POLYMYXIN B 400; 3.5; 5 [USP'U]/G; MG/G; [USP'U]/G
OINTMENT TOPICAL ONCE
Status: CANCELLED | OUTPATIENT
Start: 2021-12-27 | End: 2021-12-27

## 2021-12-27 RX ORDER — LIDOCAINE 50 MG/G
OINTMENT TOPICAL ONCE
Status: CANCELLED | OUTPATIENT
Start: 2021-12-27 | End: 2021-12-27

## 2021-12-27 RX ORDER — BACITRACIN ZINC AND POLYMYXIN B SULFATE 500; 1000 [USP'U]/G; [USP'U]/G
OINTMENT TOPICAL ONCE
Status: CANCELLED | OUTPATIENT
Start: 2021-12-27 | End: 2021-12-27

## 2021-12-27 RX ORDER — LIDOCAINE HYDROCHLORIDE 40 MG/ML
SOLUTION TOPICAL ONCE
Status: CANCELLED | OUTPATIENT
Start: 2021-12-27 | End: 2021-12-27

## 2021-12-27 RX ORDER — GENTAMICIN SULFATE 1 MG/G
OINTMENT TOPICAL ONCE
Status: CANCELLED | OUTPATIENT
Start: 2021-12-27 | End: 2021-12-27

## 2021-12-27 RX ORDER — LIDOCAINE 40 MG/G
CREAM TOPICAL ONCE
Status: COMPLETED | OUTPATIENT
Start: 2021-12-27 | End: 2021-12-27

## 2021-12-27 RX ORDER — CLOBETASOL PROPIONATE 0.5 MG/G
OINTMENT TOPICAL ONCE
Status: CANCELLED | OUTPATIENT
Start: 2021-12-27 | End: 2021-12-27

## 2021-12-27 RX ORDER — LIDOCAINE HYDROCHLORIDE 20 MG/ML
JELLY TOPICAL ONCE
Status: CANCELLED | OUTPATIENT
Start: 2021-12-27 | End: 2021-12-27

## 2021-12-27 RX ORDER — BETAMETHASONE DIPROPIONATE 0.05 %
OINTMENT (GRAM) TOPICAL ONCE
Status: CANCELLED | OUTPATIENT
Start: 2021-12-27 | End: 2021-12-27

## 2021-12-27 RX ORDER — GINSENG 100 MG
CAPSULE ORAL ONCE
Status: CANCELLED | OUTPATIENT
Start: 2021-12-27 | End: 2021-12-27

## 2021-12-27 RX ADMIN — LIDOCAINE: 40 CREAM TOPICAL at 10:06

## 2021-12-27 RX ADMIN — COLLAGENASE SANTYL: 250 OINTMENT TOPICAL at 10:27

## 2021-12-27 ASSESSMENT — PAIN SCALES - GENERAL: PAINLEVEL_OUTOF10: 6

## 2021-12-27 ASSESSMENT — PAIN DESCRIPTION - PAIN TYPE: TYPE: ACUTE PAIN

## 2021-12-27 ASSESSMENT — PAIN DESCRIPTION - DESCRIPTORS: DESCRIPTORS: BURNING;ACHING

## 2021-12-27 ASSESSMENT — PAIN DESCRIPTION - LOCATION: LOCATION: LEG

## 2021-12-27 ASSESSMENT — PAIN DESCRIPTION - FREQUENCY: FREQUENCY: INTERMITTENT

## 2021-12-27 ASSESSMENT — PAIN DESCRIPTION - ORIENTATION: ORIENTATION: LEFT

## 2021-12-27 ASSESSMENT — PAIN DESCRIPTION - ONSET: ONSET: ON-GOING

## 2021-12-27 ASSESSMENT — PAIN DESCRIPTION - PROGRESSION: CLINICAL_PROGRESSION: NOT CHANGED

## 2021-12-27 NOTE — PROGRESS NOTES
[address] [facility]  Progress Note and Procedure Note      Reno Davalos  AGE: 80 y.o. GENDER: female  : 1929  EPISODE DATE:  2021      Subjective:     Chief Complaint   Patient presents with    Wound Check     left lower leg         HISTORY of PRESENT ILLNESS HPI     Reno Davalos is a 80 y.o. female who presents today for wound evaluation. History of Wound Context: Patient was referred by primary care physician for nonhealing wound left anterior knee.  Apparently this began as a blood blister which opened up.  She had developed a traumatic hematoma in the recent past in her left lower extremity and I believe this was unroofed but with subsequent development of a wound. She had been putting Medihoney on the wound for short period of time.  Wound is painful times. Carson Barry has documented peripheral artery disease recently seen by Dr. Dany Arias done a femoropopliteal bypass on the right lower extremity.  He suggested an arteriogram be performed on the left lower extremity.  At this juncture patient is reluctant to proceed.  She is a diabetic.  She complains of pain around the wound continues with collagen dressing changes  Wound Pain Timing/Severity: intermittent  Quality of pain: aching, burning  Severity:  2 / 10   Modifying Factors: None  Associated Signs/Symptoms: pain    Wound Identification:  Wound Type: venous and arterial  Contributing Factors: edema, venous stasis and arterial insufficiency        PAST MEDICAL HISTORY        Diagnosis Date    Anxiety     Arthritis     At risk for falls     CAD (coronary artery disease)     CTS (carpal tunnel syndrome)     Bilateral, EMG-NCS    DDD (degenerative disc disease), cervical     Fractures     (L) Hip Fx 98, L1 fracture from fall 10-31-06    Hyperlipidemia     Hypertension     Mitral regurgitation     Neuropathy     Osteopenia     Osteoporosis     PAD (peripheral artery disease) ([facility] [address]     Right LE ischemia    Peripheral neuropathy 6/1/2015    Peripheral vascular disease (HCC)     bilateral lower extremities with edema    Podagra 01/09/2017    Dr. Carmona Laser Spinal stenosis     L4-5    Type 2 diabetes mellitus ([facility] [address]     Urethral stricture 5 years ago    Urgency of urination        PAST SURGICAL HISTORY    Past Surgical History:   Procedure Laterality Date    APPENDECTOMY      CARPAL TUNNEL RELEASE Right 3/29/2019    RIGHT CARPAL TUNNEL RELEASE AND RIGHT MIDDLE FINGER TRIGGER FINGER RELEASE performed by Nathan Hastings MD at [address]  [facility]    LEFT EYE EYE CATARACT PHACOEMULSIFICATION INTRAOCULAR LENS    CHOLECYSTECTOMY  1978    COLONOSCOPY  8/31/99    diverticulosis    EYE SURGERY Bilateral     bilateral cataract removed    HYSTERECTOMY  1980s    sallie    IR FEMORAL POPLITEAL BYPASS GRAFT Right 8-    KYPHOSIS SURGERY  11-7-06    L1, (fractured after a fall)    TONSILLECTOMY      TOTAL HIP ARTHROPLASTY  4/25/98    (L) THR    WRIST FRACTURE SURGERY  2008    left wrist       FAMILY HISTORY    Family History   Problem Relation Age of Onset    Stroke Father     Hypertension Father        SOCIAL HISTORY    Social History     Tobacco Use    Smoking status: Never Smoker    Smokeless tobacco: Never Used   Vaping Use    Vaping Use: Never used   Substance Use Topics    Alcohol use: No    Drug use: No       ALLERGIES    Allergies   Allergen Reactions    Aricept [Donepezil Hydrochloride] Other (See Comments)     intolerant    Doxycycline Nausea Only    Ketorolac Tromethamine Other (See Comments)     Pt unable to recall reaction       MEDICATIONS    Current Outpatient Medications on File Prior to Encounter   Medication Sig Dispense Refill    glimepiride (AMARYL) 1 MG tablet TAKE ONE TABLET BY MOUTH EVERY MORNING BEFORE BREAKFAST 22 tablet 0    amLODIPine (NORVASC) 2.5 MG tablet TAKE ONE TABLET BY MOUTH DAILY 30 tablet 3    lisinopril (PRINIVIL;ZESTRIL) 20 MG tablet TAKE Depth (cm) 0.1 cm 12/27/21 1023   Post-Procedure Surface Area (cm^2) 3.15 cm^2 12/27/21 1023   Post-Procedure Volume (cm^3) 0.315 cm^3 12/27/21 1023   Distance Tunneling (cm) 0 cm 12/27/21 1000   Undermining Maxium Distance (cm) 0 12/27/21 1000   Wound Assessment Slough 12/27/21 1000   Drainage Amount Small 12/27/21 1000   Drainage Description Yellow 12/27/21 1000   Odor None 12/27/21 1000   Nissa-wound Assessment Hemosiderin staining (brown yellow) 12/27/21 1000   Margins Defined edges 12/27/21 1000   Wound Thickness Description not for Pressure Injury Full thickness 12/27/21 1000   Number of days: 35          Plan:     Treatment Note please see attached Discharge Instructions  I have discussed with Mrs. Rika Ellington returning to see Dr. Columba Loredo for consideration of an angiogram as I believe her peripheral artery disease is a significant component in failure to heal this wound. She is agreeable to see him and I can follow-up with her following her procedure with Dr. Hanny Lincoln Medication(s) at this visit:   New Prescriptions    No medications on file       Other orders at this visit:   Orders Placed This Encounter   Procedures    Initiate Outpatient Wound Care Protocol       Written patient dismissal instructions given to patient and signed by patient or POA. Discharge [facility] Physician Orders and Discharge Instructions  [address]. [address] [place] [address]. [place] [address]  Telephone: [phone]  NAME:  Ashlyn Bird  YOB: 1929  MEDICAL RECORD NUMBER:  [id number]  DATE:  12/27/2021    Wash hands with soap and water prior to and after every dressing change. Wound Cleansing:   · Do not scrub or use excessive force. · With each dressing change, rinse wounds with 0.9% Saline. (May use wound wash or soft contact solution. Both can be purchased at a local drug store).    · If unable to obtain saline, may use a gentle soap and water. · Keep wounds dry in the shower unless otherwise instructed by the physician. · For wounds on lower legs, cast covers can be purchased at local drug stores, so that you may shower and keep the wound(s) dry. []  Vashe Wash solution instructions (if prescribed): Apply enough Vashe to soak a piece of gauze and place on wound bed for 5-10 minutes. DO NOT rinse after Vashe has been applied. Follow dressing application as instructed below. Nissa wound Topical Treatments:  Do not apply lotions, creams, or ointments to the skin around the wound bed unless directed as followed:     [] Apply around the wound: [] moisturizing lotion [] Antifungal ointment [] No-Sting barrier film [] Zinc paste [] Other:       Dressings:           Wound Location: left lower leg    Apply Primary Dressing to wound:       Pharmaceutical medication santyl- nickel size thickness     Cover and Secure with:     Cover and Secure with: 4X4 gauze pad  Conforming roll gauze   Avoid contact of tape with skin if possible.  When to change Dressing: [x] Daily [] Every Other Day [] Once a week  [] Three times per week: [] Monday, Wednesday, Friday [] Tuesday, Thursday, Saturday  [] Do Not Change Dressing [] Other:       Edema Control:     [x] Elevate leg(s) above the level of the heart for 30 minutes 4-5 times a day and/or when sitting. [x] Avoid prolonged standing in one place. Dietary:  Important dietary reminders:  1. Increase Protein intake (i.e. Lean meats, fish, eggs, legumes, and yogurt)  2. No added salt  3. If diabetic, follow a diabetic diet and check glucose prior to meals or as instructed by your physician. Dietary Supplements:  [] Octaviano  [] 30ml ProStat  [] EnsureEnlive [] Ensure Max/Premier  [] Other:    If you are still having pain after you go home:   For wounds on lower legs or arms, elevate the affected limb.    Use over-the-counter medications you would normally use for pain as permitted by your primary care doctor.  For persistent pain not relieved by the above interventions, please call your primary care doctor. Return Appointment:   Return Appointment: With Dr. Mainor May  in  2-3 [facility])  o Scheduled weekly until (Date):      [] Return Appointment for a Wound Assessment with a nurse on:     o All other future appointments:  Future Appointments   Date Time Provider Gilbert Shante   6/6/2022  1:00 PM Kelsie Mcbride MD [facility]   -       [x] Orders placed during your visit:   Orders Placed This Encounter   Procedures    Initiate Outpatient Wound Care Protocol       Your nurse  is:  Terry Wilks     Electronically signed by Jose R Vargas RN on 12/27/2021 at 10:24 R Tari Reyes Information: Should you experience any significant changes in your wound(s) or have questions about your wound care, please contact the [address] [place] [address] at [phone]. We are open from 8:00am - 4:30p Monday thru Friday except for Wednesdays which we are closed. Please give us 24-48 hours to return your call. Call your doctor now or seek immediate medical care if:    · You have symptoms of infection, such as:  ? Increased pain, swelling, warmth, or redness. ? Red streaks leading from the area. ? Pus draining from the area. ? A fever.         Physician for this visit and orders: Maureen Levin MD    [] Patient unable to sign Discharge Instructions given to ECF/Transportation/POA            Electronically signed by Maureen Levin MD on 12/27/2021 at 11:34 AM

## 2022-01-04 ENCOUNTER — OFFICE VISIT (OUTPATIENT)
Dept: INTERNAL MEDICINE CLINIC | Age: 87
End: 2022-01-04
Payer: MEDICARE

## 2022-01-04 VITALS
RESPIRATION RATE: 12 BRPM | HEART RATE: 77 BPM | BODY MASS INDEX: 21.4 KG/M2 | SYSTOLIC BLOOD PRESSURE: 122 MMHG | HEIGHT: 60 IN | WEIGHT: 109 LBS | DIASTOLIC BLOOD PRESSURE: 68 MMHG | OXYGEN SATURATION: 97 %

## 2022-01-04 DIAGNOSIS — I73.9 PAD (PERIPHERAL ARTERY DISEASE) (HCC): ICD-10-CM

## 2022-01-04 DIAGNOSIS — L98.499 ISCHEMIC ULCER, UNSPECIFIED ULCER STAGE (HCC): ICD-10-CM

## 2022-01-04 DIAGNOSIS — G57.12 MERALGIA PARESTHETICA OF LEFT SIDE: Primary | ICD-10-CM

## 2022-01-04 PROCEDURE — G8483 FLU IMM NO ADMIN DOC REA: HCPCS | Performed by: INTERNAL MEDICINE

## 2022-01-04 PROCEDURE — G8420 CALC BMI NORM PARAMETERS: HCPCS | Performed by: INTERNAL MEDICINE

## 2022-01-04 PROCEDURE — 1036F TOBACCO NON-USER: CPT | Performed by: INTERNAL MEDICINE

## 2022-01-04 PROCEDURE — 1090F PRES/ABSN URINE INCON ASSESS: CPT | Performed by: INTERNAL MEDICINE

## 2022-01-04 PROCEDURE — G8427 DOCREV CUR MEDS BY ELIG CLIN: HCPCS | Performed by: INTERNAL MEDICINE

## 2022-01-04 PROCEDURE — 1123F ACP DISCUSS/DSCN MKR DOCD: CPT | Performed by: INTERNAL MEDICINE

## 2022-01-04 PROCEDURE — 4040F PNEUMOC VAC/ADMIN/RCVD: CPT | Performed by: INTERNAL MEDICINE

## 2022-01-04 PROCEDURE — 99214 OFFICE O/P EST MOD 30 MIN: CPT | Performed by: INTERNAL MEDICINE

## 2022-01-04 NOTE — PATIENT INSTRUCTIONS
Patient Education        Meralgia Paresthetica: Care Instructions  Your Care Instructions  Meralgia paresthetica (say \"muh-RAL-juh geo-jya-WSOV-ick-uh\") is pain and numbness in the outer part of your thigh. The pain might get worse after you walk or stand for a long time. This pain and numbness occur when a nerve in your thigh is pinched (compressed). Sometimes the problem is caused by wearing tight clothing or being overweight. Most of the time the problem goes away on its own in a few months. Lowering any pressure on the thigh area may help. Wear loose clothes, and lose weight if you need to. Follow-up care is a key part of your treatment and safety. Be sure to make and go to all appointments, and call your doctor if you are having problems. It's also a good idea to know your test results and keep a list of the medicines you take. How can you care for yourself at home? · Most times the problem gets better on its own. Try wearing loose clothing to see if this helps. · Lose weight if you need to. Talk with your doctor if you need help. When should you call for help? Watch closely for changes in your health, and be sure to contact your doctor if:    · You have new symptoms, such as pain that gets worse or new numbness in your thigh.     · You do not get better as expected. Where can you learn more? Go to [email or web address]://[email or web address] org and sign in to your [employer] account. Enter E230 in the [facility] box to learn more about \"Meralgia Paresthetica: Care Instructions. \"     If you do not have an account, please click on the \"Sign Up Now\" link. Current as of: April 8, 2021               Content Version: 13.1  © 2006-2021 [employer]. Care instructions adapted under license by [employer].  If you have questions about a medical condition or this instruction, always ask your healthcare professional. [address]  any warranty or liability for your use of this information.

## 2022-01-04 NOTE — PROGRESS NOTES
[facility]) Physicians  Internal Medicine  Patient Encounter  Kely Donis D.O., [facility]        Chief Complaint   Patient presents with   [facility] Joint Pain     Left hip x2-3 weeks, itching, achyness, nu,bness, pain raidiating down the front of the thigh       HPI: 80 y.o. female seen urgently today with complaint of left hip pain for the last month. Patient initially located the discomfort on the lateral portion of the hip but the discomfort that she describes as an ache has migrated to the anterior lateral thigh. She also describes numbness and a tingling or itch. She denies any rash. She denies any low back pain per se. She denies radiculopathy or saddle anesthesia. She denies any bowel or bladder dysfunction. She denies groin pain. Patient denies any exacerbating or relieving factors. She denies any trauma. She has a history of a left total hip replacement dating back to 1998. She denies any pain with ambulating. Patient was seen by the orthopedic physician assistant on 10/14/2021. At that time he noted that she was having left leg radicular pain. X-rays at that time made no mention of loosening of hardware. She does have advanced degenerative disc disease and facet arthropathy with spondylolisthesis.   She is waiting to have an angiogram.    Past Medical History:   Diagnosis Date    Anxiety     Arthritis     At risk for falls     CAD (coronary artery disease)     CTS (carpal tunnel syndrome)     Bilateral, EMG-NCS    DDD (degenerative disc disease), cervical     Fractures     (L) Hip Fx 4/25/98, L1 fracture from fall 10-31-06    Hyperlipidemia     Hypertension     Mitral regurgitation     Neuropathy     Osteopenia     Osteoporosis     PAD (peripheral artery disease) (HCC)     Right LE ischemia    Peripheral neuropathy 6/1/2015    Peripheral vascular disease ([facility] [address]     bilateral lower extremities with edema    Podagra 01/09/2017    Dr. Trinity Alarcon    Spinal stenosis     L4-5    Type 2 diabetes mellitus ([facility] [address]     Urethral stricture 5 years ago    Urgency of urination          MEDICATIONS:  Medication Sig   collagenase (SANTYL) 250 UNIT/GM ointment Apply topically daily. glimepiride (AMARYL) 1 MG tablet TAKE ONE TABLET BY MOUTH EVERY MORNING BEFORE BREAKFAST   amLODIPine (NORVASC) 2.5 MG tablet TAKE ONE TABLET BY MOUTH DAILY   blood glucose test strips (ONETOUCH ULTRA) strip USE TO TEST BLOOD SUGAR TWICE DAILY   lisinopril (PRINIVIL;ZESTRIL) 20 MG tablet TAKE ONE TABLET BY MOUTH DAILY   metFORMIN (GLUCOPHAGE) 500 MG tablet Take 1 tablet by mouth daily   clotrimazole-betamethasone (LOTRISONE) 1-0.05 % cream Apply topically 2 times daily for 2 weeks. atorvastatin (LIPITOR) 10 MG tablet Take 1 tablet by mouth daily   Turmeric 450 MG CAPS Take 450 mg by mouth daily   meclizine (ANTIVERT) 12.5 MG tablet Take 1 tablet by mouth 3 times daily as needed for Dizziness (vertigo)   Cholecalciferol (VITAMIN D3) 2000 UNITS CAPS Take 1 capsule by mouth daily   aspirin EC 81 MG EC tablet Take 1 tablet by mouth daily   Lite Touch Lancets MISC by Does not apply route. Use twice daily when testing blood sugars. Review of Systems - As per HPI      OBJECTIVE:  /68   Pulse 77   Resp 12   Ht 5' (1.524 m)   Wt 109 lb (49.4 kg)   SpO2 97%   BMI 21.29 kg/m²   GEN: NAD, A&O, Non-toxic  NEURO: Subtle decreased light touch sensation on the anterior portion of the left thigh. VASC: Poor pedal pulses left foot. Her lower leg wound is bandaged and not examined today. SKIN:  No rashes or lesions of concern. No herpetiform rash. MS: No reproducible tenderness with palpation over the greater trochanteric region, gluteus medius, lumbar paraspinals. Range of motion of the hip is without pain. ASSESSMENT[de-identified]  Yogesh Zavala was seen today for joint pain.     Diagnoses and all orders for this visit:    Meralgia paresthetica of left side  --Symptoms are suggestive of mild meralgia paresthetica  --Patient declines any pharmaceutical treatment  --Continue to observe  --If symptoms worsen, could consider imaging of the left hip to ensure no loosening of hardware    Differential considerations would include lumbar radiculopathy or problems with the left hip replacement    Ischemic ulcer, unspecified ulcer stage ([address]  --Continue wound management    Other orders  -     collagenase (SANTYL) 250 UNIT/GM ointment; Apply topically daily. PAD  Patient needs to follow-up with her vascular specialist and she needs an angiogram.      Advised patient to call should symptoms persist, worsen or if new symptoms develop. Discussed medications with patient who voiced understanding of their use, indication and potential side effects. Pt also understands the above recommendations. All questions answered. This note was generated completely or in part utilizing Dragon dictation speech recognition software. Occasionally, words are mistranscribed and despite editing, the text may contain inaccuracies due to incorrect word recognition.   If further clarification is needed please contact the office at [phone]       Electronically signed    Jayme Arteaga D.O.

## 2022-01-05 ENCOUNTER — TELEPHONE (OUTPATIENT)
Dept: INTERNAL MEDICINE CLINIC | Age: 87
End: 2022-01-05

## 2022-01-05 DIAGNOSIS — Z11.59 SCREENING FOR VIRAL DISEASE: Primary | ICD-10-CM

## 2022-01-05 NOTE — TELEPHONE ENCOUNTER
Patient scheduled for covid testing on 1/7. Caregiver was sick last Friday. She was with her the day before. Does not currently have symptoms. Patient is not vaccinated and due to age, requesting test. Scheduled for Friday.  Order placed

## 2022-01-06 RX ORDER — GLIMEPIRIDE 1 MG/1
TABLET ORAL
Qty: 22 TABLET | Refills: 0 | Status: SHIPPED | OUTPATIENT
Start: 2022-01-06 | End: 2022-02-03

## 2022-01-06 NOTE — TELEPHONE ENCOUNTER
Last appointment: 1/4/2022  Next appointment: Visit date not found  Last refill: 12/07/2021  no return date given at last office visit

## 2022-01-18 ENCOUNTER — TELEPHONE (OUTPATIENT)
Dept: VASCULAR SURGERY | Age: 87
End: 2022-01-18

## 2022-01-18 ENCOUNTER — PREP FOR PROCEDURE (OUTPATIENT)
Dept: VASCULAR SURGERY | Age: 87
End: 2022-01-18

## 2022-01-18 NOTE — TELEPHONE ENCOUNTER
Called to give Jose Bishop the PT's instructions for her angio scheduled on 2/8 at 8:00 at [facility]. Instructed NPO after midnight. Arrive 1 hour prior.

## 2022-01-19 ENCOUNTER — PREP FOR PROCEDURE (OUTPATIENT)
Dept: VASCULAR SURGERY | Age: 87
End: 2022-01-19

## 2022-01-25 RX ORDER — SODIUM CHLORIDE 0.9 % (FLUSH) 0.9 %
5-40 SYRINGE (ML) INJECTION PRN
Status: CANCELLED | OUTPATIENT
Start: 2022-01-25

## 2022-01-25 RX ORDER — SODIUM CHLORIDE 9 MG/ML
25 INJECTION, SOLUTION INTRAVENOUS PRN
Status: CANCELLED | OUTPATIENT
Start: 2022-01-25

## 2022-01-25 RX ORDER — SODIUM CHLORIDE 0.9 % (FLUSH) 0.9 %
5-40 SYRINGE (ML) INJECTION EVERY 12 HOURS SCHEDULED
Status: CANCELLED | OUTPATIENT
Start: 2022-01-25

## 2022-01-25 RX ORDER — SODIUM CHLORIDE 9 MG/ML
INJECTION, SOLUTION INTRAVENOUS CONTINUOUS
Status: CANCELLED | OUTPATIENT
Start: 2022-01-25

## 2022-01-27 ENCOUNTER — TELEPHONE (OUTPATIENT)
Dept: WOUND CARE | Age: 87
End: 2022-01-27

## 2022-01-27 NOTE — TELEPHONE ENCOUNTER
Patient called and concerned if she should be continuing to change wound daily with santyl. She states wound looks the same, yellow, small yellow drainage, and continues to have pain in lower leg. Patient states she is having arteriogram with Dr. Laisha Deleon 2/8/22, and I made follow up appt in wound care 2/11/22 with Dr. Bill Cruz. Wound sounds unchanged from last visit at end of December, instructed patient to continue santyl daily and call if becomes worse. Patient verbalized understanding.

## 2022-01-31 ENCOUNTER — OFFICE VISIT (OUTPATIENT)
Dept: CARDIOLOGY CLINIC | Age: 87
End: 2022-01-31
Payer: MEDICARE

## 2022-01-31 VITALS
SYSTOLIC BLOOD PRESSURE: 130 MMHG | HEART RATE: 60 BPM | BODY MASS INDEX: 22.26 KG/M2 | DIASTOLIC BLOOD PRESSURE: 66 MMHG | WEIGHT: 114 LBS

## 2022-01-31 DIAGNOSIS — I10 BENIGN ESSENTIAL HTN: ICD-10-CM

## 2022-01-31 DIAGNOSIS — I34.0 MITRAL VALVE INSUFFICIENCY, UNSPECIFIED ETIOLOGY: ICD-10-CM

## 2022-01-31 DIAGNOSIS — E78.5 HYPERLIPIDEMIA, UNSPECIFIED HYPERLIPIDEMIA TYPE: Primary | ICD-10-CM

## 2022-01-31 DIAGNOSIS — R06.02 SOB (SHORTNESS OF BREATH): ICD-10-CM

## 2022-01-31 PROCEDURE — 1123F ACP DISCUSS/DSCN MKR DOCD: CPT | Performed by: INTERNAL MEDICINE

## 2022-01-31 PROCEDURE — 1036F TOBACCO NON-USER: CPT | Performed by: INTERNAL MEDICINE

## 2022-01-31 PROCEDURE — 99214 OFFICE O/P EST MOD 30 MIN: CPT | Performed by: INTERNAL MEDICINE

## 2022-01-31 PROCEDURE — G8420 CALC BMI NORM PARAMETERS: HCPCS | Performed by: INTERNAL MEDICINE

## 2022-01-31 PROCEDURE — 4040F PNEUMOC VAC/ADMIN/RCVD: CPT | Performed by: INTERNAL MEDICINE

## 2022-01-31 PROCEDURE — G8483 FLU IMM NO ADMIN DOC REA: HCPCS | Performed by: INTERNAL MEDICINE

## 2022-01-31 PROCEDURE — 1090F PRES/ABSN URINE INCON ASSESS: CPT | Performed by: INTERNAL MEDICINE

## 2022-01-31 PROCEDURE — G8427 DOCREV CUR MEDS BY ELIG CLIN: HCPCS | Performed by: INTERNAL MEDICINE

## 2022-01-31 RX ORDER — FUROSEMIDE 20 MG/1
20 TABLET ORAL DAILY
Qty: 90 TABLET | Refills: 1 | Status: SHIPPED | OUTPATIENT
Start: 2022-01-31 | End: 2022-07-29

## 2022-01-31 ASSESSMENT — ENCOUNTER SYMPTOMS
CHOKING: 0
COUGH: 0
SHORTNESS OF BREATH: 0
ALLERGIC/IMMUNOLOGIC NEGATIVE: 1
APNEA: 0
CHEST TIGHTNESS: 0
GASTROINTESTINAL NEGATIVE: 1

## 2022-01-31 NOTE — PROGRESS NOTES
Subjective:      Patient ID: Luzma Govea is a 80 y.o. female    CC: Fatigue, Mitral regurgitation, htn, cad    HPI:  Juaquin Lo is an 80year old female with HX of CAD, HTN, PAD, and Mitral regurgitation. . Last Echo. No regional wall motion abnormalities are seen.   Diastolic filling parameters suggests grade I diastolic dysfunction. Bi-atrial enlargement. severe mitral regurgitation. This is a change from 2015 echo that showed moderate MR. Patient feels well and has no chest pain nor SOB. No orthopnea nor PND. She states that she is not quite as active as she used to be and has some fatigue. doing well. Has some LE edema but has TIA and was recently in the hospital with 50% left ICA stenosis that is unchanged from prior. Allergies   Allergen Reactions    Aricept [Donepezil Hydrochloride] Other (See Comments)     intolerant    Doxycycline Nausea Only    Ketorolac Tromethamine Other (See Comments)     Pt unable to recall reaction       Social History     Socioeconomic History    Marital status:      Spouse name: None    Number of children: None    Years of education: None    Highest education level: None   Occupational History    None   Tobacco Use    Smoking status: Never Smoker    Smokeless tobacco: Never Used   Vaping Use    Vaping Use: Never used   Substance and Sexual Activity    Alcohol use: No    Drug use: No    Sexual activity: Not Currently   Other Topics Concern    None   Social History Narrative    None     Social Determinants of Health     Financial Resource Strain: Low Risk     Difficulty of Paying Living Expenses: Not hard at all   Food Insecurity: No Food Insecurity    Worried About Running Out of Food in the Last Year: Never true    [place] of Food in the Last Year: Never true   Transportation Needs:     Lack of Transportation (Medical): Not on file    Lack of Transportation (Non-Medical):  Not on file   Physical Activity:     Days of Exercise per Week: Not on file   [employer] of Exercise per Session: Not on file   Stress:     Feeling of Stress : Not on file   Social Connections:     Frequency of Communication with Friends and Family: Not on file    Frequency of Social Gatherings with Friends and Family: Not on file    Attends Jehovah's witness Services: Not on file    Active Member of Clubs or Organizations: Not on file    Attends Club or Organization Meetings: Not on file    Marital Status: Not on file   Intimate Partner Violence:     Fear of Current or Ex-Partner: Not on file    Emotionally Abused: Not on file    Physically Abused: Not on file    Sexually Abused: Not on file   Housing Stability:     Unable to Pay for Housing in the Last Year: Not on file    Number of [place] in the Last Year: Not on file    Unstable Housing in the Last Year: Not on file       Family History   Problem Relation Age of Onset    Stroke Father     Hypertension Father         has a past medical history of Anxiety, Arthritis, At risk for falls, CAD (coronary artery disease), CTS (carpal tunnel syndrome), DDD (degenerative disc disease), cervical, Fractures, Hyperlipidemia, Hypertension, Mitral regurgitation, Neuropathy, Osteopenia, Osteoporosis, PAD (peripheral artery disease) ([address] Peripheral neuropathy, Peripheral vascular disease ([address] Podagra, Spinal stenosis, Type 2 diabetes mellitus ([address] Urethral stricture, and Urgency of urination. Review of Systems   Constitutional: Positive for activity change and fatigue. Negative for appetite change, chills, diaphoresis, fever and unexpected weight change. HENT: Negative. Respiratory: Negative for apnea, cough, choking, chest tightness and shortness of breath. Cardiovascular: Negative for chest pain, palpitations and leg swelling. Gastrointestinal: Negative. Endocrine: Negative. Genitourinary: Negative. Musculoskeletal: Negative. Skin: Negative. Allergic/Immunologic: Negative. Neurological: Negative. Hematological: Negative. Psychiatric/Behavioral: Negative. Vitals:    04/26/21 1314   BP: 138/72   Pulse: 64          Objective:   Physical Exam  Constitutional:       Appearance: She is well-developed. HENT:      Head: Normocephalic and atraumatic. Eyes:      General: No scleral icterus. Right eye: No discharge. Left eye: No discharge. Conjunctiva/sclera: Conjunctivae normal.      Pupils: Pupils are equal, round, and reactive to light. Neck:      Thyroid: No thyromegaly. Trachea: No tracheal deviation. Cardiovascular:      Rate and Rhythm: Normal rate and regular rhythm. Heart sounds: Murmur heard. No friction rub. No gallop. Comments: III/VI HSM  Pulmonary:      Effort: Pulmonary effort is normal. No respiratory distress. Breath sounds: Normal breath sounds. No wheezing or rales. Abdominal:      General: Bowel sounds are normal. There is no distension. Palpations: Abdomen is soft. Tenderness: There is no abdominal tenderness. Musculoskeletal:         General: No tenderness or deformity. Normal range of motion. Cervical back: Normal range of motion and neck supple. Skin:     General: Skin is warm and dry. Coloration: Skin is not pale. Findings: No erythema or rash. Neurological:      Mental Status: She is alert and oriented to person, place, and time. Cranial Nerves: No cranial nerve deficit. Coordination: Coordination normal.   Psychiatric:         Behavior: Behavior normal.         Thought Content: Thought content normal.         Judgment: Judgment normal.         Current Outpatient Medications   Medication Sig Dispense Refill    glimepiride (AMARYL) 1 MG tablet TAKE ONE TABLET BY MOUTH EVERY MORNING BEFORE BREAKFAST 22 tablet 0    collagenase (SANTYL) 250 UNIT/GM ointment Apply topically daily.  30 g 1    amLODIPine (NORVASC) 2.5 MG tablet TAKE ONE TABLET BY MOUTH DAILY 30 tablet 3    blood glucose test strips (ONETOUCH ULTRA) strip USE TO TEST BLOOD SUGAR TWICE DAILY 100 strip 1    lisinopril (PRINIVIL;ZESTRIL) 20 MG tablet TAKE ONE TABLET BY MOUTH DAILY 90 tablet 3    metFORMIN (GLUCOPHAGE) 500 MG tablet Take 1 tablet by mouth daily 90 tablet 3    clotrimazole-betamethasone (LOTRISONE) 1-0.05 % cream Apply topically 2 times daily for 2 weeks. 60 g 0    atorvastatin (LIPITOR) 10 MG tablet Take 1 tablet by mouth daily 30 tablet 3    Turmeric 450 MG CAPS Take 450 mg by mouth daily      meclizine (ANTIVERT) 12.5 MG tablet Take 1 tablet by mouth 3 times daily as needed for Dizziness (vertigo) 30 tablet 1    Cholecalciferol (VITAMIN D3) 2000 UNITS CAPS Take 1 capsule by mouth daily      aspirin EC 81 MG EC tablet Take 1 tablet by mouth daily 30 tablet 3    Lite Touch Lancets MISC by Does not apply route. Use twice daily when testing blood sugars. 100 each 5     No current facility-administered medications for this visit. Echo 7/27/15  Conclusions      Summary   Normal left ventricle size, wall thickness and systolic function with an   estimated ejection fraction of 55-60%.   No regional wall motion abnormalities are seen.   Moderate mitral regurgitation is present.  [place] is moderate tricuspid regurgitation with RVSP estimated at 42 mmHg.      Signature      ------------------------------------------------------------------   Electronically signed by Larry Farooq MD (Interpreting   [id number]) on 07/27/2015 at 11:38 AM   ------------------------------------------------------------------    Echo 8/23/18  Summary   Concentric left ventricular hypertrophy. The left ventricle appears normal   in size. Normal left ventricular systolic function with an estimated   ejection fraction of 55-60%.  No regional wall motion abnormalities are seen.   Diastolic filling parameters suggests grade I diastolic dysfunction .   Bi-atrial enlargement.   Severe mitral regurgitation.   Trace aortic insufficiency.  Tony Pool pulmonic insufficiency.   Moderate tricuspid regurgitation.   Estimated pulmonary artery systolic pressure is 32 mmHg assuming a right   atrial pressure of 3 mmHg.      Signature      ------------------------------------------------------------------   Electronically signed by Lazaro Bar MD   (Interpreting physician) on 08/23/2018 at 12:30 PM   ------------------------------------------------------------------  Assessment:       Diagnosis Orders   1. Hyperlipidemia, unspecified hyperlipidemia type     2. Benign essential HTN     3. SOB (shortness of breath)     4. Mitral valve insufficiency, unspecified etiology            Plan:      CAD- Lipid panel 8/13/20 LDL 43, HDL 61   HTN- Lisinopril and controlled. Severe MR:  Clinically unchanged. shes taking 10 mg lisinopril PAD stable. Lasix to get rid of swelling and help with sob. Take for just 3 days and see if sob improves. HLP:  Controlled with diet but started on 10 mg atorva for 50% ICA stenosis. Infected wound on left pretibial surface with some improvement in infection with keflex   willl represcribe.

## 2022-02-03 ENCOUNTER — TELEPHONE (OUTPATIENT)
Dept: INTERNAL MEDICINE CLINIC | Age: 87
End: 2022-02-03

## 2022-02-03 RX ORDER — GLIMEPIRIDE 1 MG/1
TABLET ORAL
Qty: 30 TABLET | Refills: 2 | Status: SHIPPED | OUTPATIENT
Start: 2022-02-03 | End: 2022-04-27

## 2022-02-03 NOTE — TELEPHONE ENCOUNTER
----- Message from Papi Shah sent at 2/3/2022  4:16 PM EST -----  Subject: Message to Provider    QUESTIONS  Information for Provider? pt is calling to talk to someone about her appt   cheryl. If office will be open. Pt says that she almost fell a couple days   ago. She is feeling dizzy and is sob. Sent to Physician on call to Dr. Hannah Mccormick. Pt thought she may have punctured a lung and is why she is off   balance and sob. Office was closed due to the weather. ---------------------------------------------------------------------------  --------------  Margot Plan INFO  What is the best way for the office to contact you? OK to leave message on   voicemail  Preferred Call Back Phone Number? [phone]  ---------------------------------------------------------------------------  --------------  SCRIPT ANSWERS  Relationship to Patient?  Self

## 2022-02-03 NOTE — TELEPHONE ENCOUNTER
Attempted to call patient. Got message saying party is busy, please try to call later    Office will not be open until 11:00. Need to reschedule appointment.  Holding 4;00 Friday for her

## 2022-02-04 ENCOUNTER — OFFICE VISIT (OUTPATIENT)
Dept: INTERNAL MEDICINE CLINIC | Age: 87
End: 2022-02-04
Payer: MEDICARE

## 2022-02-04 VITALS
OXYGEN SATURATION: 98 % | SYSTOLIC BLOOD PRESSURE: 142 MMHG | WEIGHT: 114 LBS | HEART RATE: 55 BPM | BODY MASS INDEX: 21.52 KG/M2 | RESPIRATION RATE: 12 BRPM | DIASTOLIC BLOOD PRESSURE: 72 MMHG | HEIGHT: 61 IN

## 2022-02-04 DIAGNOSIS — L98.499 ISCHEMIC ULCER, UNSPECIFIED ULCER STAGE (HCC): ICD-10-CM

## 2022-02-04 DIAGNOSIS — I73.9 PAD (PERIPHERAL ARTERY DISEASE) (HCC): ICD-10-CM

## 2022-02-04 DIAGNOSIS — I87.2 VENOUS INSUFFICIENCY OF BOTH LOWER EXTREMITIES: ICD-10-CM

## 2022-02-04 DIAGNOSIS — R60.0 BILATERAL LEG EDEMA: ICD-10-CM

## 2022-02-04 DIAGNOSIS — S20.212A CONTUSION OF RIB ON LEFT SIDE, INITIAL ENCOUNTER: Primary | ICD-10-CM

## 2022-02-04 PROCEDURE — 1036F TOBACCO NON-USER: CPT | Performed by: INTERNAL MEDICINE

## 2022-02-04 PROCEDURE — 4040F PNEUMOC VAC/ADMIN/RCVD: CPT | Performed by: INTERNAL MEDICINE

## 2022-02-04 PROCEDURE — G8427 DOCREV CUR MEDS BY ELIG CLIN: HCPCS | Performed by: INTERNAL MEDICINE

## 2022-02-04 PROCEDURE — 1123F ACP DISCUSS/DSCN MKR DOCD: CPT | Performed by: INTERNAL MEDICINE

## 2022-02-04 PROCEDURE — 1090F PRES/ABSN URINE INCON ASSESS: CPT | Performed by: INTERNAL MEDICINE

## 2022-02-04 PROCEDURE — G8420 CALC BMI NORM PARAMETERS: HCPCS | Performed by: INTERNAL MEDICINE

## 2022-02-04 PROCEDURE — 99214 OFFICE O/P EST MOD 30 MIN: CPT | Performed by: INTERNAL MEDICINE

## 2022-02-04 PROCEDURE — G8483 FLU IMM NO ADMIN DOC REA: HCPCS | Performed by: INTERNAL MEDICINE

## 2022-02-04 NOTE — PROGRESS NOTES
[facility]) Physicians  Internal Medicine  Patient Encounter  Josep Donahue D.O., [facility]        Chief Complaint   Patient presents with    Shoulder Pain     fell on saturday, caught herself on the table, brusised under the armput     Leg Pain     left leg wound is not getting any better, sleepless nights x4 days       HPI: 80 y.o. female seen urgently today with complaint of a recent fall. This past Saturday she was working in the kitchen when she stumbled and fell into the countertop. She struck the left lateral chest on the edge of the counter. She had pain immediately. Since then she has developed some bruising that has migrated inferiorly. She denies any shortness of breath. Patient is also complaining of poorly healing left leg wound. We have talked about this several times. She needs to get her vascular situation taken care of. Patient has an appointment to have an angiogram done on 2/8/2022. Dr. Naomy Luis will be performing the procedure. She continues to have left lower extremity pain. Interestingly the pain is worse when her legs are elevated. She is able to walk around her house without much difficulty. She has been under the care of Dr. Lory Ibrahim at [facility] wound care. The patient is using Santyl daily. Patient is also complaining of lower extremity swelling bilaterally. She has a known history of venous insufficiency. She followed up with Dr. Bharati Larose her cardiologist. He has been following her for her mitral vegetation and coronary artery disease. She has a known 3/6 systolic murmur. Last echo 11/4/2019 showed severe mitral regurgitation with an ejection fraction of 55 to 60%. He gave her some Lasix to help with swelling and reported shortness of breath. He advised her to just take 3 days worth. He did note that the wound on her left lower leg was infected. It is not infected.     Past Medical History:   Diagnosis Date    Anxiety     Arthritis     At risk for falls     CAD (coronary artery disease)     CTS (carpal tunnel syndrome)     Bilateral, EMG-NCS    DDD (degenerative disc disease), cervical     Fractures     (L) Hip Fx 4/25/98, L1 fracture from fall 10-31-06    Hyperlipidemia     Hypertension     Mitral regurgitation     Neuropathy     Osteopenia     Osteoporosis     PAD (peripheral artery disease) (HCC)     Right LE ischemia    Peripheral neuropathy 6/1/2015    Peripheral vascular disease ([facility]     bilateral lower extremities with edema    Podagra 01/09/2017    Dr. Cat Boles Spinal stenosis     L4-5    Type 2 diabetes mellitus ([facility]     Urethral stricture 5 years ago    Urgency of urination          MEDICATIONS:  Medication Sig   glimepiride (AMARYL) 1 MG tablet TAKE ONE TABLET BY MOUTH EVERY MORNING BEFORE BREAKFAST   furosemide (LASIX) 20 MG tablet Take 1 tablet by mouth daily   blood glucose test strips (ONETOUCH ULTRA) strip USE TO TEST BLOOD SUGAR TWICE DAILY   lisinopril (PRINIVIL;ZESTRIL) 20 MG tablet TAKE ONE TABLET BY MOUTH DAILY   metFORMIN (GLUCOPHAGE) 500 MG tablet Take 1 tablet by mouth daily   clotrimazole-betamethasone (LOTRISONE) 1-0.05 % cream Apply topically 2 times daily for 2 weeks. atorvastatin (LIPITOR) 10 MG tablet Take 1 tablet by mouth daily   Turmeric 450 MG CAPS Take 450 mg by mouth daily   meclizine (ANTIVERT) 12.5 MG tablet Take 1 tablet by mouth 3 times daily as needed for Dizziness (vertigo)   Cholecalciferol (VITAMIN D3) 2000 UNITS CAPS Take 1 capsule by mouth daily   aspirin EC 81 MG EC tablet Take 1 tablet by mouth daily   Lite Touch Lancets MISC by Does not apply route. Use twice daily when testing blood sugars.    amLODIPine (NORVASC) 2.5 MG tablet TAKE ONE TABLET BY MOUTH DAILY  Patient not taking: Reported on 2/4/2022           Review of Systems - As per HPI    OBJECTIVE:  BP (!) 142/72 (Site: Left Upper Arm, Position: Sitting)   Pulse 55   Resp 12   Ht 5' 1\" (1.549 m)   Wt 114 lb (51.7 kg)   SpO2 98%   BMI 21.54 kg/m²   GEN: NAD, A&O, Non-toxic  HEENT: NC/AT, FLACA, EOMI, Oral cavity Clear  CV: Regular rhythm. No ectopy. 3/6 systolic murmur  PULM: CTA, symmentric air exchange. No crackles  EXT: + Bilateral lower extremity pitting edema. GI: Abdomen is soft, NT, BS+, No hepatomegaly. No masses. NEURO: No focal or lateralizing deficits. VASC:  No carotid bruits. Pulses symmetric  SKIN: Left lower extremity anterior tibial region with circular ulceration with fibrin and biofilm. No surrounding induration or erythema. No warmth. MS: Tenderness with palpation of the left lateral rib cage right around rib 6, 7, 8. Migrating ecchymosis along the lower left lateral rib cage. ASSESSMENT/PLAN:    1. Contusion of rib on left side, initial encounter  We will check x-ray to rule out fracture  - XR RIBS LEFT (2 VIEWS); Future    2. Ischemic ulcer, unspecified ulcer stage ([address]  Due to have angiogram  We will need to continue with wound care  Continue Santyl as a debriding agent    3. PAD (peripheral artery disease) ([address]  As above  Recommendations per Dr. Lesly Mendoza    4. Bilateral leg edema  5. Venous insufficiency of both lower extremities  Would hold on using the compression stocking on the left leg. This may make her arterial insufficiency somewhat worse. Elevating the legs increases her pain. Though her legs may swell more, she should consider keeping the legs below the level of the heart to improve arterial circulation. Discussed medications with patient who voiced understanding of their use, indication and potential side effects. Pt also understands the above recommendations. All questions answered. This note was generated completely or in part utilizing Dragon dictation speech recognition software. Occasionally, words are mistranscribed and despite editing, the text may contain inaccuracies due to incorrect word recognition.   If further clarification is needed please contact the office at [phone]       Electronically signed    Rashaad Dunn D.O.

## 2022-02-07 ENCOUNTER — HOSPITAL ENCOUNTER (OUTPATIENT)
Dept: GENERAL RADIOLOGY | Age: 87
Discharge: HOME | End: 2022-02-07
Payer: MEDICARE

## 2022-02-07 ENCOUNTER — HOSPITAL ENCOUNTER (OUTPATIENT)
Age: 87
Discharge: HOME | End: 2022-02-07
Payer: MEDICARE

## 2022-02-07 DIAGNOSIS — S20.212A CONTUSION OF RIB ON LEFT SIDE, INITIAL ENCOUNTER: ICD-10-CM

## 2022-02-07 PROCEDURE — 71101 X-RAY EXAM UNILAT RIBS/CHEST: CPT

## 2022-02-08 ENCOUNTER — HOSPITAL ENCOUNTER (OUTPATIENT)
Dept: CARDIAC CATH/INVASIVE PROCEDURES | Age: 87
Discharge: HOME | End: 2022-02-08
Attending: SURGERY | Admitting: SURGERY
Payer: MEDICARE

## 2022-02-08 VITALS
HEIGHT: 61 IN | TEMPERATURE: 98 F | WEIGHT: 114 LBS | SYSTOLIC BLOOD PRESSURE: 167 MMHG | OXYGEN SATURATION: 98 % | BODY MASS INDEX: 21.52 KG/M2 | DIASTOLIC BLOOD PRESSURE: 68 MMHG

## 2022-02-08 LAB
ANION GAP SERPL CALCULATED.3IONS-SCNC: 11 MMOL/L (ref 3–16)
BUN BLDV-MCNC: 20 MG/DL (ref 7–20)
CALCIUM SERPL-MCNC: 9.6 MG/DL (ref 8.3–10.6)
CHLORIDE BLD-SCNC: 104 MMOL/L (ref 99–110)
CO2: 26 MMOL/L (ref 21–32)
CREAT SERPL-MCNC: 1.1 MG/DL (ref 0.6–1.2)
GFR AFRICAN AMERICAN: 56
GFR NON-AFRICAN AMERICAN: 46
GLUCOSE BLD-MCNC: 124 MG/DL (ref 70–99)
HCT VFR BLD CALC: 36.1 % (ref 36–48)
HEMOGLOBIN: 12 G/DL (ref 12–16)
MCH RBC QN AUTO: 31.2 PG (ref 26–34)
MCHC RBC AUTO-ENTMCNC: 33.2 G/DL (ref 31–36)
MCV RBC AUTO: 93.8 FL (ref 80–100)
PDW BLD-RTO: 13.8 % (ref 12.4–15.4)
PLATELET # BLD: 194 K/UL (ref 135–450)
PMV BLD AUTO: 7.8 FL (ref 5–10.5)
POTASSIUM REFLEX MAGNESIUM: 4.8 MMOL/L (ref 3.5–5.1)
RBC # BLD: 3.85 M/UL (ref 4–5.2)
SODIUM BLD-SCNC: 141 MMOL/L (ref 136–145)
WBC # BLD: 6.2 K/UL (ref 4–11)

## 2022-02-08 PROCEDURE — 36200 PLACE CATHETER IN AORTA: CPT

## 2022-02-08 PROCEDURE — 99153 MOD SED SAME PHYS/QHP EA: CPT

## 2022-02-08 PROCEDURE — 85027 COMPLETE CBC AUTOMATED: CPT

## 2022-02-08 PROCEDURE — 2500000003 HC RX 250 WO HCPCS

## 2022-02-08 PROCEDURE — 75625 CONTRAST EXAM ABDOMINL AORTA: CPT

## 2022-02-08 PROCEDURE — 75625 CONTRAST EXAM ABDOMINL AORTA: CPT | Performed by: SURGERY

## 2022-02-08 PROCEDURE — 36415 COLL VENOUS BLD VENIPUNCTURE: CPT

## 2022-02-08 PROCEDURE — 80048 BASIC METABOLIC PNL TOTAL CA: CPT

## 2022-02-08 PROCEDURE — 99152 MOD SED SAME PHYS/QHP 5/>YRS: CPT

## 2022-02-08 PROCEDURE — C1894 INTRO/SHEATH, NON-LASER: HCPCS

## 2022-02-08 PROCEDURE — 99152 MOD SED SAME PHYS/QHP 5/>YRS: CPT | Performed by: SURGERY

## 2022-02-08 PROCEDURE — 75716 ARTERY X-RAYS ARMS/LEGS: CPT | Performed by: SURGERY

## 2022-02-08 PROCEDURE — 6360000002 HC RX W HCPCS

## 2022-02-08 PROCEDURE — C1769 GUIDE WIRE: HCPCS

## 2022-02-08 PROCEDURE — 76937 US GUIDE VASCULAR ACCESS: CPT | Performed by: SURGERY

## 2022-02-08 PROCEDURE — 2709999900 HC NON-CHARGEABLE SUPPLY

## 2022-02-08 PROCEDURE — 6360000004 HC RX CONTRAST MEDICATION: Performed by: SURGERY

## 2022-02-08 PROCEDURE — 75716 ARTERY X-RAYS ARMS/LEGS: CPT

## 2022-02-08 PROCEDURE — 36200 PLACE CATHETER IN AORTA: CPT | Performed by: SURGERY

## 2022-02-08 RX ORDER — SODIUM CHLORIDE 0.9 % (FLUSH) 0.9 %
5-40 SYRINGE (ML) INJECTION EVERY 12 HOURS SCHEDULED
Status: DISCONTINUED | OUTPATIENT
Start: 2022-02-08 | End: 2022-02-08 | Stop reason: HOSPADM

## 2022-02-08 RX ORDER — IODIXANOL 320 MG/ML
82 INJECTION, SOLUTION INTRAVASCULAR
Status: COMPLETED | OUTPATIENT
Start: 2022-02-08 | End: 2022-02-08

## 2022-02-08 RX ORDER — SODIUM CHLORIDE 9 MG/ML
25 INJECTION, SOLUTION INTRAVENOUS PRN
Status: DISCONTINUED | OUTPATIENT
Start: 2022-02-08 | End: 2022-02-08 | Stop reason: HOSPADM

## 2022-02-08 RX ORDER — SODIUM CHLORIDE 0.9 % (FLUSH) 0.9 %
5-40 SYRINGE (ML) INJECTION PRN
Status: DISCONTINUED | OUTPATIENT
Start: 2022-02-08 | End: 2022-02-08 | Stop reason: HOSPADM

## 2022-02-08 RX ORDER — SODIUM CHLORIDE 9 MG/ML
INJECTION, SOLUTION INTRAVENOUS CONTINUOUS
Status: DISCONTINUED | OUTPATIENT
Start: 2022-02-08 | End: 2022-02-08 | Stop reason: HOSPADM

## 2022-02-08 RX ADMIN — IODIXANOL 82 ML: 320 INJECTION, SOLUTION INTRAVASCULAR at 09:36

## 2022-02-08 NOTE — BRIEF OP NOTE
Brief Postoperative Note      Patient: Que Shannon  YOB: 1929  MRN: [id number]    Date of Procedure:  2/8/2022    Pre-Op Diagnosis: nonhealing L leg wound    Post-Op Diagnosis: Same       Procedure: Aortogram with B runoff    Anesthesia: local + sedation    Estimated Blood Loss (mL): less than 50     Complications: None    Specimens:   * Cannot find log *    Implants:  * No surgical log found *      Drains: * No LDAs found *    Findings: severe R renal artery stenosis; calcified widely patent aorta and B iliacs with patent B iliac stents; irregular R SFA with widely patent R SFA-BKpop bypass and 2 vessel runoff; severe near occlusion L CFA stenosis with irregular L SFA & AK popliteal occlusion with small L BK popliteal and intact tibial runoff. Large collaterals off L SFA filling into popliteal and tibials.   Will plan L CFA endarterectomy only - should be adequate for healing    Electronically signed by Annemarie Ortiz MD on 2/8/2022 at 9:33 AM

## 2022-02-08 NOTE — H&P
Well known pt S/P R SFA-BKpop bypass with later iliac stent and SFA angioplasty presents with nonhealing L shin wound of several weeks duration. C/O pain around this area - worse at HS. No classic rest pain.  Managed through [facility] [address] with local care.      Past Medical History        Past Medical History:   Diagnosis Date    Anxiety      Arthritis      At risk for falls      CAD (coronary artery disease)      CTS (carpal tunnel syndrome)       Bilateral, EMG-NCS    DDD (degenerative disc disease), cervical      Fractures       (L) Hip Fx 4/25/98, L1 fracture from fall 10-31-06    Hypertension      Mitral regurgitation      Neuropathy      Osteopenia      Osteoporosis      PAD (peripheral artery disease) (HCC)       Right LE ischemia    Peripheral neuropathy 6/1/2015    Peripheral vascular disease ([address]       bilateral lower extremities with edema    Podagra 01/09/2017     Dr. Quinten Dotson Spinal stenosis       L4-5    Type 2 diabetes mellitus ([address]      Urethral stricture 5 years ago    Urgency of urination           Past Surgical History         Past Surgical History:   Procedure Laterality Date    APPENDECTOMY        CARPAL TUNNEL RELEASE Right 3/29/2019     RIGHT CARPAL TUNNEL RELEASE AND RIGHT MIDDLE FINGER TRIGGER FINGER RELEASE performed by Bimal Quintero MD at [address]   [id number]     LEFT EYE EYE CATARACT PHACOEMULSIFICATION INTRAOCULAR LENS    CHOLECYSTECTOMY   1978    COLONOSCOPY   8/31/99     diverticulosis    EYE SURGERY Bilateral       bilateral cataract removed    HYSTERECTOMY   1980s     sallie    IR FEMORAL POPLITEAL BYPASS GRAFT Right 8-    KYPHOSIS SURGERY   11-7-06     L1, (fractured after a fall)    TONSILLECTOMY        TOTAL HIP ARTHROPLASTY   4/25/98     (L) THR    WRIST FRACTURE SURGERY   2008     left wrist               Allergies   Allergen Reactions    Aricept [Donepezil Hydrochloride] Other (See Comments)       intolerant    Doxycycline Nausea Only    Ketorolac Tromethamine Other (See Comments)       Pt unable to recall reaction      Current Facility-Administered Medications          Current Outpatient Medications   Medication Sig Dispense Refill    cephALEXin (KEFLEX) 500 MG capsule Take 1 capsule by mouth 3 times daily for 7 days 21 capsule 0    glimepiride (AMARYL) 1 MG tablet TAKE ONE TABLET BY MOUTH EVERY MORNING BEFORE BREAKFAST 22 tablet 0    amLODIPine (NORVASC) 2.5 MG tablet TAKE ONE TABLET BY MOUTH DAILY 30 tablet 3    blood glucose test strips (ONETOUCH ULTRA) strip USE TO TEST BLOOD SUGAR TWICE DAILY 100 strip 1    lisinopril (PRINIVIL;ZESTRIL) 20 MG tablet TAKE ONE TABLET BY MOUTH DAILY 90 tablet 3    metFORMIN (GLUCOPHAGE) 500 MG tablet Take 1 tablet by mouth daily 90 tablet 3    clotrimazole-betamethasone (LOTRISONE) 1-0.05 % cream Apply topically 2 times daily for 2 weeks. 60 g 0    atorvastatin (LIPITOR) 10 MG tablet Take 1 tablet by mouth daily 30 tablet 3    Turmeric 450 MG CAPS Take 450 mg by mouth daily        meclizine (ANTIVERT) 12.5 MG tablet Take 1 tablet by mouth 3 times daily as needed for Dizziness (vertigo) 30 tablet 1    Cholecalciferol (VITAMIN D3) 2000 UNITS CAPS Take 1 capsule by mouth daily        aspirin EC 81 MG EC tablet Take 1 tablet by mouth daily 30 tablet 3    Lite Touch Lancets MISC by Does not apply route. Use twice daily when testing blood sugars. 100 each 5      No current facility-administered medications for this visit.         Social History               Socioeconomic History    Marital status:         Spouse name: Not on file    Number of children: Not on file    Years of education: Not on file    Highest education level: Not on file   Occupational History    Not on file   Tobacco Use    Smoking status: Never Smoker    Smokeless tobacco: Never Used   Vaping Use    Vaping Use: Never used   Substance and Sexual Activity    Alcohol use: No    Drug use: No    Sexual activity: Not Currently   Other Topics Concern    Not on file   Social History Narrative    Not on file      Social Determinants of Health          Financial Resource Strain: Low Risk     Difficulty of Paying Living Expenses: Not hard at all   Food Insecurity: No Food Insecurity    Worried About Running Out of Food in the Last Year: Never true    [address] in the Last Year: Never true   Transportation Needs:     Lack of Transportation (Medical):  Lack of Transportation (Non-Medical):    Physical Activity:     Days of Exercise per Week:     Minutes of Exercise per Session:    Stress:     Feeling of Stress :    Social Connections:     Frequency of Communication with Friends and Family:     Frequency of Social Gatherings with Friends and Family:     Attends Muslim Services:     Active Member of Clubs or Organizations:     Attends Club or Organization Meetings:     Marital Status:    Intimate Partner Violence:     Fear of Current or Ex-Partner:     Emotionally Abused:     Physically Abused:     Sexually Abused:          Family History   Family History   Problem Relation Age of Onset    Stroke Father      Hypertension Father              Review of Systems  Unremarkable as reviewed personally by this MD     Objective:   Physical Exam  Nursing note reviewed. Constitutional:       Appearance: Normal appearance. HENT:      Head: Normocephalic and atraumatic. Nose: Nose normal.      Mouth/Throat:      Mouth: Mucous membranes are moist.      Pharynx: Oropharynx is clear. Eyes:      Extraocular Movements: Extraocular movements intact. Conjunctiva/sclera: Conjunctivae normal.   Cardiovascular:      Rate and Rhythm: Normal rate and regular rhythm. Heart sounds: Normal heart sounds. Pulmonary:      Effort: Pulmonary effort is normal.      Breath sounds: Normal breath sounds. Abdominal:      General: Abdomen is flat.  Bowel sounds are normal.      Palpations: Abdomen is soft. There is no mass. Musculoskeletal:      Cervical back: Normal range of motion and neck supple. Skin:     General: Skin is warm and dry. Capillary Refill: Capillary refill takes more than 3 seconds. Comments: L anterior shin superficial ulcer 3 cm x 2 cm)  Elevation pallor L foot with mild dependent rubor   Neurological:      General: No focal deficit present. Mental Status: She is alert and oriented to person, place, and time. Cranial Nerves: No cranial nerve deficit. Sensory: No sensory deficit. Motor: No weakness. Coordination: Coordination normal.   Psychiatric:         Mood and Affect: Mood normal.         Behavior: Behavior normal.         Thought Content: Thought content normal.         Judgment: Judgment normal.      Comments: Remains argumentative         Pulses:   R bruit   L bruit   2    carotid 2     2    brachial 2     2    radial 2     2    femoral 1     0    popliteal 0     0    posterior tibial 0     2    dorsalis pedis 0     2    bypass graft na        Arterial scan 11/3/2021  Rt BUZZ 0.89  Lt BUZZ NC  R fem-pop widely patent  L CFA >50% stenosis with distal SFA occlusion     Assessment:   1) S/P R SFA-pop bypass. Clinically widely patent and by GSS today  2) Nonhealing L leg wound with multilevel arterial disease  3) DM                Plan:   Angiogram with possible intervention today. Pt understands and agrees.     Marti Shabazz

## 2022-02-08 NOTE — PRE SEDATION
Sedation Pre-Procedure Note    Patient Name: Derrick Baker   YOB: 1929  Room/Bed: Cath/NONE  Medical Record Number: [id number]  Date: 2/8/2022   Time: 8:33 AM       Indication:  Nonhealing L leg wound    Consent: I have discussed with the patient and/or the patient representative the indication, alternatives, and the possible risks and/or complications of the planned procedure and the anesthesia methods. The patient and/or patient representative appear to understand and agree to proceed. Vital Signs:   Vitals:    02/08/22 0740   BP: (!) 167/68   Temp: 98 °F (36.7 °C)   SpO2: 98%       Past Medical History:   has a past medical history of Anxiety, Arthritis, At risk for falls, CAD (coronary artery disease), CTS (carpal tunnel syndrome), DDD (degenerative disc disease), cervical, Fractures, Hyperlipidemia, Hypertension, Mitral regurgitation, Neuropathy, Osteopenia, Osteoporosis, PAD (peripheral artery disease) ([address] Peripheral neuropathy, Peripheral vascular disease ([address] Podagra, Spinal stenosis, Type 2 diabetes mellitus ([address] Urethral stricture, and Urgency of urination. Past Surgical History:   has a past surgical history that includes Tonsillectomy; Appendectomy; Cholecystectomy (1978); Colonoscopy (8/31/99); Hysterectomy (1980s); Total hip arthroplasty (4/25/98); Kyphosis surgery (11-7-06); Wrist fracture surgery (2008); Cataract removal with implant (188012); eye surgery (Bilateral); IR Femoral Popliteal Bypass Graft (Right, 8-); and Carpal tunnel release (Right, 3/29/2019).     Medications:   Scheduled Meds:    sodium chloride flush  5-40 mL IntraVENous 2 times per day    sodium chloride flush  5-40 mL IntraVENous 2 times per day     Continuous Infusions:    sodium chloride      sodium chloride      sodium chloride       PRN Meds: sodium chloride, sodium chloride flush, sodium chloride, sodium chloride flush  Home Meds:   Prior to Admission medications    Medication Sig Start Date End Date Taking? Authorizing Provider   glimepiride (AMARYL) 1 MG tablet TAKE ONE TABLET BY MOUTH EVERY MORNING BEFORE BREAKFAST 2/3/22  Yes Paulo Forman DO   furosemide (LASIX) 20 MG tablet Take 1 tablet by mouth daily 1/31/22  Yes Vickie Ling MD   lisinopril (PRINIVIL;ZESTRIL) 20 MG tablet TAKE ONE TABLET BY MOUTH DAILY 5/11/21  Yes Paulo Forman DO   metFORMIN (GLUCOPHAGE) 500 MG tablet Take 1 tablet by mouth daily 5/11/21  Yes Paulo Forman DO   clotrimazole-betamethasone (LOTRISONE) 1-0.05 % cream Apply topically 2 times daily for 2 weeks. 4/29/21  Yes Paulo Forman DO   atorvastatin (LIPITOR) 10 MG tablet Take 1 tablet by mouth daily 4/20/21  Yes Paulo Forman DO   Turmeric 450 MG CAPS Take 450 mg by mouth daily   Yes Historical Provider, MD   Cholecalciferol (VITAMIN D3) 2000 UNITS CAPS Take 1 capsule by mouth daily   Yes Historical Provider, MD   aspirin EC 81 MG EC tablet Take 1 tablet by mouth daily 5/13/15  Yes Paulo Forman DO   amLODIPine (NORVASC) 2.5 MG tablet TAKE ONE TABLET BY MOUTH DAILY  Patient not taking: Reported on 2/4/2022 8/24/21   Paulo Forman DO   blood glucose test strips (ONETOUCH ULTRA) strip USE TO TEST BLOOD SUGAR TWICE DAILY 7/1/21   Paulo Forman DO   meclizine (ANTIVERT) 12.5 MG tablet Take 1 tablet by mouth 3 times daily as needed for Dizziness (vertigo) 12/2/20   Paulo Forman DO   Lite Touch Lancets MISC by Does not apply route. Use twice daily when testing blood sugars. 12/18/13   Charlene Dominique MD     Coumadin Use Last 7 Days:  no  Antiplatelet drug therapy use last 7 days: yes  Other anticoagulant use last 7 days: no  Additional Medication Information:  See above; metformin      Pre-Sedation Documentation and Exam:   I have personally completed a history, physical exam & review of systems for this patient (see notes).     Mallampati Airway Assessment:  Mallampati Class III - (soft palate & base of uvula are visible)    Prior History of Anesthesia Complications:   none    ASA Classification:  Class 3 - A patient with severe systemic disease that limits activity but is not incapacitating    Sedation/ Anesthesia Plan:   intravenous sedation    Medications Planned:   midazolam (Versed) intravenously and fentanyl intravenously    Patient is an appropriate candidate for plan of sedation: yes    Electronically signed by Beverley Mujica MD on 2/8/2022 at 8:33 AM

## 2022-02-09 ENCOUNTER — TELEPHONE (OUTPATIENT)
Dept: VASCULAR SURGERY | Age: 87
End: 2022-02-09

## 2022-02-09 NOTE — TELEPHONE ENCOUNTER
Pavithra Lange her care giver to get her all the instructions for Marcelina's procedure on Monday. Advised her to arrive at noon. Nothing to eat or drink after midnight. Hold Lisinopril 2 days and no lasix morning of. Dane Leggett understood all instructions and will call if any questions.

## 2022-02-09 NOTE — OP NOTE
[facility]                     [address] [facility], [address] [facility]                                OPERATIVE REPORT    PATIENT NAME: Vernia Krabbe                       :        1929  MED REC NO:   [id number]                          ROOM:  ACCOUNT NO:   [de-identified]                           ADMIT DATE: 2022  PROVIDER:     Jesenia Freedman MD    DATE OF PROCEDURE:  2022    PREPROCEDURE DIAGNOSES:  Nonhealing left leg wound with evidence of  multilevel arterial occlusive disease. POSTPROCEDURE DIAGNOSES:  Nonhealing left leg wound with evidence of  multilevel arterial occlusive disease. OPERATION PERFORMED:  1. Ultrasound-guided right femoral catheterization. 2.  Aortoiliac angiogram via catheter positioned above the renal  arteries. 3.  Bilateral lower extremity runoff series via catheter positioned  above the aortic bifurcation. SURGEON:  Jesenia Freedman MD    ANESTHESIA:  1% lidocaine local with conscious sedation (fentanyl 50 mcg  and Versed 1 mg IV push). TOTAL SEDATION TIME:  28 minutes with continuous oxygen saturation, EKG,  and blood pressure monitoring. ESTIMATED BLOOD LOSS:  Less than 50 mL. HISTORY:  The patient is a 49-year-old functional lady with a history of  a nonhealing distal left shin wound associated with some degree of rest  pain. Evaluation demonstrated absent pulses in her feet and a  non-compressible BUZZ. Duplex scan demonstrated common femoral artery  disease and distal superficial femoral artery occlusion. It was  recommended she undergo angiography for anatomic definition and possible  treatment. She agreed understanding the risks, benefits, and other  options. TECHNIQUE:  The patient was brought to the hybrid room and placed on the  table in the supine position. She was attached to EKG, blood pressure  monitoring, and oxygen saturation monitoring for administration of  conscious sedation.   She was monitored throughout the procedure by an  independent practitioner with sedation administered under the direction  of this physician. Total sedation time was 28 minutes. Versed 1 mg and  fentanyl 50 mcg were given. The bilateral groins were then prepped and  draped in a sterile fashion. Ultrasound was passed on the field in the  right common femoral artery and common femoral artery and its  bifurcation was identified. Lidocaine was infiltrated using ultrasound  guidance. The vessel was confirmed to be patent and images were saved  for the permanent record. Ultrasound was then used to visualize the  common femoral artery and the artery was directly punctured without  difficulty. Wires were then carefully advanced proximally in the aorta  followed by a 5-Chadian sheath. Over the wire, was then advanced a  pigtail catheter which was positioned above the renal arteries. A flush  aortogram was performed with combination iliac imaging. Catheter was  withdrawn to just above the aortic bifurcation and a severe right  anterior oblique angiogram was performed at 25 degrees. After  completion of this, images of the bilateral runoff was performed without  repositioning the catheter. The patient tolerated this well. After  review of the images, the catheter was straightened and the wire  removed. Sheath was removed from the right groin. Pressure was applied  manually while monitoring the palpable right dorsalis pedis pulse. The  patient tolerated the procedure well. FINDINGS:  1. Severe right renal artery calcific stenosis with no significant  stenosis of the left renal artery. 2.  Patent aorta and bilateral iliacs with bilateral iliac artery stents  which are widely patent. 3.  Irregular right superficial femoral artery with widely patent right  superficial femoral artery to below-knee popliteal artery bypass with  two-vessel runoff.   4.  Severe near occlusion of the left common femoral artery by a focal  calcific plaque associated with irregular left superficial femoral  artery and left above-knee popliteal artery occlusion. A small left  below-knee popliteal artery reconstitutes with intact tibial runoff. Large collaterals still off the left superficial femoral artery into the  popliteal and tibial vessels.         Alba Duvall MD    D: 02/08/2022 10:15:26       T: 02/08/2022 20:27:19     [id number]  Job#: [id number]     Doc#: [id number]    CC:

## 2022-02-10 ENCOUNTER — HOSPITAL ENCOUNTER (OUTPATIENT)
Age: 87
Discharge: HOME | End: 2022-02-10
Payer: MEDICARE

## 2022-02-10 ENCOUNTER — ANESTHESIA EVENT (OUTPATIENT)
Dept: OPERATING ROOM | Age: 87
DRG: 253 | End: 2022-02-10
Payer: MEDICARE

## 2022-02-10 DIAGNOSIS — Z11.59 SCREENING FOR VIRAL DISEASE: ICD-10-CM

## 2022-02-10 PROCEDURE — U0003 INFECTIOUS AGENT DETECTION BY NUCLEIC ACID (DNA OR RNA); SEVERE ACUTE RESPIRATORY SYNDROME CORONAVIRUS 2 (SARS-COV-2) (CORONAVIRUS DISEASE [COVID-19]), AMPLIFIED PROBE TECHNIQUE, MAKING USE OF HIGH THROUGHPUT TECHNOLOGIES AS DESCRIBED BY CMS-2020-01-R: HCPCS

## 2022-02-10 PROCEDURE — U0005 INFEC AGEN DETEC AMPLI PROBE: HCPCS

## 2022-02-10 RX ORDER — SODIUM CHLORIDE 9 MG/ML
25 INJECTION, SOLUTION INTRAVENOUS PRN
Status: CANCELLED | OUTPATIENT
Start: 2022-02-10

## 2022-02-10 RX ORDER — SODIUM CHLORIDE 0.9 % (FLUSH) 0.9 %
5-40 SYRINGE (ML) INJECTION PRN
Status: CANCELLED | OUTPATIENT
Start: 2022-02-10

## 2022-02-10 RX ORDER — SODIUM CHLORIDE 0.9 % (FLUSH) 0.9 %
5-40 SYRINGE (ML) INJECTION EVERY 12 HOURS SCHEDULED
Status: CANCELLED | OUTPATIENT
Start: 2022-02-10

## 2022-02-10 NOTE — PROGRESS NOTES
Name_______________________________________Printed:____________________  Date and time of surgery___2/14 1400_____________________Arrival Time:_1200_______________   1. The instructions given regarding when and if a patient needs to stop oral intake prior to surgery varies. Follow the specific instructions you were given                  _x__Nothing to eat or to drink after Midnight the night before.                   ____Carbo loading or ERAS instructions will be given to select patients-if you have been given those instructions -please do the following                           The evening before your surgery after dinner before midnight drink 40 ounces of gatorade. If you are diabetic use sugar free. The morning of surgery drink 40 ounces of water. This needs to be finished 3 hours prior to your surgery start time. 2. Take the following pills with a small sip of water on the morning of surgery__ none_________________________________________________                  Do not take blood pressure medications ending in pril or sartan the vero prior to surgery or the morning of surgery_   3. Aspirin, Ibuprofen, Advil, Naproxen, Vitamin E and other Anti-inflammatory products and supplements should be stopped for 5 -7days before surgery or as directed by your physician. 4. Check with your Doctor regarding stopping Plavix, Coumadin,Eliquis, Lovenox,Effient,Pradaxa,Xarelto, Fragmin or other blood thinners and follow their instructions. 5. Do not smoke, and do not drink any alcoholic beverages 24 hours prior to surgery. This includes NA Beer. Refrain from the usage of any recreational drugs. 6. You may brush your teeth and gargle the morning of surgery. DO NOT SWALLOW WATER   7. You MUST make arrangements for a responsible adult to stay on site while you are here and take you home after your surgery. You will not be allowed to leave alone or drive yourself home.   It is strongly suggested someone stay with you the first 24 hrs. Your surgery will be cancelled if you do not have a ride home. 8. A parent/legal guardian must accompany a child scheduled for surgery and plan to stay at the hospital until the child is discharged. Please do not bring other children with you. 9. Please wear simple, loose fitting clothing to the hospital.  Geovanna Montes not bring valuables (money, credit cards, checkbooks, etc.) Do not wear any makeup (including no eye makeup) or nail polish on your fingers or toes. 10. DO NOT wear any jewelry or piercings on day of surgery. All body piercing jewelry must be removed. 11. If you have ___dentures, they will be removed before going to the OR; we will provide you a container. If you wear ___contact lenses or ___glasses, they will be removed; please bring a case for them. 12. Please see your family doctor/pediatrician for a history & physical and/or concerning medications. Bring any test results/reports from your physician's office. PCP__________________Phone___________H&P Appt. Date________             13 If you  have a Living Will and Durable Power of  for Healthcare, please bring in a copy. 15. Notify your Surgeon if you develop any illness between now and surgery  time, cough, cold, fever, sore throat, nausea, vomiting, etc.  Please notify your surgeon if you experience dizziness, shortness of breath or blurred vision between now & the time of your surgery             15. DO NOT shave your operative site 96 hours prior to surgery. For face & neck surgery, men may use an electric razor 48 hours prior to surgery. 16. Shower the night before or morning of surgery using an antibacterial soap or as you have been instructed. 17. To provide excellent care visitors will be limited to one in the room at any given time. 18.  Please bring picture ID and insurance card.              19.  Visit our web site for additional information:  [email or web address]              20.During flu season no children under the age of 15 are permitted in the hospital for the safety of all patients. 21. If you take a long acting insulin in the evening only  take half of your usual  dose the night  before your procedure              22. If you use a c-pap please bring DOS if staying overnight,             23.For your convenience [address] [facility] has a pharmacy on site to fill your prescriptions. 24. If you use oxygen and have a portable tank please bring it  with you the DOS             25. Bring a complete list of all your medications with name and dose include any supplements. 26. Other__________________________________________   *Please call pre admission testing if you any further questions   [address]    [facility] [place]  [phone]   [facility] [address] BHAVANI LAMAS   [phone]           COVID TESTING    _x__ Covid test to be done 3-5 days prior to scheduled surgery -patient aware they are REQUIRED to bring a copy of the negative result DOS-if they receive a positive result to notify their surgeon         If known - indicate where patient is getting covid test done __[facility] 2/10_________________________________________________________    ___ Rapid - DOS    ___ Other___________________________________      Tamar Buerger POLICY(subject to change)    There is a one visitor policy at [facility] for all surgeries and endoscopies. Whether the visitor can stay or will be asked to wait in the car will depend on the current policy and if social distancing can be maintained. The policy is subject to change at any time. Please make sure the visitor has a cell phone that is on,charged and able to accept calls, as this may be the way that the staff communicates with them. Pain management is NO VISITOR policyThe patients ride is expected to remain in the car with a cell phone for communication. If the ride is leaving the hospital grounds please make sure they are back in time for pickup. Have the patient inform the staff on arrival what their rides plans are while the patient is in the facility. At the MAIN there is one visitor allowed. Please note that the visitor policy is subject to change. All above information reviewed with patient in person or by phone. Patient verbalizes understanding. All questions and concerns addressed.                                                                                                  Patient/Rep____________________                                                                                                                 Per phone/pt                   PRE OP INSTRUCTIONS

## 2022-02-11 LAB — SARS-COV-2: NOT DETECTED

## 2022-02-14 ENCOUNTER — HOSPITAL ENCOUNTER (INPATIENT)
Age: 87
LOS: 2 days | Discharge: HOME | DRG: 253 | End: 2022-02-16
Attending: SURGERY | Admitting: SURGERY
Payer: MEDICARE

## 2022-02-14 ENCOUNTER — ANESTHESIA (OUTPATIENT)
Dept: OPERATING ROOM | Age: 87
DRG: 253 | End: 2022-02-14
Payer: MEDICARE

## 2022-02-14 VITALS
OXYGEN SATURATION: 89 % | RESPIRATION RATE: 1 BRPM | SYSTOLIC BLOOD PRESSURE: 158 MMHG | TEMPERATURE: 96.6 F | DIASTOLIC BLOOD PRESSURE: 71 MMHG

## 2022-02-14 LAB
ABO/RH: NORMAL
ANTIBODY SCREEN: NORMAL
GLUCOSE BLD-MCNC: 117 MG/DL (ref 70–99)
GLUCOSE BLD-MCNC: 121 MG/DL (ref 70–99)
GLUCOSE BLD-MCNC: 173 MG/DL (ref 70–99)
GLUCOSE BLD-MCNC: 248 MG/DL (ref 70–99)
PERFORMED ON: ABNORMAL

## 2022-02-14 PROCEDURE — 6370000000 HC RX 637 (ALT 250 FOR IP): Performed by: SURGERY

## 2022-02-14 PROCEDURE — 3600000004 HC SURGERY LEVEL 4 BASE: Performed by: SURGERY

## 2022-02-14 PROCEDURE — 3700000000 HC ANESTHESIA ATTENDED CARE: Performed by: SURGERY

## 2022-02-14 PROCEDURE — 0JBP0ZZ EXCISION OF LEFT LOWER LEG SUBCUTANEOUS TISSUE AND FASCIA, OPEN APPROACH: ICD-10-PCS | Performed by: SURGERY

## 2022-02-14 PROCEDURE — P9045 ALBUMIN (HUMAN), 5%, 250 ML: HCPCS | Performed by: NURSE ANESTHETIST, CERTIFIED REGISTERED

## 2022-02-14 PROCEDURE — 2580000003 HC RX 258: Performed by: NURSE ANESTHETIST, CERTIFIED REGISTERED

## 2022-02-14 PROCEDURE — 6360000002 HC RX W HCPCS: Performed by: ANESTHESIOLOGY

## 2022-02-14 PROCEDURE — 35371 RECHANNELING OF ARTERY: CPT | Performed by: SURGERY

## 2022-02-14 PROCEDURE — 2709999900 HC NON-CHARGEABLE SUPPLY: Performed by: SURGERY

## 2022-02-14 PROCEDURE — 04CL0ZZ EXTIRPATION OF MATTER FROM LEFT FEMORAL ARTERY, OPEN APPROACH: ICD-10-PCS | Performed by: SURGERY

## 2022-02-14 PROCEDURE — 86901 BLOOD TYPING SEROLOGIC RH(D): CPT

## 2022-02-14 PROCEDURE — 6360000002 HC RX W HCPCS: Performed by: SURGERY

## 2022-02-14 PROCEDURE — 2500000003 HC RX 250 WO HCPCS: Performed by: NURSE ANESTHETIST, CERTIFIED REGISTERED

## 2022-02-14 PROCEDURE — 7100000000 HC PACU RECOVERY - FIRST 15 MIN: Performed by: SURGERY

## 2022-02-14 PROCEDURE — 86850 RBC ANTIBODY SCREEN: CPT

## 2022-02-14 PROCEDURE — 86900 BLOOD TYPING SEROLOGIC ABO: CPT

## 2022-02-14 PROCEDURE — 6360000002 HC RX W HCPCS: Performed by: NURSE ANESTHETIST, CERTIFIED REGISTERED

## 2022-02-14 PROCEDURE — A4217 STERILE WATER/SALINE, 500 ML: HCPCS | Performed by: SURGERY

## 2022-02-14 PROCEDURE — C1768 GRAFT, VASCULAR: HCPCS | Performed by: SURGERY

## 2022-02-14 PROCEDURE — 36415 COLL VENOUS BLD VENIPUNCTURE: CPT

## 2022-02-14 PROCEDURE — 04UL0KZ SUPPLEMENT LEFT FEMORAL ARTERY WITH NONAUTOLOGOUS TISSUE SUBSTITUTE, OPEN APPROACH: ICD-10-PCS | Performed by: SURGERY

## 2022-02-14 PROCEDURE — 2580000003 HC RX 258: Performed by: SURGERY

## 2022-02-14 PROCEDURE — 2000000000 HC ICU R&B

## 2022-02-14 PROCEDURE — 3700000001 HC ADD 15 MINUTES (ANESTHESIA): Performed by: SURGERY

## 2022-02-14 PROCEDURE — 3600000014 HC SURGERY LEVEL 4 ADDTL 15MIN: Performed by: SURGERY

## 2022-02-14 PROCEDURE — 11042 DBRDMT SUBQ TIS 1ST 20SQCM/<: CPT | Performed by: SURGERY

## 2022-02-14 PROCEDURE — 7100000001 HC PACU RECOVERY - ADDTL 15 MIN: Performed by: SURGERY

## 2022-02-14 DEVICE — VASCU-GUARD PERIPHERAL VASCULAR PATCH IS PREPARED FROM BOVINE PERICARDIUM WHICH IS CROSS-LINKED WITH GLUTARALDEHYDE. THE PERICARDIUM IS PROCURED FROM CATTLE ORIGINATING IN THE UNITED STATES. VASCU-GUARD PERIPHERAL VASCULAR PATCH IS CHEMICALLY STERILIZED USING ETHANOL AND PROPYLENE OXIDE. VASCU-GUARD PERIPHERAL VASCULAR PATCH HAS BEEN TREATED WITH 1 MOLAR SODIUM HYDROXIDE FOR A MINIMUM OF 60 MINUTES AT 20 - 25°C.  VASCU-GUARD PERIPHERAL VASCULAR PATCH IS PACKAGED IN A CONTAINER FILLED WITH STERILE, NON-PYROGENIC WATER CONTAINING PROPYLENE OXIDE. THE CONTENTS OF THE UNOPENED, UNDAMAGED CONTAINER ARE STERILE.
Type: IMPLANTABLE DEVICE | Site: ARTERIAL | Status: FUNCTIONAL
Brand: VASCU-GUARD PERIPHERAL VASCULAR PATCH

## 2022-02-14 RX ORDER — FENTANYL CITRATE 50 UG/ML
INJECTION, SOLUTION INTRAMUSCULAR; INTRAVENOUS PRN
Status: DISCONTINUED | OUTPATIENT
Start: 2022-02-14 | End: 2022-02-14 | Stop reason: SDUPTHER

## 2022-02-14 RX ORDER — SODIUM CHLORIDE 0.9 % (FLUSH) 0.9 %
5-40 SYRINGE (ML) INJECTION EVERY 12 HOURS SCHEDULED
Status: DISCONTINUED | OUTPATIENT
Start: 2022-02-14 | End: 2022-02-16 | Stop reason: HOSPADM

## 2022-02-14 RX ORDER — HYDROMORPHONE HCL 110MG/55ML
0.25 PATIENT CONTROLLED ANALGESIA SYRINGE INTRAVENOUS EVERY 5 MIN PRN
Status: DISCONTINUED | OUTPATIENT
Start: 2022-02-14 | End: 2022-02-14 | Stop reason: HOSPADM

## 2022-02-14 RX ORDER — SODIUM CHLORIDE 9 MG/ML
25 INJECTION, SOLUTION INTRAVENOUS PRN
Status: DISCONTINUED | OUTPATIENT
Start: 2022-02-14 | End: 2022-02-14 | Stop reason: HOSPADM

## 2022-02-14 RX ORDER — GLYCOPYRROLATE 1 MG/5 ML
SYRINGE (ML) INTRAVENOUS PRN
Status: DISCONTINUED | OUTPATIENT
Start: 2022-02-14 | End: 2022-02-14 | Stop reason: SDUPTHER

## 2022-02-14 RX ORDER — FENTANYL CITRATE 50 UG/ML
25 INJECTION, SOLUTION INTRAMUSCULAR; INTRAVENOUS EVERY 5 MIN PRN
Status: DISCONTINUED | OUTPATIENT
Start: 2022-02-14 | End: 2022-02-14 | Stop reason: HOSPADM

## 2022-02-14 RX ORDER — ONDANSETRON 2 MG/ML
INJECTION INTRAMUSCULAR; INTRAVENOUS PRN
Status: DISCONTINUED | OUTPATIENT
Start: 2022-02-14 | End: 2022-02-14 | Stop reason: SDUPTHER

## 2022-02-14 RX ORDER — ALBUMIN, HUMAN INJ 5% 5 %
SOLUTION INTRAVENOUS PRN
Status: DISCONTINUED | OUTPATIENT
Start: 2022-02-14 | End: 2022-02-14 | Stop reason: SDUPTHER

## 2022-02-14 RX ORDER — ASPIRIN 81 MG/1
81 TABLET ORAL DAILY
Status: DISCONTINUED | OUTPATIENT
Start: 2022-02-14 | End: 2022-02-16 | Stop reason: HOSPADM

## 2022-02-14 RX ORDER — OXYCODONE HYDROCHLORIDE 5 MG/1
5 TABLET ORAL EVERY 4 HOURS PRN
Status: DISCONTINUED | OUTPATIENT
Start: 2022-02-14 | End: 2022-02-16 | Stop reason: HOSPADM

## 2022-02-14 RX ORDER — LIDOCAINE HYDROCHLORIDE 20 MG/ML
INJECTION, SOLUTION EPIDURAL; INFILTRATION; INTRACAUDAL; PERINEURAL PRN
Status: DISCONTINUED | OUTPATIENT
Start: 2022-02-14 | End: 2022-02-14 | Stop reason: SDUPTHER

## 2022-02-14 RX ORDER — MORPHINE SULFATE 2 MG/ML
4 INJECTION, SOLUTION INTRAMUSCULAR; INTRAVENOUS
Status: DISCONTINUED | OUTPATIENT
Start: 2022-02-14 | End: 2022-02-15

## 2022-02-14 RX ORDER — ONDANSETRON 2 MG/ML
4 INJECTION INTRAMUSCULAR; INTRAVENOUS
Status: COMPLETED | OUTPATIENT
Start: 2022-02-14 | End: 2022-02-14

## 2022-02-14 RX ORDER — GLIPIZIDE 5 MG/1
2.5 TABLET ORAL
Status: DISCONTINUED | OUTPATIENT
Start: 2022-02-15 | End: 2022-02-16 | Stop reason: HOSPADM

## 2022-02-14 RX ORDER — SODIUM CHLORIDE 9 MG/ML
INJECTION, SOLUTION INTRAVENOUS CONTINUOUS PRN
Status: DISCONTINUED | OUTPATIENT
Start: 2022-02-14 | End: 2022-02-14 | Stop reason: SDUPTHER

## 2022-02-14 RX ORDER — ONDANSETRON 2 MG/ML
4 INJECTION INTRAMUSCULAR; INTRAVENOUS EVERY 6 HOURS PRN
Status: DISCONTINUED | OUTPATIENT
Start: 2022-02-14 | End: 2022-02-16 | Stop reason: HOSPADM

## 2022-02-14 RX ORDER — SODIUM CHLORIDE 0.9 % (FLUSH) 0.9 %
5-40 SYRINGE (ML) INJECTION PRN
Status: DISCONTINUED | OUTPATIENT
Start: 2022-02-14 | End: 2022-02-14 | Stop reason: HOSPADM

## 2022-02-14 RX ORDER — ROCURONIUM BROMIDE 10 MG/ML
INJECTION, SOLUTION INTRAVENOUS PRN
Status: DISCONTINUED | OUTPATIENT
Start: 2022-02-14 | End: 2022-02-14 | Stop reason: SDUPTHER

## 2022-02-14 RX ORDER — SODIUM CHLORIDE 9 MG/ML
25 INJECTION, SOLUTION INTRAVENOUS PRN
Status: DISCONTINUED | OUTPATIENT
Start: 2022-02-14 | End: 2022-02-16 | Stop reason: HOSPADM

## 2022-02-14 RX ORDER — PROTAMINE SULFATE 10 MG/ML
INJECTION, SOLUTION INTRAVENOUS PRN
Status: DISCONTINUED | OUTPATIENT
Start: 2022-02-14 | End: 2022-02-14 | Stop reason: SDUPTHER

## 2022-02-14 RX ORDER — DEXTROSE MONOHYDRATE 50 MG/ML
100 INJECTION, SOLUTION INTRAVENOUS PRN
Status: DISCONTINUED | OUTPATIENT
Start: 2022-02-14 | End: 2022-02-16 | Stop reason: HOSPADM

## 2022-02-14 RX ORDER — SODIUM CHLORIDE 0.9 % (FLUSH) 0.9 %
10 SYRINGE (ML) INJECTION EVERY 12 HOURS SCHEDULED
Status: DISCONTINUED | OUTPATIENT
Start: 2022-02-14 | End: 2022-02-14 | Stop reason: HOSPADM

## 2022-02-14 RX ORDER — DEXAMETHASONE SODIUM PHOSPHATE 4 MG/ML
INJECTION, SOLUTION INTRA-ARTICULAR; INTRALESIONAL; INTRAMUSCULAR; INTRAVENOUS; SOFT TISSUE PRN
Status: DISCONTINUED | OUTPATIENT
Start: 2022-02-14 | End: 2022-02-14 | Stop reason: SDUPTHER

## 2022-02-14 RX ORDER — SODIUM CHLORIDE 0.9 % (FLUSH) 0.9 %
5-40 SYRINGE (ML) INJECTION PRN
Status: DISCONTINUED | OUTPATIENT
Start: 2022-02-14 | End: 2022-02-16 | Stop reason: HOSPADM

## 2022-02-14 RX ORDER — FUROSEMIDE 20 MG/1
20 TABLET ORAL DAILY
Status: DISCONTINUED | OUTPATIENT
Start: 2022-02-14 | End: 2022-02-16 | Stop reason: HOSPADM

## 2022-02-14 RX ORDER — INSULIN LISPRO 100 [IU]/ML
0-12 INJECTION, SOLUTION INTRAVENOUS; SUBCUTANEOUS
Status: DISCONTINUED | OUTPATIENT
Start: 2022-02-15 | End: 2022-02-16 | Stop reason: HOSPADM

## 2022-02-14 RX ORDER — INSULIN LISPRO 100 [IU]/ML
0-6 INJECTION, SOLUTION INTRAVENOUS; SUBCUTANEOUS NIGHTLY
Status: DISCONTINUED | OUTPATIENT
Start: 2022-02-14 | End: 2022-02-16 | Stop reason: HOSPADM

## 2022-02-14 RX ORDER — SODIUM CHLORIDE 9 MG/ML
INJECTION, SOLUTION INTRAVENOUS CONTINUOUS
Status: DISCONTINUED | OUTPATIENT
Start: 2022-02-14 | End: 2022-02-15

## 2022-02-14 RX ORDER — SODIUM CHLORIDE 0.9 % (FLUSH) 0.9 %
10 SYRINGE (ML) INJECTION PRN
Status: DISCONTINUED | OUTPATIENT
Start: 2022-02-14 | End: 2022-02-14 | Stop reason: HOSPADM

## 2022-02-14 RX ORDER — PROCHLORPERAZINE EDISYLATE 5 MG/ML
5 INJECTION INTRAMUSCULAR; INTRAVENOUS
Status: DISCONTINUED | OUTPATIENT
Start: 2022-02-14 | End: 2022-02-14 | Stop reason: HOSPADM

## 2022-02-14 RX ORDER — LISINOPRIL 20 MG/1
20 TABLET ORAL DAILY
Status: DISCONTINUED | OUTPATIENT
Start: 2022-02-14 | End: 2022-02-16 | Stop reason: HOSPADM

## 2022-02-14 RX ORDER — SODIUM CHLORIDE, SODIUM LACTATE, POTASSIUM CHLORIDE, CALCIUM CHLORIDE 600; 310; 30; 20 MG/100ML; MG/100ML; MG/100ML; MG/100ML
INJECTION, SOLUTION INTRAVENOUS CONTINUOUS
Status: DISCONTINUED | OUTPATIENT
Start: 2022-02-14 | End: 2022-02-15

## 2022-02-14 RX ORDER — LABETALOL HYDROCHLORIDE 5 MG/ML
5 INJECTION, SOLUTION INTRAVENOUS EVERY 10 MIN PRN
Status: DISCONTINUED | OUTPATIENT
Start: 2022-02-14 | End: 2022-02-14 | Stop reason: HOSPADM

## 2022-02-14 RX ORDER — OXYCODONE HYDROCHLORIDE 5 MG/1
10 TABLET ORAL EVERY 4 HOURS PRN
Status: DISCONTINUED | OUTPATIENT
Start: 2022-02-14 | End: 2022-02-15

## 2022-02-14 RX ORDER — ATORVASTATIN CALCIUM 10 MG/1
10 TABLET, FILM COATED ORAL NIGHTLY
Status: DISCONTINUED | OUTPATIENT
Start: 2022-02-14 | End: 2022-02-16 | Stop reason: HOSPADM

## 2022-02-14 RX ORDER — METOPROLOL TARTRATE 5 MG/5ML
INJECTION INTRAVENOUS PRN
Status: DISCONTINUED | OUTPATIENT
Start: 2022-02-14 | End: 2022-02-14 | Stop reason: SDUPTHER

## 2022-02-14 RX ORDER — PROPOFOL 10 MG/ML
INJECTION, EMULSION INTRAVENOUS PRN
Status: DISCONTINUED | OUTPATIENT
Start: 2022-02-14 | End: 2022-02-14 | Stop reason: SDUPTHER

## 2022-02-14 RX ORDER — NICOTINE POLACRILEX 4 MG
15 LOZENGE BUCCAL PRN
Status: DISCONTINUED | OUTPATIENT
Start: 2022-02-14 | End: 2022-02-16 | Stop reason: HOSPADM

## 2022-02-14 RX ORDER — PHENYLEPHRINE HCL IN 0.9% NACL 1 MG/10 ML
SYRINGE (ML) INTRAVENOUS PRN
Status: DISCONTINUED | OUTPATIENT
Start: 2022-02-14 | End: 2022-02-14 | Stop reason: SDUPTHER

## 2022-02-14 RX ORDER — LIDOCAINE HYDROCHLORIDE 10 MG/ML
1 INJECTION, SOLUTION EPIDURAL; INFILTRATION; INTRACAUDAL; PERINEURAL
Status: DISCONTINUED | OUTPATIENT
Start: 2022-02-14 | End: 2022-02-14 | Stop reason: HOSPADM

## 2022-02-14 RX ORDER — MORPHINE SULFATE 2 MG/ML
2 INJECTION, SOLUTION INTRAMUSCULAR; INTRAVENOUS
Status: DISCONTINUED | OUTPATIENT
Start: 2022-02-14 | End: 2022-02-16 | Stop reason: HOSPADM

## 2022-02-14 RX ORDER — SODIUM CHLORIDE 0.9 % (FLUSH) 0.9 %
5-40 SYRINGE (ML) INJECTION EVERY 12 HOURS SCHEDULED
Status: DISCONTINUED | OUTPATIENT
Start: 2022-02-14 | End: 2022-02-14 | Stop reason: HOSPADM

## 2022-02-14 RX ORDER — ONDANSETRON 4 MG/1
4 TABLET, ORALLY DISINTEGRATING ORAL EVERY 8 HOURS PRN
Status: DISCONTINUED | OUTPATIENT
Start: 2022-02-14 | End: 2022-02-16 | Stop reason: HOSPADM

## 2022-02-14 RX ORDER — DEXTROSE MONOHYDRATE 25 G/50ML
12.5 INJECTION, SOLUTION INTRAVENOUS PRN
Status: DISCONTINUED | OUTPATIENT
Start: 2022-02-14 | End: 2022-02-16 | Stop reason: HOSPADM

## 2022-02-14 RX ORDER — HEPARIN SODIUM 1000 [USP'U]/ML
INJECTION, SOLUTION INTRAVENOUS; SUBCUTANEOUS PRN
Status: DISCONTINUED | OUTPATIENT
Start: 2022-02-14 | End: 2022-02-14 | Stop reason: SDUPTHER

## 2022-02-14 RX ORDER — HYDRALAZINE HYDROCHLORIDE 20 MG/ML
5 INJECTION INTRAMUSCULAR; INTRAVENOUS EVERY 10 MIN PRN
Status: DISCONTINUED | OUTPATIENT
Start: 2022-02-14 | End: 2022-02-14 | Stop reason: HOSPADM

## 2022-02-14 RX ADMIN — ROCURONIUM BROMIDE 30 MG: 10 INJECTION, SOLUTION INTRAVENOUS at 14:49

## 2022-02-14 RX ADMIN — PROPOFOL 60 MG: 10 INJECTION, EMULSION INTRAVENOUS at 14:47

## 2022-02-14 RX ADMIN — PROPOFOL 20 MG: 10 INJECTION, EMULSION INTRAVENOUS at 14:53

## 2022-02-14 RX ADMIN — SODIUM CHLORIDE: 9 INJECTION, SOLUTION INTRAVENOUS at 14:34

## 2022-02-14 RX ADMIN — FENTANYL CITRATE 25 MCG: 50 INJECTION, SOLUTION INTRAMUSCULAR; INTRAVENOUS at 15:16

## 2022-02-14 RX ADMIN — HYDROMORPHONE HYDROCHLORIDE 0.25 MG: 2 INJECTION, SOLUTION INTRAMUSCULAR; INTRAVENOUS; SUBCUTANEOUS at 17:16

## 2022-02-14 RX ADMIN — ROCURONIUM BROMIDE 10 MG: 10 INJECTION, SOLUTION INTRAVENOUS at 15:17

## 2022-02-14 RX ADMIN — SODIUM CHLORIDE, PRESERVATIVE FREE 10 ML: 5 INJECTION INTRAVENOUS at 21:49

## 2022-02-14 RX ADMIN — DEXAMETHASONE SODIUM PHOSPHATE 4 MG: 4 INJECTION, SOLUTION INTRAMUSCULAR; INTRAVENOUS at 15:00

## 2022-02-14 RX ADMIN — Medication 50 MCG: at 15:56

## 2022-02-14 RX ADMIN — FENTANYL CITRATE 25 MCG: 50 INJECTION, SOLUTION INTRAMUSCULAR; INTRAVENOUS at 15:13

## 2022-02-14 RX ADMIN — Medication 50 MCG: at 15:43

## 2022-02-14 RX ADMIN — ROCURONIUM BROMIDE 10 MG: 10 INJECTION, SOLUTION INTRAVENOUS at 16:02

## 2022-02-14 RX ADMIN — PROPOFOL 20 MG: 10 INJECTION, EMULSION INTRAVENOUS at 14:50

## 2022-02-14 RX ADMIN — FENTANYL CITRATE 25 MCG: 50 INJECTION, SOLUTION INTRAMUSCULAR; INTRAVENOUS at 16:19

## 2022-02-14 RX ADMIN — LISINOPRIL 20 MG: 20 TABLET ORAL at 21:48

## 2022-02-14 RX ADMIN — ROCURONIUM BROMIDE 10 MG: 10 INJECTION, SOLUTION INTRAVENOUS at 15:45

## 2022-02-14 RX ADMIN — LIDOCAINE HYDROCHLORIDE 60 MG: 20 INJECTION, SOLUTION EPIDURAL; INFILTRATION; INTRACAUDAL; PERINEURAL at 14:47

## 2022-02-14 RX ADMIN — METOPROLOL TARTRATE 2 MG: 1 INJECTION, SOLUTION INTRAVENOUS at 16:49

## 2022-02-14 RX ADMIN — FENTANYL CITRATE 25 MCG: 0.05 INJECTION, SOLUTION INTRAMUSCULAR; INTRAVENOUS at 17:40

## 2022-02-14 RX ADMIN — ONDANSETRON 4 MG: 2 INJECTION INTRAMUSCULAR; INTRAVENOUS at 16:36

## 2022-02-14 RX ADMIN — HEPARIN SODIUM 3000 UNITS: 1000 INJECTION INTRAVENOUS; SUBCUTANEOUS at 15:33

## 2022-02-14 RX ADMIN — Medication 100 MCG: at 15:02

## 2022-02-14 RX ADMIN — ATORVASTATIN CALCIUM 10 MG: 10 TABLET, FILM COATED ORAL at 21:48

## 2022-02-14 RX ADMIN — ALBUMIN (HUMAN) 12.5 G: 12.5 INJECTION, SOLUTION INTRAVENOUS at 15:03

## 2022-02-14 RX ADMIN — HYDROMORPHONE HYDROCHLORIDE 0.25 MG: 2 INJECTION, SOLUTION INTRAMUSCULAR; INTRAVENOUS; SUBCUTANEOUS at 17:21

## 2022-02-14 RX ADMIN — SUGAMMADEX 200 MG: 100 INJECTION, SOLUTION INTRAVENOUS at 16:53

## 2022-02-14 RX ADMIN — SODIUM CHLORIDE: 9 INJECTION, SOLUTION INTRAVENOUS at 18:25

## 2022-02-14 RX ADMIN — HYDROMORPHONE HYDROCHLORIDE 0.25 MG: 2 INJECTION, SOLUTION INTRAMUSCULAR; INTRAVENOUS; SUBCUTANEOUS at 17:26

## 2022-02-14 RX ADMIN — FUROSEMIDE 20 MG: 20 TABLET ORAL at 21:48

## 2022-02-14 RX ADMIN — ONDANSETRON 4 MG: 2 INJECTION INTRAMUSCULAR; INTRAVENOUS at 17:21

## 2022-02-14 RX ADMIN — HYDROMORPHONE HYDROCHLORIDE 0.25 MG: 2 INJECTION, SOLUTION INTRAMUSCULAR; INTRAVENOUS; SUBCUTANEOUS at 17:11

## 2022-02-14 RX ADMIN — Medication 0.2 MG: at 15:29

## 2022-02-14 RX ADMIN — PROTAMINE SULFATE 15 MG: 10 INJECTION, SOLUTION INTRAVENOUS at 16:39

## 2022-02-14 RX ADMIN — CEFAZOLIN 2000 MG: 10 INJECTION, POWDER, FOR SOLUTION INTRAVENOUS at 14:35

## 2022-02-14 RX ADMIN — ROCURONIUM BROMIDE 10 MG: 10 INJECTION, SOLUTION INTRAVENOUS at 16:32

## 2022-02-14 ASSESSMENT — PULMONARY FUNCTION TESTS
PIF_VALUE: 14
PIF_VALUE: 14
PIF_VALUE: 13
PIF_VALUE: 15
PIF_VALUE: 15
PIF_VALUE: 13
PIF_VALUE: 14
PIF_VALUE: 0
PIF_VALUE: 14
PIF_VALUE: 14
PIF_VALUE: 1
PIF_VALUE: 13
PIF_VALUE: 16
PIF_VALUE: 14
PIF_VALUE: 13
PIF_VALUE: 14
PIF_VALUE: 14
PIF_VALUE: 25
PIF_VALUE: 14
PIF_VALUE: 33
PIF_VALUE: 1
PIF_VALUE: 15
PIF_VALUE: 14
PIF_VALUE: 25
PIF_VALUE: 14
PIF_VALUE: 14
PIF_VALUE: 15
PIF_VALUE: 15
PIF_VALUE: 14
PIF_VALUE: 0
PIF_VALUE: 13
PIF_VALUE: 14
PIF_VALUE: 15
PIF_VALUE: 14
PIF_VALUE: 14
PIF_VALUE: 15
PIF_VALUE: 1
PIF_VALUE: 15
PIF_VALUE: 14
PIF_VALUE: 15
PIF_VALUE: 13
PIF_VALUE: 14
PIF_VALUE: 13
PIF_VALUE: 14
PIF_VALUE: 14
PIF_VALUE: 13
PIF_VALUE: 14
PIF_VALUE: 26
PIF_VALUE: 1
PIF_VALUE: 14
PIF_VALUE: 13
PIF_VALUE: 14
PIF_VALUE: 15
PIF_VALUE: 14
PIF_VALUE: 14
PIF_VALUE: 2
PIF_VALUE: 13
PIF_VALUE: 14
PIF_VALUE: 14
PIF_VALUE: 13
PIF_VALUE: 14
PIF_VALUE: 15
PIF_VALUE: 15
PIF_VALUE: 14
PIF_VALUE: 14
PIF_VALUE: 15
PIF_VALUE: 14
PIF_VALUE: 15
PIF_VALUE: 14
PIF_VALUE: 14
PIF_VALUE: 15
PIF_VALUE: 14
PIF_VALUE: 1
PIF_VALUE: 14
PIF_VALUE: 15
PIF_VALUE: 14
PIF_VALUE: 13
PIF_VALUE: 3
PIF_VALUE: 14
PIF_VALUE: 15
PIF_VALUE: 14
PIF_VALUE: 15
PIF_VALUE: 15
PIF_VALUE: 1
PIF_VALUE: 14
PIF_VALUE: 14
PIF_VALUE: 1
PIF_VALUE: 1
PIF_VALUE: 14
PIF_VALUE: 14
PIF_VALUE: 13
PIF_VALUE: 15
PIF_VALUE: 14
PIF_VALUE: 14
PIF_VALUE: 13
PIF_VALUE: 13
PIF_VALUE: 14
PIF_VALUE: 1
PIF_VALUE: 14
PIF_VALUE: 14
PIF_VALUE: 15
PIF_VALUE: 14
PIF_VALUE: 14
PIF_VALUE: 16
PIF_VALUE: 13
PIF_VALUE: 14
PIF_VALUE: 13
PIF_VALUE: 14
PIF_VALUE: 22
PIF_VALUE: 14
PIF_VALUE: 13
PIF_VALUE: 14
PIF_VALUE: 13
PIF_VALUE: 0
PIF_VALUE: 14
PIF_VALUE: 14

## 2022-02-14 ASSESSMENT — PAIN SCALES - GENERAL
PAINLEVEL_OUTOF10: 0
PAINLEVEL_OUTOF10: 10
PAINLEVEL_OUTOF10: 0
PAINLEVEL_OUTOF10: 10
PAINLEVEL_OUTOF10: 7
PAINLEVEL_OUTOF10: 0
PAINLEVEL_OUTOF10: 0

## 2022-02-14 ASSESSMENT — PAIN - FUNCTIONAL ASSESSMENT: PAIN_FUNCTIONAL_ASSESSMENT: 0-10

## 2022-02-14 ASSESSMENT — ENCOUNTER SYMPTOMS: SHORTNESS OF BREATH: 0

## 2022-02-14 NOTE — PROGRESS NOTES
Teaching / education initiated regarding perioperative experience, expectations, and pain management during stay. Patient verbalized understanding. Pt is alert and oriented. All questions answered thus far.

## 2022-02-14 NOTE — ANESTHESIA POSTPROCEDURE EVALUATION
Department of Anesthesiology  Postprocedure Note    Patient: Arian Marinelli  MRN: [id number]  YOB: 1929  Date of evaluation: 2/14/2022  Time:  5:18 PM     Procedure Summary     Date: 02/14/22 Room / Location: [address]    Anesthesia Start: 4211 Anesthesia Stop: 7799    Procedures:       LEFT COMMON FEMORAL ARTERY ENDARTERECTOMY (Left )      LEFT LEG WOUND DEBRIDEMENT (Left ) Diagnosis: (I73.9 PERIPHERAL ARTERY DISEASE)    Surgeons: Owen Velasquez MD Responsible Provider: Marilyn Deleon MD    Anesthesia Type: general ASA Status: 3          Anesthesia Type: general    Dalila Phase I: Dalila Score: 8    Dalila Phase II:      Last vitals: Reviewed and per EMR flowsheets.        Anesthesia Post Evaluation    Patient location during evaluation: PACU  Patient participation: complete - patient participated  Level of consciousness: awake and alert  Airway patency: patent  Nausea & Vomiting: no nausea and no vomiting  Complications: no  Cardiovascular status: hemodynamically stable  Respiratory status: acceptable  Hydration status: stable  Multimodal analgesia pain management approach

## 2022-02-14 NOTE — H&P
Update History & Physical    The patient's History and Physical of February 8, 2022 was reviewed with the patient and I examined the patient. There was no change. The surgical site was confirmed by the patient and me. Plan: The risks, benefits, expected outcome, and alternative to the recommended procedure have been discussed with the patient. Patient understands and wants to proceed with the procedure.      Electronically signed by Beverley Mujica MD on 2/14/2022 at 12:47 PM

## 2022-02-14 NOTE — PROGRESS NOTES
Patient admitted to CVU 5 from PACU. Vital signs obtained BP (!) 174/55   Pulse 61   Temp 98 °F (36.7 °C) (Temporal)   Resp 18   Ht 5' (1.524 m)   Wt 109 lb (49.4 kg)   SpO2 100%   BMI 21.29 kg/m² . Left pedal and posterior pulses heard via doppler. Denies pain. Caregiver, Karley Spears, at bedside. Will continue to monitor.     Electronically signed by Nelson Wiseman RN on 2/14/2022 at 6:21 PM

## 2022-02-14 NOTE — PROGRESS NOTES
Patient transferred from OR to PACU, VSS and O2 sat maintained on 4L via simple mask. Left groin incision CDI with skin glue, LLE dressing CDI. Pedal pulse + with doppler. Patient moaning in pain, medicated per CRNA. Wagner patent. Continue to monitor.

## 2022-02-14 NOTE — PROGRESS NOTES
Patient remains confused. VSS and O2 sat maintained on 3L via NC. Patient able to fall asleep at times. Left groin and LLE dressing CDI. Wagner patent. Patient transferred to CVU in stable condition.

## 2022-02-14 NOTE — BRIEF OP NOTE
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 Item Serial No.  Lot No. LRB No. Used Action   PATCH VASC W0.8XL8CM PERIPH BOV PERICARD N PVC N DEHP [facility] - [id number] Vascular grafts PATCH VASC W0.8XL8CM PERIPH BOV PERICARD N PVC N DEHP [employer]  [employer]- [id number] Left 1 Implanted         Drains:   Urethral Catheter Straight-tip; Temperature probe 16 fr (Active)       Findings: as above    Electronically signed by Mitchel Whitfield MD on 2/14/2022 at 5:15 PM

## 2022-02-14 NOTE — ANESTHESIA PRE PROCEDURE
Department of Anesthesiology  Preprocedure Note       Name:  Letitia Sidhu   Age:  80 y.o.  :  1929                                          MRN:  [id number]         Date:  2022      Surgeon: Adry Prince):  Martin Bradley MD    Procedure: Procedure(s):  LEFT COMMON FEMORAL ARTERY ENDARTERECTOMY WITH LEFT LEG WOUND DEBRIDMENT    Medications prior to admission:   Prior to Admission medications    Medication Sig Start Date End Date Taking? Authorizing Provider   glimepiride (AMARYL) 1 MG tablet TAKE ONE TABLET BY MOUTH EVERY MORNING BEFORE BREAKFAST 2/3/22   Paulo Forman DO   furosemide (LASIX) 20 MG tablet Take 1 tablet by mouth daily 22   Akash Curry MD   blood glucose test strips (ONETOUCH ULTRA) strip USE TO TEST BLOOD SUGAR TWICE DAILY 21   Paulo Forman DO   lisinopril (PRINIVIL;ZESTRIL) 20 MG tablet TAKE ONE TABLET BY MOUTH DAILY 21   Paulo Forman DO   metFORMIN (GLUCOPHAGE) 500 MG tablet Take 1 tablet by mouth daily 21   Paulo Forman DO   clotrimazole-betamethasone (LOTRISONE) 1-0.05 % cream Apply topically 2 times daily for 2 weeks. 21   Paulo Forman DO   atorvastatin (LIPITOR) 10 MG tablet Take 1 tablet by mouth daily 21   Paulo Forman DO   Turmeric 450 MG CAPS Take 450 mg by mouth daily    Historical Provider, MD   meclizine (ANTIVERT) 12.5 MG tablet Take 1 tablet by mouth 3 times daily as needed for Dizziness (vertigo) 20   Paulo Forman DO   Cholecalciferol (VITAMIN D3) 2000 UNITS CAPS Take 1 capsule by mouth daily    Historical Provider, MD   aspirin EC 81 MG EC tablet Take 1 tablet by mouth daily 5/13/15   Paulo Forman DO   Lite Touch Lancets MISC by Does not apply route. Use twice daily when testing blood sugars.  13   Francesco Jaime MD       Current medications:    Current Facility-Administered Medications   Medication Dose Route Frequency Provider Last Rate Last Admin    0.9 % sodium chloride infusion  25 mL IntraVENous PRN Earnest Boone MD        ceFAZolin (ANCEF) 2000 mg in dextrose 5 % 50 mL IVPB  2,000 mg IntraVENous On Call to [address] Abhishek, MD        sodium chloride flush 0.9 % injection 5-40 mL  5-40 mL IntraVENous 2 times per day Earnest Boone MD        sodium chloride flush 0.9 % injection 5-40 mL  5-40 mL IntraVENous PRN Earnest Boone MD           Allergies:     Allergies   Allergen Reactions    Aricept [Donepezil Hydrochloride] Other (See Comments)     intolerant    Doxycycline Nausea Only    Ketorolac Tromethamine Other (See Comments)     Pt unable to recall reaction       Problem List:    Patient Active Problem List   Diagnosis Code    Urethral stricture N35.919    Spinal stenosis M48.00    Lumbar stenosis M48.061    Spondylolisthesis of lumbosacral region M43.17    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 ([address] E11.59    Atherosclerosis of artery of extremity with ulceration ([address] I70.299, L97.909    Benign essential HTN I10    Lumbar foraminal stenosis M48.061    DDD (degenerative disc disease), lumbar M51.36    Spinal stenosis of lumbar region with neurogenic claudication M48.062    Type 2 diabetes, controlled, with neuropathy ([address] E11.40    Severe mitral regurgitation I34.0    Venous insufficiency of both lower extremities I87.2    Hyperlipidemia E78.5    Hip arthritis M16.10    Status post carmen arthroplasty replacement of left hip 1999 Z96.642    Femoral artery stenosis, right ([address] I70.201    Osteoporosis M81.0    Osteopenia M85.80    Femoral-popliteal bypass graft occlusion, left (HCC) T82.898A    Pain due to onychomycosis of nail B35.1, M79.609    Coronary artery disease involving native coronary artery of native heart without angina pectoris I25.10    Diabetic peripheral neuropathy ([facility]) E11.42    Arthritis of left knee M17.12    Trigger middle finger of right hand M65.331    Right carpal tunnel syndrome G56.01    Vitamin D deficiency E55.9    Carotid stenosis, asymptomatic, bilateral I65.23    Fingernail problem L60.9    TIA (transient ischemic attack) G45.9    Sacral fracture, closed ([facility]) S32.10XA    Closed fracture of ramus of right pubis ([facility]) S32.591A    Left leg pain M79.605    Diabetes mellitus type 2 with peripheral artery disease ([facility]) E11.51    Open wound of left knee S81.002A       Past Medical History:        Diagnosis Date    Anxiety     Arthritis     At risk for falls     CAD (coronary artery disease)     CTS (carpal tunnel syndrome)     Bilateral, EMG-NCS    DDD (degenerative disc disease), cervical     Fractures     (L) Hip Fx 4/25/98, L1 fracture from fall 10-31-06    Hyperlipidemia     Hypertension     Mitral regurgitation     Neuropathy     Osteopenia     Osteoporosis     PAD (peripheral artery disease) ([facility])     Right LE ischemia    Peripheral neuropathy 6/1/2015    Peripheral vascular disease ([facility]     bilateral lower extremities with edema    Podagra 01/09/2017    Dr. May Brito Spinal stenosis     L4-5    Type 2 diabetes mellitus ([facility]     Urethral stricture 5 years ago    Urgency of urination        Past Surgical History:        Procedure Laterality Date    APPENDECTOMY      CARPAL TUNNEL RELEASE Right 3/29/2019    RIGHT CARPAL TUNNEL RELEASE AND RIGHT MIDDLE FINGER TRIGGER FINGER RELEASE performed by Carina Ching MD at [address]  [id number]    LEFT EYE EYE CATARACT PHACOEMULSIFICATION INTRAOCULAR LENS    CHOLECYSTECTOMY  1978    COLONOSCOPY  8/31/99    diverticulosis    EYE SURGERY Bilateral     bilateral cataract removed    HYSTERECTOMY  1980s    sallie    IR FEMORAL POPLITEAL BYPASS GRAFT Right 8-    KYPHOSIS SURGERY  11-7-06    L1, (fractured after a fall)    TONSILLECTOMY      TOTAL HIP ARTHROPLASTY  4/25/98    (L) THR    WRIST FRACTURE SURGERY  2008    left wrist       Social History:    Social History     Tobacco Use    Smoking status: Never Smoker    Smokeless tobacco: Never Used   Substance Use Topics    Alcohol use: No                                Counseling given: Not Answered      Vital Signs (Current):   Vitals:    02/10/22 0915 02/14/22 1230   BP:  (!) 175/42   Pulse:  55   Resp:  18   Temp:  96.4 °F (35.8 °C)   TempSrc:  Temporal   SpO2:  99%   Weight: 115 lb (52.2 kg) 109 lb (49.4 kg)   Height: 5' (1.524 m)                                               BP Readings from Last 3 Encounters:   02/14/22 (!) 175/42   02/08/22 (!) 167/68   02/04/22 (!) 142/72       NPO Status: Time of last liquid consumption: 0010                        Time of last solid consumption: 0010                        Date of last liquid consumption: 02/13/22                        Date of last solid food consumption: 02/13/22    BMI:   Wt Readings from Last 3 Encounters:   02/14/22 109 lb (49.4 kg)   02/08/22 114 lb (51.7 kg)   02/04/22 114 lb (51.7 kg)     Body mass index is 21.29 kg/m².     CBC:   Lab Results   Component Value Date    WBC 6.2 02/08/2022    RBC 3.85 02/08/2022    HGB 12.0 02/08/2022    HCT 36.1 02/08/2022    MCV 93.8 02/08/2022    RDW 13.8 02/08/2022     02/08/2022       CMP:   Lab Results   Component Value Date     02/08/2022    K 4.8 02/08/2022     02/08/2022    CO2 26 02/08/2022    BUN 20 02/08/2022    CREATININE 1.1 02/08/2022    GFRAA 56 02/08/2022    GFRAA >60 05/14/2013    AGRATIO 1.5 08/04/2021    LABGLOM 46 02/08/2022    GLUCOSE 124 02/08/2022    PROT 6.3 08/04/2021    PROT 6.4 07/26/2012    CALCIUM 9.6 02/08/2022    BILITOT 0.5 08/04/2021    ALKPHOS 58 08/04/2021    AST 22 08/04/2021    ALT 12 08/04/2021       POC Tests:   Recent Labs     02/14/22  1301   POCGLU 121*       Coags:   Lab Results   Component Value Date    PROTIME 11.7 08/04/2021    INR 1.03 08/04/2021    APTT 36.6 08/04/2021       HCG (If Applicable): No results found for: PREGTESTUR, PREGSERUM, HCG, HCGQUANT     ABGs: No results found for: PHART, PO2ART, DJQ3UYL, DDR1TFE, BEART, G9NHXENB Type & Screen (If Applicable):  No results found for: LABABO, LABRH    Drug/Infectious Status (If Applicable):  No results found for: HIV, HEPCAB    COVID-19 Screening (If Applicable):   Lab Results   Component Value Date    COVID19 Not Detected 02/10/2022           Anesthesia Evaluation  Patient summary reviewed and Nursing notes reviewed no history of anesthetic complications:   Airway: Mallampati: I  TM distance: >3 FB   Neck ROM: full  Mouth opening: > = 3 FB Dental: normal exam   (+) lower dentures      Pulmonary:       (-) asthma and shortness of breath                           Cardiovascular:    (+) hypertension:, valvular problems/murmurs: MR, CAD:, hyperlipidemia    (-)  angina          Echocardiogram reviewed                  Neuro/Psych:   (+) TIA,    (-) CVA           GI/Hepatic/Renal:        (-) GERD and liver disease       Endo/Other:    (+) DiabetesType II DM, , : arthritis: OA., .    (-) hypothyroidism               Abdominal:             Vascular:   + PVD, aortic or cerebral, . Other Findings:           Anesthesia Plan      general     ASA 3       Induction: intravenous. MIPS: Postoperative opioids intended and Prophylactic antiemetics administered. Anesthetic plan and risks discussed with patient. Use of blood products discussed with patient whom. Plan discussed with CRNA.                   Shagufta Mckeon MD   2/14/2022

## 2022-02-15 ENCOUNTER — APPOINTMENT (OUTPATIENT)
Dept: GENERAL RADIOLOGY | Age: 87
DRG: 253 | End: 2022-02-15
Attending: SURGERY
Payer: MEDICARE

## 2022-02-15 LAB
A/G RATIO: 1.8 (ref 1.1–2.2)
ALBUMIN SERPL-MCNC: 3.5 G/DL (ref 3.4–5)
ALP BLD-CCNC: 47 U/L (ref 40–129)
ALT SERPL-CCNC: 7 U/L (ref 10–40)
ANION GAP SERPL CALCULATED.3IONS-SCNC: 10 MMOL/L (ref 3–16)
ANION GAP SERPL CALCULATED.3IONS-SCNC: 10 MMOL/L (ref 3–16)
AST SERPL-CCNC: 13 U/L (ref 15–37)
BILIRUB SERPL-MCNC: 0.4 MG/DL (ref 0–1)
BUN BLDV-MCNC: 18 MG/DL (ref 7–20)
BUN BLDV-MCNC: 19 MG/DL (ref 7–20)
CALCIUM SERPL-MCNC: 8.6 MG/DL (ref 8.3–10.6)
CALCIUM SERPL-MCNC: 8.7 MG/DL (ref 8.3–10.6)
CHLORIDE BLD-SCNC: 107 MMOL/L (ref 99–110)
CHLORIDE BLD-SCNC: 109 MMOL/L (ref 99–110)
CO2: 21 MMOL/L (ref 21–32)
CO2: 21 MMOL/L (ref 21–32)
CREAT SERPL-MCNC: 0.9 MG/DL (ref 0.6–1.2)
CREAT SERPL-MCNC: 0.9 MG/DL (ref 0.6–1.2)
GFR AFRICAN AMERICAN: >60
GFR AFRICAN AMERICAN: >60
GFR NON-AFRICAN AMERICAN: 58
GFR NON-AFRICAN AMERICAN: 58
GLUCOSE BLD-MCNC: 204 MG/DL (ref 70–99)
GLUCOSE BLD-MCNC: 207 MG/DL (ref 70–99)
GLUCOSE BLD-MCNC: 220 MG/DL (ref 70–99)
GLUCOSE BLD-MCNC: 221 MG/DL (ref 70–99)
GLUCOSE BLD-MCNC: 235 MG/DL (ref 70–99)
GLUCOSE BLD-MCNC: 242 MG/DL (ref 70–99)
HCT VFR BLD CALC: 24.1 % (ref 36–48)
HCT VFR BLD CALC: 24.4 % (ref 36–48)
HEMOGLOBIN: 7.9 G/DL (ref 12–16)
HEMOGLOBIN: 7.9 G/DL (ref 12–16)
MAGNESIUM: 1.5 MG/DL (ref 1.8–2.4)
MCH RBC QN AUTO: 30.6 PG (ref 26–34)
MCH RBC QN AUTO: 30.8 PG (ref 26–34)
MCHC RBC AUTO-ENTMCNC: 32.3 G/DL (ref 31–36)
MCHC RBC AUTO-ENTMCNC: 32.7 G/DL (ref 31–36)
MCV RBC AUTO: 94.2 FL (ref 80–100)
MCV RBC AUTO: 94.9 FL (ref 80–100)
PDW BLD-RTO: 13.6 % (ref 12.4–15.4)
PDW BLD-RTO: 14 % (ref 12.4–15.4)
PERFORMED ON: ABNORMAL
PLATELET # BLD: 173 K/UL (ref 135–450)
PLATELET # BLD: 183 K/UL (ref 135–450)
PMV BLD AUTO: 7.8 FL (ref 5–10.5)
PMV BLD AUTO: 8 FL (ref 5–10.5)
POTASSIUM SERPL-SCNC: 5.8 MMOL/L (ref 3.5–5.1)
POTASSIUM SERPL-SCNC: 5.8 MMOL/L (ref 3.5–5.1)
RBC # BLD: 2.55 M/UL (ref 4–5.2)
RBC # BLD: 2.57 M/UL (ref 4–5.2)
SODIUM BLD-SCNC: 138 MMOL/L (ref 136–145)
SODIUM BLD-SCNC: 140 MMOL/L (ref 136–145)
TOTAL PROTEIN: 5.4 G/DL (ref 6.4–8.2)
WBC # BLD: 11.3 K/UL (ref 4–11)
WBC # BLD: 11.6 K/UL (ref 4–11)

## 2022-02-15 PROCEDURE — 6370000000 HC RX 637 (ALT 250 FOR IP): Performed by: SURGERY

## 2022-02-15 PROCEDURE — 2580000003 HC RX 258: Performed by: SURGERY

## 2022-02-15 PROCEDURE — 99024 POSTOP FOLLOW-UP VISIT: CPT | Performed by: SURGERY

## 2022-02-15 PROCEDURE — APPNB30 APP NON BILLABLE TIME 0-30 MINS: Performed by: NURSE PRACTITIONER

## 2022-02-15 PROCEDURE — 6370000000 HC RX 637 (ALT 250 FOR IP): Performed by: NURSE PRACTITIONER

## 2022-02-15 PROCEDURE — 2000000000 HC ICU R&B

## 2022-02-15 PROCEDURE — 80061 LIPID PANEL: CPT

## 2022-02-15 PROCEDURE — APPSS30 APP SPLIT SHARED TIME 16-30 MINUTES: Performed by: NURSE PRACTITIONER

## 2022-02-15 PROCEDURE — 80053 COMPREHEN METABOLIC PANEL: CPT

## 2022-02-15 PROCEDURE — 85027 COMPLETE CBC AUTOMATED: CPT

## 2022-02-15 PROCEDURE — 83735 ASSAY OF MAGNESIUM: CPT

## 2022-02-15 PROCEDURE — 71045 X-RAY EXAM CHEST 1 VIEW: CPT

## 2022-02-15 PROCEDURE — 36415 COLL VENOUS BLD VENIPUNCTURE: CPT

## 2022-02-15 RX ORDER — ACETAMINOPHEN 325 MG/1
650 TABLET ORAL EVERY 4 HOURS PRN
Status: DISCONTINUED | OUTPATIENT
Start: 2022-02-15 | End: 2022-02-16 | Stop reason: HOSPADM

## 2022-02-15 RX ORDER — SENNA AND DOCUSATE SODIUM 50; 8.6 MG/1; MG/1
1 TABLET, FILM COATED ORAL DAILY PRN
Status: DISCONTINUED | OUTPATIENT
Start: 2022-02-15 | End: 2022-02-16 | Stop reason: HOSPADM

## 2022-02-15 RX ADMIN — OXYCODONE 5 MG: 5 TABLET ORAL at 05:46

## 2022-02-15 RX ADMIN — FUROSEMIDE 20 MG: 20 TABLET ORAL at 08:18

## 2022-02-15 RX ADMIN — SODIUM CHLORIDE, PRESERVATIVE FREE 10 ML: 5 INJECTION INTRAVENOUS at 20:32

## 2022-02-15 RX ADMIN — ATORVASTATIN CALCIUM 10 MG: 10 TABLET, FILM COATED ORAL at 20:32

## 2022-02-15 RX ADMIN — METFORMIN HYDROCHLORIDE 500 MG: 500 TABLET ORAL at 08:17

## 2022-02-15 RX ADMIN — ASPIRIN 81 MG: 81 TABLET, COATED ORAL at 08:17

## 2022-02-15 RX ADMIN — LISINOPRIL 20 MG: 20 TABLET ORAL at 12:00

## 2022-02-15 RX ADMIN — ACETAMINOPHEN 650 MG: 325 TABLET ORAL at 20:56

## 2022-02-15 RX ADMIN — GLIPIZIDE 2.5 MG: 5 TABLET ORAL at 08:16

## 2022-02-15 ASSESSMENT — PAIN SCALES - GENERAL
PAINLEVEL_OUTOF10: 0
PAINLEVEL_OUTOF10: 8
PAINLEVEL_OUTOF10: 7
PAINLEVEL_OUTOF10: 7

## 2022-02-15 NOTE — CARE COORDINATION
Discharge Planning Assessment                Readmission risk score 17%    RN discharge planner met with patient/ (and family member) to discuss reason for admission, current living situation, and potential needs at the time of discharge    Demographics/Insurance verified Yes    Current type of dwellin level home    Patient from ECF/SW confirmed with:n/a    Living arrangements:lives with son    Level of function/Support: manages ADL with assistance of friends    PCP:Dasia    Last Visit to PCP: in past 2 weeks    DME: wheelchair x 2, walker, stair lift    Active with any community resources/agencies/skilled home care:no skilled services, patient privately pays several friends to assist her as needed  Patient goes to the wound care clinic at [facility] for leg wound management    Medication compliance issues:no concerns, uses the [employer] issues that could impact healthcare:not identified      Tentative discharge plan: home    Discussed and provided facilities of choice if transition to a skilled nursing facility is required at the time of discharge      Discussed with patient and/or family that on the day of discharge home tentative time of discharge will be between 10 AM and noon.     Transportation at the time of discharge: friend    [employer], BSN, CCM, RN  [facility]  [phone]

## 2022-02-15 NOTE — OP NOTE
[facility]                     [address] [facility], [address] [facility]                                OPERATIVE REPORT    PATIENT NAME: Ryland Doss                       :        1929  MED REC NO:   [id number]                          ROOM:       9385  ACCOUNT NO:   [de-identified]                           ADMIT DATE: 2022  PROVIDER:     Steff Mo MD    DATE OF PROCEDURE:  2022    PREOPERATIVE DIAGNOSES:  Nonhealing left shin wound with left common  femoral artery near occlusion and left popliteal artery occlusion. POSTOPERATIVE DIAGNOSES:  Nonhealing left shin wound with left common  femoral artery near occlusion and left popliteal artery occlusion. OPERATION PERFORMED:  1. Left common femoral endarterectomy with bovine pericardial patch  angioplasty. 2.  Partial thickness skin debridement, left shin wound (2.5 x 1.5 cm in  size). SURGEON:  Steff Mo MD    ANESTHESIA:  General endotracheal.    ESTIMATED BLOOD LOSS:  100 mL. HISTORY:  The patient is a 27-year-old lady who has had nearly six  months of a nonhealing wound on her left shin. She was evaluated  several months ago and it was recommended that she consider angiography  for better evaluation. She eventually agreed to this when the wound  failed to heal.  Angiography demonstrated a severe near occlusive  stenosis of her left common femoral artery with occlusion of her left  above-knee popliteal artery and large collateralization filling the  distal vessels. It was felt that endarterectomy might restore enough  flow to avoid the need for a more significant bypasses. This was  recommended to the patient and she agreed understanding the risks,  benefits, and other options. TECHNIQUE:  The patient was brought to the operating room and placed on  the operating table in the supine position.   After adequate induction of  anesthesia, the left groin was prepped and draped in a sterile fashion. An oblique incision was made in the left groin crease overlying the  femoral triangle. Subcutaneous tissue was divided using electrocautery. Crossing veins were divided between clips. The femoral triangle was  opened longitudinally, and the vessel was noted to be inflamed and very  hard. It was carefully dissected up the inguinal ligament where the  origin of the common femoral artery was noted to be concealed. At the  level of the inferior epigastric and lateral circumflex iliac vessels,  the artery became soft and it was dissected up underneath the inguinal  ligament. The crossing vein was divided between 2-0 silk ligatures and  control was gained on the soft, undiseased external iliac artery. The  vessels were placed around the inferior epigastric artery and the  circumflex iliac artery. Dissection down to the deep femoral artery and  superficial femoral artery was performed with control on both these  vessels which were noted to be soft. At this point, the patient was  given 3000 units of heparin. After adequate circulation time, clamps  were applied to the distal external iliac artery with deep femoral  artery and the superficial femoral artery and vessel loops were pulled  tight. A longitudinal arteriotomy was made in the common femoral artery  crossing the severe calcific near occlusive stenosis into the undiseased  proximal common femoral artery. The arteriotomy was extended 0.5 cm  into the superficial femoral artery. Endarterectomy was begun in the  common femoral artery and extended proximally where a good tapering  endpoint was developed distally. The endarterectomy was continued in  the superficial femoral artery where a tapering endpoint was developed  without difficulty and an eversion endarterectomy on the deep femoral  artery was performed without difficulty. Specimen was passed off the  field.   Pieces of residual plaque immediately were removed under direct  vision. A bovine pericardial patch was brought on to the field. It was  cut to an appropriate length and shape, and anchored proximally and  distally with 6-0 Prolenes. These were then run towards each other to  completely repair the arteriotomy in its length. Prior to completion of  this, the arteries were forward and back bled and flushed with  heparinized saline. The repair was completed and flow was restored. There was noted to be an excellent pulse downstream.  Several small  bleeding points were repaired with 6-0 Prolenes. After hemostasis was  obtained, the groin incision was closed in three separate layers of 3-0  Vicryl deep and the subcuticular stitch of 4-0 Monocryl with Dermabond. Attention was then directed to the left shin wound which measured 2.5 x  1.5 cm in size. A scalpel and then curette was used to remove fibrinous  peel from the wound and deeper tissue was noted to be healthy with good  bleeding. Hemostasis was obtained with pressure and the wound was  packed with saline soaked gauze. Clean sterile dry dressing was  applied, and the patient was extubated and transferred to the recovery  room in stable condition having tolerated the procedure well.         Mel Hull MD    D: 02/14/2022 17:44:58       T: 02/15/2022 1:29:44     [id number]  Job#: [id number]     Doc#: [id number]    CC:

## 2022-02-15 NOTE — PROGRESS NOTES
Vascular Progress Note    2/15/2022 9:45 AM    Chief complaint / Reason for visit : s/p left CFA endarterectomy and left leg wound debridement - POD # 1    Subjective:  Patient resting in bed. She is complaining of pain in her left leg. She is requesting tylenol. She also reports she has laryngitis due to being cold and did not sleep last night. She is refusing insulin. VSS, afebrile.      Vital Signs: BP (!) 157/41   Pulse 70   Temp 97 °F (36.1 °C) (Temporal)   Resp 17   Ht 5' (1.524 m)   Wt 109 lb (49.4 kg)   SpO2 99%   BMI 21.29 kg/m²  O2 Flow Rate (L/min): 1 L/min   I/O:      Intake/Output Summary (Last 24 hours) at 2/15/2022 0945  Last data filed at 2/15/2022 0500  Gross per 24 hour   Intake 1293.41 ml   Output 755 ml   Net 538.41 ml       Physical Exam:   General: no apparent distress, appears stated age  Chest/Lungs: clear to auscultation bilaterally, no accessory muscle use  Cardiac:  regular rate and rhythm, S1, S2 normal, no murmur, click, rub or gallop  Abdomen:  abdomen is soft without significant tenderness, masses, organomegaly or guarding  Vascular: palpable left femoral pulse, + left DP/PT doppler signal   Extremities: left lower leg dressing intact with strike through drainage   Skin: left groin incision intact with no drainage, erythema or hematoma     Labs:   Lab Results   Component Value Date     02/15/2022    K 5.8 02/15/2022    K 4.8 02/08/2022     02/15/2022    CO2 21 02/15/2022    BUN 18 02/15/2022    CREATININE 0.9 02/15/2022    GFRAA >60 02/15/2022    GFRAA >60 05/14/2013    LABGLOM 58 02/15/2022    GLUCOSE 242 02/15/2022    PHOS 3.4 04/03/2015    MG 1.50 02/15/2022    CALCIUM 8.6 02/15/2022     Lab Results   Component Value Date    WBC 11.3 02/15/2022    RBC 2.55 02/15/2022    HGB 7.9 02/15/2022    HCT 24.1 02/15/2022    MCV 94.2 02/15/2022    RDW 13.6 02/15/2022     02/15/2022     Lab Results   Component Value Date    INR 1.03 08/04/2021    PROTIME 11.7 08/04/2021          Scheduled Meds:    aspirin EC  81 mg Oral Daily    atorvastatin  10 mg Oral Nightly    furosemide  20 mg Oral Daily    glipiZIDE  2.5 mg Oral Daily with breakfast    lisinopril  20 mg Oral Daily    metFORMIN  500 mg Oral Daily    sodium chloride flush  5-40 mL IntraVENous 2 times per day    insulin lispro  0-12 Units SubCUTAneous TID WC    insulin lispro  0-6 Units SubCUTAneous Nightly     Continuous Infusions:    dextrose      sodium chloride           Assessment:   S/p left CFA endarterectomy and left leg wound debridement - POD # 1  DM type 2 - last A1c 6.8, home medications resumed, patient refusing insulin  HTN, controlled - home medications resumed  HLD - on statin     Plan:  D/c IVFs  D/c espinoza catheter  Increase activity - PT/OT ordered   Tolerating diet   Tylenol PRN for pain  Home medications resumed  Continue ASA and statin therapy  Dressing changed by MD - continue with saline W/D dressing changes BID to left leg wound  CXR ordered  Discussed importance of good glucose control, patient continues to refuse insulin   Possibly discharge home tomorrow     D/w patient, nursing and Dr. Orestes Choudhary      Patient educated on plan of care and disease process. All questions answered. Electronically signed by ROGELIO Ledbetter CNP on 2/15/2022 at 9:45 AM     VASCULAR STAFF  As above,  Improved perfusion. Begin saline wet to dry L shin wound. Up walking. DC espinoza and IVFs. Fe supplement.'  Encouraged pt to follow our instructions.     Waldemar Tarango

## 2022-02-15 NOTE — PROGRESS NOTES
POD1 LCFA endarterectomy w bovine pericardial patch angioplasty, L shin wound debridement. Left DP,L PT doppler. k 5.8 h/hct 7.9/24.1  Refused ins coverage and oxycodone. Wants tylenol for pain prn.

## 2022-02-16 VITALS
OXYGEN SATURATION: 94 % | TEMPERATURE: 98.3 F | DIASTOLIC BLOOD PRESSURE: 50 MMHG | HEIGHT: 60 IN | SYSTOLIC BLOOD PRESSURE: 139 MMHG | WEIGHT: 107.36 LBS | RESPIRATION RATE: 16 BRPM | HEART RATE: 64 BPM | BODY MASS INDEX: 21.08 KG/M2

## 2022-02-16 LAB
CHOLESTEROL, TOTAL: 110 MG/DL (ref 0–199)
GLUCOSE BLD-MCNC: 173 MG/DL (ref 70–99)
HDLC SERPL-MCNC: 55 MG/DL (ref 40–60)
LDL CHOLESTEROL CALCULATED: 43 MG/DL
PERFORMED ON: ABNORMAL
TRIGL SERPL-MCNC: 60 MG/DL (ref 0–150)
VLDLC SERPL CALC-MCNC: 12 MG/DL

## 2022-02-16 PROCEDURE — APPNB30 APP NON BILLABLE TIME 0-30 MINS: Performed by: NURSE PRACTITIONER

## 2022-02-16 PROCEDURE — 99024 POSTOP FOLLOW-UP VISIT: CPT | Performed by: SURGERY

## 2022-02-16 PROCEDURE — 6370000000 HC RX 637 (ALT 250 FOR IP): Performed by: SURGERY

## 2022-02-16 PROCEDURE — APPSS30 APP SPLIT SHARED TIME 16-30 MINUTES: Performed by: NURSE PRACTITIONER

## 2022-02-16 PROCEDURE — 2580000003 HC RX 258: Performed by: SURGERY

## 2022-02-16 RX ORDER — LANOLIN ALCOHOL/MO/W.PET/CERES
325 CREAM (GRAM) TOPICAL 2 TIMES DAILY
Qty: 90 TABLET | Refills: 0 | Status: SHIPPED | OUTPATIENT
Start: 2022-02-16 | End: 2022-04-26 | Stop reason: ALTCHOICE

## 2022-02-16 RX ADMIN — ASPIRIN 81 MG: 81 TABLET, COATED ORAL at 08:46

## 2022-02-16 RX ADMIN — SODIUM CHLORIDE, PRESERVATIVE FREE 10 ML: 5 INJECTION INTRAVENOUS at 08:48

## 2022-02-16 RX ADMIN — GLIPIZIDE 2.5 MG: 5 TABLET ORAL at 08:46

## 2022-02-16 RX ADMIN — LISINOPRIL 20 MG: 20 TABLET ORAL at 08:46

## 2022-02-16 RX ADMIN — FUROSEMIDE 20 MG: 20 TABLET ORAL at 08:46

## 2022-02-16 RX ADMIN — METFORMIN HYDROCHLORIDE 500 MG: 500 TABLET ORAL at 08:47

## 2022-02-16 ASSESSMENT — PAIN SCALES - GENERAL: PAINLEVEL_OUTOF10: 0

## 2022-02-16 NOTE — PROGRESS NOTES
Physician Progress Note      Wilmer Colvin  CSN #:                  [id number]  :                       1929  ADMIT DATE:       2022 12:12 PM  [address] [place] DATE:  RESPONDING  PROVIDER #:        Cornelius Albright MD          QUERY TEXT:    Patient admitted with Severe L CFA Occlusion with Nonhealing Wound. Per Op   note dated 22 documentation of debridement. To accurately reflect the   procedure performed please document if debridement was excisional or   nonexcisional and the deepest depth of tissue removed as down to and   including: The medical record reflects the following:  Risk Factors: Severe L CFA Occlusion w/Nonhealing Lt. Dupont/Knee Wound, s/p Lt. CFA Endarterectomy w/ Wound Debridement, DM2 w/Peripheral Angiopathy, HLD,   CAD, HTN  Clinical Indicators: Per OP note 22 \"Attention was then directed to the   left shin wound which measured 2.5 x 1.5 cm in size. A scalpel and then   curette was used to remove fibrinous peel from the wound and deeper tissue was   noted to be healthy with good bleeding. Hemostasis was obtained with pressure   and the wound was packed with saline soaked gauze. Clean sterile dry dressing   was applied\"  Treatment: Wound Debridement  Options provided:  -- Nonexcisional debridement of skin  -- Excisional debridement of skin  -- Nonexcisional debridement of subcutaneous tissue  -- Excisional debridement of subcutaneous tissue  -- Nonexcisional debridement of fascia  -- Excisional debridement of fascia  -- Other - I will add my own diagnosis  -- Disagree - Not applicable / Not valid  -- Disagree - Clinically unable to determine / Unknown  -- Refer to Clinical Documentation Reviewer    PROVIDER RESPONSE TEXT:    Excisional debridement of subcutaneous tissue of Left Shin/Knee during   procedure on 22.     Query created by: Lucilla Goltz on 2/15/2022 6:59 AM      Electronically signed by:  Cornelius Albright MD 2022 6:58 AM

## 2022-02-16 NOTE — PROGRESS NOTES
VASCULAR  POD#2    Very uncomfortable - not incisional but more muscle and bone complaints. VSS afeb  Incision intact without hematoma or erythema  Excellent femoral pulse  L foot well perfused with strong biphasic PT doppler signal  Shin wound with early granulation    A/P: S/P L femoral endarterectomy - improved perfusion   DC home today - f/u 2 weeks   [facility]      Yang Garcia

## 2022-02-16 NOTE — DISCHARGE SUMMARY
DISCHARGE SUMMARY      Patient ID:  Andi Rosen  [id number] 28 y.o. 4/4/1929      Admission Date:  2/14/2022 12:12 PM  Discharge Date:  2/16/2022    Principle Diagnosis:  Nonhealing left shin wound with left common femoral artery near occlusion and left popliteal artery occlusion    Secondary Diagnosis:  Active Problems:    Atherosclerosis of artery of extremity with ulceration (HCC)    Nonhealing nonsurgical wound limited to breakdown of skin  Resolved Problems:    * No resolved hospital problems.  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 ([address]    Atherosclerosis of artery of extremity with ulceration (HCC)    Benign essential HTN    Lumbar foraminal stenosis    DDD (degenerative disc disease), lumbar    Spinal stenosis of lumbar region with neurogenic claudication    Type 2 diabetes, controlled, with neuropathy (HCC)    Severe mitral regurgitation    Venous insufficiency of both lower extremities    Hyperlipidemia    Hip arthritis    Status post carmen arthroplasty replacement of left hip 1999    Femoral artery stenosis, right ([address]    Osteoporosis    Osteopenia    Femoral-popliteal bypass graft occlusion, left (HCC)    Pain due to onychomycosis of nail    Coronary artery disease involving native coronary artery of native heart without angina pectoris    Diabetic peripheral neuropathy (HCC)    Arthritis of left knee    Trigger middle finger of right hand    Right carpal tunnel syndrome    Vitamin D deficiency    Carotid stenosis, asymptomatic, bilateral    Fingernail problem    TIA (transient ischemic attack)    Sacral fracture, closed ([address]    Closed fracture of ramus of right pubis ([address]    Left leg pain    Diabetes mellitus type 2 with peripheral artery disease ([address]    Open wound of left knee    Nonhealing nonsurgical wound limited to breakdown of skin        Consults:  None    Procedure:   LEFT COMMON FEMORAL ARTERY ENDARTERECTOMY  LEFT LEG WOUND DEBRIDEMENT     History:  The patient is a 80 y.o. female with history of HTN, HLD, DM, PAD, CAD and arthritis. Hospital Course: The patient underwent left common femoral endarterectomy and left leg wound debridement on 2/14/2022 12:12 PM.  Their post-operative course was uncomplicated. Pain was controlled with combination of oral and IV medications. They were able to ambulate without difficulty and tolerate a regular diet. Patient was continued on ASA and statin therapy. The patient was discharged on 2/16/2022. Disposition:  home in stable condition    Discharge medications:     Medication List      START taking these medications    ferrous sulfate 325 (65 Fe) MG EC tablet  Commonly known as: Fe Tabs  Take 1 tablet by mouth 2 times daily  Notes to patient: Use: treats anemia  Side effects: belly pain, upset stomach, dark stools, constipation        CONTINUE taking these medications    aspirin EC 81 MG EC tablet  Take 1 tablet by mouth daily  Notes to patient: Use: prevents heart attacks and strokes. Side effects: bleeding or bruising more easily, stomach upset. atorvastatin 10 MG tablet  Commonly known as: Lipitor  Take 1 tablet by mouth daily  Notes to patient: Use:  Cholesterol reduction  Side effects:  Muscle pain or soreness, stomach and intestinal upset     clotrimazole-betamethasone 1-0.05 % cream  Commonly known as: Lotrisone  Apply topically 2 times daily for 2 weeks. furosemide 20 MG tablet  Commonly known as: LASIX  Take 1 tablet by mouth daily  Notes to patient: Use: treat heart failure, fluid retention, lower blood pressure.        Side effects: frequent urination, weakness, muscle cramps, increased sensitivity to light, nausea, and dizziness     glimepiride 1 MG tablet  Commonly known as: AMARYL  TAKE ONE TABLET BY MOUTH EVERY MORNING BEFORE BREAKFAST  Notes to patient: Use: treats diabetes or high blood sugar  Side effects: upset stomach, low blood sugar     lisinopril 20 MG tablet  Commonly known as: PRINIVIL;ZESTRIL  TAKE ONE TABLET BY MOUTH DAILY  Notes to patient: Use:  Lowers blood pressure and takes workload off heart  Side Effects: Dry cough, dizziness, drowsiness, sensitivity to the sun     Lite Touch Lancets Misc  by Does not apply route. Use twice daily when testing blood sugars. meclizine 12.5 MG tablet  Commonly known as: ANTIVERT  Take 1 tablet by mouth 3 times daily as needed for Dizziness (vertigo)  Notes to patient: Use: Prevention and treatment of motion sickness and dizziness  Side effects: Dry mouth, fatigue, headache, vomiting     metFORMIN 500 MG tablet  Commonly known as: GLUCOPHAGE  Take 1 tablet by mouth daily  Notes to patient: Use: treats diabetes or high blood sugar  Side effects: upset stomach, low blood sugar     OneTouch Ultra strip  Generic drug: blood glucose test strips  USE TO TEST BLOOD SUGAR TWICE DAILY     Turmeric 450 MG Caps  Notes to patient: Use: Dietary supplement  Side effects: Nausea     Vitamin D3 50 MCG (2000 UT) Caps  Notes to patient: Use: Dietary supplement  Side effects: Nausea           Where to Get Your Medications      These medications were sent to [facility] [address] [facility] [phone]  [facility] [place], [place] [address]    Phone: [phone]   · ferrous sulfate 325 (65 Fe) MG EC tablet         Patient Instructions: Activity:  No heavy lifting, driving, or strenuous exercise for 2 weeks. Ambulate as tolerated. Elevate left foot at all times. Diet:  diabetic diet  Wound Care:  Keep groin incision clean and dry. Can use soap and water to clean, keep open to air. Okay to shower. No tub baths, swimming or hot tubs until wounds healed. Continue L shin wound care per Dr. Yang [facility]berg at [facility], [facility]. [facility]. Follow up with Dr. Vickie Morgan in 2 weeks. Please call the office at [phone] to make an appointment. [facility] [place] in 1 week.         Signed:  AshishROGELIO Morales CNP, 2/16/2022, 9:35 AM

## 2022-02-16 NOTE — PROGRESS NOTES
Discharge instructions reviewed with patient and caregiver. Patient and caregiver verbalized understanding. All home medications have been reviewed, questions answered and patient voiced understanding. All medication side effects reviewed and patient and family verbalized understanding. Follow up appointment(s) reviewed with patient and all attempts made to schedule within 7-10 days of discharge. Patient given prescriptions, discharge instructions, and appointment times. Patient discharged to home with caregiver via private car. Taken to lobby via wheelchair.

## 2022-02-17 ENCOUNTER — CARE COORDINATION (OUTPATIENT)
Dept: CASE MANAGEMENT | Age: 87
End: 2022-02-17

## 2022-02-17 DIAGNOSIS — T14.8XXA NONHEALING NONSURGICAL WOUND LIMITED TO BREAKDOWN OF SKIN: Primary | ICD-10-CM

## 2022-02-17 PROCEDURE — 1111F DSCHRG MED/CURRENT MED MERGE: CPT | Performed by: INTERNAL MEDICINE

## 2022-02-17 NOTE — CARE COORDINATION
[facility] Transitions Initial Follow Up Call    Call within 2 business days of discharge: Yes    Patient: Komal King Patient : 1929   MRN: [id number]  Reason for Admission:   Discharge Date: 22 RARS: Readmission Risk Score: 16.8 ( )      Last Discharge [facility]       Complaint Diagnosis Description Type Department Provider    22   Admission (Discharged) Juana Arroyo MD             Non-face-to-face services provided:  Obtained and reviewed discharge summary and/or continuity of care documents  [id number] completed     Transitions of Care Initial Call    Was this an external facility discharge? No Discharge Facility:     Challenges to be reviewed by the provider   Additional needs identified to be addressed with provider: No  none             Method of communication with provider : none      Advance Care Planning:   Does patient have an Advance Directive: active and reviewed    Was this a readmission? No  Patient stated reason for admission:   Patients top risk factors for readmission: medical condition-s/p surgery, wound    Care Transition Nurse (CTN) contacted the patient by telephone to perform post hospital discharge assessment. Verified name and  with patient as identifiers. Provided introduction to self, and explanation of the CTN role. CTN reviewed discharge instructions, medical action plan and red flags with patient who verbalized understanding. Patient given an opportunity to ask questions and does not have any further questions or concerns at this time. Were discharge instructions available to patient? Yes. Reviewed appropriate site of care based on symptoms and resources available to patient including: When to call 911. The patient agrees to contact the PCP office for questions related to their healthcare. Medication reconciliation was performed with patient, who verbalizes understanding of administration of home medications.  Advised obtaining a 90-day supply of all daily and as-needed medications. Reviewed and educated patient on any new and changed medications related to discharge diagnosis. Was patient discharged with a pulse oximeter? No Discussed and confirmed pulse oximeter discharge instructions and when to notify provider or seek emergency care. Pt states doing well, no issues or concerns. Incision is CDI. Has some pain. Paying for 24 hour caregivers for a few weeks. F/U appts listed below. Agreed to more CTC f/u calls. CTN provided contact information. Plan for follow-up call in 5-7 days based on severity of symptoms and risk factors.   Plan for next call: self management-surgery, wound, HC, f/u appts    Care Transitions 24 Hour Call    Do you have any ongoing symptoms?: No  Do you have a copy of your discharge instructions?: Yes  Do you have all of your prescriptions and are they filled?: Yes  Have you been contacted by a [address]?: No  Have you scheduled your follow up appointment?: Yes  How are you going to get to your appointment?: Car - family or friend to transport  Were you discharged with any Home Care or Post Acute Services: Yes  Post Acute Services: [address] (Comment: private pay caregivers)  Do you feel like you have everything you need to keep you well at home?: Yes  Care Transitions Interventions  No Identified Needs         Follow Up  Future Appointments   Date Time Provider Gilbert Frey   3/2/2022 10:30 AM Duke Reyna MD [facility]   3/11/2022 10:30 AM Larisa Taylor  N [facility]   4/4/2022 10:00 AM Dev Kowalski MD [facility]       Lynnette Martinez, JOLENE

## 2022-02-17 NOTE — PROGRESS NOTES
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 Pt noted   to have low h/h post Left CFA Endarterectomy and Left Leg Excisional Wound   Debridement on 22. If possible, please document in the progress notes   and discharge summary if you are evaluating and/or treating any of the   following: The medical record reflects the following:  Risk Factors: Age, s/p Lt. CFA Endarterectomy w/ Lt. Leg Dupont Excisional   Debridement, DM2, HTN, HLD  Clinical Indicators: Labs on Preop 22 H/H 12/36.1  Postop 2/15/22 H/H   7.9/24.4  Treatment: ivf NS, po Lasix, monitor labs  Options provided:  -- Acute blood loss anemia  -- Postoperative acute blood loss anemia  -- Other - I will add my own diagnosis  -- Disagree - Not applicable / Not valid  -- Disagree - Clinically unable to determine / Unknown  -- Refer to Clinical Documentation Reviewer    PROVIDER RESPONSE TEXT:    This patient has acute blood loss anemia.     Query created by: Paty Moreno on 2022 7:28 AM      Electronically signed by:  Mario Barthel MD 2022 8:41 AM

## 2022-02-21 ENCOUNTER — HOSPITAL ENCOUNTER (OUTPATIENT)
Dept: WOUND CARE | Age: 87
Discharge: HOME | End: 2022-02-21
Payer: MEDICARE

## 2022-02-21 VITALS
DIASTOLIC BLOOD PRESSURE: 55 MMHG | SYSTOLIC BLOOD PRESSURE: 167 MMHG | TEMPERATURE: 97.3 F | HEART RATE: 73 BPM | RESPIRATION RATE: 16 BRPM

## 2022-02-21 DIAGNOSIS — S81.802A WOUND OF LEFT LOWER EXTREMITY, INITIAL ENCOUNTER: ICD-10-CM

## 2022-02-21 DIAGNOSIS — I73.9 PERIPHERAL ARTERY DISEASE (HCC): ICD-10-CM

## 2022-02-21 PROCEDURE — 11042 DBRDMT SUBQ TIS 1ST 20SQCM/<: CPT | Performed by: SPECIALIST

## 2022-02-21 PROCEDURE — 99212 OFFICE O/P EST SF 10 MIN: CPT | Performed by: SPECIALIST

## 2022-02-21 PROCEDURE — 11042 DBRDMT SUBQ TIS 1ST 20SQCM/<: CPT

## 2022-02-21 PROCEDURE — 99213 OFFICE O/P EST LOW 20 MIN: CPT

## 2022-02-21 RX ORDER — CLOBETASOL PROPIONATE 0.5 MG/G
OINTMENT TOPICAL ONCE
Status: CANCELLED | OUTPATIENT
Start: 2022-02-21 | End: 2022-02-21

## 2022-02-21 RX ORDER — GINSENG 100 MG
CAPSULE ORAL ONCE
Status: CANCELLED | OUTPATIENT
Start: 2022-02-21 | End: 2022-02-21

## 2022-02-21 RX ORDER — LIDOCAINE 40 MG/G
CREAM TOPICAL ONCE
Status: CANCELLED | OUTPATIENT
Start: 2022-02-21 | End: 2022-02-21

## 2022-02-21 RX ORDER — LIDOCAINE HYDROCHLORIDE 20 MG/ML
JELLY TOPICAL ONCE
Status: CANCELLED | OUTPATIENT
Start: 2022-02-21 | End: 2022-02-21

## 2022-02-21 RX ORDER — GENTAMICIN SULFATE 1 MG/G
OINTMENT TOPICAL ONCE
Status: CANCELLED | OUTPATIENT
Start: 2022-02-21 | End: 2022-02-21

## 2022-02-21 RX ORDER — LIDOCAINE 50 MG/G
OINTMENT TOPICAL ONCE
Status: CANCELLED | OUTPATIENT
Start: 2022-02-21 | End: 2022-02-21

## 2022-02-21 RX ORDER — BACITRACIN, NEOMYCIN, POLYMYXIN B 400; 3.5; 5 [USP'U]/G; MG/G; [USP'U]/G
OINTMENT TOPICAL ONCE
Status: CANCELLED | OUTPATIENT
Start: 2022-02-21 | End: 2022-02-21

## 2022-02-21 RX ORDER — LIDOCAINE HYDROCHLORIDE 40 MG/ML
SOLUTION TOPICAL ONCE
Status: CANCELLED | OUTPATIENT
Start: 2022-02-21 | End: 2022-02-21

## 2022-02-21 RX ORDER — BETAMETHASONE DIPROPIONATE 0.05 %
OINTMENT (GRAM) TOPICAL ONCE
Status: CANCELLED | OUTPATIENT
Start: 2022-02-21 | End: 2022-02-21

## 2022-02-21 RX ORDER — BACITRACIN ZINC AND POLYMYXIN B SULFATE 500; 1000 [USP'U]/G; [USP'U]/G
OINTMENT TOPICAL ONCE
Status: CANCELLED | OUTPATIENT
Start: 2022-02-21 | End: 2022-02-21

## 2022-02-21 ASSESSMENT — PAIN DESCRIPTION - ORIENTATION: ORIENTATION: LEFT

## 2022-02-21 ASSESSMENT — PAIN DESCRIPTION - LOCATION: LOCATION: LEG

## 2022-02-21 ASSESSMENT — PAIN DESCRIPTION - PAIN TYPE: TYPE: ACUTE PAIN

## 2022-02-21 ASSESSMENT — PAIN DESCRIPTION - DESCRIPTORS: DESCRIPTORS: ACHING

## 2022-02-21 NOTE — PROGRESS NOTES
[facility] [place]  Progress Note and Procedure Note      Wayne Hendrickson  MEDICAL RECORD NUMBER:  [id number]  AGE: 80 y.o. GENDER: female  : 1929  EPISODE DATE:  2022      Subjective:     Chief Complaint   Patient presents with    Wound Check     returning pt - wound on left lower leg         HISTORY of PRESENT ILLNESS HPI     Wayne Hendrickson is a 80 y.o. female who presents today for wound evaluation. History of Wound Context: Previously seen here in wound care for an arterial wound involving her left lower extremity. Dr. Rony Thacker performed a left common femoral endarterectomy with bovine patch angioplasty. She returns today for continued follow-up of this nonhealing wound. She was told by Dr. Tashia Gil to apply a dry dressing to the wound until seen by me.   In addition with today's visit she has asked me to see the operative incision in the left groin  Wound Pain Timing/Severity: intermittent  Quality of pain: sharp  Severity:  3 / 10   Modifying Factors: None  Associated Signs/Symptoms: pain    Wound Identification:  Wound Type: arterial  Contributing Factors: edema, venous stasis and arterial insufficiency    Acute Wound: N/A not an acute wound        PAST MEDICAL HISTORY        Diagnosis Date    Anxiety     Arthritis     At risk for falls     CAD (coronary artery disease)     CTS (carpal tunnel syndrome)     Bilateral, EMG-NCS    DDD (degenerative disc disease), cervical     Fractures     (L) Hip Fx 98, L1 fracture from fall 10-31-06    Hyperlipidemia     Hypertension     Mitral regurgitation     Neuropathy     Osteopenia     Osteoporosis     PAD (peripheral artery disease) (HCC)     Right LE ischemia    Peripheral neuropathy 2015    Peripheral vascular disease (HCC)     bilateral lower extremities with edema    Podagra 2017    Dr. Saman Patrick Spinal stenosis     L4-5    Type 2 diabetes mellitus ([facility] [address]     Urethral stricture 5 years ago    Urgency of urination        PAST SURGICAL HISTORY    Past Surgical History:   Procedure Laterality Date    APPENDECTOMY      CARPAL TUNNEL RELEASE Right 3/29/2019    RIGHT CARPAL TUNNEL RELEASE AND RIGHT MIDDLE FINGER TRIGGER FINGER RELEASE performed by Chana Merino MD at [address]  [id number]    LEFT EYE EYE CATARACT PHACOEMULSIFICATION INTRAOCULAR LENS    CHOLECYSTECTOMY  1978    COLONOSCOPY  8/31/99    diverticulosis    EYE SURGERY Bilateral     bilateral cataract removed    FEMORAL ENDARTERECTOMY Left 2/14/2022    LEFT COMMON FEMORAL ARTERY ENDARTERECTOMY performed by Perla Carpenter MD at [address] [place] [address]    IR FEMORAL POPLITEAL BYPASS GRAFT Right 8-    KYPHOSIS SURGERY  11-7-06    L1, (fractured after a fall)    LEG SURGERY Left 2/14/2022    LEFT LEG WOUND DEBRIDEMENT performed by Perla Carpenter MD [address]   4/25/98    (L) THR    WRIST FRACTURE SURGERY  2008    left wrist       FAMILY HISTORY    Family History   Problem Relation Age of Onset    Stroke Father     Hypertension Father        SOCIAL HISTORY    Social History     Tobacco Use    Smoking status: Never Smoker    Smokeless tobacco: Never Used   Vaping Use    Vaping Use: Never used   Substance Use Topics    Alcohol use: No    Drug use: No       ALLERGIES    Allergies   Allergen Reactions    Aricept [Donepezil Hydrochloride] Other (See Comments)     intolerant    Doxycycline Nausea Only    Ketorolac Tromethamine Other (See Comments)     Pt unable to recall reaction       MEDICATIONS    Current Outpatient Medications on File Prior to Encounter   Medication Sig Dispense Refill    ferrous sulfate (FE TABS) 325 (65 Fe) MG EC tablet Take 1 tablet by mouth 2 times daily 90 tablet 0    glimepiride (AMARYL) 1 MG tablet TAKE ONE TABLET BY MOUTH EVERY MORNING BEFORE BREAKFAST 30 tablet 2    furosemide (LASIX) 20 MG tablet Take 1 tablet by mouth daily 90 tablet 1    blood glucose test strips (ONETOUCH ULTRA) strip USE TO TEST BLOOD SUGAR TWICE DAILY 100 strip 1    lisinopril (PRINIVIL;ZESTRIL) 20 MG tablet TAKE ONE TABLET BY MOUTH DAILY 90 tablet 3    metFORMIN (GLUCOPHAGE) 500 MG tablet Take 1 tablet by mouth daily 90 tablet 3    clotrimazole-betamethasone (LOTRISONE) 1-0.05 % cream Apply topically 2 times daily for 2 weeks. 60 g 0    atorvastatin (LIPITOR) 10 MG tablet Take 1 tablet by mouth daily 30 tablet 3    Turmeric 450 MG CAPS Take 450 mg by mouth daily      meclizine (ANTIVERT) 12.5 MG tablet Take 1 tablet by mouth 3 times daily as needed for Dizziness (vertigo) 30 tablet 1    Cholecalciferol (VITAMIN D3) 2000 UNITS CAPS Take 1 capsule by mouth daily      aspirin EC 81 MG EC tablet Take 1 tablet by mouth daily 30 tablet 3    Lite Touch Lancets MISC by Does not apply route. Use twice daily when testing blood sugars. 100 each 5     No current facility-administered medications on file prior to encounter.        REVIEW OF SYSTEMS    Constitutional: negative for chills and fevers  Respiratory: negative for shortness of breath  Cardiovascular: negative for chest pain  Gastrointestinal: negative for abdominal pain  Musculoskeletal:negative for muscle weakness      Last F0Z if applicable:   Hemoglobin A1C   Date Value Ref Range Status   04/18/2021 6.8 See comment % Final     Comment:     Comment:  Diagnosis of Diabetes: > or = 6.5%  Increased risk of diabetes (Prediabetes): 5.7-6.4%  Glycemic Control: Nonpregnant Adults: <7.0%                    Pregnant: <6.0%           Objective:      BP (!) 167/55   Pulse 73   Temp 97.3 °F (36.3 °C) (Temporal)   Resp 16     Wt Readings from Last 3 Encounters:   02/16/22 107 lb 5.8 oz (48.7 kg)   02/08/22 114 lb (51.7 kg)   02/04/22 114 lb (51.7 kg)       PHYSICAL EXAM    General Appearance: alert and oriented to person, place and time and in no acute distress  Head: normocephalic and atraumatic  Pulmonary/Chest: clear to auscultation bilaterally- no wheezes, rales or rhonchi, normal air movement, no respiratory distress  Cardiovascular: normal rate, normal S1 and S2 and no gallops  Abdomen: soft, non-tender, non-distended, normal bowel sounds, no masses or organomegaly  Extremities: no cyanosis, clubbing or edema and full-thickness wound containing fibrin and slough with exposed tendon left shin. Doppler signal heard posterior tibial pulse  Superficial separation skin edges lateral portion of endarterectomy incision            Assessment:     No diagnosis found. Procedure Note  Indications:  Based on my examination of this patient's wound(s) today, sharp excision is required to promote healing and evaluate the extent healing. Performed by: Oneil Mendes MD    Consent obtained: Yes    Time out taken:  Yes    Pain Control: Anesthetic  Anesthetic: 4% Lidocaine Liquid Topical       Debridement:Excisional Debridement    Using curette the wound(s) was/were sharply debrided down through and including the removal of epidermis, dermis and subcutaneous tissue. Devitalized Tissue Debrided:  fibrin and slough    Pre Debridement Measurements:  Are located in the Wound Documentation Flow Sheet    Wound #: 1 and 2     Post  Debridement Measurements:  Wound 11/22/21 Leg Left; Lower;Distal #1 (Active)   Wound Image   02/21/22 1108   Wound Etiology Surgical 02/16/22 0800   Dressing Status Clean;Dry; Intact 02/16/22 0800   Wound Cleansed Cleansed with saline 02/21/22 1108   Dressing/Treatment Silicone pad 01/91/49 8560   Wound Length (cm) 2.3 cm 02/21/22 1108   Wound Width (cm) 1.7 cm 02/21/22 1108   Wound Depth (cm) 0.1 cm 02/21/22 1108   Wound Surface Area (cm^2) 3.91 cm^2 02/21/22 1108   Change in Wound Size % (l*w) -39.64 02/21/22 1108   Wound Volume (cm^3) 0.391 cm^3 02/21/22 1108   Wound Healing % -40 02/21/22 1108   Post-Procedure Length (cm) 2.4 cm 02/21/22 1136   Post-Procedure Width (cm) 1.8 cm 02/21/22 1136   Post-Procedure Depth (cm) 0.3 cm 02/21/22 1136   Post-Procedure Surface Area (cm^2) 4.32 cm^2 02/21/22 1136   Post-Procedure Volume (cm^3) 1.296 cm^3 02/21/22 1136   Distance Tunneling (cm) 0 cm 02/21/22 1108   Undermining Maxium Distance (cm) 0 02/21/22 1108   Wound Assessment Exposed structure tendon 02/21/22 1136   Drainage Amount None 02/21/22 1108   Drainage Description Yellow 12/27/21 1000   Odor None 02/21/22 1108   Nissa-wound Assessment Ecchymosis;Dry/flaky 02/21/22 1108   Margins Defined edges 02/21/22 1108   Wound Thickness Description not for Pressure Injury Full thickness 02/21/22 1108   Number of days: 91       Wound 02/21/22 #2 left groin (Active)   Wound Image   02/21/22 1108   Wound Etiology Non-Healing Surgical 02/21/22 1108   Wound Cleansed Cleansed with saline 02/21/22 1108   Dressing/Treatment Collagen with Ag 02/21/22 1136   Wound Length (cm) 0.3 cm 02/21/22 1108   Wound Width (cm) 1 cm 02/21/22 1108   Wound Depth (cm) 0.1 cm 02/21/22 1108   Wound Surface Area (cm^2) 0.3 cm^2 02/21/22 1108   Wound Volume (cm^3) 0.03 cm^3 02/21/22 1108   Post-Procedure Length (cm) 0.4 cm 02/21/22 1136   Post-Procedure Width (cm) 1.1 cm 02/21/22 1136   Post-Procedure Depth (cm) 0.3 cm 02/21/22 1136   Post-Procedure Surface Area (cm^2) 0.44 cm^2 02/21/22 1136   Post-Procedure Volume (cm^3) 0.132 cm^3 02/21/22 1136   Distance Tunneling (cm) 0 cm 02/21/22 1108   Tunneling Position ___ O'Clock 0 02/21/22 1108   Undermining Starts ___ O'Clock 0 02/21/22 1108   Undermining Ends___ O'Clock 0 02/21/22 1108   Undermining Maxium Distance (cm) 0 02/21/22 1108   Wound Assessment Slough 02/21/22 1108   Drainage Amount None 02/21/22 1108   Odor None 02/21/22 1108   Nissa-wound Assessment Intact 02/21/22 1108   Margins Defined edges 02/21/22 1108   Wound Thickness Description not for Pressure Injury Full thickness 02/21/22 1108   Number of days: 0          Percent of Wound Debrided: 100%    Total Surface Area Debrided:  4.7 sq cm     Bleeding:  Minimal    Hemostasis Achieved:  by pressure    Procedural Pain:  0  / 10     Post Procedural Pain:  0 / 10     Response to treatment:  Poorly tolerated by patient., With complaints of pain. Plan:       Treatment Note please see attached Discharge Instructions    New Medication(s) at this visit:   New Prescriptions    No medications on file       Other orders at this visit: No orders of the defined types were placed in this encounter. Weight Management: No. N/A    Smoking Cessation: Counseling given: Not Answered        Discharge Instructions          [facility] Physician Orders and Discharge Instructions  [address] [place] [address] [address] [place] [address]. [place] [address]  Telephone: [phone]  NAME:  Que Shannon  YOB: 1929  MEDICAL RECORD NUMBER:  [id number]  DATE:  2/21/2022    Wash hands with soap and water prior to and after every dressing change. Wound Cleansing:   · Do not scrub or use excessive force. · With each dressing change, rinse wounds with 0.9% Saline. (May use wound wash or soft contact solution. Both can be purchased at a local drug store). · If unable to obtain saline, may use a gentle soap and water. · Keep wounds dry in the shower unless otherwise instructed by the physician. · For wounds on lower legs, cast covers can be purchased at local drug stores, so that you may shower and keep the wound(s) dry. []  Vashe Wash solution instructions (if prescribed): Apply enough Vashe to soak a piece of gauze and place on wound bed for 5-10 minutes. DO NOT rinse after Vashe has been applied. Follow dressing application as instructed below.     Nissa wound Topical Treatments:  Do not apply lotions, creams, or ointments to the skin around the wound bed unless directed as followed:     [] Apply around the wound: [] moisturizing lotion [] Antifungal ointment [] No-Sting barrier film [] Zinc paste [] Other:       Dressings:           Wound Location: left  groin      Apply Primary Dressing to wound:       Collagen with silver     Cover and Secure with:     Cover and Secure with: Other mepilex border   Avoid contact of tape with skin if possible.  When to change Dressing: [] Daily [] Every Other Day [] Once a week  [] Three times per week: [x] Monday, Wednesday, Friday [] Tuesday, Thursday, Saturday  [] Do Not Change Dressing [] Other:      Dressings:           Wound Location: left lower leg       Apply Primary Dressing to wound:       Collagen gel     Cover and Secure with:     Cover and Secure with: Other mepitel, gauze, kerlix   Avoid contact of tape with skin if possible.  When to change Dressing: [] Daily [] Every Other Day [] Once a week  [] Three times per week: [x] Monday, Wednesday, Friday [] Tuesday, Thursday, Saturday  [] Do Not Change Dressing [] Other:    [x]DME/Wound Dressing Supplies ordered at this visit: []Yes []No  o Supplies Provided by:   o Please call them directly to reorder supplies when you run out.  o CONTINUE TO USE THE SUPPLIES YOU HAVE AVAILABLE. IT IS MOST IMPORTANT TO KEEP THE WOUND COVERED AT ALL TIMES. Edema Control:        Apply every morning immediately when getting up. They should be applied to affected leg(s) from mid foot to knee making sure to cover the heel. Remove every night before going to bed. [x] Elevate leg(s) above the level of the heart for 30 minutes 4-5 times a day and/or when sitting. [x] Avoid prolonged standing in one place. Dietary:  Important dietary reminders:  1. Increase Protein intake (i.e. Lean meats, fish, eggs, legumes, and yogurt)  2. No added salt  3. If diabetic, follow a diabetic diet and check glucose prior to meals or as instructed by your physician.     Dietary Supplements:  [] Octaviano  [] 30ml ProStat  [] EnsureEnlive [] Ensure Max/Premier  [] Other:    If you are still having pain after you go home:   For wounds on lower legs or arms, elevate the affected limb.  Use over-the-counter medications you would normally use for pain as permitted by your primary care doctor.  For persistent pain not relieved by the above interventions, please call your primary care doctor. Return Appointment:   Return Appointment: With Dr. Danial Gaucher  in  [facility])    [] Return Appointment for a Wound Assessment (Nurse Visit) on:       [x] Orders placed during your visit: No orders of the defined types were placed in this encounter. o All other future appointments:  Future Appointments   Date Time Provider Gilbert Frey   3/2/2022 10:30 AM Arash Hammond MD [facility] NITHYA   4/4/2022 10:00 AM Duke Trejo MD [facility]   -       Your nurse  is:  You Dawson     Electronically signed by Berta Kinsey RN on 2/21/2022 at 11:39 AM     [facility] Information: Should you experience any significant changes in your wound(s) or have questions about your wound care, please contact the [address] [place] [address] at [phone]. We are open from 8:00am - 4:30p Monday thru Friday except for Wednesdays which we are closed. Please give us 24-48 hours to return your call. Call your doctor now or seek immediate medical care if:    · You have symptoms of infection, such as:  ? Increased pain, swelling, warmth, or redness. ? Red streaks leading from the area. ? Pus draining from the area. ? A fever.         Physician for this visit and orders: Ottoniel Arciniega MD    [] Patient unable to sign Discharge Instructions given to ECF/Transportation/POA        Electronically signed by Ottoniel Arciniega MD on 2/21/2022 at 12:19 PM

## 2022-02-23 NOTE — TELEPHONE ENCOUNTER
Angelica with Dr. Allison Osei office states she retrieved a message from the office voice mail, and patient states her Lipitor is making her nauseous, and is requesting to know if there is something different Dr. Roni Santoro can prescribe. Since Dr. Marek Roach prescribes Lipitor, will he change to something that doesn't cause her to be nauseous. Please contact patient @ phone # provided.

## 2022-02-24 ENCOUNTER — CARE COORDINATION (OUTPATIENT)
Dept: CASE MANAGEMENT | Age: 87
End: 2022-02-24

## 2022-02-24 RX ORDER — ROSUVASTATIN CALCIUM 5 MG/1
5 TABLET, COATED ORAL NIGHTLY
Qty: 30 TABLET | Refills: 3 | Status: SHIPPED | OUTPATIENT
Start: 2022-02-24 | End: 2022-04-26 | Stop reason: ALTCHOICE

## 2022-02-24 NOTE — CARE COORDINATION
absolutely will not go back to [facility] SPECIALTY [facility] unless absolutely necessary and that she is not pleased with the [facility] but she is happy with all of the nurses that took care of her. Pt stated that she has been going to the [address] [place] [address] and has a f/u appt on 02/25/22. Pt may have to have a skin graft as wound is not healing as expected. Pt stated that she does not feel it is necessary to notify PCP as she stated that she is not severely ill. Denied chest pain and SOB. Pt stated she is upset that there wasn't any washcloths available during her stay, no phone in her room and that the beds are cheap and uncomfortable. Denied any other needs and concerns at this time. Advised pt to immediately report any worsening symptoms to the PCP. Patient verbalized understanding and agreed. Omkar Penaloza LPN, [facility]  PH: [phone]            Care Transitions Subsequent and Final Call    Schedule Follow Up Appointment with PCP: Completed  Subsequent and Final Calls  Do you have any ongoing symptoms?: Yes  Onset of Patient-reported symptoms: Other  Patient-reported symptoms: Pain, Other  Interventions for patient-reported symptoms: Other, Notified PCP/Physician  Have your medications changed?: No  Do you have any questions related to your medications?: No  Do you currently have any active services?: No  Are you currently active with any services?: Home Health  Do you have any needs or concerns that I can assist you with?: No  Identified Barriers: None  Care Transitions Interventions  No Identified Needs  Other Interventions:            Follow Up  Future Appointments   Date Time Provider Gilbert Frey   2/25/2022  8:45 AM Heidi Christianson MD [address] [facility]   2/28/2022  9:00 AM Heidi Christianson MD [address] [facility]   3/2/2022 10:30 AM Meghan Galeano MD [facility]   4/4/2022 10:00 AM Jonathan Andrews MD [facility] Card NITHYA Penaloza LPN

## 2022-02-24 NOTE — TELEPHONE ENCOUNTER
Patient said that after the surgery he wanted her to take both Lipitor and iron but they make her nauseous. She said that it happened to her before. Patient is agreeable to trying to Crestor. She also wants to let you know she has a bruise on right arm and her hand tingles. It comes and goes.

## 2022-02-25 ENCOUNTER — HOSPITAL ENCOUNTER (OUTPATIENT)
Dept: WOUND CARE | Age: 87
Discharge: HOME | End: 2022-02-25
Payer: MEDICARE

## 2022-02-25 ENCOUNTER — TELEPHONE (OUTPATIENT)
Dept: WOUND CARE | Age: 87
End: 2022-02-25

## 2022-02-25 VITALS
DIASTOLIC BLOOD PRESSURE: 76 MMHG | RESPIRATION RATE: 16 BRPM | TEMPERATURE: 98.9 F | HEART RATE: 103 BPM | SYSTOLIC BLOOD PRESSURE: 142 MMHG

## 2022-02-25 DIAGNOSIS — S81.802A WOUND OF LEFT LOWER EXTREMITY, INITIAL ENCOUNTER: ICD-10-CM

## 2022-02-25 DIAGNOSIS — S81.802D WOUND OF LEFT LOWER EXTREMITY, SUBSEQUENT ENCOUNTER: ICD-10-CM

## 2022-02-25 DIAGNOSIS — I73.9 PERIPHERAL ARTERY DISEASE (HCC): Primary | ICD-10-CM

## 2022-02-25 PROCEDURE — 6370000000 HC RX 637 (ALT 250 FOR IP): Performed by: SPECIALIST

## 2022-02-25 PROCEDURE — 11042 DBRDMT SUBQ TIS 1ST 20SQCM/<: CPT

## 2022-02-25 RX ORDER — LIDOCAINE HYDROCHLORIDE 20 MG/ML
JELLY TOPICAL ONCE
Status: CANCELLED | OUTPATIENT
Start: 2022-02-25 | End: 2022-02-25

## 2022-02-25 RX ORDER — BETAMETHASONE DIPROPIONATE 0.05 %
OINTMENT (GRAM) TOPICAL ONCE
Status: CANCELLED | OUTPATIENT
Start: 2022-02-25 | End: 2022-02-25

## 2022-02-25 RX ORDER — GINSENG 100 MG
CAPSULE ORAL ONCE
Status: CANCELLED | OUTPATIENT
Start: 2022-02-25 | End: 2022-02-25

## 2022-02-25 RX ORDER — LIDOCAINE 40 MG/G
CREAM TOPICAL ONCE
Status: CANCELLED | OUTPATIENT
Start: 2022-02-25 | End: 2022-02-25

## 2022-02-25 RX ORDER — LIDOCAINE 50 MG/G
OINTMENT TOPICAL ONCE
Status: CANCELLED | OUTPATIENT
Start: 2022-02-25 | End: 2022-02-25

## 2022-02-25 RX ORDER — LIDOCAINE HYDROCHLORIDE 40 MG/ML
SOLUTION TOPICAL ONCE
Status: COMPLETED | OUTPATIENT
Start: 2022-02-25 | End: 2022-02-25

## 2022-02-25 RX ORDER — GENTAMICIN SULFATE 1 MG/G
OINTMENT TOPICAL ONCE
Status: CANCELLED | OUTPATIENT
Start: 2022-02-25 | End: 2022-02-25

## 2022-02-25 RX ORDER — BACITRACIN ZINC AND POLYMYXIN B SULFATE 500; 1000 [USP'U]/G; [USP'U]/G
OINTMENT TOPICAL ONCE
Status: CANCELLED | OUTPATIENT
Start: 2022-02-25 | End: 2022-02-25

## 2022-02-25 RX ORDER — LIDOCAINE 50 MG/G
OINTMENT TOPICAL ONCE
Status: COMPLETED | OUTPATIENT
Start: 2022-02-25 | End: 2022-02-25

## 2022-02-25 RX ORDER — LIDOCAINE HYDROCHLORIDE 40 MG/ML
SOLUTION TOPICAL ONCE
Status: CANCELLED | OUTPATIENT
Start: 2022-02-25 | End: 2022-02-25

## 2022-02-25 RX ORDER — BACITRACIN, NEOMYCIN, POLYMYXIN B 400; 3.5; 5 [USP'U]/G; MG/G; [USP'U]/G
OINTMENT TOPICAL ONCE
Status: CANCELLED | OUTPATIENT
Start: 2022-02-25 | End: 2022-02-25

## 2022-02-25 RX ORDER — CLOBETASOL PROPIONATE 0.5 MG/G
OINTMENT TOPICAL ONCE
Status: CANCELLED | OUTPATIENT
Start: 2022-02-25 | End: 2022-02-25

## 2022-02-25 RX ADMIN — LIDOCAINE HYDROCHLORIDE: 40 SOLUTION TOPICAL at 09:06

## 2022-02-25 RX ADMIN — LIDOCAINE: 50 OINTMENT TOPICAL at 09:47

## 2022-02-25 RX ADMIN — COLLAGENASE SANTYL: 250 OINTMENT TOPICAL at 09:47

## 2022-02-25 ASSESSMENT — PAIN DESCRIPTION - DESCRIPTORS: DESCRIPTORS: OTHER (COMMENT)

## 2022-02-25 ASSESSMENT — PAIN DESCRIPTION - PAIN TYPE: TYPE: ACUTE PAIN

## 2022-02-25 ASSESSMENT — PAIN DESCRIPTION - LOCATION: LOCATION: LEG

## 2022-02-25 ASSESSMENT — PAIN SCALES - GENERAL: PAINLEVEL_OUTOF10: 7

## 2022-02-25 NOTE — PROGRESS NOTES
[facility]  Progress Note and Procedure Note      Juan Soto  MEDICAL RECORD NUMBER:  [id number]  AGE: 80 y.o. GENDER: female  : 1929  EPISODE DATE:  2022    Subjective:     No chief complaint on file. HISTORY of PRESENT ILLNESS HPI     Juan Soto is a 80 y.o. female who presents today for wound/ulcer evaluation. History of Wound Context: Previously seen here in wound care for an arterial wound involving her left lower extremity. Dr. Nadia Torres performed a left common femoral endarterectomy with bovine patch angioplasty. She returns today for continued follow-up of this nonhealing wound. She was told by Dr. Nadia Torres to apply a dry dressing to the wound until seen by me. Pain Assessment:  Wound/Ulcer Pain Timing/Severity: intermittent  Quality of pain: sharp  Severity:  3 / 10   Modifying Factors: None  Associated Signs/Symptoms: pain    Ulcer Identification:  Ulcer Type: arterial  Contributing Factors: arterial insufficiency    Objective:      BP (!) 142/76   Pulse 103   Temp 98.9 °F (37.2 °C) (Temporal)   Resp 16     Wt Readings from Last 3 Encounters:   22 107 lb 5.8 oz (48.7 kg)   22 114 lb (51.7 kg)   22 114 lb (51.7 kg)       PHYSICAL EXAM    Extremities: no cyanosis, clubbing or edema and full-thickness wound containing fibrin and slough, partial exposure of tendon left shin    Assessment:      1. Peripheral artery disease ([address]    2. Wound of left lower extremity, subsequent encounter    3. Wound of left lower extremity, initial encounter         Procedure Note  Indications:  Based on my examination of this patient's wound(s) today, sharp excision is required to promote healing and evaluate the extent healing. Performed by: Danita Palacios MD    Consent obtained?  Yes    Time out taken: Yes    Pain Control: Anesthetic: 5% Lidocaine Ointment Topical     Debridement:Excisional Debridement    Using curette the wound was sharply debrided down through and including the removal of  epidermis, dermis and subcutaneous tissue. Devitalized Tissue Debrided:  fibrin and slough      Pre Debridement Measurements:  Are located in the Wound Documentation Flow Sheet   Wound #: 1     Post  Debridement Measurements:  Wound 11/22/21 Leg Left; Lower;Distal #1 (Active)   Wound Image   02/21/22 1108   Wound Etiology Surgical 02/16/22 0800   Dressing Status Clean;Dry; Intact 02/16/22 0800   Wound Cleansed Cleansed with saline 02/25/22 0907   Dressing/Treatment Pharmaceutical agent (see MAR) 02/25/22 0945   Wound Length (cm) 2.3 cm 02/25/22 0907   Wound Width (cm) 1.7 cm 02/25/22 0907   Wound Depth (cm) 0.1 cm 02/25/22 0907   Wound Surface Area (cm^2) 3.91 cm^2 02/25/22 0907   Change in Wound Size % (l*w) -39.64 02/25/22 0907   Wound Volume (cm^3) 0.391 cm^3 02/25/22 0907   Wound Healing % -40 02/25/22 0907   Post-Procedure Length (cm) 2.4 cm 02/25/22 0929   Post-Procedure Width (cm) 1.8 cm 02/25/22 0929   Post-Procedure Depth (cm) 0.3 cm 02/25/22 0929   Post-Procedure Surface Area (cm^2) 4.32 cm^2 02/25/22 0929   Post-Procedure Volume (cm^3) 1.296 cm^3 02/25/22 0929   Distance Tunneling (cm) 0 cm 02/25/22 0907   Undermining Maxium Distance (cm) 0 02/25/22 0907   Wound Assessment Slough;Pink/red; Exposed structure tendon 02/25/22 0907   Drainage Amount Scant 02/25/22 0907   Drainage Description Yellow 02/25/22 0907   Odor None 02/25/22 0907   Nissa-wound Assessment Edematous 02/25/22 0907   Margins Defined edges 02/25/22 0907   Wound Thickness Description not for Pressure Injury Full thickness 02/25/22 0907   Number of days: 95       Wound 02/21/22 #2 left groin (Active)   Wound Image   02/21/22 1108   Wound Etiology Non-Healing Surgical 02/21/22 1108   Wound Cleansed Cleansed with saline 02/25/22 0907   Dressing/Treatment Collagen with Ag 02/25/22 0945   Wound Length (cm) 0.3 cm 02/25/22 0907   Wound Width (cm) 1 cm 02/25/22 0907   Wound Depth (cm) 0.1 cm 02/25/22 0433   Wound Surface Area (cm^2) 0.3 cm^2 02/25/22 0907   Change in Wound Size % (l*w) 0 02/25/22 0907   Wound Volume (cm^3) 0.03 cm^3 02/25/22 0907   Wound Healing % 0 02/25/22 0907   Post-Procedure Length (cm) 0.3 cm 02/25/22 0929   Post-Procedure Width (cm) 1 cm 02/25/22 0929   Post-Procedure Depth (cm) 0.1 cm 02/25/22 0929   Post-Procedure Surface Area (cm^2) 0.3 cm^2 02/25/22 0929   Post-Procedure Volume (cm^3) 0.03 cm^3 02/25/22 0929   Distance Tunneling (cm) 0 cm 02/25/22 9306   Tunneling Position ___ O'Clock 0 02/21/22 1108   Undermining Starts ___ O'Clock 0 02/21/22 1108   Undermining Ends___ O'Clock 0 02/21/22 1108   Undermining Maxium Distance (cm) 0 02/25/22 0907   Wound Assessment Slough 02/25/22 0907   Drainage Amount None 02/25/22 0907   Drainage Description Other (Comment) 02/25/22 0907   Odor None 02/25/22 0907   Nissa-wound Assessment Intact 02/25/22 0907   Margins Defined edges 02/25/22 0907   Wound Thickness Description not for Pressure Injury Full thickness 02/25/22 0907   Number of days: 3          Percent of Wound Debrided: 100%    Total Surface Area Debrided:  4.3 sq cm    Diabetic/Pressure/Non Pressure Ulcers only:  Ulcer: Non-Pressure ulcer, fat layer exposed    Bleeding: Minimal    Hemostasis Achieved: by pressure    Procedural Pain: 2  / 10     Post Procedural Pain: 0 / 10     Response to treatment:  Poorly tolerated by patient., With complaints of pain. Wound still not clean enough for application of a TheraSkin allograft. We will switch over to [facility] assist with cleaning up the wound. Plan:     Treatment Note: Please see attached Discharge Instructions.   These instructions were given and signed by the patient or POA    New Medication(s) at this visit:   New Prescriptions    No medications on file       Other orders at this visit:   Orders Placed This Encounter   Procedures   [address] [address] Protocol       Discharge Instructions          [address] Physician Orders and Discharge Instructions  [address] [address] [address] [address] [place] [address]  Telephone: [phone]  NAME:  Jem Wakefield  YOB: 1929  MEDICAL RECORD NUMBER:  [id number]  DATE:  2/25/2022    Wash hands with soap and water prior to and after every dressing change. Wound Cleansing:   · Do not scrub or use excessive force. · With each dressing change, rinse wounds with 0.9% Saline. (May use wound wash or soft contact solution. Both can be purchased at a local drug store). · If unable to obtain saline, may use a gentle soap and water. · Keep wounds dry in the shower unless otherwise instructed by the physician. · For wounds on lower legs, cast covers can be purchased at local drug stores, so that you may shower and keep the wound(s) dry. []  Vashe Wash solution instructions (if prescribed): Apply enough Vashe to soak a piece of gauze and place on wound bed for 5-10 minutes. DO NOT rinse after Vashe has been applied. Follow dressing application as instructed below. Nissa wound Topical Treatments:  Do not apply lotions, creams, or ointments to the skin around the wound bed unless directed as followed:     [] Apply around the wound: [] moisturizing lotion [] Antifungal ointment [] No-Sting barrier film [] Zinc paste [] Other:       Dressings:           Wound Location:  Left lower leg wound      Apply Primary Dressing to wound: Other: santyl- apply nickel size thickness to wound- then cover with saline lightly moisten gauze     Cover and Secure with:     Cover and Secure with: 4X4 gauze pad, kerlix   Avoid contact of tape with skin if possible.      When to change Dressing: [x] Daily [] Every Other Day [] Once a week  [] Three times per week: [] Monday, Wednesday, Friday [] Tuesday, Thursday, Saturday  [] Do Not Change Dressing [] Other:    Dressings:           Wound Location:  Left groin      Apply Primary Dressing to wound:       Collagen  with silver     Cover and Secure with:     Cover and Secure with: Other silicone border dressing   Avoid contact of tape with skin if possible.  When to change Dressing: [] Daily [] Every Other Day [] Once a week  [] Three times per week: [x] Monday, Wednesday, Friday [] Tuesday, Thursday, Saturday  [] Do Not Change Dressing [] Other:       Edema Control:  Apply: [x] Compression Stocking- over the counter- light compression  [x] Left Lower Leg [x] Right Lower Leg        Apply every morning immediately when getting up. They should be applied to affected leg(s) from mid foot to knee making sure to cover the heel. Remove every night before going to bed. [x] Elevate leg(s) above the level of the heart for 30 minutes 4-5 times a day and/or when sitting. [x] Avoid prolonged standing in one place. Dietary:  Important dietary reminders:  1. Increase Protein intake (i.e. Lean meats, fish, eggs, legumes, and yogurt)  2. No added salt  3. If diabetic, follow a diabetic diet and check glucose prior to meals or as instructed by your physician. Dietary Supplements:  [] Octaviano  [] 30ml ProStat  [x] EnsureEnlive- 1 can daily [] Ensure Max/Premier  [] Other:    If you are still having pain after you go home:   For wounds on lower legs or arms, elevate the affected limb.  Use over-the-counter medications you would normally use for pain as permitted by your primary care doctor.  For persistent pain not relieved by the above interventions, please call your primary care doctor.      Return Appointment:   Return Appointment: With Dr. Trevino alanna  in  [facility])    [] Return Appointment for a Wound Assessment (Nurse Visit) on:       [x] Orders placed during your visit:   Orders Placed This Encounter   Procedures    Initiate Outpatient Wound Care Protocol       o All other future appointments:  Future Appointments   Date Time Provider Gilbert Frey   2/28/2022  9:00 AM Zeke Davis MD [facility] WOUND [facility]OD   3/2/2022 10:30 AM Owen Velasquez MD [facility] VASC/ENDO [facility]   4/4/2022 10:00 AM Bob Gamez MD [facility]   -       Your nurse  is:  Stephani 7     Electronically signed by Polly Arteaga RN on 2/25/2022 at 9:34 R Tari Gomes 8 Information: Should you experience any significant changes in your wound(s) or have questions about your wound care, please contact the [address] [place] [address] at [phone]. We are open from 8:00am - 4:30p Monday thru Friday except for Wednesdays which we are closed. Please give us 24-48 hours to return your call. Call your doctor now or seek immediate medical care if:    · You have symptoms of infection, such as:  ? Increased pain, swelling, warmth, or redness. ? Red streaks leading from the area. ? Pus draining from the area. ? A fever.         Physician for this visit and orders: Jaret Doan MD    [] Patient unable to sign Discharge Instructions given to ECF/Transportation/POA        Electronically signed by Jaret Doan MD on 2/25/2022 at 10:01 AM

## 2022-02-25 NOTE — TELEPHONE ENCOUNTER
Patient called yesterday and stated the medication for her left lower leg wound has not been called into pharmacy. Explained to patient that the treatment discussed at [facility] wound care visit is not a medication from pharmacy, but a skin substitute that has to be approved by insurance and once approved and criteria met that we order and place the product on in wound care center. Explained to patient that she would be responsible for 20% of cost and medicare covers 80%. Patient states money is not a problem and she wants to proceed with skin substitute placement.

## 2022-03-01 ENCOUNTER — TELEPHONE (OUTPATIENT)
Dept: INTERNAL MEDICINE CLINIC | Age: 87
End: 2022-03-01

## 2022-03-01 DIAGNOSIS — I10 BENIGN ESSENTIAL HTN: ICD-10-CM

## 2022-03-01 RX ORDER — LISINOPRIL 20 MG/1
TABLET ORAL
Qty: 90 TABLET | Refills: 3 | Status: SHIPPED
Start: 2022-03-01 | End: 2022-08-25 | Stop reason: DRUGHIGH

## 2022-03-01 NOTE — TELEPHONE ENCOUNTER
Patient is requesting to know what type of doctor she should see for the tingling in her R hand and side of her hip. Please contact patient @ phone # provided with Dr. Kilgore Reach recommendation.

## 2022-03-01 NOTE — TELEPHONE ENCOUNTER
Last appointment: 2/4/2022  Next appointment: Visit date not found  Last refill: 5/11/2021  no return date given at last office visit

## 2022-03-02 ENCOUNTER — OFFICE VISIT (OUTPATIENT)
Dept: INTERNAL MEDICINE CLINIC | Age: 87
End: 2022-03-02
Payer: MEDICARE

## 2022-03-02 ENCOUNTER — OFFICE VISIT (OUTPATIENT)
Dept: VASCULAR SURGERY | Age: 87
End: 2022-03-02

## 2022-03-02 ENCOUNTER — TELEPHONE (OUTPATIENT)
Dept: VASCULAR SURGERY | Age: 87
End: 2022-03-02

## 2022-03-02 VITALS — BODY MASS INDEX: 21.01 KG/M2 | HEIGHT: 60 IN | WEIGHT: 107 LBS

## 2022-03-02 VITALS
OXYGEN SATURATION: 97 % | SYSTOLIC BLOOD PRESSURE: 146 MMHG | RESPIRATION RATE: 12 BRPM | WEIGHT: 108.2 LBS | HEIGHT: 61 IN | BODY MASS INDEX: 20.43 KG/M2 | HEART RATE: 69 BPM | DIASTOLIC BLOOD PRESSURE: 62 MMHG

## 2022-03-02 DIAGNOSIS — L97.909 ATHEROSCLEROSIS OF ARTERY OF EXTREMITY WITH ULCERATION (HCC): Primary | ICD-10-CM

## 2022-03-02 DIAGNOSIS — R20.2 RIGHT HAND PARESTHESIA: Primary | ICD-10-CM

## 2022-03-02 DIAGNOSIS — I70.45: ICD-10-CM

## 2022-03-02 DIAGNOSIS — I70.299 ATHEROSCLEROSIS OF ARTERY OF EXTREMITY WITH ULCERATION (HCC): Primary | ICD-10-CM

## 2022-03-02 DIAGNOSIS — D50.0 ANEMIA, BLOOD LOSS: ICD-10-CM

## 2022-03-02 DIAGNOSIS — E11.59 TYPE 2 DIABETES MELLITUS WITH VASCULAR DISEASE (HCC): ICD-10-CM

## 2022-03-02 PROCEDURE — 99024 POSTOP FOLLOW-UP VISIT: CPT | Performed by: SURGERY

## 2022-03-02 PROCEDURE — G8483 FLU IMM NO ADMIN DOC REA: HCPCS | Performed by: INTERNAL MEDICINE

## 2022-03-02 PROCEDURE — 99214 OFFICE O/P EST MOD 30 MIN: CPT | Performed by: INTERNAL MEDICINE

## 2022-03-02 PROCEDURE — 1111F DSCHRG MED/CURRENT MED MERGE: CPT | Performed by: INTERNAL MEDICINE

## 2022-03-02 PROCEDURE — 4040F PNEUMOC VAC/ADMIN/RCVD: CPT | Performed by: INTERNAL MEDICINE

## 2022-03-02 PROCEDURE — G8427 DOCREV CUR MEDS BY ELIG CLIN: HCPCS | Performed by: INTERNAL MEDICINE

## 2022-03-02 PROCEDURE — 1090F PRES/ABSN URINE INCON ASSESS: CPT | Performed by: INTERNAL MEDICINE

## 2022-03-02 PROCEDURE — 1123F ACP DISCUSS/DSCN MKR DOCD: CPT | Performed by: INTERNAL MEDICINE

## 2022-03-02 PROCEDURE — G8420 CALC BMI NORM PARAMETERS: HCPCS | Performed by: INTERNAL MEDICINE

## 2022-03-02 PROCEDURE — 1036F TOBACCO NON-USER: CPT | Performed by: INTERNAL MEDICINE

## 2022-03-02 RX ORDER — CIPROFLOXACIN 500 MG/1
250 TABLET, FILM COATED ORAL 2 TIMES DAILY
Qty: 10 TABLET | Refills: 0 | Status: SHIPPED | OUTPATIENT
Start: 2022-03-02 | End: 2022-03-10 | Stop reason: ALTCHOICE

## 2022-03-02 NOTE — PATIENT INSTRUCTIONS
Patient Education        Diabetic Neuropathy: Care Instructions  Overview     When you have diabetes, your blood sugar level may get too high. Over time, high blood sugar levels can damage nerves. This is called diabetic neuropathy. Nerve damage can cause pain, burning, tingling, and numbness and may leave you feeling weak. The feet are often affected. When you have nerve damage in your feet, you cannot feel your feet and toes as well as normal and may not notice cuts or sores. Even a small injury can lead to a serious infection. It is very important that you follow your doctor's advice on foot care. Sometimes diabetes damages nerves that help the body function. If this happens, your blood pressure, sweating, digestion, and urination might be affected. Your doctor may give you a target range for your blood sugar that is higher or lower than you are used to. Try to keep your blood sugar very close to this target range to prevent more damage. Follow-up care is a key part of your treatment and safety. Be sure to make and go to all appointments, and call your doctor if you are having problems. It's also a good idea to know your test results and keep a list of the medicines you take. How can you care for yourself at home? · Take your medicines exactly as prescribed. Call your doctor if you think you are having a problem with your medicine. · Try to keep blood sugar in your target range. ? Follow your meal plan to know how much carbohydrate you need for meals and snacks. A registered dietitian or diabetes educator can help you plan meals. ? Try to get at least 30 minutes of exercise on most days. ? Check your blood sugar as many times each day as your doctor recommends. · Take and record your blood pressure at home if your doctor tells you to. To take your blood pressure at home:  ? Ask your doctor to check your blood pressure monitor to be sure it is accurate and the cuff fits you.  Also ask your doctor to new problem with your feet, such as:  ? A new sore or ulcer. ? A break in the skin that is not healing after several days. ? Bleeding corns or calluses. ? An ingrown toenail.     · You do not get better as expected. Where can you learn more? Go to [email or web address] and sign in to your [employer] account. Enter P159 in the Fashiolista box to learn more about \"Diabetic Neuropathy: Care Instructions. \"     If you do not have an account, please click on the \"Sign Up Now\" link. Current as of: July 28, 2021               Content Version: 13.1  © 9007-1548 Healthwise, Incorporated. Care instructions adapted under license by [facility] ([facility]). If you have questions about a medical condition or this instruction, always ask your healthcare professional. [address] any warranty or liability for your use of this information.

## 2022-03-02 NOTE — PROGRESS NOTES
[facility]) Physicians  Internal Medicine  Patient Encounter  Kely Donis D.O., [facility]        Chief Complaint   Patient presents with    Hand Pain     right hand,        HPI: 80 y.o. female seen today for yet another new complaint. She is complaining of hand pain and tingling. Patient has a known history of carpal tunnel syndrome. Her last EMG-nerve conduction test was in April 2016. She was found to have moderately severe bilateral carpal tunnel syndrome with mild right ulnar neuropathy. There were also findings of mild sensorimotor peripheral neuropathy. She states the hand right now is OK. The dysesthesias are intermittent. The tingling may last 5 minutes. The tingling occurs about 5-6 times throughout the day. She states the tingling in the right hand started again about 2 weeks ago. Upon further questioning, she does admit to neck pain. She states she tends to lean her head to the right. The pain is in the right neck. She denies radicular pain in into the arm. She denies right foot or leg symptoms. She denies weakness in the hand or arm. She is scared of a stroke. She did have bruising of the right forearm. She had carpal tunnel release 3/29/2019. She has seen Dr. Enrique Leavitt. She is on ASA      Also, she has additional complaints of Diabetes. She manipulates her medications. She is way overdue for A1C. She has not had a check up. She has been seen for acute problems. Patient is status post left femoral artery endarterectomy 2/14/2022 for near occlusion of the left common femoral artery. She had an angiogram to evaluate a nonhealing left lower extremity wound. She was seen by the vascular surgeon today. Patient may need a left SFA-popliteal bypass. On 2/15/2022 lab was obtained. Hemoglobin 7.9  Hematocrit 24 %  White blood cell count 11.3    Patient did make some passive comments today about death and dying. She denies suicidality.   The family with her did indicate that they were concerned about depression. When asked, patient adamantly denies that she is depressed. Past Medical History:   Diagnosis Date    Anxiety     Arthritis     At risk for falls     CAD (coronary artery disease)     CTS (carpal tunnel syndrome)     Bilateral, EMG-NCS    DDD (degenerative disc disease), cervical     Fractures     (L) Hip Fx 4/25/98, L1 fracture from fall 10-31-06    Hyperlipidemia     Hypertension     Mitral regurgitation     Neuropathy     Osteopenia     Osteoporosis     PAD [facility]peripheral artery disease) ([facility])     Right LE ischemia    Peripheral neuropathy 6/1/2015    Peripheral vascular disease [facility]     bilateral lower extremities with edema    Podagra 01/09/2017    Dr. Cristina Gutierrez Spinal stenosis     L4-5    Type 2 diabetes mellitus ([facility]     Urethral stricture 5 years ago    Urgency of urination          MEDICATIONS:  Medication Sig   ciprofloxacin (CIPRO) 500 MG tablet Take 0.5 tablets by mouth 2 times daily for 10 days   lisinopril (PRINIVIL;ZESTRIL) 20 MG tablet TAKE ONE TABLET BY MOUTH DAILY   rosuvastatin (CRESTOR) 5 MG tablet Take 1 tablet by mouth nightly   ferrous sulfate (FE TABS) 325 (65 Fe) MG EC tablet Take 1 tablet by mouth 2 times daily   glimepiride (AMARYL) 1 MG tablet TAKE ONE TABLET BY MOUTH EVERY MORNING BEFORE BREAKFAST   furosemide (LASIX) 20 MG tablet Take 1 tablet by mouth daily   blood glucose test strips (ONETOUCH ULTRA) strip USE TO TEST BLOOD SUGAR TWICE DAILY   metFORMIN (GLUCOPHAGE) 500 MG tablet Take 1 tablet by mouth daily   clotrimazole-betamethasone (LOTRISONE) 1-0.05 % cream Apply topically 2 times daily for 2 weeks.    atorvastatin (LIPITOR) 10 MG tablet Take 1 tablet by mouth daily   Turmeric 450 MG CAPS Take 450 mg by mouth daily   meclizine (ANTIVERT) 12.5 MG tablet Take 1 tablet by mouth 3 times daily as needed for Dizziness (vertigo)   Cholecalciferol (VITAMIN D3) 2000 UNITS CAPS Take 1 capsule by mouth daily   aspirin EC 81 MG EC tablet Take 1 tablet by mouth daily   Lite Touch Lancets MISC by Does not apply route. Use twice daily when testing blood sugars. Review of Systems - As per HPI      OBJECTIVE:  Vitals:    03/02/22 1501   BP: (!) 146/62   Pulse: 69   Resp: 12   SpO2: 97%   Weight: 108 lb 3.2 oz (49.1 kg)   Height: 5' 1\" (1.549 m)     Body mass index is 20.44 kg/m². Wt Readings from Last 3 Encounters:   03/02/22 108 lb 3.2 oz (49.1 kg)   03/02/22 107 lb (48.5 kg)   02/16/22 107 lb 5.8 oz (48.7 kg)     BP Readings from Last 3 Encounters:   03/02/22 (!) 146/62   02/25/22 (!) 142/76   02/21/22 (!) 167/55      GEN: NAD, A&O, Non-toxic  GI: Abdomen is soft, NT, BS+, No hepatomegaly. No masses. NEURO: No focal or lateralizing deficits. (-) Tinel's right wrist.  Motor examination is normal.   strength equal.  No pronator drift. VASC:  No carotid bruits. Radial and brachial Pulses symmetric  SKIN:  No rashes or lesions of concern  MS:  Tenderness with palpation of the cervical paraspinals. Increased soft tissue tone and spasm noted. ROM is limited in all plains. ASSESSMENT[de-identified]  Saumya Zamora was seen today for hand pain. 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 Due for lab  --Continue to monitor for hypoglycemia  --Encouraged patient not to manipulate her medications without my knowledge  -     CBC with Auto Differential; Future  -     Comprehensive Metabolic Panel; Future  -     Lipid Panel; Future  -     Hemoglobin A1C; Future  -     Iron and TIBC; Future  -     Ferritin; Future    Anemia, blood loss  -     CBC with Auto Differential; Future  -     Comprehensive Metabolic Panel;  Future  -     Iron and TIBC; Future  -     Ferritin; Future          Additional Plan:  1. She declines Gabapentin  2. Advised patient to continue monitoring for neurologic symptoms that might suggest TIA or stroke. Discussed medications with patient who voiced understanding of their use, indication and potential side effects. Pt also understands the above recommendations. All questions answered. This note was generated completely or in part utilizing Dragon dictation speech recognition software. Occasionally, words are mistranscribed and despite editing, the text may contain inaccuracies due to incorrect word recognition.   If further clarification is needed please contact the office at [phone]       Electronically signed    Mani Hogue D.O.

## 2022-03-02 NOTE — TELEPHONE ENCOUNTER
Called PT to let her know I got her laser doppler scheduled at [facility] tomorrow at [facility] and she will have to arrive at 830 to [facility] hospital entrance. Let them know she is there for a vascular test,  PT repeated back to me all instructions and understands after that she will go to her wound doc appt across the street.

## 2022-03-02 NOTE — PROGRESS NOTES
First postop visit S/P L femoral endarterectomy for near occlusion L CFA with nonhealing L shin wound 2 weeks ago. C/O continued throbbing L shin pain only improved with dependency. Increased drainage from that wound with \"redness\" and tenderness. EXAM:  Edema L calf and foot    Wound - clear drainage with fibrinous debris and mild surrounding erythema; central granulation in a small area    Palpable pulse distal to groin incision    Strong biphasic doppler L PT- BUZZ NC    Groin incision intact without drainage or erythema    A/P: S/P L fem endarterectomy   Clinically patent but slow if no improvement to wound. Will discuss with Dr. Rosetta Lew. Will obtain laser doppler L foot and calf to better gain perfusion. If low may need L SFA-BKpop bypass   F/U 2 weeks.

## 2022-03-03 ENCOUNTER — HOSPITAL ENCOUNTER (OUTPATIENT)
Dept: WOUND CARE | Age: 87
Discharge: HOME | End: 2022-03-03
Payer: MEDICARE

## 2022-03-03 ENCOUNTER — HOSPITAL ENCOUNTER (OUTPATIENT)
Dept: VASCULAR LAB | Age: 87
Discharge: HOME | End: 2022-03-03
Payer: MEDICARE

## 2022-03-03 VITALS
TEMPERATURE: 97.5 F | HEART RATE: 83 BPM | DIASTOLIC BLOOD PRESSURE: 73 MMHG | SYSTOLIC BLOOD PRESSURE: 155 MMHG | RESPIRATION RATE: 16 BRPM

## 2022-03-03 DIAGNOSIS — I70.299 ATHEROSCLEROSIS OF ARTERY OF EXTREMITY WITH ULCERATION (HCC): ICD-10-CM

## 2022-03-03 DIAGNOSIS — I70.45: ICD-10-CM

## 2022-03-03 DIAGNOSIS — S81.802A WOUND OF LEFT LOWER EXTREMITY, INITIAL ENCOUNTER: ICD-10-CM

## 2022-03-03 DIAGNOSIS — I73.9 PERIPHERAL ARTERY DISEASE (HCC): Primary | ICD-10-CM

## 2022-03-03 DIAGNOSIS — L97.909 ATHEROSCLEROSIS OF ARTERY OF EXTREMITY WITH ULCERATION (HCC): ICD-10-CM

## 2022-03-03 DIAGNOSIS — S81.802D WOUND OF LEFT LOWER EXTREMITY, SUBSEQUENT ENCOUNTER: ICD-10-CM

## 2022-03-03 PROCEDURE — 11042 DBRDMT SUBQ TIS 1ST 20SQCM/<: CPT

## 2022-03-03 PROCEDURE — 93922 UPR/L XTREMITY ART 2 LEVELS: CPT

## 2022-03-03 PROCEDURE — 11042 DBRDMT SUBQ TIS 1ST 20SQCM/<: CPT | Performed by: SPECIALIST

## 2022-03-03 PROCEDURE — 29581 APPL MULTLAYER CMPRN SYS LEG: CPT

## 2022-03-03 PROCEDURE — 6370000000 HC RX 637 (ALT 250 FOR IP): Performed by: SPECIALIST

## 2022-03-03 RX ORDER — BACITRACIN, NEOMYCIN, POLYMYXIN B 400; 3.5; 5 [USP'U]/G; MG/G; [USP'U]/G
OINTMENT TOPICAL ONCE
Status: CANCELLED | OUTPATIENT
Start: 2022-03-03 | End: 2022-03-03

## 2022-03-03 RX ORDER — GENTAMICIN SULFATE 1 MG/G
OINTMENT TOPICAL ONCE
Status: CANCELLED | OUTPATIENT
Start: 2022-03-03 | End: 2022-03-03

## 2022-03-03 RX ORDER — LIDOCAINE HYDROCHLORIDE 40 MG/ML
SOLUTION TOPICAL ONCE
Status: CANCELLED | OUTPATIENT
Start: 2022-03-03 | End: 2022-03-03

## 2022-03-03 RX ORDER — LIDOCAINE 50 MG/G
OINTMENT TOPICAL ONCE
Status: CANCELLED | OUTPATIENT
Start: 2022-03-03 | End: 2022-03-03

## 2022-03-03 RX ORDER — LIDOCAINE HYDROCHLORIDE 40 MG/ML
SOLUTION TOPICAL ONCE
Status: DISCONTINUED | OUTPATIENT
Start: 2022-03-03 | End: 2022-03-04 | Stop reason: HOSPADM

## 2022-03-03 RX ORDER — CLOBETASOL PROPIONATE 0.5 MG/G
OINTMENT TOPICAL ONCE
Status: CANCELLED | OUTPATIENT
Start: 2022-03-03 | End: 2022-03-03

## 2022-03-03 RX ORDER — BACITRACIN ZINC AND POLYMYXIN B SULFATE 500; 1000 [USP'U]/G; [USP'U]/G
OINTMENT TOPICAL ONCE
Status: CANCELLED | OUTPATIENT
Start: 2022-03-03 | End: 2022-03-03

## 2022-03-03 RX ORDER — BETAMETHASONE DIPROPIONATE 0.05 %
OINTMENT (GRAM) TOPICAL ONCE
Status: CANCELLED | OUTPATIENT
Start: 2022-03-03 | End: 2022-03-03

## 2022-03-03 RX ORDER — LIDOCAINE 40 MG/G
CREAM TOPICAL ONCE
Status: CANCELLED | OUTPATIENT
Start: 2022-03-03 | End: 2022-03-03

## 2022-03-03 RX ORDER — LIDOCAINE HYDROCHLORIDE 20 MG/ML
JELLY TOPICAL ONCE
Status: CANCELLED | OUTPATIENT
Start: 2022-03-03 | End: 2022-03-03

## 2022-03-03 RX ORDER — LIDOCAINE 50 MG/G
OINTMENT TOPICAL ONCE
Status: COMPLETED | OUTPATIENT
Start: 2022-03-03 | End: 2022-03-03

## 2022-03-03 RX ORDER — GINSENG 100 MG
CAPSULE ORAL ONCE
Status: CANCELLED | OUTPATIENT
Start: 2022-03-03 | End: 2022-03-03

## 2022-03-03 RX ADMIN — LIDOCAINE: 50 OINTMENT TOPICAL at 11:19

## 2022-03-03 ASSESSMENT — PAIN DESCRIPTION - LOCATION: LOCATION: LEG

## 2022-03-03 ASSESSMENT — PAIN SCALES - GENERAL: PAINLEVEL_OUTOF10: 4

## 2022-03-03 ASSESSMENT — PAIN DESCRIPTION - ORIENTATION: ORIENTATION: LEFT

## 2022-03-03 ASSESSMENT — PAIN DESCRIPTION - PAIN TYPE: TYPE: ACUTE PAIN

## 2022-03-03 ASSESSMENT — PAIN DESCRIPTION - PROGRESSION: CLINICAL_PROGRESSION: NOT CHANGED

## 2022-03-03 ASSESSMENT — PAIN DESCRIPTION - DESCRIPTORS: DESCRIPTORS: SHARP

## 2022-03-03 ASSESSMENT — PAIN DESCRIPTION - FREQUENCY: FREQUENCY: CONTINUOUS

## 2022-03-03 NOTE — PROGRESS NOTES
Multilayer Compression Wrap   (Not Unna) Below the Knee    NAME:  Dahiana Ty OF BIRTH:  4/4/1929  MEDICAL RECORD NUMBER:  [id number]  DATE:  3/3/2022        Removed old Multilayer wrap if present and washed leg with mild soap/water.   Applied moisturizing agent to dry skin as needed.   Applied primary and secondary dressing as ordered     Applied multilayered dressing below the knee to Left lower leg(s)  (2 Layer compression wrap ) .   Instructed patient/caregiver not to remove dressing and to keep it clean and dry.   Instructed patient/caregiver on complications to report to provider, such as pain, numbness in toes, heavy drainage, and slippage of dressing.   Instructed patient on purpose of compression dressing and on activity and exercise recommendations.     Applied per   Guidelines    Electronically signed by Marielena Meng RN on 3/3/2022 at 12:15 PM

## 2022-03-03 NOTE — PROGRESS NOTES
1227 [facility]  Progress Note and Procedure Note      Tay Grady  MEDICAL RECORD NUMBER:  [id number]  AGE: 80 y.o. GENDER: female  : 1929  EPISODE DATE:  3/3/2022    Subjective:     Chief Complaint   Patient presents with    Wound Check     left lower leg         HISTORY of PRESENT ILLNESS HPI     Tay Grady is a 80 y.o. female who presents today for wound/ulcer evaluation. History of Wound Context: Previously seen here in wound care for an arterial wound involving her left lower extremity.  Dr. Kimberly Ayala performed a left common femoral endarterectomy with bovine patch angioplasty. Luke Macias returns today for continued follow-up of this nonhealing wound. Pain Assessment:  Wound/Ulcer Pain Timing/Severity: intermittent  Quality of pain: sharp  Severity:  5 / 10   Modifying Factors: None  Associated Signs/Symptoms: pain    Ulcer Identification:  Ulcer Type: arterial  Contributing Factors: diabetes and arterial insufficiency    Objective:      BP (!) 155/73   Pulse 83   Temp 97.5 °F (36.4 °C) (Temporal)   Resp 16     Wt Readings from Last 3 Encounters:   22 108 lb 3.2 oz (49.1 kg)   22 107 lb (48.5 kg)   22 107 lb 5.8 oz (48.7 kg)       PHYSICAL EXAM    Extremities: 1 + edema-  left extremity and full-thickness wound containing fibrin and slough minimal granulation tissue partial exposure of tendon left shin        Assessment:      1. Peripheral artery disease ([address]    2. Wound of left lower extremity, subsequent encounter    3. Wound of left lower extremity, initial encounter         Procedure Note  Indications:  Based on my examination of this patient's wound(s) today, sharp excision is required to promote healing and evaluate the extent healing. Performed by: Kassandra Scott MD    Consent obtained?  Yes    Time out taken: Yes    Pain Control: Anesthetic: 5% Lidocaine Ointment Topical     Debridement:Excisional Debridement    Using curette the wound was sharply debrided    down through and including the removal of  epidermis, dermis and subcutaneous tissue. Devitalized Tissue Debrided:  fibrin and slough      Pre Debridement Measurements:  Are located in the Wound Documentation Flow Sheet   Wound #: 1     Post  Debridement Measurements:  Wound 11/22/21 Leg Left; Lower;Distal #1 (Active)   Wound Image   03/03/22 1120   Wound Etiology Surgical 02/16/22 0800   Dressing Status Clean;Dry; Intact 02/16/22 0800   Wound Cleansed Cleansed with saline 03/03/22 1120   Dressing/Treatment Hydrofera blue 03/03/22 1215   Wound Length (cm) 2.6 cm 03/03/22 1120   Wound Width (cm) 2.1 cm 03/03/22 1120   Wound Depth (cm) 0.1 cm 03/03/22 1120   Wound Surface Area (cm^2) 5.46 cm^2 03/03/22 1120   Change in Wound Size % (l*w) -95 03/03/22 1120   Wound Volume (cm^3) 0.546 cm^3 03/03/22 1120   Wound Healing % -95 03/03/22 1120   Post-Procedure Length (cm) 2.7 cm 03/03/22 1206   Post-Procedure Width (cm) 2.2 cm 03/03/22 1206   Post-Procedure Depth (cm) 0.3 cm 03/03/22 1206   Post-Procedure Surface Area (cm^2) 5.94 cm^2 03/03/22 1206   Post-Procedure Volume (cm^3) 1.782 cm^3 03/03/22 1206   Distance Tunneling (cm) 0 cm 03/03/22 1120   Undermining Maxium Distance (cm) 0 03/03/22 1120   Wound Assessment Exposed structure tendon 03/03/22 1206   Drainage Amount Small 03/03/22 1120   Drainage Description Yellow 03/03/22 1120   Odor None 03/03/22 1120   Nissa-wound Assessment Edematous; Hemosiderin staining (brown yellow) 03/03/22 1120   Margins Defined edges 03/03/22 1120   Wound Thickness Description not for Pressure Injury Full thickness 03/03/22 1120   Number of days: 101       Wound 02/21/22 #2 left groin (Active)   Wound Image   03/03/22 1120   Wound Etiology Non-Healing Surgical 02/21/22 1108   Wound Cleansed Cleansed with saline 02/25/22 0907   Dressing/Treatment Collagen with Ag 02/25/22 0945   Wound Length (cm) 0.3 cm 03/03/22 1120   Wound Width (cm) 1 cm 03/03/22 1120   Wound Depth (cm) 0.1 cm 03/03/22 1120   Wound Surface Area (cm^2) 0.3 cm^2 03/03/22 1120   Change in Wound Size % (l*w) 0 03/03/22 1120   Wound Volume (cm^3) 0.03 cm^3 03/03/22 1120   Wound Healing % 0 03/03/22 1120   Post-Procedure Length (cm) 0.3 cm 03/03/22 1206   Post-Procedure Width (cm) 1 cm 03/03/22 1206   Post-Procedure Depth (cm) 0.1 cm 03/03/22 1206   Post-Procedure Surface Area (cm^2) 0.3 cm^2 03/03/22 1206   Post-Procedure Volume (cm^3) 0.03 cm^3 03/03/22 1206   Distance Tunneling (cm) 0 cm 03/03/22 1120   Tunneling Position ___ O'Clock 0 02/21/22 1108   Undermining Starts ___ O'Clock 0 02/21/22 1108   Undermining Ends___ O'Clock 0 02/21/22 1108   Undermining Maxium Distance (cm) 0 03/03/22 1120   Wound Assessment Slough;Pink/red 03/03/22 1120   Drainage Amount None 02/25/22 0907   Drainage Description Other (Comment) 02/25/22 0907   Odor None 03/03/22 1120   Nissa-wound Assessment Intact 03/03/22 1120   Margins Defined edges 03/03/22 1120   Wound Thickness Description not for Pressure Injury Full thickness 03/03/22 1120   Number of days: 10          Percent of Wound Debrided: 100%    Total Surface Area Debrided:  5.9 sq cm    Diabetic/Pressure/Non Pressure Ulcers only:  Ulcer: Non-Pressure ulcer, fat layer exposed and Other: Partial tendon exposure    Bleeding: Minimal    Hemostasis Achieved: by pressure    Procedural Pain: 5  / 10     Post Procedural Pain: 0 / 10     Response to treatment:  Poorly tolerated by patient., With complaints of pain. I discussed the case with Dr. Noe Torres today. Did have a laser perfusion which showed adequate blood flow to the area of the wound. We discussed placing the patient in a light compression to assist with edema control      Plan:     Treatment Note: Please see attached Discharge Instructions.   These instructions were given and signed by the patient or POA    New Medication(s) at this visit:   New Prescriptions    No medications on file       Other orders at this visit:   Orders Placed This Encounter   Procedures   [id number] [address] Protocol       Discharge Instructions          [address] [facility] Physician Orders and Discharge Instructions  [address] [address] [address] [address] [facility] [place] [address]  Telephone: [phone]  NAME:  Luzma Govae  YOB: 1929  MEDICAL RECORD NUMBER:  [id number]  DATE:  3/3/2022    Wash hands with soap and water prior to and after every dressing change. Wound Cleansing:   · Do not scrub or use excessive force. · With each dressing change, rinse wounds with 0.9% Saline. (May use wound wash or soft contact solution. Both can be purchased at a local drug store). · If unable to obtain saline, may use a gentle soap and water. · Keep wounds dry in the shower unless otherwise instructed by the physician. · For wounds on lower legs, cast covers can be purchased at local drug stores, so that you may shower and keep the wound(s) dry. []  Vashe Wash solution instructions (if prescribed): Apply enough Vashe to soak a piece of gauze and place on wound bed for 5-10 minutes. DO NOT rinse after Vashe has been applied. Follow dressing application as instructed below. Nissa wound Topical Treatments:  Do not apply lotions, creams, or ointments to the skin around the wound bed unless directed as followed:     [] Apply around the wound: [] moisturizing lotion [] Antifungal ointment [] No-Sting barrier film [] Zinc paste [] Other:     Dressing to left groin- keep dry as ordered by Dr. Brian Boateng    Dressings:           Wound Location: left lower leg      Apply Primary Dressing to wound: endoform, covered with hydrofera blue ready         Cover and Secure with: gauze       Avoid contact of tape with skin if possible.      When to change Dressing: [] Daily [] Every Other Day [] Once a week  [] Three times per week: [] Monday, Wednesday, Friday [] Tuesday, Thursday, Saturday  [x] Do Not Change Dressing [] Other:      Multilayer Compression Wrap:    Type: 2 Layer Lite compression wrap   · Applied in Clinic to the :  Left lower leg(s) on 3/3/2022  · Do not get leg(s) with wrap wet. · If wraps become too tight call the center or completely remove the wrap. · Elevate leg(s) above the level of the heart when sitting. · Avoid prolonged standing in one place. Dietary:  Important dietary reminders:  1. Increase Protein intake (i.e. Lean meats, fish, eggs, legumes, and yogurt)  2. No added salt  3. If diabetic, follow a diabetic diet and check glucose prior to meals or as instructed by your physician. Dietary Supplements:  [] Octaviano  [] 30ml ProStat  [] EnsureEnlive [] Ensure Max/Premier  [] Other:    If you are still having pain after you go home:   For wounds on lower legs or arms, elevate the affected limb.  Use over-the-counter medications you would normally use for pain as permitted by your primary care doctor.  For persistent pain not relieved by the above interventions, please call your primary care doctor. Return Appointment:  Rony Return Appointment: With Dr. Nakul Pendleton  in  1 [facility])    [] Return Appointment for a Wound Assessment (Nurse Visit) on:       [x] Orders placed during your visit:   Orders Placed This Encounter   Procedures    Initiate Outpatient Wound Care Protocol       o All other future appointments:  Future Appointments   Date Time Provider Gilbert Frey   3/24/2022 11:30 AM SCHEDULE, [facility] EMG ROOM [facility]   4/4/2022 10:00 AM Dev Kowalski MD [facility]   -       Your nurse  is:  [facility]     Electronically signed by Óscar Mojica RN on 3/3/2022 at 12:06 PM     Ramila Baez [address]: Should you experience any significant changes in your wound(s) or have questions about your wound care, please contact the [address] [place] [address] at [phone].  We are open from 8:00am - 4:30p Monday thru Friday except for Wednesdays which we are closed. Please give us 24-48 hours to return your call. Call your doctor now or seek immediate medical care if:    · You have symptoms of infection, such as:  ? Increased pain, swelling, warmth, or redness. ? Red streaks leading from the area. ? Pus draining from the area. ? A fever.         Physician for this visit and orders: Rut Cotton MD    [] Patient unable to sign Discharge Instructions given to ECF/Transportation/POA        Electronically signed by Rut Cotton MD on 3/3/2022 at 12:40 PM

## 2022-03-04 ENCOUNTER — TELEPHONE (OUTPATIENT)
Dept: INTERNAL MEDICINE CLINIC | Age: 87
End: 2022-03-04

## 2022-03-04 NOTE — TELEPHONE ENCOUNTER
Patient is returning a call from our office about lab results. Please call her back at number provided.

## 2022-03-08 ENCOUNTER — CARE COORDINATION (OUTPATIENT)
Dept: CASE MANAGEMENT | Age: 87
End: 2022-03-08

## 2022-03-08 NOTE — CARE COORDINATION
[facility] Transitions Follow Up Call    3/8/2022    Patient: Constance Pruitt  Patient : 1929   MRN: [id number]  Reason for Admission:   Discharge Date: 22 RARS: Readmission Risk Score: 16.8 ( )         Spoke with: [address] Transitions Subsequent and Final Call    Subsequent and Final Calls  Do you have any ongoing symptoms?: No  Have your medications changed?: No  Do you have any questions related to your medications?: No  Do you currently have any active services?: Yes  Are you currently active with any services?: Home Health  Do you have any needs or concerns that I can assist you with?: No  Identified Barriers: None  Care Transitions Interventions  No Identified Needs  Other Interventions:         Pt states doing well, no issues or concerns. Had f/u appts with surgeon and PCP that were good. Continues to go to the wound clinic for her leg. No need for further f/u CTC calls per pt has private caregivers and she is doing well.          Follow Up  Future Appointments   Date Time Provider Gilbert Frey   3/10/2022  9:15 AM Karena Lauren MD [facility] WOUND [facility]   3/24/2022 11:30 AM SCHEDULE, [facility] EMG ROOM [facility] EMG [facility]   2022 10:00 AM MD [facility]       Erma Ortega RN

## 2022-03-10 ENCOUNTER — HOSPITAL ENCOUNTER (OUTPATIENT)
Dept: WOUND CARE | Age: 87
Discharge: HOME | End: 2022-03-10
Payer: MEDICARE

## 2022-03-10 VITALS
RESPIRATION RATE: 18 BRPM | SYSTOLIC BLOOD PRESSURE: 178 MMHG | HEART RATE: 67 BPM | TEMPERATURE: 97.7 F | DIASTOLIC BLOOD PRESSURE: 73 MMHG

## 2022-03-10 DIAGNOSIS — S81.802A WOUND OF LEFT LOWER EXTREMITY, INITIAL ENCOUNTER: ICD-10-CM

## 2022-03-10 DIAGNOSIS — I73.9 PERIPHERAL ARTERY DISEASE (HCC): Primary | ICD-10-CM

## 2022-03-10 PROCEDURE — 11042 DBRDMT SUBQ TIS 1ST 20SQCM/<: CPT | Performed by: SPECIALIST

## 2022-03-10 PROCEDURE — 11042 DBRDMT SUBQ TIS 1ST 20SQCM/<: CPT

## 2022-03-10 PROCEDURE — 6370000000 HC RX 637 (ALT 250 FOR IP): Performed by: SPECIALIST

## 2022-03-10 RX ORDER — BACITRACIN ZINC AND POLYMYXIN B SULFATE 500; 1000 [USP'U]/G; [USP'U]/G
OINTMENT TOPICAL ONCE
Status: CANCELLED | OUTPATIENT
Start: 2022-03-10 | End: 2022-03-10

## 2022-03-10 RX ORDER — BETAMETHASONE DIPROPIONATE 0.05 %
OINTMENT (GRAM) TOPICAL ONCE
Status: CANCELLED | OUTPATIENT
Start: 2022-03-10 | End: 2022-03-10

## 2022-03-10 RX ORDER — LIDOCAINE HYDROCHLORIDE 20 MG/ML
JELLY TOPICAL ONCE
Status: CANCELLED | OUTPATIENT
Start: 2022-03-10 | End: 2022-03-10

## 2022-03-10 RX ORDER — GENTAMICIN SULFATE 1 MG/G
OINTMENT TOPICAL ONCE
Status: CANCELLED | OUTPATIENT
Start: 2022-03-10 | End: 2022-03-10

## 2022-03-10 RX ORDER — CLOBETASOL PROPIONATE 0.5 MG/G
OINTMENT TOPICAL ONCE
Status: CANCELLED | OUTPATIENT
Start: 2022-03-10 | End: 2022-03-10

## 2022-03-10 RX ORDER — LIDOCAINE HYDROCHLORIDE 40 MG/ML
SOLUTION TOPICAL ONCE
Status: CANCELLED | OUTPATIENT
Start: 2022-03-10 | End: 2022-03-10

## 2022-03-10 RX ORDER — BACITRACIN, NEOMYCIN, POLYMYXIN B 400; 3.5; 5 [USP'U]/G; MG/G; [USP'U]/G
OINTMENT TOPICAL ONCE
Status: CANCELLED | OUTPATIENT
Start: 2022-03-10 | End: 2022-03-10

## 2022-03-10 RX ORDER — LIDOCAINE 50 MG/G
OINTMENT TOPICAL ONCE
Status: COMPLETED | OUTPATIENT
Start: 2022-03-10 | End: 2022-03-10

## 2022-03-10 RX ORDER — GINSENG 100 MG
CAPSULE ORAL ONCE
Status: CANCELLED | OUTPATIENT
Start: 2022-03-10 | End: 2022-03-10

## 2022-03-10 RX ORDER — LIDOCAINE 40 MG/G
CREAM TOPICAL ONCE
Status: CANCELLED | OUTPATIENT
Start: 2022-03-10 | End: 2022-03-10

## 2022-03-10 RX ORDER — LIDOCAINE 50 MG/G
OINTMENT TOPICAL ONCE
Status: CANCELLED | OUTPATIENT
Start: 2022-03-10 | End: 2022-03-10

## 2022-03-10 RX ORDER — SODIUM CHLOR/HYPOCHLOROUS ACID 0.033 %
SOLUTION, IRRIGATION IRRIGATION
Qty: 1 EACH | Refills: 2 | Status: SHIPPED | OUTPATIENT
Start: 2022-03-10 | End: 2022-09-21 | Stop reason: ALTCHOICE

## 2022-03-10 RX ADMIN — LIDOCAINE: 50 OINTMENT TOPICAL at 09:43

## 2022-03-10 ASSESSMENT — PAIN DESCRIPTION - PROGRESSION: CLINICAL_PROGRESSION: NOT CHANGED

## 2022-03-10 ASSESSMENT — PAIN DESCRIPTION - ONSET: ONSET: AWAKENED FROM SLEEP

## 2022-03-10 ASSESSMENT — PAIN SCALES - GENERAL: PAINLEVEL_OUTOF10: 10

## 2022-03-10 ASSESSMENT — PAIN DESCRIPTION - LOCATION: LOCATION: LEG

## 2022-03-10 ASSESSMENT — PAIN - FUNCTIONAL ASSESSMENT: PAIN_FUNCTIONAL_ASSESSMENT: PREVENTS OR INTERFERES SOME ACTIVE ACTIVITIES AND ADLS

## 2022-03-10 ASSESSMENT — PAIN DESCRIPTION - FREQUENCY: FREQUENCY: INTERMITTENT

## 2022-03-10 ASSESSMENT — PAIN DESCRIPTION - ORIENTATION: ORIENTATION: LEFT

## 2022-03-10 ASSESSMENT — PAIN DESCRIPTION - DESCRIPTORS: DESCRIPTORS: SHARP;JABBING

## 2022-03-10 ASSESSMENT — PAIN DESCRIPTION - PAIN TYPE: TYPE: ACUTE PAIN

## 2022-03-10 NOTE — PROGRESS NOTES
1227 [facility]  Progress Note and Procedure Note      Jewels Lazo  MEDICAL RECORD NUMBER:  [id number]  AGE: 80 y.o. GENDER: female  : 1929  EPISODE DATE:  3/10/2022    Subjective:     Chief Complaint   Patient presents with    Wound Check     left lower leg         HISTORY of PRESENT ILLNESS HPI     Jewels Lazo is a 80 y.o. female who presents today for wound/ulcer evaluation. History of Wound Context: Patient continues follow-up here in wound care for a ulcer on the left lower extremity as a result of arterial insufficiency. She had recently undergone a left common femoral endarterectomy with bovine patch angioplasty. She states there has been continued pain from the wound. In addition a follow-up laser perfusion study was recently performed. Pain Assessment:  Wound/Ulcer Pain Timing/Severity: intermittent  Quality of pain: sharp  Severity:  5 / 10   Modifying Factors: None  Associated Signs/Symptoms: pain    Ulcer Identification:  Ulcer Type: arterial  Contributing Factors: diabetes and arterial insufficiency    Objective:      BP (!) 178/73   Pulse 67   Temp 97.7 °F (36.5 °C) (Temporal)   Resp 18     Wt Readings from Last 3 Encounters:   22 108 lb 3.2 oz (49.1 kg)   22 107 lb (48.5 kg)   22 107 lb 5.8 oz (48.7 kg)       PHYSICAL EXAM    Extremities: no cyanosis, no clubbing, minimal + edema-  left lower extremity with full-thickness wound containing fibrin and slough, minimal granulation tissue, partial exposure of muscle and tendon. Left groin incision site nearly epithelialized  Assessment:      1. Peripheral artery disease ([address]    2. Wound of left lower extremity, initial encounter         Procedure Note  Indications:  Based on my examination of this patient's wound(s) today, sharp excision is required to promote healing and evaluate the extent healing. Performed by: Celena Anderson MD    Consent obtained?  Yes    Time out taken: Yes    Pain Control: Anesthetic: 5% Lidocaine Ointment Topical     Debridement:Excisional Debridement    Using curette the wound was sharply debrided    down through and including the removal of  epidermis, dermis and subcutaneous tissue. Devitalized Tissue Debrided:  fibrin and slough      Pre Debridement Measurements:  Are located in the Wound Documentation Flow Sheet   Wound #: 1     Post  Debridement Measurements:  Wound 11/22/21 Leg Left; Lower;Distal #1 (Active)   Wound Image   03/03/22 1120   Wound Etiology Surgical 02/16/22 0800   Dressing Status Clean;Dry; Intact 02/16/22 0800   Wound Cleansed Cleansed with saline 03/10/22 0938   Dressing/Treatment Honey gel/honey paste 03/10/22 1001   Wound Length (cm) 2.9 cm 03/10/22 0938   Wound Width (cm) 2 cm 03/10/22 0938   Wound Depth (cm) 0.1 cm 03/10/22 0938   Wound Surface Area (cm^2) 5.8 cm^2 03/10/22 0938   Change in Wound Size % (l*w) -107.14 03/10/22 0938   Wound Volume (cm^3) 0.58 cm^3 03/10/22 0938   Wound Healing % -107 03/10/22 0938   Post-Procedure Length (cm) 3 cm 03/10/22 1000   Post-Procedure Width (cm) 2.1 cm 03/10/22 1000   Post-Procedure Depth (cm) 0.3 cm 03/10/22 1000   Post-Procedure Surface Area (cm^2) 6.3 cm^2 03/10/22 1000   Post-Procedure Volume (cm^3) 1.89 cm^3 03/10/22 1000   Distance Tunneling (cm) 0 cm 03/03/22 1120   Undermining Maxium Distance (cm) 0 03/03/22 1120   Wound Assessment [facility] 03/10/22 0938   Drainage Amount Small 03/10/22 0938   Drainage Description Serosanguinous 03/10/22 0938   Odor None 03/10/22 0938   Nissa-wound Assessment Maceration 03/10/22 0938   Margins Defined edges 03/10/22 0938   Wound Thickness Description not for Pressure Injury Full thickness 03/10/22 0938   Number of days: 108       Wound 02/21/22 #2 left groin (Active)   Wound Image   03/03/22 1120   Wound Etiology Non-Healing Surgical 02/21/22 1108   Wound Cleansed Cleansed with saline 03/10/22 0938   Dressing/Treatment Collagen with Ag 02/25/22 0945   Wound Length (cm) 0 cm 03/10/22 0938   Wound Width (cm) 0 cm 03/10/22 0160   Wound Depth (cm) 0 cm 03/10/22 0938   Wound Surface Area (cm^2) 0 cm^2 03/10/22 0938   Change in Wound Size % (l*w) 100 03/10/22 0938   Wound Volume (cm^3) 0 cm^3 03/10/22 0938   Wound Healing % 100 03/10/22 0938   Post-Procedure Length (cm) 0.3 cm 03/03/22 1206   Post-Procedure Width (cm) 1 cm 03/03/22 1206   Post-Procedure Depth (cm) 0.1 cm 03/03/22 1206   Post-Procedure Surface Area (cm^2) 0.3 cm^2 03/03/22 1206   Post-Procedure Volume (cm^3) 0.03 cm^3 03/03/22 1206   Distance Tunneling (cm) 0 cm 03/03/22 1120   Tunneling Position ___ O'Clock 0 02/21/22 1108   Undermining Starts ___ O'Clock 0 02/21/22 1108   Undermining Ends___ O'Clock 0 02/21/22 1108   Undermining Maxium Distance (cm) 0 03/03/22 1120   Wound Assessment Slough;Pink/red 03/03/22 1120   Drainage Amount None 02/25/22 0907   Drainage Description Other (Comment) 02/25/22 0907   Odor None 03/03/22 1120   Nissa-wound Assessment Intact 03/03/22 1120   Margins Defined edges 03/03/22 1120   Wound Thickness Description not for Pressure Injury Full thickness 03/03/22 1120   Number of days: 16          Percent of Wound Debrided: 100%    Total Surface Area Debrided:  6.3 sq cm    Diabetic/Pressure/Non Pressure Ulcers only:  Ulcer: Non-Pressure ulcer, fat layer exposed    Bleeding: Minimal    Hemostasis Achieved: by pressure    Procedural Pain: 6  / 10     Post Procedural Pain: 0 / 10     Response to treatment: With complaints of pain. Wound is essentially unchanged or perhaps larger. I have spoken with Dr. Vickie Morgan. I will try another week of vashe and Medihoney. If there is no improvement, patient will follow up with Dr. Vickie Morgan for consideration of bypass graft        Plan:     Treatment Note: Please see attached Discharge Instructions.   These instructions were given and signed by the patient or POA    New Medication(s) at this visit:   New Prescriptions    WOUND CLEANSERS (VASHE CLEANSING) SOLN Soak vashe on gauze pad and place on wound for 5-10 minutes       Other orders at this visit:   Orders Placed This Encounter   Procedures   [id number] [address] Protocol       Discharge Instructions          [address] [facility] Physician Orders and Discharge Instructions  [address] [address] [address]  Telephone: [phone]  NAME:  Lynda Rivera  YOB: 1929  MEDICAL RECORD NUMBER:  [id number]  DATE:  3/10/2022    Wash hands with soap and water prior to and after every dressing change. Wound Cleansing:   · Do not scrub or use excessive force. · With each dressing change, rinse wounds with 0.9% Saline. (May use wound wash or soft contact solution. Both can be purchased at a local drug store). · If unable to obtain saline, may use a gentle soap and water. · Keep wounds dry in the shower unless otherwise instructed by the physician. · For wounds on lower legs, cast covers can be purchased at local drug stores, so that you may shower and keep the wound(s) dry. [x]  Vashe Wash solution instructions (if prescribed): Apply enough Vashe to soak a piece of gauze and place on wound bed for 5-10 minutes. DO NOT rinse after Vashe has been applied. Follow dressing application as instructed below. Nissa wound Topical Treatments:  Do not apply lotions, creams, or ointments to the skin around the wound bed unless directed as followed:     [x] Apply around the wound: [x] moisturizing lotion [] Antifungal ointment [] No-Sting barrier film [] Zinc paste [] Other:       Dressings:           Wound Location: Left Lower Leg      Apply Primary Dressing to wound:       Honey gel     Cover and Secure with:     Cover and Secure with: 4X4 gauze pad  Spandage   Avoid contact of tape with skin if possible.      When to change Dressing: [x] Daily [] Every Other Day [] Once a week  [] Three times per week: [] Monday, Wednesday, Friday [] Tuesday, Thursday, Saturday  [] Do Not Change Dressing [] Other:      [x]DME/Wound Dressing Supplies ordered at this visit: []Yes [x]No  o Supplies Provided by:   o Please call them directly to reorder supplies when you run out.  o CONTINUE TO USE THE SUPPLIES YOU HAVE AVAILABLE. IT IS MOST IMPORTANT TO KEEP THE WOUND COVERED AT ALL TIMES. Edema Control:   [x]  Spandagrip:Strength: Medium compression 10-20 mm/Hg    [x] Left Lower Leg  [] Right Lower Leg        Apply every morning immediately when getting up. They should be applied to affected leg(s) from mid foot to knee making sure to cover the heel. Remove every night before going to bed. [x] Elevate leg(s) above the level of the heart for 30 minutes 4-5 times a day and/or when sitting. [x] Avoid prolonged standing in one place. Dietary:  Important dietary reminders:  1. Increase Protein intake (i.e. Lean meats, fish, eggs, legumes, and yogurt)  2. No added salt  3. If diabetic, follow a diabetic diet and check glucose prior to meals or as instructed by your physician. Dietary Supplements:  [] Octaviano  [] 30ml ProStat  [] EnsureEnlive [] Ensure Max/Premier  [] Other:    If you are still having pain after you go home:   For wounds on lower legs or arms, elevate the affected limb.  Use over-the-counter medications you would normally use for pain as permitted by your primary care doctor.  For persistent pain not relieved by the above interventions, please call your primary care doctor.      Return Appointment:   Return Appointment: With Dr. Robert Dumont  in  1 [facility])    [] Return Appointment for a Wound Assessment (Nurse Visit) on:       [x] Orders placed during your visit:   Orders Placed This Encounter   Procedures    Initiate Outpatient Wound Care Protocol       o All other future appointments:  Future Appointments   Date Time Provider Gilbert Frey   3/24/2022 11:30 AM SCHEDULE, [facility] EMG ROOM [facility]   4/4/2022 10:00 AM Emily Gonzalez

## 2022-03-11 ENCOUNTER — TELEPHONE (OUTPATIENT)
Dept: ORTHOPEDIC SURGERY | Age: 87
End: 2022-03-11

## 2022-03-11 ENCOUNTER — TELEPHONE (OUTPATIENT)
Dept: WOUND CARE | Age: 87
End: 2022-03-11

## 2022-03-11 DIAGNOSIS — I73.9 PERIPHERAL ARTERY DISEASE (HCC): ICD-10-CM

## 2022-03-11 NOTE — TELEPHONE ENCOUNTER
Returned call to pt. Encouraged pt to continue with taking Tylenol for pain as stated on the bottle, elevate the leg when possible and to f/u with Dr Kathy Aguilar for f/u as stated from their last visit. Pt concerned about infection but symptoms have not changed r/t to pain and look of wound for past 2 visits. Pt was agreeable with the current plan as stated above and with the continue plan of drsg changes. Offered a sooner appt with Dr Carlee Michael on Monday but pt stated she will wait and call Monday if things are worse.

## 2022-03-11 NOTE — TELEPHONE ENCOUNTER
General Question     Subject: RT HAND TINGLING  Patient and /or Facility Request: Clementine Mcardle  Contact Number: [phone]    PATIENT CALLING REQUESTING TO SPEAK TO SOMEONE REGARDING TINGLING IN HER RT HAND. PLEASE CALL BACK AT THE ABOVE UMBER.

## 2022-03-16 ENCOUNTER — OFFICE VISIT (OUTPATIENT)
Dept: ORTHOPEDIC SURGERY | Age: 87
End: 2022-03-16
Payer: MEDICARE

## 2022-03-16 VITALS — WEIGHT: 108 LBS | HEIGHT: 61 IN | RESPIRATION RATE: 16 BRPM | BODY MASS INDEX: 20.39 KG/M2

## 2022-03-16 DIAGNOSIS — G56.01 CARPAL TUNNEL SYNDROME, RIGHT: Primary | ICD-10-CM

## 2022-03-16 PROCEDURE — G8420 CALC BMI NORM PARAMETERS: HCPCS | Performed by: ORTHOPAEDIC SURGERY

## 2022-03-16 PROCEDURE — G8427 DOCREV CUR MEDS BY ELIG CLIN: HCPCS | Performed by: ORTHOPAEDIC SURGERY

## 2022-03-16 PROCEDURE — 4040F PNEUMOC VAC/ADMIN/RCVD: CPT | Performed by: ORTHOPAEDIC SURGERY

## 2022-03-16 PROCEDURE — 1123F ACP DISCUSS/DSCN MKR DOCD: CPT | Performed by: ORTHOPAEDIC SURGERY

## 2022-03-16 PROCEDURE — G8483 FLU IMM NO ADMIN DOC REA: HCPCS | Performed by: ORTHOPAEDIC SURGERY

## 2022-03-16 PROCEDURE — 99213 OFFICE O/P EST LOW 20 MIN: CPT | Performed by: ORTHOPAEDIC SURGERY

## 2022-03-16 PROCEDURE — 1036F TOBACCO NON-USER: CPT | Performed by: ORTHOPAEDIC SURGERY

## 2022-03-16 PROCEDURE — 1090F PRES/ABSN URINE INCON ASSESS: CPT | Performed by: ORTHOPAEDIC SURGERY

## 2022-03-16 PROCEDURE — 1111F DSCHRG MED/CURRENT MED MERGE: CPT | Performed by: ORTHOPAEDIC SURGERY

## 2022-03-16 NOTE — PROGRESS NOTES
This 80year old retired woman is seen in referral for Mai Hollis DO. We last examined this patient 3 years ago at which time Dr. Vahe Reed released her right carpal tunnel and right middle trigger finger. She obtained prompt and complete relief of all symptoms and has had no interval symptoms in that hand until 2 weeks ago when she developed intermittent, transient episodes of paresthesias in the right hand/fingers. She denies injury, pain, swelling or weakness in either upper extremity. Rubbing her hand against her thigh promptly relieves the suyptoms. Symptoms have not changed recently. The patient's social history, past medical history, family history, medications, allergies and review of systems have been reviewed, dated 8/10/20 and are recorded in the chart. I have personally examined and reviewed all previous imaging studies, laboratory tests(CBC,CMP,HbA1c, Lipids) and medical encounters. On physical examination the patient is Height: 5' 1\" (154.9 cm) tall and weighs Weight: 108 lb (49 kg). Respirations are 18 per minute. The patient is well nourished, is oriented to time and place, demonstrates appropriate mood and affect as well as normal gait and station. There is no soft tissue swelling present about the right wrist, hand. There is no discoloration. There is no deformity or atrophy. Tenderness is not present on palpation. Range of motion of the fingers, thumb and wrist is normal bilaterally and is not accompanied by pain. Skin is intact, as is distal circulation and sensation. Gross muscle strength is normal bilaterally. Hand and wrist joints are stable. There are no subcutaneous nodules or enlarged epitrochlear lymph nodes. Impression: No clinical evidence of recurrent right carpal tunnel syndrome. The nature of this medical problem is fully discussed with the patient, including all treatment options. All questions are answered.     It is not unusual for patients who

## 2022-03-17 ENCOUNTER — TELEPHONE (OUTPATIENT)
Dept: INTERNAL MEDICINE CLINIC | Age: 87
End: 2022-03-17

## 2022-03-17 ENCOUNTER — HOSPITAL ENCOUNTER (OUTPATIENT)
Dept: WOUND CARE | Age: 87
Discharge: HOME | End: 2022-03-17
Payer: MEDICARE

## 2022-03-17 VITALS
SYSTOLIC BLOOD PRESSURE: 184 MMHG | HEART RATE: 64 BPM | RESPIRATION RATE: 16 BRPM | TEMPERATURE: 97.1 F | DIASTOLIC BLOOD PRESSURE: 71 MMHG

## 2022-03-17 DIAGNOSIS — S81.802A WOUND OF LEFT LOWER EXTREMITY, INITIAL ENCOUNTER: ICD-10-CM

## 2022-03-17 DIAGNOSIS — S81.802D WOUND OF LEFT LOWER EXTREMITY, SUBSEQUENT ENCOUNTER: ICD-10-CM

## 2022-03-17 DIAGNOSIS — I73.9 PERIPHERAL ARTERY DISEASE (HCC): Primary | ICD-10-CM

## 2022-03-17 PROCEDURE — 11042 DBRDMT SUBQ TIS 1ST 20SQCM/<: CPT | Performed by: SPECIALIST

## 2022-03-17 PROCEDURE — 11042 DBRDMT SUBQ TIS 1ST 20SQCM/<: CPT

## 2022-03-17 PROCEDURE — 6370000000 HC RX 637 (ALT 250 FOR IP): Performed by: SPECIALIST

## 2022-03-17 RX ORDER — CLOBETASOL PROPIONATE 0.5 MG/G
OINTMENT TOPICAL ONCE
Status: CANCELLED | OUTPATIENT
Start: 2022-03-17 | End: 2022-03-17

## 2022-03-17 RX ORDER — BETAMETHASONE DIPROPIONATE 0.05 %
OINTMENT (GRAM) TOPICAL ONCE
Status: CANCELLED | OUTPATIENT
Start: 2022-03-17 | End: 2022-03-17

## 2022-03-17 RX ORDER — LIDOCAINE 50 MG/G
OINTMENT TOPICAL ONCE
Status: CANCELLED | OUTPATIENT
Start: 2022-03-17 | End: 2022-03-17

## 2022-03-17 RX ORDER — GINSENG 100 MG
CAPSULE ORAL ONCE
Status: CANCELLED | OUTPATIENT
Start: 2022-03-17 | End: 2022-03-17

## 2022-03-17 RX ORDER — LIDOCAINE 50 MG/G
OINTMENT TOPICAL ONCE
Status: COMPLETED | OUTPATIENT
Start: 2022-03-17 | End: 2022-03-17

## 2022-03-17 RX ORDER — LIDOCAINE HYDROCHLORIDE 20 MG/ML
JELLY TOPICAL ONCE
Status: CANCELLED | OUTPATIENT
Start: 2022-03-17 | End: 2022-03-17

## 2022-03-17 RX ORDER — GENTAMICIN SULFATE 1 MG/G
OINTMENT TOPICAL ONCE
Status: CANCELLED | OUTPATIENT
Start: 2022-03-17 | End: 2022-03-17

## 2022-03-17 RX ORDER — LIDOCAINE 40 MG/G
CREAM TOPICAL ONCE
Status: CANCELLED | OUTPATIENT
Start: 2022-03-17 | End: 2022-03-17

## 2022-03-17 RX ORDER — BACITRACIN ZINC AND POLYMYXIN B SULFATE 500; 1000 [USP'U]/G; [USP'U]/G
OINTMENT TOPICAL ONCE
Status: CANCELLED | OUTPATIENT
Start: 2022-03-17 | End: 2022-03-17

## 2022-03-17 RX ORDER — LIDOCAINE HYDROCHLORIDE 40 MG/ML
SOLUTION TOPICAL ONCE
Status: COMPLETED | OUTPATIENT
Start: 2022-03-17 | End: 2022-03-17

## 2022-03-17 RX ORDER — LIDOCAINE HYDROCHLORIDE 40 MG/ML
SOLUTION TOPICAL ONCE
Status: CANCELLED | OUTPATIENT
Start: 2022-03-17 | End: 2022-03-17

## 2022-03-17 RX ORDER — BACITRACIN, NEOMYCIN, POLYMYXIN B 400; 3.5; 5 [USP'U]/G; MG/G; [USP'U]/G
OINTMENT TOPICAL ONCE
Status: CANCELLED | OUTPATIENT
Start: 2022-03-17 | End: 2022-03-17

## 2022-03-17 RX ADMIN — LIDOCAINE: 50 OINTMENT TOPICAL at 10:12

## 2022-03-17 RX ADMIN — COLLAGENASE SANTYL: 250 OINTMENT TOPICAL at 10:13

## 2022-03-17 RX ADMIN — LIDOCAINE HYDROCHLORIDE: 40 SOLUTION TOPICAL at 09:31

## 2022-03-17 ASSESSMENT — PAIN DESCRIPTION - PAIN TYPE: TYPE: ACUTE PAIN

## 2022-03-17 ASSESSMENT — PAIN DESCRIPTION - DESCRIPTORS: DESCRIPTORS: ACHING

## 2022-03-17 ASSESSMENT — PAIN DESCRIPTION - LOCATION: LOCATION: LEG

## 2022-03-17 ASSESSMENT — PAIN DESCRIPTION - ONSET: ONSET: ON-GOING

## 2022-03-17 ASSESSMENT — PAIN SCALES - GENERAL: PAINLEVEL_OUTOF10: 5

## 2022-03-17 ASSESSMENT — PAIN DESCRIPTION - FREQUENCY: FREQUENCY: INTERMITTENT

## 2022-03-17 ASSESSMENT — PAIN DESCRIPTION - ORIENTATION: ORIENTATION: LEFT

## 2022-03-17 NOTE — TELEPHONE ENCOUNTER
Patient states that her wound has worsened in the past 2 weeks, and today it's worse than ever. She's been going to the wound specialist every week. She states she is in a lot of pain from this. She wants to see Dr. Marichuy Carranza only. She is scheduled Saturday 03- with Dr. Marichuy Carranza, but would like to be seen sooner. Can Dr. Marichuy Carranza accommodate this request?  Please contact patient @ phone # provided.

## 2022-03-17 NOTE — PROGRESS NOTES
taken: Yes    Pain Control: Anesthetic: 5% Lidocaine Ointment Topical     Debridement:Excisional Debridement    Using curette the wound was sharply debrided    down through and including the removal of  epidermis, dermis and subcutaneous tissue. Devitalized Tissue Debrided:  fibrin and slough      Pre Debridement Measurements:  Are located in the Wound Documentation Flow Sheet   Wound #: 1     Post  Debridement Measurements:  Wound 11/22/21 Leg Left; Lower;Distal #1 (Active)   Wound Image   03/03/22 1120   Wound Etiology Surgical 02/16/22 0800   Dressing Status Clean;Dry; Intact 02/16/22 0800   Wound Cleansed Cleansed with saline 03/17/22 0936   Dressing/Treatment Pharmaceutical agent (see MAR) 03/17/22 1012   Wound Length (cm) 2.5 cm 03/17/22 0936   Wound Width (cm) 2.5 cm 03/17/22 0936   Wound Depth (cm) 0.1 cm 03/17/22 0936   Wound Surface Area (cm^2) 6.25 cm^2 03/17/22 0936   Change in Wound Size % (l*w) -123.21 03/17/22 0936   Wound Volume (cm^3) 0.625 cm^3 03/17/22 0936   Wound Healing % -123 03/17/22 0936   Post-Procedure Length (cm) 2.6 cm 03/17/22 0959   Post-Procedure Width (cm) 2.6 cm 03/17/22 0959   Post-Procedure Depth (cm) 0.3 cm 03/17/22 0959   Post-Procedure Surface Area (cm^2) 6.76 cm^2 03/17/22 0959   Post-Procedure Volume (cm^3) 2. 028 cm^3 03/17/22 0959   Distance Tunneling (cm) 0 cm 03/17/22 0936   Undermining Maxium Distance (cm) 0 03/17/22 0936   Wound Assessment [facility] 03/17/22 0936   Drainage Amount Small 03/17/22 0936   Drainage Description Yellow 03/17/22 0936   Odor None 03/17/22 0936   Nissa-wound Assessment Fragile;Edematous 03/17/22 0936   Margins Defined edges 03/17/22 0936   Wound Thickness Description not for Pressure Injury Full thickness 03/17/22 0936   Number of days: 115          Percent of Wound Debrided: 100%    Total Surface Area Debrided:  6.7 sq cm    Diabetic/Pressure/Non Pressure Ulcers only:  Ulcer: Non-Pressure ulcer, fat layer exposed    Bleeding: Minimal    Hemostasis Achieved: by pressure    Procedural Pain: 6  / 10     Post Procedural Pain: 0 / 10     Response to treatment:  Poorly tolerated by patient., With complaints of pain. Plan:   I have spoken to Dr. Susanna Soares as I believe he needs to see the patient in follow-up in the near future. I have asked the patient to call office to schedule. There has been no real improvement in the wound appearance. Perhaps patient does need a fem distal bypass graft to improve blood flow  Treatment Note: Please see attached Discharge Instructions. These instructions were given and signed by the patient or POA    New Medication(s) at this visit:   New Prescriptions    COLLAGENASE ([address]) 250 UNIT/GM OINTMENT    Apply  To left lower leg wound daily       Other orders at this visit:   Orders Placed This Encounter   Procedures   [address] Protocol       Discharge Instructions          [address] [facility] Physician Orders and Discharge Instructions  [address] [address] [facility]. [place] [address]  Telephone: [phone]  NAME:  Mark Chairez  YOB: 1929  MEDICAL RECORD NUMBER:  [id number]  DATE:  3/17/2022    Wash hands with soap and water prior to and after every dressing change. Wound Cleansing:   · Do not scrub or use excessive force. · With each dressing change, rinse wounds with 0.9% Saline. (May use wound wash or soft contact solution. Both can be purchased at a local drug store). · If unable to obtain saline, may use a gentle soap and water. · Keep wounds dry in the shower unless otherwise instructed by the physician. · For wounds on lower legs, cast covers can be purchased at local drug stores, so that you may shower and keep the wound(s) dry. []  Vashe Wash solution instructions (if prescribed): Apply enough Vashe to soak a piece of gauze and place on wound bed for 5-10 minutes. DO NOT rinse after Vashe has been applied.   Follow dressing application as instructed below. Nissa wound Topical Treatments:  Do not apply lotions, creams, or ointments to the skin around the wound bed unless directed as followed:     [] Apply around the wound: [] moisturizing lotion [] Antifungal ointment [] No-Sting barrier film [] Zinc paste [] Other:       Dressings:           Wound Location: left lower leg    Apply Primary Dressing to wound:       Pharmaceutical medication santyl- nickel size thickness     Cover and Secure with:     Cover and Secure with: 4X4 gauze pad  Bulky roll gauze   Avoid contact of tape with skin if possible.  When to change Dressing: [x] Daily [] Every Other Day [] Once a week  [] Three times per week: [] Monday, Wednesday, Friday [] Tuesday, Thursday, Saturday  [] Do Not Change Dressing [] Other:    Edema Control:  Apply: [] Compression Stocking  [] Left Lower Leg [] Right Lower Leg     [x]  Spandagrip:Strength: Medium compression 10-20 mm/Hg    [x] Left Lower Leg  [] Right Lower Leg        Apply every morning immediately when getting up. They should be applied to affected leg(s) from mid foot to knee making sure to cover the heel. Remove every night before going to bed. [x] Elevate leg(s) above the level of the heart for 30 minutes 4-5 times a day and/or when sitting. [x] Avoid prolonged standing in one place. Dietary:  Important dietary reminders:  1. Increase Protein intake (i.e. Lean meats, fish, eggs, legumes, and yogurt)  2. No added salt  3. If diabetic, follow a diabetic diet and check glucose prior to meals or as instructed by your physician. Dietary Supplements:  [] Octaviano  [] 30ml ProStat  [] EnsureEnlive [] Ensure Max/Premier  [] Other:    If you are still having pain after you go home:   For wounds on lower legs or arms, elevate the affected limb.  Use over-the-counter medications you would normally use for pain as permitted by your primary care doctor.    For persistent pain not relieved by the above interventions, please call your primary care doctor. Return Appointment:  Rony Return Appointment: With Dr. Rosetta Lew  in  1 [facility])    [] Return Appointment for a Wound Assessment (Nurse Visit) on:       [x] Orders placed during your visit:   Orders Placed This Encounter   Procedures    Initiate Outpatient Wound Care Protocol       o All other future appointments:  Future Appointments   Date Time Provider Gilbert Frey   3/24/2022 11:30 AM [facility]EDULE[facility]   4/4/2022 10:00 AM Jonathan Andrews MD [facility]   -       Your nurse  is:  Keyonna Dunaway     Electronically signed by Dinesh Mckeon RN on 3/17/2022 at 10:02 AM     [address] [facility] [address] Information: Should you experience any significant changes in your wound(s) or have questions about your wound care, please contact the [address] [place] [address] at [phone]. We are open from 8:00am - 4:30p Monday thru Friday except for Wednesdays which we are closed. Please give us 24-48 hours to return your call. Call your doctor now or seek immediate medical care if:    · You have symptoms of infection, such as:  ? Increased pain, swelling, warmth, or redness. ? Red streaks leading from the area. ? Pus draining from the area. ? A fever.         Physician for this visit and orders: Keerthi Flores MD    [] Patient unable to sign Discharge Instructions given to ECF/Transportation/POA        Electronically signed by Keerthi Flores MD on 3/17/2022 at 11:56 AM

## 2022-03-17 NOTE — TELEPHONE ENCOUNTER
Patient can be seen sooner, but I am not sure what I am going to do about the wound. I guess she has to make sure that this is not a cancer.   Has she had a biopsy

## 2022-03-17 NOTE — TELEPHONE ENCOUNTER
Pt did go to wound specialist today, she said she \"really did not want him to do anything, I just wanted him to look at it because he is smart and picky about things\". She did want you to know that her son passed away last Friday.

## 2022-03-17 NOTE — TELEPHONE ENCOUNTER
Patient states she hasn't had a Biopsy, and she knows \"the wound doctor you referred her to, along with her surgeon, would have had her get one if it were necessary. \"  She states there is probably \"nothing Dr. Reyna Parada can do about her wound, she just thought he'd like to see it, because it has never looked this bad. \"  She states she can wait until her Saturday appointment if Dr. Reyna Parada deems this appropriate. Please contact patient @ phone # provided.

## 2022-03-24 ENCOUNTER — HOSPITAL ENCOUNTER (OUTPATIENT)
Dept: NEUROLOGY | Age: 87
Discharge: HOME | End: 2022-03-24
Payer: MEDICARE

## 2022-03-24 ENCOUNTER — HOSPITAL ENCOUNTER (OUTPATIENT)
Dept: WOUND CARE | Age: 87
Discharge: HOME | End: 2022-03-24
Payer: MEDICARE

## 2022-03-24 VITALS
SYSTOLIC BLOOD PRESSURE: 156 MMHG | TEMPERATURE: 97 F | RESPIRATION RATE: 16 BRPM | DIASTOLIC BLOOD PRESSURE: 71 MMHG | HEART RATE: 97 BPM

## 2022-03-24 DIAGNOSIS — I73.9 PERIPHERAL ARTERY DISEASE (HCC): Primary | ICD-10-CM

## 2022-03-24 DIAGNOSIS — S81.802A WOUND OF LEFT LOWER EXTREMITY, INITIAL ENCOUNTER: ICD-10-CM

## 2022-03-24 DIAGNOSIS — S81.802D WOUND OF LEFT LOWER EXTREMITY, SUBSEQUENT ENCOUNTER: ICD-10-CM

## 2022-03-24 DIAGNOSIS — R20.2 RIGHT HAND PARESTHESIA: ICD-10-CM

## 2022-03-24 PROCEDURE — 6370000000 HC RX 637 (ALT 250 FOR IP): Performed by: SPECIALIST

## 2022-03-24 PROCEDURE — 95908 NRV CNDJ TST 3-4 STUDIES: CPT

## 2022-03-24 PROCEDURE — 11042 DBRDMT SUBQ TIS 1ST 20SQCM/<: CPT

## 2022-03-24 PROCEDURE — 95886 MUSC TEST DONE W/N TEST COMP: CPT

## 2022-03-24 PROCEDURE — 11042 DBRDMT SUBQ TIS 1ST 20SQCM/<: CPT | Performed by: SPECIALIST

## 2022-03-24 RX ORDER — CLOBETASOL PROPIONATE 0.5 MG/G
OINTMENT TOPICAL ONCE
Status: CANCELLED | OUTPATIENT
Start: 2022-03-24 | End: 2022-03-24

## 2022-03-24 RX ORDER — LIDOCAINE 50 MG/G
OINTMENT TOPICAL ONCE
Status: CANCELLED | OUTPATIENT
Start: 2022-03-24 | End: 2022-03-24

## 2022-03-24 RX ORDER — LIDOCAINE 50 MG/G
OINTMENT TOPICAL ONCE
Status: COMPLETED | OUTPATIENT
Start: 2022-03-24 | End: 2022-03-24

## 2022-03-24 RX ORDER — BACITRACIN, NEOMYCIN, POLYMYXIN B 400; 3.5; 5 [USP'U]/G; MG/G; [USP'U]/G
OINTMENT TOPICAL ONCE
Status: CANCELLED | OUTPATIENT
Start: 2022-03-24 | End: 2022-03-24

## 2022-03-24 RX ORDER — BETAMETHASONE DIPROPIONATE 0.05 %
OINTMENT (GRAM) TOPICAL ONCE
Status: CANCELLED | OUTPATIENT
Start: 2022-03-24 | End: 2022-03-24

## 2022-03-24 RX ORDER — GINSENG 100 MG
CAPSULE ORAL ONCE
Status: CANCELLED | OUTPATIENT
Start: 2022-03-24 | End: 2022-03-24

## 2022-03-24 RX ORDER — BACITRACIN ZINC AND POLYMYXIN B SULFATE 500; 1000 [USP'U]/G; [USP'U]/G
OINTMENT TOPICAL ONCE
Status: CANCELLED | OUTPATIENT
Start: 2022-03-24 | End: 2022-03-24

## 2022-03-24 RX ORDER — LIDOCAINE 40 MG/G
CREAM TOPICAL ONCE
Status: CANCELLED | OUTPATIENT
Start: 2022-03-24 | End: 2022-03-24

## 2022-03-24 RX ORDER — LIDOCAINE HYDROCHLORIDE 20 MG/ML
JELLY TOPICAL ONCE
Status: CANCELLED | OUTPATIENT
Start: 2022-03-24 | End: 2022-03-24

## 2022-03-24 RX ORDER — OMEGA-3 FATTY ACIDS/FISH OIL 300-1000MG
3 CAPSULE ORAL PRN
COMMUNITY
End: 2022-03-26 | Stop reason: ALTCHOICE

## 2022-03-24 RX ORDER — LIDOCAINE HYDROCHLORIDE 40 MG/ML
SOLUTION TOPICAL ONCE
Status: CANCELLED | OUTPATIENT
Start: 2022-03-24 | End: 2022-03-24

## 2022-03-24 RX ORDER — GENTAMICIN SULFATE 1 MG/G
OINTMENT TOPICAL ONCE
Status: CANCELLED | OUTPATIENT
Start: 2022-03-24 | End: 2022-03-24

## 2022-03-24 RX ORDER — LIDOCAINE HYDROCHLORIDE 40 MG/ML
SOLUTION TOPICAL ONCE
Status: DISCONTINUED | OUTPATIENT
Start: 2022-03-24 | End: 2022-03-25 | Stop reason: HOSPADM

## 2022-03-24 RX ADMIN — COLLAGENASE SANTYL: 250 OINTMENT TOPICAL at 10:07

## 2022-03-24 RX ADMIN — LIDOCAINE: 50 OINTMENT TOPICAL at 09:41

## 2022-03-24 NOTE — PROGRESS NOTES
[address]  Progress Note and Procedure Note      Bhaskar Estevez  MEDICAL RECORD NUMBER:  [id number]  AGE: 80 y.o. GENDER: female  : 1929  EPISODE DATE:  3/24/2022    Subjective:     Chief Complaint   Patient presents with    Wound Check     left lower leg         HISTORY of PRESENT ILLNESS HPI     Bhaskar Estevez is a 80 y.o. female who presents today for wound/ulcer evaluation. History of Wound Context:Patient continues follow-up here in wound care for a ulcer on the left lower extremity as a result of arterial insufficiency.  She had recently undergone a left common femoral endarterectomy with bovine patch angioplasty.  She states there has been continued significant pain from the wound. She has an appointment scheduled with Dr. Orlando Current next week    Pain Assessment:  Wound/Ulcer Pain Timing/Severity: intermittent  Quality of pain: sharp  Severity:  6 / 10   Modifying Factors: None  Associated Signs/Symptoms: pain    Ulcer Identification:  Ulcer Type: arterial  Contributing Factors: diabetes and arterial insufficiency    Objective:      BP (!) 156/71   Pulse 97   Temp 97 °F (36.1 °C) (Temporal)   Resp 16     Wt Readings from Last 3 Encounters:   22 108 lb (49 kg)   22 108 lb 3.2 oz (49.1 kg)   22 107 lb (48.5 kg)       PHYSICAL EXAM    Extremities: no cyanosis, no clubbing, minimal + edema-  left lower extremity with painful full-thickness circular wound containing fibrin and slough minimal granulation tissue. Partial exposure of muscle and tendon left knee    Assessment:      1. Peripheral artery disease ([facility] [address]    2. Wound of left lower extremity, subsequent encounter    3. Wound of left lower extremity, initial encounter         Procedure Note  Indications:  Based on my examination of this patient's wound(s) today, sharp excision is required to promote healing and evaluate the extent healing. Performed by: Cam Self MD    Consent obtained? Yes    Time out taken: Yes    Pain Control: Anesthetic: 5% Lidocaine Ointment Topical     Debridement:Excisional Debridement    Using curette the wound was sharply debrided    down through and including the removal of  epidermis, dermis and subcutaneous tissue. Devitalized Tissue Debrided:  fibrin and slough      Pre Debridement Measurements:  Are located in the Wound Documentation Flow Sheet   Wound #: 1     Post  Debridement Measurements:  Wound 11/22/21 Leg Left; Lower;Distal #1 (Active)   Wound Image   03/03/22 1120   Wound Etiology Arterial 11/22/21 0924   Wound Cleansed Cleansed with saline 03/24/22 0932   Dressing/Treatment Pharmaceutical agent (see MAR) 03/24/22 1006   Wound Length (cm) 3.1 cm 03/24/22 0932   Wound Width (cm) 2.5 cm 03/24/22 0932   Wound Depth (cm) 0.1 cm 03/24/22 0932   Wound Surface Area (cm^2) 7.75 cm^2 03/24/22 0932   Change in Wound Size % (l*w) -176.79 03/24/22 0932   Wound Volume (cm^3) 0.775 cm^3 03/24/22 0932   Wound Healing % -177 03/24/22 0932   Post-Procedure Length (cm) 3.2 cm 03/24/22 1000   Post-Procedure Width (cm) 2.6 cm 03/24/22 1000   Post-Procedure Depth (cm) 0.3 cm 03/24/22 1000   Post-Procedure Surface Area (cm^2) 8.32 cm^2 03/24/22 1000   Post-Procedure Volume (cm^3) 2.496 cm^3 03/24/22 1000   Distance Tunneling (cm) 0 cm 03/24/22 0932   Undermining Maxium Distance (cm) 0 03/24/22 0932   Wound Assessment Slough 03/24/22 0932   Drainage Amount Small 03/24/22 0932   Drainage Description Yellow 03/24/22 0932   Odor None 03/24/22 0932   Nissa-wound Assessment Fragile 03/24/22 0932   Margins Defined edges 03/24/22 0932   Wound Thickness Description not for Pressure Injury Full thickness 03/24/22 0932   Number of days: 122          Percent of Wound Debrided: 100%    Total Surface Area Debrided:  8.3 sq cm    Diabetic/Pressure/Non Pressure Ulcers only:  Ulcer: Non-Pressure ulcer, fat layer exposed    Bleeding: Minimal    Hemostasis Achieved: by pressure    Procedural Pain: 5 / 10     Post Procedural Pain: 0 / 10     Response to treatment:  Poorly tolerated by patient., With complaints of pain. I have explained to patient at great length that I still believe this wound is secondary to arterial insufficiency. She is to see Dr. Tashia Gil next week in consultation for consideration of a bypass procedure to the lower extremity. Furthermore I discussed with her the fact that hyperbaric oxygen is not indicated for her wound at the present time        Plan:     Treatment Note: Please see attached Discharge Instructions. These instructions were given and signed by the patient or POA    New Medication(s) at this visit:   New Prescriptions    No medications on file       Other orders at this visit:   Orders Placed This Encounter   Procedures   [address] Protocol       Discharge Instructions          [address] [facility] Physician Orders and Discharge Instructions  [address] [address] [address] [place] [address]. [place] [address]  Telephone: [phone]  NAME:  Wayne Hendrickson  YOB: 1929  MEDICAL RECORD NUMBER:  [id number]  DATE:  3/24/2022    Wash hands with soap and water prior to and after every dressing change. Wound Cleansing:   · Do not scrub or use excessive force. · With each dressing change, rinse wounds with 0.9% Saline. (May use wound wash or soft contact solution. Both can be purchased at a local drug store). · If unable to obtain saline, may use a gentle soap and water. · Keep wounds dry in the shower unless otherwise instructed by the physician. · For wounds on lower legs, cast covers can be purchased at local drug stores, so that you may shower and keep the wound(s) dry. []  Vashe Wash solution instructions (if prescribed): Apply enough Vashe to soak a piece of gauze and place on wound bed for 5-10 minutes. DO NOT rinse after Vashe has been applied. Follow dressing application as instructed below.     Nissa wound Topical Treatments:  Do not apply lotions, creams, or ointments to the skin around the wound bed unless directed as followed:     [] Apply around the wound: [] moisturizing lotion [] Antifungal ointment [] No-Sting barrier film [] Zinc paste [] Other:       Dressings:           Wound Location: left lower leg    Apply Primary Dressing to wound:       Pharmaceutical medication santyl- nickel size thickness     Cover and Secure with:     Cover and Secure with: 4X4 gauze pad, kerlix   Avoid contact of tape with skin if possible.  When to change Dressing: [x] Daily [] Every Other Day [] Once a week  [] Three times per week: [] Monday, Wednesday, Friday [] Tuesday, Thursday, Saturday  [] Do Not Change Dressing [] Other:     Edema Control:  Apply: [] Compression Stocking  [] Left Lower Leg [] Right Lower Leg     [x]  Spandagrip:Strength: Medium compression 10-20 mm/Hg    [x] Left Lower Leg  [] Right Lower Leg        Apply every morning immediately when getting up. They should be applied to affected leg(s) from mid foot to knee making sure to cover the heel. Remove every night before going to bed. [x] Elevate leg(s) above the level of the heart for 30 minutes 4-5 times a day and/or when sitting. [x] Avoid prolonged standing in one place. Dietary:  Important dietary reminders:  1. Increase Protein intake (i.e. Lean meats, fish, eggs, legumes, and yogurt)  2. No added salt  3. If diabetic, follow a diabetic diet and check glucose prior to meals or as instructed by your physician. Dietary Supplements:  [] Octaviano  [] 30ml ProStat  [] EnsureEnlive [] Ensure Max/Premier  [] Other:    If you are still having pain after you go home:   For wounds on lower legs or arms, elevate the affected limb.  Use over-the-counter medications you would normally use for pain as permitted by your primary care doctor.  For persistent pain not relieved by the above interventions, please call your primary care doctor. Return Appointment:  Cayetano Patel Return Appointment: With Dr. Amber Chacon  in  2 [facility])    [] Return Appointment for a Wound Assessment (Nurse Visit) on:       [x] Orders placed during your visit:   Orders Placed This Encounter   Procedures    Initiate Outpatient Wound Care Protocol       o All other future appointments:  Future Appointments   Date Time Provider Gilbert Shante   3/24/2022 11:30 AM SCHEDULE, [facility] EMG ROOM [facility]   3/26/2022 10:00 AM DO OCTAVIO Carter [place] - DYD   3/30/2022 12:30 PM Malorie Thompson MD [facility]   4/4/2022 10:00 AM Tyree Bridges MD [facility]   -       Your nurse  is: Altaf Lopez     Electronically signed by Lenora Joseph RN on 3/24/2022 at 10:02 AM     [address] [facility] Information: Should you experience any significant changes in your wound(s) or have questions about your wound care, please contact the [address] [place] [address] at [phone]. We are open from 8:00am - 4:30p Monday thru Friday except for Wednesdays which we are closed. Please give us 24-48 hours to return your call. Call your doctor now or seek immediate medical care if:    · You have symptoms of infection, such as:  ? Increased pain, swelling, warmth, or redness. ? Red streaks leading from the area. ? Pus draining from the area. ? A fever.         Physician for this visit and orders: Olivia Coppola MD    [] Patient unable to sign Discharge Instructions given to ECF/Transportation/POA        Electronically signed by Olivia Coppola MD on 3/24/2022 at 12:01 PM

## 2022-03-24 NOTE — PROCEDURES
Test Date:  3/24/2022    Patient: Dennis Vale : 1929 Physician: Tanya Diego DO   Sex: Female ID#:  Ref Phys: Ben Garcia DO     Patient Complaints:  Patient is a 80year-old female who presents with numbness in the right hand onset 6 weeks,     Patient History / Exam:  PMH: + diabetes, managed with oral meds. + carpal tunnel surgery  PE: reflexes absent, + thumb opposition weakness     NCV & EMG Findings:  Evaluation of the right median (APB) motor nerve showed prolonged distal onset latency (5.5 ms) and reduced amplitude (1.51 mV). The right median sensory nerve showed prolonged distal peak latency (6.3 ms) and decreased conduction velocity (22 m/s). The right ulnar sensory nerve showed prolonged distal peak latency (4.0 ms), reduced amplitude (15 µV), and decreased conduction velocity (35 m/s). All remaining nerves (as indicated in the following tables) were within normal limits. All examined muscles (as indicated in the following table) showed no evidence of electrical instability. Impression:  Residual slowing of right median sensory and motor distal latencies post carpal tunnel release. suggestion of mild underlying peripheral neuropathy.  no evidence of an acute radiculopathy or other lower motor neuron dysfunction         Tanya Diego DO        Nerve Conduction Studies  Motor Nerve Results      Latency Amplitude F-Lat Segment Distance CV Comment   Site (ms) Norm (mV) Norm (ms)  (cm) (m/s) Norm    Right Median (APB) Motor   Wrist 5.5  < 4.2 1.51  > 5.0         Elbow 8.8 - 3.5 -  Elbow-Wrist 20 61  > 50    Right Ulnar (ADM) Motor   Wrist 3.9  < 4.2 6.2  > 3.0         Bel Elbow 6.8 - 4.7 -  Bel Elbow-Wrist 18 62  > 50    Abv Elbow 8.2 - 5.2 -  Abv Elbow-Bel Elbow 8 57  > 48      Sensory Nerve Results      Latency (Peak) Amplitude (P-P) Segment Distance CV Comment   Site (ms) Norm (µV) Norm  (cm) (m/s) Norm    Right Median Sensory   Wrist-Dig II 6.3  < 3.6 13  > 10 Wrist-Dig II 14 22  > 39    Right Ulnar Sensory   Wrist-Dig V 4.0  < 3.7 15  > 15 Wrist-Dig V 14 35  > 38        Electromyography     Side Muscle Nerve Root Ins Act Fibs Psw Amp Dur Poly Recrt Int Geovanna Abdirizak Comment   Right Deltoid Axillary C5-C6 Nml Nml Nml Nml Nml 0 Nml Nml    Right Biceps Musculocut C5-C6 Nml Nml Nml Nml Nml 0 Nml Nml    Right Triceps Radial C6-C8 Nml Nml Nml Nml Nml 0 Nml Nml    Right Brachiorad Radial C5-C6 Nml Nml Nml Nml Nml 0 Nml Nml    Right Pronator Teres Median C6-C7 Nml Nml Nml Nml Nml 0 Nml Nml    Right EIP Post Interosseous,  R... C7-C8 Nml Nml Nml Nml Nml 0 Nml Nml    Right APB Median C8-T1 Nml Nml Nml Nml Nml 0 Nml Nml    Right FDI Ulnar C8-T1 Nml Nml Nml Nml Nml 0 Nml Nml    Right Cervical Paraspinal (Uppe. .. Rami C1-C3 Nml Nml Nml         Right Cervical Paraspinal (Mid) Rami C4-C6 Nml Nml Nml         Right Cervical Paraspinal ([place]is. ..  Rami C7-C8 Nml Nml Nml             Electronically signed by Vikash Lopez DO on 3/24/2022 at 11:11 AM

## 2022-03-26 ENCOUNTER — OFFICE VISIT (OUTPATIENT)
Dept: INTERNAL MEDICINE CLINIC | Age: 87
End: 2022-03-26
Payer: MEDICARE

## 2022-03-26 VITALS
WEIGHT: 109.2 LBS | HEIGHT: 61 IN | RESPIRATION RATE: 12 BRPM | DIASTOLIC BLOOD PRESSURE: 58 MMHG | BODY MASS INDEX: 20.62 KG/M2 | OXYGEN SATURATION: 97 % | HEART RATE: 85 BPM | SYSTOLIC BLOOD PRESSURE: 126 MMHG

## 2022-03-26 DIAGNOSIS — I87.2 VENOUS INSUFFICIENCY OF BOTH LOWER EXTREMITIES: ICD-10-CM

## 2022-03-26 DIAGNOSIS — S81.802D OPEN WOUND OF LEFT LOWER LEG, SUBSEQUENT ENCOUNTER: ICD-10-CM

## 2022-03-26 DIAGNOSIS — L98.499 ISCHEMIC ULCER, UNSPECIFIED ULCER STAGE (HCC): ICD-10-CM

## 2022-03-26 DIAGNOSIS — E11.42 DIABETIC PERIPHERAL NEUROPATHY (HCC): Primary | ICD-10-CM

## 2022-03-26 DIAGNOSIS — I73.9 PAD (PERIPHERAL ARTERY DISEASE) (HCC): ICD-10-CM

## 2022-03-26 PROCEDURE — 1090F PRES/ABSN URINE INCON ASSESS: CPT | Performed by: INTERNAL MEDICINE

## 2022-03-26 PROCEDURE — 99214 OFFICE O/P EST MOD 30 MIN: CPT | Performed by: INTERNAL MEDICINE

## 2022-03-26 PROCEDURE — G8427 DOCREV CUR MEDS BY ELIG CLIN: HCPCS | Performed by: INTERNAL MEDICINE

## 2022-03-26 PROCEDURE — G8420 CALC BMI NORM PARAMETERS: HCPCS | Performed by: INTERNAL MEDICINE

## 2022-03-26 PROCEDURE — G8483 FLU IMM NO ADMIN DOC REA: HCPCS | Performed by: INTERNAL MEDICINE

## 2022-03-26 PROCEDURE — 4040F PNEUMOC VAC/ADMIN/RCVD: CPT | Performed by: INTERNAL MEDICINE

## 2022-03-26 PROCEDURE — 1036F TOBACCO NON-USER: CPT | Performed by: INTERNAL MEDICINE

## 2022-03-26 PROCEDURE — 3044F HG A1C LEVEL LT 7.0%: CPT | Performed by: INTERNAL MEDICINE

## 2022-03-26 PROCEDURE — 1123F ACP DISCUSS/DSCN MKR DOCD: CPT | Performed by: INTERNAL MEDICINE

## 2022-03-26 RX ORDER — TRAMADOL HYDROCHLORIDE 50 MG/1
25-50 TABLET ORAL EVERY 6 HOURS PRN
Qty: 14 TABLET | Refills: 0 | Status: SHIPPED | OUTPATIENT
Start: 2022-03-26 | End: 2022-04-26 | Stop reason: SDUPTHER

## 2022-03-26 NOTE — PATIENT INSTRUCTIONS
Patient Education        Diabetic Neuropathy: Care Instructions  Overview     When you have diabetes, your blood sugar level may get too high. Over time, high blood sugar levels can damage nerves. This is called diabetic neuropathy. Nerve damage can cause pain, burning, tingling, and numbness and may leave you feeling weak. The feet are often affected. When you have nerve damage in your feet, you cannot feel your feet and toes as well as normal and may not notice cuts or sores. Even a small injury can lead to a serious infection. It is very important that you follow your doctor's advice on foot care. Sometimes diabetes damages nerves that help the body function. If this happens, your blood pressure, sweating, digestion, and urination might be affected. Your doctor may give you a target range for your blood sugar that is higher or lower than you are used to. Try to keep your blood sugar very close to this target range to prevent more damage. Follow-up care is a key part of your treatment and safety. Be sure to make and go to all appointments, and call your doctor if you are having problems. It's also a good idea to know your test results and keep a list of the medicines you take. How can you care for yourself at home? · Take your medicines exactly as prescribed. Call your doctor if you think you are having a problem with your medicine. · Try to keep blood sugar in your target range. ? Follow your meal plan to know how much carbohydrate you need for meals and snacks. A registered dietitian or diabetes educator can help you plan meals. ? Try to get at least 30 minutes of exercise on most days. ? Check your blood sugar as many times each day as your doctor recommends. · Take and record your blood pressure at home if your doctor tells you to. To take your blood pressure at home:  ? Ask your doctor to check your blood pressure monitor to be sure it is accurate and the cuff fits you.  Also ask your doctor to watch you to make sure that you are using it right. ? Do not use medicine known to raise blood pressure (such as some nasal decongestant sprays) before taking your blood pressure. ? Avoid taking your blood pressure if you have just exercised or are nervous or upset. Rest at least 15 minutes before you take a reading. · Do not smoke. Smoking can increase your chance for a heart attack or stroke. If you need help quitting, talk to your doctor about stop-smoking programs and medicines. These can increase your chances of quitting for good. · Limit alcohol to 2 drinks a day for men and 1 drink a day for women. Too much alcohol can cause health problems. · Eat small meals often, rather than 2 or 3 large meals a day. To care for your feet  · Prevent injury by wearing shoes at all times, even when you are indoors. · Do foot care as part of your daily routine. Wash your feet and then rub lotion on your feet, but not between your toes. Use a handheld mirror or magnifying mirror to inspect your feet for blisters, cuts, cracks, or sores. · Have your toenails trimmed and filed straight across. · Wear shoes and socks that fit well. Soft shoes that have good support and that fit well (such as tennis shoes) are best for your feet. · Check your shoes for any loose objects or rough edges before you put them on. · Ask your doctor to check your feet during each visit. Your doctor may notice a foot problem you have missed. · Get early treatment for any foot problem, even a minor one. When should you call for help? Call your doctor now or seek immediate medical care if:    · You have symptoms of infection, such as:  ? Increased pain, swelling, warmth, or redness. ? Red streaks leading from the area. ? Pus draining from the area. ? A fever.     · You have new or worse numbness, pain, or tingling in any part of your body.    Watch closely for changes in your health, and be sure to contact your doctor if:    · You have a new problem with your feet, such as:  ? A new sore or ulcer. ? A break in the skin that is not healing after several days. ? Bleeding corns or calluses. ? An ingrown toenail.     · You do not get better as expected. Where can you learn more? Go to [email or web address] and sign in to your Vault Dragon account. Enter W050 in the WeHack.It box to learn more about \"Diabetic Neuropathy: Care Instructions. \"     If you do not have an account, please click on the \"Sign Up Now\" link. Current as of: July 28, 2021               Content Version: 13.1  © 2006-2021 Healthwise, Incorporated. Care instructions adapted under license by [facility] ([facility]). If you have questions about a medical condition or this instruction, always ask your healthcare professional. [address] any warranty or liability for your use of this information.

## 2022-03-26 NOTE — PROGRESS NOTES
[facility]) Physicians  Internal Medicine  Patient Encounter  Mani Hogue D.O., [facility]        Chief Complaint   Patient presents with    Wound Check       HPI: 80 y.o. female seen today for a couple of reasons. Her son just . First off she wanted to review her EMG-nerve conduction test that she had on 3/24/2022 for evaluation of numbness in the right hand. Patient has had prior carpal tunnel surgery on the right. She was seen by orthopedic hand specialist Dr. Heaven Serna on 3/16/2022. Office note is reviewed. There was no clinical evidence of recurrent right carpal tunnel syndrome. Symptoms may be related to prior carpal tunnel syndrome. He indicated that true recurrence of carpal tunnel syndrome is quite rare. He suggested that repeating the EMG was unnecessary as it will almost always remain abnormal even after successful surgery. Well, she went ahead and had the EMG despite that recommendation. Not surprisingly, there was evidence of prior right carpal tunnel changes but, there was also evidence of mild underlying peripheral neuropathy likely due to diabetes. Patient is also requesting that I look at her left lower leg wound. Healing has been slow. The wound has been deemed ischemic ulcer. Patient is status post left common femoral endarterectomy with bovine patch angioplasty. The wound was debrided again on 3/24/2022. The wound is not healing. No increased erythema or warmth. Pt is convinced she has associated infection. She has increased pain when leg is elevated and less pain when pain is up and legs are down. She has been using Ibuprofen 600 mg every 6 hours. PREOPERATIVE DIAGNOSES:  Nonhealing left shin wound with left common  femoral artery near occlusion and left popliteal artery occlusion.     POSTOPERATIVE DIAGNOSES:  Nonhealing left shin wound with left common  femoral artery near occlusion and left popliteal artery occlusion.     OPERATION PERFORMED:  1.   Left common femoral endarterectomy with bovine pericardial patch  angioplasty. 2.  Partial thickness skin debridement, left shin wound (2.5 x 1.5 cm in  size). Past Medical History:   Diagnosis Date    Anxiety     Arthritis     At risk for falls     CAD (coronary artery disease)     CTS (carpal tunnel syndrome)     Bilateral, EMG-NCS    DDD (degenerative disc disease), cervical     Fractures     (L) Hip Fx 4/25/98, L1 fracture from fall 10-31-06    Hyperlipidemia     Hypertension     Mitral regurgitation     Neuropathy     Osteopenia     Osteoporosis     PAD (peripheral artery disease) (HCC)     Right LE ischemia    Peripheral neuropathy 6/1/2015    Peripheral vascular disease ([facility]     bilateral lower extremities with edema    Podagra 01/09/2017    Dr. Nando Meraz Spinal stenosis     L4-5    Type 2 diabetes mellitus ([facility]     Urethral stricture 5 years ago    Urgency of urination          MEDICATIONS:  Medication Sig   ibuprofen (ADVIL;MOTRIN) 200 MG CAPS Take 3 capsules by mouth as needed for Fever   collagenase (SANTYL) 250 UNIT/GM ointment Apply  To left lower leg wound daily   collagenase 250 UNIT/GM ointment Apply topically daily Apply topically daily.    Wound Cleansers (VASHE CLEANSING) SOLN Soak vashe on gauze pad and place on wound for 5-10 minutes   lisinopril (PRINIVIL;ZESTRIL) 20 MG tablet TAKE ONE TABLET BY MOUTH DAILY   rosuvastatin (CRESTOR) 5 MG tablet Take 1 tablet by mouth nightly   ferrous sulfate (FE TABS) 325 (65 Fe) MG EC tablet Take 1 tablet by mouth 2 times daily   glimepiride (AMARYL) 1 MG tablet TAKE ONE TABLET BY MOUTH EVERY MORNING BEFORE BREAKFAST   furosemide (LASIX) 20 MG tablet Take 1 tablet by mouth daily   blood glucose test strips (ONETOUCH ULTRA) strip USE TO TEST BLOOD SUGAR TWICE DAILY   metFORMIN (GLUCOPHAGE) 500 MG tablet Take 1 tablet by mouth daily   atorvastatin (LIPITOR) 10 MG tablet Take 1 tablet by mouth daily   Turmeric 450 MG CAPS Take 450 mg by mouth daily   Cholecalciferol (VITAMIN D3) 2000 UNITS CAPS Take 1 capsule by mouth daily   aspirin EC 81 MG EC tablet Take 1 tablet by mouth daily   Lite Touch Lancets MISC by Does not apply route. Use twice daily when testing blood sugars. Review of Systems - As per HPI      OBJECTIVE:  Vitals:    03/26/22 0949   BP: (!) 126/58   Pulse: 85   Resp: 12   SpO2: 97%   Weight: 109 lb 3.2 oz (49.5 kg)   Height: 5' 1\" (1.549 m)     Body mass index is 20.63 kg/m². Wt Readings from Last 3 Encounters:   03/26/22 109 lb 3.2 oz (49.5 kg)   03/16/22 108 lb (49 kg)   03/02/22 108 lb 3.2 oz (49.1 kg)     BP Readings from Last 3 Encounters:   03/26/22 (!) 126/58   03/24/22 (!) 156/71   03/17/22 (!) 184/71      GEN: NAD, A&O, Non-toxic  HEENT: NC/AT, FLACA, EOMI, Oral cavity Clear  NECK: Supple. No thyromegaly. No JVD  LYMPH: No C/SC nodes. CV: S1 S2 NL, RRR.  2/6 systolic murmur  VASC:  Unable to palpate pulse in left foot. Warm foot. PULM: CTA, symmentric air exchange  EXT: + BL LE pitting edema. Left leg with 3 x 2.5 cm circular ulcer anterior shin. More proximal on lower leg there is a small skin erosion with some oozing of lymphedema. Base of the ulcer with fibrin and biofilm. PSYCH: Patient with increased anxiety. Hurried speech. Significant problem with tangential thoughts. ASSESSMENT/PLAN:    1. Diabetic peripheral neuropathy (HCC)  Reviewed EMG-NCT  Continue to monitor  - traMADol (ULTRAM) 50 MG tablet; Take 0.5-1 tablets by mouth every 6 hours as needed for Pain for up to 7 days. Dispense: 14 tablet; Refill: 0    2. Ischemic ulcer, unspecified ulcer stage (HCC)  Tramadol for pain  - traMADol (ULTRAM) 50 MG tablet; Take 0.5-1 tablets by mouth every 6 hours as needed for Pain for up to 7 days. Dispense: 14 tablet; Refill: 0    3. Open wound of left lower leg, subsequent encounter  Continue wound management  Tramadol for pain  - traMADol (ULTRAM) 50 MG tablet;  Take 0.5-1 tablets by mouth every 6 hours as needed for Pain for up to 7 days. Dispense: 14 tablet; Refill: 0    4. PAD (peripheral artery disease) ([address]  Under the management of vascular surgery  She may need more aggressive bypass surgery. At her age she will be at high risk for perioperative complications. - traMADol (ULTRAM) 50 MG tablet; Take 0.5-1 tablets by mouth every 6 hours as needed for Pain for up to 7 days. Dispense: 14 tablet; Refill: 0      5. This insufficiency both lower extremities  Continue compression stocking on the right lower extremity. Caution with compression on the left    Patient has follow-ups with her vascular surgeon and cardiologist coming up. On this date 3/26/2022 I have spent 30 minutes reviewing previous notes, test results and face to face with the patient discussing the diagnosis and importance of compliance with the treatment plan as well as documenting on the day of the visit. Discussed medications with patient who voiced understanding of their use, indication and potential side effects. Pt also understands the above recommendations. All questions answered. This note was generated completely or in part utilizing Dragon dictation speech recognition software. Occasionally, words are mistranscribed and despite editing, the text may contain inaccuracies due to incorrect word recognition.   If further clarification is needed please contact the office at [phone]       Electronically signed    Donny Millre D.O.

## 2022-03-28 RX ORDER — LANCETS
EACH MISCELLANEOUS
Qty: 100 EACH | Refills: 5 | Status: SHIPPED | OUTPATIENT
Start: 2022-03-28

## 2022-03-28 NOTE — TELEPHONE ENCOUNTER
Patient is requesting a refill for:    Lite Touch Lancets MISC by Does not apply route. Use twice daily when testing blood sugars. Please call in to:  [facility] [address] [phone] [facility] [phone]    Patient made aware to allow 24 to 48 business hours for prescription refills. Patient can be reached @ phone # provided should there be any questions.

## 2022-03-30 ENCOUNTER — OFFICE VISIT (OUTPATIENT)
Dept: VASCULAR SURGERY | Age: 87
End: 2022-03-30

## 2022-03-30 VITALS
SYSTOLIC BLOOD PRESSURE: 150 MMHG | WEIGHT: 109 LBS | BODY MASS INDEX: 20.58 KG/M2 | DIASTOLIC BLOOD PRESSURE: 70 MMHG | HEIGHT: 61 IN

## 2022-03-30 DIAGNOSIS — L97.909 ATHEROSCLEROSIS OF ARTERY OF EXTREMITY WITH ULCERATION (HCC): Primary | ICD-10-CM

## 2022-03-30 DIAGNOSIS — I70.299 ATHEROSCLEROSIS OF ARTERY OF EXTREMITY WITH ULCERATION (HCC): Primary | ICD-10-CM

## 2022-03-30 PROCEDURE — 99024 POSTOP FOLLOW-UP VISIT: CPT | Performed by: SURGERY

## 2022-03-30 NOTE — PROGRESS NOTES
Second postop visit S/P L femoral endarterectomy for near occlusion L CFA with nonhealing L shin wound 6 weeks ago. C/O continued throbbing L shin pain only improved with dependency. Worsening. Increased drainage from that wound with \"redness\" and tenderness. Seen weekly at [facility] by Tomas Catherine without improvement. Pt miserable with recent death of son. EXAM:  Edema L calf and foot    Wound - clear drainage with fibrinous debris and mild surrounding erythema; central granulation in a small area    Palpable pulse distal to groin incision    Strong biphasic doppler L PT- BUZZ NC    Groin incision intact without drainage or erythema    A/P: S/P L fem endarterectomy   Clinically patent but no improvement to wound. Discussed with Dr. Swetha Kent earlier today. Reviewed preop angio. Pt does require further revascularization - endo v open. Considering her age and degree of general deterioration a try at endovascular management is reasonable. Will discuss with colleagues and arrange.

## 2022-04-04 ENCOUNTER — OFFICE VISIT (OUTPATIENT)
Dept: CARDIOLOGY CLINIC | Age: 87
End: 2022-04-04
Payer: MEDICARE

## 2022-04-04 ENCOUNTER — HOSPITAL ENCOUNTER (OUTPATIENT)
Dept: WOUND CARE | Age: 87
Discharge: HOME | End: 2022-04-04
Payer: MEDICARE

## 2022-04-04 VITALS
DIASTOLIC BLOOD PRESSURE: 81 MMHG | SYSTOLIC BLOOD PRESSURE: 160 MMHG | HEART RATE: 83 BPM | RESPIRATION RATE: 16 BRPM | TEMPERATURE: 96.8 F

## 2022-04-04 VITALS
BODY MASS INDEX: 21.16 KG/M2 | WEIGHT: 112 LBS | DIASTOLIC BLOOD PRESSURE: 64 MMHG | SYSTOLIC BLOOD PRESSURE: 148 MMHG | OXYGEN SATURATION: 95 % | HEART RATE: 82 BPM

## 2022-04-04 DIAGNOSIS — S81.802A WOUND OF LEFT LOWER EXTREMITY, INITIAL ENCOUNTER: ICD-10-CM

## 2022-04-04 DIAGNOSIS — E78.5 HYPERLIPIDEMIA, UNSPECIFIED HYPERLIPIDEMIA TYPE: ICD-10-CM

## 2022-04-04 DIAGNOSIS — I70.299 ATHEROSCLEROSIS OF ARTERY OF EXTREMITY WITH ULCERATION (HCC): ICD-10-CM

## 2022-04-04 DIAGNOSIS — S81.802D WOUND OF LEFT LOWER EXTREMITY, SUBSEQUENT ENCOUNTER: ICD-10-CM

## 2022-04-04 DIAGNOSIS — I73.9 PERIPHERAL ARTERY DISEASE (HCC): Primary | ICD-10-CM

## 2022-04-04 DIAGNOSIS — I25.10 CORONARY ARTERY DISEASE INVOLVING NATIVE CORONARY ARTERY OF NATIVE HEART WITHOUT ANGINA PECTORIS: Primary | ICD-10-CM

## 2022-04-04 DIAGNOSIS — L97.909 ATHEROSCLEROSIS OF ARTERY OF EXTREMITY WITH ULCERATION (HCC): ICD-10-CM

## 2022-04-04 DIAGNOSIS — I10 HTN (HYPERTENSION), BENIGN: ICD-10-CM

## 2022-04-04 DIAGNOSIS — I87.2 VENOUS INSUFFICIENCY OF BOTH LOWER EXTREMITIES: ICD-10-CM

## 2022-04-04 PROCEDURE — 87077 CULTURE AEROBIC IDENTIFY: CPT

## 2022-04-04 PROCEDURE — G8427 DOCREV CUR MEDS BY ELIG CLIN: HCPCS | Performed by: INTERNAL MEDICINE

## 2022-04-04 PROCEDURE — 87205 SMEAR GRAM STAIN: CPT

## 2022-04-04 PROCEDURE — 99214 OFFICE O/P EST MOD 30 MIN: CPT | Performed by: INTERNAL MEDICINE

## 2022-04-04 PROCEDURE — 11042 DBRDMT SUBQ TIS 1ST 20SQCM/<: CPT

## 2022-04-04 PROCEDURE — 1090F PRES/ABSN URINE INCON ASSESS: CPT | Performed by: INTERNAL MEDICINE

## 2022-04-04 PROCEDURE — 87186 SC STD MICRODIL/AGAR DIL: CPT

## 2022-04-04 PROCEDURE — 11042 DBRDMT SUBQ TIS 1ST 20SQCM/<: CPT | Performed by: SPECIALIST

## 2022-04-04 PROCEDURE — 1036F TOBACCO NON-USER: CPT | Performed by: INTERNAL MEDICINE

## 2022-04-04 PROCEDURE — 87070 CULTURE OTHR SPECIMN AEROBIC: CPT

## 2022-04-04 PROCEDURE — G8420 CALC BMI NORM PARAMETERS: HCPCS | Performed by: INTERNAL MEDICINE

## 2022-04-04 PROCEDURE — 1123F ACP DISCUSS/DSCN MKR DOCD: CPT | Performed by: INTERNAL MEDICINE

## 2022-04-04 PROCEDURE — 6370000000 HC RX 637 (ALT 250 FOR IP): Performed by: SPECIALIST

## 2022-04-04 PROCEDURE — 4040F PNEUMOC VAC/ADMIN/RCVD: CPT | Performed by: INTERNAL MEDICINE

## 2022-04-04 RX ORDER — BETAMETHASONE DIPROPIONATE 0.05 %
OINTMENT (GRAM) TOPICAL ONCE
Status: CANCELLED | OUTPATIENT
Start: 2022-04-04 | End: 2022-04-04

## 2022-04-04 RX ORDER — CEPHALEXIN 250 MG/1
250 CAPSULE ORAL 3 TIMES DAILY
Qty: 30 CAPSULE | Refills: 0 | Status: SHIPPED | OUTPATIENT
Start: 2022-04-04 | End: 2022-04-11 | Stop reason: ALTCHOICE

## 2022-04-04 RX ORDER — CLOBETASOL PROPIONATE 0.5 MG/G
OINTMENT TOPICAL ONCE
Status: CANCELLED | OUTPATIENT
Start: 2022-04-04 | End: 2022-04-04

## 2022-04-04 RX ORDER — GINSENG 100 MG
CAPSULE ORAL ONCE
Status: CANCELLED | OUTPATIENT
Start: 2022-04-04 | End: 2022-04-04

## 2022-04-04 RX ORDER — GENTAMICIN SULFATE 1 MG/G
OINTMENT TOPICAL ONCE
Status: CANCELLED | OUTPATIENT
Start: 2022-04-04 | End: 2022-04-04

## 2022-04-04 RX ORDER — LIDOCAINE 50 MG/G
OINTMENT TOPICAL ONCE
Status: COMPLETED | OUTPATIENT
Start: 2022-04-04 | End: 2022-04-04

## 2022-04-04 RX ORDER — BACITRACIN ZINC AND POLYMYXIN B SULFATE 500; 1000 [USP'U]/G; [USP'U]/G
OINTMENT TOPICAL ONCE
Status: CANCELLED | OUTPATIENT
Start: 2022-04-04 | End: 2022-04-04

## 2022-04-04 RX ORDER — LIDOCAINE HYDROCHLORIDE 20 MG/ML
JELLY TOPICAL ONCE
Status: CANCELLED | OUTPATIENT
Start: 2022-04-04 | End: 2022-04-04

## 2022-04-04 RX ORDER — LIDOCAINE HYDROCHLORIDE 40 MG/ML
SOLUTION TOPICAL ONCE
Status: CANCELLED | OUTPATIENT
Start: 2022-04-04 | End: 2022-04-04

## 2022-04-04 RX ORDER — BACITRACIN, NEOMYCIN, POLYMYXIN B 400; 3.5; 5 [USP'U]/G; MG/G; [USP'U]/G
OINTMENT TOPICAL ONCE
Status: CANCELLED | OUTPATIENT
Start: 2022-04-04 | End: 2022-04-04

## 2022-04-04 RX ORDER — LIDOCAINE 50 MG/G
OINTMENT TOPICAL ONCE
Status: CANCELLED | OUTPATIENT
Start: 2022-04-04 | End: 2022-04-04

## 2022-04-04 RX ORDER — LIDOCAINE 40 MG/G
CREAM TOPICAL ONCE
Status: CANCELLED | OUTPATIENT
Start: 2022-04-04 | End: 2022-04-04

## 2022-04-04 RX ADMIN — COLLAGENASE SANTYL: 250 OINTMENT TOPICAL at 09:07

## 2022-04-04 RX ADMIN — LIDOCAINE: 50 OINTMENT TOPICAL at 08:32

## 2022-04-04 ASSESSMENT — ENCOUNTER SYMPTOMS
APNEA: 0
CHOKING: 0
SHORTNESS OF BREATH: 0
GASTROINTESTINAL NEGATIVE: 1
COUGH: 0
CHEST TIGHTNESS: 0
ALLERGIC/IMMUNOLOGIC NEGATIVE: 1

## 2022-04-04 ASSESSMENT — PAIN SCALES - GENERAL: PAINLEVEL_OUTOF10: 10

## 2022-04-04 ASSESSMENT — PAIN DESCRIPTION - LOCATION: LOCATION: LEG

## 2022-04-04 ASSESSMENT — PAIN DESCRIPTION - FREQUENCY: FREQUENCY: CONTINUOUS

## 2022-04-04 ASSESSMENT — PAIN DESCRIPTION - ONSET: ONSET: ON-GOING

## 2022-04-04 ASSESSMENT — PAIN DESCRIPTION - DESCRIPTORS: DESCRIPTORS: ACHING

## 2022-04-04 ASSESSMENT — PAIN DESCRIPTION - PROGRESSION: CLINICAL_PROGRESSION: NOT CHANGED

## 2022-04-04 ASSESSMENT — PAIN DESCRIPTION - ORIENTATION: ORIENTATION: RIGHT;LEFT

## 2022-04-04 ASSESSMENT — PAIN DESCRIPTION - PAIN TYPE: TYPE: ACUTE PAIN

## 2022-04-04 NOTE — PROGRESS NOTES
Subjective:      Patient ID: Donald Henderson is a 80 y.o. female    CC: Fatigue, Mitral regurgitation, htn, cad    HPI:  Loli Murray is an 80year old female with HX of CAD, HTN, PAD, and Mitral regurgitation. . Last Echo. No regional wall motion abnormalities are seen.   Diastolic filling parameters suggests grade I diastolic dysfunction. Bi-atrial enlargement. severe mitral regurgitation. This is a change from 2015 echo that showed moderate MR. Patient feels well and has no chest pain nor SOB. No orthopnea nor PND. She states that she is not quite as active as she used to be and has some fatigue. doing well. Has some LE edema but has TIA and was recently in the hospital with 50% left ICA stenosis that is unchanged from prior. Has weeping wounds prob from arterial insuffciency. Add keflex 10 days. hasnt been taking lasix. Asked to take and check labs in a week. Allergies   Allergen Reactions    Aricept [Donepezil Hydrochloride] Other (See Comments)     intolerant    Doxycycline Nausea Only    Ketorolac Tromethamine Other (See Comments)     Pt unable to recall reaction       Social History     Socioeconomic History    Marital status:       Spouse name: None    Number of children: None    Years of education: None    Highest education level: None   Occupational History    None   Tobacco Use    Smoking status: Never Smoker    Smokeless tobacco: Never Used   Vaping Use    Vaping Use: Never used   Substance and Sexual Activity    Alcohol use: No    Drug use: No    Sexual activity: Not Currently   Other Topics Concern    None   Social History Narrative    None     Social Determinants of Health     Financial Resource Strain: Low Risk     Difficulty of Paying Living Expenses: Not hard at all   Food Insecurity: No Food Insecurity    Worried About Running Out of Food in the Last Year: Never true    Rayray of Food in the Last Year: Never true   Transportation Needs:     Lack of Transportation (Medical): Not on file    Lack of Transportation (Non-Medical): Not on file   Physical Activity:     Days of Exercise per Week: Not on file    Minutes of Exercise per Session: Not on file   Stress:     Feeling of Stress : Not on file   Social Connections:     Frequency of Communication with Friends and Family: Not on file    Frequency of Social Gatherings with Friends and Family: Not on file    Attends Roman [employer] Services: Not on file    Active Member of [address] [place] [address] [employer] or Organizations: Not on file    Attends Club or Organization Meetings: Not on file    Marital Status: Not on file   Intimate Partner Violence:     Fear of Current or Ex-Partner: Not on file    Emotionally Abused: Not on file    Physically Abused: Not on file    Sexually Abused: Not on file   Housing Stability:     Unable to Pay for Housing in the Last Year: Not on file    Number of [place] in the Last Year: Not on file    Unstable Housing in the Last Year: Not on file       Family History   Problem Relation Age of Onset    Stroke Father     Hypertension Father         has a past medical history of Anxiety, Arthritis, At risk for falls, CAD (coronary artery disease), CTS (carpal tunnel syndrome), DDD (degenerative disc disease), cervical, Fractures, Hyperlipidemia, Hypertension, Mitral regurgitation, Neuropathy, Osteopenia, Osteoporosis, PAD (peripheral artery disease) ([address] Peripheral neuropathy, Peripheral vascular disease ([address] Podagra, Spinal stenosis, Type 2 diabetes mellitus ([address] Urethral stricture, and Urgency of urination. Review of Systems   Constitutional: Positive for activity change and fatigue. Negative for appetite change, chills, diaphoresis, fever and unexpected weight change. HENT: Negative. Respiratory: Negative for apnea, cough, choking, chest tightness and shortness of breath. Cardiovascular: Negative for chest pain, palpitations and leg swelling. Gastrointestinal: Negative. Endocrine: Negative. Genitourinary: Negative. Musculoskeletal: Negative. Skin: Negative. Allergic/Immunologic: Negative. Neurological: Negative. Hematological: Negative. Psychiatric/Behavioral: Negative. Vitals:    04/26/21 1314   BP: 138/72   Pulse: 64          Objective:   Physical Exam  Constitutional:       Appearance: She is well-developed. HENT:      Head: Normocephalic and atraumatic. Eyes:      General: No scleral icterus. Right eye: No discharge. Left eye: No discharge. Conjunctiva/sclera: Conjunctivae normal.      Pupils: Pupils are equal, round, and reactive to light. Neck:      Thyroid: No thyromegaly. Trachea: No tracheal deviation. Cardiovascular:      Rate and Rhythm: Normal rate and regular rhythm. Heart sounds: Murmur heard. No friction rub. No gallop. Comments: III/VI HSM  Pulmonary:      Effort: Pulmonary effort is normal. No respiratory distress. Breath sounds: Normal breath sounds. No wheezing or rales. Abdominal:      General: Bowel sounds are normal. There is no distension. Palpations: Abdomen is soft. Tenderness: There is no abdominal tenderness. Musculoskeletal:         General: No tenderness or deformity. Normal range of motion. Cervical back: Normal range of motion and neck supple. Skin:     General: Skin is warm and dry. Coloration: Skin is not pale. Findings: No erythema or rash. Neurological:      Mental Status: She is alert and oriented to person, place, and time. Cranial Nerves: No cranial nerve deficit. Coordination: Coordination normal.   Psychiatric:         Behavior: Behavior normal.         Thought Content: Thought content normal.         Judgment: Judgment normal.         Current Outpatient Medications   Medication Sig Dispense Refill    Lite Touch Lancets MISC Use twice daily when testing blood sugars.  100 each 5    collagenase 250 UNIT/GM ointment Apply topically daily Apply topically daily.  Wound Cleansers (VASHE CLEANSING) SOLN Soak vashe on gauze pad and place on wound for 5-10 minutes 1 each 2    lisinopril (PRINIVIL;ZESTRIL) 20 MG tablet TAKE ONE TABLET BY MOUTH DAILY 90 tablet 3    rosuvastatin (CRESTOR) 5 MG tablet Take 1 tablet by mouth nightly 30 tablet 3    ferrous sulfate (FE TABS) 325 (65 Fe) MG EC tablet Take 1 tablet by mouth 2 times daily 90 tablet 0    glimepiride (AMARYL) 1 MG tablet TAKE ONE TABLET BY MOUTH EVERY MORNING BEFORE BREAKFAST 30 tablet 2    furosemide (LASIX) 20 MG tablet Take 1 tablet by mouth daily 90 tablet 1    blood glucose test strips (ONETOUCH ULTRA) strip USE TO TEST BLOOD SUGAR TWICE DAILY 100 strip 1    metFORMIN (GLUCOPHAGE) 500 MG tablet Take 1 tablet by mouth daily 90 tablet 3    atorvastatin (LIPITOR) 10 MG tablet Take 1 tablet by mouth daily 30 tablet 3    Turmeric 450 MG CAPS Take 450 mg by mouth daily      Cholecalciferol (VITAMIN D3) 2000 UNITS CAPS Take 1 capsule by mouth daily      aspirin EC 81 MG EC tablet Take 1 tablet by mouth daily 30 tablet 3     No current facility-administered medications for this visit. Echo 7/27/15  Conclusions      Summary   Normal left ventricle size, wall thickness and systolic function with an   estimated ejection fraction of 55-60%.   No regional wall motion abnormalities are seen.   Moderate mitral regurgitation is present.  Ector Davidson is moderate tricuspid regurgitation with RVSP estimated at 42 mmHg.      Signature      ------------------------------------------------------------------   Electronically signed by Mazin Montiel MD (Interpreting   [email or web address]) on 07/27/2015 at 11:38 AM   ------------------------------------------------------------------    Echo 8/23/18  Summary   Concentric left ventricular hypertrophy. The left ventricle appears normal   in size. Normal left ventricular systolic function with an estimated   ejection fraction of 55-60%.  No regional wall motion abnormalities are seen.   Diastolic filling parameters suggests grade I diastolic dysfunction .   Bi-atrial enlargement.   Severe mitral regurgitation.   Trace aortic insufficiency.   Mild pulmonic insufficiency.   Moderate tricuspid regurgitation.   Estimated pulmonary artery systolic pressure is 32 mmHg assuming a right   atrial pressure of 3 mmHg.      Signature      ------------------------------------------------------------------   Electronically signed by Roya Santos MD   (Interpreting physician) on 08/23/2018 at 12:30 PM   ------------------------------------------------------------------  Assessment:       Diagnosis Orders   1. Coronary artery disease involving native coronary artery of native heart without angina pectoris  CBC with Auto Differential    Comprehensive Metabolic Panel   2. HTN (hypertension), benign  CBC with Auto Differential    Comprehensive Metabolic Panel   3. Hyperlipidemia, unspecified hyperlipidemia type  CBC with Auto Differential    Comprehensive Metabolic Panel   4. Venous insufficiency of both lower extremities  CBC with Auto Differential    Comprehensive Metabolic Panel   5. Atherosclerosis of artery of extremity with ulceration (HCC)  CBC with Auto Differential    Comprehensive Metabolic Panel          Plan:      CAD- Lipid panel 8/13/20 LDL 43, HDL 61   HTN- Lisinopril and controlled. Severe MR:  Clinically unchanged. shes taking 10 mg lisinopril PAD stable. Lasix to get rid of swelling and help with sob. Take for just 3 days and see if sob improves. HLP:  Controlled with diet but started on 10 mg atorva for 50% ICA stenosis. Infected wound on left pretibial surface with some improvement in infection with keflex   willl represcribe. Weeping legs with arterial insufficiency by hx.

## 2022-04-04 NOTE — PROGRESS NOTES
[address] [facility]  Progress Note and Procedure Note      Fabricio Pisano  MEDICAL RECORD NUMBER:  [id number]  AGE: 80 y.o. GENDER: female  : 1929  EPISODE DATE:  2022    Subjective:     Chief Complaint   Patient presents with    Wound Check     bilateral lower legs         HISTORY of PRESENT ILLNESS HPI     Fabricio Pisano is a 80 y.o. female who presents today for wound/ulcer evaluation. History of Wound Context: Patient continues follow-up here in wound care for a ulcer on the left lower extremity as a result of arterial insufficiency.  She had recently undergone a left common femoral endarterectomy with bovine patch angioplasty.  She states there has been continued significant pain from the wound. Patient states she injured her right lower extremity from a car door this past week resulting in another new wound. Pain Assessment:  Wound/Ulcer Pain Timing/Severity: intermittent  Quality of pain: sharp  Severity:  6 / 10   Modifying Factors: None  Associated Signs/Symptoms: pain    Ulcer Identification:  Ulcer Type: arterial and traumatic  Contributing Factors: diabetes and arterial insufficiency    Objective:      BP (!) 160/81   Pulse 83   Temp 96.8 °F (36 °C) (Temporal)   Resp 16     Wt Readings from Last 3 Encounters:   22 112 lb (50.8 kg)   22 109 lb (49.4 kg)   22 109 lb 3.2 oz (49.5 kg)       PHYSICAL EXAM    Extremities: no cyanosis, no clubbing, 1 + edema-  bilateral lower extremities with painful full-thickness circular wound containing fibrin and slough and granulation tissue. Partial exposure of muscle and tendon designated as wound #1  Partial thickness skin avulsion containing fibrin and slough right anterior knee designated as wound #2 #2          Assessment:      1. Peripheral artery disease ([address]    2. Wound of left lower extremity, subsequent encounter    3.  Wound of left lower extremity, initial encounter         Procedure Note  Indications: Based on my examination of this patient's wound(s) today, sharp excision is required to promote healing and evaluate the extent healing. Performed by: Aubrey Johnson MD    Consent obtained? Yes    Time out taken: Yes    Pain Control: Anesthetic: 5% Lidocaine Ointment Topical     Debridement:Excisional Debridement    Using curette the wound was sharply debrided    down through and including the removal of  epidermis, dermis and subcutaneous tissue. Devitalized Tissue Debrided:  fibrin and slough      Pre Debridement Measurements:  Are located in the Wound Documentation Flow Sheet   Wound #: 1 and 2     Post  Debridement Measurements:  Wound 11/22/21 Leg Left; Lower;Distal #1 (Active)   Wound Image   04/04/22 0818   Wound Etiology Arterial 11/22/21 0924   Wound Cleansed Cleansed with saline 04/04/22 0818   Dressing/Treatment Pharmaceutical agent (see MAR) 04/04/22 0904   Wound Length (cm) 3.4 cm 04/04/22 0818   Wound Width (cm) 2.5 cm 04/04/22 0818   Wound Depth (cm) 0.1 cm 04/04/22 0818   Wound Surface Area (cm^2) 8.5 cm^2 04/04/22 0818   Change in Wound Size % (l*w) -203.57 04/04/22 0818   Wound Volume (cm^3) 0.85 cm^3 04/04/22 0818   Wound Healing % -204 04/04/22 0818   Post-Procedure Length (cm) 3.5 cm 04/04/22 0850   Post-Procedure Width (cm) 2.6 cm 04/04/22 0850   Post-Procedure Depth (cm) 0.3 cm 04/04/22 0850   Post-Procedure Surface Area (cm^2) 9.1 cm^2 04/04/22 0850   Post-Procedure Volume (cm^3) 2.73 cm^3 04/04/22 0850   Distance Tunneling (cm) 0 cm 04/04/22 0818   Undermining Maxium Distance (cm) 0 04/04/22 0818   Wound Assessment Slough;Fibrin 04/04/22 0818   Drainage Amount Moderate 04/04/22 0818   Drainage Description Yellow 04/04/22 0818   Odor None 04/04/22 0818   Nissa-wound Assessment Fragile;Edematous 04/04/22 0818   Margins Defined edges 04/04/22 0818   Wound Thickness Description not for Pressure Injury Full thickness 04/04/22 0818   Number of days: 133       Wound 04/04/22 Leg Anterior; Lower;Right #2 (Active)   Wound Image   04/04/22 0818   Wound Etiology Traumatic 04/04/22 0818   Wound Cleansed Cleansed with saline 04/04/22 0818   Dressing/Treatment Xeroform 04/04/22 0904   Wound Length (cm) 3.4 cm 04/04/22 0818   Wound Width (cm) 2.7 cm 04/04/22 0818   Wound Depth (cm) 0.1 cm 04/04/22 0818   Wound Surface Area (cm^2) 9.18 cm^2 04/04/22 0818   Wound Volume (cm^3) 0.918 cm^3 04/04/22 0818   Post-Procedure Length (cm) 3.5 cm 04/04/22 0850   Post-Procedure Width (cm) 2.7 cm 04/04/22 0850   Post-Procedure Depth (cm) 0.1 cm 04/04/22 0850   Post-Procedure Surface Area (cm^2) 9.45 cm^2 04/04/22 0850   Post-Procedure Volume (cm^3) 0.945 cm^3 04/04/22 0850   Tunneling Position ___ O'Clock 0 04/04/22 0818   Undermining Maxium Distance (cm) 0 04/04/22 0818   Wound Assessment [place]/red;Slough 04/04/22 0818   Drainage Amount Small 04/04/22 0818   Drainage Description Serosanguinous;Brown 04/04/22 0818   Odor None 04/04/22 0818   Nissa-wound Assessment Edematous;Fragile; Hemosiderin staining (brown yellow) 04/04/22 0818   Margins Attached edges 04/04/22 0818   Wound Thickness Description not for Pressure Injury Partial thickness 04/04/22 0818   Number of days: 0          Percent of Wound Debrided: 100%    Total Surface Area Debrided:  18 sq cm    Diabetic/Pressure/Non Pressure Ulcers only:  Ulcer: Non-Pressure ulcer, fat layer exposed    Bleeding: Minimal    Hemostasis Achieved: by pressure    Procedural Pain: 5  / 10     Post Procedural Pain: 0 / 10     Response to treatment:  Poorly tolerated by patient., With complaints of pain. Plan:     Treatment Note: Please see attached Discharge Instructions.   These instructions were given and signed by the patient or POA    New Medication(s) at this visit:   New Prescriptions    No medications on file   These medications were prescribed by a provider not a part of this encounter    CEPHALEXIN (KEFLEX) 250 MG CAPSULE    Take 1 capsule by mouth 3 times daily for 10 days       Other orders at this visit:   Orders Placed This Encounter   Procedures   [id number] [address] [facility] Protocol    Culture, Wound       Discharge Instructions          [facility] Physician Orders and Discharge Instructions  [address] [place] [address] [address] [place] [address]. [place] [address]  Telephone: [phone]  NAME:  Hoang Rao  YOB: 1929  MEDICAL RECORD NUMBER:  [id number]  DATE:  4/4/2022    Wash hands with soap and water prior to and after every dressing change. Wound Cleansing:   · Do not scrub or use excessive force. · With each dressing change, rinse wounds with 0.9% Saline. (May use wound wash or soft contact solution. Both can be purchased at a local drug store). · If unable to obtain saline, may use a gentle soap and water. · Keep wounds dry in the shower unless otherwise instructed by the physician. · For wounds on lower legs, cast covers can be purchased at local drug stores, so that you may shower and keep the wound(s) dry. []  Vashe Wash solution instructions (if prescribed): Apply enough Vashe to soak a piece of gauze and place on wound bed for 5-10 minutes. DO NOT rinse after Vashe has been applied. Follow dressing application as instructed below. Nissa wound Topical Treatments:  Do not apply lotions, creams, or ointments to the skin around the wound bed unless directed as followed:     [] Apply around the wound: [] moisturizing lotion [] Antifungal ointment [] No-Sting barrier film [] Zinc paste [] Other:       Dressings:           Wound Location: left lower leg      Apply Primary Dressing to wound:       Pharmaceutical medication santyl - nickel size thickness    Cover and Secure with:  Cover with 4x4 gauze, bulky roll gauze   Avoid contact of tape with skin if possible.      When to change Dressing: [x] Daily [] Every Other Day [] Once a week  [] Three times per week: [] Monday, Wednesday, Friday [] Tuesday, Thursday, Saturday  [] Do Not Change Dressing [] Other:    Dressings:           Wound Location: right lower leg       Apply Primary Dressing to wound: Other: xeroform gauze     Cover and Secure with:     Cover and Secure with: 4X4 gauze pad  Bulky roll gauze   Avoid contact of tape with skin if possible.  When to change Dressing: [] Daily [] Every Other Day [] Once a week  [x] Three times per week: [x] Monday, Wednesday, Friday [] Tuesday, Thursday, Saturday  [] Do Not Change Dressing [] Other:       Edema Control:  Apply: [x] Compression Stocking  [] Left Lower Leg [] Right Lower Leg     [x]  Spandagrip:Strength: Medium compression 10-20 mm/Hg    [x] Left Lower Leg  [x] Right Lower Leg        Apply every morning immediately when getting up. They should be applied to affected leg(s) from mid foot to knee making sure to cover the heel. Remove every night before going to bed. [x] Elevate leg(s) above the level of the heart for 30 minutes 4-5 times a day and/or when sitting. [x] Avoid prolonged standing in one place. Dietary:  Important dietary reminders:  1. Increase Protein intake (i.e. Lean meats, fish, eggs, legumes, and yogurt)  2. No added salt  3. If diabetic, follow a diabetic diet and check glucose prior to meals or as instructed by your physician. Dietary Supplements:  [] Octaviano  [] 30ml ProStat  [] EnsureEnlive [] Ensure Max/Premier  [] Other:    If you are still having pain after you go home:   For wounds on lower legs or arms, elevate the affected limb.  Use over-the-counter medications you would normally use for pain as permitted by your primary care doctor.  For persistent pain not relieved by the above interventions, please call your primary care doctor.      Return Appointment:   Return Appointment: With Dr Herbert Cowden  in  [address] [place] [address])    [] Return Appointment for a Wound Assessment (Nurse Visit) on:       [x] Orders placed during your visit:   Orders Placed This Encounter   Procedures    Initiate Outpatient Wound Care Protocol    Culture, Wound       o All other future appointments:  Future Appointments   Date Time Provider Gilbert Shante   4/4/2022 10:00 AM Adonis Ramirez MD [facility]   4/11/2022  2:00 PM Rosalie Sandoval MD Jayme Minor WOUND [facility] HOD   4/26/2022  9:45 AM DO OCTAVIO Lozada [place] - DYD   -       Your nurse  is:  Ca Vidal     Electronically signed by Angelita Ku RN on 4/4/2022 at 8:53 AM     [address] [place] [address] Information: Should you experience any significant changes in your wound(s) or have questions about your wound care, please contact the [address] [place] [address] at [phone]. We are open from 8:00am - 4:30p Monday thru Friday except for Wednesdays which we are closed. Please give us 24-48 hours to return your call. Call your doctor now or seek immediate medical care if:    · You have symptoms of infection, such as:  ? Increased pain, swelling, warmth, or redness. ? Red streaks leading from the area. ? Pus draining from the area. ? A fever.         Physician for this visit and orders: Sissy Rodriguez MD    [] Patient unable to sign Discharge Instructions given to ECF/Transportation/POA        Electronically signed by Sissy Rodriguez MD on 4/4/2022 at 11:46 AM

## 2022-04-04 NOTE — PLAN OF CARE
[address]:     [employer] [id number] [address] p: [phone] f: [phone]     Ordering Center: The [address] [place] [address]. Carlos Jones    Patient Information:      Elif Lainez  [address] [facility]   [address]   [phone]   : 1929  AGE: 80 y.o. GENDER: female   TODAYS DATE:  2022    Insurance:      PRIMARY INSURANCE:  Plan: MEDICARE PART A AND B  Coverage: MEDICARE  Effective Date: 9/3/2018  [id number] - (Medicare)    SECONDARY INSURANCE:  Plan:   Coverage:   Effective Date:   [unfilled]    [unfilled]   [unfilled]     Patient Wound Information:      1. Peripheral artery disease ([address]    2. Wound of left lower extremity, subsequent encounter    3. Wound of left lower extremity, initial encounter        WOUNDS REQUIRING DRESSING SUPPLIES:     Wound 21 Leg Left; Lower;Distal #1 (Active)   Wound Image   22 0818   Wound Etiology Arterial 21 0924   Wound Cleansed Cleansed with saline 22 0818   Dressing/Treatment Pharmaceutical agent (see MAR) 22 0904   Wound Length (cm) 3.4 cm 22 0818   Wound Width (cm) 2.5 cm 22 0818   Wound Depth (cm) 0.1 cm 22 0818   Wound Surface Area (cm^2) 8.5 cm^2 22 0818   Change in Wound Size % (l*w) -203.57 22 0818   Wound Volume (cm^3) 0.85 cm^3 22 0818   Wound Healing % -204 22 0818   Post-Procedure Length (cm) 3.5 cm 22 0850   Post-Procedure Width (cm) 2.6 cm 22 0850   Post-Procedure Depth (cm) 0.3 cm 22 0850   Post-Procedure Surface Area (cm^2) 9.1 cm^2 22 0850   Post-Procedure Volume (cm^3) 2.73 cm^3 22 0850   Distance Tunneling (cm) 0 cm 22   Undermining Maxium Distance (cm) 0 22   Wound Assessment Slough;Fibrin 22   Drainage Amount Moderate 22 Medipore Tape  [x] Saline  [] Skin Prep   [] Adhesive Remover   [] Cotton Tip Applicators   [] Other:    Patient Wound(s) Debrided: [x] Yes   [] No    Debridement Date: 4/4/2022    Debribement Type: Excisional/Sharp    Patient currently being seen by Home Health: [] Yes   [x] No    Duration for needed supplies:  []15  [x]30  []60  []90 Days    Provider Information:      PROVIDER'S NAME/NPI: Sharlene Meyers MD,  NPI: [id number]    I give permission to coordinate the care for this patient   assisting with verbal orders: Radha Boucher RN 4/4/2022

## 2022-04-05 ENCOUNTER — TELEPHONE (OUTPATIENT)
Dept: INTERNAL MEDICINE CLINIC | Age: 87
End: 2022-04-05

## 2022-04-05 NOTE — TELEPHONE ENCOUNTER
Patient is requesting Dr. Kendall Barnett advice regarding the following:  She has had wound therapy for 8 weeks on her L leg, and her wound is worsening, and she fears they may want to remove it. She states \"she had a dermatologist who saw her may years ago for a bad rash, and he was able to get rid of it with a Infrared light. \"   Does Dr. Mathew Arteaga recommend Infrared light for healing and a cure for her wound on her leg? Please contact patient @ phone # provided.

## 2022-04-05 NOTE — TELEPHONE ENCOUNTER
Spoke with patient. Advised her not to use this infrared light device.   I recommended she follow-up with the wound specialist.

## 2022-04-05 NOTE — TELEPHONE ENCOUNTER
Spoke with patient, she used the light today and said it looks better. She wants to know why you do not want her to use?

## 2022-04-06 LAB
GRAM STAIN RESULT: ABNORMAL
ORGANISM: ABNORMAL
WOUND/ABSCESS: ABNORMAL

## 2022-04-07 ENCOUNTER — TELEPHONE (OUTPATIENT)
Dept: WOUND CARE | Age: 87
End: 2022-04-07

## 2022-04-07 LAB
CATARACTS: NORMAL
DIABETIC RETINOPATHY: NORMAL
GLAUCOMA: NORMAL
INTRAOCULAR PRESSURE LEFT EYE: 12
INTRAOCULAR PRESSURE RIGHT EYE: 13
VISUAL ACUITY DISTANCE LEFT EYE: NORMAL
VISUAL ACUITY DISTANCE RIGHT EYE: NORMAL

## 2022-04-07 RX ORDER — CLINDAMYCIN HYDROCHLORIDE 300 MG/1
300 CAPSULE ORAL 3 TIMES DAILY
Qty: 21 CAPSULE | Refills: 0 | Status: SHIPPED | OUTPATIENT
Start: 2022-04-07 | End: 2022-04-14

## 2022-04-07 NOTE — TELEPHONE ENCOUNTER
Called and spoke to Dong (care giver/POA) regarding wound culture results and escribed clindamycin to pharmacy listed in epic. Instructed to stop taking keflex and start clindamycin. Leola verbalized understanding.

## 2022-04-11 ENCOUNTER — HOSPITAL ENCOUNTER (OUTPATIENT)
Dept: WOUND CARE | Age: 87
Discharge: HOME | End: 2022-04-11
Payer: MEDICARE

## 2022-04-11 DIAGNOSIS — S81.802D WOUND OF LEFT LOWER EXTREMITY, SUBSEQUENT ENCOUNTER: ICD-10-CM

## 2022-04-11 DIAGNOSIS — I73.9 PERIPHERAL ARTERY DISEASE (HCC): Primary | ICD-10-CM

## 2022-04-11 DIAGNOSIS — S81.802A WOUND OF LEFT LOWER EXTREMITY, INITIAL ENCOUNTER: ICD-10-CM

## 2022-04-11 PROCEDURE — 11042 DBRDMT SUBQ TIS 1ST 20SQCM/<: CPT | Performed by: SURGERY

## 2022-04-11 PROCEDURE — 99203 OFFICE O/P NEW LOW 30 MIN: CPT | Performed by: SURGERY

## 2022-04-11 PROCEDURE — 11042 DBRDMT SUBQ TIS 1ST 20SQCM/<: CPT

## 2022-04-11 PROCEDURE — 6370000000 HC RX 637 (ALT 250 FOR IP): Performed by: SPECIALIST

## 2022-04-11 RX ORDER — LIDOCAINE 50 MG/G
OINTMENT TOPICAL ONCE
Status: CANCELLED | OUTPATIENT
Start: 2022-04-11 | End: 2022-04-11

## 2022-04-11 RX ORDER — GENTAMICIN SULFATE 1 MG/G
OINTMENT TOPICAL ONCE
Status: CANCELLED | OUTPATIENT
Start: 2022-04-11 | End: 2022-04-11

## 2022-04-11 RX ORDER — LIDOCAINE 50 MG/G
OINTMENT TOPICAL ONCE
Status: COMPLETED | OUTPATIENT
Start: 2022-04-11 | End: 2022-04-11

## 2022-04-11 RX ORDER — BACITRACIN ZINC AND POLYMYXIN B SULFATE 500; 1000 [USP'U]/G; [USP'U]/G
OINTMENT TOPICAL ONCE
Status: CANCELLED | OUTPATIENT
Start: 2022-04-11 | End: 2022-04-11

## 2022-04-11 RX ORDER — LIDOCAINE HYDROCHLORIDE 40 MG/ML
SOLUTION TOPICAL ONCE
Status: DISCONTINUED | OUTPATIENT
Start: 2022-04-11 | End: 2022-04-12 | Stop reason: HOSPADM

## 2022-04-11 RX ORDER — GINSENG 100 MG
CAPSULE ORAL ONCE
Status: CANCELLED | OUTPATIENT
Start: 2022-04-11 | End: 2022-04-11

## 2022-04-11 RX ORDER — LIDOCAINE HYDROCHLORIDE 40 MG/ML
SOLUTION TOPICAL ONCE
Status: CANCELLED | OUTPATIENT
Start: 2022-04-11 | End: 2022-04-11

## 2022-04-11 RX ORDER — LIDOCAINE HYDROCHLORIDE 20 MG/ML
JELLY TOPICAL ONCE
Status: CANCELLED | OUTPATIENT
Start: 2022-04-11 | End: 2022-04-11

## 2022-04-11 RX ORDER — LIDOCAINE 40 MG/G
CREAM TOPICAL ONCE
Status: CANCELLED | OUTPATIENT
Start: 2022-04-11 | End: 2022-04-11

## 2022-04-11 RX ORDER — CLOBETASOL PROPIONATE 0.5 MG/G
OINTMENT TOPICAL ONCE
Status: CANCELLED | OUTPATIENT
Start: 2022-04-11 | End: 2022-04-11

## 2022-04-11 RX ORDER — BACITRACIN, NEOMYCIN, POLYMYXIN B 400; 3.5; 5 [USP'U]/G; MG/G; [USP'U]/G
OINTMENT TOPICAL ONCE
Status: CANCELLED | OUTPATIENT
Start: 2022-04-11 | End: 2022-04-11

## 2022-04-11 RX ORDER — BETAMETHASONE DIPROPIONATE 0.05 %
OINTMENT (GRAM) TOPICAL ONCE
Status: CANCELLED | OUTPATIENT
Start: 2022-04-11 | End: 2022-04-11

## 2022-04-11 RX ADMIN — LIDOCAINE: 50 OINTMENT TOPICAL at 14:23

## 2022-04-11 RX ADMIN — COLLAGENASE SANTYL: 250 OINTMENT TOPICAL at 14:49

## 2022-04-11 NOTE — PROGRESS NOTES
[address] [facility] - [place], [place]  Progress Note and Procedure Note      Mikhail Cr  MEDICAL RECORD NUMBER:  [id number]  AGE: 80 y.o. GENDER: female  : 1929  EPISODE DATE:  2022    Subjective:     Chief Complaint   Patient presents with    Wound Check     bilateral lower leg      HISTORY of PRESENT ILLNESS HPI   Mikhail Cr is a 80 y.o. female who presents today for wound/ulcer evaluation. History of Wound Context: ms. Jun Dumont is referred by Dr. Juan Medina for evaluation of LLE ulceration. She underwent L femoral endarterectomy on 3/15; unfortunately, patient has not improved. She now presents for an opinion regarding percutaneous arterial intervention. I did review her arteriogram from February. She has occlusion of the mid distal left SFA and popliteal artery with reconstitution of the distal popliteal artery and two-vessel runoff to the foot. Reports pain in the region of the ulcer. Reports continued drainage and swelling of the legs.     Ulcer Identification:  Ulcer Type: venous and arterial    Contributing Factors: edema and arterial insufficiency        PAST MEDICAL HISTORY      Diagnosis Date    Anxiety     Arthritis     At risk for falls     CAD (coronary artery disease)     CTS (carpal tunnel syndrome)     Bilateral, EMG-NCS    DDD (degenerative disc disease), cervical     Fractures     (L) Hip Fx 98, L1 fracture from fall 10-31-06    Hyperlipidemia     Hypertension     Mitral regurgitation     Neuropathy     Osteopenia     Osteoporosis     PAD (peripheral artery disease) (HCC)     Right LE ischemia    Peripheral neuropathy 2015    Peripheral vascular disease ([address]     bilateral lower extremities with edema    Podagra 2017    Dr. Alyssa Parks Spinal stenosis     L4-5    Type 2 diabetes mellitus ([address]     Urethral stricture 5 years ago    Urgency of urination      PAST SURGICAL HISTORY  Past Surgical History:   Procedure Laterality Date    APPENDECTOMY      CARPAL TUNNEL RELEASE Right 3/29/2019    RIGHT CARPAL TUNNEL RELEASE AND RIGHT MIDDLE FINGER TRIGGER FINGER RELEASE performed by Matt Barrera MD at [address]  [id number]    LEFT EYE EYE CATARACT PHACOEMULSIFICATION INTRAOCULAR LENS    CHOLECYSTECTOMY  1978    COLONOSCOPY  8/31/99    diverticulosis    EYE SURGERY Bilateral     bilateral cataract removed    FEMORAL ENDARTERECTOMY Left 2/14/2022    LEFT COMMON FEMORAL ARTERY ENDARTERECTOMY performed by Viktor Tam MD at [facility]    IR FEMORAL POPLITEAL BYPASS GRAFT Right 8-    KYPHOSIS SURGERY  11-7-06    L1, (fractured after a fall)    LEG SURGERY Left 2/14/2022    LEFT LEG WOUND DEBRIDEMENT performed by iVktor Tam MD at [address]  4/25/98    (L) THR    WRIST FRACTURE SURGERY  2008    left wrist       SOCIAL HISTORY  Social History     Tobacco Use    Smoking status: Never Smoker    Smokeless tobacco: Never Used   Vaping Use    Vaping Use: Never used   Substance Use Topics    Alcohol use: No    Drug use: No       ALLERGIES  Allergies   Allergen Reactions    Aricept [Donepezil Hydrochloride] Other (See Comments)     intolerant    Doxycycline Nausea Only    Ketorolac Tromethamine Other (See Comments)     Pt unable to recall reaction     MEDICATIONS  Current Outpatient Medications on File Prior to Encounter   Medication Sig Dispense Refill    clindamycin (CLEOCIN) 300 MG capsule Take 1 capsule by mouth 3 times daily for 7 days 21 capsule 0    Lite Touch Lancets MISC Use twice daily when testing blood sugars. 100 each 5    collagenase 250 UNIT/GM ointment Apply topically daily Apply topically daily.       Wound Cleansers (VASHE CLEANSING) SOLN Soak vashe on gauze pad and place on wound for 5-10 minutes 1 each 2    lisinopril (PRINIVIL;ZESTRIL) 20 MG tablet TAKE ONE TABLET BY MOUTH DAILY 90 tablet 3    rosuvastatin (CRESTOR) 5 MG tablet Take 1 tablet by mouth nightly 30 tablet 3    ferrous sulfate (FE TABS) 325 (65 Fe) MG EC tablet Take 1 tablet by mouth 2 times daily 90 tablet 0    glimepiride (AMARYL) 1 MG tablet TAKE ONE TABLET BY MOUTH EVERY MORNING BEFORE BREAKFAST 30 tablet 2    furosemide (LASIX) 20 MG tablet Take 1 tablet by mouth daily 90 tablet 1    blood glucose test strips (ONETOUCH ULTRA) strip USE TO TEST BLOOD SUGAR TWICE DAILY 100 strip 1    metFORMIN (GLUCOPHAGE) 500 MG tablet Take 1 tablet by mouth daily 90 tablet 3    atorvastatin (LIPITOR) 10 MG tablet Take 1 tablet by mouth daily 30 tablet 3    Turmeric 450 MG CAPS Take 450 mg by mouth daily      Cholecalciferol (VITAMIN D3) 2000 UNITS CAPS Take 1 capsule by mouth daily      aspirin EC 81 MG EC tablet Take 1 tablet by mouth daily 30 tablet 3     No current facility-administered medications on file prior to encounter. REVIEW OF SYSTEMS  Pertinent items are noted in HPI. Last P9L if applicable:   Hemoglobin A1C   Date Value Ref Range Status   03/02/2022 5.6 See comment % Final     Comment:     Comment:  Diagnosis of Diabetes: > or = 6.5%  Increased risk of diabetes (Prediabetes): 5.7-6.4%  Glycemic Control: Nonpregnant Adults: <7.0%                    Pregnant: <6.0%           Objective: There were no vitals taken for this visit. Wt Readings from Last 3 Encounters:   04/04/22 112 lb (50.8 kg)   03/30/22 109 lb (49.4 kg)   03/26/22 109 lb 3.2 oz (49.5 kg)     PHYSICAL EXAM  General Appearance: alert and oriented to person, place, time; NAD. WD/WN  Pulmonary/Chest: CTA B no w/r/r  Cardiovascular: RRR  Extremities: 2+ pitting edema B calves. On the left, there is a circular, well-circumscribed ulceration measuring approximately 3.7 x 2.5 cm. The base of the wound has a rim of necrotic subcutaneous tissue at the periphery. The area is very tender. A second smaller ulceration just above this 1 is shallow and weeping.   Moderate exudate at the base Surface Area (cm^2) 9.45 cm^2 04/11/22 1447   Post-Procedure Volume (cm^3) 0.945 cm^3 04/11/22 1447   Distance Tunneling (cm) 0 cm 04/11/22 1414   Undermining Maxium Distance (cm) 0 04/11/22 1414   Wound Assessment Slough;Fibrin 04/11/22 1414   Drainage Amount Small 04/11/22 1414   Drainage Description Yellow 04/04/22 0818   Odor None 04/11/22 1414   Nissa-wound Assessment Fragile;Edematous 04/11/22 1414   Margins Defined edges 04/11/22 1414   Wound Thickness Description not for Pressure Injury Full thickness 04/11/22 1414   Number of days: 140       Wound 04/04/22 Leg Anterior; Lower;Right #2 (Active)   Wound Image   04/04/22 0818   Wound Etiology Traumatic 04/04/22 0818   Wound Cleansed Cleansed with saline 04/11/22 1414   Dressing/Treatment Xeroform 04/11/22 1448   Wound Length (cm) 3 cm 04/11/22 1414   Wound Width (cm) 2.4 cm 04/11/22 1414   Wound Depth (cm) 0.1 cm 04/11/22 1414   Wound Surface Area (cm^2) 7.2 cm^2 04/11/22 1414   Change in Wound Size % (l*w) 21.57 04/11/22 1414   Wound Volume (cm^3) 0.72 cm^3 04/11/22 1414   Wound Healing % 22 04/11/22 1414   Post-Procedure Length (cm) 3 cm 04/11/22 1447   Post-Procedure Width (cm) 2.4 cm 04/11/22 1447   Post-Procedure Depth (cm) 0.1 cm 04/11/22 1447   Post-Procedure Surface Area (cm^2) 7.2 cm^2 04/11/22 1447   Post-Procedure Volume (cm^3) 0.72 cm^3 04/11/22 1447   Distance Tunneling (cm) 0 cm 04/11/22 1414   Tunneling Position ___ O'Clock 0 04/04/22 0818   Undermining Maxium Distance (cm) 0 04/11/22 1414   Wound Assessment Pink/red;Fibrin 04/11/22 1414   Drainage Amount Small 04/11/22 1414   Drainage Description Serosanguinous 04/11/22 1414   Odor None 04/11/22 1414   Nissa-wound Assessment Edematous;Fragile; Hemosiderin staining (brown yellow) 04/11/22 1414   Margins Attached edges 04/11/22 1414   Wound Thickness Description not for Pressure Injury Partial thickness 04/11/22 1414   Number of days: 7      Percent of Wound(s)/Ulcer(s) Debrided: 100%  Total Surface Area Debrided:  9.45 sq cm   Diabetic/Pressure/Non Pressure Ulcers only:  Ulcer: Non-Pressure ulcer, fat layer exposed   Estimated Blood Loss:  Minimal  Hemostasis Achieved:  by pressure  Procedural Pain:  5  / 10   Post Procedural Pain:  2 / 10   Response to treatment:  Well tolerated by patient. Plan:   Continue current wound care regimen as noted below. Plan left lower extremity arteriogram with possible balloon angioplasty, stent or other percutaneous intervention. Scheduled for Monday, 4/18/2022 at 9:00 AM.    Treatment Note please see attached Discharge Instructions    New Medication(s) at this visit:   New Prescriptions    No medications on file       Other orders at this visit:   Orders Placed This Encounter   Procedures    Initiate Outpatient Wound Care Protocol       Smoking Cessation: Counseling given: Not Answered      Written patient dismissal instructions given to patient and signed by patient or POA. Discharge [address] [facility] Physician Orders and Discharge Instructions  [address] [address]  Telephone: [phone]  NAME:  Bakari Mak  YOB: 1929  MEDICAL RECORD NUMBER:  [id number]  DATE:  4/11/2022    Wash hands with soap and water prior to and after every dressing change. Wound Cleansing:   · Do not scrub or use excessive force. · With each dressing change, rinse wounds with 0.9% Saline. (May use wound wash or soft contact solution. Both can be purchased at a local drug store). · If unable to obtain saline, may use a gentle soap and water. · Keep wounds dry in the shower unless otherwise instructed by the physician. · For wounds on lower legs, cast covers can be purchased at local drug stores, so that you may shower and keep the wound(s) dry.     []  Vashe Wash solution instructions (if prescribed): Apply enough Vashe to soak a piece of gauze and place on wound bed for 5-10 minutes. DO NOT rinse after Vashe has been applied. Follow dressing application as instructed below. Nissa wound Topical Treatments:  Do not apply lotions, creams, or ointments to the skin around the wound bed unless directed as followed:     [] Apply around the wound: [] moisturizing lotion [] Antifungal ointment [] No-Sting barrier film [] Zinc paste [] Other:       Dressings:           Wound Location: right lower leg    Apply Primary Dressing to wound: Other: xeroform     Cover and Secure with:     Cover and Secure with: 4X4 gauze pad  Bulky roll gauze   Avoid contact of tape with skin if possible.  When to change Dressing: [] Daily [] Every Other Day [] Once a week  [x] Three times per week: [x] Monday, Wednesday, Friday [] Tuesday, Thursday, Saturday  [] Do Not Change Dressing [] Other:    Dressings:           Wound Location: left lower leg      Apply Primary Dressing to wound:       Pharmaceutical medication santyl- nickel size thickness     Cover and Secure with:     Cover and Secure with: 4X4 gauze pad   Avoid contact of tape with skin if possible.  When to change Dressing: [x] Daily [] Every Other Day [] Once a week  [] Three times per week: [] Monday, Wednesday, Friday [] Tuesday, Thursday, Saturday  [] Do Not Change Dressing [] Other:    Edema Control:  Apply: [] Compression Stocking  [] Left Lower Leg [] Right Lower Leg     [x]  Spandagrip:Strength: Medium compression 10-20 mm/Hg    [x] Left Lower Leg  [x] Right Lower Leg        Apply every morning immediately when getting up. They should be applied to affected leg(s) from mid foot to knee making sure to cover the heel. Remove every night before going to bed. [x] Elevate leg(s) above the level of the heart for 30 minutes 4-5 times a day and/or when sitting. [x] Avoid prolonged standing in one place. Dietary:  Important dietary reminders:  1.  Increase Protein intake (i.e. Lean meats, fish, eggs, legumes, and yogurt)  2. No added salt  3. If diabetic, follow a diabetic diet and check glucose prior to meals or as instructed by your physician. Dietary Supplements:  [] Octaviano  [] 30ml ProStat  [] EnsureEnlive [] Ensure Max/Premier  [] Other:    If you are still having pain after you go home:   For wounds on lower legs or arms, elevate the affected limb.  Use over-the-counter medications you would normally use for pain as permitted by your primary care doctor.  For persistent pain not relieved by the above interventions, please call your primary care doctor. Return Appointment:  Miguel Return Appointment: With Dr. Suze Paris  in  [facility])    [] Return Appointment for a Wound Assessment (Nurse Visit) on:       [x] Orders placed during your visit:   Orders Placed This Encounter   Procedures    Initiate Outpatient Wound Care Protocol       o All other future appointments:  Future Appointments   Date Time Provider Gilbert Frey   4/26/2022  9:45 AM [place] [id number] [address]   7/11/2022  3:30 PM Adonis Ramirez MD [facility]   -       Your nurse  is: Ca Vidal     Electronically signed by Angelita Ku RN on 4/11/2022 at 2:47 PM     [address] Information: Should you experience any significant changes in your wound(s) or have questions about your wound care, please contact the [address] [place] [address] at [phone]. We are open from 8:00am - 4:30p Monday, Thursday and Friday; 11:00am - 5pm on Tuesday and CLOSED Wednesday. Please give us 24-48 hours to return your call. Call your doctor now or seek immediate medical care if:    · You have symptoms of infection, such as:  ? Increased pain, swelling, warmth, or redness. ? Red streaks leading from the area. ? Pus draining from the area. ? A fever.         Physician for this visit and orders: Rosalie Sandoval MD    [] Patient unable to sign Discharge Instructions given to ECF/Transportation/POA            Electronically signed by Emiliana Leyva MD on 4/11/2022 at 4:21 PM

## 2022-04-18 ENCOUNTER — HOSPITAL ENCOUNTER (OUTPATIENT)
Dept: INTERVENTIONAL RADIOLOGY/VASCULAR | Age: 87
Discharge: HOME | End: 2022-04-18
Payer: MEDICARE

## 2022-04-18 VITALS
DIASTOLIC BLOOD PRESSURE: 54 MMHG | WEIGHT: 110 LBS | OXYGEN SATURATION: 97 % | SYSTOLIC BLOOD PRESSURE: 155 MMHG | HEART RATE: 62 BPM | RESPIRATION RATE: 16 BRPM | BODY MASS INDEX: 21.6 KG/M2 | HEIGHT: 60 IN | TEMPERATURE: 97.8 F

## 2022-04-18 DIAGNOSIS — I73.9 PVD (PERIPHERAL VASCULAR DISEASE) (HCC): ICD-10-CM

## 2022-04-18 LAB
A/G RATIO: 1.9 (ref 1.1–2.2)
ALBUMIN SERPL-MCNC: 4.2 G/DL (ref 3.4–5)
ALP BLD-CCNC: 65 U/L (ref 40–129)
ALT SERPL-CCNC: 12 U/L (ref 10–40)
ANION GAP SERPL CALCULATED.3IONS-SCNC: 9 MMOL/L (ref 3–16)
AST SERPL-CCNC: 19 U/L (ref 15–37)
BILIRUB SERPL-MCNC: 0.3 MG/DL (ref 0–1)
BUN BLDV-MCNC: 30 MG/DL (ref 7–20)
CALCIUM SERPL-MCNC: 10.1 MG/DL (ref 8.3–10.6)
CHLORIDE BLD-SCNC: 98 MMOL/L (ref 99–110)
CO2: 28 MMOL/L (ref 21–32)
CREAT SERPL-MCNC: 1.5 MG/DL (ref 0.6–1.2)
GFR AFRICAN AMERICAN: 39
GFR NON-AFRICAN AMERICAN: 32
GLUCOSE BLD-MCNC: 164 MG/DL (ref 70–99)
HCT VFR BLD CALC: 33.1 % (ref 36–48)
HEMOGLOBIN: 11.1 G/DL (ref 12–16)
INR BLD: 0.98 (ref 0.88–1.12)
MCH RBC QN AUTO: 31.3 PG (ref 26–34)
MCHC RBC AUTO-ENTMCNC: 33.6 G/DL (ref 31–36)
MCV RBC AUTO: 93.1 FL (ref 80–100)
PDW BLD-RTO: 14.8 % (ref 12.4–15.4)
PLATELET # BLD: 197 K/UL (ref 135–450)
PMV BLD AUTO: 7.7 FL (ref 5–10.5)
POTASSIUM SERPL-SCNC: 4.6 MMOL/L (ref 3.5–5.1)
PROTHROMBIN TIME: 11.1 SEC (ref 9.9–12.7)
RBC # BLD: 3.56 M/UL (ref 4–5.2)
SODIUM BLD-SCNC: 135 MMOL/L (ref 136–145)
TOTAL PROTEIN: 6.4 G/DL (ref 6.4–8.2)
WBC # BLD: 6 K/UL (ref 4–11)

## 2022-04-18 PROCEDURE — C1769 GUIDE WIRE: HCPCS

## 2022-04-18 PROCEDURE — C1725 CATH, TRANSLUMIN NON-LASER: HCPCS

## 2022-04-18 PROCEDURE — C1894 INTRO/SHEATH, NON-LASER: HCPCS

## 2022-04-18 PROCEDURE — 37224 PR REVSC OPN/PRG FEM/POP W/ANGIOPLASTY UNI: CPT | Performed by: SURGERY

## 2022-04-18 PROCEDURE — 80053 COMPREHEN METABOLIC PANEL: CPT

## 2022-04-18 PROCEDURE — 36415 COLL VENOUS BLD VENIPUNCTURE: CPT

## 2022-04-18 PROCEDURE — 75710 ARTERY X-RAYS ARM/LEG: CPT

## 2022-04-18 PROCEDURE — 75774 ARTERY X-RAY EACH VESSEL: CPT

## 2022-04-18 PROCEDURE — 85027 COMPLETE CBC AUTOMATED: CPT

## 2022-04-18 PROCEDURE — 85610 PROTHROMBIN TIME: CPT

## 2022-04-18 PROCEDURE — 6360000004 HC RX CONTRAST MEDICATION: Performed by: SURGERY

## 2022-04-18 PROCEDURE — C1760 CLOSURE DEV, VASC: HCPCS

## 2022-04-18 PROCEDURE — 99153 MOD SED SAME PHYS/QHP EA: CPT

## 2022-04-18 PROCEDURE — 37224 HC FEM POP TERRITORY PLASTY: CPT

## 2022-04-18 PROCEDURE — 37228 HC TIB PER TERRITORY PLASTY: CPT

## 2022-04-18 PROCEDURE — 2709999900 HC NON-CHARGEABLE SUPPLY

## 2022-04-18 PROCEDURE — C1887 CATHETER, GUIDING: HCPCS

## 2022-04-18 PROCEDURE — 99152 MOD SED SAME PHYS/QHP 5/>YRS: CPT

## 2022-04-18 RX ORDER — MORPHINE SULFATE 2 MG/ML
2 INJECTION, SOLUTION INTRAMUSCULAR; INTRAVENOUS
Status: DISCONTINUED | OUTPATIENT
Start: 2022-04-18 | End: 2022-04-19 | Stop reason: HOSPADM

## 2022-04-18 RX ORDER — SODIUM CHLORIDE 0.9 % (FLUSH) 0.9 %
5-40 SYRINGE (ML) INJECTION PRN
Status: DISCONTINUED | OUTPATIENT
Start: 2022-04-18 | End: 2022-04-19 | Stop reason: HOSPADM

## 2022-04-18 RX ORDER — CLONIDINE HYDROCHLORIDE 0.1 MG/1
0.1 TABLET ORAL
Status: DISCONTINUED | OUTPATIENT
Start: 2022-04-18 | End: 2022-04-19 | Stop reason: HOSPADM

## 2022-04-18 RX ORDER — MORPHINE SULFATE 2 MG/ML
4 INJECTION, SOLUTION INTRAMUSCULAR; INTRAVENOUS
Status: DISCONTINUED | OUTPATIENT
Start: 2022-04-18 | End: 2022-04-19 | Stop reason: HOSPADM

## 2022-04-18 RX ORDER — SODIUM CHLORIDE 0.9 % (FLUSH) 0.9 %
5-40 SYRINGE (ML) INJECTION EVERY 12 HOURS SCHEDULED
Status: DISCONTINUED | OUTPATIENT
Start: 2022-04-18 | End: 2022-04-19 | Stop reason: HOSPADM

## 2022-04-18 RX ORDER — LABETALOL 20 MG/4 ML (5 MG/ML) INTRAVENOUS SYRINGE
10
Status: DISCONTINUED | OUTPATIENT
Start: 2022-04-18 | End: 2022-04-19 | Stop reason: HOSPADM

## 2022-04-18 RX ORDER — IODIXANOL 320 MG/ML
150 INJECTION, SOLUTION INTRAVASCULAR
Status: COMPLETED | OUTPATIENT
Start: 2022-04-18 | End: 2022-04-18

## 2022-04-18 RX ORDER — SODIUM CHLORIDE 9 MG/ML
25 INJECTION, SOLUTION INTRAVENOUS PRN
Status: DISCONTINUED | OUTPATIENT
Start: 2022-04-18 | End: 2022-04-19 | Stop reason: HOSPADM

## 2022-04-18 RX ORDER — ATORVASTATIN CALCIUM 20 MG/1
20 TABLET, FILM COATED ORAL NIGHTLY
Status: DISCONTINUED | OUTPATIENT
Start: 2022-04-19 | End: 2022-04-19 | Stop reason: HOSPADM

## 2022-04-18 RX ORDER — ASPIRIN 81 MG/1
81 TABLET ORAL DAILY
Status: DISCONTINUED | OUTPATIENT
Start: 2022-04-18 | End: 2022-04-19 | Stop reason: HOSPADM

## 2022-04-18 RX ORDER — SODIUM CHLORIDE 9 MG/ML
INJECTION, SOLUTION INTRAVENOUS CONTINUOUS
Status: DISCONTINUED | OUTPATIENT
Start: 2022-04-18 | End: 2022-04-19 | Stop reason: HOSPADM

## 2022-04-18 RX ADMIN — IODIXANOL 150 ML: 320 INJECTION, SOLUTION INTRAVASCULAR at 11:16

## 2022-04-18 ASSESSMENT — PAIN DESCRIPTION - FREQUENCY: FREQUENCY: INTERMITTENT

## 2022-04-18 NOTE — BRIEF OP NOTE
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 Ultrasound-guided access right common femoral artery  2. Ultrasound-guided retrograde access distal left posterior tibial artery  3. Left lower extremity arteriogram with balloon angioplasty left superficial femoral artery 4 mm x 100 mm Bora balloon    Surgeon:  Erminio Favre, MD    Monitored sedation       Estimated Blood Loss (mL): Minimal    Complications: None    Findings: Total occlusion of the distal superficial femoral and proximal popliteal artery segments with dense calcification that could not be crossed percutaneously by either antegrade or retrograde pedal approach.   Patient had two-vessel runoff to the foot by the posterior tibial (dominant) and anterior tibial arteries    Electronically signed by Erminio Favre, MD on 4/18/2022 at 1:37 PM

## 2022-04-18 NOTE — OP NOTE
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 Ultrasound-guided access right common femoral artery  2. Ultrasound-guided retrograde access distal left posterior tibial artery  3. Left lower extremity arteriogram with balloon angioplasty left superficial femoral artery 4 mm x 100 mm Bora balloon     Surgeon:  Desmond Garner MD     Monitored sedation       Estimated Blood Loss (mL): Minimal     Complications: None     Findings: Total occlusion of the distal superficial femoral and proximal popliteal artery segments with dense calcification that could not be crossed percutaneously by either antegrade or retrograde pedal approach. Patient had two-vessel runoff to the foot by the posterior tibial (dominant) and anterior tibial arteries.     Indications for Procedure:  Yves Mckeon is a 80 y.o. female who was evaluated as an outpatient with nonhealing left anterior lower leg ulceration, which persisted following left femoral endarterectomy. She has known occlusion of the distal superficial femoral and popliteal arterial segments and therefore presents today for possible percutaneous revascularization. Informed consent was obtained after discussion of potential benefits, alternatives and risks of the procedure, including but not limited to bleeding, infection, worsening of arterial insufficiency, and vascular injury requiring operative intervention. She understood all risks and benefits and wished to proceed. All questions were answered preoperatively. Details of Procedure: The patient was taken to the cardiac cath lab and placed in supine position. IV sedation was administered by the nursing team. The operative area was clipped, prepared and draped in sterile fashion.  A dose of intravenous antibiotics was administered. A brief time out was performed per hospital protocol. The Right common femoral artery was accessed under ultrasound guidance with a micropuncture system. Micropuncture sheath was exchanged over a Veeva wire for a 6 Western Sandra sheath. An omni-flush catheter was inserted and positioned in the distal abdominal aorta and iliac arteriogram was performed in the Estonian and DAVIS positions. The catheter was exchanged for a rim catheter and then directed over the wire into the contra-lateral Left iliac system and guided down to the Left femoral head. Left lower extremity runoff was performed. Pre-intervention Findings:    --Bilateral common iliac arteries are patent with patent stents noted in the distal right common iliac and proximal left common iliac artery segments. --Bilateral internal iliac and external iliac arteries are patent. --Left common femoral endarterectomy site widely patent. Profundofemoral artery widely patent. --Proximal left superficial femoral artery with mild stenosis. The proximal one half of the SFA demonstrates only mild stenoses, then becomes significantly diseased in the distal 15 cm with multiple focal areas of near occlusion. It then occludes at the abductor canal and remains occluded over a length of 7 cm, reconstituting in the mid popliteal artery. --Below-knee popliteal artery is widely patent. Two-vessel runoff to the foot by the posterior tibial and anterior tibial arteries. The posterior tibial artery is the dominant vessel. Attempted Therapeutic Intervention:  The patient was systemically heparinized with 5000 units of IV heparin. The femoral sheath was exchanged over a wire for a 6 Western Sandra Destination sheath, the tip was which was positioned in the distal Left external iliac artery.   Using a number of various wires (0.014 Advantage Trek, 0.014 Command, 0.014 200g , 0.018 Advantage Glide) and catheters (0.014 Trailblazer, 0.014 Turnpike, 0.018 Navicross, 0.035 Glidecatheter) , multiple attempts were made to cross the superficial femoral artery occlusion. I was able to cross the first half of the occlusion from above but could not progress any further. Retrograde pedal access was then obtained at the medial malleolus of the distal posterior tibial artery. This was performed under ultrasound guidance using a 4/5 Slendersheath. Retrograde arteriogram was performed showing good filling of the posterior tibial, anterior tibial and distal popliteal arteries. A 0.014 angled Glidewire was passed up through the tibial segments into the mid popliteal artery. The distal portion of the lesion was engaged, but again, the wire would not pass any further. I was able to pass the antegrade wire down to the level of the tip of the retrograde wire, but the tube would not cross. A 2 mm x 40 mm Giselle cross balloon was then inserted through the retrograde access and angioplasty was performed of the distal portion of the occlusion in an attempt to allow the wire to cross. This was again unsuccessful. A 4 mm x 100 mm Giselle cross point was then inserted from the antegrade approach and balloon angioplasty was performed of the proximal portion of the lesion up to the point where the wire would not cross. Again, this was in hopes of allowing the wire to create a new path through the lesion. This was unsuccessful. After many attempts, further attempts were completed. A repeat retrograde arteriogram was performed once the sheath had been retracted. This identified a small area of extravasation from branches of the mid posterior tibial artery. This was monitored for approximately 20 minutes and no changes were noted in the calf circumference or tenderness. At this point, the pedal access was removed and compression was held for 20 minutes with good hemostasis. The right femoral sheath was removed and closure was performed with a Vascade device. Hemostasis was confirmed.   At the completion of the case, she had a strong Doppler signal in the posterior tibial artery, both proximal and distal to the access site. The foot was warm and well-perfused with good capillary refill. The patient tolerated the procedure well and was taken to the post-operative care unit in stable condition.      Berta Mg MD        Electronically signed by Berta Mg MD on 4/18/2022 at 3:36 PM

## 2022-04-19 ENCOUNTER — TELEPHONE (OUTPATIENT)
Dept: INTERNAL MEDICINE CLINIC | Age: 87
End: 2022-04-19

## 2022-04-19 NOTE — TELEPHONE ENCOUNTER
We will need to have some sort of visit as these medications are controlled. We will not be using diazepam.  We can do a phone consultation or video.

## 2022-04-19 NOTE — TELEPHONE ENCOUNTER
Patient is calling to see if she can take librium or diazepam she says she is not doing good she needs something to calm her down that she can take once or twice a day she has been very nervous recently. She has been missing her son terribly. she Is sure this has everything to do with her feelings an anxiety

## 2022-04-19 NOTE — TELEPHONE ENCOUNTER
Spoke with Austen Larson, advised her of Dr. Mary Shaw note below.  She has decided to discuss this with him further at her appointment on 4/26/22

## 2022-04-25 ENCOUNTER — HOSPITAL ENCOUNTER (OUTPATIENT)
Dept: WOUND CARE | Age: 87
Discharge: HOME | End: 2022-04-25
Payer: MEDICARE

## 2022-04-25 VITALS — HEART RATE: 65 BPM | DIASTOLIC BLOOD PRESSURE: 70 MMHG | TEMPERATURE: 97 F | SYSTOLIC BLOOD PRESSURE: 145 MMHG

## 2022-04-25 DIAGNOSIS — S81.802D WOUND OF LEFT LOWER EXTREMITY, SUBSEQUENT ENCOUNTER: ICD-10-CM

## 2022-04-25 DIAGNOSIS — G45.9 TIA (TRANSIENT ISCHEMIC ATTACK): ICD-10-CM

## 2022-04-25 DIAGNOSIS — I73.9 PERIPHERAL ARTERY DISEASE (HCC): ICD-10-CM

## 2022-04-25 PROCEDURE — 11042 DBRDMT SUBQ TIS 1ST 20SQCM/<: CPT

## 2022-04-25 PROCEDURE — 29581 APPL MULTLAYER CMPRN SYS LEG: CPT

## 2022-04-25 PROCEDURE — 11042 DBRDMT SUBQ TIS 1ST 20SQCM/<: CPT | Performed by: SPECIALIST

## 2022-04-25 RX ORDER — ATORVASTATIN CALCIUM 10 MG/1
TABLET, FILM COATED ORAL
Qty: 30 TABLET | Refills: 3 | Status: SHIPPED | OUTPATIENT
Start: 2022-04-25 | End: 2022-09-12

## 2022-04-25 RX ORDER — IBUPROFEN 600 MG/1
600 TABLET ORAL EVERY 6 HOURS PRN
COMMUNITY
End: 2022-04-26 | Stop reason: ALTCHOICE

## 2022-04-25 ASSESSMENT — PAIN DESCRIPTION - DESCRIPTORS: DESCRIPTORS: ACHING

## 2022-04-25 ASSESSMENT — PAIN DESCRIPTION - PAIN TYPE: TYPE: ACUTE PAIN

## 2022-04-25 ASSESSMENT — PAIN DESCRIPTION - ORIENTATION: ORIENTATION: RIGHT;LEFT

## 2022-04-25 ASSESSMENT — PAIN SCALES - GENERAL: PAINLEVEL_OUTOF10: 10

## 2022-04-25 NOTE — PROGRESS NOTES
[facility] [place]  Progress Note and Procedure Note      Sharon Sánchez  MEDICAL RECORD NUMBER:  [id number]  AGE: 80 y.o. GENDER: female  : 1929  EPISODE DATE:  2022    Subjective:     Chief Complaint   Patient presents with    Wound Check     bilateral lower legs         HISTORY of PRESENT ILLNESS HPI     Sharon Sánchez is a 80 y.o. female who presents today for wound/ulcer evaluation. History of Wound Context: Sent returns to the wound care today after recent attempt by Dr. Margaret Cox to open up an occlusion of the distal left superficial femoral artery. Patient still complains of ulcer pain involving the left lower extremity. Pain Assessment:  Wound/Ulcer Pain Timing/Severity: constant  Quality of pain: sharp  Severity:  3 / 10   Modifying Factors: None  Associated Signs/Symptoms: edema and drainage    Ulcer Identification:  Ulcer Type: venous and arterial  Contributing Factors: edema and arterial insufficiency    Objective:      BP (!) 145/70   Pulse 65   Temp 97 °F (36.1 °C) (Temporal)     Wt Readings from Last 3 Encounters:   22 110 lb (49.9 kg)   22 112 lb (50.8 kg)   22 109 lb (49.4 kg)       PHYSICAL EXAM    Extremities: no cyanosis, no clubbing, 1 + edema-  bilateral lower extremities with full-thickness painful circular wound containing fibrin and slough, minimal granulation tissue left anterior knee designated as wound #1  Small granulating wound with minimal fibrin minimal slough right anterior knee epithelialization at the margins of the wound designated as wound #2  Assessment:      1. Peripheral artery disease ([address]    2. Wound of left lower extremity, subsequent encounter         Procedure Note  Indications:  Based on my examination of this patient's wound(s) today, sharp excision is required to promote healing and evaluate the extent healing. Performed by: Thai Escalera MD    Consent obtained?  Yes    Time out taken: Yes    Pain Control: Anesthetic: 5% Lidocaine Ointment Topical     Debridement:Excisional Debridement    Using curette the wound was sharply debrided    down through and including the removal of  epidermis, dermis and subcutaneous tissue. Devitalized Tissue Debrided:  fibrin and slough      Pre Debridement Measurements:  Are located in the Wound Documentation Flow Sheet   Wound #: 1 and 2     Post  Debridement Measurements:  Wound 11/22/21 Leg Left; Lower;Distal #1 (Active)   Wound Image   04/04/22 0818   Wound Etiology Arterial 11/22/21 0924   Wound Cleansed Cleansed with saline 04/25/22 0858   Dressing/Treatment Alginate with Ag 04/25/22 0900   Wound Length (cm) 3.5 cm 04/25/22 0858   Wound Width (cm) 2.6 cm 04/25/22 0858   Wound Depth (cm) 0.1 cm 04/25/22 0858   Wound Surface Area (cm^2) 9.1 cm^2 04/25/22 0858   Change in Wound Size % (l*w) -225 04/25/22 0858   Wound Volume (cm^3) 0.91 cm^3 04/25/22 0858   Wound Healing % -225 04/25/22 0858   Post-Procedure Length (cm) 3.6 cm 04/25/22 0900   Post-Procedure Width (cm) 2.7 cm 04/25/22 0900   Post-Procedure Depth (cm) 0.3 cm 04/25/22 0900   Post-Procedure Surface Area (cm^2) 9.72 cm^2 04/25/22 0900   Post-Procedure Volume (cm^3) 2.916 cm^3 04/25/22 0900   Distance Tunneling (cm) 0 cm 04/11/22 1414   Undermining Maxium Distance (cm) 0 04/11/22 1414   Wound Assessment [place]/red;Slough 04/25/22 0858   Drainage Amount Small 04/25/22 0858   Drainage Description Serosanguinous 04/25/22 0858   Odor None 04/25/22 0858   Nissa-wound Assessment Fragile;Edematous 04/25/22 0858   Margins Defined edges 04/25/22 0858   Wound Thickness Description not for Pressure Injury Full thickness 04/25/22 0858   Number of days: 154       Wound 04/04/22 Leg Anterior; Lower;Right #2 (Active)   Wound Image   04/04/22 0818   Wound Etiology Traumatic 04/04/22 0818   Wound Cleansed Cleansed with saline 04/25/22 0858   Dressing/Treatment Collagen 04/25/22 0900   Wound Length (cm) 2.5 cm 04/25/22 0858   Wound Width (cm) 1 cm 04/25/22 0858   Wound Depth (cm) 0.1 cm 04/25/22 0858   Wound Surface Area (cm^2) 2.5 cm^2 04/25/22 0858   Change in Wound Size % (l*w) 72.77 04/25/22 0858   Wound Volume (cm^3) 0.25 cm^3 04/25/22 0858   Wound Healing % 73 04/25/22 0858   Post-Procedure Length (cm) 2.6 cm 04/25/22 0900   Post-Procedure Width (cm) 1.1 cm 04/25/22 0900   Post-Procedure Depth (cm) 0.3 cm 04/25/22 0900   Post-Procedure Surface Area (cm^2) 2.86 cm^2 04/25/22 0900   Post-Procedure Volume (cm^3) 0.858 cm^3 04/25/22 0900   Distance Tunneling (cm) 0 cm 04/11/22 1414   Tunneling Position ___ O'Clock 0 04/04/22 0818   Undermining Maxium Distance (cm) 0 04/11/22 1414   Wound Assessment Pink/red;Fibrin 04/25/22 0858   Drainage Amount Small 04/25/22 0858   Drainage Description Serosanguinous 04/25/22 0858   Odor None 04/25/22 0858   Nissa-wound Assessment Edematous;Fragile; Hemosiderin staining (brown yellow) 04/25/22 0858   Margins Attached edges 04/25/22 0858   Wound Thickness Description not for Pressure Injury Full thickness 04/25/22 0858   Number of days: 21          Percent of Wound Debrided: 100%    Total Surface Area Debrided:  12 sq cm    Diabetic/Pressure/Non Pressure Ulcers only:  Ulcer: Non-Pressure ulcer, fat layer exposed    Bleeding: Minimal    Hemostasis Achieved: by pressure    Procedural Pain: 4  / 10     Post Procedural Pain: 0 / 10     Response to treatment:  Poorly tolerated by patient., With complaints of pain. Plan:   Patient agrees to wear bilateral 2 layer Coban light compression both lower extremities  Treatment Note: Please see attached Discharge Instructions. These instructions were given and signed by the patient or POA    New Medication(s) at this visit:   New Prescriptions    No medications on file       Other orders at this visit: No orders of the defined types were placed in this encounter.       Discharge [facility] Physician Orders and Discharge Instructions  [address]   [address] [address]  Telephone: [phone]  NAME:  Rohan Gandara  YOB: 1929  MEDICAL RECORD NUMBER:  [id number]  DATE:  4/25/2022    Wash hands with soap and water prior to and after every dressing change. Wound Cleansing:   · Do not scrub or use excessive force. · With each dressing change, rinse wounds with 0.9% Saline. (May use wound wash or soft contact solution. Both can be purchased at a local drug store). · If unable to obtain saline, may use a gentle soap and water. · Keep wounds dry in the shower unless otherwise instructed by the physician. · For wounds on lower legs, cast covers can be purchased at local drug stores, so that you may shower and keep the wound(s) dry. []  Vashe Wash solution instructions (if prescribed): Apply enough Vashe to soak a piece of gauze and place on wound bed for 5-10 minutes. DO NOT rinse after Vashe has been applied. Follow dressing application as instructed below. Nissa wound Topical Treatments:  Do not apply lotions, creams, or ointments to the skin around the wound bed unless directed as followed:     [] Apply around the wound: [] moisturizing lotion [] Antifungal ointment [] No-Sting barrier film [] Zinc paste [] Other:       Dressings:           Wound Location: left lower leg      Apply Primary Dressing to wound:       Alginate with silver pad     Cover and Secure with:     Cover and Secure with: 4X4 gauze pad   Avoid contact of tape with skin if possible.      When to change Dressing: [] Daily [] Every Other Day [] Once a week  [] Three times per week: [] Monday, Wednesday, Friday [] Tuesday, Thursday, Saturday  [x] Do Not Change Dressing [] Other:    Dressings:           Wound Location: right lower leg    Apply Primary Dressing to wound:       Collagen      Cover and Secure with:     Cover and Secure with: 4X4 gauze pad   Avoid contact of tape with skin if possible.  When to change Dressing: [] Daily [] Every Other Day [] Once a week  [] Three times per week: [] Monday, Wednesday, Friday [] Tuesday, Thursday, Saturday  [x] Do Not Change Dressing [] Other:      Multilayer Compression Wrap:    Type: 2 Layer Lite compression wrap   · Applied in Clinic to the :  Bilateral lower leg(s) on 4/25/2022  · Do not get leg(s) with wrap wet. · If wraps become too tight call the center or completely remove the wrap. · Elevate leg(s) above the level of the heart when sitting. · Avoid prolonged standing in one place. Dietary:  Important dietary reminders:  1. Increase Protein intake (i.e. Lean meats, fish, eggs, legumes, and yogurt)  2. No added salt  3. If diabetic, follow a diabetic diet and check glucose prior to meals or as instructed by your physician. Dietary Supplements:  [] Octaviano  [] 30ml ProStat  [] EnsureEnlive [] Ensure Max/Premier  [] Other:    If you are still having pain after you go home:   For wounds on lower legs or arms, elevate the affected limb.  Use over-the-counter medications you would normally use for pain as permitted by your primary care doctor.  For persistent pain not relieved by the above interventions, please call your primary care doctor. Return Appointment:   Return Appointment: With Dr Chela Tamez  in  [facility])    [] Return Appointment for a Wound Assessment (Nurse Visit) on:       [x] Orders placed during your visit: No orders of the defined types were placed in this encounter.       o All other future appointments:  Future Appointments   Date Time Provider Gilbert Frey   4/26/2022  9:45 AM DO OCTAVIO [facility] [place] - DYD   7/11/2022  3:30 PM Kathya Jacobs MD [facility]   -       Your nurse  is:  Alberto De Paz     Electronically signed by Antonio Dukes RN on 4/25/2022 at 9:43 AM     [facility] Information: Should you experience any significant changes in your wound(s) or have questions about your wound care, please contact the [address] [place] [address] at [phone]. We are open from 8:00am - 4:30p Monday, Thursday and Friday; 11:00am - 5pm on Tuesday and CLOSED Wednesday. Please give us 24-48 hours to return your call. Call your doctor now or seek immediate medical care if:    · You have symptoms of infection, such as:  ? Increased pain, swelling, warmth, or redness. ? Red streaks leading from the area. ? Pus draining from the area. ? A fever.         Physician for this visit and orders: Ottoniel Jansen MD    [] Patient unable to sign Discharge Instructions given to ECF/Transportation/POA        Electronically signed by Ottoniel Jansen MD on 4/25/2022 at 11:47 AM

## 2022-04-25 NOTE — PROGRESS NOTES
Multilayer Compression Wrap   (Not Unna) Below the Knee    NAME:  Paige Bear OF BIRTH:  4/4/1929  MEDICAL RECORD NUMBER:  [id number]  DATE:  4/25/2022        Removed old Multilayer wrap if present and washed leg with mild soap/water.   Applied moisturizing agent to dry skin as needed.   Applied primary and secondary dressing as ordered     Applied multilayered dressing below the knee to Bilateral lower leg(s)  (2 Layer Lite compression wrap ) .   Instructed patient/caregiver not to remove dressing and to keep it clean and dry.   Instructed patient/caregiver on complications to report to provider, such as pain, numbness in toes, heavy drainage, and slippage of dressing.   Instructed patient on purpose of compression dressing and on activity and exercise recommendations.     Applied per   Guidelines    Electronically signed by Antonio Dukes RN on 4/25/2022 at 9:53 AM

## 2022-04-26 ENCOUNTER — OFFICE VISIT (OUTPATIENT)
Dept: INTERNAL MEDICINE CLINIC | Age: 87
End: 2022-04-26
Payer: MEDICARE

## 2022-04-26 VITALS
RESPIRATION RATE: 14 BRPM | WEIGHT: 112 LBS | SYSTOLIC BLOOD PRESSURE: 136 MMHG | HEIGHT: 61 IN | HEART RATE: 67 BPM | DIASTOLIC BLOOD PRESSURE: 62 MMHG | OXYGEN SATURATION: 97 % | BODY MASS INDEX: 21.14 KG/M2

## 2022-04-26 DIAGNOSIS — I73.9 PAD (PERIPHERAL ARTERY DISEASE) (HCC): ICD-10-CM

## 2022-04-26 DIAGNOSIS — F43.22 ADJUSTMENT REACTION WITH ANXIETY: ICD-10-CM

## 2022-04-26 DIAGNOSIS — N17.9 AKI (ACUTE KIDNEY INJURY) (HCC): ICD-10-CM

## 2022-04-26 DIAGNOSIS — I87.2 CHRONIC VENOUS INSUFFICIENCY: ICD-10-CM

## 2022-04-26 DIAGNOSIS — E11.42 DIABETIC PERIPHERAL NEUROPATHY (HCC): ICD-10-CM

## 2022-04-26 DIAGNOSIS — L97.922 NON-PRESSURE CHRONIC ULCER OF LEFT LOWER LEG WITH FAT LAYER EXPOSED (HCC): Primary | ICD-10-CM

## 2022-04-26 DIAGNOSIS — D64.9 ANEMIA, UNSPECIFIED TYPE: ICD-10-CM

## 2022-04-26 DIAGNOSIS — L98.499 ISCHEMIC ULCER, UNSPECIFIED ULCER STAGE (HCC): ICD-10-CM

## 2022-04-26 PROCEDURE — G8427 DOCREV CUR MEDS BY ELIG CLIN: HCPCS | Performed by: INTERNAL MEDICINE

## 2022-04-26 PROCEDURE — G8420 CALC BMI NORM PARAMETERS: HCPCS | Performed by: INTERNAL MEDICINE

## 2022-04-26 PROCEDURE — 99214 OFFICE O/P EST MOD 30 MIN: CPT | Performed by: INTERNAL MEDICINE

## 2022-04-26 PROCEDURE — 4040F PNEUMOC VAC/ADMIN/RCVD: CPT | Performed by: INTERNAL MEDICINE

## 2022-04-26 PROCEDURE — 1123F ACP DISCUSS/DSCN MKR DOCD: CPT | Performed by: INTERNAL MEDICINE

## 2022-04-26 PROCEDURE — 3044F HG A1C LEVEL LT 7.0%: CPT | Performed by: INTERNAL MEDICINE

## 2022-04-26 PROCEDURE — 1036F TOBACCO NON-USER: CPT | Performed by: INTERNAL MEDICINE

## 2022-04-26 PROCEDURE — 1090F PRES/ABSN URINE INCON ASSESS: CPT | Performed by: INTERNAL MEDICINE

## 2022-04-26 RX ORDER — TRAMADOL HYDROCHLORIDE 50 MG/1
25-50 TABLET ORAL EVERY 6 HOURS PRN
Qty: 14 TABLET | Refills: 0
Start: 2022-04-26 | End: 2022-05-03 | Stop reason: SDUPTHER

## 2022-04-26 RX ORDER — HYDROXYZINE PAMOATE 25 MG/1
25 CAPSULE ORAL DAILY PRN
Qty: 30 CAPSULE | Refills: 0 | Status: SHIPPED | OUTPATIENT
Start: 2022-04-26 | End: 2022-08-25

## 2022-04-26 NOTE — PROGRESS NOTES
[facility]) Physicians  Internal Medicine  Patient Encounter  Vicki Rahman D.O., [facility]        Chief Complaint   Patient presents with   Nikki Paragflorentino    Medication Check       HPI: 80 y.o. female with multiple medical problems seen today for a short interval 1 month follow-up. Patient has been struggling with a chronic left lower extremity arterial ulcer. She underwent a left femoral endarterectomy on 2/14/2022. On 4/18/2022 she underwent a left lower extremity arteriogram with angioplasty of the left SFA. Pt states she loves Dr. Jackie Borja. She continues to visit the wound care center. She was seen there yesterday. The patient continues to have discomfort involving the left lower extremity ulcer. Yesterday the wound was debrided. Recent wound culture on 4/4/2022 showed staph Aureus. She had an UNNA boot placed yesterday to both LE. She states the pain is increased. She states she is not happy with current progress and plan. We reviewed lab from 4/18/2022  Sodium 135  Potassium 4.6  Chloride 98  CO2 28  BUN 30  Creatinine 1.5  Glucose 164  GFR 32    Creatinine is significantly higher. Hemoglobin 11.1  Hematocrit 33.1%    She remains on Lipitor. Patient has been more depressed since the death of her son. She is eating. Weight is stable. She has been crying. She states gets upset when she walks through the [place] and \"sees\" her son. She states she does not want to take an SSRI. She states she wants Valium. She is bothered more with the pain. Tylenol does not help. She states she did not tolerate Tramadol. She states it made her dizzy. She uses Tylenol Arthritis TWO three times daily when needed. She states this does not help.       Past Medical History:   Diagnosis Date    Anxiety     Arthritis     At risk for falls     CAD (coronary artery disease)     CTS (carpal tunnel syndrome)     Bilateral, EMG-NCS    DDD (degenerative disc disease), cervical     Fractures     (L) Hip Fx 4/25/98, L1 fracture from fall 10-31-06    Hyperlipidemia     Hypertension     Mitral regurgitation     Neuropathy     Osteopenia     Osteoporosis     PAD (peripheral artery disease) ([facility])     Right LE ischemia    Peripheral neuropathy 6/1/2015    Peripheral vascular disease ([facility]     bilateral lower extremities with edema    Podagra 01/09/2017    Dr. Ryan Wood Spinal stenosis     L4-5    Type 2 diabetes mellitus ([facility]     Urethral stricture 5 years ago    Urgency of urination          MEDICATIONS:  Medication Sig   ibuprofen (ADVIL;MOTRIN) 600 MG tablet Take 600 mg by mouth every 6 hours as needed for Pain   atorvastatin (LIPITOR) 10 MG tablet TAKE ONE TABLET BY MOUTH DAILY   Lite Touch Lancets MISC Use twice daily when testing blood sugars. collagenase 250 UNIT/GM ointment Apply topically daily Apply topically daily. Wound Cleansers (VASHE CLEANSING) SOLN Soak vashe on gauze pad and place on wound for 5-10 minutes   lisinopril (PRINIVIL;ZESTRIL) 20 MG tablet TAKE ONE TABLET BY MOUTH DAILY   glimepiride (AMARYL) 1 MG tablet TAKE ONE TABLET BY MOUTH EVERY MORNING BEFORE BREAKFAST   furosemide (LASIX) 20 MG tablet Take 1 tablet by mouth daily   blood glucose test strips (ONETOUCH ULTRA) strip USE TO TEST BLOOD SUGAR TWICE DAILY   metFORMIN (GLUCOPHAGE) 500 MG tablet Take 1 tablet by mouth daily   Turmeric 450 MG CAPS Take 450 mg by mouth daily   Cholecalciferol (VITAMIN D3) 2000 UNITS CAPS Take 1 capsule by mouth daily   aspirin EC 81 MG EC tablet Take 1 tablet by mouth daily           Review of Systems - As per HPI      OBJECTIVE:  Vitals:    04/26/22 0951   BP: 136/62   Pulse: 67   Resp: 14   SpO2: 97%   Weight: 112 lb (50.8 kg)   Height: 5' 1\" (1.549 m)     Body mass index is 21.16 kg/m².      Wt Readings from Last 3 Encounters:   04/26/22 112 lb (50.8 kg)   04/18/22 110 lb (49.9 kg)   04/04/22 112 lb (50.8 kg)     BP Readings from Last 3 Encounters:   04/26/22 136/62   04/25/22 (!) 145/70 04/18/22 (!) 155/54      GEN: NAD, A&O, Non-toxic  HEENT: NC/AT, FLACA, EOMI, Oral cavity Clear,  TM's NL   NECK: Supple. No thyromegaly. No JVD  LYMPH: No C/SC nodes. CV: Reg rhythm. No ectopy  PULM: CTA  EXT: UNNA boots in place. GI: Abdomen is soft, NT, BS+, No hepatomegaly. No masses. NEURO: No focal or lateralizing deficits. SKIN:  No rashes      ASSESSMENT/PLAN:    1. Non-pressure chronic ulcer of left lower leg with fat layer exposed ([facility]  Problem continues  Recommended she try to go back to the tramadol for pain relief  Could consider a transition to oxycodone. Patient states she does not want to take this medication  Continue wound care  - traMADol (ULTRAM) 50 MG tablet; Take 0.5-1 tablets by mouth every 6 hours as needed for Pain for up to 7 days. Dispense: 14 tablet; Refill: 0    2. JAES (acute kidney injury) ([facility]  Creatinine was significantly elevated on 4/18/2022  Recheck lab  Push fluids  Advised that she stop all over-the-counter anti-inflammatory  - CBC with Auto Differential; Future  - Basic Metabolic Panel; Future    3. Adjustment reaction with anxiety  Patient requesting Librium or Valium  Discussed with patient at length the potential harms that can come from benzodiazepine  We will start with hydroxyzine though the medication does pose some risk  Counseled on the potential adverse side effects  We will try 1 daily as needed for anxiety or sleep  - hydrOXYzine (VISTARIL) 25 MG capsule; Take 1 capsule by mouth daily as needed for Anxiety  Dispense: 30 capsule; Refill: 0    4. Diabetic peripheral neuropathy (HCC)    - traMADol (ULTRAM) 50 MG tablet; Take 0.5-1 tablets by mouth every 6 hours as needed for Pain for up to 7 days. Dispense: 14 tablet; Refill: 0    5. Ischemic ulcer, unspecified ulcer stage (HCC)    - traMADol (ULTRAM) 50 MG tablet; Take 0.5-1 tablets by mouth every 6 hours as needed for Pain for up to 7 days. Dispense: 14 tablet; Refill: 0    6.  PAD (peripheral artery disease) ([address]  Status post angioplasty left SFA  Follow-up with vascular  - traMADol (ULTRAM) 50 MG tablet; Take 0.5-1 tablets by mouth every 6 hours as needed for Pain for up to 7 days. Dispense: 14 tablet; Refill: 0    7. Anemia, unspecified type    - CBC with Auto Differential; Future    8. Chronic venous insufficiency  Unna boots in place      On this date 4/26/2022 I have spent 30 minutes reviewing previous notes, test results and face to face with the patient discussing the diagnosis and importance of compliance with the treatment plan as well as documenting on the day of the visit. Discussed medications with patient who voiced understanding of their use, indication and potential side effects. Pt also understands the above recommendations. All questions answered. This note was generated completely or in part utilizing Dragon dictation speech recognition software. Occasionally, words are mistranscribed and despite editing, the text may contain inaccuracies due to incorrect word recognition.   If further clarification is needed please contact the office at [phone]       Electronically signed    Arlen Angeles D.O.

## 2022-04-26 NOTE — PATIENT INSTRUCTIONS
Patient Education        Adjustment Disorder: Care Instructions  Overview     Adjustment disorder is a mental health condition that results from stress and can cause severe emotional and behavioral responses. But your response to the stress is far more severe than expected. It is severe enough to affect your work or social life and may lead to depression and physical pains and problems. Events that may cause this response can include a divorce, money problems, orstarting school or a new job. It might be anything that causes some stress. This disorder is most often a short-term condition. It happens within 3 months of the stressful event or change. If the response lasts longer than 6 monthsafter the event ends, you may have a different mental health condition. Follow-up care is a key part of your treatment and safety. Be sure to make and go to all appointments, and call your doctor if you are having problems. It's also a good idea to know your test results and keep alist of the medicines you take. How can you care for yourself at home?  Go to all counseling sessions. Do not skip any because you are feeling better.  If your doctor prescribed medicines, take them exactly as prescribed. Call your doctor if you think you are having a problem with your medicine. You will get more details on the specific medicines your doctor prescribes.  Discuss the causes of your stress with a good friend or family member. Or you can join a support group for people with similar problems. Talking to others sometimes relieves stress.  Get at least 30 minutes of exercise on most days of the week. Walking is a good choice. You also may want to do other activities, such as running, swimming, cycling, or playing tennis or team sports. Relaxation techniques  Do relaxation exercises 10 to 20 minutes a day. You can play soothing, relaxingmusic while you do them, if you wish.    Tell others in your house that you are going to do your relaxation exercises. Ask them not to disturb you.  Find a comfortable, quiet place.  Lie down on your back, or sit with your back straight.  Focus on your breathing. Make it slow and steady.  Breathe in through your nose. Breathe out through either your nose or mouth.  Breathe deeply, filling up the area between your navel and your rib cage. Breathe so that your belly goes up and down.  Do not hold your breath.  Breathe like this for 5 to 10 minutes. Notice the feeling of calmness throughout your whole body. As you continue to breathe slowly and deeply, relax by doing these next stepsfor another 5 to 10 minutes:   Tighten and relax each muscle group in your body. Start at your toes, and work your way up to your head.  Imagine your muscle groups relaxing and getting heavy.  Empty your mind of all thoughts.  Let yourself relax more and more deeply.  Be aware of the state of calmness that surrounds you.  When your relaxation time is over, you can bring yourself back to alertness by moving your fingers and toes. Then move your hands and feet. And then move your entire body. Sometimes people fall asleep during relaxation. But they most often wake up soon.  Always give yourself time to return to full alertness before you drive a car. Wait to do anything that might cause an accident if you are not fully alert. Never play a relaxation tape while you drive a car. When should you call for help? Call 911 anytime you think you may need emergency care. For example, call if:     You feel you cannot stop from hurting yourself or someone else. If you or someone you know talks about suicide, self-harm, or feeling hopeless, get help right away. Call the [facility] at 7-156-262-QGFV ([phone]) or text HOME to 057920 to access the e27. Consider saving these numbers in your phone.   Watch closely for changes in your health, and be sure to contact your doctor if:     You have new anxiety, or your anxiety gets worse.      You have been feeling sad, depressed, or hopeless or have lost interest in things that you usually enjoy.      You do not get better as expected. Where can you learn more? Go to [email or web address] org and sign in to your [employer] account. Enter [id number] in the [employer] [place] box to learn more about \"Adjustment Disorder: Care Instructions. \"     If you do not have an account, please click on the \"Sign Up Now\" link. Current as of: June 16, 2021               Content Version: 13.2  © 7841-8930 [employer]wise, Incorporated. Care instructions adapted under license by [facility] ([facility]). If you have questions about a medical condition or this instruction, always ask your healthcare professional. [address] any warranty or liability for your use of this information.

## 2022-04-27 RX ORDER — GLIMEPIRIDE 1 MG/1
TABLET ORAL
Qty: 30 TABLET | Refills: 2 | Status: SHIPPED | OUTPATIENT
Start: 2022-04-27 | End: 2022-07-29

## 2022-05-02 ENCOUNTER — HOSPITAL ENCOUNTER (OUTPATIENT)
Dept: WOUND CARE | Age: 87
Discharge: HOME | End: 2022-05-02
Payer: MEDICARE

## 2022-05-02 VITALS
RESPIRATION RATE: 16 BRPM | TEMPERATURE: 97.1 F | DIASTOLIC BLOOD PRESSURE: 67 MMHG | SYSTOLIC BLOOD PRESSURE: 167 MMHG | HEART RATE: 73 BPM

## 2022-05-02 DIAGNOSIS — S81.802A WOUND OF LEFT LOWER EXTREMITY, INITIAL ENCOUNTER: ICD-10-CM

## 2022-05-02 DIAGNOSIS — I73.9 PERIPHERAL ARTERY DISEASE (HCC): Primary | ICD-10-CM

## 2022-05-02 DIAGNOSIS — S81.802D WOUND OF LEFT LOWER EXTREMITY, SUBSEQUENT ENCOUNTER: ICD-10-CM

## 2022-05-02 PROCEDURE — 6370000000 HC RX 637 (ALT 250 FOR IP): Performed by: SPECIALIST

## 2022-05-02 PROCEDURE — 29581 APPL MULTLAYER CMPRN SYS LEG: CPT

## 2022-05-02 PROCEDURE — 99213 OFFICE O/P EST LOW 20 MIN: CPT | Performed by: SURGERY

## 2022-05-02 RX ORDER — CLOBETASOL PROPIONATE 0.5 MG/G
OINTMENT TOPICAL ONCE
Status: CANCELLED | OUTPATIENT
Start: 2022-05-02 | End: 2022-05-02

## 2022-05-02 RX ORDER — LIDOCAINE HYDROCHLORIDE 40 MG/ML
SOLUTION TOPICAL ONCE
Status: DISCONTINUED | OUTPATIENT
Start: 2022-05-02 | End: 2022-05-03 | Stop reason: HOSPADM

## 2022-05-02 RX ORDER — BETAMETHASONE DIPROPIONATE 0.05 %
OINTMENT (GRAM) TOPICAL ONCE
Status: CANCELLED | OUTPATIENT
Start: 2022-05-02 | End: 2022-05-02

## 2022-05-02 RX ORDER — GENTAMICIN SULFATE 1 MG/G
OINTMENT TOPICAL ONCE
Status: CANCELLED | OUTPATIENT
Start: 2022-05-02 | End: 2022-05-02

## 2022-05-02 RX ORDER — GINSENG 100 MG
CAPSULE ORAL ONCE
Status: CANCELLED | OUTPATIENT
Start: 2022-05-02 | End: 2022-05-02

## 2022-05-02 RX ORDER — LIDOCAINE 50 MG/G
OINTMENT TOPICAL ONCE
Status: COMPLETED | OUTPATIENT
Start: 2022-05-02 | End: 2022-05-02

## 2022-05-02 RX ORDER — LIDOCAINE 50 MG/G
OINTMENT TOPICAL ONCE
Status: CANCELLED | OUTPATIENT
Start: 2022-05-02 | End: 2022-05-02

## 2022-05-02 RX ORDER — BACITRACIN ZINC AND POLYMYXIN B SULFATE 500; 1000 [USP'U]/G; [USP'U]/G
OINTMENT TOPICAL ONCE
Status: CANCELLED | OUTPATIENT
Start: 2022-05-02 | End: 2022-05-02

## 2022-05-02 RX ORDER — LIDOCAINE 40 MG/G
CREAM TOPICAL ONCE
Status: CANCELLED | OUTPATIENT
Start: 2022-05-02 | End: 2022-05-02

## 2022-05-02 RX ORDER — LIDOCAINE HYDROCHLORIDE 40 MG/ML
SOLUTION TOPICAL ONCE
Status: CANCELLED | OUTPATIENT
Start: 2022-05-02 | End: 2022-05-02

## 2022-05-02 RX ORDER — LIDOCAINE HYDROCHLORIDE 20 MG/ML
JELLY TOPICAL ONCE
Status: CANCELLED | OUTPATIENT
Start: 2022-05-02 | End: 2022-05-02

## 2022-05-02 RX ORDER — BACITRACIN, NEOMYCIN, POLYMYXIN B 400; 3.5; 5 [USP'U]/G; MG/G; [USP'U]/G
OINTMENT TOPICAL ONCE
Status: CANCELLED | OUTPATIENT
Start: 2022-05-02 | End: 2022-05-02

## 2022-05-02 RX ADMIN — LIDOCAINE: 50 OINTMENT TOPICAL at 14:17

## 2022-05-02 ASSESSMENT — PAIN DESCRIPTION - ORIENTATION: ORIENTATION: LEFT

## 2022-05-02 ASSESSMENT — PAIN - FUNCTIONAL ASSESSMENT: PAIN_FUNCTIONAL_ASSESSMENT: PREVENTS OR INTERFERES SOME ACTIVE ACTIVITIES AND ADLS

## 2022-05-02 ASSESSMENT — PAIN DESCRIPTION - PAIN TYPE: TYPE: ACUTE PAIN

## 2022-05-02 ASSESSMENT — PAIN DESCRIPTION - FREQUENCY: FREQUENCY: INTERMITTENT

## 2022-05-02 ASSESSMENT — PAIN DESCRIPTION - DESCRIPTORS: DESCRIPTORS: ACHING

## 2022-05-02 ASSESSMENT — PAIN DESCRIPTION - ONSET: ONSET: ON-GOING

## 2022-05-02 ASSESSMENT — PAIN DESCRIPTION - LOCATION: LOCATION: LEG

## 2022-05-02 ASSESSMENT — PAIN SCALES - GENERAL: PAINLEVEL_OUTOF10: 7

## 2022-05-02 NOTE — PROGRESS NOTES
Multilayer Compression Wrap   (Not Unna) Below the Knee    NAME:  Armando Keys OF BIRTH:  4/4/1929  MEDICAL RECORD NUMBER:  [id number]  DATE:  5/2/2022        Removed old Multilayer wrap if present and washed leg with mild soap/water.   Applied moisturizing agent to dry skin as needed.   Applied primary and secondary dressing as ordered     Applied multilayered dressing below the knee to Bilateral lower leg(s)  (2 Layer Lite compression wrap ) .   Instructed patient/caregiver not to remove dressing and to keep it clean and dry.   Instructed patient/caregiver on complications to report to provider, such as pain, numbness in toes, heavy drainage, and slippage of dressing.   Instructed patient on purpose of compression dressing and on activity and exercise recommendations.     Applied per   Guidelines    Electronically signed by Gabriella Brenner RN on 5/2/2022 at 3:08 PM abdominal pain

## 2022-05-03 ENCOUNTER — TELEPHONE (OUTPATIENT)
Dept: VASCULAR SURGERY | Age: 87
End: 2022-05-03

## 2022-05-03 DIAGNOSIS — I73.9 PAD (PERIPHERAL ARTERY DISEASE) (HCC): ICD-10-CM

## 2022-05-03 DIAGNOSIS — L98.499 ISCHEMIC ULCER, UNSPECIFIED ULCER STAGE (HCC): ICD-10-CM

## 2022-05-03 DIAGNOSIS — E11.42 DIABETIC PERIPHERAL NEUROPATHY (HCC): ICD-10-CM

## 2022-05-03 DIAGNOSIS — L97.922 NON-PRESSURE CHRONIC ULCER OF LEFT LOWER LEG WITH FAT LAYER EXPOSED (HCC): ICD-10-CM

## 2022-05-03 RX ORDER — TRAMADOL HYDROCHLORIDE 50 MG/1
25-50 TABLET ORAL EVERY 6 HOURS PRN
Qty: 14 TABLET | Refills: 0 | Status: SHIPPED | OUTPATIENT
Start: 2022-05-03 | End: 2022-05-10 | Stop reason: SDUPTHER

## 2022-05-03 NOTE — PROGRESS NOTES
[facility] [place] Note and Procedure Note      Bakari Mak  MEDICAL RECORD NUMBER:  [id number]  AGE: 80 y.o. GENDER: female  : 1929  EPISODE DATE:  2022    Subjective:     Chief Complaint   Patient presents with    Wound Check     bilateral lower legs     HPI:  Bakari Mak is a 80 y.o. female who presents today for wound check. Patient has a wound which is located on the L lower leg anterior. She was noted to have significant edema and therefore she has been placed in 2 layer compression wraps over the last 2 weeks. She has significant improvement in the appearance of the wound today, as well as the edema. She continues to complain of pain in the lower leg and reinforces that she does not want to continue the compression. Objective:      BP (!) 167/67   Pulse 73   Temp 97.1 °F (36.2 °C) (Temporal)   Resp 16     Wt Readings from Last 3 Encounters:   22 112 lb (50.8 kg)   22 110 lb (49.9 kg)   22 112 lb (50.8 kg)     PHYSICAL EXAM  On exam, the left lower extremity swelling is much improved and no longer pitting. There is now minimal clear serous drainage from the ulceration, previously noted to be copious. The wound bed itself is much improved with less exudate and increased granulation tissue. Measurements are also smaller. The periwound, which previously looked moderately bruised, is improved. Tissue integrity is stable and there is no weeping from the surrounding tissue. No surrounding erythema or evidence of cellulitis. The foot is warm and well-perfused. Vascular:  Left femoral 2+; left popliteal biphasic/non-palpable. Left PT biphasic/DP monophasic. Assessment:      1. Peripheral artery disease ([address]    2. Wound of left lower extremity, subsequent encounter    3. Wound of left lower extremity, initial encounter        Wound 21 Leg Left; Lower;Distal #1 (Active)   Wound Image   22 1417   Wound Etiology Arterial 11/22/21 0924   Wound Cleansed Cleansed with saline 05/02/22 1417   Dressing/Treatment Alginate with Ag 05/02/22 1508   Wound Length (cm) 3.4 cm 05/02/22 1417   Wound Width (cm) 2.5 cm 05/02/22 1417   Wound Depth (cm) 0.1 cm 05/02/22 1417   Wound Surface Area (cm^2) 8.5 cm^2 05/02/22 1417   Change in Wound Size % (l*w) -203.57 05/02/22 1417   Wound Volume (cm^3) 0.85 cm^3 05/02/22 1417   Wound Healing % -204 05/02/22 1417   Post-Procedure Length (cm) 3.4 cm 05/02/22 1503   Post-Procedure Width (cm) 2.5 cm 05/02/22 1503   Post-Procedure Depth (cm) 0.1 cm 05/02/22 1503   Post-Procedure Surface Area (cm^2) 8.5 cm^2 05/02/22 1503   Post-Procedure Volume (cm^3) 0.85 cm^3 05/02/22 1503   Distance Tunneling (cm) 0 cm 05/02/22 1417   Undermining Maxium Distance (cm) 0 05/02/22 1417   Wound Assessment Clatonia/red;Slough 05/02/22 1417   Drainage Amount Small 05/02/22 1417   Drainage Description Serosanguinous 05/02/22 1417   Odor None 05/02/22 1417   Nissa-wound Assessment Fragile;Edematous 05/02/22 1417   Margins Defined edges 05/02/22 1417   Wound Thickness Description not for Pressure Injury Full thickness 05/02/22 1417   Number of days: 161       Wound 04/04/22 Leg Anterior; Lower;Right #2 (Active)   Wound Image   05/02/22 1417   Wound Etiology Traumatic 04/04/22 0818   Wound Cleansed Cleansed with saline 05/02/22 1417   Dressing/Treatment Collagen 05/02/22 1507   Wound Length (cm) 1.8 cm 05/02/22 1417   Wound Width (cm) 1 cm 05/02/22 1417   Wound Depth (cm) 0.1 cm 05/02/22 1417   Wound Surface Area (cm^2) 1.8 cm^2 05/02/22 1417   Change in Wound Size % (l*w) 80.39 05/02/22 1417   Wound Volume (cm^3) 0.18 cm^3 05/02/22 1417   Wound Healing % 80 05/02/22 1417   Post-Procedure Length (cm) 1.8 cm 05/02/22 1503   Post-Procedure Width (cm) 1 cm 05/02/22 1503   Post-Procedure Depth (cm) 0.1 cm 05/02/22 1503   Post-Procedure Surface Area (cm^2) 1.8 cm^2 05/02/22 1503   Post-Procedure Volume (cm^3) 0.18 cm^3 05/02/22 1503   Distance Tunneling (cm) 0 cm 05/02/22 1417   Tunneling Position ___ O'Clock 0 04/04/22 0818   Undermining Maxium Distance (cm) 0 05/02/22 1417   Wound Assessment Pink/red;Fibrin 05/02/22 1417   Drainage Amount Small 05/02/22 1417   Drainage Description Serosanguinous 05/02/22 1417   Odor None 05/02/22 1417   Nissa-wound Assessment Edematous;Fragile; Hemosiderin staining (brown yellow) 05/02/22 1417   Margins Undefined edges 05/02/22 1417   Wound Thickness Description not for Pressure Injury Full thickness 05/02/22 1417   Number of days: 29        Plan:   Left leg ulcer has shown MUCH improvement with compression. Not only is it smaller with decrease in size of 1cm over last two weeks, but it is also improved in the development of granulation tissue. In fact, there is minimal exudate today and she did not require debridement. Ms. Justine Bro continues to states she does not want to wear the compression although I continue to reiterate that she has been improved with compression and this should lead to healing of the ulcer. She states that she will wear it 1 more week and then she is \"done\". She seems unrealistic in her expectations, and I advised her that it would take more than 1 week for complete healing. This was also firmly stressed to her friend and aide, who accompanies her today. A 2 layer Coban compression wrap was reapplied and she will return in 1 week. I also encouraged her to follow-up with Dr. Sam Hyatt as previously planned. Treatment Note please see attached Discharge Instructions    New Medication(s) at this visit:   New Prescriptions    No medications on file       Other orders at this visit: No orders of the defined types were placed in this encounter. Smoking Cessation: Counseling given: Not Answered      Written patient dismissal instructions given to patient and signed by patient or POA.          Discharge [facility] Physician Orders and Discharge Instructions  [address]   [address] [place] [address]  Telephone: [phone]  NAME:  Thayne Mohs  YOB: 1929  MEDICAL RECORD NUMBER:  [id number]  DATE:  5/2/2022    Wash hands with soap and water prior to and after every dressing change. Wound Cleansing:   · Do not scrub or use excessive force. · With each dressing change, rinse wounds with 0.9% Saline. (May use wound wash or soft contact solution. Both can be purchased at a local drug store). · If unable to obtain saline, may use a gentle soap and water. · Keep wounds dry in the shower unless otherwise instructed by the physician. · For wounds on lower legs, cast covers can be purchased at local drug stores, so that you may shower and keep the wound(s) dry. []  Vashe Wash solution instructions (if prescribed): Apply enough Vashe to soak a piece of gauze and place on wound bed for 5-10 minutes. DO NOT rinse after Vashe has been applied. Follow dressing application as instructed below. Nissa wound Topical Treatments:  Do not apply lotions, creams, or ointments to the skin around the wound bed unless directed as followed:     [] Apply around the wound: [] moisturizing lotion [] Antifungal ointment [] No-Sting barrier film [] Zinc paste [] Other:       Dressings:           Wound Location:  Right lower leg      Apply Primary Dressing to wound: Collagen          Cover and Secure with:     Cover and Secure with: 4X4 gauze pad   Avoid contact of tape with skin if possible.      When to change Dressing: [] Daily [] Every Other Day [] Once a week  [] Three times per week: [] Monday, Wednesday, Friday [] Tuesday, Thursday, Saturday  [x] Do Not Change Dressing [] Other:    Dressings:           Wound Location: left lower leg      Apply Primary Dressing to wound: Alginate with silver pad      Cover and Secure with:     Cover and Secure with: 4X4 gauze pad   Avoid contact of tape with skin if possible.  When to change Dressing: [] Daily [] Every Other Day [] Once a week  [] Three times per week: [] Monday, Wednesday, Friday [] Tuesday, Thursday, Saturday  [x] Do Not Change Dressing [] Other:      Multilayer Compression Wrap:    Type: 2 Layer Lite compression wrap   · Applied in Clinic to the :  Bilateral lower leg(s) on 5/2/2022  · Do not get leg(s) with wrap wet. · If wraps become too tight call the center or completely remove the wrap. · Elevate leg(s) above the level of the heart when sitting. · Avoid prolonged standing in one place. Off Loading(Pressure Reduction) When:   [] Walking [] Sitting [] In Bed     Weight Bearing Status:  [] Total non-weight bearing:  [] Right Leg [] Left Leg    [] Partial weight bearing  [] Right Leg [] Left Leg    [] Heel weight bearing [] Toe-touch weight bearing [] Transfers Only    [] No weight bearing restrictions   [] Right Leg [] Left Leg    [] Offloading devices:  [] Walker    [] Crutches   [] Wheelchair   [] Knee Scooter  [] Surgical/Darco Shoe  [] Podus/Prafo  [] Cast/Cam Boot  [] CROW/OWLS boot   [] Other:  [] Heel Protector Soft Boot - DO NOT WALK IN WHILE WEARING    Total Contact Cast (TCC): []Right []Left   · Total Contact Cast Applied in Clinic today 5/2/2022. · Do not get cast wet. · Contact center or go to emergency room if there is a foul odor or becomes uncomfortable due to feeling tight or swelling. · Do not use objects inside of cast to scratch. Dietary:  Important dietary reminders:  1. Increase Protein intake (i.e. Lean meats, fish, eggs, legumes, and yogurt)  2. No added salt  3. If diabetic, follow a diabetic diet and check glucose prior to meals or as instructed by your physician. Dietary Supplements:  [] Octaviano  [] 30ml ProStat  [] EnsureEnlive [] Ensure Max/Premier  [] Other:    If you are still having pain after you go home:   For wounds on lower legs or arms, elevate the affected limb.    Use over-the-counter medications you would normally use for pain as permitted by your primary care doctor.  For persistent pain not relieved by the above interventions, please call your primary care doctor. Return Appointment:  Rony Return Appointment: With Dr. Steffi Finn  in  [facility])    [] Return Appointment for a Wound Assessment (Nurse Visit) on:       [x] Orders placed during your visit:   Orders Placed This Encounter   Procedures    Initiate Outpatient Wound Care Protocol       o All other future appointments:  Future Appointments   Date Time Provider Gilbert Frey   6/28/2022  9:45 [place] [address] [address]   7/11/2022  3:30 PM Stephon Adamson MD [facility]   -       Your nurse  is: Hilda Plata     Electronically signed by Bart Rios RN on 5/2/2022 at 3:04 PM     [address]: Should you experience any significant changes in your wound(s) or have questions about your wound care, please contact the [address] [place] [address] at [phone]. We are open from 8:00am - 4:30p Monday, Thursday and Friday; 11:00am - 5pm on Tuesday and CLOSED Wednesday. Please give us 24-48 hours to return your call. Call your doctor now or seek immediate medical care if:    · You have symptoms of infection, such as:  ? Increased pain, swelling, warmth, or redness. ? Red streaks leading from the area. ? Pus draining from the area. ? A fever.         Physician for this visit and orders: Misty Romero MD    [] Patient unable to sign Discharge Instructions given to ECF/Transportation/POA            Electronically signed by Misty Romero MD on 5/3/2022 at 10:13 AM

## 2022-05-03 NOTE — TELEPHONE ENCOUNTER
Patient called complaining of leg pain. She states that Dr. Juanpablo Rosenberg wants her to be seen. She is not doing well. Patient could not really explain what was going on she states because she was in too much pain. Appointment made for 5/11 at [facility]. Please call pt to discuss as she believes this is too long to wait.

## 2022-05-04 ENCOUNTER — OFFICE VISIT (OUTPATIENT)
Dept: VASCULAR SURGERY | Age: 87
End: 2022-05-04

## 2022-05-04 DIAGNOSIS — I70.299 ATHEROSCLEROSIS OF ARTERY OF EXTREMITY WITH ULCERATION (HCC): Primary | ICD-10-CM

## 2022-05-04 DIAGNOSIS — L97.909 ATHEROSCLEROSIS OF ARTERY OF EXTREMITY WITH ULCERATION (HCC): Primary | ICD-10-CM

## 2022-05-04 PROCEDURE — 99024 POSTOP FOLLOW-UP VISIT: CPT | Performed by: SURGERY

## 2022-05-04 NOTE — PROGRESS NOTES
Seen S/P L femoral endarterectomy. Attempted endo treatment of SFA occlusion unsuccessful. Now managed at [facility] by Dr. Brannon Christie with compression wraps. Reported to be healing well. EXAM:  BLE wraps in place. Well perfused L foot    A/P: S/P L fem endarterectomy - clinically doing well. Continue with Dr. Brannon Christie at [facility]. F/U here with yearly graft surveillance.

## 2022-05-10 ENCOUNTER — HOSPITAL ENCOUNTER (OUTPATIENT)
Dept: WOUND CARE | Age: 87
Discharge: HOME | End: 2022-05-10
Payer: MEDICARE

## 2022-05-10 VITALS
RESPIRATION RATE: 16 BRPM | HEART RATE: 77 BPM | SYSTOLIC BLOOD PRESSURE: 169 MMHG | TEMPERATURE: 97.4 F | DIASTOLIC BLOOD PRESSURE: 77 MMHG

## 2022-05-10 DIAGNOSIS — I73.9 PERIPHERAL ARTERY DISEASE (HCC): Primary | ICD-10-CM

## 2022-05-10 DIAGNOSIS — L97.922 NON-PRESSURE CHRONIC ULCER OF LEFT LOWER LEG WITH FAT LAYER EXPOSED (HCC): ICD-10-CM

## 2022-05-10 DIAGNOSIS — S81.802A WOUND OF LEFT LOWER EXTREMITY, INITIAL ENCOUNTER: ICD-10-CM

## 2022-05-10 DIAGNOSIS — E11.42 DIABETIC PERIPHERAL NEUROPATHY (HCC): ICD-10-CM

## 2022-05-10 DIAGNOSIS — L98.499 ISCHEMIC ULCER, UNSPECIFIED ULCER STAGE (HCC): ICD-10-CM

## 2022-05-10 DIAGNOSIS — S81.802D WOUND OF LEFT LOWER EXTREMITY, SUBSEQUENT ENCOUNTER: ICD-10-CM

## 2022-05-10 DIAGNOSIS — I73.9 PAD (PERIPHERAL ARTERY DISEASE) (HCC): ICD-10-CM

## 2022-05-10 PROCEDURE — 11042 DBRDMT SUBQ TIS 1ST 20SQCM/<: CPT

## 2022-05-10 PROCEDURE — 29581 APPL MULTLAYER CMPRN SYS LEG: CPT

## 2022-05-10 PROCEDURE — 11042 DBRDMT SUBQ TIS 1ST 20SQCM/<: CPT | Performed by: SPECIALIST

## 2022-05-10 PROCEDURE — 6370000000 HC RX 637 (ALT 250 FOR IP): Performed by: SPECIALIST

## 2022-05-10 RX ORDER — LIDOCAINE 40 MG/G
CREAM TOPICAL ONCE
Status: CANCELLED | OUTPATIENT
Start: 2022-05-10 | End: 2022-05-10

## 2022-05-10 RX ORDER — LIDOCAINE HYDROCHLORIDE 20 MG/ML
JELLY TOPICAL ONCE
Status: CANCELLED | OUTPATIENT
Start: 2022-05-10 | End: 2022-05-10

## 2022-05-10 RX ORDER — BACITRACIN ZINC AND POLYMYXIN B SULFATE 500; 1000 [USP'U]/G; [USP'U]/G
OINTMENT TOPICAL ONCE
Status: CANCELLED | OUTPATIENT
Start: 2022-05-10 | End: 2022-05-10

## 2022-05-10 RX ORDER — GENTAMICIN SULFATE 1 MG/G
OINTMENT TOPICAL ONCE
Status: CANCELLED | OUTPATIENT
Start: 2022-05-10 | End: 2022-05-10

## 2022-05-10 RX ORDER — CLOBETASOL PROPIONATE 0.5 MG/G
OINTMENT TOPICAL ONCE
Status: CANCELLED | OUTPATIENT
Start: 2022-05-10 | End: 2022-05-10

## 2022-05-10 RX ORDER — GINSENG 100 MG
CAPSULE ORAL ONCE
Status: CANCELLED | OUTPATIENT
Start: 2022-05-10 | End: 2022-05-10

## 2022-05-10 RX ORDER — TRAMADOL HYDROCHLORIDE 50 MG/1
25-50 TABLET ORAL EVERY 6 HOURS PRN
Qty: 14 TABLET | Refills: 0 | Status: SHIPPED | OUTPATIENT
Start: 2022-05-10 | End: 2022-05-24 | Stop reason: SDUPTHER

## 2022-05-10 RX ORDER — LIDOCAINE HYDROCHLORIDE 40 MG/ML
SOLUTION TOPICAL ONCE
Status: CANCELLED | OUTPATIENT
Start: 2022-05-10 | End: 2022-05-10

## 2022-05-10 RX ORDER — LIDOCAINE 50 MG/G
OINTMENT TOPICAL ONCE
Status: COMPLETED | OUTPATIENT
Start: 2022-05-10 | End: 2022-05-10

## 2022-05-10 RX ORDER — LIDOCAINE 50 MG/G
OINTMENT TOPICAL ONCE
Status: CANCELLED | OUTPATIENT
Start: 2022-05-10 | End: 2022-05-10

## 2022-05-10 RX ORDER — BETAMETHASONE DIPROPIONATE 0.05 %
OINTMENT (GRAM) TOPICAL ONCE
Status: CANCELLED | OUTPATIENT
Start: 2022-05-10 | End: 2022-05-10

## 2022-05-10 RX ORDER — LIDOCAINE HYDROCHLORIDE 40 MG/ML
SOLUTION TOPICAL ONCE
Status: DISCONTINUED | OUTPATIENT
Start: 2022-05-10 | End: 2022-05-11 | Stop reason: HOSPADM

## 2022-05-10 RX ORDER — BACITRACIN, NEOMYCIN, POLYMYXIN B 400; 3.5; 5 [USP'U]/G; MG/G; [USP'U]/G
OINTMENT TOPICAL ONCE
Status: CANCELLED | OUTPATIENT
Start: 2022-05-10 | End: 2022-05-10

## 2022-05-10 RX ADMIN — LIDOCAINE: 50 OINTMENT TOPICAL at 10:45

## 2022-05-10 NOTE — TELEPHONE ENCOUNTER
Patient needs to have a refill of the following medication sent to her pharmacy:    traMADol (ULTRAM) 50 MG tablet Take 0.5-1 tablets by mouth every 6 hours as needed for Pain for up to 7 days.          Chris Jarod [id number] [address] [place] [address] [phone] Emerson Fierro [phone]      Last appointment: 4/26/2022  Next appointment: 6/28/2022  Last refill:

## 2022-05-10 NOTE — PROGRESS NOTES
Multilayer Compression Wrap   (Not Unna) Below the Knee    NAME:  Gretchen Ann OF BIRTH:  4/4/1929  MEDICAL RECORD NUMBER:  [id number]  DATE:  5/10/2022        Removed old Multilayer wrap if present and washed leg with mild soap/water.   Applied moisturizing agent to dry skin as needed.   Applied primary and secondary dressing as ordered     Applied multilayered dressing below the knee to Bilateral lower leg(s)  (2 Layer Lite compression wrap ) .   Instructed patient/caregiver not to remove dressing and to keep it clean and dry.   Instructed patient/caregiver on complications to report to provider, such as pain, numbness in toes, heavy drainage, and slippage of dressing.   Instructed patient on purpose of compression dressing and on activity and exercise recommendations.     Applied per   Guidelines    Electronically signed by Yulisa Ray RN on 5/10/2022 at 11:43 AM

## 2022-05-10 NOTE — PROGRESS NOTES
[facility]  Progress Note and Procedure Note      Thayne Mohs  MEDICAL RECORD NUMBER:  [id number]  AGE: 80 y.o. GENDER: female  : 1929  EPISODE DATE:  5/10/2022    Subjective:     Chief Complaint   Patient presents with    Wound Check     bilateral lower leg         HISTORY of PRESENT ILLNESS HPI     Thayne Mohs is a 80 y.o. female who presents today for wound/ulcer evaluation. History of Wound Context: Patient continues follow-up for chronic wound left anterior knee as well as traumatic wound of right knee. She is tolerated wearing a 2 layer compression wrap. Pain Assessment:  Wound/Ulcer Pain Timing/Severity: intermittent  Quality of pain: sharp  Severity:  2 / 10   Modifying Factors: None  Associated Signs/Symptoms: edema and drainage    Ulcer Identification:  Ulcer Type: venous and traumatic  Contributing Factors: edema, venous stasis and arterial insufficiency    Objective:      BP (!) 169/77   Pulse 77   Temp 97.4 °F (36.3 °C) (Temporal)   Resp 16     Wt Readings from Last 3 Encounters:   22 112 lb (50.8 kg)   22 110 lb (49.9 kg)   22 112 lb (50.8 kg)       PHYSICAL EXAM    Extremities: no cyanosis, no clubbing, minimal + edema-  bilateral lower extremities with full-thickness wound containing fibrin, minimal slough, granulation tissue left anterior knee designated as wound #1  Second traumatic wound right anterior knee full-thickness containing fibrin, slough, granulation tissue designated as wound #2  Assessment:      1. Peripheral artery disease ([address]    2. Wound of left lower extremity, subsequent encounter    3. Wound of left lower extremity, initial encounter         Procedure Note  Indications:  Based on my examination of this patient's wound(s) today, sharp excision is required to promote healing and evaluate the extent healing. Performed by: Emy Lr MD    Consent obtained?  Yes    Time out taken: Yes    Pain Control: Anesthetic: 5% Lidocaine Ointment Topical     Debridement:Excisional Debridement    Using curette the wound was sharply debrided    down through and including the removal of  epidermis, dermis and subcutaneous tissue. Devitalized Tissue Debrided:  fibrin and slough      Pre Debridement Measurements:  Are located in the Wound Documentation Flow Sheet   Wound #: 1 and 2     Post  Debridement Measurements:  Wound 11/22/21 Leg Left; Lower;Distal #1 (Active)   Wound Image   05/02/22 1417   Wound Etiology Arterial 11/22/21 0924   Wound Cleansed Cleansed with saline 05/10/22 1046   Dressing/Treatment Collagen with Ag 05/10/22 1142   Wound Length (cm) 3.3 cm 05/10/22 1046   Wound Width (cm) 2.4 cm 05/10/22 1046   Wound Depth (cm) 0.1 cm 05/10/22 1046   Wound Surface Area (cm^2) 7.92 cm^2 05/10/22 1046   Change in Wound Size % (l*w) -182.86 05/10/22 1046   Wound Volume (cm^3) 0.792 cm^3 05/10/22 1046   Wound Healing % -183 05/10/22 1046   Post-Procedure Length (cm) 3.4 cm 05/10/22 1136   Post-Procedure Width (cm) 2.5 cm 05/10/22 1136   Post-Procedure Depth (cm) 0.3 cm 05/10/22 1136   Post-Procedure Surface Area (cm^2) 8.5 cm^2 05/10/22 1136   Post-Procedure Volume (cm^3) 2.55 cm^3 05/10/22 1136   Distance Tunneling (cm) 0 cm 05/10/22 1046   Undermining Maxium Distance (cm) 0 05/10/22 1046   Wound Assessment [place]/red;Slough 05/10/22 1046   Drainage Amount Small 05/10/22 1046   Drainage Description Serosanguinous 05/10/22 1046   Odor None 05/10/22 1046   Nissa-wound Assessment Fragile;Edematous; Hemosiderin staining (brown yellow) 05/10/22 1046   Margins Defined edges 05/10/22 1046   Wound Thickness Description not for Pressure Injury Full thickness 05/10/22 1046   Number of days: 169       Wound 04/04/22 Leg Anterior; Lower;Right #2 (Active)   Wound Image   05/02/22 1417   Wound Etiology Traumatic 04/04/22 0818   Wound Cleansed Cleansed with saline 05/10/22 1046   Dressing/Treatment Collagen with Ag 05/10/22 1142   Wound Length (cm) 0.9 cm 05/10/22 1046   Wound Width (cm) 0.5 cm 05/10/22 1046   Wound Depth (cm) 0.1 cm 05/10/22 1046   Wound Surface Area (cm^2) 0.45 cm^2 05/10/22 1046   Change in Wound Size % (l*w) 95.1 05/10/22 1046   Wound Volume (cm^3) 0.045 cm^3 05/10/22 1046   Wound Healing % 95 05/10/22 1046   Post-Procedure Length (cm) 1 cm 05/10/22 1136   Post-Procedure Width (cm) 0.6 cm 05/10/22 1136   Post-Procedure Depth (cm) 0.3 cm 05/10/22 1136   Post-Procedure Surface Area (cm^2) 0.6 cm^2 05/10/22 1136   Post-Procedure Volume (cm^3) 0.18 cm^3 05/10/22 1136   Distance Tunneling (cm) 0 cm 05/10/22 1046   Tunneling Position ___ O'Clock 0 04/04/22 0818   Undermining Maxium Distance (cm) 0 05/10/22 1046   Wound Assessment [place]/red;Slough 05/10/22 1046   Drainage Amount Small 05/10/22 1046   Drainage Description Serosanguinous 05/10/22 1046   Odor None 05/10/22 1046   Nissa-wound Assessment Edematous;Fragile; Hemosiderin staining (brown yellow) 05/10/22 1046   Margins Defined edges 05/10/22 1046   Wound Thickness Description not for Pressure Injury Full thickness 05/10/22 1046   Number of days: 36          Percent of Wound Debrided: 100%    Total Surface Area Debrided:  9 sq cm    Diabetic/Pressure/Non Pressure Ulcers only:  Ulcer: Non-Pressure ulcer, fat layer exposed    Bleeding: Minimal    Hemostasis Achieved: by pressure    Procedural Pain: 2  / 10     Post Procedural Pain: 0 / 10     Response to treatment:  With complaints of pain. Patient agrees to continue with compression wraps. We will apply for a TheraSkin allograft for application to her left anterior knee wound as we deem it medically necessary        Plan:     Treatment Note: Please see attached Discharge Instructions.   These instructions were given and signed by the patient or POA    New Medication(s) at this visit:   New Prescriptions    No medications on file       Other orders at this visit:   Orders Placed This Encounter   Procedures    Initiate Outpatient Wound Care Protocol       Discharge Instructions          [address] [facility] Physician Orders and Discharge Instructions  [address] [address] [address]  Telephone: [phone]  NAME:  Vianney Benitez  YOB: 1929  MEDICAL RECORD NUMBER:  [id number]  DATE:  5/10/2022    Wash hands with soap and water prior to and after every dressing change. Wound Cleansing:   · Do not scrub or use excessive force. · With each dressing change, rinse wounds with 0.9% Saline. (May use wound wash or soft contact solution. Both can be purchased at a local drug store). · If unable to obtain saline, may use a gentle soap and water. · Keep wounds dry in the shower unless otherwise instructed by the physician. · For wounds on lower legs, cast covers can be purchased at local drug stores, so that you may shower and keep the wound(s) dry. []  Vashe Wash solution instructions (if prescribed): Apply enough Vashe to soak a piece of gauze and place on wound bed for 5-10 minutes. DO NOT rinse after Vashe has been applied. Follow dressing application as instructed below. Nissa wound Topical Treatments:  Do not apply lotions, creams, or ointments to the skin around the wound bed unless directed as followed:     [] Apply around the wound: [] moisturizing lotion [] Antifungal ointment [] No-Sting barrier film [] Zinc paste [] Other:       Dressings:           Wound Location: bilateral lower legs    Apply Primary Dressing to wound: Joleen         Cover and Secure with:     Cover and Secure with: 4X4 gauze pad   Avoid contact of tape with skin if possible.      When to change Dressing: [] Daily [] Every Other Day [] Once a week  [] Three times per week: [] Monday, Wednesday, Friday [] Tuesday, Thursday, Saturday  [x] Do Not Change Dressing [] Other:    Multilayer Compression Wrap:    Type: 2 Layer Lite compression wrap   · Applied in Clinic to the : Bilateral lower leg(s) on 5/10/2022  · Do not get leg(s) with wrap wet. · If wraps become too tight call the center or completely remove the wrap. · Elevate leg(s) above the level of the heart when sitting. · Avoid prolonged standing in one place. Dietary:  Important dietary reminders:  1. Increase Protein intake (i.e. Lean meats, fish, eggs, legumes, and yogurt)  2. No added salt  3. If diabetic, follow a diabetic diet and check glucose prior to meals or as instructed by your physician. Dietary Supplements:  [] Octaviano  [] 30ml ProStat  [] EnsureEnlive [] Ensure Max/Premier  [] Other:    If you are still having pain after you go home:   For wounds on lower legs or arms, elevate the affected limb.  Use over-the-counter medications you would normally use for pain as permitted by your primary care doctor.  For persistent pain not relieved by the above interventions, please call your primary care doctor. Return Appointment:  [facility] Return Appointment: With Dr. Bob  in  [address] [place] [address])    [] Return Appointment for a Wound Assessment (Nurse Visit) on:       [x] Orders placed during your visit:   Orders Placed This Encounter   Procedures    Initiate Outpatient Wound Care Protocol       o All other future appointments:  Future Appointments   Date Time Provider Gilbert Frey   6/28/2022  9:45 AM Vicki Rahman DO [address] [facility]   7/11/2022  3:30 PM Esequiel Guaman MD [facility]   -       Your nurse  is:  Minh Baker     Electronically signed by Fariha Quach RN on 5/10/2022 at 11:39 AM     [address] [place] [address] Information: Should you experience any significant changes in your wound(s) or have questions about your wound care, please contact the [address] [place] [address] at [phone]. We are open from 8:00am - 4:30p Monday, Thursday and Friday; 11:00am - 5pm on Tuesday and CLOSED Wednesday. Please give us 24-48 hours to return your call.       Call your doctor now or seek immediate medical care if:    · You have symptoms of infection, such as:  ? Increased pain, swelling, warmth, or redness. ? Red streaks leading from the area. ? Pus draining from the area. ? A fever.         Physician for this visit and orders: Elvia Curling, MD    [] Patient unable to sign Discharge Instructions given to ECF/Transportation/POA        Electronically signed by Elvia Curling, MD on 5/10/2022 at 1:12 PM

## 2022-05-16 ENCOUNTER — HOSPITAL ENCOUNTER (OUTPATIENT)
Dept: WOUND CARE | Age: 87
Discharge: HOME | End: 2022-05-16
Payer: MEDICARE

## 2022-05-16 DIAGNOSIS — S81.802A WOUND OF LEFT LOWER EXTREMITY, INITIAL ENCOUNTER: ICD-10-CM

## 2022-05-16 DIAGNOSIS — S81.802D WOUND OF LEFT LOWER EXTREMITY, SUBSEQUENT ENCOUNTER: ICD-10-CM

## 2022-05-16 DIAGNOSIS — I73.9 PERIPHERAL ARTERY DISEASE (HCC): Primary | ICD-10-CM

## 2022-05-16 PROCEDURE — 15271 SKIN SUB GRAFT TRNK/ARM/LEG: CPT

## 2022-05-16 PROCEDURE — 29581 APPL MULTLAYER CMPRN SYS LEG: CPT

## 2022-05-16 PROCEDURE — 11042 DBRDMT SUBQ TIS 1ST 20SQCM/<: CPT | Performed by: SPECIALIST

## 2022-05-16 PROCEDURE — 15271 SKIN SUB GRAFT TRNK/ARM/LEG: CPT | Performed by: SPECIALIST

## 2022-05-16 PROCEDURE — 6370000000 HC RX 637 (ALT 250 FOR IP): Performed by: SPECIALIST

## 2022-05-16 PROCEDURE — 11042 DBRDMT SUBQ TIS 1ST 20SQCM/<: CPT

## 2022-05-16 RX ORDER — GENTAMICIN SULFATE 1 MG/G
OINTMENT TOPICAL ONCE
Status: CANCELLED | OUTPATIENT
Start: 2022-05-16 | End: 2022-05-16

## 2022-05-16 RX ORDER — LIDOCAINE 40 MG/G
CREAM TOPICAL ONCE
Status: CANCELLED | OUTPATIENT
Start: 2022-05-16 | End: 2022-05-16

## 2022-05-16 RX ORDER — GINSENG 100 MG
CAPSULE ORAL ONCE
Status: CANCELLED | OUTPATIENT
Start: 2022-05-16 | End: 2022-05-16

## 2022-05-16 RX ORDER — LIDOCAINE HYDROCHLORIDE 40 MG/ML
SOLUTION TOPICAL ONCE
Status: CANCELLED | OUTPATIENT
Start: 2022-05-16 | End: 2022-05-16

## 2022-05-16 RX ORDER — LIDOCAINE 50 MG/G
OINTMENT TOPICAL ONCE
Status: CANCELLED | OUTPATIENT
Start: 2022-05-16 | End: 2022-05-16

## 2022-05-16 RX ORDER — BACITRACIN, NEOMYCIN, POLYMYXIN B 400; 3.5; 5 [USP'U]/G; MG/G; [USP'U]/G
OINTMENT TOPICAL ONCE
Status: CANCELLED | OUTPATIENT
Start: 2022-05-16 | End: 2022-05-16

## 2022-05-16 RX ORDER — BACITRACIN ZINC AND POLYMYXIN B SULFATE 500; 1000 [USP'U]/G; [USP'U]/G
OINTMENT TOPICAL ONCE
Status: CANCELLED | OUTPATIENT
Start: 2022-05-16 | End: 2022-05-16

## 2022-05-16 RX ORDER — LIDOCAINE HYDROCHLORIDE 20 MG/ML
JELLY TOPICAL ONCE
Status: CANCELLED | OUTPATIENT
Start: 2022-05-16 | End: 2022-05-16

## 2022-05-16 RX ORDER — LIDOCAINE 50 MG/G
OINTMENT TOPICAL ONCE
Status: COMPLETED | OUTPATIENT
Start: 2022-05-16 | End: 2022-05-16

## 2022-05-16 RX ORDER — CLOBETASOL PROPIONATE 0.5 MG/G
OINTMENT TOPICAL ONCE
Status: CANCELLED | OUTPATIENT
Start: 2022-05-16 | End: 2022-05-16

## 2022-05-16 RX ORDER — LIDOCAINE HYDROCHLORIDE 40 MG/ML
SOLUTION TOPICAL ONCE
Status: DISCONTINUED | OUTPATIENT
Start: 2022-05-16 | End: 2022-05-17 | Stop reason: HOSPADM

## 2022-05-16 RX ORDER — BETAMETHASONE DIPROPIONATE 0.05 %
OINTMENT (GRAM) TOPICAL ONCE
Status: CANCELLED | OUTPATIENT
Start: 2022-05-16 | End: 2022-05-16

## 2022-05-16 RX ADMIN — LIDOCAINE: 50 OINTMENT TOPICAL at 09:53

## 2022-05-16 NOTE — PROGRESS NOTES
[facility]  Progress Note and Procedure Note      Yves Mckeon  MEDICAL RECORD NUMBER:  [id number]  AGE: 80 y.o. GENDER: female  : 1929  EPISODE DATE:  2022    Subjective:     Chief Complaint   Patient presents with    Wound Check     left lowerleg         HISTORY of PRESENT ILLNESS HPI     Yves Mckeon is a 80 y.o. female who presents today for wound/ulcer evaluation. History of Wound Context: Patient continues follow-up for chronic wound left anterior knee as well as traumatic wound of right knee. She is tolerated wearing a 2 layer compression wrap. Wound has failed to heal greater than 30%-50% at initial assessment. Patient has received greater than 30 days of conservative treatment including debridements at each wound care visit and the following: Compression    Pain Assessment:  Wound/Ulcer Pain Timing/Severity: intermittent  Quality of pain: sharp  Severity:  2 / 10   Modifying Factors: None  Associated Signs/Symptoms: edema    Ulcer Identification:  Ulcer Type: venous and traumatic, arterial  Contributing Factors: edema, lymphedema and arterial insufficiency    Last Z8Z if applicable:   Hemoglobin A1C   Date Value Ref Range Status   2022 5.6 See comment % Final     Comment:     Comment:  Diagnosis of Diabetes: > or = 6.5%  Increased risk of diabetes (Prediabetes): 5.7-6.4%  Glycemic Control: Nonpregnant Adults: <7.0%                    Pregnant: <6.0%           Objective: There were no vitals taken for this visit. Wt Readings from Last 3 Encounters:   22 112 lb (50.8 kg)   22 110 lb (49.9 kg)   22 112 lb (50.8 kg)       PHYSICAL EXAM    Extremities: no cyanosis, no clubbing, minimal + edema-  bilateral lower extremities with granulating wound with minimal fibrin over designated wound #1 left lower extremity  Small granulating wound with minimal fibrin designated as wound #2 right lower extremity  Assessment:      1.  Peripheral artery disease ([facility] [address]    2. Wound of left lower extremity, subsequent encounter    3. Wound of left lower extremity, initial encounter         Procedure Note  Indications:  Based on my examination of this patient's wound(s) today, sharp excision is required to promote healing and evaluate the extent healing. Performed by: Israel Lynne MD    Consent obtained? Yes    Time out taken: Yes    Pain Control: Anesthetic: 5% Lidocaine Ointment Topical     Debridement:Excisional Debridement    Using curette the wound was sharply debrided    down through and including the removal of  epidermis, dermis and subcutaneous tissue. Devitalized Tissue Debrided:  fibrin      Wound #: 1 and 2     Pre & Post  Debridement Measurements:  Wound 11/22/21 Leg Left; Lower;Distal #1 (Active)   Wound Image   05/02/22 1417   Wound Etiology Arterial 11/22/21 0924   Wound Cleansed Cleansed with saline 05/10/22 1046   Dressing/Treatment Other (comment) 05/16/22 1031   Wound Length (cm) 3.3 cm 05/16/22 0947   Wound Width (cm) 2.1 cm 05/16/22 0947   Wound Depth (cm) 0.1 cm 05/16/22 0947   Wound Surface Area (cm^2) 6.93 cm^2 05/16/22 0947   Change in Wound Size % (l*w) -147.5 05/16/22 0947   Wound Volume (cm^3) 0.693 cm^3 05/16/22 0947   Wound Healing % -148 05/16/22 0947   Post-Procedure Length (cm) 3.4 cm 05/16/22 1031   Post-Procedure Width (cm) 2.2 cm 05/16/22 1031   Post-Procedure Depth (cm) 0.3 cm 05/16/22 1031   Post-Procedure Surface Area (cm^2) 7.48 cm^2 05/16/22 1031   Post-Procedure Volume (cm^3) 2.244 cm^3 05/16/22 1031   Distance Tunneling (cm) 0 cm 05/16/22 0947   Undermining Maxium Distance (cm) 0 05/16/22 0947   Wound Assessment Pink/red;Slough 05/16/22 0947   Drainage Amount Small 05/16/22 0947   Drainage Description Serosanguinous 05/16/22 0947   Odor None 05/16/22 0947   Nissa-wound Assessment Fragile;Edematous; Hemosiderin staining (brown yellow) 05/16/22 0947   Margins Defined edges 05/16/22 0947   Wound Thickness Description not for Pressure Injury Full thickness 05/16/22 0947   Number of days: 175       Wound 04/04/22 Leg Anterior; Lower;Right #2 (Active)   Wound Image   05/02/22 1417   Wound Etiology Traumatic 04/04/22 0818   Wound Cleansed Cleansed with saline 05/16/22 0947   Dressing/Treatment Collagen with Ag 05/16/22 1031   Wound Length (cm) 0.9 cm 05/16/22 0947   Wound Width (cm) 0.5 cm 05/16/22 0947   Wound Depth (cm) 0.1 cm 05/16/22 0947   Wound Surface Area (cm^2) 0.45 cm^2 05/16/22 0947   Change in Wound Size % (l*w) 95.1 05/16/22 0947   Wound Volume (cm^3) 0.045 cm^3 05/16/22 0947   Wound Healing % 95 05/16/22 0947   Post-Procedure Length (cm) 1 cm 05/16/22 1031   Post-Procedure Width (cm) 0.6 cm 05/16/22 1031   Post-Procedure Depth (cm) 0.3 cm 05/16/22 1031   Post-Procedure Surface Area (cm^2) 0.6 cm^2 05/16/22 1031   Post-Procedure Volume (cm^3) 0.18 cm^3 05/16/22 1031   Distance Tunneling (cm) 0 cm 05/16/22 0947   Tunneling Position ___ O'Clock 0 04/04/22 0818   Undermining Maxium Distance (cm) 0 05/16/22 0947   Wound Assessment Pink/red;Fibrin 05/16/22 0947   Drainage Amount Small 05/16/22 0947   Drainage Description Serosanguinous 05/16/22 0947   Odor None 05/16/22 0947   Nissa-wound Assessment Edematous;Fragile; Hemosiderin staining (brown yellow) 05/16/22 0947   Margins Defined edges 05/16/22 0947   Wound Thickness Description not for Pressure Injury Full thickness 05/16/22 0947   Number of days: 42          Percent of Wound Debrided: 100%    Total Surface Area Debrided:  8 sq cm    Diabetic/Pressure/Non Pressure Ulcers only:  Ulcer: Non-Pressure ulcer, fat layer exposed    Bleeding: Minimal    Hemostasis Achieved: by pressure    Procedural Pain: 0  / 10     Post Procedural Pain: 0 / 10     Response to treatment:  Well tolerated by patient.      Procedure:  Skin Substitute Application    Performed by: Bucky Ba MD    Ulcer Type: arterial    Consent obtained: Yes    Time out taken: Yes    Product Utilized: Shannan 13 sq/cm     Fenestrated:no    Mesher Utilized: No    Instrument(s) curette    Skin Substitute was Applied to Ulcer Number(s):    Ulcer #: 1      Wound 11/22/21 Leg Left; Lower;Distal #1 (Active)   Wound Image   05/02/22 1417   Wound Etiology Arterial 11/22/21 0924   Wound Cleansed Cleansed with saline 05/10/22 1046   Dressing/Treatment Other (comment) 05/16/22 1031   Wound Length (cm) 3.3 cm 05/16/22 0947   Wound Width (cm) 2.1 cm 05/16/22 0947   Wound Depth (cm) 0.1 cm 05/16/22 0947   Wound Surface Area (cm^2) 6.93 cm^2 05/16/22 0947   Change in Wound Size % (l*w) -147.5 05/16/22 0947   Wound Volume (cm^3) 0.693 cm^3 05/16/22 0947   Wound Healing % -148 05/16/22 0947   Post-Procedure Length (cm) 3.4 cm 05/16/22 1031   Post-Procedure Width (cm) 2.2 cm 05/16/22 1031   Post-Procedure Depth (cm) 0.3 cm 05/16/22 1031   Post-Procedure Surface Area (cm^2) 7.48 cm^2 05/16/22 1031   Post-Procedure Volume (cm^3) 2.244 cm^3 05/16/22 1031   Distance Tunneling (cm) 0 cm 05/16/22 0947   Undermining Maxium Distance (cm) 0 05/16/22 0947   Wound Assessment Pink/red;Slough 05/16/22 0947   Drainage Amount Small 05/16/22 0947   Drainage Description Serosanguinous 05/16/22 0947   Odor None 05/16/22 0947   Nissa-wound Assessment Fragile;Edematous; Hemosiderin staining (brown yellow) 05/16/22 0947   Margins Defined edges 05/16/22 0947   Wound Thickness Description not for Pressure Injury Full thickness 05/16/22 0947   Number of days: 175       Wound 04/04/22 Leg Anterior; Lower;Right #2 (Active)   Wound Image   05/02/22 1417   Wound Etiology Traumatic 04/04/22 0818   Wound Cleansed Cleansed with saline 05/16/22 0947   Dressing/Treatment Collagen with Ag 05/16/22 1031   Wound Length (cm) 0.9 cm 05/16/22 0947   Wound Width (cm) 0.5 cm 05/16/22 0947   Wound Depth (cm) 0.1 cm 05/16/22 0947   Wound Surface Area (cm^2) 0.45 cm^2 05/16/22 0947   Change in Wound Size % (l*w) 95.1 05/16/22 0947   Wound Volume (cm^3) 0.045 cm^3 05/16/22 0947   Wound Healing % 95 05/16/22 0947   Post-Procedure Length (cm) 1 cm 05/16/22 1031   Post-Procedure Width (cm) 0.6 cm 05/16/22 1031   Post-Procedure Depth (cm) 0.3 cm 05/16/22 1031   Post-Procedure Surface Area (cm^2) 0.6 cm^2 05/16/22 1031   Post-Procedure Volume (cm^3) 0.18 cm^3 05/16/22 1031   Distance Tunneling (cm) 0 cm 05/16/22 0947   Tunneling Position ___ O'Clock 0 04/04/22 0818   Undermining Maxium Distance (cm) 0 05/16/22 0947   Wound Assessment Pink/red;Fibrin 05/16/22 0947   Drainage Amount Small 05/16/22 0947   Drainage Description Serosanguinous 05/16/22 0947   Odor None 05/16/22 0947   Nissa-wound Assessment Edematous;Fragile; Hemosiderin staining (brown yellow) 05/16/22 0947   Margins Defined edges 05/16/22 0947   Wound Thickness Description not for Pressure Injury Full thickness 05/16/22 0947   Number of days: 42       Diabetic/Pressure/Non Pressure Ulcers:  Ulcer:   Non-Pressure ulcer, fat layer exposed      Total Surface Area of Ulcer(s) Covered 7.4 sq/cm    Was the Product Layered  No     Amount of Product Applied 7.4 sq/cm     Amount of Product Wasted 5.6 sq/cm     Reason for Waste Wound Size smaller then product size      Surgically Fixated: No    Secured With: Steri Strips and Mepitel     Procedural Pain: 0/10     Post Procedural Pain: 0 / 10    Response to Treatment: Well tolerated by patient. TheraSkin allograft was applied to venous wound left lower extremity designated wound #1 is medically necessary. Plan:     Treatment Note: Please see attached Discharge Instructions. These instructions were given and signed by the patient or POA    New Medication(s) at this visit:   New Prescriptions    No medications on file       Discharge Instructions          [address] [facility] Physician Orders and Discharge Instructions  [address] [place] [address] [address] [place] [address]. [place] [address]  Telephone: [phone]  NAME:  Cee Macias  DATE OF BIRTH: 4/4/1929  MEDICAL RECORD NUMBER:  [id number]  DATE:  5/16/2022    Wash hands with soap and water prior to and after every dressing change. Wound Cleansing:   · Do not scrub or use excessive force. · With each dressing change, rinse wounds with 0.9% Saline. (May use wound wash or soft contact solution. Both can be purchased at a local drug store). · If unable to obtain saline, may use a gentle soap and water. · Keep wounds dry in the shower unless otherwise instructed by the physician. · For wounds on lower legs, cast covers can be purchased at local drug stores, so that you may shower and keep the wound(s) dry. []  Vashe Wash solution instructions (if prescribed): Apply enough Vashe to soak a piece of gauze and place on wound bed for 5-10 minutes. DO NOT rinse after Vashe has been applied. Follow dressing application as instructed below. Nissa wound Topical Treatments:  Do not apply lotions, creams, or ointments to the skin around the wound bed unless directed as followed:     [] Apply around the wound: [] moisturizing lotion [] Antifungal ointment [] No-Sting barrier film [] Zinc paste [] Other:       Dressings:           Wound Location: left proximal wound and right lower leg wound    Apply Primary Dressing to wound: Joleen         Cover and Secure with:     Cover and Secure with: 4X4 gauze pad   Avoid contact of tape with skin if possible.  When to change Dressing: [] Daily [] Every Other Day [] Once a week  [] Three times per week: [] Monday, Wednesday, Friday [] Tuesday, Thursday, Saturday  [x] Do Not Change Dressing [] Other:  o       [x]Application of a biologic skin substitute:   Theraskin 1st application, secured with steri strips, mepitel, hydrogel, gauze : Wound Location:1- left lower leg  This is a highly specialized wound care dressing that is intended to enhance your own bodies ability to increase growth factors and other healing abilities.   Please leave the dressing clean, dry and intact unless otherwise instructed by your physician. Leave steri-strips and non adherent in place if changing the dressing as instructed. Multilayer Compression Wrap:    Type: 2 Layer Lite compression wrap   · Applied in Clinic to the :  Bilateral lower leg(s) on 5/16/2022  · Do not get leg(s) with wrap wet. · If wraps become too tight call the center or completely remove the wrap. · Elevate leg(s) above the level of the heart when sitting. · Avoid prolonged standing in one place. Dietary:  Important dietary reminders:  1. Increase Protein intake (i.e. Lean meats, fish, eggs, legumes, and yogurt)  2. No added salt  3. If diabetic, follow a diabetic diet and check glucose prior to meals or as instructed by your physician. Dietary Supplements:  [] Octaviano  [] 30ml ProStat  [] EnsureEnlive [] Ensure Max/Premier  [] Other:    If you are still having pain after you go home:   For wounds on lower legs or arms, elevate the affected limb.  Use over-the-counter medications you would normally use for pain as permitted by your primary care doctor.  For persistent pain not relieved by the above interventions, please call your primary care doctor.      Return Appointment:  [facility] Return Appointment: With Dr. Joel Mcintosh  in  [address] [place] [address])    [] Return Appointment for a Wound Assessment (Nurse Visit) on:       [x] Orders placed during your visit:   Orders Placed This Encounter   Procedures    Initiate Outpatient Wound Care Protocol       o All other future appointments:  Future Appointments   Date Time Provider Gilbert Frey   6/28/2022  9:45 AM [address] [place] [id number] [facility]   7/11/2022  3:30 PM MD Taylor Gonsalves [facility]   -       Your nurse  is:  Deshaun Spears     Electronically signed by Leisa Hathaway RN on 5/16/2022 at 10:36 AM     Ramila [address]aez [address]: Should you experience any significant changes in your wound(s) or have questions about your wound care, please contact the [address] [place] [address] at [phone]. We are open from 8:00am - 4:30p Monday, Thursday and Friday; 11:00am - 5pm on Tuesday and CLOSED Wednesday. Please give us 24-48 hours to return your call. Call your doctor now or seek immediate medical care if:    · You have symptoms of infection, such as:  ? Increased pain, swelling, warmth, or redness. ? Red streaks leading from the area. ? Pus draining from the area. ? A fever.         Physician for this visit and orders: Denisse Ordoñez MD    [] Patient unable to sign Discharge Instructions given to ECF/Transportation/POA        Electronically signed by Denisse Ordoñez MD on 5/16/2022 at 11:58 AM

## 2022-05-16 NOTE — PROGRESS NOTES
Multilayer Compression Wrap   (Not Unna) Below the Knee    NAME:  Magnolia Perrin OF BIRTH:  4/4/1929  MEDICAL RECORD NUMBER:  [id number]  DATE:  5/16/2022        Removed old Multilayer wrap if present and washed leg with mild soap/water.   Applied moisturizing agent to dry skin as needed.   Applied primary and secondary dressing as ordered     Applied multilayered dressing below the knee to Bilateral lower leg(s)  (2 Layer Lite compression wrap ) .   Instructed patient/caregiver not to remove dressing and to keep it clean and dry.   Instructed patient/caregiver on complications to report to provider, such as pain, numbness in toes, heavy drainage, and slippage of dressing.   Instructed patient on purpose of compression dressing and on activity and exercise recommendations.     Applied per   Guidelines    Electronically signed by Mainor Bain RN on 5/16/2022 at 11:07 AM

## 2022-05-16 NOTE — PROGRESS NOTES
TheraSkin Treatment Note      Goal:  Patient will receive safe and proper application of skin substitute. Patient will comply with caring for dressing, offloading and reporting complications. · [x] Expiration date of TheraSkin checked immediately prior to use.  [x] Package intact prior to use and no damage noted.  [x] Transport temperature controlled and acceptable. TheraSkin was prepared for application by removing from package. TheraSkin was rinsed 2 times in room temperature normal saline for 5 seconds each time. A 2nd saline rinse was left on the TheraSkin until the provider was ready to apply it within 120 minutes of thawing. White side placed against ulcer bed. · [x] Theraskin was applied to wound # 1 and affixed with steri-strips by the provider. · [x] Secondary dressing applied as ordered. · [x] Patient/caregiver was instructed not to remove dressing and to keep it clean and dry. See AVS for further instructions given to patient/caregiver. Theraskin may be applied a total of 10 times per wound over a 12 week period. Additionally Theraskin may only be used every 12 months per wound. Date of first application of Theraskin for this current wound is May 16, 2022.      Application # 1           Guidelines followed      Electronically signed by Eddie Austin RN on 5/16/2022 at 10:35 AM

## 2022-05-23 ENCOUNTER — HOSPITAL ENCOUNTER (OUTPATIENT)
Dept: WOUND CARE | Age: 87
Discharge: HOME | End: 2022-05-23
Payer: MEDICARE

## 2022-05-23 ENCOUNTER — OFFICE VISIT (OUTPATIENT)
Dept: ORTHOPEDIC SURGERY | Age: 87
End: 2022-05-23
Payer: MEDICARE

## 2022-05-23 VITALS
TEMPERATURE: 96.8 F | HEART RATE: 78 BPM | SYSTOLIC BLOOD PRESSURE: 186 MMHG | DIASTOLIC BLOOD PRESSURE: 76 MMHG | RESPIRATION RATE: 18 BRPM

## 2022-05-23 VITALS — RESPIRATION RATE: 18 BRPM | BODY MASS INDEX: 21.14 KG/M2 | HEIGHT: 61 IN | WEIGHT: 112 LBS

## 2022-05-23 DIAGNOSIS — I73.9 PAD (PERIPHERAL ARTERY DISEASE) (HCC): ICD-10-CM

## 2022-05-23 DIAGNOSIS — M47.812 CERVICAL SPONDYLOSIS: ICD-10-CM

## 2022-05-23 DIAGNOSIS — E11.42 DIABETIC PERIPHERAL NEUROPATHY (HCC): ICD-10-CM

## 2022-05-23 DIAGNOSIS — M54.2 NECK PAIN: Primary | ICD-10-CM

## 2022-05-23 DIAGNOSIS — I73.9 PERIPHERAL ARTERY DISEASE (HCC): Primary | ICD-10-CM

## 2022-05-23 DIAGNOSIS — L98.499 ISCHEMIC ULCER, UNSPECIFIED ULCER STAGE (HCC): ICD-10-CM

## 2022-05-23 DIAGNOSIS — S81.802D WOUND OF LEFT LOWER EXTREMITY, SUBSEQUENT ENCOUNTER: ICD-10-CM

## 2022-05-23 DIAGNOSIS — S81.802A WOUND OF LEFT LOWER EXTREMITY, INITIAL ENCOUNTER: ICD-10-CM

## 2022-05-23 DIAGNOSIS — L97.922 NON-PRESSURE CHRONIC ULCER OF LEFT LOWER LEG WITH FAT LAYER EXPOSED (HCC): ICD-10-CM

## 2022-05-23 PROCEDURE — 99212 OFFICE O/P EST SF 10 MIN: CPT | Performed by: SPECIALIST

## 2022-05-23 PROCEDURE — 1123F ACP DISCUSS/DSCN MKR DOCD: CPT | Performed by: PHYSICIAN ASSISTANT

## 2022-05-23 PROCEDURE — 29581 APPL MULTLAYER CMPRN SYS LEG: CPT

## 2022-05-23 PROCEDURE — 6370000000 HC RX 637 (ALT 250 FOR IP): Performed by: SPECIALIST

## 2022-05-23 PROCEDURE — G8420 CALC BMI NORM PARAMETERS: HCPCS | Performed by: PHYSICIAN ASSISTANT

## 2022-05-23 PROCEDURE — 1036F TOBACCO NON-USER: CPT | Performed by: PHYSICIAN ASSISTANT

## 2022-05-23 PROCEDURE — 1090F PRES/ABSN URINE INCON ASSESS: CPT | Performed by: PHYSICIAN ASSISTANT

## 2022-05-23 PROCEDURE — 99214 OFFICE O/P EST MOD 30 MIN: CPT | Performed by: PHYSICIAN ASSISTANT

## 2022-05-23 PROCEDURE — G8427 DOCREV CUR MEDS BY ELIG CLIN: HCPCS | Performed by: PHYSICIAN ASSISTANT

## 2022-05-23 RX ORDER — LIDOCAINE HYDROCHLORIDE 20 MG/ML
JELLY TOPICAL ONCE
Status: CANCELLED | OUTPATIENT
Start: 2022-05-23 | End: 2022-05-23

## 2022-05-23 RX ORDER — BACITRACIN ZINC AND POLYMYXIN B SULFATE 500; 1000 [USP'U]/G; [USP'U]/G
OINTMENT TOPICAL ONCE
Status: CANCELLED | OUTPATIENT
Start: 2022-05-23 | End: 2022-05-23

## 2022-05-23 RX ORDER — LIDOCAINE 50 MG/G
OINTMENT TOPICAL ONCE
Status: COMPLETED | OUTPATIENT
Start: 2022-05-23 | End: 2022-05-23

## 2022-05-23 RX ORDER — LIDOCAINE 40 MG/G
CREAM TOPICAL ONCE
Status: CANCELLED | OUTPATIENT
Start: 2022-05-23 | End: 2022-05-23

## 2022-05-23 RX ORDER — LIDOCAINE HYDROCHLORIDE 40 MG/ML
SOLUTION TOPICAL ONCE
Status: CANCELLED | OUTPATIENT
Start: 2022-05-23 | End: 2022-05-23

## 2022-05-23 RX ORDER — GINSENG 100 MG
CAPSULE ORAL ONCE
Status: CANCELLED | OUTPATIENT
Start: 2022-05-23 | End: 2022-05-23

## 2022-05-23 RX ORDER — CLOBETASOL PROPIONATE 0.5 MG/G
OINTMENT TOPICAL ONCE
Status: CANCELLED | OUTPATIENT
Start: 2022-05-23 | End: 2022-05-23

## 2022-05-23 RX ORDER — BACITRACIN, NEOMYCIN, POLYMYXIN B 400; 3.5; 5 [USP'U]/G; MG/G; [USP'U]/G
OINTMENT TOPICAL ONCE
Status: CANCELLED | OUTPATIENT
Start: 2022-05-23 | End: 2022-05-23

## 2022-05-23 RX ORDER — BETAMETHASONE DIPROPIONATE 0.05 %
OINTMENT (GRAM) TOPICAL ONCE
Status: CANCELLED | OUTPATIENT
Start: 2022-05-23 | End: 2022-05-23

## 2022-05-23 RX ORDER — LIDOCAINE 50 MG/G
OINTMENT TOPICAL ONCE
Status: CANCELLED | OUTPATIENT
Start: 2022-05-23 | End: 2022-05-23

## 2022-05-23 RX ORDER — GENTAMICIN SULFATE 1 MG/G
OINTMENT TOPICAL ONCE
Status: CANCELLED | OUTPATIENT
Start: 2022-05-23 | End: 2022-05-23

## 2022-05-23 RX ADMIN — LIDOCAINE: 50 OINTMENT TOPICAL at 10:10

## 2022-05-23 NOTE — LETTER
CONTROLLED SUBSTANCE MEDICATION AGREEMENT     Patient Name: Thayne Mohs  Patient YOB: 1929   I understand, that controlled substance medications may be used to help better manage my symptoms and to improve my ability to function at home, work and in social settings. However, I also understand that these medications do have risks, which have been discussed with me, including possible development of physical or psychological dependence. I understand that successful treatment requires mutual trust and honesty between me and my provider. I understand and agree that following this Medication Agreement is necessary in continuing my provider-patient relationship and the success of my treatment plan. Explanation from my Provider: Benefits and Goals of Controlled Substance Medications: There are two potential goals for your treatment: (1) decreased pain and suffering (2) improved daily life functions. There are many possible treatments for your chronic condition(s). Alternatives such as physical therapy, yoga, massage, home daily exercise, meditation, relaxation techniques, injections, chiropractic manipulations, surgery, cognitive therapy, hypnosis and many medications that are not habit-forming may be used. Use of controlled substance medications may be helpful, but they are unlikely to resolve all symptoms or restore all function. Explanation from my Provider: Risks of Controlled Substance Medications:  Opioid pain medications: These medications can lead to problems such as addiction/dependence, sedation, lightheadedness/dizziness, memory issues, falls, constipation, nausea, or vomiting. They may also impair the ability to drive or operate machinery. Additionally, these medications may lower testosterone levels, leading to loss of bone strength, stamina and sex drive.   They may cause problems with breathing, sleep apnea and reduced coughing, which is especially dangerous for patients with lung disease. Overdose or dangerous interactions with alcohol and other medications may occur, leading to death. Hyperalgesia may develop, which means patients receiving opioids for the treatment of pain may become more sensitive to certain painful stimuli, and in some cases, experience pain from ordinarily non-painful stimuli. Women between the ages of 14-53 who could become pregnant should carefully weigh the risks and benefits of opioids with their physicians, as these medications increase the risk of pregnancy complications, including miscarriage,  delivery and stillbirth. It is also possible for babies to be born addicted to opioids. Opioid dependence withdrawal symptoms may include; feelings of uneasiness, increased pain, irritability, belly pain, diarrhea, sweats and goose-flesh. Benzodiazepines and non-benzodiazepine sleep medications: These medications can lead to problems such as addiction/dependence, sedation, fatigue, lightheadedness, dizziness, incoordination, falls, depression, hallucinations, and impaired judgment, memory and concentration. The ability to drive and operate machinery may also be affected. Abnormal sleep-related behaviors have been reported, including sleepwalking, driving, making telephone calls, eating, or having sex while not fully awake. These medications can suppress breathing and worsen sleep apnea, particularly when combined with alcohol or other sedating medications, potentially leading to death. Dependence withdrawal symptoms may include tremors, anxiety, hallucinations and seizures. Stimulants:  Common adverse effects include addiction/dependence, increased blood  pressure and heart rate, decreased appetite, nausea, involuntary weight loss, insomnia,                                                                                                                     Initials:_______   irritability, and headaches.   These risks may increase when these medications are combined with other stimulants, such as caffeine pills or energy drinks, certain weight loss supplements and oral decongestants. Dependence withdrawal symptoms may include depressed mood, loss of interest, suicidal thoughts, anxiety, fatigue, appetite changes and agitation. Testosterone replacement therapy:  Potential side effects include increased risk of stroke and heart attack, blood clots, increased blood pressure, increased cholesterol, enlarged prostate, sleep apnea, irritability/aggression and other mood disorders, and decreased fertility. I agree and understand that I and my prescriber have the following rights and responsibilities regarding my treatment plan:     1. MY RIGHTS:  To be informed of my treatment and medication plan. To be an active participant in my health and wellbeing. 2. MY RESPONSIBILITY AND UNDERSTANDING FOR USE OF MEDICATIONS   I will take medications at the dose and frequency as directed. For my safety, I will not increase or change how I take my medications without the recommendation of my healthcare provider.  I will actively participate in any program recommended by my provider which may improve function, including social, physical, psychological programs.  I will not take my medications with alcohol or other drugs not prescribed to me. I understand that drinking alcohol with my medications increases the chances of side effects, including reduced breathing rate and could lead to personal injury when operating machinery.  I understand that if I have a history of substance use disorders, including alcohol or other illicit drugs, that I may be at increased risk of addiction to my medications.  I agree to notify my provider immediately if I should become pregnant so that my treatment plan can be adjusted.    I agree and understand that I shall only receive controlled substance medications from the prescriber that signed this agreement unless there is written agreement among other prescribers of controlled substances outlining the responsibility of the medications being prescribed.  I understand that the if the controlled medication is not helping to achieve goals, the dosage may be tapered and no longer prescribed. 3. MY RESPONSIBILITY FOR COMMUNICATION / PRESCRIPTION RENEWALS   I agree that all controlled substance medications that I take will be prescribed only by my provider. If another healthcare provider prescribes me medication in an emergency, I will notify my provider within seventy-two (72) hours.  I will arrange for refills at the prescribed interval ONLY during regular office hours. I will not ask for refills earlier than agreed, after-hours, on holidays or weekends. Refills may take up to 72 hours for processing and prescriptions to reach the pharmacy.  I will inform my other health care providers that I am taking these medications and of the existence of this [id number]. In the event of an emergency, I will provide the same information to the emergency department prescribers.  I will keep my provider updated on the pharmacy I am using for controlled medication prescription filling. Initials:_______  4. MY RESPONSIBILITY FOR PROTECTING MEDICATIONS   I will protect my prescriptions and medications. I understand that lost or misplaced prescriptions will not be replaced.  I will keep medications only for my own use and will not share them with others. I will keep all medications away from children.  I agree that if my medications are adjusted or discontinued, I will properly dispose of any remaining medications. I understand that I will be required to dispose of any remaining controlled medications as, directed by my prescriber, prior to being provided with any prescriptions for other controlled medications.   Medication drop box locations can be found at: [employer]    5. MY RESPONSIBILITY WITH ILLEGAL DRUGS    I will not use illegal or street drugs or another person's prescription medications not prescribed to me.  If there are identified addiction type symptoms, then referral to a program may be provided by my provider and I agree to follow through with this recommendation. 6. MY RESPONSIBILITY FOR COOPERATION WITH INVESTIGATIONS   I understand that my provider will comply with any applicable law and may discuss my use and/or possible misuse/abuse of controlled substances and alcohol, as appropriate, with any health care provider involved in my care, pharmacist, or legal authority.  I authorize my provider and pharmacy to cooperate fully with law enforcement agencies (as permitted by law) in the investigation of any possible misuse, sale, or other diversion of my controlled substances.  I agree to waive any applicable privilege or right of privacy or confidentiality with respect to these authorizations. 7. PROVIDERS RIGHT TO MONITOR FOR SAFETY: PRESCRIPTION MONITORING / DRUG TESTING   I consent to drug/toxicology screening and will submit to a drug screen upon my providers request to assure I am only taking the prescribed drugs for my safety monitoring. I understand that a drug screen is a laboratory test in which a sample of my urine, blood or saliva is checked to see what drugs I have been taking. This may entail an observed urine specimen, which means that a nurse or other health care provider may watch me provide urine, and I will cooperate if I am asked to provide an observed specimen.  I understand that my provider will check a copy of my State Prescription Monitoring Program () Report in order to safely prescribe medications.  Pill Counts: I consent to pill counts when requested.   I may be asked to bring all my prescribed controlled substance medications, in their original bottles, to all of my scheduled appointments. In addition, my provider may ask me to come to the practice at any time for a random pill count. 8. TERMINATION OF THIS AGREEMENT  For my safety, my prescriber has the right to stop prescribing controlled substance medications and may end this agreement. Initials:_______   Conditions that may result in termination of this agreement:  a. I do not show any improvement in pain, or my activity has not improved. b. I develop rapid tolerance or loss of improvement, as described in my treatment plan.  c. I develop significant side effects from the medication. d. My behavior is not consistent with the responsibilities outlined above, thereby causing safety concerns to continue prescribing controlled substance medications. e. I fail to follow the terms of this agreement. f. Other:____________________________       UNDERSTANDING THIS MEDICATION AGREEMENT:    I have read the above and have had all my questions answered. For chronic disease management, I know that my symptoms can be managed with many types of treatments. A chronic medication trial may be part of my treatment, but I must be an active participant in my care. Medication therapy is only one part of my symptom management plan. In some cases, there may be limited scientific evidence to support the chronic use of certain medications to improve symptoms and daily function. Furthermore, in certain circumstances, there may be scientific information that suggests that the use of chronic controlled substances may worsen my symptoms and increase my risk of unintentional death directly related to this medication therapy. I know that if my provider feels my risk from controlled medications is greater than my benefit, I will have my controlled substance medication(s) compassionately lowered or removed altogether.      I further agree to allow this office to contact my HIPAA contact if there are concerns about my safety and use of the controlled medications. I have agreed to use the prescribed controlled substance medications to me as instructed by my provider and as stated in this Medication Agreement. My initial on each page and my signature below shows that I have read each page and I have had the opportunity to ask questions with answers provided by my provider.     Patient Name (Printed): _____________________________________  Patient Signature:  ______________________   Date: _____________    Prescriber Name (Printed): ___________________________________  Prescriber Signature: _____________________  Date: _____________

## 2022-05-23 NOTE — PROGRESS NOTES
Arely Faulkner Compression Wrap   (Not Unna) Below the Knee    NAME:  Abad Christina OF BIRTH:  4/4/1929  MEDICAL RECORD NUMBER:  [id number]  DATE:  5/23/2022        Removed old Multilayer wrap if present and washed leg with mild soap/water.   Applied moisturizing agent to dry skin as needed.   Applied primary and secondary dressing as ordered     Applied multilayered dressing below the knee to Bilateral lower leg(s)  (2 Layer Lite compression wrap ) .   Instructed patient/caregiver not to remove dressing and to keep it clean and dry.   Instructed patient/caregiver on complications to report to provider, such as pain, numbness in toes, heavy drainage, and slippage of dressing.   Instructed patient on purpose of compression dressing and on activity and exercise recommendations.     Applied per   Guidelines    Electronically signed by Elise Goldstein RN on 5/23/2022 at 10:44 AM

## 2022-05-23 NOTE — TELEPHONE ENCOUNTER
Patient is requesting the following prescription(s):    Last appointment: 4/26/2022  Next appointment: 6/28/2022  Last refill: 04- #14    Tramadol (Ultram)  50 mg Last refill was for [address] [id number] [place], [address] [place] [address] [phone] Lee Huerta [phone] [address], [address] [place] [address]   Phone:  [phone]  Fax:  [phone]    Patient made aware to allow 24 to 48 business hours for prescription refills.

## 2022-05-23 NOTE — PROGRESS NOTES
[facility]  Progress Note and Procedure Note      Mikhail Cr  MEDICAL RECORD NUMBER:  [id number]  AGE: 80 y.o. GENDER: female  : 1929  EPISODE DATE:  2022    Subjective:     Chief Complaint   Patient presents with    Wound Check     bilateral lower legs         HISTORY of PRESENT ILLNESS HPI     Mikhail Cr is a 80 y.o. female who presents today for wound/ulcer evaluation. History of Wound Context: Patient continues follow-up for chronic wound left anterior knee as well as traumatic wound of right knee.  She is tolerated wearing a 2 layer compression wrap. He complains of no pain either to the grafted left knee or traumatic right knee wound    Pain Assessment:  Wound/Ulcer Pain Timing/Severity: none  Quality of pain: N/A  Severity:  0 / 10   Modifying Factors: None  Associated Signs/Symptoms: edema    Ulcer Identification:  Ulcer Type: venous, arterial and traumatic  Contributing Factors: edema, venous stasis and arterial insufficiency    Objective:      BP (!) 186/76   Pulse 78   Temp 96.8 °F (36 °C) (Temporal)   Resp 18     Wt Readings from Last 3 Encounters:   22 112 lb (50.8 kg)   22 110 lb (49.9 kg)   22 112 lb (50.8 kg)       PHYSICAL EXAM    Extremities: no cyanosis, clubbing or edema and TheraSkin allograft present on left lower extremity wound needed as wound #1  Small granulating wound with near complete epithelialization right lower extremity knee designated as wound #2. See new measurements below  Assessment:      1. Peripheral artery disease ([address]    2. Wound of left lower extremity, subsequent encounter    3. Wound of left lower extremity, initial encounter          Wound 21 Leg Left; Lower;Distal #1 (Active)   Wound Image   22 1417   Wound Etiology Arterial 21 0924   Wound Cleansed Cleansed with saline 22 1011   Dressing/Treatment Collagen with Ag 22 1042   Wound Length (cm) 3.3 cm 22 1011   Wound Width (cm) 2.1 cm 05/23/22 1011   Wound Depth (cm) 0.1 cm 05/23/22 1011   Wound Surface Area (cm^2) 6.93 cm^2 05/23/22 1011   Change in Wound Size % (l*w) -147.5 05/23/22 1011   Wound Volume (cm^3) 0.693 cm^3 05/23/22 1011   Wound Healing % -148 05/23/22 1011   Post-Procedure Length (cm) 3.3 cm 05/23/22 1039   Post-Procedure Width (cm) 2.1 cm 05/23/22 1039   Post-Procedure Depth (cm) 0.1 cm 05/23/22 1039   Post-Procedure Surface Area (cm^2) 6.93 cm^2 05/23/22 1039   Post-Procedure Volume (cm^3) 0.693 cm^3 05/23/22 1039   Distance Tunneling (cm) 0 cm 05/23/22 1011   Tunneling Position ___ O'Clock 0 05/23/22 1011   Undermining Starts ___ O'Clock 0 05/23/22 1011   Undermining Ends___ O'Clock 0 05/23/22 1011   Undermining Maxium Distance (cm) 0 05/23/22 1011   Wound Assessment Graft 05/23/22 1011   Drainage Amount Small 05/23/22 1011   Drainage Description Serosanguinous 05/23/22 1011   Odor None 05/23/22 1011   Nissa-wound Assessment Fragile;Edematous; Hemosiderin staining (brown yellow) 05/23/22 1011   Margins Defined edges 05/23/22 1011   Wound Thickness Description not for Pressure Injury Full thickness 05/23/22 1011   Number of days: 182       Wound 04/04/22 Leg Anterior; Lower;Right #2 (Active)   Wound Image   05/02/22 1417   Wound Etiology Traumatic 04/04/22 0818   Wound Cleansed Cleansed with saline 05/23/22 1011   Dressing/Treatment Collagen with Ag 05/23/22 1042   Wound Length (cm) 0.6 cm 05/23/22 1011   Wound Width (cm) 0.3 cm 05/23/22 1011   Wound Depth (cm) 0.1 cm 05/23/22 1011   Wound Surface Area (cm^2) 0.18 cm^2 05/23/22 1011   Change in Wound Size % (l*w) 98.04 05/23/22 1011   Wound Volume (cm^3) 0.018 cm^3 05/23/22 1011   Wound Healing % 98 05/23/22 1011   Post-Procedure Length (cm) 0.6 cm 05/23/22 1039   Post-Procedure Width (cm) 0.3 cm 05/23/22 1039   Post-Procedure Depth (cm) 0.1 cm 05/23/22 1039   Post-Procedure Surface Area (cm^2) 0.18 cm^2 05/23/22 1039   Post-Procedure Volume (cm^3) 0.018 cm^3 Vashe to soak a piece of gauze and place on wound bed for 5-10 minutes. DO NOT rinse after Vashe has been applied. Follow dressing application as instructed below. Nissa wound Topical Treatments:  Do not apply lotions, creams, or ointments to the skin around the wound bed unless directed as followed:     [] Apply around the wound: [] moisturizing lotion [] Antifungal ointment [] No-Sting barrier film [] Zinc paste [] Other:       Dressings:           Wound Location: bilateral lower legs       Apply Primary Dressing to wound: Joleen         Cover and Secure with:     Cover and Secure with: 4X4 gauze pad   Avoid contact of tape with skin if possible.  When to change Dressing: [] Daily [] Every Other Day [] Once a week  [] Three times per week: [] Monday, Wednesday, Friday [] Tuesday, Thursday, Saturday  [x] Do Not Change Dressing [] Other:      Multilayer Compression Wrap:    Type: 2 Layer Lite compression wrap   · Applied in Clinic to the :  Bilateral lower leg(s) on 5/23/2022  · Do not get leg(s) with wrap wet. · If wraps become too tight call the center or completely remove the wrap. · Elevate leg(s) above the level of the heart when sitting. · Avoid prolonged standing in one place. Dietary:  Important dietary reminders:  1. Increase Protein intake (i.e. Lean meats, fish, eggs, legumes, and yogurt)  2. No added salt  3. If diabetic, follow a diabetic diet and check glucose prior to meals or as instructed by your physician. Dietary Supplements:  [] Octaviano  [] 30ml ProStat  [] EnsureEnlive [] Ensure Max/Premier  [] Other:    If you are still having pain after you go home:   For wounds on lower legs or arms, elevate the affected limb.  Use over-the-counter medications you would normally use for pain as permitted by your primary care doctor.  For persistent pain not relieved by the above interventions, please call your primary care doctor.      Return Appointment:   Return Appointment: With Dr. Karen Dave  in  [facility])    [] Return Appointment for a Wound Assessment (Nurse Visit) on:       [x] Orders placed during your visit:   Orders Placed This Encounter   Procedures    Initiate Outpatient Wound Care Protocol       o All other future appointments:  Future Appointments   Date Time Provider Gilbert Frey   5/23/2022  1:45 PM DARCIE Henson [facility]   6/28/2022  9:45 AM DO OCTAVIO Valentine [place] - [facility]   7/11/2022  3:30 PM Nikhil Perdomo MD [facility]   -       Your nurse  is:  Dalton Guevara     Electronically signed by Cherise Rodriguez RN on 5/23/2022 at 10:40 R Tari Gomes 8 Information: Should you experience any significant changes in your wound(s) or have questions about your wound care, please contact the [address] [place] [address] at [phone]. We are open from 8:00am - 4:30p Monday, Thursday and Friday; 11:00am - 5pm on Tuesday and CLOSED Wednesday. Please give us 24-48 hours to return your call. Call your doctor now or seek immediate medical care if:    · You have symptoms of infection, such as:  ? Increased pain, swelling, warmth, or redness. ? Red streaks leading from the area. ? Pus draining from the area. ? A fever.         Physician for this visit and orders: Emy Lr MD    [] Patient unable to sign Discharge Instructions given to ECF/Transportation/POA        Electronically signed by Emy Lr MD on 5/23/2022 at 12:00 PM

## 2022-05-24 PROBLEM — M47.812 CERVICAL SPONDYLOSIS: Status: ACTIVE | Noted: 2022-05-24

## 2022-05-24 RX ORDER — TRAMADOL HYDROCHLORIDE 50 MG/1
25-50 TABLET ORAL EVERY 8 HOURS PRN
Qty: 90 TABLET | Refills: 0 | Status: SHIPPED | OUTPATIENT
Start: 2022-05-24 | End: 2022-06-16

## 2022-05-24 NOTE — PROGRESS NOTES
History of present illness:   Ms. Mamadou Calvert is a pleasant 80 y.o. old female who presents to the office regarding her neck pain. She states the pain began several months ago. She denies any traumatic injury or event. She rates her neck pain moderate. She denies any radicular pain associated with her neck pain. She describes the pain as aching, throbbing pain. She denies numbness and tingling in the upper or lower extremities. She denies weakness of her left or right arm. She denies, lower extremity symptoms, gait abnormality and bowel or bladder dysfunction. The pain occasionally does interfere with her sleep. She has tried Tylenol without relief. He has tried a cervical soft collar without relief. Past medical history:   Her past medical history has been reviewed. Past Medical History:   Diagnosis Date    Anxiety     Arthritis     At risk for falls     CAD (coronary artery disease)     CTS (carpal tunnel syndrome)     Bilateral, EMG-NCS    DDD (degenerative disc disease), cervical     Fractures     (L) Hip Fx 4/25/98, L1 fracture from fall 10-31-06    Hyperlipidemia     Hypertension     Mitral regurgitation     Neuropathy     Osteopenia     Osteoporosis     PAD (peripheral artery disease) (HCC)     Right LE ischemia    Peripheral neuropathy 6/1/2015    Peripheral vascular disease ([address]     bilateral lower extremities with edema    Podagra 01/09/2017    Dr. Lamonte Eugene Spinal stenosis     L4-5    Type 2 diabetes mellitus ([address]     Urethral stricture 5 years ago    Urgency of urination         Her past surgical history has been reviewed.     Past Surgical History:   Procedure Laterality Date    ANGIOPLASTY Left 04/18/2022    Left SFA    APPENDECTOMY      CARPAL TUNNEL RELEASE Right 03/29/2019    RIGHT CARPAL TUNNEL RELEASE AND RIGHT MIDDLE FINGER TRIGGER FINGER RELEASE performed by Eben Pritchett MD at [address] [place], [place] [id number]    LEFT EYE EYE CATARACT PHACOEMULSIFICATION INTRAOCULAR LENS    CHOLECYSTECTOMY  01/01/1978    COLONOSCOPY  08/31/1999    diverticulosis    EYE SURGERY Bilateral     bilateral cataract removed    FEMORAL ENDARTERECTOMY Left 02/14/2022    LEFT COMMON FEMORAL ARTERY ENDARTERECTOMY performed by Viktor Tam MD at [address] GRAFT Right 08/31/2015    KYPHOSIS SURGERY  11/07/2006    L1, (fractured after a fall)    LEG SURGERY Left 02/14/2022    LEFT LEG WOUND DEBRIDEMENT performed by Viktor Tam MD at [address]  04/25/1998    (L) THR    WRIST FRACTURE SURGERY  01/01/2008    left wrist        Her  medications and allergies were reviewed. Current Outpatient Medications   Medication Sig Dispense Refill    glimepiride (AMARYL) 1 MG tablet TAKE ONE TABLET BY MOUTH EVERY MORNING BEFORE BREAKFAST 30 tablet 2    hydrOXYzine (VISTARIL) 25 MG capsule Take 1 capsule by mouth daily as needed for Anxiety (Patient not taking: Reported on 5/10/2022) 30 capsule 0    atorvastatin (LIPITOR) 10 MG tablet TAKE ONE TABLET BY MOUTH DAILY 30 tablet 3    Lite Touch Lancets MISC Use twice daily when testing blood sugars. 100 each 5    collagenase 250 UNIT/GM ointment Apply topically daily Apply topically daily.  (Patient not taking: Reported on 5/2/2022)      Wound Cleansers (VASHE CLEANSING) SOLN Soak vashe on gauze pad and place on wound for 5-10 minutes (Patient not taking: Reported on 5/2/2022) 1 each 2    lisinopril (PRINIVIL;ZESTRIL) 20 MG tablet TAKE ONE TABLET BY MOUTH DAILY 90 tablet 3    furosemide (LASIX) 20 MG tablet Take 1 tablet by mouth daily 90 tablet 1    blood glucose test strips (ONETOUCH ULTRA) strip USE TO TEST BLOOD SUGAR TWICE DAILY 100 strip 1    metFORMIN (GLUCOPHAGE) 500 MG tablet Take 1 tablet by mouth daily 90 tablet 3    Turmeric 450 MG CAPS Take 450 mg by mouth daily      Cholecalciferol (VITAMIN D3) 2000 UNITS CAPS Take 1 capsule by mouth daily      aspirin EC 81 MG EC tablet Take 1 tablet by mouth daily 30 tablet 3     No current facility-administered medications for this visit. Allergies   Allergen Reactions    Aricept [Donepezil Hydrochloride] Other (See Comments)     intolerant    Doxycycline Nausea Only    Ketorolac Tromethamine Other (See Comments)     Pt unable to recall reaction        Her social history has been reviewed. Social History     Occupational History    Not on file   Tobacco Use    Smoking status: Never Smoker    Smokeless tobacco: Never Used   Vaping Use    Vaping Use: Never used   Substance and Sexual Activity    Alcohol use: No    Drug use: No    Sexual activity: Not Currently         Her family history has been reviewed. Family History   Problem Relation Age of Onset    Stroke Father     Hypertension Father            Review of Systems:  I have reviewed the clinically relevant past medical history, medications, allergies, family history, social history, and 13 point Review of Systems from the patient's recent history form & documented any details relevant to today's presenting complaints in the history above. The patient's self-reported past medical history, medications, allergies, family history, social history, and Review of Systems form from 3/16/2022 have been scanned into the chart under the \"Media\" tab. Physical examination:   Ms. Yvette Castillo most recent vitals:  Vitals  Resp: 18  Height: 5' 1\" (154.9 cm)  Weight: 112 lb (50.8 kg)  Body mass index is 21.16 kg/m². General Exam:  She is well-developed and well-nourished, is in obvious pain and alert and oriented to person, place, and time. She demonstrates appropriate mood and affect. She walks with a normal gait. HEENT:   Her cervical flexion, extension, and axial rotation are moderately reduced with pain. Her skin is warm and dry.   She has mild to moderate tenderness over her cervical spine and no obvious muscle spasm. The skin over her cervical spine is normal without surgical scar. Upper extremities:  She has 4+ to 5/5 strength of her interosseous muscles, wrist dorsiflexors and volarflexors, biceps, triceps, deltoids, and internal and external rotators of her shoulders, bilaterally. Her biceps, triceps, bracheoradialis, quadriceps and achilles reflexes are 2+, bilaterally. Sensation is intact to light touchfrom C6 to C8. She has no clonus and negative Rahman's bilaterally. Lower extremities:  Normal DTR knees, no clonus. Imaging:   X Rays AP and lateral cervical spine obtained in the office today:  She has multilevel cervical spondylosis. Mild anterior listhesis at multiple levels. No acute fractures. Diagnosis:      ICD-10-CM    1. Neck pain  M54.2 XR CERVICAL SPINE (2-3 VIEWS)   2. Cervical spondylosis  M47.812 External Referral To Home Health          Assessment:  Cervical spondylosis causing neck pain. She is neurologically intact in the upper and lower extremities. I had an extensive discussion with Ms. Mamadou Calvert and/or family regarding the natural history, etiology, and long term consequences of her condition. I have presented reasonable alternatives to the patient's proposed care, treatment, and services. Risks and benefits of the treatment options also reviewed in detail. I have outlined a treatment plan with them. She has had full opportunity to ask her questions. I have answered them all to her satisfaction. I feel that Ms. Mamadou Calvert understands our discussion today. Plan:  Medications-continue Tylenol for pain. She has been prescribed tramadol in the past for pain. PT-we will arrange home physical therapy for gentle range of motion, strengthening and stretching exercises for her cervical spondylosis. Further Imaging-possible MRI in the future if symptoms deteriorate. Procedures-no surgical indication at this time.       Follow up-6-8 weeks. Call or return to clinic if these symptoms worsen or fail to improve as anticipated. Zi Bello PA-C   Senior Physician Assistant   [facility]                                         Disclaimer: This note was generated with use of a verbal recognition program (DRAGON) and an attempt was made to check for errors. It is possible that there are still dictated errors within this office note. If so, please bring any significant errors to my attention for an addendum. All efforts were made to ensure that this office note is accurate.

## 2022-05-31 ENCOUNTER — HOSPITAL ENCOUNTER (OUTPATIENT)
Dept: WOUND CARE | Age: 87
Discharge: HOME | End: 2022-05-31
Payer: MEDICARE

## 2022-05-31 VITALS
TEMPERATURE: 97.3 F | DIASTOLIC BLOOD PRESSURE: 68 MMHG | RESPIRATION RATE: 18 BRPM | SYSTOLIC BLOOD PRESSURE: 157 MMHG | HEART RATE: 80 BPM

## 2022-05-31 DIAGNOSIS — S81.802A WOUND OF LEFT LOWER EXTREMITY, INITIAL ENCOUNTER: ICD-10-CM

## 2022-05-31 DIAGNOSIS — S81.802D WOUND OF LEFT LOWER EXTREMITY, SUBSEQUENT ENCOUNTER: ICD-10-CM

## 2022-05-31 DIAGNOSIS — I73.9 PERIPHERAL ARTERY DISEASE (HCC): Primary | ICD-10-CM

## 2022-05-31 PROCEDURE — 99212 OFFICE O/P EST SF 10 MIN: CPT

## 2022-05-31 PROCEDURE — 6370000000 HC RX 637 (ALT 250 FOR IP): Performed by: SPECIALIST

## 2022-05-31 PROCEDURE — 99212 OFFICE O/P EST SF 10 MIN: CPT | Performed by: SPECIALIST

## 2022-05-31 RX ORDER — LIDOCAINE HYDROCHLORIDE 20 MG/ML
JELLY TOPICAL ONCE
Status: CANCELLED | OUTPATIENT
Start: 2022-05-31 | End: 2022-05-31

## 2022-05-31 RX ORDER — BETAMETHASONE DIPROPIONATE 0.05 %
OINTMENT (GRAM) TOPICAL ONCE
Status: CANCELLED | OUTPATIENT
Start: 2022-05-31 | End: 2022-05-31

## 2022-05-31 RX ORDER — GENTAMICIN SULFATE 1 MG/G
OINTMENT TOPICAL ONCE
Status: CANCELLED | OUTPATIENT
Start: 2022-05-31 | End: 2022-05-31

## 2022-05-31 RX ORDER — LIDOCAINE 40 MG/G
CREAM TOPICAL ONCE
Status: CANCELLED | OUTPATIENT
Start: 2022-05-31 | End: 2022-05-31

## 2022-05-31 RX ORDER — GINSENG 100 MG
CAPSULE ORAL ONCE
Status: CANCELLED | OUTPATIENT
Start: 2022-05-31 | End: 2022-05-31

## 2022-05-31 RX ORDER — LIDOCAINE 50 MG/G
OINTMENT TOPICAL ONCE
Status: CANCELLED | OUTPATIENT
Start: 2022-05-31 | End: 2022-05-31

## 2022-05-31 RX ORDER — BACITRACIN, NEOMYCIN, POLYMYXIN B 400; 3.5; 5 [USP'U]/G; MG/G; [USP'U]/G
OINTMENT TOPICAL ONCE
Status: CANCELLED | OUTPATIENT
Start: 2022-05-31 | End: 2022-05-31

## 2022-05-31 RX ORDER — LIDOCAINE HYDROCHLORIDE 40 MG/ML
SOLUTION TOPICAL ONCE
Status: CANCELLED | OUTPATIENT
Start: 2022-05-31 | End: 2022-05-31

## 2022-05-31 RX ORDER — CLOBETASOL PROPIONATE 0.5 MG/G
OINTMENT TOPICAL ONCE
Status: CANCELLED | OUTPATIENT
Start: 2022-05-31 | End: 2022-05-31

## 2022-05-31 RX ORDER — LIDOCAINE HYDROCHLORIDE 40 MG/ML
SOLUTION TOPICAL ONCE
Status: COMPLETED | OUTPATIENT
Start: 2022-05-31 | End: 2022-05-31

## 2022-05-31 RX ORDER — BACITRACIN ZINC AND POLYMYXIN B SULFATE 500; 1000 [USP'U]/G; [USP'U]/G
OINTMENT TOPICAL ONCE
Status: CANCELLED | OUTPATIENT
Start: 2022-05-31 | End: 2022-05-31

## 2022-05-31 RX ADMIN — LIDOCAINE HYDROCHLORIDE: 40 SOLUTION TOPICAL at 10:00

## 2022-05-31 NOTE — PROGRESS NOTES
[facility]  [place]      Rohan Gandara  AGE: 80 y.o. GENDER: female  : 1929  EPISODE DATE:  2022      Subjective:     Chief Complaint   Patient presents with    Wound Check     bilateral lower legs         HISTORY of PRESENT ILLNESS HPI     Rohan Gandara is a 80 y.o. female who presents today for wound evaluation. History of Wound Context: Patient continues follow-up for chronic wound left anterior knee as well as a traumatic wound of the right knee. She is now 2 weeks post application of TheraSkin allograft.   However she states she cut off both compression wraps this past week because of pain  and she refuses to wear them any longer  Wound Pain Timing/Severity: none  Quality of pain: N/A  Severity:  0 / 10   Modifying Factors: None  Associated Signs/Symptoms: edema    Wound Identification:  Wound Type: venous, arterial and traumatic  Contributing Factors: edema, venous stasis, arterial insufficiency and non-adherence        PAST MEDICAL HISTORY        Diagnosis Date    Anxiety     Arthritis     At risk for falls     CAD (coronary artery disease)     CTS (carpal tunnel syndrome)     Bilateral, EMG-NCS    DDD (degenerative disc disease), cervical     Fractures     (L) Hip Fx 98, L1 fracture from fall 10-31-06    Hyperlipidemia     Hypertension     Mitral regurgitation     Neuropathy     Osteopenia     Osteoporosis     PAD (peripheral artery disease) (HCC)     Right LE ischemia    Peripheral neuropathy 2015    Peripheral vascular disease ([address]     bilateral lower extremities with edema    Podagra 2017    Dr. Carlos Awan Spinal stenosis     L4-5    Type 2 diabetes mellitus ([address]     Urethral stricture 5 years ago    Urgency of urination        PAST SURGICAL HISTORY    Past Surgical History:   Procedure Laterality Date    ANGIOPLASTY Left 2022    Left SFA    APPENDECTOMY      CARPAL TUNNEL RELEASE Right 2019 RIGHT CARPAL TUNNEL RELEASE AND RIGHT MIDDLE FINGER TRIGGER FINGER RELEASE performed by Sina Aguilera MD at [address]  [id number]    LEFT EYE EYE CATARACT PHACOEMULSIFICATION INTRAOCULAR LENS    CHOLECYSTECTOMY  01/01/1978    COLONOSCOPY  08/31/1999    diverticulosis    EYE SURGERY Bilateral     bilateral cataract removed    FEMORAL ENDARTERECTOMY Left 02/14/2022    LEFT COMMON FEMORAL ARTERY ENDARTERECTOMY performed by Linda Garrison MD at [address] GRAFT Right 08/31/2015    KYPHOSIS SURGERY  11/07/2006    L1, (fractured after a fall)    LEG SURGERY Left 02/14/2022    LEFT LEG WOUND DEBRIDEMENT performed by Linda Garrison MD at [address]  04/25/1998    (L) THR    WRIST FRACTURE SURGERY  01/01/2008    left wrist       FAMILY HISTORY    Family History   Problem Relation Age of Onset    Stroke Father     Hypertension Father        SOCIAL HISTORY    Social History     Tobacco Use    Smoking status: Never Smoker    Smokeless tobacco: Never Used   Vaping Use    Vaping Use: Never used   Substance Use Topics    Alcohol use: No    Drug use: No       ALLERGIES    Allergies   Allergen Reactions    Aricept [Donepezil Hydrochloride] Other (See Comments)     intolerant    Doxycycline Nausea Only    Ketorolac Tromethamine Other (See Comments)     Pt unable to recall reaction       MEDICATIONS    Current Outpatient Medications on File Prior to Encounter   Medication Sig Dispense Refill    traMADol (ULTRAM) 50 MG tablet Take 0.5-1 tablets by mouth every 8 hours as needed for Pain for up to 30 days.  90 tablet 0    glimepiride (AMARYL) 1 MG tablet TAKE ONE TABLET BY MOUTH EVERY MORNING BEFORE BREAKFAST 30 tablet 2    hydrOXYzine (VISTARIL) 25 MG capsule Take 1 capsule by mouth daily as needed for Anxiety (Patient not taking: Reported on 5/10/2022) 30 capsule 0    atorvastatin (LIPITOR) 10 MG tablet TAKE ONE TABLET BY MOUTH DAILY 30 tablet 3    Lite Touch Lancets MISC Use twice daily when testing blood sugars. 100 each 5    collagenase 250 UNIT/GM ointment Apply topically daily Apply topically daily. (Patient not taking: Reported on 5/2/2022)      Wound Cleansers (VASHE CLEANSING) SOLN Soak vashe on gauze pad and place on wound for 5-10 minutes (Patient not taking: Reported on 5/2/2022) 1 each 2    lisinopril (PRINIVIL;ZESTRIL) 20 MG tablet TAKE ONE TABLET BY MOUTH DAILY 90 tablet 3    furosemide (LASIX) 20 MG tablet Take 1 tablet by mouth daily 90 tablet 1    blood glucose test strips (ONETOUCH ULTRA) strip USE TO TEST BLOOD SUGAR TWICE DAILY 100 strip 1    metFORMIN (GLUCOPHAGE) 500 MG tablet Take 1 tablet by mouth daily 90 tablet 3    Turmeric 450 MG CAPS Take 450 mg by mouth daily      Cholecalciferol (VITAMIN D3) 2000 UNITS CAPS Take 1 capsule by mouth daily      aspirin EC 81 MG EC tablet Take 1 tablet by mouth daily 30 tablet 3     No current facility-administered medications on file prior to encounter. REVIEW OF SYSTEMS    Pertinent items are noted in HPI. Objective:      BP (!) 157/68   Pulse 80   Temp 97.3 °F (36.3 °C) (Temporal)   Resp 18     PHYSICAL EXAM    Extremities: no cyanosis, no clubbing, minimal + edema-  bilateral lower extremities complete epithelialization of right lower extremity traumatic wound previously designated as wound #2. There is TheraSkin allograft present left lower extremity wound designated as wound #1        Assessment:     1. Peripheral artery disease ([facility] [address]    2. Wound of left lower extremity, subsequent encounter    3. Wound of left lower extremity, initial encounter          Wound Measurements:  Wound 11/22/21 Leg Left; Lower;Distal #1 (Active)   Wound Image   05/02/22 1417   Wound Etiology Arterial 11/22/21 0924   Wound Cleansed Cleansed with saline 05/31/22 0957   Dressing/Treatment Collagen with Ag 05/31/22 1044   Wound Length (cm) 3.3 cm 05/31/22 0957   Wound Width (cm) 2.1 cm 05/31/22 0957   Wound Depth (cm) 0.1 cm 05/31/22 0957   Wound Surface Area (cm^2) 6.93 cm^2 05/31/22 0957   Change in Wound Size % (l*w) -147.5 05/31/22 0957   Wound Volume (cm^3) 0.693 cm^3 05/31/22 0957   Wound Healing % -148 05/31/22 0957   Post-Procedure Length (cm) 3.3 cm 05/31/22 1000   Post-Procedure Width (cm) 2.1 cm 05/31/22 1000   Post-Procedure Depth (cm) 0.1 cm 05/31/22 1000   Post-Procedure Surface Area (cm^2) 6.93 cm^2 05/31/22 1000   Post-Procedure Volume (cm^3) 0.693 cm^3 05/31/22 1000   Distance Tunneling (cm) 0 cm 05/23/22 1011   Tunneling Position ___ O'Clock 0 05/23/22 1011   Undermining Starts ___ O'Clock 0 05/23/22 1011   Undermining Ends___ O'Clock 0 05/23/22 1011   Undermining Maxium Distance (cm) 0 05/23/22 1011   Wound Assessment Graft 05/31/22 0957   Drainage Amount Small 05/31/22 0957   Drainage Description Serosanguinous 05/31/22 0957   Odor None 05/31/22 0957   Nissa-wound Assessment Fragile;Edematous; Hemosiderin staining (brown yellow) 05/31/22 0957   Margins Defined edges 05/31/22 0957   Wound Thickness Description not for Pressure Injury Full thickness 05/31/22 0957   Number of days: 190          Plan:   Patient refuses appropriate compression to left lower extremity grafted site  Treatment Note please see attached Discharge Instructions    New Medication(s) at this visit:   New Prescriptions    No medications on file       Other orders at this visit:   Orders Placed This Encounter   Procedures    Initiate Outpatient Wound Care Protocol       Written patient dismissal instructions given to patient and signed by patient or POA. Discharge [facility] Physician Orders and Discharge Instructions  [address] [address]  Telephone: [phone]  NAME:  Kelvin Beltran  YOB: 1929  MEDICAL RECORD NUMBER:  [id number]  DATE:  5/31/2022    Wash hands with soap and water prior to and after every dressing change. Wound Cleansing:   · Do not scrub or use excessive force. · With each dressing change, rinse wounds with 0.9% Saline. (May use wound wash or soft contact solution. Both can be purchased at a local drug store). · If unable to obtain saline, may use a gentle soap and water. · Keep wounds dry in the shower unless otherwise instructed by the physician. · For wounds on lower legs, cast covers can be purchased at local drug stores, so that you may shower and keep the wound(s) dry. []  Vashe Wash solution instructions (if prescribed): Apply enough Vashe to soak a piece of gauze and place on wound bed for 5-10 minutes. DO NOT rinse after Vashe has been applied. Follow dressing application as instructed below. Nissa wound Topical Treatments:  Do not apply lotions, creams, or ointments to the skin around the wound bed unless directed as followed:     [] Apply around the wound: [] moisturizing lotion [] Antifungal ointment [] No-Sting barrier film [] Zinc paste [] Other:       Dressings:           Wound Location: left lower leg       Apply Primary Dressing to wound: Joleen         Cover and Secure with:     Cover and Secure with: 4X4 gauze pad  Bulky roll gauze   Avoid contact of tape with skin if possible.  When to change Dressing: [] Daily [] Every Other Day [] Once a week  [] Three times per week: [] Monday, Wednesday, Friday [] Tuesday, Thursday, Saturday  [x] Do Not Change Dressing [] Other:    Negative Pressure:           Wound Location:   [] Pressure @ mmHg  continuous using black foam.    []White foam to be used on:     []Change dressing: []Monday, Wednesday Friday  []Tuesday, Thursday, Saturday  [] Other:    Make sure that tubing is not against the skin by using gauze and kerlix. For Pressure Ulcer Relief:  · When sitting, shift position or do seat lifts every 15 minutes.   · Turn every 2 hours when in bed. Avoid position directing pressure on wound site. · Limit side lying to 30 degree tilt. Limit HOB elevation to 30 degrees. Specialty equipment ordered:  []Wheelchair cushion [] Specialty Bed/Mattress   **Ordered by the [address] through [facility]**     Edema Control:  Apply: [x] Compression Stocking- own compression stockings  [x] Left Lower Leg [x] Right Lower Leg        Apply every morning immediately when getting up. They should be applied to affected leg(s) from mid foot to knee making sure to cover the heel. Remove every night before going to bed. [x] Elevate leg(s) above the level of the heart for 30 minutes 4-5 times a day and/or when sitting. [x] Avoid prolonged standing in one place. Dietary:  Important dietary reminders:  1. Increase Protein intake (i.e. Lean meats, fish, eggs, legumes, and yogurt)  2. No added salt  3. If diabetic, follow a diabetic diet and check glucose prior to meals or as instructed by your physician. Dietary Supplements:  [] Octaviano  [] 30ml ProStat  [] EnsureEnlive [] Ensure Max/Premier  [] Other:    If you are still having pain after you go home:   For wounds on lower legs or arms, elevate the affected limb.  Use over-the-counter medications you would normally use for pain as permitted by your primary care doctor.  For persistent pain not relieved by the above interventions, please call your primary care doctor.      Return Appointment:   Return Appointment: With Dr. Sun Acosta  in  [facility])    [] Return Appointment for a Wound Assessment (Nurse Visit) on:       [x] Orders placed during your visit:   Orders Placed This Encounter   Procedures    Initiate Outpatient Wound Care Protocol       o All other future appointments:  Future Appointments   Date Time Provider Gilbert Frey   6/28/2022  9:45 AM DO OCTAVIO Alcocerci - [place]   7/11/2022  3:30 PM MD [place] Aquino [facility] -       Your nurse  is:  Melissa     Electronically signed by Brenton Fajardo RN on 5/31/2022 at 10:35 AM     [address] Information: Should you experience any significant changes in your wound(s) or have questions about your wound care, please contact the [address] [place] [address] at [phone]. We are open from 8:00am - 4:30p Monday, Thursday and Friday; 11:00am - 5pm on Tuesday and CLOSED Wednesday. Please give us 24-48 hours to return your call. Call your doctor now or seek immediate medical care if:    · You have symptoms of infection, such as:  ? Increased pain, swelling, warmth, or redness. ? Red streaks leading from the area. ? Pus draining from the area. ? A fever.         Physician for this visit and orders: Naomy Herrera MD    [] Patient unable to sign Discharge Instructions given to ECF/Transportation/POA            Electronically signed by Naomy Herrera MD on 5/31/2022 at 12:45 PM

## 2022-06-06 ENCOUNTER — HOSPITAL ENCOUNTER (OUTPATIENT)
Dept: WOUND CARE | Age: 87
Discharge: HOME | End: 2022-06-06
Payer: MEDICARE

## 2022-06-06 VITALS
BODY MASS INDEX: 19.54 KG/M2 | SYSTOLIC BLOOD PRESSURE: 156 MMHG | DIASTOLIC BLOOD PRESSURE: 65 MMHG | RESPIRATION RATE: 16 BRPM | WEIGHT: 103.4 LBS | TEMPERATURE: 97.1 F | HEART RATE: 65 BPM

## 2022-06-06 DIAGNOSIS — I73.9 PERIPHERAL ARTERY DISEASE (HCC): Primary | ICD-10-CM

## 2022-06-06 DIAGNOSIS — S81.802D WOUND OF LEFT LOWER EXTREMITY, SUBSEQUENT ENCOUNTER: ICD-10-CM

## 2022-06-06 DIAGNOSIS — S81.802A WOUND OF LEFT LOWER EXTREMITY, INITIAL ENCOUNTER: ICD-10-CM

## 2022-06-06 PROCEDURE — 99212 OFFICE O/P EST SF 10 MIN: CPT

## 2022-06-06 PROCEDURE — 6370000000 HC RX 637 (ALT 250 FOR IP): Performed by: SPECIALIST

## 2022-06-06 PROCEDURE — 99212 OFFICE O/P EST SF 10 MIN: CPT | Performed by: SPECIALIST

## 2022-06-06 RX ORDER — CLOBETASOL PROPIONATE 0.5 MG/G
OINTMENT TOPICAL ONCE
Status: CANCELLED | OUTPATIENT
Start: 2022-06-06 | End: 2022-06-06

## 2022-06-06 RX ORDER — LIDOCAINE 50 MG/G
OINTMENT TOPICAL ONCE
Status: COMPLETED | OUTPATIENT
Start: 2022-06-06 | End: 2022-06-06

## 2022-06-06 RX ORDER — LIDOCAINE HYDROCHLORIDE 40 MG/ML
SOLUTION TOPICAL ONCE
Status: CANCELLED | OUTPATIENT
Start: 2022-06-06 | End: 2022-06-06

## 2022-06-06 RX ORDER — LIDOCAINE 40 MG/G
CREAM TOPICAL ONCE
Status: CANCELLED | OUTPATIENT
Start: 2022-06-06 | End: 2022-06-06

## 2022-06-06 RX ORDER — GINSENG 100 MG
CAPSULE ORAL ONCE
Status: CANCELLED | OUTPATIENT
Start: 2022-06-06 | End: 2022-06-06

## 2022-06-06 RX ORDER — BETAMETHASONE DIPROPIONATE 0.05 %
OINTMENT (GRAM) TOPICAL ONCE
Status: CANCELLED | OUTPATIENT
Start: 2022-06-06 | End: 2022-06-06

## 2022-06-06 RX ORDER — LIDOCAINE HYDROCHLORIDE 40 MG/ML
SOLUTION TOPICAL ONCE
Status: DISCONTINUED | OUTPATIENT
Start: 2022-06-06 | End: 2022-06-07 | Stop reason: HOSPADM

## 2022-06-06 RX ORDER — BACITRACIN ZINC AND POLYMYXIN B SULFATE 500; 1000 [USP'U]/G; [USP'U]/G
OINTMENT TOPICAL ONCE
Status: CANCELLED | OUTPATIENT
Start: 2022-06-06 | End: 2022-06-06

## 2022-06-06 RX ORDER — GENTAMICIN SULFATE 1 MG/G
OINTMENT TOPICAL ONCE
Status: CANCELLED | OUTPATIENT
Start: 2022-06-06 | End: 2022-06-06

## 2022-06-06 RX ORDER — BACITRACIN, NEOMYCIN, POLYMYXIN B 400; 3.5; 5 [USP'U]/G; MG/G; [USP'U]/G
OINTMENT TOPICAL ONCE
Status: CANCELLED | OUTPATIENT
Start: 2022-06-06 | End: 2022-06-06

## 2022-06-06 RX ORDER — LIDOCAINE HYDROCHLORIDE 20 MG/ML
JELLY TOPICAL ONCE
Status: CANCELLED | OUTPATIENT
Start: 2022-06-06 | End: 2022-06-06

## 2022-06-06 RX ORDER — LIDOCAINE 50 MG/G
OINTMENT TOPICAL ONCE
Status: CANCELLED | OUTPATIENT
Start: 2022-06-06 | End: 2022-06-06

## 2022-06-06 RX ADMIN — LIDOCAINE: 50 OINTMENT TOPICAL at 10:03

## 2022-06-06 NOTE — PROGRESS NOTES
[address] [facility] [place] [facility]  Progress Note and Procedure Note      Severiano England  AGE: 80 y.o. GENDER: female  : 1929  EPISODE DATE:  2022      Subjective:     Chief Complaint   Patient presents with    Wound Check     bilateral lower legs         HISTORY of PRESENT ILLNESS HPI     Severiano England is a 80 y.o. female who presents today for wound evaluation. History of Wound Context: Patient continues follow-up for left anterior knee wound. Now 3 weeks post application of TheraSkin allograft. The right lower extremity traumatic wound has has healed. She states she is putting some sort of lowspandagrip on her left lower extremity as she refuses to wear compression.   Wound Pain Timing/Severity: none  Quality of pain: N/A  Severity:  0 / 10   Modifying Factors: None  Associated Signs/Symptoms: edema    Wound Identification:  Wound Type: arterial  Contributing Factors: edema, venous stasis, arterial insufficiency and non-adherence        PAST MEDICAL HISTORY        Diagnosis Date    Anxiety     Arthritis     At risk for falls     CAD (coronary artery disease)     CTS (carpal tunnel syndrome)     Bilateral, EMG-NCS    DDD (degenerative disc disease), cervical     Fractures     (L) Hip Fx 98, L1 fracture from fall 10-31-06    Hyperlipidemia     Hypertension     Mitral regurgitation     Neuropathy     Osteopenia     Osteoporosis     PAD (peripheral artery disease) (HCC)     Right LE ischemia    Peripheral neuropathy 2015    Peripheral vascular disease ([address]     bilateral lower extremities with edema    Podagra 2017    Dr. Mago Mark Spinal stenosis     L4-5    Type 2 diabetes mellitus ([address]     Urethral stricture 5 years ago    Urgency of urination        PAST SURGICAL HISTORY    Past Surgical History:   Procedure Laterality Date    ANGIOPLASTY Left 2022    Left SFA    APPENDECTOMY      CARPAL TUNNEL RELEASE Right 2019    RIGHT CARPAL TUNNEL RELEASE AND RIGHT MIDDLE FINGER TRIGGER FINGER RELEASE performed by Claudetta Harrier, MD at [address]  [id number]    LEFT EYE EYE CATARACT PHACOEMULSIFICATION INTRAOCULAR LENS    CHOLECYSTECTOMY  01/01/1978    COLONOSCOPY  08/31/1999    diverticulosis    EYE SURGERY Bilateral     bilateral cataract removed    FEMORAL ENDARTERECTOMY Left 02/14/2022    LEFT COMMON FEMORAL ARTERY ENDARTERECTOMY performed by Sarah Ramires MD at [address] GRAFT Right 08/31/2015    KYPHOSIS SURGERY  11/07/2006    L1, (fractured after a fall)    LEG SURGERY Left 02/14/2022    LEFT LEG WOUND DEBRIDEMENT [facility]  04/25/1998    (L) THR    WRIST FRACTURE SURGERY  01/01/2008    left wrist       FAMILY HISTORY    Family History   Problem Relation Age of Onset    Stroke Father     Hypertension Father        SOCIAL HISTORY    Social History     Tobacco Use    Smoking status: Never Smoker    Smokeless tobacco: Never Used   Vaping Use    Vaping Use: Never used   Substance Use Topics    Alcohol use: No    Drug use: No       ALLERGIES    Allergies   Allergen Reactions    Aricept [Donepezil Hydrochloride] Other (See Comments)     intolerant    Doxycycline Nausea Only    Ketorolac Tromethamine Other (See Comments)     Pt unable to recall reaction       MEDICATIONS    Current Outpatient Medications on File Prior to Encounter   Medication Sig Dispense Refill    traMADol (ULTRAM) 50 MG tablet Take 0.5-1 tablets by mouth every 8 hours as needed for Pain for up to 30 days.  90 tablet 0    glimepiride (AMARYL) 1 MG tablet TAKE ONE TABLET BY MOUTH EVERY MORNING BEFORE BREAKFAST 30 tablet 2    hydrOXYzine (VISTARIL) 25 MG capsule Take 1 capsule by mouth daily as needed for Anxiety (Patient not taking: Reported on 5/10/2022) 30 capsule 0    atorvastatin (LIPITOR) 10 MG tablet TAKE ONE TABLET BY MOUTH DAILY 30 tablet 3    Lite Touch Lancets MISC Use twice daily when testing blood sugars. 100 each 5    collagenase 250 UNIT/GM ointment Apply topically daily Apply topically daily. (Patient not taking: Reported on 5/2/2022)      Wound Cleansers (VASHE CLEANSING) SOLN Soak vashe on gauze pad and place on wound for 5-10 minutes (Patient not taking: Reported on 5/2/2022) 1 each 2    lisinopril (PRINIVIL;ZESTRIL) 20 MG tablet TAKE ONE TABLET BY MOUTH DAILY 90 tablet 3    furosemide (LASIX) 20 MG tablet Take 1 tablet by mouth daily 90 tablet 1    blood glucose test strips (ONETOUCH ULTRA) strip USE TO TEST BLOOD SUGAR TWICE DAILY 100 strip 1    metFORMIN (GLUCOPHAGE) 500 MG tablet Take 1 tablet by mouth daily 90 tablet 3    Turmeric 450 MG CAPS Take 450 mg by mouth daily      Cholecalciferol (VITAMIN D3) 2000 UNITS CAPS Take 1 capsule by mouth daily      aspirin EC 81 MG EC tablet Take 1 tablet by mouth daily 30 tablet 3     No current facility-administered medications on file prior to encounter. REVIEW OF SYSTEMS    Pertinent items are noted in HPI. Objective:      BP (!) 156/65   Pulse 65   Temp 97.1 °F (36.2 °C) (Temporal)   Resp 16   Wt 103 lb 6.3 oz (46.9 kg)   BMI 19.54 kg/m²     PHYSICAL EXAM    Extremities: no cyanosis, no clubbing, minimal + edema-  left lower extremity TheraSkin allograft intact and present overlying left leg the need as wound #1. Complete epithelialization of traumatic right lower extremity wound      Assessment:     1. Peripheral artery disease ([facility]    2. Wound of left lower extremity, subsequent encounter    3. Wound of left lower extremity, initial encounter          Wound Measurements:  Wound 11/22/21 Leg Left; Lower;Distal #1 (Active)   Wound Image   05/02/22 1417   Wound Etiology Arterial 11/22/21 0924   Wound Cleansed Cleansed with saline 06/06/22 1004   Dressing/Treatment Collagen with Ag 05/31/22 1044   Wound Length (cm) 3 cm 06/06/22 1004 Wound Width (cm) 2.1 cm 06/06/22 1004   Wound Depth (cm) 0.1 cm 06/06/22 1004   Wound Surface Area (cm^2) 6.3 cm^2 06/06/22 1004   Change in Wound Size % (l*w) -125 06/06/22 1004   Wound Volume (cm^3) 0.63 cm^3 06/06/22 1004   Wound Healing % -125 06/06/22 1004   Post-Procedure Length (cm) 3 cm 06/06/22 1029   Post-Procedure Width (cm) 2.1 cm 06/06/22 1029   Post-Procedure Depth (cm) 0.1 cm 06/06/22 1029   Post-Procedure Surface Area (cm^2) 6.3 cm^2 06/06/22 1029   Post-Procedure Volume (cm^3) 0.63 cm^3 06/06/22 1029   Distance Tunneling (cm) 0 cm 06/06/22 1004   Tunneling Position ___ O'Clock 0 05/23/22 1011   Undermining Starts ___ O'Clock 0 05/23/22 1011   Undermining Ends___ O'Clock 0 05/23/22 1011   Undermining Maxium Distance (cm) 0 06/06/22 1004   Wound Assessment Graft 06/06/22 1004   Drainage Amount Small 06/06/22 1004   Drainage Description Serosanguinous 06/06/22 1004   Odor None 06/06/22 1004   Nissa-wound Assessment Fragile;Edematous; Hemosiderin staining (brown yellow) 06/06/22 1004   Margins Defined edges 06/06/22 1004   Wound Thickness Description not for Pressure Injury Full thickness 06/06/22 1004   Number of days: 196          Plan:     Treatment Note please see attached Discharge Instructions    New Prescriptions    No medications on file       Orders Placed This Encounter   Procedures    Initiate Outpatient Wound Care Protocol       Written patient dismissal instructions given to patient and signed by patient or POA. Discharge [address] [facility] Physician Orders and Discharge Instructions  [address] [place] [address]. [place]  Telephone: [phone]  NAME:  Yeni Simpson  YOB: 1929  MEDICAL RECORD NUMBER:  [id number]  DATE:  6/6/2022    Wash hands with soap and water prior to and after every dressing change. Wound Cleansing:   · Do not scrub or use excessive force.   · With each dressing change, rinse wounds with 0.9% Saline. (May use wound wash or soft contact solution. Both can be purchased at a local drug store). · If unable to obtain saline, may use a gentle soap and water. · Keep wounds dry in the shower unless otherwise instructed by the physician. · For wounds on lower legs, cast covers can be purchased at local drug stores, so that you may shower and keep the wound(s) dry. []  Vashe Wash solution instructions (if prescribed): Apply enough Vashe to soak a piece of gauze and place on wound bed for 5-10 minutes. DO NOT rinse after Vashe has been applied. Follow dressing application as instructed below. Nissa wound Topical Treatments:  Do not apply lotions, creams, or ointments to the skin around the wound bed unless directed as followed:     [] Apply around the wound: [] moisturizing lotion [] Antifungal ointment [] No-Sting barrier film [] Zinc paste [] Other:       Dressings:           Wound Location: left lower leg      Apply Primary Dressing to wound: Joleen         Cover and Secure with:     Cover and Secure with: 4X4 gauze pad, conforming roll gauze   Avoid contact of tape with skin if possible.  When to change Dressing: [] Daily [] Every Other Day [] Once a week  [] Three times per week: [] Monday, Wednesday, Friday [] Tuesday, Thursday, Saturday  [x] Do Not Change Dressing [] Other:.      Edema Control:  Apply: [x] Compression Stocking  [x] Left Lower Leg [x] Right Lower Leg        Apply every morning immediately when getting up. They should be applied to affected leg(s) from mid foot to knee making sure to cover the heel. Remove every night before going to bed. [x] Elevate leg(s) above the level of the heart for 30 minutes 4-5 times a day and/or when sitting. [x] Avoid prolonged standing in one place. Dietary:  Important dietary reminders:  1. Increase Protein intake (i.e. Lean meats, fish, eggs, legumes, and yogurt)  2. No added salt  3.  If diabetic, follow a diabetic diet and check glucose prior to meals or as instructed by your physician. Dietary Supplements:  [] Octaviano  [] 30ml ProStat  [] EnsureEnlive [] Ensure Max/Premier  [] Other:    If you are still having pain after you go home:   For wounds on lower legs or arms, elevate the affected limb.  Use over-the-counter medications you would normally use for pain as permitted by your primary care doctor.  For persistent pain not relieved by the above interventions, please call your primary care doctor. Return Appointment:  [facility] Return Appointment: With Dr. Juan Haley  in  [facility])    [] Return Appointment for a Wound Assessment (Nurse Visit) on:       [x] Orders placed during your visit:   Orders Placed This Encounter   Procedures    Initiate Outpatient Wound Care Protocol       o All other future appointments:  Future Appointments   Date Time Provider Gilbert Frey   2022  9:45 AM [place] [place] [address] [facility]   2022  3:30 PM Juliette Napier MD [facility]   -       Your nurse  is:  Nirmala Mckee     Electronically signed by Maximo Hauser RN on 2022 at 10:31 AM     [address] [facility] Information: Should you experience any significant changes in your wound(s) or have questions about your wound care, please contact the [address] [place] [address] at [phone]. We are open from 8:00am - 4:30p Monday, Thursday and Friday; 11:00am - 5pm on Tuesday and CLOSED Wednesday. Please give us 24-48 hours to return your call. Call your doctor now or seek immediate medical care if:    · You have symptoms of infection, such as:  ? Increased pain, swelling, warmth, or redness. ? Red streaks leading from the area. ? Pus draining from the area. ? A fever.         Physician for this visit and orders: Mikki Griffin MD    [] Patient unable to sign Discharge Instructions given to ECF/Transportation/POA            Electronically signed by Mikki Griffin MD on 6/6/2022 at 11:00 AM

## 2022-06-07 ENCOUNTER — TELEPHONE (OUTPATIENT)
Dept: INTERNAL MEDICINE CLINIC | Age: 87
End: 2022-06-07

## 2022-06-07 NOTE — TELEPHONE ENCOUNTER
Patient called to see if she could have the following medication refilled. She understands that this is a very old script and that she might have to bee seen first but She says it works really good for her leg cramps. The leg cramps have started again about 2 nights ago,and she wants to see of she could have this refilled if possible.         Take 0.5-1 tablets by mouth nightly for 7 days - Oral     Methocarbamol 500 MG Oral Tablet          [facility] [id number] [phone] [facility] [phone]    Last appointment: 4/26/2022  Next appointment: 6/28/2022  Last refill:2019

## 2022-06-07 NOTE — TELEPHONE ENCOUNTER
Please let her know that Dr. Gutierrez December is out of town until next week. With a number of medical problems she has and the current issue going on with the wound, covering physician is not comfortable ordering this medication because does not know enough about her. She should make sure she is drinking water regularly to help with the cramps. She might also need to talk to her vascular doctor, because the issue may be her circulation and not related to muscle cramps themselves.

## 2022-06-13 ENCOUNTER — HOSPITAL ENCOUNTER (OUTPATIENT)
Dept: WOUND CARE | Age: 87
Discharge: HOME | End: 2022-06-13
Payer: MEDICARE

## 2022-06-13 VITALS
SYSTOLIC BLOOD PRESSURE: 170 MMHG | RESPIRATION RATE: 16 BRPM | DIASTOLIC BLOOD PRESSURE: 64 MMHG | HEART RATE: 70 BPM | TEMPERATURE: 97.8 F

## 2022-06-13 DIAGNOSIS — S81.802A WOUND OF LEFT LOWER EXTREMITY, INITIAL ENCOUNTER: ICD-10-CM

## 2022-06-13 DIAGNOSIS — S81.802D WOUND OF LEFT LOWER EXTREMITY, SUBSEQUENT ENCOUNTER: ICD-10-CM

## 2022-06-13 DIAGNOSIS — I73.9 PERIPHERAL ARTERY DISEASE (HCC): Primary | ICD-10-CM

## 2022-06-13 PROCEDURE — 6370000000 HC RX 637 (ALT 250 FOR IP): Performed by: SPECIALIST

## 2022-06-13 PROCEDURE — 11042 DBRDMT SUBQ TIS 1ST 20SQCM/<: CPT | Performed by: SPECIALIST

## 2022-06-13 PROCEDURE — 11042 DBRDMT SUBQ TIS 1ST 20SQCM/<: CPT

## 2022-06-13 RX ORDER — LIDOCAINE 40 MG/G
CREAM TOPICAL ONCE
Status: CANCELLED | OUTPATIENT
Start: 2022-06-13 | End: 2022-06-13

## 2022-06-13 RX ORDER — LIDOCAINE HYDROCHLORIDE 20 MG/ML
JELLY TOPICAL ONCE
Status: CANCELLED | OUTPATIENT
Start: 2022-06-13 | End: 2022-06-13

## 2022-06-13 RX ORDER — BACITRACIN, NEOMYCIN, POLYMYXIN B 400; 3.5; 5 [USP'U]/G; MG/G; [USP'U]/G
OINTMENT TOPICAL ONCE
Status: CANCELLED | OUTPATIENT
Start: 2022-06-13 | End: 2022-06-13

## 2022-06-13 RX ORDER — LIDOCAINE 50 MG/G
OINTMENT TOPICAL ONCE
Status: CANCELLED | OUTPATIENT
Start: 2022-06-13 | End: 2022-06-13

## 2022-06-13 RX ORDER — LIDOCAINE 50 MG/G
OINTMENT TOPICAL ONCE
Status: COMPLETED | OUTPATIENT
Start: 2022-06-13 | End: 2022-06-13

## 2022-06-13 RX ORDER — LIDOCAINE HYDROCHLORIDE 40 MG/ML
SOLUTION TOPICAL ONCE
Status: CANCELLED | OUTPATIENT
Start: 2022-06-13 | End: 2022-06-13

## 2022-06-13 RX ORDER — BACITRACIN ZINC AND POLYMYXIN B SULFATE 500; 1000 [USP'U]/G; [USP'U]/G
OINTMENT TOPICAL ONCE
Status: CANCELLED | OUTPATIENT
Start: 2022-06-13 | End: 2022-06-13

## 2022-06-13 RX ORDER — GINSENG 100 MG
CAPSULE ORAL ONCE
Status: CANCELLED | OUTPATIENT
Start: 2022-06-13 | End: 2022-06-13

## 2022-06-13 RX ORDER — CLOBETASOL PROPIONATE 0.5 MG/G
OINTMENT TOPICAL ONCE
Status: CANCELLED | OUTPATIENT
Start: 2022-06-13 | End: 2022-06-13

## 2022-06-13 RX ORDER — GENTAMICIN SULFATE 1 MG/G
OINTMENT TOPICAL ONCE
Status: CANCELLED | OUTPATIENT
Start: 2022-06-13 | End: 2022-06-13

## 2022-06-13 RX ORDER — BETAMETHASONE DIPROPIONATE 0.05 %
OINTMENT (GRAM) TOPICAL ONCE
Status: CANCELLED | OUTPATIENT
Start: 2022-06-13 | End: 2022-06-13

## 2022-06-13 RX ADMIN — LIDOCAINE: 50 OINTMENT TOPICAL at 09:57

## 2022-06-13 ASSESSMENT — PAIN DESCRIPTION - FREQUENCY: FREQUENCY: INTERMITTENT

## 2022-06-13 ASSESSMENT — PAIN DESCRIPTION - PAIN TYPE: TYPE: ACUTE PAIN

## 2022-06-13 ASSESSMENT — PAIN DESCRIPTION - DESCRIPTORS: DESCRIPTORS: ACHING

## 2022-06-13 ASSESSMENT — PAIN SCALES - GENERAL: PAINLEVEL_OUTOF10: 7

## 2022-06-13 ASSESSMENT — PAIN DESCRIPTION - LOCATION: LOCATION: LEG

## 2022-06-13 ASSESSMENT — PAIN DESCRIPTION - ORIENTATION: ORIENTATION: LEFT

## 2022-06-13 NOTE — PROGRESS NOTES
[facility]  Progress Note and Procedure Note      Severiano England  MEDICAL RECORD NUMBER:  [id number]  AGE: 80 y.o. GENDER: female  : 1929  EPISODE DATE:  2022    Subjective:     Chief Complaint   Patient presents with    Wound Check     bilateral lower leg         HISTORY of PRESENT ILLNESS HPI     Severiano England is a 80 y.o. female who presents today for wound/ulcer evaluation. History of Wound Context:   Wound/Ulcer Pain TimingPatient continues follow-up for left anterior knee wound. Now 4 weeks post application of TheraSkin allograft. The right lower extremity traumatic wound has has healed. She states she is putting some sort of lowspandagrip on her left lower extremity as she refuses to wear Coban compression to left lower extremity after graft placement.   Complains of significant rest pain in her calf and foot this past week with increased swelling  Quality of pain: sharp, aching  Severity:  4 / 10   Modifying Factors: Pain is relieved/improved with Elevation of her left leg  Associated Signs/Symptoms: edema and pain    Ulcer Identification:  Ulcer Type: arterial    Contributing Factors: edema, venous stasis, arterial insufficiency and non-adherence    Acute Wound: N/A not an acute wound    PAST MEDICAL HISTORY        Diagnosis Date    Anxiety     Arthritis     At risk for falls     CAD (coronary artery disease)     CTS (carpal tunnel syndrome)     Bilateral, EMG-NCS    DDD (degenerative disc disease), cervical     Fractures     (L) Hip Fx 98, L1 fracture from fall 10-31-06    Hyperlipidemia     Hypertension     Mitral regurgitation     Neuropathy     Osteopenia     Osteoporosis     PAD (peripheral artery disease) (HCC)     Right LE ischemia    Peripheral neuropathy 2015    Peripheral vascular disease ([address]     bilateral lower extremities with edema    Podagra 2017    Dr. My Owen    Spinal stenosis     L4-5    Type 2 diabetes mellitus [facility])     Urethral stricture 5 years ago    Urgency of urination        PAST SURGICAL HISTORY    Past Surgical History:   Procedure Laterality Date    ANGIOPLASTY Left 04/18/2022    Left SFA    APPENDECTOMY      CARPAL TUNNEL RELEASE Right 03/29/2019    RIGHT CARPAL TUNNEL RELEASE AND RIGHT MIDDLE FINGER TRIGGER FINGER RELEASE performed by Linda Regalado MD at [address]  [id number]    LEFT EYE EYE CATARACT PHACOEMULSIFICATION INTRAOCULAR LENS    CHOLECYSTECTOMY  01/01/1978    COLONOSCOPY  08/31/1999    diverticulosis    EYE SURGERY Bilateral     bilateral cataract removed    FEMORAL ENDARTERECTOMY Left 02/14/2022    LEFT COMMON FEMORAL ARTERY ENDARTERECTOMY performed by Issa Rodrigues MD at [address] [place] (CERVIX STATUS UNKNOWN)  [address] [place] [address]    IR FEMORAL POPLITEAL BYPASS GRAFT Right 08/31/2015    KYPHOSIS SURGERY  11/07/2006    L1, (fractured after a fall)    LEG SURGERY Left 02/14/2022    LEFT LEG WOUND DEBRIDEMENT performed by Issa Rodrigues MD at [address]  04/25/1998    (L) THR    WRIST FRACTURE SURGERY  01/01/2008    left wrist       FAMILY HISTORY    Family History   Problem Relation Age of Onset    Stroke Father     Hypertension Father        SOCIAL HISTORY    Social History     Tobacco Use    Smoking status: Never Smoker    Smokeless tobacco: Never Used   Vaping Use    Vaping Use: Never used   Substance Use Topics    Alcohol use: No    Drug use: No       ALLERGIES    Allergies   Allergen Reactions    Aricept [Donepezil Hydrochloride] Other (See Comments)     intolerant    Doxycycline Nausea Only    Ketorolac Tromethamine Other (See Comments)     Pt unable to recall reaction       MEDICATIONS    Current Outpatient Medications on File Prior to Encounter   Medication Sig Dispense Refill    traMADol (ULTRAM) 50 MG tablet Take 0.5-1 tablets by mouth every 8 hours as needed for Pain for up to 30 days. 90 tablet 0    glimepiride (AMARYL) 1 MG tablet TAKE ONE TABLET BY MOUTH EVERY MORNING BEFORE BREAKFAST 30 tablet 2    hydrOXYzine (VISTARIL) 25 MG capsule Take 1 capsule by mouth daily as needed for Anxiety (Patient not taking: Reported on 5/10/2022) 30 capsule 0    atorvastatin (LIPITOR) 10 MG tablet TAKE ONE TABLET BY MOUTH DAILY 30 tablet 3    Lite Touch Lancets MISC Use twice daily when testing blood sugars. 100 each 5    collagenase 250 UNIT/GM ointment Apply topically daily Apply topically daily. (Patient not taking: Reported on 5/2/2022)      Wound Cleansers (VASHE CLEANSING) SOLN Soak vashe on gauze pad and place on wound for 5-10 minutes (Patient not taking: Reported on 5/2/2022) 1 each 2    lisinopril (PRINIVIL;ZESTRIL) 20 MG tablet TAKE ONE TABLET BY MOUTH DAILY 90 tablet 3    furosemide (LASIX) 20 MG tablet Take 1 tablet by mouth daily (Patient not taking: Reported on 6/13/2022) 90 tablet 1    blood glucose test strips (ONETOUCH ULTRA) strip USE TO TEST BLOOD SUGAR TWICE DAILY 100 strip 1    metFORMIN (GLUCOPHAGE) 500 MG tablet Take 1 tablet by mouth daily 90 tablet 3    Turmeric 450 MG CAPS Take 450 mg by mouth daily      Cholecalciferol (VITAMIN D3) 2000 UNITS CAPS Take 1 capsule by mouth daily      aspirin EC 81 MG EC tablet Take 1 tablet by mouth daily 30 tablet 3     No current facility-administered medications on file prior to encounter. REVIEW OF SYSTEMS  Review of Systems    Pertinent items are noted in HPI. Objective:      BP (!) 170/64   Pulse 70   Temp 97.8 °F (36.6 °C) (Temporal)   Resp 16     Wt Readings from Last 3 Encounters:   06/06/22 103 lb 6.3 oz (46.9 kg)   05/23/22 112 lb (50.8 kg)   04/26/22 112 lb (50.8 kg)       PHYSICAL EXAM    Extremities: no cyanosis, no clubbing and 1 + edema-  left lower extremity, minimal residual TheraSkin allograft with fibrin over left leg wound    Assessment:      1. Peripheral artery disease ([address]    2.  Wound of left lower extremity, subsequent encounter    3. Wound of left lower extremity, initial encounter         Procedure Note  Indications:  Based on my examination of this patient's wound(s) today, sharp excision is required to promote healing and evaluate the extent healing. Performed by: Sissy Rodriguez MD    Consent obtained? Yes    Time out taken: Yes    Pain Control: Anesthetic: 5% Lidocaine Ointment Topical     Debridement:Excisional Debridement    Using curette, scissors and forceps the wound was sharply debrided    down through and including the removal of  epidermis, dermis and subcutaneous tissue. Devitalized Tissue Debrided:  fibrin, biofilm and Residual allograft      Pre Debridement Measurements:  Are located in the Wound Documentation Flow Sheet   Wound #: 1     Post  Debridement Measurements:  Wound 11/22/21 Leg Left; Lower;Distal #1 (Active)   Wound Image   05/02/22 1417   Wound Etiology Arterial 11/22/21 0924   Wound Cleansed Cleansed with saline 06/13/22 0958   Dressing/Treatment Collagen with Ag 05/31/22 1044   Wound Length (cm) 2.8 cm 06/13/22 0958   Wound Width (cm) 1.9 cm 06/13/22 0958   Wound Depth (cm) 0.1 cm 06/13/22 0958   Wound Surface Area (cm^2) 5.32 cm^2 06/13/22 0958   Change in Wound Size % (l*w) -90 06/13/22 0958   Wound Volume (cm^3) 0.532 cm^3 06/13/22 0958   Wound Healing % -90 06/13/22 0958   Post-Procedure Length (cm) 2.9 cm 06/13/22 1014   Post-Procedure Width (cm) 2 cm 06/13/22 1014   Post-Procedure Depth (cm) 0.3 cm 06/13/22 1014   Post-Procedure Surface Area (cm^2) 5.8 cm^2 06/13/22 1014   Post-Procedure Volume (cm^3) 1.74 cm^3 06/13/22 1014   Distance Tunneling (cm) 0 cm 06/06/22 1004   Tunneling Position ___ O'Clock 0 05/23/22 1011   Undermining Starts ___ O'Clock 0 05/23/22 1011   Undermining Ends___ O'Clock 0 05/23/22 1011   Undermining Maxium Distance (cm) 0 06/06/22 1004   Wound Assessment Graft 06/13/22 0958   Drainage Amount Small 06/13/22 0958   Drainage Description Serosanguinous 06/13/22 0958   Odor None 06/13/22 0958   Nissa-wound Assessment Fragile;Edematous; Hemosiderin staining (brown yellow) 06/13/22 0958   Margins Defined edges 06/13/22 0958   Wound Thickness Description not for Pressure Injury Full thickness 06/13/22 0958   Number of days: 203          Percent of Wound Debrided: 100%    Total Surface Area Debrided:  5.8 sq cm    Diabetic/Pressure/Non Pressure Ulcers only:  Ulcer: Non-Pressure ulcer, fat layer exposed    Bleeding: Minimal    Hemostasis Achieved: by pressure    Procedural Pain: 3  / 10     Post Procedural Pain: 0 / 10     Response to treatment:  Poorly tolerated by patient., With complaints of pain. Wound measurements slightly improved. However patient refuses to wear a Coban compression to assist with wound healing. Also because of her symptoms which could be attributable to her arterial disease suggested that she should make an appointment to see Dr. Antonia Atwood for follow-up        Plan:     Treatment Note: Please see attached Discharge Instructions. These instructions were given and signed by the patient or POA    New Prescriptions    No medications on file       Orders Placed This Encounter   Procedures   [address] [address] [facility] Protocol       Discharge Instructions          [facility] Physician Orders and Discharge Instructions  [address] [place] [address] [address] [place] [address]. [place] [address]  Telephone: [phone]  NAME:  Tenisha Coats  YOB: 1929  MEDICAL RECORD NUMBER:  [id number]  DATE:  6/13/2022    Return Appointment:  Creston Sicard Return Appointment: With Dr. Suze Paris  in  2 [facility])  o Schedule weekly for the rest of the month?  No  o [] Return Appointment for a Wound Assessment with the nurse on:   Future Appointments   Date Time Provider Gilbert Frey   6/28/2022  9:45 AM DO OCTAVIO [facility] [place] [facility]   7/11/2022  3:30 PM MD Taylor George [facility]   o   Orders Placed This Encounter   Procedures   Jared 5: none  Medically necessary services: [] Skilled Nursing [] PT [] OT [] Social Work [] Dietician [] Other:    Wound care instructions:  1. If you smoke we ask that you refrain from smoking. Smoking inhibits wounds from healing. 2. When taking antibiotics take the entire prescription as ordered. Do not stop taking until medication is all gone unless otherwise instructed. 3. Exercise as tolerated. 4. Keep weight off wounds and reposition every 2 hours if applicable. 5. Do not get wounds wet in the bath or shower unless otherwise instructed by your physician. If your wound is on your foot or leg, you may purchase a cast bag. Please ask at the pharmacy. 6. Wash hands with soap and water prior to and after every dressing change. [x]Wash wounds with: 0.9% normal saline  [x]Nissa wound Topical Treatments: Do not apply lotions, creams, or ointments to the skin around the wound bed unless directed as followed:   o Apply around the wound: Nothing         [x]Wound Location: left lower leg    Apply Primary Dressing to wound: Joleen   Secondary Dressing: 4X4 gauze pad and Conforming roll gauze  o  Avoid contact of tape with skin if possible.  When to change Dressing: 3 times per week:Monday/Wednesday/Friday    [facility] [phone]     [x] Edema Control:  Apply: Compression Stocking on Bilateral- patient wears own      · Elevate leg(s) above the level of the heart for 30 minutes 4-5 times a day and/or when sitting. · Avoid prolonged standing in one place. Dietary:  Important dietary reminders:  1. Increase Protein intake (i.e. Lean meats, fish, eggs, legumes, and yogurt)  2. No added salt  3. If diabetic, follow a diabetic diet and check glucose prior to meals or as instructed by your physician.     Dietary Supplements(Take twice a day unless instructed otherwise):  [] Margarita Marshall  [] 30ml ProStat [] Joycelyn Celestin [] Ensure Max/Premier [] Other:    Your nurse  is:  Mohit Clements     Electronically signed by Brenton Fajardo RN on 6/13/2022 at 10:16 AM     [facility] Information: Should you experience any significant changes in your wound(s) or have questions about your wound care, please contact the [address] [place] [address] at [phone]. We are open from 8:00am - 4:30p Monday, Thursday and Friday; 11:00am - 5pm on Tuesday and CLOSED Wednesday. Please give us 24-48 business hours to return your call. Call your doctor now or seek immediate medical care if:    · You have symptoms of infection, such as:  ? Increased pain, swelling, warmth, or redness. ? Red streaks leading from the area. ? Pus draining from the area. ? A fever.         Physician for this visit and orders: Naomy Herrera MD    [] Patient unable to sign Discharge Instructions given to ECF/Transportation/POA        Electronically signed by Naomy Herrera MD on 6/13/2022 at 10:41 AM

## 2022-06-13 NOTE — PLAN OF CARE
[address] [facility]:     [address] f: [phone] f: [phone] p: [phone] [email or web address]      Ordering Center: The [facility]  [address] [facility] Marcelino Jones    Patient Information:      Katlyn Juarez  [address]   [phone]   : 1929  AGE: 80 y.o. GENDER: female   TODAYS DATE:  2022    Insurance:      PRIMARY INSURANCE:  Plan: MEDICARE PART A AND B  Coverage: MEDICARE  Effective Date: 9/3/2018  [id number] - (Medicare)    SECONDARY INSURANCE:  Plan:   Coverage:   Effective Date:   [unfilled]    [unfilled]   [unfilled]     Patient Wound Information:      1. Peripheral artery disease ([address]    2. Wound of left lower extremity, subsequent encounter    3. Wound of left lower extremity, initial encounter        WOUNDS REQUIRING DRESSING SUPPLIES:     Wound 21 Leg Left; Lower;Distal #1 (Active)   Wound Image   22 1417   Wound Etiology Arterial 21 0924   Wound Cleansed Cleansed with saline 22 0958   Dressing/Treatment Collagen with Ag 22 1044   Wound Length (cm) 2.8 cm 22 0958   Wound Width (cm) 1.9 cm 22 0958   Wound Depth (cm) 0.1 cm 22 0958   Wound Surface Area (cm^2) 5.32 cm^2 22 0958   Change in Wound Size % (l*w) -22 0958   Wound Volume (cm^3) 0.532 cm^3 22 0958   Wound Healing % -22 0958   Post-Procedure Length (cm) 2.9 cm 22 1014   Post-Procedure Width (cm) 2 cm 22 1014   Post-Procedure Depth (cm) 0.3 cm 22 1014   Post-Procedure Surface Area (cm^2) 5.8 cm^2 22 1014   Post-Procedure Volume (cm^3) 1.74 cm^3 22 1014   Distance Tunneling (cm) 0 cm 22 1004   Tunneling Position ___ O'Clock 0 22 1011   Undermining Starts ___ O'Clock 0 22 1011   Undermining Ends___ O'Clock 0 05/23/22 1011   Undermining Maxium Distance (cm) 0 06/06/22 1004   Wound Assessment Graft 06/13/22 0958   Drainage Amount Small 06/13/22 0958   Drainage Description Serosanguinous 06/13/22 0958   Odor None 06/13/22 0958   Nissa-wound Assessment Fragile;Edematous; Hemosiderin staining (brown yellow) 06/13/22 0958   Margins Defined edges 06/13/22 0958   Wound Thickness Description not for Pressure Injury Full thickness 06/13/22 0958   Number of days: 202          Supplies Requested :      WOUND #: 1   PRIMARY DRESSING:  Other: vijaya   Cover and Secure with: None     FREQUENCY OF DRESSING CHANGES:  Three times per week         ADDITIONAL ITEMS:  [] Gloves Small  [] Gloves Medium [] Gloves Large [] Gloves XLarge  [] Tape 1\" [] Tape 2\" [] Tape 3\"  [] Medipore Tape  [] Saline  [] Skin Prep   [] Adhesive Remover   [] Cotton Tip Applicators   [] Other:    Patient Wound(s) Debrided: [x] Yes   [] No    Debridement Date: 6/13/2022    Debribement Type: Excisional/Sharp    Patient currently being seen by Home Health: [] Yes   [x] No    Duration for needed supplies:  [x]15  []30  []60  []90 Days    Provider Information:      PROVIDER'S NAME/NPI: Adalid Villalta MD,  NPI: [id number]    I give permission to coordinate the care for this patient   assisting with verbal orders: Kraig Coronado RN 6/13/2022

## 2022-06-14 ENCOUNTER — TELEPHONE (OUTPATIENT)
Dept: INTERNAL MEDICINE CLINIC | Age: 87
End: 2022-06-14

## 2022-06-14 NOTE — TELEPHONE ENCOUNTER
Spoke with patient. This is not out of the normal for her. She was doing well for awhile but then recently her legs started swelling again. She states her left leg can not be propped up due to the blood flow. She states \"it will not allow me to prop it up\" \"the blood flow is not right in my leg\" She states she cannot wear the compression her leg will not allow it. It hurts to bad. She sees Dr Shlomo Sheehan 6/22/22 but she does not think he is the best provider for her. She was advised by Wound to go to ER but she refused. She states \"I am not going to the ER to sit in the bed all day and not be able to use the restroom. \" I recommended her the same and she again refused. She would like a different referral to someone else besides Dr Shlomo Sheehan. She is aware Dr John Bridges is out of the office but states \" there is nothing he can do for me either\".

## 2022-06-14 NOTE — TELEPHONE ENCOUNTER
Patient called stating that she has been having some swelling and pain in both legs. She says her legs are somewhat discolored,also her left foot is feeling very numb and tingly for at least 2 days. She wants to know what the doctor suggest her do about this she can barley get around. She is wanting to know if it is possible for her to get a referral to the [address] [place] [address] clinic so they could help find out why her legs wont get better.

## 2022-06-15 NOTE — TELEPHONE ENCOUNTER
Spoke with patient, advised her per Dr. Bibi La She can get another opinion from Dr. Jazz Johnson. She is a vascular specialist.  Or, she can get another opinion from vascular group at [facility]-- Dr. Rip Gray. Other options would be to go to vascular department in [facility], but unfortunately, I think her reason for the swelling is venous insufficiency and she needs those [facility] boosts. There is not much more we can do. We could try a daily dose of Lasix, but she may not tolerate. Dr. Jazz Johnson is best choice if she wants to change her care. She would like you to place a referral to the [facility] clinic. Can you do this?  Thank you

## 2022-06-15 NOTE — TELEPHONE ENCOUNTER
She can get another opinion from Dr. Kita Jackman. She is a vascular specialist.  Or, she can get another opinion from vascular group at [facility]-- Dr. Leslie Leon. Other options would be to go to vascular department in [place], but unfortunately, I think her reason for the swelling is venous insufficiency and she needs those [facility] boosts. There is not much more we can do. We could try a daily dose of Lasix, but she may not tolerate. Dr. Kita Jackman is best choice if she wants to change her care.

## 2022-06-16 ENCOUNTER — APPOINTMENT (OUTPATIENT)
Dept: CT IMAGING | Age: 87
End: 2022-06-16
Payer: MEDICARE

## 2022-06-16 ENCOUNTER — HOSPITAL ENCOUNTER (EMERGENCY)
Age: 87
Discharge: HOME | End: 2022-06-16
Attending: EMERGENCY MEDICINE
Payer: MEDICARE

## 2022-06-16 ENCOUNTER — APPOINTMENT (OUTPATIENT)
Dept: VASCULAR LAB | Age: 87
End: 2022-06-16
Payer: MEDICARE

## 2022-06-16 VITALS
HEART RATE: 74 BPM | HEIGHT: 60 IN | SYSTOLIC BLOOD PRESSURE: 170 MMHG | RESPIRATION RATE: 18 BRPM | OXYGEN SATURATION: 97 % | WEIGHT: 106 LBS | TEMPERATURE: 98.1 F | DIASTOLIC BLOOD PRESSURE: 65 MMHG | BODY MASS INDEX: 20.81 KG/M2

## 2022-06-16 DIAGNOSIS — M79.605 ACUTE PAIN OF LEFT LOWER EXTREMITY: Primary | ICD-10-CM

## 2022-06-16 DIAGNOSIS — I73.9 ASYMPTOMATIC PVD (PERIPHERAL VASCULAR DISEASE) (HCC): ICD-10-CM

## 2022-06-16 DIAGNOSIS — I73.9 PERIPHERAL ARTERY DISEASE (HCC): ICD-10-CM

## 2022-06-16 LAB
ANION GAP SERPL CALCULATED.3IONS-SCNC: 9 MMOL/L (ref 3–16)
BASE EXCESS VENOUS: 1.8 MMOL/L (ref -2–3)
BASOPHILS ABSOLUTE: 0 K/UL (ref 0–0.2)
BASOPHILS RELATIVE PERCENT: 0.3 %
BUN BLDV-MCNC: 24 MG/DL (ref 7–20)
CALCIUM SERPL-MCNC: 9.1 MG/DL (ref 8.3–10.6)
CARBOXYHEMOGLOBIN: 1.1 % (ref 0–1.5)
CHLORIDE BLD-SCNC: 100 MMOL/L (ref 99–110)
CO2: 26 MMOL/L (ref 21–32)
CREAT SERPL-MCNC: 1.3 MG/DL (ref 0.6–1.2)
EOSINOPHILS ABSOLUTE: 0.1 K/UL (ref 0–0.6)
EOSINOPHILS RELATIVE PERCENT: 0.8 %
GFR AFRICAN AMERICAN: 46
GFR NON-AFRICAN AMERICAN: 38
GLUCOSE BLD-MCNC: 191 MG/DL (ref 70–99)
HCO3 VENOUS: 27.2 MMOL/L (ref 24–28)
HCT VFR BLD CALC: 31.3 % (ref 36–48)
HEMOGLOBIN, VEN, REDUCED: 20.9 %
HEMOGLOBIN: 10.4 G/DL (ref 12–16)
LACTIC ACID: 1.4 MMOL/L (ref 0.4–2)
LYMPHOCYTES ABSOLUTE: 1.1 K/UL (ref 1–5.1)
LYMPHOCYTES RELATIVE PERCENT: 17.1 %
MCH RBC QN AUTO: 30.2 PG (ref 26–34)
MCHC RBC AUTO-ENTMCNC: 33.1 G/DL (ref 31–36)
MCV RBC AUTO: 91.1 FL (ref 80–100)
METHEMOGLOBIN VENOUS: 0.4 % (ref 0–1.5)
MONOCYTES ABSOLUTE: 0.5 K/UL (ref 0–1.3)
MONOCYTES RELATIVE PERCENT: 7.8 %
NEUTROPHILS ABSOLUTE: 4.8 K/UL (ref 1.7–7.7)
NEUTROPHILS RELATIVE PERCENT: 74 %
O2 SAT, VEN: 79 %
PCO2, VEN: 44.9 MMHG (ref 41–51)
PDW BLD-RTO: 13.8 % (ref 12.4–15.4)
PH VENOUS: 7.39 (ref 7.35–7.45)
PLATELET # BLD: 161 K/UL (ref 135–450)
PMV BLD AUTO: 7.7 FL (ref 5–10.5)
PO2, VEN: 44.8 MMHG (ref 25–40)
POTASSIUM REFLEX MAGNESIUM: 4.9 MMOL/L (ref 3.5–5.1)
RBC # BLD: 3.43 M/UL (ref 4–5.2)
SODIUM BLD-SCNC: 135 MMOL/L (ref 136–145)
TCO2 CALC VENOUS: 29 MMOL/L
WBC # BLD: 6.5 K/UL (ref 4–11)

## 2022-06-16 PROCEDURE — 83605 ASSAY OF LACTIC ACID: CPT

## 2022-06-16 PROCEDURE — 93926 LOWER EXTREMITY STUDY: CPT

## 2022-06-16 PROCEDURE — 6370000000 HC RX 637 (ALT 250 FOR IP)

## 2022-06-16 PROCEDURE — 99231 SBSQ HOSP IP/OBS SF/LOW 25: CPT | Performed by: SURGERY

## 2022-06-16 PROCEDURE — 82803 BLOOD GASES ANY COMBINATION: CPT

## 2022-06-16 PROCEDURE — 85025 COMPLETE CBC W/AUTO DIFF WBC: CPT

## 2022-06-16 PROCEDURE — 6360000004 HC RX CONTRAST MEDICATION: Performed by: SURGERY

## 2022-06-16 PROCEDURE — 75635 CT ANGIO ABDOMINAL ARTERIES: CPT

## 2022-06-16 PROCEDURE — 99285 EMERGENCY DEPT VISIT HI MDM: CPT

## 2022-06-16 PROCEDURE — 80048 BASIC METABOLIC PNL TOTAL CA: CPT

## 2022-06-16 RX ORDER — OXYCODONE HYDROCHLORIDE 5 MG/1
5 TABLET ORAL EVERY 4 HOURS PRN
Status: DISCONTINUED | OUTPATIENT
Start: 2022-06-16 | End: 2022-06-16 | Stop reason: HOSPADM

## 2022-06-16 RX ORDER — ACETAMINOPHEN 325 MG/1
650 TABLET ORAL EVERY 4 HOURS PRN
Status: DISCONTINUED | OUTPATIENT
Start: 2022-06-16 | End: 2022-06-16 | Stop reason: HOSPADM

## 2022-06-16 RX ORDER — OXYCODONE HYDROCHLORIDE 5 MG/1
TABLET ORAL
Status: COMPLETED
Start: 2022-06-16 | End: 2022-06-16

## 2022-06-16 RX ORDER — TRAMADOL HYDROCHLORIDE 50 MG/1
25 TABLET ORAL 2 TIMES DAILY
Qty: 6 TABLET | Refills: 0 | Status: SHIPPED | OUTPATIENT
Start: 2022-06-16 | End: 2022-06-22

## 2022-06-16 RX ORDER — ACETAMINOPHEN 325 MG/1
TABLET ORAL
Status: COMPLETED
Start: 2022-06-16 | End: 2022-06-16

## 2022-06-16 RX ADMIN — ACETAMINOPHEN 650 MG: 325 TABLET ORAL at 13:49

## 2022-06-16 RX ADMIN — IOPAMIDOL 80 ML: 755 INJECTION, SOLUTION INTRAVENOUS at 14:15

## 2022-06-16 ASSESSMENT — ENCOUNTER SYMPTOMS
EYES NEGATIVE: 1
COUGH: 0
GASTROINTESTINAL NEGATIVE: 1
COLOR CHANGE: 0
ABDOMINAL PAIN: 0
SHORTNESS OF BREATH: 0
RHINORRHEA: 0
RESPIRATORY NEGATIVE: 1
SORE THROAT: 0
VOMITING: 0

## 2022-06-16 ASSESSMENT — PAIN SCALES - GENERAL
PAINLEVEL_OUTOF10: 9

## 2022-06-16 ASSESSMENT — PAIN - FUNCTIONAL ASSESSMENT: PAIN_FUNCTIONAL_ASSESSMENT: 0-10

## 2022-06-16 ASSESSMENT — PAIN DESCRIPTION - ORIENTATION: ORIENTATION: LEFT

## 2022-06-16 ASSESSMENT — PAIN DESCRIPTION - LOCATION
LOCATION: LEG
LOCATION: FOOT

## 2022-06-16 ASSESSMENT — PAIN DESCRIPTION - DESCRIPTORS: DESCRIPTORS: ACHING

## 2022-06-16 NOTE — ED TRIAGE NOTES
Existing PVD but left foot more painful for past week. Foot is cold to touch. No pulses palpated and no doppler pulse.

## 2022-06-16 NOTE — CONSULTS
Vascular Surgery   Resident Consult Note    Reason for Consult: LLE PAD    History of Present Illness:   Luis Manuel Maharaj is a 80 y.o. female with Hx of TIA, CAD, HTN, HLD, DM2, and known PAD who presents to the ED with decreased temperature, sensation, and motor function of left foot along with increased rest pain. The patient reports several episodes of paralysis of her left foot, along with cold sensation and rest pain worsened with elevation of the leg, improved with dependence over the bed. She states she has not been able to sleep for the past week due to intermittent cramping pain. The paralysis started last Thurs, as the patient states she could not move bilateral arms or legs. \"I think it was a TIA\", which she has had in the past. Since then, she reports \"parital paralysis\" of the left foot every other day, which improves when walking. She states \"I have to drag it around, which brings it back to life\". She is complaining of cramping from the lower thigh down to the calf, and notes her foot is cold. She denies changes to the temperature. She endorses intermittent numbness of the leg and foot, which has not changed much over the past couple months. Besides these symptoms, she does report a week about 2-3 weeks where she was symptom-free with good mobility. At baseline, she walks at home using the back of her wheelchair as a walker. The patient notes she was offered possible fem-pop bypass on the left by Dr. Erna Serrano, which she would like if possible.     Vascular Surgery History:  Dr. Erna Serrano:  -9/1/15: Right mid SFA to below-knee popliteal bypass with autogeneic greater saphenous vein  -7/21/16: Right balloon SFA angioplasty x2  -12/7/16: Right SFA angioplasty, L YOLANDA angioplast with stent placement  -2/8/22: Angio  -2/14/22: Left common femoral endarterectomy with bovine pericardial patch angioplasty, left shin wound debridement  Dr. Cook Staff:  -4/18/22: Left arteriogram with attempted revascularization of distal SFA to popliteal occlusion with alice/retrograde balloon angioplasty, unsuccessful       Past Medical History:        Diagnosis Date    Anxiety     Arthritis     At risk for falls     CAD (coronary artery disease)     CTS (carpal tunnel syndrome)     Bilateral, EMG-NCS    DDD (degenerative disc disease), cervical     Fractures     (L) Hip Fx 4/25/98, L1 fracture from fall 10-31-06    Hyperlipidemia     Hypertension     Mitral regurgitation     Neuropathy     Osteopenia     Osteoporosis     PAD (peripheral artery disease) (HCC)     Right LE ischemia    Peripheral neuropathy 6/1/2015    Peripheral vascular disease ([facility]     bilateral lower extremities with edema    Podagra 01/09/2017    Dr. Sarah Schreiber Spinal stenosis     L4-5    Type 2 diabetes mellitus ([facility]     Urethral stricture 5 years ago    Urgency of urination        Past Surgical History:        Procedure Laterality Date    ANGIOPLASTY Left 04/18/2022    Left SFA    APPENDECTOMY      CARPAL TUNNEL RELEASE Right 03/29/2019    RIGHT CARPAL TUNNEL RELEASE AND RIGHT MIDDLE FINGER TRIGGER FINGER RELEASE performed by Aisha Orozco MD at [place] [address]  [id number]    LEFT EYE EYE CATARACT PHACOEMULSIFICATION INTRAOCULAR LENS    CHOLECYSTECTOMY  01/01/1978    COLONOSCOPY  08/31/1999    diverticulosis    EYE SURGERY Bilateral     bilateral cataract removed    FEMORAL ENDARTERECTOMY Left 02/14/2022    LEFT COMMON FEMORAL ARTERY ENDARTERECTOMY performed by Fabián Lieberman MD at [facility] ([facility])  [address] [place] [address]    IR FEMORAL POPLITEAL BYPASS GRAFT Right 08/31/2015    KYPHOSIS SURGERY  11/07/2006    L1, (fractured after a fall)    LEG SURGERY Left 02/14/2022    LEFT LEG WOUND DEBRIDEMENT performed by Fabián Lieberman MD at [address]  04/25/1998    (L) THR    WRIST FRACTURE SURGERY  01/01/2008    left wrist       Allergies:  Aricept Jara Leaven hydrochloride], Doxycycline, and Ketorolac tromethamine    Medications:   Home Meds  No current facility-administered medications on file prior to encounter. Current Outpatient Medications on File Prior to Encounter   Medication Sig Dispense Refill    traMADol (ULTRAM) 50 MG tablet Take 0.5-1 tablets by mouth every 8 hours as needed for Pain for up to 30 days. 90 tablet 0    glimepiride (AMARYL) 1 MG tablet TAKE ONE TABLET BY MOUTH EVERY MORNING BEFORE BREAKFAST 30 tablet 2    hydrOXYzine (VISTARIL) 25 MG capsule Take 1 capsule by mouth daily as needed for Anxiety (Patient not taking: Reported on 5/10/2022) 30 capsule 0    atorvastatin (LIPITOR) 10 MG tablet TAKE ONE TABLET BY MOUTH DAILY 30 tablet 3    Lite Touch Lancets MISC Use twice daily when testing blood sugars. 100 each 5    collagenase 250 UNIT/GM ointment Apply topically daily Apply topically daily. (Patient not taking: Reported on 5/2/2022)      Wound Cleansers (VASHE CLEANSING) SOLN Soak vashe on gauze pad and place on wound for 5-10 minutes (Patient not taking: Reported on 5/2/2022) 1 each 2    lisinopril (PRINIVIL;ZESTRIL) 20 MG tablet TAKE ONE TABLET BY MOUTH DAILY 90 tablet 3    furosemide (LASIX) 20 MG tablet Take 1 tablet by mouth daily (Patient not taking: Reported on 6/13/2022) 90 tablet 1    blood glucose test strips (ONETOUCH ULTRA) strip USE TO TEST BLOOD SUGAR TWICE DAILY 100 strip 1    metFORMIN (GLUCOPHAGE) 500 MG tablet Take 1 tablet by mouth daily 90 tablet 3    Turmeric 450 MG CAPS Take 450 mg by mouth daily      Cholecalciferol (VITAMIN D3) 2000 UNITS CAPS Take 1 capsule by mouth daily      aspirin EC 81 MG EC tablet Take 1 tablet by mouth daily 30 tablet 3       Current Meds  No current facility-administered medications for this encounter.       Family History:   Family History   Problem Relation Age of Onset    Stroke Father     Hypertension Father        Social History:   TOBACCO:   reports that she has never smoked. She has never used smokeless tobacco.  ETOH:   reports no history of alcohol use. DRUGS:   reports no history of drug use. Review of Systems:   A 14 point review of systems was conducted and all pertinent positives and negatives were included in the HPI. Physical exam:    Vitals:    06/16/22 0841   BP: (!) 170/67   Pulse: 53   Resp: 19   Temp: 98.1 °F (36.7 °C)   TempSrc: Oral   SpO2: 99%   Weight: 106 lb (48.1 kg)   Height: 5' (1.524 m)       General appearance: alert, no acute distress, grooming appropriate  Eyes: No scleral icterus, EOM grossly intact  Neck: trachea midline, no JVD, no lymphadenopathy, neck supple  Chest/Lungs: Equal excursion bilaterally, normal effort with no accessory muscle use on RA  Cardiovascular: RRR, brisk capillary refill distally  Abdomen: Soft, non-tender, non-distended, no rebound, guarding, or rigidity. Skin: warm and dry, no rashes  Extremities: no edema, no cyanosis; chronic ulceration of LLE with some surrounding chronic erythema; left medial foot with decreased sensation to pinprick, intact lateral sensation; decreased movement of left toes; left foot cooler than right but with some warmth, cap refill 3 sec  Neuro: A&Ox3, no focal deficits    Pulses    F P PT DP   R + + -/+ +   L + -/+ -/- -/-    +, -/+ ,-/-       Labs:    CBC:   Recent Labs     06/16/22  0929   WBC 6.5   HGB 10.4*   HCT 31.3*   MCV 91.1        BMP:   Recent Labs     06/16/22  0929   *   K 4.9      CO2 26   BUN 24*   CREATININE 1.3*     PT/INR: No results for input(s): PROTIME, INR in the last 72 hours. APTT: No results for input(s): APTT in the last 72 hours.   Liver Profile:   Lab Results   Component Value Date    AST 19 04/18/2022    ALT 12 04/18/2022    BILIDIR <0.2 05/13/2021    BILITOT 0.3 04/18/2022    ALKPHOS 65 04/18/2022     Lab Results   Component Value Date    CHOL 102 03/02/2022    HDL 48 03/02/2022    HDL 57 01/30/2012    TRIG 129 03/02/2022     UA:   Lab Results Component Value Date    NITRITE neg 11/05/2020    COLORU Yellow 04/17/2021    PHUR 7.0 04/17/2021    WBCUA 0-2 04/17/2021    RBCUA 0-2 04/17/2021    BACTERIA 4+ 11/21/2020    CLARITYU Clear 04/17/2021    SPECGRAV 1.015 04/17/2021    LEUKOCYTESUR SMALL 04/17/2021    UROBILINOGEN 0.2 04/17/2021    BILIRUBINUR Negative 04/17/2021    BILIRUBINUR neg 11/05/2020    BLOODU Negative 04/17/2021    GLUCOSEU Negative 04/17/2021       Imaging:   No orders to display         Assessment/Plan: This is a 80 y.o. female with Hx of TIA, CAD, HTN, HLD, DM2, and known PAD who presents to the ED with decreased temperature, sensation, and motor function of left foot along with increased rest pain over the past week. Prior angio in April of this year with 2-vessel run-off to LLE and documented doppler signals at [facility] and . Today's exam with absent signals at distal LLE with thorough examination, but intact popliteal signal and palpable femoral pulse. Some decreased sensation at LLE. Labs show no leukocytosis, creatinine is at baseline 1.3, and lactate is normal.    - STAT duplex study of LLE arteries to assess subacute on chronic occlusion  - Keep NPO, IVF in anticipation of possible intervention  - Recommend adequate pain management  - Will follow up studies and update recommendations  - Recommend admission to medicine given patient's recent report of TIA, and other comorbidities. Patient seen with senior and chief residents, who discussed the case with staff, Dr. Esther Rod.     Fay Hodgkins, DO  PGY1, General Surgery  06/16/22  2:21 PM  [phone]

## 2022-06-16 NOTE — ED PROVIDER NOTES
[address] ENCOUNTER          ATTENDING PHYSICIAN NOTE       Date of evaluation: 6/16/2022    ADDENDUM:      Care of this patient was assumed from Dr Renaldo Diehl. The patient was seen for Cold Extremity (Existing PVD but left foot more painful for past week. Foot is cold to touch. No pulses palpated and no doppler pulse.)  . The patient's initial evaluation and plan have been discussed with the prior provider who initially evaluated the patient. Nursing Notes, Past Medical Hx, Past Surgical Hx, Social Hx, Allergies, and Family Hx were all reviewed. Diagnostic Results     EKG   None    RADIOLOGY:  CTA ABDOMINAL AORTA W BILAT RUNOFF W WO CONTRAST   Final Result   1. High-grade stenosis left common femoral artery followed by a high-grade near occlusive segmental lesion of the proximal superficial femoral artery. 2. Diffuse disease left femoral artery and popliteal artery with infrapopliteal arterial insufficiency with predominant posterior tibial runoff to the foot   3. Patent right femoral-popliteal dorsalis pedis jump graft with proximal SFA inflow stenoses suspected.    4. Left and right renal nephrolithiasis with no evidence of obstruction         VL DUP LOWER EXTREMITY ARTERIES LEFT             LABS:   Results for orders placed or performed during the hospital encounter of 06/16/22   CBC with Auto Differential   Result Value Ref Range    WBC 6.5 4.0 - 11.0 K/uL    RBC 3.43 (L) 4.00 - 5.20 M/uL    Hemoglobin 10.4 (L) 12.0 - 16.0 g/dL    Hematocrit 31.3 (L) 36.0 - 48.0 %    MCV 91.1 80.0 - 100.0 fL    MCH 30.2 26.0 - 34.0 pg    MCHC 33.1 31.0 - 36.0 g/dL    RDW 13.8 12.4 - 15.4 %    Platelets 474 899 - 513 K/uL    MPV 7.7 5.0 - 10.5 fL    Neutrophils % 74.0 %    Lymphocytes % 17.1 %    Monocytes % 7.8 %    Eosinophils % 0.8 %    Basophils % 0.3 %    Neutrophils Absolute 4.8 1.7 - 7.7 K/uL    Lymphocytes Absolute 1.1 1.0 - 5.1 K/uL    Monocytes Absolute 0.5 0.0 - 1.3 K/uL Eosinophils Absolute 0.1 0.0 - 0.6 K/uL    Basophils Absolute 0.0 0.0 - 0.2 K/uL   Basic Metabolic Panel w/ Reflex to MG   Result Value Ref Range    Sodium 135 (L) 136 - 145 mmol/L    Potassium reflex Magnesium 4.9 3.5 - 5.1 mmol/L    Chloride 100 99 - 110 mmol/L    CO2 26 21 - 32 mmol/L    Anion Gap 9 3 - 16    Glucose 191 (H) 70 - 99 mg/dL    BUN 24 (H) 7 - 20 mg/dL    CREATININE 1.3 (H) 0.6 - 1.2 mg/dL    GFR Non- 38 (A) >60    GFR  46 (A) >60    Calcium 9.1 8.3 - 10.6 mg/dL   Blood gas, venous (Lab)   Result Value Ref Range    pH, Real 7.391 7.350 - 7.450    pCO2, Real 44.9 41.0 - 51.0 mmHg    pO2, Real 44.8 (H) 25.0 - 40.0 mmHg    HCO3, Venous 27.2 24.0 - 28.0 mmol/L    Base Excess, Real 1.8 -2.0 - 3.0 mmol/L    O2 Sat, Real 79 Not established %    Carboxyhemoglobin 1.1 0.0 - 1.5 %    MetHgb, Real 0.4 0.0 - 1.5 %    TC02 (Calc), Real 29 mmol/L    Hemoglobin, Real, Reduced 20.90 %   Lactic Acid   Result Value Ref Range    Lactic Acid 1.4 0.4 - 2.0 mmol/L       RECENT VITALS:  BP: (!) 170/65, Temp: 98.1 °F (36.7 °C), Heart Rate: 74, Resp: 18, SpO2: 97 %     Procedures     None performed    ED Course     The patient was given the following medications:  Orders Placed This Encounter   Medications    oxyCODONE (ROXICODONE) immediate release tablet 5 mg    acetaminophen (TYLENOL) tablet 650 mg    acetaminophen (TYLENOL) 325 MG tablet     Salud Almaraz: cabinet override    oxyCODONE (ROXICODONE) 5 MG immediate release tablet     Salud Almaraz: cabinet override    iopamidol (ISOVUE-370) 76 % injection 80 mL    traMADol (ULTRAM) 50 MG tablet     Sig: Take 0.5 tablets by mouth in the morning and at bedtime for 6 days. Intended supply: 7 days. Take lowest dose possible to manage your acute pain. This medication can make you drowsy and you should not drive, operate machinery, or make financial decisions while using. Do not combine with alcohol.      Dispense:  6 tablet     Refill:  0 CONSULTS:  [id number] / KELLY / Sergey Deshaun is a 80 y.o. female with PVD/PAD who presents with intermittent L leg pain    On my reassessment, she reports marked improvement in symptoms of leg pain. She notes these pains have waxed and wained. She denies new ulcers or wounds. Denies spreading wound symptoms. The bilateral extremities are warm to the touch. There are no asymmetric color or cap refill changes. She is well-appearing. Normal vitals. No aphasia or dysarthria. CTA noted to have high-grade stenosis  Vascular surgery has staffed the patient and plans for close outpatient follow-up and likely non-emergent surgery. I reveiewed return precautions with the mejia and her daughter. Tramadol prescribed at suggestion fo vascular sugery, precautiosn discussed with daughter and mejia. Risks, benefits, and alternatives to the proposed outpatient treatment plan were discussed. The patient has been hemodynamically stable and afebrile in the department. At this time the patient has been deemed safe for discharge. Workup, treatment and diagnosis were discussed with the patient and/or family members; the patient agrees to the plan and all questions were addressed. Discharge instructions including strict return precautions for worsening or new symptoms have been communicated both verbally and in written format. The patient verbalized understanding of these instructions. Clinical Impression     1. Acute pain of left lower extremity    2. Asymptomatic PVD (peripheral vascular disease) ([facility] [address]    3.  Peripheral artery disease ([facility] [address]        Disposition     PATIENT REFERRED TO:  Tripp Elliott MD  [address] [place] [id number]  [phone]    Schedule an appointment as soon as possible for a visit in 2 days  CALL TO [address] [place] [address] ED visit, and referrals/medication      DISCHARGE MEDICATIONS:  New Prescriptions    TRAMADOL (ULTRAM) 50 MG TABLET    Take 0.5 tablets by mouth in the morning and at bedtime for 6 days. Intended supply: 7 days. Take lowest dose possible to manage your acute pain. This medication can make you drowsy and you should not drive, operate machinery, or make financial decisions while using. Do not combine with alcohol.        DISPOSITION Decision To Discharge 06/16/2022 03:15:52 PM         Moon Allen MD  06/16/22 7650

## 2022-06-16 NOTE — ED PROVIDER NOTES
[address] [facility] [place]          ATTENDING PHYSICIAN NOTE       Date of evaluation: 6/16/2022    Chief Complaint     Cold Extremity (Existing PVD but left foot more painful for past week. Foot is cold to touch. No pulses palpated and no doppler pulse.)      History of Present Illness     Sunita Dwyer is a 80 y.o. female, with a complex past medical history that includes a number of cardiovascular risk factors, as well as severe peripheral vascular disease for which she has received numerous interventions, most recent the angiography with angioplasty in April of this year of the left lower extremity, followed by Dr. Ivan Byrd and Dr. Kayli Locke of vascular surgery, who presents with worsening left foot pain and numbness over approximately the last week. Patient states that she often deals with pain and paresthetic numbness in her left foot, which is worse when the leg is elevated, better when she sits with the leg dependent, or when she walks. However, over the last week, although the pain and numbness is still somewhat intermittent, she now feels that it is much worse, and states that her pain yesterday was 12 out of 10 in intensity, today is only 9 out of 10 in intensity, but she now feels as though the foot is intermittently \"dead\" or \"paralyzed,\" which is an overall worsening of her symptoms. The foot is now colder than usual also, per her report. Review of Systems     Review of Systems   Constitutional: Positive for fatigue. Negative for activity change, chills and fever. HENT: Negative. Negative for congestion, rhinorrhea and sore throat. Eyes: Negative. Negative for visual disturbance. Respiratory: Negative. Negative for cough and shortness of breath. Cardiovascular: Negative. Negative for chest pain. Gastrointestinal: Negative. Negative for abdominal pain and vomiting. Genitourinary: Negative. Negative for difficulty urinating and dysuria.    Musculoskeletal: Left foot pain   Skin: Negative for color change and pallor. Neurological: Positive for weakness and numbness. Negative for syncope and headaches. Numbness and weakness of the left foot, intermittently   Psychiatric/Behavioral: Negative. Past Medical, Surgical, Family, and Social History     She has a past medical history of Anxiety, Arthritis, At risk for falls, CAD (coronary artery disease), CTS (carpal tunnel syndrome), DDD (degenerative disc disease), cervical, Fractures, Hyperlipidemia, Hypertension, Mitral regurgitation, Neuropathy, Osteopenia, Osteoporosis, PAD (peripheral artery disease) ([address] Peripheral neuropathy, Peripheral vascular disease ([address] Podagra, Spinal stenosis, Type 2 diabetes mellitus ([address] Urethral stricture, and Urgency of urination. She has a past surgical history that includes Tonsillectomy; Appendectomy; Cholecystectomy (01/01/1978); Colonoscopy (08/31/1999); Hysterectomy (1980s); Total hip arthroplasty (04/25/1998); Kyphosis surgery (11/07/2006); Wrist fracture surgery (01/01/2008); Cataract removal with implant (698153); eye surgery (Bilateral); IR Femoral Popliteal Bypass Graft (Right, 08/31/2015); Carpal tunnel release (Right, 03/29/2019); Femoral Endarterectomy (Left, 02/14/2022); Leg Surgery (Left, 02/14/2022); and angioplasty (Left, 04/18/2022). Her family history includes Hypertension in her father; Stroke in her father. She reports that she has never smoked. She has never used smokeless tobacco. She reports that she does not drink alcohol and does not use drugs.     Medications     Previous Medications    ASPIRIN EC 81 MG EC TABLET    Take 1 tablet by mouth daily    ATORVASTATIN (LIPITOR) 10 MG TABLET    TAKE ONE TABLET BY MOUTH DAILY    BLOOD GLUCOSE TEST STRIPS (ONETOUCH ULTRA) STRIP    USE TO TEST BLOOD SUGAR TWICE DAILY    CHOLECALCIFEROL (VITAMIN D3) 2000 UNITS CAPS    Take 1 capsule by mouth daily    COLLAGENASE 250 UNIT/GM OINTMENT    Apply topically daily Apply topically daily. FUROSEMIDE (LASIX) 20 MG TABLET    Take 1 tablet by mouth daily    GLIMEPIRIDE (AMARYL) 1 MG TABLET    TAKE ONE TABLET BY MOUTH EVERY MORNING BEFORE BREAKFAST    HYDROXYZINE (VISTARIL) 25 MG CAPSULE    Take 1 capsule by mouth daily as needed for Anxiety    LISINOPRIL (PRINIVIL;ZESTRIL) 20 MG TABLET    TAKE ONE TABLET BY MOUTH DAILY    LITE TOUCH LANCETS MISC    Use twice daily when testing blood sugars. METFORMIN (GLUCOPHAGE) 500 MG TABLET    Take 1 tablet by mouth daily    TRAMADOL (ULTRAM) 50 MG TABLET    Take 0.5-1 tablets by mouth every 8 hours as needed for Pain for up to 30 days. TURMERIC 450 MG CAPS    Take 450 mg by mouth daily    WOUND CLEANSERS (VASHE CLEANSING) SOLN    Soak vashe on gauze pad and place on wound for 5-10 minutes       Allergies     She is allergic to aricept [donepezil hydrochloride], doxycycline, and ketorolac tromethamine. Physical Exam     INITIAL VITALS: BP: (!) 170/67, Temp: 98.1 °F (36.7 °C), Heart Rate: 53, Resp: 19, SpO2: 99 %     General: Well appearing, slender, well-appearing older patient. She is briskly awake and alert, pleasantly conversational, and in NAD. HEENT: Pupils are equal, round, and reactive to light. Extraocular muscles are intact. Conjunctivae are clear and moist. No redness or drainage from the eyes. Neck: Supple, with full range of motion. Cardiovascular: Normal S1-S2 without murmur rub or gallop. The patient has a trace palpable but clearly dopplerable DP pulse in the right foot, which is warm, with good capillary refill. On examination of the left foot, there is color change of vascular congestion, with 3 to 4-second capillary refill. PT and DP pulses are not able to be dopplered, even when the leg is hanging off the edge of the bed. Respiratory: Unlabored breathing with equal chest rise and fall. Lungs are clear to auscultation bilaterally. No adventitious lung sounds heard.     Abdomen: Soft and nontender, without guarding or rebound tenderness. No masses or hepatosplenomegaly. Skin: Warm and dry, without rashes or ecchymoses, lacerations or abrasions. Neuro: Alert and oriented x3. Patient endorses intermittent numbness and weakness of the left foot, but has intact sensation currently. Otherwise, no focal neurologic deficits are noted. Extremities: Warm and well-perfused, without clubbing, cyanosis, or edema. The patient moves all extremities equally. Psych: The patient's mood and affect are generally within normal limits for their presentation.       Diagnostic Results     RADIOLOGY:  VL DUP LOWER EXTREMITY ARTERIES LEFT         CTA ABDOMINAL AORTA W BILAT RUNOFF W WO CONTRAST    (Results Pending)       LABS:   Results for orders placed or performed during the hospital encounter of 06/16/22   CBC with Auto Differential   Result Value Ref Range    WBC 6.5 4.0 - 11.0 K/uL    RBC 3.43 (L) 4.00 - 5.20 M/uL    Hemoglobin 10.4 (L) 12.0 - 16.0 g/dL    Hematocrit 31.3 (L) 36.0 - 48.0 %    MCV 91.1 80.0 - 100.0 fL    MCH 30.2 26.0 - 34.0 pg    MCHC 33.1 31.0 - 36.0 g/dL    RDW 13.8 12.4 - 15.4 %    Platelets 593 739 - 324 K/uL    MPV 7.7 5.0 - 10.5 fL    Neutrophils % 74.0 %    Lymphocytes % 17.1 %    Monocytes % 7.8 %    Eosinophils % 0.8 %    Basophils % 0.3 %    Neutrophils Absolute 4.8 1.7 - 7.7 K/uL    Lymphocytes Absolute 1.1 1.0 - 5.1 K/uL    Monocytes Absolute 0.5 0.0 - 1.3 K/uL    Eosinophils Absolute 0.1 0.0 - 0.6 K/uL    Basophils Absolute 0.0 0.0 - 0.2 K/uL   Basic Metabolic Panel w/ Reflex to MG   Result Value Ref Range    Sodium 135 (L) 136 - 145 mmol/L    Potassium reflex Magnesium 4.9 3.5 - 5.1 mmol/L    Chloride 100 99 - 110 mmol/L    CO2 26 21 - 32 mmol/L    Anion Gap 9 3 - 16    Glucose 191 (H) 70 - 99 mg/dL    BUN 24 (H) 7 - 20 mg/dL    CREATININE 1.3 (H) 0.6 - 1.2 mg/dL    GFR Non- 38 (A) >60    GFR  46 (A) >60    Calcium 9.1 8.3 - 10.6 mg/dL   Blood gas, venous (Lab)   Result Value Ref Range    pH, Real 7.391 7.350 - 7.450    pCO2, Real 44.9 41.0 - 51.0 mmHg    pO2, Real 44.8 (H) 25.0 - 40.0 mmHg    HCO3, Venous 27.2 24.0 - 28.0 mmol/L    Base Excess, [place] 1.8 -2.0 - 3.0 mmol/L    O2 Sat, Real 79 Not established %    Carboxyhemoglobin 1.1 0.0 - 1.5 %    MetHgb, Real 0.4 0.0 - 1.5 %    TC02 (Calc), Real 29 mmol/L    Hemoglobin, Real, Reduced 20.90 %   Lactic Acid   Result Value Ref Range    Lactic Acid 1.4 0.4 - 2.0 mmol/L         RECENT VITALS:  BP: (!) 170/65, Temp: 98.1 °F (36.7 °C), Heart Rate: 74, Resp: 18, SpO2: 97 %     Procedures       ED Course     Nursing Notes, Past Medical Hx, Past Surgical Hx, Social Hx, Allergies, and Family Hx were reviewed. The patient was given the following medications:  Orders Placed This Encounter   Medications    oxyCODONE (ROXICODONE) immediate release tablet 5 mg    acetaminophen (TYLENOL) tablet 650 mg    acetaminophen (TYLENOL) 325 MG tablet     Salud Almaraz: cabinet override    oxyCODONE (ROXICODONE) 5 MG immediate release tablet     Salud Almaraz: cabinet override    iopamidol (ISOVUE-370) 76 % injection 80 mL       CONSULTS:  [address] / ASSESSMENT / Penny Candelario is a 80 y.o. female with a complex vascular history as described above, who presents with a 1 week history of worsening left foot pain and intermittent paresthesias, numbness, and weakness of the foot. As noted above, her examination shows evidence of very poor perfusion to the foot, although with intact sensation and intact capillary refill at the moment. However, she does not have dopplerable PT or DP pulses on the left, which were documented as dopplerable in April. Basic laboratory studies were sent, and vascular surgery was consulted. The surgical resident team did see and evaluate the patient in the emergency department.   They ordered further testing, initially duplex of the left lower extremity, followed by a CTA with runoff of the left lower extremity. Final disposition is pending from the vascular surgery service at this time. (Please note that portions of this note were completed with voice recognition software.   Efforts were made to edit the dictations but occasionally words are mis-transcribed.)     Florencio Laird MD  06/16/22 [id number]

## 2022-06-17 ENCOUNTER — PROCEDURE VISIT (OUTPATIENT)
Dept: VASCULAR SURGERY | Age: 87
End: 2022-06-17

## 2022-06-17 ENCOUNTER — OFFICE VISIT (OUTPATIENT)
Dept: VASCULAR SURGERY | Age: 87
End: 2022-06-17
Payer: MEDICARE

## 2022-06-17 ENCOUNTER — TELEPHONE (OUTPATIENT)
Dept: INTERNAL MEDICINE CLINIC | Age: 87
End: 2022-06-17

## 2022-06-17 ENCOUNTER — CARE COORDINATION (OUTPATIENT)
Dept: CARE COORDINATION | Age: 87
End: 2022-06-17

## 2022-06-17 ENCOUNTER — TELEPHONE (OUTPATIENT)
Dept: VASCULAR SURGERY | Age: 87
End: 2022-06-17

## 2022-06-17 VITALS
SYSTOLIC BLOOD PRESSURE: 162 MMHG | HEIGHT: 60 IN | HEART RATE: 63 BPM | DIASTOLIC BLOOD PRESSURE: 60 MMHG | BODY MASS INDEX: 20.81 KG/M2 | WEIGHT: 106 LBS

## 2022-06-17 DIAGNOSIS — I73.9 PVD (PERIPHERAL VASCULAR DISEASE) WITH CLAUDICATION (HCC): Primary | ICD-10-CM

## 2022-06-17 DIAGNOSIS — I70.299 ATHEROSCLEROSIS OF ARTERY OF EXTREMITY WITH ULCERATION (HCC): Primary | ICD-10-CM

## 2022-06-17 DIAGNOSIS — L97.909 ATHEROSCLEROSIS OF ARTERY OF EXTREMITY WITH ULCERATION (HCC): Primary | ICD-10-CM

## 2022-06-17 DIAGNOSIS — I65.23 CAROTID STENOSIS, BILATERAL: ICD-10-CM

## 2022-06-17 DIAGNOSIS — I73.9 PVD (PERIPHERAL VASCULAR DISEASE) WITH CLAUDICATION (HCC): ICD-10-CM

## 2022-06-17 DIAGNOSIS — I70.229 ATHEROSCLEROSIS OF ARTERY OF EXTREMITY WITH REST PAIN (HCC): ICD-10-CM

## 2022-06-17 DIAGNOSIS — I65.23 CAROTID STENOSIS, ASYMPTOMATIC, BILATERAL: ICD-10-CM

## 2022-06-17 DIAGNOSIS — Z01.818 PRE-OP TESTING: ICD-10-CM

## 2022-06-17 PROCEDURE — 1123F ACP DISCUSS/DSCN MKR DOCD: CPT | Performed by: SURGERY

## 2022-06-17 PROCEDURE — G8420 CALC BMI NORM PARAMETERS: HCPCS | Performed by: SURGERY

## 2022-06-17 PROCEDURE — 99213 OFFICE O/P EST LOW 20 MIN: CPT | Performed by: SURGERY

## 2022-06-17 PROCEDURE — 1036F TOBACCO NON-USER: CPT | Performed by: SURGERY

## 2022-06-17 PROCEDURE — G8427 DOCREV CUR MEDS BY ELIG CLIN: HCPCS | Performed by: SURGERY

## 2022-06-17 PROCEDURE — 1090F PRES/ABSN URINE INCON ASSESS: CPT | Performed by: SURGERY

## 2022-06-17 RX ORDER — HYDROCODONE BITARTRATE AND ACETAMINOPHEN 5; 325 MG/1; MG/1
1 TABLET ORAL EVERY 6 HOURS PRN
Qty: 30 TABLET | Refills: 0 | Status: SHIPPED | OUTPATIENT
Start: 2022-06-17 | End: 2022-06-23 | Stop reason: ALTCHOICE

## 2022-06-17 NOTE — TELEPHONE ENCOUNTER
Spoke with patient, she has been advised per Dr. Yoko Pineda Her vascular specialist are handling this. If she states that her leg is worse than it was when she was in the ER, she needs to go back to the ER.   She needs surgery and her vascular specialist is planning for another angiogram.

## 2022-06-17 NOTE — TELEPHONE ENCOUNTER
Called Dami Pham to give her all the information about Marcelina's upcoming angio and surgery. She wrote down all info including meds. NPO midnight.

## 2022-06-17 NOTE — CARE COORDINATION
[facility] Transitions Initial Follow Up Call    Call within 2 business days of discharge: Yes     Patient: Mago Schafer Patient : 1929 MRN: [id number]    Last Discharge [facility]       Complaint Diagnosis Description Type Department Provider    22 Cold Extremity Acute pain of left lower extremity . .. ED (DISCHARGE) [facility] ED Tami Haro MD; Henny Beth MD          RARS: Readmission Risk Score: 16.8 ( )       Spoke with: patient    Discharge department/facility: [facility]    Non-face-to-face services provided:  Scheduled appointment with Specialist-saw Dr Carson Ivan today, scheduling surgery  Obtained and reviewed discharge summary and/or continuity of care documents  Education of patient/family/caregiver/guardian to support self-management-pain meds ,as instructed by Dr Carson Ivan, please call office if worsening     Unaware that pain medications were sent to [facility]  Will send granddaughter to .     PLAN  Will cont outreach    Follow Up  Future Appointments   Date Time Provider Gilbert Frey   2022 11:00 AM [place] SPECIAL PROCEDURES  [address] [facility]   2022  9:30 AM Franklin Summers MD [place] VASC/EN [facility]   2022  9:45 AM Naveen New MD [facility] WOUND [facility]   2022  9:45 AM DO OCTAVIO Dwyer [place] [facility]   2022  3:30 PM Brayan Sweeney MD [facility]       Carmen Regan, RN

## 2022-06-17 NOTE — Clinical Note
Pool Knight,    I saw Nessa Nuno again in the office today because of worsening left foot rest pain and continued nonhealing of her left wound. As per my note I think we have no other alternative except to proceed with revascularization and a more distal bypass. She does have strange symptoms that could be construed as a TIA and we will obtain a carotid duplex to rule out progression of her known disease. As you know Nessa Nuno is complex and challenging. I will keep you appraised of our results. Please call me should you have any questions or concerns that I might be able to answer. Again thanks for asked me to see her and please let me know if I can help with any other patients in the future.     Susana Zhong

## 2022-06-17 NOTE — TELEPHONE ENCOUNTER
Her vascular specialist are handling this. If she states that her leg is worse than it was when she was in the ER, she needs to go back to the ER.   She needs surgery and her vascular specialist is planning for another angiogram.

## 2022-06-17 NOTE — PROGRESS NOTES
Thayne Mohs is a 80 y.o. female well known with nonhealing L shin wound S/P L femoral endarterectomy 2/2022 S/P R fem-BKpop 5 years ago now with worsening left foot rest pain and continue nonhealing left shin wound. Complains of severe coldness in her left foot and some numbness. Relief is gained by dependency only. Also complains of intermittent complete body paralysis which clears but seems to be mostly involving the left side. Also complains of bilateral thigh pain without right foot pain.  Evaluation in [place] [address] yesterday per Dr. Speedy Mann    Past Medical History:   Diagnosis Date    Anxiety     Arthritis     At risk for falls     CAD (coronary artery disease)     CTS (carpal tunnel syndrome)     Bilateral, EMG-NCS    DDD (degenerative disc disease), cervical     Fractures     (L) Hip Fx 4/25/98, L1 fracture from fall 10-31-06    Hyperlipidemia     Hypertension     Mitral regurgitation     Neuropathy     Osteopenia     Osteoporosis     PAD (peripheral artery disease) (HCC)     Right LE ischemia    Peripheral neuropathy 6/1/2015    Peripheral vascular disease ([address]     bilateral lower extremities with edema    Podagra 01/09/2017    Dr. Héctor Johnson Spinal stenosis     L4-5    Type 2 diabetes mellitus ([address]     Urethral stricture 5 years ago    Urgency of urination      Past Surgical History:   Procedure Laterality Date    ANGIOPLASTY Left 04/18/2022    Left SFA    APPENDECTOMY      CARPAL TUNNEL RELEASE Right 03/29/2019    RIGHT CARPAL TUNNEL RELEASE AND RIGHT MIDDLE FINGER TRIGGER FINGER RELEASE performed by Chester Christy MD at [address] [place] [address] Road  [facility]    LEFT EYE EYE CATARACT PHACOEMULSIFICATION INTRAOCULAR LENS    CHOLECYSTECTOMY  01/01/1978    COLONOSCOPY  08/31/1999    diverticulosis    EYE SURGERY Bilateral     bilateral cataract removed    FEMORAL ENDARTERECTOMY Left 02/14/2022    LEFT COMMON FEMORAL ARTERY ENDARTERECTOMY performed by Naima Ruiz MD at [facility] OR    HYSTERECTOMY (CERVIX STATUS UNKNOWN)  1980s    [facility]    IR FEMORAL POPLITEAL BYPASS GRAFT Right 08/31/2015    KYPHOSIS SURGERY  11/07/2006    L1, (fractured after a fall)    LEG SURGERY Left 02/14/2022    LEFT LEG WOUND DEBRIDEMENT performed by Debbi Olvera MD at [address]  04/25/1998    (L) THR    WRIST FRACTURE SURGERY  01/01/2008    left wrist     Family History   Problem Relation Age of Onset    Stroke Father     Hypertension Father      Social History     Socioeconomic History    Marital status:      Spouse name: Not on file    Number of children: Not on file    Years of education: Not on file    Highest education level: Not on file   Occupational History    Not on file   Tobacco Use    Smoking status: Never Smoker    Smokeless tobacco: Never Used   Vaping Use    Vaping Use: Never used   Substance and Sexual Activity    Alcohol use: No    Drug use: No    Sexual activity: Not Currently   Other Topics Concern    Not on file   Social History Narrative    Not on file     Social Determinants of Health     Financial Resource Strain: Low Risk     Difficulty of Paying Living Expenses: Not hard at all   Food Insecurity: No Food Insecurity    Worried About [facility] in the Last Year: Never true    [facility] in the Last Year: Never true   Transportation Needs:     Lack of Transportation (Medical): Not on file    Lack of Transportation (Non-Medical):  Not on file   Physical Activity:     Days of Exercise per Week: Not on file    Minutes of Exercise per Session: Not on file   Stress:     Feeling of Stress : Not on file   Social Connections:     Frequency of Communication with Friends and Family: Not on file    Frequency of Social Gatherings with Friends and Family: Not on file    Attends Baptism Services: Not on file    Active Member of Clubs or Organizations: Not on file    Attends Club or Organization Meetings: Not on file    Marital Status: Not on file   Intimate Partner Violence:     Fear of Current or Ex-Partner: Not on file    Emotionally Abused: Not on file    Physically Abused: Not on file    Sexually Abused: Not on file   Housing Stability:     Unable to Pay for Housing in the Last Year: Not on file    Number of [place] in the Last Year: Not on file    Unstable Housing in the Last Year: Not on file     Current Outpatient Medications   Medication Sig Dispense Refill    traMADol (ULTRAM) 50 MG tablet Take 0.5 tablets by mouth in the morning and at bedtime for 6 days. Intended supply: 7 days. Take lowest dose possible to manage your acute pain. This medication can make you drowsy and you should not drive, operate machinery, or make financial decisions while using. Do not combine with alcohol. 6 tablet 0    glimepiride (AMARYL) 1 MG tablet TAKE ONE TABLET BY MOUTH EVERY MORNING BEFORE BREAKFAST 30 tablet 2    hydrOXYzine (VISTARIL) 25 MG capsule Take 1 capsule by mouth daily as needed for Anxiety 30 capsule 0    atorvastatin (LIPITOR) 10 MG tablet TAKE ONE TABLET BY MOUTH DAILY 30 tablet 3    Lite Touch Lancets MISC Use twice daily when testing blood sugars. 100 each 5    collagenase 250 UNIT/GM ointment Apply topically daily Apply topically daily.        Wound Cleansers (VASHE CLEANSING) SOLN Soak vashe on gauze pad and place on wound for 5-10 minutes 1 each 2    lisinopril (PRINIVIL;ZESTRIL) 20 MG tablet TAKE ONE TABLET BY MOUTH DAILY 90 tablet 3    furosemide (LASIX) 20 MG tablet Take 1 tablet by mouth daily 90 tablet 1    blood glucose test strips (ONETOUCH ULTRA) strip USE TO TEST BLOOD SUGAR TWICE DAILY 100 strip 1    metFORMIN (GLUCOPHAGE) 500 MG tablet Take 1 tablet by mouth daily 90 tablet 3    Turmeric 450 MG CAPS Take 450 mg by mouth daily      Cholecalciferol (VITAMIN D3) 2000 UNITS CAPS Take 1 capsule by mouth daily      aspirin EC 81 MG EC tablet Take 1 tablet by mouth daily 30 tablet 3     No current facility-administered medications for this visit. Allergies   Allergen Reactions    Aricept [Donepezil Hydrochloride] Other (See Comments)     intolerant    Doxycycline Nausea Only    Ketorolac Tromethamine Other (See Comments)     Pt unable to recall reaction       Review of Systems  Pertinent items are noted in HPI. Objective:     BP (!) 162/60 (Site: Left Upper Arm)   Pulse 63   Ht 5' (1.524 m)   Wt 106 lb (48.1 kg)   BMI 20.70 kg/m²     General:  alert, appears stated age and cooperative   Skin:  normal   Eyes: conjunctivae/corneas clear. PERRL, EOM's intact. Fundi benign. Mouth: MMM no lesions   Lymph Nodes:  Cervical, supraclavicular, and axillary nodes normal.   Lungs:  clear to auscultation bilaterally   Heart:  regular rate and rhythm, S1, S2 normal, no murmur, click, rub or gallop   Abdomen: soft, non-tender; bowel sounds normal; no masses,  no organomegaly   CVA:  absent   Genitourinary: defer exam   Extremities:  No RLE wounds  L ant shin wound dry with fibrous base - no granulation. L foot cool with slow cap refill; dependent rubor and elevation pallor    Pulses:   R bruit  L bruit   2   carotid 2    2   brachial 2    2   radial 2    2   femoral 1 +      popliteal 0    0   posterior tibial 0    2   dorsalis pedis 0    2   bypass graft na na     BUZZ 0.39     Neurologic:  negative   Psychiatric:  non focal       L leg arterial duplex 6/16/2022  The right ankle/brachial index is 1.0 (PT-152 mmHg; DP-175 mmHg). Left   The left ankle/brachial index is 0.39 (PT-68 mmHg; DP- 0 mmHg, inaudible). There is abnormal, monophasic flow identified in the common femoral artery,   suggesting evidence of significant aorto-iliac inflow disease. Elevated velocities at the proximal common femoral artery indicate a >50%   stenosis by velocity criteria (velocities went from 75 to 476 cm/s).    Elevated velocities at the origin and distal superficial femoral artery indicate two areas of >50% stenosis by velocity criteria (origin- went from 60   to 442 cm/s; distal- went from 22 to 233 cm/s). The distal superficial femoral artery/proximal popliteal artery is occluded   for a short segment. There is one area of trickle flow identified in the mid peroneal artery. The   posterior tibial artery and distal anterior tibial artery appear patent,   however, with very low velocities and low pulsatility. Compared to the previous outside study of 11/3/2021, the right BUZZ is mildly   increased, and the left BUZZ was previously non-compressible. There appears to   be a new area of stenosis noted in the left CFA and distal SFA. 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 TIA with carotid origin (known ICA disease)  3) CAD        Rec: Will need revascularization for relief of rest pain, wound healing and limb salvage. Preop angio this coming week followed by likely L femoral replacement and L fem-BKpop. Carotid duplex and vein mapping today. Explained in detail to the patient and his  Meagan Redbird. Allie Card is in significant discomfort but is not in an acute limb loss situation. Will prescribe pain meds and encourage use. Patient to call the office should she have worsening symptoms of any sort or complaints.

## 2022-06-17 NOTE — TELEPHONE ENCOUNTER
Patient is requesting to know if there is anything she can do for her L foot that is paralyzed. She states this happens, but this time it not going away. She put ice on it an it didn't help. She states the wound RN told her not to use heat. She was at the ER all day yesterday and today saw Dr. Dewayne Lorenzo. She states \" the rest of her leg hurts and feels weird, and only the L foot is paralyzed, not the whole leg. \"  She isn't sure if this is a \"stroke like symptoms stating that she has had several strokes recently. \"    She \" wants to know what she can do for right now for this. \"    Please contact patient @ phone # provided.

## 2022-06-19 ENCOUNTER — HOSPITAL ENCOUNTER (EMERGENCY)
Age: 87
Discharge: HOME | End: 2022-06-19
Attending: STUDENT IN AN ORGANIZED HEALTH CARE EDUCATION/TRAINING PROGRAM
Payer: MEDICARE

## 2022-06-19 VITALS
TEMPERATURE: 97.5 F | RESPIRATION RATE: 17 BRPM | SYSTOLIC BLOOD PRESSURE: 183 MMHG | HEART RATE: 55 BPM | DIASTOLIC BLOOD PRESSURE: 56 MMHG | OXYGEN SATURATION: 98 %

## 2022-06-19 DIAGNOSIS — I73.9 PAD (PERIPHERAL ARTERY DISEASE) (HCC): ICD-10-CM

## 2022-06-19 DIAGNOSIS — M79.605 LEFT LEG PAIN: Primary | ICD-10-CM

## 2022-06-19 LAB
ANION GAP SERPL CALCULATED.3IONS-SCNC: 8 MMOL/L (ref 3–16)
BASE EXCESS VENOUS: 1.4 MMOL/L (ref -2–3)
BASOPHILS ABSOLUTE: 0 K/UL (ref 0–0.2)
BASOPHILS RELATIVE PERCENT: 0.5 %
BUN BLDV-MCNC: 24 MG/DL (ref 7–20)
CALCIUM SERPL-MCNC: 9.1 MG/DL (ref 8.3–10.6)
CARBOXYHEMOGLOBIN: 1.1 % (ref 0–1.5)
CHLORIDE BLD-SCNC: 101 MMOL/L (ref 99–110)
CO2: 26 MMOL/L (ref 21–32)
CREAT SERPL-MCNC: 1.4 MG/DL (ref 0.6–1.2)
EOSINOPHILS ABSOLUTE: 0.1 K/UL (ref 0–0.6)
EOSINOPHILS RELATIVE PERCENT: 2.1 %
GFR AFRICAN AMERICAN: 42
GFR NON-AFRICAN AMERICAN: 35
GLUCOSE BLD-MCNC: 102 MG/DL (ref 70–99)
HCO3 VENOUS: 27 MMOL/L (ref 24–28)
HCT VFR BLD CALC: 30 % (ref 36–48)
HEMOGLOBIN, VEN, REDUCED: 23.1 %
HEMOGLOBIN: 10 G/DL (ref 12–16)
LACTIC ACID: 0.9 MMOL/L (ref 0.4–2)
LYMPHOCYTES ABSOLUTE: 1.4 K/UL (ref 1–5.1)
LYMPHOCYTES RELATIVE PERCENT: 27.8 %
MCH RBC QN AUTO: 30.3 PG (ref 26–34)
MCHC RBC AUTO-ENTMCNC: 33.3 G/DL (ref 31–36)
MCV RBC AUTO: 90.8 FL (ref 80–100)
METHEMOGLOBIN VENOUS: 0.3 % (ref 0–1.5)
MONOCYTES ABSOLUTE: 0.5 K/UL (ref 0–1.3)
MONOCYTES RELATIVE PERCENT: 9.4 %
NEUTROPHILS ABSOLUTE: 3 K/UL (ref 1.7–7.7)
NEUTROPHILS RELATIVE PERCENT: 60.2 %
O2 SAT, VEN: 77 %
PCO2, VEN: 46 MMHG (ref 41–51)
PDW BLD-RTO: 13.5 % (ref 12.4–15.4)
PH VENOUS: 7.38 (ref 7.35–7.45)
PLATELET # BLD: 158 K/UL (ref 135–450)
PMV BLD AUTO: 7.8 FL (ref 5–10.5)
PO2, VEN: 42.9 MMHG (ref 25–40)
POTASSIUM REFLEX MAGNESIUM: 5.1 MMOL/L (ref 3.5–5.1)
RBC # BLD: 3.31 M/UL (ref 4–5.2)
SODIUM BLD-SCNC: 135 MMOL/L (ref 136–145)
TCO2 CALC VENOUS: 28 MMOL/L
WBC # BLD: 4.9 K/UL (ref 4–11)

## 2022-06-19 PROCEDURE — 82803 BLOOD GASES ANY COMBINATION: CPT

## 2022-06-19 PROCEDURE — 80048 BASIC METABOLIC PNL TOTAL CA: CPT

## 2022-06-19 PROCEDURE — 6370000000 HC RX 637 (ALT 250 FOR IP): Performed by: PHYSICIAN ASSISTANT

## 2022-06-19 PROCEDURE — 36415 COLL VENOUS BLD VENIPUNCTURE: CPT

## 2022-06-19 PROCEDURE — 85025 COMPLETE CBC W/AUTO DIFF WBC: CPT

## 2022-06-19 PROCEDURE — 99283 EMERGENCY DEPT VISIT LOW MDM: CPT

## 2022-06-19 PROCEDURE — 83605 ASSAY OF LACTIC ACID: CPT

## 2022-06-19 RX ORDER — HYDROCODONE BITARTRATE AND ACETAMINOPHEN 5; 325 MG/1; MG/1
1 TABLET ORAL ONCE
Status: COMPLETED | OUTPATIENT
Start: 2022-06-19 | End: 2022-06-19

## 2022-06-19 RX ADMIN — HYDROCODONE BITARTRATE AND ACETAMINOPHEN 1 TABLET: 5; 325 TABLET ORAL at 11:37

## 2022-06-19 ASSESSMENT — PAIN - FUNCTIONAL ASSESSMENT: PAIN_FUNCTIONAL_ASSESSMENT: 0-10

## 2022-06-19 ASSESSMENT — PAIN DESCRIPTION - ONSET: ONSET: ON-GOING

## 2022-06-19 ASSESSMENT — ENCOUNTER SYMPTOMS
NAUSEA: 0
VOMITING: 0
ABDOMINAL PAIN: 0
SHORTNESS OF BREATH: 0

## 2022-06-19 ASSESSMENT — PAIN DESCRIPTION - PAIN TYPE: TYPE: ACUTE PAIN

## 2022-06-19 ASSESSMENT — PAIN DESCRIPTION - LOCATION
LOCATION: FOOT
LOCATION: FOOT

## 2022-06-19 ASSESSMENT — PAIN SCALES - GENERAL
PAINLEVEL_OUTOF10: 10
PAINLEVEL_OUTOF10: 9

## 2022-06-19 ASSESSMENT — PAIN DESCRIPTION - ORIENTATION
ORIENTATION: LEFT
ORIENTATION: LEFT

## 2022-06-19 ASSESSMENT — PAIN DESCRIPTION - FREQUENCY: FREQUENCY: INTERMITTENT

## 2022-06-19 NOTE — ED PROVIDER NOTES
ED Attending Attestation Note     Date of evaluation: 6/19/2022    This patient was seen by the advance practice provider. I have seen and examined the patient, agree with the workup, evaluation, management and diagnosis. The care plan has been discussed. My assessment reveals pleasant female complaining of left foot pain. She has a planned vascular surgery procedure at the end of this month. She has been seen a vascular has had imaging and outpatient follow-ups recently. She has not been taking her dose of pain medication fully at home because she is hesitant to take the pain medications. On exam, difficult to palpate pulses in left foot. Slightly cooler to touch than right side. Sensation intact and able to wiggle all toes.   We will obtain blood work and speak with vascular     Babak Roman MD  06/19/22 [id number]

## 2022-06-19 NOTE — CONSULTS
Vascular Surgery   Resident Consult Note    Reason for Consult: LLE pain    History of Present Illness:   Cee Macias is a 80 y.o. female with Hx of TIA, CAD, HTN, HLD, DM2, PAD, well known to the vascular service with nonhealing L shin wound S/P L femoral endarterectomy 2/2022 & S/P R fem-BKpop 5 years ago who presents to the ED again with ongoing left foot pain since she was last seen in our ED on 6/16. She was discharged with tramadol, and was seen by Dr. Juan Mosher in the office on 6/17, where she was prescribed norco. She returns with ongoing left foot pain. She reports taking a half of tramadol this am at 830, but did not relieve her pain. She states she was told by her pharmacist not to take both tramadol and norco together so she has a filled prescription at home waiting to be used. She states that they have been doing warm compresses which has helped. She feels as if the toradol isn't helping and was afraid to try the norco given her warning. She continues to report pain, coolness to touch, \"paralysis\".     Past Medical History:        Diagnosis Date    Anxiety     Arthritis     At risk for falls     CAD (coronary artery disease)     CTS (carpal tunnel syndrome)     Bilateral, EMG-NCS    DDD (degenerative disc disease), cervical     Fractures     (L) Hip Fx 4/25/98, L1 fracture from fall 10-31-06    Hyperlipidemia     Hypertension     Mitral regurgitation     Neuropathy     Osteopenia     Osteoporosis     PAD (peripheral artery disease) ([address]     Right LE ischemia    Peripheral neuropathy 6/1/2015    Peripheral vascular disease ([address]     bilateral lower extremities with edema    Podagra 01/09/2017    Dr. Conchis Maya    Spinal stenosis     L4-5    Type 2 diabetes mellitus ([address]     Urethral stricture 5 years ago    Urgency of urination        Past Surgical History:           Procedure Laterality Date    ANGIOPLASTY Left 04/18/2022    Left SFA    APPENDECTOMY      CARPAL TUNNEL RELEASE Right 03/29/2019    RIGHT CARPAL TUNNEL RELEASE AND RIGHT MIDDLE FINGER TRIGGER FINGER RELEASE performed by Shawn Boyd MD at [place] [address]  [id number]    LEFT EYE EYE CATARACT PHACOEMULSIFICATION INTRAOCULAR LENS    CHOLECYSTECTOMY  01/01/1978    COLONOSCOPY  08/31/1999    diverticulosis    EYE SURGERY Bilateral     bilateral cataract removed    FEMORAL ENDARTERECTOMY Left 02/14/2022    LEFT COMMON FEMORAL ARTERY ENDARTERECTOMY performed by Arti Lobato MD at [address] [place] ([address] [place] [address])  1980s    sallie    IR FEMORAL POPLITEAL BYPASS GRAFT Right 08/31/2015    KYPHOSIS SURGERY  11/07/2006    L1, (fractured after a fall)    LEG SURGERY Left 02/14/2022    LEFT LEG WOUND DEBRIDEMENT performed by Arti Lobato MD at [facility]  04/25/1998    (L) THR    WRIST FRACTURE SURGERY  01/01/2008    left wrist       Allergies:  Aricept [donepezil hydrochloride], Doxycycline, and Ketorolac tromethamine    Medications:   Home Meds  No current facility-administered medications on file prior to encounter. Current Outpatient Medications on File Prior to Encounter   Medication Sig Dispense Refill    HYDROcodone-acetaminophen (NORCO) 5-325 MG per tablet Take 1 tablet by mouth every 6 hours as needed for Pain for up to 14 days. Intended supply: 5 days. Take lowest dose possible to manage pain 30 tablet 0    traMADol (ULTRAM) 50 MG tablet Take 0.5 tablets by mouth in the morning and at bedtime for 6 days. Intended supply: 7 days. Take lowest dose possible to manage your acute pain. This medication can make you drowsy and you should not drive, operate machinery, or make financial decisions while using.  Do not combine with alcohol. 6 tablet 0    glimepiride (AMARYL) 1 MG tablet TAKE ONE TABLET BY MOUTH EVERY MORNING BEFORE BREAKFAST 30 tablet 2    hydrOXYzine (VISTARIL) 25 MG capsule Take 1 capsule by mouth daily as needed for Anxiety 30 capsule 0    atorvastatin (LIPITOR) 10 MG tablet TAKE ONE TABLET BY MOUTH DAILY 30 tablet 3    Lite Touch Lancets MISC Use twice daily when testing blood sugars. 100 each 5    collagenase 250 UNIT/GM ointment Apply topically daily Apply topically daily. Wound Cleansers (VASHE CLEANSING) SOLN Soak vashe on gauze pad and place on wound for 5-10 minutes 1 each 2    lisinopril (PRINIVIL;ZESTRIL) 20 MG tablet TAKE ONE TABLET BY MOUTH DAILY 90 tablet 3    furosemide (LASIX) 20 MG tablet Take 1 tablet by mouth daily 90 tablet 1    blood glucose test strips (ONETOUCH ULTRA) strip USE TO TEST BLOOD SUGAR TWICE DAILY 100 strip 1    metFORMIN (GLUCOPHAGE) 500 MG tablet Take 1 tablet by mouth daily 90 tablet 3    Turmeric 450 MG CAPS Take 450 mg by mouth daily      Cholecalciferol (VITAMIN D3) 2000 UNITS CAPS Take 1 capsule by mouth daily      aspirin EC 81 MG EC tablet Take 1 tablet by mouth daily 30 tablet 3       Current Meds  HYDROcodone-acetaminophen (NORCO) 5-325 MG per tablet 1 tablet, Once        Family History:   Family History   Problem Relation Age of Onset    Stroke Father     Hypertension Father        Social History:   TOBACCO:   reports that she has never smoked. She has never used smokeless tobacco.  ETOH:   reports no history of alcohol use. DRUGS:   reports no history of drug use. ROS: A 14 point review of systems was conducted, significant findings as noted in HPI. All other systems negative. Physical exam:    Vitals:    06/19/22 0930   BP: (!) 183/56   Pulse: 55   Resp: 17   Temp: 97.5 °F (36.4 °C)   TempSrc: Oral   SpO2: 98%       General appearance: alert, no acute distress, grooming appropriate  Eyes: No scleral icterus, EOM grossly intact  Neck: trachea midline, no JVD, no lymphadenopathy, neck supple  Chest/Lungs: Equal excursion bilaterally, normal effort with no accessory muscle use on RA  Cardiovascular: RRR, brisk capillary refill distally  Abdomen: Soft, non-tender, non-distended, no rebound, guarding, or rigidity.   Skin: warm and dry, no rashes  Extremities: no edema, no cyanosis; chronic ulceration of LLE with some surrounding chronic erythema; left medial foot with decreased sensation to pinprick, intact lateral sensation; decreased movement of left toes; left foot cooler than right but with some warmth, cap refill 3 sec  Neuro: A&Ox3, no focal deficits     Pulses     F PT DP   R + -/+ +   L + -/- -/-    +, -/+ ,-/-            Labs:    CBC: No results for input(s): WBC, HGB, HCT, MCV, PLT in the last 72 hours. BMP: No results for input(s): NA, K, CL, CO2, PHOS, BUN, CREATININE, CA in the last 72 hours. PT/INR: No results for input(s): PROTIME, INR in the last 72 hours. APTT: No results for input(s): APTT in the last 72 hours. Liver Profile:  Lab Results   Component Value Date    AST 19 04/18/2022    ALT 12 04/18/2022    BILIDIR <0.2 05/13/2021    BILITOT 0.3 04/18/2022    ALKPHOS 65 04/18/2022     Lab Results   Component Value Date    CHOL 102 03/02/2022    HDL 48 03/02/2022    HDL 57 01/30/2012    TRIG 129 03/02/2022     UA:   Lab Results   Component Value Date    NITRITE neg 11/05/2020    COLORU Yellow 04/17/2021    PHUR 7.0 04/17/2021    WBCUA 0-2 04/17/2021    RBCUA 0-2 04/17/2021    BACTERIA 4+ 11/21/2020    CLARITYU Clear 04/17/2021    SPECGRAV 1.015 04/17/2021    LEUKOCYTESUR SMALL 04/17/2021    UROBILINOGEN 0.2 04/17/2021    BILIRUBINUR Negative 04/17/2021    BILIRUBINUR neg 11/05/2020    BLOODU Negative 04/17/2021    GLUCOSEU Negative 04/17/2021       Imaging:   No orders to display   L leg arterial duplex 6/16/2022  The right ankle/brachial index is 1.0 (PT-152 mmHg; DP-175 mmHg). Left   The left ankle/brachial index is 0.39 (PT-68 mmHg; DP- 0 mmHg, inaudible). There is abnormal, monophasic flow identified in the common femoral artery,   suggesting evidence of significant aorto-iliac inflow disease.    Elevated velocities at the proximal common femoral artery indicate a >50%   stenosis by velocity criteria (velocities went from 75 to 476 cm/s). Elevated velocities at the origin and distal superficial femoral artery   indicate two areas of >50% stenosis by velocity criteria (origin- went from 60   to 442 cm/s; distal- went from 22 to 233 cm/s). The distal superficial femoral artery/proximal popliteal artery is occluded   for a short segment. There is one area of trickle flow identified in the mid peroneal artery. The   posterior tibial artery and distal anterior tibial artery appear patent,   however, with very low velocities and low pulsatility. Compared to the previous outside study of 11/3/2021, the right BUZZ is mildly   increased, and the left BUZZ was previously non-compressible. There appears to   be a new area of stenosis noted in the left CFA and distal SFA. Assessment/Plan: This is a 80 y.o. female with Hx of TIA, CAD, HTN, HLD, DM2, and known PAD who presents to the ED with ongoing rest pain since last visit on 6/16. Prior angio in April of this year with 2-vessel run-off to E and documented doppler signals [facility] and PT. CTA on 6/16 showed significant stenosis L SFA and near occlusion of the prox SFA. Diffuse disease, with popliteal runoff. Doppler exam similar to previous exam, questionable monophasic flow through L PT, no DP or AT pulses. Foot with 3 sec capillary refill, mild rubor, warm to touch. Lactate WNL. BMP stable. Vascular surgery consulted for evaluation of her left foot.      - follow up labs  - On the schedule for angio this coming week followed by likely L femoral replacement and L fem-BKpop, foot does not appear to be ischemic and exam stable from last visit, seen by resident team and Dr. Reji Aguilera on 6/16, and Dr. Juan Mccormack, 6/17.  - Carotid duplex and vein mapping completed yesterday  - will trial the norco to see if that helps with relief, if relieved, will discharge home to follow up workup this week and plan for bypass  - discussed with her care aid, alternating tylenol and norco for adequate pain relief, both her and patient in agreement with plan. - counselled taking the pain medication as needed with the patient. Ginny Turcios MD  Surgical Resident PGY 4  06/19/22  11:32 AM  [phone]    VASCULAR STAFF    I did not see the patient but I did directly supervise patient care in coordination with resident team. Stable chronic ischemic changes. Needs bypass, already scheduled with Dr. Carson Ivan.     Xavi Roblero DO, FSVS, [address] [facility] Vascular and Endovascular Surgery

## 2022-06-19 NOTE — ED PROVIDER NOTES
[facility]          PHYSICIAN ASSISTANT NOTE       Date of evaluation: 6/19/2022    Chief Complaint     Foot Pain (Pt here thursday but pain is getting worse on left side and now right side is starting to hurt. Pt scheduled 6/28 for surgery)      History of Present Illness     Thayne Mohs is a 80 y.o. female with PMHx of TIA, CAD, HTN, HLD, DM2, and known PAD s/p numerous interventions, most recent the angiography with angioplasty in April of this year of the left lower extremity, followed by Dr. Marianna Sims and Dr. Speedy Mann of vascular surgery. She presents today with complaints of persistent pain and numbness in the left foot since her ED presentation last week. She also reports persistent cold \"dead\" foot. Patient states she is scheduled for angiogram on 6/28 but does not feel that she can wait until then. She states that she is prescribed tramadol but states that she is only taking 1/2 tablet twice daily. She states that she wants to solve the problem rather than just treat the pain. She otherwise denies any other acute changes in her symptoms. Review of Systems     Review of Systems   Constitutional: Negative for chills and fever. Respiratory: Negative for shortness of breath. Cardiovascular: Negative for chest pain. Gastrointestinal: Negative for abdominal pain, nausea and vomiting. Musculoskeletal:        +foot pain   Skin: Positive for wound (chronic ulcer left shin). Neurological: Positive for numbness. Negative for weakness. Psychiatric/Behavioral: The patient is not nervous/anxious. All other systems reviewed and are negative.       Past Medical, Surgical, Family, and Social History     She has a past medical history of Anxiety, Arthritis, At risk for falls, CAD (coronary artery disease), CTS (carpal tunnel syndrome), DDD (degenerative disc disease), cervical, Fractures, Hyperlipidemia, Hypertension, Mitral regurgitation, Neuropathy, Osteopenia, Osteoporosis, PAD (peripheral artery disease) ([facility] Peripheral neuropathy, Peripheral vascular disease ([facility] Podagra, Spinal stenosis, Type 2 diabetes mellitus ([facility] Urethral stricture, and Urgency of urination. She has a past surgical history that includes Tonsillectomy; Appendectomy; Cholecystectomy (01/01/1978); Colonoscopy (08/31/1999); Hysterectomy (1980s); Total hip arthroplasty (04/25/1998); Kyphosis surgery (11/07/2006); Wrist fracture surgery (01/01/2008); Cataract removal with implant ([id number]); eye surgery (Bilateral); IR Femoral Popliteal Bypass Graft (Right, 08/31/2015); Carpal tunnel release (Right, 03/29/2019); Femoral Endarterectomy (Left, 02/14/2022); Leg Surgery (Left, 02/14/2022); and angioplasty (Left, 04/18/2022). Her family history includes Hypertension in her father; Stroke in her father. She reports that she has never smoked. She has never used smokeless tobacco. She reports that she does not drink alcohol and does not use drugs. Medications     Discharge Medication List as of 6/19/2022 11:57 AM      CONTINUE these medications which have NOT CHANGED    Details   HYDROcodone-acetaminophen (NORCO) 5-325 MG per tablet Take 1 tablet by mouth every 6 hours as needed for Pain for up to 14 days. Intended supply: 5 days. Take lowest dose possible to manage pain, Disp-30 tablet, R-0Normal      traMADol (ULTRAM) 50 MG tablet Take 0.5 tablets by mouth in the morning and at bedtime for 6 days. Intended supply: 7 days. Take lowest dose possible to manage your acute pain. This medication can make you drowsy and you should not drive, operate machinery, or make financial decision s while using.  Do not combine with alcohol., Disp-6 tablet, R-0Print      glimepiride (AMARYL) 1 MG tablet TAKE ONE TABLET BY MOUTH EVERY MORNING BEFORE BREAKFAST, Disp-30 tablet, R-2Normal      hydrOXYzine (VISTARIL) 25 MG capsule Take 1 capsule by mouth daily as needed for Anxiety, Disp-30 capsule, R-0Normal atorvastatin (LIPITOR) 10 MG tablet TAKE ONE TABLET BY MOUTH DAILY, Disp-30 tablet, R-3Normal      Lite Touch Lancets MISC Disp-100 each, R-5, NormalUse twice daily when testing blood sugars. collagenase 250 UNIT/GM ointment Apply topically daily Apply topically daily. , Topical, DAILY, Historical Med      Wound Cleansers (VASHE CLEANSING) SOLN Soak vashe on gauze pad and place on wound for 5-10 minutes, Disp-1 each, R-2Normal      lisinopril (PRINIVIL;ZESTRIL) 20 MG tablet TAKE ONE TABLET BY MOUTH DAILY, Disp-90 tablet, R-3Normal      furosemide (LASIX) 20 MG tablet Take 1 tablet by mouth daily, Disp-90 tablet, R-1Normal      blood glucose test strips (ONETOUCH ULTRA) strip USE TO TEST BLOOD SUGAR TWICE DAILY, Disp-100 strip, R-1Normal      metFORMIN (GLUCOPHAGE) 500 MG tablet Take 1 tablet by mouth daily, Disp-90 tablet, R-3Normal      Turmeric 450 MG CAPS Take 450 mg by mouth dailyHistorical Med      Cholecalciferol (VITAMIN D3) 2000 UNITS CAPS Take 1 capsule by mouth daily      aspirin EC 81 MG EC tablet Take 1 tablet by mouth daily, Disp-30 tablet, R-3             Allergies     She is allergic to aricept [donepezil hydrochloride], doxycycline, and ketorolac tromethamine. Physical Exam     INITIAL VITALS: BP: (!) 183/56,Temp: 97.5 °F (36.4 °C), Heart Rate: 55, Resp: 17, SpO2: 98 %   Physical Exam  Vitals and nursing note reviewed. Constitutional:       General: She is not in acute distress. HENT:      Head: Normocephalic and atraumatic. Mouth/Throat:      Mouth: Mucous membranes are moist.   Eyes:      Extraocular Movements: Extraocular movements intact. Conjunctiva/sclera: Conjunctivae normal.   Cardiovascular:      Rate and Rhythm: Normal rate and regular rhythm. Pulmonary:      Effort: Pulmonary effort is normal. No respiratory distress. Breath sounds: Normal breath sounds. No wheezing, rhonchi or rales.    Abdominal:      General: Bowel sounds are normal. There is no distension. Palpations: Abdomen is soft. Tenderness: There is no abdominal tenderness. There is no guarding or rebound. Musculoskeletal:         General: No deformity. Cervical back: Neck supple. Skin:     General: Skin is warm and dry. Neurological:      Mental Status: She is alert and oriented to person, place, and time. Comments: There is palpable DP pulse and dopplerable DP and PT pulses in the right foot. The RLE is warm with good capillary refill. The PT and DP pulses are not palpable or dopplerable in the left foot, consistent with prior exam last week. The right foot is cooler to touch and pale compared to the left lower extremity but has intact 3-4 sec capillary refill. Sensation is intact to light touch bilaterally.    Psychiatric:         Mood and Affect: Mood normal.         Behavior: Behavior normal.         Diagnostic Results     RADIOLOGY:  No orders to display       LABS:   Results for orders placed or performed during the hospital encounter of 06/19/22   CBC with Auto Differential   Result Value Ref Range    WBC 4.9 4.0 - 11.0 K/uL    RBC 3.31 (L) 4.00 - 5.20 M/uL    Hemoglobin 10.0 (L) 12.0 - 16.0 g/dL    Hematocrit 30.0 (L) 36.0 - 48.0 %    MCV 90.8 80.0 - 100.0 fL    MCH 30.3 26.0 - 34.0 pg    MCHC 33.3 31.0 - 36.0 g/dL    RDW 13.5 12.4 - 15.4 %    Platelets 918 968 - 114 K/uL    MPV 7.8 5.0 - 10.5 fL    Neutrophils % 60.2 %    Lymphocytes % 27.8 %    Monocytes % 9.4 %    Eosinophils % 2.1 %    Basophils % 0.5 %    Neutrophils Absolute 3.0 1.7 - 7.7 K/uL    Lymphocytes Absolute 1.4 1.0 - 5.1 K/uL    Monocytes Absolute 0.5 0.0 - 1.3 K/uL    Eosinophils Absolute 0.1 0.0 - 0.6 K/uL    Basophils Absolute 0.0 0.0 - 0.2 K/uL   Lactic Acid   Result Value Ref Range    Lactic Acid 0.9 0.4 - 2.0 mmol/L   Blood gas, venous (Lab)   Result Value Ref Range    pH, Real 7.377 7.350 - 7.450    pCO2, Real 46.0 41.0 - 51.0 mmHg    pO2, Real 42.9 (H) 25.0 - 40.0 mmHg    HCO3, Venous 27.0 24.0 - 28.0 mmol/L    Base Excess, Real 1.4 -2.0 - 3.0 mmol/L    O2 Sat, Real 77 Not established %    Carboxyhemoglobin 1.1 0.0 - 1.5 %    MetHgb, Real 0.3 0.0 - 1.5 %    TC02 (Calc), Real 28 mmol/L    Hemoglobin, Real, Reduced 23.10 %   Basic Metabolic Panel w/ Reflex to MG   Result Value Ref Range    Sodium 135 (L) 136 - 145 mmol/L    Potassium reflex Magnesium 5.1 3.5 - 5.1 mmol/L    Chloride 101 99 - 110 mmol/L    CO2 26 21 - 32 mmol/L    Anion Gap 8 3 - 16    Glucose 102 (H) 70 - 99 mg/dL    BUN 24 (H) 7 - 20 mg/dL    CREATININE 1.4 (H) 0.6 - 1.2 mg/dL    GFR Non-African American 35 (A) >60    GFR  42 (A) >60    Calcium 9.1 8.3 - 10.6 mg/dL       RECENT VITALS:  BP: (!) 183/56, Temp: 97.5 °F (36.4 °C), Heart Rate: 55,Resp: 17, SpO2: 98 %     Procedures         ED Course     Nursing Notes, Past Medical Hx, Past Surgical Hx, Social Hx, Allergies, and Family Hx were reviewed. The patient was given the followingmedications:  Orders Placed This Encounter   Medications    HYDROcodone-acetaminophen (NORCO) 5-325 MG per tablet 1 tablet       CONSULTS:  [address] / ASSESSMENT / Layne Denis is a 80 y.o. female with PMHx of TIA, CAD, HTN, HLD, DM2, and known PAD s/p numerous interventions, most recent the angiography with angioplasty in April of this year of the left lower extremity, followed by Dr. Venessa Bird and Dr. Rayshawn Marie of vascular surgery. She presents today with complaints of persistent pain and numbness in the left foot since her ED presentation last week. She denies any acute changes in her symptoms. After her ED presentation last week, patient was scheduled for angioplasty on 6/28 with vascular surgery. Patient feels that she is unable to wait until then. She notes that she is taking 25 mg of tramadol once or twice daily for pain. On exam today is not significantly different as compared to last week.   Please see H&P above for further information. Basic laboratory studies were ordered and vascular surgery was consulted. Laboratory work-up revealed no acute findings with normal white blood cell count of 4.9, stable hemoglobin of 10, creatinine 1.4, lactate 2.9, pH 7.37. The surgical resident team evaluated patient and feels that exam today is unchanged from last week's exam in the ED as well as clinic. They recommended to the patient that she start the Almon Kayser previously prescribed last week and stop Tramadol. Patient was given a dose of hydrocodone here and reported improvement in her pain. We will plan to have her follow-up as scheduled with vascular surgery. At this point in time, patient is stable for discharge. Patient was given strict return precautions as outlined in the AVS. Patient was agreeable and understanding to this plan of care. Prior to discharge, patient was ambulatory and PO tolerant. This patient was also evaluated by the attending physician. All care plans were discussed and agreed upon. Clinical Impression     1. Left leg pain    2. PAD (peripheral artery disease) ([address]        Disposition     PATIENT REFERRED TO:  [facility] Emergency Department  If symptoms worsen, this is where your surgery will be performed          DISCHARGE MEDICATIONS:  Discharge Medication List as of 6/19/2022 11:57 AM          DISPOSITION  Discharge.        Kane Duenas PA-C  06/19/22 2408

## 2022-06-20 ENCOUNTER — HOSPITAL ENCOUNTER (OUTPATIENT)
Dept: INTERVENTIONAL RADIOLOGY/VASCULAR | Age: 87
Discharge: HOME | End: 2022-06-20
Payer: MEDICARE

## 2022-06-20 ENCOUNTER — PREP FOR PROCEDURE (OUTPATIENT)
Dept: VASCULAR SURGERY | Age: 87
End: 2022-06-20

## 2022-06-20 VITALS
HEART RATE: 67 BPM | DIASTOLIC BLOOD PRESSURE: 68 MMHG | SYSTOLIC BLOOD PRESSURE: 154 MMHG | TEMPERATURE: 97 F | RESPIRATION RATE: 16 BRPM | OXYGEN SATURATION: 97 %

## 2022-06-20 DIAGNOSIS — Z01.818 PRE-OP TESTING: Primary | ICD-10-CM

## 2022-06-20 DIAGNOSIS — I73.9 PVD (PERIPHERAL VASCULAR DISEASE) WITH CLAUDICATION (HCC): ICD-10-CM

## 2022-06-20 LAB
APTT: 36.4 SEC (ref 23–34.3)
FIBRINOGEN: 317 MG/DL (ref 207–509)
GLUCOSE BLD-MCNC: 116 MG/DL (ref 70–99)
INR BLD: 1.09 (ref 0.87–1.14)
PERFORMED ON: ABNORMAL
PROTHROMBIN TIME: 14 SEC (ref 11.7–14.5)

## 2022-06-20 PROCEDURE — 85610 PROTHROMBIN TIME: CPT

## 2022-06-20 PROCEDURE — 7100000010 HC PHASE II RECOVERY - FIRST 15 MIN

## 2022-06-20 PROCEDURE — 2580000003 HC RX 258: Performed by: SURGERY

## 2022-06-20 PROCEDURE — 2500000003 HC RX 250 WO HCPCS: Performed by: SURGERY

## 2022-06-20 PROCEDURE — 6360000004 HC RX CONTRAST MEDICATION: Performed by: SURGERY

## 2022-06-20 PROCEDURE — 2709999900

## 2022-06-20 PROCEDURE — 99153 MOD SED SAME PHYS/QHP EA: CPT

## 2022-06-20 PROCEDURE — 7100000011 HC PHASE II RECOVERY - ADDTL 15 MIN

## 2022-06-20 PROCEDURE — 6360000002 HC RX W HCPCS: Performed by: SURGERY

## 2022-06-20 PROCEDURE — 36415 COLL VENOUS BLD VENIPUNCTURE: CPT

## 2022-06-20 PROCEDURE — 75710 ARTERY X-RAYS ARM/LEG: CPT

## 2022-06-20 PROCEDURE — 85384 FIBRINOGEN ACTIVITY: CPT

## 2022-06-20 PROCEDURE — 85730 THROMBOPLASTIN TIME PARTIAL: CPT

## 2022-06-20 PROCEDURE — 6370000000 HC RX 637 (ALT 250 FOR IP): Performed by: SURGERY

## 2022-06-20 PROCEDURE — 36245 INS CATH ABD/L-EXT ART 1ST: CPT

## 2022-06-20 PROCEDURE — 99152 MOD SED SAME PHYS/QHP 5/>YRS: CPT

## 2022-06-20 RX ORDER — ACETAMINOPHEN 325 MG/1
650 TABLET ORAL EVERY 4 HOURS PRN
Status: DISCONTINUED | OUTPATIENT
Start: 2022-06-20 | End: 2022-06-21 | Stop reason: HOSPADM

## 2022-06-20 RX ORDER — SODIUM CHLORIDE 9 MG/ML
INJECTION, SOLUTION INTRAVENOUS CONTINUOUS
Status: DISCONTINUED | OUTPATIENT
Start: 2022-06-20 | End: 2022-06-21 | Stop reason: HOSPADM

## 2022-06-20 RX ORDER — IODIXANOL 320 MG/ML
180 INJECTION, SOLUTION INTRAVASCULAR
Status: COMPLETED | OUTPATIENT
Start: 2022-06-20 | End: 2022-06-20

## 2022-06-20 RX ORDER — SODIUM CHLORIDE 9 MG/ML
INJECTION, SOLUTION INTRAVENOUS ONCE
Status: COMPLETED | OUTPATIENT
Start: 2022-06-20 | End: 2022-06-20

## 2022-06-20 RX ORDER — LABETALOL HYDROCHLORIDE 5 MG/ML
INJECTION, SOLUTION INTRAVENOUS DAILY PRN
Status: COMPLETED | OUTPATIENT
Start: 2022-06-20 | End: 2022-06-20

## 2022-06-20 RX ORDER — MIDAZOLAM HYDROCHLORIDE 1 MG/ML
INJECTION INTRAMUSCULAR; INTRAVENOUS DAILY PRN
Status: COMPLETED | OUTPATIENT
Start: 2022-06-20 | End: 2022-06-20

## 2022-06-20 RX ORDER — HYDROCODONE BITARTRATE AND ACETAMINOPHEN 5; 325 MG/1; MG/1
2 TABLET ORAL EVERY 4 HOURS PRN
Status: DISCONTINUED | OUTPATIENT
Start: 2022-06-20 | End: 2022-06-21 | Stop reason: HOSPADM

## 2022-06-20 RX ORDER — FENTANYL CITRATE 50 UG/ML
INJECTION, SOLUTION INTRAMUSCULAR; INTRAVENOUS DAILY PRN
Status: COMPLETED | OUTPATIENT
Start: 2022-06-20 | End: 2022-06-20

## 2022-06-20 RX ORDER — MORPHINE SULFATE 2 MG/ML
2 INJECTION, SOLUTION INTRAMUSCULAR; INTRAVENOUS
Status: DISCONTINUED | OUTPATIENT
Start: 2022-06-20 | End: 2022-06-21 | Stop reason: HOSPADM

## 2022-06-20 RX ORDER — HYDROCODONE BITARTRATE AND ACETAMINOPHEN 5; 325 MG/1; MG/1
1 TABLET ORAL EVERY 4 HOURS PRN
Status: DISCONTINUED | OUTPATIENT
Start: 2022-06-20 | End: 2022-06-21 | Stop reason: HOSPADM

## 2022-06-20 RX ORDER — MORPHINE SULFATE 2 MG/ML
4 INJECTION, SOLUTION INTRAMUSCULAR; INTRAVENOUS
Status: DISCONTINUED | OUTPATIENT
Start: 2022-06-20 | End: 2022-06-21 | Stop reason: HOSPADM

## 2022-06-20 RX ORDER — ENALAPRILAT 2.5 MG/2ML
INJECTION INTRAVENOUS DAILY PRN
Status: COMPLETED | OUTPATIENT
Start: 2022-06-20 | End: 2022-06-20

## 2022-06-20 RX ADMIN — LABETALOL HYDROCHLORIDE 5 MG: 5 INJECTION, SOLUTION INTRAVENOUS at 12:50

## 2022-06-20 RX ADMIN — SODIUM CHLORIDE: 9 INJECTION, SOLUTION INTRAVENOUS at 11:39

## 2022-06-20 RX ADMIN — MIDAZOLAM 0.5 MG: 1 INJECTION INTRAMUSCULAR; INTRAVENOUS at 13:08

## 2022-06-20 RX ADMIN — FENTANYL CITRATE 25 MCG: 50 INJECTION INTRAMUSCULAR; INTRAVENOUS at 13:08

## 2022-06-20 RX ADMIN — MIDAZOLAM 0.5 MG: 1 INJECTION INTRAMUSCULAR; INTRAVENOUS at 12:56

## 2022-06-20 RX ADMIN — HYDROCODONE BITARTRATE AND ACETAMINOPHEN 1 TABLET: 5; 325 TABLET ORAL at 15:48

## 2022-06-20 RX ADMIN — FENTANYL CITRATE 25 MCG: 50 INJECTION INTRAMUSCULAR; INTRAVENOUS at 12:56

## 2022-06-20 RX ADMIN — ENALAPRILAT 1.25 MG: 1.25 INJECTION INTRAVENOUS at 13:00

## 2022-06-20 RX ADMIN — ENALAPRILAT 1.25 MG: 1.25 INJECTION INTRAVENOUS at 12:54

## 2022-06-20 RX ADMIN — IODIXANOL 90 ML: 320 INJECTION, SOLUTION INTRAVASCULAR at 13:59

## 2022-06-20 ASSESSMENT — PAIN DESCRIPTION - ORIENTATION
ORIENTATION: LEFT
ORIENTATION: LEFT

## 2022-06-20 ASSESSMENT — PAIN DESCRIPTION - DESCRIPTORS
DESCRIPTORS: DISCOMFORT
DESCRIPTORS: DISCOMFORT;BURNING

## 2022-06-20 ASSESSMENT — PAIN DESCRIPTION - LOCATION
LOCATION: FOOT
LOCATION: FOOT

## 2022-06-20 ASSESSMENT — PAIN SCALES - GENERAL
PAINLEVEL_OUTOF10: 5
PAINLEVEL_OUTOF10: 6

## 2022-06-20 ASSESSMENT — PAIN - FUNCTIONAL ASSESSMENT
PAIN_FUNCTIONAL_ASSESSMENT: PREVENTS OR INTERFERES SOME ACTIVE ACTIVITIES AND ADLS
PAIN_FUNCTIONAL_ASSESSMENT: PREVENTS OR INTERFERES SOME ACTIVE ACTIVITIES AND ADLS
PAIN_FUNCTIONAL_ASSESSMENT: 0-10

## 2022-06-20 ASSESSMENT — PAIN DESCRIPTION - ONSET: ONSET: ON-GOING

## 2022-06-20 ASSESSMENT — PAIN DESCRIPTION - FREQUENCY: FREQUENCY: CONTINUOUS

## 2022-06-20 ASSESSMENT — PAIN DESCRIPTION - PAIN TYPE: TYPE: CHRONIC PAIN

## 2022-06-20 NOTE — PROGRESS NOTES
Discharge instructions given to patient and caregiver, both state understanding and deny having any further questions at this time.

## 2022-06-20 NOTE — PROGRESS NOTES
Voided per cup per patient's request.  [place] with snack selection. Dietary called. Vascular checks stable. Medicated for pain.

## 2022-06-20 NOTE — PRE SEDATION
by mouth every 6 hours as needed for Pain for up to 14 days. Intended supply: 5 days. Take lowest dose possible to manage pain 6/17/22 7/1/22  Gianni Yan MD   traMADol (ULTRAM) 50 MG tablet Take 0.5 tablets by mouth in the morning and at bedtime for 6 days. Intended supply: 7 days. Take lowest dose possible to manage your acute pain. This medication can make you drowsy and you should not drive, operate machinery, or make financial decisions while using. Do not combine with alcohol. 6/16/22 6/22/22  Chen Farah MD   glimepiride (AMARYL) 1 MG tablet TAKE ONE TABLET BY MOUTH EVERY MORNING BEFORE BREAKFAST 4/27/22   Paulo Forman DO   hydrOXYzine (VISTARIL) 25 MG capsule Take 1 capsule by mouth daily as needed for Anxiety 4/26/22   Paulo Forman DO   atorvastatin (LIPITOR) 10 MG tablet TAKE ONE TABLET BY MOUTH DAILY 4/25/22   Paulo Forman DO   Lite Touch Lancets MISC Use twice daily when testing blood sugars. 3/28/22   Paulo Forman DO   collagenase 250 UNIT/GM ointment Apply topically daily Apply topically daily.      Historical Provider, MD   Wound Cleansers (VASHE CLEANSING) SOLN Soak vashe on gauze pad and place on wound for 5-10 minutes 3/10/22   Andrew Kang MD   lisinopril (PRINIVIL;ZESTRIL) 20 MG tablet TAKE ONE TABLET BY MOUTH DAILY 3/1/22   Paulo Forman DO   furosemide (LASIX) 20 MG tablet Take 1 tablet by mouth daily 1/31/22   Yue Rausch MD   blood glucose test strips (ONETOUCH ULTRA) strip USE TO TEST BLOOD SUGAR TWICE DAILY 7/1/21   Paulo Forman DO   metFORMIN (GLUCOPHAGE) 500 MG tablet Take 1 tablet by mouth daily 5/11/21   Paulo Forman DO   Turmeric 450 MG CAPS Take 450 mg by mouth daily    Historical Provider, MD   Cholecalciferol (VITAMIN D3) 2000 UNITS CAPS Take 1 capsule by mouth daily    Historical Provider, MD   aspirin EC 81 MG EC tablet Take 1 tablet by mouth daily 5/13/15   Paulo Forman DO     Coumadin Use Last 7 Days:  no  Antiplatelet drug therapy use last 7 days: yes - asa  Other anticoagulant use last 7 days: no  Additional Medication Information:  metformin      Pre-Sedation Documentation and Exam:   I have personally completed a history, physical exam & review of systems for this patient (see notes).     Mallampati Airway Assessment:  Mallampati Class  IV - (soft palate not visible)    Prior History of Anesthesia Complications:   none    ASA Classification:  Class 3 - A patient with severe systemic disease that limits activity but is not incapacitating    Sedation/ Anesthesia Plan:   intravenous sedation    Medications Planned:   midazolam (Versed) intravenously and fentanyl intravenously    Patient is an appropriate candidate for plan of sedation: yes    Electronically signed by Fabián Lieberman MD on 6/20/2022 at 11:58 AM

## 2022-06-20 NOTE — H&P
Update History & Physical    The patient's History and Physical of June 17, 2022 was reviewed with the patient and I examined the patient. There was no change. The surgical site was confirmed by the patient and me. Plan: The risks, benefits, expected outcome, and alternative to the recommended procedure have been discussed with the patient. Patient understands and wants to proceed with the procedure.      Electronically signed by Naima Ruiz MD on 6/20/2022 at 11:58 AM

## 2022-06-20 NOTE — PROGRESS NOTES
Received from Radiology post angiogram. Admitted to Phase 2 care. Awake and alert, respirations easy and even. Oriented to room and surroundings. Left foot painful to touch.

## 2022-06-21 ENCOUNTER — TELEPHONE (OUTPATIENT)
Dept: WOUND CARE | Age: 87
End: 2022-06-21

## 2022-06-21 NOTE — OP NOTE
[address] [place]                                 OPERATIVE REPORT    PATIENT NAME: Misty Dakin                       :        1929  MED REC NO:   [id number]                          ROOM:  ACCOUNT NO:   [de-identified]                           ADMIT DATE: 2022  PROVIDER:     Zeeshan Mckeon MD      ANGIOGRAM    DATE OF PROCEDURE:  2022    PREOPERATIVE DIAGNOSIS:  Severe left foot rest pain with multilevel  arterial disease. POSTOPERATIVE DIAGNOSIS:  Severe left foot rest pain with multilevel  arterial disease. OPERATION PERFORMED:  1. Ultrasound-guided right femoral catheterization. 2.  Aortoiliac angiogram via catheter positioned in the distal aorta. 3.  Selective left leg angiogram via the catheter positioned in the  distal left external iliac artery. SURGEON:  Zeeshan Mckeon MD    ANESTHESIA:  Conscious sedation (Versed 2 mg and fentanyl 75 mcg IV  push) with continuous oxygen saturation, EKG, and blood pressure  monitoring. TOTAL SEDATION TIME:  30 minutes. ESTIMATED BLOOD LOSS:  Less than 50 mL. INDICATIONS:  The patient is a 77-year-old lady who remains very  functional and cognitive, who has had difficulties with left shin wound  and has undergone left femoral endarterectomy, however, recently she has  developed rest pain in her foot and worsening of the perfusion on  physical exam and by ankle brachial indices. It was recommended she  undergo angiography for better evaluation and planning for  revascularization, and she agreed understanding the risks, benefits and  other options. OPERATIVE PROCEDURE:  The patient was brought to the Angiogram  Department and placed on the table in supine position.   After adequate  induction of IV sedation, which was supervised directly by this  physician as well as an independent medical practitioner, the right  groin was prepped and draped in a sterile fashion. Ultrasound was  sterilely passed onto the field and the right common femoral artery was  identified and noted to be pulsatile and patent with duplex imaging. Lidocaine was infiltrated over the right femoral pulse and using  ultrasound guidance, the artery was punctured without difficulty in the  common femoral artery. An angled stiff guidewire was advanced under  fluoroscopic guidance into the aorta followed by a 5-Indian sheath. Over the wire was placed a RIM catheter, which was positioned in the mid  infrarenal aorta and a flush aortoiliac angiogram was performed. The  catheter was then withdrawn down to the aortic bifurcation and the  origin of the left common iliac artery was engaged. A stiff Glidewire  was advanced distally and the RIM catheter was exchanged for a straight  [place] catheter. The catheter was then advanced into the distal left  external iliac artery and left in this position. A combination of hand  injections and power injections was performed of the left leg to image  the runoff. After completion of this study, the catheter was straightened and wire  removed. Sheath was removed from the right groin and pressure was  applied manually for 15 minutes while maintaining pulsation in the more  inferiorly originating bypass graft. The patient tolerated the  procedure well. FINDINGS:  1. Irregular but nonstenotic aortoiliac disease with widely patent left  iliac stent. 2.  Near occlusive early re-stenosis of the left common femoral  endarterectomy site with proximal left superficial femoral artery  occlusion. 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 The popliteal artery reconstitutes below the knee and has single  vessel runoff through the anterior tibial to the foot.         Vishal Sinclair MD    D: 06/20/2022 14:00:35       T: 06/20/2022 15:12:47 [email or web address]  Job#: [id number]     Doc#: [id number]    CC:

## 2022-06-22 ENCOUNTER — CARE COORDINATION (OUTPATIENT)
Dept: CARE COORDINATION | Age: 87
End: 2022-06-22

## 2022-06-22 RX ORDER — SODIUM CHLORIDE 0.9 % (FLUSH) 0.9 %
5-40 SYRINGE (ML) INJECTION PRN
Status: CANCELLED | OUTPATIENT
Start: 2022-06-22

## 2022-06-22 RX ORDER — SODIUM CHLORIDE 0.9 % (FLUSH) 0.9 %
5-40 SYRINGE (ML) INJECTION EVERY 12 HOURS SCHEDULED
Status: CANCELLED | OUTPATIENT
Start: 2022-06-22

## 2022-06-22 RX ORDER — SODIUM CHLORIDE 9 MG/ML
INJECTION, SOLUTION INTRAVENOUS PRN
Status: CANCELLED | OUTPATIENT
Start: 2022-06-22

## 2022-06-22 NOTE — CARE COORDINATION
Ambulatory Care Coordination Note  6/22/2022  CM Risk Score: 2  Charlson 10 Year Mortality Risk Score: 100%     ACC: Lashawn Nagy, RN    Summary Note: Call to patient   will be having bypass next week  w Dr Erna Serrano on 6/28   [address] [facility] every Mon for left leg wound  Not getting better due to PVD    Plan  Will cont outreach  [facility] docs- has / will provide     Future Appointments   Date Time Provider Gilbret Frey   6/27/2022  9:45 AM Nina Mcdermott  [address] WOUND [facility]   6/28/2022  9:45 AM DO OCTVAIO [facility] [place] - [facility]   7/11/2022  3:30 PM MD Taylor Dobson [facility]             Lab Results     None              Goals Addressed    None         Prior to Admission medications    Medication Sig Start Date End Date Taking? Authorizing Provider   HYDROcodone-acetaminophen (NORCO) 5-325 MG per tablet Take 1 tablet by mouth every 6 hours as needed for Pain for up to 14 days. Intended supply: 5 days. Take lowest dose possible to manage pain 6/17/22 7/1/22  Kathie Higgins MD   traMADol (ULTRAM) 50 MG tablet Take 0.5 tablets by mouth in the morning and at bedtime for 6 days. Intended supply: 7 days. Take lowest dose possible to manage your acute pain. This medication can make you drowsy and you should not drive, operate machinery, or make financial decisions while using. Do not combine with alcohol. 6/16/22 6/22/22  Shar Keane MD   glimepiride (AMARYL) 1 MG tablet TAKE ONE TABLET BY MOUTH EVERY MORNING BEFORE BREAKFAST 4/27/22   Paulo Forman DO   hydrOXYzine (VISTARIL) 25 MG capsule Take 1 capsule by mouth daily as needed for Anxiety 4/26/22   Paulo Forman DO   atorvastatin (LIPITOR) 10 MG tablet TAKE ONE TABLET BY MOUTH DAILY 4/25/22   Paulo Forman DO   Lite Touch Lancets MISC Use twice daily when testing blood sugars. 3/28/22   Paulo Forman DO   collagenase 250 UNIT/GM ointment Apply topically daily Apply topically daily.      Historical Provider, MD   Wound Cleansers (Ol[employer] Roman CLEANSING) SOLN [place]obarbara mattahe on gauze pad and place on wound for 5-10 minutes 3/10/22   Robbie Baker MD   lisinopril (PRINIVIL;ZESTRIL) 20 MG tablet TAKE ONE TABLET BY MOUTH DAILY 3/1/22   Paulo Forman DO   furosemide (LASIX) 20 MG tablet Take 1 tablet by mouth daily 1/31/22   Kana Herrmann MD   blood glucose test strips (ONETOUCH ULTRA) strip USE TO TEST BLOOD SUGAR TWICE DAILY 7/1/21   Paulo Forman DO   metFORMIN (GLUCOPHAGE) 500 MG tablet Take 1 tablet by mouth daily 5/11/21   Paulo Forman DO   Turmeric 450 MG CAPS Take 450 mg by mouth daily    Historical Provider, MD   Cholecalciferol (VITAMIN D3) 2000 UNITS CAPS Take 1 capsule by mouth daily    Historical Provider, MD   aspirin EC 81 MG EC tablet Take 1 tablet by mouth daily 5/13/15   [address] [place] [address]       Future Appointments   Date Time Provider Gilbert Frey   6/27/2022  9:45 AM Robbie Baker MD [facility]   6/28/2022  9:45 AM DO OCTAVIO Dhillon [place] - [facility]   7/11/2022  3:30 PM Kana Herrmann MD [facility]

## 2022-06-23 RX ORDER — TRAMADOL HYDROCHLORIDE 50 MG/1
25 TABLET ORAL EVERY 6 HOURS PRN
COMMUNITY
End: 2022-09-21 | Stop reason: ALTCHOICE

## 2022-06-23 RX ORDER — ACETAMINOPHEN 325 MG/1
650 TABLET ORAL EVERY 6 HOURS PRN
COMMUNITY

## 2022-06-23 NOTE — TELEPHONE ENCOUNTER
2nd call from Dong regarding supplies have not been received yet. Reached out to [employer] and awaiting response. Let Dong know that if I do not get back with her to expect her supplies either today or tomorrow. Also cancelled next appt with Dr Sun Acosta r/t surgery on 6/28 with Dr Sam Hyatt. She will call back to reschedule. After this RN got off the phone with Dong, response received that supplies will be delivered today. No further needs at this time.

## 2022-06-23 NOTE — PROGRESS NOTES
[address] [facility] time___0955_________        Surgery time_____1155_______    Take the following medications with a sip of water: Follow your MD/Surgeons pre-procedure instructions regarding your medications     Do not eat or drink anything after 12:00 midnight prior to your surgery. This includes water chewing gum, mints and ice chips. You may brush your teeth and gargle the morning of your surgery, but do not swallow the water     Please see your family doctor/pediatrician for a history and physical and/or concerning medications. Bring any test results/reports from your physicians office. If you are under the care of a heart doctor or specialist doctor, please be aware that you may be asked to them for clearance    You may be asked to stop blood thinners such as Coumadin, Plavix, Fragmin, Lovenox, etc., or any anti-inflammatories such as:  Aspirin, Ibuprofen, Advil, Naproxen prior to your surgery. We also ask that you stop any OTC medications such as fish oil, vitamin E, glucosamine, garlic, Multivitamins, COQ 10, etc.    We ask that you do not smoke 24 hours prior to surgery  We ask that you do not  drink any alcoholic beverages 24 hours prior to surgery     You must make arrangements for a responsible adult to take you home after your surgery. For your safety you will not be allowed to leave alone or drive yourself home. Your surgery will be cancelled if you do not have a ride home. Also for your safety, it is strongly suggested that someone stay with you the first 24 hours after your surgery. A parent or legal guardian must accompany a child scheduled for surgery and plan to stay at the hospital until the child is discharged. Please do not bring other children with you. For your comfort, please wear simple loose fitting clothing to the hospital.  Please do not bring valuables.     Do not wear any make-up or nail polish on your fingers or toes      For your safety, please do not wear any jewelry or body piercing's on the day of surgery. All jewelry must be removed. If you have dentures, they will be removed before going to operating room. For your convenience, we will provide you with a container. If you wear contact lenses or glasses, they will be removed, please bring a case for them. If you have a living will and a durable power of  for healthcare, please bring in a copy. As part of our patient safety program to minimize surgical site infections, we ask you to do the following:    · Please notify your surgeon if you develop any illness between         now and the  day of your surgery. · This includes a cough, cold, fever, sore throat, nausea,         or vomiting, and diarrhea, etc.  ·  Please notify your surgeon if you experience dizziness, shortness         of breath or blurred vision between now and the time of your surgery. Do not shave your operative site 96 hours prior to surgery. For face and neck surgery, men may use an electric razor 48 hours   prior to surgery. You may shower the night before surgery or the morning of   your surgery with an antibacterial soap. You will need to bring a photo ID and insurance card    [facility] has an onsite pharmacy, would you like to utilize our pharmacy     If you will be staying overnight and use a C-pap machine, please bring   your C-pap to hospital     Our goal is to provide you with excellent care, therefore, visitors will be limited to two(2) in the room at a time so that we may focus on providing this care for you. Please contact pre-admission testing if you have any further questions. [facility] phone number:  [phone] [facility] fax number:  [phone]  Please note these are generalized instructions for all surgical cases, you may be provided with more specific instructions according to your surgery.     C-Difficile admission screening and protocol:       * Admitted with diarrhea? [] YES    [x]  NO     *Prior history of C-Diff. In last 3 months? [] YES    [x]  NO     *Antibiotic use in the past 6-8 weeks? [x]  NO    []  YES                 If yes, which ANTIBIOTIC AND REASON______     *Prior hospitalization or nursing home in the last month? []  YES    [x]  NO        SAFETY FIRST. .call before you fall

## 2022-06-27 ENCOUNTER — HOSPITAL ENCOUNTER (OUTPATIENT)
Dept: WOUND CARE | Age: 87
Discharge: HOME | End: 2022-06-27

## 2022-06-27 ENCOUNTER — ANESTHESIA EVENT (OUTPATIENT)
Dept: OPERATING ROOM | Age: 87
DRG: 270 | End: 2022-06-27
Payer: MEDICARE

## 2022-06-28 ENCOUNTER — HOSPITAL ENCOUNTER (INPATIENT)
Age: 87
LOS: 4 days | Discharge: HOME HEALTH | DRG: 270 | End: 2022-07-02
Attending: SURGERY | Admitting: SURGERY
Payer: MEDICARE

## 2022-06-28 ENCOUNTER — ANESTHESIA (OUTPATIENT)
Dept: OPERATING ROOM | Age: 87
DRG: 270 | End: 2022-06-28
Payer: MEDICARE

## 2022-06-28 ENCOUNTER — APPOINTMENT (OUTPATIENT)
Dept: GENERAL RADIOLOGY | Age: 87
DRG: 270 | End: 2022-06-28
Attending: SURGERY
Payer: MEDICARE

## 2022-06-28 PROBLEM — I70.229 ATHEROSCLEROSIS OF ARTERY OF EXTREMITY WITH REST PAIN (HCC): Status: ACTIVE | Noted: 2022-06-28

## 2022-06-28 LAB
ABO/RH: NORMAL
ANION GAP SERPL CALCULATED.3IONS-SCNC: 10 MMOL/L (ref 3–16)
ANTIBODY SCREEN: NORMAL
BASOPHILS ABSOLUTE: 0 K/UL (ref 0–0.2)
BASOPHILS RELATIVE PERCENT: 0.3 %
BLOOD BANK DISPENSE STATUS: NORMAL
BLOOD BANK DISPENSE STATUS: NORMAL
BLOOD BANK PRODUCT CODE: NORMAL
BLOOD BANK PRODUCT CODE: NORMAL
BPU ID: NORMAL
BPU ID: NORMAL
BUN BLDV-MCNC: 24 MG/DL (ref 7–20)
CALCIUM SERPL-MCNC: 9.6 MG/DL (ref 8.3–10.6)
CHLORIDE BLD-SCNC: 97 MMOL/L (ref 99–110)
CO2: 24 MMOL/L (ref 21–32)
CREAT SERPL-MCNC: 1.3 MG/DL (ref 0.6–1.2)
DESCRIPTION BLOOD BANK: NORMAL
DESCRIPTION BLOOD BANK: NORMAL
EOSINOPHILS ABSOLUTE: 0 K/UL (ref 0–0.6)
EOSINOPHILS RELATIVE PERCENT: 0.1 %
GFR AFRICAN AMERICAN: 46
GFR NON-AFRICAN AMERICAN: 38
GLUCOSE BLD-MCNC: 127 MG/DL (ref 70–99)
GLUCOSE BLD-MCNC: 128 MG/DL (ref 70–99)
GLUCOSE BLD-MCNC: 242 MG/DL (ref 70–99)
HCT VFR BLD CALC: 24 % (ref 36–48)
HCT VFR BLD CALC: 26.2 % (ref 36–48)
HCT VFR BLD CALC: 32.1 % (ref 36–48)
HEMOGLOBIN: 11.2 G/DL (ref 12–16)
HEMOGLOBIN: 8.2 G/DL (ref 12–16)
HEMOGLOBIN: 9.1 G/DL (ref 12–16)
LYMPHOCYTES ABSOLUTE: 1.5 K/UL (ref 1–5.1)
LYMPHOCYTES RELATIVE PERCENT: 17.1 %
MCH RBC QN AUTO: 30.5 PG (ref 26–34)
MCH RBC QN AUTO: 31.2 PG (ref 26–34)
MCHC RBC AUTO-ENTMCNC: 34 G/DL (ref 31–36)
MCHC RBC AUTO-ENTMCNC: 34.9 G/DL (ref 31–36)
MCV RBC AUTO: 89.4 FL (ref 80–100)
MCV RBC AUTO: 89.7 FL (ref 80–100)
MONOCYTES ABSOLUTE: 0.2 K/UL (ref 0–1.3)
MONOCYTES RELATIVE PERCENT: 2.4 %
NEUTROPHILS ABSOLUTE: 6.9 K/UL (ref 1.7–7.7)
NEUTROPHILS RELATIVE PERCENT: 80.1 %
PDW BLD-RTO: 14.4 % (ref 12.4–15.4)
PDW BLD-RTO: 15.1 % (ref 12.4–15.4)
PERFORMED ON: ABNORMAL
PERFORMED ON: ABNORMAL
PLATELET # BLD: 187 K/UL (ref 135–450)
PLATELET # BLD: 190 K/UL (ref 135–450)
PMV BLD AUTO: 6.7 FL (ref 5–10.5)
PMV BLD AUTO: 7 FL (ref 5–10.5)
POTASSIUM REFLEX MAGNESIUM: 5.2 MMOL/L (ref 3.5–5.1)
RBC # BLD: 2.68 M/UL (ref 4–5.2)
RBC # BLD: 3.59 M/UL (ref 4–5.2)
SODIUM BLD-SCNC: 131 MMOL/L (ref 136–145)
WBC # BLD: 5.3 K/UL (ref 4–11)
WBC # BLD: 8.6 K/UL (ref 4–11)

## 2022-06-28 PROCEDURE — 6360000002 HC RX W HCPCS: Performed by: SURGERY

## 2022-06-28 PROCEDURE — C1726 CATH, BAL DIL, NON-VASCULAR: HCPCS | Performed by: SURGERY

## 2022-06-28 PROCEDURE — C1768 GRAFT, VASCULAR: HCPCS | Performed by: SURGERY

## 2022-06-28 PROCEDURE — 86923 COMPATIBILITY TEST ELECTRIC: CPT

## 2022-06-28 PROCEDURE — 2500000003 HC RX 250 WO HCPCS

## 2022-06-28 PROCEDURE — 35583 VEIN BYP GRFT FEM-POPLITEAL: CPT | Performed by: SURGERY

## 2022-06-28 PROCEDURE — 73590 X-RAY EXAM OF LOWER LEG: CPT

## 2022-06-28 PROCEDURE — 3700000000 HC ANESTHESIA ATTENDED CARE: Performed by: SURGERY

## 2022-06-28 PROCEDURE — 2580000003 HC RX 258: Performed by: ANESTHESIOLOGY

## 2022-06-28 PROCEDURE — 85027 COMPLETE CBC AUTOMATED: CPT

## 2022-06-28 PROCEDURE — 3700000001 HC ADD 15 MINUTES (ANESTHESIA): Performed by: SURGERY

## 2022-06-28 PROCEDURE — 2580000003 HC RX 258: Performed by: SURGERY

## 2022-06-28 PROCEDURE — 3600000015 HC SURGERY LEVEL 5 ADDTL 15MIN: Performed by: SURGERY

## 2022-06-28 PROCEDURE — P9045 ALBUMIN (HUMAN), 5%, 250 ML: HCPCS | Performed by: NURSE ANESTHETIST, CERTIFIED REGISTERED

## 2022-06-28 PROCEDURE — 6370000000 HC RX 637 (ALT 250 FOR IP): Performed by: SURGERY

## 2022-06-28 PROCEDURE — 85018 HEMOGLOBIN: CPT

## 2022-06-28 PROCEDURE — 85014 HEMATOCRIT: CPT

## 2022-06-28 PROCEDURE — 3600000005 HC SURGERY LEVEL 5 BASE: Performed by: SURGERY

## 2022-06-28 PROCEDURE — 86900 BLOOD TYPING SEROLOGIC ABO: CPT

## 2022-06-28 PROCEDURE — 2500000003 HC RX 250 WO HCPCS: Performed by: SURGERY

## 2022-06-28 PROCEDURE — A4217 STERILE WATER/SALINE, 500 ML: HCPCS | Performed by: SURGERY

## 2022-06-28 PROCEDURE — 041L0JJ BYPASS LEFT FEMORAL ARTERY TO LEFT FEMORAL ARTERY WITH SYNTHETIC SUBSTITUTE, OPEN APPROACH: ICD-10-PCS | Performed by: SURGERY

## 2022-06-28 PROCEDURE — 2580000003 HC RX 258

## 2022-06-28 PROCEDURE — 2580000003 HC RX 258: Performed by: NURSE ANESTHETIST, CERTIFIED REGISTERED

## 2022-06-28 PROCEDURE — P9016 RBC LEUKOCYTES REDUCED: HCPCS

## 2022-06-28 PROCEDURE — 6360000002 HC RX W HCPCS

## 2022-06-28 PROCEDURE — 6360000002 HC RX W HCPCS: Performed by: NURSE ANESTHETIST, CERTIFIED REGISTERED

## 2022-06-28 PROCEDURE — 35665 BPG ILIOFEMORAL: CPT | Performed by: SURGERY

## 2022-06-28 PROCEDURE — 2709999900 HC NON-CHARGEABLE SUPPLY: Performed by: SURGERY

## 2022-06-28 PROCEDURE — 86901 BLOOD TYPING SEROLOGIC RH(D): CPT

## 2022-06-28 PROCEDURE — 86850 RBC ANTIBODY SCREEN: CPT

## 2022-06-28 PROCEDURE — 6360000004 HC RX CONTRAST MEDICATION: Performed by: SURGERY

## 2022-06-28 PROCEDURE — 04QL0ZZ REPAIR LEFT FEMORAL ARTERY, OPEN APPROACH: ICD-10-PCS | Performed by: SURGERY

## 2022-06-28 PROCEDURE — 2100000000 HC CCU R&B

## 2022-06-28 PROCEDURE — 85025 COMPLETE CBC W/AUTO DIFF WBC: CPT

## 2022-06-28 PROCEDURE — 04CL3ZZ EXTIRPATION OF MATTER FROM LEFT FEMORAL ARTERY, PERCUTANEOUS APPROACH: ICD-10-PCS | Performed by: SURGERY

## 2022-06-28 PROCEDURE — 80048 BASIC METABOLIC PNL TOTAL CA: CPT

## 2022-06-28 DEVICE — IMPLANTABLE DEVICE: Type: IMPLANTABLE DEVICE | Site: LEG | Status: FUNCTIONAL

## 2022-06-28 RX ORDER — SODIUM CHLORIDE 0.9 % (FLUSH) 0.9 %
5-40 SYRINGE (ML) INJECTION EVERY 12 HOURS SCHEDULED
Status: DISCONTINUED | OUTPATIENT
Start: 2022-06-28 | End: 2022-06-28 | Stop reason: HOSPADM

## 2022-06-28 RX ORDER — PHENYLEPHRINE HCL IN 0.9% NACL 1 MG/10 ML
SYRINGE (ML) INTRAVENOUS PRN
Status: DISCONTINUED | OUTPATIENT
Start: 2022-06-28 | End: 2022-06-28 | Stop reason: SDUPTHER

## 2022-06-28 RX ORDER — HYDROCODONE BITARTRATE AND ACETAMINOPHEN 5; 325 MG/1; MG/1
1 TABLET ORAL EVERY 6 HOURS PRN
COMMUNITY
End: 2022-07-18

## 2022-06-28 RX ORDER — LORAZEPAM 2 MG/ML
0.5 INJECTION INTRAMUSCULAR
Status: DISCONTINUED | OUTPATIENT
Start: 2022-06-28 | End: 2022-06-28

## 2022-06-28 RX ORDER — PAPAVERINE HYDROCHLORIDE 30 MG/ML
INJECTION INTRAMUSCULAR; INTRAVENOUS
Status: COMPLETED | OUTPATIENT
Start: 2022-06-28 | End: 2022-06-28

## 2022-06-28 RX ORDER — INSULIN LISPRO 100 [IU]/ML
0-6 INJECTION, SOLUTION INTRAVENOUS; SUBCUTANEOUS NIGHTLY
Status: DISCONTINUED | OUTPATIENT
Start: 2022-06-28 | End: 2022-07-02 | Stop reason: HOSPADM

## 2022-06-28 RX ORDER — SODIUM CHLORIDE 9 MG/ML
INJECTION, SOLUTION INTRAVENOUS PRN
Status: DISCONTINUED | OUTPATIENT
Start: 2022-06-28 | End: 2022-06-28 | Stop reason: HOSPADM

## 2022-06-28 RX ORDER — MORPHINE SULFATE 2 MG/ML
2 INJECTION, SOLUTION INTRAMUSCULAR; INTRAVENOUS
Status: DISCONTINUED | OUTPATIENT
Start: 2022-06-28 | End: 2022-06-29

## 2022-06-28 RX ORDER — LISINOPRIL 20 MG/1
20 TABLET ORAL DAILY
Status: DISCONTINUED | OUTPATIENT
Start: 2022-06-29 | End: 2022-06-29

## 2022-06-28 RX ORDER — SODIUM CHLORIDE 0.9 % (FLUSH) 0.9 %
5-40 SYRINGE (ML) INJECTION PRN
Status: DISCONTINUED | OUTPATIENT
Start: 2022-06-28 | End: 2022-06-28 | Stop reason: HOSPADM

## 2022-06-28 RX ORDER — INSULIN LISPRO 100 [IU]/ML
0-12 INJECTION, SOLUTION INTRAVENOUS; SUBCUTANEOUS
Status: DISCONTINUED | OUTPATIENT
Start: 2022-06-29 | End: 2022-07-02 | Stop reason: HOSPADM

## 2022-06-28 RX ORDER — ONDANSETRON 4 MG/1
4 TABLET, ORALLY DISINTEGRATING ORAL EVERY 8 HOURS PRN
Status: DISCONTINUED | OUTPATIENT
Start: 2022-06-28 | End: 2022-07-02 | Stop reason: HOSPADM

## 2022-06-28 RX ORDER — ROCURONIUM BROMIDE 10 MG/ML
INJECTION, SOLUTION INTRAVENOUS PRN
Status: DISCONTINUED | OUTPATIENT
Start: 2022-06-28 | End: 2022-06-28 | Stop reason: SDUPTHER

## 2022-06-28 RX ORDER — KETAMINE HCL IN NACL, ISO-OSM 100MG/10ML
SYRINGE (ML) INJECTION PRN
Status: DISCONTINUED | OUTPATIENT
Start: 2022-06-28 | End: 2022-06-28 | Stop reason: SDUPTHER

## 2022-06-28 RX ORDER — MORPHINE SULFATE 4 MG/ML
4 INJECTION, SOLUTION INTRAMUSCULAR; INTRAVENOUS
Status: DISCONTINUED | OUTPATIENT
Start: 2022-06-28 | End: 2022-06-29

## 2022-06-28 RX ORDER — ONDANSETRON 2 MG/ML
4 INJECTION INTRAMUSCULAR; INTRAVENOUS
Status: DISCONTINUED | OUTPATIENT
Start: 2022-06-28 | End: 2022-06-28

## 2022-06-28 RX ORDER — SODIUM CHLORIDE 0.9 % (FLUSH) 0.9 %
5-40 SYRINGE (ML) INJECTION EVERY 12 HOURS SCHEDULED
Status: DISCONTINUED | OUTPATIENT
Start: 2022-06-28 | End: 2022-07-02 | Stop reason: HOSPADM

## 2022-06-28 RX ORDER — OXYCODONE HYDROCHLORIDE 5 MG/1
5 TABLET ORAL EVERY 4 HOURS PRN
Status: DISCONTINUED | OUTPATIENT
Start: 2022-06-28 | End: 2022-07-02 | Stop reason: HOSPADM

## 2022-06-28 RX ORDER — DEXAMETHASONE SODIUM PHOSPHATE 4 MG/ML
INJECTION, SOLUTION INTRA-ARTICULAR; INTRALESIONAL; INTRAMUSCULAR; INTRAVENOUS; SOFT TISSUE PRN
Status: DISCONTINUED | OUTPATIENT
Start: 2022-06-28 | End: 2022-06-28 | Stop reason: SDUPTHER

## 2022-06-28 RX ORDER — ALBUMIN, HUMAN INJ 5% 5 %
SOLUTION INTRAVENOUS PRN
Status: DISCONTINUED | OUTPATIENT
Start: 2022-06-28 | End: 2022-06-28 | Stop reason: SDUPTHER

## 2022-06-28 RX ORDER — LIDOCAINE HYDROCHLORIDE 20 MG/ML
INJECTION, SOLUTION EPIDURAL; INFILTRATION; INTRACAUDAL; PERINEURAL PRN
Status: DISCONTINUED | OUTPATIENT
Start: 2022-06-28 | End: 2022-06-28 | Stop reason: SDUPTHER

## 2022-06-28 RX ORDER — SODIUM CHLORIDE 9 MG/ML
INJECTION, SOLUTION INTRAVENOUS PRN
Status: DISCONTINUED | OUTPATIENT
Start: 2022-06-28 | End: 2022-06-29

## 2022-06-28 RX ORDER — HALOPERIDOL 5 MG/ML
1 INJECTION INTRAMUSCULAR
Status: DISCONTINUED | OUTPATIENT
Start: 2022-06-28 | End: 2022-06-28

## 2022-06-28 RX ORDER — OXYCODONE HYDROCHLORIDE 10 MG/1
10 TABLET ORAL EVERY 4 HOURS PRN
Status: DISCONTINUED | OUTPATIENT
Start: 2022-06-28 | End: 2022-07-02 | Stop reason: HOSPADM

## 2022-06-28 RX ORDER — HEPARIN SODIUM 10000 [USP'U]/ML
INJECTION, SOLUTION INTRAVENOUS; SUBCUTANEOUS PRN
Status: DISCONTINUED | OUTPATIENT
Start: 2022-06-28 | End: 2022-06-28 | Stop reason: SDUPTHER

## 2022-06-28 RX ORDER — ONDANSETRON 2 MG/ML
INJECTION INTRAMUSCULAR; INTRAVENOUS PRN
Status: DISCONTINUED | OUTPATIENT
Start: 2022-06-28 | End: 2022-06-28 | Stop reason: SDUPTHER

## 2022-06-28 RX ORDER — FENTANYL CITRATE 50 UG/ML
50 INJECTION, SOLUTION INTRAMUSCULAR; INTRAVENOUS EVERY 5 MIN PRN
Status: DISCONTINUED | OUTPATIENT
Start: 2022-06-28 | End: 2022-06-28

## 2022-06-28 RX ORDER — FENTANYL CITRATE 50 UG/ML
INJECTION, SOLUTION INTRAMUSCULAR; INTRAVENOUS PRN
Status: DISCONTINUED | OUTPATIENT
Start: 2022-06-28 | End: 2022-06-28 | Stop reason: SDUPTHER

## 2022-06-28 RX ORDER — SODIUM CHLORIDE 9 MG/ML
INJECTION, SOLUTION INTRAVENOUS PRN
Status: DISCONTINUED | OUTPATIENT
Start: 2022-06-28 | End: 2022-07-02 | Stop reason: HOSPADM

## 2022-06-28 RX ORDER — SODIUM CHLORIDE 9 MG/ML
INJECTION, SOLUTION INTRAVENOUS CONTINUOUS
Status: DISCONTINUED | OUTPATIENT
Start: 2022-06-28 | End: 2022-06-29

## 2022-06-28 RX ORDER — OXYCODONE HYDROCHLORIDE 5 MG/1
5 TABLET ORAL
Status: DISCONTINUED | OUTPATIENT
Start: 2022-06-28 | End: 2022-06-28

## 2022-06-28 RX ORDER — SODIUM CHLORIDE 0.9 % (FLUSH) 0.9 %
5-40 SYRINGE (ML) INJECTION EVERY 12 HOURS SCHEDULED
Status: DISCONTINUED | OUTPATIENT
Start: 2022-06-28 | End: 2022-06-28

## 2022-06-28 RX ORDER — SODIUM CHLORIDE 0.9 % (FLUSH) 0.9 %
5-40 SYRINGE (ML) INJECTION PRN
Status: DISCONTINUED | OUTPATIENT
Start: 2022-06-28 | End: 2022-07-02 | Stop reason: HOSPADM

## 2022-06-28 RX ORDER — ONDANSETRON 2 MG/ML
4 INJECTION INTRAMUSCULAR; INTRAVENOUS EVERY 6 HOURS PRN
Status: DISCONTINUED | OUTPATIENT
Start: 2022-06-28 | End: 2022-07-02 | Stop reason: HOSPADM

## 2022-06-28 RX ORDER — ATORVASTATIN CALCIUM 10 MG/1
10 TABLET, FILM COATED ORAL DAILY
Status: DISCONTINUED | OUTPATIENT
Start: 2022-06-29 | End: 2022-07-02 | Stop reason: HOSPADM

## 2022-06-28 RX ORDER — SODIUM CHLORIDE 0.9 % (FLUSH) 0.9 %
5-40 SYRINGE (ML) INJECTION PRN
Status: DISCONTINUED | OUTPATIENT
Start: 2022-06-28 | End: 2022-06-29

## 2022-06-28 RX ORDER — FAMOTIDINE 20 MG/1
20 TABLET, FILM COATED ORAL DAILY
Status: DISCONTINUED | OUTPATIENT
Start: 2022-06-29 | End: 2022-07-02 | Stop reason: HOSPADM

## 2022-06-28 RX ORDER — SODIUM CHLORIDE, SODIUM LACTATE, POTASSIUM CHLORIDE, CALCIUM CHLORIDE 600; 310; 30; 20 MG/100ML; MG/100ML; MG/100ML; MG/100ML
INJECTION, SOLUTION INTRAVENOUS CONTINUOUS PRN
Status: DISCONTINUED | OUTPATIENT
Start: 2022-06-28 | End: 2022-06-28 | Stop reason: SDUPTHER

## 2022-06-28 RX ORDER — FAMOTIDINE 20 MG/1
20 TABLET, FILM COATED ORAL 2 TIMES DAILY
Status: DISCONTINUED | OUTPATIENT
Start: 2022-06-28 | End: 2022-06-28 | Stop reason: DRUGHIGH

## 2022-06-28 RX ORDER — ASPIRIN 81 MG/1
81 TABLET ORAL DAILY
Status: DISCONTINUED | OUTPATIENT
Start: 2022-06-29 | End: 2022-07-02 | Stop reason: HOSPADM

## 2022-06-28 RX ORDER — GLIPIZIDE 5 MG/1
2.5 TABLET ORAL
Status: DISCONTINUED | OUTPATIENT
Start: 2022-06-29 | End: 2022-07-02 | Stop reason: HOSPADM

## 2022-06-28 RX ORDER — FUROSEMIDE 20 MG/1
20 TABLET ORAL DAILY
Status: DISCONTINUED | OUTPATIENT
Start: 2022-06-29 | End: 2022-06-29

## 2022-06-28 RX ORDER — PROPOFOL 10 MG/ML
INJECTION, EMULSION INTRAVENOUS PRN
Status: DISCONTINUED | OUTPATIENT
Start: 2022-06-28 | End: 2022-06-28 | Stop reason: SDUPTHER

## 2022-06-28 RX ORDER — DIPHENHYDRAMINE HYDROCHLORIDE 50 MG/ML
12.5 INJECTION INTRAMUSCULAR; INTRAVENOUS
Status: DISCONTINUED | OUTPATIENT
Start: 2022-06-28 | End: 2022-06-28

## 2022-06-28 RX ORDER — GLYCOPYRROLATE 0.2 MG/ML
INJECTION INTRAMUSCULAR; INTRAVENOUS PRN
Status: DISCONTINUED | OUTPATIENT
Start: 2022-06-28 | End: 2022-06-28 | Stop reason: SDUPTHER

## 2022-06-28 RX ORDER — DEXTROSE MONOHYDRATE 50 MG/ML
100 INJECTION, SOLUTION INTRAVENOUS PRN
Status: DISCONTINUED | OUTPATIENT
Start: 2022-06-28 | End: 2022-07-02 | Stop reason: HOSPADM

## 2022-06-28 RX ADMIN — CEFAZOLIN SODIUM 2000 MG: 10 INJECTION, POWDER, FOR SOLUTION INTRAVENOUS at 14:40

## 2022-06-28 RX ADMIN — Medication 100 MCG: at 14:38

## 2022-06-28 RX ADMIN — ROCURONIUM BROMIDE 10 MG: 10 INJECTION INTRAVENOUS at 19:18

## 2022-06-28 RX ADMIN — PROPOFOL 40 MG: 10 INJECTION, EMULSION INTRAVENOUS at 14:31

## 2022-06-28 RX ADMIN — FENTANYL CITRATE 50 MCG: 50 INJECTION INTRAMUSCULAR; INTRAVENOUS at 14:22

## 2022-06-28 RX ADMIN — FENTANYL CITRATE 50 MCG: 50 INJECTION INTRAMUSCULAR; INTRAVENOUS at 14:31

## 2022-06-28 RX ADMIN — SODIUM CHLORIDE, SODIUM LACTATE, POTASSIUM CHLORIDE, AND CALCIUM CHLORIDE: .6; .31; .03; .02 INJECTION, SOLUTION INTRAVENOUS at 19:06

## 2022-06-28 RX ADMIN — PROPOFOL 20 MG: 10 INJECTION, EMULSION INTRAVENOUS at 18:30

## 2022-06-28 RX ADMIN — SODIUM CHLORIDE, SODIUM LACTATE, POTASSIUM CHLORIDE, CALCIUM CHLORIDE: 600; 310; 30; 20 INJECTION, SOLUTION INTRAVENOUS at 16:20

## 2022-06-28 RX ADMIN — HYDROMORPHONE HYDROCHLORIDE 0.25 MG: 1 INJECTION, SOLUTION INTRAMUSCULAR; INTRAVENOUS; SUBCUTANEOUS at 20:57

## 2022-06-28 RX ADMIN — SODIUM CHLORIDE: 9 INJECTION, SOLUTION INTRAVENOUS at 22:17

## 2022-06-28 RX ADMIN — HYDROMORPHONE HYDROCHLORIDE 0.25 MG: 1 INJECTION, SOLUTION INTRAMUSCULAR; INTRAVENOUS; SUBCUTANEOUS at 16:26

## 2022-06-28 RX ADMIN — ONDANSETRON 4 MG: 2 INJECTION INTRAMUSCULAR; INTRAVENOUS at 14:37

## 2022-06-28 RX ADMIN — PROPOFOL 30 MG: 10 INJECTION, EMULSION INTRAVENOUS at 15:16

## 2022-06-28 RX ADMIN — LIDOCAINE HYDROCHLORIDE 50 MG: 20 INJECTION, SOLUTION EPIDURAL; INFILTRATION; INTRACAUDAL; PERINEURAL at 14:31

## 2022-06-28 RX ADMIN — PROPOFOL 50 MG: 10 INJECTION, EMULSION INTRAVENOUS at 16:25

## 2022-06-28 RX ADMIN — HYDROMORPHONE HYDROCHLORIDE 0.25 MG: 1 INJECTION, SOLUTION INTRAMUSCULAR; INTRAVENOUS; SUBCUTANEOUS at 18:29

## 2022-06-28 RX ADMIN — HYDROMORPHONE HYDROCHLORIDE 0.25 MG: 1 INJECTION, SOLUTION INTRAMUSCULAR; INTRAVENOUS; SUBCUTANEOUS at 15:19

## 2022-06-28 RX ADMIN — ROCURONIUM BROMIDE 50 MG: 10 INJECTION INTRAVENOUS at 14:31

## 2022-06-28 RX ADMIN — Medication 15 MG: at 14:31

## 2022-06-28 RX ADMIN — PHENYLEPHRINE HYDROCHLORIDE 50 MCG: 10 INJECTION INTRAVENOUS at 18:45

## 2022-06-28 RX ADMIN — CEFAZOLIN SODIUM 2000 MG: 10 INJECTION, POWDER, FOR SOLUTION INTRAVENOUS at 18:38

## 2022-06-28 RX ADMIN — DEXAMETHASONE SODIUM PHOSPHATE 4 MG: 4 INJECTION, SOLUTION INTRAMUSCULAR; INTRAVENOUS at 14:37

## 2022-06-28 RX ADMIN — ROCURONIUM BROMIDE 20 MG: 10 INJECTION INTRAVENOUS at 16:26

## 2022-06-28 RX ADMIN — PHENYLEPHRINE HYDROCHLORIDE 25 MCG/MIN: 10 INJECTION INTRAVENOUS at 14:44

## 2022-06-28 RX ADMIN — SODIUM CHLORIDE: 9 INJECTION, SOLUTION INTRAVENOUS at 16:53

## 2022-06-28 RX ADMIN — ROCURONIUM BROMIDE 10 MG: 10 INJECTION INTRAVENOUS at 17:51

## 2022-06-28 RX ADMIN — PROPOFOL 50 MG: 10 INJECTION, EMULSION INTRAVENOUS at 20:54

## 2022-06-28 RX ADMIN — SODIUM CHLORIDE: 9 INJECTION, SOLUTION INTRAVENOUS at 11:08

## 2022-06-28 RX ADMIN — HEPARIN SODIUM 3000 UNITS: 10000 INJECTION INTRAVENOUS; SUBCUTANEOUS at 16:37

## 2022-06-28 RX ADMIN — PHENYLEPHRINE HYDROCHLORIDE 100 MCG: 10 INJECTION INTRAVENOUS at 21:03

## 2022-06-28 RX ADMIN — HEPARIN SODIUM 500 UNITS: 10000 INJECTION INTRAVENOUS; SUBCUTANEOUS at 19:09

## 2022-06-28 RX ADMIN — ALBUMIN (HUMAN) 250 ML: 12.5 INJECTION, SOLUTION INTRAVENOUS at 15:56

## 2022-06-28 RX ADMIN — HEPARIN SODIUM 1000 UNITS: 10000 INJECTION INTRAVENOUS; SUBCUTANEOUS at 17:19

## 2022-06-28 RX ADMIN — SODIUM CHLORIDE, SODIUM LACTATE, POTASSIUM CHLORIDE, AND CALCIUM CHLORIDE: .6; .31; .03; .02 INJECTION, SOLUTION INTRAVENOUS at 16:01

## 2022-06-28 RX ADMIN — Medication 100 MCG: at 14:44

## 2022-06-28 RX ADMIN — GLYCOPYRROLATE 0.2 MG: 0.2 INJECTION, SOLUTION INTRAMUSCULAR; INTRAVENOUS at 14:38

## 2022-06-28 ASSESSMENT — PAIN - FUNCTIONAL ASSESSMENT: PAIN_FUNCTIONAL_ASSESSMENT: 0-10

## 2022-06-28 ASSESSMENT — PAIN DESCRIPTION - DESCRIPTORS: DESCRIPTORS: BURNING;TINGLING

## 2022-06-28 ASSESSMENT — ENCOUNTER SYMPTOMS: SHORTNESS OF BREATH: 0

## 2022-06-28 NOTE — ANESTHESIA PRE PROCEDURE
Department of Anesthesiology  Preprocedure Note       Name:  Thayne Mohs   Age:  80 y.o.  :  1929                                          MRN:  [id number]         Date:  2022      Surgeon: Dulce Mcknight):  Naima Ruiz MD    Procedure: Procedure(s):  LEFT LEG FEMORAL TO POPLITEAL BYPASS    Medications prior to admission:   Prior to Admission medications    Medication Sig Start Date End Date Taking? Authorizing Provider   HYDROcodone-acetaminophen (NORCO) 5-325 MG per tablet Take 1 tablet by mouth every 6 hours as needed for Pain. Historical Provider, MD   traMADol (ULTRAM) 50 MG tablet Take 25 mg by mouth every 6 hours as needed for Pain. Historical Provider, MD   acetaminophen (TYLENOL) 325 MG tablet Take 650 mg by mouth every 6 hours as needed for Pain    Historical Provider, MD   glimepiride (AMARYL) 1 MG tablet TAKE ONE TABLET BY MOUTH EVERY MORNING BEFORE BREAKFAST 22   Paulo Forman DO   hydrOXYzine (VISTARIL) 25 MG capsule Take 1 capsule by mouth daily as needed for Anxiety  Patient not taking: Reported on 2022   Paulo Forman DO   atorvastatin (LIPITOR) 10 MG tablet TAKE ONE TABLET BY MOUTH DAILY 22   Paulo Forman DO   Lite Touch Lancets MISC Use twice daily when testing blood sugars.  3/28/22   Paulo Forman DO   Wound Cleansers (VASHE CLEANSING) SOLN Soak vashe on gauze pad and place on wound for 5-10 minutes 3/10/22   Yaa Tran MD   lisinopril (PRINIVIL;ZESTRIL) 20 MG tablet TAKE ONE TABLET BY MOUTH DAILY 3/1/22   Paulo Forman DO   furosemide (LASIX) 20 MG tablet Take 1 tablet by mouth daily 22   Nikhil Perdomo MD   blood glucose test strips (ONETOUCH ULTRA) strip USE TO TEST BLOOD SUGAR TWICE DAILY 21   Paulo Forman DO   metFORMIN (GLUCOPHAGE) 500 MG tablet Take 1 tablet by mouth daily 21   Paulo Forman DO   Turmeric 450 MG CAPS Take 450 mg by mouth daily    Historical Provider, MD   Cholecalciferol (VITAMIN D3) 2000 UNITS CAPS Take 1 capsule by mouth daily    Historical Provider, MD   aspirin EC 81 MG EC tablet Take 1 tablet by mouth daily 5/13/15   Paulo Ayala Show, DO       Current medications:    No current facility-administered medications for this visit. No current outpatient medications on file. Facility-Administered Medications Ordered in Other Visits   Medication Dose Route Frequency Provider Last Rate Last Admin    sodium chloride flush 0.9 % injection 5-40 mL  5-40 mL IntraVENous 2 times per day Philomena Donald MD        sodium chloride flush 0.9 % injection 5-40 mL  5-40 mL IntraVENous PRN Philomena Donald MD        0.9 % sodium chloride infusion   IntraVENous PRN Philomena Donald MD 75 mL/hr at 06/28/22 1108 New Bag at 06/28/22 1108    ceFAZolin (ANCEF) 2000 mg in dextrose 5 % 100 mL IVPB  2,000 mg IntraVENous Once Ray Pedroza MD           Allergies:     Allergies   Allergen Reactions    Aricept [Donepezil Hydrochloride] Other (See Comments)     Pt unable to recall reaction    Doxycycline Nausea Only    Ketorolac Tromethamine Other (See Comments)     Pt unable to recall reaction       Problem List:    Patient Active Problem List   Diagnosis Code    Urethral stricture N35.919    Spinal stenosis M48.00    Lumbar stenosis M48.061    Spondylolisthesis of lumbosacral region M43.17    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 ([address] E11.59    Atherosclerosis of artery of extremity with ulceration ([address] I70.299, L97.909    Benign essential HTN I10    Lumbar foraminal stenosis M48.061    DDD (degenerative disc disease), lumbar M51.36    Spinal stenosis of lumbar region with neurogenic claudication M48.062    Type 2 diabetes, controlled, with neuropathy ([address] E11.40    Severe mitral regurgitation I34.0    Venous insufficiency of both lower extremities I87.2    Hyperlipidemia E78.5    Hip arthritis M16.10    Status post carmen arthroplasty replacement of left hip 1999 Z96.642    Femoral artery stenosis, right ([address] I70.201    Osteoporosis M81.0    Osteopenia M85.80    Femoral-popliteal bypass graft occlusion, left ([facility]) T82.898A    Pain due to onychomycosis of nail B35.1, M79.609    Coronary artery disease involving native coronary artery of native heart without angina pectoris I25.10    Diabetic peripheral neuropathy ([facility]) E11.42    Arthritis of left knee M17.12    Trigger middle finger of right hand M65.331    Carpal tunnel syndrome, right G56.01    Vitamin D deficiency E55.9    Carotid stenosis, asymptomatic, bilateral I65.23    Fingernail problem L60.9    TIA (transient ischemic attack) G45.9    Sacral fracture, closed ([facility]) S32.10XA    Closed fracture of ramus of right pubis ([facility]) S32.591A    Acute pain of left lower extremity M79.605    Diabetes mellitus type 2 with peripheral artery disease ([facility]) E11.51    Open wound of left knee S81.002A    Nonhealing nonsurgical wound limited to breakdown of skin T14. 8XXA    Peripheral vascular disease, unspecified ([address] I73.9    Wound of left leg S81.802A    Peripheral artery disease ([facility]) I73.9    Cervical spondylosis M47.812       Past Medical History:        Diagnosis Date    Anxiety     Arthritis     At risk for falls     CAD (coronary artery disease)     Cerebral artery occlusion with cerebral infarction ([facility])     TIA--no residual effects    DDD (degenerative disc disease), cervical     Fractures     (L) Hip Fx 4/25/98, L1 fracture from fall 10-31-06    Hyperlipidemia     Hypertension     Mitral regurgitation     Neuropathy     Osteopenia     Osteoporosis     PAD (peripheral artery disease) ([facility])     Right LE ischemia    Peripheral neuropathy 6/1/2015    Peripheral vascular disease ([address]     bilateral lower extremities with edema    Podagra 01/09/2017    Dr. Nii Moran    Prolonged emergence from general anesthesia     Spinal stenosis     L4-5    Type 2 diabetes mellitus ([address]     Urethral stricture 5 years ago   [facility] Urgency of urination        Past Surgical History:        Procedure Laterality Date    ANGIOPLASTY Left 04/18/2022    Left SFA    APPENDECTOMY      CARPAL TUNNEL RELEASE Right 03/29/2019    RIGHT CARPAL TUNNEL RELEASE AND RIGHT MIDDLE FINGER TRIGGER FINGER RELEASE performed by Faizan Alexander MD at [address]  [id number]    LEFT EYE EYE CATARACT PHACOEMULSIFICATION INTRAOCULAR LENS    CHOLECYSTECTOMY  01/01/1978    COLONOSCOPY  08/31/1999    diverticulosis    EYE SURGERY Bilateral     bilateral cataract removed    FEMORAL ENDARTERECTOMY Left 02/14/2022    LEFT COMMON FEMORAL ARTERY ENDARTERECTOMY performed by Edwin Allen MD at [address] [place] ([address] [place] [address])  [address] [place] [address]    IR FEMORAL POPLITEAL BYPASS GRAFT Right 08/31/2015    KYPHOSIS SURGERY  11/07/2006    L1, (fractured after a fall)    LEG SURGERY Left 02/14/2022    LEFT LEG WOUND DEBRIDEMENT performed by Edwin Allen MD at [address] [facility],  [address]  04/25/1998    (L) THR    WRIST FRACTURE SURGERY  01/01/2008    left wrist       Social History:    Social History     Tobacco Use    Smoking status: Never Smoker    Smokeless tobacco: Never Used   Substance Use Topics    Alcohol use: No                                Counseling given: Not Answered      Vital Signs (Current): There were no vitals filed for this visit.                                            BP Readings from Last 3 Encounters:   06/28/22 (!) 146/54   06/20/22 (!) 154/68   06/19/22 (!) 183/56       NPO Status:                                                                                 BMI:   Wt Readings from Last 3 Encounters:   06/28/22 100 lb (45.4 kg)   06/17/22 106 lb (48.1 kg)   06/16/22 106 lb (48.1 kg)     There is no height or weight on file to calculate BMI.    CBC:   Lab Results   Component Value Date    WBC 5.3 06/28/2022    RBC 3.59 06/28/2022    HGB 11.2 06/28/2022    HCT 32.1 06/28/2022    MCV 89.4 06/28/2022    RDW 14.4 06/28/2022     06/28/2022       CMP:   Lab Results   Component Value Date     06/19/2022    K 5.1 06/19/2022     06/19/2022    CO2 26 06/19/2022    BUN 24 06/19/2022    CREATININE 1.4 06/19/2022    GFRAA 42 06/19/2022    GFRAA >60 05/14/2013    AGRATIO 1.9 04/18/2022    LABGLOM 35 06/19/2022    GLUCOSE 102 06/19/2022    PROT 6.4 04/18/2022    PROT 6.4 07/26/2012    CALCIUM 9.1 06/19/2022    BILITOT 0.3 04/18/2022    ALKPHOS 65 04/18/2022    AST 19 04/18/2022    ALT 12 04/18/2022       POC Tests:   Recent Labs     06/28/22  1108   POCGLU 127*       Coags:   Lab Results   Component Value Date    PROTIME 14.0 06/20/2022    INR 1.09 06/20/2022    APTT 36.4 06/20/2022       HCG (If Applicable): No results found for: PREGTESTUR, PREGSERUM, HCG, HCGQUANT     ABGs: No results found for: PHART, PO2ART, UNU1LSE, UFB7HZH, BEART, I1ODSBJH     Type & Screen (If Applicable):  No results found for: LABABO, LABRH    Drug/Infectious Status (If Applicable):  No results found for: HIV, HEPCAB    COVID-19 Screening (If Applicable):   Lab Results   Component Value Date    COVID19 Not Detected 02/10/2022           Anesthesia Evaluation  Patient summary reviewed and Nursing notes reviewed no history of anesthetic complications:   Airway: Mallampati: II  TM distance: >3 FB   Neck ROM: full  Mouth opening: > = 3 FB   Dental: normal exam   (+) upper dentures      Pulmonary:       (-) asthma and shortness of breath                           Cardiovascular:    (+) hypertension:, valvular problems/murmurs: MR, CAD:, hyperlipidemia    (-)  angina          Echocardiogram reviewed                  Neuro/Psych:   (+) TIA,    (-) CVA           GI/Hepatic/Renal:        (-) GERD and liver disease       Endo/Other:    (+) DiabetesType II DM, , : arthritis: OA., .    (-) hypothyroidism               Abdominal:             Vascular:   + PVD, aortic or cerebral, .        Other Findings: Anesthesia Plan      general     ASA 4     (DNR suspended)  Induction: intravenous. MIPS: Postoperative opioids intended and Prophylactic antiemetics administered. Anesthetic plan and risks discussed with patient and healthcare power of . Use of blood products discussed with patient whom. Plan discussed with CRNA. This pre-anesthesia assessment may be used as a history and physical.    DOS STAFF ADDENDUM:    Pt seen and examined, chart reviewed (including anesthesia, drug and allergy history). No interval changes to history and physical examination. Anesthetic plan, risks, benefits, alternatives, and personnel involved discussed with patient. Patient verbalized an understanding and agrees to proceed.       Sarai Lange MD  June 28, 2022  11:27 AM

## 2022-06-28 NOTE — H&P
Update History & Physical    The patient's History and Physical of June 17, 2022 was reviewed with the patient and I examined the patient. There was no change. The surgical site was confirmed by the patient and me. Plan: The risks, benefits, expected outcome, and alternative to the recommended procedure have been discussed with the patient. Patient understands and wants to proceed with the procedure.      Electronically signed by Misty Wiley MD on 6/28/2022 at 1:31 PM

## 2022-06-29 ENCOUNTER — CARE COORDINATION (OUTPATIENT)
Dept: CARE COORDINATION | Age: 87
End: 2022-06-29

## 2022-06-29 LAB
ANION GAP SERPL CALCULATED.3IONS-SCNC: 16 MMOL/L (ref 3–16)
ANION GAP SERPL CALCULATED.3IONS-SCNC: 17 MMOL/L (ref 3–16)
ANION GAP SERPL CALCULATED.3IONS-SCNC: 18 MMOL/L (ref 3–16)
BACTERIA: ABNORMAL /HPF
BILIRUBIN URINE: NEGATIVE
BLOOD, URINE: ABNORMAL
BUN BLDV-MCNC: 29 MG/DL (ref 7–20)
BUN BLDV-MCNC: 29 MG/DL (ref 7–20)
BUN BLDV-MCNC: 35 MG/DL (ref 7–20)
CALCIUM SERPL-MCNC: 7.7 MG/DL (ref 8.3–10.6)
CALCIUM SERPL-MCNC: 7.9 MG/DL (ref 8.3–10.6)
CALCIUM SERPL-MCNC: 8.1 MG/DL (ref 8.3–10.6)
CHLORIDE BLD-SCNC: 103 MMOL/L (ref 99–110)
CHLORIDE BLD-SCNC: 104 MMOL/L (ref 99–110)
CHLORIDE BLD-SCNC: 104 MMOL/L (ref 99–110)
CLARITY: ABNORMAL
CO2: 12 MMOL/L (ref 21–32)
CO2: 16 MMOL/L (ref 21–32)
CO2: 21 MMOL/L (ref 21–32)
COLOR: YELLOW
COMMENT UA: ABNORMAL
CREAT SERPL-MCNC: 1.5 MG/DL (ref 0.6–1.2)
CREAT SERPL-MCNC: 1.6 MG/DL (ref 0.6–1.2)
CREAT SERPL-MCNC: 1.9 MG/DL (ref 0.6–1.2)
CREATININE URINE: 84.9 MG/DL (ref 28–259)
EPITHELIAL CELLS, UA: 4 /HPF (ref 0–5)
FERRITIN: 701.3 NG/ML (ref 15–150)
FOLATE: >20 NG/ML (ref 4.78–24.2)
GFR AFRICAN AMERICAN: 30
GFR AFRICAN AMERICAN: 36
GFR AFRICAN AMERICAN: 39
GFR NON-AFRICAN AMERICAN: 25
GFR NON-AFRICAN AMERICAN: 30
GFR NON-AFRICAN AMERICAN: 32
GLUCOSE BLD-MCNC: 145 MG/DL (ref 70–99)
GLUCOSE BLD-MCNC: 167 MG/DL (ref 70–99)
GLUCOSE BLD-MCNC: 174 MG/DL (ref 70–99)
GLUCOSE BLD-MCNC: 189 MG/DL (ref 70–99)
GLUCOSE BLD-MCNC: 222 MG/DL (ref 70–99)
GLUCOSE BLD-MCNC: 223 MG/DL (ref 70–99)
GLUCOSE BLD-MCNC: 228 MG/DL (ref 70–99)
GLUCOSE BLD-MCNC: 236 MG/DL (ref 70–99)
GLUCOSE BLD-MCNC: 243 MG/DL (ref 70–99)
GLUCOSE BLD-MCNC: 278 MG/DL (ref 70–99)
GLUCOSE BLD-MCNC: 448 MG/DL (ref 70–99)
GLUCOSE URINE: NEGATIVE MG/DL
HCT VFR BLD CALC: 27.8 % (ref 36–48)
HEMOGLOBIN: 9.3 G/DL (ref 12–16)
HYALINE CASTS: 8 /LPF (ref 0–8)
IRON SATURATION: 31 % (ref 15–50)
IRON: 41 UG/DL (ref 37–145)
KETONES, URINE: ABNORMAL MG/DL
LEUKOCYTE ESTERASE, URINE: ABNORMAL
MCH RBC QN AUTO: 30.1 PG (ref 26–34)
MCHC RBC AUTO-ENTMCNC: 33.3 G/DL (ref 31–36)
MCV RBC AUTO: 90.2 FL (ref 80–100)
MICROSCOPIC EXAMINATION: YES
NITRITE, URINE: NEGATIVE
PDW BLD-RTO: 15.5 % (ref 12.4–15.4)
PERFORMED ON: ABNORMAL
PH UA: 5 (ref 5–8)
PLATELET # BLD: 164 K/UL (ref 135–450)
PMV BLD AUTO: 7.3 FL (ref 5–10.5)
POTASSIUM SERPL-SCNC: 4.6 MMOL/L (ref 3.5–5.1)
POTASSIUM SERPL-SCNC: 6.5 MMOL/L (ref 3.5–5.1)
POTASSIUM SERPL-SCNC: 6.8 MMOL/L (ref 3.5–5.1)
PROTEIN UA: 30 MG/DL
RBC # BLD: 3.09 M/UL (ref 4–5.2)
RBC UA: ABNORMAL /HPF (ref 0–4)
REASON FOR REJECTION: NORMAL
REJECTED TEST: NORMAL
SODIUM BLD-SCNC: 133 MMOL/L (ref 136–145)
SODIUM BLD-SCNC: 136 MMOL/L (ref 136–145)
SODIUM BLD-SCNC: 142 MMOL/L (ref 136–145)
SODIUM URINE: 27 MMOL/L
SPECIFIC GRAVITY UA: 1.03 (ref 1–1.03)
TOTAL IRON BINDING CAPACITY: 131 UG/DL (ref 260–445)
TRANSFERRIN: 112 MG/DL (ref 200–360)
UREA NITROGEN, UR: 253.9 MG/DL (ref 800–1666)
URINE REFLEX TO CULTURE: YES
URINE TYPE: ABNORMAL
UROBILINOGEN, URINE: 0.2 E.U./DL
VITAMIN B-12: 969 PG/ML (ref 211–911)
WBC # BLD: 25.3 K/UL (ref 4–11)
WBC UA: 1985 /HPF (ref 0–5)

## 2022-06-29 PROCEDURE — 51798 US URINE CAPACITY MEASURE: CPT

## 2022-06-29 PROCEDURE — 6360000002 HC RX W HCPCS: Performed by: SURGERY

## 2022-06-29 PROCEDURE — 84300 ASSAY OF URINE SODIUM: CPT

## 2022-06-29 PROCEDURE — 2580000003 HC RX 258: Performed by: SURGERY

## 2022-06-29 PROCEDURE — 2580000003 HC RX 258: Performed by: INTERNAL MEDICINE

## 2022-06-29 PROCEDURE — 2100000000 HC CCU R&B

## 2022-06-29 PROCEDURE — 83540 ASSAY OF IRON: CPT

## 2022-06-29 PROCEDURE — 99024 POSTOP FOLLOW-UP VISIT: CPT | Performed by: SURGERY

## 2022-06-29 PROCEDURE — 6370000000 HC RX 637 (ALT 250 FOR IP): Performed by: INTERNAL MEDICINE

## 2022-06-29 PROCEDURE — 85027 COMPLETE CBC AUTOMATED: CPT

## 2022-06-29 PROCEDURE — 81001 URINALYSIS AUTO W/SCOPE: CPT

## 2022-06-29 PROCEDURE — 6360000002 HC RX W HCPCS: Performed by: INTERNAL MEDICINE

## 2022-06-29 PROCEDURE — 2500000003 HC RX 250 WO HCPCS: Performed by: INTERNAL MEDICINE

## 2022-06-29 PROCEDURE — 82746 ASSAY OF FOLIC ACID SERUM: CPT

## 2022-06-29 PROCEDURE — 87077 CULTURE AEROBIC IDENTIFY: CPT

## 2022-06-29 PROCEDURE — 2500000003 HC RX 250 WO HCPCS: Performed by: SURGERY

## 2022-06-29 PROCEDURE — 82607 VITAMIN B-12: CPT

## 2022-06-29 PROCEDURE — 82728 ASSAY OF FERRITIN: CPT

## 2022-06-29 PROCEDURE — 87086 URINE CULTURE/COLONY COUNT: CPT

## 2022-06-29 PROCEDURE — 87186 SC STD MICRODIL/AGAR DIL: CPT

## 2022-06-29 PROCEDURE — P9041 ALBUMIN (HUMAN),5%, 50ML: HCPCS | Performed by: INTERNAL MEDICINE

## 2022-06-29 PROCEDURE — 36415 COLL VENOUS BLD VENIPUNCTURE: CPT

## 2022-06-29 PROCEDURE — 84540 ASSAY OF URINE/UREA-N: CPT

## 2022-06-29 PROCEDURE — 6370000000 HC RX 637 (ALT 250 FOR IP): Performed by: SURGERY

## 2022-06-29 PROCEDURE — 84466 ASSAY OF TRANSFERRIN: CPT

## 2022-06-29 PROCEDURE — 80048 BASIC METABOLIC PNL TOTAL CA: CPT

## 2022-06-29 PROCEDURE — 82570 ASSAY OF URINE CREATININE: CPT

## 2022-06-29 RX ORDER — MIDODRINE HYDROCHLORIDE 5 MG/1
2.5 TABLET ORAL
Status: DISCONTINUED | OUTPATIENT
Start: 2022-06-29 | End: 2022-06-30

## 2022-06-29 RX ORDER — DEXTROSE MONOHYDRATE 25 G/50ML
25 INJECTION, SOLUTION INTRAVENOUS ONCE
Status: DISCONTINUED | OUTPATIENT
Start: 2022-06-29 | End: 2022-06-29 | Stop reason: SDUPTHER

## 2022-06-29 RX ORDER — 0.9 % SODIUM CHLORIDE 0.9 %
500 INTRAVENOUS SOLUTION INTRAVENOUS ONCE
Status: COMPLETED | OUTPATIENT
Start: 2022-06-29 | End: 2022-06-29

## 2022-06-29 RX ORDER — ALBUMIN, HUMAN INJ 5% 5 %
12.5 SOLUTION INTRAVENOUS ONCE
Status: DISCONTINUED | OUTPATIENT
Start: 2022-06-29 | End: 2022-06-29

## 2022-06-29 RX ORDER — ALBUMIN, HUMAN INJ 5% 5 %
25 SOLUTION INTRAVENOUS ONCE
Status: COMPLETED | OUTPATIENT
Start: 2022-06-29 | End: 2022-06-29

## 2022-06-29 RX ADMIN — CEFAZOLIN SODIUM 1000 MG: 1 INJECTION, POWDER, FOR SOLUTION INTRAMUSCULAR; INTRAVENOUS at 19:53

## 2022-06-29 RX ADMIN — OXYCODONE HYDROCHLORIDE 10 MG: 10 TABLET ORAL at 08:13

## 2022-06-29 RX ADMIN — Medication 20 MG: at 08:04

## 2022-06-29 RX ADMIN — MORPHINE SULFATE 2 MG: 2 INJECTION, SOLUTION INTRAMUSCULAR; INTRAVENOUS at 03:37

## 2022-06-29 RX ADMIN — OXYCODONE 5 MG: 5 TABLET ORAL at 14:30

## 2022-06-29 RX ADMIN — CEFAZOLIN SODIUM 1000 MG: 1 INJECTION, POWDER, FOR SOLUTION INTRAMUSCULAR; INTRAVENOUS at 07:49

## 2022-06-29 RX ADMIN — ALBUMIN (HUMAN) 25 G: 12.5 INJECTION, SOLUTION INTRAVENOUS at 10:36

## 2022-06-29 RX ADMIN — ALBUMIN (HUMAN) 25 G: 12.5 INJECTION, SOLUTION INTRAVENOUS at 16:33

## 2022-06-29 RX ADMIN — INSULIN LISPRO 4 UNITS: 100 INJECTION, SOLUTION INTRAVENOUS; SUBCUTANEOUS at 12:25

## 2022-06-29 RX ADMIN — MIDODRINE HYDROCHLORIDE 2.5 MG: 5 TABLET ORAL at 16:28

## 2022-06-29 RX ADMIN — SODIUM ZIRCONIUM CYCLOSILICATE 5 G: 10 POWDER, FOR SUSPENSION ORAL at 13:56

## 2022-06-29 RX ADMIN — ONDANSETRON 4 MG: 2 INJECTION INTRAMUSCULAR; INTRAVENOUS at 08:24

## 2022-06-29 RX ADMIN — SODIUM ZIRCONIUM CYCLOSILICATE 5 G: 10 POWDER, FOR SUSPENSION ORAL at 11:09

## 2022-06-29 RX ADMIN — DEXTROSE MONOHYDRATE 250 ML: 100 INJECTION, SOLUTION INTRAVENOUS at 10:18

## 2022-06-29 RX ADMIN — INSULIN HUMAN 10 UNITS: 100 INJECTION, SOLUTION PARENTERAL at 10:15

## 2022-06-29 RX ADMIN — SODIUM BICARBONATE 100 MEQ: 84 INJECTION, SOLUTION INTRAVENOUS at 10:06

## 2022-06-29 RX ADMIN — INSULIN LISPRO 4 UNITS: 100 INJECTION, SOLUTION INTRAVENOUS; SUBCUTANEOUS at 08:07

## 2022-06-29 RX ADMIN — SODIUM CHLORIDE, PRESERVATIVE FREE 10 ML: 5 INJECTION INTRAVENOUS at 21:04

## 2022-06-29 RX ADMIN — SODIUM BICARBONATE: 84 INJECTION, SOLUTION INTRAVENOUS at 22:41

## 2022-06-29 RX ADMIN — SODIUM CHLORIDE 500 ML: 9 INJECTION, SOLUTION INTRAVENOUS at 04:19

## 2022-06-29 RX ADMIN — SODIUM ZIRCONIUM CYCLOSILICATE 5 G: 10 POWDER, FOR SUSPENSION ORAL at 21:03

## 2022-06-29 RX ADMIN — SODIUM BICARBONATE: 84 INJECTION, SOLUTION INTRAVENOUS at 11:45

## 2022-06-29 ASSESSMENT — PAIN DESCRIPTION - DESCRIPTORS
DESCRIPTORS: PATIENT UNABLE TO DESCRIBE
DESCRIPTORS: ACHING
DESCRIPTORS: TIGHTNESS

## 2022-06-29 ASSESSMENT — PAIN SCALES - GENERAL
PAINLEVEL_OUTOF10: 10
PAINLEVEL_OUTOF10: 10
PAINLEVEL_OUTOF10: 6
PAINLEVEL_OUTOF10: 6
PAINLEVEL_OUTOF10: 10

## 2022-06-29 ASSESSMENT — PAIN DESCRIPTION - LOCATION
LOCATION: ABDOMEN;BACK
LOCATION: GENERALIZED
LOCATION: LEG

## 2022-06-29 ASSESSMENT — PAIN - FUNCTIONAL ASSESSMENT
PAIN_FUNCTIONAL_ASSESSMENT: PREVENTS OR INTERFERES SOME ACTIVE ACTIVITIES AND ADLS

## 2022-06-29 ASSESSMENT — PAIN DESCRIPTION - ORIENTATION
ORIENTATION: LEFT
ORIENTATION: RIGHT;LEFT;MID

## 2022-06-29 ASSESSMENT — PAIN DESCRIPTION - ONSET: ONSET: ON-GOING

## 2022-06-29 ASSESSMENT — PAIN DESCRIPTION - FREQUENCY
FREQUENCY: CONTINUOUS
FREQUENCY: INTERMITTENT

## 2022-06-29 NOTE — CONSULTS
Nephrology Consult Note   [facility]      Chief Complaint: s/p Left femoral to below-knee popliteal in-situ bypass graft 22    Reason for Consultation: Hyperkalemia/Acidosis and JASE    History of Present Illness: Ms Ethel Sharma is a 81 yo female with a past medical history significant for PVD, HTN, DM that was admitted and underwent Left femoral to below-knee popliteal in-situ bypass graft on 22. She was noted to have hyperkalemia / acidosis and worsening kidney function on morning labs - urine output has also dwindled down. Upon reviewing the patients medical chart it appears her BP yesterday was 146/54 and decreased to 85/61 at 2 AM this morning. Patient was also on Lisinopril and Lasix but both these medications were not given today     Subjective:    Resting in bed; NAD    ROS: Limited; patient talkative but difficult to converse with. No chest pain; no shortness of breath; feels legs are weak    Scheduled Meds:   ceFAZolin  1,000 mg IntraVENous Q12H    sodium zirconium cyclosilicate  5 g Oral TID    aspirin EC  81 mg Oral Daily    atorvastatin  10 mg Oral Daily    glipiZIDE  2.5 mg Oral QAM AC    sodium chloride flush  5-40 mL IntraVENous 2 times per day    insulin lispro  0-12 Units SubCUTAneous TID WC    insulin lispro  0-6 Units SubCUTAneous Nightly    famotidine  20 mg Oral Daily    Or    famotidine (PEPCID) injection  20 mg IntraVENous Daily        sodium bicarbonate infusion 125 mL/hr at 22 1145    sodium chloride      dextrose         PRN Meds:. HYDROmorphone **OR** HYDROmorphone, sodium chloride flush, sodium chloride, ondansetron **OR** ondansetron, oxyCODONE **OR** oxyCODONE, glucose, dextrose bolus **OR** dextrose bolus, glucagon (rDNA), dextrose    Physical Exam:    TEMPERATURE:  Current - Temp: 97.9 °F (36.6 °C);  Max - Temp  Av.6 °F (36.4 °C)  Min: 97.4 °F (36.3 °C)  Max: 97.9 °F (36.6 °C)  RESPIRATIONS RANGE: Resp  Av.2  Min: 14  Max: 30  PULSE RANGE: Pulse Av.8  Min: 52  Max: 109  BLOOD PRESSURE RANGE:  Systolic (42IYL), KZV:081 , Min:85 , KNC:136   ; Diastolic (02SGY), ADM:76, Min:36, Max:69    24HR INTAKE/OUTPUT:      Intake/Output Summary (Last 24 hours) at 2022 1152  Last data filed at 2022 1108  Gross per 24 hour   Intake 2650 ml   Output 1700 ml   Net 950 ml       Patient Vitals for the past 96 hrs (Last 3 readings):   Weight   22 2201 101 lb 6.6 oz (46 kg)   22 1041 100 lb (45.4 kg)       General: Alert, Awake, NAD, lean  HEENT: Normocephalic, atraumatic, Nose and ears appear externally without deformity, MMM  Eyes: FLACA  Chest: clear to auscultation, no intercostal retractions  CVS: sinus tachycardia, no rub  Abdomen: soft, non tender, no organomegaly  Extremities: no edema, no cyanosis.   Skin: scattered ecchymoses; loss of turgor  Musculoskeletal: no joint swelling, no visible deformity  Neurological: no focal motor neurological deficit  Psych: O x 3  : espinoza in place; no hematuria noted    Past Medical History:   Diagnosis Date    Anxiety     Arthritis     At risk for falls     CAD (coronary artery disease)     Cerebral artery occlusion with cerebral infarction (HCC)     TIA--no residual effects    DDD (degenerative disc disease), cervical     Fractures     (L) Hip Fx 98, L1 fracture from fall 10-31-06    Hyperlipidemia     Hypertension     Mitral regurgitation     Neuropathy     Osteopenia     Osteoporosis     PAD (peripheral artery disease) (HCC)     Right LE ischemia    Peripheral neuropathy 2015    Peripheral vascular disease ([address]     bilateral lower extremities with edema    Podagra 2017    Dr. Rachel Mcgovern    Prolonged emergence from general anesthesia     Spinal stenosis     L4-5    Type 2 diabetes mellitus ([address]     Urethral stricture 5 years ago    Urgency of urination      Past Surgical History:   Procedure Laterality Date    ANGIOPLASTY Left 2022    Left SFA    APPENDECTOMY      CARPAL TUNNEL RELEASE Right 03/29/2019    RIGHT CARPAL TUNNEL RELEASE AND RIGHT MIDDLE FINGER TRIGGER FINGER RELEASE performed by Jey Dee MD at [address]  [id number]    LEFT EYE EYE CATARACT PHACOEMULSIFICATION INTRAOCULAR LENS    CHOLECYSTECTOMY  01/01/1978    COLONOSCOPY  08/31/1999    diverticulosis    EYE SURGERY Bilateral     bilateral cataract removed    FEMORAL BYPASS Left 6/28/2022    LEFT LEG FEMORAL TO POPLITEAL BYPASS WITH INTRAOPERATIVE ANGIOGRAM performed by Nasrin Spivey MD at [address] [facility] Left 02/14/2022    LEFT COMMON FEMORAL ARTERY ENDARTERECTOMY performed by Nasrin Spivey MD at [address] [place] (CERVIX STATUS UNKNOWN)  [address] [place] [address]    IR FEMORAL POPLITEAL BYPASS GRAFT Right 08/31/2015    KYPHOSIS SURGERY  11/07/2006    L1, (fractured after a fall)    LEG SURGERY Left 02/14/2022    LEFT LEG WOUND DEBRIDEMENT performed by Nasrin Spivey MD at [address]  04/25/1998    (L) THR    WRIST FRACTURE SURGERY  01/01/2008    left wrist     Family History   Problem Relation Age of Onset    Cancer Mother 43    Stroke Father     Hypertension Father      Social History     Socioeconomic History    Marital status:       Spouse name: Not on file    Number of children: Not on file    Years of education: Not on file    Highest education level: Not on file   Occupational History    Not on file   Tobacco Use    Smoking status: Never Smoker    Smokeless tobacco: Never Used   Vaping Use    Vaping Use: Never used   Substance and Sexual Activity    Alcohol use: No    Drug use: Never    Sexual activity: Not Currently   Other Topics Concern    Not on file   Social History Narrative    Not on file     Social Determinants of Health     Financial Resource Strain: Low Risk     Difficulty of Paying Living Expenses: Not hard at all   Food Insecurity: No Food Insecurity    Worried About Running Out of Food in the Last Year: Never true    Ran Out of Food in the Last Year: Never true   Transportation Needs:     Lack of Transportation (Medical): Not on file    Lack of Transportation (Non-Medical): Not on file   Physical Activity:     Days of Exercise per Week: Not on file    Minutes of Exercise per Session: Not on file   Stress:     Feeling of Stress : Not on file   Social Connections:     Frequency of Communication with Friends and Family: Not on file    Frequency of Social Gatherings with Friends and Family: Not on file    Attends [employer] Services: Not on file    Active Member of [address] [place] [address] [employer] or Organizations: Not on file    Attends Club or Organization Meetings: Not on file    Marital Status: Not on file   Intimate Partner Violence:     Fear of Current or Ex-Partner: Not on file    Emotionally Abused: Not on file    Physically Abused: Not on file    Sexually Abused: Not on file   Housing Stability:     Unable to Pay for Housing in the Last Year: Not on file    Number of [place] in the Last Year: Not on file    Unstable Housing in the Last Year: Not on file       Allergies:   Allergies   Allergen Reactions    Aricept [Donepezil Hydrochloride] Other (See Comments)     Pt unable to recall reaction    Doxycycline Nausea Only    Ketorolac Tromethamine Other (See Comments)     Pt unable to recall reaction          LAB DATA:    CBC:   Lab Results   Component Value Date    WBC 25.3 06/29/2022    RBC 3.09 06/29/2022    HGB 9.3 06/29/2022    HCT 27.8 06/29/2022    MCV 90.2 06/29/2022    MCH 30.1 06/29/2022    MCHC 33.3 06/29/2022    RDW 15.5 06/29/2022     06/29/2022    MPV 7.3 06/29/2022     BMP:    Lab Results   Component Value Date     06/29/2022    K 6.8 06/29/2022    K 5.2 06/28/2022     06/29/2022    CO2 16 06/29/2022    BUN 29 06/29/2022    CREATININE 1.6 06/29/2022    CALCIUM 8.1 06/29/2022    GFRAA 36 06/29/2022    GFRAA >60 05/14/2013    LABGLOM 30 06/29/2022    GLUCOSE 222 06/29/2022     Ionized Calcium:  No results found for: IONCA  Magnesium:    Lab Results   Component Value Date    MG 1.50 02/15/2022     Phosphorus:    Lab Results   Component Value Date    PHOS 3.4 04/03/2015     U/A:    Lab Results   Component Value Date    NITRITE neg 11/05/2020    COLORU Yellow 04/17/2021    PHUR 7.0 04/17/2021    WBCUA 0-2 04/17/2021    RBCUA 0-2 04/17/2021    BACTERIA 4+ 11/21/2020    CLARITYU Clear 04/17/2021    SPECGRAV 1.015 04/17/2021    LEUKOCYTESUR SMALL 04/17/2021    UROBILINOGEN 0.2 04/17/2021    BILIRUBINUR Negative 04/17/2021    BILIRUBINUR neg 11/05/2020    BLOODU Negative 04/17/2021    GLUCOSEU Negative 04/17/2021         IMPRESSION/RECOMMENDATIONS:      Principal Problem: 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 *    1. JASE - suspect secondary to reduced renal perfusion in setting of Hypotension  - Administer bolus of IV albumin  - Change IVF composition and rate   - Stop Lasix and Lisinopril  - Check urine lytes  - no immediate indication of RRT   - Avoid Hypotension and Nephrotoxic agents    2. Hyperkalemia - treated medically   - hopefully K level will come down and UO will  and will not need HD since poor long term candidate given age and comorbidities    3. Acidosis - start HCO gtt    4.  Left femoral to below-knee popliteal in-situ bypass graft 6/28/22     Overall prognosis guarded; total critical care time 35 mins

## 2022-06-29 NOTE — OP NOTE
[address] [facility]                                OPERATIVE REPORT    PATIENT NAME: Lowell Acosta                       :        1929  MED REC NO:   [id number]                          ROOM:       1  ACCOUNT NO:   [de-identified]                           ADMIT DATE: 2022  PROVIDER:     Maurisio Denise MD      DATE OF PROCEDURE:  2022    PREOPERATIVE DIAGNOSES:  Ischemic rest pain left foot with recurrent  left common femoral and superficial femoral artery stenosis and  superficial femoral artery occlusive disease. POSTOPERATIVE DIAGNOSES:  Ischemic rest pain left foot with recurrent  left common femoral and superficial femoral artery stenosis and  superficial femoral artery occlusive disease. OPERATION PERFORMED:  1. Left iliofemoral bypass for femoral artery replacement (Dacron 6-mm  Graft)- redo with recent left femoral endarterectomy. 2.  Left femoral to below-knee popliteal in-situ bypass graft. 3.  Intraoperative angiogram.    SURGEON:  Maurisio Denise MD    ANESTHESIA:  General endotracheal.    ESTIMATED BLOOD LOSS:  1000 mL. INDICATIONS:  The patient is a 80year-old very functional lady who has  had difficulty with a nonhealing wound on her left shin and now has  developed rest pain in her left foot. Approximately four months ago,  she underwent a left femoral endarterectomy with Bovine patch  pericardial angioplasty but with these new symptoms developing  evaluation demonstrated that that endarterectomy site had rapidly  restenosed to near occlusion with associated occlusion of the most  proximal superficial femoral artery and a very tight stenosis in the  deep femoral artery.   This was in association with severe superficial  femoral artery disease distally with occlusion of the above-knee  popliteal artery and reconstitution of below-knee popliteal artery with  irregular tibial level disease. It was recommended she undergo  revascularization for limb salvage and agreed understanding the risks,  benefits and other options. OPERATIVE PROCEDURE:  The patient was brought to the operating room and  placed on the operating table in supine position. After adequate  induction of anesthesia, the left groin and leg were prepped and draped  in a sterile fashion. The procedure was begun by making a longitudinal incision in the groin. The dissection was continued down through severe scar tissue until the  superficial femoral artery was identified at the level below the  occlusion. It was noted to be soft at this level. Sharp dissection  with a scalpel and scissors was performed along the scar tissue  encompassing the femoral artery, taking care to avoid injury to the  artery. The deep femoral arteries were sharply excised and controlled with  vessel loops as was superficial femoral artery. The inguinal ligament  was partially divided to allow access to the external iliac artery,  which was encased in scar. It was carefully dissected and controlled. Dissection was then continued down along the common femoral artery. The  circumflex iliac and inferior epigastric arteries were ligated between  2-0 silks and divided. The artery was noted to be quite hard with  evidence of rapid fibrosis. After the artery had been completely  mobilized and a small bled off the external iliac artery repaired with a  4-0 Prolene, the wound was packed with antibiotic-soaked gauze. Attention was then directed to the below-knee popliteal artery. A  longitudinal incision was made in the below-knee area just posterior to  the tibia. The greater saphenous vein was identified in the wound and  protected and was found to be of good quality. The fascia of the  popliteal fossa was then incised and gastrocnemius muscle was rotated  posteriorly allowing for entrance into the popliteal fossa.   The  popliteal vein was dissected off of the popliteal artery, which was  found to be calcified proximally but soft distally near its termination  into the anterior tibial artery and tibioperoneal trunk. The vessel was  carefully dissected and controlled proximally with a vessel loop and  doubly passed vessel loops on both the anterior tibial and tibioperoneal  trunk. The wound was packed with antibiotic-soaked gauze. Initial  attempt was to make a counterincision approximately 10 cm below the  groin and originate the graft off that patent superficial femoral  artery. For this reason, another incision was made along the course of  the greater saphenous vein. The vein was identified and protected and  electrocautery was used to divide the soft tissue down to the fascia of  the thigh. The fascia was then incised along the border of sartorius  muscle, which was rotated posteriorly and the superficial femoral artery  identified. Unfortunately, it was hard and did not have easily palpable  pulse contrary to what was expected. For this reason, the fascia was  reapproximated with 3-0 Vicryls and it was decided to proceed with _____  in-situ bypass from the femoral artery repair. The greater saphenous vein was then mobilized through three separate  incisions in the thigh dividing and clipping side branches as they were  identified leaving the vein in place. The distal greater saphenous vein  was then divided below the level of the popliteal incision and tied off  with a 2-0 silk. It was distended with heparin-papaverine and found to  be of reasonable quality measuring 2.5 to 3 mm in size at that level. During the course of the operation, the patient received a total of 4500  units of heparin and had significant oozing throughout the operation. This resulted in a significant amount of blood loss.   After having  mobilized the greater saphenous vein and exposed the popliteal artery,  the heparinization was begun and clamps were applied to the distal  external iliac artery, deep femoral artery and the superficial femoral  artery. A longitudinal arteriotomy was made in the common femoral artery through  the previous patch where there was barely a lumen identified. The  arteriotomy was continued proximally and the artery was transected just  at the termination of the external iliac artery. The arteriotomy was  then continued into both the superficial femoral artery and through a  severe stenosis in the deep femoral artery. Localized endarterectomy  was performed in the origin of the superficial femoral artery as well as  into the deep femoral artery. A 6-mm Dacron graft was brought onto the field. It was spatulated  distally and then bivalved to provide patches for both the deep femoral  and superficial femoral artery. The artery was transected completely  above the bifurcation and an end-to-end spatulated anastomosis with  these two patches extending as a bifurcated patch into the vessels was  performed. This was done using running 6-0 Prolenes beginning at the  heel of the artery and graft and extending down to the toes on both  arteries. After completion of this, the clamps were removed and there was noted to  be several areas of backbleeding, which were controlled with additional  sutures. The graft was then stretched to an appropriate length and a  standard end-to-end slightly spatulated anastomosis was made on the  external iliac artery using a running 5-0 Prolene. Prior to completion of this, the arteries were forward and backbled and  flushed with heparinized saline. The flow was restored and once again  several other bleeding points were controlled with sutures. There was  noted to be a good pulse and flow distally. The saphenofemoral junction  was then divided over a clamp and then oversewn with 2-0 silk suture  ligature.   The vein was mobilized enough to allow for it to extend to  the graft, which were to replace the common femoral artery above the  level of its bifurcation. Additional heparin was given and clamps were applied to the deep femoral  arteries as well as the superficial femoral artery and an atraumatic  clamp was applied to the Dacron graft. An ellipse of graft was excised  and the proximal graft was spatulated. It was then sewn in an  end-to-side fashion using a running 5-0 Prolene. After completion of  this, flow was restored and there was noted to be a good pulse into this  vein graft down to the first competent valve cusp. The vein was  distended with heparin-papaverine solution and a 2-mm valvulotome was  inserted from distally and advanced up to the proximal anastomosis. It  was withdrawn several times with initiation of reasonable flow. The graft was heparinized and then evaluated using Doppler, and there  was found to be a fistula in a skin bridge. This was identified by  mobilizing further and it was clipped. When that was performed, the  flow improved dramatically in the graft. Another pass of the  valvulotome resulted in lysis of one more valve again with initiation of  excellent pulsatile flow. The graft was heparinized and attention was directed to the popliteal  artery. Control was gained on the popliteal artery proximally with a  clamp and distally with a bulldog across both the anterior tibial and  tibioperoneal trunk. A longitudinal arteriotomy measuring 1 cm in  length was made. The graft was cut down to appropriate length and  spatulated. A standard end-to-side anastomosis was created using a  single running 6-0 Prolene. Prior to completion of this, the graft was  forward and backbled and flushed with heparinized saline. Anastomosis  was completed and flow was restored.     The proximal graft was then punctured with a 21 gauge butterfly and an  angiogram was performed, which demonstrated good flow through the graft  with no fistulas and no major technical abnormalities with runoff  through the tibioperoneal trunk and anterior tibial arteries. The  puncture site was then repaired with a previously placed 6-0  pursestring. As stated previously, significant oozing occurred  throughout the process of the operation. This was eventually controlled  with a combination of topical agents and electrocautery. All wounds were then closed with 3-0 Vicryl in several layers and  subcuticular stitch of 4-0 Vicryl. Steri-Strips were applied. A MAG  device was applied to the left groin. The calf wound was covered with  Polysporin and a clean sterile dry compressive dressing was applied to  all of the left leg from the ball of the foot to the groin. The patient  was then extubated and transferred to recovery room. This case was technically challenging as a redo operation with  significant challenges and requiring special technical expertise. Operation lasted seven and a half hours.         Tomer Penaloza MD    D: 06/28/2022 21:58:52       T: 06/29/2022 0:33:33     [id number]  Job#: [id number]     Doc#: [id number]    CC:

## 2022-06-29 NOTE — BRIEF OP NOTE
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 Iliofemoral bypass for replacement with L fem-BKpop in situ bypass    Surgeon(s):  Laxmi Ibrahim MD    Assistant:  Surgical Assistant: Charly Childs    Anesthesia: General    Estimated Blood Loss (mL): 0160    Complications: Bleeding    Specimens:   * No specimens in log *    Implants:  Implant Name Type Inv.  Item Serial No.  Lot No. LRB No. Used Action   GRAFT VASC VASCUTEK GELSFT + 12 MM MAIN BOR SIZEX6 MM LEG - [id number] Vascular grafts GRAFT VASC VASCUTEK GELSFT + 12 MM MAIN BOR SIZEX6 MM LEG [id number] [facility] CARDIOVASCULAR- [id number] Left 1 Implanted         Drains:   Urinary Catheter 2 Way (Active)   $ Urethral catheter insertion Inserted for procedure 06/28/22 1507   Catheter Indications Perioperative use for selected surgical procedures 06/28/22 1507   Urine Color Yellow 06/28/22 1507   Urine Appearance Clear 06/28/22 1507       Findings: as abve    Electronically signed by Laxmi Ibrahim MD on 6/28/2022 at 9:15 PM

## 2022-06-29 NOTE — CONSULTS
Neurology Consult Note  Reason for Consult: longstanding intermittent paralytic episodes    Chief complaint: multiple complaints. Dr Yancy Michelle MD asked me to see Gerhardt Nanny in consultation for evaluation of longstanding intermittent paralytic episodes    History of Present Illness:  Gerhardt Nanny is a 80 y.o. female who presents for vascular surgery. I obtained my information via interview w/ the patient, supplemented by chart review. The patient and her son at the bedside are very challenging historians. It was quite difficult to obtain an accurate, consistent history despite multiple attempts to redirect and focus on what the neurology consult was for. She has known PAD/PVD. She follows w/ vascular surgery. She presented to the hospital yesterday for L iliofemoral bypass. She has had previous vascular procedures on her LLE. We were asked to see the patient for paralytic episodes. Again, it was very difficult to try and sort out what exactly she was referring to. At one point she told me that she had an episode several months ago where she was standing in an office, didn't feel good, and couldn't move at all. She says she had another episode in the past where she was sitting in her chair and felt like she couldn't move for half of the day. She has had complaints of limb pain, numbness, cold to touch, etc.  She said that she has had multiple TIAs but it is very difficult to have her describe these. The patient did see Neurology, Dr. Masha Vasquez, back in 2017 for cognitive impairment. It was suspected that stress/anxiety were playing a role. She was not formally diagnosed w/ a dementing disorder back then.       Medical History:  Past Medical History:   Diagnosis Date    Anxiety     Arthritis     At risk for falls     CAD (coronary artery disease)     Cerebral artery occlusion with cerebral infarction (HCC)     TIA--no residual effects    DDD (degenerative disc disease), cervical     Fractures     (L) Hip Fx 4/25/98, L1 fracture from fall 10-31-06    Hyperlipidemia     Hypertension     Mitral regurgitation     Neuropathy     Osteopenia     Osteoporosis     PAD (peripheral artery disease) (HCC)     Right LE ischemia    Peripheral neuropathy 6/1/2015    Peripheral vascular disease (HCC)     bilateral lower extremities with edema    Podagra 01/09/2017    Dr. Adán Marc    Prolonged emergence from general anesthesia     Spinal stenosis     L4-5    Type 2 diabetes mellitus ([address]     Urethral stricture 5 years ago    Urgency of urination      Past Surgical History:   Procedure Laterality Date    ANGIOPLASTY Left 04/18/2022    Left SFA    APPENDECTOMY      CARPAL TUNNEL RELEASE Right 03/29/2019    RIGHT CARPAL TUNNEL RELEASE AND RIGHT MIDDLE FINGER TRIGGER FINGER RELEASE performed by Maureen Juarez MD at [address]  [id number]    LEFT EYE EYE CATARACT PHACOEMULSIFICATION INTRAOCULAR LENS    CHOLECYSTECTOMY  01/01/1978    COLONOSCOPY  08/31/1999    diverticulosis    EYE SURGERY Bilateral     bilateral cataract removed    FEMORAL BYPASS Left 6/28/2022    LEFT LEG FEMORAL TO POPLITEAL BYPASS WITH INTRAOPERATIVE ANGIOGRAM performed by Juhi Guajardo MD at [address] [facility] Left 02/14/2022    LEFT COMMON FEMORAL ARTERY ENDARTERECTOMY performed by Juhi Guajardo MD at [address] [place] ([facility])  [address] GRAFT Right 08/31/2015    KYPHOSIS SURGERY  11/07/2006    L1, (fractured after a fall)    LEG SURGERY Left 02/14/2022    LEFT LEG WOUND DEBRIDEMENT performed by Juhi Guajardo MD at [address]  04/25/1998    (L) THR    WRIST FRACTURE SURGERY  01/01/2008    left wrist     Scheduled Meds:   ceFAZolin  1,000 mg IntraVENous Q12H    aspirin EC  81 mg Oral Daily    atorvastatin  10 mg Oral Daily    furosemide  20 mg Oral Daily    glipiZIDE  2.5 mg Oral QAM AC    lisinopril  20 mg Oral Daily    sodium chloride flush  5-40 mL IntraVENous 2 times per day    insulin lispro  0-12 Units SubCUTAneous TID WC    insulin lispro  0-6 Units SubCUTAneous Nightly    famotidine  20 mg Oral Daily    Or    famotidine (PEPCID) injection  20 mg IntraVENous Daily     Continuous Infusions:   sodium chloride 75 mL/hr at 06/29/22 0703     Medications Prior to Admission:   HYDROcodone-acetaminophen (NORCO) 5-325 MG per tablet, Take 1 tablet by mouth every 6 hours as needed for Pain.  traMADol (ULTRAM) 50 MG tablet, Take 25 mg by mouth every 6 hours as needed for Pain. acetaminophen (TYLENOL) 325 MG tablet, Take 650 mg by mouth every 6 hours as needed for Pain  glimepiride (AMARYL) 1 MG tablet, TAKE ONE TABLET BY MOUTH EVERY MORNING BEFORE BREAKFAST  hydrOXYzine (VISTARIL) 25 MG capsule, Take 1 capsule by mouth daily as needed for Anxiety (Patient not taking: Reported on 6/28/2022)  atorvastatin (LIPITOR) 10 MG tablet, TAKE ONE TABLET BY MOUTH DAILY  Lite Touch Lancets MISC, Use twice daily when testing blood sugars.   Wound Cleansers (VASHE CLEANSING) SOLN, Soak vashe on gauze pad and place on wound for 5-10 minutes  lisinopril (PRINIVIL;ZESTRIL) 20 MG tablet, TAKE ONE TABLET BY MOUTH DAILY  furosemide (LASIX) 20 MG tablet, Take 1 tablet by mouth daily  blood glucose test strips (ONETOUCH ULTRA) strip, USE TO TEST BLOOD SUGAR TWICE DAILY  metFORMIN (GLUCOPHAGE) 500 MG tablet, Take 1 tablet by mouth daily  Turmeric 450 MG CAPS, Take 450 mg by mouth daily  Cholecalciferol (VITAMIN D3) 2000 UNITS CAPS, Take 1 capsule by mouth daily  aspirin EC 81 MG EC tablet, Take 1 tablet by mouth daily    Allergies   Allergen Reactions    Aricept [Donepezil Hydrochloride] Other (See Comments)     Pt unable to recall reaction    Doxycycline Nausea Only    Ketorolac Tromethamine Other (See Comments)     Pt unable to recall reaction     Family History Problem Relation Age of Onset    Cancer Mother 43    Stroke Father     Hypertension Father      Social History     Tobacco Use   Smoking Status Never Smoker   Smokeless Tobacco Never Used     Social History     Substance and Sexual Activity   Drug Use Never     Social History     Substance and Sexual Activity   Alcohol Use No     ROS  Constitutional- No weight loss or fevers  Eyes- No diplopia. No photophobia. Ears/nose/throat- No dysphagia. No Dysarthria  Cardiovascular- No palpitations. No chest pain  Respiratory- No dyspnea. No Cough  Gastrointestinal- No Abdominal pain. No Vomiting. Genitourinary- No incontinence. No urinary retention  Musculoskeletal- No myalgia. No arthralgia  Skin- No rash. No easy bruising. Psychiatric- No depression. No anxiety  Endocrine- No diabetes. No thyroid issues. Hematologic- No bleeding difficulty. No fatigue  Neurologic- No weakness. No Headache. Exam  Blood pressure (!) 106/50, pulse 99, temperature 97.9 °F (36.6 °C), temperature source Axillary, resp. rate 19, height 5' (1.524 m), weight 101 lb 6.6 oz (46 kg), SpO2 100 %, not currently breastfeeding. Constitutional    Vital signs: BP, HR, and RR reviewed   General Alert, no distress, well-nourished  Eyes: unable to visualize the fundi  Cardiovascular: pulses symmetric in all 4 extremities. No peripheral edema. Psychiatric: cooperative with examination, no  psychotic behavior noted.   Neurologic  Mental status:   orientation to person and place   General fund of knowledge grossly intact   Memory grossly intact   Attention intact as able to attend well to the exam     Language fluent in conversation   Comprehension intact; follows simple commands  Cranial nerves:   CN2: Visual Fields full w/o extinction on confrontational testing,   CN 3,4,6: extraocular muscles intact,  CN5: facial sensation symmetric   CN7:face symmetric without dysarthria,   CN8: hearing grossly intact  CN9: palate elevated symmetrically  CN11:

## 2022-06-29 NOTE — PROGRESS NOTES
Patient bladder scanned; 30mls resulted. Wagner drained 30mls at this time. Urine pale yellow and cloudy. Vascular NP, Talya Amaro at bedside. Made aware of repeat K and urine output. See new orders.     Electronically signed by Poncho Campbell RN on 6/29/2022 at 9:17 AM

## 2022-06-29 NOTE — PROGRESS NOTES
2201: Patient arrived to room 1308 in CVICU, S/P Left femoral pop bypass with Dr. Mireya Phelps. Patient drowsy with bite block in place. VSS. SpO2 100% on 1 liter of oxygen. Left groin incision with MAG drain, green light flashing. Site is soft with no signs of hematoma. LLE red and cool to touch, capillary refill less than 3 seconds. Doppler DP signal located and marked. RLE pale, cool to touch, capillary refill less than 3 seconds, +1 palpable pulse. Patient drowsy and not following commands at this time. Will continue to reassess. 2341: Patient's blood sugar 242. Attempted to give patient 2 unit coverage per order. Patient refused. Educated the patient on importance of regulating glucose to promote healing and perfusion. Patient still refused and states \"I almost  last time I was given insulin. No insulin\". Patient's caregiver at bedside agreeable with patient.

## 2022-06-29 NOTE — PROGRESS NOTES
Repeat BMP showed K of 6.76. Pt refusing to take oral medications this AM besides pain medication, she states she always takes her medications after lunch. Will re attempt. Pt did take IV medications and ordered insulin, see MAR. Dr Marylin Marshall made aware. Awaiting response.     Electronically signed by Libby Lucas RN on 6/29/2022 at 8:44 AM

## 2022-06-29 NOTE — PROGRESS NOTES
Repeat BMP showed K of 4.6. UO remains around 15 mls/hr. BP soft with maps in the 50s. Secure message sent to Dr Anu Mixon with nephrology. New orders for albumin and midodrine. Will continue to [facility].      Electronically signed by Lesly Granados RN on 6/29/2022 at 4:17 PM

## 2022-06-29 NOTE — ANESTHESIA POSTPROCEDURE EVALUATION
Department of Anesthesiology  Postprocedure Note    Patient: Lazarus Clinton  MRN: [id number]  YOB: 1929  Date of evaluation: 6/28/2022      Procedure Summary     Date: 06/28/22 Room / Location: [facility] OR [address] [place] [address]    Anesthesia Start: 8806 Anesthesia Stop: 9038    Procedure: LEFT LEG FEMORAL TO POPLITEAL BYPASS WITH INTRAOPERATIVE ANGIOGRAM (Left ) Diagnosis:       Atherosclerosis of artery of extremity with ulceration ([address]      (ATHEROSCLEROSIS OF ARTERY OF LOWER EXTREMITY WITH ULCERATION)    Surgeons: Daniel Nunes MD Responsible Provider: Dia Pickard MD    Anesthesia Type: general ASA Status: 4          Anesthesia Type: No value filed.     Dalila Phase I: Dalila Score: 9    Dalila Phase II:        Anesthesia Post Evaluation    Patient location during evaluation: ICU  Patient participation: waiting for patient participation  Level of consciousness: responsive to noxious stimuli  Pain score: 0  Nausea & Vomiting: no nausea  Complications: no  Cardiovascular status: hemodynamically stable  Respiratory status: acceptable  Hydration status: stable

## 2022-06-30 LAB
ALBUMIN SERPL-MCNC: 3.4 G/DL (ref 3.4–5)
ANION GAP SERPL CALCULATED.3IONS-SCNC: 15 MMOL/L (ref 3–16)
BLOOD BANK DISPENSE STATUS: NORMAL
BLOOD BANK PRODUCT CODE: NORMAL
BPU ID: NORMAL
BUN BLDV-MCNC: 37 MG/DL (ref 7–20)
CALCIUM SERPL-MCNC: 7.5 MG/DL (ref 8.3–10.6)
CHLORIDE BLD-SCNC: 98 MMOL/L (ref 99–110)
CO2: 26 MMOL/L (ref 21–32)
CREAT SERPL-MCNC: 1.8 MG/DL (ref 0.6–1.2)
DESCRIPTION BLOOD BANK: NORMAL
GFR AFRICAN AMERICAN: 32
GFR NON-AFRICAN AMERICAN: 26
GLUCOSE BLD-MCNC: 174 MG/DL (ref 70–99)
GLUCOSE BLD-MCNC: 177 MG/DL (ref 70–99)
GLUCOSE BLD-MCNC: 178 MG/DL (ref 70–99)
GLUCOSE BLD-MCNC: 198 MG/DL (ref 70–99)
HCT VFR BLD CALC: 17.6 % (ref 36–48)
HCT VFR BLD CALC: 17.7 % (ref 36–48)
HCT VFR BLD CALC: 24.2 % (ref 36–48)
HEMOGLOBIN: 6.1 G/DL (ref 12–16)
HEMOGLOBIN: 6.3 G/DL (ref 12–16)
HEMOGLOBIN: 8.4 G/DL (ref 12–16)
MAGNESIUM: 1.6 MG/DL (ref 1.8–2.4)
MCH RBC QN AUTO: 30.7 PG (ref 26–34)
MCHC RBC AUTO-ENTMCNC: 34.4 G/DL (ref 31–36)
MCV RBC AUTO: 89.1 FL (ref 80–100)
PDW BLD-RTO: 15.7 % (ref 12.4–15.4)
PERFORMED ON: ABNORMAL
PHOSPHORUS: 3.7 MG/DL (ref 2.5–4.9)
PLATELET # BLD: 110 K/UL (ref 135–450)
PMV BLD AUTO: 7.3 FL (ref 5–10.5)
POTASSIUM SERPL-SCNC: 4.5 MMOL/L (ref 3.5–5.1)
RBC # BLD: 1.99 M/UL (ref 4–5.2)
SODIUM BLD-SCNC: 139 MMOL/L (ref 136–145)
URIC ACID, SERUM: 8.1 MG/DL (ref 2.6–6)
WBC # BLD: 9 K/UL (ref 4–11)

## 2022-06-30 PROCEDURE — 2580000003 HC RX 258: Performed by: SURGERY

## 2022-06-30 PROCEDURE — 6360000002 HC RX W HCPCS: Performed by: SURGERY

## 2022-06-30 PROCEDURE — 97530 THERAPEUTIC ACTIVITIES: CPT

## 2022-06-30 PROCEDURE — 94761 N-INVAS EAR/PLS OXIMETRY MLT: CPT

## 2022-06-30 PROCEDURE — 85027 COMPLETE CBC AUTOMATED: CPT

## 2022-06-30 PROCEDURE — 2700000000 HC OXYGEN THERAPY PER DAY

## 2022-06-30 PROCEDURE — 83735 ASSAY OF MAGNESIUM: CPT

## 2022-06-30 PROCEDURE — 6370000000 HC RX 637 (ALT 250 FOR IP): Performed by: SURGERY

## 2022-06-30 PROCEDURE — 84550 ASSAY OF BLOOD/URIC ACID: CPT

## 2022-06-30 PROCEDURE — 95819 EEG AWAKE AND ASLEEP: CPT

## 2022-06-30 PROCEDURE — 85014 HEMATOCRIT: CPT

## 2022-06-30 PROCEDURE — 2100000000 HC CCU R&B

## 2022-06-30 PROCEDURE — 97162 PT EVAL MOD COMPLEX 30 MIN: CPT

## 2022-06-30 PROCEDURE — 2580000003 HC RX 258: Performed by: INTERNAL MEDICINE

## 2022-06-30 PROCEDURE — 80069 RENAL FUNCTION PANEL: CPT

## 2022-06-30 PROCEDURE — 97166 OT EVAL MOD COMPLEX 45 MIN: CPT

## 2022-06-30 PROCEDURE — 85018 HEMOGLOBIN: CPT

## 2022-06-30 PROCEDURE — 6370000000 HC RX 637 (ALT 250 FOR IP): Performed by: INTERNAL MEDICINE

## 2022-06-30 PROCEDURE — 36415 COLL VENOUS BLD VENIPUNCTURE: CPT

## 2022-06-30 PROCEDURE — 36430 TRANSFUSION BLD/BLD COMPNT: CPT

## 2022-06-30 PROCEDURE — 99024 POSTOP FOLLOW-UP VISIT: CPT | Performed by: SURGERY

## 2022-06-30 RX ORDER — BACITRACIN ZINC AND POLYMYXIN B SULFATE 500; 1000 [USP'U]/G; [USP'U]/G
OINTMENT TOPICAL DAILY
Status: DISCONTINUED | OUTPATIENT
Start: 2022-06-30 | End: 2022-07-02 | Stop reason: HOSPADM

## 2022-06-30 RX ORDER — SODIUM CHLORIDE 9 MG/ML
INJECTION, SOLUTION INTRAVENOUS PRN
Status: DISCONTINUED | OUTPATIENT
Start: 2022-06-30 | End: 2022-07-02 | Stop reason: HOSPADM

## 2022-06-30 RX ORDER — BACITRACIN, NEOMYCIN, POLYMYXIN B 400; 3.5; 5 [USP'U]/G; MG/G; [USP'U]/G
OINTMENT TOPICAL 2 TIMES DAILY
Status: DISCONTINUED | OUTPATIENT
Start: 2022-06-30 | End: 2022-06-30

## 2022-06-30 RX ORDER — SODIUM CHLORIDE 9 MG/ML
INJECTION, SOLUTION INTRAVENOUS CONTINUOUS
Status: DISCONTINUED | OUTPATIENT
Start: 2022-06-30 | End: 2022-07-01

## 2022-06-30 RX ADMIN — SODIUM CHLORIDE: 9 INJECTION, SOLUTION INTRAVENOUS at 12:15

## 2022-06-30 RX ADMIN — OXYCODONE 5 MG: 5 TABLET ORAL at 11:23

## 2022-06-30 RX ADMIN — ATORVASTATIN CALCIUM 10 MG: 10 TABLET, FILM COATED ORAL at 10:17

## 2022-06-30 RX ADMIN — GLIPIZIDE 2.5 MG: 5 TABLET ORAL at 07:16

## 2022-06-30 RX ADMIN — ASPIRIN 81 MG: 81 TABLET, COATED ORAL at 10:17

## 2022-06-30 RX ADMIN — SODIUM CHLORIDE, PRESERVATIVE FREE 10 ML: 5 INJECTION INTRAVENOUS at 12:16

## 2022-06-30 RX ADMIN — BACITRACIN ZINC AND POLYMYXIN B SULFATE: 500; 10000 OINTMENT TOPICAL at 19:55

## 2022-06-30 RX ADMIN — CEFAZOLIN SODIUM 1000 MG: 1 INJECTION, POWDER, FOR SOLUTION INTRAMUSCULAR; INTRAVENOUS at 10:22

## 2022-06-30 RX ADMIN — OXYCODONE 5 MG: 5 TABLET ORAL at 19:43

## 2022-06-30 RX ADMIN — FAMOTIDINE 20 MG: 20 TABLET, FILM COATED ORAL at 10:17

## 2022-06-30 RX ADMIN — SODIUM CHLORIDE, PRESERVATIVE FREE 10 ML: 5 INJECTION INTRAVENOUS at 19:45

## 2022-06-30 RX ADMIN — SODIUM ZIRCONIUM CYCLOSILICATE 5 G: 10 POWDER, FOR SUSPENSION ORAL at 10:15

## 2022-06-30 ASSESSMENT — PAIN SCALES - GENERAL
PAINLEVEL_OUTOF10: 9
PAINLEVEL_OUTOF10: 10
PAINLEVEL_OUTOF10: 3

## 2022-06-30 ASSESSMENT — PAIN DESCRIPTION - ORIENTATION: ORIENTATION: LEFT

## 2022-06-30 ASSESSMENT — PAIN DESCRIPTION - DESCRIPTORS: DESCRIPTORS: DISCOMFORT

## 2022-06-30 ASSESSMENT — PAIN DESCRIPTION - LOCATION: LOCATION: FOOT

## 2022-06-30 NOTE — PROGRESS NOTES
Physical Therapy  Facility/Department: [facility] 3A CVICU  Physical Therapy Initial Assessment    Name: Anali Whalen  : 1929  MRN: [id number]  Date of Service: 2022    Discharge Recommendations:  Continue to assess pending progress,24 hour supervision or assist   PT Equipment Recommendations  Other: Will monitor for potential equipt needs. Current Am-Pac Score 13/24. Assessment   Body Structures, Functions, Activity Limitations Requiring Skilled Therapeutic Intervention: Decreased functional mobility ; Decreased strength;Decreased safe awareness;Decreased cognition;Decreased endurance;Decreased balance; Increased pain  Assessment: 81 y/o female admit 2022 with PVD, L LE Ischemia. 2022 S/P L Iliofem Bypass for Fem Artery Replacement, Redo with L Fem Endarterectomy, L Fem to Below Knee Pop Bypass Graft. PTA pt living in own home with 24/7 caregivers, level entry and stays on 1st floor of home. Currently, Pt c/o sign pain with functional activities (pain meds prior) although pt did alpesh oob via Pivot Transfers, then via Ida and finally amb few steps at bedside. At this time, pt/family report adequate assist/support for d/c. May benefit from Home PT. Will cont to monitor pt's progress. Therapy Prognosis: Fair  Decision Making: Medium Complexity  History: 81 y/o female admit 2022 with PVD, L LE Ischemia. 2022 S/P L Iliofem Bypass for Fem Artery Replacement, Redo with L Fem Endarterectomy, L Fem to Below Knee Pop Bypass Graft. Exam: See above. Clinical Presentation: See above. Barriers to Learning: Cognitive. Requires PT Follow-Up: Yes  Activity Tolerance  Activity Tolerance: Treatment limited secondary to decreased cognition;Patient limited by pain  Activity Tolerance Comments: Pt c/o sign pain with functional activities (pain meds prior) although pt did alpesh oob via Pivot Transfers, then via Ida and finally amb few steps at bedside.      Plan   Plan  Plan: 3-5 times per week  Current Treatment Recommendations: Strengthening,Functional mobility training,Transfer training,Gait training,Safety education & training,Patient/Caregiver education & training  Safety Devices  Type of Devices: Call light within reach,Chair alarm in place,Left in chair,Nurse notified     Restrictions  Restrictions/Precautions  Restrictions/Precautions: Fall Risk  Position Activity Restriction  Other position/activity restrictions: L Groin MAG Drain. O2 3L via NC. Subjective   General  Chart Reviewed: Yes  Patient assessed for rehabilitation services?: Yes  Additional Pertinent Hx: 81 y/o female admit 6/28/2022 with PVD, L LE Ischemia. 6/28/2022 S/P L Iliofem Bypass for Fem Artery Replacement, Redo with L Fem Endarterectomy, L Fem to Below Knee Pop Bypass Graft. Family / Caregiver Present: No (Dtr initially at bedside.)  Referring Practitioner: Dr. Marylin Marshall  Other (Comment): Pt follows simple commands; delayed/distracted at times. Subjective  Subjective: Pt agreeable to PT Eval/Rx. C/O sign pain with activity (recent pain meds prior).          Social/Functional History  Social/Functional History  Lives With: Alone (Caregivers 24/7.)  Type of Home: House  Home Layout: Multi-level,1/2 bath on main level,Bed/Bath upstairs,Laundry in basement ([employer] to access 2nd floor.)  Home Access: Level entry  Bathroom Shower/Tub: Tub/Shower unit  Bathroom Toilet: Handicap height  Bathroom Equipment: 3-in-1 commode,Shower chair,Grab bars in shower,Grab bars around toilet  Bathroom Accessibility: Accessible  Home Equipment: Wheelchair-manual,Walker, rolling,Walker, 4 wheeled  ADL Assistance:  (Caregivers assist with daily care; sponge baths.)  Homemaking Assistance:  (Caregivers take complete all homemaking needs.)  Ambulation Assistance:  (Pt ambulates short distances pushing w/c in home with someone close by.)  Transfer Assistance: Independent  Active : No (Caregivers transport.)  Additional Comments: Pt/dtr report adequate 24/7 assist/support upon d/c. Vision/Hearing  Vision  Vision: Within Functional Limits  Hearing  Hearing: Within functional limits    Cognition   Orientation  Overall Orientation Status: Within Functional Limits  Cognition  Overall Cognitive Status: Exceptions  Following Commands: Follows one step commands consistently  Attention Span: Attends with cues to redirect  Safety Judgement: Decreased awareness of need for safety;Decreased awareness of need for assistance  Insights: Decreased awareness of deficits     Objective        Observation/Palpation  Observation: MAG Drain L Groin. L LE Dressing/AceWrap. L Foot Warm; Hypersensitive to light touch. AROM RLE (degrees)  RLE AROM: WFL  AROM LLE (degrees)  LLE General AROM: Limited due to pain; adequate for functional activities. AROM RUE (degrees)  RUE AROM : WFL  AROM LUE (degrees)  LUE AROM : WFL  Strength Other  Other: Generalized weak throughout; 3+ 4-/5. Bed mobility  Supine to Sit: Moderate assistance;2 Person assistance (HOB elevated.)  Transfers  Sit to Stand: Minimal Assistance;2 Person Assistance  Stand to sit: Minimal Assistance;2 Person Assistance  Comment: Initial Stand Pivot Transfer eob to chair Mod assist.   Additional Transfer via Vincent Shindler assist.  Pt then insist Transfer to Stand to/from W/C; completed Min assist x 2. While upright holding W/C; pt able to amb 3-4 steps forward/back (pt pushing w/c (pt prefers pushing w/c)  Min assist x 2. A/AROM Exercises: Heel Slides, Hip Abd/Adduct, Ankle Pumps : 10x B LEs. AM-PAC Score  AM-PAC Inpatient Mobility Raw Score : 13 (06/30/22 1352)  AM-PAC Inpatient T-Scale Score : 36.74 (06/30/22 1352)  Mobility Inpatient CMS 0-100% Score: 64.91 (06/30/22 1352)  Mobility Inpatient CMS G-Code Modifier : CL (06/30/22 1352)          Goals  Short Term Goals  Time Frame for Short term goals: Upon d/c acute care setting. Short term goal 1: Bed Mob CGA.   Short term

## 2022-06-30 NOTE — PROGRESS NOTES
Nephrology Progress Note   [facility]      Chief Complaint: s/p Left femoral to below-knee popliteal in-situ bypass graft 22    Reason for Consultation: Hyperkalemia/Acidosis and JASE    History of Present Illness: Ms Maico Flores is a 81 yo female with a past medical history significant for PVD, HTN, DM that was admitted and underwent Left femoral to below-knee popliteal in-situ bypass graft on 22. She was noted to have hyperkalemia / acidosis and worsening kidney function on morning labs - urine output has also dwindled down. Upon reviewing the patients medical chart it appears her BP yesterday was 146/54 and decreased to 85/61 at 2 AM this morning. Patient was also on Lisinopril and Lasix but both these medications were not given today     Subjective:    Resting in recliner; NAD    ROS: Limited; patient talkative but difficult to converse with. No chest pain; no shortness of breath; feels legs are weak    Scheduled Meds:   bacitracin-polymyxin b   Topical Daily    aspirin EC  81 mg Oral Daily    atorvastatin  10 mg Oral Daily    glipiZIDE  2.5 mg Oral QAM AC    sodium chloride flush  5-40 mL IntraVENous 2 times per day    insulin lispro  0-12 Units SubCUTAneous TID WC    insulin lispro  0-6 Units SubCUTAneous Nightly    famotidine  20 mg Oral Daily    Or    famotidine (PEPCID) injection  20 mg IntraVENous Daily        sodium chloride      sodium chloride      sodium chloride      dextrose         PRN Meds:.sodium chloride, HYDROmorphone **OR** HYDROmorphone, sodium chloride flush, sodium chloride, ondansetron **OR** ondansetron, oxyCODONE **OR** oxyCODONE, glucose, dextrose bolus **OR** dextrose bolus, glucagon (rDNA), dextrose    Physical Exam:    TEMPERATURE:  Current - Temp: 98.1 °F (36.7 °C);  Max - Temp  Av.5 °F (36.9 °C)  Min: 98.1 °F (36.7 °C)  Max: 98.9 °F (37.2 °C)  RESPIRATIONS RANGE: Resp  Av.2  Min: 11  Max: 28  PULSE RANGE: Pulse  Av.9  Min: 81  Max: 115  BLOOD APPENDECTOMY      CARPAL TUNNEL RELEASE Right 03/29/2019    RIGHT CARPAL TUNNEL RELEASE AND RIGHT MIDDLE FINGER TRIGGER FINGER RELEASE performed by Vish Beasley MD at [address]  [id number]    LEFT EYE EYE CATARACT PHACOEMULSIFICATION INTRAOCULAR LENS    CHOLECYSTECTOMY  01/01/1978    COLONOSCOPY  08/31/1999    diverticulosis    EYE SURGERY Bilateral     bilateral cataract removed    FEMORAL BYPASS Left 6/28/2022    LEFT LEG FEMORAL TO POPLITEAL BYPASS WITH INTRAOPERATIVE ANGIOGRAM performed by Mckay Holland MD at [address] [facility][note 3] Left 02/14/2022    LEFT COMMON FEMORAL ARTERY ENDARTERECTOMY performed by Mckay Holland MD at [address] (CERVIX STATUS UNKNOWN)  1980s    sallie    IR FEMORAL POPLITEAL BYPASS GRAFT Right 08/31/2015    KYPHOSIS SURGERY  11/07/2006    L1, (fractured after a fall)    LEG SURGERY Left 02/14/2022    LEFT LEG WOUND DEBRIDEMENT performed by Mckay Holland MD at [address]  04/25/1998    (L) THR    WRIST FRACTURE SURGERY  01/01/2008    left wrist     Family History   Problem Relation Age of Onset    Cancer Mother 43    Stroke Father     Hypertension Father      Social History     Socioeconomic History    Marital status:       Spouse name: Not on file    Number of children: Not on file    Years of education: Not on file    Highest education level: Not on file   Occupational History    Not on file   Tobacco Use    Smoking status: Never Smoker    Smokeless tobacco: Never Used   Vaping Use    Vaping Use: Never used   Substance and Sexual Activity    Alcohol use: No    Drug use: Never    Sexual activity: Not Currently   Other Topics Concern    Not on file   Social History Narrative    Not on file     Social Determinants of Health     Financial Resource Strain: Low Risk     Difficulty of Paying Living Expenses: Not hard at all   Food Insecurity: No Food Insecurity    Worried About Running Out of Food in the Last Year: Never true    Rayray of Food in the Last Year: Never true   Transportation Needs:     Lack of Transportation (Medical): Not on file    Lack of Transportation (Non-Medical): Not on file   Physical Activity:     Days of Exercise per Week: Not on file    Minutes of Exercise per Session: Not on file   Stress:     Feeling of Stress : Not on file   Social Connections:     Frequency of Communication with Friends and Family: Not on file    Frequency of Social Gatherings with Friends and Family: Not on file    Attends [employer] Services: Not on file    Active Member of [address] [place] [address] [employer] or Organizations: Not on file    Attends Club or Organization Meetings: Not on file    Marital Status: Not on file   Intimate Partner Violence:     Fear of Current or Ex-Partner: Not on file    Emotionally Abused: Not on file    Physically Abused: Not on file    Sexually Abused: Not on file   Housing Stability:     Unable to Pay for Housing in the Last Year: Not on file    Number of [place] in the Last Year: Not on file    Unstable Housing in the Last Year: Not on file       Allergies:   Allergies   Allergen Reactions    Aricept [Donepezil Hydrochloride] Other (See Comments)     Pt unable to recall reaction    Doxycycline Nausea Only    Ketorolac Tromethamine Other (See Comments)     Pt unable to recall reaction          LAB DATA:    CBC:   Lab Results   Component Value Date/Time    WBC 9.0 06/30/2022 03:47 AM    RBC 1.99 06/30/2022 03:47 AM    HGB 6.3 06/30/2022 05:04 AM    HCT 17.6 06/30/2022 05:04 AM    MCV 89.1 06/30/2022 03:47 AM    MCH 30.7 06/30/2022 03:47 AM    MCHC 34.4 06/30/2022 03:47 AM    RDW 15.7 06/30/2022 03:47 AM     06/30/2022 03:47 AM    MPV 7.3 06/30/2022 03:47 AM     BMP:    Lab Results   Component Value Date/Time     06/30/2022 03:47 AM    K 4.5 06/30/2022 03:47 AM    K 5.2 06/28/2022 11:05 AM    CL 98 06/30/2022 03:47 AM CO2 26 06/30/2022 03:47 AM    BUN 37 06/30/2022 03:47 AM    CREATININE 1.8 06/30/2022 03:47 AM    CALCIUM 7.5 06/30/2022 03:47 AM    GFRAA 32 06/30/2022 03:47 AM    GFRAA >60 05/14/2013 11:33 AM    LABGLOM 26 06/30/2022 03:47 AM    GLUCOSE 174 06/30/2022 03:47 AM     Ionized Calcium:  No results found for: IONCA  Magnesium:    Lab Results   Component Value Date/Time    MG 1.60 06/30/2022 03:47 AM     Phosphorus:    Lab Results   Component Value Date/Time    PHOS 3.7 06/30/2022 03:47 AM     U/A:    Lab Results   Component Value Date/Time    NITRITE neg 11/05/2020 12:36 PM    COLORU Yellow 06/29/2022 11:54 AM    PHUR 5.0 06/29/2022 11:54 AM    [address] 06/29/2022 11:50 AM    RBCUA 3-4 06/29/2022 11:50 AM    BACTERIA 2+ 06/29/2022 11:50 AM    CLARITYU TURBID 06/29/2022 11:54 AM    SPECGRAV 1.026 06/29/2022 11:54 AM    LEUKOCYTESUR LARGE 06/29/2022 11:54 AM    UROBILINOGEN 0.2 06/29/2022 11:54 AM    BILIRUBINUR Negative 06/29/2022 11:54 AM    BILIRUBINUR neg 11/05/2020 12:36 PM    BLOODU MODERATE 06/29/2022 11:54 AM    GLUCOSEU Negative 06/29/2022 11:54 AM         IMPRESSION/RECOMMENDATIONS:      Principal Problem: 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 *    1. JASE - suspect secondary to reduced renal perfusion in setting of Hypotension  - Change IVF composition and rate   - continue to hold Lasix and Lisinopril  - no immediate indication of RRT   - Avoid Hypotension and Nephrotoxic agents    2. Hyperkalemia - treated medically   - Resolved  - Stop Lokelma    3. Acidosis - resolved with HCO gtt    4.  Left femoral to below-knee popliteal in-situ bypass graft 6/28/22     Overall prognosis guarded

## 2022-06-30 NOTE — PLAN OF CARE
Problem: Chronic Conditions and Co-morbidities  Goal: Patient's chronic conditions and co-morbidity symptoms are monitored and maintained or improved  Outcome: Progressing  Flowsheets (Taken 6/29/2022 2000)  Care Plan - Patient's Chronic Conditions and Co-Morbidity Symptoms are Monitored and Maintained or Improved: Monitor and assess patient's chronic conditions and comorbid symptoms for stability, deterioration, or improvement     Problem: Discharge Planning  Goal: Discharge to home or other facility with appropriate resources  Outcome: Progressing  Flowsheets (Taken 6/29/2022 2000)  Discharge to home or other facility with appropriate resources: Identify barriers to discharge with patient and caregiver     Problem: Pain  Goal: Verbalizes/displays adequate comfort level or baseline comfort level  Outcome: Progressing  Note: Continuing to monitor pain and discomfort. Monitoring pain level on scale of 0-10. Non- pharmacological measures encouraged to reduce discomfort/pain. PRN pain meds administeration continues when/if applicable as ordered by physician. Problem: Safety - Adult  Goal: Free from fall injury  Outcome: Progressing  Note: Falling star program remains in place. Call light and personal belongings within reach. Frequent visual monitoring continues. Toileting program inplace. Patient assisted in turning/repositioning at least once every 2 hours, and on a prn basis. Problem: Skin/Tissue Integrity  Goal: Absence of new skin breakdown  Description: 1. Monitor for areas of redness and/or skin breakdown  2. Assess vascular access sites hourly  3. Every 4-6 hours minimum:  Change oxygen saturation probe site  4. Every 4-6 hours:  If on nasal continuous positive airway pressure, respiratory therapy assess nares and determine need for appliance change or resting period.   Outcome: Progressing  Note: Monitoring patient skin integrity for skin breakdown, turning and repositioning q2h per

## 2022-06-30 NOTE — PROGRESS NOTES
Pt's blood sugar 189, RN reviewed evening medication and updated pt that she will have one unit of insulin. Pt stated she just have ice cream about an hour ago and my blood sugar will be fine as far as it is below 200. Pt is also complaining of left foot pain rating 6/10 and was requesting pain med during shift change. pt's blood pressure is 114/33 (56) and RN updated to pt and pt's son at  Bedside. . Pt and family member at bedside decided to hold pain medication for now. Pt closing eyes right after assessment. Will continue to monitor.

## 2022-06-30 NOTE — PROGRESS NOTES
VASCULAR                 POD#2     Slept overnight. Now c/o \"Left leg paralysis\".    VSS        afeb                I/O  +4100 (UO last 8 hrs 435)  Lungs clear  Abd soft  PICOS L groin intact & working  Some bloody drainage but no hematomas - incisions intact and soft  L shin wound granulating and clean  Foot pink with brisk cap refill; hyperemic & warm  Palpable graft pulse subq with excellent doppler signal; biphasic L PT doppler signal     Labs reviewed             HgB 6.3           WBC 9.0        K 4.5                Creat 1.8 (baseline 1.2; down from 1.9)     A/P:     S/P L fem replacement with fem-BKpop bypass. Patent with well perfused foot   Anemia - surgical blood loss and due to rehydration. No active blooding. Will transfuse and follow. ATN - may be due to intraop transient hypotension. Improving. Hyperkalemia - resolved              Transient paralysis - longstanding and unclear etiology. Neuro evaluation noted. Up in chair today. PT/OT. PSO daily to L shin wound. Ace wrap.               Difficult pt to manage due to behavioral issues.                  [place]

## 2022-06-30 NOTE — PROCEDURES
Name: Florencio Avina   : 1929   Interpreting Physician: Melvi Upton MD   Referring Physician: Lynnann Ormond, APRN  Date of EE2022      Clinical History: Altered mental status    Current Antiepileptic Medications: Current Facility-Administered Medications: 0.9 % sodium chloride infusion, , IntraVENous, PRN  bacitracin-polymyxin b (POLYSPORIN) ointment, , Topical, Daily  0.9 % sodium chloride infusion, , IntraVENous, Continuous  HYDROmorphone (DILAUDID) injection 0.25 mg, 0.25 mg, IntraVENous, Q3H PRN **OR** HYDROmorphone (DILAUDID) injection 0.5 mg, 0.5 mg, IntraVENous, Q3H PRN  aspirin EC tablet 81 mg, 81 mg, Oral, Daily  atorvastatin (LIPITOR) tablet 10 mg, 10 mg, Oral, Daily  glipiZIDE (GLUCOTROL) tablet 2.5 mg, 2.5 mg, Oral, QAM AC  sodium chloride flush 0.9 % injection 5-40 mL, 5-40 mL, IntraVENous, 2 times per day  sodium chloride flush 0.9 % injection 5-40 mL, 5-40 mL, IntraVENous, PRN  0.9 % sodium chloride infusion, , IntraVENous, PRN  ondansetron (ZOFRAN-ODT) disintegrating tablet 4 mg, 4 mg, Oral, Q8H PRN **OR** ondansetron (ZOFRAN) injection 4 mg, 4 mg, IntraVENous, Q6H PRN  oxyCODONE (ROXICODONE) immediate release tablet 5 mg, 5 mg, Oral, Q4H PRN **OR** oxyCODONE HCl (OXY-IR) immediate release tablet 10 mg, 10 mg, Oral, Q4H PRN  glucose chewable tablet 16 g, 4 tablet, Oral, PRN  dextrose bolus 10% 125 mL, 125 mL, IntraVENous, PRN **OR** dextrose bolus 10% 250 mL, 250 mL, IntraVENous, PRN  glucagon (rDNA) injection 1 mg, 1 mg, IntraMUSCular, PRN  dextrose 5 % solution, 100 mL/hr, IntraVENous, PRN  insulin lispro (HUMALOG) injection vial 0-12 Units, 0-12 Units, SubCUTAneous, TID WC  insulin lispro (HUMALOG) injection vial 0-6 Units, 0-6 Units, SubCUTAneous, Nightly  famotidine (PEPCID) tablet 20 mg, 20 mg, Oral, Daily **OR** famotidine (PEPCID) 20 mg in sodium chloride (PF) 10 mL injection, 20 mg, IntraVENous, Daily         Technical Summary:  The EEG was recorded in a digital format on a patient who is reported to be awake and drowsy state during the recording. The patient was not sleep deprived prior to the EEG. The recording revealed a normal background rhythm in the theta frequency range that was without posterior dominance. The record was remarkable for the presence of generalized slowing in the form of 6-7hz activity. The record was remarkable for the absence of epileptiform discharges. Photic stimulation was not  Performed. Hyperventilation was not performed due to medical condition. During the recording stage II sleep  was not seen. The EKG lead revealed  rhythm abnormalties. EEG Interpretation:   The EEG was abnormal due to the presence of: mild generalized slowing. No epileptiform activity. Abnormal EKG rhythm strip. Clinical correlation is recommended. The absence of epileptiform discharges on a single EEG does not rule out a diagnosis of  epilepsy or rule out non-convulsive or complex partial status epilepticus as a cause of altered mental status    Generalized slowing is a non-specific finding consistent with a generalized disturbance of cerebral functioning including toxic, metabolic, or structural abnormalities that are multi-focal or diffuse.     Electronically signed by Sasha Sinha MD on 6/30/2022 at 1:47 PM

## 2022-06-30 NOTE — PROGRESS NOTES
7410: Hematology called for critical Hgb results of 6.1. stat HH ordered to confirm results. 2054: Hematology gain called and hgb resulted 6.3.   0535: Vascular surgery paged and left message. Awaiting for response.

## 2022-06-30 NOTE — PROGRESS NOTES
Photic Stim not available for test.   EEG completed and available for interpretation on the [employer].

## 2022-06-30 NOTE — PROGRESS NOTES
Occupational Therapy  Facility/Department: [facility]  Occupational Therapy Initial Assessment    Name: Marin Huang  : 1929  MRN: [id number]  Date of Service: 2022    Discharge Recommendations:  24 hour supervision or assist,Home with Home health OT  OT Equipment Recommendations  Equipment Needed: No     Marin Huang scored a 15/24 on the AM-PAC ADL Inpatient form. Current research shows that an AM-PAC score of 18 or greater is typically associated with a discharge to the patient's home setting. Based on the patient's AM-PAC score, and their current ADL deficits, it is recommended that the patient have 2-3 sessions per week of Occupational Therapy at d/c to increase the patient's independence. At this time, this patient demonstrates the endurance and safety to discharge home with home therapy and a follow up treatment frequency of 2-3x/wk. Please see assessment section for further patient specific details. If patient discharges prior to next session this note will serve as a discharge summary. Please see below for the latest assessment towards goals. Patient Diagnosis(es): There were no encounter diagnoses. Past Medical History:  has a past medical history of Anxiety, Arthritis, At risk for falls, CAD (coronary artery disease), Cerebral artery occlusion with cerebral infarction ([address] DDD (degenerative disc disease), cervical, Fractures, Hyperlipidemia, Hypertension, Mitral regurgitation, Neuropathy, Osteopenia, Osteoporosis, PAD (peripheral artery disease) ([address] Peripheral neuropathy, Peripheral vascular disease ([address] Podagra, Prolonged emergence from general anesthesia, Spinal stenosis, Type 2 diabetes mellitus ([address] Urethral stricture, and Urgency of urination. Past Surgical History:  has a past surgical history that includes Tonsillectomy; Appendectomy; Cholecystectomy (1978); Colonoscopy (1999); Hysterectomy (); Total hip arthroplasty (1998);  Kyphosis surgery (11/07/2006); Wrist fracture surgery (01/01/2008); Cataract removal with implant (106259); eye surgery (Bilateral); IR Femoral Popliteal Bypass Graft (Right, 08/31/2015); Carpal tunnel release (Right, 03/29/2019); Femoral Endarterectomy (Left, 02/14/2022); Leg Surgery (Left, 02/14/2022); angioplasty (Left, 04/18/2022); and femoral bypass (Left, 6/28/2022). Assessment   Performance deficits / Impairments: Decreased functional mobility ; Decreased strength;Decreased endurance;Decreased ADL status; Decreased safe awareness;Decreased high-level IADLs;Decreased balance;Decreased cognition  Assessment: 81 y/o female admitted 6/28/2022 S/P L fem replacement with fem-BK pop bypass. PTA pt lives at home with 24/7 caregivers, requires intermittent assist for ADLs and can ambulate short distances while standing behind w/c and pushing it. Today, pt c/o significant pain. Pt required CGA to stand from chair > stedy. Pt stood from chair > w/c with min A x 2 and took a few steps forward/backwards with min A x 2. Pt with functional UE ROM for self care and anticipate will require up to max A for ADLs d/t pain. Pt is functioning below baseline and benefit from skilled therapy. Anticipate pt will return home at discharge with family support.   Prognosis: Good  Decision Making: Medium Complexity  REQUIRES OT FOLLOW-UP: Yes  Activity Tolerance  Activity Tolerance: Patient limited by pain        Plan   Plan  Times per Week: 3-5  Current Treatment Recommendations: Strengthening,Balance training,Functional mobility training,Endurance training,Gait training,Self-Care / ADL,Patient/Caregiver education & training     Restrictions  Restrictions/Precautions  Restrictions/Precautions: Fall Risk  Position Activity Restriction  Other position/activity restrictions: L bayron drain; 3L O2 via NC; espinoza    Subjective   General  Chart Reviewed: Yes  Patient assessed for rehabilitation services?: Yes  Additional Pertinent Hx: 81 y/o female stood from twice from chair > stedy with min A. Pt tolerated standing with stedy support ~ 1 min. Pt then insisting on standing to w/c. Pt required min a x 2 to stand from chair > w/c and took 3-4 steps forward/backwards. Pt refused to use a walker for ambulation as she prefers pushing her w/c. Pt position in chair for comfort with needs in reach at end of session. Transfers  Sit to stand: Minimal assistance;2 Person assistance  Stand to sit: 2 Person assistance;Minimal assistance          Cognition  Overall Cognitive Status: Exceptions  Following Commands: Follows one step commands consistently  Attention Span: Attends with cues to redirect  Safety Judgement: Decreased awareness of need for safety;Decreased awareness of need for assistance  Problem Solving: Decreased awareness of errors  Insights: Decreased awareness of deficits  Orientation  Overall Orientation Status: Within Functional Limits                  Education Given To: Patient  Education Provided: Role of Therapy;Plan of Care;Transfer Training  Education Method: Demonstration;Verbal  Barriers to Learning: Cognition  Education Outcome: Continued education needed     LUE AROM (degrees)  LUE AROM : WFL  Left Hand AROM (degrees)  Left Hand AROM: WFL  RUE AROM (degrees)  RUE AROM : WFL  Right Hand AROM (degrees)  Right Hand AROM: [facility]    AM-PAC Score  AM-PAC Inpatient Daily Activity Raw Score: 15 (06/30/22 1313)  AM-PAC Inpatient ADL T-Scale Score : 34.69 (06/30/22 1313)  ADL Inpatient CMS 0-100% Score: 56.46 (06/30/22 1313)  ADL Inpatient CMS G-Code Modifier : CK (06/30/22 1313)    Goals  Short Term Goals  Time Frame for Short term goals: Prior to DC:   Short Term Goal 1: Pt will complete ADL transfer with CGA  Short Term Goal 2: Pt will complete functional mobility with CGA  Short Term Goal 3: Pt will tolerate standing > 3 min for functional task with CGA  Short Term Goal 4: Pt will complete toileting with CGA  Short Term Goal 5: Pt will complete bed mobility in prep for ADL transfers with CGA  Patient Goals   Patient goals : to go home       Therapy Time   Individual Concurrent Group Co-treatment   Time In 1110         Time Out 1150         Minutes 40         Timed Code Treatment Minutes: 25 Minutes     This note to serve as OT d/c summary if pt is d/c-ed prior to next therapy session.     Jaime Arreola, OTR/L

## 2022-06-30 NOTE — CARE COORDINATION
Per chart review pt remains IP at [facility] for elective surgery w DR Nikkie Jimenes    LEFT LEG FEMORAL TO POPLITEAL BYPASS WITH INTRAOPERATIVE ANGIOGRAM

## 2022-07-01 LAB
ALBUMIN SERPL-MCNC: 2.8 G/DL (ref 3.4–5)
ANION GAP SERPL CALCULATED.3IONS-SCNC: 6 MMOL/L (ref 3–16)
BUN BLDV-MCNC: 31 MG/DL (ref 7–20)
CALCIUM SERPL-MCNC: 7.7 MG/DL (ref 8.3–10.6)
CHLORIDE BLD-SCNC: 98 MMOL/L (ref 99–110)
CO2: 32 MMOL/L (ref 21–32)
CREAT SERPL-MCNC: 1.2 MG/DL (ref 0.6–1.2)
GFR AFRICAN AMERICAN: 51
GFR NON-AFRICAN AMERICAN: 42
GLUCOSE BLD-MCNC: 136 MG/DL (ref 70–99)
GLUCOSE BLD-MCNC: 168 MG/DL (ref 70–99)
GLUCOSE BLD-MCNC: 216 MG/DL (ref 70–99)
GLUCOSE BLD-MCNC: 222 MG/DL (ref 70–99)
HCT VFR BLD CALC: 23.7 % (ref 36–48)
HEMOGLOBIN: 8 G/DL (ref 12–16)
MAGNESIUM: 1.8 MG/DL (ref 1.8–2.4)
MCH RBC QN AUTO: 28.7 PG (ref 26–34)
MCHC RBC AUTO-ENTMCNC: 33.7 G/DL (ref 31–36)
MCV RBC AUTO: 85.2 FL (ref 80–100)
ORGANISM: ABNORMAL
PDW BLD-RTO: 19.9 % (ref 12.4–15.4)
PERFORMED ON: ABNORMAL
PHOSPHORUS: 3.9 MG/DL (ref 2.5–4.9)
PLATELET # BLD: 109 K/UL (ref 135–450)
PMV BLD AUTO: 6.9 FL (ref 5–10.5)
POTASSIUM SERPL-SCNC: 4 MMOL/L (ref 3.5–5.1)
RBC # BLD: 2.78 M/UL (ref 4–5.2)
SODIUM BLD-SCNC: 136 MMOL/L (ref 136–145)
URIC ACID, SERUM: 8 MG/DL (ref 2.6–6)
URINE CULTURE, ROUTINE: ABNORMAL
WBC # BLD: 10.1 K/UL (ref 4–11)

## 2022-07-01 PROCEDURE — 94760 N-INVAS EAR/PLS OXIMETRY 1: CPT

## 2022-07-01 PROCEDURE — 97530 THERAPEUTIC ACTIVITIES: CPT

## 2022-07-01 PROCEDURE — 99024 POSTOP FOLLOW-UP VISIT: CPT | Performed by: SURGERY

## 2022-07-01 PROCEDURE — 80069 RENAL FUNCTION PANEL: CPT

## 2022-07-01 PROCEDURE — 93005 ELECTROCARDIOGRAM TRACING: CPT | Performed by: SURGERY

## 2022-07-01 PROCEDURE — 2140000000 HC CCU INTERMEDIATE R&B

## 2022-07-01 PROCEDURE — 97110 THERAPEUTIC EXERCISES: CPT

## 2022-07-01 PROCEDURE — 84550 ASSAY OF BLOOD/URIC ACID: CPT

## 2022-07-01 PROCEDURE — 6370000000 HC RX 637 (ALT 250 FOR IP): Performed by: SURGERY

## 2022-07-01 PROCEDURE — 36415 COLL VENOUS BLD VENIPUNCTURE: CPT

## 2022-07-01 PROCEDURE — 85027 COMPLETE CBC AUTOMATED: CPT

## 2022-07-01 PROCEDURE — 2580000003 HC RX 258: Performed by: SURGERY

## 2022-07-01 PROCEDURE — 97116 GAIT TRAINING THERAPY: CPT

## 2022-07-01 PROCEDURE — 2700000000 HC OXYGEN THERAPY PER DAY

## 2022-07-01 PROCEDURE — 83735 ASSAY OF MAGNESIUM: CPT

## 2022-07-01 RX ADMIN — BACITRACIN ZINC AND POLYMYXIN B SULFATE: 500; 10000 OINTMENT TOPICAL at 09:14

## 2022-07-01 RX ADMIN — OXYCODONE HYDROCHLORIDE 10 MG: 10 TABLET ORAL at 20:34

## 2022-07-01 RX ADMIN — OXYCODONE HYDROCHLORIDE 10 MG: 10 TABLET ORAL at 09:12

## 2022-07-01 RX ADMIN — SODIUM CHLORIDE, PRESERVATIVE FREE 10 ML: 5 INJECTION INTRAVENOUS at 20:29

## 2022-07-01 RX ADMIN — FAMOTIDINE 20 MG: 20 TABLET, FILM COATED ORAL at 09:12

## 2022-07-01 RX ADMIN — OXYCODONE 5 MG: 5 TABLET ORAL at 02:55

## 2022-07-01 RX ADMIN — ASPIRIN 81 MG: 81 TABLET, COATED ORAL at 09:12

## 2022-07-01 RX ADMIN — ATORVASTATIN CALCIUM 10 MG: 10 TABLET, FILM COATED ORAL at 09:13

## 2022-07-01 RX ADMIN — SODIUM CHLORIDE, PRESERVATIVE FREE 10 ML: 5 INJECTION INTRAVENOUS at 09:16

## 2022-07-01 RX ADMIN — GLIPIZIDE 2.5 MG: 5 TABLET ORAL at 09:12

## 2022-07-01 ASSESSMENT — PAIN SCALES - GENERAL
PAINLEVEL_OUTOF10: 4
PAINLEVEL_OUTOF10: 4
PAINLEVEL_OUTOF10: 10
PAINLEVEL_OUTOF10: 7
PAINLEVEL_OUTOF10: 10

## 2022-07-01 ASSESSMENT — PAIN DESCRIPTION - LOCATION
LOCATION: FOOT
LOCATION: FOOT

## 2022-07-01 ASSESSMENT — PAIN DESCRIPTION - ORIENTATION
ORIENTATION: LEFT
ORIENTATION: LEFT

## 2022-07-01 ASSESSMENT — PAIN DESCRIPTION - DESCRIPTORS
DESCRIPTORS: ACHING;BURNING
DESCRIPTORS: DISCOMFORT

## 2022-07-01 NOTE — DISCHARGE INSTR - COC
Continuity of Care Form    Patient Name: Jessica Pizarro   :  1929  MRN:  [id number]    [facility] date:  2022  Discharge date:  22    Code Status Order: [facility]   Advance Directives:   Advance Care Flowsheet Documentation       Date/Time Healthcare Directive Type of Healthcare Directive Copy in [address] Agent's Name Healthcare Agent's Phone Number    22 [phone] Yes, patient has an advance directive for healthcare treatment Durable power of  for health care;Living will Yes, copy in chart Healthcare power of  bhupinder browningkylah [phone]            Admitting Physician:  Edy Garcia MD  PCP: Nicholas Tarango DO    Discharging Nurse: Veronica [facility] Unit/Room#: D4Q-2023/6190-38  Discharging Unit Phone Number: [phone]    Emergency Contact:   Extended Emergency Contact Information  Primary Emergency Contact: Bhupinder Sheppard  Home Phone: [phone]  Relation: Lay Caregiver  Secondary Emergency Contact: not contact, do  Home Phone: [phone]  Relation: Child    Past Surgical History:  Past Surgical History:   Procedure Laterality Date    ANGIOPLASTY Left 2022    Left SFA    APPENDECTOMY      CARPAL TUNNEL RELEASE Right 2019    RIGHT CARPAL TUNNEL RELEASE AND RIGHT MIDDLE FINGER TRIGGER FINGER RELEASE performed by [facility] [place] MD at [place] [address]  [id number]    LEFT EYE EYE CATARACT PHACOEMULSIFICATION INTRAOCULAR LENS    CHOLECYSTECTOMY  1978    COLONOSCOPY  1999    diverticulosis    EYE SURGERY Bilateral     bilateral cataract removed    FEMORAL BYPASS Left 2022    LEFT LEG FEMORAL TO POPLITEAL BYPASS WITH INTRAOPERATIVE ANGIOGRAM performed by Edy Garcia MD at [place] [address] Left 2022    LEFT COMMON FEMORAL ARTERY ENDARTERECTOMY performed by Edy Garcia MD at [address] [place] [address] (CERVIX STATUS UNKNOWN)  [address] [place] [address]    IR FEMORAL POPLITEAL BYPASS GRAFT Right 08/31/2015    KYPHOSIS SURGERY  11/07/2006    L1, (fractured after a fall)    LEG SURGERY Left 02/14/2022    LEFT LEG WOUND DEBRIDEMENT performed by Evelia Vallecillo MD at [facility]  04/25/1998    (L) THR    WRIST FRACTURE SURGERY  01/01/2008    left wrist       Immunization History: There is no immunization history for the selected administration types on file for this patient.     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 (HCC) E11.59    Atherosclerosis of artery of extremity with ulceration ([facility]) I70.299, L97.909    Benign essential HTN I10    Lumbar foraminal stenosis M48.061    DDD (degenerative disc disease), lumbar M51.36    Spinal stenosis of lumbar region with neurogenic claudication M48.062    Type 2 diabetes, controlled, with neuropathy ([address] E11.40    Severe mitral regurgitation I34.0    Venous insufficiency of both lower extremities I87.2    Hyperlipidemia E78.5    Hip arthritis M16.10    Status post carmen arthroplasty replacement of left hip 1999 Z96.642    Femoral artery stenosis, right ([address] I70.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    Osteoporosis M81.0    Osteopenia M85.80    Femoral-popliteal bypass graft occlusion, left ([facility]) T82.898A    Pain due to onychomycosis of nail B35.1, M79.609    Coronary artery disease involving native coronary artery of native heart without angina pectoris I25.10    Diabetic peripheral neuropathy ([facility]) E11.42    Arthritis of left knee M17.12    Trigger middle finger of right hand M65.331    Carpal tunnel syndrome, right G56.01    Vitamin D deficiency E55.9    Carotid stenosis, asymptomatic, bilateral I65.23    Fingernail problem L60.9    TIA (transient ischemic attack) G45.9    Sacral fracture, closed ([address] S32.10XA    Closed fracture of ramus of right pubis ([facility]) S32.591A    Acute pain of left lower extremity M79.605    Diabetes mellitus type 2 with peripheral artery disease ([facility]) E11.51    Open wound of left knee S81.002A    Nonhealing nonsurgical wound limited to breakdown of skin T14. 8XXA    Peripheral vascular disease, unspecified ([facility] I73.9    Wound of left leg S81.802A    Peripheral artery disease ([facility]) I73.9    Cervical spondylosis M47.812    Atherosclerosis of artery of extremity with rest pain ([facility]) I70.229       Isolation/Infection:   Isolation            No Isolation          Patient Infection Status       Infection Onset Added Last Indicated Last Indicated By Review Planned Expiration Resolved Resolved By    None active    Resolved    COVID-19 (Rule Out) 02/10/22 02/10/22 02/10/22 COVID-19 (Ordered)   02/11/22 Rule-Out Test Resulted            Nurse Assessment:  Last Vital Signs: BP (!) 161/120   Pulse (!) 105   Temp 98.2 °F (36.8 °C)   Resp 16   Ht 5' (1.524 m)   Wt 106 lb 0.7 oz (48.1 kg)   SpO2 99%   BMI 20.71 kg/m²     Last documented pain score (0-10 scale): Pain Level: 10  Last Weight:   Wt Readings from Last 1 Encounters:   06/30/22 106 lb 0.7 oz (48.1 kg)     Mental Status:  oriented and alert    IV Access:  - None    Nursing Mobility/ADLs:  Walking   Assisted  Transfer  Assisted  Bathing  Assisted  Dressing  Assisted  Toileting  Assisted  Feeding  Independent  Med Admin  Assisted  Med Delivery   whole    Wound Care Documentation and Therapy:  Wound 11/22/21 Leg Left; Lower;Distal #1 (Active)   Dressing Status Dry; Intact 07/01/22 1200   Wound Cleansed Not Cleansed 07/01/22 1200   Dressing/Treatment Ace wrap;Roll gauze 07/01/22 1200   Wound Length (cm) 2.8 cm 06/13/22 0958   Wound Width (cm) 1.9 cm 06/13/22 0958   Wound Depth (cm) 0.1 cm 06/13/22 0958   Wound Surface Area (cm^2) 5.32 cm^2 06/13/22 0958   Change in Wound Size % (l*w) -90 06/13/22 0958   Wound Volume (cm^3) 0.532 cm^3 06/13/22 0958   Wound care    Address: [facility] [employer] [facility] [address] [place]  Phone: [phone]  Fax: [phone]      Dialysis Facility (if applicable)   Name:  Address:  Dialysis Schedule:  Phone:  Fax:    / signature: Electronically signed by YOANDY Etienne on 7/2/22 at 9:49 AM EDT    PHYSICIAN SECTION    Prognosis: Good    Condition at Discharge: Stable    Rehab Potential (if transferring to Rehab): Good    Recommended Labs or Other Treatments After Discharge: PT    Physician Certification: I certify the above information and transfer of Yari Baum  is necessary for the continuing treatment of the diagnosis listed and that she requires Home Care for greater 30 days.      Update Admission H&P: No change in H&P    PHYSICIAN SIGNATURE:  Electronically signed by Raymond Soliz MD on 7/2/22 at 9:52 AM EDT

## 2022-07-01 NOTE — PROGRESS NOTES
VASCULAR                 POD#3     Slept overnight. Paralytic complaints resolved. Walked yesterday with PT/OT     VSS        afeb                I/O  -1237  Lungs clear  Abd soft  PICOS L groin intact & working  Swollen proximal thigh   Some bloody drainage but no hematomas - incisions intact and soft  L shin wound granulating and clean  Foot pink with brisk cap refill; hyperemic & warm  Palpable graft pulse subq with excellent doppler signal; biphasic L PT doppler signal     Labs reviewed             HgB 8.0          WBC 10.1       K 4.0                Creat 1.2 (baseline)     A/P:     S/P L fem replacement with fem-BKpop bypass. Patent with well perfused foot              Anemia - surgical blood loss and due to rehydration. No active blooding. Observe.              ATN - resolved. DC IVFs. MORENO espinoza.             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 Neuro evaluation noted.              QB in chair today. PT/OT.             PSO daily to L shin wound. Ace wrap.              Difficult pt to manage due to behavioral issues. Plan DC directly home in the next several days.    Can transfer to floor bed if bed needed.                             Petey Marte

## 2022-07-01 NOTE — PROGRESS NOTES
Physical Therapy  Facility/Department: [facility]  Physical Therapy Treatment Note  Name: Victoria Monreal  : 1929  MRN: [id number]  Date of Service: 2022    Discharge Recommendations:  Continue to assess pending progress (SNF vs Home (if adequate assist/support). )   PT Equipment Recommendations  Other: Will monitor for potential equipt needs. Current Am-Pac Score 14/24. Assessment   Body Structures, Functions, Activity Limitations Requiring Skilled Therapeutic Intervention: Decreased functional mobility ; Decreased strength;Decreased safe awareness;Decreased cognition;Decreased endurance;Decreased balance; Increased pain  Assessment: 79 y/o female admit 2022 with PVD, L LE Ischemia. 2022 S/P L Iliofem Bypass for Fem Artery Replacement, Redo with L Fem Endarterectomy, L Fem to Below Knee Pop Bypass Graft. PTA pt living in own home with 24/7 caregivers, level entry and stays on 1st floor of home. Currently,  Difficulty with pt following simple commands/cues for exs and functional activities. Pt attempts to insist on complete activities her way despite less than optimal/safe. Did amb 25'x 2 although Mod assist x 2 and VERY unsafe/poor completion. Pt/family report adequate assist/support for d/c home although need to ensure they can provide  current level of assist needed. Given pt 's disposition, d/c home would be best case scenario. If unable, would recommend (3-5); otherwise home with cont Home PT. Will cont to monitor pt's progress. Therapy Prognosis: Fair  History: 79 y/o female admit 2022 with PVD, L LE Ischemia. 2022 S/P L Iliofem Bypass for Fem Artery Replacement, Redo with L Fem Endarterectomy, L Fem to Below Knee Pop Bypass Graft. Exam: See above. Clinical Presentation: See above. Barriers to Learning: Cognitive.   Requires PT Follow-Up: Yes  Activity Tolerance  Activity Tolerance: Treatment limited secondary to decreased cognition;Patient limited by pain  Activity Tolerance Comments: Difficulty with pt following simple commands/cues for exs and functional activities. Pt attempts to insist on complete activities her way despite less than optimal/safe. Did amb 25'x 2 although Mod assist x 2 and VERY unsafe/poor completion. Plan   Plan  Plan: 3-5 times per week  Current Treatment Recommendations: Strengthening,Functional mobility training,Transfer training,Gait training,Safety education & training,Patient/Caregiver education & training  Safety Devices  Type of Devices: Call light within reach,Chair alarm in place,Left in chair,Nurse notified     Restrictions  Restrictions/Precautions  Restrictions/Precautions: Fall Risk  Position Activity Restriction  Other position/activity restrictions: L Groin MAG Drain. O2 1L via NC. Room Air during functional activities; sats remain at least 90%. Subjective   General  Chart Reviewed: Yes  Patient assessed for rehabilitation services?: Yes  Additional Pertinent Hx: 81 y/o female admit 6/28/2022 with PVD, L LE Ischemia. 6/28/2022 S/P L Iliofem Bypass for Fem Artery Replacement, Redo with L Fem Endarterectomy, L Fem to Below Knee Pop Bypass Graft. Family / Caregiver Present: No  Referring Practitioner: Dr. Neida Guardado  Other (Comment): Difficulty with following simple commands/cues; very distractable. Wants to do things  her way despite less than optimal/safe. Subjective  Subjective: Pt agreeable to PT Rx. Wants to ex/walk. \"The doctor didn't tell me that I can do this. .. Jessie Garcia \"         Social/Functional History  Social/Functional History  Lives With: Alone (Caregivers 24/7.)  Type of Home: House  Home Layout: Multi-level,1/2 bath on main level,Bed/Bath upstairs,Laundry in basement ([employer] to access 2nd floor.)  Home Access: Level entry  Bathroom Shower/Tub: Tub/Shower unit  Bathroom Toilet: Handicap height  Bathroom Equipment: 3-in-1 commode,Shower chair,Grab bars in shower,Grab bars around toilet  Bathroom Accessibility: Accessible  Home Equipment: Wheelchair-manual,Walker, rolling,Walker, 4 wheeled  ADL Assistance:  (Caregivers assist with daily care; sponge baths.)  Homemaking Assistance:  (Caregivers take complete all homemaking needs.)  Ambulation Assistance:  (Pt ambulates short distances pushing w/c in home with someone close by.)  Transfer Assistance: Independent  Active : No (Caregivers transport.)  Additional Comments: Pt/dtr report adequate 24/7 assist/support upon d/c. Vision/Hearing  Vision  Vision: Within Functional Limits  Hearing  Hearing: Exceptions to [facility]  Hearing Exceptions: Hard of hearing/hearing concerns    Cognition   Orientation  Overall Orientation Status: Within Functional Limits  Cognition  Overall Cognitive Status: Exceptions  Following Commands:  (Easily distracted; wants to do things her way.)  Attention Span: Difficulty attending to directions  Safety Judgement: Decreased awareness of need for safety;Decreased awareness of need for assistance  Problem Solving: Assistance required to identify errors made  Insights: Decreased awareness of deficits  Initiation: Requires cues for some  Sequencing: Requires cues for some     Objective            Bed mobility  Supine to Sit:  (Bed converted to chair postion; pt moving towards foot of bed prior (did not want to move to eob). )  Transfers  Sit to Stand: Minimal Assistance;2 Person Assistance  Stand to sit: Minimal Assistance;2 Person Assistance  Ambulation  Surface: level tile  Assistance: Moderate assistance;2 Person assistance  Distance: Pt initially amb pushing her own w/c (pt insist on this method (25'x 2) with ongoing Mod assist x 2. Flexed posture (becoming more so as distance progressed) and very short shuffling steps. Pt easily distracted/attempts to stop and talk about various unrelated items. Very slow; becoming increasingly unsafe. Chair to pt enabling safe return to seated position.   Following brief rest; Pt amb with Rolling Marcelina Barnes (pt advised use for increase safety) 25' with Mod assist x 2. Cont to note flexed posture, short/shuffling steps. Pt cont distracted at times; attempt to stop to talk. Increase assist to transition to chair, pt attempt to turn and sit prior optimal positioning. A/AROM Exercises: Attempt Supine/Sitting LE Exs; difficulty maintain on task/easily distracted (wants to complete her way). AM-PAC Score  AM-PAC Inpatient Mobility Raw Score : 14 (07/01/22 1118)  AM-PAC Inpatient T-Scale Score : 38.1 (07/01/22 1118)  Mobility Inpatient CMS 0-100% Score: 61.29 (07/01/22 1118)  Mobility Inpatient CMS G-Code Modifier : CL (07/01/22 1118)          Goals  Short Term Goals  Time Frame for Short term goals: Upon d/c acute care setting. Short term goal 1: Bed Mob CGA. Short term goal 2: Transfers with/without assist device CGA. Short term goal 3: Pt amb with/without assist device 10-15' Min assist.  Patient Goals   Patient goals : Go home. Education  Patient Education  Education Given To: Patient  Education Provided Comments: Role of PT, POC, Need to call for assist, LE Exs, Safe Functional Activities. Education Method: Verbal;Demonstration  Barriers to Learning: Cognition; Hearing  Education Outcome: Continued education needed      Therapy Time   Individual Concurrent Group Co-treatment   Time In 1000         Time Out [address]         Minutes [facility] Workman

## 2022-07-01 NOTE — CARE COORDINATION
[facility]    Referral received from  to follow for home care services. I will follow for needs, and speak with patient to verify demos. Steve, docs faxed to [facility] in anticipation of weekend DC. If patient discharges over the weekend, please fax [facility] [employer] [facility]. ORDER and ARELY  to [facility] at [phone] upon discharge.     Thank you,    Peter Culver RN, BSN CTN  [facility] [phone]

## 2022-07-01 NOTE — PROGRESS NOTES
Attempted to remove pt's espinoza catheter at this time. Pt refusing stating \"lets just wait until tomorrow this doesn't hurt. \"  This RN educated pt and caregiver at bedside on increased risk of infection with urinary catheter in place. Other alternatives (purwick) was explained to pt at this time. Pt worried about how she would ambulate to restroom at this time. Pt states \"this isn't going to get infected I will leave it in until tomorrow. \"

## 2022-07-01 NOTE — PROGRESS NOTES
Nephrology Progress Note   [facility]      Chief Complaint: s/p Left femoral to below-knee popliteal in-situ bypass graft 22    Reason for Consultation: Hyperkalemia/Acidosis and JASE    History of Present Illness: Ms Loli Watson is a 79 yo female with a past medical history significant for PVD, HTN, DM that was admitted and underwent Left femoral to below-knee popliteal in-situ bypass graft on 22. She was noted to have hyperkalemia / acidosis and worsening kidney function on morning labs - urine output has also dwindled down. Upon reviewing the patients medical chart it appears her BP yesterday was 146/54 and decreased to 85/61 at 2 AM this morning. Patient was also on Lisinopril and Lasix but both these medications were not given today     Subjective:    Resting in recliner; NAD  Son at bedside    ROS: Limited; No chest pain; no shortness of breath; feels legs are weak    Scheduled Meds:   bacitracin-polymyxin b   Topical Daily    aspirin EC  81 mg Oral Daily    atorvastatin  10 mg Oral Daily    glipiZIDE  2.5 mg Oral QAM AC    sodium chloride flush  5-40 mL IntraVENous 2 times per day    insulin lispro  0-12 Units SubCUTAneous TID WC    insulin lispro  0-6 Units SubCUTAneous Nightly    famotidine  20 mg Oral Daily    Or    famotidine (PEPCID) injection  20 mg IntraVENous Daily        sodium chloride      sodium chloride      dextrose         PRN Meds:.sodium chloride, HYDROmorphone **OR** HYDROmorphone, sodium chloride flush, sodium chloride, ondansetron **OR** ondansetron, oxyCODONE **OR** oxyCODONE, glucose, dextrose bolus **OR** dextrose bolus, glucagon (rDNA), dextrose    Physical Exam:    TEMPERATURE:  Current - Temp: 98.7 °F (37.1 °C);  Max - Temp  Av.5 °F (36.9 °C)  Min: 98.4 °F (36.9 °C)  Max: 98.7 °F (37.1 °C)  RESPIRATIONS RANGE: Resp  Av.8  Min: 10  Max: 22  PULSE RANGE: Pulse  Av  Min: 66  Max: 100  BLOOD PRESSURE RANGE:  Systolic (71LBR), DGH:466 , Min:117 , Max:155 ; Diastolic (39ZNU), LPF:81, Min:39, Max:66    24HR INTAKE/OUTPUT:      Intake/Output Summary (Last 24 hours) at 7/1/2022 1302  Last data filed at 7/1/2022 0953  Gross per 24 hour   Intake 420 ml   Output 1575 ml   Net -1155 ml       Patient Vitals for the past 96 hrs (Last 3 readings):   Weight   06/30/22 0600 106 lb 0.7 oz (48.1 kg)   06/28/22 2201 101 lb 6.6 oz (46 kg)   06/28/22 1041 100 lb (45.4 kg)       General: Alert, Awake, NAD, lean  HEENT: Normocephalic, atraumatic, Nose and ears appear externally without deformity, MMM  Eyes: FLACA  Chest: clear to auscultation, no intercostal retractions  CVS: sinus tachycardia, no rub  Abdomen: soft, non tender, no organomegaly  Extremities: no edema, no cyanosis.   Skin: scattered ecchymoses; loss of turgor  Musculoskeletal: no joint swelling, no visible deformity  Neurological: no focal motor neurological deficit  Psych: O x 3  : espinoza in place; no hematuria noted    Past Medical History:   Diagnosis Date    Anxiety     Arthritis     At risk for falls     CAD (coronary artery disease)     Cerebral artery occlusion with cerebral infarction (HCC)     TIA--no residual effects    DDD (degenerative disc disease), cervical     Fractures     (L) Hip Fx 4/25/98, L1 fracture from fall 10-31-06    Hyperlipidemia     Hypertension     Mitral regurgitation     Neuropathy     Osteopenia     Osteoporosis     PAD (peripheral artery disease) (HCC)     Right LE ischemia    Peripheral neuropathy 6/1/2015    Peripheral vascular disease ([address]     bilateral lower extremities with edema    Podagra 01/09/2017    Dr. Merrick Chandra    Prolonged emergence from general anesthesia     Spinal stenosis     L4-5    Type 2 diabetes mellitus ([address]     Urethral stricture 5 years ago    Urgency of urination      Past Surgical History:   Procedure Laterality Date    ANGIOPLASTY Left 04/18/2022    Left SFA    APPENDECTOMY      CARPAL TUNNEL RELEASE Right 03/29/2019    RIGHT CARPAL TUNNEL RELEASE AND RIGHT MIDDLE FINGER TRIGGER FINGER RELEASE performed by Sayda Moya MD at [address] [facility]  [id number]    LEFT EYE EYE CATARACT PHACOEMULSIFICATION INTRAOCULAR LENS    CHOLECYSTECTOMY  01/01/1978    COLONOSCOPY  08/31/1999    diverticulosis    EYE SURGERY Bilateral     bilateral cataract removed    FEMORAL BYPASS Left 6/28/2022    LEFT LEG FEMORAL TO POPLITEAL BYPASS WITH INTRAOPERATIVE ANGIOGRAM performed by Lenny Bone MD at [address] [facility] Left 02/14/2022    LEFT COMMON FEMORAL ARTERY ENDARTERECTOMY performed by Lenny Bone MD at [facility] (CERVIX STATUS UNKNOWN)  1980s    sallie    IR FEMORAL POPLITEAL BYPASS GRAFT Right 08/31/2015    KYPHOSIS SURGERY  11/07/2006    L1, (fractured after a fall)    LEG SURGERY Left 02/14/2022    LEFT LEG WOUND DEBRIDEMENT performed by Lenny Bone MD at [address]  04/25/1998    (L) THR    WRIST FRACTURE SURGERY  01/01/2008    left wrist     Family History   Problem Relation Age of Onset    Cancer Mother 43    Stroke Father     Hypertension Father      Social History     Socioeconomic History    Marital status:       Spouse name: Not on file    Number of children: Not on file    Years of education: Not on file    Highest education level: Not on file   Occupational History    Not on file   Tobacco Use    Smoking status: Never Smoker    Smokeless tobacco: Never Used   Vaping Use    Vaping Use: Never used   Substance and Sexual Activity    Alcohol use: No    Drug use: Never    Sexual activity: Not Currently   Other Topics Concern    Not on file   Social History Narrative    Not on file     Social Determinants of Health     Financial Resource Strain: Low Risk     Difficulty of Paying Living Expenses: Not hard at all   Food Insecurity: No Food Insecurity    Worried About [facility] in the Last Year: Never true   [facility] Ran Out of Food in the Last Year: Never true   Transportation Needs:     Lack of Transportation (Medical): Not on file    Lack of Transportation (Non-Medical): Not on file   Physical Activity:     Days of Exercise per Week: Not on file    Minutes of Exercise per Session: Not on file   Stress:     Feeling of Stress : Not on file   Social Connections:     Frequency of Communication with Friends and Family: Not on file    Frequency of Social Gatherings with Friends and Family: Not on file    Attends Druze Services: Not on file    Active Member of [address] [place] [address] [employer] or Organizations: Not on file    Attends Club or Organization Meetings: Not on file    Marital Status: Not on file   Intimate Partner Violence:     Fear of Current or Ex-Partner: Not on file    Emotionally Abused: Not on file    Physically Abused: Not on file    Sexually Abused: Not on file   Housing Stability:     Unable to Pay for Housing in the Last Year: Not on file    Number of [place] in the Last Year: Not on file    Unstable Housing in the Last Year: Not on file       Allergies:   Allergies   Allergen Reactions    Aricept [Donepezil Hydrochloride] Other (See Comments)     Pt unable to recall reaction    Doxycycline Nausea Only    Ketorolac Tromethamine Other (See Comments)     Pt unable to recall reaction          LAB DATA:    CBC:   Lab Results   Component Value Date/Time    WBC 10.1 07/01/2022 04:20 AM    RBC 2.78 07/01/2022 04:20 AM    HGB 8.0 07/01/2022 04:20 AM    HCT 23.7 07/01/2022 04:20 AM    MCV 85.2 07/01/2022 04:20 AM    MCH 28.7 07/01/2022 04:20 AM    MCHC 33.7 07/01/2022 04:20 AM    RDW 19.9 07/01/2022 04:20 AM     07/01/2022 04:20 AM    MPV 6.9 07/01/2022 04:20 AM     BMP:    Lab Results   Component Value Date/Time     07/01/2022 04:20 AM    K 4.0 07/01/2022 04:20 AM    K 5.2 06/28/2022 11:05 AM    CL 98 07/01/2022 04:20 AM    CO2 32 07/01/2022 04:20 AM    BUN 31 07/01/2022 04:20 AM    CREATININE 1.2 07/01/2022 04:20 AM    CALCIUM 7.7 07/01/2022 04:20 AM    GFRAA 51 07/01/2022 04:20 AM    GFRAA >60 05/14/2013 11:33 AM    LABGLOM 42 07/01/2022 04:20 AM    GLUCOSE 136 07/01/2022 04:20 AM     Ionized Calcium:  No results found for: IONCA  Magnesium:    Lab Results   Component Value Date/Time    MG 1.80 07/01/2022 04:20 AM     Phosphorus:    Lab Results   Component Value Date/Time    PHOS 3.9 07/01/2022 04:20 AM     U/A:    Lab Results   Component Value Date/Time    NITRITE neg 11/05/2020 12:36 PM    COLORU Yellow 06/29/2022 11:54 AM    PHUR 5.0 06/29/2022 11:54 AM    [address] 06/29/2022 11:50 AM    RBCUA 3-4 06/29/2022 11:50 AM    BACTERIA 2+ 06/29/2022 11:50 AM    CLARITYU TURBID 06/29/2022 11:54 AM    SPECGRAV 1.026 06/29/2022 11:54 AM    LEUKOCYTESUR LARGE 06/29/2022 11:54 AM    UROBILINOGEN 0.2 06/29/2022 11:54 AM    BILIRUBINUR Negative 06/29/2022 11:54 AM    BILIRUBINUR neg 11/05/2020 12:36 PM    BLOODU MODERATE 06/29/2022 11:54 AM    GLUCOSEU Negative 06/29/2022 11:54 AM         IMPRESSION/RECOMMENDATIONS:      Principal Problem: 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 *    1. JASE - suspect secondary to reduced renal perfusion in setting of Hypotension  - Resolving - off IVF  - continue to hold Lasix and Lisinopril  - no immediate indication of RRT   - Avoid Hypotension and Nephrotoxic agents    2. Hyperkalemia - treated medically   - Resolved    3. Acidosis - resolved with HCO gtt    4.  Left femoral to below-knee popliteal in-situ bypass graft 6/28/22     Overall prognosis guarded

## 2022-07-02 VITALS
HEIGHT: 60 IN | HEART RATE: 102 BPM | BODY MASS INDEX: 22.51 KG/M2 | OXYGEN SATURATION: 98 % | DIASTOLIC BLOOD PRESSURE: 91 MMHG | TEMPERATURE: 98.3 F | SYSTOLIC BLOOD PRESSURE: 164 MMHG | WEIGHT: 114.64 LBS | RESPIRATION RATE: 21 BRPM

## 2022-07-02 LAB
ALBUMIN SERPL-MCNC: 3.1 G/DL (ref 3.4–5)
ANION GAP SERPL CALCULATED.3IONS-SCNC: 6 MMOL/L (ref 3–16)
BUN BLDV-MCNC: 24 MG/DL (ref 7–20)
CALCIUM SERPL-MCNC: 8.2 MG/DL (ref 8.3–10.6)
CHLORIDE BLD-SCNC: 98 MMOL/L (ref 99–110)
CO2: 31 MMOL/L (ref 21–32)
CREAT SERPL-MCNC: 0.9 MG/DL (ref 0.6–1.2)
EKG ATRIAL RATE: 105 BPM
EKG DIAGNOSIS: NORMAL
EKG Q-T INTERVAL: 370 MS
EKG QRS DURATION: 114 MS
EKG QTC CALCULATION (BAZETT): 442 MS
EKG R AXIS: 83 DEGREES
EKG T AXIS: 10 DEGREES
EKG VENTRICULAR RATE: 86 BPM
GFR AFRICAN AMERICAN: >60
GFR NON-AFRICAN AMERICAN: 58
GLUCOSE BLD-MCNC: 122 MG/DL (ref 70–99)
GLUCOSE BLD-MCNC: 130 MG/DL (ref 70–99)
MAGNESIUM: 1.9 MG/DL (ref 1.8–2.4)
PERFORMED ON: ABNORMAL
PHOSPHORUS: 2 MG/DL (ref 2.5–4.9)
POTASSIUM SERPL-SCNC: 4.3 MMOL/L (ref 3.5–5.1)
SODIUM BLD-SCNC: 135 MMOL/L (ref 136–145)
URIC ACID, SERUM: 7.8 MG/DL (ref 2.6–6)

## 2022-07-02 PROCEDURE — 94760 N-INVAS EAR/PLS OXIMETRY 1: CPT

## 2022-07-02 PROCEDURE — 93010 ELECTROCARDIOGRAM REPORT: CPT | Performed by: INTERNAL MEDICINE

## 2022-07-02 PROCEDURE — 6370000000 HC RX 637 (ALT 250 FOR IP): Performed by: SURGERY

## 2022-07-02 PROCEDURE — 83735 ASSAY OF MAGNESIUM: CPT

## 2022-07-02 PROCEDURE — 84550 ASSAY OF BLOOD/URIC ACID: CPT

## 2022-07-02 PROCEDURE — 36415 COLL VENOUS BLD VENIPUNCTURE: CPT

## 2022-07-02 PROCEDURE — 80069 RENAL FUNCTION PANEL: CPT

## 2022-07-02 RX ADMIN — OXYCODONE 5 MG: 5 TABLET ORAL at 08:17

## 2022-07-02 RX ADMIN — ATORVASTATIN CALCIUM 10 MG: 10 TABLET, FILM COATED ORAL at 09:04

## 2022-07-02 RX ADMIN — ASPIRIN 81 MG: 81 TABLET, COATED ORAL at 09:04

## 2022-07-02 RX ADMIN — GLIPIZIDE 2.5 MG: 5 TABLET ORAL at 09:04

## 2022-07-02 RX ADMIN — FAMOTIDINE 20 MG: 20 TABLET, FILM COATED ORAL at 09:04

## 2022-07-02 ASSESSMENT — PAIN DESCRIPTION - FREQUENCY
FREQUENCY: INTERMITTENT
FREQUENCY: INTERMITTENT

## 2022-07-02 ASSESSMENT — PAIN SCALES - GENERAL
PAINLEVEL_OUTOF10: 2
PAINLEVEL_OUTOF10: 5
PAINLEVEL_OUTOF10: 5
PAINLEVEL_OUTOF10: 6

## 2022-07-02 ASSESSMENT — PAIN DESCRIPTION - PAIN TYPE: TYPE: ACUTE PAIN

## 2022-07-02 ASSESSMENT — PAIN DESCRIPTION - DESCRIPTORS
DESCRIPTORS: ACHING
DESCRIPTORS: BURNING

## 2022-07-02 ASSESSMENT — PAIN DESCRIPTION - ORIENTATION
ORIENTATION: LEFT
ORIENTATION: LEFT

## 2022-07-02 ASSESSMENT — PAIN DESCRIPTION - LOCATION
LOCATION: FOOT
LOCATION: FOOT

## 2022-07-02 ASSESSMENT — PAIN - FUNCTIONAL ASSESSMENT
PAIN_FUNCTIONAL_ASSESSMENT: PREVENTS OR INTERFERES SOME ACTIVE ACTIVITIES AND ADLS
PAIN_FUNCTIONAL_ASSESSMENT: PREVENTS OR INTERFERES SOME ACTIVE ACTIVITIES AND ADLS

## 2022-07-02 ASSESSMENT — PAIN DESCRIPTION - ONSET: ONSET: ON-GOING

## 2022-07-02 NOTE — CARE COORDINATION
CASE MANAGEMENT DISCHARGE SUMMARY:    DISCHARGE DATE: 7/2/22    DISCHARGED TO: Home                Discharging to Facility/ Agency   · Name:  [facility]    · Address: [address] [place] [address]., [address] [place] [address]., [facility]  · Phone: [phone]  · Fax: [phone]     TRANSPORTATION: Family               ARELY Updated      Destination updated (SNF/HHC)     Whiteboard Note Updated with above     Home Care Order Verified     Electronically signed by YOANDY Ngo on 7/2/2022 at 9:50 AM     Octavia Crook with [facility] - confirm order received [phone]

## 2022-07-02 NOTE — PROGRESS NOTES
Patient up in chair with waffle cushion under bottom. Weight redistributed while in chair. Asked patient if she wanted to get back to bed so she can alleviate pressure on bottom. Patient refused at this time, states she is comfortable in chair.

## 2022-07-02 NOTE — PROGRESS NOTES
Nephrology Progress Note   [email or web address]      Chief Complaint: s/p Left femoral to below-knee popliteal in-situ bypass graft 22    Reason for Consultation: Hyperkalemia/Acidosis and JASE    History of Present Illness: Ms Ana Brandon is a 81 yo female with a past medical history significant for PVD, HTN, DM that was admitted and underwent Left femoral to below-knee popliteal in-situ bypass graft on 22. She was noted to have hyperkalemia / acidosis and worsening kidney function on morning labs - urine output has also dwindled down. Upon reviewing the patients medical chart it appears her BP yesterday was 146/54 and decreased to 85/61 at 2 AM this morning. Patient was also on Lisinopril and Lasix but both these medications were not given today     Subjective:    Feels OK ready to go home  Son at bedside    ROS:  No chest pain; no shortness of breath    Scheduled Meds:   bacitracin-polymyxin b   Topical Daily    aspirin EC  81 mg Oral Daily    atorvastatin  10 mg Oral Daily    glipiZIDE  2.5 mg Oral QAM AC    sodium chloride flush  5-40 mL IntraVENous 2 times per day    insulin lispro  0-12 Units SubCUTAneous TID WC    insulin lispro  0-6 Units SubCUTAneous Nightly    famotidine  20 mg Oral Daily    Or    famotidine (PEPCID) injection  20 mg IntraVENous Daily        sodium chloride      sodium chloride      dextrose         PRN Meds:.sodium chloride, HYDROmorphone **OR** HYDROmorphone, sodium chloride flush, sodium chloride, ondansetron **OR** ondansetron, oxyCODONE **OR** oxyCODONE, glucose, dextrose bolus **OR** dextrose bolus, glucagon (rDNA), dextrose    Physical Exam:    TEMPERATURE:  Current - Temp: 98.3 °F (36.8 °C);  Max - Temp  Av °F (36.7 °C)  Min: 97.5 °F (36.4 °C)  Max: 98.3 °F (36.8 °C)  RESPIRATIONS RANGE: Resp  Av.5  Min: 10  Max: 21  PULSE RANGE: Pulse  Av.6  Min: 70  Max: 105  BLOOD PRESSURE RANGE:  Systolic (22AOH), WH , Min:125 , BYP:254   ; Diastolic (74ZUB), Av, Min:39, Max:120    24HR INTAKE/OUTPUT:      Intake/Output Summary (Last 24 hours) at 2022 1022  Last data filed at 2022 0645  Gross per 24 hour   Intake 360 ml   Output 1035 ml   Net -675 ml       Patient Vitals for the past 96 hrs (Last 3 readings):   Weight   22 0630 114 lb 10.2 oz (52 kg)   22 0600 106 lb 0.7 oz (48.1 kg)   22 2201 101 lb 6.6 oz (46 kg)       General: Alert, Awake, NAD, lean  HEENT: Normocephalic, atraumatic  Eyes: FLACA  Chest: clear to auscultation, no intercostal retractions  CVS: sinus tachycardia, no rub  Abdomen: soft, non tender, no organomegaly  Extremities: no edema, no cyanosis. Ace wraps LE's  Skin: scattered ecchymoses; loss of turgor  Musculoskeletal: no joint swelling, no visible deformity  Neurological: no focal motor neurological deficit  Psych: O x 3      Past Medical History:   Diagnosis Date    Anxiety     Arthritis     At risk for falls     CAD (coronary artery disease)     Cerebral artery occlusion with cerebral infarction (HCC)     TIA--no residual effects    DDD (degenerative disc disease), cervical     Fractures     (L) Hip Fx 98, L1 fracture from fall 10-31-06    Hyperlipidemia     Hypertension     Mitral regurgitation     Neuropathy     Osteopenia     Osteoporosis     PAD (peripheral artery disease) (HCC)     Right LE ischemia    Peripheral neuropathy 2015    Peripheral vascular disease ([facility])     bilateral lower extremities with edema    Podagra 2017    Dr. Bajwa Olp    Prolonged emergence from general anesthesia     Spinal stenosis     L4-5    Type 2 diabetes mellitus [address]     Urethral stricture 5 years ago    Urgency of urination      Past Surgical History:   Procedure Laterality Date    ANGIOPLASTY Left 2022    Left SFA    APPENDECTOMY      CARPAL TUNNEL RELEASE Right 2019    RIGHT CARPAL TUNNEL RELEASE AND RIGHT MIDDLE FINGER TRIGGER FINGER RELEASE performed by [facility] OR    CATARACT REMOVAL WITH IMPLANT  [id number]    LEFT EYE EYE CATARACT PHACOEMULSIFICATION INTRAOCULAR LENS    CHOLECYSTECTOMY  01/01/1978    COLONOSCOPY  08/31/1999    diverticulosis    EYE SURGERY Bilateral     bilateral cataract removed    FEMORAL BYPASS Left 6/28/2022    LEFT LEG FEMORAL TO POPLITEAL BYPASS WITH INTRAOPERATIVE ANGIOGRAM performed by Ester Navarro MD at [address] [facility] Left 02/14/2022    LEFT COMMON FEMORAL ARTERY ENDARTERECTOMY performed by Ester Navarro MD at [address] [place] (CERVIX STATUS UNKNOWN)  1980s    sallie    IR FEMORAL POPLITEAL BYPASS GRAFT Right 08/31/2015    KYPHOSIS SURGERY  11/07/2006    L1, (fractured after a fall)    LEG SURGERY Left 02/14/2022    LEFT LEG WOUND DEBRIDEMENT performed by Ester Navarro MD at [address]  04/25/1998    (L) THR    WRIST FRACTURE SURGERY  01/01/2008    left wrist     Family History   Problem Relation Age of Onset    Cancer Mother 43    Stroke Father     Hypertension Father      Social History     Socioeconomic History    Marital status:       Spouse name: Not on file    Number of children: Not on file    Years of education: Not on file    Highest education level: Not on file   Occupational History    Not on file   Tobacco Use    Smoking status: Never Smoker    Smokeless tobacco: Never Used   Vaping Use    Vaping Use: Never used   Substance and Sexual Activity    Alcohol use: No    Drug use: Never    Sexual activity: Not Currently   Other Topics Concern    Not on file   Social History Narrative    Not on file     Social Determinants of Health     Financial Resource Strain: Low Risk     Difficulty of Paying Living Expenses: Not hard at all   Food Insecurity: No Food Insecurity    Worried About Running Out of Food in the Last Year: Never true    [place] of Food in the Last Year: Never true   Transportation Needs:     Lack of Transportation

## 2022-07-02 NOTE — PROGRESS NOTES
Patient called RN into room. Stated she wanted PIV removed now or she was going to remove it. Discussed with patient that the provider needed to assess patient but patient stated she was leaving now. Page sent to Dr Ramiro Hinojosa to update on patient wishing to leave AMA.

## 2022-07-02 NOTE — PROGRESS NOTES
Dr Tan Moon at the bedside to eval patient. Plan to discharge patient home. Caregiver/family at the bedside and agreeable with patient on plan of care.

## 2022-07-02 NOTE — PROGRESS NOTES
Patient discharged via wheelchair to private vehicle with family/caregiver. Instructions reviewed with caregiver/family including follow-up care and to call Dr Raisa Smyth office for appointment. Instructed on signs/symptoms to monitor at home and management of MAG drain. Also instructed on monitoring for constipation and to notify office if patient has difficulty passing stool. No new prescriptions. Discussed Dr West Pulling instructions to hold lisinopril at this time. Family aware, stated understanding. All belongings taken with patient. Stable at time of discharge.

## 2022-07-02 NOTE — PLAN OF CARE
Problem: Chronic Conditions and Co-morbidities  Goal: Patient's chronic conditions and co-morbidity symptoms are monitored and maintained or improved  Outcome: Progressing     Problem: Discharge Planning  Goal: Discharge to home or other facility with appropriate resources  Outcome: Progressing     Problem: Pain  Goal: Verbalizes/displays adequate comfort level or baseline comfort level  Outcome: Progressing  Flowsheets (Taken 7/1/2022 2100)  Verbalizes/displays adequate comfort level or baseline comfort level:   Encourage patient to monitor pain and request assistance   Assess pain using appropriate pain scale   Administer analgesics based on type and severity of pain and evaluate response     Problem: Safety - Adult  Goal: Free from fall injury  Outcome: Progressing     Problem: Skin/Tissue Integrity  Goal: Absence of new skin breakdown  Description: 1. Monitor for areas of redness and/or skin breakdown  2. Assess vascular access sites hourly  3. Every 4-6 hours minimum:  Change oxygen saturation probe site  4. Every 4-6 hours:  If on nasal continuous positive airway pressure, respiratory therapy assess nares and determine need for appliance change or resting period.   Outcome: Progressing

## 2022-07-02 NOTE — PROGRESS NOTES
0645: Wagner catheter removed per order. Nissa care completed. Patient tolerated well. Purewick placed.

## 2022-07-05 ENCOUNTER — HOSPITAL ENCOUNTER (OUTPATIENT)
Dept: WOUND CARE | Age: 87
Discharge: HOME | End: 2022-07-05
Payer: MEDICARE

## 2022-07-05 ENCOUNTER — CARE COORDINATION (OUTPATIENT)
Dept: CASE MANAGEMENT | Age: 87
End: 2022-07-05

## 2022-07-05 VITALS
HEART RATE: 98 BPM | DIASTOLIC BLOOD PRESSURE: 78 MMHG | SYSTOLIC BLOOD PRESSURE: 167 MMHG | RESPIRATION RATE: 18 BRPM | TEMPERATURE: 97.5 F

## 2022-07-05 DIAGNOSIS — S81.802D WOUND OF LEFT LOWER EXTREMITY, SUBSEQUENT ENCOUNTER: ICD-10-CM

## 2022-07-05 DIAGNOSIS — S81.802A WOUND OF LEFT LOWER EXTREMITY, INITIAL ENCOUNTER: ICD-10-CM

## 2022-07-05 DIAGNOSIS — I87.2 VENOUS INSUFFICIENCY OF BOTH LOWER EXTREMITIES: Primary | ICD-10-CM

## 2022-07-05 DIAGNOSIS — I73.9 PERIPHERAL ARTERY DISEASE (HCC): Primary | ICD-10-CM

## 2022-07-05 PROCEDURE — 6370000000 HC RX 637 (ALT 250 FOR IP): Performed by: SPECIALIST

## 2022-07-05 PROCEDURE — 11042 DBRDMT SUBQ TIS 1ST 20SQCM/<: CPT

## 2022-07-05 PROCEDURE — 1111F DSCHRG MED/CURRENT MED MERGE: CPT | Performed by: INTERNAL MEDICINE

## 2022-07-05 PROCEDURE — 11042 DBRDMT SUBQ TIS 1ST 20SQCM/<: CPT | Performed by: SPECIALIST

## 2022-07-05 RX ORDER — LIDOCAINE 50 MG/G
OINTMENT TOPICAL ONCE
Status: CANCELLED | OUTPATIENT
Start: 2022-07-05 | End: 2022-07-05

## 2022-07-05 RX ORDER — BACITRACIN, NEOMYCIN, POLYMYXIN B 400; 3.5; 5 [USP'U]/G; MG/G; [USP'U]/G
OINTMENT TOPICAL ONCE
Status: CANCELLED | OUTPATIENT
Start: 2022-07-05 | End: 2022-07-05

## 2022-07-05 RX ORDER — LIDOCAINE HYDROCHLORIDE 20 MG/ML
JELLY TOPICAL ONCE
Status: CANCELLED | OUTPATIENT
Start: 2022-07-05 | End: 2022-07-05

## 2022-07-05 RX ORDER — LIDOCAINE 40 MG/G
CREAM TOPICAL ONCE
Status: CANCELLED | OUTPATIENT
Start: 2022-07-05 | End: 2022-07-05

## 2022-07-05 RX ORDER — CLOBETASOL PROPIONATE 0.5 MG/G
OINTMENT TOPICAL ONCE
Status: CANCELLED | OUTPATIENT
Start: 2022-07-05 | End: 2022-07-05

## 2022-07-05 RX ORDER — BETAMETHASONE DIPROPIONATE 0.05 %
OINTMENT (GRAM) TOPICAL ONCE
Status: CANCELLED | OUTPATIENT
Start: 2022-07-05 | End: 2022-07-05

## 2022-07-05 RX ORDER — GINSENG 100 MG
CAPSULE ORAL ONCE
Status: CANCELLED | OUTPATIENT
Start: 2022-07-05 | End: 2022-07-05

## 2022-07-05 RX ORDER — LIDOCAINE HYDROCHLORIDE 40 MG/ML
SOLUTION TOPICAL ONCE
Status: COMPLETED | OUTPATIENT
Start: 2022-07-05 | End: 2022-07-05

## 2022-07-05 RX ORDER — BACITRACIN ZINC AND POLYMYXIN B SULFATE 500; 1000 [USP'U]/G; [USP'U]/G
OINTMENT TOPICAL ONCE
Status: CANCELLED | OUTPATIENT
Start: 2022-07-05 | End: 2022-07-05

## 2022-07-05 RX ORDER — GENTAMICIN SULFATE 1 MG/G
OINTMENT TOPICAL ONCE
Status: CANCELLED | OUTPATIENT
Start: 2022-07-05 | End: 2022-07-05

## 2022-07-05 RX ORDER — LIDOCAINE HYDROCHLORIDE 40 MG/ML
SOLUTION TOPICAL ONCE
Status: CANCELLED | OUTPATIENT
Start: 2022-07-05 | End: 2022-07-05

## 2022-07-05 RX ADMIN — LIDOCAINE HYDROCHLORIDE: 40 SOLUTION TOPICAL at 10:10

## 2022-07-05 ASSESSMENT — PAIN SCALES - GENERAL: PAINLEVEL_OUTOF10: 10

## 2022-07-05 ASSESSMENT — PAIN DESCRIPTION - ORIENTATION: ORIENTATION: LEFT

## 2022-07-05 ASSESSMENT — PAIN DESCRIPTION - DESCRIPTORS: DESCRIPTORS: BURNING

## 2022-07-05 ASSESSMENT — PAIN DESCRIPTION - LOCATION: LOCATION: FOOT

## 2022-07-05 ASSESSMENT — PAIN DESCRIPTION - PAIN TYPE: TYPE: ACUTE PAIN

## 2022-07-05 NOTE — PROGRESS NOTES
[facility]  Progress Note and Procedure Note      Aliza Hansen  MEDICAL RECORD NUMBER:  [id number]  AGE: 80 y.o. GENDER: female  : 1929  EPISODE DATE:  2022    Subjective:     Chief Complaint   Patient presents with    Wound Check     left lower leg         HISTORY of PRESENT ILLNESS HPI     Aliza Hansen is a 80 y.o. female who presents today for wound/ulcer evaluation. History of Wound Context: Patient returns today to wound care following recent left external iliofemoral bypass with a left femoral below the knee popliteal in situ bypass by Dr. Brenden Luna.   Wound/Ulcer Pain Timing/Severity: none  Quality of pain: N/A  Severity:  0 / 10   Modifying Factors: None  Associated Signs/Symptoms: edema and pain    Ulcer Identification:  Ulcer Type: arterial    Contributing Factors: edema, venous stasis and arterial insufficiency    Acute Wound: N/A not an acute wound    PAST MEDICAL HISTORY        Diagnosis Date    Anxiety     Arthritis     At risk for falls     CAD (coronary artery disease)     Cerebral artery occlusion with cerebral infarction (HCC)     TIA--no residual effects    DDD (degenerative disc disease), cervical     Fractures     (L) Hip Fx 98, L1 fracture from fall 10-31-06    Hyperlipidemia     Hypertension     Mitral regurgitation     Neuropathy     Osteopenia     Osteoporosis     PAD (peripheral artery disease) (HCC)     Right LE ischemia    Peripheral neuropathy 2015    Peripheral vascular disease (HCC)     bilateral lower extremities with edema    Podagra 2017    Dr. Polo Ball    Prolonged emergence from general anesthesia     Spinal stenosis     L4-5    Type 2 diabetes mellitus ([facility]     Urethral stricture 5 years ago    Urgency of urination        PAST SURGICAL HISTORY    Past Surgical History:   Procedure Laterality Date    ANGIOPLASTY Left 2022    Left SFA    APPENDECTOMY      CARPAL TUNNEL RELEASE Right 2019    RIGHT CARPAL TUNNEL RELEASE AND RIGHT MIDDLE FINGER TRIGGER FINGER RELEASE performed by Zi Booker MD at [address] [facility]  [id number]    LEFT EYE EYE CATARACT PHACOEMULSIFICATION INTRAOCULAR LENS    CHOLECYSTECTOMY  01/01/1978    COLONOSCOPY  08/31/1999    diverticulosis    EYE SURGERY Bilateral     bilateral cataract removed    FEMORAL BYPASS Left 6/28/2022    LEFT LEG FEMORAL TO POPLITEAL BYPASS WITH INTRAOPERATIVE ANGIOGRAM performed by Raine Esquivel MD at [address] [facility] Left 02/14/2022    LEFT COMMON FEMORAL ARTERY ENDARTERECTOMY performed by Raine Esquivel MD at [address] [facility] (CERVIX STATUS UNKNOWN)  [address] [place] [address]    IR FEMORAL POPLITEAL BYPASS GRAFT Right 08/31/2015    KYPHOSIS SURGERY  11/07/2006    L1, (fractured after a fall)    LEG SURGERY Left 02/14/2022    LEFT LEG WOUND DEBRIDEMENT performed by Raine Esquivel MD at [address]  04/25/1998    (L) THR    WRIST FRACTURE SURGERY  01/01/2008    left wrist       FAMILY HISTORY    Family History   Problem Relation Age of Onset    Cancer Mother 43    Stroke Father     Hypertension Father        SOCIAL HISTORY    Social History     Tobacco Use    Smoking status: Never Smoker    Smokeless tobacco: Never Used   Vaping Use    Vaping Use: Never used   Substance Use Topics    Alcohol use: No    Drug use: Never       ALLERGIES    Allergies   Allergen Reactions    Aricept [Donepezil Hydrochloride] Other (See Comments)     Pt unable to recall reaction    Doxycycline Nausea Only    Ketorolac Tromethamine Other (See Comments)     Pt unable to recall reaction       MEDICATIONS    Current Outpatient Medications on File Prior to Encounter   Medication Sig Dispense Refill    metFORMIN (GLUCOPHAGE) 500 MG tablet TAKE ONE TABLET BY MOUTH DAILY 90 tablet 3    HYDROcodone-acetaminophen (NORCO) 5-325 MG per tablet Take 1 tablet by mouth every 6 hours as needed for Pain.  traMADol (ULTRAM) 50 MG tablet Take 25 mg by mouth every 6 hours as needed for Pain.  acetaminophen (TYLENOL) 325 MG tablet Take 650 mg by mouth every 6 hours as needed for Pain      glimepiride (AMARYL) 1 MG tablet TAKE ONE TABLET BY MOUTH EVERY MORNING BEFORE BREAKFAST 30 tablet 2    hydrOXYzine (VISTARIL) 25 MG capsule Take 1 capsule by mouth daily as needed for Anxiety (Patient not taking: Reported on 6/28/2022) 30 capsule 0    atorvastatin (LIPITOR) 10 MG tablet TAKE ONE TABLET BY MOUTH DAILY 30 tablet 3    Lite Touch Lancets MISC Use twice daily when testing blood sugars. 100 each 5    Wound Cleansers (VASHE CLEANSING) SOLN Soak vashe on gauze pad and place on wound for 5-10 minutes 1 each 2    lisinopril (PRINIVIL;ZESTRIL) 20 MG tablet TAKE ONE TABLET BY MOUTH DAILY (Patient not taking: Reported on 7/5/2022) 90 tablet 3    furosemide (LASIX) 20 MG tablet Take 1 tablet by mouth daily 90 tablet 1    blood glucose test strips (ONETOUCH ULTRA) strip USE TO TEST BLOOD SUGAR TWICE DAILY 100 strip 1    Turmeric 450 MG CAPS Take 450 mg by mouth daily      Cholecalciferol (VITAMIN D3) 2000 UNITS CAPS Take 1 capsule by mouth daily      aspirin EC 81 MG EC tablet Take 1 tablet by mouth daily 30 tablet 3     No current facility-administered medications on file prior to encounter. REVIEW OF SYSTEMS  Review of Systems    Pertinent items are noted in HPI.     Objective:      BP (!) 167/78   Pulse 98   Temp 97.5 °F (36.4 °C) (Temporal)   Resp 18     Wt Readings from Last 3 Encounters:   07/02/22 114 lb 10.2 oz (52 kg)   06/17/22 106 lb (48.1 kg)   06/16/22 106 lb (48.1 kg)       PHYSICAL EXAM    General Appearance: alert and oriented to person, place and time, well-developed and well-nourished, in no acute distress, in no acute distress and lethargic  Extremities: no cyanosis, no clubbing, 2 + edema-  left lower extremity with evidence of several blisteron dorsal left foot and medial calf.  Full-thickness fibrous wound left shin containing fibrin and slough minimal granulation tissue  Palpable graft pulse      Assessment:      1. Peripheral artery disease ([address]    2. Wound of left lower extremity, subsequent encounter    3. Wound of left lower extremity, initial encounter         Procedure Note  Indications:  Based on my examination of this patient's wound(s) today, sharp excision is required to promote healing and evaluate the extent healing. Performed by: Roberto Steele MD    Consent obtained? Yes    Time out taken: Yes    Pain Control: Anesthetic: 4% Lidocaine Liquid Topical     Debridement:Excisional Debridement    Using curette the wound was sharply debrided    down through and including the removal of  epidermis, dermis and subcutaneous tissue. Devitalized Tissue Debrided:  fibrin, biofilm and slough      Pre Debridement Measurements:  Are located in the Wound Documentation Flow Sheet   Wound #: 1     Post  Debridement Measurements:  Wound 11/22/21 Leg Left; Lower;Distal #1 (Active)   Wound Image   07/05/22 1012   Wound Etiology Arterial 11/22/21 0924   Dressing Status Dry; Intact 07/02/22 0800   Wound Cleansed Cleansed with saline 07/05/22 1012   Dressing/Treatment Ace wrap;Roll gauze 07/02/22 0800   Wound Length (cm) 3 cm 07/05/22 1012   Wound Width (cm) 2.5 cm 07/05/22 1012   Wound Depth (cm) 0.1 cm 07/05/22 1012   Wound Surface Area (cm^2) 7.5 cm^2 07/05/22 1012   Change in Wound Size % (l*w) -167.86 07/05/22 1012   Wound Volume (cm^3) 0.75 cm^3 07/05/22 1012   Wound Healing % -168 07/05/22 1012   Post-Procedure Length (cm) 3.1 cm 07/05/22 1046   Post-Procedure Width (cm) 2.6 cm 07/05/22 1046   Post-Procedure Depth (cm) 0.3 cm 07/05/22 1046   Post-Procedure Surface Area (cm^2) 8.06 cm^2 07/05/22 1046   Post-Procedure Volume (cm^3) 2.418 cm^3 07/05/22 1046   Distance Tunneling (cm) 0 cm 06/06/22 1004   Tunneling Position ___ O'Clock 0 05/23/22 1011   Undermining Starts ___ O'Clock [address] [place] [address]  Telephone: [phone]  NAME:  Vandana Loja  YOB: 1929  MEDICAL RECORD NUMBER:  [id number]  DATE:  7/5/2022    Return Appointment:  [facility] Return Appointment: With Dr. Minh Williamson  in  1 Week(s)  o Schedule weekly for the rest of the month? Yes    o [] Return Appointment for a Wound Assessment with the nurse on:     Future Appointments   Date Time Provider Gilbert Frey   7/6/2022  2:30 PM Laxmi Ibrahim MD [facility] VASC/ENDO [facility]   7/11/2022  3:30 PM Jem Roach MD [place] [facility]   7/26/2022 10:45 AM DO OCTAVIO Lozada - DYDYANA       Orders Placed This Encounter   Procedures   [address] [place] [address] Ext: [address]:  Phone: [phone]  Fax: [phone]  Medically necessary services: [x] Skilled Nursing [] PT (Eval & Treat) [] OT (Eval & Treat) [] Social Work [] Dietician  [] Other:    Wound care instructions:  1. If you smoke we ask that you refrain from smoking. Smoking inhibits wounds from healing. 2. When taking antibiotics take the entire prescription as ordered. Do not stop taking until medication is all gone unless otherwise instructed. 3. Exercise as tolerated. 4. Keep weight off wounds and reposition every 2 hours if applicable. 5. Do not get wounds wet in the bath or shower unless otherwise instructed by your physician. If your wound is on your foot or leg, you may purchase a cast bag. Please ask at the pharmacy. 6. Wash hands with soap and water prior to and after every dressing change.     [x]Wash wounds with: 0.9% normal saline  [x]Nissa wound Topical Treatments: Do not apply lotions, creams, or ointments to the skin around the wound bed unless directed as followed:   o Apply around the wound: Nothing         [x]Wound Location:  Left lower leg    Apply Primary Dressing to wound: Hydrofera Blue pad   Secondary Dressing: 4X4 gauze pad and Conforming roll

## 2022-07-05 NOTE — CARE COORDINATION
[facility] Transitions Initial Follow Up Call    Call within 2 business days of discharge: Yes    Patient: Sally Cortes Patient : 1929   MRN: [id number]  Reason for Admission: LEFT LEG FEMORAL  [address] ANGIOGRAM  Discharge Date: 22 RARS: Readmission Risk Score: 19.5 ( )      Last Discharge [facility]       Complaint Diagnosis Description Type Department Provider    22   Admission (Discharged) Sofy Odell MD           Spoke with: Elizabeth Moctezuma - verified with HIPPA form    Facility: [facility]    Non-face-to-face services provided:  Obtained and reviewed discharge summary and/or continuity of care documents     Transitions of Care Initial Call    Was this an external facility discharge? No    Challenges to be reviewed by the provider   Additional needs identified to be addressed with provider: No  none             Method of communication with provider : none      Advance Care Planning:   Does patient have an Advance Directive: reviewed and current. Advance Care Planning   Healthcare Decision Maker:    Primary Decision Maker: not contact, do - Child - [phone]    Secondary Decision Maker: Luiz Ma - Lay Caregiver - [phone]    Click here to complete Healthcare Decision Makers including selection of the Healthcare Decision Maker Relationship (ie \"Primary\"). Today we documented Decision Maker(s) consistent with Legal Next of Kin hierarchy. Was this a readmission? No  Patient stated reason for admission: LEFT LEG FEMORAL TO POPLITEAL BYPASS WITH INTRAOPERATIVE ANGIOGRAM  Patients top risk factors for readmission: medical condition-.    Care Transition Nurse (CTN) contacted the caregiver by telephone to perform post hospital discharge assessment. Verified name and  with caregiver as identifiers. Provided introduction to self, and explanation of the CTN role.      CTN reviewed discharge instructions, medical action plan and red flags with caregiver who verbalized understanding. Caregiver given an opportunity to ask questions and does not have any further questions or concerns at this time. Were discharge instructions available to patient? Yes. Reviewed appropriate site of care based on symptoms and resources available to patient including: PCP  Specialist. The caregiver agrees to contact the PCP office for questions related to their healthcare. Medication reconciliation was performed with caregiver, who verbalizes understanding of administration of home medications. Reviewed and educated caregiver on any new and changed medications related to discharge diagnosis. CTN provided contact information. Plan for next call: symptom management-.  self management-.      Care Transitions 24 Hour Call    Do you have a copy of your discharge instructions?: Yes  Do you have all of your prescriptions and are they filled?: Yes  Have you been contacted by a [facility] Pharmacist?: No  Have you scheduled your follow up appointment?: Yes  How are you going to get to your appointment?: Car - family or friend to transport  [address]: [address] [facility] (Comment: private pay caregivers)  Do you feel like you have everything you need to keep you well at home?: Yes  Care Transitions Interventions         Follow Up  Future Appointments   Date Time Provider Gilbert Frey   7/6/2022  2:30 PM Winston Peck MD  VASC/ENDO [facility]   7/11/2022  9:15 AM Shelley Schulz MD [address] [facility]   7/11/2022  3:30 PM Rosa Maria Gan MD [facility]   7/18/2022  9:15 AM Shelley Schulz MD [address]   7/25/2022  9:15 AM Shleley Schulz MD [address] [facility]   7/26/2022 10:45 AM [address] Parm[facility] "[employer],  [address] - DYD   8/1/2022  9:15 AM MD [facility]   8/8/2022  9:15 AM Shelley Schulz MD [address] [facility],[address] [facility] HOD       Spoke with Ankit Hollis, pt caregiver, who states pt is doing ok today.  Denies any fever, chills, cp or sob. States they went to the wound clinic today and will follow up with Dr. Sherine Soriano tomorrow. Pt has new bandage applied to leg along with a compression stocking. Pt able to eat and drink. Denies bowel or bladder issues. Did state there was some constipation but this has resolved. Pt using wheelchair to get around. Med rec complete and 1111F placed. Encouraged Caregiver to also make a PCP follow up. [facility] reached out to caregiver Saturday. Pt has not been seen yet as they stated it was a holiday weekend. No questions or concerns. Will continue to follow.      MARITZA Lopez, RN   Care Transition Nurse  Mobile: [phone]

## 2022-07-06 ENCOUNTER — OFFICE VISIT (OUTPATIENT)
Dept: VASCULAR SURGERY | Age: 87
End: 2022-07-06

## 2022-07-06 VITALS
SYSTOLIC BLOOD PRESSURE: 138 MMHG | DIASTOLIC BLOOD PRESSURE: 68 MMHG | BODY MASS INDEX: 19.83 KG/M2 | WEIGHT: 101 LBS | HEIGHT: 60 IN

## 2022-07-06 DIAGNOSIS — I89.9 LYMPH LEAK: ICD-10-CM

## 2022-07-06 DIAGNOSIS — L97.909 ATHEROSCLEROSIS OF ARTERY OF EXTREMITY WITH ULCERATION (HCC): Primary | ICD-10-CM

## 2022-07-06 DIAGNOSIS — I70.222 ATHEROSCLEROSIS OF NATIVE ARTERIES OF EXTREMITIES WITH REST PAIN, LEFT LEG (HCC): ICD-10-CM

## 2022-07-06 DIAGNOSIS — I70.299 ATHEROSCLEROSIS OF ARTERY OF EXTREMITY WITH ULCERATION (HCC): Primary | ICD-10-CM

## 2022-07-06 PROCEDURE — 99024 POSTOP FOLLOW-UP VISIT: CPT | Performed by: SURGERY

## 2022-07-06 RX ORDER — CIPROFLOXACIN 500 MG/1
250 TABLET, FILM COATED ORAL 2 TIMES DAILY
Qty: 10 TABLET | Refills: 0 | Status: SHIPPED | OUTPATIENT
Start: 2022-07-06 | End: 2022-07-13 | Stop reason: SDUPTHER

## 2022-07-06 NOTE — PROGRESS NOTES
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 Noted documentation of ATN  22 PN and JASE per   Nephrology consult. If possible, please document in progress notes and   discharge summary if you are evaluating and /or treating any of the following: The medical record reflects the following:  Risk Factors: L fem replacement with fem-BKpop bypass ABLA   Hypotension  Clinical Indicators: Nephrology consult\"   JASE - suspect secondary to reduced   renal perfusion in setting of Hypotension\"  Treatment: - Administer bolus of IV albumin - Change IVF composition and rate   - Stop Lasix and Lisinopril-- Check urine lytes  - no immediate indication of RRT - Avoid Hypotension and Nephrotoxic agents   and lab monitoring  Options provided:  -- JASE confirmed and ATN ruled out  -- ATN confirmed and JASE ruled out  -- Other - I will add my own diagnosis  -- Disagree - Not applicable / Not valid  -- Disagree - Clinically unable to determine / Unknown  -- Refer to Clinical Documentation Reviewer    PROVIDER RESPONSE TEXT:    After study, ATN confirmed and JASE ruled out.     Query created by: Georgian Opitz on 2022 12:22 PM      Electronically signed by:  Kathy Smith MD 2022 8:07 AM

## 2022-07-06 NOTE — PROGRESS NOTES
Name_______________________________________Printed:____________________  Date and time of surgery__7/7/2022_______0930_______________Arrival Time:______0730__________   1. The instructions given regarding when and if a patient needs to stop oral intake prior to surgery varies. Follow the specific instructions you were given                  _x__Nothing to eat or to drink after Midnight the night before.                   ____Carbo loading or ERAS instructions will be given to select patients-if you have been given those instructions -please do the following                           The evening before your surgery after dinner before midnight drink 40 ounces of gatorade. If you are diabetic use sugar free. The morning of surgery drink 40 ounces of water. This needs to be finished 3 hours prior to your surgery start time. 2. Take the following pills with a small sip of water on the morning of surgery___________________________________________________                  Do not take blood pressure medications ending in pril or sartan the vero prior to surgery or the morning of surgery_   3. Aspirin, Ibuprofen, Advil, Naproxen, Vitamin E and other Anti-inflammatory products and supplements should be stopped for 5 -7days before surgery or as directed by your physician. 4. Check with your Doctor regarding stopping Plavix, Coumadin,Eliquis, Lovenox,Effient,Pradaxa,Xarelto, Fragmin or other blood thinners and follow their instructions. 5. Do not smoke, and do not drink any alcoholic beverages 24 hours prior to surgery. This includes NA Beer. Refrain from the usage of any recreational drugs. 6. You may brush your teeth and gargle the morning of surgery. DO NOT SWALLOW WATER   7. You MUST make arrangements for a responsible adult to stay on site while you are here and take you home after your surgery. You will not be allowed to leave alone or drive yourself home.   It is strongly suggested someone stay with you the first 24 hrs. Your surgery will be cancelled if you do not have a ride home. 8. A parent/legal guardian must accompany a child scheduled for surgery and plan to stay at the hospital until the child is discharged. Please do not bring other children with you. 9. Please wear simple, loose fitting clothing to the hospital.  Charity Elliott not bring valuables (money, credit cards, checkbooks, etc.) Do not wear any makeup (including no eye makeup) or nail polish on your fingers or toes. 10. DO NOT wear any jewelry or piercings on day of surgery. All body piercing jewelry must be removed. 11. If you have ___dentures, they will be removed before going to the OR; we will provide you a container. If you wear ___contact lenses or ___glasses, they will be removed; please bring a case for them. 12. Please see your family doctor/pediatrician for a history & physical and/or concerning medications. Bring any test results/reports from your physician's office. PCP__________________Phone___________H&P Appt. Date________             13 If you  have a Living Will and Durable Power of  for Healthcare, please bring in a copy. 15. Notify your Surgeon if you develop any illness between now and surgery  time, cough, cold, fever, sore throat, nausea, vomiting, etc.  Please notify your surgeon if you experience dizziness, shortness of breath or blurred vision between now & the time of your surgery             15. DO NOT shave your operative site 96 hours prior to surgery. For face & neck surgery, men may use an electric razor 48 hours prior to surgery. 16. Shower the night before or morning of surgery using an antibacterial soap or as you have been instructed. 17. To provide excellent care visitors will be limited to one in the room at any given time. 18.  Please bring picture ID and insurance card.              19.  Visit our web site for additional

## 2022-07-06 NOTE — PROGRESS NOTES
First OV S/P L femoral replacement with L fem-BKpop bypass. C/O foot and leg swelling (no wraps and hanging foot). Also has had severe L groin drainage after PICOs removed at [facility]. No fever or chills.     Past Medical History:   Diagnosis Date    Anxiety     Arthritis     At risk for falls     CAD (coronary artery disease)     Cerebral artery occlusion with cerebral infarction (HCC)     TIA--no residual effects    DDD (degenerative disc disease), cervical     Fractures     (L) Hip Fx 4/25/98, L1 fracture from fall 10-31-06    Hyperlipidemia     Hypertension     Mitral regurgitation     Neuropathy     Osteopenia     Osteoporosis     PAD (peripheral artery disease) (HCC)     Right LE ischemia    Peripheral neuropathy 6/1/2015    Peripheral vascular disease ([facility])     bilateral lower extremities with edema    Podagra 01/09/2017    Dr. Garett France    Prolonged emergence from general anesthesia     Spinal stenosis     L4-5    Type 2 diabetes mellitus ([facility] [address]     Urethral stricture 5 years ago    Urgency of urination      Past Surgical History:   Procedure Laterality Date    ANGIOPLASTY Left 04/18/2022    Left SFA    APPENDECTOMY      CARPAL TUNNEL RELEASE Right 03/29/2019    RIGHT CARPAL TUNNEL RELEASE AND RIGHT MIDDLE FINGER TRIGGER FINGER RELEASE performed by Leonardo Key MD at [address]  [id number]    LEFT EYE EYE CATARACT PHACOEMULSIFICATION INTRAOCULAR LENS    CHOLECYSTECTOMY  01/01/1978    COLONOSCOPY  08/31/1999    diverticulosis    EYE SURGERY Bilateral     bilateral cataract removed    FEMORAL BYPASS Left 6/28/2022    LEFT LEG FEMORAL TO POPLITEAL BYPASS WITH INTRAOPERATIVE ANGIOGRAM performed by Gucci Boles MD at [address] [facility] Left 02/14/2022    LEFT COMMON FEMORAL ARTERY ENDARTERECTOMY performed by Gucci Boles MD at [address] [place] (CERVIX STATUS UNKNOWN)  [address] [place] [address]    IR FEMORAL POPLITEAL BYPASS GRAFT Right 08/31/2015    KYPHOSIS SURGERY  11/07/2006    L1, (fractured after a fall)    LEG SURGERY Left 02/14/2022    LEFT LEG WOUND DEBRIDEMENT performed by Claud Harada, MD at [address]  04/25/1998    (L) THR    WRIST FRACTURE SURGERY  01/01/2008    left wrist     Allergies   Allergen Reactions    Aricept [Donepezil Hydrochloride] Other (See Comments)     Pt unable to recall reaction    Doxycycline Nausea Only    Ketorolac Tromethamine Other (See Comments)     Pt unable to recall reaction     Current Outpatient Medications   Medication Sig Dispense Refill    metFORMIN (GLUCOPHAGE) 500 MG tablet TAKE ONE TABLET BY MOUTH DAILY 90 tablet 3    HYDROcodone-acetaminophen (NORCO) 5-325 MG per tablet Take 1 tablet by mouth every 6 hours as needed for Pain.  traMADol (ULTRAM) 50 MG tablet Take 25 mg by mouth every 6 hours as needed for Pain.  acetaminophen (TYLENOL) 325 MG tablet Take 650 mg by mouth every 6 hours as needed for Pain      glimepiride (AMARYL) 1 MG tablet TAKE ONE TABLET BY MOUTH EVERY MORNING BEFORE BREAKFAST 30 tablet 2    hydrOXYzine (VISTARIL) 25 MG capsule Take 1 capsule by mouth daily as needed for Anxiety (Patient not taking: Reported on 7/5/2022) 30 capsule 0    atorvastatin (LIPITOR) 10 MG tablet TAKE ONE TABLET BY MOUTH DAILY 30 tablet 3    Lite Touch Lancets MISC Use twice daily when testing blood sugars.  100 each 5    Wound Cleansers (VASHE CLEANSING) SOLN Soak vashe on gauze pad and place on wound for 5-10 minutes 1 each 2    lisinopril (PRINIVIL;ZESTRIL) 20 MG tablet TAKE ONE TABLET BY MOUTH DAILY (Patient not taking: Reported on 7/5/2022) 90 tablet 3    furosemide (LASIX) 20 MG tablet Take 1 tablet by mouth daily 90 tablet 1    blood glucose test strips (ONETOUCH ULTRA) strip USE TO TEST BLOOD SUGAR TWICE DAILY 100 strip 1    Turmeric 450 MG CAPS Take 450 mg by mouth daily      Cholecalciferol (VITAMIN D3) 2000 UNITS CAPS Take 1 capsule by mouth daily      aspirin EC 81 MG EC tablet Take 1 tablet by mouth daily 30 tablet 3     No current facility-administered medications for this visit. Social History     Socioeconomic History    Marital status:      Spouse name: Not on file    Number of children: Not on file    Years of education: Not on file    Highest education level: Not on file   Occupational History    Not on file   Tobacco Use    Smoking status: Never Smoker    Smokeless tobacco: Never Used   Vaping Use    Vaping Use: Never used   Substance and Sexual Activity    Alcohol use: No    Drug use: Never    Sexual activity: Not Currently   Other Topics Concern    Not on file   Social History Narrative    Not on file     Social Determinants of Health     Financial Resource Strain: Low Risk     Difficulty of Paying Living Expenses: Not hard at all   Food Insecurity: No Food Insecurity    Worried About [address] in the Last Year: Never true    [address] in the Last Year: Never true   Transportation Needs:     Lack of Transportation (Medical): Not on file    Lack of Transportation (Non-Medical):  Not on file   Physical Activity:     Days of Exercise per Week: Not on file    Minutes of Exercise per Session: Not on file   Stress:     Feeling of Stress : Not on file   Social Connections:     Frequency of Communication with Friends and Family: Not on file    Frequency of Social Gatherings with Friends and Family: Not on file    Attends Yazidi Services: Not on file    Active Member of Clubs or Organizations: Not on file    Attends Club or Organization Meetings: Not on file    Marital Status: Not on file   Intimate Partner Violence:     Fear of Current or Ex-Partner: Not on file    Emotionally Abused: Not on file    Physically Abused: Not on file    Sexually Abused: Not on file   Housing Stability:     Unable to Pay for Housing in the Last Year: Not on file    Number of [place] in the Last Year: Not on file    Unstable Housing in the Last Year: Not on file     Family History   Problem Relation Age of Onset    Cancer Mother 43    Stroke Father     Hypertension Father      EXAM:  VSS afeb    Uncomfortable    Heart RRR    Lungs clear    Abd soft    L groin wound with slight separation and profuse serosanguinous drainage - no pus or erythema    Other incision intact    L knee through foot edematous    L foot hyperemic and warm    IMP: S/P L femoral replacement + L fem-BKpop bypass. Patent with profound reperfusion   L groin lymphatic leak    REC: Paint with betadine and dry clean dressing. Wrap L LE from ball of foot to groin. Elevate leg. Leave dressing in place. OR tomorrow for L groin I&D with closure to protect against dacron graft infection. Caregiver in favor - pt more reluctant.

## 2022-07-07 ENCOUNTER — HOSPITAL ENCOUNTER (OUTPATIENT)
Age: 87
Setting detail: OBSERVATION
Discharge: HOME | End: 2022-07-07
Attending: SURGERY | Admitting: SURGERY
Payer: MEDICARE

## 2022-07-07 ENCOUNTER — ANESTHESIA (OUTPATIENT)
Dept: OPERATING ROOM | Age: 87
End: 2022-07-07
Payer: MEDICARE

## 2022-07-07 ENCOUNTER — ANESTHESIA EVENT (OUTPATIENT)
Dept: OPERATING ROOM | Age: 87
End: 2022-07-07
Payer: MEDICARE

## 2022-07-07 VITALS
SYSTOLIC BLOOD PRESSURE: 138 MMHG | WEIGHT: 101 LBS | BODY MASS INDEX: 19.83 KG/M2 | OXYGEN SATURATION: 99 % | TEMPERATURE: 97.9 F | DIASTOLIC BLOOD PRESSURE: 65 MMHG | RESPIRATION RATE: 9 BRPM | HEIGHT: 60 IN | HEART RATE: 77 BPM

## 2022-07-07 PROBLEM — I89.9 LYMPH LEAK: Status: ACTIVE | Noted: 2022-07-07

## 2022-07-07 LAB
GLUCOSE BLD-MCNC: 134 MG/DL (ref 70–99)
GLUCOSE BLD-MCNC: 137 MG/DL (ref 70–99)
PERFORMED ON: ABNORMAL
PERFORMED ON: ABNORMAL

## 2022-07-07 PROCEDURE — A4217 STERILE WATER/SALINE, 500 ML: HCPCS | Performed by: SURGERY

## 2022-07-07 PROCEDURE — 3700000000 HC ANESTHESIA ATTENDED CARE: Performed by: SURGERY

## 2022-07-07 PROCEDURE — 87077 CULTURE AEROBIC IDENTIFY: CPT

## 2022-07-07 PROCEDURE — 7100000010 HC PHASE II RECOVERY - FIRST 15 MIN: Performed by: SURGERY

## 2022-07-07 PROCEDURE — 6360000002 HC RX W HCPCS: Performed by: ANESTHESIOLOGY

## 2022-07-07 PROCEDURE — 11042 DBRDMT SUBQ TIS 1ST 20SQCM/<: CPT | Performed by: SURGERY

## 2022-07-07 PROCEDURE — 2580000003 HC RX 258: Performed by: SURGERY

## 2022-07-07 PROCEDURE — 2709999900 HC NON-CHARGEABLE SUPPLY: Performed by: SURGERY

## 2022-07-07 PROCEDURE — 7100000001 HC PACU RECOVERY - ADDTL 15 MIN: Performed by: SURGERY

## 2022-07-07 PROCEDURE — 2580000003 HC RX 258: Performed by: NURSE ANESTHETIST, CERTIFIED REGISTERED

## 2022-07-07 PROCEDURE — 3600000012 HC SURGERY LEVEL 2 ADDTL 15MIN: Performed by: SURGERY

## 2022-07-07 PROCEDURE — 87206 SMEAR FLUORESCENT/ACID STAI: CPT

## 2022-07-07 PROCEDURE — 6360000002 HC RX W HCPCS: Performed by: NURSE ANESTHETIST, CERTIFIED REGISTERED

## 2022-07-07 PROCEDURE — P9045 ALBUMIN (HUMAN), 5%, 250 ML: HCPCS

## 2022-07-07 PROCEDURE — 87070 CULTURE OTHR SPECIMN AEROBIC: CPT

## 2022-07-07 PROCEDURE — 3600000002 HC SURGERY LEVEL 2 BASE: Performed by: SURGERY

## 2022-07-07 PROCEDURE — 6360000002 HC RX W HCPCS

## 2022-07-07 PROCEDURE — 7100000011 HC PHASE II RECOVERY - ADDTL 15 MIN: Performed by: SURGERY

## 2022-07-07 PROCEDURE — 87015 SPECIMEN INFECT AGNT CONCNTJ: CPT

## 2022-07-07 PROCEDURE — 7100000000 HC PACU RECOVERY - FIRST 15 MIN: Performed by: SURGERY

## 2022-07-07 PROCEDURE — 87116 MYCOBACTERIA CULTURE: CPT

## 2022-07-07 PROCEDURE — 6360000002 HC RX W HCPCS: Performed by: SURGERY

## 2022-07-07 PROCEDURE — 6370000000 HC RX 637 (ALT 250 FOR IP)

## 2022-07-07 PROCEDURE — 87205 SMEAR GRAM STAIN: CPT

## 2022-07-07 PROCEDURE — 3700000001 HC ADD 15 MINUTES (ANESTHESIA): Performed by: SURGERY

## 2022-07-07 PROCEDURE — 87075 CULTR BACTERIA EXCEPT BLOOD: CPT

## 2022-07-07 PROCEDURE — 2500000003 HC RX 250 WO HCPCS: Performed by: NURSE ANESTHETIST, CERTIFIED REGISTERED

## 2022-07-07 PROCEDURE — 87102 FUNGUS ISOLATION CULTURE: CPT

## 2022-07-07 RX ORDER — PROPOFOL 10 MG/ML
INJECTION, EMULSION INTRAVENOUS PRN
Status: DISCONTINUED | OUTPATIENT
Start: 2022-07-07 | End: 2022-07-07 | Stop reason: SDUPTHER

## 2022-07-07 RX ORDER — HYDROXYZINE PAMOATE 25 MG/1
25 CAPSULE ORAL DAILY PRN
Status: DISCONTINUED | OUTPATIENT
Start: 2022-07-07 | End: 2022-07-07 | Stop reason: HOSPADM

## 2022-07-07 RX ORDER — HYDROCODONE BITARTRATE AND ACETAMINOPHEN 5; 325 MG/1; MG/1
1 TABLET ORAL EVERY 6 HOURS PRN
Status: DISCONTINUED | OUTPATIENT
Start: 2022-07-07 | End: 2022-07-07 | Stop reason: HOSPADM

## 2022-07-07 RX ORDER — LISINOPRIL 20 MG/1
1 TABLET ORAL DAILY
Status: DISCONTINUED | OUTPATIENT
Start: 2022-07-07 | End: 2022-07-07 | Stop reason: HOSPADM

## 2022-07-07 RX ORDER — CIPROFLOXACIN 500 MG/1
250 TABLET, FILM COATED ORAL 2 TIMES DAILY
Status: DISCONTINUED | OUTPATIENT
Start: 2022-07-07 | End: 2022-07-07 | Stop reason: HOSPADM

## 2022-07-07 RX ORDER — SODIUM CHLORIDE 0.9 % (FLUSH) 0.9 %
5-40 SYRINGE (ML) INJECTION EVERY 12 HOURS SCHEDULED
Status: DISCONTINUED | OUTPATIENT
Start: 2022-07-07 | End: 2022-07-07 | Stop reason: HOSPADM

## 2022-07-07 RX ORDER — TRAMADOL HYDROCHLORIDE 50 MG/1
25 TABLET ORAL EVERY 6 HOURS PRN
Status: DISCONTINUED | OUTPATIENT
Start: 2022-07-07 | End: 2022-07-07 | Stop reason: HOSPADM

## 2022-07-07 RX ORDER — DEXAMETHASONE SODIUM PHOSPHATE 4 MG/ML
INJECTION, SOLUTION INTRA-ARTICULAR; INTRALESIONAL; INTRAMUSCULAR; INTRAVENOUS; SOFT TISSUE PRN
Status: DISCONTINUED | OUTPATIENT
Start: 2022-07-07 | End: 2022-07-07 | Stop reason: SDUPTHER

## 2022-07-07 RX ORDER — ALBUMIN, HUMAN INJ 5% 5 %
25 SOLUTION INTRAVENOUS ONCE
Status: COMPLETED | OUTPATIENT
Start: 2022-07-07 | End: 2022-07-07

## 2022-07-07 RX ORDER — ACETAMINOPHEN 325 MG/1
650 TABLET ORAL EVERY 6 HOURS PRN
Status: DISCONTINUED | OUTPATIENT
Start: 2022-07-07 | End: 2022-07-07 | Stop reason: HOSPADM

## 2022-07-07 RX ORDER — SODIUM CHLORIDE 9 MG/ML
INJECTION, SOLUTION INTRAVENOUS PRN
Status: DISCONTINUED | OUTPATIENT
Start: 2022-07-07 | End: 2022-07-07 | Stop reason: HOSPADM

## 2022-07-07 RX ORDER — ONDANSETRON 2 MG/ML
INJECTION INTRAMUSCULAR; INTRAVENOUS PRN
Status: DISCONTINUED | OUTPATIENT
Start: 2022-07-07 | End: 2022-07-07 | Stop reason: SDUPTHER

## 2022-07-07 RX ORDER — SODIUM CHLORIDE 9 MG/ML
INJECTION, SOLUTION INTRAVENOUS CONTINUOUS PRN
Status: DISCONTINUED | OUTPATIENT
Start: 2022-07-07 | End: 2022-07-07 | Stop reason: SDUPTHER

## 2022-07-07 RX ORDER — FUROSEMIDE 10 MG/ML
INJECTION INTRAMUSCULAR; INTRAVENOUS
Status: COMPLETED
Start: 2022-07-07 | End: 2022-07-07

## 2022-07-07 RX ORDER — INSULIN LISPRO 100 [IU]/ML
0-3 INJECTION, SOLUTION INTRAVENOUS; SUBCUTANEOUS NIGHTLY
Status: DISCONTINUED | OUTPATIENT
Start: 2022-07-07 | End: 2022-07-07 | Stop reason: HOSPADM

## 2022-07-07 RX ORDER — ONDANSETRON 2 MG/ML
4 INJECTION INTRAMUSCULAR; INTRAVENOUS EVERY 6 HOURS PRN
Status: DISCONTINUED | OUTPATIENT
Start: 2022-07-07 | End: 2022-07-07 | Stop reason: HOSPADM

## 2022-07-07 RX ORDER — LIDOCAINE HYDROCHLORIDE 20 MG/ML
INJECTION, SOLUTION EPIDURAL; INFILTRATION; INTRACAUDAL; PERINEURAL PRN
Status: DISCONTINUED | OUTPATIENT
Start: 2022-07-07 | End: 2022-07-07 | Stop reason: SDUPTHER

## 2022-07-07 RX ORDER — DEXTROSE MONOHYDRATE 50 MG/ML
100 INJECTION, SOLUTION INTRAVENOUS PRN
Status: DISCONTINUED | OUTPATIENT
Start: 2022-07-07 | End: 2022-07-07 | Stop reason: HOSPADM

## 2022-07-07 RX ORDER — ACETAMINOPHEN 160 MG
1 TABLET,DISINTEGRATING ORAL DAILY
Status: DISCONTINUED | OUTPATIENT
Start: 2022-07-07 | End: 2022-07-07 | Stop reason: HOSPADM

## 2022-07-07 RX ORDER — ONDANSETRON 2 MG/ML
INJECTION INTRAMUSCULAR; INTRAVENOUS
Status: COMPLETED
Start: 2022-07-07 | End: 2022-07-07

## 2022-07-07 RX ORDER — SODIUM CHLORIDE 0.9 % (FLUSH) 0.9 %
5-40 SYRINGE (ML) INJECTION PRN
Status: DISCONTINUED | OUTPATIENT
Start: 2022-07-07 | End: 2022-07-07 | Stop reason: HOSPADM

## 2022-07-07 RX ORDER — FUROSEMIDE 20 MG/1
20 TABLET ORAL DAILY
Status: DISCONTINUED | OUTPATIENT
Start: 2022-07-07 | End: 2022-07-07 | Stop reason: HOSPADM

## 2022-07-07 RX ORDER — FENTANYL CITRATE 50 UG/ML
INJECTION, SOLUTION INTRAMUSCULAR; INTRAVENOUS PRN
Status: DISCONTINUED | OUTPATIENT
Start: 2022-07-07 | End: 2022-07-07 | Stop reason: SDUPTHER

## 2022-07-07 RX ORDER — ONDANSETRON 2 MG/ML
4 INJECTION INTRAMUSCULAR; INTRAVENOUS
Status: DISCONTINUED | OUTPATIENT
Start: 2022-07-07 | End: 2022-07-07 | Stop reason: HOSPADM

## 2022-07-07 RX ORDER — HYDROMORPHONE HCL 110MG/55ML
0.5 PATIENT CONTROLLED ANALGESIA SYRINGE INTRAVENOUS EVERY 5 MIN PRN
Status: DISCONTINUED | OUTPATIENT
Start: 2022-07-07 | End: 2022-07-07 | Stop reason: HOSPADM

## 2022-07-07 RX ORDER — HYDROMORPHONE HCL 110MG/55ML
PATIENT CONTROLLED ANALGESIA SYRINGE INTRAVENOUS
Status: COMPLETED
Start: 2022-07-07 | End: 2022-07-07

## 2022-07-07 RX ORDER — ONDANSETRON 4 MG/1
4 TABLET, ORALLY DISINTEGRATING ORAL EVERY 8 HOURS PRN
Status: DISCONTINUED | OUTPATIENT
Start: 2022-07-07 | End: 2022-07-07 | Stop reason: HOSPADM

## 2022-07-07 RX ORDER — EPHEDRINE SULFATE/0.9% NACL/PF 50 MG/5 ML
SYRINGE (ML) INTRAVENOUS PRN
Status: DISCONTINUED | OUTPATIENT
Start: 2022-07-07 | End: 2022-07-07 | Stop reason: SDUPTHER

## 2022-07-07 RX ORDER — GLIPIZIDE 5 MG/1
2.5 TABLET ORAL
Status: DISCONTINUED | OUTPATIENT
Start: 2022-07-08 | End: 2022-07-07 | Stop reason: HOSPADM

## 2022-07-07 RX ORDER — ASPIRIN 81 MG/1
81 TABLET ORAL DAILY
Status: DISCONTINUED | OUTPATIENT
Start: 2022-07-07 | End: 2022-07-07 | Stop reason: HOSPADM

## 2022-07-07 RX ORDER — IPRATROPIUM BROMIDE AND ALBUTEROL SULFATE 2.5; .5 MG/3ML; MG/3ML
SOLUTION RESPIRATORY (INHALATION)
Status: COMPLETED
Start: 2022-07-07 | End: 2022-07-07

## 2022-07-07 RX ORDER — INSULIN LISPRO 100 [IU]/ML
0-6 INJECTION, SOLUTION INTRAVENOUS; SUBCUTANEOUS
Status: DISCONTINUED | OUTPATIENT
Start: 2022-07-07 | End: 2022-07-07 | Stop reason: HOSPADM

## 2022-07-07 RX ORDER — FUROSEMIDE 10 MG/ML
20 INJECTION INTRAMUSCULAR; INTRAVENOUS ONCE
Status: COMPLETED | OUTPATIENT
Start: 2022-07-07 | End: 2022-07-07

## 2022-07-07 RX ORDER — IPRATROPIUM BROMIDE AND ALBUTEROL SULFATE 2.5; .5 MG/3ML; MG/3ML
1 SOLUTION RESPIRATORY (INHALATION) ONCE
Status: COMPLETED | OUTPATIENT
Start: 2022-07-07 | End: 2022-07-07

## 2022-07-07 RX ORDER — ENOXAPARIN SODIUM 100 MG/ML
30 INJECTION SUBCUTANEOUS DAILY
Status: DISCONTINUED | OUTPATIENT
Start: 2022-07-07 | End: 2022-07-07 | Stop reason: HOSPADM

## 2022-07-07 RX ORDER — ALBUMIN, HUMAN INJ 5% 5 %
SOLUTION INTRAVENOUS
Status: COMPLETED
Start: 2022-07-07 | End: 2022-07-07

## 2022-07-07 RX ORDER — ATORVASTATIN CALCIUM 10 MG/1
1 TABLET, FILM COATED ORAL DAILY
Status: DISCONTINUED | OUTPATIENT
Start: 2022-07-07 | End: 2022-07-07 | Stop reason: HOSPADM

## 2022-07-07 RX ADMIN — HYDROMORPHONE HYDROCHLORIDE 0.5 MG: 2 INJECTION, SOLUTION INTRAMUSCULAR; INTRAVENOUS; SUBCUTANEOUS at 10:59

## 2022-07-07 RX ADMIN — PROPOFOL 50 MG: 10 INJECTION, EMULSION INTRAVENOUS at 09:39

## 2022-07-07 RX ADMIN — FENTANYL CITRATE 25 MCG: 50 INJECTION, SOLUTION INTRAMUSCULAR; INTRAVENOUS at 09:36

## 2022-07-07 RX ADMIN — SODIUM CHLORIDE: 9 INJECTION, SOLUTION INTRAVENOUS at 09:01

## 2022-07-07 RX ADMIN — ONDANSETRON 4 MG: 2 INJECTION INTRAMUSCULAR; INTRAVENOUS at 11:03

## 2022-07-07 RX ADMIN — FUROSEMIDE 20 MG: 100 INJECTION, SOLUTION INTRAMUSCULAR; INTRAVENOUS at 13:17

## 2022-07-07 RX ADMIN — HYDROMORPHONE HYDROCHLORIDE 0.5 MG: 2 INJECTION, SOLUTION INTRAMUSCULAR; INTRAVENOUS; SUBCUTANEOUS at 10:46

## 2022-07-07 RX ADMIN — FENTANYL CITRATE 25 MCG: 50 INJECTION, SOLUTION INTRAMUSCULAR; INTRAVENOUS at 09:33

## 2022-07-07 RX ADMIN — IPRATROPIUM BROMIDE AND ALBUTEROL SULFATE 1 AMPULE: 2.5; .5 SOLUTION RESPIRATORY (INHALATION) at 13:17

## 2022-07-07 RX ADMIN — ONDANSETRON 4 MG: 2 INJECTION INTRAMUSCULAR; INTRAVENOUS at 09:47

## 2022-07-07 RX ADMIN — ALBUMIN, HUMAN INJ 5% 12.5 G: 5 SOLUTION at 14:28

## 2022-07-07 RX ADMIN — LIDOCAINE HYDROCHLORIDE 60 MG: 20 INJECTION, SOLUTION EPIDURAL; INFILTRATION; INTRACAUDAL; PERINEURAL at 09:39

## 2022-07-07 RX ADMIN — DEXAMETHASONE SODIUM PHOSPHATE 8 MG: 4 INJECTION, SOLUTION INTRAMUSCULAR; INTRAVENOUS at 09:47

## 2022-07-07 RX ADMIN — FUROSEMIDE 20 MG: 10 INJECTION INTRAMUSCULAR; INTRAVENOUS at 13:17

## 2022-07-07 RX ADMIN — IPRATROPIUM BROMIDE AND ALBUTEROL SULFATE 1 AMPULE: .5; 2.5 SOLUTION RESPIRATORY (INHALATION) at 13:17

## 2022-07-07 RX ADMIN — ALBUMIN (HUMAN) 12.5 G: 12.5 INJECTION, SOLUTION INTRAVENOUS at 14:28

## 2022-07-07 RX ADMIN — CEFAZOLIN 2000 MG: 2 INJECTION, POWDER, FOR SOLUTION INTRAMUSCULAR; INTRAVENOUS at 09:29

## 2022-07-07 RX ADMIN — Medication 10 MG: at 09:54

## 2022-07-07 ASSESSMENT — PAIN DESCRIPTION - LOCATION: LOCATION: GROIN

## 2022-07-07 ASSESSMENT — PAIN - FUNCTIONAL ASSESSMENT: PAIN_FUNCTIONAL_ASSESSMENT: 0-10

## 2022-07-07 ASSESSMENT — ENCOUNTER SYMPTOMS: SHORTNESS OF BREATH: 0

## 2022-07-07 ASSESSMENT — PAIN DESCRIPTION - ORIENTATION: ORIENTATION: LEFT

## 2022-07-07 ASSESSMENT — PAIN SCALES - GENERAL: PAINLEVEL_OUTOF10: 9

## 2022-07-07 NOTE — ANESTHESIA PRE PROCEDURE
Department of Anesthesiology  Preprocedure Note       Name:  Yuni Newman   Age:  80 y.o.  :  1929                                          MRN:  [id number]         Date:  2022      Surgeon: Kat Mchugh):  Jennifer Lao MD    Procedure: Procedure(s):  INCISION AND DRAINAGE GROIN WITH CLOSURE    Medications prior to admission:   Prior to Admission medications    Medication Sig Start Date End Date Taking? Authorizing Provider   ciprofloxacin (CIPRO) 500 MG tablet Take 0.5 tablets by mouth 2 times daily for 10 days 22  Jennifer Lao MD   metFORMIN (GLUCOPHAGE) 500 MG tablet TAKE ONE TABLET BY MOUTH DAILY 22   Paulo Forman DO   HYDROcodone-acetaminophen (NORCO) 5-325 MG per tablet Take 1 tablet by mouth every 6 hours as needed for Pain. Historical Provider, MD   traMADol (ULTRAM) 50 MG tablet Take 25 mg by mouth every 6 hours as needed for Pain. Historical Provider, MD   acetaminophen (TYLENOL) 325 MG tablet Take 650 mg by mouth every 6 hours as needed for Pain    Historical Provider, MD   glimepiride (AMARYL) 1 MG tablet TAKE ONE TABLET BY MOUTH EVERY MORNING BEFORE BREAKFAST 22   Paulo Forman DO   hydrOXYzine (VISTARIL) 25 MG capsule Take 1 capsule by mouth daily as needed for Anxiety  Patient not taking: Reported on 2022   Paulo Forman DO   atorvastatin (LIPITOR) 10 MG tablet TAKE ONE TABLET BY MOUTH DAILY 22   Paulo Forman DO   Lite Touch Lancets MISC Use twice daily when testing blood sugars.  3/28/22   Paulo Forman DO   Wound Cleansers (VASHE CLEANSING) SOLN Soak vashe on gauze pad and place on wound for 5-10 minutes 3/10/22   Chris Hastings MD   lisinopril (PRINIVIL;ZESTRIL) 20 MG tablet TAKE ONE TABLET BY MOUTH DAILY  Patient not taking: Reported on 2022 3/1/22   Paulo Forman DO   furosemide (LASIX) 20 MG tablet Take 1 tablet by mouth daily 22   Speedy Howell MD   blood glucose test strips (ONETOUCH ULTRA) strip USE TO TEST BLOOD SUGAR TWICE DAILY 7/1/21   Paulo Forman DO   Turmeric 450 MG CAPS Take 450 mg by mouth daily    Historical Provider, MD   Cholecalciferol (VITAMIN D3) 2000 UNITS CAPS Take 1 capsule by mouth daily    Historical Provider, MD   aspirin EC 81 MG EC tablet Take 1 tablet by mouth daily 5/13/15   Paulo Shah DO       Current medications:    No current outpatient medications on file. No current facility-administered medications for this visit. Allergies:     Allergies   Allergen Reactions    Aricept [Donepezil Hydrochloride] Other (See Comments)     Pt unable to recall reaction    Doxycycline Nausea Only    Ketorolac Tromethamine Other (See Comments)     Pt unable to recall reaction       Problem List:    Patient Active Problem List   Diagnosis Code    Urethral stricture N35.919    Spinal stenosis M48.00    Lumbar stenosis M48.061    Spondylolisthesis of lumbosacral region M43.17    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 ([address] E11.59    Atherosclerosis of artery of extremity with ulceration ([address] I70.299, L97.909    Benign essential HTN I10    Lumbar foraminal stenosis M48.061    DDD (degenerative disc disease), lumbar M51.36    Spinal stenosis of lumbar region with neurogenic claudication M48.062    Type 2 diabetes, controlled, with neuropathy ([address] E11.40    Severe mitral regurgitation I34.0    Venous insufficiency of both lower extremities I87.2    Hyperlipidemia E78.5    Hip arthritis M16.10    Status post carmen arthroplasty replacement of left hip 1999 Z96.642    Femoral artery stenosis, right ([address] I70.201    Osteoporosis M81.0    Osteopenia M85.80    Femoral-popliteal bypass graft occlusion, left ([address] T82.898A    Pain due to onychomycosis of nail B35.1, M79.609    Coronary artery disease involving native coronary artery of native heart without angina pectoris I25.10    Diabetic peripheral neuropathy (HCC) E11.42    Arthritis of left knee M17.12    Trigger middle finger of right hand M65.331    Carpal tunnel syndrome, right G56.01    Vitamin D deficiency E55.9    Carotid stenosis, asymptomatic, bilateral I65.23    Fingernail problem L60.9    TIA (transient ischemic attack) G45.9    Sacral fracture, closed ([facility]) S32.10XA    Closed fracture of ramus of right pubis ([facility]) S32.591A    Acute pain of left lower extremity M79.605    Diabetes mellitus type 2 with peripheral artery disease ([facility]) E11.51    Open wound of left knee S81.002A    Nonhealing nonsurgical wound limited to breakdown of skin T14. 8XXA    Peripheral vascular disease, unspecified ([facility] [address] I73.9    Wound of left leg S81.802A    Peripheral artery disease ([facility]) I73.9    Cervical spondylosis M47.812    Atherosclerosis of artery of extremity with rest pain ([facility]) I70.229       Past Medical History:        Diagnosis Date    Anxiety     Arthritis     At risk for falls     CAD (coronary artery disease)     Cerebral artery occlusion with cerebral infarction ([facility])     TIA--no residual effects    DDD (degenerative disc disease), cervical     Fractures     (L) Hip Fx 4/25/98, L1 fracture from fall 10-31-06    Hyperlipidemia     Hypertension     Mitral regurgitation     Neuropathy     Osteopenia     Osteoporosis     PAD (peripheral artery disease) ([facility])     Right LE ischemia    Peripheral neuropathy 6/1/2015    Peripheral vascular disease ([facility])     bilateral lower extremities with edema    Podagra 01/09/2017    Dr. Garett France    Prolonged emergence from general anesthesia     Spinal stenosis     L4-5    Type 2 diabetes mellitus ([facility] [address]     Urethral stricture 5 years ago    Urgency of urination        Past Surgical History:        Procedure Laterality Date    ANGIOPLASTY Left 04/18/2022    Left SFA    APPENDECTOMY      CARPAL TUNNEL RELEASE Right 03/29/2019    RIGHT CARPAL TUNNEL RELEASE AND RIGHT MIDDLE FINGER TRIGGER FINGER RELEASE performed by Leonardo Key MD at [address] [place] [address] CATARACT REMOVAL WITH IMPLANT  [id number]    LEFT EYE EYE CATARACT PHACOEMULSIFICATION INTRAOCULAR LENS    CHOLECYSTECTOMY  01/01/1978    COLONOSCOPY  08/31/1999    diverticulosis    EYE SURGERY Bilateral     bilateral cataract removed    FEMORAL BYPASS Left 6/28/2022    LEFT LEG FEMORAL TO POPLITEAL BYPASS WITH INTRAOPERATIVE ANGIOGRAM performed by Kevin Espinosa MD at [address] [facility] Left 02/14/2022    LEFT COMMON FEMORAL ARTERY ENDARTERECTOMY performed by Kevin Espinosa MD at [address] [place] ([address] [facility])  1980s    sallie    IR FEMORAL POPLITEAL BYPASS GRAFT Right 08/31/2015    KYPHOSIS SURGERY  11/07/2006    L1, (fractured after a fall)    LEG SURGERY Left 02/14/2022    LEFT LEG WOUND DEBRIDEMENT performed by Kevin Espinosa MD at [address]  04/25/1998    (L) THR    WRIST FRACTURE SURGERY  01/01/2008    left wrist       Social History:    Social History     Tobacco Use    Smoking status: Never Smoker    Smokeless tobacco: Never Used   Substance Use Topics    Alcohol use: No                                Counseling given: Not Answered      Vital Signs (Current): There were no vitals filed for this visit.                                            BP Readings from Last 3 Encounters:   07/06/22 138/68   07/05/22 (!) 167/78   07/02/22 (!) 164/91       NPO Status:                                                                                 BMI:   Wt Readings from Last 3 Encounters:   07/06/22 101 lb (45.8 kg)   07/02/22 114 lb 10.2 oz (52 kg)   06/17/22 106 lb (48.1 kg)     There is no height or weight on file to calculate BMI.    CBC:   Lab Results   Component Value Date/Time    WBC 10.1 07/01/2022 04:20 AM    RBC 2.78 07/01/2022 04:20 AM    HGB 8.0 07/01/2022 04:20 AM    HCT 23.7 07/01/2022 04:20 AM    MCV 85.2 07/01/2022 04:20 AM    RDW 19.9 07/01/2022 04:20 AM     07/01/2022 04:20 AM       CMP:   Lab Results Component Value Date/Time     07/02/2022 04:10 AM    K 4.3 07/02/2022 04:10 AM    K 5.2 06/28/2022 11:05 AM    CL 98 07/02/2022 04:10 AM    CO2 31 07/02/2022 04:10 AM    BUN 24 07/02/2022 04:10 AM    CREATININE 0.9 07/02/2022 04:10 AM    GFRAA >60 07/02/2022 04:10 AM    GFRAA >60 05/14/2013 11:33 AM    AGRATIO 1.9 04/18/2022 08:05 AM    LABGLOM 58 07/02/2022 04:10 AM    GLUCOSE 122 07/02/2022 04:10 AM    PROT 6.4 04/18/2022 08:05 AM    PROT 6.4 07/26/2012 04:30 PM    CALCIUM 8.2 07/02/2022 04:10 AM    BILITOT 0.3 04/18/2022 08:05 AM    ALKPHOS 65 04/18/2022 08:05 AM    AST 19 04/18/2022 08:05 AM    ALT 12 04/18/2022 08:05 AM       POC Tests:   No results for input(s): POCGLU, POCNA, POCK, POCCL, POCBUN, POCHEMO, POCHCT in the last 72 hours.     Coags:   Lab Results   Component Value Date/Time    PROTIME 14.0 06/20/2022 11:30 AM    INR 1.09 06/20/2022 11:30 AM    APTT 36.4 06/20/2022 11:30 AM       HCG (If Applicable): No results found for: PREGTESTUR, PREGSERUM, HCG, HCGQUANT     ABGs: No results found for: PHART, PO2ART, TCV0YVV, KSM1QKE, BEART, P6DNEDWL     Type & Screen (If Applicable):  No results found for: LABABO, LABRH    Drug/Infectious Status (If Applicable):  No results found for: HIV, HEPCAB    COVID-19 Screening (If Applicable):   Lab Results   Component Value Date/Time    COVID19 Not Detected 02/10/2022 12:37 PM           Anesthesia Evaluation  Patient summary reviewed and Nursing notes reviewed no history of anesthetic complications:   Airway: Mallampati: II  TM distance: >3 FB   Neck ROM: full  Mouth opening: > = 3 FB   Dental: normal exam   (+) upper dentures      Pulmonary:       (-) asthma and shortness of breath                           Cardiovascular:    (+) hypertension:, valvular problems/murmurs: MR, CAD:, hyperlipidemia    (-)  angina          Echocardiogram reviewed                  Neuro/Psych:   (+) TIA,    (-) CVA           GI/Hepatic/Renal:        (-) GERD and liver disease Endo/Other:    (+) DiabetesType II DM, , : arthritis: OA., .    (-) hypothyroidism               Abdominal:             Vascular:   + PVD, aortic or cerebral, . Other Findings:             Anesthesia Plan      general     ASA 4     (DNR suspended)  Induction: intravenous. MIPS: Postoperative opioids intended and Prophylactic antiemetics administered. Anesthetic plan and risks discussed with patient and healthcare power of . Use of blood products discussed with patient whom. Plan discussed with CRNA. This pre-anesthesia assessment may be used as a history and physical.    DOS STAFF ADDENDUM:    Pt seen and examined, chart reviewed (including anesthesia, drug and allergy history). No interval changes to history and physical examination. Anesthetic plan, risks, benefits, alternatives, and personnel involved discussed with patient. Patient verbalized an understanding and agrees to proceed.       Estefana Rubinstein, MD  July 7, 2022  7:58 AM

## 2022-07-07 NOTE — PROGRESS NOTES
Pt to pacu from OR, VSS, O2 sats 100% on 6L NC. Left groin with wound vac, dressing CD&I. LLE pedal and post tib pulses by doppler.   Will monitor

## 2022-07-07 NOTE — PROGRESS NOTES
Spoke with ETELVINA Cy Forth) regarding discharge planning for pt. Young making arrangements for wound vac to be placed and D/C planning.

## 2022-07-07 NOTE — ANESTHESIA POSTPROCEDURE EVALUATION
Department of Anesthesiology  Postprocedure Note    Patient: Jeanine Manuel  MRN: [id number]  YOB: 1929  Date of evaluation: 7/7/2022      Procedure Summary     Date: 07/07/22 Room / Location: [address]    Anesthesia Start: 0930 Anesthesia Stop:     Procedure: INCISION AND DRAINAGE OF LEFT SIDE GROIN WITH PLACEMENT OF WOUND VAC (N/A Groin) Diagnosis:       Lymph leak      (I89.8 GROIN LYMPH LEAK)    Surgeons: Gosia Baeza MD Responsible Provider: Elsa Chandler MD    Anesthesia Type: general ASA Status: 4          Anesthesia Type: No value filed.     Dalila Phase I:      Dalila Phase II:        Anesthesia Post Evaluation    Patient location during evaluation: PACU  Patient participation: complete - patient participated  Level of consciousness: awake  Airway patency: patent  Nausea & Vomiting: no vomiting and no nausea  Complications: no  Cardiovascular status: hemodynamically stable  Respiratory status: acceptable  Hydration status: stable  Multimodal analgesia pain management approach

## 2022-07-07 NOTE — BRIEF OP NOTE
Brief Postoperative Note      Patient: Chelsea Figueroa  YOB: 1929  MRN: [id number]    Date of Procedure: 7/7/2022    Pre-Op Diagnosis: I89.8 GROIN LYMPH LEAK    Post-Op Diagnosis: Same       Procedure(s):  INCISION AND DRAINAGE OF LEFT SIDE GROIN WITH PLACEMENT OF WOUND VAC    Surgeon(s):  Brice Lew MD    Assistant:  Surgical Assistant: David Medley    Anesthesia: General    Estimated Blood Loss (mL): Minimal    Complications: None    Specimens:   ID Type Source Tests Collected by Time Destination   1 : 1) LEFT GROIN Body Fluid Fluid CULTURE, FUNGUS, CULTURE WITH SMEAR, ACID FAST BACILLIUS, CULTURE, ANAEROBIC AND AEROBIC Brice Lew MD 7/7/2022 0751        Implants:  * No implants in log *      Drains:   [REMOVED] Urinary Catheter 2 Way (Removed)   $ Urethral catheter insertion Inserted for procedure 06/28/22 1507   Catheter Indications Need for fluid volume management of the critically ill patient in a critical care setting 07/01/22 2000   Urine Color Yellow 07/01/22 2000   Urine Appearance Clear 07/01/22 2000   Collection Container Standard 07/01/22 2000   Securement Method Securing device (Describe) 07/01/22 2000   Catheter Care Completed Yes 06/30/22 1500   Catheter Best Practices  Drainage tube clipped to bed;Catheter secured to thigh; Bag below bladder; Tamper seal intact; Bag not on floor; Lack of dependent loop in tubing;Drainage bag less than half full 07/01/22 2000   Status Draining 07/01/22 2000   Output (mL) 85 mL 07/02/22 0645       [REMOVED] External Urinary Catheter (Removed)   Site Assessment Clean,dry & intact 07/02/22 0650   Placement Initiated 07/02/22 0650   Catheter Care Catheter/Wick replaced;Suction Canister/Tubing changed 07/02/22 0650   Perineal Care Yes 07/02/22 0650   Suction Other 07/02/22 0650       Findings: no exposed graft; lymph leak noted.  VAC placed rather than reclosure; WOUND 5 cm x 2 cm x 1 cm deep    Electronically signed by Brice Lew MD on 7/7/2022 at 10:05 AM

## 2022-07-07 NOTE — PROGRESS NOTES
Phase I discharge criteria met, VSS, pt reports pain is tolerable, pt tolerating PO. Pt will transfer to phase II level care in stable condition.

## 2022-07-07 NOTE — CARE COORDINATION
SW received a call from [facility] rep stating that patient has been approved for delivery of one of their wound vacs. SW got a wound vac from the case management office and delivered it to patient's room. Receipt of wound vac paperwork for [employer] was signed and faxed to the agency. RN informed to change hospital wound vac to [employer] wound vac before discharge. SW informed pt and caretaker that the wound vac is a rental and will need to be returned. Provided them with [facility] customer service number for any questions. Melissa Rivera w/[facility] and informed this information. She confirmed they will be out tomorrow to complete an RN visit. Discharge Plan:  Home from PACU unit with wound vac from [facility] and Jayde Davey w/[facility].     Electronically signed by LUKE Nolan, FRANSICOW on 7/7/2022 at 4:14 PM

## 2022-07-07 NOTE — CARE COORDINATION
ETELVINA has been consulted to assist with discharge planning. Plan is for patient to discharge home with a wound vac and HHC. Attending doctor would like to see if this can be accomplished today to prevent admission to the hospital.  Sarah Tejada called  ([facility]) rep, Yael Murray [phone], who stated that she will need pt's facesheet, H&P, notes and wound vac measurements. ETELVINA faxed all this information to her at [phone]. ETELVINA called [facility] and confirmed patient is active with their services, enrolled yesterday. [facility] confirmed they can have an RN out to the house tomorrow to check on patient and wound vac. Wound vac agency will also need a signed script from the doctor. ETELVINA attempted to reach Dr. Yassine Morales for this but he had to leave and is not available. ETELVINA coordinated with his office and had Dr. Ryan Rivera sign the order. Order was faxed to [address] [place] [address]) who is now processing the order. Awaiting a return call from them stating that patient was approved so ETELVINA can deliver the wound vac to the patient.       Electronically signed by LUKE Thorne, YOANDY on 7/7/2022 at 1:10 PM

## 2022-07-08 ENCOUNTER — TELEPHONE (OUTPATIENT)
Dept: VASCULAR SURGERY | Age: 87
End: 2022-07-08

## 2022-07-08 ENCOUNTER — TELEPHONE (OUTPATIENT)
Dept: WOUND CARE | Age: 87
End: 2022-07-08

## 2022-07-08 NOTE — OP NOTE
[address]                     [address] [facility], [address] [facility]                                OPERATIVE REPORT    PATIENT NAME: Oneyda Shaikh                       :        1929  MED REC NO:   [id number]                          ROOM:  ACCOUNT NO:   [de-identified]                           ADMIT DATE: 2022  PROVIDER:     Carson Giron MD    DATE OF PROCEDURE:  2022    PREOPERATIVE DIAGNOSIS:  Left groin lymphatic leak status post left  femoral replacement with left femoral popliteal bypass. POSTOPERATIVE DIAGNOSIS:  Left groin lymphatic leak status post left  femoral replacement with left femoral popliteal bypass. OPERATION PERFORMED:  Incision and drainage with debridement left groin  wound, placement of wound VAC (wound 5 cm x 2 cm x 1 cm deep). SURGEON:  Carson Giron MD    ANESTHESIA:  General endotracheal.    ESTIMATED BLOOD LOSS:  Less than 50 mL. HISTORY:  The patient is a 77-year-old lady who underwent a left femoral  replacement and left femoral popliteal bypass approximately 10 days ago. She was discharged home and when seen in the office yesterday was noted  to have significant serosanguineous drainage from her left groin  incision with mild skin edge necrosis. Because of the presence of a  prosthetic graft in her groin, it was concerned that if this was left  untreated, she might develop a prosthetic graft infection and it was  recommended she undergo incision and drainage, appropriate debridement  and treatment of this wound operatively. She agreed understanding the  risks, benefits and other options. TECHNIQUE:  The patient was brought to the operating room and placed on  the operating table in the supine position. After adequate induction of  anesthesia, the left groin was prepped and draped in a sterile fashion.    The skin closure subcuticular stitch was then incised and the wound was  opened proximally for a distance of approximately 5 cm. The  subcutaneous tissue appeared to be intact except proximally where there  was serous drainage from high in the wound. The next layer of closure  was incised to open it slightly and the wound was vigorously irrigated. Gentle probing demonstrated no connection or visualization of the graft. There was no purulence, but a culture was obtained. Some necrotic  tissue including the skin edges were excised and hemostasis was obtained  with electrocautery. At this point, a VAC sponge was placed into the  wound and the wound was covered with appropriate dressing and then  attached to the suction device. The patient was then extubated and  transferred to recovery room.         Joseph Abdullahi MD    D: 07/08/2022 8:14:58       T: 07/08/2022 15:42:11     [id number]  Job#: [id number]     Doc#: [id number]    CC:

## 2022-07-08 NOTE — TELEPHONE ENCOUNTER
Arsh Laura from [email or web address] stating that they are planning to do the wound vac change three times a week and PRN with dressing changes if there are any issues with the wound vac. Please call Arsh Laura if this will be an issue or any other orders need to be addressed at [phone].

## 2022-07-08 NOTE — TELEPHONE ENCOUNTER
Called lm/voicemail with Liss Vergara that per the below orders this is correct per Dr. Ananias Bosworth.

## 2022-07-11 ENCOUNTER — HOSPITAL ENCOUNTER (OUTPATIENT)
Dept: WOUND CARE | Age: 87
Discharge: HOME | End: 2022-07-11
Payer: MEDICARE

## 2022-07-11 VITALS
TEMPERATURE: 97.5 F | DIASTOLIC BLOOD PRESSURE: 53 MMHG | SYSTOLIC BLOOD PRESSURE: 174 MMHG | RESPIRATION RATE: 16 BRPM | HEART RATE: 89 BPM

## 2022-07-11 DIAGNOSIS — I73.9 PERIPHERAL ARTERY DISEASE (HCC): ICD-10-CM

## 2022-07-11 DIAGNOSIS — S81.802A WOUND OF LEFT LOWER EXTREMITY, INITIAL ENCOUNTER: Primary | ICD-10-CM

## 2022-07-11 DIAGNOSIS — T81.89XA NON-HEALING SURGICAL WOUND, INITIAL ENCOUNTER: ICD-10-CM

## 2022-07-11 PROCEDURE — 6370000000 HC RX 637 (ALT 250 FOR IP): Performed by: SPECIALIST

## 2022-07-11 PROCEDURE — 11042 DBRDMT SUBQ TIS 1ST 20SQCM/<: CPT

## 2022-07-11 PROCEDURE — 11042 DBRDMT SUBQ TIS 1ST 20SQCM/<: CPT | Performed by: SPECIALIST

## 2022-07-11 RX ORDER — BACITRACIN, NEOMYCIN, POLYMYXIN B 400; 3.5; 5 [USP'U]/G; MG/G; [USP'U]/G
OINTMENT TOPICAL ONCE
Status: CANCELLED | OUTPATIENT
Start: 2022-07-11 | End: 2022-07-11

## 2022-07-11 RX ORDER — GENTAMICIN SULFATE 1 MG/G
OINTMENT TOPICAL ONCE
Status: CANCELLED | OUTPATIENT
Start: 2022-07-11 | End: 2022-07-11

## 2022-07-11 RX ORDER — LIDOCAINE HYDROCHLORIDE 20 MG/ML
JELLY TOPICAL ONCE
Status: CANCELLED | OUTPATIENT
Start: 2022-07-11 | End: 2022-07-11

## 2022-07-11 RX ORDER — GINSENG 100 MG
CAPSULE ORAL ONCE
Status: CANCELLED | OUTPATIENT
Start: 2022-07-11 | End: 2022-07-11

## 2022-07-11 RX ORDER — BACITRACIN ZINC AND POLYMYXIN B SULFATE 500; 1000 [USP'U]/G; [USP'U]/G
OINTMENT TOPICAL ONCE
Status: CANCELLED | OUTPATIENT
Start: 2022-07-11 | End: 2022-07-11

## 2022-07-11 RX ORDER — LIDOCAINE HYDROCHLORIDE 40 MG/ML
SOLUTION TOPICAL ONCE
Status: COMPLETED | OUTPATIENT
Start: 2022-07-11 | End: 2022-07-11

## 2022-07-11 RX ORDER — LIDOCAINE 40 MG/G
CREAM TOPICAL ONCE
Status: CANCELLED | OUTPATIENT
Start: 2022-07-11 | End: 2022-07-11

## 2022-07-11 RX ORDER — LIDOCAINE HYDROCHLORIDE 40 MG/ML
SOLUTION TOPICAL ONCE
Status: CANCELLED | OUTPATIENT
Start: 2022-07-11 | End: 2022-07-11

## 2022-07-11 RX ORDER — LIDOCAINE 50 MG/G
OINTMENT TOPICAL ONCE
Status: CANCELLED | OUTPATIENT
Start: 2022-07-11 | End: 2022-07-11

## 2022-07-11 RX ORDER — BETAMETHASONE DIPROPIONATE 0.05 %
OINTMENT (GRAM) TOPICAL ONCE
Status: CANCELLED | OUTPATIENT
Start: 2022-07-11 | End: 2022-07-11

## 2022-07-11 RX ORDER — CLOBETASOL PROPIONATE 0.5 MG/G
OINTMENT TOPICAL ONCE
Status: CANCELLED | OUTPATIENT
Start: 2022-07-11 | End: 2022-07-11

## 2022-07-11 RX ADMIN — LIDOCAINE HYDROCHLORIDE: 40 SOLUTION TOPICAL at 09:58

## 2022-07-11 ASSESSMENT — PAIN DESCRIPTION - ORIENTATION: ORIENTATION: LEFT

## 2022-07-11 ASSESSMENT — PAIN DESCRIPTION - DESCRIPTORS: DESCRIPTORS: ACHING;THROBBING

## 2022-07-11 ASSESSMENT — PAIN SCALES - GENERAL: PAINLEVEL_OUTOF10: 4

## 2022-07-11 ASSESSMENT — PAIN DESCRIPTION - LOCATION: LOCATION: LEG

## 2022-07-11 NOTE — PROGRESS NOTES
[address] [facility]  Progress Note and Procedure Note      Boy Gaona  MEDICAL RECORD NUMBER:  [id number]  AGE: 80 y.o. GENDER: female  : 1929  EPISODE DATE:  2022    Subjective:     Chief Complaint   Patient presents with    Wound Check     left lower leg and left groin         HISTORY of PRESENT ILLNESS HPI     Boy Gaona is a 80 y.o. female who presents today for wound/ulcer evaluation. History of Wound Context: Patient returns today to wound care following recent left external iliofemoral bypass with a left femoral below the knee popliteal in situ bypass by Dr. Margarete Bamberger. Since her last visit here in wound care she was taken back to the operating room on 2022 where the left groin incision was opened to drain a lymphatic leak.   She returns today with a wound VAC in place  Wound/Ulcer Pain Timing/Severity: none  Quality of pain: N/A  Severity:  0 / 10   Modifying Factors: None  Associated Signs/Symptoms: edema and pain    Ulcer Identification:  Ulcer Type: arterial and non-healing surgical    Contributing Factors: edema, venous stasis and arterial insufficiency    Acute Wound: N/A not an acute wound    PAST MEDICAL HISTORY        Diagnosis Date    Anxiety     Arthritis     At risk for falls     CAD (coronary artery disease)     Cerebral artery occlusion with cerebral infarction ([address]     TIA--no residual effects    DDD (degenerative disc disease), cervical     Fractures     (L) Hip Fx 98, L1 fracture from fall 10-31-06    Hyperlipidemia     Hypertension     Mitral regurgitation     Neuropathy     Osteopenia     Osteoporosis     PAD (peripheral artery disease) (HCC)     Right LE ischemia    Peripheral neuropathy 2015    Peripheral vascular disease (HCC)     bilateral lower extremities with edema    Podagra 2017    Dr. Tony Wyatt    Prolonged emergence from general anesthesia     Spinal stenosis     L4-5    Type 2 diabetes mellitus ([address]     Urethral stricture 5 years ago    Urgency of urination        PAST SURGICAL HISTORY    Past Surgical History:   Procedure Laterality Date    ANGIOPLASTY Left 04/18/2022    Left SFA    APPENDECTOMY      CARPAL TUNNEL RELEASE Right 03/29/2019    RIGHT CARPAL TUNNEL RELEASE AND RIGHT MIDDLE FINGER TRIGGER FINGER RELEASE performed by Lucretia Ayala MD at [address]  [id number]    LEFT EYE EYE CATARACT PHACOEMULSIFICATION INTRAOCULAR LENS    CHOLECYSTECTOMY  01/01/1978    COLONOSCOPY  08/31/1999    diverticulosis    EYE SURGERY Bilateral     bilateral cataract removed    FEMORAL BYPASS Left 6/28/2022    LEFT LEG FEMORAL TO POPLITEAL BYPASS WITH INTRAOPERATIVE ANGIOGRAM performed by Rachel Yun MD at [id number] [facility] Left 02/14/2022    LEFT COMMON FEMORAL ARTERY ENDARTERECTOMY performed by Rachel Yun MD at [address] at the [facility] (CERVIX STATUS UNKNOWN)  [address] [place] [address]    IR FEMORAL POPLITEAL BYPASS GRAFT Right 08/31/2015    KYPHOSIS SURGERY  11/07/2006    L1, (fractured after a fall)    LEG SURGERY Left 02/14/2022    LEFT LEG WOUND DEBRIDEMENT performed by Rachel Yun MD at [address] N/A 7/7/2022    INCISION AND DRAINAGE OF LEFT SIDE GROIN WITH PLACEMENT OF WOUND VAC performed by Rachel Yun MD at [address]  04/25/1998    (L) THR    WRIST FRACTURE SURGERY  01/01/2008    left wrist       FAMILY HISTORY    Family History   Problem Relation Age of Onset    Cancer Mother 43    Stroke Father     Hypertension Father        SOCIAL HISTORY    Social History     Tobacco Use    Smoking status: Never Smoker    Smokeless tobacco: Never Used   Vaping Use    Vaping Use: Never used   Substance Use Topics    Alcohol use: No    Drug use: Never       ALLERGIES    Allergies   Allergen Reactions    Aricept [Donepezil Hydrochloride] Other (See Comments)     Pt unable to recall reaction    Doxycycline Nausea Only    Ketorolac Tromethamine Other (See Comments)     Pt unable to recall reaction       MEDICATIONS    Current Outpatient Medications on File Prior to Encounter   Medication Sig Dispense Refill    ciprofloxacin (CIPRO) 500 MG tablet Take 0.5 tablets by mouth 2 times daily for 10 days 10 tablet 0    metFORMIN (GLUCOPHAGE) 500 MG tablet TAKE ONE TABLET BY MOUTH DAILY 90 tablet 3    HYDROcodone-acetaminophen (NORCO) 5-325 MG per tablet Take 1 tablet by mouth every 6 hours as needed for Pain.  traMADol (ULTRAM) 50 MG tablet Take 25 mg by mouth every 6 hours as needed for Pain.  acetaminophen (TYLENOL) 325 MG tablet Take 650 mg by mouth every 6 hours as needed for Pain      glimepiride (AMARYL) 1 MG tablet TAKE ONE TABLET BY MOUTH EVERY MORNING BEFORE BREAKFAST 30 tablet 2    hydrOXYzine (VISTARIL) 25 MG capsule Take 1 capsule by mouth daily as needed for Anxiety (Patient not taking: Reported on 7/5/2022) 30 capsule 0    atorvastatin (LIPITOR) 10 MG tablet TAKE ONE TABLET BY MOUTH DAILY 30 tablet 3    Lite Touch Lancets MISC Use twice daily when testing blood sugars. 100 each 5    Wound Cleansers (VASHE CLEANSING) SOLN Soak vashe on gauze pad and place on wound for 5-10 minutes 1 each 2    lisinopril (PRINIVIL;ZESTRIL) 20 MG tablet TAKE ONE TABLET BY MOUTH DAILY (Patient not taking: Reported on 7/11/2022) 90 tablet 3    furosemide (LASIX) 20 MG tablet Take 1 tablet by mouth daily 90 tablet 1    blood glucose test strips (ONETOUCH ULTRA) strip USE TO TEST BLOOD SUGAR TWICE DAILY 100 strip 1    Turmeric 450 MG CAPS Take 450 mg by mouth daily      Cholecalciferol (VITAMIN D3) 2000 UNITS CAPS Take 1 capsule by mouth daily      aspirin EC 81 MG EC tablet Take 1 tablet by mouth daily 30 tablet 3     No current facility-administered medications on file prior to encounter. REVIEW OF SYSTEMS  Review of Systems    Pertinent items are noted in HPI.     Objective:      BP (!) 1012   Wound Etiology Arterial 11/22/21 0924   Dressing Status Dry; Intact 07/02/22 0800   Wound Cleansed Cleansed with saline 07/11/22 0943   Dressing/Treatment Hydrofera blue 07/11/22 1007   Wound Length (cm) 2.9 cm 07/11/22 0943   Wound Width (cm) 2.3 cm 07/11/22 0943   Wound Depth (cm) 0.1 cm 07/11/22 0943   Wound Surface Area (cm^2) 6.67 cm^2 07/11/22 0943   Change in Wound Size % (l*w) -138.21 07/11/22 0943   Wound Volume (cm^3) 0.667 cm^3 07/11/22 0943   Wound Healing % -138 07/11/22 0943   Post-Procedure Length (cm) 3 cm 07/11/22 1007   Post-Procedure Width (cm) 2.4 cm 07/11/22 1007   Post-Procedure Depth (cm) 0.3 cm 07/11/22 1007   Post-Procedure Surface Area (cm^2) 7.2 cm^2 07/11/22 1007   Post-Procedure Volume (cm^3) 2.16 cm^3 07/11/22 1007   Distance Tunneling (cm) 0 cm 07/11/22 0943   Tunneling Position ___ O'Clock 0 07/11/22 0943   Undermining Starts ___ O'Clock 0 07/11/22 0943   Undermining Ends___ O'Clock 0 07/11/22 0943   Undermining Maxium Distance (cm) 0 07/11/22 0943   Wound Assessment Pale granulation tissue;Slough 07/11/22 0943   Drainage Amount Small 07/11/22 0943   Drainage Description Serosanguinous 07/11/22 0943   Odor None 07/11/22 0943   Nissa-wound Assessment Intact 07/11/22 0943   Margins Defined edges 07/11/22 0943   Wound Thickness Description not for Pressure Injury Full thickness 07/11/22 0943   Number of days: 231       Wound 07/11/22 Left #2 groin (Active)   Wound Image   07/11/22 0943   Wound Cleansed Cleansed with saline 07/11/22 0943   Dressing/Treatment Moist to dry 07/11/22 1018   Wound Length (cm) 3 cm 07/11/22 0943   Wound Width (cm) 3 cm 07/11/22 0943   Wound Depth (cm) 1.5 cm 07/11/22 0943   Wound Surface Area (cm^2) 9 cm^2 07/11/22 0943   Wound Volume (cm^3) 13.5 cm^3 07/11/22 0943   Post-Procedure Length (cm) 3 cm 07/11/22 1007   Post-Procedure Width (cm) 3 cm 07/11/22 1007   Post-Procedure Depth (cm) 1.5 cm 07/11/22 1007   Post-Procedure Surface Area (cm^2) 9 cm^2 07/11/22 1007   Post-Procedure Volume (cm^3) 13.5 cm^3 07/11/22 1007   Distance Tunneling (cm) 0 cm 07/11/22 0943   Tunneling Position ___ O'Clock 0 07/11/22 0943   Undermining Starts ___ O'Clock 0 07/11/22 0943   Undermining Ends___ O'Clock 0 07/11/22 0943   Undermining Maxium Distance (cm) 0 07/11/22 0943   Wound Assessment Slough 07/11/22 0943   Drainage Amount Moderate 07/11/22 0943   Drainage Description Serosanguinous 07/11/22 0943   Odor None 07/11/22 0943   Nissa-wound Assessment Intact 07/11/22 0943   Margins Defined edges 07/11/22 0943   Wound Thickness Description not for Pressure Injury Full thickness 07/11/22 0943   Number of days: 0     Incision 06/28/22 Other (Comment) Medial (Active)   Dressing Status Clean;Dry; Intact 07/02/22 0800   Incision Cleansed Not Cleansed 07/02/22 0800   Drainage Amount Small 07/02/22 0800   Drainage Description Other (Comment) 07/02/22 0800   Number of days: 12       Percent of Wound Debrided: 100%    Total Surface Area Debrided:  7.2 sq cm    Diabetic/Pressure/Non Pressure Ulcers only:  Ulcer: Non-Pressure ulcer, fat layer exposed    Bleeding: Minimal    Hemostasis Achieved: by pressure    Procedural Pain: 0  / 10     Post Procedural Pain: 0 / 10     Response to treatment:  Well tolerated by patient. Will continue with Hydrofera Blue primary dressing to left shin wound. Also strongly encourage patient to at least wear a low spandagrip. Wound VAC therapy will continue to recently opened groin incision. Plan:     Treatment Note: Please see attached Discharge Instructions. These instructions were given and signed by the patient or POA    New Prescriptions    No medications on file       Orders Placed This Encounter   Procedures   [address] Protocol       Discharge Instructions          [facility] Physician Orders and Discharge Instructions  [address] [place] [address] E. [address] [place] [address]. [place].  [address]  Telephone: [phone] FAX [phone]  NAME:  Rodger Mclaughlin  YOB: 1929  MEDICAL RECORD NUMBER:  [id number]  DATE:  7/11/2022    Return Appointment:  [facility] Return Appointment: With Dr. Army Whitaker  in  1 Week(s)  o Schedule weekly for the rest of the month? Yes    o [] Return Appointment for a Wound Assessment with the nurse on:     Future Appointments   Date Time Provider Gilbert Frey   7/11/2022  3:30 PM MD Taylor Watson [facility]   7/13/2022 11:15 AM Giovani Russell MD [facility]   7/18/2022  9:15 AM Radha Hitchcock MD [facility]   7/25/2022  9:15 AM MD Elma Hernandez [facility]   7/26/2022 10:45 AM DO OCTAVIO Lai [place] - DYD   8/1/2022  9:15 AM MD Elma Hernandez [facility]   8/8/2022  9:15 AM Radha Hitchcock MD [facility]       Orders Placed This Encounter   Procedures    Initiate Outpatient Wound Care Protocol     [address]: [address]:  Phone: [phone]  Fax: [phone]  Medically necessary services: [x] Skilled Nursing [] PT (Eval & Treat) [] OT (Eval & Treat) [] Social Work [] Dietician  [] Other:    Wound care instructions:  1. If you smoke we ask that you refrain from smoking. Smoking inhibits wounds from healing. 2. When taking antibiotics take the entire prescription as ordered. Do not stop taking until medication is all gone unless otherwise instructed. 3. Exercise as tolerated. 4. Keep weight off wounds and reposition every 2 hours if applicable. 5. Do not get wounds wet in the bath or shower unless otherwise instructed by your physician. If your wound is on your foot or leg, you may purchase a cast bag. Please ask at the pharmacy. 6. Wash hands with soap and water prior to and after every dressing change.     [x]Wash wounds with: 0.9% normal saline  [x]Nissa wound Topical Treatments: Do not apply lotions, creams, or ointments to the skin around the wound bed unless directed as followed:   o Apply around the wound: Nothing         [x]Wound Location: left lower leg    Apply Primary Dressing to wound: Hydrofera Blue pad   Secondary Dressing: 4X4 gauze pad and Bulky roll gauze  o  Avoid contact of tape with skin if possible.  When to change Dressing: 3 times per week:Monday/Wednesday/Friday    [x]Wound Location: left lower medial leg wound    Apply Primary Dressing to wound: Joleen   Secondary Dressing: 4X4 gauze pad and Bulky roll gauze  o  Avoid contact of tape with skin if possible.  When to change Dressing: 3 times per week:Monday/Wednesday/Friday      [x]Negative Pressure:           Wound Location: left groin ( wet to dry dressing with vashe until home care nurse can reapply wound vac today)  [x]Wound clinic to apply saline wet to dry dressing until home care to place wound vac on.  Use No-Sting Barrier film to sherrill wound with each dressing change (DO NOT USE if Dermatac Drape from [employer] is used).  Pressure @ 125 mmHg continuous using black foam.     []Apply White foam on exposed bone and/or tunnels:    Change dressing: Monday/Wednesday/Friday   Make sure that tubing is not against the skin by using gauze and/or Kerlix. · Edema control: Low spandagrip to left lower leg- remove and night and on during the day. Dietary:  Important dietary reminders:  1. Increase Protein intake (i.e. Lean meats, fish, eggs, legumes, and yogurt)  2. No added salt  3. If diabetic, follow a diabetic diet and check glucose prior to meals or as instructed by your physician.     Dietary Supplements(Take twice a day unless instructed otherwise):  [] Bora   [] 30ml ProStat [] Keturah Yayo [] Ensure Max/Premier [] Other:    Your nurse  is:  Cem Miller     Electronically signed by Alexis Peterson RN on 7/11/2022 at 10:03 AM     [address] Information: Should you experience any significant changes in your wound(s) or have questions about your wound care, please contact the [facility] [address] at [phone]. We are open from 8:00am - 4:30p Monday, Thursday and Friday; 11:00am - 5pm on Tuesday and CLOSED Wednesday. Please give us 24-48 business hours to return your call. Call your doctor now or seek immediate medical care if:    · You have symptoms of infection, such as:  ? Increased pain, swelling, warmth, or redness. ? Red streaks leading from the area. ? Pus draining from the area. ? A fever.         Physician for this visit and orders: Alba Reaves MD    [] Patient unable to sign Discharge Instructions given to ECF/Transportation/POA        Electronically signed by Alba Reaves MD on 7/11/2022 at 10:55 AM

## 2022-07-12 ENCOUNTER — CARE COORDINATION (OUTPATIENT)
Dept: CASE MANAGEMENT | Age: 87
End: 2022-07-12

## 2022-07-12 LAB
ANAEROBIC CULTURE: ABNORMAL
CULTURE SURGICAL: ABNORMAL
CULTURE SURGICAL: ABNORMAL
GRAM STAIN RESULT: ABNORMAL
ORGANISM: ABNORMAL

## 2022-07-12 NOTE — CARE COORDINATION
Care Transitions Follow Up Call    Needs to be reviewed by the provider   Additional needs identified to be addressed with provider: No  none             Method of communication with provider : none      Care Transition Nurse contacted the patient by telephone to follow up after admission. Verified name and  with patient as identifiers. Addressed changes since last contact: none  Discussed follow-up appointments. If no appointment was previously scheduled, appointment scheduling offered: No.   Is follow up appointment scheduled within 7 days of discharge? scheduled. CTN reviewed discharge instructions, medical action plan and red flags with patient and discussed any barriers to care and/or understanding of plan of care after discharge. Discussed appropriate site of care based on symptoms and resources available to patient including: PCP  Specialist  Home health  When to call 911. The patient agrees to contact the PCP office for questions related to their healthcare. Patients top risk factors for readmission: medical condition-   Interventions to address risk factors: Assessment and support for treatment adherence and medication management-     CTN provided contact information for future needs. Plan for follow-up call in 5-7 days based on severity of symptoms and risk factors. Plan for next call: symptom management-   self management-       States her therapist just left and they did some exercises. Reports she continues to have pain in the operative leg but states she's being well taken care of with caregivers and home care. She had her wound vac dressing changed yesterday and has an appt tomorrow with the vas surgeon. Denies fevers or flu-like symptoms. She she's eating and drinking but her last BM was the day before yesterday and she's been having some constipation. She's taking senna and metamucil. States she has some prunes and prune juice at home and will do that as well today.  She denies any questions or concerns at this time.

## 2022-07-13 ENCOUNTER — OFFICE VISIT (OUTPATIENT)
Dept: VASCULAR SURGERY | Age: 87
End: 2022-07-13

## 2022-07-13 VITALS — WEIGHT: 101 LBS | BODY MASS INDEX: 19.83 KG/M2 | HEIGHT: 60 IN

## 2022-07-13 DIAGNOSIS — M79.609 POSTOPERATIVE PAIN OF EXTREMITY: ICD-10-CM

## 2022-07-13 DIAGNOSIS — G89.18 POSTOPERATIVE PAIN OF EXTREMITY: ICD-10-CM

## 2022-07-13 DIAGNOSIS — I70.299 ATHEROSCLEROSIS OF ARTERY OF EXTREMITY WITH ULCERATION (HCC): Primary | ICD-10-CM

## 2022-07-13 DIAGNOSIS — L97.909 ATHEROSCLEROSIS OF ARTERY OF EXTREMITY WITH ULCERATION (HCC): Primary | ICD-10-CM

## 2022-07-13 DIAGNOSIS — I89.9 LYMPH LEAK: ICD-10-CM

## 2022-07-13 PROCEDURE — 99024 POSTOP FOLLOW-UP VISIT: CPT | Performed by: SURGERY

## 2022-07-13 RX ORDER — HYDROCODONE BITARTRATE AND ACETAMINOPHEN 5; 325 MG/1; MG/1
1 TABLET ORAL EVERY 6 HOURS PRN
Qty: 30 TABLET | Refills: 0 | Status: SHIPPED | OUTPATIENT
Start: 2022-07-13 | End: 2022-07-27

## 2022-07-13 RX ORDER — CIPROFLOXACIN 500 MG/1
250 TABLET, FILM COATED ORAL 2 TIMES DAILY
Qty: 10 TABLET | Refills: 0 | Status: SHIPPED | OUTPATIENT
Start: 2022-07-13 | End: 2022-07-18 | Stop reason: ALTCHOICE

## 2022-07-13 NOTE — PROGRESS NOTES
OV S/P I&D L groin lymph leak 6 days ago S/P L femoral replacement + L fem-BKpop bypass 6/28/2022. Tolerated VAC to L groin reasonably but C/O diffuse L leg shooting pains. Seen at [facility]. EXAM:  Palpable graft and L PT pulses    L foot hyperemic and warm    L foot wound healed. Shin wound granulating    L groin wound with slight serous drainage; edges beginning top granulate - no exposed graft or deep tissue     Culture - staph epi    A/P: S/P L fem replacement + L fem-BKpop bypass. Patent with well perfused foot. S/P I&D L groin. Continue VAC and oral antibiotics until granulated over. Will renew pain meds. F/U one week.

## 2022-07-15 ENCOUNTER — CARE COORDINATION (OUTPATIENT)
Dept: CARE COORDINATION | Age: 87
End: 2022-07-15

## 2022-07-18 ENCOUNTER — OFFICE VISIT (OUTPATIENT)
Dept: CARDIOLOGY CLINIC | Age: 87
End: 2022-07-18
Payer: MEDICARE

## 2022-07-18 ENCOUNTER — HOSPITAL ENCOUNTER (OUTPATIENT)
Dept: WOUND CARE | Age: 87
Discharge: HOME | End: 2022-07-18
Payer: MEDICARE

## 2022-07-18 VITALS
RESPIRATION RATE: 16 BRPM | DIASTOLIC BLOOD PRESSURE: 84 MMHG | SYSTOLIC BLOOD PRESSURE: 170 MMHG | HEART RATE: 75 BPM | TEMPERATURE: 96.8 F

## 2022-07-18 VITALS
BODY MASS INDEX: 19.92 KG/M2 | SYSTOLIC BLOOD PRESSURE: 130 MMHG | HEART RATE: 89 BPM | DIASTOLIC BLOOD PRESSURE: 62 MMHG | WEIGHT: 102 LBS

## 2022-07-18 DIAGNOSIS — S81.802D WOUND OF LEFT LOWER EXTREMITY, SUBSEQUENT ENCOUNTER: ICD-10-CM

## 2022-07-18 DIAGNOSIS — I70.229 ATHEROSCLEROSIS OF ARTERY OF EXTREMITY WITH REST PAIN (HCC): ICD-10-CM

## 2022-07-18 DIAGNOSIS — I73.9 PERIPHERAL ARTERY DISEASE (HCC): Primary | ICD-10-CM

## 2022-07-18 DIAGNOSIS — R06.02 SOB (SHORTNESS OF BREATH): ICD-10-CM

## 2022-07-18 DIAGNOSIS — I49.1 PAC (PREMATURE ATRIAL CONTRACTION): ICD-10-CM

## 2022-07-18 DIAGNOSIS — E78.2 MIXED HYPERLIPIDEMIA: ICD-10-CM

## 2022-07-18 DIAGNOSIS — I10 BENIGN ESSENTIAL HTN: Primary | ICD-10-CM

## 2022-07-18 DIAGNOSIS — S81.802A WOUND OF LEFT LOWER EXTREMITY, INITIAL ENCOUNTER: ICD-10-CM

## 2022-07-18 PROCEDURE — 93000 ELECTROCARDIOGRAM COMPLETE: CPT | Performed by: INTERNAL MEDICINE

## 2022-07-18 PROCEDURE — 6370000000 HC RX 637 (ALT 250 FOR IP): Performed by: SPECIALIST

## 2022-07-18 PROCEDURE — 11042 DBRDMT SUBQ TIS 1ST 20SQCM/<: CPT

## 2022-07-18 PROCEDURE — 1123F ACP DISCUSS/DSCN MKR DOCD: CPT | Performed by: INTERNAL MEDICINE

## 2022-07-18 PROCEDURE — 99214 OFFICE O/P EST MOD 30 MIN: CPT | Performed by: INTERNAL MEDICINE

## 2022-07-18 PROCEDURE — 11042 DBRDMT SUBQ TIS 1ST 20SQCM/<: CPT | Performed by: SPECIALIST

## 2022-07-18 RX ORDER — LIDOCAINE HYDROCHLORIDE 40 MG/ML
SOLUTION TOPICAL ONCE
Status: CANCELLED | OUTPATIENT
Start: 2022-07-18 | End: 2022-07-18

## 2022-07-18 RX ORDER — LIDOCAINE 50 MG/G
OINTMENT TOPICAL ONCE
Status: COMPLETED | OUTPATIENT
Start: 2022-07-18 | End: 2022-07-18

## 2022-07-18 RX ORDER — LIDOCAINE HYDROCHLORIDE 40 MG/ML
SOLUTION TOPICAL ONCE
Status: DISCONTINUED | OUTPATIENT
Start: 2022-07-18 | End: 2022-07-19 | Stop reason: HOSPADM

## 2022-07-18 RX ORDER — BACITRACIN, NEOMYCIN, POLYMYXIN B 400; 3.5; 5 [USP'U]/G; MG/G; [USP'U]/G
OINTMENT TOPICAL ONCE
Status: CANCELLED | OUTPATIENT
Start: 2022-07-18 | End: 2022-07-18

## 2022-07-18 RX ORDER — LIDOCAINE 40 MG/G
CREAM TOPICAL ONCE
Status: CANCELLED | OUTPATIENT
Start: 2022-07-18 | End: 2022-07-18

## 2022-07-18 RX ORDER — GINSENG 100 MG
CAPSULE ORAL ONCE
Status: CANCELLED | OUTPATIENT
Start: 2022-07-18 | End: 2022-07-18

## 2022-07-18 RX ORDER — CLOBETASOL PROPIONATE 0.5 MG/G
OINTMENT TOPICAL ONCE
Status: CANCELLED | OUTPATIENT
Start: 2022-07-18 | End: 2022-07-18

## 2022-07-18 RX ORDER — LIDOCAINE HYDROCHLORIDE 20 MG/ML
JELLY TOPICAL ONCE
Status: CANCELLED | OUTPATIENT
Start: 2022-07-18 | End: 2022-07-18

## 2022-07-18 RX ORDER — GENTAMICIN SULFATE 1 MG/G
OINTMENT TOPICAL ONCE
Status: CANCELLED | OUTPATIENT
Start: 2022-07-18 | End: 2022-07-18

## 2022-07-18 RX ORDER — AMIODARONE HYDROCHLORIDE 200 MG/1
100 TABLET ORAL DAILY
Qty: 30 TABLET | Refills: 3 | Status: SHIPPED
Start: 2022-07-18 | End: 2022-07-26

## 2022-07-18 RX ORDER — BACITRACIN ZINC AND POLYMYXIN B SULFATE 500; 1000 [USP'U]/G; [USP'U]/G
OINTMENT TOPICAL ONCE
Status: CANCELLED | OUTPATIENT
Start: 2022-07-18 | End: 2022-07-18

## 2022-07-18 RX ORDER — BETAMETHASONE DIPROPIONATE 0.05 %
OINTMENT (GRAM) TOPICAL ONCE
Status: CANCELLED | OUTPATIENT
Start: 2022-07-18 | End: 2022-07-18

## 2022-07-18 RX ORDER — LIDOCAINE 50 MG/G
OINTMENT TOPICAL ONCE
Status: CANCELLED | OUTPATIENT
Start: 2022-07-18 | End: 2022-07-18

## 2022-07-18 RX ADMIN — LIDOCAINE: 50 OINTMENT TOPICAL at 10:08

## 2022-07-18 ASSESSMENT — PAIN DESCRIPTION - FREQUENCY: FREQUENCY: CONTINUOUS

## 2022-07-18 ASSESSMENT — PAIN DESCRIPTION - PAIN TYPE: TYPE: ACUTE PAIN

## 2022-07-18 ASSESSMENT — ENCOUNTER SYMPTOMS
SHORTNESS OF BREATH: 0
GASTROINTESTINAL NEGATIVE: 1
COUGH: 0
APNEA: 0
CHOKING: 0
CHEST TIGHTNESS: 0
ALLERGIC/IMMUNOLOGIC NEGATIVE: 1

## 2022-07-18 ASSESSMENT — PAIN DESCRIPTION - ONSET: ONSET: ON-GOING

## 2022-07-18 ASSESSMENT — PAIN DESCRIPTION - ORIENTATION: ORIENTATION: LEFT

## 2022-07-18 ASSESSMENT — PAIN SCALES - GENERAL: PAINLEVEL_OUTOF10: 9

## 2022-07-18 ASSESSMENT — PAIN DESCRIPTION - LOCATION: LOCATION: LEG

## 2022-07-18 ASSESSMENT — PAIN - FUNCTIONAL ASSESSMENT: PAIN_FUNCTIONAL_ASSESSMENT: PREVENTS OR INTERFERES SOME ACTIVE ACTIVITIES AND ADLS

## 2022-07-18 ASSESSMENT — PAIN DESCRIPTION - DESCRIPTORS: DESCRIPTORS: ACHING;THROBBING

## 2022-07-18 NOTE — PROGRESS NOTES
Subjective:      Patient ID: Edy Holt is a 80 y.o. female    CC: Fatigue, Mitral regurgitation, htn, cad.  sob    HPI:  Guille Weeks is an 80year old female with HX of CAD, HTN, PAD, and Mitral regurgitation. . Last Echo. No regional wall motion abnormalities are seen. Diastolic filling parameters suggests grade I diastolic dysfunction. Bi-atrial enlargement. severe mitral regurgitation. This is a change from 2015 echo that showed moderate MR. Patient feels well and has no chest pain nor SOB. No orthopnea nor PND. She states that she is not quite as active as she used to be and has some fatigue. doing well. Has some LE edema but has TIA and was recently in the hospital with 50% left ICA stenosis that is unchanged from prior. Has weeping wounds prob from arterial insuffciency. Add keflex 10 days. hasnt been taking lasix. Asked to take and check labs in a week. Allergies   Allergen Reactions    Aricept [Donepezil Hydrochloride] Other (See Comments)     Pt unable to recall reaction    Doxycycline Nausea Only    Ketorolac Tromethamine Other (See Comments)     Pt unable to recall reaction       Social History     Socioeconomic History    Marital status:       Spouse name: None    Number of children: None    Years of education: None    Highest education level: None   Tobacco Use    Smoking status: Never    Smokeless tobacco: Never   Vaping Use    Vaping Use: Never used   Substance and Sexual Activity    Alcohol use: No    Drug use: Never    Sexual activity: Not Currently     Social Determinants of Health     Financial Resource Strain: Low Risk     Difficulty of Paying Living Expenses: Not hard at all   Food Insecurity: No Food Insecurity    Worried About Running Out of Food in the Last Year: Never true    Ran Out of Food in the Last Year: Never true       Family History   Problem Relation Age of Onset    Cancer Mother 43    Stroke Father     Hypertension Father         has a past medical history of Anxiety, Arthritis, At risk for falls, CAD (coronary artery disease), Cerebral artery occlusion with cerebral infarction ([address] DDD (degenerative disc disease), cervical, Fractures, Hyperlipidemia, Hypertension, Mitral regurgitation, Neuropathy, Osteopenia, Osteoporosis, PAD (peripheral artery disease) ([address] Peripheral neuropathy, Peripheral vascular disease ([address] Podagra, Prolonged emergence from general anesthesia, Spinal stenosis, Type 2 diabetes mellitus ([address] Urethral stricture, and Urgency of urination. Review of Systems   Constitutional:  Positive for activity change and fatigue. Negative for appetite change, chills, diaphoresis, fever and unexpected weight change. HENT: Negative. Respiratory:  Negative for apnea, cough, choking, chest tightness and shortness of breath. Cardiovascular:  Negative for chest pain, palpitations and leg swelling. Gastrointestinal: Negative. Endocrine: Negative. Genitourinary: Negative. Musculoskeletal: Negative. Skin: Negative. Allergic/Immunologic: Negative. Neurological: Negative. Hematological: Negative. Psychiatric/Behavioral: Negative. Vitals:    04/26/21 1314   BP: 138/72   Pulse: 64          Objective:   Physical Exam  Constitutional:       Appearance: She is well-developed. HENT:      Head: Normocephalic and atraumatic. Eyes:      General: No scleral icterus. Right eye: No discharge. Left eye: No discharge. Conjunctiva/sclera: Conjunctivae normal.      Pupils: Pupils are equal, round, and reactive to light. Neck:      Thyroid: No thyromegaly. Trachea: No tracheal deviation. Cardiovascular:      Rate and Rhythm: Normal rate and regular rhythm. Heart sounds: Murmur heard. No friction rub. No gallop. Comments: III/VI HSM  Pulmonary:      Effort: Pulmonary effort is normal. No respiratory distress. Breath sounds: Normal breath sounds. No wheezing or rales.    Abdominal: General: Bowel sounds are normal. There is no distension. Palpations: Abdomen is soft. Tenderness: There is no abdominal tenderness. Musculoskeletal:         General: No tenderness or deformity. Normal range of motion. Cervical back: Normal range of motion and neck supple. Skin:     General: Skin is warm and dry. Coloration: Skin is not pale. Findings: No erythema or rash. Neurological:      Mental Status: She is alert and oriented to person, place, and time. Cranial Nerves: No cranial nerve deficit. Coordination: Coordination normal.   Psychiatric:         Behavior: Behavior normal.         Thought Content: Thought content normal.         Judgment: Judgment normal.       Current Outpatient Medications   Medication Sig Dispense Refill    HYDROcodone-acetaminophen (NORCO) 5-325 MG per tablet Take 1 tablet by mouth every 6 hours as needed for Pain for up to 14 days. Intended supply: 3 days. Take lowest dose possible to manage pain 30 tablet 0    metFORMIN (GLUCOPHAGE) 500 MG tablet TAKE ONE TABLET BY MOUTH DAILY 90 tablet 3    traMADol (ULTRAM) 50 MG tablet Take 25 mg by mouth every 6 hours as needed for Pain. acetaminophen (TYLENOL) 325 MG tablet Take 650 mg by mouth every 6 hours as needed for Pain      glimepiride (AMARYL) 1 MG tablet TAKE ONE TABLET BY MOUTH EVERY MORNING BEFORE BREAKFAST 30 tablet 2    hydrOXYzine (VISTARIL) 25 MG capsule Take 1 capsule by mouth daily as needed for Anxiety 30 capsule 0    atorvastatin (LIPITOR) 10 MG tablet TAKE ONE TABLET BY MOUTH DAILY 30 tablet 3    Lite Touch Lancets MISC Use twice daily when testing blood sugars.  100 each 5    Wound Cleansers (VASHE CLEANSING) SOLN Soak vashe on gauze pad and place on wound for 5-10 minutes 1 each 2    furosemide (LASIX) 20 MG tablet Take 1 tablet by mouth daily 90 tablet 1    blood glucose test strips (ONETOUCH ULTRA) strip USE TO TEST BLOOD SUGAR TWICE DAILY 100 strip 1    Turmeric 450 MG CAPS Take 450 mg by mouth daily      Cholecalciferol (VITAMIN D3) 2000 UNITS CAPS Take 1 capsule by mouth daily      aspirin EC 81 MG EC tablet Take 1 tablet by mouth daily 30 tablet 3    lisinopril (PRINIVIL;ZESTRIL) 20 MG tablet TAKE ONE TABLET BY MOUTH DAILY (Patient not taking: Reported on 7/18/2022) 90 tablet 3     No current facility-administered medications for this visit. Facility-Administered Medications Ordered in Other Visits   Medication Dose Route Frequency Provider Last Rate Last Admin    lidocaine (XYLOCAINE) 4 % external solution   Topical Once Chris Hastings MD             Echo 7/27/15  Conclusions      Summary   Normal left ventricle size, wall thickness and systolic function with an   estimated ejection fraction of 55-60%. No regional wall motion abnormalities are seen. Moderate mitral regurgitation is present. There is moderate tricuspid regurgitation with RVSP estimated at 42 mmHg. Signature      ------------------------------------------------------------------   Electronically signed by Leon Aparicio MD (Interpreting   physician) on 07/27/2015 at 11:38 AM   ------------------------------------------------------------------    Echo 8/23/18  Summary   Concentric left ventricular hypertrophy. The left ventricle appears normal   in size. Normal left ventricular systolic function with an estimated   ejection fraction of 55-60%. No regional wall motion abnormalities are seen. Diastolic filling parameters suggests grade I diastolic dysfunction . Bi-atrial enlargement. Severe mitral regurgitation. Trace aortic insufficiency. Mild pulmonic insufficiency. Moderate tricuspid regurgitation. Estimated pulmonary artery systolic pressure is 32 mmHg assuming a right   atrial pressure of 3 mmHg.       Signature      ------------------------------------------------------------------   Electronically signed by Alexander Baig MD   (Interpreting physician) on 08/23/2018 at 12:30 PM   ------------------------------------------------------------------  Assessment:       Diagnosis Orders   1. Benign essential HTN        2. Mixed hyperlipidemia        3. 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 ECG:  sr with pacs. Will start amiodarone 100 mg daily  Severe MR:  Clinically unchanged. shes taking 10 mg lisinopril PAD stable. Lasix to get rid of swelling and help with sob. Take for just 3 days and see if sob improves. HLP:  Controlled with diet but started on 10 mg atorva for 50% ICA stenosis. Infected wound on left pretibial surface with some improvement in infection with keflex   willl represcribe. Weeping legs with arterial insufficiency by hx. Pacs:  try amio 100 mg 5 days a week.

## 2022-07-18 NOTE — DISCHARGE INSTRUCTIONS
[facility] Physician Orders and Discharge Instructions  [address] [address] [address] [address] [facility] [address]  Telephone: [phone]  NAME:  Jason Mariee  YOB: 1929  MEDICAL RECORD NUMBER:  [id number]  DATE:  @ED@    Return Appointment:  Return Appointment: With Dr. Jearld Osgood  in  [facility])  Schedule weekly for the rest of the month? Yes    [] Return Appointment for a Wound Assessment with the nurse on:     Future Appointments   Date Time Provider Gilbert Frey   7/18/2022  1:00 PM Elvira Deras MD [facility]   7/20/2022  9:45 AM Lenny Bone MD [facility] VASC/ENDO [facility]   7/25/2022  9:15 AM Zarina Arteaga MD [facility]   7/26/2022 10:45 AM DO OCTAVIO [facility] [place] [facility]   8/1/2022  9:15 AM MD [facility]   8/8/2022  9:15 AM Zarina Arteaga MD [address] [place] [address] Road: [address] [place] [address] Ave:  Phone: [phone]  Fax: [phone]   Medically necessary services: [x] Skilled Nursing [] PT (Eval & Treat) [] OT (Eval & Treat) [] Social Work [] Dietician  [] Other:    Wound care instructions: If you smoke we ask that you refrain from smoking. Smoking inhibits wounds from healing. When taking antibiotics take the entire prescription as ordered. Do not stop taking until medication is all gone unless otherwise instructed. Exercise as tolerated. Keep weight off wounds and reposition every 2 hours if applicable. Do not get wounds wet in the bath or shower unless otherwise instructed by your physician. If your wound is on your foot or leg, you may purchase a cast bag. Please ask at the pharmacy. Wash hands with soap and water prior to and after every dressing change.     [x]Wash wounds with: 0.9% normal saline  [x]Nissa wound Topical Treatments: Do not apply lotions, creams, or ointments to the skin around the wound bed unless directed as followed:   Apply around the wound: Nothing         [x]Wound Location: left lower leg   Apply Primary Dressing to wound: Hydrofera Blue pad  Secondary Dressing: 4X4 gauze pad and Conforming roll gauze   Avoid contact of tape with skin if possible. When to change Dressing: 3 times per week:Monday/Wednesday/Friday      [x]Negative Pressure:           Wound Location: left groin  [x]Wound clinic to apply saline wet to dry dressing until home care to place wound vac on. Use No-Sting Barrier film to sherrill wound with each dressing change (DO NOT USE if Dermatac Drape from [employer] is used). Pressure @ 125 mmHg continuous using black foam.     []Apply White foam on exposed bone and/or tunnels:   Change dressing: Monday/Wednesday/Friday  Make sure that tubing is not against the skin by using gauze and/or Kerlix. [x] Edema Control:  Apply: Spandagrip/Medigrip on Left; Medium compression 10-20 mm/Hg. They should be applied to affected leg(s) from mid foot to knee making sure to cover the heel  - left lower leg    Elevate leg(s) above the level of the heart for 30 minutes 4-5 times a day and/or when sitting. Avoid prolonged standing in one place. Dietary:  Important dietary reminders:  1. Increase Protein intake (i.e. Lean meats, fish, eggs, legumes, and yogurt)  2. No added salt  3. If diabetic, follow a diabetic diet and check glucose prior to meals or as instructed by your physician. Dietary Supplements(Take twice a day unless instructed otherwise):  [] Teresia Ally  [] 30ml ProStat [] Roshni Shy [] Ensure Max/Premier [] Other:    Your nurse  is:  Rigoberto Wilkes     Electronically signed by Pratibha Long RN on 7/18/2022 at 10:21 AM     [address] [facility] [address] [facility] [address] Information: Should you experience any significant changes in your wound(s) or have questions about your wound care, please contact the [address] [place] [address] at [phone]. We are open from 8:00am - 4:30p Monday, Thursday and Friday; 11:00am - 5pm on Tuesday and CLOSED Wednesday. Please give us 24-48 business hours to return your call. Call your doctor now or seek immediate medical care if:    You have symptoms of infection, such as: Increased pain, swelling, warmth, or redness. Red streaks leading from the area. Pus draining from the area. A fever.         Physician for this visit and orders:     [] Patient unable to sign Discharge Instructions given to ECF/Transportation/POA

## 2022-07-18 NOTE — PROGRESS NOTES
stricture 5 years ago    Urgency of urination        PAST SURGICAL HISTORY    Past Surgical History:   Procedure Laterality Date    ANGIOPLASTY Left 04/18/2022    Left SFA    APPENDECTOMY      CARPAL TUNNEL RELEASE Right 03/29/2019    RIGHT CARPAL TUNNEL RELEASE AND RIGHT MIDDLE FINGER TRIGGER FINGER RELEASE performed by Zi Booker MD at [place] [address]  [id number]    LEFT EYE EYE CATARACT PHACOEMULSIFICATION INTRAOCULAR LENS    CHOLECYSTECTOMY  01/01/1978    COLONOSCOPY  08/31/1999    diverticulosis    EYE SURGERY Bilateral     bilateral cataract removed    FEMORAL BYPASS Left 6/28/2022    LEFT LEG FEMORAL TO POPLITEAL BYPASS WITH INTRAOPERATIVE ANGIOGRAM performed by Raine Esquivel MD at [facility] 2 Left 02/14/2022    LEFT COMMON FEMORAL ARTERY ENDARTERECTOMY performed by Raine Esquivel MD at [address] [place] [address] (CERVIX STATUS UNKNOWN)  [address] [place] [address]    IR FEMORAL POPLITEAL BYPASS GRAFT Right 08/31/2015    KYPHOSIS SURGERY  11/07/2006    L1, (fractured after a fall)    LEG SURGERY Left 02/14/2022    LEFT LEG WOUND DEBRIDEMENT performed by Raine Esquivel MD at [facility] 19 N/A 7/7/2022    INCISION AND DRAINAGE OF LEFT SIDE GROIN WITH PLACEMENT OF WOUND VAC performed by Raine Esquivel MD at [facility]  04/25/1998    (L) THR    WRIST FRACTURE SURGERY  01/01/2008    left wrist       FAMILY HISTORY    Family History   Problem Relation Age of Onset    Cancer Mother 43    Stroke Father     Hypertension Father        SOCIAL HISTORY    Social History     Tobacco Use    Smoking status: Never    Smokeless tobacco: Never   Vaping Use    Vaping Use: Never used   Substance Use Topics    Alcohol use: No    Drug use: Never       ALLERGIES    Allergies   Allergen Reactions    Aricept [Donepezil Hydrochloride] Other (See Comments)     Pt unable to recall reaction    Doxycycline Nausea Only    Ketorolac Tromethamine Other (See Comments)     Pt unable to recall reaction       MEDICATIONS    Current Outpatient Medications on File Prior to Encounter   Medication Sig Dispense Refill    HYDROcodone-acetaminophen (NORCO) 5-325 MG per tablet Take 1 tablet by mouth every 6 hours as needed for Pain for up to 14 days. Intended supply: 3 days. Take lowest dose possible to manage pain 30 tablet 0    metFORMIN (GLUCOPHAGE) 500 MG tablet TAKE ONE TABLET BY MOUTH DAILY 90 tablet 3    traMADol (ULTRAM) 50 MG tablet Take 25 mg by mouth every 6 hours as needed for Pain. acetaminophen (TYLENOL) 325 MG tablet Take 650 mg by mouth every 6 hours as needed for Pain      glimepiride (AMARYL) 1 MG tablet TAKE ONE TABLET BY MOUTH EVERY MORNING BEFORE BREAKFAST 30 tablet 2    hydrOXYzine (VISTARIL) 25 MG capsule Take 1 capsule by mouth daily as needed for Anxiety (Patient not taking: No sig reported) 30 capsule 0    atorvastatin (LIPITOR) 10 MG tablet TAKE ONE TABLET BY MOUTH DAILY 30 tablet 3    Lite Touch Lancets MISC Use twice daily when testing blood sugars. 100 each 5    Wound Cleansers (VASHE CLEANSING) SOLN Soak vashe on gauze pad and place on wound for 5-10 minutes 1 each 2    lisinopril (PRINIVIL;ZESTRIL) 20 MG tablet TAKE ONE TABLET BY MOUTH DAILY (Patient not taking: No sig reported) 90 tablet 3    furosemide (LASIX) 20 MG tablet Take 1 tablet by mouth daily 90 tablet 1    blood glucose test strips (ONETOUCH ULTRA) strip USE TO TEST BLOOD SUGAR TWICE DAILY 100 strip 1    Turmeric 450 MG CAPS Take 450 mg by mouth daily      Cholecalciferol (VITAMIN D3) 2000 UNITS CAPS Take 1 capsule by mouth daily      aspirin EC 81 MG EC tablet Take 1 tablet by mouth daily 30 tablet 3     No current facility-administered medications on file prior to encounter. REVIEW OF SYSTEMS  Review of Systems    Pertinent items are noted in HPI.     Objective:      BP (!) 170/84   Pulse 75   Temp 96.8 °F (36 °C) (Temporal)   Resp 16     Wt Readings from Last 3 Encounters:   07/13/22 101 lb (45.8 kg)   07/07/22 101 lb (45.8 kg)   07/06/22 101 lb (45.8 kg)       PHYSICAL EXAM    General Appearance: in no acute distress and frail-appearing  Extremities: no cyanosis, no clubbing, 2 + edema-  left lower extremity with full-thickness fibrous wound overlying the left shin containing fibrin minimal slough and granulation tissue designated as wound #1. Periwound dry and intact  Full-thickness wound at recent groin incision site containing fibrin, slough minimal granulation tissue designated as wound #2 palpable left femoral pulse  Assessment:      1. Peripheral artery disease ([address]    2. Wound of left lower extremity, subsequent encounter    3. Wound of left lower extremity, initial encounter         Procedure Note  Indications:  Based on my examination of this patient's wound(s) today, sharp excision is required to promote healing and evaluate the extent healing. Performed by: Evita Canales MD    Consent obtained? Yes    Time out taken: Yes    Pain Control: Anesthetic: 4% Lidocaine Liquid Topical     Debridement:Excisional Debridement    Using curette the wound was sharply debrided    down through and including the removal of  epidermis, dermis, and subcutaneous tissue. Devitalized Tissue Debrided:  fibrin and slough      Pre Debridement Measurements:  Are located in the Wound Documentation Flow Sheet   Wound #: 1 and 2     Post  Debridement Measurements:  Negative Pressure Wound Therapy Groin Left; Anterior (Active)   Wound Type Surgical 07/11/22 0943   Unit Type kci 07/11/22 0943   Dressing Type Black Foam 07/07/22 1024   Cycle Continuous 07/11/22 0943   Target Pressure (mmHg) 125 07/11/22 0943   Dressing Status Clean, dry & intact 07/07/22 1500   Drainage Amount Small 07/07/22 1500   Drainage Description Serosanguinous 07/07/22 1500   Number of days: 11       Wound 11/22/21 Leg Left; Lower;Distal #1 (Active)   Wound Image   07/05/22 1012   Wound Etiology Arterial 11/22/21 0924   Dressing Status Dry; Intact 07/02/22 0800   Wound Cleansed Cleansed with saline 07/18/22 0950   Dressing/Treatment Hydrofera blue 07/18/22 1100   Wound Length (cm) 2.8 cm 07/18/22 0950   Wound Width (cm) 2.4 cm 07/18/22 0950   Wound Depth (cm) 0.1 cm 07/18/22 0950   Wound Surface Area (cm^2) 6.72 cm^2 07/18/22 0950   Change in Wound Size % (l*w) -140 07/18/22 0950   Wound Volume (cm^3) 0.672 cm^3 07/18/22 0950   Wound Healing % -140 07/18/22 0950   Post-Procedure Length (cm) 2.9 cm 07/18/22 1020   Post-Procedure Width (cm) 2.5 cm 07/18/22 1020   Post-Procedure Depth (cm) 0.3 cm 07/18/22 1020   Post-Procedure Surface Area (cm^2) 7.25 cm^2 07/18/22 1020   Post-Procedure Volume (cm^3) 2.175 cm^3 07/18/22 1020   Distance Tunneling (cm) 0 cm 07/18/22 0950   Tunneling Position ___ O'Clock 0 07/11/22 0943   Undermining Starts ___ O'Clock 0 07/11/22 0943   Undermining Ends___ O'Clock 0 07/11/22 0943   Undermining Maxium Distance (cm) 0 07/18/22 0950   Wound Assessment Pink/red;Fibrin 07/18/22 0950   Drainage Amount Small 07/18/22 0950   Drainage Description Serosanguinous 07/18/22 0950   Odor None 07/18/22 0950   Nissa-wound Assessment Intact; Ecchymosis;Edematous 07/18/22 0950   Margins Defined edges 07/18/22 0950   Wound Thickness Description not for Pressure Injury Full thickness 07/18/22 0950   Number of days: 238       Wound 07/11/22 Left #2 groin (Active)   Wound Image   07/11/22 0943   Wound Cleansed Cleansed with saline 07/18/22 0950   Dressing/Treatment Moist to dry 07/18/22 1100   Wound Length (cm) 2.8 cm 07/18/22 0950   Wound Width (cm) 3.5 cm 07/18/22 0950   Wound Depth (cm) 0.5 cm 07/18/22 0950   Wound Surface Area (cm^2) 9.8 cm^2 07/18/22 0950   Change in Wound Size % (l*w) -8.89 07/18/22 0950   Wound Volume (cm^3) 4.9 cm^3 07/18/22 0950   Wound Healing % 64 07/18/22 0950   Post-Procedure Length (cm) 2.9 cm 07/18/22 1020   Post-Procedure Width (cm) 2.6 cm 07/18/22 1020   Post-Procedure Depth (cm) Center Physician Orders and Discharge Instructions  [address] [place] [address] [address]  Telephone: [phone]  NAME:  Eber Aguirre  YOB: 1929  MEDICAL RECORD NUMBER:  [id number]  DATE:  @ED@    Return Appointment:  Return Appointment: With Dr. Piyush Lantigua  in  1 [facility])  Schedule weekly for the rest of the month? Yes    [] Return Appointment for a Wound Assessment with the nurse on:     Future Appointments   Date Time Provider Gilbert Frey   7/18/2022  1:00 PM MD Jovon Almeidawood Card [facility]   7/20/2022  9:45 AM Orlando Monte MD FF VASC/ENDO [facility]   7/25/2022  9:15 AM Marisela Reagan MD [facility] WOUND [facility] HOD   7/26/2022 10:45 AM DO OCTAVIO Campo  [place] - DYD   8/1/2022  9:15 AM [facility]   8/8/2022  9:15 AM Marisela Reagan  [facility] [address]: [address]:  Phone: [phone]  Fax: [phone]   Medically necessary services: [x] Skilled Nursing [] PT (Eval & Treat) [] OT (Eval & Treat) [] Social Work [] Dietician  [] Other:    Wound care instructions: If you smoke we ask that you refrain from smoking. Smoking inhibits wounds from healing. When taking antibiotics take the entire prescription as ordered. Do not stop taking until medication is all gone unless otherwise instructed. Exercise as tolerated. Keep weight off wounds and reposition every 2 hours if applicable. Do not get wounds wet in the bath or shower unless otherwise instructed by your physician. If your wound is on your foot or leg, you may purchase a cast bag. Please ask at the pharmacy. Wash hands with soap and water prior to and after every dressing change.     [x]Wash wounds with: 0.9% normal saline  [x]Nissa wound Topical Treatments: Do not apply lotions, creams, or ointments to the skin around the wound bed unless directed as followed:   Apply around the wound: Nothing         [x]Wound Location: left lower leg   Apply Primary Dressing to wound: Hydrofera Blue pad  Secondary Dressing: 4X4 gauze pad and Conforming roll gauze   Avoid contact of tape with skin if possible. When to change Dressing: 3 times per week:Monday/Wednesday/Friday      [x]Negative Pressure:           Wound Location: left groin  [x]Wound clinic to apply saline wet to dry dressing until home care to place wound vac on. Use No-Sting Barrier film to sherrill wound with each dressing change (DO NOT USE if Dermatac Drape from [employer] is used). Pressure @ 125 mmHg continuous using black foam.     []Apply White foam on exposed bone and/or tunnels:   Change dressing: Monday/Wednesday/Friday  Make sure that tubing is not against the skin by using gauze and/or Kerlix. [x] Edema Control:  Apply: Spandagrip/Medigrip on Left; Medium compression 10-20 mm/Hg. They should be applied to affected leg(s) from mid foot to knee making sure to cover the heel  - left lower leg    Elevate leg(s) above the level of the heart for 30 minutes 4-5 times a day and/or when sitting. Avoid prolonged standing in one place. Dietary:  Important dietary reminders:  1. Increase Protein intake (i.e. Lean meats, fish, eggs, legumes, and yogurt)  2. No added salt  3. If diabetic, follow a diabetic diet and check glucose prior to meals or as instructed by your physician. Dietary Supplements(Take twice a day unless instructed otherwise):  [] Kaila Davalos  [] 30ml ProStat [] Yves Gregg [] Ensure Max/Premier [] Other:    Your nurse  is:  Casey Casarez     Electronically signed by William Moe RN on 7/18/2022 at 10:21 AM     [address] [facility] [address] [facility] [address] Information: Should you experience any significant changes in your wound(s) or have questions about your wound care, please contact the [address] [place] [address] at [phone]. We are open from 8:00am - 4:30p Monday, Thursday and Friday; 11:00am - 5pm on Tuesday and CLOSED Wednesday.  Please give us 24-48 business hours to return your call. Call your doctor now or seek immediate medical care if:    You have symptoms of infection, such as: Increased pain, swelling, warmth, or redness. Red streaks leading from the area. Pus draining from the area. A fever.         Physician for this visit and orders:     [] Patient unable to sign Discharge Instructions given to ECF/Transportation/POA         Electronically signed by Maureen Levin MD on 7/18/2022 at 12:00 PM

## 2022-07-19 ENCOUNTER — CARE COORDINATION (OUTPATIENT)
Dept: CASE MANAGEMENT | Age: 87
End: 2022-07-19

## 2022-07-19 NOTE — CARE COORDINATION
[facility] Trans[facility] Follow Up Call    2022    Patient: Patrica Nunes  Patient : 1929   MRN: [id number]  Reason for Admission:   Discharge Date: 22 RARS: Readmission Risk Score: 19.5 ( )         Spoke with: Patrica Nunes who states she is doing ok. Pt reports pain in operative leg, and foot. States this has been ongoing with no relief. Reports she does have narcotics but does not prefer to use them as they make her \"head feel funny. \" Pt states the pain is tolerable. States wound vac is on and not causing any issues but states \"the wound is not getting any better or any worse. \" Pt does praise PT and feels she is getting stronger. Denies any fever, chills. States her BP, BS, oxygen and temp are all good. Pt able to eat and drink. Cardiology added amiodarone and pt states she has started this. Pt also verbalized she never stopped taking lasix. No questions or concerns at this time. Will continue to follow. Care Transitions Follow Up Call    Needs to be reviewed by the provider   Additional needs identified to be addressed with provider: No  none             Method of communication with provider : none      Care Transition Nurse contacted the patient by telephone to follow up after admission. Verified name and  with patient as identifiers. Addressed changes since last contact: medications-amiodarone, lasix  Discussed follow-up appointments. If no appointment was previously scheduled, appointment scheduling offered: No.   Is follow up appointment scheduled within 7 days of discharge? No.      CTN reviewed discharge instructions, medical action plan and red flags with patient and discussed any barriers to care and/or understanding of plan of care after discharge. Discussed appropriate site of care based on symptoms and resources available to patient including: PCP. The patient agrees to contact the PCP office for questions related to their healthcare.      P  Non-BS follow up appointment(s): n/a    CTN provided contact information for future needs. Plan for follow-up call in 5-7 days based on severity of symptoms and risk factors. Care Transitions Subsequent and Final Call    Subsequent and Final Calls  Do you have any ongoing symptoms?: Yes  Patient-reported symptoms: Pain  Have your medications changed?: No  Do you have any questions related to your medications?: No  Do you currently have any active services?: No  Are you currently active with any services?: Home Health  Do you have any needs or concerns that I can assist you with?: No  Identified Barriers: None  Care Transitions Interventions  Other Interventions:              Follow Up  Future Appointments   Date Time Provider Department Center   7/20/2022  9:45 AM Frankie Gaxiola MD FF VASC/ENDO [facility]   7/25/2022  9:15 AM MD [facility]   7/26/2022 10:45 AM DO OCTAVIO Chen [place] - DYD   8/1/2022  9:15 AM MD [facility]   8/8/2022  9:15 AM MD [facility]   9/12/2022  1:30 PM Gallo Hammer MD [address], RN   Care Transition Nurse  Mobile: [phone]

## 2022-07-20 ENCOUNTER — TELEPHONE (OUTPATIENT)
Dept: VASCULAR SURGERY | Age: 87
End: 2022-07-20

## 2022-07-20 ENCOUNTER — OFFICE VISIT (OUTPATIENT)
Dept: VASCULAR SURGERY | Age: 87
End: 2022-07-20

## 2022-07-20 VITALS
BODY MASS INDEX: 20.03 KG/M2 | DIASTOLIC BLOOD PRESSURE: 60 MMHG | WEIGHT: 102 LBS | SYSTOLIC BLOOD PRESSURE: 110 MMHG | HEIGHT: 60 IN

## 2022-07-20 DIAGNOSIS — T82.7XXA VASCULAR DEVICE, IMPLANT, OR GRAFT INFECTION OR INFLAMMATION, INITIAL ENCOUNTER (HCC): Primary | ICD-10-CM

## 2022-07-20 DIAGNOSIS — T81.31XA POSTOPERATIVE WOUND DEHISCENCE, INITIAL ENCOUNTER: Primary | ICD-10-CM

## 2022-07-20 DIAGNOSIS — I70.299 ATHEROSCLEROSIS OF ARTERY OF EXTREMITY WITH ULCERATION (HCC): ICD-10-CM

## 2022-07-20 DIAGNOSIS — L97.909 ATHEROSCLEROSIS OF ARTERY OF EXTREMITY WITH ULCERATION (HCC): ICD-10-CM

## 2022-07-20 PROCEDURE — 99024 POSTOP FOLLOW-UP VISIT: CPT | Performed by: SURGERY

## 2022-07-20 RX ORDER — CIPROFLOXACIN 500 MG/1
250 TABLET, FILM COATED ORAL 2 TIMES DAILY
Qty: 10 TABLET | Refills: 0 | Status: SHIPPED | OUTPATIENT
Start: 2022-07-20 | End: 2022-07-26 | Stop reason: ALTCHOICE

## 2022-07-20 NOTE — TELEPHONE ENCOUNTER
Spoke to patient and caregiver-   CTA abdomen pelvis w contrast is scheduled for 7/21/22 at 10:00am at [address]:   Arrival time 9:30am  NPO 4 hrs prior to testing (6am)  No pants with zippers, belts or suspenders

## 2022-07-20 NOTE — PROGRESS NOTES
OV S/P I&D L groin lymph leak 13 days ago S/P L femoral replacement + L fem-BKpop bypass 6/28/2022. Tolerated VAC to L groin reasonably. Changed M-W-F. Less leg and foot pain. Sleeping through the night. Has noticed some swelling in the upper thigh (\"banana under skin\"). No redness or drainage. Recently completed oral Cipro several days ago. Seen at [facility]. EXAM:  Palpable graft and L PT pulses    L foot hyperemic and warm    L foot wound healed. Shin wound granulating    L groin wound with purulent draiange after VAC removed - probed deep under inguinal ligament    A/P: S/P L fem replacement + L fem-BKpop bypass. Patent with well perfused foot. S/P I&D L groin. Will need to open wound further. Pt agrees to office opening. ADDENDUM: 1% lidocaine with epinephrine infiltrated along the superior aspect of the open incision up to the inguinal ligament. Hemostat placed along the tract and the skin was opened sharply with a 15 blade knife. Patient tolerated this well. Manual cleaning of the deeper necrotic tissue and irrigation of purulence performed. Culture taken. Deep probing demonstrates visible dacron graft in the base of the wound. No exposed anastomosis. No active bleeding. Area exposed approximately a centimeter. Packed with dry gauze and saline dressing. Discussed the situation in detail with Comfort Ortega. Recommendation was that she be admitted to the hospital for wound care and further evaluation with a CT angiogram.  Real risk of bleeding from her groin site was explained as well. Great concern that the dacron graft is infected and she runs the risk of anastomotic breakdown, bleeding, graft thrombosis and limb loss. After a vigorous discussion she remains adamantly against hospitalization. Her caregiver defered the decision to her. This surgeon has grave concerns that non-hospitalization carries the profound risk of progression to the above-stated complications.   Standard management would involve excision of the graft and extra-anatomic bypass and IV antibiotics. Will resume Cipro p.o. and adjust based upon the cultures obtained today. Spoke with VRN and instructed her to pack the VAC sponge along the newly created tract on top of the graft and placed to suction as usual.  Continue Uerhwn-Rbolbfoms-Vdrdfo VAC changes. Deferred in the situation to the patient making it clear that she assumes all responsibility for untoward events. She is very adamant and comfortable with this decision. RTO 2 weeks or ER if bleeding.

## 2022-07-21 ENCOUNTER — HOSPITAL ENCOUNTER (OUTPATIENT)
Dept: CT IMAGING | Age: 87
Discharge: HOME | End: 2022-07-21
Payer: MEDICARE

## 2022-07-21 DIAGNOSIS — T82.7XXA VASCULAR DEVICE, IMPLANT, OR GRAFT INFECTION OR INFLAMMATION, INITIAL ENCOUNTER (HCC): ICD-10-CM

## 2022-07-21 PROCEDURE — G1010 CDSM STANSON: HCPCS

## 2022-07-21 PROCEDURE — 6360000004 HC RX CONTRAST MEDICATION: Performed by: SURGERY

## 2022-07-21 RX ADMIN — IOPAMIDOL 75 ML: 755 INJECTION, SOLUTION INTRAVENOUS at 10:01

## 2022-07-22 ENCOUNTER — TELEPHONE (OUTPATIENT)
Dept: VASCULAR SURGERY | Age: 87
End: 2022-07-22

## 2022-07-22 RX ORDER — SULFAMETHOXAZOLE AND TRIMETHOPRIM 400; 80 MG/1; MG/1
1 TABLET ORAL 2 TIMES DAILY
Qty: 20 TABLET | Refills: 0 | Status: SHIPPED
Start: 2022-07-22 | End: 2022-07-26 | Stop reason: ALTCHOICE

## 2022-07-22 NOTE — TELEPHONE ENCOUNTER
Patient had a culture and MRI done. Patient and caregiver have questions about the results and medication questions. Please call Ariel Yanes back to advise.

## 2022-07-23 LAB
GRAM STAIN RESULT: ABNORMAL
ORGANISM: ABNORMAL
WOUND/ABSCESS: ABNORMAL

## 2022-07-23 RX ORDER — LINEZOLID 600 MG/1
600 TABLET, FILM COATED ORAL 2 TIMES DAILY
Qty: 28 TABLET | Refills: 0 | Status: SHIPPED | OUTPATIENT
Start: 2022-07-23 | End: 2022-08-01

## 2022-07-25 ENCOUNTER — TELEPHONE (OUTPATIENT)
Dept: WOUND CARE | Age: 87
End: 2022-07-25

## 2022-07-25 ENCOUNTER — HOSPITAL ENCOUNTER (OUTPATIENT)
Dept: WOUND CARE | Age: 87
Discharge: HOME | End: 2022-07-25
Payer: MEDICARE

## 2022-07-25 VITALS — DIASTOLIC BLOOD PRESSURE: 68 MMHG | SYSTOLIC BLOOD PRESSURE: 168 MMHG | TEMPERATURE: 97.5 F | RESPIRATION RATE: 16 BRPM

## 2022-07-25 DIAGNOSIS — I73.9 PERIPHERAL ARTERY DISEASE (HCC): Primary | ICD-10-CM

## 2022-07-25 DIAGNOSIS — S81.802A WOUND OF LEFT LOWER EXTREMITY, INITIAL ENCOUNTER: ICD-10-CM

## 2022-07-25 DIAGNOSIS — S81.802D WOUND OF LEFT LOWER EXTREMITY, SUBSEQUENT ENCOUNTER: ICD-10-CM

## 2022-07-25 PROCEDURE — 6370000000 HC RX 637 (ALT 250 FOR IP): Performed by: SPECIALIST

## 2022-07-25 PROCEDURE — 11042 DBRDMT SUBQ TIS 1ST 20SQCM/<: CPT | Performed by: SPECIALIST

## 2022-07-25 PROCEDURE — 11042 DBRDMT SUBQ TIS 1ST 20SQCM/<: CPT

## 2022-07-25 RX ORDER — BACITRACIN, NEOMYCIN, POLYMYXIN B 400; 3.5; 5 [USP'U]/G; MG/G; [USP'U]/G
OINTMENT TOPICAL ONCE
Status: CANCELLED | OUTPATIENT
Start: 2022-07-25 | End: 2022-07-25

## 2022-07-25 RX ORDER — GENTAMICIN SULFATE 1 MG/G
OINTMENT TOPICAL ONCE
Status: CANCELLED | OUTPATIENT
Start: 2022-07-25 | End: 2022-07-25

## 2022-07-25 RX ORDER — LIDOCAINE HYDROCHLORIDE 40 MG/ML
SOLUTION TOPICAL ONCE
Status: COMPLETED | OUTPATIENT
Start: 2022-07-25 | End: 2022-07-25

## 2022-07-25 RX ORDER — CLOBETASOL PROPIONATE 0.5 MG/G
OINTMENT TOPICAL ONCE
Status: CANCELLED | OUTPATIENT
Start: 2022-07-25 | End: 2022-07-25

## 2022-07-25 RX ORDER — LIDOCAINE 40 MG/G
CREAM TOPICAL ONCE
Status: CANCELLED | OUTPATIENT
Start: 2022-07-25 | End: 2022-07-25

## 2022-07-25 RX ORDER — BLOOD SUGAR DIAGNOSTIC
STRIP MISCELLANEOUS
Qty: 100 STRIP | Refills: 1 | OUTPATIENT
Start: 2022-07-25

## 2022-07-25 RX ORDER — LIDOCAINE HYDROCHLORIDE 40 MG/ML
SOLUTION TOPICAL ONCE
Status: CANCELLED | OUTPATIENT
Start: 2022-07-25 | End: 2022-07-25

## 2022-07-25 RX ORDER — BETAMETHASONE DIPROPIONATE 0.05 %
OINTMENT (GRAM) TOPICAL ONCE
Status: CANCELLED | OUTPATIENT
Start: 2022-07-25 | End: 2022-07-25

## 2022-07-25 RX ORDER — BACITRACIN ZINC AND POLYMYXIN B SULFATE 500; 1000 [USP'U]/G; [USP'U]/G
OINTMENT TOPICAL ONCE
Status: CANCELLED | OUTPATIENT
Start: 2022-07-25 | End: 2022-07-25

## 2022-07-25 RX ORDER — GINSENG 100 MG
CAPSULE ORAL ONCE
Status: CANCELLED | OUTPATIENT
Start: 2022-07-25 | End: 2022-07-25

## 2022-07-25 RX ORDER — LIDOCAINE 50 MG/G
OINTMENT TOPICAL ONCE
Status: CANCELLED | OUTPATIENT
Start: 2022-07-25 | End: 2022-07-25

## 2022-07-25 RX ORDER — LIDOCAINE HYDROCHLORIDE 20 MG/ML
JELLY TOPICAL ONCE
Status: CANCELLED | OUTPATIENT
Start: 2022-07-25 | End: 2022-07-25

## 2022-07-25 RX ORDER — BLOOD SUGAR DIAGNOSTIC
STRIP MISCELLANEOUS
Qty: 100 STRIP | Refills: 1 | Status: SHIPPED | OUTPATIENT
Start: 2022-07-25

## 2022-07-25 RX ADMIN — LIDOCAINE HYDROCHLORIDE: 40 SOLUTION TOPICAL at 09:48

## 2022-07-25 ASSESSMENT — PAIN DESCRIPTION - ORIENTATION: ORIENTATION: LEFT

## 2022-07-25 ASSESSMENT — PAIN DESCRIPTION - PAIN TYPE: TYPE: ACUTE PAIN

## 2022-07-25 ASSESSMENT — PAIN SCALES - GENERAL: PAINLEVEL_OUTOF10: 10

## 2022-07-25 ASSESSMENT — PAIN DESCRIPTION - DESCRIPTORS: DESCRIPTORS: ACHING;THROBBING

## 2022-07-25 ASSESSMENT — PAIN DESCRIPTION - LOCATION: LOCATION: LEG

## 2022-07-25 ASSESSMENT — PAIN DESCRIPTION - FREQUENCY: FREQUENCY: CONTINUOUS

## 2022-07-25 NOTE — DISCHARGE INSTRUCTIONS
[address] [facility] Physician Orders and Discharge Instructions  [address] [place] [address] [address] [address] [facility]. [place] [place]  Telephone: [phone]  NAME:  Kodi Elliott OF BIRTH:  4/4/1929  MEDICAL RECORD NUMBER:  [id number]  DATE: 7/25/22     Return Appointment:  Return Appointment: With Dr. Ivelisse Hall  in  1 [facility])  [] Return Appointment for a Wound Assessment with the nurse on:     Future Appointments   Date Time Provider Gilbert Frey   7/26/2022 10:45 AM Renny Escudero [place] [address] [address] [facility]   8/1/2022  9:15 AM Brown Corey MD [facility] HOD   8/3/2022  8:15 AM Juhi Guajardo MD FF VASC/ENDO [facility]   8/8/2022  9:15 AM Brown Corey  [address] WOUND [facility] HOD   9/12/2022  1:30 PM Anne Hansen MD [address] [place] [address] Avenue: [facility] [address]:  Phone: [phone]  Fax: [phone]   Medically necessary services: [x] Skilled Nursing [] PT (Eval & Treat) [] OT (Eval & Treat) [] Social Work [] Dietician  [] Other:    Wound care instructions: If you smoke we ask that you refrain from smoking. Smoking inhibits wounds from healing. When taking antibiotics take the entire prescription as ordered. Do not stop taking until medication is all gone unless otherwise instructed. Exercise as tolerated. Keep weight off wounds and reposition every 2 hours if applicable. Do not get wounds wet in the bath or shower unless otherwise instructed by your physician. If your wound is on your foot or leg, you may purchase a cast bag. Please ask at the pharmacy. Wash hands with soap and water prior to and after every dressing change. [x]Wash wounds with: Vashe wash - Apply enough Vashe to soak a piece of gauze and place on wound bed for 5-10 minutes. No need to rinse after soaking.   [x]Nissa wound Topical Treatments: Do not apply lotions, creams, or ointments to the skin around the wound bed unless directed as followed:   Apply around the wound: Nothing         [x]Wound Location: left lower leg   Apply Primary Dressing to wound:  microlyte  Secondary Dressing: 4X4 gauze pad, Bulky roll gauze, and Coban   Avoid contact of tape with skin if possible. When to change Dressing: DO NOT CHANGE      [x]Negative Pressure:           Wound Location: left groin ( wound managed by Dr. Juana Cardozo)  [x]Wound clinic to apply saline wet to dry dressing until home care to place wound vac on. Use No-Sting Barrier film to sherrill wound with each dressing change (DO NOT USE if Dermatac Drape from [employer] is used). Pressure @ 125 mmHg continuous using black foam.     []Apply White foam on exposed bone and/or tunnels:   Change dressing: Monday/Wednesday/Friday  Make sure that tubing is not against the skin by using gauze and/or Kerlix. [x] Edema Control:     Elevate leg(s) above the level of the heart for 30 minutes 4-5 times a day and/or when sitting. Avoid prolonged standing in one place. Dietary:  Important dietary reminders:  1. Increase Protein intake (i.e. Lean meats, fish, eggs, legumes, and yogurt)  2. No added salt  3. If diabetic, follow a diabetic diet and check glucose prior to meals or as instructed by your physician. Dietary Supplements(Take twice a day unless instructed otherwise):  [] Teresia Ally  [] 30ml ProStat [] Roshni Shy [] Ensure Max/Premier [] Other:    Your nurse  is:  Rigoberto Wilkes     Electronically signed by Pratibha Long RN on 7/25/2022 at 10:21 AM     [address] [facility] Information: Should you experience any significant changes in your wound(s) or have questions about your wound care, please contact the [address] [place] [address] at [phone]. We are open from 8:00am - 4:30p Monday, Thursday and Friday; 11:00am - 5pm on Tuesday and CLOSED Wednesday. Please give us 24-48 business hours to return your call. Call your doctor now or seek immediate medical care if:    You have symptoms of infection, such as:   Increased pain, swelling, warmth, or redness. Red streaks leading from the area. Pus draining from the area. A fever.          [] Patient unable to sign Discharge Instructions given to ECF/Transportation/POA

## 2022-07-25 NOTE — TELEPHONE ENCOUNTER
Rigoberto Velasco stated moderate amount of blood on left lower leg wound coming through dressings. Home care nurse was there to change wound vac  so cshe change left lower leg dressing. Bleeding stopped and left primary product in place and changed gauze and kerlix.

## 2022-07-25 NOTE — PROGRESS NOTES
[facility] [place]  [place] Note and Procedure Note      Blank Joaquin  MEDICAL RECORD NUMBER:  [id number]  AGE: 80 y.o. GENDER: female  : 1929  EPISODE DATE:  2022    Subjective:     Chief Complaint   Patient presents with    Wound Check     Left lower leg         HISTORY of PRESENT ILLNESS HPI     Blank Joaquin is a 80 y.o. female who presents today for wound/ulcer evaluation. History of Wound Context:Patient returns for continued follow-up of left anterior knee wound. Recently underwent left external iliofemoral bypass with a left femoral below the knee popliteal in situ bypass by Dr. Danielle Fong. Since her last visit here in wound care she was taken back to the operating room on 2022 where the left groin incision was opened to drain a lymphatic leak.   She returns today with a wound VAC in place and managed by Dr. Arthur Cotton Pain Timing/Severity: none  Quality of pain: N/A  Severity:  0 / 10   Modifying Factors: None  Associated Signs/Symptoms: edema    Ulcer Identification:  Ulcer Type: arterial and non-healing surgical    Contributing Factors: edema, venous stasis, and arterial insufficiency    Acute Wound: N/A not an acute wound    PAST MEDICAL HISTORY        Diagnosis Date    Anxiety     Arthritis     At risk for falls     CAD (coronary artery disease)     Cerebral artery occlusion with cerebral infarction (HCC)     TIA--no residual effects    DDD (degenerative disc disease), cervical     Fractures     (L) Hip Fx 98, L1 fracture from fall 10-31-06    Hyperlipidemia     Hypertension     Mitral regurgitation     Neuropathy     Osteopenia     Osteoporosis     PAD (peripheral artery disease) ([facility] [address]     Right LE ischemia    Peripheral neuropathy 2015    Peripheral vascular disease (HCC)     bilateral lower extremities with edema    Podagra 2017    Dr. Merrick Chandra    Prolonged emergence from general anesthesia     Spinal stenosis     L4-5    Type 2 diabetes mellitus [facility])     Urethral stricture 5 years ago    Urgency of urination        PAST SURGICAL HISTORY    Past Surgical History:   Procedure Laterality Date    ANGIOPLASTY Left 04/18/2022    Left SFA    APPENDECTOMY      CARPAL TUNNEL RELEASE Right 03/29/2019    RIGHT CARPAL TUNNEL RELEASE AND RIGHT MIDDLE FINGER TRIGGER FINGER RELEASE performed by Simeon Maharaj MD at [place] [id number]  [id number]    LEFT EYE EYE CATARACT PHACOEMULSIFICATION INTRAOCULAR LENS    CHOLECYSTECTOMY  01/01/1978    COLONOSCOPY  08/31/1999    diverticulosis    EYE SURGERY Bilateral     bilateral cataract removed    FEMORAL BYPASS Left 6/28/2022    LEFT LEG FEMORAL TO POPLITEAL BYPASS WITH INTRAOPERATIVE ANGIOGRAM performed by Kevin Espinosa MD at [facility] 2 Left 02/14/2022    LEFT COMMON FEMORAL ARTERY ENDARTERECTOMY performed by Kevin Espinosa MD at [address] [facility] (CERVIX STATUS UNKNOWN)  [address] [place] [address]    IR FEMORAL POPLITEAL BYPASS GRAFT Right 08/31/2015    KYPHOSIS SURGERY  11/07/2006    L1, (fractured after a fall)    LEG SURGERY Left 02/14/2022    LEFT LEG WOUND DEBRIDEMENT performed by Kevin Espinosa MD at [place] [address] N/A 7/7/2022    INCISION AND DRAINAGE OF LEFT SIDE GROIN WITH PLACEMENT OF WOUND VAC performed by Kevin Espinosa MD at [facility]  04/25/1998    (L) THR    WRIST FRACTURE SURGERY  01/01/2008    left wrist       FAMILY HISTORY    Family History   Problem Relation Age of Onset    Cancer Mother 43    Stroke Father     Hypertension Father        SOCIAL HISTORY    Social History     Tobacco Use    Smoking status: Never    Smokeless tobacco: Never   Vaping Use    Vaping Use: Never used   Substance Use Topics    Alcohol use: No    Drug use: Never       ALLERGIES    Allergies   Allergen Reactions    Aricept [Donepezil Hydrochloride] Other (See Comments)     Pt unable to recall reaction    Doxycycline Nausea Only    Ketorolac Tromethamine Other (See Comments)     Pt unable to recall reaction       MEDICATIONS    Current Outpatient Medications on File Prior to Encounter   Medication Sig Dispense Refill    linezolid (ZYVOX) 600 MG tablet Take 1 tablet by mouth in the morning and 1 tablet before bedtime. Do all this for 14 days. 28 tablet 0    sulfamethoxazole-trimethoprim (BACTRIM) 400-80 MG per tablet Take 1 tablet by mouth in the morning and 1 tablet before bedtime. Do all this for 10 days. (Patient not taking: Reported on 7/25/2022) 20 tablet 0    ciprofloxacin (CIPRO) 500 MG tablet Take 0.5 tablets by mouth in the morning and 0.5 tablets before bedtime. Do all this for 10 days. (Patient not taking: Reported on 7/25/2022) 10 tablet 0    amiodarone (CORDARONE) 200 MG tablet Take 0.5 tablets by mouth in the morning. 30 tablet 3    HYDROcodone-acetaminophen (NORCO) 5-325 MG per tablet Take 1 tablet by mouth every 6 hours as needed for Pain for up to 14 days. Intended supply: 3 days. Take lowest dose possible to manage pain 30 tablet 0    metFORMIN (GLUCOPHAGE) 500 MG tablet TAKE ONE TABLET BY MOUTH DAILY 90 tablet 3    traMADol (ULTRAM) 50 MG tablet Take 25 mg by mouth every 6 hours as needed for Pain. acetaminophen (TYLENOL) 325 MG tablet Take 650 mg by mouth every 6 hours as needed for Pain      glimepiride (AMARYL) 1 MG tablet TAKE ONE TABLET BY MOUTH EVERY MORNING BEFORE BREAKFAST 30 tablet 2    hydrOXYzine (VISTARIL) 25 MG capsule Take 1 capsule by mouth daily as needed for Anxiety 30 capsule 0    atorvastatin (LIPITOR) 10 MG tablet TAKE ONE TABLET BY MOUTH DAILY 30 tablet 3    Lite Touch Lancets MISC Use twice daily when testing blood sugars.  100 each 5    Wound Cleansers (VASHE CLEANSING) SOLN Soak vashe on gauze pad and place on wound for 5-10 minutes 1 each 2    lisinopril (PRINIVIL;ZESTRIL) 20 MG tablet TAKE ONE TABLET BY MOUTH DAILY (Patient not taking: Reported on 7/25/2022) 90 tablet 3    furosemide (LASIX) 20 MG tablet Take 1 tablet by mouth daily 90 tablet 1    blood glucose test strips (ONETOUCH ULTRA) strip USE TO TEST BLOOD SUGAR TWICE DAILY 100 strip 1    Turmeric 450 MG CAPS Take 450 mg by mouth daily      Cholecalciferol (VITAMIN D3) 2000 UNITS CAPS Take 1 capsule by mouth daily      aspirin EC 81 MG EC tablet Take 1 tablet by mouth daily 30 tablet 3     No current facility-administered medications on file prior to encounter. REVIEW OF SYSTEMS  Review of Systems    Pertinent items are noted in HPI. Objective:      BP (!) 168/68   Temp 97.5 °F (36.4 °C) (Temporal)   Resp 16     Wt Readings from Last 3 Encounters:   07/20/22 102 lb (46.3 kg)   07/18/22 102 lb (46.3 kg)   07/13/22 101 lb (45.8 kg)       PHYSICAL EXAM    General Appearance: alert and oriented to person, place and time and in no acute distress  Extremities: no cyanosis, no clubbing, minimal + edema-  left lower extremity with wound VAC in place left groin. Full-thickness wound left anterior knee containing fibrin minimal slough and granulation tissue    Assessment:      1. Peripheral artery disease ([address]    2. Wound of left lower extremity, subsequent encounter    3. Wound of left lower extremity, initial encounter         Procedure Note  Indications:  Based on my examination of this patient's wound(s) today, sharp excision is required to promote healing and evaluate the extent healing. Performed by: Shady Johnson MD    Consent obtained? Yes    Time out taken: Yes    Pain Control: Anesthetic: 4% Lidocaine Liquid Topical     Debridement:Excisional Debridement    Using curette the wound was sharply debrided    down through and including the removal of  epidermis, dermis, and subcutaneous tissue. Devitalized Tissue Debrided:  fibrin and slough      Pre Debridement Measurements:  Are located in the Wound Documentation Flow Sheet   Wound #: 1     Post  Debridement Measurements:  Negative Pressure Wound Therapy Groin Left; Anterior (Active)   Wound Type Surgical 07/11/22 0943   Unit Type kci 07/18/22 1100   Dressing Type Black Foam 07/07/22 1024   Cycle Continuous 07/18/22 1100   Target Pressure (mmHg) 125 07/18/22 1100   Dressing Status Clean, dry & intact 07/07/22 1500   Drainage Amount Small 07/18/22 1100   Drainage Description Serosanguinous 07/18/22 1100   Number of days: 18       Wound 11/22/21 Leg Left; Lower;Distal #1 (Active)   Wound Image   07/05/22 1012   Wound Etiology Arterial 11/22/21 0924   Dressing Status Dry; Intact 07/02/22 0800   Wound Cleansed Cleansed with saline 07/25/22 0944   Dressing/Treatment Other (comment) 07/25/22 1019   Wound Length (cm) 2.5 cm 07/25/22 0944   Wound Width (cm) 2 cm 07/25/22 0944   Wound Depth (cm) 0.1 cm 07/25/22 0944   Wound Surface Area (cm^2) 5 cm^2 07/25/22 0944   Change in Wound Size % (l*w) -78.57 07/25/22 0944   Wound Volume (cm^3) 0.5 cm^3 07/25/22 0944   Wound Healing % -79 07/25/22 0944   Post-Procedure Length (cm) 2.6 cm 07/25/22 1019   Post-Procedure Width (cm) 2.1 cm 07/25/22 1019   Post-Procedure Depth (cm) 0.3 cm 07/25/22 1019   Post-Procedure Surface Area (cm^2) 5.46 cm^2 07/25/22 1019   Post-Procedure Volume (cm^3) 1.638 cm^3 07/25/22 1019   Distance Tunneling (cm) 0 cm 07/18/22 0950   Tunneling Position ___ O'Clock 0 07/11/22 0943   Undermining Starts ___ O'Clock 0 07/11/22 0943   Undermining Ends___ O'Clock 0 07/11/22 0943   Undermining Maxium Distance (cm) 0 07/18/22 0950   Wound Assessment Pink/red;Pale granulation tissue 07/25/22 0944   Drainage Amount Small 07/25/22 0944   Drainage Description Serosanguinous 07/25/22 0944   Odor None 07/25/22 0944   Nissa-wound Assessment Intact 07/25/22 0944   Margins Defined edges 07/25/22 0944   Wound Thickness Description not for Pressure Injury Full thickness 07/25/22 0944   Number of days: 245       Wound 07/11/22 Left #2 groin (Active)   Wound Image   07/11/22 0943   Wound Cleansed Cleansed with saline 07/18/22 0950   Dressing/Treatment Moist to dry 07/18/22 1100   Wound Length (cm) 2.8 cm 07/18/22 0950   Wound Width (cm) 3.5 cm 07/18/22 0950   Wound Depth (cm) 0.5 cm 07/18/22 0950   Wound Surface Area (cm^2) 9.8 cm^2 07/18/22 0950   Change in Wound Size % (l*w) -8.89 07/18/22 0950   Wound Volume (cm^3) 4.9 cm^3 07/18/22 0950   Wound Healing % 64 07/18/22 0950   Post-Procedure Length (cm) 2.9 cm 07/18/22 1020   Post-Procedure Width (cm) 2.6 cm 07/18/22 1020   Post-Procedure Depth (cm) 0.7 cm 07/18/22 1020   Post-Procedure Surface Area (cm^2) 7.54 cm^2 07/18/22 1020   Post-Procedure Volume (cm^3) 5.278 cm^3 07/18/22 1020   Distance Tunneling (cm) 0 cm 07/18/22 0950   Tunneling Position ___ O'Clock 0 07/11/22 0943   Undermining Starts ___ O'Clock 9:00 07/18/22 0950   Undermining Ends___ O'Clock 11;00 07/18/22 0950   Undermining Maxium Distance (cm) 0 07/18/22 0950   Wound Assessment [place]/red;Slough 07/18/22 0950   Drainage Amount Moderate 07/18/22 0950   Drainage Description Serosanguinous 07/18/22 0950   Odor None 07/18/22 0950   Nissa-wound Assessment Fragile 07/18/22 0950   Margins Defined edges 07/18/22 0950   Wound Thickness Description not for Pressure Injury Full thickness 07/18/22 0950   Number of days: 14     Incision 06/28/22 Other (Comment) Medial (Active)   Dressing Status Clean;Dry; Intact 07/02/22 0800   Incision Cleansed Not Cleansed 07/02/22 0800   Drainage Amount Small 07/02/22 0800   Drainage Description Other (Comment) 07/02/22 0800   Number of days: 26       Percent of Wound Debrided: 100%    Total Surface Area Debrided:  5.4 sq cm    Diabetic/Pressure/Non Pressure Ulcers only:  Ulcer: Non-Pressure ulcer, fat layer exposed    Bleeding: Minimal    Hemostasis Achieved: by pressure    Procedural Pain: 0  / 10     Post Procedural Pain: 0 / 10     Response to treatment:  Well tolerated by patient. [place] wound actually slightly improved in measurements.   Groin wound to be managed by Dr. Aurea Sidhu:     Treatment Note: Please see attached Discharge Instructions. These instructions were given and signed by the patient or POA    New Prescriptions    No medications on file       Orders Placed This Encounter   Procedures    Initiate Outpatient Wound Care Protocol       Discharge Instructions          [facility] Physician Orders and Discharge Instructions  [address] [place] [address] [address] [place] [address]  Telephone: [phone]  NAME:  Savannah Guzman OF BIRTH:  4/4/1929  MEDICAL RECORD NUMBER:  [id number]  DATE: 7/25/22     Return Appointment:  Return Appointment: With Dr. Roxana Shipman  in  [facility])  [] Return Appointment for a Wound Assessment with the nurse on:     Future Appointments   Date Time Provider Gilbert Frey   7/26/2022 10:45 [place] [address] [email or web address]   8/1/2022  9:15 AM MD Leonor George \[facility]\""   8/3/2022  8:15 AM Mckay Holland MD FF VASC/ENDO MMA   8/8/2022  9:15 AM Linh Jones  [address] WOUND Spiritism \[facility]\""   9/12/2022  1:30 PM Korina Gaytan MD [address] [place] [address] Avenue: [address] [place] [address] Ave:  Phone: [phone]  Fax: [phone]   Medically necessary services: [x] Skilled Nursing [] PT (Eval & Treat) [] OT (Eval & Treat) [] Social Work [] Dietician  [] Other:    Wound care instructions: If you smoke we ask that you refrain from smoking. Smoking inhibits wounds from healing. When taking antibiotics take the entire prescription as ordered. Do not stop taking until medication is all gone unless otherwise instructed. Exercise as tolerated. Keep weight off wounds and reposition every 2 hours if applicable. Do not get wounds wet in the bath or shower unless otherwise instructed by your physician. If your wound is on your foot or leg, you may purchase a cast bag. Please ask at the pharmacy. Wash hands with soap and water prior to and after every dressing change.     [x]Wash wounds with: Vashe wash - Apply enough Vashe to soak a piece of gauze and place on wound bed for 5-10 minutes. No need to rinse after soaking. [x]Nissa wound Topical Treatments: Do not apply lotions, creams, or ointments to the skin around the wound bed unless directed as followed:   Apply around the wound: Nothing         [x]Wound Location: left lower leg   Apply Primary Dressing to wound:  microlyte  Secondary Dressing: 4X4 gauze pad, Bulky roll gauze, and Coban   Avoid contact of tape with skin if possible. When to change Dressing: DO NOT CHANGE      [x]Negative Pressure:           Wound Location: left groin ( wound managed by Dr. Cathleen Peabody)  [x]Wound clinic to apply saline wet to dry dressing until home care to place wound vac on. Use No-Sting Barrier film to nissa wound with each dressing change (DO NOT USE if Dermatac Drape from [employer] is used). Pressure @ 125 mmHg continuous using black foam.     []Apply White foam on exposed bone and/or tunnels:   Change dressing: Monday/Wednesday/Friday  Make sure that tubing is not against the skin by using gauze and/or Kerlix. [x] Edema Control:     Elevate leg(s) above the level of the heart for 30 minutes 4-5 times a day and/or when sitting. Avoid prolonged standing in one place. Dietary:  Important dietary reminders:  1. Increase Protein intake (i.e. Lean meats, fish, eggs, legumes, and yogurt)  2. No added salt  3. If diabetic, follow a diabetic diet and check glucose prior to meals or as instructed by your physician. Dietary Supplements(Take twice a day unless instructed otherwise):  [] Monnie Mcardle  [] 30ml ProStat [] Hencookie Hazard [] Ensure Max/Premier [] Other:    Your nurse  is:  Shweta Smoker     Electronically signed by Trupti Oscar RN on 7/25/2022 at 10:21 AM     [address] Information: Should you experience any significant changes in your wound(s) or have questions about your wound care, please contact the [address] [place] [address] at [phone].  We are open from 8:00am - 4:30p Monday, Thursday and Friday; 11:00am - 5pm on Tuesday and CLOSED Wednesday. Please give us 24-48 business hours to return your call. Call your doctor now or seek immediate medical care if:    You have symptoms of infection, such as: Increased pain, swelling, warmth, or redness. Red streaks leading from the area. Pus draining from the area. A fever.          [] Patient unable to sign Discharge Instructions given to ECF/Transportation/POA       Electronically signed by Nirmal Leavitt MD on 7/25/2022 at 12:40 PM

## 2022-07-26 ENCOUNTER — OFFICE VISIT (OUTPATIENT)
Dept: INTERNAL MEDICINE CLINIC | Age: 87
End: 2022-07-26
Payer: MEDICARE

## 2022-07-26 ENCOUNTER — TELEPHONE (OUTPATIENT)
Dept: INTERNAL MEDICINE CLINIC | Age: 87
End: 2022-07-26

## 2022-07-26 VITALS
SYSTOLIC BLOOD PRESSURE: 138 MMHG | OXYGEN SATURATION: 98 % | HEART RATE: 79 BPM | DIASTOLIC BLOOD PRESSURE: 62 MMHG | RESPIRATION RATE: 14 BRPM

## 2022-07-26 DIAGNOSIS — E11.59 TYPE 2 DIABETES MELLITUS WITH VASCULAR DISEASE (HCC): ICD-10-CM

## 2022-07-26 DIAGNOSIS — R11.0 NAUSEA: ICD-10-CM

## 2022-07-26 DIAGNOSIS — I87.2 CHRONIC VENOUS INSUFFICIENCY: ICD-10-CM

## 2022-07-26 DIAGNOSIS — L97.922 NON-PRESSURE CHRONIC ULCER OF LEFT LOWER LEG WITH FAT LAYER EXPOSED (HCC): ICD-10-CM

## 2022-07-26 DIAGNOSIS — M79.2 NEUROPATHIC PAIN OF LEFT FOOT: ICD-10-CM

## 2022-07-26 DIAGNOSIS — E11.40 TYPE 2 DIABETES, CONTROLLED, WITH NEUROPATHY (HCC): ICD-10-CM

## 2022-07-26 DIAGNOSIS — I70.1 RAS (RENAL ARTERY STENOSIS) (HCC): ICD-10-CM

## 2022-07-26 DIAGNOSIS — A49.02 MRSA INFECTION: ICD-10-CM

## 2022-07-26 DIAGNOSIS — E78.5 HYPERLIPIDEMIA, UNSPECIFIED HYPERLIPIDEMIA TYPE: ICD-10-CM

## 2022-07-26 DIAGNOSIS — I34.0 SEVERE MITRAL REGURGITATION: ICD-10-CM

## 2022-07-26 DIAGNOSIS — R60.0: ICD-10-CM

## 2022-07-26 DIAGNOSIS — I73.9 PAD (PERIPHERAL ARTERY DISEASE) (HCC): Primary | ICD-10-CM

## 2022-07-26 DIAGNOSIS — I10 BENIGN ESSENTIAL HTN: ICD-10-CM

## 2022-07-26 DIAGNOSIS — T81.49XA POSTOPERATIVE WOUND INFECTION: ICD-10-CM

## 2022-07-26 PROCEDURE — 1090F PRES/ABSN URINE INCON ASSESS: CPT | Performed by: INTERNAL MEDICINE

## 2022-07-26 PROCEDURE — 1111F DSCHRG MED/CURRENT MED MERGE: CPT | Performed by: INTERNAL MEDICINE

## 2022-07-26 PROCEDURE — 99215 OFFICE O/P EST HI 40 MIN: CPT | Performed by: INTERNAL MEDICINE

## 2022-07-26 PROCEDURE — 1123F ACP DISCUSS/DSCN MKR DOCD: CPT | Performed by: INTERNAL MEDICINE

## 2022-07-26 PROCEDURE — 1036F TOBACCO NON-USER: CPT | Performed by: INTERNAL MEDICINE

## 2022-07-26 PROCEDURE — G8420 CALC BMI NORM PARAMETERS: HCPCS | Performed by: INTERNAL MEDICINE

## 2022-07-26 PROCEDURE — G8427 DOCREV CUR MEDS BY ELIG CLIN: HCPCS | Performed by: INTERNAL MEDICINE

## 2022-07-26 PROCEDURE — 3044F HG A1C LEVEL LT 7.0%: CPT | Performed by: INTERNAL MEDICINE

## 2022-07-26 RX ORDER — GABAPENTIN 100 MG/1
100 CAPSULE ORAL NIGHTLY
Qty: 90 CAPSULE | Refills: 0 | Status: SHIPPED | OUTPATIENT
Start: 2022-07-26 | End: 2022-08-29

## 2022-07-26 RX ORDER — ONDANSETRON 4 MG/1
4 TABLET, ORALLY DISINTEGRATING ORAL EVERY 6 HOURS PRN
Qty: 30 TABLET | Refills: 2 | Status: SHIPPED | OUTPATIENT
Start: 2022-07-26 | End: 2022-08-25

## 2022-07-26 ASSESSMENT — PATIENT HEALTH QUESTIONNAIRE - PHQ9
SUM OF ALL RESPONSES TO PHQ QUESTIONS 1-9: 0
SUM OF ALL RESPONSES TO PHQ9 QUESTIONS 1 & 2: 0
2. FEELING DOWN, DEPRESSED OR HOPELESS: 0
SUM OF ALL RESPONSES TO PHQ QUESTIONS 1-9: 0
SUM OF ALL RESPONSES TO PHQ QUESTIONS 1-9: 0
1. LITTLE INTEREST OR PLEASURE IN DOING THINGS: 0
SUM OF ALL RESPONSES TO PHQ QUESTIONS 1-9: 0

## 2022-07-26 NOTE — PROGRESS NOTES
[facility]) Physicians  Internal Medicine  Patient Encounter  Con Schwab D.O., [facility] [place] [facility]        Chief Complaint   Patient presents with    Check-Up    Medication Check       HPI: 80 y.o. female seen today requesting a checkup regarding the status of current issues listed below along with a medication review and reconciliation. Princess Weiss has multiple medical problems including but not limited to type 2 diabetes, mild coronary artery disease, severe mitral regurgitation, hyperlipidemia, hypertension, chronic anxiety, osteoporosis, PAD, gout, lumbar spinal stenosis, venous insufficiency, carotid atherosclerosis/stenosis. The patient has continued to struggle with getting the left lower extremity wound healed. It was determined that she has had clinically significant peripheral arterial disease and has required vascular bypass. She had a left SFA angioplasty on 4/18/2022. Before that on 2/14/2022 she had a left common femoral artery endarterectomy. Then on 6/28/2022 she underwent ileofemoral bypass and a left femoropopliteal bypass. Patient has been undergoing chronic wound management. She has had post-op wound complications. It was thought she had a lymphatic leak. The wound was opened to repair this. Patient was recently started on Zyvox 600 mg twice daily on 7/23/2022 for MRSA positive culture from the surgical wound. As usual, she states she does not tolerate this antibiotics. She was on Bactrim, but this was stopped. The MRSA is sensitive to Bactrim. The lower leg wound is improving. She is seeing Dr. Olvin Taylor yesterday. Wound vac in place left groin. She states the left foot is hurting her. She does have blood flow into the left foot. She has been seen by her cardiologist.  This appointment was on 7/18/2022. She was seen by Lopez Gama. While she was there he added Keflex for what he thought was a wound infection. Patient was found to have PACs on the EKG.   He started her on amiodarone 100 mg daily. Of course the patient called here indicating that she could not tolerate this medication and did not want to take it. He had some Lasix for swelling for a few days. He also wanted her on atorvastatin which she has been resistant to taking as well. Pt confirms she is not taking the Amiodarone. 2 months ago, she woke \"completely paralyzed. \"      Today, she states she was doing better until today and she states it is due to the Zyvox. She does not like how the left leg feels. She states she feelsnauseated with the Zyvox. She had CT abd/pelvis 7/20/2022:  Impression   Postoperative changes from left iliofemoral bypass graft and partially   visualized left fem-pop bypass. Small amount of fluid along the course of   the left iliofemoral bypass graft may represent postoperative seroma. Wound   VAC overlying left groin. Severe diffuse atherosclerosis with suspected high-grade stenosis involving   bilateral renal arteries and celiac artery.              Past Medical History:   Diagnosis Date    Anxiety     Arthritis     At risk for falls     CAD (coronary artery disease)     Cerebral artery occlusion with cerebral infarction (HCC)     TIA--no residual effects    DDD (degenerative disc disease), cervical     Fractures     (L) Hip Fx 4/25/98, L1 fracture from fall 10-31-06    Hyperlipidemia     Hypertension     Mitral regurgitation     Neuropathy     Osteopenia     Osteoporosis     PAD (peripheral artery disease) ([address]     Right LE ischemia    Peripheral neuropathy 6/1/2015    Peripheral vascular disease (HCC)     bilateral lower extremities with edema    Podagra 01/09/2017    Dr. Yasmany Carbajal    Prolonged emergence from general anesthesia     Spinal stenosis     L4-5    Type 2 diabetes mellitus ([address]     Urethral stricture 5 years ago    Urgency of urination          MEDICATIONS:  Prior to Visit Medications    Medication Sig   blood glucose test strips (ONETOUCH ULTRA) strip USE TO TEST BLOOD SUGAR TWICE DAILY   linezolid (ZYVOX) 600 MG tablet Take 1 tablet by mouth in the morning and 1 tablet before bedtime. Do all this for 14 days. amiodarone (CORDARONE) 200 MG tablet Take 0.5 tablets by mouth in the morning. HYDROcodone-acetaminophen (NORCO) 5-325 MG per tablet Take 1 tablet by mouth every 6 hours as needed for Pain for up to 14 days. Intended supply: 3 days. Take lowest dose possible to manage pain   metFORMIN (GLUCOPHAGE) 500 MG tablet TAKE ONE TABLET BY MOUTH DAILY   traMADol (ULTRAM) 50 MG tablet Take 25 mg by mouth every 6 hours as needed for Pain. acetaminophen (TYLENOL) 325 MG tablet Take 650 mg by mouth every 6 hours as needed for Pain   glimepiride (AMARYL) 1 MG tablet TAKE ONE TABLET BY MOUTH EVERY MORNING BEFORE BREAKFAST   hydrOXYzine (VISTARIL) 25 MG capsule Take 1 capsule by mouth daily as needed for Anxiety   atorvastatin (LIPITOR) 10 MG tablet TAKE ONE TABLET BY MOUTH DAILY   Lite Touch Lancets MISC Use twice daily when testing blood sugars. Wound Cleansers (VASHE CLEANSING) SOLN Soak vashe on gauze pad and place on wound for 5-10 minutes   furosemide (LASIX) 20 MG tablet Take 1 tablet by mouth daily   Turmeric 450 MG CAPS Take 450 mg by mouth daily   Cholecalciferol (VITAMIN D3) 2000 UNITS CAPS Take 1 capsule by mouth daily   aspirin EC 81 MG EC tablet Take 1 tablet by mouth daily   sulfamethoxazole-trimethoprim (BACTRIM) 400-80 MG per tablet Take 1 tablet by mouth in the morning and 1 tablet before bedtime. Do all this for 10 days. Patient not taking: No sig reported   lisinopril (PRINIVIL;ZESTRIL) 20 MG tablet TAKE ONE TABLET BY MOUTH DAILY  Patient not taking: No sig reported           Review of Systems - As per HPI      OBJECTIVE:  Vitals:    07/26/22 1051   BP: 138/62   Pulse: 79   Resp: 14   SpO2: 98%     There is no height or weight on file to calculate BMI.      Wt Readings from Last 3 Encounters:   07/20/22 102 lb (46.3 kg)   07/18/22 102 lb (46.3 kg)   07/13/22 101 lb (45.8 kg)     BP Readings from Last 3 Encounters:   07/26/22 138/62   07/25/22 (!) 168/68   07/20/22 110/60        GEN: NAD, A&O, Non-toxic  HEENT: NC/AT, FLACA, EOMI, Oral cavity Clear,  TM's NL, Nasal cavity clear. NECK: Supple. No thyromegaly. No JVD  LYMPH: No C/SC nodes. CV: Reg rhythm, + Ectopy, 3/6 systolic murmur best at left apex. PULM: CTA, symmentric air exchange  EXT: + Left pedal edema. GI: Abdomen is soft, NT, BS+, No hepatomegaly. No masses. NEURO: No focal or lateralizing deficits.  + Allodynia with palpation of the left foot. VASC:  Difficulty palpating left foot pulses. SKIN:  Right medial thigh surgical wound with periwound edema some of which is dependent. ASSESSMENT/PLAN:    1. PAD (peripheral artery disease) (HCC)  Status post iliofemoral and femoral-popliteal bypass  Continue management per vascular    2. FRANCINE (renal artery stenosis) ([address]  Seen on recent CTA  She seems to be asymptomatic with this  Continue to monitor for accelerated blood pressures  Agree with not using ACE or ARB's    3. 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 ([address]  4. Type 2 diabetes, controlled, with neuropathy ([address]  Control uncertain  Repeat lab  - CBC with Auto Differential; Future  - Comprehensive Metabolic Panel; Future  - Lipid Panel; Future  - Hemoglobin A1C; Future  - TSH; Future    5. Edema of periwound skin  Patient with some periwound edema  ? Seroma  On exam it seems more like edema rather than a collection of fluid. In time this should resorb    6. Benign essential HTN  Well controlled even off of lisinopril  Continue to monitor  - CBC with Auto Differential; Future  - Comprehensive Metabolic Panel; Future  - Lipid Panel; Future  - TSH; Future    7. Severe mitral regurgitation  Patient with a very loud murmur  Monitor for congestive heart failure    8. 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  Chronic venous insufficiency  Stable  Continue wound care    10. Hyperlipidemia, unspecified hyperlipidemia type    - Comprehensive Metabolic Panel; Future  - Lipid Panel; Future    11. Neuropathic pain of left foot  Patient with significant tenderness with light touch on the plantar surface of the foot and dorsal portion of the foot  This seems to be some neuropathic pain  The foot is warm  Try her back on gabapentin 100 mg nightly  - gabapentin (NEURONTIN) 100 MG capsule; Take 1 capsule by mouth at bedtime for 90 days. Dispense: 90 capsule; Refill: 0    12. Nausea  Matt Herreradra due to the Zyvox  Encourage patient to continue taking the Zyvox  If needed, she could potentially transition back to Bactrim  Consider ID if not able to take the oral medications  Could also consider IV Vanco  - ondansetron (ZOFRAN-ODT) 4 MG disintegrating tablet; Take 1 tablet by mouth every 6 hours as needed for Nausea or Vomiting  Dispense: 30 tablet; Refill: 2      13. Postop wound infection  MRSA  Continue  Zofran for    On this date 7/26/2022 I have spent 40 minutes reviewing previous notes, test results and face to face with the patient discussing the diagnosis and importance of compliance with the treatment plan as well as documenting on the day of the visit. Discussed medications with patient who voiced understanding of their use, indication and potential side effects. Pt also understands the above recommendations. All questions answered. This note was generated completely or in part utilizing Dragon dictation speech recognition software. Occasionally, words are mistranscribed and despite editing, the text may contain inaccuracies due to incorrect word recognition.   If further clarification is needed please contact the office at [phone]       Electronically signed    Nicholas Tarango D.O.

## 2022-07-27 ENCOUNTER — CARE COORDINATION (OUTPATIENT)
Dept: CASE MANAGEMENT | Age: 87
End: 2022-07-27

## 2022-07-27 NOTE — CARE COORDINATION
[facility] Trans[facility] Follow Up Call    2022    Patient: Jennifer Gan  Patient : [id number]   MRN: [id number]  Reason for Admission: LEFT LEG FEMORAL  [address] ANGIOGRAM  Discharge Date: 22 RARS: Readmission Risk Score: 19.5 ( )         Spoke with: Jennifer Gan who states she is hanging in there. States this past week was a very difficult week for her. She was becoming nauseated with each bite she took. Pt was given zofran and states she has now been able to eat small amounts of food throughout the past two days without issues. Pt had her wound vac changed today and states she is in a significant amount of pain. Taking tylenol as need to control this. States \"everyone is taking real good care of me. \" Pt states her swelling the the leg has gone down. States there is still a \"hard bannana like spot. \" Drs are aware and this was called out in Dr. Govind Mathew note. Denies bleeding to site. States she has not been walking since this last surgery and using the wheel chair to help get around. States she is active with PT. No questions or concerns at this time. Will continue to follow. Care Transitions Follow Up Call    Needs to be reviewed by the provider   Additional needs identified to be addressed with provider: No  none             Method of communication with provider : none      Care Transition Nurse contacted the patient by telephone to follow up after admission. Verified name and  with patient as identifiers. Addressed changes since last contact:  dr stinson, swelling to leg with hard knot  Discussed follow-up appointments. If no appointment was previously scheduled, appointment scheduling offered: No.   Is follow up appointment scheduled within 7 days of discharge? Yes. CTN reviewed discharge instructions, medical action plan and red flags with patient and discussed any barriers to care and/or understanding of plan of care after discharge.  Discussed appropriate site of care based on symptoms and resources available to patient including: PCP  Specialist. The patient agrees to contact the PCP office for questions related to their healthcare. Patients top risk factors for readmission: medical condition-infection, wound not healing        Non-[facility] follow up appointment(s): n/a    CTN provided contact information for future needs. Plan for follow-up call in 7-10 days based on severity of symptoms and risk factors. Plan for next call: symptom management-nausea, pain, healing, wound vac          Care Transitions Subsequent and Final Call    Subsequent and Final Calls  Do you have any ongoing symptoms?: Yes  Patient-reported symptoms: Pain, Weakness  Have your medications changed?: Yes  Patient Reports: zofran, gabapentin  Do you have any questions related to your medications?: No  Do you currently have any active services?: Yes  Are you currently active with any services?: Home Health  Do you have any needs or concerns that I can assist you with?: No  Identified Barriers: None  Care Transitions Interventions  Other Interventions:              Follow Up  Future Appointments   Date Time Provider Gilbert Mauroi   8/1/2022  9:15 AM Marisela Reagan MD [facility]   8/3/2022  8:15 AM Orlando Monte MD  VASC/ENDO [facility]   8/8/2022  9:15 AM Marisela Reagan MD [facility]   8/29/2022 10:45 AM DO OCTAVIO [place]   9/12/2022  1:30 PM Edmundo Kelly MD [facility]       MARITZA Mcnamara, RN   Care Transition Nurse  Mobile: [phone]

## 2022-07-28 ENCOUNTER — TELEPHONE (OUTPATIENT)
Dept: INTERNAL MEDICINE CLINIC | Age: 87
End: 2022-07-28

## 2022-07-28 DIAGNOSIS — E87.1 HYPONATREMIA: Primary | ICD-10-CM

## 2022-07-28 NOTE — TELEPHONE ENCOUNTER
MRSA is from a culture. Hers was a wound culture.   It can get into blood stream.  She does not have that

## 2022-07-28 NOTE — TELEPHONE ENCOUNTER
Patient is calling to know if MRSA /Staph infection would show up in a blood test or does she need a biopsy? Did her recent blood work show anything like that? Please call her at number provided.

## 2022-07-29 ENCOUNTER — TELEPHONE (OUTPATIENT)
Dept: VASCULAR SURGERY | Age: 87
End: 2022-07-29

## 2022-07-29 DIAGNOSIS — T81.31XA POSTOPERATIVE WOUND DEHISCENCE, INITIAL ENCOUNTER: Primary | ICD-10-CM

## 2022-07-29 RX ORDER — GLIMEPIRIDE 1 MG/1
TABLET ORAL
Qty: 30 TABLET | Refills: 2 | Status: SHIPPED | OUTPATIENT
Start: 2022-07-29

## 2022-07-29 RX ORDER — FUROSEMIDE 20 MG/1
TABLET ORAL
Qty: 90 TABLET | Refills: 1 | Status: SHIPPED | OUTPATIENT
Start: 2022-07-29

## 2022-07-29 RX ORDER — SULFAMETHOXAZOLE AND TRIMETHOPRIM 800; 160 MG/1; MG/1
1 TABLET ORAL 2 TIMES DAILY
Qty: 14 TABLET | Refills: 0 | Status: SHIPPED | OUTPATIENT
Start: 2022-07-29 | End: 2022-08-05

## 2022-07-29 NOTE — TELEPHONE ENCOUNTER
Requested Prescriptions     Pending Prescriptions Disp Refills    furosemide (LASIX) 20 MG tablet [Pharmacy Med Name: FUROSEMIDE 20 MG TABLET] 90 tablet 1     Sig: TAKE ONE TABLET BY MOUTH DAILY          Number: 90    Refills: 1    Last Office Visit: 7/18/2022     Next Office Visit: 9/12/2022

## 2022-07-29 NOTE — TELEPHONE ENCOUNTER
Called patient back regarding her shortness of breath. She states that this started when she started taking her Linezolid 600 mg. She started this about 6 days ago or so. Now she has stopped it and says shortness of breath has gotten better. She had a total of 14 days worth. Dr Yunier Ba will look at chart after gets out of surgery and will get back with me.  Isak

## 2022-07-29 NOTE — TELEPHONE ENCOUNTER
LOV 7/26/22  NOV 8/29/202      Last filled # 30 with 2 refills on 4/27/22 7/26/22 1207 3/2/22 1558 4/18/21 0632 8/13/20 0716 3/2/20 1224 10/22/19 1232 7/8/19 1109     Hemoglobin A1C See comment % 5.5  5.6 CM  6.8 CM  6.6 CM  6.2 CM  6.1 CM  6.7 CM

## 2022-08-01 ENCOUNTER — HOSPITAL ENCOUNTER (OUTPATIENT)
Dept: WOUND CARE | Age: 87
Discharge: HOME | End: 2022-08-01
Payer: MEDICARE

## 2022-08-01 VITALS
BODY MASS INDEX: 17.69 KG/M2 | WEIGHT: 93.7 LBS | HEIGHT: 61 IN | HEART RATE: 77 BPM | SYSTOLIC BLOOD PRESSURE: 170 MMHG | TEMPERATURE: 97.4 F | DIASTOLIC BLOOD PRESSURE: 68 MMHG | RESPIRATION RATE: 16 BRPM

## 2022-08-01 DIAGNOSIS — S81.802D WOUND OF LEFT LOWER EXTREMITY, SUBSEQUENT ENCOUNTER: ICD-10-CM

## 2022-08-01 DIAGNOSIS — S81.802A WOUND OF LEFT LOWER EXTREMITY, INITIAL ENCOUNTER: ICD-10-CM

## 2022-08-01 DIAGNOSIS — I73.9 PERIPHERAL ARTERY DISEASE (HCC): Primary | ICD-10-CM

## 2022-08-01 PROCEDURE — 6370000000 HC RX 637 (ALT 250 FOR IP): Performed by: SPECIALIST

## 2022-08-01 PROCEDURE — 11042 DBRDMT SUBQ TIS 1ST 20SQCM/<: CPT

## 2022-08-01 PROCEDURE — 11042 DBRDMT SUBQ TIS 1ST 20SQCM/<: CPT | Performed by: SPECIALIST

## 2022-08-01 RX ORDER — BACITRACIN ZINC AND POLYMYXIN B SULFATE 500; 1000 [USP'U]/G; [USP'U]/G
OINTMENT TOPICAL ONCE
Status: CANCELLED | OUTPATIENT
Start: 2022-08-01 | End: 2022-08-01

## 2022-08-01 RX ORDER — LIDOCAINE HYDROCHLORIDE 20 MG/ML
JELLY TOPICAL ONCE
Status: CANCELLED | OUTPATIENT
Start: 2022-08-01 | End: 2022-08-01

## 2022-08-01 RX ORDER — LIDOCAINE HYDROCHLORIDE 40 MG/ML
SOLUTION TOPICAL ONCE
Status: CANCELLED | OUTPATIENT
Start: 2022-08-01 | End: 2022-08-01

## 2022-08-01 RX ORDER — GINSENG 100 MG
CAPSULE ORAL ONCE
Status: CANCELLED | OUTPATIENT
Start: 2022-08-01 | End: 2022-08-01

## 2022-08-01 RX ORDER — CLOBETASOL PROPIONATE 0.5 MG/G
OINTMENT TOPICAL ONCE
Status: CANCELLED | OUTPATIENT
Start: 2022-08-01 | End: 2022-08-01

## 2022-08-01 RX ORDER — BACITRACIN, NEOMYCIN, POLYMYXIN B 400; 3.5; 5 [USP'U]/G; MG/G; [USP'U]/G
OINTMENT TOPICAL ONCE
Status: CANCELLED | OUTPATIENT
Start: 2022-08-01 | End: 2022-08-01

## 2022-08-01 RX ORDER — BETAMETHASONE DIPROPIONATE 0.05 %
OINTMENT (GRAM) TOPICAL ONCE
Status: CANCELLED | OUTPATIENT
Start: 2022-08-01 | End: 2022-08-01

## 2022-08-01 RX ORDER — LIDOCAINE HYDROCHLORIDE 40 MG/ML
SOLUTION TOPICAL ONCE
Status: COMPLETED | OUTPATIENT
Start: 2022-08-01 | End: 2022-08-01

## 2022-08-01 RX ORDER — LIDOCAINE 40 MG/G
CREAM TOPICAL ONCE
Status: CANCELLED | OUTPATIENT
Start: 2022-08-01 | End: 2022-08-01

## 2022-08-01 RX ORDER — GENTAMICIN SULFATE 1 MG/G
OINTMENT TOPICAL ONCE
Status: CANCELLED | OUTPATIENT
Start: 2022-08-01 | End: 2022-08-01

## 2022-08-01 RX ORDER — LIDOCAINE 50 MG/G
OINTMENT TOPICAL ONCE
Status: CANCELLED | OUTPATIENT
Start: 2022-08-01 | End: 2022-08-01

## 2022-08-01 RX ADMIN — LIDOCAINE HYDROCHLORIDE: 40 SOLUTION TOPICAL at 09:54

## 2022-08-01 NOTE — PROGRESS NOTES
1227 [facility]  Progress Note and Procedure Note      Sally Cortes  MEDICAL RECORD NUMBER:  [id number]  AGE: 80 y.o. GENDER: female  : 1929  EPISODE DATE:  2022    Subjective:     Chief Complaint   Patient presents with    Wound Check     Left lower leg           HISTORY of PRESENT ILLNESS HPI     Sally Cortes is a 80 y.o. female who presents today for wound/ulcer evaluation. History of Wound Context: Patient returns for continued follow-up of left anterior knee wound. Recently underwent left external iliofemoral bypass with a left femoral below the knee popliteal in situ bypass by Dr. Megan Ramon. Since her last visit here in wound care she was taken back to the operating room on 2022 where the left groin incision was opened to drain a lymphatic leak. She returns today with a wound VAC in place and managed by Dr. Megan Ramon. States she is taking her Zyvox is to keep her leg elevated.   States she is not having any pain from her knee wound  Wound/Ulcer Pain Timing/Severity: none  Quality of pain: N/A  Severity:  0 / 10   Modifying Factors: None  Associated Signs/Symptoms: edema    Ulcer Identification:  Ulcer Type: arterial and non-healing surgical    Contributing Factors: edema, venous stasis, diabetes, and arterial insufficiency    Acute Wound: N/A not an acute wound    PAST MEDICAL HISTORY        Diagnosis Date    Anxiety     Arthritis     At risk for falls     CAD (coronary artery disease)     Cerebral artery occlusion with cerebral infarction (HCC)     TIA--no residual effects    DDD (degenerative disc disease), cervical     Fractures     (L) Hip Fx 98, L1 fracture from fall 10-31-06    Hyperlipidemia     Hypertension     Mitral regurgitation     Neuropathy     Osteopenia     Osteoporosis     PAD (peripheral artery disease) ([facility]     Right LE ischemia    Peripheral neuropathy 2015    Peripheral vascular disease (HCC)     bilateral lower extremities with edema    Podagra 01/09/2017    Dr. Rj Kat    Prolonged emergence from general anesthesia     Spinal stenosis     L4-5    Type 2 diabetes mellitus ([address]     Urethral stricture 5 years ago    Urgency of urination        PAST SURGICAL HISTORY    Past Surgical History:   Procedure Laterality Date    ANGIOPLASTY Left 04/18/2022    Left SFA    APPENDECTOMY      CARPAL TUNNEL RELEASE Right 03/29/2019    RIGHT CARPAL TUNNEL RELEASE AND RIGHT MIDDLE FINGER TRIGGER FINGER RELEASE performed by Rukhsana Lanza MD at [place] [address]  [id number]    LEFT EYE EYE CATARACT PHACOEMULSIFICATION INTRAOCULAR LENS    CHOLECYSTECTOMY  01/01/1978    COLONOSCOPY  08/31/1999    diverticulosis    EYE SURGERY Bilateral     bilateral cataract removed    FEMORAL BYPASS Left 6/28/2022    LEFT LEG FEMORAL TO POPLITEAL BYPASS WITH INTRAOPERATIVE ANGIOGRAM performed by Jorje Aviles MD at [place] [address] Left 02/14/2022    LEFT COMMON FEMORAL ARTERY ENDARTERECTOMY performed by Jorje Aviles MD at [facility] (CERVIX STATUS UNKNOWN)  [facility]    sallie    IR FEMORAL POPLITEAL BYPASS GRAFT Right 08/31/2015    KYPHOSIS SURGERY  11/07/2006    L1, (fractured after a fall)    LEG SURGERY Left 02/14/2022    LEFT LEG WOUND DEBRIDEMENT performed by Jorje Aviles MD at [place] [address] N/A 7/7/2022    INCISION AND DRAINAGE OF LEFT SIDE GROIN WITH PLACEMENT OF WOUND VAC performed by Jorje Aviles MD at [address] [place] [address]  04/25/1998    (L) THR    WRIST FRACTURE SURGERY  01/01/2008    left wrist       FAMILY HISTORY    Family History   Problem Relation Age of Onset    Cancer Mother 43    Stroke Father     Hypertension Father        SOCIAL HISTORY    Social History     Tobacco Use    Smoking status: Never    Smokeless tobacco: Never   Vaping Use    Vaping Use: Never used   Substance Use Topics    Alcohol use: No    Drug use: Never       ALLERGIES    Allergies   Allergen Reactions Aricept [Donepezil Hydrochloride] Other (See Comments)     Pt unable to recall reaction    Doxycycline Nausea Only    Ketorolac Tromethamine Other (See Comments)     Pt unable to recall reaction       MEDICATIONS    Current Outpatient Medications on File Prior to Encounter   Medication Sig Dispense Refill    Probiotic Product (CULTURELLE PROBIOTICS PO) Take 1 tablet by mouth daily      furosemide (LASIX) 20 MG tablet TAKE ONE TABLET BY MOUTH DAILY 90 tablet 1    glimepiride (AMARYL) 1 MG tablet TAKE ONE TABLET BY MOUTH EVERY MORNING BEFORE BREAKFAST 30 tablet 2    sulfamethoxazole-trimethoprim (BACTRIM DS;SEPTRA DS) 800-160 MG per tablet Take 1 tablet by mouth in the morning and 1 tablet before bedtime. Do all this for 7 days. 14 tablet 0    gabapentin (NEURONTIN) 100 MG capsule Take 1 capsule by mouth at bedtime for 90 days. 90 capsule 0    ondansetron (ZOFRAN-ODT) 4 MG disintegrating tablet Take 1 tablet by mouth every 6 hours as needed for Nausea or Vomiting (Patient not taking: Reported on 8/1/2022) 30 tablet 2    blood glucose test strips (ONETOUCH ULTRA) strip USE TO TEST BLOOD SUGAR TWICE DAILY 100 strip 1    metFORMIN (GLUCOPHAGE) 500 MG tablet TAKE ONE TABLET BY MOUTH DAILY 90 tablet 3    traMADol (ULTRAM) 50 MG tablet Take 25 mg by mouth every 6 hours as needed for Pain. acetaminophen (TYLENOL) 325 MG tablet Take 650 mg by mouth every 6 hours as needed for Pain      hydrOXYzine (VISTARIL) 25 MG capsule Take 1 capsule by mouth daily as needed for Anxiety (Patient not taking: Reported on 8/1/2022) 30 capsule 0    atorvastatin (LIPITOR) 10 MG tablet TAKE ONE TABLET BY MOUTH DAILY 30 tablet 3    Lite Touch Lancets MISC Use twice daily when testing blood sugars.  100 each 5    Wound Cleansers (VASHE CLEANSING) SOLN Soak vashe on gauze pad and place on wound for 5-10 minutes (Patient not taking: Reported on 8/1/2022) 1 each 2    lisinopril (PRINIVIL;ZESTRIL) 20 MG tablet TAKE ONE TABLET BY MOUTH DAILY (Patient not taking: No sig reported) 90 tablet 3    Turmeric 450 MG CAPS Take 450 mg by mouth daily      Cholecalciferol (VITAMIN D3) 2000 UNITS CAPS Take 1 capsule by mouth daily      aspirin EC 81 MG EC tablet Take 1 tablet by mouth daily 30 tablet 3     No current facility-administered medications on file prior to encounter. REVIEW OF SYSTEMS  Review of Systems    Pertinent items are noted in HPI. Objective:      BP (!) 170/68   Pulse 77   Temp 97.4 °F (36.3 °C) (Temporal)   Resp 16   Ht 5' 0.5\" (1.537 m)   Wt 93 lb 11.1 oz (42.5 kg)   BMI 18.00 kg/m²     Wt Readings from Last 3 Encounters:   08/01/22 93 lb 11.1 oz (42.5 kg)   07/20/22 102 lb (46.3 kg)   07/18/22 102 lb (46.3 kg)       PHYSICAL EXAM    General Appearance: alert and oriented to person, place and time  Extremities: VAC present left groin. Full-thickness wound left anterior knee containing fibrin, minimal slough and significant granulation tissue and minimal epithelialization at the margins of the wound designated as wound #1. Periwound dry with hemosiderin staining and presence of old blood. There is persistent mild edema left lower extremity      Assessment:      1. Peripheral artery disease ([address]    2. Wound of left lower extremity, subsequent encounter    3. Wound of left lower extremity, initial encounter         Procedure Note  Indications:  Based on my examination of this patient's wound(s) today, sharp excision is required to promote healing and evaluate the extent healing. Performed by: Nirmal Leavitt MD    Consent obtained? Yes    Time out taken: Yes    Pain Control: Anesthetic: 4% Lidocaine Cream     Debridement:Excisional Debridement    Using curette the wound was sharply debrided    down through and including the removal of  epidermis, dermis, and subcutaneous tissue.     Devitalized Tissue Debrided:  fibrin and slough      Pre Debridement Measurements:  Are located in the Wound Documentation Flow Sheet   Wound #: 1     Post  Debridement Measurements:  Negative Pressure Wound Therapy Groin Left; Anterior (Active)   Wound Type Surgical 07/11/22 0943   Unit Type kci 07/18/22 1100   Dressing Type Black Foam 07/07/22 1024   Cycle Continuous 07/18/22 1100   Target Pressure (mmHg) 125 07/18/22 1100   Dressing Status Clean, dry & intact 07/07/22 1500   Drainage Amount Small 07/18/22 1100   Drainage Description Serosanguinous 07/18/22 1100   Number of days: 25       Wound 11/22/21 Leg Left; Lower;Distal #1 (Active)   Wound Image   08/01/22 0938   Wound Etiology Arterial 11/22/21 0924   Dressing Status Dry; Intact 07/02/22 0800   Wound Cleansed Cleansed with saline 08/01/22 0938   Dressing/Treatment Other (comment) 07/25/22 1019   Wound Length (cm) 2.3 cm 08/01/22 0938   Wound Width (cm) 2.1 cm 08/01/22 0938   Wound Depth (cm) 0.1 cm 08/01/22 0938   Wound Surface Area (cm^2) 4.83 cm^2 08/01/22 0938   Change in Wound Size % (l*w) -72.5 08/01/22 0938   Wound Volume (cm^3) 0.483 cm^3 08/01/22 0938   Wound Healing % -73 08/01/22 0938   Post-Procedure Length (cm) 2.4 cm 08/01/22 1013   Post-Procedure Width (cm) 2.2 cm 08/01/22 1013   Post-Procedure Depth (cm) 0.3 cm 08/01/22 1013   Post-Procedure Surface Area (cm^2) 5.28 cm^2 08/01/22 1013   Post-Procedure Volume (cm^3) 1.584 cm^3 08/01/22 1013   Distance Tunneling (cm) 0 cm 08/01/22 0938   Tunneling Position ___ O'Clock 0 07/11/22 0943   Undermining Starts ___ O'Clock 0 07/11/22 0943   Undermining Ends___ O'Clock 0 07/11/22 0943   Undermining Maxium Distance (cm) 0 08/01/22 0938   Wound Assessment Pink/red;Fibrin 08/01/22 0938   Drainage Amount Small 08/01/22 0938   Drainage Description Black; Serosanguinous 08/01/22 0938   Odor None 08/01/22 0938   Nissa-wound Assessment Intact;Fragile 08/01/22 0938   Margins Defined edges 08/01/22 0938   Wound Thickness Description not for Pressure Injury Full thickness 08/01/22 0938   Number of days: 252       Wound 07/11/22 Left #2 groin (Active)   Wound piece of gauze and place on wound bed for 5-10 minutes. No need to rinse after soaking. [x]Nissa wound Topical Treatments: Do not apply lotions, creams, or ointments to the skin around the wound bed unless directed as followed:  Apply around the wound: Nothing                    [x]Wound Location: Left Lower Leg    Apply Primary Dressing to wound:  microlyte  Secondary Dressing: 4X4 gauze pad, Bulky roll gauze, and Coban              Avoid contact of tape with skin if possible. When to change Dressing: DO NOT CHANGE        [x]Negative Pressure:           Wound Location: left groin ( wound managed by Dr. Deion Ballesteros)  [x]Wound clinic to apply saline wet to dry dressing until home care to place wound vac on. Use No-Sting Barrier film to nissa wound with each dressing change (DO NOT USE if Dermatac Drape from [employer] is used). Pressure @ 125 mmHg continuous using black foam.                       []Apply White foam on exposed bone and/or tunnels:     Change dressing: Monday/Wednesday/Friday  Make sure that tubing is not against the skin by using gauze and/or Kerlix. [x] Edema Control:                Elevate leg(s) above the level of the heart for 30 minutes 4-5 times a day and/or when sitting. Avoid prolonged standing in one place    Dietary:  Important dietary reminders:  1. Increase Protein intake (i.e. Lean meats, fish, eggs, legumes, and yogurt)  2. No added salt  3. If diabetic, follow a diabetic diet and check glucose prior to meals or as instructed by your physician.     Dietary Supplements(Take twice a day unless instructed otherwise):  [] Claudette Person  [] 30ml ProStat [] Olestarra Rosendo [] Ensure Max/Premier [] Other:    Your nurse  is:  Svetlana Rivera     Electronically signed by Destiny Guerra RN on 8/1/2022 at 10:18 AM     [address] [facility] [address] Information: Should you experience any significant changes in your wound(s) or have questions about your wound care, please contact the [facility]

## 2022-08-01 NOTE — DISCHARGE INSTRUCTIONS
215 [facility] Physician Orders and Discharge Instructions  [address] [place] [address] LEEANN Manley  Telephone: [phone]  NAME:  Faythe Lesch OF BIRTH:  4/4/1929  MEDICAL RECORD NUMBER:  [id number]  DATE: 8/1/22     Return Appointment:  Return Appointment: With Dr. Karla Patel  in  [facility])  [] Return Appointment for a Wound Assessment with the nurse on:     Future Appointments   Date Time Provider Gilbert Frey   8/3/2022  8:15 AM Masoud Mcarthur MD FF VASC/ENDO MMA   8/8/2022  9:15 AM Ignacio Mancini MD [facility] WOUND [facility] HOD   8/29/2022 10:45 AM DO OCTAVIO Novak [place] - DYD   9/12/2022  1:30 PM Elvira Wills MD [address] [place] [address] Avenue: [address] [place] [address] Ave:  Phone: [phone]  Fax: [phone]  Medically necessary services:        [x] Skilled Nursing       [] PT (Eval & Treat)   [] OT (Eval & Treat)       [] Social Work           [] Dietician  [] Other:     Wound care instructions: If you smoke we ask that you refrain from smoking. Smoking inhibits wounds from healing. When taking antibiotics take the entire prescription as ordered. Do not stop taking until medication is all gone unless otherwise instructed. Exercise as tolerated. Keep weight off wounds and reposition every 2 hours if applicable. Do not get wounds wet in the bath or shower unless otherwise instructed by your physician. If your wound is on your foot or leg, you may purchase a cast bag. Please ask at the pharmacy. Wash hands with soap and water prior to and after every dressing change. [x]Wash wounds with: Vashe wash - Apply enough Vashe to soak a piece of gauze and place on wound bed for 5-10 minutes. No need to rinse after soaking.   [x]Nissa wound Topical Treatments: Do not apply lotions, creams, or ointments to the skin around the wound bed unless directed as followed:  Apply around the wound: Nothing [x]Wound Location: Left Lower Leg    Apply Primary Dressing to wound:  microlyte  Secondary Dressing: 4X4 gauze pad, Bulky roll gauze, and Coban              Avoid contact of tape with skin if possible. When to change Dressing: DO NOT CHANGE        [x]Negative Pressure:           Wound Location: left groin ( wound managed by Dr. Ector Acevedo)  [x]Wound clinic to apply saline wet to dry dressing until home care to place wound vac on. Use No-Sting Barrier film to sherrill wound with each dressing change (DO NOT USE if Dermatac Drape from [employer] is used). Pressure @ 125 mmHg continuous using black foam.                       []Apply White foam on exposed bone and/or tunnels:     Change dressing: Monday/Wednesday/Friday  Make sure that tubing is not against the skin by using gauze and/or Kerlix. [x] Edema Control:                Elevate leg(s) above the level of the heart for 30 minutes 4-5 times a day and/or when sitting. Avoid prolonged standing in one place    Dietary:  Important dietary reminders:  1. Increase Protein intake (i.e. Lean meats, fish, eggs, legumes, and yogurt)  2. No added salt  3. If diabetic, follow a diabetic diet and check glucose prior to meals or as instructed by your physician. Dietary Supplements(Take twice a day unless instructed otherwise):  [] Helayne Rye  [] 30ml ProStat [] Humble Ball [] Ensure Max/Premier [] Other:    Your nurse  is:  Cece Graham     Electronically signed by Rosalie Don RN on 8/1/2022 at 10:18 AM     215 [facility] Information: Should you experience any significant changes in your wound(s) or have questions about your wound care, please contact the [address] [place] [address] at [phone]. We are open from 8:00am - 4:30p Monday, Thursday and Friday; 11:00am - 5pm on Tuesday and CLOSED Wednesday. Please give us 24-48 business hours to return your call.       Call your doctor now or seek immediate medical care if:    You have symptoms of infection, such as: Increased pain, swelling, warmth, or redness. Red streaks leading from the area. Pus draining from the area. A fever.          [] Patient unable to sign Discharge Instructions given to ECF/Transportation/POA

## 2022-08-03 ENCOUNTER — OFFICE VISIT (OUTPATIENT)
Dept: VASCULAR SURGERY | Age: 87
End: 2022-08-03

## 2022-08-03 ENCOUNTER — CARE COORDINATION (OUTPATIENT)
Dept: CASE MANAGEMENT | Age: 87
End: 2022-08-03

## 2022-08-03 VITALS
BODY MASS INDEX: 18.26 KG/M2 | HEIGHT: 60 IN | DIASTOLIC BLOOD PRESSURE: 60 MMHG | SYSTOLIC BLOOD PRESSURE: 130 MMHG | WEIGHT: 93 LBS

## 2022-08-03 DIAGNOSIS — T81.31XA POSTOPERATIVE WOUND DEHISCENCE, INITIAL ENCOUNTER: Primary | ICD-10-CM

## 2022-08-03 PROCEDURE — 99024 POSTOP FOLLOW-UP VISIT: CPT | Performed by: SURGERY

## 2022-08-03 NOTE — PROGRESS NOTES
First OV since office I&D of L groin infection. Recommended admission but refused. Has been on VAC and po antibx for MRSA (Bactrim to Zyvox -poorly tolerated due to SOB and diarrhea - now back on Bactrim DS per [facility]). Feeling better overall. Apligraf at [facility]. EXAM:  L groin wound - excellent granulation, sealed deep without graft exposure; no erythema or purulence (7x2x1. 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 Continue VAC   Continue Bactrim - will change to lower dose for age after current dose completed. F/U 2 weeks.

## 2022-08-03 NOTE — CARE COORDINATION
[facility] Transitions Follow Up Call    8/3/2022    Patient: Shan Wilhelm  Patient : 1929   MRN: [id number]  Reason for Admission: LEFT LEG FEMORAL TO POPLITEAL BYPASS WITH INTRAOPERATIVE ANGIOGRAM  Discharge Date: 22 RARS: Readmission Risk Score: 19.5 ( )         Spoke with: no one    Care Transitions Subsequent and Final Call    Subsequent and Final Calls  Are you currently active with any services?: Home Health  Care Transitions Interventions  Other Interventions: Follow Up  Future Appointments   Date Time Provider Gilbert Frey   2022  9:15 AM MD Angeles [facility] WOUND [facility] HOD   2022  8:45 AM Anuradha Person MD FF VASC/ENDO MMA   2022 10:45 AM DO OCTAVIO Danielson [place] - DYD   2022  1:30 PM Martín Leigh MD [facility]       This is pt final outreach call attempt, no answer. CTN left  with contact information and request for return call. CTN will resolve episode and remain available. Pt had a follow up this morning with Dr. Lona Angelo. Will hand off to [facility] at this time.      COLE KelleyN, RN   Care Transition Nurse  Mobile: [phone]

## 2022-08-04 ENCOUNTER — TELEPHONE (OUTPATIENT)
Dept: INTERNAL MEDICINE CLINIC | Age: 87
End: 2022-08-04

## 2022-08-04 DIAGNOSIS — E87.1 HYPONATREMIA: Primary | ICD-10-CM

## 2022-08-04 DIAGNOSIS — I10 BENIGN ESSENTIAL HTN: ICD-10-CM

## 2022-08-04 NOTE — TELEPHONE ENCOUNTER
Patient would like to have her Sodium checked again this coming Monday and is requesting an Order please be generated. She states her sodium was low and she'd like it re-checked. Patient can be reached @ phone # provided should there be any questions.

## 2022-08-08 ENCOUNTER — HOSPITAL ENCOUNTER (OUTPATIENT)
Dept: WOUND CARE | Age: 87
Discharge: HOME | End: 2022-08-08
Payer: MEDICARE

## 2022-08-08 VITALS
RESPIRATION RATE: 16 BRPM | HEART RATE: 75 BPM | SYSTOLIC BLOOD PRESSURE: 156 MMHG | TEMPERATURE: 96.8 F | DIASTOLIC BLOOD PRESSURE: 69 MMHG

## 2022-08-08 DIAGNOSIS — S81.802D WOUND OF LEFT LOWER EXTREMITY, SUBSEQUENT ENCOUNTER: ICD-10-CM

## 2022-08-08 DIAGNOSIS — I73.9 PERIPHERAL ARTERY DISEASE (HCC): Primary | ICD-10-CM

## 2022-08-08 DIAGNOSIS — S81.802A WOUND OF LEFT LOWER EXTREMITY, INITIAL ENCOUNTER: ICD-10-CM

## 2022-08-08 LAB
FUNGUS (MYCOLOGY) CULTURE: NORMAL
FUNGUS STAIN: NORMAL

## 2022-08-08 PROCEDURE — 11042 DBRDMT SUBQ TIS 1ST 20SQCM/<: CPT | Performed by: SPECIALIST

## 2022-08-08 PROCEDURE — 6370000000 HC RX 637 (ALT 250 FOR IP): Performed by: SPECIALIST

## 2022-08-08 PROCEDURE — 11042 DBRDMT SUBQ TIS 1ST 20SQCM/<: CPT

## 2022-08-08 RX ORDER — LIDOCAINE HYDROCHLORIDE 20 MG/ML
JELLY TOPICAL ONCE
Status: CANCELLED | OUTPATIENT
Start: 2022-08-08 | End: 2022-08-08

## 2022-08-08 RX ORDER — BACITRACIN, NEOMYCIN, POLYMYXIN B 400; 3.5; 5 [USP'U]/G; MG/G; [USP'U]/G
OINTMENT TOPICAL ONCE
Status: CANCELLED | OUTPATIENT
Start: 2022-08-08 | End: 2022-08-08

## 2022-08-08 RX ORDER — GENTAMICIN SULFATE 1 MG/G
OINTMENT TOPICAL ONCE
Status: CANCELLED | OUTPATIENT
Start: 2022-08-08 | End: 2022-08-08

## 2022-08-08 RX ORDER — BETAMETHASONE DIPROPIONATE 0.05 %
OINTMENT (GRAM) TOPICAL ONCE
Status: CANCELLED | OUTPATIENT
Start: 2022-08-08 | End: 2022-08-08

## 2022-08-08 RX ORDER — LIDOCAINE HYDROCHLORIDE 40 MG/ML
SOLUTION TOPICAL ONCE
Status: DISCONTINUED | OUTPATIENT
Start: 2022-08-08 | End: 2022-08-09 | Stop reason: HOSPADM

## 2022-08-08 RX ORDER — LIDOCAINE 40 MG/G
CREAM TOPICAL ONCE
Status: CANCELLED | OUTPATIENT
Start: 2022-08-08 | End: 2022-08-08

## 2022-08-08 RX ORDER — LIDOCAINE 50 MG/G
OINTMENT TOPICAL ONCE
Status: CANCELLED | OUTPATIENT
Start: 2022-08-08 | End: 2022-08-08

## 2022-08-08 RX ORDER — GINSENG 100 MG
CAPSULE ORAL ONCE
Status: CANCELLED | OUTPATIENT
Start: 2022-08-08 | End: 2022-08-08

## 2022-08-08 RX ORDER — LIDOCAINE HYDROCHLORIDE 40 MG/ML
SOLUTION TOPICAL ONCE
Status: CANCELLED | OUTPATIENT
Start: 2022-08-08 | End: 2022-08-08

## 2022-08-08 RX ORDER — BACITRACIN ZINC AND POLYMYXIN B SULFATE 500; 1000 [USP'U]/G; [USP'U]/G
OINTMENT TOPICAL ONCE
Status: CANCELLED | OUTPATIENT
Start: 2022-08-08 | End: 2022-08-08

## 2022-08-08 RX ORDER — LIDOCAINE 50 MG/G
OINTMENT TOPICAL ONCE
Status: COMPLETED | OUTPATIENT
Start: 2022-08-08 | End: 2022-08-08

## 2022-08-08 RX ORDER — CLOBETASOL PROPIONATE 0.5 MG/G
OINTMENT TOPICAL ONCE
Status: CANCELLED | OUTPATIENT
Start: 2022-08-08 | End: 2022-08-08

## 2022-08-08 RX ADMIN — LIDOCAINE: 50 OINTMENT TOPICAL at 09:23

## 2022-08-08 ASSESSMENT — PAIN DESCRIPTION - LOCATION: LOCATION: FOOT

## 2022-08-08 ASSESSMENT — PAIN DESCRIPTION - FREQUENCY: FREQUENCY: CONTINUOUS

## 2022-08-08 ASSESSMENT — PAIN - FUNCTIONAL ASSESSMENT: PAIN_FUNCTIONAL_ASSESSMENT: PREVENTS OR INTERFERES SOME ACTIVE ACTIVITIES AND ADLS

## 2022-08-08 ASSESSMENT — PAIN DESCRIPTION - ORIENTATION: ORIENTATION: LEFT

## 2022-08-08 ASSESSMENT — PAIN SCALES - GENERAL: PAINLEVEL_OUTOF10: 8

## 2022-08-08 ASSESSMENT — PAIN DESCRIPTION - PAIN TYPE: TYPE: ACUTE PAIN

## 2022-08-08 ASSESSMENT — PAIN DESCRIPTION - DESCRIPTORS: DESCRIPTORS: ACHING;THROBBING

## 2022-08-08 ASSESSMENT — PAIN DESCRIPTION - ONSET: ONSET: ON-GOING

## 2022-08-08 NOTE — DISCHARGE INSTRUCTIONS
215 [facility] Physician Orders and Discharge Instructions  [address] [place] [address] [address]EE[place] Manley  Telephone: [phone]  NAME:  Karyle Pencil OF BIRTH:  4/4/1929  MEDICAL RECORD NUMBER:  [id number]  DATE: 8/8/22     Return Appointment:  Return Appointment: With Dr. Mattie Favre  in  1 [facility])  [] Return Appointment for a Wound Assessment with the nurse on:     Future Appointments   Date Time Provider Gilbert Frey   8/15/2022  9:15 AM Mireya Cho  [address]   8/17/2022  8:45 AM Gosia Baeza MD FF VASC/ENDO MMA   8/22/2022  9:15 AM Mireya Cho MD [facility] WOUND [facility] HOD   8/29/2022  9:15 AM Mireya Cho MD [facility] WOUND [facility]   8/29/2022 10:45 AM DO OCTAVIO Jean Baptiste [place] - DYD   9/12/2022  1:30 PM Humble Mauricio MD [address] [place] [address] Avenue: [address] [place] [address] Ave:  Phone: [phone]  Fax: [phone]   Medically necessary services: [x] Skilled Nursing [] PT (Eval & Treat) [] OT (Eval & Treat) [] Social Work [] Dietician  [] Other:    Wound care instructions: If you smoke we ask that you refrain from smoking. Smoking inhibits wounds from healing. When taking antibiotics take the entire prescription as ordered. Do not stop taking until medication is all gone unless otherwise instructed. Exercise as tolerated. Keep weight off wounds and reposition every 2 hours if applicable. Do not get wounds wet in the bath or shower unless otherwise instructed by your physician. If your wound is on your foot or leg, you may purchase a cast bag. Please ask at the pharmacy. Wash hands with soap and water prior to and after every dressing change.     [x]Wash wounds with: 0.9% normal saline  [x]Nissa wound Topical Treatments: Do not apply lotions, creams, or ointments to the skin around the wound bed unless directed as followed:   Apply around the wound: Nothing         [x]Wound Location: left lower leg   Apply Primary Dressing to wound: Joleen  Secondary Dressing: 4X4 gauze pad, conforming roll gauze   Avoid contact of tape with skin if possible. When to change Dressin times per week: Wednesday and Friday    Left groin wound managed by Dr. Megan Ramon        [x] Edema Control:     Elevate leg(s) above the level of the heart for 30 minutes 4-5 times a day and/or when sitting. Avoid prolonged standing in one place. Dietary:  Important dietary reminders:  1. Increase Protein intake (i.e. Lean meats, fish, eggs, legumes, and yogurt)  2. No added salt  3. If diabetic, follow a diabetic diet and check glucose prior to meals or as instructed by your physician. Dietary Supplements(Take twice a day unless instructed otherwise):  [] Tia Robert  [] 30ml ProStat [] Doneta More [] Ensure Max/Premier [] Other:    Your nurse  is:  Carline Boateng     Electronically signed by Shamar Calzada RN on 2022 at 10:11 AM     215 [facility] Information: Should you experience any significant changes in your wound(s) or have questions about your wound care, please contact the [address] [place] [address] at [phone]. We are open from 8:00am - 4:30p Monday, Thursday and Friday; 11:00am - 5pm on Tuesday and CLOSED Wednesday. Please give us 24-48 business hours to return your call. Call your doctor now or seek immediate medical care if:    You have symptoms of infection, such as: Increased pain, swelling, warmth, or redness. Red streaks leading from the area. Pus draining from the area. A fever.          [] Patient unable to sign Discharge Instructions given to ECF/Transportation/POA

## 2022-08-08 NOTE — PROGRESS NOTES
1227 [facility]  Progress Note and Procedure Note      Julia Morales  MEDICAL RECORD NUMBER:  [id number]  AGE: 80 y.o. GENDER: female  : 1929  EPISODE DATE:  2022    Subjective:     Chief Complaint   Patient presents with    Wound Check     Left leg           HISTORY of PRESENT ILLNESS HPI     Julia Morales is a 80 y.o. female who presents today for wound/ulcer evaluation. History of Wound Context: Patient returns for continued follow-up of left anterior knee wound. Recently underwent left external iliofemoral bypass with a left femoral below the knee popliteal in situ bypass by Dr. Shlomo Sheehan. Since her last visit here in wound care she was taken back to the operating room on 2022 where the left groin incision was opened to drain a lymphatic leak. She returns today with a wound VAC in place and managed by Dr. Shlomo Sheehan. States she is taking her Zyvox is to keep her leg elevated.   States she is not having any pain from her knee wound  Wound/Ulcer Pain Timing/Severity: none  Quality of pain: N/A  Severity:  0 / 10   Modifying Factors: None  Associated Signs/Symptoms: edema    Ulcer Identification:  Ulcer Type: arterial    Contributing Factors: edema, venous stasis, diabetes, and arterial insufficiency    Acute Wound: N/A    PAST MEDICAL HISTORY        Diagnosis Date    Anxiety     Arthritis     At risk for falls     CAD (coronary artery disease)     Cerebral artery occlusion with cerebral infarction (HCC)     TIA--no residual effects    DDD (degenerative disc disease), cervical     Fractures     (L) Hip Fx 98, L1 fracture from fall 10-31-06    Hyperlipidemia     Hypertension     Mitral regurgitation     Neuropathy     Osteopenia     Osteoporosis     PAD (peripheral artery disease) ([facility] [address]     Right LE ischemia    Peripheral neuropathy 2015    Peripheral vascular disease ([facility] [address]     bilateral lower extremities with edema    Podagra 2017    Dr. Adán Marc    Prolonged emergence from general anesthesia     Spinal stenosis     L4-5    Type 2 diabetes mellitus ([facility] [place] [address]     Urethral stricture 5 years ago    Urgency of urination        PAST SURGICAL HISTORY    Past Surgical History:   Procedure Laterality Date    ANGIOPLASTY Left 04/18/2022    Left SFA    APPENDECTOMY      CARPAL TUNNEL RELEASE Right 03/29/2019    RIGHT CARPAL TUNNEL RELEASE AND RIGHT MIDDLE FINGER TRIGGER FINGER RELEASE performed by Vish Beasley MD at [place] [address]  [id number]    LEFT EYE EYE CATARACT PHACOEMULSIFICATION INTRAOCULAR LENS    CHOLECYSTECTOMY  01/01/1978    COLONOSCOPY  08/31/1999    diverticulosis    EYE SURGERY Bilateral     bilateral cataract removed    FEMORAL BYPASS Left 6/28/2022    LEFT LEG FEMORAL TO POPLITEAL BYPASS WITH INTRAOPERATIVE ANGIOGRAM performed by Mckay Holland MD at [place] [address] Left 02/14/2022    LEFT COMMON FEMORAL ARTERY ENDARTERECTOMY performed by Mckay Holland MD at [address] [place] [address] (CERVIX STATUS UNKNOWN)  [address] [place] [address]    IR FEMORAL POPLITEAL BYPASS GRAFT Right 08/31/2015    KYPHOSIS SURGERY  11/07/2006    L1, (fractured after a fall)    LEG SURGERY Left 02/14/2022    LEFT LEG WOUND DEBRIDEMENT performed by Mckay Holland MD at [place] [address] N/A 7/7/2022    INCISION AND DRAINAGE OF LEFT SIDE GROIN WITH PLACEMENT OF WOUND VAC performed by Mckay Holland MD at [address] [place] [address]  04/25/1998    (L) THR    WRIST FRACTURE SURGERY  01/01/2008    left wrist       FAMILY HISTORY    Family History   Problem Relation Age of Onset    Cancer Mother 43    Stroke Father     Hypertension Father        SOCIAL HISTORY    Social History     Tobacco Use    Smoking status: Never    Smokeless tobacco: Never   Vaping Use    Vaping Use: Never used   Substance Use Topics    Alcohol use: No    Drug use: Never       ALLERGIES    Allergies   Allergen Reactions    Aricept [Donepezil Hydrochloride] Other (See Comments)     Pt unable to recall reaction    Doxycycline Nausea Only    Ketorolac Tromethamine Other (See Comments)     Pt unable to recall reaction       MEDICATIONS    Current Outpatient Medications on File Prior to Encounter   Medication Sig Dispense Refill    Probiotic Product (CULTURELLE PROBIOTICS PO) Take 1 tablet by mouth daily      furosemide (LASIX) 20 MG tablet TAKE ONE TABLET BY MOUTH DAILY 90 tablet 1    glimepiride (AMARYL) 1 MG tablet TAKE ONE TABLET BY MOUTH EVERY MORNING BEFORE BREAKFAST (Patient not taking: Reported on 8/8/2022) 30 tablet 2    gabapentin (NEURONTIN) 100 MG capsule Take 1 capsule by mouth at bedtime for 90 days. 90 capsule 0    ondansetron (ZOFRAN-ODT) 4 MG disintegrating tablet Take 1 tablet by mouth every 6 hours as needed for Nausea or Vomiting (Patient not taking: Reported on 8/8/2022) 30 tablet 2    blood glucose test strips (ONETOUCH ULTRA) strip USE TO TEST BLOOD SUGAR TWICE DAILY 100 strip 1    metFORMIN (GLUCOPHAGE) 500 MG tablet TAKE ONE TABLET BY MOUTH DAILY 90 tablet 3    traMADol (ULTRAM) 50 MG tablet Take 25 mg by mouth every 6 hours as needed for Pain. acetaminophen (TYLENOL) 325 MG tablet Take 650 mg by mouth every 6 hours as needed for Pain      hydrOXYzine (VISTARIL) 25 MG capsule Take 1 capsule by mouth daily as needed for Anxiety 30 capsule 0    atorvastatin (LIPITOR) 10 MG tablet TAKE ONE TABLET BY MOUTH DAILY 30 tablet 3    Lite Touch Lancets MISC Use twice daily when testing blood sugars.  100 each 5    Wound Cleansers (VASHE CLEANSING) SOLN Soak vashe on gauze pad and place on wound for 5-10 minutes (Patient not taking: Reported on 8/8/2022) 1 each 2    lisinopril (PRINIVIL;ZESTRIL) 20 MG tablet TAKE ONE TABLET BY MOUTH DAILY (Patient not taking: Reported on 8/8/2022) 90 tablet 3    Turmeric 450 MG CAPS Take 450 mg by mouth daily      Cholecalciferol (VITAMIN D3) 2000 UNITS CAPS Take 1 capsule by mouth daily      aspirin EC 81 MG EC tablet Take 1 tablet by mouth daily 30 tablet 3     No current facility-administered medications on file prior to encounter. REVIEW OF SYSTEMS  Review of Systems    Pertinent items are noted in HPI. Objective:      BP (!) 156/69   Pulse 75   Temp 96.8 °F (36 °C) (Temporal)   Resp 16     Wt Readings from Last 3 Encounters:   08/03/22 93 lb (42.2 kg)   08/01/22 93 lb 11.1 oz (42.5 kg)   07/20/22 102 lb (46.3 kg)       PHYSICAL EXAM    General Appearance: alert and oriented to person, place and time and in no acute distress  Extremities: VAC present on left groin. Full thickness wound left anterior knee containing fibrin, minimal slough, granulation tissue,designated as wound #1. There is minimal edema present left lower extremity. Periwound is dry    Assessment:      1. Peripheral artery disease ([address]    2. Wound of left lower extremity, subsequent encounter    3. Wound of left lower extremity, initial encounter         Procedure Note  Indications:  Based on my examination of this patient's wound(s) today, sharp excision is required to promote healing and evaluate the extent healing. Performed by: Bunny Tipton MD    Consent obtained? Yes    Time out taken: Yes    Pain Control: Anesthetic: 4% Lidocaine Liquid Topical     Debridement:Excisional Debridement    Using curette the wound was sharply debrided    down through and including the removal of  epidermis, dermis, and subcutaneous tissue. Devitalized Tissue Debrided:  fibrin and slough      Pre Debridement Measurements:  Are located in the Wound Documentation Flow Sheet   Wound #: 1     Post  Debridement Measurements:  Negative Pressure Wound Therapy Groin Left; Anterior (Active)   Wound Type Surgical 07/11/22 0943   Unit Type kci 07/18/22 1100   Cycle Continuous 07/18/22 1100   Target Pressure (mmHg) 125 07/18/22 1100   Drainage Amount Small 07/18/22 1100   Drainage Description Serosanguinous 07/18/22 1100   Number of days: 32       Wound 11/22/21 Leg Post-Procedure Depth (cm) 0.7 cm 07/18/22 1020   Post-Procedure Surface Area (cm^2) 7.54 cm^2 07/18/22 1020   Post-Procedure Volume (cm^3) 5.278 cm^3 07/18/22 1020   Distance Tunneling (cm) 0 cm 07/18/22 0950   Tunneling Position ___ O'Clock 0 07/11/22 0943   Undermining Starts ___ O'Clock 9:00 07/18/22 0950   Undermining Ends___ O'Clock 11;00 07/18/22 0950   Undermining Maxium Distance (cm) 0 07/18/22 0950   Wound Assessment [place]/red;Slough 07/18/22 0950   Drainage Amount Moderate 07/18/22 0950   Drainage Description Serosanguinous 07/18/22 0950   Odor None 07/18/22 0950   Nissa-wound Assessment Fragile 07/18/22 0950   Margins Defined edges 07/18/22 0950   Wound Thickness Description not for Pressure Injury Full thickness 07/18/22 0950   Number of days: 28          Percent of Wound Debrided: 100%    Total Surface Area Debrided:  5.7 sq cm    Diabetic/Pressure/Non Pressure Ulcers only:  Ulcer: Non-Pressure ulcer, fat layer exposed    Bleeding: Minimal    Hemostasis Achieved: by pressure    Procedural Pain: 2  / 10     Post Procedural Pain: 0 / 10     Response to treatment:  With complaints of pain. Plan:   In view of what is now presumed to be maximum blood flow to the lower extremities as well as minimal edema will consider TheraSkin allograft as now medically necessary to assist with wound healing  Treatment Note: Please see attached Discharge Instructions. These instructions were given and signed by the patient or POA    New Prescriptions    No medications on file       Orders Placed This Encounter   Procedures    Initiate Outpatient Wound Care Protocol       Discharge Instructions          [facility] Physician Orders and Discharge Instructions  [address] [place].  80  Telephone: [phone]  NAME:  Jonathan Yu  YOB: 1929  MEDICAL RECORD NUMBER:  [id number]  DATE: 8/8/22     Return Appointment:  Return Appointment: With Dr. Lulu Danielson  in  1 [facility])  [] Return Appointment for a Wound Assessment with the nurse on:     Future Appointments   Date Time Provider Gilbert Mauroi   8/15/2022  9:15 AM Karri King MD [address] [facility]   2022  8:45 AM Sergio Rosenberg MD [facility] VASC/ENDO [facility]   2022  9:15 AM Karri King  [address] WOUND [facility]   2022  9:15 AM Karri King  [address] WOUND [facility]   2022 10:45 AM DO COTAVIO Alonso  [place] - DYD   2022  1:30 PM Isis Haque  [address]: [facility] DEACONESS:  Phone: [phone]  Fax: [phone]   Medically necessary services: [x] Skilled Nursing [] PT (Eval & Treat) [] OT (Eval & Treat) [] Social Work [] Dietician  [] Other:    Wound care instructions: If you smoke we ask that you refrain from smoking. Smoking inhibits wounds from healing. When taking antibiotics take the entire prescription as ordered. Do not stop taking until medication is all gone unless otherwise instructed. Exercise as tolerated. Keep weight off wounds and reposition every 2 hours if applicable. Do not get wounds wet in the bath or shower unless otherwise instructed by your physician. If your wound is on your foot or leg, you may purchase a cast bag. Please ask at the pharmacy. Wash hands with soap and water prior to and after every dressing change. [x]Wash wounds with: 0.9% normal saline  [x]Nissa wound Topical Treatments: Do not apply lotions, creams, or ointments to the skin around the wound bed unless directed as followed:   Apply around the wound: Nothing         [x]Wound Location: left lower leg   Apply Primary Dressing to wound: Joleen  Secondary Dressing: 4X4 gauze pad, conforming roll gauze   Avoid contact of tape with skin if possible. When to change Dressin times per week:  Wednesday and Friday    Left groin wound managed by Dr. Yassine Morales        [x] Edema Control:     Elevate leg(s) above the level of the heart for 30 minutes 4-5 times a day and/or when sitting. Avoid prolonged standing in one place. Dietary:  Important dietary reminders:  1. Increase Protein intake (i.e. Lean meats, fish, eggs, legumes, and yogurt)  2. No added salt  3. If diabetic, follow a diabetic diet and check glucose prior to meals or as instructed by your physician. Dietary Supplements(Take twice a day unless instructed otherwise):  [] Emily Childress  [] 30ml ProStat [] Cheko Weinberg [] Ensure Max/Premier [] Other:    Your nurse  is:  Alannah Limon     Electronically signed by Josiane Sharma RN on 8/8/2022 at 10:11 AM     [address] [facility] [address] Information: Should you experience any significant changes in your wound(s) or have questions about your wound care, please contact the [address] [place] [address] at [phone]. We are open from 8:00am - 4:30p Monday, Thursday and Friday; 11:00am - 5pm on Tuesday and CLOSED Wednesday. Please give us 24-48 business hours to return your call. Call your doctor now or seek immediate medical care if:    You have symptoms of infection, such as: Increased pain, swelling, warmth, or redness. Red streaks leading from the area. Pus draining from the area. A fever.          [] Patient unable to sign Discharge Instructions given to ECF/Transportation/POA         Electronically signed by Nirmal Leavitt MD on 8/8/2022 at 11:54 AM

## 2022-08-09 ENCOUNTER — TELEPHONE (OUTPATIENT)
Dept: INTERNAL MEDICINE CLINIC | Age: 87
End: 2022-08-09

## 2022-08-09 DIAGNOSIS — D64.9 NORMOCYTIC ANEMIA: Primary | ICD-10-CM

## 2022-08-09 DIAGNOSIS — E87.1 HYPONATREMIA: Primary | ICD-10-CM

## 2022-08-09 DIAGNOSIS — M81.0 POSTMENOPAUSAL OSTEOPOROSIS: ICD-10-CM

## 2022-08-09 NOTE — TELEPHONE ENCOUNTER
Patient said she is coming in next Monday for blood work. She requested that her Vitamin B-12 and D, and iron be checked as well. I did not see any active lab orders for this in her chart. Please advise.

## 2022-08-11 NOTE — DISCHARGE SUMMARY
Physician Discharge Summary     Patient ID:  Florencio Avina  [id number]  81 y.o.  4/4/1929    Admit date: 6/28/2022    Discharge date and time: 7/2/2022 10:29 AM     Admitting Physician: Frankie Gaxiola MD     Discharge Physician: same    Admission Diagnoses: Atherosclerosis of artery of extremity with ulceration ([address] [I70.299, L97.909]  Atherosclerosis of artery of extremity with rest pain ([address] [I70.229]    Discharge Diagnoses: same    Admission Condition: fair    Discharged Condition: good    Indication for Admission: ischemic rest pain and nonhealing wound L leg    Hospital Course: Admitted DOS with uneventful perioperative course. Foot well perfused postop. Consults: none    Significant Diagnostic Studies: standard perioperative labs    Outstanding Order Results       No orders found from 5/30/2022 to 6/29/2022. Treatments: surgery: L iliofemoral bypass with L femoropopliteal bypass    Discharge Exam:  See chart    Disposition: home    Patient Instructions:   [unfilled]  Activity: no lifting, Driving, or Strenuous exercise for 2 weeks  Diet: regular diet  Wound Care: keep wound clean and dry    Follow-up with Ananias Bosworth in 1 week.     Signed:  Frankie Gaxiola MD  8/11/2022  8:21 AM

## 2022-08-12 ENCOUNTER — TELEPHONE (OUTPATIENT)
Dept: VASCULAR SURGERY | Age: 87
End: 2022-08-12

## 2022-08-12 NOTE — TELEPHONE ENCOUNTER
Daryle Staggers from [facility] called and needs Dr. Shlomo Sheehan to go into the portal and sign the homecare orders so they can finish the process for billing.

## 2022-08-15 ENCOUNTER — HOSPITAL ENCOUNTER (OUTPATIENT)
Dept: WOUND CARE | Age: 87
Discharge: HOME | End: 2022-08-15
Payer: MEDICARE

## 2022-08-15 ENCOUNTER — TELEPHONE (OUTPATIENT)
Dept: VASCULAR SURGERY | Age: 87
End: 2022-08-15

## 2022-08-15 VITALS
RESPIRATION RATE: 18 BRPM | DIASTOLIC BLOOD PRESSURE: 65 MMHG | HEART RATE: 83 BPM | TEMPERATURE: 96.8 F | SYSTOLIC BLOOD PRESSURE: 162 MMHG

## 2022-08-15 DIAGNOSIS — S81.802A WOUND OF LEFT LOWER EXTREMITY, INITIAL ENCOUNTER: ICD-10-CM

## 2022-08-15 DIAGNOSIS — S81.802D WOUND OF LEFT LOWER EXTREMITY, SUBSEQUENT ENCOUNTER: ICD-10-CM

## 2022-08-15 DIAGNOSIS — I73.9 PERIPHERAL ARTERY DISEASE (HCC): Primary | ICD-10-CM

## 2022-08-15 PROCEDURE — C5271 LOW COST SKIN SUBSTITUTE APP: HCPCS

## 2022-08-15 PROCEDURE — 15271 SKIN SUB GRAFT TRNK/ARM/LEG: CPT

## 2022-08-15 PROCEDURE — 11042 DBRDMT SUBQ TIS 1ST 20SQCM/<: CPT

## 2022-08-15 PROCEDURE — 15271 SKIN SUB GRAFT TRNK/ARM/LEG: CPT | Performed by: SPECIALIST

## 2022-08-15 PROCEDURE — 6370000000 HC RX 637 (ALT 250 FOR IP): Performed by: SPECIALIST

## 2022-08-15 RX ORDER — GENTAMICIN SULFATE 1 MG/G
OINTMENT TOPICAL ONCE
Status: CANCELLED | OUTPATIENT
Start: 2022-08-15 | End: 2022-08-15

## 2022-08-15 RX ORDER — LIDOCAINE HYDROCHLORIDE 40 MG/ML
SOLUTION TOPICAL ONCE
Status: CANCELLED | OUTPATIENT
Start: 2022-08-15 | End: 2022-08-15

## 2022-08-15 RX ORDER — LIDOCAINE HYDROCHLORIDE 20 MG/ML
JELLY TOPICAL ONCE
Status: CANCELLED | OUTPATIENT
Start: 2022-08-15 | End: 2022-08-15

## 2022-08-15 RX ORDER — CLOBETASOL PROPIONATE 0.5 MG/G
OINTMENT TOPICAL ONCE
Status: CANCELLED | OUTPATIENT
Start: 2022-08-15 | End: 2022-08-15

## 2022-08-15 RX ORDER — BACITRACIN ZINC AND POLYMYXIN B SULFATE 500; 1000 [USP'U]/G; [USP'U]/G
OINTMENT TOPICAL ONCE
Status: CANCELLED | OUTPATIENT
Start: 2022-08-15 | End: 2022-08-15

## 2022-08-15 RX ORDER — LIDOCAINE 50 MG/G
OINTMENT TOPICAL ONCE
Status: CANCELLED | OUTPATIENT
Start: 2022-08-15 | End: 2022-08-15

## 2022-08-15 RX ORDER — GINSENG 100 MG
CAPSULE ORAL ONCE
Status: CANCELLED | OUTPATIENT
Start: 2022-08-15 | End: 2022-08-15

## 2022-08-15 RX ORDER — LIDOCAINE 50 MG/G
OINTMENT TOPICAL ONCE
Status: COMPLETED | OUTPATIENT
Start: 2022-08-15 | End: 2022-08-15

## 2022-08-15 RX ORDER — BETAMETHASONE DIPROPIONATE 0.05 %
OINTMENT (GRAM) TOPICAL ONCE
Status: CANCELLED | OUTPATIENT
Start: 2022-08-15 | End: 2022-08-15

## 2022-08-15 RX ORDER — BACITRACIN, NEOMYCIN, POLYMYXIN B 400; 3.5; 5 [USP'U]/G; MG/G; [USP'U]/G
OINTMENT TOPICAL ONCE
Status: CANCELLED | OUTPATIENT
Start: 2022-08-15 | End: 2022-08-15

## 2022-08-15 RX ORDER — SULFAMETHOXAZOLE AND TRIMETHOPRIM 400; 80 MG/1; MG/1
1 TABLET ORAL 2 TIMES DAILY
COMMUNITY
End: 2022-08-31 | Stop reason: SDUPTHER

## 2022-08-15 RX ORDER — LIDOCAINE 40 MG/G
CREAM TOPICAL ONCE
Status: CANCELLED | OUTPATIENT
Start: 2022-08-15 | End: 2022-08-15

## 2022-08-15 RX ADMIN — LIDOCAINE: 50 OINTMENT TOPICAL at 09:39

## 2022-08-15 NOTE — TELEPHONE ENCOUNTER
Per Kyra Rodriguez- ok to refill antibiotic for 3 weeks. Called into [facility] [employer]. Left message for Florian Schaefer to call the office to discuss incision.

## 2022-08-15 NOTE — DISCHARGE INSTRUCTIONS
1909 [facility] Physician Orders and Discharge Instructions  [address] [place] [address] [address] [address] [facility] 80  Telephone: [phone]  NAME:  Leo Del Valle OF BIRTH:  1929  MEDICAL RECORD NUMBER:  [id number]  DATE: 8/15/22     Return Appointment:  Return Appointment: With Dr. Lulu Danielson  in  1 [facility])  [] Return Appointment for a Wound Assessment with the nurse on:     Future Appointments   Date Time Provider Gilbert Frey   2022  8:45 AM Sergio Rosenberg MD FF VASC/ENDO [facility]   2022  9:15 AM Karri King  [address] WOUND [facility]   2022 10:15 AM Karri King  [address] WOUND [facility]   2022 10:45 AM DO OCTAVIO Alonso [place] - DYD   2022  1:30 PM Isis Haque MD [address] [place] [address] Avenue: [facility] DEACONESS:  Phone: [phone]  Fax: [phone]   Medically necessary services: [x] Skilled Nursing [] PT (Eval & Treat) [] OT (Eval & Treat) [] Social Work [] Dietician  [] Other:    Wound care instructions: If you smoke we ask that you refrain from smoking. Smoking inhibits wounds from healing. When taking antibiotics take the entire prescription as ordered. Do not stop taking until medication is all gone unless otherwise instructed. Exercise as tolerated. Keep weight off wounds and reposition every 2 hours if applicable. Do not get wounds wet in the bath or shower unless otherwise instructed by your physician. If your wound is on your foot or leg, you may purchase a cast bag. Please ask at the pharmacy. Wash hands with soap and water prior to and after every dressing change.     [x]Wash wounds with: 0.9% normal saline  [x]Nissa wound Topical Treatments: Do not apply lotions, creams, or ointments to the skin around the wound bed unless directed as followed:   Apply around the wound: No-Sting barrier film           [x]Application of a biologic skin substitute: Yes: Jozefin : Instructions given to ECF/Transportation/POA

## 2022-08-15 NOTE — PROGRESS NOTES
[facility]  Progress Note and Procedure Note      Sohan Patel  MEDICAL RECORD NUMBER:  [id number]  AGE: 80 y.o. GENDER: female  : 1929  EPISODE DATE:  8/15/2022    Subjective:     Chief Complaint   Patient presents with    Wound Check     Left lower leg           HISTORY of PRESENT ILLNESS HPI     Sohan Patel is a 80 y.o. female who presents today for wound/ulcer evaluation. History of Wound Context: Patient returns for continued follow-up of left anterior knee wound. Recently underwent left external iliofemoral bypass with a left femoral below the knee popliteal in situ bypass by Dr. Walter Du. Since her last visit here in wound care she was taken back to the operating room on 2022 where the left groin incision was opened to drain a lymphatic leak. She returns today with a wound VAC in place and managed by Dr. Walter Du. Wound has failed to heal greater than 30%-50% at initial assessment.   Patient has received greater than 30 days of conservative treatment including debridements at each wound care visit and the following: Compression    Pain Assessment:  Wound/Ulcer Pain Timing/Severity: none  Quality of pain: N/A  Severity:  0 / 10   Modifying Factors: None  Associated Signs/Symptoms: edema    Ulcer Identification:  Ulcer Type: arterial  Contributing Factors: edema, venous stasis, diabetes, and arterial insufficiency    Last R5A if applicable:   Hemoglobin A1C   Date Value Ref Range Status   2022 5.5 See comment % Final     Comment:     Comment:  Diagnosis of Diabetes: > or = 6.5%  Increased risk of diabetes (Prediabetes): 5.7-6.4%  Glycemic Control: Nonpregnant Adults: <7.0%                    Pregnant: <6.0%           Objective:      BP (!) 162/65   Pulse 83   Temp 96.8 °F (36 °C) (Temporal)   Resp 18     Wt Readings from Last 3 Encounters:   22 93 lb (42.2 kg)   22 93 lb 11.1 oz (42.5 kg)   22 102 lb (46.3 kg)       PHYSICAL EXAM    General Appearance: alert and oriented to person, place and time and in no acute distress  Extremities: VAC present left groin. Full-thickness wound left anterior knee containing fibrin and pale appearing granulation tissue designated as wound #1. Periwound is dry    Assessment:      1. Peripheral artery disease ([address]    2. Wound of left lower extremity, subsequent encounter    3. Wound of left lower extremity, initial encounter         Procedure Note  Indications:  Based on my examination of this patient's wound(s) today, sharp excision is required to promote healing and evaluate the extent healing. Performed by: Lucio Collins MD    Consent obtained? Yes    Time out taken: Yes    Pain Control: Anesthetic: 4% Lidocaine Liquid Topical     Debridement:Excisional Debridement    Using curette the wound was sharply debrided    down through and including the removal of  epidermis, dermis, and subcutaneous tissue. Devitalized Tissue Debrided:  fibrin      Wound #: 1     Pre & Post  Debridement Measurements:  Negative Pressure Wound Therapy Groin Left; Anterior (Active)   Wound Type Surgical 07/11/22 0943   Unit Type kci 07/18/22 1100   Cycle Continuous 07/18/22 1100   Target Pressure (mmHg) 125 07/18/22 1100   Drainage Amount Small 07/18/22 1100   Drainage Description Serosanguinous 07/18/22 1100   Number of days: 39       Wound 11/22/21 Leg Left; Lower;Distal #1 (Active)   Wound Image   08/01/22 0938   Wound Etiology Arterial 11/22/21 0924   Wound Cleansed Cleansed with saline 08/15/22 0932   Dressing/Treatment Other (comment) 08/15/22 1003   Wound Length (cm) 2.9 cm 08/15/22 0932   Wound Width (cm) 2.1 cm 08/15/22 0932   Wound Depth (cm) 0.1 cm 08/15/22 0932   Wound Surface Area (cm^2) 6.09 cm^2 08/15/22 0932   Change in Wound Size % (l*w) -117.5 08/15/22 0932   Wound Volume (cm^3) 0.609 cm^3 08/15/22 0932   Wound Healing % -118 08/15/22 0932   Post-Procedure Length (cm) 3 cm 08/15/22 1003   Post-Procedure Width (cm) 2.2 cm 08/15/22 1003   Post-Procedure Depth (cm) 0.3 cm 08/15/22 1003   Post-Procedure Surface Area (cm^2) 6.6 cm^2 08/15/22 1003   Post-Procedure Volume (cm^3) 1.98 cm^3 08/15/22 1003   Distance Tunneling (cm) 0 cm 08/15/22 0932   Tunneling Position ___ O'Clock 0 07/11/22 0943   Undermining Starts ___ O'Clock 0 07/11/22 0943   Undermining Ends___ O'Clock 0 07/11/22 0943   Undermining Maxium Distance (cm) 0 08/15/22 0932   Wound Assessment Pink/red;Fibrin 08/15/22 0932   Drainage Amount Small 08/15/22 0932   Drainage Description Serosanguinous 08/15/22 0932   Odor None 08/15/22 0932   Nissa-wound Assessment Intact;Fragile 08/15/22 0932   Margins Defined edges 08/15/22 0932   Wound Thickness Description not for Pressure Injury Full thickness 08/15/22 0932   Number of days: 266       Wound 07/11/22 Left #2 groin (Active)   Wound Image   07/11/22 0943   Wound Cleansed Cleansed with saline 07/18/22 0950   Dressing/Treatment Moist to dry 07/18/22 1100   Wound Length (cm) 2.8 cm 07/18/22 0950   Wound Width (cm) 3.5 cm 07/18/22 0950   Wound Depth (cm) 0.5 cm 07/18/22 0950   Wound Surface Area (cm^2) 9.8 cm^2 07/18/22 0950   Change in Wound Size % (l*w) -8.89 07/18/22 0950   Wound Volume (cm^3) 4.9 cm^3 07/18/22 0950   Wound Healing % 64 07/18/22 0950   Post-Procedure Length (cm) 2.9 cm 07/18/22 1020   Post-Procedure Width (cm) 2.6 cm 07/18/22 1020   Post-Procedure Depth (cm) 0.7 cm 07/18/22 1020   Post-Procedure Surface Area (cm^2) 7.54 cm^2 07/18/22 1020   Post-Procedure Volume (cm^3) 5.278 cm^3 07/18/22 1020   Distance Tunneling (cm) 0 cm 07/18/22 0950   Tunneling Position ___ O'Clock 0 07/11/22 0943   Undermining Starts ___ O'Clock 9:00 07/18/22 0950   Undermining Ends___ O'Clock 11;00 07/18/22 0950   Undermining Maxium Distance (cm) 0 07/18/22 0950   Wound Assessment [place]/red;Slough 07/18/22 0950   Drainage Amount Moderate 07/18/22 0950   Drainage Description Serosanguinous 07/18/22 0950   Odor None 07/18/22 0950 Nissa-wound Assessment Fragile 07/18/22 0950   Margins Defined edges 07/18/22 0950   Wound Thickness Description not for Pressure Injury Full thickness 07/18/22 0950   Number of days: 35          Percent of Wound Debrided: 100%    Total Surface Area Debrided:  7.5 sq cm    Diabetic/Pressure/Non Pressure Ulcers only:  Ulcer: Non-Pressure ulcer, fat layer exposed    Bleeding: Minimal    Hemostasis Achieved: by pressure    Procedural Pain: 0  / 10     Post Procedural Pain: 0 / 10     Response to treatment:  Well tolerated by patient. rocedure:  Skin Substitute Application    Performed by: Sai Patel MD    Ulcer Type: arterial    Consent obtained: Yes    Time out taken: Yes    Product Utilized: TheraSkin 6 sq/cm     Fenestrated: No    Mesher Utilized: No    Instrument(s) curette    Skin Substitute was Applied to Ulcer Number(s):    Ulcer #: 1    Negative Pressure Wound Therapy Groin Left; Anterior (Active)   Wound Type Surgical 07/11/22 0943   Unit Type kci 07/18/22 1100   Cycle Continuous 07/18/22 1100   Target Pressure (mmHg) 125 07/18/22 1100   Drainage Amount Small 07/18/22 1100   Drainage Description Serosanguinous 07/18/22 1100   Number of days: 39       Wound 11/22/21 Leg Left; Lower;Distal #1 (Active)   Wound Image   08/01/22 0938   Wound Etiology Arterial 11/22/21 0924   Wound Cleansed Cleansed with saline 08/15/22 0932   Dressing/Treatment Other (comment) 08/15/22 1003   Wound Length (cm) 2.9 cm 08/15/22 0932   Wound Width (cm) 2.1 cm 08/15/22 0932   Wound Depth (cm) 0.1 cm 08/15/22 0932   Wound Surface Area (cm^2) 6.09 cm^2 08/15/22 0932   Change in Wound Size % (l*w) -117.5 08/15/22 0932   Wound Volume (cm^3) 0.609 cm^3 08/15/22 0932   Wound Healing % -118 08/15/22 0932   Post-Procedure Length (cm) 3 cm 08/15/22 1003   Post-Procedure Width (cm) 2.2 cm 08/15/22 1003   Post-Procedure Depth (cm) 0.3 cm 08/15/22 1003   Post-Procedure Surface Area (cm^2) 6.6 cm^2 08/15/22 1003   Post-Procedure Volume (cm^3) 1.98 cm^3 08/15/22 1003   Distance Tunneling (cm) 0 cm 08/15/22 0932   Tunneling Position ___ O'Clock 0 07/11/22 0943   Undermining Starts ___ O'Clock 0 07/11/22 0943   Undermining Ends___ O'Clock 0 07/11/22 0943   Undermining Maxium Distance (cm) 0 08/15/22 0932   Wound Assessment Pink/red;Fibrin 08/15/22 0932   Drainage Amount Small 08/15/22 0932   Drainage Description Serosanguinous 08/15/22 0932   Odor None 08/15/22 0932   Nissa-wound Assessment Intact;Fragile 08/15/22 0932   Margins Defined edges 08/15/22 0932   Wound Thickness Description not for Pressure Injury Full thickness 08/15/22 0932   Number of days: 266       Wound 07/11/22 Left #2 groin (Active)   Wound Image   07/11/22 0943   Wound Cleansed Cleansed with saline 07/18/22 0950   Dressing/Treatment Moist to dry 07/18/22 1100   Wound Length (cm) 2.8 cm 07/18/22 0950   Wound Width (cm) 3.5 cm 07/18/22 0950   Wound Depth (cm) 0.5 cm 07/18/22 0950   Wound Surface Area (cm^2) 9.8 cm^2 07/18/22 0950   Change in Wound Size % (l*w) -8.89 07/18/22 0950   Wound Volume (cm^3) 4.9 cm^3 07/18/22 0950   Wound Healing % 64 07/18/22 0950   Post-Procedure Length (cm) 2.9 cm 07/18/22 1020   Post-Procedure Width (cm) 2.6 cm 07/18/22 1020   Post-Procedure Depth (cm) 0.7 cm 07/18/22 1020   Post-Procedure Surface Area (cm^2) 7.54 cm^2 07/18/22 1020   Post-Procedure Volume (cm^3) 5.278 cm^3 07/18/22 1020   Distance Tunneling (cm) 0 cm 07/18/22 0950   Tunneling Position ___ O'Clock 0 07/11/22 0943   Undermining Starts ___ O'Clock 9:00 07/18/22 0950   Undermining Ends___ O'Clock 11;00 07/18/22 0950   Undermining Maxium Distance (cm) 0 07/18/22 0950   Wound Assessment [place]/red;Slough 07/18/22 0950   Drainage Amount Moderate 07/18/22 0950   Drainage Description Serosanguinous 07/18/22 0950   Odor None 07/18/22 0950   Nissa-wound Assessment Fragile 07/18/22 0950   Margins Defined edges 07/18/22 0950   Wound Thickness Description not for Pressure Injury Full thickness 07/18/22 0950   Number of days: 35          Diabetic/Pressure/Non Pressure Ulcers:  Ulcer:   Non-Pressure ulcer, fat layer exposed      Total Surface Area of Ulcer(s) Covered 6 sq/cm    Was the Product Layered  No     Amount of Product Applied 6 sq/cm     Amount of Product Wasted 0 sq/cm     Reason for Waste: N/A     Surgically Fixated: No:     Secured With: Steri Strips and Silicone Dressing     Procedural Pain: 0/10     Post Procedural Pain: 0 / 10    Response to Treatment: Well tolerated by patient. Plan:     Treatment Note: Please see attached Discharge Instructions. These instructions were given and signed by the patient or POA    New Medication(s) at this visit:   New Prescriptions    No medications on file       Discharge Instructions          [facility] Physician Orders and Discharge Instructions  [address] [address] [address] [place] [address]. [place]  Telephone: [phone]  NAME:  Norris Gillespie OF BIRTH:  4/4/1929  MEDICAL RECORD NUMBER:  [id number]  DATE: 8/15/22     Return Appointment:  Return Appointment: With Dr. Karrie Aviles  in  1 [facility])  [] Return Appointment for a Wound Assessment with the nurse on:     Future Appointments   Date Time Provider Gilbert Frey   8/17/2022  8:45 AM Ed Keita MD FF VASC/ENDO [facility]   8/22/2022  9:15 AM Ronna Falcon MD [facility] WOUND [facility]   8/29/2022 10:15 AM Ronna Falcon MD [facility] WOUND [facility]   8/29/2022 10:45 AM DO OCTAVIO Vera [place] - DYD   9/12/2022  1:30 PM Radha Dillon MD [address] [place] [address] Avenue: [address] [place] [address] Ave:  Phone: [phone]  Fax: [phone]   Medically necessary services: [x] Skilled Nursing [] PT (Eval & Treat) [] OT (Eval & Treat) [] Social Work [] Dietician  [] Other:    Wound care instructions: If you smoke we ask that you refrain from smoking. Smoking inhibits wounds from healing.   When taking antibiotics take the entire prescription as ordered. Do not stop taking until medication is all gone unless otherwise instructed. Exercise as tolerated. Keep weight off wounds and reposition every 2 hours if applicable. Do not get wounds wet in the bath or shower unless otherwise instructed by your physician. If your wound is on your foot or leg, you may purchase a cast bag. Please ask at the pharmacy. Wash hands with soap and water prior to and after every dressing change. [x]Wash wounds with: 0.9% normal saline  [x]Nissa wound Topical Treatments: Do not apply lotions, creams, or ointments to the skin around the wound bed unless directed as followed:   Apply around the wound: No-Sting barrier film           [x]Application of a biologic skin substitute: Yes: Theraskin : Wound Location: left lower leg wound, theraskin 6sqcm applied, covered with mepitel, hydrogel, steri strips,  gauze, kerlix  This is a highly specialized wound care dressing that is intended to enhance your own bodies ability to increase growth factors and other healing abilities. Please leave the dressing clean, dry and intact unless otherwise instructed by your physician. Leave steri-strips and non adherent in place if changing the dressing as instructed. [x]Instructions for [employer] if applicable: leave dry and intact       [x]Negative Pressure:           Wound Location: left groin wound managed by Dr. Cathleen Peabody      [x] Edema Control:      Elevate leg(s) above the level of the heart for 30 minutes 4-5 times a day and/or when sitting. Avoid prolonged standing in one place. Dietary:  Important dietary reminders:  1. Increase Protein intake (i.e. Lean meats, fish, eggs, legumes, and yogurt)  2. No added salt  3. If diabetic, follow a diabetic diet and check glucose prior to meals or as instructed by your physician.     Dietary Supplements(Take twice a day unless instructed otherwise):  [] Otcaviano  [] 30ml ProStat [] EnsureEnlive [] Ensure Max/Premier [] Other: Your nurse  is:  isra     Electronically signed by Caro Gifford RN on 8/15/2022 at [address] Information: Should you experience any significant changes in your wound(s) or have questions about your wound care, please contact the [address] [place] [address] at [phone]. We are open from 8:00am - 4:30p Monday, Thursday and Friday; 11:00am - 5pm on Tuesday and CLOSED Wednesday. Please give us 24-48 business hours to return your call. Call your doctor now or seek immediate medical care if:    You have symptoms of infection, such as: Increased pain, swelling, warmth, or redness. Red streaks leading from the area. Pus draining from the area. A fever.          [] Patient unable to sign Discharge Instructions given to ECF/Transportation/POA         Electronically signed by Steve Chang MD on 8/15/2022 at 12:48 PM

## 2022-08-15 NOTE — TELEPHONE ENCOUNTER
Jame Summers with [employer] called. Patient has two in her lower incision, not where the wound vac is. She has an appointment on Wednesday to see Dr. Trista Russell. If there are any concerns, please call Ankit Hollis (caregiver) at [phone].

## 2022-08-15 NOTE — TELEPHONE ENCOUNTER
Patient's daughter called and asked to send in more antibiotics.   Patient has appt with Dr. Tai Dumont on 8/17

## 2022-08-16 RX ORDER — SULFAMETHOXAZOLE AND TRIMETHOPRIM 400; 80 MG/1; MG/1
1 TABLET ORAL 2 TIMES DAILY
Qty: 42 TABLET | Refills: 0 | OUTPATIENT
Start: 2022-08-16 | End: 2022-09-06

## 2022-08-17 ENCOUNTER — OFFICE VISIT (OUTPATIENT)
Dept: VASCULAR SURGERY | Age: 87
End: 2022-08-17

## 2022-08-17 VITALS
SYSTOLIC BLOOD PRESSURE: 158 MMHG | DIASTOLIC BLOOD PRESSURE: 68 MMHG | HEIGHT: 60 IN | BODY MASS INDEX: 18.26 KG/M2 | WEIGHT: 93 LBS

## 2022-08-17 DIAGNOSIS — T81.31XA POSTOPERATIVE WOUND DEHISCENCE, INITIAL ENCOUNTER: Primary | ICD-10-CM

## 2022-08-17 PROCEDURE — 99024 POSTOP FOLLOW-UP VISIT: CPT | Performed by: SURGERY

## 2022-08-17 NOTE — PROGRESS NOTES
OV for L groin wound complication and now with serous drainage upper inner L thigh. Apligraf at [facility]    EXAM:  L groin wound - excellent granulation, sealed deep without graft exposure; no erythema or purulence - superficial and flush with skin. Upper L thigh incision with soft pinpoint swelling draining serous fluid    Palpable graft pulse with excellent doppler signals     Draining wound opened - contained cavity without exposed graft; no pus. 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 Overall improved. DC VAC & continue Bactrim - lower dose for age. Begin saline wetr to dry BID both wounds. Eventual CT to reassess graft after wound healed. F/U 2 weeks.

## 2022-08-22 ENCOUNTER — HOSPITAL ENCOUNTER (OUTPATIENT)
Dept: WOUND CARE | Age: 87
Discharge: HOME | End: 2022-08-22
Payer: MEDICARE

## 2022-08-22 VITALS
TEMPERATURE: 97.6 F | HEART RATE: 66 BPM | DIASTOLIC BLOOD PRESSURE: 71 MMHG | SYSTOLIC BLOOD PRESSURE: 174 MMHG | RESPIRATION RATE: 16 BRPM

## 2022-08-22 DIAGNOSIS — I73.9 PERIPHERAL ARTERY DISEASE (HCC): Primary | ICD-10-CM

## 2022-08-22 DIAGNOSIS — S81.802A WOUND OF LEFT LOWER EXTREMITY, INITIAL ENCOUNTER: ICD-10-CM

## 2022-08-22 DIAGNOSIS — S81.802D WOUND OF LEFT LOWER EXTREMITY, SUBSEQUENT ENCOUNTER: ICD-10-CM

## 2022-08-22 PROCEDURE — 99212 OFFICE O/P EST SF 10 MIN: CPT | Performed by: SPECIALIST

## 2022-08-22 PROCEDURE — 6370000000 HC RX 637 (ALT 250 FOR IP): Performed by: SPECIALIST

## 2022-08-22 PROCEDURE — 99212 OFFICE O/P EST SF 10 MIN: CPT

## 2022-08-22 RX ORDER — LIDOCAINE HYDROCHLORIDE 20 MG/ML
JELLY TOPICAL ONCE
Status: CANCELLED | OUTPATIENT
Start: 2022-08-22 | End: 2022-08-22

## 2022-08-22 RX ORDER — GINSENG 100 MG
CAPSULE ORAL ONCE
Status: CANCELLED | OUTPATIENT
Start: 2022-08-22 | End: 2022-08-22

## 2022-08-22 RX ORDER — LIDOCAINE 50 MG/G
OINTMENT TOPICAL ONCE
Status: CANCELLED | OUTPATIENT
Start: 2022-08-22 | End: 2022-08-22

## 2022-08-22 RX ORDER — GENTAMICIN SULFATE 1 MG/G
OINTMENT TOPICAL ONCE
Status: CANCELLED | OUTPATIENT
Start: 2022-08-22 | End: 2022-08-22

## 2022-08-22 RX ORDER — LIDOCAINE HYDROCHLORIDE 40 MG/ML
SOLUTION TOPICAL ONCE
Status: DISCONTINUED | OUTPATIENT
Start: 2022-08-22 | End: 2022-08-23 | Stop reason: HOSPADM

## 2022-08-22 RX ORDER — CLOBETASOL PROPIONATE 0.5 MG/G
OINTMENT TOPICAL ONCE
Status: CANCELLED | OUTPATIENT
Start: 2022-08-22 | End: 2022-08-22

## 2022-08-22 RX ORDER — LIDOCAINE HYDROCHLORIDE 40 MG/ML
SOLUTION TOPICAL ONCE
Status: CANCELLED | OUTPATIENT
Start: 2022-08-22 | End: 2022-08-22

## 2022-08-22 RX ORDER — BACITRACIN, NEOMYCIN, POLYMYXIN B 400; 3.5; 5 [USP'U]/G; MG/G; [USP'U]/G
OINTMENT TOPICAL ONCE
Status: CANCELLED | OUTPATIENT
Start: 2022-08-22 | End: 2022-08-22

## 2022-08-22 RX ORDER — LIDOCAINE 40 MG/G
CREAM TOPICAL ONCE
Status: CANCELLED | OUTPATIENT
Start: 2022-08-22 | End: 2022-08-22

## 2022-08-22 RX ORDER — BETAMETHASONE DIPROPIONATE 0.05 %
OINTMENT (GRAM) TOPICAL ONCE
Status: CANCELLED | OUTPATIENT
Start: 2022-08-22 | End: 2022-08-22

## 2022-08-22 RX ORDER — BACITRACIN ZINC AND POLYMYXIN B SULFATE 500; 1000 [USP'U]/G; [USP'U]/G
OINTMENT TOPICAL ONCE
Status: CANCELLED | OUTPATIENT
Start: 2022-08-22 | End: 2022-08-22

## 2022-08-22 ASSESSMENT — PAIN DESCRIPTION - DESCRIPTORS: DESCRIPTORS: ACHING

## 2022-08-22 ASSESSMENT — PAIN SCALES - GENERAL: PAINLEVEL_OUTOF10: 8

## 2022-08-22 ASSESSMENT — PAIN DESCRIPTION - LOCATION: LOCATION: FOOT

## 2022-08-22 ASSESSMENT — PAIN DESCRIPTION - ORIENTATION: ORIENTATION: LEFT

## 2022-08-22 ASSESSMENT — PAIN DESCRIPTION - ONSET: ONSET: ON-GOING

## 2022-08-22 ASSESSMENT — PAIN - FUNCTIONAL ASSESSMENT: PAIN_FUNCTIONAL_ASSESSMENT: ACTIVITIES ARE NOT PREVENTED

## 2022-08-22 ASSESSMENT — PAIN DESCRIPTION - FREQUENCY: FREQUENCY: CONTINUOUS

## 2022-08-22 ASSESSMENT — PAIN DESCRIPTION - PAIN TYPE: TYPE: ACUTE PAIN

## 2022-08-22 NOTE — DISCHARGE INSTRUCTIONS
[facility] Physician Orders and Discharge Instructions  [address] [address] [address] [address] [facility]. [place]  Telephone: [phone]  NAME:  Fidel Loredo OF BIRTH:  4/4/1929  MEDICAL RECORD NUMBER:  [id number]  DATE: 8/22/22     Return Appointment:  Return Appointment: With Dr. Minh Williamson  in  1 [facility])  [] Return Appointment for a Wound Assessment with the nurse on:     Future Appointments   Date Time Provider Gilbert Frey   8/29/2022 10:15 AM Gianni Hendricks MD [address] [facility]   8/31/2022 10:00 AM Laxmi Ibrahim MD FF VASC/ENDO MMA   9/12/2022  1:30 PM MD J[place]on [place] Card [facility]   9/13/2022  9:45 AM Nay Yañez DO [facility]: [address]:  Phone: [phone]  Fax: [phone]   Medically necessary services: [x] Skilled Nursing [] PT (Eval & Treat) [] OT (Eval & Treat) [] Social Work [] Dietician  [] Other:    Wound care instructions: If you smoke we ask that you refrain from smoking. Smoking inhibits wounds from healing. When taking antibiotics take the entire prescription as ordered. Do not stop taking until medication is all gone unless otherwise instructed. Exercise as tolerated. Keep weight off wounds and reposition every 2 hours if applicable. Do not get wounds wet in the bath or shower unless otherwise instructed by your physician. If your wound is on your foot or leg, you may purchase a cast bag. Please ask at the pharmacy. Wash hands with soap and water prior to and after every dressing change.     [x]Wash wounds with: 0.9% normal saline  [x]Nissa wound Topical Treatments: Do not apply lotions, creams, or ointments to the skin around the wound bed unless directed as followed:   Apply around the wound: Nothing         [x]Wound Location: left lower leg   Apply Primary Dressing to wound: Joleen  Secondary Dressing: 4X4 gauze pad and Conforming roll gauze   Avoid contact of tape with skin if possible. When to change Dressing: DO NOT CHANGE    [x]Wound Location: left groin- treated by Dr. Neida Guardado       [x] Edema Control:     Elevate leg(s) above the level of the heart for 30 minutes 4-5 times a day and/or when sitting. Avoid prolonged standing in one place. Dietary:  Important dietary reminders:  1. Increase Protein intake (i.e. Lean meats, fish, eggs, legumes, and yogurt)  2. No added salt  3. If diabetic, follow a diabetic diet and check glucose prior to meals or as instructed by your physician. Dietary Supplements(Take twice a day unless instructed otherwise):  [] Meme Bump  [] 30ml ProStat [] Patrice Huge [] Ensure Max/Premier [] Other:    Your nurse  is:  Nathan Cavazos     Electronically signed by Tona Pettit RN on 8/22/2022 at 9:51 AM     [address]: Should you experience any significant changes in your wound(s) or have questions about your wound care, please contact the [address] [place] [address] at [phone]. We are open from 8:00am - 4:30p Monday, Thursday and Friday; 11:00am - 5pm on Tuesday and CLOSED Wednesday. Please give us 24-48 business hours to return your call. Call your doctor now or seek immediate medical care if:    You have symptoms of infection, such as: Increased pain, swelling, warmth, or redness. Red streaks leading from the area. Pus draining from the area. A fever.          [] Patient unable to sign Discharge Instructions given to ECF/Transportation/POA

## 2022-08-22 NOTE — PROGRESS NOTES
[address] [facility]  Progress Note and Procedure Note      Rudy Romero  AGE: 80 y.o. GENDER: female  : 1929  EPISODE DATE:  2022      Subjective:     Chief Complaint   Patient presents with    Wound Check     LEFT LOWER LEG             HISTORY of PRESENT ILLNESS HPI     Rudy Romero is a 80 y.o. female who presents today for wound evaluation. History of Wound Context: Patient is now 1 week post TheraSkin allograft application. States she is not having any pain in her leg.   Since her last visit Dr. Megan Ramon has remove the wound VAC from her left groin incision  Wound Pain Timing/Severity: none  Quality of pain: N/A  Severity:  0 / 10   Modifying Factors: None  Associated Signs/Symptoms: edema    Wound Identification:  Wound Type: arterial  Contributing Factors: edema, venous stasis, diabetes, and arterial insufficiency        PAST MEDICAL HISTORY        Diagnosis Date    Anxiety     Arthritis     At risk for falls     CAD (coronary artery disease)     Cerebral artery occlusion with cerebral infarction ([address]     TIA--no residual effects    DDD (degenerative disc disease), cervical     Fractures     (L) Hip Fx 98, L1 fracture from fall 10-31-06    Hyperlipidemia     Hypertension     Mitral regurgitation     Neuropathy     Osteopenia     Osteoporosis     PAD (peripheral artery disease) ([address]     Right LE ischemia    Peripheral neuropathy 2015    Peripheral vascular disease (HCC)     bilateral lower extremities with edema    Podagra 2017    Dr. Dayron Quigley    Prolonged emergence from general anesthesia     Spinal stenosis     L4-5    Type 2 diabetes mellitus ([address]     Urethral stricture 5 years ago    Urgency of urination        PAST SURGICAL HISTORY    Past Surgical History:   Procedure Laterality Date    ANGIOPLASTY Left 2022    Left SFA    APPENDECTOMY      CARPAL TUNNEL RELEASE Right 2019    RIGHT CARPAL TUNNEL RELEASE AND RIGHT MIDDLE FINGER TRIGGER FINGER RELEASE performed by Lucretia Ayala MD at [place] [address]  [id number]    LEFT EYE EYE CATARACT PHACOEMULSIFICATION INTRAOCULAR LENS    CHOLECYSTECTOMY  01/01/1978    COLONOSCOPY  08/31/1999    diverticulosis    EYE SURGERY Bilateral     bilateral cataract removed    FEMORAL BYPASS Left 6/28/2022    LEFT LEG FEMORAL TO POPLITEAL BYPASS WITH INTRAOPERATIVE ANGIOGRAM performed by Rachel Yun MD at [facility] Left 02/14/2022    LEFT COMMON FEMORAL ARTERY ENDARTERECTOMY performed by Rachel Yun MD at [facility] (CERVIX STATUS UNKNOWN)  [address] [place] [address]    IR FEMORAL POPLITEAL BYPASS GRAFT Right 08/31/2015    KYPHOSIS SURGERY  11/07/2006    L1, (fractured after a fall)    LEG SURGERY Left 02/14/2022    LEFT LEG WOUND DEBRIDEMENT performed by Rachel Yun MD at [place] [address] N/A 7/7/2022    INCISION AND DRAINAGE OF LEFT SIDE GROIN WITH PLACEMENT OF WOUND VAC performed by Rachel Yun MD at [facility]  04/25/1998    (L) THR    WRIST FRACTURE SURGERY  01/01/2008    left wrist       FAMILY HISTORY    Family History   Problem Relation Age of Onset    Cancer Mother 43    Stroke Father     Hypertension Father        SOCIAL HISTORY    Social History     Tobacco Use    Smoking status: Never    Smokeless tobacco: Never   Vaping Use    Vaping Use: Never used   Substance Use Topics    Alcohol use: No    Drug use: Never       ALLERGIES    Allergies   Allergen Reactions    Aricept [Donepezil Hydrochloride] Other (See Comments)     Pt unable to recall reaction    Doxycycline Nausea Only    Ketorolac Tromethamine Other (See Comments)     Pt unable to recall reaction       MEDICATIONS    Current Outpatient Medications on File Prior to Encounter   Medication Sig Dispense Refill    sulfamethoxazole-trimethoprim (BACTRIM;SEPTRA) 400-80 MG per tablet Take 1 tablet by mouth in the morning and 1 tablet before bedtime. Per Dr Brenden Luna. Probiotic Product (CULTURELLE PROBIOTICS PO) Take 1 tablet by mouth daily (Patient not taking: Reported on 8/22/2022)      furosemide (LASIX) 20 MG tablet TAKE ONE TABLET BY MOUTH DAILY 90 tablet 1    glimepiride (AMARYL) 1 MG tablet TAKE ONE TABLET BY MOUTH EVERY MORNING BEFORE BREAKFAST 30 tablet 2    gabapentin (NEURONTIN) 100 MG capsule Take 1 capsule by mouth at bedtime for 90 days. 90 capsule 0    ondansetron (ZOFRAN-ODT) 4 MG disintegrating tablet Take 1 tablet by mouth every 6 hours as needed for Nausea or Vomiting (Patient not taking: Reported on 8/22/2022) 30 tablet 2    blood glucose test strips (ONETOUCH ULTRA) strip USE TO TEST BLOOD SUGAR TWICE DAILY 100 strip 1    metFORMIN (GLUCOPHAGE) 500 MG tablet TAKE ONE TABLET BY MOUTH DAILY 90 tablet 3    traMADol (ULTRAM) 50 MG tablet Take 25 mg by mouth every 6 hours as needed for Pain. (Patient not taking: Reported on 8/22/2022)      acetaminophen (TYLENOL) 325 MG tablet Take 650 mg by mouth every 6 hours as needed for Pain      hydrOXYzine (VISTARIL) 25 MG capsule Take 1 capsule by mouth daily as needed for Anxiety 30 capsule 0    atorvastatin (LIPITOR) 10 MG tablet TAKE ONE TABLET BY MOUTH DAILY 30 tablet 3    Lite Touch Lancets MISC Use twice daily when testing blood sugars. 100 each 5    Wound Cleansers (VASHE CLEANSING) SOLN Soak vashe on gauze pad and place on wound for 5-10 minutes 1 each 2    lisinopril (PRINIVIL;ZESTRIL) 20 MG tablet TAKE ONE TABLET BY MOUTH DAILY 90 tablet 3    Turmeric 450 MG CAPS Take 450 mg by mouth daily      Cholecalciferol (VITAMIN D3) 2000 UNITS CAPS Take 1 capsule by mouth daily      aspirin EC 81 MG EC tablet Take 1 tablet by mouth daily 30 tablet 3     No current facility-administered medications on file prior to encounter. REVIEW OF SYSTEMS    Pertinent items are noted in HPI.       Objective:      BP (!) 174/71   Pulse 66   Temp 97.6 °F (36.4 °C) (Temporal)   Resp 16     PHYSICAL EXAM    General Appearance: alert and oriented to person, place and time and in no acute distress  Extremities: no cyanosis, no clubbing, minimal + edema-  left lower extremity, and presence of allograft overlying left anterior knee wound. Periwound is dry        Assessment:     1. Peripheral artery disease ([address]    2. Wound of left lower extremity, subsequent encounter    3. Wound of left lower extremity, initial encounter          Wound Measurements:  Wound 11/22/21 Leg Left; Lower;Distal #1 (Active)   Wound Image   08/01/22 0938   Wound Etiology Arterial 11/22/21 0924   Wound Cleansed Cleansed with saline 08/22/22 0931   Dressing/Treatment Collagen with Ag 08/22/22 0947   Wound Length (cm) 2.9 cm 08/22/22 0931   Wound Width (cm) 2.1 cm 08/22/22 0931   Wound Depth (cm) 0.1 cm 08/22/22 0931   Wound Surface Area (cm^2) 6.09 cm^2 08/22/22 0931   Change in Wound Size % (l*w) -117.5 08/22/22 0931   Wound Volume (cm^3) 0.609 cm^3 08/22/22 0931   Wound Healing % -118 08/22/22 0931   Post-Procedure Length (cm) 2.9 cm 08/22/22 0947   Post-Procedure Width (cm) 2.1 cm 08/22/22 0947   Post-Procedure Depth (cm) 0.1 cm 08/22/22 0947   Post-Procedure Surface Area (cm^2) 6.09 cm^2 08/22/22 0947   Post-Procedure Volume (cm^3) 0.609 cm^3 08/22/22 0947   Distance Tunneling (cm) 0 cm 08/15/22 0932   Tunneling Position ___ O'Clock 0 07/11/22 0943   Undermining Starts ___ O'Clock 0 07/11/22 0943   Undermining Ends___ O'Clock 0 07/11/22 0943   Undermining Maxium Distance (cm) 0 08/15/22 0932   Wound Assessment Graft 08/22/22 0931   Drainage Amount Small 08/22/22 0931   Drainage Description Serosanguinous 08/22/22 0931   Odor None 08/22/22 0931   Nissa-wound Assessment Intact;Fragile 08/22/22 0931   Margins Defined edges 08/22/22 0931   Wound Thickness Description not for Pressure Injury Full thickness 08/22/22 0931   Number of days: 273       Wound 07/11/22 Left #2 groin (Active)   Wound Image   07/11/22 0943   Wound Cleansed Cleansed with saline 07/18/22 0950 Dressing/Treatment Moist to dry 07/18/22 1100   Wound Length (cm) 2.8 cm 07/18/22 0950   Wound Width (cm) 3.5 cm 07/18/22 0950   Wound Depth (cm) 0.5 cm 07/18/22 0950   Wound Surface Area (cm^2) 9.8 cm^2 07/18/22 0950   Change in Wound Size % (l*w) -8.89 07/18/22 0950   Wound Volume (cm^3) 4.9 cm^3 07/18/22 0950   Wound Healing % 64 07/18/22 0950   Post-Procedure Length (cm) 2.9 cm 07/18/22 1020   Post-Procedure Width (cm) 2.6 cm 07/18/22 1020   Post-Procedure Depth (cm) 0.7 cm 07/18/22 1020   Post-Procedure Surface Area (cm^2) 7.54 cm^2 07/18/22 1020   Post-Procedure Volume (cm^3) 5.278 cm^3 07/18/22 1020   Distance Tunneling (cm) 0 cm 07/18/22 0950   Tunneling Position ___ O'Clock 0 07/11/22 0943   Undermining Starts ___ O'Clock 9:00 07/18/22 0950   Undermining Ends___ O'Clock 11;00 07/18/22 0950   Undermining Maxium Distance (cm) 0 07/18/22 0950   Wound Assessment [place]/red;[place] 07/18/22 0950   Drainage Amount Moderate 07/18/22 0950   Drainage Description Serosanguinous 07/18/22 0950   Odor None 07/18/22 0950   Nissa-wound Assessment Fragile 07/18/22 0950   Margins Defined edges 07/18/22 0950   Wound Thickness Description not for Pressure Injury Full thickness 07/18/22 0950   Number of days: 42          Plan:     Treatment Note please see attached Discharge Instructions    New Prescriptions    No medications on file       Orders Placed This Encounter   Procedures    Initiate Outpatient Wound Care Protocol       Written patient dismissal instructions given to patient and signed by patient or POA. Discharge [facility] Physician Orders and Discharge Instructions  [address] [place] [address]  Telephone: [phone]  NAME:  Vandana Loja  YOB: 1929  MEDICAL RECORD NUMBER:  [id number]  DATE: 8/22/22     Return Appointment:  Return Appointment: With Dr. Minh Williamson  in  [address] [place] [address])  [] Return Appointment for a Wound Assessment with the nurse on:     Future Appointments   Date Time Provider Gilbert Mauroi   8/29/2022 10:15 AM MD Sukhdev Bryant   8/31/2022 10:00 AM Lenny Bone MD FF VASC/ENDO [facility]   9/12/2022  1:30 PM Elvira Deras MD [place] [facility]   9/13/2022  9:45 AM Piper Dubois  [facility]: [address]:  Phone: [phone]  Fax: [phone]   Medically necessary services: [x] Skilled Nursing [] PT (Eval & Treat) [] OT (Eval & Treat) [] Social Work [] Dietician  [] Other:    Wound care instructions: If you smoke we ask that you refrain from smoking. Smoking inhibits wounds from healing. When taking antibiotics take the entire prescription as ordered. Do not stop taking until medication is all gone unless otherwise instructed. Exercise as tolerated. Keep weight off wounds and reposition every 2 hours if applicable. Do not get wounds wet in the bath or shower unless otherwise instructed by your physician. If your wound is on your foot or leg, you may purchase a cast bag. Please ask at the pharmacy. Wash hands with soap and water prior to and after every dressing change. [x]Wash wounds with: 0.9% normal saline  [x]Nissa wound Topical Treatments: Do not apply lotions, creams, or ointments to the skin around the wound bed unless directed as followed:   Apply around the wound: Nothing         [x]Wound Location: left lower leg   Apply Primary Dressing to wound: Joleen  Secondary Dressing: 4X4 gauze pad and Conforming roll gauze   Avoid contact of tape with skin if possible. When to change Dressing: DO NOT CHANGE    [x]Wound Location: left groin- treated by Dr. Praveen Marte       [x] Edema Control:     Elevate leg(s) above the level of the heart for 30 minutes 4-5 times a day and/or when sitting. Avoid prolonged standing in one place. Dietary:  Important dietary reminders:  1.  Increase Protein intake (i.e. Lean meats, fish, eggs, legumes, and yogurt)  2. No added salt  3. If diabetic, follow a diabetic diet and check glucose prior to meals or as instructed by your physician. Dietary Supplements(Take twice a day unless instructed otherwise):  [] Mari   [] 30ml ProStat [] Vallarie Miser [] Ensure Max/Premier [] Other:    Your nurse  is:  Mary Ly     Electronically signed by Merlinda Clay, RN on 8/22/2022 at 9:51 AM     [address]: Should you experience any significant changes in your wound(s) or have questions about your wound care, please contact the [address] [place] [address] at [phone]. We are open from 8:00am - 4:30p Monday, Thursday and Friday; 11:00am - 5pm on Tuesday and CLOSED Wednesday. Please give us 24-48 business hours to return your call. Call your doctor now or seek immediate medical care if:    You have symptoms of infection, such as: Increased pain, swelling, warmth, or redness. Red streaks leading from the area. Pus draining from the area. A fever.          [] Patient unable to sign Discharge Instructions given to ECF/Transportation/POA             Electronically signed by Thea Vera MD on 8/22/2022 at 11:00 AM

## 2022-08-23 ENCOUNTER — CARE COORDINATION (OUTPATIENT)
Dept: CARE COORDINATION | Age: 87
End: 2022-08-23

## 2022-08-23 LAB
AFB CULTURE (MYCOBACTERIA): NORMAL
AFB SMEAR: NORMAL

## 2022-08-24 RX ORDER — M-VIT,TX,IRON,MINS/CALC/FOLIC 27MG-0.4MG
1 TABLET ORAL DAILY
COMMUNITY

## 2022-08-24 NOTE — CARE COORDINATION
Ambulatory Care Coordination Note  8/24/2022    ACC: Tara Pop RN    Summary Note: call to patient  [address] Monday for 1 wound   Home wound care twice daily dressing changes , I dressing once daily  Not taking lisinopril , states someone stopped it, not sure who,\    \"No more cutting on me\"    PLAN  Lisinopril? Should she be taking? [address] cont  Home care for wound dressings changes        Lab Results       None            Care Coordination Interventions    Referral from Primary Care Provider: No  Suggested Interventions and Community Resources  Other Services or Interventions: caregiver daily          Goals Addressed    None         Prior to Admission medications    Medication Sig Start Date End Date Taking? Authorizing Provider   Multiple Vitamins-Minerals (THERAPEUTIC MULTIVITAMIN-MINERALS) tablet Take 1 tablet by mouth daily   Yes Historical Provider, MD   sulfamethoxazole-trimethoprim (BACTRIM;SEPTRA) 400-80 MG per tablet Take 1 tablet by mouth in the morning and 1 tablet before bedtime. Per Dr Denver Hernandez. Yes Historical Provider, MD   furosemide (LASIX) 20 MG tablet TAKE ONE TABLET BY MOUTH DAILY 7/29/22  Yes Jaxon Guerra MD   glimepiride (AMARYL) 1 MG tablet TAKE ONE TABLET BY MOUTH EVERY MORNING BEFORE BREAKFAST 7/29/22  Yes Paulo Forman DO   blood glucose test strips (ONETOUCH ULTRA) strip USE TO TEST BLOOD SUGAR TWICE DAILY 7/25/22  Yes Paulo Forman DO   metFORMIN (GLUCOPHAGE) 500 MG tablet TAKE ONE TABLET BY MOUTH DAILY 6/28/22  Yes Paulo Forman DO   acetaminophen (TYLENOL) 325 MG tablet Take 650 mg by mouth every 6 hours as needed for Pain   Yes Historical Provider, MD   atorvastatin (LIPITOR) 10 MG tablet TAKE ONE TABLET BY MOUTH DAILY 4/25/22  Yes Paulo Forman DO   Lite Touch Lancets MISC Use twice daily when testing blood sugars.  3/28/22  Yes Paulo Forman DO   Wound Cleansers (VASHE CLEANSING) SOLN Soak vashe on gauze pad and place on wound for 5-10 minutes 3/10/22  Yes Obi LONDON Conner Navas MD   Turmeric 450 MG CAPS Take 450 mg by mouth daily   Yes Historical Provider, MD   Cholecalciferol (VITAMIN D3) 2000 UNITS CAPS Take 1 capsule by mouth daily   Yes Historical Provider, MD   aspirin EC 81 MG EC tablet Take 1 tablet by mouth daily 5/13/15  Yes Paulo Forman DO   Probiotic Product (CULTURELLE PROBIOTICS PO) Take 1 tablet by mouth daily  Patient not taking: No sig reported    Historical Provider, MD   gabapentin (NEURONTIN) 100 MG capsule Take 1 capsule by mouth at bedtime for 90 days. 7/26/22 10/24/22  Paulo Forman DO   ondansetron (ZOFRAN-ODT) 4 MG disintegrating tablet Take 1 tablet by mouth every 6 hours as needed for Nausea or Vomiting  Patient not taking: No sig reported 7/26/22   Paulo Forman DO   traMADol (ULTRAM) 50 MG tablet Take 25 mg by mouth every 6 hours as needed for Pain.     Historical Provider, MD   hydrOXYzine (VISTARIL) 25 MG capsule Take 1 capsule by mouth daily as needed for Anxiety  Patient not taking: Reported on 8/24/2022 4/26/22   Paulo Gregg DO   lisinopril (PRINIVIL;ZESTRIL) 20 MG tablet TAKE ONE TABLET BY MOUTH DAILY  Patient not taking: Reported on 8/24/2022 3/1/22   Ashish Goldsmith DO       Future Appointments   Date Time Provider Gilbert Frey   8/29/2022 10:15 AM Bernabe Vergara MD  [facility]   8/31/2022 10:00 AM Nina Mendoza MD FF VASC/ENDO [facility]   9/12/2022  1:30 PM MD Taylor Mccray [facility]   9/13/2022  9:45 AM DO OCTAVIO Lozada - [facility]

## 2022-08-24 NOTE — TELEPHONE ENCOUNTER
We should base that decision based on her blood pressure readings. How is her blood pressure running?

## 2022-08-25 NOTE — TELEPHONE ENCOUNTER
Left a message for scot, we need to know if she is in agreement to start taking lisinopril 2. 5? If she says yes, this needs to be teed up and sent over to Dr. Rocael Arreola to get this sent in for her to start.

## 2022-08-25 NOTE — TELEPHONE ENCOUNTER
I do not want her on the same dose that she was. I would start at a very low dose of lisinopril 2.5 mg daily.   We need to be very cautious

## 2022-08-25 NOTE — CARE COORDINATION
Spoke w Erin Verdugo  Reviewed BP readings over past week taken at home:    140/65  120/60 nurse  170/ 73 at drs office -w wound and she was upset  132/68  125/73  139/68  120/57    Has checked their machine w nurse's and they are callibrated. Reports not certain about 1 pill in box. She is agreeable to starting Lisinopril at lower dose.       Call to caregiver, Deepakalexis Evelin  Reviewed med list/ confirmed above   She reports BP still on low side mostly, the above readings reviewed and confirmed

## 2022-08-29 ENCOUNTER — HOSPITAL ENCOUNTER (OUTPATIENT)
Dept: WOUND CARE | Age: 87
Discharge: HOME | End: 2022-08-29
Payer: MEDICARE

## 2022-08-29 VITALS
DIASTOLIC BLOOD PRESSURE: 73 MMHG | SYSTOLIC BLOOD PRESSURE: 164 MMHG | TEMPERATURE: 97.5 F | HEART RATE: 59 BPM | RESPIRATION RATE: 16 BRPM

## 2022-08-29 DIAGNOSIS — I73.9 PERIPHERAL ARTERY DISEASE (HCC): Primary | ICD-10-CM

## 2022-08-29 DIAGNOSIS — S81.802A WOUND OF LEFT LOWER EXTREMITY, INITIAL ENCOUNTER: ICD-10-CM

## 2022-08-29 DIAGNOSIS — S81.802D WOUND OF LEFT LOWER EXTREMITY, SUBSEQUENT ENCOUNTER: ICD-10-CM

## 2022-08-29 PROCEDURE — 99212 OFFICE O/P EST SF 10 MIN: CPT

## 2022-08-29 PROCEDURE — 99212 OFFICE O/P EST SF 10 MIN: CPT | Performed by: SPECIALIST

## 2022-08-29 PROCEDURE — 6370000000 HC RX 637 (ALT 250 FOR IP): Performed by: SPECIALIST

## 2022-08-29 RX ORDER — LIDOCAINE HYDROCHLORIDE 40 MG/ML
SOLUTION TOPICAL ONCE
Status: CANCELLED | OUTPATIENT
Start: 2022-08-29 | End: 2022-08-29

## 2022-08-29 RX ORDER — CLOBETASOL PROPIONATE 0.5 MG/G
OINTMENT TOPICAL ONCE
Status: CANCELLED | OUTPATIENT
Start: 2022-08-29 | End: 2022-08-29

## 2022-08-29 RX ORDER — GENTAMICIN SULFATE 1 MG/G
OINTMENT TOPICAL ONCE
Status: CANCELLED | OUTPATIENT
Start: 2022-08-29 | End: 2022-08-29

## 2022-08-29 RX ORDER — LIDOCAINE HYDROCHLORIDE 20 MG/ML
JELLY TOPICAL ONCE
Status: CANCELLED | OUTPATIENT
Start: 2022-08-29 | End: 2022-08-29

## 2022-08-29 RX ORDER — BACITRACIN, NEOMYCIN, POLYMYXIN B 400; 3.5; 5 [USP'U]/G; MG/G; [USP'U]/G
OINTMENT TOPICAL ONCE
Status: CANCELLED | OUTPATIENT
Start: 2022-08-29 | End: 2022-08-29

## 2022-08-29 RX ORDER — LISINOPRIL 10 MG/1
20 TABLET ORAL
COMMUNITY
Start: 2022-06-29 | End: 2022-09-21 | Stop reason: SDUPTHER

## 2022-08-29 RX ORDER — LIDOCAINE 50 MG/G
OINTMENT TOPICAL ONCE
Status: CANCELLED | OUTPATIENT
Start: 2022-08-29 | End: 2022-08-29

## 2022-08-29 RX ORDER — BACITRACIN ZINC AND POLYMYXIN B SULFATE 500; 1000 [USP'U]/G; [USP'U]/G
OINTMENT TOPICAL ONCE
Status: CANCELLED | OUTPATIENT
Start: 2022-08-29 | End: 2022-08-29

## 2022-08-29 RX ORDER — LIDOCAINE 40 MG/G
CREAM TOPICAL ONCE
Status: CANCELLED | OUTPATIENT
Start: 2022-08-29 | End: 2022-08-29

## 2022-08-29 RX ORDER — BETAMETHASONE DIPROPIONATE 0.05 %
OINTMENT (GRAM) TOPICAL ONCE
Status: CANCELLED | OUTPATIENT
Start: 2022-08-29 | End: 2022-08-29

## 2022-08-29 RX ORDER — GINSENG 100 MG
CAPSULE ORAL ONCE
Status: CANCELLED | OUTPATIENT
Start: 2022-08-29 | End: 2022-08-29

## 2022-08-29 RX ORDER — LIDOCAINE HYDROCHLORIDE 40 MG/ML
SOLUTION TOPICAL ONCE
Status: COMPLETED | OUTPATIENT
Start: 2022-08-29 | End: 2022-08-29

## 2022-08-29 RX ADMIN — LIDOCAINE HYDROCHLORIDE: 40 SOLUTION TOPICAL at 10:54

## 2022-08-29 ASSESSMENT — PAIN DESCRIPTION - FREQUENCY: FREQUENCY: INTERMITTENT

## 2022-08-29 ASSESSMENT — PAIN DESCRIPTION - ORIENTATION: ORIENTATION: LEFT

## 2022-08-29 ASSESSMENT — PAIN DESCRIPTION - PAIN TYPE: TYPE: ACUTE PAIN

## 2022-08-29 ASSESSMENT — PAIN SCALES - GENERAL: PAINLEVEL_OUTOF10: 8

## 2022-08-29 ASSESSMENT — PAIN DESCRIPTION - DESCRIPTORS: DESCRIPTORS: ACHING

## 2022-08-29 ASSESSMENT — PAIN DESCRIPTION - LOCATION: LOCATION: FOOT

## 2022-08-29 NOTE — DISCHARGE INSTRUCTIONS
[address] [facility] Physician Orders and Discharge Instructions  [address] [place] [address] [address] [address] [facility]. [place]. [place]  Telephone: [phone]  NAME:  Faythe Lesch OF BIRTH:  4/4/1929  MEDICAL RECORD NUMBER:  [id number]  DATE: 8/29/22     Return Appointment:  Return Appointment: With Dr. Karla Patel  in  [facility])  [] Return Appointment for a Wound Assessment with the nurse on:     Future Appointments   Date Time Provider Gilbert Frey   8/31/2022 10:00 AM Masoud Mcarthur MD FF VASC/ENDO [facility]   9/6/2022  8:15 AM Ignacio Mancini MD [facility]   9/12/2022  8:15 AM Ignacio Mancini MD [facility]   9/12/2022  1:30 PM Elvira Wills MD [facility]   9/13/2022  9:45 AM DO OCTAVIO [facility]ak [place] - [facility]   9/19/2022  8:15 AM Ignacio Mancini MD [address] [place] [address] Road: [address] [place] [address] [place]:  Phone: [phone]  Fax: [phone]   Medically necessary services: [x] Skilled Nursing [] PT (Eval & Treat) [] OT (Eval & Treat) [] Social Work [] Dietician  [] Other:    Wound care instructions: If you smoke we ask that you refrain from smoking. Smoking inhibits wounds from healing. When taking antibiotics take the entire prescription as ordered. Do not stop taking until medication is all gone unless otherwise instructed. Exercise as tolerated. Keep weight off wounds and reposition every 2 hours if applicable. Do not get wounds wet in the bath or shower unless otherwise instructed by your physician. If your wound is on your foot or leg, you may purchase a cast bag. Please ask at the pharmacy. Wash hands with soap and water prior to and after every dressing change.     [x]Wash wounds with: 0.9% normal saline  [x]Nissa wound Topical Treatments: Do not apply lotions, creams, or ointments to the skin around the wound bed unless directed as followed:   Apply around the wound: Nothing         [x]Wound Location: left lower leg   Apply Primary Dressing to wound: Joleen  Secondary Dressing: 4X4 gauze pad and Conforming roll gauze, netting   Avoid contact of tape with skin if possible. When to change Dressing: DO NOT CHANGE    [x]Wound Location: Dr. Shlomo Sheehan managing left groin wound        [x] Edema Control:     Elevate leg(s) above the level of the heart for 30 minutes 4-5 times a day and/or when sitting. Avoid prolonged standing in one place. Dietary:  Important dietary reminders:  1. Increase Protein intake (i.e. Lean meats, fish, eggs, legumes, and yogurt)  2. No added salt  3. If diabetic, follow a diabetic diet and check glucose prior to meals or as instructed by your physician. Dietary Supplements(Take twice a day unless instructed otherwise):  [] Lenora [place]  [] 30ml ProStat [] Zachary Sour [] Ensure Max/Premier [] Other:    Your nurse  is:  Octavia Lora     Electronically signed by Frances Guadalupe RN on 8/29/2022 at 11:32 AM     [address]: Should you experience any significant changes in your wound(s) or have questions about your wound care, please contact the [address] [place] [address] at [phone]. We are open from 8:00am - 4:30p Monday, Thursday and Friday; 11:00am - 5pm on Tuesday and CLOSED Wednesday. Please give us 24-48 business hours to return your call. Call your doctor now or seek immediate medical care if:    You have symptoms of infection, such as: Increased pain, swelling, warmth, or redness. Red streaks leading from the area. Pus draining from the area. A fever.          [] Patient unable to sign Discharge Instructions given to ECF/Transportation/POA

## 2022-08-29 NOTE — PROGRESS NOTES
[address] [facility]  Progress Note and Procedure Note      Marin Huang  AGE: 80 y.o. GENDER: female  : 1929  EPISODE DATE:  2022      Subjective:     Chief Complaint   Patient presents with    Wound Check     Left lower leg         HISTORY of PRESENT ILLNESS HPI     Marin Huang is a 80 y.o. female who presents today for wound evaluation. History of Wound Context: Patient is now 2 weeks post TheraSkin allograft application to left knee wound. States she is not having any pain in her leg.   Since her last visit Dr. Denver Hernandez has remove the wound VAC from her left groin incision  Wound Pain Timing/Severity: none  Quality of pain: N/A  Severity:  0 / 10   Modifying Factors: None  Associated Signs/Symptoms: edema    Wound Identification:  Wound Type: arterial  Contributing Factors: edema, venous stasis, diabetes, and arterial insufficiency        PAST MEDICAL HISTORY        Diagnosis Date    Anxiety     Arthritis     At risk for falls     CAD (coronary artery disease)     Cerebral artery occlusion with cerebral infarction ([address]     TIA--no residual effects    DDD (degenerative disc disease), cervical     Fractures     (L) Hip Fx 98, L1 fracture from fall 10-31-06    Hyperlipidemia     Hypertension     Mitral regurgitation     Neuropathy     Osteopenia     Osteoporosis     PAD (peripheral artery disease) ([address]     Right LE ischemia    Peripheral neuropathy 2015    Peripheral vascular disease (HCC)     bilateral lower extremities with edema    Podagra 2017    Dr. Nabor Chamberlain    Prolonged emergence from general anesthesia     Spinal stenosis     L4-5    Type 2 diabetes mellitus ([address]     Urethral stricture 5 years ago    Urgency of urination        PAST SURGICAL HISTORY    Past Surgical History:   Procedure Laterality Date    ANGIOPLASTY Left 2022    Left SFA    APPENDECTOMY      CARPAL TUNNEL RELEASE Right 2019    RIGHT CARPAL TUNNEL RELEASE AND RIGHT MIDDLE FINGER TRIGGER FINGER RELEASE performed by Vish Beasley MD at [facility]  [id number]    LEFT EYE EYE CATARACT PHACOEMULSIFICATION INTRAOCULAR LENS    CHOLECYSTECTOMY  01/01/1978    COLONOSCOPY  08/31/1999    diverticulosis    EYE SURGERY Bilateral     bilateral cataract removed    FEMORAL BYPASS Left 6/28/2022    LEFT LEG FEMORAL TO POPLITEAL BYPASS WITH INTRAOPERATIVE ANGIOGRAM performed by Mkcay Holland MD at [facility] 2 Left 02/14/2022    LEFT COMMON FEMORAL ARTERY ENDARTERECTOMY performed by Mckay Holland MD at [address] [facility] (CERVIX STATUS UNKNOWN)  [address] [place] [address]    IR FEMORAL POPLITEAL BYPASS GRAFT Right 08/31/2015    KYPHOSIS SURGERY  11/07/2006    L1, (fractured after a fall)    LEG SURGERY Left 02/14/2022    LEFT LEG WOUND DEBRIDEMENT performed by Mckay Holland MD at [place] [address] N/A 7/7/2022    INCISION AND DRAINAGE OF LEFT SIDE GROIN WITH PLACEMENT OF WOUND VAC performed by Mckay Holland MD at [address] [facility]  04/25/1998    (L) THR    WRIST FRACTURE SURGERY  01/01/2008    left wrist       FAMILY HISTORY    Family History   Problem Relation Age of Onset    Cancer Mother 43    Stroke Father     Hypertension Father        SOCIAL HISTORY    Social History     Tobacco Use    Smoking status: Never    Smokeless tobacco: Never   Vaping Use    Vaping Use: Never used   Substance Use Topics    Alcohol use: No    Drug use: Never       ALLERGIES    Allergies   Allergen Reactions    Aricept [Donepezil Hydrochloride] Other (See Comments)     Pt unable to recall reaction    Doxycycline Nausea Only    Ketorolac Tromethamine Other (See Comments)     Pt unable to recall reaction       MEDICATIONS    Current Outpatient Medications on File Prior to Encounter   Medication Sig Dispense Refill    lisinopril (PRINIVIL;ZESTRIL) 10 MG tablet 20 mg      Multiple Vitamins-Minerals (THERAPEUTIC MULTIVITAMIN-MINERALS) tablet Take 1 tablet by mouth daily      sulfamethoxazole-trimethoprim (BACTRIM;SEPTRA) 400-80 MG per tablet Take 1 tablet by mouth in the morning and 1 tablet before bedtime. Per Dr Martita Alfaro.      furosemide (LASIX) 20 MG tablet TAKE ONE TABLET BY MOUTH DAILY 90 tablet 1    glimepiride (AMARYL) 1 MG tablet TAKE ONE TABLET BY MOUTH EVERY MORNING BEFORE BREAKFAST 30 tablet 2    blood glucose test strips (ONETOUCH ULTRA) strip USE TO TEST BLOOD SUGAR TWICE DAILY 100 strip 1    metFORMIN (GLUCOPHAGE) 500 MG tablet TAKE ONE TABLET BY MOUTH DAILY 90 tablet 3    traMADol (ULTRAM) 50 MG tablet Take 25 mg by mouth every 6 hours as needed for Pain. acetaminophen (TYLENOL) 325 MG tablet Take 650 mg by mouth every 6 hours as needed for Pain      atorvastatin (LIPITOR) 10 MG tablet TAKE ONE TABLET BY MOUTH DAILY 30 tablet 3    Lite Touch Lancets MISC Use twice daily when testing blood sugars. 100 each 5    Wound Cleansers (VASHE CLEANSING) SOLN Soak vashe on gauze pad and place on wound for 5-10 minutes 1 each 2    Turmeric 450 MG CAPS Take 450 mg by mouth daily      Cholecalciferol (VITAMIN D3) 2000 UNITS CAPS Take 1 capsule by mouth daily      aspirin EC 81 MG EC tablet Take 1 tablet by mouth daily 30 tablet 3     No current facility-administered medications on file prior to encounter. REVIEW OF SYSTEMS    Pertinent items are noted in HPI. Objective:      BP (!) 164/73   Pulse 59   Temp 97.5 °F (36.4 °C) (Temporal)   Resp 16     PHYSICAL EXAM    Extremities: no cyanosis, no clubbing, minimal + edema-  left lower extremity, and entire TheraSkin allograft remains in place overlying left anterior knee wound. Periwound is dry        Assessment:     1. Peripheral artery disease ([address]    2. Wound of left lower extremity, subsequent encounter    3. Wound of left lower extremity, initial encounter          Wound Measurements:  Wound 11/22/21 Leg Left; Lower;Distal #1 (Active)   Wound Image   08/01/22 0915   Wound Etiology Arterial (cm^2) 7.54 cm^2 07/18/22 1020   Post-Procedure Volume (cm^3) 5.278 cm^3 07/18/22 1020   Distance Tunneling (cm) 0 cm 07/18/22 0950   Tunneling Position ___ O'Clock 0 07/11/22 0943   Undermining Starts ___ O'Clock 9:00 07/18/22 0950   Undermining Ends___ O'Clock 11;00 07/18/22 0950   Undermining Maxium Distance (cm) 0 07/18/22 0950   Wound Assessment [place]/red;[place] 07/18/22 0950   Drainage Amount Moderate 07/18/22 0950   Drainage Description Serosanguinous 07/18/22 0950   Odor None 07/18/22 0950   Nissa-wound Assessment Fragile 07/18/22 0950   Margins Defined edges 07/18/22 0950   Wound Thickness Description not for Pressure Injury Full thickness 07/18/22 0950   Number of days: 49          Plan:     Treatment Note please see attached Discharge Instructions    New Prescriptions    No medications on file       Orders Placed This Encounter   Procedures    Initiate Outpatient Wound Care Protocol       Written patient dismissal instructions given to patient and signed by patient or POA. Discharge [facility] Physician Orders and Discharge Instructions  [address] [place] [address]  Telephone: [phone]  NAME:  Angela Khoury OF BIRTH:  4/4/1929  MEDICAL RECORD NUMBER:  [id number]  DATE: 8/29/22     Return Appointment:  Return Appointment: With Dr. Thad Escobar  in  [facility])  [] Return Appointment for a Wound Assessment with the nurse on:     Future Appointments   Date Time Provider Gilbert Frey   8/31/2022 10:00 AM Kevin Espinosa MD [facility] VASC/ENDO [facility]   9/6/2022  8:15 AM MD Ivania Gutierres Bowels WOUND [facility]   9/12/2022  8:15 AM MD Ivania Gutierres Bowels WOUND [facility]   9/12/2022  1:30 PM Abe Vale MD [place] [facility]   9/13/2022  9:45 AM DO OCTAVIO Michaels  [place] - DYD   9/19/2022  8:15 AM Katja Toussaint MD [address] [place] [address] Road: [facility] Care:  Phone: [phone]  Fax: [phone]   Medically necessary services: [x] Skilled Nursing [] PT (Eval & Treat) [] OT (Eval & Treat) [] Social Work [] Dietician  [] Other:    Wound care instructions: If you smoke we ask that you refrain from smoking. Smoking inhibits wounds from healing. When taking antibiotics take the entire prescription as ordered. Do not stop taking until medication is all gone unless otherwise instructed. Exercise as tolerated. Keep weight off wounds and reposition every 2 hours if applicable. Do not get wounds wet in the bath or shower unless otherwise instructed by your physician. If your wound is on your foot or leg, you may purchase a cast bag. Please ask at the pharmacy. Wash hands with soap and water prior to and after every dressing change. [x]Wash wounds with: 0.9% normal saline  [x]Nissa wound Topical Treatments: Do not apply lotions, creams, or ointments to the skin around the wound bed unless directed as followed:   Apply around the wound: Nothing         [x]Wound Location: left lower leg   Apply Primary Dressing to wound: Joleen  Secondary Dressing: 4X4 gauze pad and Conforming roll gauze, netting   Avoid contact of tape with skin if possible. When to change Dressing: DO NOT CHANGE    [x]Wound Location: Dr. Todd Bynum managing left groin wound        [x] Edema Control:     Elevate leg(s) above the level of the heart for 30 minutes 4-5 times a day and/or when sitting. Avoid prolonged standing in one place. Dietary:  Important dietary reminders:  1. Increase Protein intake (i.e. Lean meats, fish, eggs, legumes, and yogurt)  2. No added salt  3. If diabetic, follow a diabetic diet and check glucose prior to meals or as instructed by your physician.     Dietary Supplements(Take twice a day unless instructed otherwise):  [] Leisa Pulse  [] 30ml ProStat [] Karyle Denver [] Ensure Max/Premier [] Other:    Your nurse  is:  Rajeev Charles     Electronically signed by Rajeev Charles Jarrod Jones on 8/29/2022 at 11:32 AM     [address] [facility] [address] Information: Should you experience any significant changes in your wound(s) or have questions about your wound care, please contact the [address] [place] [address] at [phone]. We are open from 8:00am - 4:30p Monday, Thursday and Friday; 11:00am - 5pm on Tuesday and CLOSED Wednesday. Please give us 24-48 business hours to return your call. Call your doctor now or seek immediate medical care if:    You have symptoms of infection, such as: Increased pain, swelling, warmth, or redness. Red streaks leading from the area. Pus draining from the area. A fever.          [] Patient unable to sign Discharge Instructions given to ECF/Transportation/POA             Electronically signed by Nirmal Leavitt MD on 8/29/2022 at 1:45 PM

## 2022-08-31 ENCOUNTER — OFFICE VISIT (OUTPATIENT)
Dept: VASCULAR SURGERY | Age: 87
End: 2022-08-31

## 2022-08-31 ENCOUNTER — TELEPHONE (OUTPATIENT)
Dept: INTERNAL MEDICINE CLINIC | Age: 87
End: 2022-08-31

## 2022-08-31 VITALS — HEIGHT: 60 IN | WEIGHT: 93 LBS | BODY MASS INDEX: 18.26 KG/M2

## 2022-08-31 DIAGNOSIS — I70.299 ATHEROSCLEROSIS OF ARTERY OF EXTREMITY WITH ULCERATION (HCC): Primary | ICD-10-CM

## 2022-08-31 DIAGNOSIS — L97.909 ATHEROSCLEROSIS OF ARTERY OF EXTREMITY WITH ULCERATION (HCC): Primary | ICD-10-CM

## 2022-08-31 DIAGNOSIS — T82.7XXA VASCULAR DEVICE, IMPLANT, OR GRAFT INFECTION OR INFLAMMATION, INITIAL ENCOUNTER (HCC): ICD-10-CM

## 2022-08-31 DIAGNOSIS — T81.31XA POSTOPERATIVE WOUND DEHISCENCE, INITIAL ENCOUNTER: ICD-10-CM

## 2022-08-31 PROCEDURE — 99024 POSTOP FOLLOW-UP VISIT: CPT | Performed by: SURGERY

## 2022-08-31 RX ORDER — SULFAMETHOXAZOLE AND TRIMETHOPRIM 400; 80 MG/1; MG/1
1 TABLET ORAL 2 TIMES DAILY
Qty: 60 TABLET | Refills: 2 | Status: SHIPPED | OUTPATIENT
Start: 2022-08-31 | End: 2022-10-05 | Stop reason: SDUPTHER

## 2022-08-31 NOTE — PROGRESS NOTES
OV for L groin wound complication and S/P office I&D of proximal thigh incisional lymphocele. Thigh wound small with slight drainage (change pad once a day). [facility] at [facility]    EXAM:  L groin wound - excellent granulation, sealed deep without graft exposure; no erythema or purulence  Wound superficial and flush with skin. Upper L thigh incision flat with small draining opening. Other slight swelling at incisions. Palpable graft pulse with excellent doppler signals    A/P: S/P L fem-pop bypass with femoral replacement. 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 Overall improved. Continue Bactrim - lower dose for age. Saline wet to dry BID groin wound with dry dressing on thigh. Reassured that \"lumpies and bumpies\" will resolve. Eventual CT to reassess graft after wound healed. F/U 2 weeks.

## 2022-08-31 NOTE — TELEPHONE ENCOUNTER
Kirti with [address] is requesting that Dr. Bowie No call her back to provide verbal confirmation that he will continue to sign for patient to have skilled nursing visits on a weekly basis. Pleas contact Kirti at number provided.

## 2022-09-01 ENCOUNTER — TELEPHONE (OUTPATIENT)
Dept: INTERNAL MEDICINE CLINIC | Age: 87
End: 2022-09-01

## 2022-09-01 DIAGNOSIS — E87.1 HYPONATREMIA: Primary | ICD-10-CM

## 2022-09-01 NOTE — TELEPHONE ENCOUNTER
Marian Calle, caregiver, is requesting an order for sodium. She is concerned it may be too high. She would like to bring Luo Sails over today to have this done. Please contact [email or web address] to advise.

## 2022-09-06 ENCOUNTER — CARE COORDINATION (OUTPATIENT)
Dept: CARE COORDINATION | Age: 87
End: 2022-09-06

## 2022-09-06 ENCOUNTER — HOSPITAL ENCOUNTER (OUTPATIENT)
Dept: WOUND CARE | Age: 87
Discharge: HOME | End: 2022-09-06
Payer: MEDICARE

## 2022-09-06 VITALS
SYSTOLIC BLOOD PRESSURE: 174 MMHG | TEMPERATURE: 97.1 F | RESPIRATION RATE: 16 BRPM | DIASTOLIC BLOOD PRESSURE: 77 MMHG | HEART RATE: 84 BPM

## 2022-09-06 DIAGNOSIS — S81.802A WOUND OF LEFT LOWER EXTREMITY, INITIAL ENCOUNTER: ICD-10-CM

## 2022-09-06 DIAGNOSIS — I73.9 PERIPHERAL ARTERY DISEASE (HCC): Primary | ICD-10-CM

## 2022-09-06 DIAGNOSIS — S81.802D WOUND OF LEFT LOWER EXTREMITY, SUBSEQUENT ENCOUNTER: ICD-10-CM

## 2022-09-06 PROCEDURE — 15271 SKIN SUB GRAFT TRNK/ARM/LEG: CPT | Performed by: SPECIALIST

## 2022-09-06 PROCEDURE — 11042 DBRDMT SUBQ TIS 1ST 20SQCM/<: CPT

## 2022-09-06 PROCEDURE — 6370000000 HC RX 637 (ALT 250 FOR IP): Performed by: SPECIALIST

## 2022-09-06 PROCEDURE — 15271 SKIN SUB GRAFT TRNK/ARM/LEG: CPT

## 2022-09-06 RX ORDER — GINSENG 100 MG
CAPSULE ORAL ONCE
Status: CANCELLED | OUTPATIENT
Start: 2022-09-06 | End: 2022-09-06

## 2022-09-06 RX ORDER — LIDOCAINE HYDROCHLORIDE 20 MG/ML
JELLY TOPICAL ONCE
Status: CANCELLED | OUTPATIENT
Start: 2022-09-06 | End: 2022-09-06

## 2022-09-06 RX ORDER — CLOBETASOL PROPIONATE 0.5 MG/G
OINTMENT TOPICAL ONCE
Status: CANCELLED | OUTPATIENT
Start: 2022-09-06 | End: 2022-09-06

## 2022-09-06 RX ORDER — LIDOCAINE 50 MG/G
OINTMENT TOPICAL ONCE
Status: CANCELLED | OUTPATIENT
Start: 2022-09-06 | End: 2022-09-06

## 2022-09-06 RX ORDER — BACITRACIN, NEOMYCIN, POLYMYXIN B 400; 3.5; 5 [USP'U]/G; MG/G; [USP'U]/G
OINTMENT TOPICAL ONCE
Status: CANCELLED | OUTPATIENT
Start: 2022-09-06 | End: 2022-09-06

## 2022-09-06 RX ORDER — GENTAMICIN SULFATE 1 MG/G
OINTMENT TOPICAL ONCE
Status: CANCELLED | OUTPATIENT
Start: 2022-09-06 | End: 2022-09-06

## 2022-09-06 RX ORDER — BETAMETHASONE DIPROPIONATE 0.05 %
OINTMENT (GRAM) TOPICAL ONCE
Status: CANCELLED | OUTPATIENT
Start: 2022-09-06 | End: 2022-09-06

## 2022-09-06 RX ORDER — LIDOCAINE HYDROCHLORIDE 40 MG/ML
SOLUTION TOPICAL ONCE
Status: CANCELLED | OUTPATIENT
Start: 2022-09-06 | End: 2022-09-06

## 2022-09-06 RX ORDER — LIDOCAINE HYDROCHLORIDE 40 MG/ML
SOLUTION TOPICAL ONCE
Status: COMPLETED | OUTPATIENT
Start: 2022-09-06 | End: 2022-09-06

## 2022-09-06 RX ORDER — BACITRACIN ZINC AND POLYMYXIN B SULFATE 500; 1000 [USP'U]/G; [USP'U]/G
OINTMENT TOPICAL ONCE
Status: CANCELLED | OUTPATIENT
Start: 2022-09-06 | End: 2022-09-06

## 2022-09-06 RX ORDER — LIDOCAINE 40 MG/G
CREAM TOPICAL ONCE
Status: CANCELLED | OUTPATIENT
Start: 2022-09-06 | End: 2022-09-06

## 2022-09-06 RX ADMIN — LIDOCAINE HYDROCHLORIDE: 40 SOLUTION TOPICAL at 09:05

## 2022-09-06 ASSESSMENT — PAIN DESCRIPTION - FREQUENCY: FREQUENCY: INTERMITTENT

## 2022-09-06 ASSESSMENT — PAIN DESCRIPTION - PAIN TYPE: TYPE: ACUTE PAIN

## 2022-09-06 ASSESSMENT — PAIN DESCRIPTION - LOCATION: LOCATION: FOOT

## 2022-09-06 ASSESSMENT — PAIN SCALES - GENERAL: PAINLEVEL_OUTOF10: 8

## 2022-09-06 ASSESSMENT — PAIN DESCRIPTION - ONSET: ONSET: ON-GOING

## 2022-09-06 ASSESSMENT — PAIN DESCRIPTION - ORIENTATION: ORIENTATION: LEFT

## 2022-09-06 ASSESSMENT — PAIN DESCRIPTION - DESCRIPTORS: DESCRIPTORS: ACHING

## 2022-09-06 NOTE — PROGRESS NOTES
1227 [facility]  Progress Note and Procedure Note      Shweta Mckee  MEDICAL RECORD NUMBER:  [id number]  AGE: 80 y.o. GENDER: female  : 1929  EPISODE DATE:  2022    Subjective:     Chief Complaint   Patient presents with    Wound Check     LLL         HISTORY of PRESENT ILLNESS HPI     Shweta Mckee is a 80 y.o. female who presents today for wound/ulcer evaluation. History of Wound Context: Patient is now 3 weeks post TheraSkin allograft application to left knee wound. States she is not having any pain in her leg overlying the wound. Since her last visit Dr. Kathy Roach has remove the wound VAC from her left groin incision    Wound has failed to heal greater than 30%-50% at initial assessment.   Patient has received greater than 30 days of conservative treatment including debridements at each wound care visit and the following: Compression    Pain Assessment:  Wound/Ulcer Pain Timing/Severity: none  Quality of pain: N/A  Severity:  0 / 10   Modifying Factors: None  Associated Signs/Symptoms: edema    Ulcer Identification:  Ulcer Type: arterial  Contributing Factors: edema, venous stasis, diabetes, and arterial insufficiency    Last H6B if applicable:   Hemoglobin A1C   Date Value Ref Range Status   2022 5.5 See comment % Final     Comment:     Comment:  Diagnosis of Diabetes: > or = 6.5%  Increased risk of diabetes (Prediabetes): 5.7-6.4%  Glycemic Control: Nonpregnant Adults: <7.0%                    Pregnant: <6.0%           Objective:      BP (!) 174/77   Pulse 84   Temp 97.1 °F (36.2 °C) (Temporal)   Resp 16     Wt Readings from Last 3 Encounters:   22 93 lb (42.2 kg)   22 93 lb (42.2 kg)   22 93 lb (42.2 kg)       PHYSICAL EXAM    General Appearance: alert and oriented to person, place and time, well-developed and well-nourished, in no acute distress  Extremities: no cyanosis, no clubbing, minimal + edema-  left lower extremity with granulating wound and residual TheraSkin allograft overlying left anterior knee. Periwound is dry      Assessment:      1. Peripheral artery disease ([address]    2. Wound of left lower extremity, subsequent encounter    3. Wound of left lower extremity, initial encounter         Procedure Note  Indications:  Based on my examination of this patient's wound(s) today, sharp excision is required to promote healing and evaluate the extent healing. Performed by: Lucio Collins MD    Consent obtained? Yes    Time out taken: Yes    Pain Control: Anesthetic: 4% Lidocaine Liquid Topical     Debridement:Excisional Debridement    Using curette the wound was sharply debrided    down through and including the removal of  epidermis, dermis, and subcutaneous tissue. Devitalized Tissue Debrided:  fibrin and residual TheraSkin allograft      Wound #: 1     Pre & Post  Debridement Measurements:  Wound 11/22/21 Leg Left; Lower;Distal #1 (Active)   Wound Image   09/06/22 0854   Wound Etiology Arterial 11/22/21 0924   Wound Cleansed Cleansed with saline 09/06/22 0854   Dressing/Treatment Other (comment) 09/06/22 0920   Wound Length (cm) 2 cm 09/06/22 0854   Wound Width (cm) 2 cm 09/06/22 0854   Wound Depth (cm) 0.1 cm 09/06/22 0854   Wound Surface Area (cm^2) 4 cm^2 09/06/22 0854   Change in Wound Size % (l*w) -42.86 09/06/22 0854   Wound Volume (cm^3) 0.4 cm^3 09/06/22 0854   Wound Healing % -43 09/06/22 0854   Post-Procedure Length (cm) 2 cm 09/06/22 0920   Post-Procedure Width (cm) 2 cm 09/06/22 0920   Post-Procedure Depth (cm) 0 cm 09/06/22 0920   Post-Procedure Surface Area (cm^2) 4 cm^2 09/06/22 0920   Post-Procedure Volume (cm^3) 0 cm^3 09/06/22 0920   Distance Tunneling (cm) 0 cm 08/29/22 1054   Tunneling Position ___ O'Clock 0 07/11/22 0943   Undermining Starts ___ O'Clock 0 07/11/22 0943   Undermining Ends___ O'Clock 0 07/11/22 0943   Undermining Maxium Distance (cm) 0 08/29/22 1054   Wound Assessment Graft 09/06/22 0854   Drainage Amount None 09/06/22 0854   Drainage Description Serosanguinous 08/29/22 1054   Odor None 09/06/22 0854   Nissa-wound Assessment Fragile;Edematous 09/06/22 0854   Margins Defined edges 09/06/22 0854   Wound Thickness Description not for Pressure Injury Full thickness 09/06/22 0854   Number of days: 288       Wound 07/11/22 Left #2 groin (Active)   Wound Image   07/11/22 0943   Wound Cleansed Cleansed with saline 07/18/22 0950   Dressing/Treatment Collagen with Ag 08/29/22 1140   Wound Length (cm) 2.8 cm 07/18/22 0950   Wound Width (cm) 3.5 cm 07/18/22 0950   Wound Depth (cm) 0.5 cm 07/18/22 0950   Wound Surface Area (cm^2) 9.8 cm^2 07/18/22 0950   Change in Wound Size % (l*w) -8.89 07/18/22 0950   Wound Volume (cm^3) 4.9 cm^3 07/18/22 0950   Wound Healing % 64 07/18/22 0950   Post-Procedure Length (cm) 2.9 cm 07/18/22 1020   Post-Procedure Width (cm) 2.6 cm 07/18/22 1020   Post-Procedure Depth (cm) 0.7 cm 07/18/22 1020   Post-Procedure Surface Area (cm^2) 7.54 cm^2 07/18/22 1020   Post-Procedure Volume (cm^3) 5.278 cm^3 07/18/22 1020   Distance Tunneling (cm) 0 cm 07/18/22 0950   Tunneling Position ___ O'Clock 0 07/11/22 0943   Undermining Starts ___ O'Clock 9:00 07/18/22 0950   Undermining Ends___ O'Clock 11;00 07/18/22 0950   Undermining Maxium Distance (cm) 0 07/18/22 0950   Wound Assessment [place]/red;Slough 07/18/22 0950   Drainage Amount Moderate 07/18/22 0950   Drainage Description Serosanguinous 07/18/22 0950   Odor None 07/18/22 0950   Nissa-wound Assessment Fragile 07/18/22 0950   Margins Defined edges 07/18/22 0950   Wound Thickness Description not for Pressure Injury Full thickness 07/18/22 0950   Number of days: 57          Percent of Wound Debrided: 100%    Total Surface Area Debrided:  7.5 sq cm    Diabetic/Pressure/Non Pressure Ulcers only:  Ulcer: Non-Pressure ulcer, fat layer exposed    Bleeding: Minimal    Hemostasis Achieved: by pressure    Procedural Pain: 0  / 10     Post Procedural Pain: 0 / 10     Response to treatment:  Well tolerated by patient. rocedure:  Skin Substitute Application    Performed by: Alondra Fatima MD    Ulcer Type: arterial    Consent obtained: Yes    Time out taken: Yes    Product Utilized: TheraSkin 6 cm²     Fenestrated: No    Mesher Utilized: No    Instrument(s) curette    Skin Substitute was Applied to Ulcer Number(s):    Ulcer #: 1    Wound 11/22/21 Leg Left; Lower;Distal #1 (Active)   Wound Image   09/06/22 0854   Wound Etiology Arterial 11/22/21 0924   Wound Cleansed Cleansed with saline 09/06/22 0854   Dressing/Treatment Other (comment) 09/06/22 0920   Wound Length (cm) 2 cm 09/06/22 0854   Wound Width (cm) 2 cm 09/06/22 0854   Wound Depth (cm) 0.1 cm 09/06/22 0854   Wound Surface Area (cm^2) 4 cm^2 09/06/22 0854   Change in Wound Size % (l*w) -42.86 09/06/22 0854   Wound Volume (cm^3) 0.4 cm^3 09/06/22 0854   Wound Healing % -43 09/06/22 0854   Post-Procedure Length (cm) 2 cm 09/06/22 0920   Post-Procedure Width (cm) 2 cm 09/06/22 0920   Post-Procedure Depth (cm) 0 cm 09/06/22 0920   Post-Procedure Surface Area (cm^2) 4 cm^2 09/06/22 0920   Post-Procedure Volume (cm^3) 0 cm^3 09/06/22 0920   Distance Tunneling (cm) 0 cm 08/29/22 1054   Tunneling Position ___ O'Clock 0 07/11/22 0943   Undermining Starts ___ O'Clock 0 07/11/22 0943   Undermining Ends___ O'Clock 0 07/11/22 0943   Undermining Maxium Distance (cm) 0 08/29/22 1054   Wound Assessment Graft 09/06/22 0854   Drainage Amount None 09/06/22 0854   Drainage Description Serosanguinous 08/29/22 1054   Odor None 09/06/22 0854   Nissa-wound Assessment Fragile;Edematous 09/06/22 0854   Margins Defined edges 09/06/22 0854   Wound Thickness Description not for Pressure Injury Full thickness 09/06/22 0854   Number of days: 288       Wound 07/11/22 Left #2 groin (Active)   Wound Image   07/11/22 0943   Wound Cleansed Cleansed with saline 07/18/22 0950   Dressing/Treatment Collagen with Ag 08/29/22 1140   Wound Length (cm) 2.8 cm 07/18/22 4110   Wound Width (cm) 3.5 cm 07/18/22 0950   Wound Depth (cm) 0.5 cm 07/18/22 0950   Wound Surface Area (cm^2) 9.8 cm^2 07/18/22 0950   Change in Wound Size % (l*w) -8.89 07/18/22 0950   Wound Volume (cm^3) 4.9 cm^3 07/18/22 0950   Wound Healing % 64 07/18/22 0950   Post-Procedure Length (cm) 2.9 cm 07/18/22 1020   Post-Procedure Width (cm) 2.6 cm 07/18/22 1020   Post-Procedure Depth (cm) 0.7 cm 07/18/22 1020   Post-Procedure Surface Area (cm^2) 7.54 cm^2 07/18/22 1020   Post-Procedure Volume (cm^3) 5.278 cm^3 07/18/22 1020   Distance Tunneling (cm) 0 cm 07/18/22 0950   Tunneling Position ___ O'Clock 0 07/11/22 0943   Undermining Starts ___ O'Clock 9:00 07/18/22 0950   Undermining Ends___ O'Clock 11;00 07/18/22 0950   Undermining Maxium Distance (cm) 0 07/18/22 0950   Wound Assessment [place]/red;Slough 07/18/22 0950   Drainage Amount Moderate 07/18/22 0950   Drainage Description Serosanguinous 07/18/22 0950   Odor None 07/18/22 0950   Nissa-wound Assessment Fragile 07/18/22 0950   Margins Defined edges 07/18/22 0950   Wound Thickness Description not for Pressure Injury Full thickness 07/18/22 0950   Number of days: 57          Diabetic/Pressure/Non Pressure Ulcers:  Ulcer:   Non-Pressure ulcer, fat layer exposed      Total Surface Area of Ulcer(s) Covered 7.5 sq/cm    Was the Product Layered  No     Amount of Product Applied 6 sq/cm     Amount of Product Wasted 0 sq/cm     Reason for Waste: N/A     Surgically Fixated: No:     Secured With: Steri Strips and Silicone Dressing     Procedural Pain: 0/10     Post Procedural Pain: 0 / 10    Response to Treatment: Well tolerated by patient. Plan:     Treatment Note: Please see attached Discharge Instructions.   These instructions were given and signed by the patient or POA    New Medication(s) at this visit:   New Prescriptions    No medications on file       Discharge Instructions          215 [facility] Physician Orders and Discharge Marco Tang. [address]. [facility]. [place]  Telephone: [phone]  NAME:  Gabriela Romero OF BIRTH:  4/4/1929  MEDICAL RECORD NUMBER:  [id number]  DATE: 9/6/22     Return Appointment:  Return Appointment: With Dr. Oz Valles  in  1 [facility])  [] Return Appointment for a Wound Assessment with the nurse on:     Future Appointments   Date Time Provider Gilbert Frey   9/12/2022  8:15 AM Gregoria Doyle MD [address] [facility]   9/12/2022  1:30 PM Vanessa Barfield MD [facility]   9/13/2022  9:45 AM DO OCTAVIO Clarke [place] - DYD   9/14/2022  2:45 PM Pedro Mckeon MD [place] [facility]   9/19/2022  8:15 AM Gregoria Doyle MD [address] [place] [address] Road: [address] [place] [address] Ave:  Phone: [phone]  Fax: [phone]   Medically necessary services: [x] Skilled Nursing [] PT (Eval & Treat) [] OT (Eval & Treat) [] Social Work [] Dietician  [] Other:    Wound care instructions: If you smoke we ask that you refrain from smoking. Smoking inhibits wounds from healing. When taking antibiotics take the entire prescription as ordered. Do not stop taking until medication is all gone unless otherwise instructed. Exercise as tolerated. Keep weight off wounds and reposition every 2 hours if applicable. Do not get wounds wet in the bath or shower unless otherwise instructed by your physician. If your wound is on your foot or leg, you may purchase a cast bag. Please ask at the pharmacy. Wash hands with soap and water prior to and after every dressing change. [x]Wash wounds with: Vashe wash - Apply enough Vashe to soak a piece of gauze and place on wound bed for 5-10 minutes. No need to rinse after soaking.   [x]Nissa wound Topical Treatments: Do not apply lotions, creams, or ointments to the skin around the wound bed unless directed as followed:   Apply around the wound: No-Sting barrier film           [x]Application of a biologic skin substitute: Yes: Jozefin : Wound Location: to left lower leg wound. Covered with mepitel, secured with steri strips, hydrogel, gauze. This is a highly specialized wound care dressing that is intended to enhance your own bodies ability to increase growth factors and other healing abilities. Please leave the dressing clean, dry and intact unless otherwise instructed by your physician. Leave steri-strips and non adherent in place if changing the dressing as instructed. [x]Instructions for [facility] if applicable: do not change. [x] Edema Control: Patient at times wears own compression stocking     Elevate leg(s) above the level of the heart for 30 minutes 4-5 times a day and/or when sitting. Avoid prolonged standing in one place. Dietary:  Important dietary reminders:  1. Increase Protein intake (i.e. Lean meats, fish, eggs, legumes, and yogurt)  2. No added salt  3. If diabetic, follow a diabetic diet and check glucose prior to meals or as instructed by your physician. Dietary Supplements(Take twice a day unless instructed otherwise):  [] Ragena Cellar  [] 30ml ProStat [] Karel Juju [] Ensure Max/Premier [] Other:    Your nurse  is:  Josphine Pea     Electronically signed by Kelli Culver RN on 9/6/2022 at 9:24 AM     215 [facility] Information: Should you experience any significant changes in your wound(s) or have questions about your wound care, please contact the [address] [place] [address] at [phone]. We are open from 8:00am - 4:30p Monday, Thursday and Friday; 11:00am - 5pm on Tuesday and CLOSED Wednesday. Please give us 24-48 business hours to return your call. Call your doctor now or seek immediate medical care if:    You have symptoms of infection, such as: Increased pain, swelling, warmth, or redness. Red streaks leading from the area. Pus draining from the area. A fever.          [] Patient unable to sign Discharge Instructions given to ECF/Transportation/POA Electronically signed by Shady Johnson MD on 9/6/2022 at 10:44 AM

## 2022-09-06 NOTE — DISCHARGE INSTRUCTIONS
215 [facility] Physician Orders and Discharge Instructions  [address] [address] [address] [address] [facility]. [place]. [place]  Telephone: [phone]  NAME:  Angela Khoury OF BIRTH:  4/4/1929  MEDICAL RECORD NUMBER:  [id number]  DATE: 9/6/22     Return Appointment:  Return Appointment: With Dr. Thad Escobar  in  1 [facility])  [] Return Appointment for a Wound Assessment with the nurse on:     Future Appointments   Date Time Provider Gilbert Frey   9/12/2022  8:15 AM Katja Toussaint MD [address] [facility]   9/12/2022  1:30 PM Abe Vale MD [facility]   9/13/2022  9:45 AM DO OCTAVIO Michaels  [place] - DYD   9/14/2022  2:45 PM Kevin Espinosa MD [facility]/EN [facility]   9/19/2022  8:15 AM Katja Toussaint MD [address] [place] [address] Road: [address] [place] [address] Ave:  Phone: [phone]  Fax: [phone]   Medically necessary services: [x] Skilled Nursing [] PT (Eval & Treat) [] OT (Eval & Treat) [] Social Work [] Dietician  [] Other:    Wound care instructions: If you smoke we ask that you refrain from smoking. Smoking inhibits wounds from healing. When taking antibiotics take the entire prescription as ordered. Do not stop taking until medication is all gone unless otherwise instructed. Exercise as tolerated. Keep weight off wounds and reposition every 2 hours if applicable. Do not get wounds wet in the bath or shower unless otherwise instructed by your physician. If your wound is on your foot or leg, you may purchase a cast bag. Please ask at the pharmacy. Wash hands with soap and water prior to and after every dressing change. [x]Wash wounds with: Vashe wash - Apply enough Vashe to soak a piece of gauze and place on wound bed for 5-10 minutes. No need to rinse after soaking.   [x]Nissa wound Topical Treatments: Do not apply lotions, creams, or ointments to the skin around the wound bed unless directed as followed:   Apply [] Patient unable to sign Discharge Instructions given to ECF/Transportation/POA

## 2022-09-11 DIAGNOSIS — G45.9 TIA (TRANSIENT ISCHEMIC ATTACK): ICD-10-CM

## 2022-09-12 ENCOUNTER — TELEPHONE (OUTPATIENT)
Dept: VASCULAR SURGERY | Age: 87
End: 2022-09-12

## 2022-09-12 ENCOUNTER — HOSPITAL ENCOUNTER (OUTPATIENT)
Dept: WOUND CARE | Age: 87
Discharge: HOME | End: 2022-09-12
Payer: MEDICARE

## 2022-09-12 VITALS
RESPIRATION RATE: 16 BRPM | TEMPERATURE: 97.3 F | HEART RATE: 71 BPM | SYSTOLIC BLOOD PRESSURE: 161 MMHG | DIASTOLIC BLOOD PRESSURE: 75 MMHG

## 2022-09-12 DIAGNOSIS — S81.802D WOUND OF LEFT LOWER EXTREMITY, SUBSEQUENT ENCOUNTER: ICD-10-CM

## 2022-09-12 DIAGNOSIS — S81.802A WOUND OF LEFT LOWER EXTREMITY, INITIAL ENCOUNTER: ICD-10-CM

## 2022-09-12 DIAGNOSIS — I73.9 PERIPHERAL ARTERY DISEASE (HCC): Primary | ICD-10-CM

## 2022-09-12 PROCEDURE — 99213 OFFICE O/P EST LOW 20 MIN: CPT

## 2022-09-12 PROCEDURE — 6370000000 HC RX 637 (ALT 250 FOR IP): Performed by: SPECIALIST

## 2022-09-12 PROCEDURE — 99212 OFFICE O/P EST SF 10 MIN: CPT | Performed by: SPECIALIST

## 2022-09-12 RX ORDER — GINSENG 100 MG
CAPSULE ORAL ONCE
Status: CANCELLED | OUTPATIENT
Start: 2022-09-12 | End: 2022-09-12

## 2022-09-12 RX ORDER — ATORVASTATIN CALCIUM 10 MG/1
TABLET, FILM COATED ORAL
Qty: 30 TABLET | Refills: 3 | Status: SHIPPED | OUTPATIENT
Start: 2022-09-12 | End: 2022-11-03 | Stop reason: SDUPTHER

## 2022-09-12 RX ORDER — LIDOCAINE HYDROCHLORIDE 40 MG/ML
SOLUTION TOPICAL ONCE
Status: CANCELLED | OUTPATIENT
Start: 2022-09-12 | End: 2022-09-12

## 2022-09-12 RX ORDER — BACITRACIN ZINC AND POLYMYXIN B SULFATE 500; 1000 [USP'U]/G; [USP'U]/G
OINTMENT TOPICAL ONCE
Status: CANCELLED | OUTPATIENT
Start: 2022-09-12 | End: 2022-09-12

## 2022-09-12 RX ORDER — LIDOCAINE HYDROCHLORIDE 20 MG/ML
JELLY TOPICAL ONCE
Status: CANCELLED | OUTPATIENT
Start: 2022-09-12 | End: 2022-09-12

## 2022-09-12 RX ORDER — LIDOCAINE 40 MG/G
CREAM TOPICAL ONCE
Status: CANCELLED | OUTPATIENT
Start: 2022-09-12 | End: 2022-09-12

## 2022-09-12 RX ORDER — CLOBETASOL PROPIONATE 0.5 MG/G
OINTMENT TOPICAL ONCE
Status: CANCELLED | OUTPATIENT
Start: 2022-09-12 | End: 2022-09-12

## 2022-09-12 RX ORDER — LIDOCAINE HYDROCHLORIDE 40 MG/ML
SOLUTION TOPICAL ONCE
Status: COMPLETED | OUTPATIENT
Start: 2022-09-12 | End: 2022-09-12

## 2022-09-12 RX ORDER — GENTAMICIN SULFATE 1 MG/G
OINTMENT TOPICAL ONCE
Status: CANCELLED | OUTPATIENT
Start: 2022-09-12 | End: 2022-09-12

## 2022-09-12 RX ORDER — LIDOCAINE 50 MG/G
OINTMENT TOPICAL ONCE
Status: CANCELLED | OUTPATIENT
Start: 2022-09-12 | End: 2022-09-12

## 2022-09-12 RX ORDER — BETAMETHASONE DIPROPIONATE 0.05 %
OINTMENT (GRAM) TOPICAL ONCE
Status: CANCELLED | OUTPATIENT
Start: 2022-09-12 | End: 2022-09-12

## 2022-09-12 RX ORDER — BACITRACIN, NEOMYCIN, POLYMYXIN B 400; 3.5; 5 [USP'U]/G; MG/G; [USP'U]/G
OINTMENT TOPICAL ONCE
Status: CANCELLED | OUTPATIENT
Start: 2022-09-12 | End: 2022-09-12

## 2022-09-12 RX ORDER — ATORVASTATIN CALCIUM 10 MG/1
TABLET, FILM COATED ORAL
Qty: 30 TABLET | Refills: 3 | OUTPATIENT
Start: 2022-09-12

## 2022-09-12 RX ADMIN — LIDOCAINE HYDROCHLORIDE: 40 SOLUTION TOPICAL at 08:44

## 2022-09-12 NOTE — DISCHARGE INSTRUCTIONS
215 [facility] Physician Orders and Discharge Instructions  [address] [address] [address] [address] [facility]. [place]. [place]  Telephone: [phone]  NAME:  Nessa Gerard OF BIRTH:  4/4/1929  MEDICAL RECORD NUMBER:  [id number]  DATE: 9/12/22     Return Appointment:  Return Appointment: With Dr. Vinicio Hughes  in  1 [facility])  [] Return Appointment for a Wound Assessment with the nurse on:     Future Appointments   Date Time Provider Gilbert Frey   9/14/2022  2:45 PM Evelia Vallecillo MD [place] [facility]/EN [facility]   9/19/2022  8:15 AM Mortimer Coward, MD [facility] WOUND Scientologist HOD   9/21/2022  8:00 AM DO OCTAVIO [facility] [place] - DYD   11/3/2022  2:00 PM Frank Barros MD [address] [place] [address] Avenue: [facility] DEACONESS:  Phone: [phone]  Fax: [phone]    Medically necessary services: [x] Skilled Nursing [] PT (Eval & Treat) [] OT (Eval & Treat) [] Social Work [] Dietician  [] Other:    Wound care instructions: If you smoke we ask that you refrain from smoking. Smoking inhibits wounds from healing. When taking antibiotics take the entire prescription as ordered. Do not stop taking until medication is all gone unless otherwise instructed. Exercise as tolerated. Keep weight off wounds and reposition every 2 hours if applicable. Do not get wounds wet in the bath or shower unless otherwise instructed by your physician. If your wound is on your foot or leg, you may purchase a cast bag. Please ask at the pharmacy. Wash hands with soap and water prior to and after every dressing change. [x]Wash wounds with: No need to wash. Leave dressing in place.     [x]Nissa wound Topical Treatments: Do not apply lotions, creams, or ointments to the skin around the wound bed unless directed as followed:   Apply around the wound: No-Sting barrier film and  - Done in clinic only         [x]Wound Location: Left lower leg   Apply Primary Dressing to wound: Hydrogel  Secondary Dressing: 4X4 gauze pad and Coban   Avoid contact of tape with skin if possible. When to change Dressing: DO NOT CHANGE and As needed (PRN) if becomes wet or soiled    [x] Edema Control:  Apply: own compression stockings when able to    Elevate leg(s) above the level of the heart for 30 minutes 4-5 times a day and/or when sitting. Avoid standing for long periods of time  Dietary:  Important dietary reminders:  1. Increase Protein intake (i.e. Lean meats, fish, eggs, legumes, and yogurt)  2. No added salt  3. If diabetic, follow a diabetic diet and check glucose prior to meals or as instructed by your physician. Dietary Supplements(Take twice a day unless instructed otherwise):  [] Alissa Cutter  [] 30ml ProStat [] Deepika Hedges [] Ensure Max/Premier [] Other:    Your nurse  is:  Libertad Wilson     Electronically signed by Ottoniel Angelo RN on 9/12/2022 at 8:51 AM     215 [facility] [address] Information: Should you experience any significant changes in your wound(s) or have questions about your wound care, please contact the [address] [place] [address] at [phone]. We are open from 8:00am - 4:30p Monday, Thursday and Friday; 11:00am - 5pm on Tuesday and CLOSED Wednesday. Please give us 24-48 business hours to return your call. Call your doctor now or seek immediate medical care if:    You have symptoms of infection, such as: Increased pain, swelling, warmth, or redness. Red streaks leading from the area. Pus draining from the area. A fever.          [] Patient unable to sign Discharge Instructions given to ECF/Transportation/POA

## 2022-09-12 NOTE — TELEPHONE ENCOUNTER
Patient's home health care nurse called and stated they would like to make Dr. Ananias Bosworth aware that the patient has nodules going down left leg. Please call patient to advise.      Brenda Medina: [phone]

## 2022-09-12 NOTE — PROGRESS NOTES
[facility]  Progress Note and Procedure Note      Ashlyn Bird  AGE: 80 y.o. GENDER: female  : 1929  EPISODE DATE:  2022      Subjective:     Chief Complaint   Patient presents with    Wound Check         HISTORY of PRESENT ILLNESS HPI     Ashlyn Bird is a 80 y.o. female who presents today for wound evaluation. History of Wound Context: Patient is now 1 week post application of second TheraSkin allograft to left anterior knee wound. She describes no pain from the wound but persistent neuropathic type pain from her foot and ankle. Her groin incision is nearly healed.   She states she has an appointment with Dr. Columba Loredo next week  Wound Pain Timing/Severity: none  Quality of pain: N/A  Severity:  0 / 10   Modifying Factors: None  Associated Signs/Symptoms: edema    Wound Identification:  Wound Type: arterial  Contributing Factors: edema, venous stasis, diabetes, and arterial insufficiency        PAST MEDICAL HISTORY        Diagnosis Date    Anxiety     Arthritis     At risk for falls     CAD (coronary artery disease)     Cerebral artery occlusion with cerebral infarction ([address]     TIA--no residual effects    DDD (degenerative disc disease), cervical     Fractures     (L) Hip Fx 98, L1 fracture from fall 10-31-06    Hyperlipidemia     Hypertension     Mitral regurgitation     Neuropathy     Osteopenia     Osteoporosis     PAD (peripheral artery disease) ([address]     Right LE ischemia    Peripheral neuropathy 2015    Peripheral vascular disease (HCC)     bilateral lower extremities with edema    [place] 2017    Dr. Dessie Skiff    Prolonged emergence from general anesthesia     Spinal stenosis     L4-5    Type 2 diabetes mellitus ([address]     Urethral stricture 5 years ago    Urgency of urination        PAST SURGICAL HISTORY    Past Surgical History:   Procedure Laterality Date    ANGIOPLASTY Left 2022    Left SFA    APPENDECTOMY      CARPAL TUNNEL RELEASE Right 2019 RIGHT CARPAL TUNNEL RELEASE AND RIGHT MIDDLE FINGER TRIGGER FINGER RELEASE performed by Zi Booker MD at [place] [address]  [id number]    LEFT EYE EYE CATARACT PHACOEMULSIFICATION INTRAOCULAR LENS    CHOLECYSTECTOMY  01/01/1978    COLONOSCOPY  08/31/1999    diverticulosis    EYE SURGERY Bilateral     bilateral cataract removed    FEMORAL BYPASS Left 6/28/2022    LEFT LEG FEMORAL TO POPLITEAL BYPASS WITH INTRAOPERATIVE ANGIOGRAM performed by Raine Esquivel MD at [place] [address] Left 02/14/2022    LEFT COMMON FEMORAL ARTERY ENDARTERECTOMY performed by Raine Esquivel MD at [facility] (CERVIX STATUS UNKNOWN)  [address] [place] [address]    IR FEMORAL POPLITEAL BYPASS GRAFT Right 08/31/2015    KYPHOSIS SURGERY  11/07/2006    L1, (fractured after a fall)    LEG SURGERY Left 02/14/2022    LEFT LEG WOUND DEBRIDEMENT performed by Raine Esquivel MD at [address] [place] [address] N/A 7/7/2022    INCISION AND DRAINAGE OF LEFT SIDE GROIN WITH PLACEMENT OF WOUND VAC performed by Raine Esquivel MD at [facility]  04/25/1998    (L) THR    WRIST FRACTURE SURGERY  01/01/2008    left wrist       FAMILY HISTORY    Family History   Problem Relation Age of Onset    Cancer Mother 43    Stroke Father     Hypertension Father        SOCIAL HISTORY    Social History     Tobacco Use    Smoking status: Never    Smokeless tobacco: Never   Vaping Use    Vaping Use: Never used   Substance Use Topics    Alcohol use: No    Drug use: Never       ALLERGIES    Allergies   Allergen Reactions    Aricept [Donepezil Hydrochloride] Other (See Comments)     Pt unable to recall reaction    Doxycycline Nausea Only    Ketorolac Tromethamine Other (See Comments)     Pt unable to recall reaction    Donepezil Hcl        MEDICATIONS    Current Outpatient Medications on File Prior to Encounter   Medication Sig Dispense Refill    sulfamethoxazole-trimethoprim (BACTRIM;SEPTRA) 400-80 MG per tablet Take 1 tablet by mouth 2 times daily Per Dr Deion Ballesteros 60 tablet 2    lisinopril (PRINIVIL;ZESTRIL) 10 MG tablet 20 mg      Multiple Vitamins-Minerals (THERAPEUTIC MULTIVITAMIN-MINERALS) tablet Take 1 tablet by mouth daily      furosemide (LASIX) 20 MG tablet TAKE ONE TABLET BY MOUTH DAILY 90 tablet 1    glimepiride (AMARYL) 1 MG tablet TAKE ONE TABLET BY MOUTH EVERY MORNING BEFORE BREAKFAST 30 tablet 2    blood glucose test strips (ONETOUCH ULTRA) strip USE TO TEST BLOOD SUGAR TWICE DAILY 100 strip 1    metFORMIN (GLUCOPHAGE) 500 MG tablet TAKE ONE TABLET BY MOUTH DAILY 90 tablet 3    traMADol (ULTRAM) 50 MG tablet Take 25 mg by mouth every 6 hours as needed for Pain. acetaminophen (TYLENOL) 325 MG tablet Take 650 mg by mouth every 6 hours as needed for Pain      atorvastatin (LIPITOR) 10 MG tablet TAKE ONE TABLET BY MOUTH DAILY 30 tablet 3    Lite Touch Lancets MISC Use twice daily when testing blood sugars. 100 each 5    Wound Cleansers (VASHE CLEANSING) SOLN Soak vashe on gauze pad and place on wound for 5-10 minutes 1 each 2    Turmeric 450 MG CAPS Take 450 mg by mouth daily      Cholecalciferol (VITAMIN D3) 2000 UNITS CAPS Take 1 capsule by mouth daily      aspirin EC 81 MG EC tablet Take 1 tablet by mouth daily 30 tablet 3     No current facility-administered medications on file prior to encounter. REVIEW OF SYSTEMS    Pertinent items are noted in HPI. Objective:      BP (!) 161/75   Pulse 71   Temp 97.3 °F (36.3 °C) (Temporal)   Resp 16     PHYSICAL EXAM    General Appearance: alert and oriented to person, place and time and in no acute distress  Extremities: no cyanosis, no clubbing, 1+ edema-  left lower extremity, and TheraSkin allograft covering entire wound. Periwound is dry        Assessment:     1. Peripheral artery disease ([address]    2. Wound of left lower extremity, subsequent encounter    3.  Wound of left lower extremity, initial encounter          Wound Measurements:  Wound 11/22/21 Leg Left; Lower;Distal #1 (Active)   Wound Image   09/06/22 0854   Wound Etiology Arterial 11/22/21 0924   Wound Cleansed Cleansed with saline 09/12/22 0835   Dressing/Treatment Hydrating gel 09/12/22 0905   Wound Length (cm) 2 cm 09/12/22 0835   Wound Width (cm) 2 cm 09/12/22 0835   Wound Depth (cm) 0.1 cm 09/12/22 0835   Wound Surface Area (cm^2) 4 cm^2 09/12/22 0835   Change in Wound Size % (l*w) -42.86 09/12/22 0835   Wound Volume (cm^3) 0.4 cm^3 09/12/22 0835   Wound Healing % -43 09/12/22 0835   Post-Procedure Length (cm) 2 cm 09/06/22 0920   Post-Procedure Width (cm) 2 cm 09/06/22 0920   Post-Procedure Depth (cm) 0 cm 09/06/22 0920   Post-Procedure Surface Area (cm^2) 4 cm^2 09/06/22 0920   Post-Procedure Volume (cm^3) 0 cm^3 09/06/22 0920   Distance Tunneling (cm) 0 cm 09/12/22 0835   Tunneling Position ___ O'Clock 0 07/11/22 0943   Undermining Starts ___ O'Clock 0 07/11/22 0943   Undermining Ends___ O'Clock 0 07/11/22 0943   Undermining Maxium Distance (cm) 0 09/12/22 0835   Wound Assessment Graft 09/12/22 0835   Drainage Amount Small 09/12/22 0835   Drainage Description Carmelo Falling 09/12/22 0835   Odor None 09/12/22 0835   Nissa-wound Assessment Intact;Fragile 09/12/22 0835   Margins Defined edges 09/12/22 0835   Wound Thickness Description not for Pressure Injury Full thickness 09/12/22 0835   Number of days: 293       Wound 07/11/22 Left #2 groin (Active)   Wound Image   07/11/22 0943   Wound Cleansed Cleansed with saline 09/12/22 0835   Dressing/Treatment Collagen with Ag 08/29/22 1140   Wound Length (cm) 0.5 cm 09/12/22 0835   Wound Width (cm) 1 cm 09/12/22 0835   Wound Depth (cm) 0.1 cm 09/12/22 0835   Wound Surface Area (cm^2) 0.5 cm^2 09/12/22 0835   Change in Wound Size % (l*w) 94.44 09/12/22 0835   Wound Volume (cm^3) 0.05 cm^3 09/12/22 0835   Wound Healing % 100 09/12/22 0835   Post-Procedure Length (cm) 2.9 cm 07/18/22 1020   Post-Procedure Width (cm) 2.6 cm 07/18/22 1020 Post-Procedure Depth (cm) 0.7 cm 07/18/22 1020   Post-Procedure Surface Area (cm^2) 7.54 cm^2 07/18/22 1020   Post-Procedure Volume (cm^3) 5.278 cm^3 07/18/22 1020   Distance Tunneling (cm) 0 cm 09/12/22 0835   Tunneling Position ___ O'Clock 0 07/11/22 0943   Undermining Starts ___ O'Clock 0 09/12/22 0835   Undermining Ends___ O'Clock 0 09/12/22 0835   Undermining Maxium Distance (cm) 0 09/12/22 0835   Wound Assessment Pink/red;Granulation tissue 09/12/22 0835   Drainage Amount Scant 09/12/22 0835   Drainage Description Yellow 09/12/22 0835   Odor None 09/12/22 0835   Nissa-wound Assessment Fragile 09/12/22 0835   Margins Defined edges 09/12/22 0835   Wound Thickness Description not for Pressure Injury Full thickness 09/12/22 0835   Number of days: 62          Plan:     Treatment Note please see attached Discharge Instructions    New Prescriptions    No medications on file       Orders Placed This Encounter   Procedures    Initiate Outpatient Wound Care Protocol       Written patient dismissal instructions given to patient and signed by patient or POA. Discharge [facility] Physician Orders and Discharge Instructions  [address] [facility]  Telephone: [phone]  NAME:  Jud Jasmine OF BIRTH:  4/4/1929  MEDICAL RECORD NUMBER:  [id number]  DATE: 9/12/22     Return Appointment:  Return Appointment: With Dr. Kristopher Larson  in  [facility])  [] Return Appointment for a Wound Assessment with the nurse on:     Future Appointments   Date Time Provider Gilbert Frey   9/14/2022  2:45 PM Raine Esquivel MD [place] VASC/EN MMA   9/19/2022  8:15 AM Elizabeth Ferro MD [facility] WOUND [place] HOD   9/21/2022  8:00 AM DO OCTAVIO Cummings [place] - DYD   11/3/2022  2:00 PM [facility]: [facility] DEACONESS:  Phone: [phone]  Fax: [phone]    Medically necessary services: [x] Skilled Nursing [] PT (Eval & Treat) [] OT (Eval & Treat) [] Social Work [] Dietician  [] Other:    Wound care instructions: If you smoke we ask that you refrain from smoking. Smoking inhibits wounds from healing. When taking antibiotics take the entire prescription as ordered. Do not stop taking until medication is all gone unless otherwise instructed. Exercise as tolerated. Keep weight off wounds and reposition every 2 hours if applicable. Do not get wounds wet in the bath or shower unless otherwise instructed by your physician. If your wound is on your foot or leg, you may purchase a cast bag. Please ask at the pharmacy. Wash hands with soap and water prior to and after every dressing change. [x]Wash wounds with: No need to wash. Leave dressing in place. [x]Nissa wound Topical Treatments: Do not apply lotions, creams, or ointments to the skin around the wound bed unless directed as followed:   Apply around the wound: No-Sting barrier film and  - Done in clinic only         [x]Wound Location: Left lower leg   Apply Primary Dressing to wound: Hydrogel  Secondary Dressing: 4X4 gauze pad and Coban   Avoid contact of tape with skin if possible. When to change Dressing: DO NOT CHANGE and As needed (PRN) if becomes wet or soiled    [x] Edema Control:  Apply: own compression stockings when able to    Elevate leg(s) above the level of the heart for 30 minutes 4-5 times a day and/or when sitting. Avoid standing for long periods of time  Dietary:  Important dietary reminders:  1. Increase Protein intake (i.e. Lean meats, fish, eggs, legumes, and yogurt)  2. No added salt  3. If diabetic, follow a diabetic diet and check glucose prior to meals or as instructed by your physician.     Dietary Supplements(Take twice a day unless instructed otherwise):  [] Alissa Cutter  [] 30ml ProStat [] Arlina Hedges [] Ensure Max/Premier [] Other:    Your nurse  is:  Libertad Wilson Electronically signed by zOiel Mcfarlane RN on 9/12/2022 at 8:51 AM     [address] [facility] [address] Information: Should you experience any significant changes in your wound(s) or have questions about your wound care, please contact the [address] [place] [address] at [phone]. We are open from 8:00am - 4:30p Monday, Thursday and Friday; 11:00am - 5pm on Tuesday and CLOSED Wednesday. Please give us 24-48 business hours to return your call. Call your doctor now or seek immediate medical care if:    You have symptoms of infection, such as: Increased pain, swelling, warmth, or redness. Red streaks leading from the area. Pus draining from the area. A fever.          [] Patient unable to sign Discharge Instructions given to ECF/Transportation/POA              Electronically signed by Zulma Mosqueda MD on 9/12/2022 at 9:17 AM

## 2022-09-13 NOTE — TELEPHONE ENCOUNTER
Spoke with bhupinder. She states that this is nothing new but the Nurse was concerned.  Patient has an appointment cheryl afternoon with Aleyda Nunes

## 2022-09-14 ENCOUNTER — TELEPHONE (OUTPATIENT)
Dept: ORTHOPEDIC SURGERY | Age: 87
End: 2022-09-14

## 2022-09-14 NOTE — TELEPHONE ENCOUNTER
General Question     Subject: PATIENT IS HAVING BAD LEG PAIN AND WOULD LIKE TO SPEAK WITH SOMEONE IN OFFICE   Patient and /or Facility Request: [place]   Contact Number:  [phone]    PATIENT IS HAVING BAD LEG PAIN AND WOULD LIKE TO SPEAK WITH SOMEONE IN OFFICE. PLEASE CALL PATIENT BACK AT THE NUMBER ABOVE.

## 2022-09-15 NOTE — TELEPHONE ENCOUNTER
Called. She is concerned about her lumps and bumps on her leg. She is s/p LLE  bypass Dr Mini Knight.

## 2022-09-19 ENCOUNTER — HOSPITAL ENCOUNTER (OUTPATIENT)
Dept: WOUND CARE | Age: 87
Discharge: HOME | End: 2022-09-19
Payer: MEDICARE

## 2022-09-19 DIAGNOSIS — S81.802A WOUND OF LEFT LOWER EXTREMITY, INITIAL ENCOUNTER: ICD-10-CM

## 2022-09-19 DIAGNOSIS — I73.9 PERIPHERAL ARTERY DISEASE (HCC): Primary | ICD-10-CM

## 2022-09-19 DIAGNOSIS — S81.802D WOUND OF LEFT LOWER EXTREMITY, SUBSEQUENT ENCOUNTER: ICD-10-CM

## 2022-09-19 PROCEDURE — 6370000000 HC RX 637 (ALT 250 FOR IP): Performed by: SPECIALIST

## 2022-09-19 PROCEDURE — 99213 OFFICE O/P EST LOW 20 MIN: CPT

## 2022-09-19 PROCEDURE — 99212 OFFICE O/P EST SF 10 MIN: CPT | Performed by: SPECIALIST

## 2022-09-19 RX ORDER — LIDOCAINE 40 MG/G
CREAM TOPICAL ONCE
Status: CANCELLED | OUTPATIENT
Start: 2022-09-19 | End: 2022-09-19

## 2022-09-19 RX ORDER — LIDOCAINE 50 MG/G
OINTMENT TOPICAL ONCE
Status: CANCELLED | OUTPATIENT
Start: 2022-09-19 | End: 2022-09-19

## 2022-09-19 RX ORDER — LIDOCAINE HYDROCHLORIDE 20 MG/ML
JELLY TOPICAL ONCE
Status: CANCELLED | OUTPATIENT
Start: 2022-09-19 | End: 2022-09-19

## 2022-09-19 RX ORDER — BACITRACIN, NEOMYCIN, POLYMYXIN B 400; 3.5; 5 [USP'U]/G; MG/G; [USP'U]/G
OINTMENT TOPICAL ONCE
Status: CANCELLED | OUTPATIENT
Start: 2022-09-19 | End: 2022-09-19

## 2022-09-19 RX ORDER — LIDOCAINE HYDROCHLORIDE 40 MG/ML
SOLUTION TOPICAL ONCE
Status: CANCELLED | OUTPATIENT
Start: 2022-09-19 | End: 2022-09-19

## 2022-09-19 RX ORDER — BETAMETHASONE DIPROPIONATE 0.05 %
OINTMENT (GRAM) TOPICAL ONCE
Status: CANCELLED | OUTPATIENT
Start: 2022-09-19 | End: 2022-09-19

## 2022-09-19 RX ORDER — BACITRACIN ZINC AND POLYMYXIN B SULFATE 500; 1000 [USP'U]/G; [USP'U]/G
OINTMENT TOPICAL ONCE
Status: CANCELLED | OUTPATIENT
Start: 2022-09-19 | End: 2022-09-19

## 2022-09-19 RX ORDER — CLOBETASOL PROPIONATE 0.5 MG/G
OINTMENT TOPICAL ONCE
Status: CANCELLED | OUTPATIENT
Start: 2022-09-19 | End: 2022-09-19

## 2022-09-19 RX ORDER — LIDOCAINE HYDROCHLORIDE 40 MG/ML
SOLUTION TOPICAL ONCE
Status: COMPLETED | OUTPATIENT
Start: 2022-09-19 | End: 2022-09-19

## 2022-09-19 RX ORDER — GINSENG 100 MG
CAPSULE ORAL ONCE
Status: CANCELLED | OUTPATIENT
Start: 2022-09-19 | End: 2022-09-19

## 2022-09-19 RX ORDER — GENTAMICIN SULFATE 1 MG/G
OINTMENT TOPICAL ONCE
Status: CANCELLED | OUTPATIENT
Start: 2022-09-19 | End: 2022-09-19

## 2022-09-19 RX ADMIN — LIDOCAINE HYDROCHLORIDE: 40 SOLUTION TOPICAL at 08:38

## 2022-09-19 NOTE — PROGRESS NOTES
1227 [facility]  Progress Note and Procedure Note      Jonathan Yu  AGE: 80 y.o. GENDER: female  : 1929  EPISODE DATE:  2022      Subjective:     Chief Complaint   Patient presents with    Wound Check     Left lower leg           HISTORY of PRESENT ILLNESS HPI     Jonathan Yu is a 80 y.o. female who presents today for wound evaluation. History of Wound Context: Now 2 weeks post application of second TheraSkin application to left anterior knee wound. She describes no pain from the wound but persistent neuropathy of her foot and ankle as well as swelling in her right medial thigh around her operative incision. She continues to be followed by Dr. Mini Knight.   Wound Pain Timing/Severity: none  Quality of pain: N/A  Severity:  0 / 10   Modifying Factors: None  Associated Signs/Symptoms: edema    Wound Identification:  Wound Type: arterial  Contributing Factors: edema, venous stasis, diabetes, and arterial insufficiency        PAST MEDICAL HISTORY        Diagnosis Date    Anxiety     Arthritis     At risk for falls     CAD (coronary artery disease)     Cerebral artery occlusion with cerebral infarction (HCC)     TIA--no residual effects    DDD (degenerative disc disease), cervical     Fractures     (L) Hip Fx 98, L1 fracture from fall 10-31-06    Hyperlipidemia     Hypertension     Mitral regurgitation     Neuropathy     Osteopenia     Osteoporosis     PAD (peripheral artery disease) ([address]     Right LE ischemia    Peripheral neuropathy 2015    Peripheral vascular disease (HCC)     bilateral lower extremities with edema    Podagra 2017    Dr. Amina Vera    Prolonged emergence from general anesthesia     Spinal stenosis     L4-5    Type 2 diabetes mellitus ([address]     Urethral stricture 5 years ago    Urgency of urination        PAST SURGICAL HISTORY    Past Surgical History:   Procedure Laterality Date    ANGIOPLASTY Left 2022    Left SFA    APPENDECTOMY      CARPAL TUNNEL RELEASE Right 03/29/2019    RIGHT CARPAL TUNNEL RELEASE AND RIGHT MIDDLE FINGER TRIGGER FINGER RELEASE performed by Sayda Moya MD at [facility]  [id number]    LEFT EYE EYE CATARACT PHACOEMULSIFICATION INTRAOCULAR LENS    CHOLECYSTECTOMY  01/01/1978    COLONOSCOPY  08/31/1999    diverticulosis    EYE SURGERY Bilateral     bilateral cataract removed    FEMORAL BYPASS Left 6/28/2022    LEFT LEG FEMORAL TO POPLITEAL BYPASS WITH INTRAOPERATIVE ANGIOGRAM [facility] Left 02/14/2022    LEFT COMMON FEMORAL ARTERY ENDARTERECTOMY performed by Lenny Bone MD at [facility] (CERVIX STATUS UNKNOWN)  [address] [place] [address]    IR FEMORAL POPLITEAL BYPASS GRAFT Right 08/31/2015    KYPHOSIS SURGERY  11/07/2006    L1, (fractured after a fall)    LEG SURGERY Left 02/14/2022    LEFT LEG WOUND DEBRIDEMENT performed by Lenny Bone MD at [place] [address] N/A 7/7/2022    INCISION AND DRAINAGE OF LEFT SIDE GROIN WITH PLACEMENT OF WOUND VAC performed by Lenny Bone MD at [facility]  04/25/1998    (L) THR    WRIST FRACTURE SURGERY  01/01/2008    left wrist       FAMILY HISTORY    Family History   Problem Relation Age of Onset    Cancer Mother 43    Stroke Father     Hypertension Father        SOCIAL HISTORY    Social History     Tobacco Use    Smoking status: Never    Smokeless tobacco: Never   Vaping Use    Vaping Use: Never used   Substance Use Topics    Alcohol use: No    Drug use: Never       ALLERGIES    Allergies   Allergen Reactions    Aricept [Donepezil Hydrochloride] Other (See Comments)     Pt unable to recall reaction    Doxycycline Nausea Only    Ketorolac Tromethamine Other (See Comments)     Pt unable to recall reaction    Donepezil Hcl        MEDICATIONS    Current Outpatient Medications on File Prior to Encounter   Medication Sig Dispense Refill    atorvastatin (LIPITOR) 10 MG tablet TAKE ONE TABLET BY MOUTH DAILY 30 tablet 3    sulfamethoxazole-trimethoprim (BACTRIM;SEPTRA) 400-80 MG per tablet Take 1 tablet by mouth 2 times daily Per Dr Margarete Bamberger 60 tablet 2    lisinopril (PRINIVIL;ZESTRIL) 10 MG tablet 20 mg      Multiple Vitamins-Minerals (THERAPEUTIC MULTIVITAMIN-MINERALS) tablet Take 1 tablet by mouth daily      furosemide (LASIX) 20 MG tablet TAKE ONE TABLET BY MOUTH DAILY 90 tablet 1    glimepiride (AMARYL) 1 MG tablet TAKE ONE TABLET BY MOUTH EVERY MORNING BEFORE BREAKFAST 30 tablet 2    blood glucose test strips (ONETOUCH ULTRA) strip USE TO TEST BLOOD SUGAR TWICE DAILY 100 strip 1    metFORMIN (GLUCOPHAGE) 500 MG tablet TAKE ONE TABLET BY MOUTH DAILY 90 tablet 3    traMADol (ULTRAM) 50 MG tablet Take 25 mg by mouth every 6 hours as needed for Pain. (Patient not taking: Reported on 9/19/2022)      acetaminophen (TYLENOL) 325 MG tablet Take 650 mg by mouth every 6 hours as needed for Pain      Lite Touch Lancets MISC Use twice daily when testing blood sugars. 100 each 5    Wound Cleansers (VASHE CLEANSING) SOLN Soak vashe on gauze pad and place on wound for 5-10 minutes (Patient not taking: Reported on 9/19/2022) 1 each 2    Turmeric 450 MG CAPS Take 450 mg by mouth daily      Cholecalciferol (VITAMIN D3) 2000 UNITS CAPS Take 1 capsule by mouth daily      aspirin EC 81 MG EC tablet Take 1 tablet by mouth daily 30 tablet 3     No current facility-administered medications on file prior to encounter. REVIEW OF SYSTEMS    Pertinent items are noted in HPI. Objective: There were no vitals taken for this visit. PHYSICAL EXAM    General Appearance: alert and oriented to person, place and time, well-developed and well-nourished, in no acute distress  Extremities: no cyanosis, no clubbing, 1 + edema-  left lower extremity, and TheraSkin allograft in place over left knee wound. Periwound is dry        Assessment:     1. Peripheral artery disease ([address]    2.  Wound of left lower extremity, subsequent encounter    3. Wound of left lower extremity, initial encounter          Wound Measurements:  Wound 11/22/21 Leg Left; Lower;Distal #1 (Active)   Wound Image   09/06/22 0854   Wound Etiology Arterial 11/22/21 0924   Wound Cleansed Cleansed with saline 09/12/22 0835   Dressing/Treatment Collagen with Ag 09/19/22 0859   Wound Length (cm) 2.2 cm 09/19/22 0829   Wound Width (cm) 2.3 cm 09/19/22 0829   Wound Depth (cm) 0.1 cm 09/19/22 0829   Wound Surface Area (cm^2) 5.06 cm^2 09/19/22 0829   Change in Wound Size % (l*w) -80.71 09/19/22 0829   Wound Volume (cm^3) 0.506 cm^3 09/19/22 0829   Wound Healing % -81 09/19/22 0829   Post-Procedure Length (cm) 2 cm 09/06/22 0920   Post-Procedure Width (cm) 2 cm 09/06/22 0920   Post-Procedure Depth (cm) 0 cm 09/06/22 0920   Post-Procedure Surface Area (cm^2) 4 cm^2 09/06/22 0920   Post-Procedure Volume (cm^3) 0 cm^3 09/06/22 0920   Distance Tunneling (cm) 0 cm 09/19/22 0829   Tunneling Position ___ O'Clock 0 07/11/22 0943   Undermining Starts ___ O'Clock 0 07/11/22 0943   Undermining Ends___ O'Clock 0 07/11/22 0943   Undermining Maxium Distance (cm) 0 09/19/22 0829   Wound Assessment Slough;Fibrin 09/19/22 0829   Drainage Amount Small 09/19/22 0829   Drainage Description [place] 09/19/22 0829   Odor None 09/19/22 0829   Nissa-wound Assessment Intact;Fragile;Edematous 09/19/22 0829   Margins Defined edges 09/19/22 0829   Wound Thickness Description not for Pressure Injury Full thickness 09/19/22 0829   Number of days: 300       Wound 07/11/22 Left #2 groin (Active)   Wound Image   07/11/22 0943   Wound Cleansed Cleansed with saline 09/12/22 0835   Dressing/Treatment Collagen with Ag 08/29/22 1140   Wound Length (cm) 0.5 cm 09/12/22 0835   Wound Width (cm) 1 cm 09/12/22 0835   Wound Depth (cm) 0.1 cm 09/12/22 0835   Wound Surface Area (cm^2) 0.5 cm^2 09/12/22 0835   Change in Wound Size % (l*w) 94.44 09/12/22 0835   Wound Volume (cm^3) 0.05 cm^3 09/12/22 8051   Wound Healing % 100 09/12/22 0835   Post-Procedure Length (cm) 0.5 cm 09/19/22 0829   Post-Procedure Width (cm) 1 cm 09/19/22 0829   Post-Procedure Depth (cm) 0.1 cm 09/19/22 0829   Post-Procedure Surface Area (cm^2) 0.5 cm^2 09/19/22 0829   Post-Procedure Volume (cm^3) 0.05 cm^3 09/19/22 0829   Distance Tunneling (cm) 0 cm 09/12/22 0835   Tunneling Position ___ O'Clock 0 07/11/22 0943   Undermining Starts ___ O'Clock 0 09/12/22 0835   Undermining Ends___ O'Clock 0 09/12/22 0835   Undermining Maxium Distance (cm) 0 09/12/22 0835   Wound Assessment Pink/red;Granulation tissue 09/12/22 0835   Drainage Amount Scant 09/12/22 0835   Drainage Description Yellow 09/12/22 0835   Odor None 09/12/22 0835   Nissa-wound Assessment Fragile 09/12/22 0835   Margins Defined edges 09/12/22 0835   Wound Thickness Description not for Pressure Injury Full thickness 09/12/22 0835   Number of days: 69          Plan:     Treatment Note please see attached Discharge Instructions    New Prescriptions    No medications on file       Orders Placed This Encounter   Procedures    Initiate Outpatient Wound Care Protocol       Written patient dismissal instructions given to patient and signed by patient or POA. Discharge [address] [facility] Physician Orders and Discharge Instructions  [address]. [address] [place] [address]. [facility]  Telephone: [phone]  NAME:  Cleo Bergeron OF BIRTH:  4/4/1929  MEDICAL RECORD NUMBER:  [id number]  DATE: 9/19/22     Return Appointment:  Return Appointment: With Dr. Jeni Wells  in  1 [facility])  [] Return Appointment for a Wound Assessment with the nurse on:     Future Appointments   Date Time Provider Gilbert Frey   9/21/2022  8:00 AM [facility]or [facility] [address] [facility]   9/21/2022 10:15 AM Suellen Gottron, MD FF VASC/ENDO MMA   11/3/2022  2:00 PM Angie Day MD [address] [place] [address] Avenue: [address]:  Phone: [phone]  Fax: [phone]   Medically necessary services: [x] Skilled Nursing [] PT (Eval & Treat) [] OT (Eval & Treat) [] Social Work [] Dietician  [] Other:    Wound care instructions: If you smoke we ask that you refrain from smoking. Smoking inhibits wounds from healing. When taking antibiotics take the entire prescription as ordered. Do not stop taking until medication is all gone unless otherwise instructed. Exercise as tolerated. Keep weight off wounds and reposition every 2 hours if applicable. Do not get wounds wet in the bath or shower unless otherwise instructed by your physician. If your wound is on your foot or leg, you may purchase a cast bag. Please ask at the pharmacy. Wash hands with soap and water prior to and after every dressing change. [x]Wash wounds with: Vashe wash - Apply enough Vashe to soak a piece of gauze and place on wound bed for 5-10 minutes. No need to rinse after soaking. [x]Nissa wound Topical Treatments: Do not apply lotions, creams, or ointments to the skin around the wound bed unless directed as followed:   Apply around the wound: Nothing         [x]Wound Location: left lower leg   Apply Primary Dressing to wound: Joleen  Secondary Dressing: 2X2 gauze pad and Conforming roll gauze   Avoid contact of tape with skin if possible. When to change Dressing: DO NOT CHANGE    [x]Wound Location: left groin being managed by Dr. Sarah Maki      [x] Edema Control:  Apply: Compression Stocking on Bilateral - unknown strength    Elevate leg(s) above the level of the heart for 30 minutes 4-5 times a day and/or when sitting. Avoid prolonged standing in one place. Dietary:  Important dietary reminders:  1. Increase Protein intake (i.e. Lean meats, fish, eggs, legumes, and yogurt)  2. No added salt  3. If diabetic, follow a diabetic diet and check glucose prior to meals or as instructed by your physician.     Dietary Supplements(Take twice a day unless instructed otherwise):  [] Bora   [] 30ml ProStat [] Keturah Yayo [] Ensure Max/Premier [] Other:    Your nurse  is:  Cem Miller     Electronically signed by Alexis Peterson RN on 9/19/2022 at 8:44 AM     [facility] Information: Should you experience any significant changes in your wound(s) or have questions about your wound care, please contact the [address] [place] [address] at [phone]. We are open from 8:00am - 4:30p Monday, Thursday and Friday; 11:00am - 5pm on Tuesday and CLOSED Wednesday. Please give us 24-48 business hours to return your call. Call your doctor now or seek immediate medical care if:    You have symptoms of infection, such as: Increased pain, swelling, warmth, or redness. Red streaks leading from the area. Pus draining from the area. A fever.          [] Patient unable to sign Discharge Instructions given to ECF/Transportation/POA             Electronically signed by Shayne Guillory MD on 9/19/2022 at 9:22 AM

## 2022-09-19 NOTE — DISCHARGE INSTRUCTIONS
215 [facility] Physician Orders and Discharge Instructions  [address] [place] [address] [address] [address] [facility]. [facility]. [place]  Telephone: [phone]  NAME:  Cristel Hall OF BIRTH:  4/4/1929  MEDICAL RECORD NUMBER:  [id number]  DATE: 9/19/22     Return Appointment:  Return Appointment: With Dr. Nadia Alfaro  in  1 [facility])  [] Return Appointment for a Wound Assessment with the nurse on:     Future Appointments   Date Time Provider Gilbert Frey   9/21/2022  8:00 AM Irene Aguila DO [address] [address] [email or web address]   9/21/2022 10:15 AM Pilar Vázquez MD FF VASC/ENDO MMA   11/3/2022  2:00 PM Carrie Membreno MD [address] [place] [address] Avenue: [address] [place] [address] Ave:  Phone: [phone]  Fax: [phone]   Medically necessary services: [x] Skilled Nursing [] PT (Eval & Treat) [] OT (Eval & Treat) [] Social Work [] Dietician  [] Other:    Wound care instructions: If you smoke we ask that you refrain from smoking. Smoking inhibits wounds from healing. When taking antibiotics take the entire prescription as ordered. Do not stop taking until medication is all gone unless otherwise instructed. Exercise as tolerated. Keep weight off wounds and reposition every 2 hours if applicable. Do not get wounds wet in the bath or shower unless otherwise instructed by your physician. If your wound is on your foot or leg, you may purchase a cast bag. Please ask at the pharmacy. Wash hands with soap and water prior to and after every dressing change. [x]Wash wounds with: Vashe wash - Apply enough Vashe to soak a piece of gauze and place on wound bed for 5-10 minutes. No need to rinse after soaking.   [x]Nissa wound Topical Treatments: Do not apply lotions, creams, or ointments to the skin around the wound bed unless directed as followed:   Apply around the wound: Nothing         [x]Wound Location: left lower leg   Apply Primary Dressing to wound: Joleen  Secondary Dressing: 2X2 gauze pad and Conforming roll gauze   Avoid contact of tape with skin if possible. When to change Dressing: DO NOT CHANGE    [x]Wound Location: left groin being managed by Dr. Todd Bynum      [x] Edema Control:  Apply: Compression Stocking on Bilateral - unknown strength    Elevate leg(s) above the level of the heart for 30 minutes 4-5 times a day and/or when sitting. Avoid prolonged standing in one place. Dietary:  Important dietary reminders:  1. Increase Protein intake (i.e. Lean meats, fish, eggs, legumes, and yogurt)  2. No added salt  3. If diabetic, follow a diabetic diet and check glucose prior to meals or as instructed by your physician. Dietary Supplements(Take twice a day unless instructed otherwise):  [] Leisa Pulse  [] 30ml ProStat [] Karyle Buchanan [] Ensure Max/Premier [] Other:    Your nurse  is:  Rajeev Charles     Electronically signed by Kevon Saxena RN on 9/19/2022 at 8:44 AM     215 [facility] Information: Should you experience any significant changes in your wound(s) or have questions about your wound care, please contact the [address] [place] [address] at [phone]. We are open from 8:00am - 4:30p Monday, Thursday and Friday; 11:00am - 5pm on Tuesday and CLOSED Wednesday. Please give us 24-48 business hours to return your call. Call your doctor now or seek immediate medical care if:    You have symptoms of infection, such as: Increased pain, swelling, warmth, or redness. Red streaks leading from the area. Pus draining from the area. A fever.          [] Patient unable to sign Discharge Instructions given to ECF/Transportation/POA

## 2022-09-21 ENCOUNTER — OFFICE VISIT (OUTPATIENT)
Dept: VASCULAR SURGERY | Age: 87
End: 2022-09-21

## 2022-09-21 ENCOUNTER — OFFICE VISIT (OUTPATIENT)
Dept: INTERNAL MEDICINE CLINIC | Age: 87
End: 2022-09-21
Payer: MEDICARE

## 2022-09-21 VITALS
SYSTOLIC BLOOD PRESSURE: 124 MMHG | DIASTOLIC BLOOD PRESSURE: 76 MMHG | RESPIRATION RATE: 12 BRPM | OXYGEN SATURATION: 98 % | HEART RATE: 69 BPM

## 2022-09-21 VITALS
WEIGHT: 93 LBS | HEIGHT: 60 IN | SYSTOLIC BLOOD PRESSURE: 110 MMHG | BODY MASS INDEX: 18.26 KG/M2 | DIASTOLIC BLOOD PRESSURE: 60 MMHG

## 2022-09-21 DIAGNOSIS — I73.9 PAD (PERIPHERAL ARTERY DISEASE) (HCC): ICD-10-CM

## 2022-09-21 DIAGNOSIS — N18.9 ACUTE KIDNEY INJURY SUPERIMPOSED ON CHRONIC KIDNEY DISEASE (HCC): Primary | ICD-10-CM

## 2022-09-21 DIAGNOSIS — T81.49XA POSTOPERATIVE WOUND INFECTION: ICD-10-CM

## 2022-09-21 DIAGNOSIS — L97.922 NON-PRESSURE CHRONIC ULCER OF LEFT LOWER LEG WITH FAT LAYER EXPOSED (HCC): ICD-10-CM

## 2022-09-21 DIAGNOSIS — M79.2 NEUROPATHIC PAIN OF LEFT FOOT: ICD-10-CM

## 2022-09-21 DIAGNOSIS — D64.9 NORMOCYTIC ANEMIA: ICD-10-CM

## 2022-09-21 DIAGNOSIS — T81.31XA POSTOPERATIVE WOUND DEHISCENCE, INITIAL ENCOUNTER: Primary | ICD-10-CM

## 2022-09-21 DIAGNOSIS — I34.0 SEVERE MITRAL REGURGITATION: ICD-10-CM

## 2022-09-21 DIAGNOSIS — N17.9 ACUTE KIDNEY INJURY SUPERIMPOSED ON CHRONIC KIDNEY DISEASE (HCC): Primary | ICD-10-CM

## 2022-09-21 PROCEDURE — 1090F PRES/ABSN URINE INCON ASSESS: CPT | Performed by: INTERNAL MEDICINE

## 2022-09-21 PROCEDURE — 99214 OFFICE O/P EST MOD 30 MIN: CPT | Performed by: INTERNAL MEDICINE

## 2022-09-21 PROCEDURE — 99024 POSTOP FOLLOW-UP VISIT: CPT | Performed by: SURGERY

## 2022-09-21 PROCEDURE — 3288F FALL RISK ASSESSMENT DOCD: CPT | Performed by: INTERNAL MEDICINE

## 2022-09-21 PROCEDURE — 1036F TOBACCO NON-USER: CPT | Performed by: INTERNAL MEDICINE

## 2022-09-21 PROCEDURE — G8427 DOCREV CUR MEDS BY ELIG CLIN: HCPCS | Performed by: INTERNAL MEDICINE

## 2022-09-21 PROCEDURE — 1123F ACP DISCUSS/DSCN MKR DOCD: CPT | Performed by: INTERNAL MEDICINE

## 2022-09-21 PROCEDURE — G8419 CALC BMI OUT NRM PARAM NOF/U: HCPCS | Performed by: INTERNAL MEDICINE

## 2022-09-21 RX ORDER — LISINOPRIL 10 MG/1
10 TABLET ORAL DAILY
Qty: 30 TABLET | Refills: 0
Start: 2022-09-21 | End: 2022-10-26 | Stop reason: SDUPTHER

## 2022-09-21 NOTE — PROGRESS NOTES
[facility]) Physicians  Internal Medicine  Patient Encounter  Melissa Winston D.O., Sanjiv        Chief Complaint   Patient presents with    Follow-up       HPI: 80 y.o. female seen today for a short interval follow-up. She was seen on 7/26/2022 for a checkup. We addressed her most recent vascular procedures and ongoing wound management of the left lower extremity ulcer. We started her on gabapentin 100 mg nightly for neuropathic pain. She had been on Zyvox for an MRSA positive culture. She has completed this medication. Additionally she had problems with hyponatremia back in July. On 7/26/2022 her sodium was 128. This was likely due to poor solute intake. Patient has made some adjustments and her subsequent sodium levels have been normal.  On 9/1/2022 her sodium was 135. However, her creatinine was up to 1.4 suggesting she was a bit dehydrated again. She is mildly anemic with a hemoglobin of 9.5 on 8/15/2022. She is on Bactrim 400-80 BID. Pt states she never started the Gabapentin. She saw Dr. Deshaun Krishna 9/7/2022. He started her on Lyrica 75 mg BID. Probably not the best due to potential adverse effects-- swelling, drowsiness, fluid retention    She is using Tylenol for pain. She states she is pain all over. She does not want to take the Tramadol. The wound is improving. Reviewed picture from 9/6/2022. She is getting skin grafts from the skin donor bank. She still has medial thigh swelling from the lymph leak? ? The surgical wound has healed, but there are still some openings. She is asking about PRP therapy.       Past Medical History:   Diagnosis Date    Anxiety     Arthritis     At risk for falls     CAD (coronary artery disease)     Cerebral artery occlusion with cerebral infarction (HCC)     TIA--no residual effects    DDD (degenerative disc disease), cervical     Fractures     (L) Hip Fx 4/25/98, L1 fracture from fall 10-31-06    Hyperlipidemia     Hypertension Mitral regurgitation     Neuropathy     Osteopenia     Osteoporosis     PAD (peripheral artery disease) ([facility])     Right LE ischemia    Peripheral neuropathy 6/1/2015    Peripheral vascular disease ([facility])     bilateral lower extremities with edema    Podagra 01/09/2017    Dr. Horace Haley    Prolonged emergence from general anesthesia     Spinal stenosis     L4-5    Type 2 diabetes mellitus ([address]     Urethral stricture 5 years ago    Urgency of urination          MEDICATIONS:  Prior to Visit Medications    Medication Sig   atorvastatin (LIPITOR) 10 MG tablet TAKE ONE TABLET BY MOUTH DAILY   sulfamethoxazole-trimethoprim (BACTRIM;SEPTRA) 400-80 MG per tablet Take 1 tablet by mouth 2 times daily Per Dr Nikki Garrett   lisinopril (PRINIVIL;ZESTRIL) 10 MG tablet 20 mg   Multiple Vitamins-Minerals (THERAPEUTIC MULTIVITAMIN-MINERALS) tablet Take 1 tablet by mouth daily   furosemide (LASIX) 20 MG tablet TAKE ONE TABLET BY MOUTH DAILY   glimepiride (AMARYL) 1 MG tablet TAKE ONE TABLET BY MOUTH EVERY MORNING BEFORE BREAKFAST   blood glucose test strips (ONETOUCH ULTRA) strip USE TO TEST BLOOD SUGAR TWICE DAILY   metFORMIN (GLUCOPHAGE) 500 MG tablet TAKE ONE TABLET BY MOUTH DAILY   acetaminophen (TYLENOL) 325 MG tablet Take 650 mg by mouth every 6 hours as needed for Pain   Lite Touch Lancets MISC Use twice daily when testing blood sugars. Turmeric 450 MG CAPS Take 450 mg by mouth daily   Cholecalciferol (VITAMIN D3) 2000 UNITS CAPS Take 1 capsule by mouth daily   aspirin EC 81 MG EC tablet Take 1 tablet by mouth daily           Review of Systems - As per HPI      OBJECTIVE:  Vitals:    09/21/22 0805   BP: 124/76   Pulse: 69   Resp: 12   SpO2: 98%     There is no height or weight on file to calculate BMI.      Wt Readings from Last 3 Encounters:   08/31/22 93 lb (42.2 kg)   08/17/22 93 lb (42.2 kg)   08/03/22 93 lb (42.2 kg)     BP Readings from Last 3 Encounters:   09/21/22 124/76   09/12/22 (!) 161/75   09/06/22 (!) 174/77        GEN: NAD, A&O, Non-toxic  HEENT: NC/AT, FLACA, EOMI, anicteric  NECK: Supple. No thyromegaly. No JVD  LYMPH: No C/SC nodes. CV:  Regular with frequent ectopy. Loud 3/6 systolic murmur heard best at apex. PULM: CTA  EXT: + BL LE pitting edema. Left medial thigh focal lymphedema  GI: Abdomen is soft, NT, BS+, No hepatomegaly. No masses. NEURO: + Allodynia left foot. VASC:  No carotid bruits. Pulses symmetric  SKIN: Left lower leg wound not examined. Left groin with small defect draining clear fluid. ASSESSMENT/PLAN:    1. Acute kidney injury superimposed on chronic kidney disease ([address]  Likely fluid related but have to monitor while on Bactrim  Check electrolytes and renal function. Monitor for hyperkalemia  - CBC with Auto Differential; Future  - Comprehensive Metabolic Panel; Future    2. PAD (peripheral artery disease) (HCC)  Status post endarterectomy, vascular bypass  Postoperative wound infection with MRSA    3. Normocytic anemia  Likely due to chronic disease  - CBC with Auto Differential; Future    4. Severe mitral regurgitation  Patient with loud systolic murmur on exam  No signs of cardiac decompensation at this time    5. Neuropathic pain of left foot  Unfortunately, patient not able to tolerate gabapentin  Patient did not start the Lyrica given to her by her podiatrist  She would likely not tolerate Lyrica as well  Patient may need pain management  Consider palliative care    6. Non-pressure chronic ulcer of left lower leg with fat layer exposed ([address]  Continue wound management  - Sedimentation Rate; Future  - C-Reactive Protein; Future      On this date 9/21/2022 I have spent 30 minutes reviewing previous notes, test results and face to face with the patient discussing the diagnosis and importance of compliance with the treatment plan as well as documenting on the day of the visit.        Discussed medications with patient who voiced understanding of their use, indication and potential side effects. Pt also understands the above recommendations. All questions answered. This note was generated completely or in part utilizing Dragon dictation speech recognition software. Occasionally, words are mistranscribed and despite editing, the text may contain inaccuracies due to incorrect word recognition.   If further clarification is needed please contact the office at [phone]       Electronically signed    Ethel Hill D.O.

## 2022-09-23 ENCOUNTER — TELEPHONE (OUTPATIENT)
Dept: VASCULAR SURGERY | Age: 87
End: 2022-09-23

## 2022-09-23 NOTE — TELEPHONE ENCOUNTER
Patient has a groin wound and home care nurse would like to discuss new orders with Dr. Nikki Garrett. Please call to advise.

## 2022-09-23 NOTE — TELEPHONE ENCOUNTER
Left message for Svetlana to call back. Per Jah Easley orders are:    DC saline wet to dry BID groin wound. Dry gauze L groin daily and prn. Hold in place with panties/pannus.

## 2022-09-26 ENCOUNTER — HOSPITAL ENCOUNTER (OUTPATIENT)
Dept: WOUND CARE | Age: 87
Discharge: HOME | End: 2022-09-26
Payer: MEDICARE

## 2022-09-26 VITALS
DIASTOLIC BLOOD PRESSURE: 69 MMHG | RESPIRATION RATE: 16 BRPM | SYSTOLIC BLOOD PRESSURE: 174 MMHG | HEART RATE: 70 BPM | TEMPERATURE: 97.5 F

## 2022-09-26 DIAGNOSIS — S81.802A WOUND OF LEFT LOWER EXTREMITY, INITIAL ENCOUNTER: ICD-10-CM

## 2022-09-26 DIAGNOSIS — I73.9 PERIPHERAL ARTERY DISEASE (HCC): Primary | ICD-10-CM

## 2022-09-26 DIAGNOSIS — S81.802D WOUND OF LEFT LOWER EXTREMITY, SUBSEQUENT ENCOUNTER: ICD-10-CM

## 2022-09-26 PROCEDURE — 15271 SKIN SUB GRAFT TRNK/ARM/LEG: CPT | Performed by: SPECIALIST

## 2022-09-26 PROCEDURE — 15271 SKIN SUB GRAFT TRNK/ARM/LEG: CPT

## 2022-09-26 PROCEDURE — 6370000000 HC RX 637 (ALT 250 FOR IP): Performed by: SPECIALIST

## 2022-09-26 PROCEDURE — 11042 DBRDMT SUBQ TIS 1ST 20SQCM/<: CPT

## 2022-09-26 RX ORDER — LIDOCAINE HYDROCHLORIDE 20 MG/ML
JELLY TOPICAL ONCE
Status: CANCELLED | OUTPATIENT
Start: 2022-09-26 | End: 2022-09-26

## 2022-09-26 RX ORDER — LIDOCAINE HYDROCHLORIDE 40 MG/ML
SOLUTION TOPICAL ONCE
Status: COMPLETED | OUTPATIENT
Start: 2022-09-26 | End: 2022-09-26

## 2022-09-26 RX ORDER — BACITRACIN, NEOMYCIN, POLYMYXIN B 400; 3.5; 5 [USP'U]/G; MG/G; [USP'U]/G
OINTMENT TOPICAL ONCE
Status: CANCELLED | OUTPATIENT
Start: 2022-09-26 | End: 2022-09-26

## 2022-09-26 RX ORDER — GINSENG 100 MG
CAPSULE ORAL ONCE
Status: CANCELLED | OUTPATIENT
Start: 2022-09-26 | End: 2022-09-26

## 2022-09-26 RX ORDER — LIDOCAINE 50 MG/G
OINTMENT TOPICAL ONCE
Status: CANCELLED | OUTPATIENT
Start: 2022-09-26 | End: 2022-09-26

## 2022-09-26 RX ORDER — LIDOCAINE 40 MG/G
CREAM TOPICAL ONCE
Status: CANCELLED | OUTPATIENT
Start: 2022-09-26 | End: 2022-09-26

## 2022-09-26 RX ORDER — GENTAMICIN SULFATE 1 MG/G
OINTMENT TOPICAL ONCE
Status: CANCELLED | OUTPATIENT
Start: 2022-09-26 | End: 2022-09-26

## 2022-09-26 RX ORDER — CLOBETASOL PROPIONATE 0.5 MG/G
OINTMENT TOPICAL ONCE
Status: CANCELLED | OUTPATIENT
Start: 2022-09-26 | End: 2022-09-26

## 2022-09-26 RX ORDER — BETAMETHASONE DIPROPIONATE 0.05 %
OINTMENT (GRAM) TOPICAL ONCE
Status: CANCELLED | OUTPATIENT
Start: 2022-09-26 | End: 2022-09-26

## 2022-09-26 RX ORDER — LIDOCAINE HYDROCHLORIDE 40 MG/ML
SOLUTION TOPICAL ONCE
Status: CANCELLED | OUTPATIENT
Start: 2022-09-26 | End: 2022-09-26

## 2022-09-26 RX ORDER — BACITRACIN ZINC AND POLYMYXIN B SULFATE 500; 1000 [USP'U]/G; [USP'U]/G
OINTMENT TOPICAL ONCE
Status: CANCELLED | OUTPATIENT
Start: 2022-09-26 | End: 2022-09-26

## 2022-09-26 RX ADMIN — LIDOCAINE HYDROCHLORIDE: 40 SOLUTION TOPICAL at 08:20

## 2022-09-26 ASSESSMENT — PAIN DESCRIPTION - ORIENTATION: ORIENTATION: LEFT

## 2022-09-26 ASSESSMENT — PAIN DESCRIPTION - DESCRIPTORS: DESCRIPTORS: BURNING;ACHING

## 2022-09-26 ASSESSMENT — PAIN SCALES - GENERAL: PAINLEVEL_OUTOF10: 10

## 2022-09-26 ASSESSMENT — PAIN DESCRIPTION - LOCATION: LOCATION: FOOT;LEG

## 2022-09-26 ASSESSMENT — PAIN DESCRIPTION - PAIN TYPE: TYPE: ACUTE PAIN

## 2022-09-26 ASSESSMENT — PAIN DESCRIPTION - FREQUENCY: FREQUENCY: CONTINUOUS

## 2022-09-26 NOTE — PROGRESS NOTES
TheraSkin Treatment Note      Goal:  Patient will receive safe and proper application of skin substitute. Patient will comply with caring for dressing, offloading and reporting complications. [x] Expiration date of TheraSkin checked immediately prior to use. [x] Package intact prior to use and no damage noted. [x] Transport temperature controlled and acceptable. TheraSkin was prepared for application by removing from package. TheraSkin was rinsed 2 times in room temperature normal saline for 5 seconds each time. A 2nd saline rinse was left on the TheraSkin until the provider was ready to apply it within 120 minutes of thawing. White side placed against ulcer bed. [x] Theraskin was applied to wound # 1 and affixed with steri-strips by the provider. [x] Secondary dressing applied as ordered. [x] Patient/caregiver was instructed not to remove dressing and to keep it clean and dry. See AVS for further instructions given to patient/caregiver. Theraskin may be applied a total of 10 times per wound over a 12 week period. Additionally Theraskin may only be used every 12 months per wound. Date of first application of Theraskin for this current wound is May 16, 2022.      Application # 4           Guidelines followed      Electronically signed by Ivy Sam RN on 9/26/2022 at 9:01 AM

## 2022-09-26 NOTE — DISCHARGE INSTRUCTIONS
215 [facility] Physician Orders and Discharge Instructions  [address] [address] [address] [address] [facility]. [facility]. [place]  Telephone: [phone]  NAME:  Debo Rosales OF BIRTH:  4/4/1929  MEDICAL RECORD NUMBER:  [id number]  DATE: 9/26/22     Return Appointment:  Return Appointment: With Dr. Annabelle Pringle  in  1 [facility])  [] Return Appointment for a Wound Assessment with the nurse on:     Future Appointments   Date Time Provider Gilbert Frey   10/3/2022 10:15 AM Nahid Spangler MD [address] [facility] [place]   10/5/2022 11:00 AM Brianna Conn MD FF VASC/ENDO MMA   11/3/2022  2:00 PM Jaxon Guerra MD [address] [place] [address] Avenue: [address] [place] [address] Ave:  Phone: [phone]  Fax: [phone]   Medically necessary services: [x] Skilled Nursing [] PT (Eval & Treat) [] OT (Eval & Treat) [] Social Work [] Dietician  [] Other:    Wound care instructions: If you smoke we ask that you refrain from smoking. Smoking inhibits wounds from healing. When taking antibiotics take the entire prescription as ordered. Do not stop taking until medication is all gone unless otherwise instructed. Exercise as tolerated. Keep weight off wounds and reposition every 2 hours if applicable. Do not get wounds wet in the bath or shower unless otherwise instructed by your physician. If your wound is on your foot or leg, you may purchase a cast bag. Please ask at the pharmacy. Wash hands with soap and water prior to and after every dressing change. [x]Wash wounds with: Vashe wash - Apply enough Vashe to soak a piece of gauze and place on wound bed for 5-10 minutes. No need to rinse after soaking.   [x]Nissa wound Topical Treatments: Do not apply lotions, creams, or ointments to the skin around the wound bed unless directed as followed:   Apply around the wound: No-Sting barrier film           [x]Application of a biologic skin substitute: Yes: Theraskin : Wound Location: left lower leg wound- covered with mepitel, steri strips to secure, applied hydrogel, gauze, kerlix, coban  This is a highly specialized wound care dressing that is intended to enhance your own bodies ability to increase growth factors and other healing abilities. Please leave the dressing clean, dry and intact unless otherwise instructed by your physician. Leave steri-strips and non adherent in place if changing the dressing as instructed. []Instructions for [address] [facility] if applicable:     [x] Edema Control:  Apply: wears own compression stocking on right leg when she tolerates   Elevate leg(s) above the level of the heart for 30 minutes 4-5 times a day and/or when sitting. Avoid prolonged standing in one place. Dietary:  Important dietary reminders:  1. Increase Protein intake (i.e. Lean meats, fish, eggs, legumes, and yogurt)  2. No added salt  3. If diabetic, follow a diabetic diet and check glucose prior to meals or as instructed by your physician. Dietary Supplements(Take twice a day unless instructed otherwise):  [] Bora   [] 30ml ProStat [] Keturah Yayo [] Ensure Max/Premier [] Other:    Your nurse  is:  Cem Miller     Electronically signed by Alexis Peterson RN on 9/26/2022 at 8:57 AM     215 [facility] Information: Should you experience any significant changes in your wound(s) or have questions about your wound care, please contact the [address] [place] [address] at [phone]. We are open from 8:00am - 4:30p Monday, Thursday and Friday; 11:00am - 5pm on Tuesday and CLOSED Wednesday. Please give us 24-48 business hours to return your call. Call your doctor now or seek immediate medical care if:    You have symptoms of infection, such as: Increased pain, swelling, warmth, or redness. Red streaks leading from the area. Pus draining from the area. A fever.          [] Patient unable to sign Discharge Instructions given to ECF/Transportation/POA

## 2022-09-26 NOTE — PROGRESS NOTES
1227 [facility]  Progress Note and Procedure Note      Anali Whalen  MEDICAL RECORD NUMBER:  [id number]  AGE: 80 y.o. GENDER: female  : 1929  EPISODE DATE:  2022    Subjective:     Chief Complaint   Patient presents with    Wound Check     Left lower leg         HISTORY of PRESENT ILLNESS HPI     Anali Whalen is a 80 y.o. female who presents today for wound/ulcer evaluation. History of Wound Context: Patient is now 3 weeks post application of second TheraSkin to left anterior knee wound. She has recently seen Dr. Sarah Maki once again in follow-up following his surgery. She describes no pain or drainage from the wound. Does complain about left foot pain    Wound has failed to heal greater than 30%-50% at initial assessment. Patient has received greater than 30 days of conservative treatment including debridements at each wound care visit and the following: Compression    Pain Assessment:  Wound/Ulcer Pain Timing/Severity: none  Quality of pain: N/A  Severity:  0 / 10   Modifying Factors: None  Associated Signs/Symptoms: edema    Ulcer Identification:  Ulcer Type: arterial  Contributing Factors: edema, venous stasis, diabetes, and arterial insufficiency    Last S6R if applicable:   Hemoglobin A1C   Date Value Ref Range Status   2022 5.5 See comment % Final     Comment:     Comment:  Diagnosis of Diabetes: > or = 6.5%  Increased risk of diabetes (Prediabetes): 5.7-6.4%  Glycemic Control: Nonpregnant Adults: <7.0%                    Pregnant: <6.0%           Objective:      BP (!) 174/69   Pulse 70   Temp 97.5 °F (36.4 °C) (Temporal)   Resp 16     Wt Readings from Last 3 Encounters:   22 93 lb (42.2 kg)   22 93 lb (42.2 kg)   22 93 lb (42.2 kg)       PHYSICAL EXAM    Extremities: no cyanosis, no clubbing, minimal + edema-  left lower extremity, and granulating wound with minimal TheraSkin in place over left knee wound    Assessment:      1.  Peripheral artery disease ([facility] [address]    2. Wound of left lower extremity, subsequent encounter    3. Wound of left lower extremity, initial encounter         Procedure Note  Indications:  Based on my examination of this patient's wound(s) today, sharp excision is required to promote healing and evaluate the extent healing. Performed by: Aurelia Pritchett MD    Consent obtained? Yes    Time out taken: Yes    Pain Control: Anesthetic: 4% Lidocaine Liquid Topical     Debridement:Excisional Debridement    Using curette the wound was sharply debrided    down through and including the removal of  epidermis, dermis, and subcutaneous tissue. Devitalized Tissue Debrided:  fibrin and residual allograft      Wound #: 1     Pre & Post  Debridement Measurements:  Wound 11/22/21 Leg Left; Lower;Distal #1 (Active)   Wound Image   09/06/22 0854   Wound Etiology Arterial 11/22/21 0924   Wound Cleansed Cleansed with saline 09/26/22 0824   Dressing/Treatment Collagen with Ag 09/19/22 0859   Wound Length (cm) 2 cm 09/26/22 0824   Wound Width (cm) 2 cm 09/26/22 0824   Wound Depth (cm) 0.1 cm 09/26/22 0824   Wound Surface Area (cm^2) 4 cm^2 09/26/22 0824   Change in Wound Size % (l*w) -42.86 09/26/22 0824   Wound Volume (cm^3) 0.4 cm^3 09/26/22 0824   Wound Healing % -43 09/26/22 0824   Post-Procedure Length (cm) 2.1 cm 09/26/22 0849   Post-Procedure Width (cm) 2.1 cm 09/26/22 0849   Post-Procedure Depth (cm) 0.3 cm 09/26/22 0849   Post-Procedure Surface Area (cm^2) 4.41 cm^2 09/26/22 0849   Post-Procedure Volume (cm^3) 1.323 cm^3 09/26/22 0849   Distance Tunneling (cm) 0 cm 09/19/22 0829   Tunneling Position ___ O'Clock 0 07/11/22 0943   Undermining Starts ___ O'Clock 0 07/11/22 0943   Undermining Ends___ O'Clock 0 07/11/22 0943   Undermining Maxium Distance (cm) 0 09/19/22 0829   Wound Assessment Graft 09/26/22 0824   Drainage Amount Small 09/26/22 0824   Drainage Description Nancey Hams 09/26/22 0824   Odor None 09/26/22 0824   Nissa-wound Assessment Intact;Fragile;Edematous 09/26/22 0824   Margins Defined edges 09/26/22 0824   Wound Thickness Description not for Pressure Injury Full thickness 09/26/22 0824   Number of days: 307       Wound 07/11/22 Left #2 groin (Active)   Wound Image   07/11/22 0943   Wound Cleansed Cleansed with saline 09/12/22 0835   Dressing/Treatment Collagen with Ag 08/29/22 1140   Wound Length (cm) 0.5 cm 09/12/22 0835   Wound Width (cm) 1 cm 09/12/22 0835   Wound Depth (cm) 0.1 cm 09/12/22 0835   Wound Surface Area (cm^2) 0.5 cm^2 09/12/22 0835   Change in Wound Size % (l*w) 94.44 09/12/22 0835   Wound Volume (cm^3) 0.05 cm^3 09/12/22 0835   Wound Healing % 100 09/12/22 0835   Post-Procedure Length (cm) 0.5 cm 09/19/22 0829   Post-Procedure Width (cm) 1 cm 09/19/22 0829   Post-Procedure Depth (cm) 0.1 cm 09/19/22 0829   Post-Procedure Surface Area (cm^2) 0.5 cm^2 09/19/22 0829   Post-Procedure Volume (cm^3) 0.05 cm^3 09/19/22 0829   Distance Tunneling (cm) 0 cm 09/12/22 0835   Tunneling Position ___ O'Clock 0 07/11/22 0943   Undermining Starts ___ O'Clock 0 09/12/22 0835   Undermining Ends___ O'Clock 0 09/12/22 0835   Undermining Maxium Distance (cm) 0 09/12/22 0835   Wound Assessment Pink/red;Granulation tissue 09/12/22 0835   Drainage Amount Scant 09/12/22 0835   Drainage Description Yellow 09/12/22 0835   Odor None 09/12/22 0835   Nissa-wound Assessment Fragile 09/12/22 0835   Margins Defined edges 09/12/22 0835   Wound Thickness Description not for Pressure Injury Full thickness 09/12/22 0835   Number of days: 76          Percent of Wound Debrided: 100%    Total Surface Area Debrided:  4.4 sq cm    Diabetic/Pressure/Non Pressure Ulcers only:  Ulcer: Non-Pressure ulcer, fat layer exposed    Bleeding: Minimal    Hemostasis Achieved: by pressure    Procedural Pain: 0  / 10     Post Procedural Pain: 0 / 10     Response to treatment:  Well tolerated by patient.      rocedure:  Skin Substitute Application    Performed by: Horacio Ayala Area (cm^2) 0.5 cm^2 09/12/22 0835   Change in Wound Size % (l*w) 94.44 09/12/22 0835   Wound Volume (cm^3) 0.05 cm^3 09/12/22 0835   Wound Healing % 100 09/12/22 0835   Post-Procedure Length (cm) 0.5 cm 09/19/22 0829   Post-Procedure Width (cm) 1 cm 09/19/22 0829   Post-Procedure Depth (cm) 0.1 cm 09/19/22 0829   Post-Procedure Surface Area (cm^2) 0.5 cm^2 09/19/22 0829   Post-Procedure Volume (cm^3) 0.05 cm^3 09/19/22 0829   Distance Tunneling (cm) 0 cm 09/12/22 0835   Tunneling Position ___ O'Clock 0 07/11/22 0943   Undermining Starts ___ O'Clock 0 09/12/22 0835   Undermining Ends___ O'Clock 0 09/12/22 0835   Undermining Maxium Distance (cm) 0 09/12/22 0835   Wound Assessment Pink/red;Granulation tissue 09/12/22 0835   Drainage Amount Scant 09/12/22 0835   Drainage Description Yellow 09/12/22 0835   Odor None 09/12/22 0835   Nissa-wound Assessment Fragile 09/12/22 0835   Margins Defined edges 09/12/22 0835   Wound Thickness Description not for Pressure Injury Full thickness 09/12/22 0835   Number of days: 76          Diabetic/Pressure/Non Pressure Ulcers:  Ulcer:   Non-Pressure ulcer, fat layer exposed      Total Surface Area of Ulcer(s) Covered 4.4 sq/cm    Was the Product Layered  No     Amount of Product Applied 3 sq/cm     Amount of Product Wasted 0 sq/cm     Reason for Waste: N/A     Surgically Fixated: No:     Secured With: Steri Strips and Silicone Dressing     Procedural Pain: 0/10     Post Procedural Pain: 0 / 10    Response to Treatment: Well tolerated by patient. Third application of TheraSkin allograft was applied today without difficulty as medically necessary. Plan:     Treatment Note: Please see attached Discharge Instructions.   These instructions were given and signed by the patient or POA    New Medication(s) at this visit:   New Prescriptions    No medications on file       Discharge Instructions          215 [facility] Physician Orders and Discharge 1101 [facility] [address] [place] [address]. [place]  Telephone: [phone]  NAME:  Jayy Thompson OF BIRTH:  4/4/1929  MEDICAL RECORD NUMBER:  [id number]  DATE: 9/26/22     Return Appointment:  Return Appointment: With Dr. Shelby Paul  in  1 [facility])  [] Return Appointment for a Wound Assessment with the nurse on:     Future Appointments   Date Time Provider Gilbert Frey   10/3/2022 10:15 AM Shelley Schulz MD [address] [facility]   10/5/2022 11:00 AM Winston Peck MD FF VASC/ENDO MMA   11/3/2022  2:00 PM Rosa Maria Gan MD [address] [place] [address] Avenue: [address] [place] [address]:  Phone: [phone]  Fax: [phone]   Medically necessary services: [x] Skilled Nursing [] PT (Eval & Treat) [] OT (Eval & Treat) [] Social Work [] Dietician  [] Other:    Wound care instructions: If you smoke we ask that you refrain from smoking. Smoking inhibits wounds from healing. When taking antibiotics take the entire prescription as ordered. Do not stop taking until medication is all gone unless otherwise instructed. Exercise as tolerated. Keep weight off wounds and reposition every 2 hours if applicable. Do not get wounds wet in the bath or shower unless otherwise instructed by your physician. If your wound is on your foot or leg, you may purchase a cast bag. Please ask at the pharmacy. Wash hands with soap and water prior to and after every dressing change. [x]Wash wounds with: Vashe wash - Apply enough Vashe to soak a piece of gauze and place on wound bed for 5-10 minutes. No need to rinse after soaking.   [x]Nissa wound Topical Treatments: Do not apply lotions, creams, or ointments to the skin around the wound bed unless directed as followed:   Apply around the wound: No-Sting barrier film           [x]Application of a biologic skin substitute: Yes: Theraskin : Wound Location: left lower leg wound- covered with mepitel, steri strips to secure, applied hydrogel, gauze, kerlix, coban  This is a highly specialized wound care dressing that is intended to enhance your own bodies ability to increase growth factors and other healing abilities. Please leave the dressing clean, dry and intact unless otherwise instructed by your physician. Leave steri-strips and non adherent in place if changing the dressing as instructed. []Instructions for 2003 [facility] if applicable:     [x] Edema Control:  Apply: wears own compression stocking on right leg when she tolerates   Elevate leg(s) above the level of the heart for 30 minutes 4-5 times a day and/or when sitting. Avoid prolonged standing in one place. Dietary:  Important dietary reminders:  1. Increase Protein intake (i.e. Lean meats, fish, eggs, legumes, and yogurt)  2. No added salt  3. If diabetic, follow a diabetic diet and check glucose prior to meals or as instructed by your physician. Dietary Supplements(Take twice a day unless instructed otherwise):  [] Ardia Santana  [] 30ml ProStat [] Atchison Peeks [] Ensure Max/Premier [] Other:    Your nurse  is:  Bella Jasso     Electronically signed by Kelvin Linton RN on 9/26/2022 at 8:57 AM     [facility] Information: Should you experience any significant changes in your wound(s) or have questions about your wound care, please contact the [address] [place] [address] at [phone]. We are open from 8:00am - 4:30p Monday, Thursday and Friday; 11:00am - 5pm on Tuesday and CLOSED Wednesday. Please give us 24-48 business hours to return your call. Call your doctor now or seek immediate medical care if:    You have symptoms of infection, such as: Increased pain, swelling, warmth, or redness. Red streaks leading from the area. Pus draining from the area. A fever.          [] Patient unable to sign Discharge Instructions given to ECF/Transportation/POA         Electronically signed by Zulma Mosqueda MD on 9/26/2022 at 9:26 AM

## 2022-09-29 ENCOUNTER — TELEPHONE (OUTPATIENT)
Dept: VASCULAR SURGERY | Age: 87
End: 2022-09-29

## 2022-09-29 NOTE — TELEPHONE ENCOUNTER
Spoke with pt and informed her to continue care with Dr Denan Denise and Dr Jose Mcelroy. Pt verbalized understanding.

## 2022-09-30 ENCOUNTER — HOSPITAL ENCOUNTER (OUTPATIENT)
Dept: CT IMAGING | Age: 87
Discharge: HOME | End: 2022-09-30
Payer: MEDICARE

## 2022-09-30 ENCOUNTER — TELEPHONE (OUTPATIENT)
Dept: VASCULAR SURGERY | Age: 87
End: 2022-09-30

## 2022-09-30 DIAGNOSIS — T82.7XXD VASCULAR DEVICE, IMPLANT, OR GRAFT INFECTION OR INFLAMMATION, SUBSEQUENT ENCOUNTER: ICD-10-CM

## 2022-09-30 DIAGNOSIS — I70.592: ICD-10-CM

## 2022-09-30 DIAGNOSIS — T82.7XXD VASCULAR DEVICE, IMPLANT, OR GRAFT INFECTION OR INFLAMMATION, SUBSEQUENT ENCOUNTER: Primary | ICD-10-CM

## 2022-09-30 PROCEDURE — 6360000004 HC RX CONTRAST MEDICATION: Performed by: SURGERY

## 2022-09-30 PROCEDURE — 75635 CT ANGIO ABDOMINAL ARTERIES: CPT

## 2022-09-30 RX ADMIN — IOPAMIDOL 60 ML: 755 INJECTION, SOLUTION INTRAVENOUS at 13:59

## 2022-09-30 NOTE — TELEPHONE ENCOUNTER
Caregiver called to inform Dr. Marylin Marshall patient's groin incision was draining and is now actively bleeding.      Please call to advise     Debo Monte [phone]

## 2022-09-30 NOTE — TELEPHONE ENCOUNTER
Called VRN and discussed - slow drip of red blood from pinpoint opening. Also skin redness and site on thigh. Pictures from [address] reviewed. Recommended pt go to [facility] for ER evaluation and possible admission. Refuses adamantly that she will not allow any other surgeries and would rather die including bleeding to death. Despite discussion she still refuses in the presence of the VRN. Also discussed with Deanna Shearer (caregiver). Does agree to CTA abd/pelvis with runoff today. Clear that this may represent a herald bleed. This was explained to pt and Deanna Shearer as well as VRN.

## 2022-10-03 ENCOUNTER — HOSPITAL ENCOUNTER (OUTPATIENT)
Dept: WOUND CARE | Age: 87
Discharge: HOME | End: 2022-10-03
Payer: MEDICARE

## 2022-10-03 VITALS
RESPIRATION RATE: 16 BRPM | TEMPERATURE: 96 F | HEART RATE: 69 BPM | SYSTOLIC BLOOD PRESSURE: 157 MMHG | DIASTOLIC BLOOD PRESSURE: 84 MMHG

## 2022-10-03 DIAGNOSIS — S81.802A WOUND OF LEFT LOWER EXTREMITY, INITIAL ENCOUNTER: ICD-10-CM

## 2022-10-03 DIAGNOSIS — I73.9 PERIPHERAL ARTERY DISEASE (HCC): Primary | ICD-10-CM

## 2022-10-03 DIAGNOSIS — S81.802D WOUND OF LEFT LOWER EXTREMITY, SUBSEQUENT ENCOUNTER: ICD-10-CM

## 2022-10-03 PROCEDURE — 99212 OFFICE O/P EST SF 10 MIN: CPT | Performed by: SPECIALIST

## 2022-10-03 PROCEDURE — 99212 OFFICE O/P EST SF 10 MIN: CPT

## 2022-10-03 PROCEDURE — 6370000000 HC RX 637 (ALT 250 FOR IP): Performed by: SPECIALIST

## 2022-10-03 RX ORDER — LIDOCAINE HYDROCHLORIDE 40 MG/ML
SOLUTION TOPICAL ONCE
Status: CANCELLED | OUTPATIENT
Start: 2022-10-03 | End: 2022-10-03

## 2022-10-03 RX ORDER — LIDOCAINE HYDROCHLORIDE 20 MG/ML
JELLY TOPICAL ONCE
OUTPATIENT
Start: 2022-10-03 | End: 2022-10-03

## 2022-10-03 RX ORDER — BACITRACIN, NEOMYCIN, POLYMYXIN B 400; 3.5; 5 [USP'U]/G; MG/G; [USP'U]/G
OINTMENT TOPICAL ONCE
OUTPATIENT
Start: 2022-10-03 | End: 2022-10-03

## 2022-10-03 RX ORDER — GENTAMICIN SULFATE 1 MG/G
OINTMENT TOPICAL ONCE
OUTPATIENT
Start: 2022-10-03 | End: 2022-10-03

## 2022-10-03 RX ORDER — LIDOCAINE HYDROCHLORIDE 40 MG/ML
SOLUTION TOPICAL ONCE
Status: COMPLETED | OUTPATIENT
Start: 2022-10-03 | End: 2022-10-03

## 2022-10-03 RX ORDER — BETAMETHASONE DIPROPIONATE 0.05 %
OINTMENT (GRAM) TOPICAL ONCE
OUTPATIENT
Start: 2022-10-03 | End: 2022-10-03

## 2022-10-03 RX ORDER — GINSENG 100 MG
CAPSULE ORAL ONCE
OUTPATIENT
Start: 2022-10-03 | End: 2022-10-03

## 2022-10-03 RX ORDER — CLOBETASOL PROPIONATE 0.5 MG/G
OINTMENT TOPICAL ONCE
OUTPATIENT
Start: 2022-10-03 | End: 2022-10-03

## 2022-10-03 RX ORDER — LIDOCAINE 40 MG/G
CREAM TOPICAL ONCE
OUTPATIENT
Start: 2022-10-03 | End: 2022-10-03

## 2022-10-03 RX ORDER — LIDOCAINE 50 MG/G
OINTMENT TOPICAL ONCE
OUTPATIENT
Start: 2022-10-03 | End: 2022-10-03

## 2022-10-03 RX ORDER — BACITRACIN ZINC AND POLYMYXIN B SULFATE 500; 1000 [USP'U]/G; [USP'U]/G
OINTMENT TOPICAL ONCE
OUTPATIENT
Start: 2022-10-03 | End: 2022-10-03

## 2022-10-03 RX ADMIN — LIDOCAINE HYDROCHLORIDE: 40 SOLUTION TOPICAL at 10:50

## 2022-10-03 ASSESSMENT — PAIN SCALES - GENERAL: PAINLEVEL_OUTOF10: 10

## 2022-10-03 ASSESSMENT — PAIN DESCRIPTION - LOCATION: LOCATION: FOOT

## 2022-10-03 ASSESSMENT — PAIN DESCRIPTION - FREQUENCY: FREQUENCY: CONTINUOUS

## 2022-10-03 ASSESSMENT — PAIN DESCRIPTION - DESCRIPTORS: DESCRIPTORS: ACHING

## 2022-10-03 ASSESSMENT — PAIN DESCRIPTION - PAIN TYPE: TYPE: ACUTE PAIN;CHRONIC PAIN

## 2022-10-03 ASSESSMENT — PAIN DESCRIPTION - ORIENTATION: ORIENTATION: LEFT

## 2022-10-03 NOTE — DISCHARGE INSTRUCTIONS
215 [facility] Physician Orders and Discharge Instructions  [address] [address] [address] [address] [facility]. [place]. [place]  Telephone: [phone]  NAME:  Osei Grossman OF BIRTH:  4/4/1929  MEDICAL RECORD NUMBER:  [id number]  DATE: 10/3/22     Return Appointment:  Return Appointment: With Dr. Monique Cr  in  2 [facility])  [] Return Appointment for a Wound Assessment with the nurse on:     Future Appointments   Date Time Provider Gilbert Frey   10/3/2022  1:15 PM DARCIE Belcher W ORTHO MMA   10/5/2022 11:00 AM Stanislaw Garcia MD FF VASC/ENDO MMA   11/3/2022  2:00 PM Connie Murray MD [facility] [address] [place]: [address]:  Phone: [phone]  Fax: [phone] - managing left groin wound with Dr. Kandace Wood  Medically necessary services: [] Skilled Nursing [] PT (Eval & Treat) [] OT (Eval & Treat) [] Social Work [] Dietician  [] Other:    Wound care instructions: If you smoke we ask that you refrain from smoking. Smoking inhibits wounds from healing. When taking antibiotics take the entire prescription as ordered. Do not stop taking until medication is all gone unless otherwise instructed. Exercise as tolerated. Keep weight off wounds and reposition every 2 hours if applicable. Do not get wounds wet in the bath or shower unless otherwise instructed by your physician. If your wound is on your foot or leg, you may purchase a cast bag. Please ask at the pharmacy. Wash hands with soap and water prior to and after every dressing change.     [x]Wash wounds with: 0.9% normal saline  [x]Nissa wound Topical Treatments: Do not apply lotions, creams, or ointments to the skin around the wound bed unless directed as followed:   Apply around the wound: Nothing         [x]Wound Location: left lower leg   Apply Primary Dressing to wound: Joleen- moisten with collagen gel  Secondary Dressing: 4X4 gauze pad and Conforming roll gauze   Avoid contact of tape with skin if possible. When to change Dressing: Once a week: Monday        [x] Edema Control: patient at times wears own compression stocking- not sure of strength     Elevate leg(s) above the level of the heart for 30 minutes 4-5 times a day and/or when sitting. Avoid prolonged standing in one place. Dietary:  Important dietary reminders:  1. Increase Protein intake (i.e. Lean meats, fish, eggs, legumes, and yogurt)  2. No added salt  3. If diabetic, follow a diabetic diet and check glucose prior to meals or as instructed by your physician. Dietary Supplements(Take twice a day unless instructed otherwise):  [] Marypricilae Revering  [] 30ml ProStat [] Marc Fulling [] Ensure Max/Premier [] Other:    Your nurse  is:  Elina Maciel     Electronically signed by Eliezer Pandey RN on 10/3/2022 at 11:21 AM     215 [facility] Information: Should you experience any significant changes in your wound(s) or have questions about your wound care, please contact the [address] [place] [address] at [phone]. We are open from 8:00am - 4:30p Monday, Thursday and Friday; 11:00am - 5pm on Tuesday and CLOSED Wednesday. Please give us 24-48 business hours to return your call. Call your doctor now or seek immediate medical care if:    You have symptoms of infection, such as: Increased pain, swelling, warmth, or redness. Red streaks leading from the area. Pus draining from the area. A fever.          [] Patient unable to sign Discharge Instructions given to ECF/Transportation/POA

## 2022-10-03 NOTE — PROGRESS NOTES
[facility] [place]  Progress Note and Procedure Note      Kathleen Raman  AGE: 80 y.o. GENDER: female  : 1929  EPISODE DATE:  10/3/2022      Subjective:     Chief Complaint   Patient presents with    Wound Check     Right lower leg         HISTORY of PRESENT ILLNESS HPI     Kathleen Raman is a 80 y.o. female who presents today for wound evaluation. History of Wound Context: Patient is now 1 week post application of third TheraSkin to left anterior knee wound. She recently had a CTA of the abdomen and pelvis ordered by Dr. Amber Elias. she has recently seen Dr. Amber Elias once again in follow-up following his surgery. She describes no pain or drainage from the wound.   Does complain about persistent left foot pain  Wound Pain Timing/Severity: none  Quality of pain: N/A  Severity:  0 / 10   Modifying Factors: None  Associated Signs/Symptoms: edema    Wound Identification:  Wound Type: arterial  Contributing Factors: edema, venous stasis, diabetes, and arterial insufficiency        PAST MEDICAL HISTORY        Diagnosis Date    Anxiety     Arthritis     At risk for falls     CAD (coronary artery disease)     Cerebral artery occlusion with cerebral infarction (HCC)     TIA--no residual effects    DDD (degenerative disc disease), cervical     Fractures     (L) Hip Fx 98, L1 fracture from fall 10-31-06    Hyperlipidemia     Hypertension     Mitral regurgitation     Neuropathy     Osteopenia     Osteoporosis     PAD (peripheral artery disease) ([address]     Right LE ischemia    Peripheral neuropathy 2015    Peripheral vascular disease (HCC)     bilateral lower extremities with edema    Podagra 2017    Dr. Yeimi Cornad    Prolonged emergence from general anesthesia     Spinal stenosis     L4-5    Type 2 diabetes mellitus ([address]     Urethral stricture 5 years ago    Urgency of urination        PAST SURGICAL HISTORY    Past Surgical History:   Procedure Laterality Date    ANGIOPLASTY Left 2022    Left SFA Refill    lisinopril (PRINIVIL;ZESTRIL) 10 MG tablet Take 1 tablet by mouth daily 30 tablet 0    atorvastatin (LIPITOR) 10 MG tablet TAKE ONE TABLET BY MOUTH DAILY 30 tablet 3    sulfamethoxazole-trimethoprim (BACTRIM;SEPTRA) 400-80 MG per tablet Take 1 tablet by mouth 2 times daily Per Dr Yvrose Almaguer 60 tablet 2    Multiple Vitamins-Minerals (THERAPEUTIC MULTIVITAMIN-MINERALS) tablet Take 1 tablet by mouth daily      furosemide (LASIX) 20 MG tablet TAKE ONE TABLET BY MOUTH DAILY 90 tablet 1    glimepiride (AMARYL) 1 MG tablet TAKE ONE TABLET BY MOUTH EVERY MORNING BEFORE BREAKFAST 30 tablet 2    blood glucose test strips (ONETOUCH ULTRA) strip USE TO TEST BLOOD SUGAR TWICE DAILY 100 strip 1    metFORMIN (GLUCOPHAGE) 500 MG tablet TAKE ONE TABLET BY MOUTH DAILY 90 tablet 3    acetaminophen (TYLENOL) 325 MG tablet Take 650 mg by mouth every 6 hours as needed for Pain      Lite Touch Lancets MISC Use twice daily when testing blood sugars. 100 each 5    Turmeric 450 MG CAPS Take 450 mg by mouth daily      Cholecalciferol (VITAMIN D3) 2000 UNITS CAPS Take 1 capsule by mouth daily      aspirin EC 81 MG EC tablet Take 1 tablet by mouth daily 30 tablet 3     No current facility-administered medications on file prior to encounter. REVIEW OF SYSTEMS    Pertinent items are noted in HPI. Objective:      BP (!) 157/84   Pulse 69   Temp (!) 96 °F (35.6 °C) (Temporal)   Resp 16     PHYSICAL EXAM    Extremities: no cyanosis, no clubbing, minimal + edema-  left lower extremity, and presence of allograft overlying left anterior knee wound. Periwound is dry        Assessment:     1. Peripheral artery disease ([facility]    2. Wound of left lower extremity, subsequent encounter    3. Wound of left lower extremity, initial encounter          Wound Measurements:  Wound 11/22/21 Leg Left; Lower;Distal #1 (Active)   Wound Image   10/03/22 1054   Wound Etiology Arterial 11/22/21 0924   Wound Cleansed Cleansed with saline 10/03/22 1054 Dressing/Treatment Collagen with Ag 10/03/22 1119   Wound Length (cm) 2 cm 10/03/22 1054   Wound Width (cm) 2 cm 10/03/22 1054   Wound Depth (cm) 0.1 cm 10/03/22 1054   Wound Surface Area (cm^2) 4 cm^2 10/03/22 1054   Change in Wound Size % (l*w) -42.86 10/03/22 1054   Wound Volume (cm^3) 0.4 cm^3 10/03/22 1054   Wound Healing % -43 10/03/22 1054   Post-Procedure Length (cm) 2 cm 10/03/22 1119   Post-Procedure Width (cm) 2 cm 10/03/22 1119   Post-Procedure Depth (cm) 0.1 cm 10/03/22 1119   Post-Procedure Surface Area (cm^2) 4 cm^2 10/03/22 1119   Post-Procedure Volume (cm^3) 0.4 cm^3 10/03/22 1119   Distance Tunneling (cm) 0 cm 10/03/22 1054   Tunneling Position ___ O'Clock 0 07/11/22 0943   Undermining Starts ___ O'Clock 0 07/11/22 0943   Undermining Ends___ O'Clock 0 07/11/22 0943   Undermining Maxium Distance (cm) 0 10/03/22 1054   Wound Assessment Graft 10/03/22 1054   Drainage Amount Small 10/03/22 1054   Drainage Description Brown 10/03/22 1054   Odor None 10/03/22 1054   Nissa-wound Assessment Intact;Fragile;Edematous 10/03/22 1054   Margins Defined edges 10/03/22 1054   Wound Thickness Description not for Pressure Injury Full thickness 10/03/22 1054   Number of days: 315          Plan:     Treatment Note please see attached Discharge Instructions    New Prescriptions    No medications on file       Orders Placed This Encounter   Procedures    Initiate Outpatient Wound Care Protocol       Written patient dismissal instructions given to patient and signed by patient or POA. Discharge 315 [facility] Physician Orders and Discharge Instructions  [address] [place] [address]  Telephone: [phone]  NAME:  Elen Mendez  YOB: 1929  MEDICAL RECORD NUMBER:  [id number]  DATE: 10/3/22     Return Appointment:  Return Appointment: With Dr. Ibeth Zayas  in  2 [facility])  [] Return Appointment for a Wound Assessment with the nurse on:     Future Appointments   Date Time Provider Gilbert Menendezisti   10/3/2022  1:15 PM DARCIE Belcher ORTHO MMA   10/5/2022 11:00 AM Stanislaw Garcia MD FF VASC/ENDO [facility]   11/3/2022  2:00 PM Connie Murray  [address]: [facility]:  Phone: [phone]  Fax: [phone] - managing left groin wound with Dr. Kandace Wood  Medically necessary services: [] Skilled Nursing [] PT (Eval & Treat) [] OT (Eval & Treat) [] Social Work [] Dietician  [] Other:    Wound care instructions: If you smoke we ask that you refrain from smoking. Smoking inhibits wounds from healing. When taking antibiotics take the entire prescription as ordered. Do not stop taking until medication is all gone unless otherwise instructed. Exercise as tolerated. Keep weight off wounds and reposition every 2 hours if applicable. Do not get wounds wet in the bath or shower unless otherwise instructed by your physician. If your wound is on your foot or leg, you may purchase a cast bag. Please ask at the pharmacy. Wash hands with soap and water prior to and after every dressing change. [x]Wash wounds with: 0.9% normal saline  [x]Nissa wound Topical Treatments: Do not apply lotions, creams, or ointments to the skin around the wound bed unless directed as followed:   Apply around the wound: Nothing         [x]Wound Location: left lower leg   Apply Primary Dressing to wound: Joleen- moisten with collagen gel  Secondary Dressing: 4X4 gauze pad and Conforming roll gauze   Avoid contact of tape with skin if possible. When to change Dressing: Once a week: Monday        [x] Edema Control: patient at times wears own compression stocking- not sure of strength     Elevate leg(s) above the level of the heart for 30 minutes 4-5 times a day and/or when sitting. Avoid prolonged standing in one place. Dietary:  Important dietary reminders:  1.  Increase Protein intake (i.e. Lean meats, fish, eggs, legumes, and yogurt)  2. No added salt  3. If diabetic, follow a diabetic diet and check glucose prior to meals or as instructed by your physician. Dietary Supplements(Take twice a day unless instructed otherwise):  [] Ady Candelario  [] 30ml ProStat [] Jac Netter [] Ensure Max/Premier [] Other:    Your nurse  is:  Jesus Lincoln     Electronically signed by Norm Russell RN on 10/3/2022 at 11:21 AM     [facility] Information: Should you experience any significant changes in your wound(s) or have questions about your wound care, please contact the [address] [place] [address] at [phone]. We are open from 8:00am - 4:30p Monday, Thursday and Friday; 11:00am - 5pm on Tuesday and CLOSED Wednesday. Please give us 24-48 business hours to return your call. Call your doctor now or seek immediate medical care if:    You have symptoms of infection, such as: Increased pain, swelling, warmth, or redness. Red streaks leading from the area. Pus draining from the area. A fever.          [] Patient unable to sign Discharge Instructions given to ECF/Transportation/POA             Electronically signed by Han Gamboa MD on 10/3/2022 at 1:15 PM

## 2022-10-05 ENCOUNTER — OFFICE VISIT (OUTPATIENT)
Dept: VASCULAR SURGERY | Age: 87
End: 2022-10-05

## 2022-10-05 VITALS
WEIGHT: 93 LBS | SYSTOLIC BLOOD PRESSURE: 118 MMHG | BODY MASS INDEX: 18.26 KG/M2 | HEIGHT: 60 IN | DIASTOLIC BLOOD PRESSURE: 76 MMHG

## 2022-10-05 DIAGNOSIS — T81.31XA POSTOPERATIVE WOUND DEHISCENCE, INITIAL ENCOUNTER: ICD-10-CM

## 2022-10-05 DIAGNOSIS — T82.7XXD VASCULAR DEVICE, IMPLANT, OR GRAFT INFECTION OR INFLAMMATION, SUBSEQUENT ENCOUNTER: Primary | ICD-10-CM

## 2022-10-05 PROCEDURE — 99024 POSTOP FOLLOW-UP VISIT: CPT | Performed by: SURGERY

## 2022-10-05 RX ORDER — SULFAMETHOXAZOLE AND TRIMETHOPRIM 400; 80 MG/1; MG/1
1 TABLET ORAL 2 TIMES DAILY
Qty: 60 TABLET | Refills: 5 | Status: SHIPPED | OUTPATIENT
Start: 2022-10-05

## 2022-10-05 NOTE — PROGRESS NOTES
OV for L groin wound complication and S/P office I&D of proximal thigh incisional lymphocele. Thigh wound healed per Amita Guadarrama. Developed L groin drainage late last week - CT without perigraft fluid. On site visit suggested superficial pus collection when probed. C/O L leg swelling and discomfort. Some R leg discomfort as well. Apligraf at [facility] doing well per pt. No fever or chills. Slight wound drainage. EXAM:  L groin wound - pinpoint drainage - probes under pannus 1 cm without deep extension  Blister medial thigh healing    Upper L thigh incision healed. Other bigger swelling at incisions without drainage. Palpable graft pulse with excellent doppler signals    A/P: S/P L fem-pop bypass with femoral replacement. Scattered incisional lymphoceles - larger/symptomatic. 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 Overall improved. Continue Bactrim - lower dose for age. Continue betadine paint L groin daily. No tape. PSO to thigh. Hold in place with panties/pannus. F/U 1-2 weeks for B GSS.

## 2022-10-07 DIAGNOSIS — G45.9 TIA (TRANSIENT ISCHEMIC ATTACK): ICD-10-CM

## 2022-10-07 RX ORDER — ATORVASTATIN CALCIUM 10 MG/1
TABLET, FILM COATED ORAL
Qty: 30 TABLET | Refills: 3 | OUTPATIENT
Start: 2022-10-07

## 2022-10-07 NOTE — TELEPHONE ENCOUNTER
C/ Timothy Garvin 33 to check the status on RX for Atorvastatin. There are 3 refills so they will have it ready for her on Monday .  Will contact pt to advise

## 2022-10-10 ENCOUNTER — CARE COORDINATION (OUTPATIENT)
Dept: CARE COORDINATION | Age: 87
End: 2022-10-10

## 2022-10-10 ENCOUNTER — TELEPHONE (OUTPATIENT)
Dept: INTERNAL MEDICINE CLINIC | Age: 87
End: 2022-10-10

## 2022-10-10 NOTE — CARE COORDINATION
Ambulatory Care Coordination Note  10/10/2022    ACC: Kimberly Russell, RN  Call to patient   Frustrated w swollen lymph nodes that persist..doesnt understand why it persists,,   One spot hard like an egg  Jeremy Hernandez soon  US on FRI   Wears compression stockings   Bactrim low dose    PLAN  Schedule w PCP for end of Nov US this week          Offered patient enrollment in the Remote Patient Monitoring (RPM) program for in-home monitoring: Patient declined. Lab Results       None            Care Coordination Interventions    Referral from Primary Care Provider: No  Suggested Interventions and Community Resources  Other Services or Interventions: caregiver daily          Goals Addressed    None         Prior to Admission medications    Medication Sig Start Date End Date Taking? Authorizing Provider   sulfamethoxazole-trimethoprim (BACTRIM;SEPTRA) 400-80 MG per tablet Take 1 tablet by mouth 2 times daily Per Dr Kendall Hernandez 10/5/22   Negin Barron MD   lisinopril (PRINIVIL;ZESTRIL) 10 MG tablet Take 1 tablet by mouth daily 9/21/22   Paulo Forman DO   atorvastatin (LIPITOR) 10 MG tablet TAKE ONE TABLET BY MOUTH DAILY 9/12/22   Paulo Forman DO   Multiple Vitamins-Minerals (THERAPEUTIC MULTIVITAMIN-MINERALS) tablet Take 1 tablet by mouth daily    Historical Provider, MD   furosemide (LASIX) 20 MG tablet TAKE ONE TABLET BY MOUTH DAILY 7/29/22   Dami Louis MD   glimepiride (AMARYL) 1 MG tablet TAKE ONE TABLET BY MOUTH EVERY MORNING BEFORE BREAKFAST 7/29/22   Paulo Forman DO   blood glucose test strips (ONETOUCH ULTRA) strip USE TO TEST BLOOD SUGAR TWICE DAILY 7/25/22   Paulo Forman DO   metFORMIN (GLUCOPHAGE) 500 MG tablet TAKE ONE TABLET BY MOUTH DAILY 6/28/22   Paulo Forman DO   acetaminophen (TYLENOL) 325 MG tablet Take 650 mg by mouth every 6 hours as needed for Pain    Historical Provider, MD   Lite Touch Lancets MISC Use twice daily when testing blood sugars.  3/28/22   Paulo Forman DO   Turmeric 450 MG CAPS Take 450 mg by mouth daily    Historical Provider, MD   Cholecalciferol (VITAMIN D3) 2000 UNITS CAPS Take 1 capsule by mouth daily    Historical Provider, MD   aspirin EC 81 MG EC tablet Take 1 tablet by mouth daily 5/13/15   Theron Kay, DO       Future Appointments   Date Time Provider Gilbert Frey   10/14/2022 12:30 PM SCHEDULE, [facility] [place] [facility]   10/17/2022  9:15 AM Leta Gore MD [facility]   11/3/2022  2:00 PM Ambika Walsh MD [facility]

## 2022-10-10 NOTE — TELEPHONE ENCOUNTER
Kirti with [facility] is calling to  let Sonia Bradley know that patient fell last Thursday and had a small skin tear on left elbow. Alright now. Just an FYI. Jovanna Meraz is required to report incident to Dr. Triny Jc.

## 2022-10-12 ENCOUNTER — TELEPHONE (OUTPATIENT)
Dept: INTERNAL MEDICINE CLINIC | Age: 87
End: 2022-10-12

## 2022-10-12 NOTE — TELEPHONE ENCOUNTER
Kirti with [facility] is calling because patient's BP today was 168/59 and she needs report anytime it  is high. Just an FYI.

## 2022-10-13 ENCOUNTER — PROCEDURE VISIT (OUTPATIENT)
Dept: VASCULAR SURGERY | Age: 87
End: 2022-10-13

## 2022-10-13 DIAGNOSIS — I73.9 PVD (PERIPHERAL VASCULAR DISEASE) WITH CLAUDICATION (HCC): ICD-10-CM

## 2022-10-13 DIAGNOSIS — I73.9 PVD (PERIPHERAL VASCULAR DISEASE) WITH CLAUDICATION (HCC): Primary | ICD-10-CM

## 2022-10-17 ENCOUNTER — HOSPITAL ENCOUNTER (OUTPATIENT)
Dept: WOUND CARE | Age: 87
Discharge: HOME | End: 2022-10-17
Payer: MEDICARE

## 2022-10-17 ENCOUNTER — PREP FOR PROCEDURE (OUTPATIENT)
Dept: VASCULAR SURGERY | Age: 87
End: 2022-10-17

## 2022-10-17 VITALS
HEART RATE: 62 BPM | TEMPERATURE: 96.8 F | SYSTOLIC BLOOD PRESSURE: 178 MMHG | RESPIRATION RATE: 18 BRPM | DIASTOLIC BLOOD PRESSURE: 88 MMHG

## 2022-10-17 DIAGNOSIS — S81.802D WOUND OF LEFT LOWER EXTREMITY, SUBSEQUENT ENCOUNTER: ICD-10-CM

## 2022-10-17 DIAGNOSIS — S81.802A WOUND OF LEFT LOWER EXTREMITY, INITIAL ENCOUNTER: ICD-10-CM

## 2022-10-17 DIAGNOSIS — I73.9 PERIPHERAL ARTERY DISEASE (HCC): Primary | ICD-10-CM

## 2022-10-17 DIAGNOSIS — I73.9 PVD (PERIPHERAL VASCULAR DISEASE) WITH CLAUDICATION (HCC): Primary | ICD-10-CM

## 2022-10-17 PROCEDURE — 11042 DBRDMT SUBQ TIS 1ST 20SQCM/<: CPT | Performed by: SPECIALIST

## 2022-10-17 PROCEDURE — 11042 DBRDMT SUBQ TIS 1ST 20SQCM/<: CPT

## 2022-10-17 PROCEDURE — 6370000000 HC RX 637 (ALT 250 FOR IP): Performed by: SPECIALIST

## 2022-10-17 RX ORDER — BACITRACIN, NEOMYCIN, POLYMYXIN B 400; 3.5; 5 [USP'U]/G; MG/G; [USP'U]/G
OINTMENT TOPICAL ONCE
OUTPATIENT
Start: 2022-10-17 | End: 2022-10-17

## 2022-10-17 RX ORDER — LIDOCAINE HYDROCHLORIDE 20 MG/ML
JELLY TOPICAL ONCE
OUTPATIENT
Start: 2022-10-17 | End: 2022-10-17

## 2022-10-17 RX ORDER — LIDOCAINE HYDROCHLORIDE 40 MG/ML
SOLUTION TOPICAL ONCE
Status: COMPLETED | OUTPATIENT
Start: 2022-10-17 | End: 2022-10-17

## 2022-10-17 RX ORDER — GENTAMICIN SULFATE 1 MG/G
OINTMENT TOPICAL ONCE
OUTPATIENT
Start: 2022-10-17 | End: 2022-10-17

## 2022-10-17 RX ORDER — BETAMETHASONE DIPROPIONATE 0.05 %
OINTMENT (GRAM) TOPICAL ONCE
OUTPATIENT
Start: 2022-10-17 | End: 2022-10-17

## 2022-10-17 RX ORDER — CLOBETASOL PROPIONATE 0.5 MG/G
OINTMENT TOPICAL ONCE
OUTPATIENT
Start: 2022-10-17 | End: 2022-10-17

## 2022-10-17 RX ORDER — GINSENG 100 MG
CAPSULE ORAL ONCE
OUTPATIENT
Start: 2022-10-17 | End: 2022-10-17

## 2022-10-17 RX ORDER — BACITRACIN ZINC AND POLYMYXIN B SULFATE 500; 1000 [USP'U]/G; [USP'U]/G
OINTMENT TOPICAL ONCE
OUTPATIENT
Start: 2022-10-17 | End: 2022-10-17

## 2022-10-17 RX ORDER — LIDOCAINE 40 MG/G
CREAM TOPICAL ONCE
OUTPATIENT
Start: 2022-10-17 | End: 2022-10-17

## 2022-10-17 RX ORDER — LIDOCAINE HYDROCHLORIDE 40 MG/ML
SOLUTION TOPICAL ONCE
OUTPATIENT
Start: 2022-10-17 | End: 2022-10-17

## 2022-10-17 RX ORDER — LIDOCAINE 50 MG/G
OINTMENT TOPICAL ONCE
OUTPATIENT
Start: 2022-10-17 | End: 2022-10-17

## 2022-10-17 RX ADMIN — LIDOCAINE HYDROCHLORIDE: 40 SOLUTION TOPICAL at 09:26

## 2022-10-17 ASSESSMENT — PAIN DESCRIPTION - ORIENTATION: ORIENTATION: LEFT

## 2022-10-17 ASSESSMENT — PAIN DESCRIPTION - FREQUENCY: FREQUENCY: CONTINUOUS

## 2022-10-17 ASSESSMENT — PAIN DESCRIPTION - PAIN TYPE: TYPE: ACUTE PAIN

## 2022-10-17 ASSESSMENT — PAIN SCALES - GENERAL: PAINLEVEL_OUTOF10: 10

## 2022-10-17 ASSESSMENT — PAIN DESCRIPTION - DESCRIPTORS: DESCRIPTORS: ACHING

## 2022-10-17 ASSESSMENT — PAIN DESCRIPTION - LOCATION: LOCATION: FOOT

## 2022-10-17 ASSESSMENT — PAIN DESCRIPTION - ONSET: ONSET: ON-GOING

## 2022-10-17 NOTE — DISCHARGE INSTRUCTIONS
215 [facility] Physician Orders and Discharge Instructions  [address] [place] [address] [address] [address] [facility]. [facility]. [place]  Telephone: [phone]  NAME:  Yair Crowe OF BIRTH:  4/4/1929  MEDICAL RECORD NUMBER:  [id number]  DATE: 10/17/22     Return Appointment:  Return Appointment: With Dr. Rhonda rCowe  in  2 [facility])  [] Return Appointment for a Wound Assessment with the nurse on:     Future Appointments   Date Time Provider Gilbert Frey   11/3/2022  2:00 PM MD Flako Gamble 37 [address] Bernie Saltere: [address]:  Phone: [phone]  Fax: [phone] - groin wound which is managed by Dr. Lovely Holter  Medically necessary services: [x] Skilled Nursing [] PT (Eval & Treat) [] OT (Eval & Treat) [] Social Work [] Dietician  [] Other:    Wound care instructions: If you smoke we ask that you refrain from smoking. Smoking inhibits wounds from healing. When taking antibiotics take the entire prescription as ordered. Do not stop taking until medication is all gone unless otherwise instructed. Exercise as tolerated. Keep weight off wounds and reposition every 2 hours if applicable. Do not get wounds wet in the bath or shower unless otherwise instructed by your physician. If your wound is on your foot or leg, you may purchase a cast bag. Please ask at the pharmacy. Wash hands with soap and water prior to and after every dressing change. [x]Wash wounds with: 0.9% normal saline  [x]Nissa wound Topical Treatments: Do not apply lotions, creams, or ointments to the skin around the wound bed unless directed as followed:   Apply around the wound: Nothing         [x]Wound Location: left lower leg   Apply Primary Dressing to wound: Hydrogel  Secondary Dressing: 4X4 gauze pad and Conforming roll gauze   Avoid contact of tape with skin if possible.   When to change Dressing: 3 times per week:Monday/Wednesday/Friday    [x] Edema Control:    Elevate leg(s) above the level of the heart for 30 minutes 4-5 times a day and/or when sitting. Avoid prolonged standing in one place. Dietary:  Important dietary reminders:  1. Increase Protein intake (i.e. Lean meats, fish, eggs, legumes, and yogurt)  2. No added salt  3. If diabetic, follow a diabetic diet and check glucose prior to meals or as instructed by your physician. Dietary Supplements(Take twice a day unless instructed otherwise):  [] Marc Gregoria  [] 30ml ProStat [] Leonce Delude [] Ensure Max/Premier [] Other:    Your nurse  is:  Oriana Reyez     Electronically signed by Jenna Avilez RN on 10/17/2022 at 9:49 AM     [address] [facility] [address] Information: Should you experience any significant changes in your wound(s) or have questions about your wound care, please contact the [address] [place] [address] at [phone]. We are open from 8:00am - 4:30p Monday, Thursday and Friday; 11:00am - 5pm on Tuesday and CLOSED Wednesday. Please give us 24-48 business hours to return your call. Call your doctor now or seek immediate medical care if:    You have symptoms of infection, such as: Increased pain, swelling, warmth, or redness. Red streaks leading from the area. Pus draining from the area. A fever.          [] Patient unable to sign Discharge Instructions given to ECF/Transportation/POA

## 2022-10-17 NOTE — PROGRESS NOTES
[facility] [place]  Progress Note and Procedure Note      Ivette Pizano  MEDICAL RECORD NUMBER:  [id number]  AGE: 80 y.o. GENDER: female  : 1929  EPISODE DATE:  10/17/2022    Subjective:     Chief Complaint   Patient presents with    Wound Check     Left lower leg           HISTORY of PRESENT ILLNESS HPI     Ivette Pizano is a 80 y.o. female who presents today for wound/ulcer evaluation. History of Wound Context: Patient is now 2 weeks post application of third TheraSkin allograft to left anterior knee wound. She is also being seen by Dr. Rosa Maria Jones for recent surgery. She describes no wound pain but does have persistent left foot pain probably on the basis of neuropathy.   Wound/Ulcer Pain Timing/Severity: none  Quality of pain: N/A  Severity:  0 / 10   Modifying Factors: None  Associated Signs/Symptoms: edema    Ulcer Identification:  Ulcer Type: arterial    Contributing Factors: edema, venous stasis, diabetes, and arterial insufficiency    Acute Wound: N/A not an acute wound    PAST MEDICAL HISTORY        Diagnosis Date    Anxiety     Arthritis     At risk for falls     CAD (coronary artery disease)     Cerebral artery occlusion with cerebral infarction (HCC)     TIA--no residual effects    DDD (degenerative disc disease), cervical     Fractures     (L) Hip Fx 98, L1 fracture from fall 10-31-06    Hyperlipidemia     Hypertension     Mitral regurgitation     Neuropathy     Osteopenia     Osteoporosis     PAD (peripheral artery disease) ([address]     Right LE ischemia    Peripheral neuropathy 2015    Peripheral vascular disease (HCC)     bilateral lower extremities with edema    [place] 2017    Dr. Clayton Nolan    Prolonged emergence from general anesthesia     Spinal stenosis     L4-5    Type 2 diabetes mellitus ([address]     Urethral stricture 5 years ago    Urgency of urination        PAST SURGICAL HISTORY    Past Surgical History:   Procedure Laterality Date    ANGIOPLASTY Left 2022 Left SFA    APPENDECTOMY      CARPAL TUNNEL RELEASE Right 03/29/2019    RIGHT CARPAL TUNNEL RELEASE AND RIGHT MIDDLE FINGER TRIGGER FINGER RELEASE performed by Maryjo Kemp MD at [place] [address]  [id number]    LEFT EYE EYE CATARACT PHACOEMULSIFICATION INTRAOCULAR LENS    CHOLECYSTECTOMY  01/01/1978    COLONOSCOPY  08/31/1999    diverticulosis    EYE SURGERY Bilateral     bilateral cataract removed    FEMORAL BYPASS Left 6/28/2022    LEFT LEG FEMORAL TO POPLITEAL BYPASS WITH INTRAOPERATIVE ANGIOGRAM performed by Zonia Chacko MD at [place] [address] Left 02/14/2022    LEFT COMMON FEMORAL ARTERY ENDARTERECTOMY performed by Zonia Chacko MD at [facility] (CERVIX STATUS UNKNOWN)  1980s    sallie    IR FEMORAL POPLITEAL BYPASS GRAFT Right 08/31/2015    KYPHOSIS SURGERY  11/07/2006    L1, (fractured after a fall)    LEG SURGERY Left 02/14/2022    LEFT LEG WOUND DEBRIDEMENT performed by Zonia Chacko MD at [place] [address] N/A 7/7/2022    INCISION AND DRAINAGE OF LEFT SIDE GROIN WITH PLACEMENT OF WOUND VAC performed by Zonia Chacko MD at [address] [place] [address]  04/25/1998    (L) THR    WRIST FRACTURE SURGERY  01/01/2008    left wrist       FAMILY HISTORY    Family History   Problem Relation Age of Onset    Cancer Mother 43    Stroke Father     Hypertension Father        SOCIAL HISTORY    Social History     Tobacco Use    Smoking status: Never    Smokeless tobacco: Never   Vaping Use    Vaping Use: Never used   Substance Use Topics    Alcohol use: No    Drug use: Never       ALLERGIES    Allergies   Allergen Reactions    Aricept [Donepezil Hydrochloride] Other (See Comments)     Pt unable to recall reaction    Doxycycline Nausea Only    Ketorolac Tromethamine Other (See Comments)     Pt unable to recall reaction    Donepezil Hcl        MEDICATIONS    Current Outpatient Medications on File Prior to Encounter   Medication Sig Dispense Refill    sulfamethoxazole-trimethoprim (BACTRIM;SEPTRA) 400-80 MG per tablet Take 1 tablet by mouth 2 times daily Per Dr Dhaval Ley 60 tablet 5    lisinopril (PRINIVIL;ZESTRIL) 10 MG tablet Take 1 tablet by mouth daily 30 tablet 0    atorvastatin (LIPITOR) 10 MG tablet TAKE ONE TABLET BY MOUTH DAILY 30 tablet 3    Multiple Vitamins-Minerals (THERAPEUTIC MULTIVITAMIN-MINERALS) tablet Take 1 tablet by mouth daily      furosemide (LASIX) 20 MG tablet TAKE ONE TABLET BY MOUTH DAILY 90 tablet 1    glimepiride (AMARYL) 1 MG tablet TAKE ONE TABLET BY MOUTH EVERY MORNING BEFORE BREAKFAST 30 tablet 2    blood glucose test strips (ONETOUCH ULTRA) strip USE TO TEST BLOOD SUGAR TWICE DAILY 100 strip 1    metFORMIN (GLUCOPHAGE) 500 MG tablet TAKE ONE TABLET BY MOUTH DAILY 90 tablet 3    acetaminophen (TYLENOL) 325 MG tablet Take 650 mg by mouth every 6 hours as needed for Pain      Lite Touch Lancets MISC Use twice daily when testing blood sugars. 100 each 5    Turmeric 450 MG CAPS Take 450 mg by mouth daily      Cholecalciferol (VITAMIN D3) 2000 UNITS CAPS Take 1 capsule by mouth daily      aspirin EC 81 MG EC tablet Take 1 tablet by mouth daily 30 tablet 3     No current facility-administered medications on file prior to encounter. REVIEW OF SYSTEMS  Review of Systems    Pertinent items are noted in HPI. Objective:      BP (!) 178/88   Pulse 62   Temp 96.8 °F (36 °C) (Temporal)   Resp 18     Wt Readings from Last 3 Encounters:   10/05/22 93 lb (42.2 kg)   09/21/22 93 lb (42.2 kg)   08/31/22 93 lb (42.2 kg)       PHYSICAL EXAM    Extremities: no cyanosis, no clubbing, minimal + edema-  left lower extremity, and residual graft and fibrin left anterior knee wound which appears somewhat desiccated    Assessment:      1. Peripheral artery disease ([address]    2. Wound of left lower extremity, subsequent encounter    3.  Wound of left lower extremity, initial encounter         Procedure Note  Indications:  Based on my examination of this patient's wound(s) today, sharp excision is required to promote healing and evaluate the extent healing. Performed by: Adrianne Mcconnell MD    Consent obtained? Yes    Time out taken: Yes    Pain Control: Anesthetic: 4% Lidocaine Liquid Topical     Debridement:Excisional Debridement    Using curette the wound was sharply debrided    down through and including the removal of  epidermis, dermis, and subcutaneous tissue. Devitalized Tissue Debrided:  fibrin      Pre Debridement Measurements:  Are located in the Wound Documentation Flow Sheet   Wound #: 1     Post  Debridement Measurements:  Wound 11/22/21 Leg Left; Lower;Distal #1 (Active)   Wound Image   10/03/22 1054   Wound Etiology Arterial 11/22/21 0924   Wound Cleansed Cleansed with saline 10/17/22 0927   Dressing/Treatment Collagen with Ag 10/03/22 1119   Wound Length (cm) 1.8 cm 10/17/22 0927   Wound Width (cm) 1.8 cm 10/17/22 0927   Wound Depth (cm) 0.1 cm 10/17/22 0927   Wound Surface Area (cm^2) 3.24 cm^2 10/17/22 0927   Change in Wound Size % (l*w) -15.71 10/17/22 0927   Wound Volume (cm^3) 0.324 cm^3 10/17/22 0927   Wound Healing % -16 10/17/22 0927   Post-Procedure Length (cm) 1.9 cm 10/17/22 0948   Post-Procedure Width (cm) 1.9 cm 10/17/22 0948   Post-Procedure Depth (cm) 0.3 cm 10/17/22 0948   Post-Procedure Surface Area (cm^2) 3.61 cm^2 10/17/22 0948   Post-Procedure Volume (cm^3) 1.083 cm^3 10/17/22 0948   Distance Tunneling (cm) 0 cm 10/17/22 0927   Tunneling Position ___ O'Clock 0 07/11/22 0943   Undermining Starts ___ O'Clock 0 07/11/22 0943   Undermining Ends___ O'Clock 0 07/11/22 0943   Undermining Maxium Distance (cm) 0 10/17/22 0927   Wound Assessment Dry;Pink/red;Fibrin;Devitalized tissue 10/17/22 0927   Drainage Amount Small 10/17/22 0927   Drainage Description Chas Ort 10/17/22 0927   Odor None 10/17/22 0927   Nissa-wound Assessment Intact;Fragile;Edematous 10/17/22 0927   Margins Defined edges 10/17/22 0927   Wound Thickness Description not for Pressure Injury Full thickness 10/17/22 0927   Number of days: 329          Percent of Wound Debrided: 100%    Total Surface Area Debrided:  3.6 sq cm    Diabetic/Pressure/Non Pressure Ulcers only:  Ulcer: Non-Pressure ulcer, fat layer exposed    Bleeding: Minimal    Hemostasis Achieved: by pressure    Procedural Pain: 0  / 10     Post Procedural Pain: 0 / 10     Response to treatment:  Well tolerated by patient. Wound measurements are slightly improved. We will switch to hydrogel primary dressing. Plan:     Treatment Note: Please see attached Discharge Instructions. These instructions were given and signed by the patient or POA    New Prescriptions    No medications on file       Orders Placed This Encounter   Procedures    Initiate Outpatient Wound Care Protocol       Discharge Instructions          [facility] Physician Orders and Discharge Instructions  [address] [place] [address] [address] [place] [address]. [place]  Telephone: [phone]  NAME:  Joaquin Tucker OF BIRTH:  4/4/1929  MEDICAL RECORD NUMBER:  [id number]  DATE: 10/17/22     Return Appointment:  Return Appointment: With Dr. Carmen Salamanca  in  [facility])  [] Return Appointment for a Wound Assessment with the nurse on:     Future Appointments   Date Time Provider Gilbert Frey   11/3/2022  2:00 PM Gucci Oconnell MD [place] [address] [address] [place]: [address]:  Phone: [phone]  Fax: [phone] - groin wound which is managed by Dr. Benedicto Mac  Medically necessary services: [x] Skilled Nursing [] PT (Eval & Treat) [] OT (Eval & Treat) [] Social Work [] Dietician  [] Other:    Wound care instructions: If you smoke we ask that you refrain from smoking. Smoking inhibits wounds from healing. When taking antibiotics take the entire prescription as ordered. Do not stop taking until medication is all gone unless otherwise instructed.    Exercise as tolerated. Keep weight off wounds and reposition every 2 hours if applicable. Do not get wounds wet in the bath or shower unless otherwise instructed by your physician. If your wound is on your foot or leg, you may purchase a cast bag. Please ask at the pharmacy. Wash hands with soap and water prior to and after every dressing change. [x]Wash wounds with: 0.9% normal saline  [x]Nissa wound Topical Treatments: Do not apply lotions, creams, or ointments to the skin around the wound bed unless directed as followed:   Apply around the wound: Nothing         [x]Wound Location: left lower leg   Apply Primary Dressing to wound: Hydrogel  Secondary Dressing: 4X4 gauze pad and Conforming roll gauze   Avoid contact of tape with skin if possible. When to change Dressing: 3 times per week:Monday/Wednesday/Friday    [x] Edema Control:    Elevate leg(s) above the level of the heart for 30 minutes 4-5 times a day and/or when sitting. Avoid prolonged standing in one place. Dietary:  Important dietary reminders:  1. Increase Protein intake (i.e. Lean meats, fish, eggs, legumes, and yogurt)  2. No added salt  3. If diabetic, follow a diabetic diet and check glucose prior to meals or as instructed by your physician. Dietary Supplements(Take twice a day unless instructed otherwise):  [] Ivonne Hogue  [] 30ml ProStat [] Hayden Ma [] Ensure Max/Premier [] Other:    Your nurse  is:  Milvia Ballesteros     Electronically signed by Giacomo Rubio RN on 10/17/2022 at 9:49 AM     [facility] Information: Should you experience any significant changes in your wound(s) or have questions about your wound care, please contact the [address] [place] [address] at [phone]. We are open from 8:00am - 4:30p Monday, Thursday and Friday; 11:00am - 5pm on Tuesday and CLOSED Wednesday. Please give us 24-48 business hours to return your call.       Call your doctor now or seek immediate medical care if:    You have symptoms of infection, such as: Increased pain, swelling, warmth, or redness. Red streaks leading from the area. Pus draining from the area. A fever.          [] Patient unable to sign Discharge Instructions given to ECF/Transportation/POA         Electronically signed by Kin Rudolph MD on 10/17/2022 at 11:01 AM

## 2022-10-18 ENCOUNTER — HOSPITAL ENCOUNTER (OUTPATIENT)
Dept: CARDIAC CATH/INVASIVE PROCEDURES | Age: 87
Discharge: HOME | End: 2022-10-18
Attending: SURGERY | Admitting: SURGERY
Payer: MEDICARE

## 2022-10-18 VITALS
HEART RATE: 72 BPM | RESPIRATION RATE: 16 BRPM | BODY MASS INDEX: 18.26 KG/M2 | WEIGHT: 93 LBS | HEIGHT: 60 IN | TEMPERATURE: 97.8 F | SYSTOLIC BLOOD PRESSURE: 194 MMHG | DIASTOLIC BLOOD PRESSURE: 70 MMHG

## 2022-10-18 PROBLEM — T82.858A BYPASS GRAFT STENOSIS (HCC): Status: ACTIVE | Noted: 2022-10-18

## 2022-10-18 LAB
ANION GAP SERPL CALCULATED.3IONS-SCNC: 9 MMOL/L (ref 3–16)
BUN BLDV-MCNC: 21 MG/DL (ref 7–20)
CALCIUM SERPL-MCNC: 9.7 MG/DL (ref 8.3–10.6)
CHLORIDE BLD-SCNC: 103 MMOL/L (ref 99–110)
CO2: 24 MMOL/L (ref 21–32)
CREAT SERPL-MCNC: 1.3 MG/DL (ref 0.6–1.2)
GFR SERPL CREATININE-BSD FRML MDRD: 38 ML/MIN/{1.73_M2}
GLUCOSE BLD-MCNC: 120 MG/DL (ref 70–99)
HCT VFR BLD CALC: 31.3 % (ref 36–48)
HEMOGLOBIN: 10.1 G/DL (ref 12–16)
MCH RBC QN AUTO: 31.1 PG (ref 26–34)
MCHC RBC AUTO-ENTMCNC: 32.2 G/DL (ref 31–36)
MCV RBC AUTO: 96.6 FL (ref 80–100)
PDW BLD-RTO: 15.2 % (ref 12.4–15.4)
PLATELET # BLD: 219 K/UL (ref 135–450)
PMV BLD AUTO: 6.6 FL (ref 5–10.5)
POC ACT LR: 154 SEC
POC ACT LR: 297 SEC
POC ACT LR: 299 SEC
POTASSIUM SERPL-SCNC: 5.2 MMOL/L (ref 3.5–5.1)
RBC # BLD: 3.25 M/UL (ref 4–5.2)
SODIUM BLD-SCNC: 136 MMOL/L (ref 136–145)
WBC # BLD: 7.1 K/UL (ref 4–11)

## 2022-10-18 PROCEDURE — 75716 ARTERY X-RAYS ARMS/LEGS: CPT

## 2022-10-18 PROCEDURE — 80048 BASIC METABOLIC PNL TOTAL CA: CPT

## 2022-10-18 PROCEDURE — 36247 INS CATH ABD/L-EXT ART 3RD: CPT | Performed by: SURGERY

## 2022-10-18 PROCEDURE — 99152 MOD SED SAME PHYS/QHP 5/>YRS: CPT

## 2022-10-18 PROCEDURE — 37224 HC FEM POP TERRITORY PLASTY: CPT

## 2022-10-18 PROCEDURE — 76937 US GUIDE VASCULAR ACCESS: CPT | Performed by: SURGERY

## 2022-10-18 PROCEDURE — 75625 CONTRAST EXAM ABDOMINL AORTA: CPT | Performed by: SURGERY

## 2022-10-18 PROCEDURE — 2709999900 HC NON-CHARGEABLE SUPPLY

## 2022-10-18 PROCEDURE — C1894 INTRO/SHEATH, NON-LASER: HCPCS

## 2022-10-18 PROCEDURE — 6360000004 HC RX CONTRAST MEDICATION: Performed by: SURGERY

## 2022-10-18 PROCEDURE — 85347 COAGULATION TIME ACTIVATED: CPT

## 2022-10-18 PROCEDURE — 99152 MOD SED SAME PHYS/QHP 5/>YRS: CPT | Performed by: SURGERY

## 2022-10-18 PROCEDURE — 6360000002 HC RX W HCPCS

## 2022-10-18 PROCEDURE — 75710 ARTERY X-RAYS ARM/LEG: CPT | Performed by: SURGERY

## 2022-10-18 PROCEDURE — 36415 COLL VENOUS BLD VENIPUNCTURE: CPT

## 2022-10-18 PROCEDURE — C1725 CATH, TRANSLUMIN NON-LASER: HCPCS

## 2022-10-18 PROCEDURE — 37224 PR REVSC OPN/PRG FEM/POP W/ANGIOPLASTY UNI: CPT | Performed by: SURGERY

## 2022-10-18 PROCEDURE — 99153 MOD SED SAME PHYS/QHP EA: CPT

## 2022-10-18 PROCEDURE — C1887 CATHETER, GUIDING: HCPCS

## 2022-10-18 PROCEDURE — 85027 COMPLETE CBC AUTOMATED: CPT

## 2022-10-18 PROCEDURE — C1769 GUIDE WIRE: HCPCS

## 2022-10-18 RX ORDER — HYDROCODONE BITARTRATE AND ACETAMINOPHEN 5; 325 MG/1; MG/1
2 TABLET ORAL EVERY 4 HOURS PRN
Status: DISCONTINUED | OUTPATIENT
Start: 2022-10-18 | End: 2022-10-18 | Stop reason: HOSPADM

## 2022-10-18 RX ORDER — ACETAMINOPHEN 325 MG/1
650 TABLET ORAL EVERY 4 HOURS PRN
Status: DISCONTINUED | OUTPATIENT
Start: 2022-10-18 | End: 2022-10-18 | Stop reason: HOSPADM

## 2022-10-18 RX ORDER — HYDROCODONE BITARTRATE AND ACETAMINOPHEN 5; 325 MG/1; MG/1
1 TABLET ORAL EVERY 4 HOURS PRN
Status: DISCONTINUED | OUTPATIENT
Start: 2022-10-18 | End: 2022-10-18 | Stop reason: HOSPADM

## 2022-10-18 RX ORDER — SODIUM CHLORIDE 9 MG/ML
INJECTION, SOLUTION INTRAVENOUS CONTINUOUS
Status: DISCONTINUED | OUTPATIENT
Start: 2022-10-18 | End: 2022-10-18 | Stop reason: HOSPADM

## 2022-10-18 RX ADMIN — IOPAMIDOL 63 ML: 755 INJECTION, SOLUTION INTRAVENOUS at 16:17

## 2022-10-18 NOTE — BRIEF OP NOTE
Brief Postoperative Note      Patient: Julio Hess  YOB: 1929  MRN: [id number]    Date of Procedure: 10/18/2022    Pre-Op Diagnosis: Graft stenosis L fem-BKpop    Post-Op Diagnosis: Same       Procedure: Distal aortoiliac angiogram with selective L leg angio; Cutting balloon angioplasy distal L fem-BKpop (Wolverrine 2.5 x 15 mm)    Anesthesia: * No surgery found *    Estimated Blood Loss (mL): Minimal    Complications: None    Specimens:   * Cannot find log *    Implants:  * No surgical log found *      Drains: * No LDAs found *    Findings: as above- good result    Electronically signed by Libertad Saldaña MD on 10/18/2022 at 4:35 PM

## 2022-10-18 NOTE — DISCHARGE INSTRUCTIONS
PERIPHERAL ANGIOGRAM    Care of your puncture site:  Remove bandage 24 hours after the procedure. May shower in 24 hours but do not sit in a bathtub/pool of water for 5 days or until the wound is healed. Inspect the site daily and gently clean using soap and water while standing in the shower. Dry thoroughly and apply a Band-Aid that covers the entire site. Do not apply powder or lotion. Normal Observations:  Soreness or tenderness which may last one week. Mild oozing from the incision site. Possible bruising that could last 2 weeks. Activity:  You may resume driving 24 hours following the procedure. You may resume normal activity in 5 days or after the wound heals. Avoid lifting more than 10 pounds for 5 days or until the wound heals. Avoid strenuous exercise or activity for 1 week. Nutrition:  Regular diet   Drink at least 8 to 10 glasses of decaffeinated, non-alcoholic fluid for the next 24 hours to flush the x-ray dye used for your angiogram out of your body. Call your doctor immediately if your condition worsens, for any other concerns, for a follow-up appointment or if you experience any of the following:  Significant bleeding that does not stop after 10 minutes of applying firm pressure on the puncture site. Increased swelling on the groin or leg. Unusual pain, numbness, or tingling of the groin or down the leg. Any signs of infection such as: redness, yellow drainage at the site, swelling or pain. Other Instructions:  Hold Metformin or Metformin containing drugs for 48 hours after procedure.

## 2022-10-19 ENCOUNTER — TELEPHONE (OUTPATIENT)
Dept: VASCULAR SURGERY | Age: 87
End: 2022-10-19

## 2022-10-19 NOTE — TELEPHONE ENCOUNTER
[facility] calling stating that there is an order in the portal that needs to be signed, please call Rupal Morales at   [phone] with any questions.

## 2022-10-19 NOTE — OP NOTE
[address]                     [facility], [facility]                                OPERATIVE REPORT    PATIENT NAME: Caron Luna                       :        1929  MED REC NO:   [id number]                          ROOM:  ACCOUNT NO:   [de-identified]                           ADMIT DATE: 10/18/2022  PROVIDER:     Nhung De La Rosa MD    DATE OF PROCEDURE:  10/18/2022    PREOPERATIVE DIAGNOSES:  Severe distal graft stenosis, status post left  femoral below knee popliteal bypass. POSTOPERATIVE DIAGNOSES:  Severe distal graft stenosis, status post left  femoral below knee popliteal bypass. OPERATION PERFORMED:  1. Ultrasound-guided right femoral catheterization. 2.  Aortoiliac angiogram via catheter positioned in the infrarenal  aorta. 3.  Selective left leg angiogram via the catheter positioned  sequentially in the left external iliac artery and left leg bypass. 4.  Cutting balloon angioplasty, distal bypass graft (Dalton 2.5 mm x  15 mm). SURGEON:  Nhung De La Rosa MD.    ANESTHESIA:  1% lidocaine with conscious sedation (Versed 2 mg and  fentanyl 100 mcg IV push). DURATION:  Sixty minutes with continuous oxygen saturation, EKG and  blood pressure monitoring as well as independent practitioner monitoring  and administration under supervision of physician. HISTORY:  The patient is a 66-year-old lady, who has undergone left  femoral to below knee popliteal bypass for rest pain and nonhealing  wound. This was done approximately four months ago. Duplex scan  demonstrated severe stenosis with velocities over 500 cm/s in the distal  graft. It was recommended that she undergo angiography and intervention  as appropriate, and she agreed understanding the risks, benefits, and  other options. TECHNIQUE:  The patient was brought to the Angiogram Department and  placed on the table in the supine position.   After adequate induction of  IV sedation and with continuous oxygen saturation, EKG and blood  pressure monitoring, and independent medical professional,  administration of anesthesia and observation, the right groin was  prepped and draped in a sterile fashion. 1% lidocaine was infiltrated  over the right femoral artery. Ultrasound was used to identify the  femoral artery and confirm its patency. Using ultrasound guidance, the right common femoral artery was punctured  proximal to its bifurcation and a wire was advanced under fluoroscopic  guidance. A 5-Kazakh sheath was placed. A RIM catheter was then  inserted and positioned in the proximal infrarenal aorta, where a flush  aortoiliac angiogram was performed. Catheter was then withdrawn below  the aortic bifurcation where the left common iliac artery origin was  engaged. A stiff Glidewire was advanced distally out into the graft and  a straight Springfield catheter was then inserted and advanced into the  proximal graft. A hand injection of 0.5% contrast was performed to evaluate the graft  and runoff. After the stenosis was identified and evaluated and sized,  it was decided to proceed with an angioplasty using a cutting balloon. A Lapwai 2.5 mm x 15 mm cutting balloon was chosen. The patient was  given 3000 units of heparin and ACT measured just under 300 seconds. The 5-Kazakh sheath was exchanged for a 6-Kazakh Destination sheath,  which was advanced across the aortic bifurcation into the external iliac  artery. Over the wire was placed a Quick Cross catheter, which was advanced to  the proximity with stenosis. A Victory 0.014 wire was then advanced  through that catheter and across the stenosis into the outflow of  anterior tibial artery. The Lapwai 2.5 mm x 15 mm cutting balloon  was then advanced over that wire after a road map was obtained. It was  positioned across the stenosis and inflated to 6 atmospheres for two  minutes.   Another angiogram demonstrated irregularity in the proximal  portion of the stenosis and the cutting balloon was withdrawn to that  proximity. It was then inflated another time for two minutes to 6  atmospheres. A final inflation was performed across the anastomosis for  one minute to 7 atmospheres. After completion of this, an angiogram was performed which demonstrated  excellent result with no residual stenosis and good flow to the graft. Repeat ACT demonstrated that it measured still 290 seconds, and  protamine was used for reversing the effect with 50 mg given IV push. Eventually, right sheath was removed and manual pressure was applied. The patient tolerated the procedure well. FINDINGS:  1. Calcified irregular infrarenal aorta and bilateral iliac arteries  without stenoses. 2.  Patent left femoral replacement with a patent left femoral to below  knee popliteal artery bypass graft. Distal graft has a severe greater  than 90% stenosis of approximately 1.5 cm in length. Runoff is  maintained through the posterior tibial artery. 3.  Successful cutting balloon angioplasty with distal graft stenosis.         Jacquelyn Zimmerman MD    D: 10/18/2022 17:01:26       T: 10/18/2022 21:20:07     [id number]  Job#: [id number]     Doc#: [id number]    CC:

## 2022-10-20 DIAGNOSIS — M79.2 NEUROPATHIC PAIN OF LEFT FOOT: ICD-10-CM

## 2022-10-20 RX ORDER — GABAPENTIN 100 MG/1
CAPSULE ORAL
Qty: 90 CAPSULE | Refills: 0 | OUTPATIENT
Start: 2022-10-20

## 2022-10-24 ENCOUNTER — CARE COORDINATION (OUTPATIENT)
Dept: CARE COORDINATION | Age: 87
End: 2022-10-24

## 2022-10-24 RX ORDER — SODIUM CHLORIDE 0.9 % (FLUSH) 0.9 %
5-40 SYRINGE (ML) INJECTION PRN
Status: CANCELLED | OUTPATIENT
Start: 2022-10-24

## 2022-10-24 RX ORDER — SODIUM CHLORIDE 9 MG/ML
INJECTION, SOLUTION INTRAVENOUS PRN
Status: CANCELLED | OUTPATIENT
Start: 2022-10-24

## 2022-10-24 RX ORDER — SODIUM CHLORIDE 0.9 % (FLUSH) 0.9 %
5-40 SYRINGE (ML) INJECTION EVERY 12 HOURS SCHEDULED
Status: CANCELLED | OUTPATIENT
Start: 2022-10-24

## 2022-10-24 NOTE — H&P
Subjective:       Lourdes Quintero is a 80 y.o. female S/P L fem-BKpop bypass with graft stenosis    Past Medical History:   Diagnosis Date    Anxiety     Arthritis     At risk for falls     CAD (coronary artery disease)     Cerebral artery occlusion with cerebral infarction (HCC)     TIA--no residual effects    DDD (degenerative disc disease), cervical     Fractures     (L) Hip Fx 4/25/98, L1 fracture from fall 10-31-06    Hyperlipidemia     Hypertension     Mitral regurgitation     Neuropathy     Osteopenia     Osteoporosis     PAD (peripheral artery disease) ([facility] [address]     Right LE ischemia    Peripheral neuropathy 6/1/2015    Peripheral vascular disease (HCC)     bilateral lower extremities with edema    Podagra 01/09/2017    Dr. Sky Holt    Prolonged emergence from general anesthesia     Spinal stenosis     L4-5    Type 2 diabetes mellitus ([facility] [address]     Urethral stricture 5 years ago    Urgency of urination      Past Surgical History:   Procedure Laterality Date    ANGIOPLASTY Left 04/18/2022    Left SFA    APPENDECTOMY      CARPAL TUNNEL RELEASE Right 03/29/2019    RIGHT CARPAL TUNNEL RELEASE AND RIGHT MIDDLE FINGER TRIGGER FINGER RELEASE performed by Noah Roa MD at [address]  [id number]    LEFT EYE EYE CATARACT PHACOEMULSIFICATION INTRAOCULAR LENS    CHOLECYSTECTOMY  01/01/1978    COLONOSCOPY  08/31/1999    diverticulosis    EYE SURGERY Bilateral     bilateral cataract removed    FEMORAL BYPASS Left 6/28/2022    LEFT LEG FEMORAL TO POPLITEAL BYPASS WITH INTRAOPERATIVE ANGIOGRAM performed by Luigi Cervantes MD at [facility] Left 02/14/2022    LEFT COMMON FEMORAL ARTERY ENDARTERECTOMY performed by Luigi Cervantes MD at [address] (CERVIX STATUS UNKNOWN)  1980s    sallie    IR FEMORAL POPLITEAL BYPASS GRAFT Right 08/31/2015    KYPHOSIS SURGERY  11/07/2006    L1, (fractured after a fall)    LEG SURGERY Left 02/14/2022    LEFT LEG WOUND DEBRIDEMENT performed by Christina Carrington MD at [facility] 21 N/A 7/7/2022    INCISION AND DRAINAGE OF LEFT SIDE GROIN WITH PLACEMENT OF WOUND VAC performed by Christina Carrington MD at [facility]  04/25/1998    (L) THR    WRIST FRACTURE SURGERY  01/01/2008    left wrist     Family History   Problem Relation Age of Onset    Cancer Mother 43    Stroke Father     Hypertension Father      Social History     Socioeconomic History    Marital status:    Tobacco Use    Smoking status: Never    Smokeless tobacco: Never   Vaping Use    Vaping Use: Never used   Substance and Sexual Activity    Alcohol use: No    Drug use: Never    Sexual activity: Not Currently     No current facility-administered medications on file prior to encounter. Current Outpatient Medications on File Prior to Encounter   Medication Sig Dispense Refill    sulfamethoxazole-trimethoprim (BACTRIM;SEPTRA) 400-80 MG per tablet Take 1 tablet by mouth 2 times daily Per Dr Bruner Persons 60 tablet 5    lisinopril (PRINIVIL;ZESTRIL) 10 MG tablet Take 1 tablet by mouth daily 30 tablet 0    atorvastatin (LIPITOR) 10 MG tablet TAKE ONE TABLET BY MOUTH DAILY 30 tablet 3    Multiple Vitamins-Minerals (THERAPEUTIC MULTIVITAMIN-MINERALS) tablet Take 1 tablet by mouth daily      furosemide (LASIX) 20 MG tablet TAKE ONE TABLET BY MOUTH DAILY 90 tablet 1    glimepiride (AMARYL) 1 MG tablet TAKE ONE TABLET BY MOUTH EVERY MORNING BEFORE BREAKFAST 30 tablet 2    blood glucose test strips (ONETOUCH ULTRA) strip USE TO TEST BLOOD SUGAR TWICE DAILY 100 strip 1    metFORMIN (GLUCOPHAGE) 500 MG tablet TAKE ONE TABLET BY MOUTH DAILY 90 tablet 3    acetaminophen (TYLENOL) 325 MG tablet Take 650 mg by mouth every 6 hours as needed for Pain      Lite Touch Lancets MISC Use twice daily when testing blood sugars.  100 each 5    Turmeric 450 MG CAPS Take 450 mg by mouth daily      Cholecalciferol (VITAMIN D3) 2000 UNITS CAPS Take 1 capsule by mouth daily aspirin EC 81 MG EC tablet Take 1 tablet by mouth daily 30 tablet 3     Allergies   Allergen Reactions    Aricept [Donepezil Hydrochloride] Other (See Comments)     Pt unable to recall reaction    Doxycycline Nausea Only    Ketorolac Tromethamine Other (See Comments)     Pt unable to recall reaction    Donepezil Hcl        Review of Systems  Pertinent items are noted in HPI. Objective:      BP (!) 194/70   Pulse 72   Temp 97.8 °F (36.6 °C)   Resp 16   Ht 5' (1.524 m)   Wt 93 lb (42.2 kg)   BMI 18.16 kg/m²     General:  alert, appears stated age, and cooperative   Skin:  normal   Eyes: conjunctivae/corneas clear. PERRL, EOM's intact. Fundi benign.    Mouth: MMM no lesions   Lymph Nodes:  Cervical, supraclavicular, and axillary nodes normal.   Lungs:  clear to auscultation bilaterally   Heart:  regular rate and rhythm, S1, S2 normal, no murmur, click, rub or gallop   Abdomen: soft, non-tender; bowel sounds normal; no masses,  no organomegaly   CVA:  absent   Genitourinary: defer exam   Extremities:  extremities normal, atraumatic, no cyanosis or edema   Neurologic:  negative   Psychiatric:  non focal         Assessment:      Graft threatening stenosis S/P fem-BKpop      Plan:     Angiogram L leg with intervention

## 2022-10-25 NOTE — CARE COORDINATION
Care Coordination Episodes    Type: Amb Care Management  Episode: complex care  Noted: 6/17/2022      Call to patient, LM on VM   Per chart review   [facility] consult completed   Active w [facility] per DR Stephen William    Will cont outreach

## 2022-10-26 RX ORDER — LISINOPRIL 10 MG/1
10 TABLET ORAL DAILY
Qty: 30 TABLET | Refills: 0 | Status: ON HOLD | OUTPATIENT
Start: 2022-10-26

## 2022-10-31 ENCOUNTER — HOSPITAL ENCOUNTER (OUTPATIENT)
Dept: WOUND CARE | Age: 87
Discharge: HOME | End: 2022-10-31
Payer: MEDICARE

## 2022-10-31 ENCOUNTER — TELEPHONE (OUTPATIENT)
Dept: INTERNAL MEDICINE CLINIC | Age: 87
End: 2022-10-31

## 2022-10-31 VITALS
RESPIRATION RATE: 16 BRPM | SYSTOLIC BLOOD PRESSURE: 175 MMHG | TEMPERATURE: 97.6 F | DIASTOLIC BLOOD PRESSURE: 69 MMHG | HEART RATE: 75 BPM

## 2022-10-31 DIAGNOSIS — S81.802D WOUND OF LEFT LOWER EXTREMITY, SUBSEQUENT ENCOUNTER: ICD-10-CM

## 2022-10-31 DIAGNOSIS — I73.9 PERIPHERAL ARTERY DISEASE (HCC): Primary | ICD-10-CM

## 2022-10-31 PROCEDURE — 11042 DBRDMT SUBQ TIS 1ST 20SQCM/<: CPT | Performed by: SPECIALIST

## 2022-10-31 PROCEDURE — 11042 DBRDMT SUBQ TIS 1ST 20SQCM/<: CPT

## 2022-10-31 ASSESSMENT — PAIN DESCRIPTION - LOCATION: LOCATION: LEG

## 2022-10-31 ASSESSMENT — PAIN DESCRIPTION - DESCRIPTORS: DESCRIPTORS: ACHING

## 2022-10-31 ASSESSMENT — PAIN DESCRIPTION - ORIENTATION: ORIENTATION: LEFT

## 2022-10-31 ASSESSMENT — PAIN SCALES - GENERAL: PAINLEVEL_OUTOF10: 10

## 2022-10-31 NOTE — TELEPHONE ENCOUNTER
Samira Zee, patient's home health nurse, is requesting verbal confirmation from Dr. Jamir Ortega that he will continue to sign for the patient. She is currently having wound care 2x weekly. Please contact Kirti at number provided.

## 2022-10-31 NOTE — PROGRESS NOTES
[facility]  Progress Note and Procedure Note      Julio Hess  MEDICAL RECORD NUMBER:  [id number]  AGE: 80 y.o. GENDER: female  : 1929  EPISODE DATE:  10/31/2022    Subjective:     Chief Complaint   Patient presents with    Wound Check     LEFT LOWER LEG         HISTORY of PRESENT ILLNESS HPI     Julio Hess is a 80 y.o. female who presents today for wound/ulcer evaluation. History of Wound Context: Patient returns in follow-up for left anterior knee wound.   She recently underwent a angioplasty of her distal left fem BK popliteal bypass graft  Wound/Ulcer Pain Timing/Severity: none  Quality of pain: N/A  Severity:  0 / 10   Modifying Factors: None  Associated Signs/Symptoms: edema    Ulcer Identification:  Ulcer Type: arterial    Contributing Factors: edema, venous stasis, diabetes, and arterial insufficiency    Acute Wound: N/A not an acute wound    PAST MEDICAL HISTORY        Diagnosis Date    Anxiety     Arthritis     At risk for falls     CAD (coronary artery disease)     Cerebral artery occlusion with cerebral infarction (HCC)     TIA--no residual effects    DDD (degenerative disc disease), cervical     Fractures     (L) Hip Fx 98, L1 fracture from fall 10-31-06    Hyperlipidemia     Hypertension     Mitral regurgitation     Neuropathy     Osteopenia     Osteoporosis     PAD (peripheral artery disease) ([address]     Right LE ischemia    Peripheral neuropathy 2015    Peripheral vascular disease (HCC)     bilateral lower extremities with edema    Podagra 2017    Dr. Latanya Rao    Prolonged emergence from general anesthesia     Spinal stenosis     L4-5    Type 2 diabetes mellitus ([address]     Urethral stricture 5 years ago    Urgency of urination        PAST SURGICAL HISTORY    Past Surgical History:   Procedure Laterality Date    ANGIOPLASTY Left 2022    Left SFA    APPENDECTOMY      CARPAL TUNNEL RELEASE Right 2019    RIGHT CARPAL TUNNEL RELEASE AND RIGHT MIDDLE FINGER TRIGGER FINGER RELEASE performed by Emiliano Lin MD at [facility] [address]  [id number]    LEFT EYE EYE CATARACT PHACOEMULSIFICATION INTRAOCULAR LENS    CHOLECYSTECTOMY  01/01/1978    COLONOSCOPY  08/31/1999    diverticulosis    EYE SURGERY Bilateral     bilateral cataract removed    FEMORAL BYPASS Left 6/28/2022    LEFT LEG FEMORAL TO POPLITEAL BYPASS WITH INTRAOPERATIVE ANGIOGRAM performed by Ayala Jeff MD at [place] [address] Left 02/14/2022    LEFT COMMON FEMORAL ARTERY ENDARTERECTOMY performed by Ayala Jeff MD at [facility] (CERVIX STATUS UNKNOWN)  [address] [place] [address]    IR FEMORAL POPLITEAL BYPASS GRAFT Right 08/31/2015    KYPHOSIS SURGERY  11/07/2006    L1, (fractured after a fall)    LEG SURGERY Left 02/14/2022    LEFT LEG WOUND DEBRIDEMENT performed by Ayala Jeff MD at [place] [address] N/A 7/7/2022    INCISION AND DRAINAGE OF LEFT SIDE GROIN WITH PLACEMENT OF WOUND VAC performed by Ayala Jeff MD at [address] [facility]  04/25/1998    (L) THR    WRIST FRACTURE SURGERY  01/01/2008    left wrist       FAMILY HISTORY    Family History   Problem Relation Age of Onset    Cancer Mother 43    Stroke Father     Hypertension Father        SOCIAL HISTORY    Social History     Tobacco Use    Smoking status: Never    Smokeless tobacco: Never   Vaping Use    Vaping Use: Never used   Substance Use Topics    Alcohol use: No    Drug use: Never       ALLERGIES    Allergies   Allergen Reactions    Aricept [Donepezil Hydrochloride] Other (See Comments)     Pt unable to recall reaction    Doxycycline Nausea Only    Ketorolac Tromethamine Other (See Comments)     Pt unable to recall reaction    Donepezil Hcl        MEDICATIONS    Current Outpatient Medications on File Prior to Encounter   Medication Sig Dispense Refill    lisinopril (PRINIVIL;ZESTRIL) 10 MG tablet Take 1 tablet by mouth daily 30 tablet 0 sulfamethoxazole-trimethoprim (BACTRIM;SEPTRA) 400-80 MG per tablet Take 1 tablet by mouth 2 times daily Per Dr Benedicto Mac 60 tablet 5    atorvastatin (LIPITOR) 10 MG tablet TAKE ONE TABLET BY MOUTH DAILY 30 tablet 3    Multiple Vitamins-Minerals (THERAPEUTIC MULTIVITAMIN-MINERALS) tablet Take 1 tablet by mouth daily      furosemide (LASIX) 20 MG tablet TAKE ONE TABLET BY MOUTH DAILY (Patient not taking: Reported on 10/31/2022) 90 tablet 1    glimepiride (AMARYL) 1 MG tablet TAKE ONE TABLET BY MOUTH EVERY MORNING BEFORE BREAKFAST 30 tablet 2    blood glucose test strips (ONETOUCH ULTRA) strip USE TO TEST BLOOD SUGAR TWICE DAILY 100 strip 1    metFORMIN (GLUCOPHAGE) 500 MG tablet TAKE ONE TABLET BY MOUTH DAILY 90 tablet 3    acetaminophen (TYLENOL) 325 MG tablet Take 650 mg by mouth every 6 hours as needed for Pain      Lite Touch Lancets MISC Use twice daily when testing blood sugars. 100 each 5    Turmeric 450 MG CAPS Take 450 mg by mouth daily      Cholecalciferol (VITAMIN D3) 2000 UNITS CAPS Take 1 capsule by mouth daily      aspirin EC 81 MG EC tablet Take 1 tablet by mouth daily 30 tablet 3     No current facility-administered medications on file prior to encounter. REVIEW OF SYSTEMS  Review of Systems    Pertinent items are noted in HPI. Objective:      BP (!) 175/69   Pulse 75   Temp 97.6 °F (36.4 °C) (Temporal)   Resp 16     Wt Readings from Last 3 Encounters:   10/18/22 93 lb (42.2 kg)   10/05/22 93 lb (42.2 kg)   09/21/22 93 lb (42.2 kg)       PHYSICAL EXAM    Extremities: no cyanosis, no clubbing, 2 + edema-  left extremity, and full-thickness wound with fibrin and granulation tissue    Assessment:      1. Peripheral artery disease ([address]    2. Wound of left lower extremity, subsequent encounter         Procedure Note  Indications:  Based on my examination of this patient's wound(s) today, sharp excision is required to promote healing and evaluate the extent healing.     Performed by: Saumya Higgins Abelino Mora MD    Consent obtained? Yes    Time out taken: Yes    Pain Control: Anesthetic: 4% Lidocaine Liquid Topical     Debridement:Excisional Debridement    Using curette the wound was sharply debrided    down through and including the removal of  epidermis, dermis, and subcutaneous tissue. Devitalized Tissue Debrided:  fibrin      Pre Debridement Measurements:  Are located in the Wound Documentation Flow Sheet   Wound #: 1     Post  Debridement Measurements:  Wound 11/22/21 Leg Left; Lower;Distal #1 (Active)   Wound Image   10/31/22 0919   Wound Etiology Arterial 11/22/21 0924   Wound Cleansed Cleansed with saline 10/31/22 0919   Dressing/Treatment Collagen with Ag 10/31/22 0938   Wound Length (cm) 1.8 cm 10/31/22 0919   Wound Width (cm) 1.8 cm 10/31/22 0919   Wound Depth (cm) 0.1 cm 10/31/22 0919   Wound Surface Area (cm^2) 3.24 cm^2 10/31/22 0919   Change in Wound Size % (l*w) -15.71 10/31/22 0919   Wound Volume (cm^3) 0.324 cm^3 10/31/22 0919   Wound Healing % -16 10/31/22 0919   Post-Procedure Length (cm) 1.9 cm 10/31/22 0938   Post-Procedure Width (cm) 1.9 cm 10/31/22 0938   Post-Procedure Depth (cm) 0.3 cm 10/31/22 0938   Post-Procedure Surface Area (cm^2) 3.61 cm^2 10/31/22 0938   Post-Procedure Volume (cm^3) 1.083 cm^3 10/31/22 0938   Distance Tunneling (cm) 0 cm 10/17/22 0927   Tunneling Position ___ O'Clock 0 07/11/22 0943   Undermining Starts ___ O'Clock 0 07/11/22 0943   Undermining Ends___ O'Clock 0 07/11/22 0943   Undermining Maxium Distance (cm) 0 10/17/22 0927   Wound Assessment Dry;Pink/red;Fibrin;Devitalized tissue 10/31/22 0919   Drainage Amount None 10/31/22 0919   Drainage Description Other (Comment) 10/31/22 0919   Odor None 10/31/22 0919   Nissa-wound Assessment Intact;Fragile;Edematous 10/31/22 0919   Margins Defined edges 10/31/22 0919   Wound Thickness Description not for Pressure Injury Full thickness 10/31/22 0919   Number of days: 343          Percent of Wound Debrided: 100%    Total Surface Area Debrided:  3.6 sq cm    Diabetic/Pressure/Non Pressure Ulcers only:  Ulcer: Non-Pressure ulcer, fat layer exposed    Bleeding: Minimal    Hemostasis Achieved: by pressure    Procedural Pain: 0  / 10     Post Procedural Pain: 0 / 10     Response to treatment:  Well tolerated by patient. Patient has significantly more edema since her last visit. Strongly suggested that she at least wear a medium span to  to assist with edema control and wound healing. Plan:     Treatment Note: Please see attached Discharge Instructions. These instructions were given and signed by the patient or POA    New Prescriptions    No medications on file       No orders of the defined types were placed in this encounter. Discharge [facility] Physician Orders and Discharge Instructions  [address] [place] [address] [address] [address] [facility]. [place]. [place]  Telephone: [phone]  NAME:  Renzo Mosley OF BIRTH:  4/4/1929  MEDICAL RECORD NUMBER:  [id number]  DATE: 10/31/22     Return Appointment:  Return Appointment: With Dr. Sonu Camacho  in  [facility])  [] Return Appointment for a Wound Assessment with the nurse on:     Future Appointments   Date Time Provider Gilbert Frey   11/2/2022  3:30 PM Enzo Fernandez MD FF VASC/ENDO MMA   11/3/2022  2:00 PM Christin Fernandez MD [address] [place] [address] Avenue: [address] [place] [address] [place]:  Phone: [phone]  Fax: [phone]:  Phone: [phone]  Fax: [phone]   Medically necessary services: [x] Skilled Nursing [] PT (Eval & Treat) [] OT (Eval & Treat) [] Social Work [] Dietician  [] Other:    Wound care instructions: If you smoke we ask that you refrain from smoking. Smoking inhibits wounds from healing. When taking antibiotics take the entire prescription as ordered. Do not stop taking until medication is all gone unless otherwise instructed. Exercise as tolerated. Keep weight off wounds and reposition every 2 hours if applicable. Do not get wounds wet in the bath or shower unless otherwise instructed by your physician. If your wound is on your foot or leg, you may purchase a cast bag. Please ask at the pharmacy. Wash hands with soap and water prior to and after every dressing change. [x]Wash wounds with: 0.9% normal saline  [x]Nissa wound Topical Treatments: Do not apply lotions, creams, or ointments to the skin around the wound bed unless directed as followed:   Apply around the wound: Nothing         [x]Wound Location: left lower leg   Apply Primary Dressing to wound: Joleen  Secondary Dressing: 4X4 gauze pad and Conforming roll gauze   Avoid contact of tape with skin if possible. When to change Dressing: 3 times per week:Monday/Wednesday/Friday      [x] Edema Control:  Apply:  Medigrip on the Left; (Single medium). They should be applied to affected leg(s) from mid foot to knee making sure to cover the heel      Elevate leg(s) above the level of the heart for 30 minutes 4-5 times a day and/or when sitting. Avoid prolonged standing in one place. Dietary:  Important dietary reminders:  1. Increase Protein intake (i.e. Lean meats, fish, eggs, legumes, and yogurt)  2. No added salt  3. If diabetic, follow a diabetic diet and check glucose prior to meals or as instructed by your physician. Dietary Supplements(Take twice a day unless instructed otherwise):  [] Blanca Rod  [] 30ml ProStat [] Marie Bidding [] Ensure Max/Premier [] Other:    Your nurse  is:  Royer Schaefer     Electronically signed by Karl Reardon RN on 10/31/2022 at 9:41 AM     [address] [facility] Information: Should you experience any significant changes in your wound(s) or have questions about your wound care, please contact the [address] [place] [address] at [phone].  We are open from 8:00am - 4:30p Monday, Thursday and Friday; 11:00am - 5pm on Tuesday and CLOSED Wednesday. Please give us 24-48 business hours to return your call. Call your doctor now or seek immediate medical care if:    You have symptoms of infection, such as: Increased pain, swelling, warmth, or redness. Red streaks leading from the area. Pus draining from the area. A fever.          [] Patient unable to sign Discharge Instructions given to ECF/Transportation/POA         Electronically signed by Seda Mena MD on 10/31/2022 at 11:04 AM

## 2022-10-31 NOTE — DISCHARGE INSTRUCTIONS
[facility] Physician Orders and Discharge Instructions  [address] [place] [address] [address] [address] [facility]. [facility]  Telephone: [phone]  NAME:  Viridiana Choudhary OF BIRTH:  4/4/1929  MEDICAL RECORD NUMBER:  [id number]  DATE: 10/31/22     Return Appointment:  Return Appointment: With Dr. José Miguel Stevenson  in  [facility])  [] Return Appointment for a Wound Assessment with the nurse on:     Future Appointments   Date Time Provider Gilbert Frey   11/2/2022  3:30 PM Dorothea White MD FF VASC/ENDO MMA   11/3/2022  2:00 PM Thais Plata  [address]: [facility] [place]:  Phone: [phone]  Fax: [phone]:  Phone: [phone]  Fax: [phone]   Medically necessary services: [x] Skilled Nursing [] PT (Eval & Treat) [] OT (Eval & Treat) [] Social Work [] Dietician  [] Other:    Wound care instructions: If you smoke we ask that you refrain from smoking. Smoking inhibits wounds from healing. When taking antibiotics take the entire prescription as ordered. Do not stop taking until medication is all gone unless otherwise instructed. Exercise as tolerated. Keep weight off wounds and reposition every 2 hours if applicable. Do not get wounds wet in the bath or shower unless otherwise instructed by your physician. If your wound is on your foot or leg, you may purchase a cast bag. Please ask at the pharmacy. Wash hands with soap and water prior to and after every dressing change. [x]Wash wounds with: 0.9% normal saline  [x]Nissa wound Topical Treatments: Do not apply lotions, creams, or ointments to the skin around the wound bed unless directed as followed:   Apply around the wound: Nothing         [x]Wound Location: left lower leg   Apply Primary Dressing to wound: Joleen  Secondary Dressing: 4X4 gauze pad and Conforming roll gauze   Avoid contact of tape with skin if possible.   When to change Dressing: 3 times per week:Monday/Wednesday/Friday      [x] Edema Control:  Apply:  Medigrip on the Left; (Single medium). They should be applied to affected leg(s) from mid foot to knee making sure to cover the heel      Elevate leg(s) above the level of the heart for 30 minutes 4-5 times a day and/or when sitting. Avoid prolonged standing in one place. Dietary:  Important dietary reminders:  1. Increase Protein intake (i.e. Lean meats, fish, eggs, legumes, and yogurt)  2. No added salt  3. If diabetic, follow a diabetic diet and check glucose prior to meals or as instructed by your physician. Dietary Supplements(Take twice a day unless instructed otherwise):  [] Jen Letters  [] 30ml ProStat [] Manish Juares [] Ensure Max/Premier [] Other:    Your nurse  is:  Crow Diamond     Electronically signed by Diaz Telles RN on 10/31/2022 at 9:41 AM     [address] [facility] [address] Information: Should you experience any significant changes in your wound(s) or have questions about your wound care, please contact the [address] [place] [address] at [phone]. We are open from 8:00am - 4:30p Monday, Thursday and Friday; 11:00am - 5pm on Tuesday and CLOSED Wednesday. Please give us 24-48 business hours to return your call. Call your doctor now or seek immediate medical care if:    You have symptoms of infection, such as: Increased pain, swelling, warmth, or redness. Red streaks leading from the area. Pus draining from the area. A fever.          [] Patient unable to sign Discharge Instructions given to ECF/Transportation/POA

## 2022-11-02 ENCOUNTER — CARE COORDINATION (OUTPATIENT)
Dept: CARE COORDINATION | Age: 87
End: 2022-11-02

## 2022-11-02 ENCOUNTER — OFFICE VISIT (OUTPATIENT)
Dept: VASCULAR SURGERY | Age: 87
End: 2022-11-02

## 2022-11-02 VITALS
SYSTOLIC BLOOD PRESSURE: 170 MMHG | DIASTOLIC BLOOD PRESSURE: 68 MMHG | WEIGHT: 93 LBS | HEIGHT: 60 IN | BODY MASS INDEX: 18.26 KG/M2

## 2022-11-02 DIAGNOSIS — T82.858A BYPASS GRAFT STENOSIS, INITIAL ENCOUNTER (HCC): Primary | ICD-10-CM

## 2022-11-02 PROCEDURE — 99024 POSTOP FOLLOW-UP VISIT: CPT | Performed by: SURGERY

## 2022-11-02 NOTE — PROGRESS NOTES
First OV S/P cutting balloon angioplasty L fem-BKpop graft stenosis. Continues with a multitude of complaints including L leg burning pain and L groin draiange. Tolerating po antibx. EXAM:  Palpable L graft pulse without bruits. Strong L PT and MIREYA doppler signals. L leg diffusely edematous. Seromas L thigh unchanged    L groin with pinpoint draining sinus - no erythema    A/P: S/P L graft angioplasty - good early result. L leg edema - multifactorial (postop, chronic groin infection, seromas)   L leg pain - likely neurogenic with H/O spinal stenosis   Continue current plans - suppressive antibx, groin dressing changes. Offered I&D of seromas and groin - refused again. Will discuss with PCP referral to spine specialist for evaluation. F/U 4 weeks or earlier as needed.

## 2022-11-03 DIAGNOSIS — T81.31XA POSTOPERATIVE WOUND DEHISCENCE, INITIAL ENCOUNTER: Primary | ICD-10-CM

## 2022-11-03 DIAGNOSIS — G45.9 TIA (TRANSIENT ISCHEMIC ATTACK): ICD-10-CM

## 2022-11-03 RX ORDER — ATORVASTATIN CALCIUM 10 MG/1
10 TABLET, FILM COATED ORAL DAILY
Qty: 30 TABLET | Refills: 3 | Status: ON HOLD | OUTPATIENT
Start: 2022-11-03

## 2022-11-03 RX ORDER — SULFAMETHOXAZOLE AND TRIMETHOPRIM 400; 80 MG/1; MG/1
1 TABLET ORAL 2 TIMES DAILY
Qty: 60 TABLET | Refills: 5 | OUTPATIENT
Start: 2022-11-03

## 2022-11-03 NOTE — CARE COORDINATION
Ambulatory Care Coordination Note  11/3/2022    ACC: Katie Bailey, RN  Call to patient   Reports doing horrible, leg/ foot hurt terrible , however reports its tolerable with Tylenol  Had visit from [facility], and is not interested at this time  Pt would like to know who DR Arti Persaud recommends  1- good spine surgeon  2-good lymph node specialist?    PLAN   Will cont outreach          Offered patient enrollment in the Remote Patient Monitoring (RPM) program for in-home monitoring: Patient declined. Lab Results       None            Care Coordination Interventions    Referral from Primary Care Provider: No  Suggested Interventions and Community Resources  Other Services or Interventions: caregiver daily          Goals Addressed    None         Prior to Admission medications    Medication Sig Start Date End Date Taking?  Authorizing Provider   atorvastatin (LIPITOR) 10 MG tablet Take 1 tablet by mouth daily 11/3/22   Paulo Forman DO   lisinopril (PRINIVIL;ZESTRIL) 10 MG tablet Take 1 tablet by mouth daily 10/26/22   Paulo Forman DO   sulfamethoxazole-trimethoprim (BACTRIM;SEPTRA) 400-80 MG per tablet Take 1 tablet by mouth 2 times daily Per Dr Kandace Wood 10/5/22   Stanislaw Garcia MD   Multiple Vitamins-Minerals (THERAPEUTIC MULTIVITAMIN-MINERALS) tablet Take 1 tablet by mouth daily    Historical Provider, MD   furosemide (LASIX) 20 MG tablet TAKE ONE TABLET BY MOUTH DAILY 7/29/22   Connie Murray MD   glimepiride (AMARYL) 1 MG tablet TAKE ONE TABLET BY MOUTH EVERY MORNING BEFORE BREAKFAST 7/29/22   Paulo Forman DO   blood glucose test strips (ONETOUCH ULTRA) strip USE TO TEST BLOOD SUGAR TWICE DAILY 7/25/22   Paulo Forman DO   metFORMIN (GLUCOPHAGE) 500 MG tablet TAKE ONE TABLET BY MOUTH DAILY 6/28/22   Paulo Forman DO   acetaminophen (TYLENOL) 325 MG tablet Take 650 mg by mouth every 6 hours as needed for Pain    Historical Provider, MD   Lite Touch Lancets MISC Use twice daily when testing blood sugars.  3/28/22   Paulo Forman, DO   Turmeric 450 MG CAPS Take 450 mg by mouth daily    Historical Provider, MD   Cholecalciferol (VITAMIN D3) 2000 UNITS CAPS Take 1 capsule by mouth daily    Historical Provider, MD   aspirin EC 81 MG EC tablet Take 1 tablet by mouth daily 5/13/15   Kimberly Brannon, DO       Future Appointments   Date Time Provider Gilbert Frey   11/14/2022  8:45 AM Elda Leyva  [address] WOUND [facility]   12/7/2022  1:15 PM Negin Barron MD FF VASC/ENDO [facility]    and   General Assessment    Do you have any symptoms that are causing concern?: Yes  Progression since Onset: Unchanged  Reported Symptoms: Pain

## 2022-11-03 NOTE — TELEPHONE ENCOUNTER
Delphine,  I am feeling pretty removed from [facility]. What is going on with her spine?   Lymph node specialist?  What is going on?

## 2022-11-04 ENCOUNTER — CARE COORDINATION (OUTPATIENT)
Dept: CARE COORDINATION | Age: 87
End: 2022-11-04

## 2022-11-04 NOTE — TELEPHONE ENCOUNTER
I think the best thing to do is have her in for an appointment so I can muddle my way thorugh her symptoms and determine the best plan of care.

## 2022-11-04 NOTE — CARE COORDINATION
Future Appointments   Date Time Provider Gilbert Frey   11/9/2022  3:00 PM DO OCTAVIO Smiley [place] - DYD   11/14/2022  8:45 AM Alvino Cartwright MD TJ WOUND [facility]   12/7/2022  1:15 PM Mary Murphy MD FF VASC/ENDO [facility]

## 2022-11-09 ENCOUNTER — OFFICE VISIT (OUTPATIENT)
Dept: INTERNAL MEDICINE CLINIC | Age: 87
End: 2022-11-09
Payer: MEDICARE

## 2022-11-09 VITALS
RESPIRATION RATE: 12 BRPM | HEART RATE: 98 BPM | HEIGHT: 61 IN | BODY MASS INDEX: 20.73 KG/M2 | OXYGEN SATURATION: 99 % | WEIGHT: 109.8 LBS | SYSTOLIC BLOOD PRESSURE: 132 MMHG | DIASTOLIC BLOOD PRESSURE: 60 MMHG

## 2022-11-09 DIAGNOSIS — M48.061 SPINAL STENOSIS OF LUMBAR REGION WITHOUT NEUROGENIC CLAUDICATION: ICD-10-CM

## 2022-11-09 DIAGNOSIS — I89.0 LYMPHEDEMA OF LEFT LOWER EXTREMITY: ICD-10-CM

## 2022-11-09 DIAGNOSIS — G89.29 CHRONIC NEUROPATHIC PAIN: Primary | ICD-10-CM

## 2022-11-09 DIAGNOSIS — G89.29 CHRONIC PAIN OF LEFT LOWER EXTREMITY: ICD-10-CM

## 2022-11-09 DIAGNOSIS — I89.0 CHRONIC ACQUIRED LYMPHEDEMA: ICD-10-CM

## 2022-11-09 DIAGNOSIS — L97.922 NON-PRESSURE CHRONIC ULCER OF LEFT LOWER LEG WITH FAT LAYER EXPOSED (HCC): ICD-10-CM

## 2022-11-09 DIAGNOSIS — R60.0 BILATERAL LEG EDEMA: ICD-10-CM

## 2022-11-09 DIAGNOSIS — M79.2 CHRONIC NEUROPATHIC PAIN: Primary | ICD-10-CM

## 2022-11-09 DIAGNOSIS — M79.605 CHRONIC PAIN OF LEFT LOWER EXTREMITY: ICD-10-CM

## 2022-11-09 PROCEDURE — 99215 OFFICE O/P EST HI 40 MIN: CPT | Performed by: INTERNAL MEDICINE

## 2022-11-09 PROCEDURE — 1090F PRES/ABSN URINE INCON ASSESS: CPT | Performed by: INTERNAL MEDICINE

## 2022-11-09 PROCEDURE — G8427 DOCREV CUR MEDS BY ELIG CLIN: HCPCS | Performed by: INTERNAL MEDICINE

## 2022-11-09 PROCEDURE — G8483 FLU IMM NO ADMIN DOC REA: HCPCS | Performed by: INTERNAL MEDICINE

## 2022-11-09 PROCEDURE — 1036F TOBACCO NON-USER: CPT | Performed by: INTERNAL MEDICINE

## 2022-11-09 PROCEDURE — 1123F ACP DISCUSS/DSCN MKR DOCD: CPT | Performed by: INTERNAL MEDICINE

## 2022-11-09 PROCEDURE — G8420 CALC BMI NORM PARAMETERS: HCPCS | Performed by: INTERNAL MEDICINE

## 2022-11-09 NOTE — PROGRESS NOTES
[facility]) Physicians  Internal Medicine  Patient Encounter  Nereyda Ann D.O., Daniela Almanza        Chief Complaint   Patient presents with    Other     Discuss spine/ lymph nodes        HPI: 80 y.o. female with multiple complicated medical issues who continues to struggle with wound problems. She has been dealing with a left lower leg anterior tibial ulceration that has finally started to heal.  The wound is thought to be ischemic in nature. Patient underwent left common femoral endarterectomy on 2/14/2022, SFA angioplasty on 4/18/2022, then iliofemoral bypass and left femoral-popliteal bypass on 6/28/2022 she had postop wound complications and lymphatic leak. She required some antibiotics which she did not tolerate well. Unfortunately, she has continued to develop left lower extremity edema and seromas. A CTA aortogram with runoff on 9/30/2022 showed evidence of multiple fluid collections noted just medial to the bypass graft with the largest at the distal aspect of the femur. On 10/18/2022 she underwent angioplasty of the distal bypass graft in the left lower extremity for severe distal graft stenosis. The patient is frustrated and at times even agitated. He has been recommended that she undergo surgical intervention for the left proximal medial thigh seromas. Patient has declined any further surgical intervention. Patient complains of swelling and pain from her left groin to the left foot. She describes the pain as electrical and stinging which has been worsening over the last year. She continues to siddiqui wounds. She has a small left groin wound that uses clear fluid. She C/O a medial thigh wound as well. Patient was seen by her wound care specialist on 10/31/2022. He addressed the left lower extremity ischemic ulcer. He also commented regarding her leg edema and contributing factors. She was seen by her vascular specialist on 11/2/2022.   At that visit it was noted that she had palpable left graft pulse, strong Doppler signals in distal pulses, left leg diffuse edema, seromas left thigh, left groin with a pinpoint draining sinus with no signs of infection. There was some discussion regarding her left leg pain as to whether or not some of this could be neurogenic due to spinal stenosis. Dr. Sudhakar Bartholomew had recommended consultation with a spine specialist for evaluation to determine whether or not her spine may be contributing to some of the neuropathic pain in the left lower extremity and to determine if intervention may be able to help control the pain. Patient states that she has been using an infrared device that she found online. She is also using a vascular compression pump.         Past Medical History:   Diagnosis Date    Anxiety     Arthritis     At risk for falls     CAD (coronary artery disease)     Cerebral artery occlusion with cerebral infarction (HCC)     TIA--no residual effects    DDD (degenerative disc disease), cervical     Fractures     (L) Hip Fx 4/25/98, L1 fracture from fall 10-31-06    Hyperlipidemia     Hypertension     Mitral regurgitation     Neuropathy     Osteopenia     Osteoporosis     PAD (peripheral artery disease) ([facility] [address]     Right LE ischemia    Peripheral neuropathy 6/1/2015    Peripheral vascular disease (HCC)     bilateral lower extremities with edema    Podagra 01/09/2017    Dr. Corrina Birmingham    Prolonged emergence from general anesthesia     Spinal stenosis     L4-5    Type 2 diabetes mellitus ([facility] [address]     Urethral stricture 5 years ago    Urgency of urination          MEDICATIONS:  Prior to Visit Medications    Medication Sig   atorvastatin (LIPITOR) 10 MG tablet Take 1 tablet by mouth daily   lisinopril (PRINIVIL;ZESTRIL) 10 MG tablet Take 1 tablet by mouth daily   sulfamethoxazole-trimethoprim (BACTRIM;SEPTRA) 400-80 MG per tablet Take 1 tablet by mouth 2 times daily Per Dr Sudhakar Bartholomew   Multiple Vitamins-Minerals (THERAPEUTIC MULTIVITAMIN-MINERALS) tablet Take 1 tablet by mouth daily   furosemide (LASIX) 20 MG tablet TAKE ONE TABLET BY MOUTH DAILY   glimepiride (AMARYL) 1 MG tablet TAKE ONE TABLET BY MOUTH EVERY MORNING BEFORE BREAKFAST   blood glucose test strips (ONETOUCH ULTRA) strip USE TO TEST BLOOD SUGAR TWICE DAILY   metFORMIN (GLUCOPHAGE) 500 MG tablet TAKE ONE TABLET BY MOUTH DAILY   acetaminophen (TYLENOL) 325 MG tablet Take 650 mg by mouth every 6 hours as needed for Pain   Lite Touch Lancets MISC Use twice daily when testing blood sugars. Turmeric 450 MG CAPS Take 450 mg by mouth daily   Cholecalciferol (VITAMIN D3) 2000 UNITS CAPS Take 1 capsule by mouth daily   aspirin EC 81 MG EC tablet Take 1 tablet by mouth daily           Review of Systems - As per HPI      OBJECTIVE:  Vitals:    11/09/22 1514   BP: 132/60   Pulse: 98   Resp: 12   SpO2: 99%   Weight: 109 lb 12.8 oz (49.8 kg)   Height: 5' 1\" (1.549 m)     Body mass index is 20.75 kg/m². Wt Readings from Last 3 Encounters:   11/09/22 109 lb 12.8 oz (49.8 kg)   11/02/22 93 lb (42.2 kg)   10/18/22 93 lb (42.2 kg)     BP Readings from Last 3 Encounters:   11/09/22 132/60   11/02/22 (!) 170/68   10/31/22 (!) 175/69        GEN: NAD, A&O, Non-toxic  HEENT: NC/AT, FLACA, EOMI, Oral cavity Clear,  TM's NL, Nasal cavity clear. NECK: Supple. No thyromegaly. No JVD  LYMPH: No C/SC nodes. CV: Reg rhythm. 3/6 Systolic murmur heard best at [place]. PULM: CTA, symmentric air exchange  EXT: Left medial thigh with fluid collection along with pitting, woody edema. The entire left left--groin to foot with woody, pitting edema. Peau d'orange appearance in the thigh. Patient also with right lower extremity edema. GI: Abdomen is soft, NT, BS+, No hepatomegaly. No masses. NEURO: No focal or lateralizing deficits. 5/5 strength all muscle groups BL LE. No ankle dorsiflexor weakness. + Allodynia with palpation of the left foot. VASC:  No carotid bruits. Pulses symmetric  SKIN:  Right medial thigh ulceration, fibrin.   No oozing. PSYCH: As usual, the patient displays significant increased psychomotor activity with rapid and hurried speech, thoughts are tangential.  She really does not let me get in a word in [place] wise. ASSESSMENT/PLAN:    1. Chronic neuropathic pain  Suspect her neuropathic pain is related mostly to peripheral neuropathy due to previous lower extremity ischemia along with continued lymphedema of the leg and foot. Patient refuses to reconsider gabapentin  Lyrica is an option but may cause fluid retention and given her severe valvular heart disease, may be too much of a risk for congestive heart failure   - MRI LUMBAR SPINE WO CONTRAST; Future    2. Chronic acquired lymphedema  Multiple etiologies contributing to edema including postsurgical, lymphatic injury/disruption, seromas  Rule out venous thrombosis. Obtain venous Doppler as above    3. Lymphedema of left lower extremity  As above    4. Chronic pain of left lower extremity  As above  - MRI LUMBAR SPINE WO CONTRAST; Future    5. Spinal stenosis of lumbar region without neurogenic claudication  Patient definitely has a history of spinal stenosis  Could this be contributing to  - [facility]; Future    6. Non-pressure chronic ulcer of left lower leg with fat layer exposed ([address]  Continue wound care    Spoke with Dr. Shayy Reyez regarding plan of care. On this date 11/9/2022 I have spent 40 minutes reviewing previous notes, test results and face to face with the patient discussing the diagnosis and importance of compliance with the treatment plan as well as documenting on the day of the visit. Discussed medications with patient who voiced understanding of their use, indication and potential side effects. Pt also understands the above recommendations. All questions answered. This note was generated completely or in part utilizing Dragon dictation speech recognition software.   Occasionally, words are mistranscribed and despite editing, the text may contain inaccuracies due to incorrect word recognition.   If further clarification is needed please contact the office at [phone]       Electronically signed    Emmanuel Loomis D.O.

## 2022-11-14 ENCOUNTER — HOSPITAL ENCOUNTER (OUTPATIENT)
Dept: WOUND CARE | Age: 87
Discharge: HOME | End: 2022-11-14
Payer: MEDICARE

## 2022-11-14 VITALS
RESPIRATION RATE: 16 BRPM | HEART RATE: 72 BPM | SYSTOLIC BLOOD PRESSURE: 177 MMHG | DIASTOLIC BLOOD PRESSURE: 75 MMHG | TEMPERATURE: 97.3 F

## 2022-11-14 DIAGNOSIS — S81.802D WOUND OF LEFT LOWER EXTREMITY, SUBSEQUENT ENCOUNTER: ICD-10-CM

## 2022-11-14 DIAGNOSIS — S81.802A WOUND OF LEFT LOWER EXTREMITY, INITIAL ENCOUNTER: ICD-10-CM

## 2022-11-14 DIAGNOSIS — I73.9 PERIPHERAL ARTERY DISEASE (HCC): Primary | ICD-10-CM

## 2022-11-14 PROCEDURE — 6370000000 HC RX 637 (ALT 250 FOR IP): Performed by: SPECIALIST

## 2022-11-14 PROCEDURE — 11042 DBRDMT SUBQ TIS 1ST 20SQCM/<: CPT | Performed by: SPECIALIST

## 2022-11-14 PROCEDURE — 11042 DBRDMT SUBQ TIS 1ST 20SQCM/<: CPT

## 2022-11-14 RX ORDER — LIDOCAINE 50 MG/G
OINTMENT TOPICAL ONCE
OUTPATIENT
Start: 2022-11-14 | End: 2022-11-14

## 2022-11-14 RX ORDER — BACITRACIN ZINC AND POLYMYXIN B SULFATE 500; 1000 [USP'U]/G; [USP'U]/G
OINTMENT TOPICAL ONCE
OUTPATIENT
Start: 2022-11-14 | End: 2022-11-14

## 2022-11-14 RX ORDER — BETAMETHASONE DIPROPIONATE 0.05 %
OINTMENT (GRAM) TOPICAL ONCE
OUTPATIENT
Start: 2022-11-14 | End: 2022-11-14

## 2022-11-14 RX ORDER — BACITRACIN, NEOMYCIN, POLYMYXIN B 400; 3.5; 5 [USP'U]/G; MG/G; [USP'U]/G
OINTMENT TOPICAL ONCE
OUTPATIENT
Start: 2022-11-14 | End: 2022-11-14

## 2022-11-14 RX ORDER — GENTAMICIN SULFATE 1 MG/G
OINTMENT TOPICAL ONCE
OUTPATIENT
Start: 2022-11-14 | End: 2022-11-14

## 2022-11-14 RX ORDER — LIDOCAINE HYDROCHLORIDE 20 MG/ML
JELLY TOPICAL ONCE
OUTPATIENT
Start: 2022-11-14 | End: 2022-11-14

## 2022-11-14 RX ORDER — GINSENG 100 MG
CAPSULE ORAL ONCE
OUTPATIENT
Start: 2022-11-14 | End: 2022-11-14

## 2022-11-14 RX ORDER — LIDOCAINE HYDROCHLORIDE 40 MG/ML
SOLUTION TOPICAL ONCE
OUTPATIENT
Start: 2022-11-14 | End: 2022-11-14

## 2022-11-14 RX ORDER — LIDOCAINE 40 MG/G
CREAM TOPICAL ONCE
OUTPATIENT
Start: 2022-11-14 | End: 2022-11-14

## 2022-11-14 RX ORDER — LIDOCAINE HYDROCHLORIDE 40 MG/ML
SOLUTION TOPICAL ONCE
Status: COMPLETED | OUTPATIENT
Start: 2022-11-14 | End: 2022-11-14

## 2022-11-14 RX ORDER — CLOBETASOL PROPIONATE 0.5 MG/G
OINTMENT TOPICAL ONCE
OUTPATIENT
Start: 2022-11-14 | End: 2022-11-14

## 2022-11-14 RX ADMIN — LIDOCAINE HYDROCHLORIDE: 40 SOLUTION TOPICAL at 10:49

## 2022-11-14 ASSESSMENT — PAIN SCALES - GENERAL: PAINLEVEL_OUTOF10: 0

## 2022-11-14 NOTE — PROGRESS NOTES
[facility]  Progress Note and Procedure Note      Elen Mendez  MEDICAL RECORD NUMBER:  [id number]  AGE: 80 y.o. GENDER: female  : 1929  EPISODE DATE:  2022    Subjective:     Chief Complaint   Patient presents with    Wound Check     Left lower leg         HISTORY of PRESENT ILLNESS HPI     Elen Mendez is a 80 y.o. female who presents today for wound/ulcer evaluation. History of Wound Context: Patient returns in follow-up for her left anterior knee wound. She has recently seen Dr. Krista Cid in follow-up following his bypass procedure. Patient continues to complain about left foot pain.   Wound/Ulcer Pain Timing/Severity: none  Quality of pain: N/A  Severity:  0 / 10   Modifying Factors: None  Associated Signs/Symptoms: edema    Ulcer Identification:  Ulcer Type: arterial and non-healing surgical    Contributing Factors: edema, venous stasis, and arterial insufficiency    Acute Wound: N/A not an acute wound    PAST MEDICAL HISTORY        Diagnosis Date    Anxiety     Arthritis     At risk for falls     CAD (coronary artery disease)     Cerebral artery occlusion with cerebral infarction (HCC)     TIA--no residual effects    DDD (degenerative disc disease), cervical     Fractures     (L) Hip Fx 98, L1 fracture from fall 10-31-06    Hyperlipidemia     Hypertension     Mitral regurgitation     Neuropathy     Osteopenia     Osteoporosis     PAD (peripheral artery disease) ([address]     Right LE ischemia    Peripheral neuropathy 2015    Peripheral vascular disease (HCC)     bilateral lower extremities with edema    [place] 2017    Dr. Marine West    Prolonged emergence from general anesthesia     Spinal stenosis     L4-5    Type 2 diabetes mellitus ([address]     Urethral stricture 5 years ago    Urgency of urination        PAST SURGICAL HISTORY    Past Surgical History:   Procedure Laterality Date    ANGIOPLASTY Left 2022    Left SFA    APPENDECTOMY      CARPAL TUNNEL RELEASE Right 03/29/2019    RIGHT CARPAL TUNNEL RELEASE AND RIGHT MIDDLE FINGER TRIGGER FINGER RELEASE performed by Daniella Rivera MD at [address]  [id number]    LEFT EYE EYE CATARACT PHACOEMULSIFICATION INTRAOCULAR LENS    CHOLECYSTECTOMY  01/01/1978    COLONOSCOPY  08/31/1999    diverticulosis    EYE SURGERY Bilateral     bilateral cataract removed    FEMORAL BYPASS Left 6/28/2022    LEFT LEG FEMORAL TO POPLITEAL BYPASS WITH INTRAOPERATIVE ANGIOGRAM performed by Christina Carrington MD at [place] [address] Left 02/14/2022    LEFT COMMON FEMORAL ARTERY ENDARTERECTOMY performed by Christina Carrington MD at [facility] (CERVIX STATUS UNKNOWN)  [address] [place] [address]    IR FEMORAL POPLITEAL BYPASS GRAFT Right 08/31/2015    KYPHOSIS SURGERY  11/07/2006    L1, (fractured after a fall)    LEG SURGERY Left 02/14/2022    LEFT LEG WOUND DEBRIDEMENT performed by Christina Carrington MD at [facility] N/A 7/7/2022    INCISION AND DRAINAGE OF LEFT SIDE GROIN WITH PLACEMENT OF WOUND VAC performed by Christina Carrington MD at [address] [facility]  04/25/1998    (L) THR    WRIST FRACTURE SURGERY  01/01/2008    left wrist       FAMILY HISTORY    Family History   Problem Relation Age of Onset    Cancer Mother 43    Stroke Father     Hypertension Father        SOCIAL HISTORY    Social History     Tobacco Use    Smoking status: Never    Smokeless tobacco: Never   Vaping Use    Vaping Use: Never used   Substance Use Topics    Alcohol use: No    Drug use: Never       ALLERGIES    Allergies   Allergen Reactions    Aricept [Donepezil Hydrochloride] Other (See Comments)     Pt unable to recall reaction    Doxycycline Nausea Only    Ketorolac Tromethamine Other (See Comments)     Pt unable to recall reaction    Donepezil Hcl        MEDICATIONS    Current Outpatient Medications on File Prior to Encounter   Medication Sig Dispense Refill    atorvastatin (LIPITOR) 10 MG tablet Take 1 tablet by mouth daily 30 tablet 3    lisinopril (PRINIVIL;ZESTRIL) 10 MG tablet Take 1 tablet by mouth daily 30 tablet 0    sulfamethoxazole-trimethoprim (BACTRIM;SEPTRA) 400-80 MG per tablet Take 1 tablet by mouth 2 times daily Per Dr Steven Valdivia 60 tablet 5    Multiple Vitamins-Minerals (THERAPEUTIC MULTIVITAMIN-MINERALS) tablet Take 1 tablet by mouth daily      furosemide (LASIX) 20 MG tablet TAKE ONE TABLET BY MOUTH DAILY 90 tablet 1    glimepiride (AMARYL) 1 MG tablet TAKE ONE TABLET BY MOUTH EVERY MORNING BEFORE BREAKFAST 30 tablet 2    blood glucose test strips (ONETOUCH ULTRA) strip USE TO TEST BLOOD SUGAR TWICE DAILY 100 strip 1    metFORMIN (GLUCOPHAGE) 500 MG tablet TAKE ONE TABLET BY MOUTH DAILY 90 tablet 3    acetaminophen (TYLENOL) 325 MG tablet Take 650 mg by mouth every 6 hours as needed for Pain      Lite Touch Lancets MISC Use twice daily when testing blood sugars. 100 each 5    Turmeric 450 MG CAPS Take 450 mg by mouth daily      Cholecalciferol (VITAMIN D3) 2000 UNITS CAPS Take 1 capsule by mouth daily      aspirin EC 81 MG EC tablet Take 1 tablet by mouth daily 30 tablet 3     No current facility-administered medications on file prior to encounter. REVIEW OF SYSTEMS  Review of Systems    Pertinent items are noted in HPI. Objective:      BP (!) 177/75   Pulse 72   Temp 97.3 °F (36.3 °C) (Temporal)   Resp 16     Wt Readings from Last 3 Encounters:   11/09/22 109 lb 12.8 oz (49.8 kg)   11/02/22 93 lb (42.2 kg)   10/18/22 93 lb (42.2 kg)       PHYSICAL EXAM    General Appearance:  frail-appearing  Extremities: no cyanosis, no clubbing, 2 + edema-  left lower extremity, and full-thickness wound with fibrin, granulation tissue and residual allograft left anterior knee designated as wound #1. Full-thickness wound left medial thigh with fibrin and minimal granulation tissue designated as wound #2.   Full-thickness pinpoint granulating wound left groin with serous drainage at previous surgical site designated as wound #3    Assessment:      1. Peripheral artery disease ([address]    2. Wound of left lower extremity, subsequent encounter    3. Wound of left lower extremity, initial encounter         Procedure Note  Indications:  Based on my examination of this patient's wound(s) today, sharp excision is required to promote healing and evaluate the extent healing. Performed by: Lucy Toth MD    Consent obtained? Yes    Time out taken: Yes    Pain Control: Anesthetic: 4% Lidocaine Liquid Topical     Debridement:Excisional Debridement    Using curette the wound was sharply debrided    down through and including the removal of  epidermis, dermis, and subcutaneous tissue. Devitalized Tissue Debrided:  fibrin and slough      Pre Debridement Measurements:  Are located in the Wound Documentation Flow Sheet   Wound #: 1 and 2     Post  Debridement Measurements:  Wound 11/22/21 Leg Left; Lower;Distal #1 (Active)   Wound Image   10/31/22 0919   Wound Etiology Arterial 11/22/21 0924   Wound Cleansed Cleansed with saline 11/14/22 1049   Dressing/Treatment Collagen with Ag 11/14/22 1149   Wound Length (cm) 1.3 cm 11/14/22 1049   Wound Width (cm) 1.3 cm 11/14/22 1049   Wound Depth (cm) 0.1 cm 11/14/22 1049   Wound Surface Area (cm^2) 1.69 cm^2 11/14/22 1049   Change in Wound Size % (l*w) 39.64 11/14/22 1049   Wound Volume (cm^3) 0.169 cm^3 11/14/22 1049   Wound Healing % 40 11/14/22 1049   Post-Procedure Length (cm) 1.4 cm 11/14/22 1051   Post-Procedure Width (cm) 1.4 cm 11/14/22 1051   Post-Procedure Depth (cm) 0.3 cm 11/14/22 1051   Post-Procedure Surface Area (cm^2) 1.96 cm^2 11/14/22 1051   Post-Procedure Volume (cm^3) 0.588 cm^3 11/14/22 1051   Distance Tunneling (cm) 0 cm 11/14/22 1049   Tunneling Position ___ O'Clock 0 11/14/22 1049   Undermining Starts ___ O'Clock 0 11/14/22 1049   Undermining Ends___ O'Clock 0 11/14/22 1049   Undermining Maxium Distance (cm) 0 11/14/22 1049   Wound Assessment Dry 11/14/22 1049   Drainage Amount None 11/14/22 1049   Drainage Description Other (Comment) 10/31/22 0919   Odor None 11/14/22 1049   Nissa-wound Assessment Intact;Fragile;Edematous 10/31/22 0919   Margins Defined edges 11/14/22 1049   Wound Thickness Description not for Pressure Injury Full thickness 11/14/22 1049   Number of days: 357       Wound 11/14/22 Thigh Left;Medial #2 (Active)   Wound Image   11/14/22 1049   Wound Cleansed Cleansed with saline 11/14/22 1049   Dressing/Treatment Collagen with Ag 11/14/22 1149   Wound Length (cm) 2 cm 11/14/22 1049   Wound Width (cm) 1 cm 11/14/22 1049   Wound Depth (cm) 0.1 cm 11/14/22 1049   Wound Surface Area (cm^2) 2 cm^2 11/14/22 1049   Wound Volume (cm^3) 0.2 cm^3 11/14/22 1049   Post-Procedure Length (cm) 2.1 cm 11/14/22 1051   Post-Procedure Width (cm) 1.1 cm 11/14/22 1051   Post-Procedure Depth (cm) 0.3 cm 11/14/22 1051   Post-Procedure Surface Area (cm^2) 2.31 cm^2 11/14/22 1051   Post-Procedure Volume (cm^3) 0.693 cm^3 11/14/22 1051   Wound Assessment Dry;Fibrin;Pink/red 11/14/22 1049   Drainage Amount Small 11/14/22 1049   Drainage Description Serosanguinous;Brown 11/14/22 1049   Odor None 11/14/22 1049   Nissa-wound Assessment Intact 11/14/22 1049   Margins Defined edges 11/14/22 1049   Number of days: 0       Wound 11/14/22 Groin Left #3 (Active)   Wound Image   11/14/22 1049   Wound Etiology Non-Healing Surgical 11/14/22 1049   Wound Cleansed Cleansed with saline 11/14/22 1049   Dressing/Treatment Collagen with Ag 11/14/22 1149   Wound Length (cm) 0.2 cm 11/14/22 1049   Wound Width (cm) 0.2 cm 11/14/22 1049   Wound Depth (cm) 0.2 cm 11/14/22 1049   Wound Surface Area (cm^2) 0.04 cm^2 11/14/22 1049   Wound Volume (cm^3) 0.008 cm^3 11/14/22 1049   Post-Procedure Length (cm) 0.2 cm 11/14/22 1051   Post-Procedure Width (cm) 0.2 cm 11/14/22 1051   Post-Procedure Depth (cm) 0.2 cm 11/14/22 1051   Post-Procedure Surface Area (cm^2) 0.04 cm^2 11/14/22 1051 Post-Procedure Volume (cm^3) 0.008 cm^3 11/14/22 1051   Wound Assessment Pink/red 11/14/22 1049   Drainage Amount Moderate 11/14/22 1049   Drainage Description Brown 11/14/22 1049   Odor None 11/14/22 1049   Nissa-wound Assessment Fragile 11/14/22 1049   Margins Defined edges 11/14/22 1049   Wound Thickness Description not for Pressure Injury Full thickness 11/14/22 1049   Number of days: 0          Percent of Wound Debrided: 100%    Total Surface Area Debrided:  4 sq cm    Diabetic/Pressure/Non Pressure Ulcers only:  Ulcer: Non-Pressure ulcer, fat layer exposed    Bleeding: Minimal    Hemostasis Achieved: by pressure    Procedural Pain: 0  / 10     Post Procedural Pain: 0 / 10     Response to treatment:  With complaints of pain. Some improvement in wound measurements in regards to left anterior knee. We will continue to follow groin incision at surgical site        Plan:     Treatment Note: Please see attached Discharge Instructions. These instructions were given and signed by the patient or POA    New Prescriptions    No medications on file       Orders Placed This Encounter   Procedures    Initiate Outpatient Wound Care Protocol       Discharge Instructions          [facility] Physician Orders and Discharge Instructions  [address] [place] [address] [address] [place] [address]. [facility]  Telephone: [phone]  NAME:  Ren Abler OF BIRTH:  4/4/1929  MEDICAL RECORD NUMBER:  [id number]  DATE: 11/14/22     Return Appointment:  Return Appointment: With Dr. Tyler Heller  in  2 [facility])  [] Return Appointment for a Wound Assessment with the nurse on:     Future Appointments   Date Time Provider Gilbert Frey   11/16/2022  2:30 PM SCHEDULE, Dede Evangelista VASCULAR LAB 1 [place] [facility]   12/7/2022  1:15 PM Julian York MD  [facility] DEACONESS:  Phone: [phone]  Fax: [phone]   Medically necessary services: [x] Skilled Nursing [] PT (Eval & Treat) [] OT (Eval & Treat) [] Social Work [] Dietician  [] Other:    Wound care instructions: If you smoke we ask that you refrain from smoking. Smoking inhibits wounds from healing. When taking antibiotics take the entire prescription as ordered. Do not stop taking until medication is all gone unless otherwise instructed. Exercise as tolerated. Keep weight off wounds and reposition every 2 hours if applicable. Do not get wounds wet in the bath or shower unless otherwise instructed by your physician. If your wound is on your foot or leg, you may purchase a cast bag. Please ask at the pharmacy. Wash hands with soap and water prior to and after every dressing change. [x]Wash wounds with: 0.9% normal saline  [x]Nissa wound Topical Treatments: Do not apply lotions, creams, or ointments to the skin around the wound bed unless directed as followed:   Apply around the wound: Nothing         [x]Wound Location: left lower leg ,left thigh, and left groin   Apply Primary Dressing to wound: Joleen  Secondary Dressing: 4X4 gauze pad and paper tape   Avoid contact of tape with skin if possible. When to change Dressin times per week: Monday and Thursday        [x] Edema Control:  Apply: Compression Stocking on the Bilateral leg- patient to wear own stockings- not sure of strength or single layer of low medigrip   Elevate leg(s) above the level of the heart for 30 minutes 4-5 times a day and/or when sitting. Avoid prolonged standing in one place. Dietary:  Important dietary reminders:  1. Increase Protein intake (i.e. Lean meats, fish, eggs, legumes, and yogurt)  2. No added salt  3. If diabetic, follow a diabetic diet and check glucose prior to meals or as instructed by your physician.     Dietary Supplements(Take twice a day unless instructed otherwise):  [] Gailen Boers  [] 30ml ProStat [] Francie Sensing [] Ensure Max/Premier [] Other:    Your nurse  is:  Aristeo Mcclellan Electronically signed by Anuj Andrews RN on 11/14/2022 at 11:31 AM     [address] [address] [facility] [address] Information: Should you experience any significant changes in your wound(s) or have questions about your wound care, please contact the [address] [place] [address] at [phone]. We are open from 8:00am - 4:30p Monday, Thursday and Friday; 11:00am - 5pm on Tuesday and CLOSED Wednesday. Please give us 24-48 business hours to return your call. Call your doctor now or seek immediate medical care if:    You have symptoms of infection, such as: Increased pain, swelling, warmth, or redness. Red streaks leading from the area. Pus draining from the area. A fever.          [] Patient unable to sign Discharge Instructions given to ECF/Transportation/POA         Electronically signed by Alfredito Israel MD on 11/14/2022 at 1:03 PM

## 2022-11-14 NOTE — DISCHARGE INSTRUCTIONS
[address] [facility] Physician Orders and Discharge Instructions  [address] [place] [address] [address] [address] [facility]. [facility]. [place]  Telephone: [phone]  NAME:  Shanae Delaney OF BIRTH:  1929  MEDICAL RECORD NUMBER:  [id number]  DATE: 22     Return Appointment:  Return Appointment: With Dr. Christopher Carrillo  in  2 [facility])  [] Return Appointment for a Wound Assessment with the nurse on:     Future Appointments   Date Time Provider Gilbert Frey   2022  2:30 PM SCHEDULE, Robin Vazquez [facility]ASCULAR LAB [facility] [place] [facility]   2022  1:15 PM Bhumi Salas MD [facility] VASC/ENDO [address]: [facility] DEACONESS:  Phone: [phone]  Fax: [phone]   Medically necessary services: [x] Skilled Nursing [] PT (Eval & Treat) [] OT (Eval & Treat) [] Social Work [] Dietician  [] Other:    Wound care instructions: If you smoke we ask that you refrain from smoking. Smoking inhibits wounds from healing. When taking antibiotics take the entire prescription as ordered. Do not stop taking until medication is all gone unless otherwise instructed. Exercise as tolerated. Keep weight off wounds and reposition every 2 hours if applicable. Do not get wounds wet in the bath or shower unless otherwise instructed by your physician. If your wound is on your foot or leg, you may purchase a cast bag. Please ask at the pharmacy. Wash hands with soap and water prior to and after every dressing change. [x]Wash wounds with: 0.9% normal saline  [x]Nissa wound Topical Treatments: Do not apply lotions, creams, or ointments to the skin around the wound bed unless directed as followed:   Apply around the wound: Nothing         [x]Wound Location: left lower leg ,left thigh, and left groin   Apply Primary Dressing to wound: Joleen  Secondary Dressing: 4X4 gauze pad and paper tape   Avoid contact of tape with skin if possible.   When to change Dressin times per week: Monday and Thursday        [x] Edema Control:  Apply: Compression Stocking on the Bilateral leg- patient to wear own stockings- not sure of strength or single layer of low medigrip   Elevate leg(s) above the level of the heart for 30 minutes 4-5 times a day and/or when sitting. Avoid prolonged standing in one place. Dietary:  Important dietary reminders:  1. Increase Protein intake (i.e. Lean meats, fish, eggs, legumes, and yogurt)  2. No added salt  3. If diabetic, follow a diabetic diet and check glucose prior to meals or as instructed by your physician. Dietary Supplements(Take twice a day unless instructed otherwise):  [] Charmayne Sauers  [] 30ml ProStat [] Gareld Stair [] Ensure Max/Premier [] Other:    Your nurse  is:  Sabina Asif     Electronically signed by Verito Hankins RN on 11/14/2022 at 11:31 AM     [address] [facility] Information: Should you experience any significant changes in your wound(s) or have questions about your wound care, please contact the [address] [place] [address] at [phone]. We are open from 8:00am - 4:30p Monday, Thursday and Friday; 11:00am - 5pm on Tuesday and CLOSED Wednesday. Please give us 24-48 business hours to return your call. Call your doctor now or seek immediate medical care if:    You have symptoms of infection, such as: Increased pain, swelling, warmth, or redness. Red streaks leading from the area. Pus draining from the area. A fever.          [] Patient unable to sign Discharge Instructions given to ECF/Transportation/POA

## 2022-11-15 ENCOUNTER — TELEPHONE (OUTPATIENT)
Dept: INTERNAL MEDICINE CLINIC | Age: 87
End: 2022-11-15

## 2022-11-15 RX ORDER — BLOOD SUGAR DIAGNOSTIC
STRIP MISCELLANEOUS
Qty: 100 STRIP | Refills: 1 | OUTPATIENT
Start: 2022-11-15

## 2022-11-15 RX ORDER — BLOOD SUGAR DIAGNOSTIC
STRIP MISCELLANEOUS
Qty: 100 STRIP | Refills: 1 | Status: ON HOLD | OUTPATIENT
Start: 2022-11-15

## 2022-11-15 NOTE — TELEPHONE ENCOUNTER
Okay to try Mucinex DM as well as Flonase nasal spray. She can also take over-the-counter Claritin 10 mg daily.   If symptoms worsen, she will need COVID testing

## 2022-11-15 NOTE — TELEPHONE ENCOUNTER
Leola calling for advice. Patient has had cough & runny nose since Thursday. No fever, sore, throat or congestion. Has tried Nyquil.  Negative home covid test. Please advise

## 2022-11-16 NOTE — TELEPHONE ENCOUNTER
She can try Mucinex DM as I suggested previously. She may need COVID testing. If worse or getting short of breath or developing fever, she needs to go to the emergency department.

## 2022-11-16 NOTE — TELEPHONE ENCOUNTER
Spoke with Julieta Esteban, she will have her caregiver get this setup her study. She also stated her cough is getting worse. She has been using Nyquil and she doesn't seem to be feeling better.  She would like to know what else to take

## 2022-11-16 NOTE — TELEPHONE ENCOUNTER
This was supposed to be scheduled.   Please help patient's schedule test Detail Level: Detailed Dapsone Pregnancy And Lactation Text: This medication is Pregnancy Category C and is not considered safe during pregnancy or breast feeding. Spironolactone Pregnancy And Lactation Text: This medication can cause feminization of the male fetus and should be avoided during pregnancy. The active metabolite is also found in breast milk. Use Enhanced Medication Counseling?: No Topical Retinoid counseling:  Patient advised to apply a pea-sized amount only at bedtime and wait 30 minutes after washing their face before applying.  If too drying, patient may add a non-comedogenic moisturizer. The patient verbalized understanding of the proper use and possible adverse effects of retinoids.  All of the patient's questions and concerns were addressed. Isotretinoin Counseling: Patient should get monthly blood tests, not donate blood, not drive at night if vision affected, not share medication, and not undergo elective surgery for 6 months after tx completed. Side effects reviewed, pt to contact office should one occur. Bactrim Counseling:  I discussed with the patient the risks of sulfa antibiotics including but not limited to GI upset, allergic reaction, drug rash, diarrhea, dizziness, photosensitivity, and yeast infections.  Rarely, more serious reactions can occur including but not limited to aplastic anemia, agranulocytosis, methemoglobinemia, blood dyscrasias, liver or kidney failure, lung infiltrates or desquamative/blistering drug rashes. Minocycline Counseling: Patient advised regarding possible photosensitivity and discoloration of the teeth, skin, lips, tongue and gums.  Patient instructed to avoid sunlight, if possible.  When exposed to sunlight, patients should wear protective clothing, sunglasses, and sunscreen.  The patient was instructed to call the office immediately if the following severe adverse effects occur:  hearing changes, easy bruising/bleeding, severe headache, or vision changes.  The patient verbalized understanding of the proper use and possible adverse effects of minocycline.  All of the patient's questions and concerns were addressed. Topical Clindamycin Counseling: Patient counseled that this medication may cause skin irritation or allergic reactions.  In the event of skin irritation, the patient was advised to reduce the amount of the drug applied or use it less frequently.   The patient verbalized understanding of the proper use and possible adverse effects of clindamycin.  All of the patient's questions and concerns were addressed. Benzoyl Peroxide Pregnancy And Lactation Text: This medication is Pregnancy Category C. It is unknown if benzoyl peroxide is excreted in breast milk. Erythromycin Pregnancy And Lactation Text: This medication is Pregnancy Category B and is considered safe during pregnancy. It is also excreted in breast milk. Spironolactone Counseling: Patient advised regarding risks of diarrhea, abdominal pain, hyperkalemia, birth defects (for female patients), liver toxicity and renal toxicity. The patient may need blood work to monitor liver and kidney function and potassium levels while on therapy. The patient verbalized understanding of the proper use and possible adverse effects of spironolactone.  All of the patient's questions and concerns were addressed. Dapsone Counseling: I discussed with the patient the risks of dapsone including but not limited to hemolytic anemia, agranulocytosis, rashes, methemoglobinemia, kidney failure, peripheral neuropathy, headaches, GI upset, and liver toxicity.  Patients who start dapsone require monitoring including baseline LFTs and weekly CBCs for the first month, then every month thereafter.  The patient verbalized understanding of the proper use and possible adverse effects of dapsone.  All of the patient's questions and concerns were addressed. Benzoyl Peroxide Counseling: Patient counseled that medicine may cause skin irritation and bleach clothing.  In the event of skin irritation, the patient was advised to reduce the amount of the drug applied or use it less frequently.   The patient verbalized understanding of the proper use and possible adverse effects of benzoyl peroxide.  All of the patient's questions and concerns were addressed. Erythromycin Counseling:  I discussed with the patient the risks of erythromycin including but not limited to GI upset, allergic reaction, drug rash, diarrhea, increase in liver enzymes, and yeast infections. Azithromycin Pregnancy And Lactation Text: This medication is considered safe during pregnancy and is also secreted in breast milk. High Dose Vitamin A Pregnancy And Lactation Text: High dose vitamin A therapy is contraindicated during pregnancy and breast feeding. Tazorac Pregnancy And Lactation Text: This medication is not safe during pregnancy. It is unknown if this medication is excreted in breast milk. Birth Control Pills Pregnancy And Lactation Text: This medication should be avoided if pregnant and for the first 30 days post-partum. Sarecycline Pregnancy And Lactation Text: This medication is Pregnancy Category D and not consider safe during pregnancy. It is also excreted in breast milk. Topical Sulfur Applications Pregnancy And Lactation Text: This medication is Pregnancy Category C and has an unknown safety profile during pregnancy. It is unknown if this topical medication is excreted in breast milk. High Dose Vitamin A Counseling: Side effects reviewed, pt to contact office should one occur. Tazorac Counseling:  Patient advised that medication is irritating and drying.  Patient may need to apply sparingly and wash off after an hour before eventually leaving it on overnight.  The patient verbalized understanding of the proper use and possible adverse effects of tazorac.  All of the patient's questions and concerns were addressed. Azithromycin Counseling:  I discussed with the patient the risks of azithromycin including but not limited to GI upset, allergic reaction, drug rash, diarrhea, and yeast infections. Birth Control Pills Counseling: Birth Control Pill Counseling: I discussed with the patient the potential side effects of OCPs including but not limited to increased risk of stroke, heart attack, thrombophlebitis, deep venous thrombosis, hepatic adenomas, breast changes, GI upset, headaches, and depression.  The patient verbalized understanding of the proper use and possible adverse effects of OCPs. All of the patient's questions and concerns were addressed. Sarecycline Counseling: Patient advised regarding possible photosensitivity and discoloration of the teeth, skin, lips, tongue and gums.  Patient instructed to avoid sunlight, if possible.  When exposed to sunlight, patients should wear protective clothing, sunglasses, and sunscreen.  The patient was instructed to call the office immediately if the following severe adverse effects occur:  hearing changes, easy bruising/bleeding, severe headache, or vision changes.  The patient verbalized understanding of the proper use and possible adverse effects of sarecycline.  All of the patient's questions and concerns were addressed. Topical Sulfur Applications Counseling: Topical Sulfur Counseling: Patient counseled that this medication may cause skin irritation or allergic reactions.  In the event of skin irritation, the patient was advised to reduce the amount of the drug applied or use it less frequently.   The patient verbalized understanding of the proper use and possible adverse effects of topical sulfur application.  All of the patient's questions and concerns were addressed. Doxycycline Pregnancy And Lactation Text: This medication is Pregnancy Category D and not consider safe during pregnancy. It is also excreted in breast milk but is considered safe for shorter treatment courses. Detail Level: Zone Isotretinoin Pregnancy And Lactation Text: This medication is Pregnancy Category X and is considered extremely dangerous during pregnancy. It is unknown if it is excreted in breast milk. Topical Retinoid Pregnancy And Lactation Text: This medication is Pregnancy Category C. It is unknown if this medication is excreted in breast milk. Bactrim Pregnancy And Lactation Text: This medication is Pregnancy Category D and is known to cause fetal risk.  It is also excreted in breast milk. Tetracycline Counseling: Patient counseled regarding possible photosensitivity and increased risk for sunburn.  Patient instructed to avoid sunlight, if possible.  When exposed to sunlight, patients should wear protective clothing, sunglasses, and sunscreen.  The patient was instructed to call the office immediately if the following severe adverse effects occur:  hearing changes, easy bruising/bleeding, severe headache, or vision changes.  The patient verbalized understanding of the proper use and possible adverse effects of tetracycline.  All of the patient's questions and concerns were addressed. Patient understands to avoid pregnancy while on therapy due to potential birth defects. Doxycycline Counseling:  Patient counseled regarding possible photosensitivity and increased risk for sunburn.  Patient instructed to avoid sunlight, if possible.  When exposed to sunlight, patients should wear protective clothing, sunglasses, and sunscreen.  The patient was instructed to call the office immediately if the following severe adverse effects occur:  hearing changes, easy bruising/bleeding, severe headache, or vision changes.  The patient verbalized understanding of the proper use and possible adverse effects of doxycycline.  All of the patient's questions and concerns were addressed. Topical Clindamycin Pregnancy And Lactation Text: This medication is Pregnancy Category B and is considered safe during pregnancy. It is unknown if it is excreted in breast milk.

## 2022-11-20 ENCOUNTER — APPOINTMENT (OUTPATIENT)
Dept: GENERAL RADIOLOGY | Age: 87
DRG: 271 | End: 2022-11-20
Payer: MEDICARE

## 2022-11-20 ENCOUNTER — ANESTHESIA EVENT (OUTPATIENT)
Dept: OPERATING ROOM | Age: 87
DRG: 271 | End: 2022-11-20
Payer: MEDICARE

## 2022-11-20 ENCOUNTER — HOSPITAL ENCOUNTER (INPATIENT)
Age: 87
LOS: 8 days | Discharge: NURSING, REHAB OR LONG-TERM CARE | DRG: 271 | End: 2022-11-28
Attending: EMERGENCY MEDICINE | Admitting: SURGERY
Payer: MEDICARE

## 2022-11-20 ENCOUNTER — APPOINTMENT (OUTPATIENT)
Dept: CT IMAGING | Age: 87
DRG: 271 | End: 2022-11-20
Payer: MEDICARE

## 2022-11-20 ENCOUNTER — ANESTHESIA (OUTPATIENT)
Dept: OPERATING ROOM | Age: 87
DRG: 271 | End: 2022-11-20
Payer: MEDICARE

## 2022-11-20 DIAGNOSIS — T82.9XXA COMPLICATIONS DUE TO VASCULAR DEVICE, IMPLANT, AND GRAFT, INITIAL ENCOUNTER: ICD-10-CM

## 2022-11-20 DIAGNOSIS — G89.18 POST-OP PAIN: ICD-10-CM

## 2022-11-20 DIAGNOSIS — I72.3 ILIAC ANEURYSM (HCC): ICD-10-CM

## 2022-11-20 DIAGNOSIS — A49.02 MRSA INFECTION: Primary | ICD-10-CM

## 2022-11-20 PROBLEM — I70.243 ATHEROSCLEROSIS OF NATIVE ARTERY OF LEFT LEG WITH ULCERATION OF ANKLE (HCC): Status: ACTIVE | Noted: 2022-11-20

## 2022-11-20 PROBLEM — I72.9 PSEUDOANEURYSM (HCC): Status: ACTIVE | Noted: 2022-11-20

## 2022-11-20 LAB
A/G RATIO: 1.8 (ref 1.1–2.2)
ABO/RH: NORMAL
ALBUMIN SERPL-MCNC: 2.4 G/DL (ref 3.4–5)
ALBUMIN SERPL-MCNC: 3.7 G/DL (ref 3.4–5)
ALP BLD-CCNC: 84 U/L (ref 40–129)
ALT SERPL-CCNC: 14 U/L (ref 10–40)
ANION GAP SERPL CALCULATED.3IONS-SCNC: 10 MMOL/L (ref 3–16)
ANION GAP SERPL CALCULATED.3IONS-SCNC: 8 MMOL/L (ref 3–16)
ANTIBODY SCREEN: NORMAL
AST SERPL-CCNC: 29 U/L (ref 15–37)
BASOPHILS ABSOLUTE: 0 K/UL (ref 0–0.2)
BASOPHILS RELATIVE PERCENT: 0.4 %
BILIRUB SERPL-MCNC: 0.3 MG/DL (ref 0–1)
BUN BLDV-MCNC: 12 MG/DL (ref 7–20)
BUN BLDV-MCNC: 13 MG/DL (ref 7–20)
CALCIUM SERPL-MCNC: 7.3 MG/DL (ref 8.3–10.6)
CALCIUM SERPL-MCNC: 9.2 MG/DL (ref 8.3–10.6)
CHLORIDE BLD-SCNC: 104 MMOL/L (ref 99–110)
CHLORIDE BLD-SCNC: 107 MMOL/L (ref 99–110)
CO2: 16 MMOL/L (ref 21–32)
CO2: 24 MMOL/L (ref 21–32)
CREAT SERPL-MCNC: 1.1 MG/DL (ref 0.6–1.2)
CREAT SERPL-MCNC: 1.2 MG/DL (ref 0.6–1.2)
EOSINOPHILS ABSOLUTE: 0.1 K/UL (ref 0–0.6)
EOSINOPHILS RELATIVE PERCENT: 0.9 %
GFR SERPL CREATININE-BSD FRML MDRD: 42 ML/MIN/{1.73_M2}
GFR SERPL CREATININE-BSD FRML MDRD: 47 ML/MIN/{1.73_M2}
GLUCOSE BLD-MCNC: 197 MG/DL (ref 70–99)
GLUCOSE BLD-MCNC: 287 MG/DL (ref 70–99)
GLUCOSE BLD-MCNC: 298 MG/DL (ref 70–99)
GLUCOSE BLD-MCNC: 323 MG/DL (ref 70–99)
GLUCOSE BLD-MCNC: 326 MG/DL (ref 70–99)
HCT VFR BLD CALC: 15.1 % (ref 36–48)
HCT VFR BLD CALC: 27.1 % (ref 36–48)
HEMOGLOBIN: 5 G/DL (ref 12–16)
HEMOGLOBIN: 9.1 G/DL (ref 12–16)
INR BLD: 1.1 (ref 0.87–1.14)
LYMPHOCYTES ABSOLUTE: 1.2 K/UL (ref 1–5.1)
LYMPHOCYTES RELATIVE PERCENT: 16.6 %
MAGNESIUM: 1.5 MG/DL (ref 1.8–2.4)
MCH RBC QN AUTO: 32.1 PG (ref 26–34)
MCHC RBC AUTO-ENTMCNC: 33.5 G/DL (ref 31–36)
MCV RBC AUTO: 95.6 FL (ref 80–100)
MONOCYTES ABSOLUTE: 0.5 K/UL (ref 0–1.3)
MONOCYTES RELATIVE PERCENT: 6.4 %
NEUTROPHILS ABSOLUTE: 5.6 K/UL (ref 1.7–7.7)
NEUTROPHILS RELATIVE PERCENT: 75.7 %
PDW BLD-RTO: 14.8 % (ref 12.4–15.4)
PERFORMED ON: ABNORMAL
PHOSPHORUS: 4.2 MG/DL (ref 2.5–4.9)
PLATELET # BLD: 178 K/UL (ref 135–450)
PMV BLD AUTO: 7.1 FL (ref 5–10.5)
POTASSIUM SERPL-SCNC: 5.1 MMOL/L (ref 3.5–5.1)
POTASSIUM SERPL-SCNC: 5.4 MMOL/L (ref 3.5–5.1)
PROTHROMBIN TIME: 14.1 SEC (ref 11.7–14.5)
RBC # BLD: 2.83 M/UL (ref 4–5.2)
SODIUM BLD-SCNC: 133 MMOL/L (ref 136–145)
SODIUM BLD-SCNC: 136 MMOL/L (ref 136–145)
TOTAL PROTEIN: 5.8 G/DL (ref 6.4–8.2)
WBC # BLD: 7.4 K/UL (ref 4–11)

## 2022-11-20 PROCEDURE — 94761 N-INVAS EAR/PLS OXIMETRY MLT: CPT

## 2022-11-20 PROCEDURE — 75635 CT ANGIO ABDOMINAL ARTERIES: CPT

## 2022-11-20 PROCEDURE — C1757 CATH, THROMBECTOMY/EMBOLECT: HCPCS | Performed by: SURGERY

## 2022-11-20 PROCEDURE — 87075 CULTR BACTERIA EXCEPT BLOOD: CPT

## 2022-11-20 PROCEDURE — 6360000002 HC RX W HCPCS: Performed by: SURGERY

## 2022-11-20 PROCEDURE — 35903 EXCISION GRAFT EXTREMITY: CPT | Performed by: SURGERY

## 2022-11-20 PROCEDURE — 041J09J BYPASS LEFT EXTERNAL ILIAC ARTERY TO LEFT FEMORAL ARTERY WITH AUTOLOGOUS VENOUS TISSUE, OPEN APPROACH: ICD-10-PCS | Performed by: SURGERY

## 2022-11-20 PROCEDURE — 85025 COMPLETE CBC W/AUTO DIFF WBC: CPT

## 2022-11-20 PROCEDURE — 3700000001 HC ADD 15 MINUTES (ANESTHESIA): Performed by: SURGERY

## 2022-11-20 PROCEDURE — 86403 PARTICLE AGGLUT ANTBDY SCRN: CPT

## 2022-11-20 PROCEDURE — 6360000002 HC RX W HCPCS: Performed by: NURSE ANESTHETIST, CERTIFIED REGISTERED

## 2022-11-20 PROCEDURE — 87070 CULTURE OTHR SPECIMN AEROBIC: CPT

## 2022-11-20 PROCEDURE — 6370000000 HC RX 637 (ALT 250 FOR IP): Performed by: SURGERY

## 2022-11-20 PROCEDURE — 6370000000 HC RX 637 (ALT 250 FOR IP): Performed by: STUDENT IN AN ORGANIZED HEALTH CARE EDUCATION/TRAINING PROGRAM

## 2022-11-20 PROCEDURE — 04PY0JZ REMOVAL OF SYNTHETIC SUBSTITUTE FROM LOWER ARTERY, OPEN APPROACH: ICD-10-PCS | Performed by: SURGERY

## 2022-11-20 PROCEDURE — 2500000003 HC RX 250 WO HCPCS: Performed by: SURGERY

## 2022-11-20 PROCEDURE — 2700000000 HC OXYGEN THERAPY PER DAY

## 2022-11-20 PROCEDURE — 05BC4ZZ EXCISION OF LEFT BASILIC VEIN, PERCUTANEOUS ENDOSCOPIC APPROACH: ICD-10-PCS | Performed by: SURGERY

## 2022-11-20 PROCEDURE — 36430 TRANSFUSION BLD/BLD COMPNT: CPT

## 2022-11-20 PROCEDURE — 3600000004 HC SURGERY LEVEL 4 BASE: Performed by: SURGERY

## 2022-11-20 PROCEDURE — 6360000002 HC RX W HCPCS: Performed by: STUDENT IN AN ORGANIZED HEALTH CARE EDUCATION/TRAINING PROGRAM

## 2022-11-20 PROCEDURE — 2580000003 HC RX 258

## 2022-11-20 PROCEDURE — 2000000000 HC ICU R&B

## 2022-11-20 PROCEDURE — 6360000002 HC RX W HCPCS

## 2022-11-20 PROCEDURE — 86923 COMPATIBILITY TEST ELECTRIC: CPT

## 2022-11-20 PROCEDURE — 7100000001 HC PACU RECOVERY - ADDTL 15 MIN: Performed by: SURGERY

## 2022-11-20 PROCEDURE — 2580000003 HC RX 258: Performed by: NURSE ANESTHETIST, CERTIFIED REGISTERED

## 2022-11-20 PROCEDURE — 6360000004 HC RX CONTRAST MEDICATION: Performed by: EMERGENCY MEDICINE

## 2022-11-20 PROCEDURE — 86900 BLOOD TYPING SEROLOGIC ABO: CPT

## 2022-11-20 PROCEDURE — 93005 ELECTROCARDIOGRAM TRACING: CPT | Performed by: STUDENT IN AN ORGANIZED HEALTH CARE EDUCATION/TRAINING PROGRAM

## 2022-11-20 PROCEDURE — 87077 CULTURE AEROBIC IDENTIFY: CPT

## 2022-11-20 PROCEDURE — 85014 HEMATOCRIT: CPT

## 2022-11-20 PROCEDURE — 85610 PROTHROMBIN TIME: CPT

## 2022-11-20 PROCEDURE — A4217 STERILE WATER/SALINE, 500 ML: HCPCS | Performed by: SURGERY

## 2022-11-20 PROCEDURE — 2580000003 HC RX 258: Performed by: SURGERY

## 2022-11-20 PROCEDURE — 2709999900 HC NON-CHARGEABLE SUPPLY: Performed by: SURGERY

## 2022-11-20 PROCEDURE — 85018 HEMOGLOBIN: CPT

## 2022-11-20 PROCEDURE — P9016 RBC LEUKOCYTES REDUCED: HCPCS

## 2022-11-20 PROCEDURE — 80053 COMPREHEN METABOLIC PANEL: CPT

## 2022-11-20 PROCEDURE — 99285 EMERGENCY DEPT VISIT HI MDM: CPT

## 2022-11-20 PROCEDURE — 6360000002 HC RX W HCPCS: Performed by: ANESTHESIOLOGY

## 2022-11-20 PROCEDURE — 3600000014 HC SURGERY LEVEL 4 ADDTL 15MIN: Performed by: SURGERY

## 2022-11-20 PROCEDURE — 86850 RBC ANTIBODY SCREEN: CPT

## 2022-11-20 PROCEDURE — 7100000000 HC PACU RECOVERY - FIRST 15 MIN: Performed by: SURGERY

## 2022-11-20 PROCEDURE — 86901 BLOOD TYPING SEROLOGIC RH(D): CPT

## 2022-11-20 PROCEDURE — 99223 1ST HOSP IP/OBS HIGH 75: CPT | Performed by: SURGERY

## 2022-11-20 PROCEDURE — 2500000003 HC RX 250 WO HCPCS: Performed by: ANESTHESIOLOGY

## 2022-11-20 PROCEDURE — 2580000003 HC RX 258: Performed by: STUDENT IN AN ORGANIZED HEALTH CARE EDUCATION/TRAINING PROGRAM

## 2022-11-20 PROCEDURE — 2500000003 HC RX 250 WO HCPCS: Performed by: NURSE ANESTHETIST, CERTIFIED REGISTERED

## 2022-11-20 PROCEDURE — 35565 ART BYP GRFT ILIOFEMORAL: CPT | Performed by: SURGERY

## 2022-11-20 PROCEDURE — 87186 SC STD MICRODIL/AGAR DIL: CPT

## 2022-11-20 PROCEDURE — 3700000000 HC ANESTHESIA ATTENDED CARE: Performed by: SURGERY

## 2022-11-20 PROCEDURE — 83735 ASSAY OF MAGNESIUM: CPT

## 2022-11-20 PROCEDURE — 71045 X-RAY EXAM CHEST 1 VIEW: CPT

## 2022-11-20 PROCEDURE — 36415 COLL VENOUS BLD VENIPUNCTURE: CPT

## 2022-11-20 RX ORDER — DEXAMETHASONE SODIUM PHOSPHATE 4 MG/ML
INJECTION, SOLUTION INTRA-ARTICULAR; INTRALESIONAL; INTRAMUSCULAR; INTRAVENOUS; SOFT TISSUE PRN
Status: DISCONTINUED | OUTPATIENT
Start: 2022-11-20 | End: 2022-11-20 | Stop reason: SDUPTHER

## 2022-11-20 RX ORDER — HEPARIN SODIUM 1000 [USP'U]/ML
INJECTION, SOLUTION INTRAVENOUS; SUBCUTANEOUS PRN
Status: DISCONTINUED | OUTPATIENT
Start: 2022-11-20 | End: 2022-11-20 | Stop reason: SDUPTHER

## 2022-11-20 RX ORDER — LIDOCAINE HYDROCHLORIDE 20 MG/ML
INJECTION, SOLUTION INFILTRATION; PERINEURAL PRN
Status: DISCONTINUED | OUTPATIENT
Start: 2022-11-20 | End: 2022-11-20 | Stop reason: SDUPTHER

## 2022-11-20 RX ORDER — SODIUM CHLORIDE, SODIUM LACTATE, POTASSIUM CHLORIDE, AND CALCIUM CHLORIDE .6; .31; .03; .02 G/100ML; G/100ML; G/100ML; G/100ML
500 INJECTION, SOLUTION INTRAVENOUS ONCE
Status: COMPLETED | OUTPATIENT
Start: 2022-11-20 | End: 2022-11-20

## 2022-11-20 RX ORDER — SODIUM CHLORIDE 0.9 % (FLUSH) 0.9 %
5-40 SYRINGE (ML) INJECTION PRN
Status: DISCONTINUED | OUTPATIENT
Start: 2022-11-20 | End: 2022-11-22

## 2022-11-20 RX ORDER — MAGNESIUM SULFATE IN WATER 40 MG/ML
4000 INJECTION, SOLUTION INTRAVENOUS ONCE
Status: COMPLETED | OUTPATIENT
Start: 2022-11-20 | End: 2022-11-21

## 2022-11-20 RX ORDER — SUCCINYLCHOLINE CHLORIDE 20 MG/ML
INJECTION INTRAMUSCULAR; INTRAVENOUS PRN
Status: DISCONTINUED | OUTPATIENT
Start: 2022-11-20 | End: 2022-11-20 | Stop reason: SDUPTHER

## 2022-11-20 RX ORDER — ACETAMINOPHEN 500 MG
1000 TABLET ORAL EVERY 6 HOURS
Status: DISCONTINUED | OUTPATIENT
Start: 2022-11-20 | End: 2022-11-28 | Stop reason: HOSPADM

## 2022-11-20 RX ORDER — LABETALOL HYDROCHLORIDE 5 MG/ML
10 INJECTION, SOLUTION INTRAVENOUS
Status: DISCONTINUED | OUTPATIENT
Start: 2022-11-20 | End: 2022-11-20 | Stop reason: HOSPADM

## 2022-11-20 RX ORDER — SODIUM CHLORIDE 9 MG/ML
INJECTION, SOLUTION INTRAVENOUS PRN
Status: DISCONTINUED | OUTPATIENT
Start: 2022-11-20 | End: 2022-11-20 | Stop reason: HOSPADM

## 2022-11-20 RX ORDER — PROCHLORPERAZINE EDISYLATE 5 MG/ML
5 INJECTION INTRAMUSCULAR; INTRAVENOUS
Status: DISCONTINUED | OUTPATIENT
Start: 2022-11-20 | End: 2022-11-20 | Stop reason: HOSPADM

## 2022-11-20 RX ORDER — METHOCARBAMOL 500 MG/1
1000 TABLET, FILM COATED ORAL 3 TIMES DAILY
Status: DISCONTINUED | OUTPATIENT
Start: 2022-11-20 | End: 2022-11-28 | Stop reason: HOSPADM

## 2022-11-20 RX ORDER — FAMOTIDINE 10 MG/ML
INJECTION, SOLUTION INTRAVENOUS PRN
Status: DISCONTINUED | OUTPATIENT
Start: 2022-11-20 | End: 2022-11-20 | Stop reason: SDUPTHER

## 2022-11-20 RX ORDER — ONDANSETRON 4 MG/1
4 TABLET, ORALLY DISINTEGRATING ORAL EVERY 8 HOURS PRN
Status: DISCONTINUED | OUTPATIENT
Start: 2022-11-20 | End: 2022-11-28 | Stop reason: HOSPADM

## 2022-11-20 RX ORDER — SODIUM CHLORIDE 0.9 % (FLUSH) 0.9 %
5-40 SYRINGE (ML) INJECTION EVERY 12 HOURS SCHEDULED
Status: DISCONTINUED | OUTPATIENT
Start: 2022-11-20 | End: 2022-11-22

## 2022-11-20 RX ORDER — PROPOFOL 10 MG/ML
INJECTION, EMULSION INTRAVENOUS PRN
Status: DISCONTINUED | OUTPATIENT
Start: 2022-11-20 | End: 2022-11-20 | Stop reason: SDUPTHER

## 2022-11-20 RX ORDER — SODIUM CHLORIDE 9 MG/ML
INJECTION, SOLUTION INTRAVENOUS PRN
Status: DISCONTINUED | OUTPATIENT
Start: 2022-11-20 | End: 2022-11-20

## 2022-11-20 RX ORDER — SODIUM CHLORIDE 9 MG/ML
INJECTION, SOLUTION INTRAVENOUS PRN
Status: DISCONTINUED | OUTPATIENT
Start: 2022-11-20 | End: 2022-11-23

## 2022-11-20 RX ORDER — FENTANYL CITRATE 50 UG/ML
INJECTION, SOLUTION INTRAMUSCULAR; INTRAVENOUS PRN
Status: DISCONTINUED | OUTPATIENT
Start: 2022-11-20 | End: 2022-11-20 | Stop reason: SDUPTHER

## 2022-11-20 RX ORDER — SODIUM CHLORIDE 9 MG/ML
INJECTION, SOLUTION INTRAVENOUS PRN
Status: COMPLETED | OUTPATIENT
Start: 2022-11-20 | End: 2022-11-20

## 2022-11-20 RX ORDER — FUROSEMIDE 20 MG/1
20 TABLET ORAL DAILY
Status: DISCONTINUED | OUTPATIENT
Start: 2022-11-20 | End: 2022-11-28 | Stop reason: HOSPADM

## 2022-11-20 RX ORDER — ATORVASTATIN CALCIUM 10 MG/1
10 TABLET, FILM COATED ORAL DAILY
Status: DISCONTINUED | OUTPATIENT
Start: 2022-11-20 | End: 2022-11-28 | Stop reason: HOSPADM

## 2022-11-20 RX ORDER — SODIUM CHLORIDE 0.9 % (FLUSH) 0.9 %
5-40 SYRINGE (ML) INJECTION PRN
Status: DISCONTINUED | OUTPATIENT
Start: 2022-11-20 | End: 2022-11-20

## 2022-11-20 RX ORDER — DEXTROSE MONOHYDRATE 25 G/50ML
12.5 INJECTION, SOLUTION INTRAVENOUS PRN
Status: DISCONTINUED | OUTPATIENT
Start: 2022-11-20 | End: 2022-11-20

## 2022-11-20 RX ORDER — ASPIRIN 81 MG/1
81 TABLET ORAL DAILY
Status: DISCONTINUED | OUTPATIENT
Start: 2022-11-20 | End: 2022-11-28 | Stop reason: HOSPADM

## 2022-11-20 RX ORDER — HEPARIN SODIUM 5000 [USP'U]/ML
5000 INJECTION, SOLUTION INTRAVENOUS; SUBCUTANEOUS 2 TIMES DAILY
Status: DISCONTINUED | OUTPATIENT
Start: 2022-11-20 | End: 2022-11-28 | Stop reason: HOSPADM

## 2022-11-20 RX ORDER — SODIUM CHLORIDE 0.9 % (FLUSH) 0.9 %
5-40 SYRINGE (ML) INJECTION PRN
Status: DISCONTINUED | OUTPATIENT
Start: 2022-11-20 | End: 2022-11-20 | Stop reason: HOSPADM

## 2022-11-20 RX ORDER — SODIUM CHLORIDE 9 MG/ML
INJECTION, SOLUTION INTRAVENOUS CONTINUOUS PRN
Status: DISCONTINUED | OUTPATIENT
Start: 2022-11-20 | End: 2022-11-20 | Stop reason: SDUPTHER

## 2022-11-20 RX ORDER — ONDANSETRON 2 MG/ML
INJECTION INTRAMUSCULAR; INTRAVENOUS PRN
Status: DISCONTINUED | OUTPATIENT
Start: 2022-11-20 | End: 2022-11-20 | Stop reason: SDUPTHER

## 2022-11-20 RX ORDER — ROCURONIUM BROMIDE 10 MG/ML
INJECTION, SOLUTION INTRAVENOUS PRN
Status: DISCONTINUED | OUTPATIENT
Start: 2022-11-20 | End: 2022-11-20 | Stop reason: SDUPTHER

## 2022-11-20 RX ORDER — GLYCOPYRROLATE 0.2 MG/ML
INJECTION INTRAMUSCULAR; INTRAVENOUS PRN
Status: DISCONTINUED | OUTPATIENT
Start: 2022-11-20 | End: 2022-11-20 | Stop reason: SDUPTHER

## 2022-11-20 RX ORDER — SODIUM CHLORIDE 0.9 % (FLUSH) 0.9 %
5-40 SYRINGE (ML) INJECTION EVERY 12 HOURS SCHEDULED
Status: DISCONTINUED | OUTPATIENT
Start: 2022-11-20 | End: 2022-11-20

## 2022-11-20 RX ORDER — ONDANSETRON 2 MG/ML
4 INJECTION INTRAMUSCULAR; INTRAVENOUS EVERY 6 HOURS PRN
Status: DISCONTINUED | OUTPATIENT
Start: 2022-11-20 | End: 2022-11-28 | Stop reason: HOSPADM

## 2022-11-20 RX ORDER — DEXTROSE MONOHYDRATE 100 MG/ML
INJECTION, SOLUTION INTRAVENOUS CONTINUOUS PRN
Status: DISCONTINUED | OUTPATIENT
Start: 2022-11-20 | End: 2022-11-28 | Stop reason: HOSPADM

## 2022-11-20 RX ORDER — SODIUM CHLORIDE, SODIUM LACTATE, POTASSIUM CHLORIDE, CALCIUM CHLORIDE 600; 310; 30; 20 MG/100ML; MG/100ML; MG/100ML; MG/100ML
INJECTION, SOLUTION INTRAVENOUS CONTINUOUS
Status: DISCONTINUED | OUTPATIENT
Start: 2022-11-20 | End: 2022-11-21

## 2022-11-20 RX ORDER — ONDANSETRON 2 MG/ML
4 INJECTION INTRAMUSCULAR; INTRAVENOUS
Status: DISCONTINUED | OUTPATIENT
Start: 2022-11-20 | End: 2022-11-20 | Stop reason: HOSPADM

## 2022-11-20 RX ORDER — HYDRALAZINE HYDROCHLORIDE 20 MG/ML
10 INJECTION INTRAMUSCULAR; INTRAVENOUS
Status: DISCONTINUED | OUTPATIENT
Start: 2022-11-20 | End: 2022-11-20 | Stop reason: HOSPADM

## 2022-11-20 RX ORDER — SODIUM CHLORIDE 0.9 % (FLUSH) 0.9 %
5-40 SYRINGE (ML) INJECTION EVERY 12 HOURS SCHEDULED
Status: DISCONTINUED | OUTPATIENT
Start: 2022-11-20 | End: 2022-11-20 | Stop reason: HOSPADM

## 2022-11-20 RX ORDER — SODIUM CHLORIDE 9 MG/ML
INJECTION, SOLUTION INTRAVENOUS PRN
Status: DISCONTINUED | OUTPATIENT
Start: 2022-11-20 | End: 2022-11-28 | Stop reason: HOSPADM

## 2022-11-20 RX ADMIN — ROCURONIUM BROMIDE 40 MG: 10 INJECTION INTRAVENOUS at 13:30

## 2022-11-20 RX ADMIN — ACETAMINOPHEN 1000 MG: 500 TABLET ORAL at 20:51

## 2022-11-20 RX ADMIN — PROPOFOL 20 MG: 10 INJECTION, EMULSION INTRAVENOUS at 13:58

## 2022-11-20 RX ADMIN — SODIUM CHLORIDE: 9 INJECTION, SOLUTION INTRAVENOUS at 12:55

## 2022-11-20 RX ADMIN — PHENYLEPHRINE HYDROCHLORIDE 100 MCG: 10 INJECTION, SOLUTION INTRAMUSCULAR; INTRAVENOUS; SUBCUTANEOUS at 13:14

## 2022-11-20 RX ADMIN — ONDANSETRON 4 MG: 2 INJECTION INTRAMUSCULAR; INTRAVENOUS at 13:27

## 2022-11-20 RX ADMIN — FENTANYL CITRATE 25 MCG: 50 INJECTION, SOLUTION INTRAMUSCULAR; INTRAVENOUS at 13:44

## 2022-11-20 RX ADMIN — SODIUM CHLORIDE: 9 INJECTION, SOLUTION INTRAVENOUS at 13:30

## 2022-11-20 RX ADMIN — VANCOMYCIN HYDROCHLORIDE 1250 MG: 10 INJECTION, POWDER, LYOPHILIZED, FOR SOLUTION INTRAVENOUS at 11:55

## 2022-11-20 RX ADMIN — SUCCINYLCHOLINE CHLORIDE 100 MG: 20 INJECTION, SOLUTION INTRAMUSCULAR; INTRAVENOUS; PARENTERAL at 13:07

## 2022-11-20 RX ADMIN — GLYCOPYRROLATE 0.2 MG: 0.2 INJECTION INTRAMUSCULAR; INTRAVENOUS at 13:03

## 2022-11-20 RX ADMIN — PROPOFOL 20 MG: 10 INJECTION, EMULSION INTRAVENOUS at 14:14

## 2022-11-20 RX ADMIN — FAMOTIDINE 20 MG: 10 INJECTION, SOLUTION INTRAVENOUS at 12:58

## 2022-11-20 RX ADMIN — PHENYLEPHRINE HYDROCHLORIDE 100 MCG: 10 INJECTION, SOLUTION INTRAMUSCULAR; INTRAVENOUS; SUBCUTANEOUS at 13:22

## 2022-11-20 RX ADMIN — CEFEPIME 2000 MG: 2 INJECTION, POWDER, FOR SOLUTION INTRAVENOUS at 09:59

## 2022-11-20 RX ADMIN — SODIUM CHLORIDE, POTASSIUM CHLORIDE, SODIUM LACTATE AND CALCIUM CHLORIDE: 600; 310; 30; 20 INJECTION, SOLUTION INTRAVENOUS at 20:03

## 2022-11-20 RX ADMIN — SODIUM CHLORIDE, PRESERVATIVE FREE 10 ML: 5 INJECTION INTRAVENOUS at 20:34

## 2022-11-20 RX ADMIN — ONDANSETRON 4 MG: 2 INJECTION INTRAMUSCULAR; INTRAVENOUS at 19:52

## 2022-11-20 RX ADMIN — HYDROMORPHONE HYDROCHLORIDE 0.5 MG: 1 INJECTION, SOLUTION INTRAMUSCULAR; INTRAVENOUS; SUBCUTANEOUS at 22:49

## 2022-11-20 RX ADMIN — LIDOCAINE HYDROCHLORIDE 100 MG: 20 INJECTION, SOLUTION INFILTRATION; PERINEURAL at 13:06

## 2022-11-20 RX ADMIN — SODIUM CHLORIDE, POTASSIUM CHLORIDE, SODIUM LACTATE AND CALCIUM CHLORIDE 500 ML: 600; 310; 30; 20 INJECTION, SOLUTION INTRAVENOUS at 20:44

## 2022-11-20 RX ADMIN — FENTANYL CITRATE 25 MCG: 50 INJECTION, SOLUTION INTRAMUSCULAR; INTRAVENOUS at 13:28

## 2022-11-20 RX ADMIN — PHENYLEPHRINE HYDROCHLORIDE 100 MCG: 10 INJECTION, SOLUTION INTRAMUSCULAR; INTRAVENOUS; SUBCUTANEOUS at 13:18

## 2022-11-20 RX ADMIN — INSULIN HUMAN 10 UNITS: 100 INJECTION, SOLUTION PARENTERAL at 22:51

## 2022-11-20 RX ADMIN — HEPARIN SODIUM 5000 UNITS: 1000 INJECTION INTRAVENOUS; SUBCUTANEOUS at 15:23

## 2022-11-20 RX ADMIN — ROCURONIUM BROMIDE 10 MG: 10 INJECTION INTRAVENOUS at 13:06

## 2022-11-20 RX ADMIN — PROPOFOL 30 MG: 10 INJECTION, EMULSION INTRAVENOUS at 13:06

## 2022-11-20 RX ADMIN — HYDROMORPHONE HYDROCHLORIDE 0.25 MG: 1 INJECTION, SOLUTION INTRAMUSCULAR; INTRAVENOUS; SUBCUTANEOUS at 18:58

## 2022-11-20 RX ADMIN — DEXAMETHASONE SODIUM PHOSPHATE 4 MG: 4 INJECTION, SOLUTION INTRAMUSCULAR; INTRAVENOUS at 13:21

## 2022-11-20 RX ADMIN — IOPAMIDOL 90 ML: 755 INJECTION, SOLUTION INTRAVENOUS at 06:47

## 2022-11-20 RX ADMIN — METHOCARBAMOL TABLETS 1000 MG: 500 TABLET, COATED ORAL at 20:51

## 2022-11-20 RX ADMIN — MAGNESIUM SULFATE HEPTAHYDRATE 4000 MG: 40 INJECTION, SOLUTION INTRAVENOUS at 23:42

## 2022-11-20 RX ADMIN — SODIUM CHLORIDE, POTASSIUM CHLORIDE, SODIUM LACTATE AND CALCIUM CHLORIDE 500 ML: 600; 310; 30; 20 INJECTION, SOLUTION INTRAVENOUS at 19:26

## 2022-11-20 RX ADMIN — FENTANYL CITRATE 100 MCG: 50 INJECTION, SOLUTION INTRAMUSCULAR; INTRAVENOUS at 13:06

## 2022-11-20 RX ADMIN — HYDROMORPHONE HYDROCHLORIDE 0.5 MG: 1 INJECTION, SOLUTION INTRAMUSCULAR; INTRAVENOUS; SUBCUTANEOUS at 19:10

## 2022-11-20 RX ADMIN — FENTANYL CITRATE 25 MCG: 50 INJECTION, SOLUTION INTRAMUSCULAR; INTRAVENOUS at 13:33

## 2022-11-20 RX ADMIN — HEPARIN SODIUM 5000 UNITS: 5000 INJECTION INTRAVENOUS; SUBCUTANEOUS at 20:39

## 2022-11-20 RX ADMIN — ROCURONIUM BROMIDE 20 MG: 10 INJECTION INTRAVENOUS at 14:39

## 2022-11-20 RX ADMIN — SODIUM CHLORIDE: 9 INJECTION, SOLUTION INTRAVENOUS at 18:48

## 2022-11-20 RX ADMIN — PHENYLEPHRINE HYDROCHLORIDE 25 MCG/MIN: 10 INJECTION, SOLUTION INTRAMUSCULAR; INTRAVENOUS; SUBCUTANEOUS at 13:13

## 2022-11-20 RX ADMIN — FENTANYL CITRATE 25 MCG: 50 INJECTION, SOLUTION INTRAMUSCULAR; INTRAVENOUS at 13:38

## 2022-11-20 RX ADMIN — HYDROMORPHONE HYDROCHLORIDE 0.5 MG: 1 INJECTION, SOLUTION INTRAMUSCULAR; INTRAVENOUS; SUBCUTANEOUS at 19:20

## 2022-11-20 RX ADMIN — PHENYLEPHRINE HYDROCHLORIDE 50 MCG: 10 INJECTION, SOLUTION INTRAMUSCULAR; INTRAVENOUS; SUBCUTANEOUS at 13:04

## 2022-11-20 RX ADMIN — SUGAMMADEX 200 MG: 100 INJECTION, SOLUTION INTRAVENOUS at 18:17

## 2022-11-20 ASSESSMENT — PAIN DESCRIPTION - DESCRIPTORS
DESCRIPTORS: ACHING

## 2022-11-20 ASSESSMENT — PAIN DESCRIPTION - PAIN TYPE: TYPE: CHRONIC PAIN

## 2022-11-20 ASSESSMENT — PAIN - FUNCTIONAL ASSESSMENT: PAIN_FUNCTIONAL_ASSESSMENT: 0-10

## 2022-11-20 ASSESSMENT — PAIN DESCRIPTION - LOCATION
LOCATION: GROIN
LOCATION: FOOT;ABDOMEN
LOCATION: FOOT

## 2022-11-20 ASSESSMENT — PAIN SCALES - GENERAL
PAINLEVEL_OUTOF10: 7
PAINLEVEL_OUTOF10: 8
PAINLEVEL_OUTOF10: 8

## 2022-11-20 ASSESSMENT — PAIN DESCRIPTION - ORIENTATION
ORIENTATION: LEFT
ORIENTATION: RIGHT;LEFT

## 2022-11-20 ASSESSMENT — PAIN DESCRIPTION - FREQUENCY: FREQUENCY: CONTINUOUS

## 2022-11-20 NOTE — ANESTHESIA PRE PROCEDURE
Department of Anesthesiology  Preprocedure Note       Name:  Iram Martinez   Age:  80 y.o.  :  1929                                          MRN:  [id number]         Date:  2022      Surgeon: Tay Villavicencio):  Jose Luis Gutierrez MD    Procedure: Procedure(s):  EXCISION OF INFECTED RUPTURED ILIAC GRAFT WITH LEFT AXILLARY FEMORAL BYPASS    Medications prior to admission:   Prior to Admission medications    Medication Sig Start Date End Date Taking? Authorizing Provider   blood glucose test strips (ONETOUCH ULTRA) strip USE TO TEST BLOOD SUGAR TWICE DAILY 11/15/22   Paulo Forman DO   atorvastatin (LIPITOR) 10 MG tablet Take 1 tablet by mouth daily 11/3/22   Paulo Forman DO   lisinopril (PRINIVIL;ZESTRIL) 10 MG tablet Take 1 tablet by mouth daily 10/26/22   Paulo Forman DO   sulfamethoxazole-trimethoprim (BACTRIM;SEPTRA) 400-80 MG per tablet Take 1 tablet by mouth 2 times daily Per Dr Debby Lerner 10/5/22   Enzo Fernandez MD   Multiple Vitamins-Minerals (THERAPEUTIC MULTIVITAMIN-MINERALS) tablet Take 1 tablet by mouth daily    Historical Provider, MD   furosemide (LASIX) 20 MG tablet TAKE ONE TABLET BY MOUTH DAILY 22   Christin Fernandez MD   glimepiride (AMARYL) 1 MG tablet TAKE ONE TABLET BY MOUTH EVERY MORNING BEFORE BREAKFAST 22   Paulo Forman DO   metFORMIN (GLUCOPHAGE) 500 MG tablet TAKE ONE TABLET BY MOUTH DAILY 22   Paulo Forman DO   acetaminophen (TYLENOL) 325 MG tablet Take 650 mg by mouth every 6 hours as needed for Pain    Historical Provider, MD   Lite Touch Lancets MISC Use twice daily when testing blood sugars.  3/28/22   Paulo Forman DO   Turmeric 450 MG CAPS Take 450 mg by mouth daily    Historical Provider, MD   Cholecalciferol (VITAMIN D3) 2000 UNITS CAPS Take 1 capsule by mouth daily    Historical Provider, MD   aspirin EC 81 MG EC tablet Take 1 tablet by mouth daily 5/13/15   Paulo Monzon DO       Current medications:    Current Facility-Administered Medications Medication Dose Route Frequency Provider Last Rate Last Admin    vancomycin (VANCOCIN) 1,250 mg in dextrose 5 % 250 mL IVPB  1,250 mg IntraVENous Once Maral Hdz .7 mL/hr at 11/20/22 1155 1,250 mg at 11/20/22 1155    0.9 % sodium chloride infusion   IntraVENous PRN Jaswinder Pino DO         Current Outpatient Medications   Medication Sig Dispense Refill    blood glucose test strips (ONETOUCH ULTRA) strip USE TO TEST BLOOD SUGAR TWICE DAILY 100 strip 1    atorvastatin (LIPITOR) 10 MG tablet Take 1 tablet by mouth daily 30 tablet 3    lisinopril (PRINIVIL;ZESTRIL) 10 MG tablet Take 1 tablet by mouth daily 30 tablet 0    sulfamethoxazole-trimethoprim (BACTRIM;SEPTRA) 400-80 MG per tablet Take 1 tablet by mouth 2 times daily Per Dr Florencio Barcenas 60 tablet 5    Multiple Vitamins-Minerals (THERAPEUTIC MULTIVITAMIN-MINERALS) tablet Take 1 tablet by mouth daily      furosemide (LASIX) 20 MG tablet TAKE ONE TABLET BY MOUTH DAILY 90 tablet 1    glimepiride (AMARYL) 1 MG tablet TAKE ONE TABLET BY MOUTH EVERY MORNING BEFORE BREAKFAST 30 tablet 2    metFORMIN (GLUCOPHAGE) 500 MG tablet TAKE ONE TABLET BY MOUTH DAILY 90 tablet 3    acetaminophen (TYLENOL) 325 MG tablet Take 650 mg by mouth every 6 hours as needed for Pain      Lite Touch Lancets MISC Use twice daily when testing blood sugars. 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    Spinal stenosis M48.00    Lumbar stenosis M48.061    Spondylolisthesis of lumbosacral region M43.17    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 ([facility]) E11.59    Atherosclerosis of artery of extremity with ulceration (formerly [facility]) I70.299, L97.909    Benign essential HTN I10    Lumbar foraminal stenosis M48.061    DDD (degenerative disc disease), lumbar M51.36    Spinal stenosis of lumbar region with neurogenic claudication M48.062    Type 2 diabetes, controlled, with neuropathy ([address] E11.40    Severe mitral regurgitation I34.0    Venous insufficiency of both lower extremities I87.2    Hyperlipidemia E78.5    Hip arthritis M16.10    Status post carmen arthroplasty replacement of left hip 1999 Z96.642    Femoral artery stenosis, right (formerly [facility]) I70.201    Osteoporosis M81.0    Osteopenia M85.80    Femoral-popliteal bypass graft occlusion, left (formerly [facility]) T82.898A    Pain due to onychomycosis of nail B35.1, M79.609    Coronary artery disease involving native coronary artery of native heart without angina pectoris I25.10    Diabetic peripheral neuropathy (formerly [facility]) E11.42    Arthritis of left knee M17.12    Trigger middle finger of right hand M65.331    Carpal tunnel syndrome, right G56.01    Vitamin D deficiency E55.9    Carotid stenosis, asymptomatic, bilateral I65.23    Fingernail problem L60.9    TIA (transient ischemic attack) G45.9    Sacral fracture, closed ([address] S32.10XA    Closed fracture of ramus of right pubis (formerly [facility]) S32.591A    Acute pain of left lower extremity M79.605    Diabetes mellitus type 2 with peripheral artery disease (formerly [facility]) E11.51    Open wound of left knee S81.002A    Nonhealing nonsurgical wound limited to breakdown of skin T14. 8XXA    Peripheral vascular disease, unspecified ([address] I73.9    Wound of left leg S81.802A    Peripheral artery disease ([facility]) I73.9    Cervical spondylosis M47.812    Atherosclerosis of artery of extremity with rest pain (formerly [facility]) I70.229    Lymph leak I89.9    Nonhealing surgical wound T81. 89XA    Bypass graft stenosis ([facility]) T82.858A    Atherosclerosis of native artery of left leg with ulceration of ankle ([facility]) I70.243       Past Medical History:        Diagnosis Date    Anxiety     Arthritis     At risk for falls     CAD (coronary artery disease)     Cerebral artery occlusion with cerebral infarction ([facility])     TIA--no residual effects    DDD (degenerative disc disease), cervical     Fractures     (L) Hip Fx 4/25/98, L1 fracture from fall 10-31-06    Hyperlipidemia     Hypertension     Mitral regurgitation     Neuropathy     Osteopenia     Osteoporosis     PAD (peripheral artery disease) (HCC)     Right LE ischemia    Peripheral neuropathy 6/1/2015    Peripheral vascular disease ([facility])     bilateral lower extremities with edema    Podagra 01/09/2017    Dr. Cem Rivers    Prolonged emergence from general anesthesia     Spinal stenosis     L4-5    Type 2 diabetes mellitus ([facility]     Urethral stricture 5 years ago    Urgency of urination        Past Surgical History:        Procedure Laterality Date    ANGIOPLASTY Left 04/18/2022    Left SFA    APPENDECTOMY      CARPAL TUNNEL RELEASE Right 03/29/2019    RIGHT CARPAL TUNNEL RELEASE AND RIGHT MIDDLE FINGER TRIGGER FINGER RELEASE performed by Nakita Walsh MD at [facility]  [id number]    LEFT EYE EYE CATARACT PHACOEMULSIFICATION INTRAOCULAR LENS    CHOLECYSTECTOMY  01/01/1978    COLONOSCOPY  08/31/1999    diverticulosis    EYE SURGERY Bilateral     bilateral cataract removed    FEMORAL BYPASS Left 6/28/2022    LEFT LEG FEMORAL TO POPLITEAL BYPASS WITH INTRAOPERATIVE ANGIOGRAM performed by Stanislaw Garcia MD at [address] [facility] Left 02/14/2022    LEFT COMMON FEMORAL ARTERY ENDARTERECTOMY performed by Stanislaw Garcia MD at [address] [place] (CERVIX STATUS UNKNOWN)  1980s    sallie    IR FEMORAL POPLITEAL BYPASS GRAFT Right 08/31/2015    KYPHOSIS SURGERY  11/07/2006    L1, (fractured after a fall)    LEG SURGERY Left 02/14/2022    LEFT LEG WOUND DEBRIDEMENT performed by Annabella Gillette MD at [place] [address] N/A 7/7/2022    INCISION AND DRAINAGE OF LEFT SIDE GROIN WITH PLACEMENT OF WOUND VAC performed by Annabella Gillette MD at [address]  04/25/1998    (L) THR    WRIST FRACTURE SURGERY  01/01/2008    left wrist       Social History:    Social History     Tobacco Use    Smoking status: Never    Smokeless tobacco: Never   Substance Use Topics    Alcohol use: No                                Counseling given: Not Answered      Vital Signs (Current):   Vitals:    11/20/22 0400 11/20/22 0800 11/20/22 0930 11/20/22 1030   BP: (!) 155/56 (!) 143/55 (!) 181/72 (!) 175/86   Pulse:       Resp:       Temp:       TempSrc:       SpO2: 100%  100% 99%   Weight:       Height:                                                  BP Readings from Last 3 Encounters:   11/20/22 (!) 175/86   11/14/22 (!) 177/75   11/09/22 132/60       NPO Status:                                                                                 BMI:   Wt Readings from Last 3 Encounters:   11/20/22 110 lb 8 oz (50.1 kg)   11/09/22 109 lb 12.8 oz (49.8 kg)   11/02/22 93 lb (42.2 kg)     Body mass index is 18.97 kg/m².     CBC:   Lab Results   Component Value Date/Time    WBC 7.4 11/20/2022 04:56 AM    RBC 2.83 11/20/2022 04:56 AM    HGB 9.1 11/20/2022 04:56 AM    HCT 27.1 11/20/2022 04:56 AM    MCV 95.6 11/20/2022 04:56 AM    RDW 14.8 11/20/2022 04:56 AM     11/20/2022 04:56 AM       CMP:   Lab Results   Component Value Date/Time     11/20/2022 05:26 AM    K 5.1 11/20/2022 05:26 AM    K 5.2 06/28/2022 11:05 AM     11/20/2022 05:26 AM    CO2 24 11/20/2022 05:26 AM    BUN 13 11/20/2022 05:26 AM    CREATININE 1.1 11/20/2022 05:26 AM    GFRAA 51 09/21/2022 08:51 AM    GFRAA >60 05/14/2013 11:33 AM    AGRATIO 1.8 11/20/2022 05:26 AM    LABGLOM 47 11/20/2022 05:26 AM    GLUCOSE 197 11/20/2022 05:26 AM    PROT 5.8 11/20/2022 05:26 AM    PROT 6.4 07/26/2012 04:30 PM    CALCIUM 9.2 11/20/2022 05:26 AM    BILITOT 0.3 11/20/2022 05:26 AM    ALKPHOS 84 11/20/2022 05:26 AM    AST 29 11/20/2022 05:26 AM    ALT 14 11/20/2022 05:26 AM       POC Tests: No results for input(s): POCGLU, POCNA, POCK, POCCL, POCBUN, POCHEMO, POCHCT in the last 72 hours. Coags:   Lab Results   Component Value Date/Time    PROTIME 14.1 11/20/2022 04:56 AM    INR 1.10 11/20/2022 04:56 AM    APTT 36.4 06/20/2022 11:30 AM       HCG (If Applicable): No results found for: PREGTESTUR, PREGSERUM, HCG, HCGQUANT     ABGs: No results found for: PHART, PO2ART, DEL0DVJ, NWE7PDE, BEART, E8LUIMTJ     Type & Screen (If Applicable):  No results found for: LABABO, LABRH    Drug/Infectious Status (If Applicable):  No results found for: HIV, HEPCAB    COVID-19 Screening (If Applicable):   Lab Results   Component Value Date/Time    COVID19 Not Detected 02/10/2022 12:37 PM           Anesthesia Evaluation  Patient summary reviewed and Nursing notes reviewed no history of anesthetic complications:   Airway: Mallampati: II  TM distance: >3 FB   Neck ROM: full  Mouth opening: > = 3 FB   Dental:    (+) poor dentition      Pulmonary:normal exam                               Cardiovascular:    (+) hypertension:, CAD:,                   Neuro/Psych:   (+) CVA:, neuromuscular disease:, TIA,             GI/Hepatic/Renal: Neg GI/Hepatic/Renal ROS            Endo/Other:    (+) DiabetesType II DM, , .                 Abdominal:             Vascular: negative vascular ROS.  + PVD, aortic or cerebral, . Other Findings:           Anesthesia Plan      general     ASA 4 - emergent       Induction: intravenous. arterial line  MIPS: Postoperative opioids intended and Prophylactic antiemetics administered. Anesthetic plan and risks discussed with patient.     Use of blood products discussed with patient whom consented to blood products.      Attending anesthesiologist reviewed and agrees with Preprocedure content                Waylon Kebede MD   11/20/2022

## 2022-11-20 NOTE — PROGRESS NOTES
Patient arrived to OR with belonging bag and dentures still in. Dentures were placed in a container and brought PACU before the case began. Belongings bag was moved to ICU bed and will be sent with patient to PACU.

## 2022-11-20 NOTE — ANESTHESIA PROCEDURE NOTES
Arterial Line:    An arterial line was placed using surface landmarks, in the OR for the following indication(s): continuous blood pressure monitoring and blood sampling needed. A 20 gauge (size), 1 and 3/8 inch (length), Angiocath (type) catheter was placed, Seldinger technique not used, into the right radial artery, secured by tape. Anesthesia type: Local    Events:  patient tolerated procedure well with no complications and EBL 0mL. 11/20/2022 1:15 PM11/20/2022 1:19 PM  Anesthesiologist: Godwin Curry MD  Performed: Anesthesiologist   Preanesthetic Checklist  Completed: patient identified, IV checked, site marked, risks and benefits discussed, surgical/procedural consents, equipment checked, pre-op evaluation, timeout performed, anesthesia consent given, oxygen available and monitors applied/VS acknowledged

## 2022-11-20 NOTE — ED PROVIDER NOTES
[address] [facility] [address] ENCOUNTER          ATTENDING PHYSICIAN NOTE       Date of evaluation: 11/20/2022      Assessment/ Medical Decion Making     MDM: Ivette Pizano is a 80 y.o. female with history of vascular surgery with nonhealing at prior surgical site presents for evaluation of bleeding at surgical site    Patient without ongoing bleeding on exam but stigmata of recent bleeding. CBC obtained to ensure no significant anemia when compared to prior. She is not therapeutically anticoagulated. Hemodynamically stable at this time. Vascular surgery consulted who advised CTA with runoff to evaluate for evidence of pseudoaneurysm. This result as well as final vascular recommendations are pending at time of signout to oncoming ED team.       Amina Olmedo MD  6:14 PM    Clinical Impression     Bleeding wound    Disposition     DISPOSITION Admitted 11/20/2022 09:51:34 AM        Chief Complaint     Wound Check (Patient arrives per ems from home. States surgery months ago. States has wound to left groin that did not heal. States had bleeding from wound this am. Pinhole wound noted to left groin)      History of Present Illness     Ivette Pizano is a 80 y.o. female with Prior history of fem pop bypass on June 28th. Has had a wound since then. Had a wound vac for one month following. Has small amount of discharge normally with daily dressing changes, but today had significant bleeding. Has pain around the area of bleeding. NO trauma. Was sitting at the table when it started. NO lightheadedness or fainting. Takes baby ASA daily but no other anticoagulation. Has has coughx 1 week. No fever. NO sore throat. No CP. No SOB. Review of Systems     Pertinent positive and negative findings as documented in the HPI. Otherwise a complete ROS was performed and all other systems were reviewed and were negative.     Physical Exam     INITIAL VITALS: BP: (!) 199/95, Temp: 98.2 °F (36.8 °C), Heart Rate: 68, Resp: 19, SpO2: 99 %     Nursing note and vitals reviewed. Physical Exam  Constitutional:       General: She is not in acute distress. Appearance: Normal appearance. She is not toxic-appearing. HENT:      Head: Normocephalic. Right Ear: External ear normal.      Left Ear: External ear normal.   Eyes:      General:         Right eye: No discharge. Left eye: No discharge. Extraocular Movements: Extraocular movements intact. Cardiovascular:      Rate and Rhythm: Normal rate. Pulmonary:      Effort: Pulmonary effort is normal. No respiratory distress. Breath sounds: No stridor. No wheezing or rales. Comments: Normal O2 sat on RA  Abdominal:      General: There is no distension. Musculoskeletal:         General: No deformity. Cervical back: Normal range of motion. No rigidity. Comments: Trace ankle edema bilaterally   Skin:     General: Skin is warm and dry. Comments: Wound to left groin, left inner thigh, left lower leg. Stigmata of recent bleeding to left groin wound. No evidence of secondary infection. 2+ left femoral pulse. 1+ DP and PT pulses bilaterally   Neurological:      General: No focal deficit present. Mental Status: She is alert. Mental status is at baseline. Psychiatric:         Thought Content:  Thought content normal.       Procedures   Procedures    Past Medical, Surgical, Family, and Social History     She has a past medical history of Anxiety, Arthritis, At risk for falls, CAD (coronary artery disease), Cerebral artery occlusion with cerebral infarction ([address] DDD (degenerative disc disease), cervical, Fractures, Hyperlipidemia, Hypertension, Mitral regurgitation, Neuropathy, Osteopenia, Osteoporosis, PAD (peripheral artery disease) ([address] Peripheral neuropathy, Peripheral vascular disease ([address] Podagra, Prolonged emergence from general anesthesia, Spinal stenosis, Type 2 diabetes mellitus ([address] Urethral stricture, and Urgency of urination. She has a past surgical history that includes Tonsillectomy; Appendectomy; Cholecystectomy (01/01/1978); Colonoscopy (08/31/1999); Hysterectomy (1980s); Total hip arthroplasty (04/25/1998); Kyphosis surgery (11/07/2006); Wrist fracture surgery (01/01/2008); Cataract removal with implant ([id number]); eye surgery (Bilateral); IR Femoral Popliteal Bypass Graft (Right, 08/31/2015); Carpal tunnel release (Right, 03/29/2019); Femoral Endarterectomy (Left, 02/14/2022); Leg Surgery (Left, 02/14/2022); angioplasty (Left, 04/18/2022); femoral bypass (Left, 6/28/2022); and Leg Surgery (N/A, 7/7/2022). Her family history includes Cancer (age of onset: 43) in her mother; Hypertension in her father; Stroke in her father. She reports that she has never smoked. She has never used smokeless tobacco. She reports that she does not drink alcohol and does not use drugs. Nursing Notes, Past Medical Hx, Past Surgical Hx, Social Hx,Allergies, and Family Hx were reviewed.     Medications     Current Discharge Medication List        CONTINUE these medications which have NOT CHANGED    Details   blood glucose test strips (ONETOUCH ULTRA) strip USE TO TEST BLOOD SUGAR TWICE DAILY  Qty: 100 strip, Refills: 1      atorvastatin (LIPITOR) 10 MG tablet Take 1 tablet by mouth daily  Qty: 30 tablet, Refills: 3    Associated Diagnoses: TIA (transient ischemic attack)      lisinopril (PRINIVIL;ZESTRIL) 10 MG tablet Take 1 tablet by mouth daily  Qty: 30 tablet, Refills: 0      sulfamethoxazole-trimethoprim (BACTRIM;SEPTRA) 400-80 MG per tablet Take 1 tablet by mouth 2 times daily Per Dr Solares Slight: 60 tablet, Refills: 5      Multiple Vitamins-Minerals (THERAPEUTIC MULTIVITAMIN-MINERALS) tablet Take 1 tablet by mouth daily      furosemide (LASIX) 20 MG tablet TAKE ONE TABLET BY MOUTH DAILY  Qty: 90 tablet, Refills: 1      glimepiride (AMARYL) 1 MG tablet TAKE ONE TABLET BY MOUTH EVERY MORNING BEFORE BREAKFAST  Qty: 30 tablet, Refills: 2 metFORMIN (GLUCOPHAGE) 500 MG tablet TAKE ONE TABLET BY MOUTH DAILY  Qty: 90 tablet, Refills: 3      acetaminophen (TYLENOL) 325 MG tablet Take 650 mg by mouth every 6 hours as needed for Pain      Lite Touch Lancets MISC Use twice daily when testing blood sugars. Qty: 100 each, Refills: 5      Turmeric 450 MG CAPS Take 450 mg by mouth daily      Cholecalciferol (VITAMIN D3) 2000 UNITS CAPS Take 1 capsule by mouth daily      aspirin EC 81 MG EC tablet Take 1 tablet by mouth daily  Qty: 30 tablet, Refills: 3    Associated Diagnoses: PVD (peripheral vascular disease) with claudication ([facility] Peripheral arterial occlusive disease ([facility]             Allergies     She is allergic to aricept [donepezil hydrochloride], doxycycline, ketorolac tromethamine, and donepezil hcl. ED Course     Patient was given the following medications:  Orders Placed This Encounter   Medications    iopamidol (ISOVUE-370) 76 % injection 90 mL    cefepime (MAXIPIME) 2000 mg IVPB minibag     Order Specific Question:   Antimicrobial Indications     Answer:   Surgical Site Infection    vancomycin (VANCOCIN) 1,250 mg in dextrose 5 % 250 mL IVPB     Order Specific Question:   Antimicrobial Indications     Answer:   Surgical Site Infection    0.9 % sodium chloride infusion    sodium chloride 0.9 % 1,000 mL with heparin (porcine) 10,000 Units    ceFAZolin (ANCEF) 1,000 mg in sodium chloride 0.9 % 1,000 mL    oxidized cellulose external pads    thrombin kit    sodium chloride flush 0.9 % injection 5-40 mL    sodium chloride flush 0.9 % injection 5-40 mL    0.9 % sodium chloride infusion    ondansetron (ZOFRAN) injection 4 mg    HYDROmorphone (DILAUDID) injection 0.25 mg    HYDROmorphone (DILAUDID) injection 0.5 mg    prochlorperazine (COMPAZINE) injection 5 mg    OR Linked Order Group     labetalol (NORMODYNE;TRANDATE) injection 10 mg     hydrALAZINE (APRESOLINE) injection 10 mg    0.9 % sodium chloride infusion       No results found.     RECENT VITALS:  BP: (!) 175/86,Temp: 98.2 °F (36.8 °C), Heart Rate: 68, Resp: 19, SpO2: 99 %     RADIOLOGY:  XR CHEST PORTABLE   Final Result   Impression:   1. No airspace disease. CTA ABDOMINAL AORTA W BILAT RUNOFF W CONTRAST   Final Result   Impression:      1. Small amount of hemorrhage in the left groin without evidence of active extravasation. 2. 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 3. 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 4. Loculated fluid collection of the medial left thigh which could represent abscess or sterile postoperative fluid collection. 5. Subcutaneous edema and fluid lower extremities. 6. Left pleural effusion.              LABS:   Results for orders placed or performed during the hospital encounter of 11/20/22   CBC with Auto Differential   Result Value Ref Range    WBC 7.4 4.0 - 11.0 K/uL    RBC 2.83 (L) 4.00 - 5.20 M/uL    Hemoglobin 9.1 (L) 12.0 - 16.0 g/dL    Hematocrit 27.1 (L) 36.0 - 48.0 %    MCV 95.6 80.0 - 100.0 fL    MCH 32.1 26.0 - 34.0 pg    MCHC 33.5 31.0 - 36.0 g/dL    RDW 14.8 12.4 - 15.4 %    Platelets 274 345 - 261 K/uL    MPV 7.1 5.0 - 10.5 fL    Neutrophils % 75.7 %    Lymphocytes % 16.6 %    Monocytes % 6.4 %    Eosinophils % 0.9 %    Basophils % 0.4 %    Neutrophils Absolute 5.6 1.7 - 7.7 K/uL    Lymphocytes Absolute 1.2 1.0 - 5.1 K/uL    Monocytes Absolute 0.5 0.0 - 1.3 K/uL    Eosinophils Absolute 0.1 0.0 - 0.6 K/uL    Basophils Absolute 0.0 0.0 - 0.2 K/uL   Comprehensive Metabolic Panel   Result Value Ref Range    Sodium 136 136 - 145 mmol/L    Potassium 5.1 3.5 - 5.1 mmol/L    Chloride 104 99 - 110 mmol/L    CO2 24 21 - 32 mmol/L    Anion Gap 8 3 - 16    Glucose 197 (H) 70 - 99 mg/dL    BUN 13 7 - 20 mg/dL    Creatinine 1.1 0.6 - 1.2 mg/dL    Est, Glom Filt Rate 47 (A) >60    Calcium 9.2 8.3 - 10.6 mg/dL    Total Protein 5.8 (L) 6.4 - 8.2 g/dL    Albumin 3.7 3.4 - 5.0 g/dL    Albumin/Globulin Ratio 1.8 1.1 - 2.2    Total Bilirubin 0.3 0.0 - 1.0 mg/dL    Alkaline Phosphatase 84 40 - 129 U/L    ALT 14 10 - 40 U/L    AST 29 15 - 37 U/L   Protime-INR   Result Value Ref Range    Protime 14.1 11.7 - 14.5 sec    INR 1.10 0.87 - 1.14   TYPE AND SCREEN   Result Value Ref Range    ABO/Rh O POS     Antibody Screen NEG    PREPARE RBC (CROSSMATCH), 4 Units   Result Value Ref Range    Product Code Blood Bank U4904K51     Description Blood Bank Red Blood Cells, Leuko-reduced     Unit Number [id number]     Dispense Status Blood Bank issued     Product Code Blood Bank I0357S11     Description Blood Bank Red Blood Cells, Leuko-reduced     Unit Number [id number]     Dispense Status Blood Bank issued     Product Code Blood Bank G7926M13     Description Blood Bank Red Blood Cells, Leuko-reduced     Unit Number [id number]     Dispense Status Blood Bank issued     Product Code Blood Bank [id number]     Description Blood Bank Red Blood Cells, Leuko-reduced     Unit Number [id number]     Dispense Status Blood Bank issued    PREPARE PLATELETS, 4 Product   Result Value Ref Range    Product Code Blood Bank P2227O94     Description Blood Bank [address] [place] [address]     Unit Number [id number]     Dispense Status Blood Bank selected    PREPARE PLASMA, 4 Units   Result Value Ref Range    Product Code Blood Bank P6503B91     Description Blood Bank Plasma, 5 Day, Thawed     Unit Number [id number]     Dispense Status Blood Bank selected     Product Code Blood Bank Q9557H97     Description Blood Bank Plasma, 5 Day, Thawed     Unit Number [id number]     Dispense Status Blood Bank selected        CONSULTS:  IP CONSULT TO VASCULAR SURGERY  IP CONSULT TO VASCULAR SURGERY  PHARMACY TO DOSE VANCOMYCIN  IP CONSULT TO PHARMACY    PATIENT REFERRED TO:  No follow-up provider specified.     DISCHARGE MEDICATIONS:  Current Discharge Medication List             Delmi Quintero MD  11/20/22 2294

## 2022-11-20 NOTE — H&P
General Surgery   Resident Consult Note    Reason for Consult: Left groin bleed    History of Present Illness:   Kalie Bolanos is a 80 y.o. female with extensive past medical and surgical history well known to the vascular surgery service who presents with bleeding from her left groin. The patient state she began bleeding around 3am. Denies trauma to the area. Denies any changes in sensation or motor to the limb from her baseline. The patient has a chronic left groin wound that has been draining for several months that she been following with wound care. She states the area starting bleeding and had a large amount of blood come from the wound and stopped spontaneously. Denies dizziness or light headedness.     Past Medical History:        Diagnosis Date    Anxiety     Arthritis     At risk for falls     CAD (coronary artery disease)     Cerebral artery occlusion with cerebral infarction (HCC)     TIA--no residual effects    DDD (degenerative disc disease), cervical     Fractures     (L) Hip Fx 4/25/98, L1 fracture from fall 10-31-06    Hyperlipidemia     Hypertension     Mitral regurgitation     Neuropathy     Osteopenia     Osteoporosis     PAD (peripheral artery disease) (HCC)     Right LE ischemia    Peripheral neuropathy 6/1/2015    Peripheral vascular disease (HCC)     bilateral lower extremities with edema    Podagra 01/09/2017    Dr. Corrina Birmingham    Prolonged emergence from general anesthesia     Spinal stenosis     L4-5    Type 2 diabetes mellitus ([facility]     Urethral stricture 5 years ago    Urgency of urination        Past Surgical History:        Procedure Laterality Date    ANGIOPLASTY Left 04/18/2022    Left SFA    APPENDECTOMY      CARPAL TUNNEL RELEASE Right 03/29/2019    RIGHT CARPAL TUNNEL RELEASE AND RIGHT MIDDLE FINGER TRIGGER FINGER RELEASE performed by Bertin Knight MD at [place] [address]  [id number]    LEFT EYE EYE CATARACT PHACOEMULSIFICATION INTRAOCULAR LENS    CHOLECYSTECTOMY 500 MG tablet TAKE ONE TABLET BY MOUTH DAILY 90 tablet 3    acetaminophen (TYLENOL) 325 MG tablet Take 650 mg by mouth every 6 hours as needed for Pain      Lite Touch Lancets MISC Use twice daily when testing blood sugars. 100 each 5    Turmeric 450 MG CAPS Take 450 mg by mouth daily      Cholecalciferol (VITAMIN D3) 2000 UNITS CAPS Take 1 capsule by mouth daily      aspirin EC 81 MG EC tablet Take 1 tablet by mouth daily 30 tablet 3       Current Meds  No current facility-administered medications for this encounter. Family History:   Family History   Problem Relation Age of Onset    Cancer Mother 43    Stroke Father     Hypertension Father        Social History:   TOBACCO:   reports that she has never smoked. She has never used smokeless tobacco.  ETOH:   reports no history of alcohol use. DRUGS:   reports no history of drug use. Review of Systems:   14 point review of systems was conducted and all pertinent positives and negatives were included in the HPI. Physical exam:    Vitals:    11/20/22 0331 11/20/22 0400   BP: (!) 199/95 (!) 155/56   Pulse: 68    Resp: 19    Temp: 98.2 °F (36.8 °C)    TempSrc: Oral    SpO2: 99% 100%   Weight: 110 lb 8 oz (50.1 kg)    Height: 5' 4\" (1.626 m)        General appearance: alert, no acute distress, elderly  Eyes: PERRL, no scleral icterus  Neck: trachea midline, no JVD  Chest/Lungs: symmetrical chest rise, normal effort  Cardiovascular: RRR  Abdomen: soft, non-tender, non-distended, no guarding/rigidity  Skin: warm and dry, no rashes  Extremities: no edema, no cyanosis, LLE wound with dressing in place, left groin wound with minimal bloody drainage, dopler PT/DP on left, palpable fem  Neuro: A&Ox3, no focal deficits, sensation intact    Labs:    CBC:   Recent Labs     11/20/22  0456   WBC 7.4   HGB 9.1*   HCT 27.1*   MCV 95.6        BMP: No results for input(s): NA, K, CL, CO2, PHOS, BUN, CREATININE, CA in the last 72 hours.   PT/INR: No results for input(s): PROTIME, INR in the last 72 hours. APTT: No results for input(s): APTT in the last 72 hours. Liver Profile:   Lab Results   Component Value Date/Time    AST 24 09/21/2022 08:51 AM    ALT 19 09/21/2022 08:51 AM    BILIDIR <0.2 05/13/2021 11:53 AM    BILITOT 0.3 09/21/2022 08:51 AM    ALKPHOS 97 09/21/2022 08:51 AM     Lab Results   Component Value Date/Time    CHOL 99 07/26/2022 12:07 PM    HDL 50 07/26/2022 12:07 PM    HDL 57 01/30/2012 11:20 AM    TRIG 125 07/26/2022 12:07 PM     UA:   Lab Results   Component Value Date/Time    NITRITE neg 11/05/2020 12:36 PM    COLORU Yellow 06/29/2022 11:54 AM    PHUR 5.0 06/29/2022 11:54 AM    [address] 06/29/2022 11:50 AM    RBCUA 3-4 06/29/2022 11:50 AM    BACTERIA 2+ 06/29/2022 11:50 AM    CLARITYU TURBID 06/29/2022 11:54 AM    SPECGRAV 1.026 06/29/2022 11:54 AM    LEUKOCYTESUR LARGE 06/29/2022 11:54 AM    UROBILINOGEN 0.2 06/29/2022 11:54 AM    BILIRUBINUR Negative 06/29/2022 11:54 AM    BILIRUBINUR neg 11/05/2020 12:36 PM    BLOODU MODERATE 06/29/2022 11:54 AM    GLUCOSEU Negative 06/29/2022 11:54 AM       Imaging:   CTA ABDOMINAL AORTA W BILAT RUNOFF W CONTRAST    (Results Pending)         Assessment/Plan: This is a 80 y.o. female with Hx as delineated above who likely had a herald bleed from the left groin. Patient has extensive left groin surgical history and bleeding from the chronic wound is likely indicative of likely pseudo aneurysm rupture which was found on CTA.      - Patient will be taken to the OR emergently for repair of pseudoaneurysm and explantation of infected graft with redo bypass  - Patient has 4,4,4 on call to OR for blood products  - Informed consent obtained and placed in chart    Brooke Mcgraw DO  11/20/22  5:35 AM

## 2022-11-20 NOTE — OP NOTE
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 LEFT FEMORAL GRAFT CULTURE Hardware Hardware CULTURE, SURGICAL Gordon Lance MD 11/20/2022 1802        Implants:  * No implants in log *        Drains:   Negative Pressure Wound Therapy Leg Anterior;Left;Proximal;Upper (Active)       Urinary Catheter 11/20/22 Wagner (Active)       Findings: Extraanatomical left external iliac to femoral bypass via left obturator canal using left basilic vein conduit; excision of infected ileofemoral bypass graft with placement of wound vac overlying graft excision site    Detailed Description of Procedure:   Patient was taken to the operating room and placed in a supine position and prepped and draped in the usual sterile fashion using Betadine solution. The abdomen was prepped from the chest to the thighs, left leg was circumferentially prepped throughout and the left arm was circumferentially prepped and draped. Timeout was called and patient and operative site were appropriately identified. She was given IV vancomycin and cefepime prior to the procedure.     The patient had previous placement of left iliofemoral bypass for replacement of left common femoral artery and she had a left femoral to below-knee popliteal bypass with in situ saphenous vein graft with the anastomosis of the saphenous vein conduit to the distal end of the existing dacron graft. The dacron graft had become infected and on CT imaging was noted to have pseudoaneurysm with surrounding hematoma. The patient had presented this afternoon with a herald bleed from the left groin. Ioban drapes were placed over the entire abdomen and left anterior and medial thigh. A left lower quadrant oblique incision was then made and the tissue was dissected out through the skin and subcutaneous tissue with electrocautery. The fascia of the external and internal oblique was opened sharply. The transversalis fascia was opened. The retroperitoneum was then entered by sweeping the contents of the peritoneal sac cephalad and medially. A [place] retractor was placed. The external iliac artery was palpated medial to the psoas muscle. It was fairly calcified proximally but quite soft and free of disease in the mid and distal portion. It was looped with a vessel loop in the midsection. Patient was then turned to exposure of the greater saphenous vein conduit in the thigh. Patient had approximately 20 cm of basilic vein available and therefore the saphenous vein bypass was exposed approximately 5 cm below the lowest point of the eventual groin dissection. A 3 cm incision was made over the conduit in the proximal mid thigh and the bypass graft was easily identified and looped with a vessel loop. The inguinal ring was palpated. The muscle overlying the obturator canal was bluntly dissected with finger dissection. It was then opened with a Lucy clamp and gently spread. No bleeding was encountered. The urine remained clear and bloodless. A curved Lucy-Wick instrument was then inserted through the opening in the anterior medial portion of the obturator canal and then passed into the incision exposing the existing bypass conduit. This portion of the great saphenous vein conduit would be chosen as the distal anastomosis.   A 1/2 inch Penrose drain was then tied to the end of the [address] [place] [address] and passed through the canal and held in place. Incision was made in the lower aspect of left arm from the elbow to the axilla. Tissue was dissected down through the skin and subcutaneous tissue with electrocautery. The fascial package of seeing the basilic vein was opened longitudinally. Care was taken to avoid any nerve structures. The basilic vein was mobilized and harvested from the elbow, including the median antecubital vein, up to the axilla. All side branches were doubly ligated with either 4-0 silk or 2-0 silk sutures and transected. The vein was gently irrigated dilated with heparinized saline solution, removed and then placed in a heparin papaverine solution on the back table. The left arm wound was irrigated Arctic saline solution and closed with 3-0 Vicryl running suture followed closure of skin with staples. A sterile dressing was applied. The patient was systemically heparinized with 5000 units of IV heparin. After appropriate circulation time, the basilic vein conduit was reversed. It had been gently irrigated and dilated with heparinized saline solution. The external iliac artery was proximally distally controlled in the midsegment. It should be noted that the previously placed Dacron graft was not visible from the retroperitoneum. A 1.5 cm arteriotomy was then performed in the anterior wall to which the spatulated end of the basilic vein was anastomosed using 5-0 Prolene suture in standard running fashion. All bleeding points were controlled. The graft was allowed to flushed through, reclamped and then variously irrigated with heparinized saline solution. It was then tied to the ends of the Penrose drain and gently passed back through the obturator canal into the eye excision exposing the functional great saphenous vein femoral-popliteal bypass graft.   The conduit was proximally distally controlled and a 1.5 cm arteriotomy was made in the bypass to which the distal end of the basilic vein was anastomosed using 6-0 Prolene suture in standard running fashion. All bleeding points were controlled. The anastomosis was vented and then flow was restored to the outflow. At this point she had restoration of a strong posterior tibial Doppler signal.    Both anastomoses were inspected and were hemostatic. The abdominal wound was copiously irrigated with antibiotic saline solution. The transversus abdominis, internal and external oblique muscles were reapproximated in the fascia of each individually closed with running 3-0 PDS suture. The subcutaneous tissue was closed with 2 layers of running 3-0 Vicryl suture and the skin was closed with staples. The incision overlying the distal anastomosis was closed with running 3-0 Vicryl suture and closure of skin with staples. Sterile dressings were applied and then the incisions were covered with clean Ioban dressing. This was now turned back to the groin wound. The tissue was very stiff and adherent to a pulsatile mass. There was also a small opening centrally with a small amount of clot. A 10 cm oblique incision was made in the left groin. The tissue was softer proximally, and this was opened first, down to the inguinal ligament. The inguinal ligament was opened and with further dissection, a small amount of bright red blood was encountered. This was easily controlled with finger compression while blunt dissection was continued up under the inguinal ligament. Eventually, the bypass graft was visible and was dissected up to its end-to-end anastomosis with the distal external iliac artery. It appeared that the pulsatility of the graft was actually retrograde bleeding from the femoral-popliteal bypass. Therefore the distal portion of the dacryon graft was clamped. The proximal anastomosis was excised, including a small portion of the distal external iliac artery which was debrided back to healthy tissue.   It was then oversewn with 4-0 Prolene suture and was hemostatic. The end of the dacryon was then dissected down to its anastomosis with the proximal superficial femoral artery. The superficial femoral artery just distal to the anastomosis was clamped and the dacryon graft completely removed. It was removed with a 2 cm portion of the previously placed greater saphenous vein conduit which was tied off just distal to this. The graft was noted to be somewhat slimy. There was no obvious purulence. There was no odor. It was passed off to microbiology for culture. On the base of the wound bed, there was backbleeding from several small orifice ease, possibly profunda branches. These were oversewn with 4-0 Prolene suture. The wound was copiously irrigated with antibiotic saline solution. The soft tissue was closed over the stump of the oversewn external iliac artery as well as over the stump of the existing great saphenous vein conduit. A wound VAC was then applied. The final wound measured approximately 10 cm x 8 cm x 7 cm. The patient tolerated the procedure well. She was extubated in the operating room and taken to the ICU in stable condition. Needle sponge and instrument counts were correct.     Electronically signed by Saurabh Mckeon MD on 11/20/2022 at 6:48 PM

## 2022-11-20 NOTE — BRIEF OP NOTE
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  LEFT FEMORAL GRAFT CULTURE Hardware Hardware CULTURE, SURGICAL Sadie Burns MD 11/20/2022 1802        Implants:  * No implants in log *      Drains:   Negative Pressure Wound Therapy Leg Anterior;Left;Proximal;Upper (Active)       Urinary Catheter 11/20/22 Wagner (Active)       Findings: Extraanatomical left external iliac to femoral bypass via left obturator canal using left basilic vein conduit, Surgicel placed near both proximal & distal anastomoses; excision of infected ileofemoral bypass graft with placement of wound vac overlying graft excision site; L PT & DP Doppler signals present postoperatively     Electronically signed by Kalpana Gage MD on 11/20/2022 at 6:33 PM

## 2022-11-20 NOTE — CONSULTS
Clinical Pharmacy Progress Note    Pharmacy is consulted to dose Vancomycin x1 dose in ED per Dr. Jax Jalloh for surgical site infection. Height:  5 ft 4 in  Weight:  50.1 kg      Assessment/Plan:  Will order Vancomycin 1250mg x1 for administration in ED per ER pharmacy consult. If Vancomycin is to continue on admission and pharmacy is to manage dosing, please re-consult with admission orders.       Please call with questions--  Thanks--  Angel Prince, PharmD, [address] [facility], [facility])   11/20/2022 9:50 AM

## 2022-11-20 NOTE — ED PROVIDER NOTES
[id number] [facility] RESIDENT NOTE     Date of evaluation: 11/20/2022    ADDENDUM:      Care of this patient was assumed from Dr. Earle Baker. The patient was seen for Wound Check (Patient arrives per ems from home. States surgery months ago. [facility] has wound to left groin that did not heal. [facility] had bleeding from wound this am. Pinhole wound noted to left groin)  . The patient's initial evaluation and plan have been discussed with the prior provider who initially evaluated the patient. Nursing Notes, Past Medical Hx, Past Surgical Hx, Social Hx, Allergies, and Family Hx were all reviewed. Diagnostic Results     EKG   N/a    RADIOLOGY:  CTA ABDOMINAL AORTA W BILAT RUNOFF W CONTRAST   Final Result   Impression:      1. Small amount of hemorrhage in the left groin without evidence of active extravasation. 2. 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 3. 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 4. Loculated fluid collection of the medial left thigh which could represent abscess or sterile postoperative fluid collection. 5. Subcutaneous edema and fluid lower extremities. 6. Left pleural effusion.          XR CHEST PORTABLE    (Results Pending)       LABS:   Results for orders placed or performed during the hospital encounter of 11/20/22   CBC with Auto Differential   Result Value Ref Range    WBC 7.4 4.0 - 11.0 K/uL    RBC 2.83 (L) 4.00 - 5.20 M/uL    Hemoglobin 9.1 (L) 12.0 - 16.0 g/dL    Hematocrit 27.1 (L) 36.0 - 48.0 %    MCV 95.6 80.0 - 100.0 fL    MCH 32.1 26.0 - 34.0 pg    MCHC 33.5 31.0 - 36.0 g/dL    RDW 14.8 12.4 - 15.4 %    Platelets 887 022 - 606 K/uL    MPV 7.1 5.0 - 10.5 fL    Neutrophils % 75.7 %    Lymphocytes % 16.6 %    Monocytes % 6.4 %    Eosinophils % 0.9 %    Basophils % 0.4 %    Neutrophils Absolute 5.6 1.7 - 7.7 K/uL    Lymphocytes Absolute 1.2 1.0 - 5.1 K/uL    Monocytes Absolute 0.5 0.0 - 1.3 K/uL    Eosinophils Absolute 0.1 0.0 - 0.6 K/uL    Basophils Absolute 0.0 0.0 - 0.2 K/uL   Comprehensive Metabolic Panel   Result Value Ref Range    Sodium 136 136 - 145 mmol/L    Potassium 5.1 3.5 - 5.1 mmol/L    Chloride 104 99 - 110 mmol/L    CO2 24 21 - 32 mmol/L    Anion Gap 8 3 - 16    Glucose 197 (H) 70 - 99 mg/dL    BUN 13 7 - 20 mg/dL    Creatinine 1.1 0.6 - 1.2 mg/dL    Est, Glom Filt Rate 47 (A) >60    Calcium 9.2 8.3 - 10.6 mg/dL    Total Protein 5.8 (L) 6.4 - 8.2 g/dL    Albumin 3.7 3.4 - 5.0 g/dL    Albumin/Globulin Ratio 1.8 1.1 - 2.2    Total Bilirubin 0.3 0.0 - 1.0 mg/dL    Alkaline Phosphatase 84 40 - 129 U/L    ALT 14 10 - 40 U/L    AST 29 15 - 37 U/L   Protime-INR   Result Value Ref Range    Protime 14.1 11.7 - 14.5 sec    INR 1.10 0.87 - 1.14       RECENT VITALS:  BP: (!) 155/56, Temp: 98.2 °F (36.8 °C), Heart Rate: 68, Resp: 19, SpO2: 100 %     Procedures     none    ED Course          The patient was given the following medications:  Orders Placed This Encounter   Medications    iopamidol (ISOVUE-370) 76 % injection 90 mL    cefepime (MAXIPIME) 2000 mg IVPB minibag     Order Specific Question:   Antimicrobial Indications     Answer:   Surgical Site Infection       CONSULTS:  IP CONSULT TO VASCULAR SURGERY  IP CONSULT TO VASCULAR SURGERY  PHARMACY TO DOSE [address] / KELLY / Dilcia Consuelo is a 80 y.o. female with chronic vascular issues in the lower extremities with multiple previous procedures and ongoing issues with a wound in her groin/leg. Patient was evaluated by the outgoing provider, did not have any acute signs of limb ischemia, and vascular surgery was consulted overnight. They recommended a repeat CT angiogram.  On follow-up with angiogram, there is a pseudoaneurysm and vascular surgery recommends admission for possible graft failure versus graft infection.   They recommend broad-spectrum antibiotics were started down here. Patient is remained hemodynamically stable. She is admitted to vascular surgery. This patient was also evaluated by the attending physician. All care plans were discussed and agreed upon. Clinical Impression     1. Complications due to vascular device, implant, and graft, initial encounter        Disposition     PATIENT REFERRED TO:  No follow-up provider specified.     DISCHARGE MEDICATIONS:  New Prescriptions    No medications on file       DISPOSITION Decision To Admit 11/20/2022 09:26:50 AM          Peter Weeks MD  11/20/22 [id number]

## 2022-11-20 NOTE — ANESTHESIA POSTPROCEDURE EVALUATION
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     Dalila Phase I: Dalila Score: 8    Dalila Phase II:        Anesthesia Post Evaluation    Patient location during evaluation: ICU  Patient participation: complete - patient participated  Level of consciousness: awake and alert  Airway patency: patent  Nausea & Vomiting: no nausea and no vomiting  Complications: no  Cardiovascular status: hemodynamically stable  Respiratory status: acceptable  Hydration status: euvolemic  Multimodal analgesia pain management approach

## 2022-11-21 LAB
ALBUMIN SERPL-MCNC: 2.8 G/DL (ref 3.4–5)
ANION GAP SERPL CALCULATED.3IONS-SCNC: 11 MMOL/L (ref 3–16)
ANION GAP SERPL CALCULATED.3IONS-SCNC: 12 MMOL/L (ref 3–16)
BASOPHILS ABSOLUTE: 0 K/UL (ref 0–0.2)
BASOPHILS RELATIVE PERCENT: 0.1 %
BLOOD BANK DISPENSE STATUS: NORMAL
BLOOD BANK PRODUCT CODE: NORMAL
BPU ID: NORMAL
BUN BLDV-MCNC: 17 MG/DL (ref 7–20)
BUN BLDV-MCNC: 19 MG/DL (ref 7–20)
C-REACTIVE PROTEIN: 12 MG/L (ref 0–5.1)
CALCIUM SERPL-MCNC: 7.7 MG/DL (ref 8.3–10.6)
CALCIUM SERPL-MCNC: 7.8 MG/DL (ref 8.3–10.6)
CHLORIDE BLD-SCNC: 107 MMOL/L (ref 99–110)
CHLORIDE BLD-SCNC: 108 MMOL/L (ref 99–110)
CO2: 15 MMOL/L (ref 21–32)
CO2: 17 MMOL/L (ref 21–32)
CREAT SERPL-MCNC: 1.5 MG/DL (ref 0.6–1.2)
CREAT SERPL-MCNC: 1.7 MG/DL (ref 0.6–1.2)
DESCRIPTION BLOOD BANK: NORMAL
EKG DIAGNOSIS: NORMAL
EKG Q-T INTERVAL: 396 MS
EKG QRS DURATION: 104 MS
EKG QTC CALCULATION (BAZETT): 445 MS
EKG R AXIS: 114 DEGREES
EKG T AXIS: 40 DEGREES
EKG VENTRICULAR RATE: 76 BPM
EOSINOPHILS ABSOLUTE: 0 K/UL (ref 0–0.6)
EOSINOPHILS RELATIVE PERCENT: 0 %
GFR SERPL CREATININE-BSD FRML MDRD: 28 ML/MIN/{1.73_M2}
GFR SERPL CREATININE-BSD FRML MDRD: 32 ML/MIN/{1.73_M2}
GLUCOSE BLD-MCNC: 169 MG/DL (ref 70–99)
GLUCOSE BLD-MCNC: 180 MG/DL (ref 70–99)
GLUCOSE BLD-MCNC: 199 MG/DL (ref 70–99)
GLUCOSE BLD-MCNC: 255 MG/DL (ref 70–99)
GLUCOSE BLD-MCNC: 275 MG/DL (ref 70–99)
HCT VFR BLD CALC: 24 % (ref 36–48)
HCT VFR BLD CALC: 26.7 % (ref 36–48)
HCT VFR BLD CALC: 27.2 % (ref 36–48)
HEMOGLOBIN: 8 G/DL (ref 12–16)
HEMOGLOBIN: 8.9 G/DL (ref 12–16)
HEMOGLOBIN: 9 G/DL (ref 12–16)
LYMPHOCYTES ABSOLUTE: 0.9 K/UL (ref 1–5.1)
LYMPHOCYTES RELATIVE PERCENT: 5.5 %
MAGNESIUM: 3 MG/DL (ref 1.8–2.4)
MCH RBC QN AUTO: 29.2 PG (ref 26–34)
MCH RBC QN AUTO: 29.4 PG (ref 26–34)
MCHC RBC AUTO-ENTMCNC: 32.7 G/DL (ref 31–36)
MCHC RBC AUTO-ENTMCNC: 33.4 G/DL (ref 31–36)
MCV RBC AUTO: 88 FL (ref 80–100)
MCV RBC AUTO: 89.1 FL (ref 80–100)
MONOCYTES ABSOLUTE: 1.2 K/UL (ref 0–1.3)
MONOCYTES RELATIVE PERCENT: 7.2 %
NEUTROPHILS ABSOLUTE: 14.3 K/UL (ref 1.7–7.7)
NEUTROPHILS RELATIVE PERCENT: 87.2 %
PDW BLD-RTO: 17 % (ref 12.4–15.4)
PDW BLD-RTO: 17.1 % (ref 12.4–15.4)
PERFORMED ON: ABNORMAL
PHOSPHORUS: 5.4 MG/DL (ref 2.5–4.9)
PLATELET # BLD: 136 K/UL (ref 135–450)
PLATELET # BLD: 143 K/UL (ref 135–450)
PMV BLD AUTO: 7.2 FL (ref 5–10.5)
PMV BLD AUTO: 7.5 FL (ref 5–10.5)
POTASSIUM SERPL-SCNC: 5.5 MMOL/L (ref 3.5–5.1)
POTASSIUM SERPL-SCNC: 5.9 MMOL/L (ref 3.5–5.1)
PREALBUMIN: 11.1 MG/DL (ref 20–40)
RBC # BLD: 2.73 M/UL (ref 4–5.2)
RBC # BLD: 3.06 M/UL (ref 4–5.2)
SODIUM BLD-SCNC: 135 MMOL/L (ref 136–145)
SODIUM BLD-SCNC: 135 MMOL/L (ref 136–145)
TRANSFERRIN: 147 MG/DL (ref 200–360)
VANCOMYCIN RANDOM: 9.4 UG/ML
WBC # BLD: 15.4 K/UL (ref 4–11)
WBC # BLD: 16.4 K/UL (ref 4–11)

## 2022-11-21 PROCEDURE — 2580000003 HC RX 258: Performed by: STUDENT IN AN ORGANIZED HEALTH CARE EDUCATION/TRAINING PROGRAM

## 2022-11-21 PROCEDURE — 6360000002 HC RX W HCPCS: Performed by: STUDENT IN AN ORGANIZED HEALTH CARE EDUCATION/TRAINING PROGRAM

## 2022-11-21 PROCEDURE — 2580000003 HC RX 258: Performed by: SURGERY

## 2022-11-21 PROCEDURE — 80202 ASSAY OF VANCOMYCIN: CPT

## 2022-11-21 PROCEDURE — 6370000000 HC RX 637 (ALT 250 FOR IP): Performed by: STUDENT IN AN ORGANIZED HEALTH CARE EDUCATION/TRAINING PROGRAM

## 2022-11-21 PROCEDURE — 84466 ASSAY OF TRANSFERRIN: CPT

## 2022-11-21 PROCEDURE — 2700000000 HC OXYGEN THERAPY PER DAY

## 2022-11-21 PROCEDURE — 36415 COLL VENOUS BLD VENIPUNCTURE: CPT

## 2022-11-21 PROCEDURE — 36430 TRANSFUSION BLD/BLD COMPNT: CPT

## 2022-11-21 PROCEDURE — 85027 COMPLETE CBC AUTOMATED: CPT

## 2022-11-21 PROCEDURE — 85014 HEMATOCRIT: CPT

## 2022-11-21 PROCEDURE — 85025 COMPLETE CBC W/AUTO DIFF WBC: CPT

## 2022-11-21 PROCEDURE — 83735 ASSAY OF MAGNESIUM: CPT

## 2022-11-21 PROCEDURE — 84134 ASSAY OF PREALBUMIN: CPT

## 2022-11-21 PROCEDURE — 80069 RENAL FUNCTION PANEL: CPT

## 2022-11-21 PROCEDURE — 86140 C-REACTIVE PROTEIN: CPT

## 2022-11-21 PROCEDURE — 99223 1ST HOSP IP/OBS HIGH 75: CPT | Performed by: INTERNAL MEDICINE

## 2022-11-21 PROCEDURE — 94761 N-INVAS EAR/PLS OXIMETRY MLT: CPT

## 2022-11-21 PROCEDURE — 85018 HEMOGLOBIN: CPT

## 2022-11-21 PROCEDURE — 2000000000 HC ICU R&B

## 2022-11-21 PROCEDURE — 94669 MECHANICAL CHEST WALL OSCILL: CPT

## 2022-11-21 PROCEDURE — 94150 VITAL CAPACITY TEST: CPT

## 2022-11-21 RX ORDER — FUROSEMIDE 10 MG/ML
20 INJECTION INTRAMUSCULAR; INTRAVENOUS ONCE
Status: DISCONTINUED | OUTPATIENT
Start: 2022-11-21 | End: 2022-11-21

## 2022-11-21 RX ORDER — SODIUM CHLORIDE 0.9 % (FLUSH) 0.9 %
5-40 SYRINGE (ML) INJECTION PRN
Status: DISCONTINUED | OUTPATIENT
Start: 2022-11-21 | End: 2022-11-22

## 2022-11-21 RX ORDER — SODIUM CHLORIDE 9 MG/ML
INJECTION, SOLUTION INTRAVENOUS PRN
Status: DISCONTINUED | OUTPATIENT
Start: 2022-11-21 | End: 2022-11-21

## 2022-11-21 RX ORDER — SODIUM CHLORIDE 0.9 % (FLUSH) 0.9 %
5-40 SYRINGE (ML) INJECTION EVERY 12 HOURS SCHEDULED
Status: DISCONTINUED | OUTPATIENT
Start: 2022-11-21 | End: 2022-11-22

## 2022-11-21 RX ORDER — FUROSEMIDE 10 MG/ML
20 INJECTION INTRAMUSCULAR; INTRAVENOUS SEE ADMIN INSTRUCTIONS
Status: DISCONTINUED | OUTPATIENT
Start: 2022-11-21 | End: 2022-11-21

## 2022-11-21 RX ORDER — OXYCODONE HYDROCHLORIDE 5 MG/1
5 TABLET ORAL EVERY 4 HOURS PRN
Status: DISCONTINUED | OUTPATIENT
Start: 2022-11-21 | End: 2022-11-23

## 2022-11-21 RX ORDER — FUROSEMIDE 10 MG/ML
20 INJECTION INTRAMUSCULAR; INTRAVENOUS ONCE
Status: COMPLETED | OUTPATIENT
Start: 2022-11-21 | End: 2022-11-21

## 2022-11-21 RX ORDER — SODIUM CHLORIDE 9 MG/ML
INJECTION, SOLUTION INTRAVENOUS PRN
Status: DISCONTINUED | OUTPATIENT
Start: 2022-11-21 | End: 2022-11-28 | Stop reason: HOSPADM

## 2022-11-21 RX ORDER — SODIUM CHLORIDE 9 MG/ML
INJECTION, SOLUTION INTRAVENOUS CONTINUOUS
Status: DISCONTINUED | OUTPATIENT
Start: 2022-11-21 | End: 2022-11-22

## 2022-11-21 RX ORDER — NALOXONE HYDROCHLORIDE 0.4 MG/ML
0.4 INJECTION, SOLUTION INTRAMUSCULAR; INTRAVENOUS; SUBCUTANEOUS PRN
Status: DISCONTINUED | OUTPATIENT
Start: 2022-11-21 | End: 2022-11-28 | Stop reason: HOSPADM

## 2022-11-21 RX ORDER — OXYCODONE HYDROCHLORIDE 5 MG/1
10 TABLET ORAL EVERY 4 HOURS PRN
Status: DISCONTINUED | OUTPATIENT
Start: 2022-11-21 | End: 2022-11-23

## 2022-11-21 RX ORDER — IPRATROPIUM BROMIDE AND ALBUTEROL SULFATE 2.5; .5 MG/3ML; MG/3ML
1 SOLUTION RESPIRATORY (INHALATION) EVERY 4 HOURS PRN
Status: DISCONTINUED | OUTPATIENT
Start: 2022-11-21 | End: 2022-11-28 | Stop reason: HOSPADM

## 2022-11-21 RX ADMIN — SODIUM CHLORIDE, PRESERVATIVE FREE 10 ML: 5 INJECTION INTRAVENOUS at 09:27

## 2022-11-21 RX ADMIN — ACETAMINOPHEN 1000 MG: 500 TABLET ORAL at 02:13

## 2022-11-21 RX ADMIN — METHOCARBAMOL TABLETS 1000 MG: 500 TABLET, COATED ORAL at 09:09

## 2022-11-21 RX ADMIN — SODIUM CHLORIDE, POTASSIUM CHLORIDE, SODIUM LACTATE AND CALCIUM CHLORIDE: 600; 310; 30; 20 INJECTION, SOLUTION INTRAVENOUS at 02:29

## 2022-11-21 RX ADMIN — ACETAMINOPHEN 1000 MG: 500 TABLET ORAL at 09:09

## 2022-11-21 RX ADMIN — ACETAMINOPHEN 1000 MG: 500 TABLET ORAL at 15:17

## 2022-11-21 RX ADMIN — HEPARIN SODIUM 5000 UNITS: 5000 INJECTION INTRAVENOUS; SUBCUTANEOUS at 09:09

## 2022-11-21 RX ADMIN — SODIUM CHLORIDE: 9 INJECTION, SOLUTION INTRAVENOUS at 06:16

## 2022-11-21 RX ADMIN — SODIUM CHLORIDE, PRESERVATIVE FREE 10 ML: 5 INJECTION INTRAVENOUS at 21:52

## 2022-11-21 RX ADMIN — ACETAMINOPHEN 1000 MG: 500 TABLET ORAL at 21:42

## 2022-11-21 RX ADMIN — SODIUM CHLORIDE, PRESERVATIVE FREE 10 ML: 5 INJECTION INTRAVENOUS at 21:53

## 2022-11-21 RX ADMIN — CEFEPIME HYDROCHLORIDE 1000 MG: 1 INJECTION, POWDER, FOR SOLUTION INTRAMUSCULAR; INTRAVENOUS at 10:54

## 2022-11-21 RX ADMIN — METHOCARBAMOL TABLETS 1000 MG: 500 TABLET, COATED ORAL at 21:42

## 2022-11-21 RX ADMIN — METHOCARBAMOL TABLETS 1000 MG: 500 TABLET, COATED ORAL at 15:17

## 2022-11-21 RX ADMIN — FUROSEMIDE 20 MG: 10 INJECTION, SOLUTION INTRAMUSCULAR; INTRAVENOUS at 22:12

## 2022-11-21 RX ADMIN — INSULIN HUMAN 8 UNITS: 100 INJECTION, SOLUTION PARENTERAL at 21:42

## 2022-11-21 RX ADMIN — VANCOMYCIN HYDROCHLORIDE 750 MG: 10 INJECTION, POWDER, LYOPHILIZED, FOR SOLUTION INTRAVENOUS at 12:35

## 2022-11-21 RX ADMIN — SODIUM CHLORIDE, PRESERVATIVE FREE 10 ML: 5 INJECTION INTRAVENOUS at 08:16

## 2022-11-21 ASSESSMENT — PAIN SCALES - GENERAL
PAINLEVEL_OUTOF10: 0
PAINLEVEL_OUTOF10: 2
PAINLEVEL_OUTOF10: 5
PAINLEVEL_OUTOF10: 2
PAINLEVEL_OUTOF10: 0
PAINLEVEL_OUTOF10: 4
PAINLEVEL_OUTOF10: 0

## 2022-11-21 NOTE — CONSULTS
Plastic Surgery  Resident Consult Note    Reason for Consult: Coverage of left groin wound     History of Present Illness:   Jacqueline Stokes is a 80 y.o. female with Hx of CAD, TIA, HTN, mitral regurgitation, Osteoporosis, PAD, PVD, T2DM. S/p total hip L, IR femoral popliteal bypass, Left leg wound debridement, L femoral endartectomy, Left leg femoral bypass, I&D of L groin with wound vac placement, and excision of infected ileofemoral bypass graft with harvest of L basilic vein and left external iliac artery to femoral bypass through obturator canal (11/20). Patient Presented on 11/20 to [facility] ED with a herald bleed of the left groin. She was taken emergently to the OR for the above surgery on 11/20. A wound vacuum was utilized as a dressing. Plastic surgery is consulted for coverage of wound and reconstruction.      Past Medical History:        Diagnosis Date    Anxiety     Arthritis     At risk for falls     CAD (coronary artery disease)     Cerebral artery occlusion with cerebral infarction (HCC)     TIA--no residual effects    DDD (degenerative disc disease), cervical     Fractures     (L) Hip Fx 4/25/98, L1 fracture from fall 10-31-06    Hyperlipidemia     Hypertension     Mitral regurgitation     Neuropathy     Osteopenia     Osteoporosis     PAD (peripheral artery disease) ([address]     Right LE ischemia    Peripheral neuropathy 6/1/2015    Peripheral vascular disease (HCC)     bilateral lower extremities with edema    Podagra 01/09/2017    Dr. Ramos Daily    Prolonged emergence from general anesthesia     Spinal stenosis     L4-5    Type 2 diabetes mellitus ([address]     Urethral stricture 5 years ago    Urgency of urination        Past Surgical History:        Procedure Laterality Date    ANGIOPLASTY Left 04/18/2022    Left SFA    APPENDECTOMY      CARPAL TUNNEL RELEASE Right 03/29/2019    RIGHT CARPAL TUNNEL RELEASE AND RIGHT MIDDLE FINGER TRIGGER FINGER RELEASE performed by Taras Hill MD at [facility] REMOVAL WITH IMPLANT  [id number]    LEFT EYE EYE CATARACT PHACOEMULSIFICATION INTRAOCULAR LENS    CHOLECYSTECTOMY  01/01/1978    COLONOSCOPY  08/31/1999    diverticulosis    EYE SURGERY Bilateral     bilateral cataract removed    FEMORAL BYPASS Left 6/28/2022    LEFT LEG FEMORAL TO POPLITEAL BYPASS WITH INTRAOPERATIVE ANGIOGRAM performed by Carmen Olivas MD at [facility] 2 Left 02/14/2022    LEFT COMMON FEMORAL ARTERY ENDARTERECTOMY performed by Carmen Olivas MD at [facility] ([facility])  [address] [place] [address]    IR FEMORAL POPLITEAL BYPASS GRAFT Right 08/31/2015    KYPHOSIS SURGERY  11/07/2006    L1, (fractured after a fall)    LEG SURGERY Left 02/14/2022    LEFT LEG WOUND DEBRIDEMENT performed by Carmen Olivas MD at [place] [address] N/A 7/7/2022    INCISION AND DRAINAGE OF LEFT SIDE GROIN WITH PLACEMENT OF WOUND VAC performed by Carmen Olivas MD at [address]  04/25/1998    (L) THR    WRIST FRACTURE SURGERY  01/01/2008    left wrist       Allergies:  Aricept [donepezil hydrochloride], Doxycycline, Ketorolac tromethamine, and Donepezil hcl    Medications:   Home Meds  No current facility-administered medications on file prior to encounter.      Current Outpatient Medications on File Prior to Encounter   Medication Sig Dispense Refill    blood glucose test strips (ONETOUCH ULTRA) strip USE TO TEST BLOOD SUGAR TWICE DAILY 100 strip 1    atorvastatin (LIPITOR) 10 MG tablet Take 1 tablet by mouth daily 30 tablet 3    lisinopril (PRINIVIL;ZESTRIL) 10 MG tablet Take 1 tablet by mouth daily 30 tablet 0    sulfamethoxazole-trimethoprim (BACTRIM;SEPTRA) 400-80 MG per tablet Take 1 tablet by mouth 2 times daily Per Dr Shayy Reyez 60 tablet 5    Multiple Vitamins-Minerals (THERAPEUTIC MULTIVITAMIN-MINERALS) tablet Take 1 tablet by mouth daily      furosemide (LASIX) 20 MG tablet TAKE ONE TABLET BY MOUTH DAILY 90 tablet 1    glimepiride (AMARYL) 1 MG tablet TAKE ONE TABLET BY MOUTH EVERY MORNING BEFORE BREAKFAST 30 tablet 2    metFORMIN (GLUCOPHAGE) 500 MG tablet TAKE ONE TABLET BY MOUTH DAILY 90 tablet 3    acetaminophen (TYLENOL) 325 MG tablet Take 650 mg by mouth every 6 hours as needed for Pain      Lite Touch Lancets MISC Use twice daily when testing blood sugars. 100 each 5    Turmeric 450 MG CAPS Take 450 mg by mouth daily      Cholecalciferol (VITAMIN D3) 2000 UNITS CAPS Take 1 capsule by mouth daily      aspirin EC 81 MG EC tablet Take 1 tablet by mouth daily 30 tablet 3       Current Meds  ondansetron (ZOFRAN-ODT) disintegrating tablet 4 mg, Q8H PRN   Or  ondansetron (ZOFRAN) injection 4 mg, Q6H PRN  heparin (porcine) injection 5,000 Units, BID  [START ON 11/21/2022] vancomycin (VANCOCIN) 750 mg in dextrose 5 % 250 mL IVPB, Q24H  [START ON 11/21/2022] cefepime (MAXIPIME) 1000 mg IVPB minibag, Q24H  [Held by provider] aspirin EC tablet 81 mg, Daily  [Held by provider] atorvastatin (LIPITOR) tablet 10 mg, Daily  [Held by provider] furosemide (LASIX) tablet 20 mg, Daily  sodium chloride flush 0.9 % injection 5-40 mL, 2 times per day  sodium chloride flush 0.9 % injection 5-40 mL, PRN  0.9 % sodium chloride infusion, PRN  lactated ringers infusion, Continuous  HYDROmorphone (DILAUDID) injection 0.25 mg, Q3H PRN   Or  HYDROmorphone (DILAUDID) injection 0.5 mg, Q3H PRN        Family History:   Family History   Problem Relation Age of Onset    Cancer Mother 43    Stroke Father     Hypertension Father        Social History:   TOBACCO:   reports that she has never smoked. She has never used smokeless tobacco.  ETOH:   reports no history of alcohol use. DRUGS:   reports no history of drug use. Review of Systems:     Constitutional: Negative. HENT: Negative. Eyes: Negative. Respiratory: Negative. Cardiovascular: Negative. Gastrointestinal: Negative  Genitourinary: Negative. Musculoskeletal: Negative except for above.    Skin: Negative. Endocrine: Negative. Allergic/Immunologic: Negative. Neurological: Negative. Hematological: Negative. Psychiatric/Behavioral: Negative. Physical exam:    Vitals:    11/20/22 1905 11/20/22 1910 11/20/22 1915 11/20/22 1920   BP:   (!) 100/33    Pulse: 78 78 78 84   Resp: 25 19 19 22   Temp:       TempSrc:       SpO2:  90% 96% (!) 89%   Weight:       Height:           General appearance: alert, no acute distress, grooming appropriate  Eyes: PERRL, no scleral icterus  Neck: trachea midline, no JVD  Chest/Lungs: normal effort, no adventitious breathing, no accessory muscle use, on RA  Cardiovascular: RRR  Abdomen: soft, non-tender, non-distended, no guarding/rigidity   Skin: warm and dry, no rashes  Extremities: no edema, no cyanosis, L groin with wound vac in place with good suction and seal   Neuro: A&Ox3, no focal deficits, sensation intact    Labs:    CBC:   Recent Labs     11/20/22 0456   WBC 7.4   HGB 9.1*   HCT 27.1*   MCV 95.6        BMP:   Recent Labs     11/20/22 0526      K 5.1      CO2 24   BUN 13   CREATININE 1.1     PT/INR:   Recent Labs     11/20/22 0456   PROTIME 14.1   INR 1.10     APTT: No results for input(s): APTT in the last 72 hours.   Liver Profile:   Lab Results   Component Value Date/Time    AST 29 11/20/2022 05:26 AM    ALT 14 11/20/2022 05:26 AM    BILIDIR <0.2 05/13/2021 11:53 AM    BILITOT 0.3 11/20/2022 05:26 AM    ALKPHOS 84 11/20/2022 05:26 AM     Lab Results   Component Value Date/Time    CHOL 99 07/26/2022 12:07 PM    HDL 50 07/26/2022 12:07 PM    HDL 57 01/30/2012 11:20 AM    TRIG 125 07/26/2022 12:07 PM     UA:   Lab Results   Component Value Date/Time    NITRITE neg 11/05/2020 12:36 PM    COLORU Yellow 06/29/2022 11:54 AM    PHUR 5.0 06/29/2022 11:54 AM    [facility] 06/29/2022 11:50 AM    RBCUA 3-4 06/29/2022 11:50 AM    BACTERIA 2+ 06/29/2022 11:50 AM    CLARITYU TURBID 06/29/2022 11:54 AM    SPECGRAV 1.026 06/29/2022 11:54 AM    LEUKOCYTESUR LARGE 06/29/2022 11:54 AM    UROBILINOGEN 0.2 06/29/2022 11:54 AM    BILIRUBINUR Negative 06/29/2022 11:54 AM    BILIRUBINUR neg 11/05/2020 12:36 PM    BLOODU MODERATE 06/29/2022 11:54 AM    GLUCOSEU Negative 06/29/2022 11:54 AM       Imaging:   XR CHEST PORTABLE   Final Result   Impression:   1. No airspace disease. CTA ABDOMINAL AORTA W BILAT RUNOFF W CONTRAST   Final Result   Impression:      1. Small amount of hemorrhage in the left groin without evidence of active extravasation. 2. 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 3. 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 4. Loculated fluid collection of the medial left thigh which could represent abscess or sterile postoperative fluid collection. 5. Subcutaneous edema and fluid lower extremities. 6. Left pleural effusion. Assessment/Plan: This is a 80 y.o. female with Hx of CAD, TIA, HTN, mitral regurgitation, Osteoporosis, PAD, PVD, T2DM and extensive surgical history of the left leg/groin. Patient is afebrile, and mildly tachycardic. Labs show glucose of 326, HgB 5.0. Currently patient is in critical condition. Plastic surgery consulted for assessment and reconstruction of L groin wound. Given that the patient is immediately post op and in ICU, she will benefit from a delay in reconstruction. Immediate surgical intervention is not indicted at this time. - FU nutritional labs  - Strict glucose control  - Plan OR Tuesday       Patient was seen with senior resident and discussed with Dr. Zunilda Panda DO  PGY1, General Surgery  11/20/22  8:00 PM  [employer]  Pager: [phone]    I saw and independently examined the patient today. I agree with the history of present illness, past medical/surgical histories, family history, social history, medication list and allergies as listed. The review of systems is as noted above. My physical exam confirms the findings listed above. Review of labs, pathology reports, radiology reports and medical records confirm the findings noted above. I edited the note where appropriate in italics, strikethrough font, or underline. Unfortunate situation with 89F presenting with herald bleed requiring emergent vascular surgery intervention. Large wound in the abdomen with exposed vessels and protected by vac, thus plastic surgery consulted. Had discussion with patient regarding surgical options - morbidity and possible mortality - as well as nonoperative management. She desires to proceed with flap reconstruction +/- STSG. To OR.     Carlotta Thapa MD  [address] [place] [address] Reconstructive Surgery  [phone]  11/22/22

## 2022-11-21 NOTE — PROGRESS NOTES
Plastic Surgery   Daily Progress Note  Patient: Mitra Jacobo      CC: Wound closure    SUBJECTIVE:   Required 2u PRBC, 10u insuline, and remains on 5L O2 overnight. Afebrile, afib with rate in 70s. Patient currently without pain, comfortable in bed. ROS:   A 14 point review of systems was conducted, significant findings as noted above. All other systems negative. OBJECTIVE:    PHYSICAL EXAM:    Vitals:    11/21/22 0730 11/21/22 0745 11/21/22 0800 11/21/22 0815   BP:   (!) 144/84    Pulse: 67 75 81 78   Resp: 12 17 21 15   Temp:   98 °F (36.7 °C)    TempSrc:   Temporal    SpO2: 99%  97% 95%   Weight:       Height:           General appearance: alert, no acute distress  Eyes: No scleral icterus, EOM grossly intact  Neck: trachea midline, no JVD, neck supple  Chest/Lungs: normal effort with no accessory muscle use, no signs of respiratory distress, on 5L NC  Cardiovascular: RRR   Abdomen: Soft, non-tender, non-distended, no rebound, guarding, or rigidity. Skin: warm and dry, no rashes  Extremities: no edema, no cyanosis. L brachial mepilex with moderate strike through, non tender to palpation. L groin with firm LN on medial groin, unchanged from prior exam, non tender. Mepilex with minor strike through. WV to suction with good seal -100. Groin and incision sites are soft without tenderness and no signs of hematoma or active bleeding. Sensation and mobility intact b/l LE. Genitourinary:  Wagner in place with clear yellow urine  Neuro: A&Ox3, no focal deficits, sensation intact    LABS:   Recent Labs     11/20/22  0456 11/20/22 1956 11/21/22 0318   WBC 7.4  --  16.4*   HGB 9.1* 5.0* 8.9*   HCT 27.1* 15.1* 27.2*   MCV 95.6  --  89.1     --  136        Recent Labs     11/20/22 1956 11/21/22 0318   * 135*   K 5.4* 5.5*    108   CO2 16* 15*   PHOS 4.2 5.4*   BUN 12 17   CREATININE 1.2 1.5*        Recent Labs     11/20/22  0526   AST 29   ALT 14   BILITOT 0.3   ALKPHOS 84      No results for input(s): LIPASE, AMYLASE in the last 72 hours. Recent Labs     11/20/22  0456 11/20/22  0526   PROT  --  5.8*   INR 1.10  --       No results for input(s): CKTOTAL, CKMB, CKMBINDEX, TROPONINI in the last 72 hours. ASSESSMENT & PLAN:   This is a 80y.o. 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 with harvest of left basilic vein and left external iliac artery to femoral bypass through obturator canal 11/20    - will preop and consent  - strict glucose control   - FU nutrition labs  - Plan OR tomorrow for rectofemoris myocutaneous flap with possible skin graft    Tj Holder DO  PGY1, General Surgery  11/21/22  8:34 AM  [employer]  Pager: [phone]    I am post-call today and will not be on campus. Please contact Dr. Sis Bocanegra at [phone] for questions or concerns regarding this patient.

## 2022-11-21 NOTE — PROGRESS NOTES
PACU Transfer Note    Vitals:    11/20/22 1920   BP:    Pulse: 84   Resp: 22   Temp:    SpO2: (!) 89%       No intake/output data recorded. Pain assessment:  level of pain (1-10, 10 severe), 5/10. VS stable.  Report to [facility] RN ICU  Pain Level: 8    Report given to Receiving unit RN.    11/20/2022 7:29 PM

## 2022-11-21 NOTE — CARE COORDINATION
Case Management Assessment  Initial Evaluation    Date/Time of Evaluation: 11/21/2022 10:47 AM  Assessment Completed by: Maxim Winchester    If patient is discharged prior to next notation, then this note serves as note for discharge by case management. 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 9XXA]  Pseudoaneurysm ([facility] [I72.9]                   Date / Time: 11/20/2022  3:24 AM    Patient Admission Status: Inpatient   Readmission Risk (Low < 19, Mod (19-27), High > 27): Readmission Risk Score: 20.5    Current PCP: Abe Smart, DO  PCP verified by CM? Chart Reviewed: Yes      History Provided by: Patient, Other (see comment) (caregiver)  Patient Orientation: Alert and Oriented    Patient Cognition: Alert    Hospitalization in the last 30 days (Readmission):  No    If yes, Readmission Assessment in CM Navigator will be completed. Advance Directives:      Code Status: Full Code   Patient's Primary Decision Maker is: Named in [address] [place] [address]    Primary Decision MakerDow Harvey Child - [phone]    Secondary Decision Maker: Leola Sheppard - Lay Caregiver - [phone]    Discharge Planning:    Patient lives with: Alone, Other (Comment) ([facility]) Type of Home: House  Primary Care Giver: Private caregiver  Patient Support Systems include: Children, Home Care Staff   Current Financial resources:    Current community resources:    Current services prior to admission: Home Care, Durable Medical Equipment            Current DME: Honora Sables, Wheelchair            Type of Home Care services:  [employer], Nursing Services    ADLS  Prior functional level:    Current functional level:      PT AM-PAC:   /24  OT AM-PAC:   /24    Family can provide assistance at DC:     Would you like Case Management to discuss the discharge plan with any other family members/significant others, and if so, who? Plans to Return to Present Housing: Yes  Other Identified Issues/Barriers to RETURNING to current housing: none  Potential Assistance needed at discharge: Home Care            Potential DME:    Patient expects to discharge to: [address] [place] [address] for transportation at discharge:      Financial    Payor: Fidel Thompson / Plan: MEDICARE PART A AND B / Product Type: *No Product type* /     Does insurance require precert for SNF: No    Potential assistance Purchasing Medications:    Meds-to-Beds request:        Pat [address] [place] [address] 36, [address] [phone] [employer] [phone] [address] [facility] [address]  Phone: [phone] Fax: [phone]    Matt Glasgow [id number] - [id number], [address] [phone] [employer] [phone]  [place] [address]  Phone: [phone] Fax: [phone]      Notes:    Factors facilitating achievement of predicted outcomes: Family support, Caregiver support, Motivated, Cooperative, Pleasant, Sense of humor, Good insight into deficits, and Has needed Durable Medical Equipment at home    Barriers to discharge: Medical complications and Wound Care    Additional Case Management Notes: CM spoke with patient and caregiver at bedside. Pt states she has all needed DME at home and works with [facility] and has a private duty caregiver as well- who will be her ride home. Pt is currently on 4L O2, plan to wean off O2 as pt does not have home O2. 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 9XXA]  Pseudoaneurysm ([address] [B30.2]    IF APPLICABLE: The Patient and/or patient representative Toby Marr and her family were provided with a choice of provider and agrees with the discharge plan. Freedom of choice list with basic dialogue that supports the patient's individualized plan of care/goals and shares the quality data associated with the providers was provided to:     Patient Representative Name:       The Patient and/or Patient Representative Agree with the Discharge Plan?       Celestino Bradley  Case Management Department  Ph: [phone] Fax:

## 2022-11-21 NOTE — PROGRESS NOTES
Clinical Pharmacy Progress Note    Vancomycin - Management by Pharmacy    Consult Date(s): 11/20/22  Consulting Provider(s): Neisha    Assessment / Plan  SSTI/ Possible Infected Graft Infection- Vancomycin  Concurrent Antimicrobials: Cefepime - day #2  Day of Vanc Therapy / Ordered Duration: 2 / 7  Current Dosing Method: Bayesian-Guided AUC Dosing --> Intermittent due to increasing Scr  Therapeutic Goal: Trough levels ~15 mg/L  Current Dose / Plan:   Patient with increasing Scr; 1.5 today from 1.1 on admission. UOP dropped overnight, from 0.7 to 0.1 mL/kg/hr. Will stop scheduled dosing for now, and dose intermittently based on levels until renal function stabilizes. Random level today -- pending. Patient received 1250mg IV vancomycin yesterday as a loading dose. Will update this note once random level returns. Addendum @ 11:25 - random level this AM = 9.4 mg/L. Will re-dose with 750mg IV x1 today, then obtain a random level 11/22 AM.    Will continue to monitor clinical condition and make adjustments to regimen as appropriate. Thank you for consulting Pharmacy! Margy Cortes PharmD., [facility]   11/21/2022 8:20 AM  Wireless: 7-0521      Interval Update:  S/p excision of infected ileofemoral bypass graft with Vasc on 11/20. Wound vac placed. Per Plastics, will return to OR on 11/22. Scr increased overnight with drop in UOP. Subjective/Objective:   Robin Frankel is a 80 y.o. female with a PMHx significant for CAD, HTN, HLP, mitral regurgitation, DM type II, cerebral infarct/TIA, PAD s/p LLE femoral bypass with replacement of distal L external iliac and common femoral artery with Dacron graft with resultant continued draining. Presents to the ED with bleeding from L groin site. Started on antibiotics for infected ileofemoral bypass graft. Pharmacy is consulted to dose vancomycin.     Ht Readings from Last 1 Encounters:   11/21/22 5' 4\" (1.626 m)     Wt Readings from Last 1 Encounters:   11/21/22 111 lb 5.3 oz (50.5 kg)     Current & Prior Antimicrobial Regimen(s):  Cefepime (11/20 - Current)  Vancomycin   1250mg IV x1 (11/20)   (11/21)  Intermittent via levels (11/21-current)  Date: Vanc Level: Vanc Dose:   11/20  1250mg   11/21 9.4 mg/L Ordered 750mg   11/22 Ordered         Vancomycin Level(s) / Doses:    Date Time Dose Type of Level / Level Interpretation                 Note: Serum levels collected for AUC-based dosing may be high if collected in close proximity to the dose administered. This is not necessarily indicative of toxicity. Cultures & Sensitivities:    Date Site Micro Susceptibility / Result   11/20 Surgical - hardware In process            Recent Labs     11/20/22  0456 11/20/22  0526 11/20/22  1956 11/21/22  0318   CREATININE  --  1.1 1.2 1.5*   BUN  --  13 12 17   WBC 7.4  --   --  16.4*         Estimated Creatinine Clearance: 19 mL/min (A) (based on SCr of 1.5 mg/dL (H)). Additional Lab Values / Findings of Note:    No results for input(s): PROCAL in the last 72 hours.

## 2022-11-21 NOTE — PLAN OF CARE
Integrity  Goal: Absence of new skin breakdown  Description: 1. Monitor for areas of redness and/or skin breakdown  2. Assess vascular access sites hourly  3. Every 4-6 hours minimum:  Change oxygen saturation probe site  4. Every 4-6 hours:  If on nasal continuous positive airway pressure, respiratory therapy assess nares and determine need for appliance change or resting period. Outcome: Progressing  Note: Absence of new skin breakdown. Patient turned routinely with pillow support. Heels and arms elevated off of bed. Skin assessed. Prophylactic sacral heart in place. Patient on special air mattress. Patient educated on risks associated with prolonged bed rest. Patient checked routinely for incontinence, cleansed thoroughly in its presence. Lines off loaded from skin. Will continue to monitor and reassess.         Problem: Safety - Adult  Goal: Free from fall injury  Outcome: Progressing  Flowsheets (Taken 11/20/2022 2008 by Greer Ryder RN)  Free From Fall Injury:   Instruct family/caregiver on patient safety   Based on caregiver fall risk screen, instruct family/caregiver to ask for assistance with transferring infant if caregiver noted to have fall risk factors

## 2022-11-21 NOTE — PROGRESS NOTES
ICU VASCULAR SURGERY DAILY PROGRESS NOTE    CC: excision of infected ilofemoral bypass graft with harvest of left basilic vein and left external iliac artery to femoral bypass through obturator canal    SUBJECTIVE:   Interval Hx:   Patient doing well post operatively. Initially required 2u PRBC. Requires 5LNC at this time, unchanged from extubation. Blood sugars noted to be in 300s after surgery, was given 10u insulin and responded to 199. Patient denies pain at this time, is comfortable in bed. Is able to move all four extremities spontaneously. Is currently in Afib without RVR, rate in 70s. ROS: A 14 point review of systems was conducted, significant findings as noted above. All other systems negative. OBJECTIVE:   Vitals:   Vitals:    11/21/22 0530 11/21/22 0545 11/21/22 0600 11/21/22 0615   BP:   (!) 128/58    Pulse: 66 66 78 73   Resp: (!) 9 13 21 22   Temp:       TempSrc:       SpO2: 100% 99% 98% 99%   Weight:   111 lb 5.3 oz (50.5 kg)    Height:   5' 4\" (1.626 m)        I/O:   Intake/Output Summary (Last 24 hours) at 11/21/2022 0640  Last data filed at 11/21/2022 0600  Gross per 24 hour   Intake 4857.83 ml   Output 1195 ml   Net 3662.83 ml     I/O last 3 completed shifts: In: 3024.2 [I.V.:3000; IV Piggyback:24.2]  Out: 1075 [Urine:400; Blood:675]    Diet: Diet NPO      Physical Examination:   General appearance: alert, no acute distress, grooming appropriate  Eyes: No scleral icterus, EOM grossly intact  Neck: trachea midline, no JVD, no lymphadenopathy, neck supple  Chest/Lungs: CTAB, no crackles/rales, wheezes/rhonchi, normal effort with no accessory muscle use on 5L NC  Cardiovascular: afib rate in 70s  Abdomen: Soft, non-tender, non-distended, no rebound, guarding, or rigidity. Skin: warm and dry, no rashes  Extremities: no edema, no cyanosis. L brachial mepilex with moderate strike through, non tender to palpation. L groin with firm LN on medial groin, unchanged from prior exam, non tender. Mepilex with minor strike through. WV to suction with good seal -100. Groin and incision sites are soft without tenderness and no signs of hematoma or active bleeding. Sensation and mobility intact b/l LE. Genitourinary: Wagner in place with clear yellow urine  Neuro: A&Ox3, no focal deficits, sensation intact    Pulses    Rad Fem Pop PT DP   Right + + -/+ -/+ -/+   Left + x -/+ -/+ -/-       Labs:  CBC:   Recent Labs     11/20/22 0456 11/20/22 1956 11/21/22 0318   WBC 7.4  --  16.4*   HGB 9.1* 5.0* 8.9*   HCT 27.1* 15.1* 27.2*     --  136       BMP:   Recent Labs     11/20/22 0526 11/20/22 1956 11/21/22 0318    133* 135*   K 5.1 5.4* 5.5*    107 108   CO2 24 16* 15*   BUN 13 12 17   CREATININE 1.1 1.2 1.5*   GLUCOSE 197* 323* 199*     LFT's:   Recent Labs     11/20/22  0526   AST 29   ALT 14   BILITOT 0.3   ALKPHOS 84     Troponin: No results for input(s): TROPONINI in the last 72 hours. BNP: No results for input(s): BNP in the last 72 hours. ABGs: No results for input(s): PHART, CMP6QAT, PO2ART in the last 72 hours. INR:   Recent Labs     11/20/22 0456   INR 1.10       U/A:No results for input(s): NITRITE, COLORU, PHUR, LABCAST, WBCUA, RBCUA, MUCUS, TRICHOMONAS, YEAST, BACTERIA, CLARITYU, SPECGRAV, LEUKOCYTESUR, UROBILINOGEN, BILIRUBINUR, BLOODU, GLUCOSEU, AMORPHOUS in the last 72 hours. Invalid input(s): Caron Loredo     Rad:   XR CHEST PORTABLE   Final Result   Impression:   1. No airspace disease. CTA ABDOMINAL AORTA W BILAT RUNOFF W CONTRAST   Final Result   Impression:      1. Small amount of hemorrhage in the left groin without evidence of active extravasation. 2. 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 3. 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    4. Loculated fluid collection of the medial left thigh which could represent abscess or sterile postoperative fluid collection. 5. Subcutaneous edema and fluid lower extremities. 6. Left pleural effusion. ASSESSMENT AND PLAN:   This is a 80y.o. 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  No prior patient history    Pulmonary:   - 5LNC, wean as tolerated  - IS, acapella, Duo nebs, aggressive RT therapy    GI:   - advance to CLD today, NPO at 0000    FEN:   - K 5.5 from 5.4 post blood transfusion  - Mg 3.00 from 1.5 post replacement  - LR75 changed to NS75  - replace lytes as indicated    /Renal:   - patient urine output low approx 200ml post op  - 200/50/13= 0.3cc/kg/hr  - Patient lasix dependent at home, will give 20IV this AM, to be followed by 1u PRBC    Hem/ID:   - continue abx; cefepime, vanc  - patient had 2u PRBC post op, will continue q6 H&H today  - will follow lasix dose with 1u PRBC    Endo:   - hyperglycemic, hx of T2DM  - insuline sliding scale, high dose 0-15  - strict management for improved wound healing potential    Consults:  - plastic surgery to see patient, plan for wound closure  - will hold off on cardiology for now, post op afib likely due to volume shifts    Prophylaxis:   - SQH    Access:  Peripheral Access: 3x pIV  Arterial Line Access: R radial (11/20)                                Wagner Date placed: 11/20    Mobility:  - OOB if tolerates   - PTOT Wednesday     Dispo:   ICU    SW/HHC:  - will discuss [address] and wound management after plastic surgery, anticipated date 11/22    Code Status: DNR-CCA  -----------------------------    Jordana Werner DO  PGY1, General Surgery  11/21/22  7:36 AM  [employer]  Pager: [phone]    I am post-call today and will not be on campus. Please contact Dr. Dolores Wyatt at [phone] for questions or concerns regarding this patient.

## 2022-11-21 NOTE — PROGRESS NOTES
Patient resting in bed. Low urine out put, pt prefers legs highly elevated with wedges, okayed by Dr. Scott Higgins with general surgery. 2 units of blood transfused with no reaction. Will continue to monitor.

## 2022-11-21 NOTE — PROGRESS NOTES
4 Eyes Admission Assessment     I agree as the admission nurse that 2 RN's have performed a thorough Head to Toe Skin Assessment on the patient. ALL assessment sites listed below have been assessed on admission. Areas assessed by both nurses:   [x]   Head, Face, and Ears   [x]   Shoulders, Back, and Chest  [x]   Arms, Elbows, and Hands   [x]   Coccyx, Sacrum, and Ischium  [x]   Legs, Feet, and Heels        Does the Patient have Skin Breakdown? Multiple surgical wounds noted with LDA. Pt has a wound on LLE shin noted with LDA. Blanchable redness on the sacrum and scattered bruising noted.          Juan Francisco Prevention initiated:  Yes   Wound Care Orders initiated:  Yes      [address] nurse consulted for Pressure Injury (Stage 3,4, Unstageable, DTI, NWPT, and Complex wounds) or Juan Francisco score 18 or lower:  No      Nurse 1 eSignature: Electronically signed by Fabiana Holcomb RN on 11/21/22 at 12:55 AM EST    **SHARE this note so that the co-signing nurse is able to place an eSignature**    Nurse 2 eSignature: Electronically signed by Jenelle Murphy RN on 11/21/22 at 6:33 AM EST

## 2022-11-21 NOTE — CONSULTS
Infectious Diseases Inpatient Consult Note    Reason for Consult:   Infected pseudoaneurysm  Requesting Physician:   Dr Houston Hernandez  Primary Care Physician:  Melia Ren DO  History Obtained From:   Pt, EPIC    Admit Date: 11/20/2022  Hospital Day: 2    CHIEF COMPLAINT:       Chief Complaint   Patient presents with    Wound Check     Patient arrives per ems from home. [facility] surgery months ago. [facility] has wound to left groin that did not heal. [facility] had bleeding from wound this am. Pinhole wound noted to left groin       HISTORY OF PRESENT ILLNESS:      79 yo woman with DM, HNT, CAD, PAD, CVA, lumbar stenosis    Hx L fem endarterectomy 2/14/22, SFA angioplasty 4/18/22, L ileofem (redo, prelaced femoral artery with Dacron graft), L fem to below knee popliteal in-situ bypass graft - 6/28/22    L groin wound I&D 7/7/22  L LE angiogram with boon angioplasty distal bypass graft 10/18/22    Presents with bleeding from L groin. She had chronic wound at site since surg in July. Drain was bloody. Had large amt blood on   No fever / chills    In ED 11/20, afeb, WBC 7.4, UA large leuk    Taken to OR, resection graft, extranatomical L external iliac to femoral a bypass (basilic vein, bypass via L obturator canal). Graft resected, 'somewhat slimy' per OR note.     Admit to ICU, started on Vancomycin + Cefepime      Past Medical History:    Past Medical History:   Diagnosis Date    Anxiety     Arthritis     At risk for falls     CAD (coronary artery disease)     Cerebral artery occlusion with cerebral infarction (HCC)     TIA--no residual effects    DDD (degenerative disc disease), cervical     Fractures     (L) Hip Fx 4/25/98, L1 fracture from fall 10-31-06    Hyperlipidemia     Hypertension     Mitral regurgitation     Neuropathy     Osteopenia     Osteoporosis     PAD (peripheral artery disease) ([address]     Right LE ischemia    Peripheral neuropathy 6/1/2015    Peripheral vascular disease ([address]     bilateral lower extremities with edema    Podagra 01/09/2017    Dr. Marycarmen Jc    Prolonged emergence from general anesthesia     Spinal stenosis     L4-5    Type 2 diabetes mellitus ([address]     Urethral stricture 5 years ago    Urgency of urination        Past Surgical History:    Past Surgical History:   Procedure Laterality Date    ANGIOPLASTY Left 04/18/2022    Left SFA    APPENDECTOMY      AXILLARY-FEMORAL BYPASS GRAFT Left 11/20/2022    EXCISION OF INFECTED ILEOFEMORAL BYPASS GRAFT; HARVEST LEFT BASILIC VEIN; LEFT EXTERNAL ILIAC ARTERY TO FEMORAL BYPASS THROUGH OBTURATOR CANAL; PLACEMENT OF WOUND VAC performed by Gabriel Borden MD at [facility] [address] Right 03/29/2019    RIGHT CARPAL TUNNEL RELEASE AND RIGHT MIDDLE FINGER TRIGGER FINGER RELEASE performed by Sukhjinder Kwon MD at [facility] [address]  [id number]    LEFT EYE EYE CATARACT PHACOEMULSIFICATION INTRAOCULAR LENS    CHOLECYSTECTOMY  01/01/1978    COLONOSCOPY  08/31/1999    diverticulosis    EYE SURGERY Bilateral     bilateral cataract removed    FEMORAL BYPASS Left 6/28/2022    LEFT LEG FEMORAL TO POPLITEAL BYPASS WITH INTRAOPERATIVE ANGIOGRAM performed by Mary Murphy MD at [place] [address] Left 02/14/2022    LEFT COMMON FEMORAL ARTERY ENDARTERECTOMY performed by Mary Murphy MD at [address] [facility] ([facility])  1980s    sallie    IR FEMORAL POPLITEAL BYPASS GRAFT Right 08/31/2015    KYPHOSIS SURGERY  11/07/2006    L1, (fractured after a fall)    LEG SURGERY Left 02/14/2022    LEFT LEG WOUND DEBRIDEMENT performed by Mary Murphy MD at [address] 7/7/2022    INCISION AND DRAINAGE OF LEFT SIDE GROIN WITH PLACEMENT OF WOUND VAC performed by Mary Murphy MD at [facility]  04/25/1998    (L) THR    WRIST FRACTURE SURGERY  01/01/2008    left wrist       Current Medications:     furosemide  20 mg IntraVENous See Admin Instructions    vancomycin (VANCOCIN) intermittent dosing (placeholder)   Other RX Placeholder    sodium chloride flush  5-40 mL IntraVENous 2 times per day    vancomycin  750 mg IntraVENous Once    heparin (porcine)  5,000 Units SubCUTAneous BID    cefepime  1,000 mg IntraVENous Q24H    [Held by provider] aspirin EC  81 mg Oral Daily    [Held by provider] atorvastatin  10 mg Oral Daily    [Held by provider] furosemide  20 mg Oral Daily    sodium chloride flush  5-40 mL IntraVENous 2 times per day    acetaminophen  1,000 mg Oral Q6H    methocarbamol  1,000 mg Oral TID    insulin regular  0-15 Units SubCUTAneous 4x daily       Allergies:  Aricept [donepezil hydrochloride], Doxycycline, Ketorolac tromethamine, and Donepezil hcl    Social History:    TOBACCO:    None   ETOH:    None   DRUGS:   None   MARITAL STATUS:      OCCUPATION:   Retired     Family History:   No immunodeficiency    REVIEW OF SYSTEMS:    No fever / chills / sweats. No weight loss. No visual change, eye pain, eye discharge. No oral lesion, sore throat, dysphagia. Denies cough / sputum. Denies chest pain, palpitations. Denies n / v / abd pain. No diarrhea. Denies dysuria or change in urinary function. Denies joint swelling or pain. No myalgia, arthralgia. Denies skin changes, itching  Denies focal weakness, sensory change or other neurologic symptom    Denies new / worse depression, psychiatric symptoms    PHYSICAL EXAM:      Vitals:    BP (!) 144/84   Pulse 74   Temp 98 °F (36.7 °C) (Temporal)   Resp 16   Ht 5' 4\" (1.626 m)   Wt 111 lb 5.3 oz (50.5 kg)   SpO2 100%   BMI 19.11 kg/m²     GENERAL: No apparent distress.   RA  HEENT: Membranes moist, no oral lesion  NECK:  Supple, no lymphadenopathy  LUNGS: Clear b/l, no rales, no dullness  CARDIAC: RRR, no murmur appreciated  ABD:  + BS, soft / NT -   Lower abd wall wound / dressing   L groin VAC, dressing   EXT:  No rash, no edema, no lesions  L arm with dressing (basilar v harvest site)  L foot with scab /escar, no open wound / drainage / erythema   NEURO: No focal neurologic findings  PSYCH: Orientation, sensorium, mood normal  LINES:  Peripheral iv    DATA:    Lab Results   Component Value Date    WBC 15.4 (H) 2022    HGB 8.0 (L) 2022    HCT 24.0 (L) 2022    MCV 88.0 2022     2022     Lab Results   Component Value Date    CREATININE 1.7 (H) 2022    BUN 19 2022     (L) 2022    K 5.9 (H) 2022     2022    CO2 17 (L) 2022       Hepatic Function Panel:   Lab Results   Component Value Date/Time    ALKPHOS 84 2022 05:26 AM    ALT 14 2022 05:26 AM    AST 29 2022 05:26 AM    PROT 5.8 2022 05:26 AM    PROT 6.4 2012 04:30 PM    BILITOT 0.3 2022 05:26 AM    BILIDIR <0.2 2021 11:53 AM    IBILI see below 2021 11:53 AM    LABALBU 2.8 2022 03:18 AM       Micro:   Surg cult sent     Imagin/20 Abd CTA  1. Small amount of hemorrhage in the left groin without evidence of active extravasation. 2. 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 3. 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 4. Loculated fluid collection of the medial left thigh which could represent abscess or sterile postoperative fluid collection. 5. Subcutaneous edema and fluid lower extremities. 6. Left pleural effusion.      IMPRESSION:      Patient Active Problem List   Diagnosis    Urethral stricture    Spinal stenosis    Lumbar stenosis    Spondylolisthesis of lumbosacral region    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 ([address]    Atherosclerosis of artery of extremity with ulceration (HCC)    Benign essential HTN    Lumbar foraminal stenosis    DDD (degenerative disc disease), lumbar    Spinal stenosis of lumbar region with neurogenic claudication    Type 2 diabetes, controlled, with neuropathy ([address] Severe mitral regurgitation    Venous insufficiency of both lower extremities    Hyperlipidemia    Hip arthritis    Status post carmen arthroplasty replacement of left hip 1999    Femoral artery stenosis, right (HCC)    Osteoporosis    Osteopenia    Femoral-popliteal bypass graft occlusion, left (HCC)    Pain due to onychomycosis of nail    Coronary artery disease involving native coronary artery of native heart without angina pectoris    Diabetic peripheral neuropathy (HCC)    Arthritis of left knee    Trigger middle finger of right hand    Carpal tunnel syndrome, right    Vitamin D deficiency    Carotid stenosis, asymptomatic, bilateral    Fingernail problem    TIA (transient ischemic attack)    Sacral fracture, closed (HCC)    Closed fracture of ramus of right pubis (HCC)    Acute pain of left lower extremity    Diabetes mellitus type 2 with peripheral artery disease (HCC)    Open wound of left knee    Nonhealing nonsurgical wound limited to breakdown of skin    Peripheral vascular disease, unspecified (HCC)    Wound of left leg    Peripheral artery disease ([facility])    Cervical spondylosis    Atherosclerosis of artery of extremity with rest pain ([facility])    Lymph leak    Nonhealing surgical wound    Bypass graft stenosis ([facility])    Atherosclerosis of native artery of left leg with ulceration of ankle ([facility])    Complications due to vascular device, implant, and graft    Pseudoaneurysm ([facility])       Hx DM, HTN, CAD, PAD, CVA, lumbar stenosis    Hx L fem endarterectomy 2/14/22, SFA angioplasty 4/18/22, L ileofem (redo, prelaced femoral artery with Dacron graft), L fem to below knee popliteal in-situ bypass graft - 6/28/22  L groin wound I&D 7/7/22, cult S epidermidis in broth    L LE angiogram with boon angioplasty distal bypass graft 10/18/22  Wound cult 7/20 - heavy MRSA (R clinda, tetra; vanco DANITA <0.5)    POD#1 EXCISION OF INFECTED ILEOFEMORAL BYPASS GRAFT; HARVEST LEFT BASILIC VEIN; LEFT EXTERNAL ILIAC ARTERY TO FEMORAL BYPASS THROUGH OBTURATOR CANAL; PLACEMENT OF WOUND VAC   - evidence infection in surg     RECOMMENDATIONS:    Cont Vancomycin + Cefepime  Will f/u on cult  Postop care per Vascular   Plastic surg wound management  return to OR tentatively 9/22    Medical Decision Making: The following items were considered in medical decision making:  Discussion of patient care with other providers  Reviewed clinical lab tests  Reviewed radiology tests  Reviewed other diagnostic tests/interventions  Independent review of radiologic images - will review w Radiologist   Microbiology cultures and other micro tests reviewed      Risk of Complications/Morbidity: High   Illness(es)/ Infection present that pose threat to bodily function. There is potential for severe exacerbation of infection/side effects of treatment.   Therapy requires intensive monitoring for antimicrobial agent toxicity    Discussed with pt, RN  Crystal Caputo MD

## 2022-11-21 NOTE — PROGRESS NOTES
Clinical Pharmacy Progress Note    Vancomycin - Management by Pharmacy    Consult Date(s): 11/20/22  Consulting Provider(s): Neisha    Assessment / Plan  SSTI/ Possible Infected Graft Infection- Vancomycin  Concurrent Antimicrobials: Cefepime  Day of Vanc Therapy / Ordered Duration: 1 / 7  Current Dosing Method: Bayesian-Guided AUC Dosing  Therapeutic Goal: -600 mg/L*hr  Current Dose / Plan:   Patient with Scr of 1.1 mg/dL (Stable, baseline ~1.1 mg/dL). Patient given a LD of 1250 mg IV x once in the ED. Will start 750 mg IV q24 hours as this dose predicts an AUC of 524 mg/L*hr and ssTrough of 17.3 mg/L. Will check a level tomorrow AM.   Will continue to monitor clinical condition and make adjustments to regimen as appropriate. Thank you for consulting Pharmacy! Venessa Eisenmenger, PharmD  Main Pharmacy: [id number]  11/20/2022 7:25 PM      Subjective/Objective:   Frantz Santiago is a 80 y.o. female with a PMHx significant for anxiety, arthritis CAD, cerebral infract, DDD, HLD, HTN, Mitral Regurgitation, PAD and extensive left groin surgical history and chronic wound who is admitted with bleeding from left groin. Pharmacy is consulted to dose vancomycin. Ht Readings from Last 1 Encounters:   11/20/22 5' 4\" (1.626 m)     Wt Readings from Last 1 Encounters:   11/20/22 110 lb 8 oz (50.1 kg)     Current & Prior Antimicrobial Regimen(s):  Cefepime (11/20 - Current)  Vancomycin (11/22 - Current)    Vancomycin Level(s) / Doses:    Date Time Dose Type of Level / Level Interpretation                 Note: Serum levels collected for AUC-based dosing may be high if collected in close proximity to the dose administered. This is not necessarily indicative of toxicity.     Cultures & Sensitivities:    Date Site Micro Susceptibility / Result   11/20 Surgical Culture Sent            Recent Labs     11/20/22  0456 11/20/22  0526   CREATININE  --  1.1   BUN  --  13   WBC 7.4  --        Estimated Creatinine Clearance: 25 mL/min (based on SCr of 1.1 mg/dL). Additional Lab Values / Findings of Note:    No results for input(s): PROCAL in the last 72 hours.

## 2022-11-21 NOTE — PROGRESS NOTES
Pt is NPO but Dr. Iglesia Yi with general surgery said that pt can sips of water with meds. Critical H&H blood released and awaiting on blood bank to verify and send. Will continue to monitor.

## 2022-11-21 NOTE — PROGRESS NOTES
Pharmacy Note - Extended Infusion Beta-Lactam Adjustment    Cefepime ordered for treatment of SSTI/Infected Graft. Per [facility] Extended Infusion Beta-Lactam Policy, Cefepime will be changed to 1g Q24 hr EI. Estimated Creatinine Clearance: Estimated Creatinine Clearance: 25 mL/min (based on SCr of 1.1 mg/dL). Dialysis Status, JASE, CKD: CKD/ Age  BMI: Body mass index is 18.97 kg/m². Rationale for Adjustment: Agent is renally eliminated and demonstrates time-dependent effect on bacterial eradication. Extended-infusion dosing strategy aims to enhance microbiologic and clinical efficacy. Pharmacy will continue to monitor renal function, cultures and sensitivities (where available) and adjust dose as necessary. Please call with any questions.     Julio Mckee PharmD  Main Pharmacy: [id number]  11/20/2022 7:21 PM

## 2022-11-21 NOTE — PROGRESS NOTES
PRE-OP NOTE  Department of Surgery    Procedure: Left groin wound flap closure, possible adjacent tissue transfer      Consent: Written consent obtained from patient. Pt wished to remain DNR for procedure. Labs    Recent Labs     22   WBC 7.4  --  16.4*   HGB 9.1* 5.0* 8.9*   HCT 27.1* 15.1* 27.2*   MCV 95.6  --  89.1     --  136        Recent Labs     22   * 135*   K 5.4* 5.5*    108   CO2 16* 15*   PHOS 4.2 5.4*   BUN 12 17   CREATININE 1.2 1.5*        Recent Labs     22  05   AST 29   ALT 14   BILITOT 0.3   ALKPHOS 84      No results for input(s): LIPASE, AMYLASE in the last 72 hours. Recent Labs     22   PROT  --  5.8*   INR 1.10  --       No results for input(s): CKTOTAL, CKMB, CKMBINDEX, TROPONINI in the last 72 hours. CXR:    Findings: Curvature thoracic spine convex to the left consistent with scoliosis. Compression fracture status post vertebroplasty spine. Cardiac silhouette stable in size. No pneumothorax. No airspace disease or effusion. Calcified granuloma left lung    base. EK22 reviewed. Afib with controlled rate. IV Access Right Forearm PIV      Orders:   1. Diet: NPO at 0001,  IVF 0.9 NaCL @ 100   2. Pre op Medications:  Continue cefepime and vancomycin   3. Labs to be drawn: N/A. Type and screen collected  @ 0456. Draw renal, magnesium, and CBC with differential  4. Ensure 2 functional pIV's prior to going to the OR.        Ramiro Hunt DO   PGY1, General Surgery  22  4:06 PM  Pager # [phone]

## 2022-11-21 NOTE — PROGRESS NOTES
Department of Surgery:  Post-op Note    CC: excision of infected ilofemoral bypass graft with harvest of left basilic vein and left external iliac artery to femoral bypass through obturator canal    Subjective:   Pain is tolerable at this time. Patient mildly tachycardic up to 109. SBP , DBP 33-55, MAP 62-80. BP cuff and arterial line discordant. Patient moving in bed, elevating legs and turning. She denies nausea or vomiting post op. Denies BM or flatus. Urinating via espinoza. Objective:  Anesthesia type: General      I/O    Intra op    Post op     Fluids  600 mL 2400 mL      mL loss 2u PRBC given     Urine 400 mL 100 mL     Physical Exam:  Vitals:    11/20/22 2150 11/20/22 2155 11/20/22 2200 11/20/22 2215   BP:   (!) 131/54 (!) 117/44   Pulse: (!) 104 (!) 101 96 99   Resp: 22 (!) 9 15 14   Temp:   96.9 °F (36.1 °C)    TempSrc:   Temporal    SpO2:   95%    Weight:       Height:           General appearance: alert, no acute distress, grooming appropriate  Chest/Lungs: no adventitious breath sounds, normal effort on 4LNC  Cardiovascular: afib, mild tachycardia up to 106  Abdomen: soft, non tender, non-distended  : Espinoza catheter with clear yellow urine  Extremities: no edema, no cyanosis, sensation and mobility of LE intact b/l, firmness on mid medial L thigh consistent with preop exam, incisions c/d/I with minimal strike through of mepilex. Wound vac with good seal and suction.    Neuro: A&Ox3, no focal deficits, sensation intact    Pulses    Fem Pop PT DP   Right -/+ -/+ -/+ -/+   Left x -/+ -/+ -/-     Assessment and Plan  This is a 80y.o. 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 with harvest of left basilic vein and left external iliac artery to femoral bypass through obturator canal POD #0    Neuro:   Pain/sedation/psych  - continue multimodal pain control; robaxin, tylenol, dilaudid    Cardiovascular:   - 2U PRBC given postop   - will discuss restarting home meds; lipitor    Pulmonary:   - extubated, encourage hourly incentive spirometry and deep breathing  - wean O2 when able    GI:   - NPO, start clears tomorrow  - Zofran prn    FEN:   - continue LR 75  - NPO for now  - replace lytes as indicated  - Post op renal: K 5.4,  Mg 1.5, phos 4.2  - FU nutrition labs    /Renal:   - continue espinoza, strict I/O   - Lasix dependent at home, will discuss with team restart time  - UOP marginal 100ml/5hr= 0.4cc/kg/hr    Hem/ID:   - post op HgB 5.0  - 2U PRBC given post op  - FU H&H  - continue cefepime, vanc  - FU ID recs    Endo:   - glucose 992,150,128   - 10u insuline given    Prophylaxis:   SQH    Access:  Peripheral Access: 3x pIV  Arterial Line Access: R radial (11/20)                                Espinoza Date placed: 11/20    Mobility:  Bed rest    Dispo:   ICU    Code Status: DNR-CCA    Jose Raul Sharif DO  PGY1, General Surgery  11/20/22  11:08 PM  Luis A  Pager: [phone]

## 2022-11-22 ENCOUNTER — ANESTHESIA (OUTPATIENT)
Dept: OPERATING ROOM | Age: 87
DRG: 271 | End: 2022-11-22
Payer: MEDICARE

## 2022-11-22 ENCOUNTER — ANESTHESIA EVENT (OUTPATIENT)
Dept: OPERATING ROOM | Age: 87
DRG: 271 | End: 2022-11-22
Payer: MEDICARE

## 2022-11-22 LAB
ABO/RH: NORMAL
ALBUMIN SERPL-MCNC: 2.8 G/DL (ref 3.4–5)
ANION GAP SERPL CALCULATED.3IONS-SCNC: 9 MMOL/L (ref 3–16)
ANTIBODY SCREEN: NORMAL
BASOPHILS ABSOLUTE: 0 K/UL (ref 0–0.2)
BASOPHILS RELATIVE PERCENT: 0.3 %
BUN BLDV-MCNC: 25 MG/DL (ref 7–20)
CALCIUM SERPL-MCNC: 7.6 MG/DL (ref 8.3–10.6)
CHLORIDE BLD-SCNC: 111 MMOL/L (ref 99–110)
CO2: 16 MMOL/L (ref 21–32)
CREAT SERPL-MCNC: 2.2 MG/DL (ref 0.6–1.2)
EKG ATRIAL RATE: 71 BPM
EKG DIAGNOSIS: NORMAL
EKG Q-T INTERVAL: 416 MS
EKG QRS DURATION: 100 MS
EKG QTC CALCULATION (BAZETT): 458 MS
EKG R AXIS: 50 DEGREES
EKG T AXIS: 21 DEGREES
EKG VENTRICULAR RATE: 73 BPM
EOSINOPHILS ABSOLUTE: 0 K/UL (ref 0–0.6)
EOSINOPHILS RELATIVE PERCENT: 0.3 %
GFR SERPL CREATININE-BSD FRML MDRD: 20 ML/MIN/{1.73_M2}
GLUCOSE BLD-MCNC: 170 MG/DL (ref 70–99)
GLUCOSE BLD-MCNC: 188 MG/DL (ref 70–99)
GLUCOSE BLD-MCNC: 190 MG/DL (ref 70–99)
GLUCOSE BLD-MCNC: 69 MG/DL (ref 70–99)
GLUCOSE BLD-MCNC: 78 MG/DL (ref 70–99)
GLUCOSE BLD-MCNC: 96 MG/DL (ref 70–99)
HCT VFR BLD CALC: 26.4 % (ref 36–48)
HEMOGLOBIN: 8.8 G/DL (ref 12–16)
INR BLD: 1.17 (ref 0.87–1.14)
LYMPHOCYTES ABSOLUTE: 1.7 K/UL (ref 1–5.1)
LYMPHOCYTES RELATIVE PERCENT: 13.6 %
MAGNESIUM: 2.5 MG/DL (ref 1.8–2.4)
MCH RBC QN AUTO: 29.7 PG (ref 26–34)
MCHC RBC AUTO-ENTMCNC: 33.5 G/DL (ref 31–36)
MCV RBC AUTO: 88.7 FL (ref 80–100)
MONOCYTES ABSOLUTE: 0.9 K/UL (ref 0–1.3)
MONOCYTES RELATIVE PERCENT: 6.8 %
NEUTROPHILS ABSOLUTE: 10 K/UL (ref 1.7–7.7)
NEUTROPHILS RELATIVE PERCENT: 79 %
PDW BLD-RTO: 16.3 % (ref 12.4–15.4)
PERFORMED ON: ABNORMAL
PERFORMED ON: NORMAL
PERFORMED ON: NORMAL
PHOSPHORUS: 4.4 MG/DL (ref 2.5–4.9)
PLATELET # BLD: 117 K/UL (ref 135–450)
PMV BLD AUTO: 7.1 FL (ref 5–10.5)
POTASSIUM SERPL-SCNC: 5.2 MMOL/L (ref 3.5–5.1)
PROTHROMBIN TIME: 14.8 SEC (ref 11.7–14.5)
RBC # BLD: 2.97 M/UL (ref 4–5.2)
SODIUM BLD-SCNC: 136 MMOL/L (ref 136–145)
VANCOMYCIN RANDOM: 16.6 UG/ML
WBC # BLD: 12.6 K/UL (ref 4–11)

## 2022-11-22 PROCEDURE — 6360000002 HC RX W HCPCS: Performed by: SURGERY

## 2022-11-22 PROCEDURE — 86901 BLOOD TYPING SEROLOGIC RH(D): CPT

## 2022-11-22 PROCEDURE — 0KBR0ZZ EXCISION OF LEFT UPPER LEG MUSCLE, OPEN APPROACH: ICD-10-PCS | Performed by: SURGERY

## 2022-11-22 PROCEDURE — 36415 COLL VENOUS BLD VENIPUNCTURE: CPT

## 2022-11-22 PROCEDURE — 3700000001 HC ADD 15 MINUTES (ANESTHESIA): Performed by: SURGERY

## 2022-11-22 PROCEDURE — 6370000000 HC RX 637 (ALT 250 FOR IP): Performed by: SURGERY

## 2022-11-22 PROCEDURE — 2580000003 HC RX 258: Performed by: SURGERY

## 2022-11-22 PROCEDURE — C9290 INJ, BUPIVACAINE LIPOSOME: HCPCS | Performed by: SURGERY

## 2022-11-22 PROCEDURE — 2500000003 HC RX 250 WO HCPCS

## 2022-11-22 PROCEDURE — 6360000002 HC RX W HCPCS: Performed by: STUDENT IN AN ORGANIZED HEALTH CARE EDUCATION/TRAINING PROGRAM

## 2022-11-22 PROCEDURE — 15100 SPLT AGRFT T/A/L 1ST 100SQCM: CPT | Performed by: SURGERY

## 2022-11-22 PROCEDURE — 2580000003 HC RX 258: Performed by: STUDENT IN AN ORGANIZED HEALTH CARE EDUCATION/TRAINING PROGRAM

## 2022-11-22 PROCEDURE — 80202 ASSAY OF VANCOMYCIN: CPT

## 2022-11-22 PROCEDURE — 2500000003 HC RX 250 WO HCPCS: Performed by: NURSE ANESTHETIST, CERTIFIED REGISTERED

## 2022-11-22 PROCEDURE — 99232 SBSQ HOSP IP/OBS MODERATE 35: CPT | Performed by: INTERNAL MEDICINE

## 2022-11-22 PROCEDURE — 6360000002 HC RX W HCPCS

## 2022-11-22 PROCEDURE — 0HRAX74 REPLACEMENT OF INGUINAL SKIN WITH AUTOLOGOUS TISSUE SUBSTITUTE, PARTIAL THICKNESS, EXTERNAL APPROACH: ICD-10-PCS | Performed by: SURGERY

## 2022-11-22 PROCEDURE — 2000000000 HC ICU R&B

## 2022-11-22 PROCEDURE — 2709999900 HC NON-CHARGEABLE SUPPLY: Performed by: SURGERY

## 2022-11-22 PROCEDURE — 93005 ELECTROCARDIOGRAM TRACING: CPT

## 2022-11-22 PROCEDURE — 85610 PROTHROMBIN TIME: CPT

## 2022-11-22 PROCEDURE — 3600000004 HC SURGERY LEVEL 4 BASE: Performed by: SURGERY

## 2022-11-22 PROCEDURE — 80069 RENAL FUNCTION PANEL: CPT

## 2022-11-22 PROCEDURE — 83735 ASSAY OF MAGNESIUM: CPT

## 2022-11-22 PROCEDURE — 15738 MUSCLE-SKIN GRAFT LEG: CPT | Performed by: SURGERY

## 2022-11-22 PROCEDURE — 93010 ELECTROCARDIOGRAM REPORT: CPT | Performed by: INTERNAL MEDICINE

## 2022-11-22 PROCEDURE — 86850 RBC ANTIBODY SCREEN: CPT

## 2022-11-22 PROCEDURE — 99223 1ST HOSP IP/OBS HIGH 75: CPT | Performed by: SURGERY

## 2022-11-22 PROCEDURE — 6360000002 HC RX W HCPCS: Performed by: NURSE ANESTHETIST, CERTIFIED REGISTERED

## 2022-11-22 PROCEDURE — 94669 MECHANICAL CHEST WALL OSCILL: CPT

## 2022-11-22 PROCEDURE — 6370000000 HC RX 637 (ALT 250 FOR IP)

## 2022-11-22 PROCEDURE — 94150 VITAL CAPACITY TEST: CPT

## 2022-11-22 PROCEDURE — 2580000003 HC RX 258: Performed by: NURSE ANESTHETIST, CERTIFIED REGISTERED

## 2022-11-22 PROCEDURE — 85025 COMPLETE CBC W/AUTO DIFF WBC: CPT

## 2022-11-22 PROCEDURE — 3600000014 HC SURGERY LEVEL 4 ADDTL 15MIN: Performed by: SURGERY

## 2022-11-22 PROCEDURE — 86900 BLOOD TYPING SEROLOGIC ABO: CPT

## 2022-11-22 PROCEDURE — 3700000000 HC ANESTHESIA ATTENDED CARE: Performed by: SURGERY

## 2022-11-22 PROCEDURE — 2500000003 HC RX 250 WO HCPCS: Performed by: SURGERY

## 2022-11-22 PROCEDURE — 0KXR0ZZ TRANSFER LEFT UPPER LEG MUSCLE, OPEN APPROACH: ICD-10-PCS | Performed by: SURGERY

## 2022-11-22 PROCEDURE — 94761 N-INVAS EAR/PLS OXIMETRY MLT: CPT

## 2022-11-22 PROCEDURE — 6370000000 HC RX 637 (ALT 250 FOR IP): Performed by: STUDENT IN AN ORGANIZED HEALTH CARE EDUCATION/TRAINING PROGRAM

## 2022-11-22 RX ORDER — LABETALOL HYDROCHLORIDE 5 MG/ML
10 INJECTION, SOLUTION INTRAVENOUS
Status: DISCONTINUED | OUTPATIENT
Start: 2022-11-22 | End: 2022-11-22

## 2022-11-22 RX ORDER — VANCOMYCIN HYDROCHLORIDE 1 G/20ML
INJECTION, POWDER, LYOPHILIZED, FOR SOLUTION INTRAVENOUS PRN
Status: DISCONTINUED | OUTPATIENT
Start: 2022-11-22 | End: 2022-11-22 | Stop reason: ALTCHOICE

## 2022-11-22 RX ORDER — LIDOCAINE HYDROCHLORIDE 20 MG/ML
INJECTION, SOLUTION INFILTRATION; PERINEURAL PRN
Status: DISCONTINUED | OUTPATIENT
Start: 2022-11-22 | End: 2022-11-22 | Stop reason: SDUPTHER

## 2022-11-22 RX ORDER — SODIUM CHLORIDE 9 MG/ML
INJECTION, SOLUTION INTRAVENOUS CONTINUOUS
Status: DISCONTINUED | OUTPATIENT
Start: 2022-11-22 | End: 2022-11-22

## 2022-11-22 RX ORDER — SODIUM CHLORIDE, SODIUM LACTATE, POTASSIUM CHLORIDE, CALCIUM CHLORIDE 600; 310; 30; 20 MG/100ML; MG/100ML; MG/100ML; MG/100ML
INJECTION, SOLUTION INTRAVENOUS CONTINUOUS PRN
Status: DISCONTINUED | OUTPATIENT
Start: 2022-11-22 | End: 2022-11-22 | Stop reason: SDUPTHER

## 2022-11-22 RX ORDER — OXYCODONE HYDROCHLORIDE 5 MG/1
5 TABLET ORAL PRN
Status: DISCONTINUED | OUTPATIENT
Start: 2022-11-22 | End: 2022-11-22

## 2022-11-22 RX ORDER — DIPHENHYDRAMINE HYDROCHLORIDE 50 MG/ML
12.5 INJECTION INTRAMUSCULAR; INTRAVENOUS
Status: DISCONTINUED | OUTPATIENT
Start: 2022-11-22 | End: 2022-11-22

## 2022-11-22 RX ORDER — MINERAL OIL
OIL (ML) MISCELLANEOUS PRN
Status: DISCONTINUED | OUTPATIENT
Start: 2022-11-22 | End: 2022-11-22 | Stop reason: ALTCHOICE

## 2022-11-22 RX ORDER — SODIUM CHLORIDE, SODIUM LACTATE, POTASSIUM CHLORIDE, CALCIUM CHLORIDE 600; 310; 30; 20 MG/100ML; MG/100ML; MG/100ML; MG/100ML
INJECTION, SOLUTION INTRAVENOUS CONTINUOUS
Status: DISCONTINUED | OUTPATIENT
Start: 2022-11-22 | End: 2022-11-23

## 2022-11-22 RX ORDER — ONDANSETRON 2 MG/ML
4 INJECTION INTRAMUSCULAR; INTRAVENOUS
Status: DISCONTINUED | OUTPATIENT
Start: 2022-11-22 | End: 2022-11-22

## 2022-11-22 RX ORDER — ROCURONIUM BROMIDE 10 MG/ML
INJECTION, SOLUTION INTRAVENOUS PRN
Status: DISCONTINUED | OUTPATIENT
Start: 2022-11-22 | End: 2022-11-22 | Stop reason: SDUPTHER

## 2022-11-22 RX ORDER — SODIUM CHLORIDE 9 MG/ML
25 INJECTION, SOLUTION INTRAVENOUS PRN
Status: DISCONTINUED | OUTPATIENT
Start: 2022-11-22 | End: 2022-11-22

## 2022-11-22 RX ORDER — LORAZEPAM 2 MG/ML
0.5 INJECTION INTRAMUSCULAR
Status: DISCONTINUED | OUTPATIENT
Start: 2022-11-22 | End: 2022-11-22

## 2022-11-22 RX ORDER — OXYCODONE HYDROCHLORIDE 5 MG/1
10 TABLET ORAL PRN
Status: DISCONTINUED | OUTPATIENT
Start: 2022-11-22 | End: 2022-11-22

## 2022-11-22 RX ORDER — DEXTROSE AND SODIUM CHLORIDE 5; .45 G/100ML; G/100ML
INJECTION, SOLUTION INTRAVENOUS CONTINUOUS
Status: DISCONTINUED | OUTPATIENT
Start: 2022-11-22 | End: 2022-11-22

## 2022-11-22 RX ORDER — SODIUM CHLORIDE 0.9 % (FLUSH) 0.9 %
5-40 SYRINGE (ML) INJECTION PRN
Status: DISCONTINUED | OUTPATIENT
Start: 2022-11-22 | End: 2022-11-22

## 2022-11-22 RX ORDER — LIDOCAINE HYDROCHLORIDE AND EPINEPHRINE 10; 10 MG/ML; UG/ML
INJECTION, SOLUTION INFILTRATION; PERINEURAL PRN
Status: DISCONTINUED | OUTPATIENT
Start: 2022-11-22 | End: 2022-11-22 | Stop reason: ALTCHOICE

## 2022-11-22 RX ORDER — HYDRALAZINE HYDROCHLORIDE 20 MG/ML
5 INJECTION INTRAMUSCULAR; INTRAVENOUS EVERY 6 HOURS PRN
Status: DISCONTINUED | OUTPATIENT
Start: 2022-11-22 | End: 2022-11-28 | Stop reason: HOSPADM

## 2022-11-22 RX ORDER — ONDANSETRON 2 MG/ML
INJECTION INTRAMUSCULAR; INTRAVENOUS PRN
Status: DISCONTINUED | OUTPATIENT
Start: 2022-11-22 | End: 2022-11-22 | Stop reason: SDUPTHER

## 2022-11-22 RX ORDER — PROCHLORPERAZINE EDISYLATE 5 MG/ML
5 INJECTION INTRAMUSCULAR; INTRAVENOUS
Status: DISCONTINUED | OUTPATIENT
Start: 2022-11-22 | End: 2022-11-22

## 2022-11-22 RX ORDER — SODIUM CHLORIDE 0.9 % (FLUSH) 0.9 %
5-40 SYRINGE (ML) INJECTION EVERY 12 HOURS SCHEDULED
Status: DISCONTINUED | OUTPATIENT
Start: 2022-11-22 | End: 2022-11-22

## 2022-11-22 RX ORDER — PROPOFOL 10 MG/ML
INJECTION, EMULSION INTRAVENOUS PRN
Status: DISCONTINUED | OUTPATIENT
Start: 2022-11-22 | End: 2022-11-22 | Stop reason: SDUPTHER

## 2022-11-22 RX ORDER — FENTANYL CITRATE 50 UG/ML
25 INJECTION, SOLUTION INTRAMUSCULAR; INTRAVENOUS EVERY 5 MIN PRN
Status: DISCONTINUED | OUTPATIENT
Start: 2022-11-22 | End: 2022-11-22

## 2022-11-22 RX ADMIN — ONDANSETRON 4 MG: 2 INJECTION INTRAMUSCULAR; INTRAVENOUS at 17:49

## 2022-11-22 RX ADMIN — HYDROMORPHONE HYDROCHLORIDE 0.5 MG: 1 INJECTION, SOLUTION INTRAMUSCULAR; INTRAVENOUS; SUBCUTANEOUS at 18:49

## 2022-11-22 RX ADMIN — CEFEPIME HYDROCHLORIDE 1000 MG: 1 INJECTION, POWDER, FOR SOLUTION INTRAMUSCULAR; INTRAVENOUS at 09:35

## 2022-11-22 RX ADMIN — HYDROMORPHONE HYDROCHLORIDE 0.5 MG: 1 INJECTION, SOLUTION INTRAMUSCULAR; INTRAVENOUS; SUBCUTANEOUS at 23:17

## 2022-11-22 RX ADMIN — ACETAMINOPHEN 1000 MG: 500 TABLET ORAL at 19:59

## 2022-11-22 RX ADMIN — VANCOMYCIN HYDROCHLORIDE 500 MG: 5 INJECTION, POWDER, LYOPHILIZED, FOR SOLUTION INTRAVENOUS at 08:51

## 2022-11-22 RX ADMIN — LIDOCAINE HYDROCHLORIDE 50 MG: 20 INJECTION, SOLUTION INFILTRATION; PERINEURAL at 15:38

## 2022-11-22 RX ADMIN — SODIUM CHLORIDE: 9 INJECTION, SOLUTION INTRAVENOUS at 07:10

## 2022-11-22 RX ADMIN — DEXTROSE AND SODIUM CHLORIDE: 5; 450 INJECTION, SOLUTION INTRAVENOUS at 09:50

## 2022-11-22 RX ADMIN — SODIUM CHLORIDE, PRESERVATIVE FREE 10 ML: 5 INJECTION INTRAVENOUS at 08:57

## 2022-11-22 RX ADMIN — SODIUM CHLORIDE, PRESERVATIVE FREE 10 ML: 5 INJECTION INTRAVENOUS at 08:58

## 2022-11-22 RX ADMIN — SODIUM CHLORIDE, SODIUM LACTATE, POTASSIUM CHLORIDE, AND CALCIUM CHLORIDE: .6; .31; .03; .02 INJECTION, SOLUTION INTRAVENOUS at 15:40

## 2022-11-22 RX ADMIN — ACETAMINOPHEN 1000 MG: 500 TABLET ORAL at 08:53

## 2022-11-22 RX ADMIN — SUGAMMADEX 200 MG: 100 INJECTION, SOLUTION INTRAVENOUS at 17:52

## 2022-11-22 RX ADMIN — PROPOFOL 50 MG: 10 INJECTION, EMULSION INTRAVENOUS at 15:40

## 2022-11-22 RX ADMIN — ROCURONIUM BROMIDE 50 MG: 10 INJECTION INTRAVENOUS at 15:40

## 2022-11-22 RX ADMIN — ACETAMINOPHEN 1000 MG: 500 TABLET ORAL at 03:19

## 2022-11-22 RX ADMIN — SODIUM CHLORIDE, POTASSIUM CHLORIDE, SODIUM LACTATE AND CALCIUM CHLORIDE: 600; 310; 30; 20 INJECTION, SOLUTION INTRAVENOUS at 22:05

## 2022-11-22 ASSESSMENT — PAIN SCALES - GENERAL
PAINLEVEL_OUTOF10: 7
PAINLEVEL_OUTOF10: 0
PAINLEVEL_OUTOF10: 9
PAINLEVEL_OUTOF10: 9
PAINLEVEL_OUTOF10: 0
PAINLEVEL_OUTOF10: 7
PAINLEVEL_OUTOF10: 7
PAINLEVEL_OUTOF10: 0

## 2022-11-22 ASSESSMENT — PAIN DESCRIPTION - ORIENTATION
ORIENTATION: LEFT
ORIENTATION: LEFT

## 2022-11-22 ASSESSMENT — PAIN - FUNCTIONAL ASSESSMENT
PAIN_FUNCTIONAL_ASSESSMENT: PREVENTS OR INTERFERES WITH ALL ACTIVE AND SOME PASSIVE ACTIVITIES
PAIN_FUNCTIONAL_ASSESSMENT: PREVENTS OR INTERFERES WITH ALL ACTIVE AND SOME PASSIVE ACTIVITIES

## 2022-11-22 ASSESSMENT — PAIN DESCRIPTION - DESCRIPTORS
DESCRIPTORS: SHARP
DESCRIPTORS: SHOOTING

## 2022-11-22 ASSESSMENT — PAIN DESCRIPTION - PAIN TYPE: TYPE: SURGICAL PAIN

## 2022-11-22 ASSESSMENT — PAIN DESCRIPTION - LOCATION
LOCATION: LEG
LOCATION: INCISION;LEG

## 2022-11-22 NOTE — BRIEF OP NOTE
Brief Postoperative Note      Patient: Beatriz Alexandre  YOB: 1929  MRN: [id number]    Date of Procedure: 11/22/2022    Pre-Op Diagnosis: Wound of left groin, initial encounter [S31.109A]    Post-Op Diagnosis: Same       Procedure(s):  DEBRIDEMENT OF LEFT GROIN (10 X 3 CM), LEFT RECTUS FEMORIS FLAP, LEFT SPLIT THICKNESS SKIN GRAFT (10 X 5 CM), APPLICATION OF WOUND VACUUM DEVICE    Surgeon(s):  Rocio Ramirez MD    Assistant:  Surgical Assistant: Porfirio San  Resident: Kenny Skinner DO; Brenda Issa DO    Anesthesia: General    Estimated Blood Loss (mL): less than 50     Complications: None    Specimens:   * No specimens in log *    Implants:  * No implants in log *      Drains:   Negative Pressure Wound Therapy Leg Anterior;Left;Proximal;Upper (Active)   Wound Type Surgical 11/22/22 1400   Dressing Type Black Foam 11/22/22 1756   Cycle Continuous 11/22/22 1400   Target Pressure (mmHg) 100 11/22/22 1756   Canister changed? Yes 11/22/22 1756   Dressing Status Clean, dry & intact 11/22/22 1400   Dressing Changed Changed/New 11/22/22 1756   Output (ml) 50 ml 11/22/22 1400   Odor None 11/22/22 1400       Urinary Catheter 11/20/22 [place] (Active)   Catheter Indications Need for fluid volume management of the critically ill patient in a critical care setting 11/22/22 1400   Site Assessment No urethral drainage 11/22/22 1400   Urine Color Yellow 11/22/22 1400   Urine Appearance Clear 11/22/22 1400   Collection Container Standard 11/22/22 1400   Securement Method Securing device (Describe) 11/22/22 0400   Catheter Care Completed Yes 11/21/22 2300   Catheter Best Practices  Drainage tube clipped to bed;Catheter secured to thigh; Tamper seal intact; Bag below bladder;Bag not on floor; Lack of dependent loop in tubing;Drainage bag less than half full 11/22/22 1400   Status Draining 11/22/22 1400   Output (mL) 100 mL 11/22/22 1400       Findings:   Left rectus femoris flap to left groin wound, primary closure of leg wound, STSG to left groin, prevena application.      Electronically signed by Cris Meyers DO on 11/22/2022 at 6:22 PM

## 2022-11-22 NOTE — PROGRESS NOTES
Patient is adamantly refusing subcutaneous heparin. Discussed with surgical team about possibly switching medications, however creatinine is elevated at 1.7.

## 2022-11-22 NOTE — ANESTHESIA PRE PROCEDURE
Department of Anesthesiology  Preprocedure Note       Name:  Rosales Donnelly   Age:  80 y.o.  :  1929                                          MRN:  [id number]         Date:  2022      Surgeon: Van Oscar):  Adria Franz MD    Procedure: Procedure(s):  LEFT GROIN WOUND FLAP CLOSURE, ADJACENT TISSUE TRANSFER    Medications prior to admission:   Prior to Admission medications    Medication Sig Start Date End Date Taking? Authorizing Provider   blood glucose test strips (ONETOUCH ULTRA) strip USE TO TEST BLOOD SUGAR TWICE DAILY 11/15/22   Paulo Forman DO   atorvastatin (LIPITOR) 10 MG tablet Take 1 tablet by mouth daily 11/3/22   Paulo Forman DO   lisinopril (PRINIVIL;ZESTRIL) 10 MG tablet Take 1 tablet by mouth daily 10/26/22   Paulo Forman DO   sulfamethoxazole-trimethoprim (BACTRIM;SEPTRA) 400-80 MG per tablet Take 1 tablet by mouth 2 times daily Per Dr Alicia Barrios 10/5/22   Rodriguez Guzman MD   Multiple Vitamins-Minerals (THERAPEUTIC MULTIVITAMIN-MINERALS) tablet Take 1 tablet by mouth daily    Historical Provider, MD   furosemide (LASIX) 20 MG tablet TAKE ONE TABLET BY MOUTH DAILY 22   Araseli Bishop MD   glimepiride (AMARYL) 1 MG tablet TAKE ONE TABLET BY MOUTH EVERY MORNING BEFORE BREAKFAST 22   Paulo Forman DO   metFORMIN (GLUCOPHAGE) 500 MG tablet TAKE ONE TABLET BY MOUTH DAILY 22   Paulo Forman DO   acetaminophen (TYLENOL) 325 MG tablet Take 650 mg by mouth every 6 hours as needed for Pain    Historical Provider, MD   Lite Touch Lancets MISC Use twice daily when testing blood sugars. 3/28/22   Paulo Forman DO   Turmeric 450 MG CAPS Take 450 mg by mouth daily    Historical Provider, MD   Cholecalciferol (VITAMIN D3) 2000 UNITS CAPS Take 1 capsule by mouth daily    Historical Provider, MD   aspirin EC 81 MG EC tablet Take 1 tablet by mouth daily 5/13/15   Paulo Shah DO       Current medications:    No current facility-administered medications for this visit. No current outpatient medications on file.      Facility-Administered Medications Ordered in Other Visits   Medication Dose Route Frequency Provider Last Rate Last Admin    dextrose 5 % and 0.45 % sodium chloride infusion   IntraVENous Continuous Neeru Ricks  mL/hr at 11/22/22 0950 New Bag at 11/22/22 0950    ipratropium-albuterol (DUONEB) nebulizer solution 1 ampule  1 ampule Inhalation Q4H PRN Anson Books, DO        oxyCODONE (ROXICODONE) immediate release tablet 5 mg  5 mg Oral Q4H PRN Anson Books, DO        Or    oxyCODONE (ROXICODONE) immediate release tablet 10 mg  10 mg Oral Q4H PRN Anson Books, DO        naloxone [facility]) injection 0.4 mg  0.4 mg IntraVENous PRN Anson Books, DO        vancomycin [facility]) intermittent dosing (placeholder)   Other RX Placeholder Aamick Driver DO        sodium chloride flush 0.9 % injection 5-40 mL  5-40 mL IntraVENous 2 times per day Ayala Jeff MD   10 mL at 11/22/22 0857    sodium chloride flush 0.9 % injection 5-40 mL  5-40 mL IntraVENous PRN Ayala Jeff MD        0.9 % sodium chloride infusion   IntraVENous PRN Ayala Jeff MD        ondansetron (ZOFRAN-ODT) disintegrating tablet 4 mg  4 mg Oral Q8H PRN Wilfredo Taylor MD        Or    ondansetron [facility]) injection 4 mg  4 mg IntraVENous Q6H PRN Wilfredo Taylor MD   4 mg at 11/20/22 1952    heparin (porcine) injection 5,000 Units  5,000 Units SubCUTAneous BID Wilfredo Taylor MD   5,000 Units at 11/21/22 0909    cefepime (MAXIPIME) 1000 mg IVPB minibag  1,000 mg IntraVENous Q24H Wilfredo Taylor MD 12.5 mL/hr at 11/22/22 0935 1,000 mg at 11/22/22 0935    [Held by provider] aspirin EC tablet 81 mg  81 mg Oral Daily Wilfredo Taylor MD        [Held by provider] atorvastatin (LIPITOR) tablet 10 mg  10 mg Oral Daily Wilfredo Taylor MD        [Held by provider] furosemide (LASIX) tablet 20 mg  20 mg Oral Daily Wilfredo Taylor MD        sodium chloride flush 0.9 % injection 5-40 mL  5-40 mL IntraVENous 2 times per day Holly Sahu MD   10 mL at 11/22/22 0858    sodium chloride flush 0.9 % injection 5-40 mL  5-40 mL IntraVENous PRN Holly Sahu MD        0.9 % sodium chloride infusion   IntraVENous PRN Holly Sahu MD        HYDROmorphone (DILAUDID) injection 0.25 mg  0.25 mg IntraVENous Q3H PRN Holly Sahu MD        Or    HYDROmorphone (DILAUDID) injection 0.5 mg  0.5 mg IntraVENous Q3H PRN Holly Sahu MD   0.5 mg at 11/20/22 2249    acetaminophen (TYLENOL) tablet 1,000 mg  1,000 mg Oral Q6H Holly Sahu MD   1,000 mg at 11/22/22 4310    methocarbamol (ROBAXIN) tablet 1,000 mg  1,000 mg Oral TID Holly Sahu MD   1,000 mg at 11/21/22 2142    0.9 % sodium chloride infusion   IntraVENous PRN Holly Sahu MD        insulin regular (HUMULIN R;NOVOLIN R) injection 0-15 Units  0-15 Units SubCUTAneous 4x daily Holly Sahu MD   8 Units at 11/21/22 2142    glucose chewable tablet 16 g  4 tablet Oral PRN Holly Sahu MD        dextrose bolus 10% 125 mL  125 mL IntraVENous PRN Holly Sahu MD        Or    dextrose bolus 10% 250 mL  250 mL IntraVENous PRN Holly Sahu MD        glucagon (rDNA) injection 1 mg  1 mg SubCUTAneous PRN Holly Sahu MD        dextrose 10 % infusion   IntraVENous Continuous PRN Holyl Sahu MD           Allergies:     Allergies   Allergen Reactions    Aricept [Donepezil Hydrochloride] Other (See Comments)     Pt unable to recall reaction    Doxycycline Nausea Only    Ketorolac Tromethamine Other (See Comments)     Pt unable to recall reaction    Donepezil Hcl        Problem List:    Patient Active Problem List   Diagnosis Code    Urethral stricture 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.919    Spinal stenosis M48.00    Lumbar stenosis M48.061    Spondylolisthesis of lumbosacral region M43.17    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 ([address] E11.59    Atherosclerosis of artery of extremity with ulceration ([address] I70.299, L97.909    Benign essential HTN I10    Lumbar foraminal stenosis M48.061    DDD (degenerative disc disease), lumbar M51.36    Spinal stenosis of lumbar region with neurogenic claudication M48.062    Type 2 diabetes, controlled, with neuropathy ([address] E11.40    Severe mitral regurgitation I34.0    Venous insufficiency of both lower extremities I87.2    Hyperlipidemia E78.5    Hip arthritis M16.10    Status post carmen arthroplasty replacement of left hip 1999 Z96.642    Femoral artery stenosis, right ([facility]) I70.201    Osteoporosis M81.0    Osteopenia M85.80    Femoral-popliteal bypass graft occlusion, left ([facility]) T82.898A    Pain due to onychomycosis of nail B35.1, M79.609    Coronary artery disease involving native coronary artery of native heart without angina pectoris I25.10    Diabetic peripheral neuropathy ([facility]) E11.42    Arthritis of left knee M17.12    Trigger middle finger of right hand M65.331    Carpal tunnel syndrome, right G56.01    Vitamin D deficiency E55.9    Carotid stenosis, asymptomatic, bilateral I65.23    Fingernail problem L60.9    TIA (transient ischemic attack) G45.9    Sacral fracture, closed ([address] S32.10XA    Closed fracture of ramus of right pubis ([facility]) S32.591A    Acute pain of left lower extremity M79.605    Diabetes mellitus type 2 with peripheral artery disease ([facility]) E11.51    Open wound of left knee S81.002A    Nonhealing nonsurgical wound limited to breakdown of skin T14. 8XXA    Peripheral vascular disease, unspecified ([address] I73.9    Wound of left leg S81.802A    Peripheral artery disease ([facility]) I73.9    Cervical spondylosis M47.812    Atherosclerosis of artery of extremity with rest pain ([facility]) I70.229    Lymph leak I89.9    Nonhealing surgical wound T81.89XA    Bypass graft stenosis ([facility]) G82.476B    Atherosclerosis of native artery of left leg with ulceration of ankle ([facility]) B46.578    Complications due to vascular device, implant, and graft T82. 9XXA  Pseudoaneurysm (HCC) I72.9       Past Medical History:        Diagnosis Date    Anxiety     Arthritis     At risk for falls     CAD (coronary artery disease)     Cerebral artery occlusion with cerebral infarction (HCC)     TIA--no residual effects    DDD (degenerative disc disease), cervical     Fractures     (L) Hip Fx 4/25/98, L1 fracture from fall 10-31-06    Hyperlipidemia     Hypertension     Mitral regurgitation     Neuropathy     Osteopenia     Osteoporosis     PAD (peripheral artery disease) (HCC)     Right LE ischemia    Peripheral neuropathy 6/1/2015    Peripheral vascular disease ([facility])     bilateral lower extremities with edema    Podagra 01/09/2017    Dr. Linette Hahn    Prolonged emergence from general anesthesia     Spinal stenosis     L4-5    Type 2 diabetes mellitus [facility]     Urethral stricture 5 years ago    Urgency of urination        Past Surgical History:        Procedure Laterality Date    ANGIOPLASTY Left 04/18/2022    Left SFA    APPENDECTOMY      AXILLARY-FEMORAL BYPASS GRAFT Left 11/20/2022    EXCISION OF INFECTED ILEOFEMORAL BYPASS GRAFT; HARVEST LEFT BASILIC VEIN; LEFT EXTERNAL ILIAC ARTERY TO FEMORAL BYPASS THROUGH OBTURATOR CANAL; PLACEMENT OF WOUND VAC performed by Mariaelena Guajardo MD at [place] [address] Right 03/29/2019    RIGHT CARPAL TUNNEL RELEASE AND RIGHT MIDDLE FINGER TRIGGER FINGER RELEASE performed by Francisco Segal MD at [address] [place] [address]  [id number]    LEFT EYE EYE CATARACT PHACOEMULSIFICATION INTRAOCULAR LENS    CHOLECYSTECTOMY  01/01/1978    COLONOSCOPY  08/31/1999    diverticulosis    EYE SURGERY Bilateral     bilateral cataract removed    FEMORAL BYPASS Left 6/28/2022    LEFT LEG FEMORAL TO POPLITEAL BYPASS WITH INTRAOPERATIVE ANGIOGRAM performed by Claudetta Feather, MD at [address] [facility] Left 02/14/2022    LEFT COMMON FEMORAL ARTERY ENDARTERECTOMY performed by Claudetta Feather, MD at [place] [address] HYSTERECTOMY (CERVIX STATUS UNKNOWN)  1980s    [facility]    IR FEMORAL POPLITEAL BYPASS GRAFT Right 08/31/2015    KYPHOSIS SURGERY  11/07/2006    L1, (fractured after a fall)    LEG SURGERY Left 02/14/2022    LEFT LEG WOUND DEBRIDEMENT performed by Enzo Fernandez MD at [address] N/A 7/7/2022    INCISION AND DRAINAGE OF LEFT SIDE GROIN WITH PLACEMENT OF WOUND VAC performed by Enzo Fernandez MD at [address]  04/25/1998    (L) THR    WRIST FRACTURE SURGERY  01/01/2008    left wrist       Social History:    Social History     Tobacco Use    Smoking status: Never    Smokeless tobacco: Never   Substance Use Topics    Alcohol use: No                                Counseling given: Not Answered      Vital Signs (Current): There were no vitals filed for this visit.                                            BP Readings from Last 3 Encounters:   11/22/22 (!) 151/54   11/14/22 (!) 177/75   11/09/22 132/60       NPO Status:                                                                                 BMI:   Wt Readings from Last 3 Encounters:   11/22/22 114 lb 13.8 oz (52.1 kg)   11/09/22 109 lb 12.8 oz (49.8 kg)   11/02/22 93 lb (42.2 kg)     There is no height or weight on file to calculate BMI.    CBC:   Lab Results   Component Value Date/Time    WBC 12.6 11/22/2022 03:39 AM    RBC 2.97 11/22/2022 03:39 AM    HGB 8.8 11/22/2022 03:39 AM    HCT 26.4 11/22/2022 03:39 AM    MCV 88.7 11/22/2022 03:39 AM    RDW 16.3 11/22/2022 03:39 AM     11/22/2022 03:39 AM       CMP:   Lab Results   Component Value Date/Time     11/22/2022 03:39 AM    K 5.2 11/22/2022 03:39 AM    K 5.2 06/28/2022 11:05 AM     11/22/2022 03:39 AM    CO2 16 11/22/2022 03:39 AM    BUN 25 11/22/2022 03:39 AM    CREATININE 2.2 11/22/2022 03:39 AM    GFRAA 51 09/21/2022 08:51 AM    GFRAA >60 05/14/2013 11:33 AM    AGRATIO 1.8 11/20/2022 05:26 AM    LABGLOM 20 11/22/2022 03:39 AM GLUCOSE 69 11/22/2022 03:39 AM    PROT 5.8 11/20/2022 05:26 AM    PROT 6.4 07/26/2012 04:30 PM    CALCIUM 7.6 11/22/2022 03:39 AM    BILITOT 0.3 11/20/2022 05:26 AM    ALKPHOS 84 11/20/2022 05:26 AM    AST 29 11/20/2022 05:26 AM    ALT 14 11/20/2022 05:26 AM       POC Tests:   Recent Labs     11/22/22  1013   POCGLU 170*       Coags:   Lab Results   Component Value Date/Time    PROTIME 14.8 11/22/2022 05:39 AM    INR 1.17 11/22/2022 05:39 AM    APTT 36.4 06/20/2022 11:30 AM       HCG (If Applicable): No results found for: PREGTESTUR, PREGSERUM, HCG, HCGQUANT     ABGs: No results found for: PHART, PO2ART, OWE4YFY, JEP0ZKT, BEART, U0SKWEXK     Type & Screen (If Applicable):  No results found for: LABABO, LABRH    Drug/Infectious Status (If Applicable):  No results found for: HIV, HEPCAB    COVID-19 Screening (If Applicable):   Lab Results   Component Value Date/Time    COVID19 Not Detected 02/10/2022 12:37 PM           Anesthesia Evaluation  Patient summary reviewed and Nursing notes reviewed no history of anesthetic complications:   Airway: Mallampati: II  TM distance: >3 FB   Neck ROM: full  Mouth opening: > = 3 FB   Dental:    (+) poor dentition      Pulmonary:normal exam                               Cardiovascular:    (+) hypertension:, CAD:,                   Neuro/Psych:   (+) CVA:, neuromuscular disease:, TIA,             GI/Hepatic/Renal: Neg GI/Hepatic/Renal ROS            Endo/Other:    (+) DiabetesType II DM, , .                 Abdominal:             Vascular: negative vascular ROS.  + PVD, aortic or cerebral, . Other Findings:             Anesthesia Plan      general     ASA 4       Induction: intravenous. arterial line  MIPS: Postoperative opioids intended and Prophylactic antiemetics administered. Anesthetic plan and risks discussed with patient. Use of blood products discussed with patient whom consented to blood products.      Attending anesthesiologist reviewed and agrees with

## 2022-11-22 NOTE — ANESTHESIA POSTPROCEDURE EVALUATION
Department of Anesthesiology  Postprocedure Note    Patient: Cheyenne Barrientos  MRN: [id number]  YOB: 1929  Date of evaluation: 11/22/2022      Procedure Summary     Date: 11/22/22 Room / Location: [address] [place] [address] [address] [facility] [address] / [facility]    Anesthesia Start: 0710 Anesthesia Stop: 1442    Procedure: [address] (10 X 3 CM), LEFT RECTUS FEMORIS FLAP, LEFT SPLIT THICKNESS SKIN GRAFT (10 X 5 CM), APPLICATION OF WOUND VACUUM DEVICE (Left: Thigh) Diagnosis:       Wound of left groin, initial encounter      (Wound of left groin, initial encounter [S31.109A])    Surgeons: Haresh Rollins MD Responsible Provider: Keerthi Moreno MD    Anesthesia Type: general ASA Status: 4          Anesthesia Type: No value filed.     Dalila Phase I: Dalila Score: 8    Dalila Phase II:        Anesthesia Post Evaluation    Patient location during evaluation: PACU  Level of consciousness: awake  Complications: no  Multimodal analgesia pain management approach

## 2022-11-22 NOTE — PROGRESS NOTES
ID Follow-up NOTE    CC:   Infected pseudoaneurysm, MRSA infection  Antibiotics: Vancomycin + Cefepime    Admit Date: 11/20/2022  Hospital Day: 3    Subjective:     Patient c/o abd pain with palpation      Objective:     Patient Vitals for the past 8 hrs:   BP Temp Temp src Pulse Resp SpO2 Weight   11/22/22 1005 -- -- -- 66 14 97 % --   11/22/22 0700 (!) 151/54 -- -- 75 27 -- --   11/22/22 0655 -- -- -- 60 13 -- --   11/22/22 0650 -- -- -- 57 13 -- --   11/22/22 0645 -- -- -- 56 13 -- --   11/22/22 0640 -- -- -- 55 13 -- --   11/22/22 0635 -- -- -- 58 13 -- --   11/22/22 0630 -- -- -- 62 13 -- --   11/22/22 0625 -- -- -- 55 14 -- --   11/22/22 0620 -- -- -- 68 13 -- --   11/22/22 0615 -- -- -- 77 15 -- --   11/22/22 0610 -- -- -- 59 14 -- --   11/22/22 0605 -- -- -- 60 (!) 9 -- --   11/22/22 0600 (!) 166/56 -- -- 62 17 -- --   11/22/22 0555 -- -- -- 79 13 -- --   11/22/22 0550 -- -- -- 74 17 -- --   11/22/22 0545 -- -- -- 66 12 -- --   11/22/22 0540 -- -- -- 66 18 -- --   11/22/22 0535 -- -- -- 76 14 -- --   11/22/22 0530 -- -- -- 71 13 -- --   11/22/22 0525 -- -- -- 75 14 -- --   11/22/22 0520 -- -- -- 69 17 -- --   11/22/22 0515 -- -- -- 71 17 -- --   11/22/22 0510 -- -- -- 77 22 -- --   11/22/22 0505 -- -- -- 89 19 -- --   11/22/22 0500 (!) 153/55 -- -- 73 16 -- --   11/22/22 0400 (!) 151/41 97.9 °F (36.6 °C) Temporal 76 15 95 % 114 lb 13.8 oz (52.1 kg)     I/O last 3 completed shifts: In: 4841.5 [P.O.:185; I.V.:2022.8; Blood:1082.5; IV Piggyback:1551.2]  Out: 1500 [Urine:895; Drains:605]  No intake/output data recorded. EXAM:  GENERAL: No apparent distress.   RA  HEENT: Membranes moist, no oral lesion  NECK:  Supple, no lymphadenopathy  LUNGS: Clear b/l, no rales, no dullness  CARDIAC: RRR, no murmur appreciated  ABD:  + BS, soft - tender with palpation  Lower abd wall wound / dressing   L groin VAC, dressing   EXT:                No rash, no edema, no lesions  L arm with dressing (basilar v harvest site)  L foot with scab /escar, no open wound / drainage / erythema    NEURO: No focal neurologic findings  PSYCH: Orientation, sensorium, mood normal  LINES:  Peripheral iv       Data Review:  Lab Results   Component Value Date    WBC 12.6 (H) 2022    HGB 8.8 (L) 2022    HCT 26.4 (L) 2022    MCV 88.7 2022     (L) 2022     Lab Results   Component Value Date    CREATININE 2.2 (H) 2022    BUN 25 (H) 2022     2022    K 5.2 (H) 2022     (H) 2022    CO2 16 (L) 2022       Hepatic Function Panel:   Lab Results   Component Value Date/Time    ALKPHOS 84 2022 05:26 AM    ALT 14 2022 05:26 AM    AST 29 2022 05:26 AM    PROT 5.8 2022 05:26 AM    PROT 6.4 2012 04:30 PM    BILITOT 0.3 2022 05:26 AM    BILIDIR <0.2 2021 11:53 AM    IBILI see below 2021 11:53 AM    LABALBU 2.8 2022 03:39 AM       Micro:   Surg cult - light growth MRSA  No BC sent     Imagin/20 Abd CTA  1. Small amount of hemorrhage in the left groin without evidence of active extravasation. 2. 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 3. 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 4. Loculated fluid collection of the medial left thigh which could represent abscess or sterile postoperative fluid collection. 5. Subcutaneous edema and fluid lower extremities. 6. Left pleural effusion.        Scheduled Meds:   vancomycin (VANCOCIN) intermittent dosing (placeholder)   Other RX Placeholder    sodium chloride flush  5-40 mL IntraVENous 2 times per day    heparin (porcine)  5,000 Units SubCUTAneous BID    cefepime  1,000 mg IntraVENous Q24H    [Held by provider] aspirin EC  81 mg Oral Daily    [Held by provider] atorvastatin  10 mg Oral Daily    [Held by provider] furosemide  20 mg Oral Daily    sodium chloride flush  5-40 mL IntraVENous 2 times per day    acetaminophen  1,000 mg Oral Q6H    methocarbamol  1,000 mg Oral TID    insulin regular  0-15 Units SubCUTAneous 4x daily       Continuous Infusions:   dextrose 5 % and 0.45 % NaCl 100 mL/hr at 11/22/22 0950    sodium chloride      sodium chloride      sodium chloride      dextrose         PRN Meds:  ipratropium-albuterol, oxyCODONE **OR** oxyCODONE, naloxone, sodium chloride flush, sodium chloride, ondansetron **OR** ondansetron, sodium chloride flush, sodium chloride, HYDROmorphone **OR** HYDROmorphone, sodium chloride, glucose, dextrose bolus **OR** dextrose bolus, glucagon (rDNA), dextrose      Assessment:     Hx DM, HTN, CAD, PAD, CVA, lumbar stenosis     Hx L fem endarterectomy 2/14/22, SFA angioplasty 4/18/22, L ileofem (redo, prelaced femoral artery with Dacron graft), L fem to below knee popliteal in-situ bypass graft - 6/28/22  L groin wound I&D 7/7/22, cult S epidermidis in broth     L LE angiogram with balloon angioplasty distal bypass graft 10/18/22  Wound cult 7/20 - heavy MRSA (R clinda, tetra; vanco DANITA <0.5)     POD#1 EXCISION OF INFECTED ILEOFEMORAL BYPASS GRAFT; HARVEST LEFT BASILIC VEIN; LEFT EXTERNAL ILIAC ARTERY TO FEMORAL BYPASS THROUGH OBTURATOR CANAL; PLACEMENT OF WOUND VAC   - evidence infection in surg     Leukocytosis    Plan:     Cont Vancomycin +  Cefepime for now  Will f/u on cult - await MRSA sensitivities  Postop care per Vascular     Plastic surg wound management  return to OR today 9/22    Medical Decision Making:   The following items were considered in medical decision making:  Discussion of patient care with other providers  Reviewed clinical lab tests  Reviewed radiology tests  Reviewed other diagnostic tests/interventions  Independent review of radiologic images - reviewed CT / CTA with Radiologist / Dr Luiz Julian on 11/21  Microbiology cultures and other micro tests reviewed      Discussed with pt, charge RN   Angelina Sanchez MD

## 2022-11-22 NOTE — PLAN OF CARE
Problem: Chronic Conditions and Co-morbidities  Goal: Patient's chronic conditions and co-morbidity symptoms are monitored and maintained or improved  11/21/2022 2118 by Haley Merritt RN  Outcome: Progressing  Flowsheets (Taken 11/21/2022 2000)  Care Plan - Patient's Chronic Conditions and Co-Morbidity Symptoms are Monitored and Maintained or Improved:   Monitor and assess patient's chronic conditions and comorbid symptoms for stability, deterioration, or improvement   Collaborate with multidisciplinary team to address chronic and comorbid conditions and prevent exacerbation or deterioration   Update acute care plan with appropriate goals if chronic or comorbid symptoms are exacerbated and prevent overall improvement and discharge  11/21/2022 0851 by Radha Doherty RN  Outcome: Progressing  [facility] (Taken 11/20/2022 2000 by Abe Doshi RN)  Care Plan - Patient's Chronic Conditions and Co-Morbidity Symptoms are Monitored and Maintained or Improved:   Monitor and assess patient's chronic conditions and comorbid symptoms for stability, deterioration, or improvement   Collaborate with multidisciplinary team to address chronic and comorbid conditions and prevent exacerbation or deterioration   Update acute care plan with appropriate goals if chronic or comorbid symptoms are exacerbated and prevent overall improvement and discharge     Problem: Discharge Planning  Goal: Discharge to home or other facility with appropriate resources  11/21/2022 2118 by Haley Merritt RN  Outcome: Progressing  Flowsheets (Taken 11/21/2022 2000)  Discharge to home or other facility with appropriate resources:   Identify barriers to discharge with patient and caregiver   Arrange for needed discharge resources and transportation as appropriate   Identify discharge learning needs (meds, wound care, etc)   Arrange for interpreters to assist at discharge as needed   Refer to discharge planning if patient needs post-hospital services based on physician order or complex needs related to functional status, cognitive ability or social support system  11/21/2022 0851 by Nathanial Severance, RN  Outcome: Progressing  Flowsheets (Taken 11/20/2022 2000 by Adriana Zee RN)  Discharge to home or other facility with appropriate resources:   Arrange for needed discharge resources and transportation as appropriate   Identify barriers to discharge with patient and caregiver   Identify discharge learning needs (meds, wound care, etc)   Arrange for interpreters to assist at discharge as needed   Refer to discharge planning if patient needs post-hospital services based on physician order or complex needs related to functional status, cognitive ability or social support system     Problem: Pain  Goal: Verbalizes/displays adequate comfort level or baseline comfort level  11/21/2022 2118 by Aniyah Roberts RN  Outcome: Progressing  Flowsheets (Taken 11/21/2022 2000)  Verbalizes/displays adequate comfort level or baseline comfort level: Encourage patient to monitor pain and request assistance  11/21/2022 0851 by Nathanial Severance, RN  Outcome: Progressing  Flowsheets (Taken 11/21/2022 0851)  Verbalizes/displays adequate comfort level or baseline comfort level: Assess pain using appropriate pain scale     Problem: ABCDS Injury Assessment  Goal: Absence of physical injury  11/21/2022 2118 by Aniyah Roberts RN  Outcome: Progressing  Flowsheets (Taken 11/21/2022 2115)  Absence of Physical Injury: Implement safety measures based on patient assessment  11/21/2022 0851 by Nathanial Severance, RN  Outcome: Progressing  Flowsheets (Taken 11/20/2022 2008 by Adriana Zee RN)  Absence of Physical Injury: Implement safety measures based on patient assessment     Problem: Skin/Tissue Integrity  Goal: Absence of new skin breakdown  Description: 1. Monitor for areas of redness and/or skin breakdown  2. Assess vascular access sites hourly  3.   Every 4-6 hours minimum:  Change oxygen saturation probe site  4. Every 4-6 hours:  If on nasal continuous positive airway pressure, respiratory therapy assess nares and determine need for appliance change or resting period. 11/21/2022 2118 by Ezra Cox RN  Outcome: Progressing  11/21/2022 0851 by Kimberly Crawford, RN  Outcome: Progressing  Note: Absence of new skin breakdown. Patient turned routinely with pillow support. Heels and arms elevated off of bed. Skin assessed. Prophylactic sacral heart in place. Patient on special air mattress. Patient educated on risks associated with prolonged bed rest. Patient checked routinely for incontinence, cleansed thoroughly in its presence. Lines off loaded from skin. Will continue to monitor and reassess.         Problem: Safety - Adult  Goal: Free from fall injury  11/21/2022 2118 by Ezra Cox RN  Outcome: [facility] (Taken 11/21/2022 2115)  Free From Fall Injury:   Instruct family/caregiver on patient safety   Based on caregiver fall risk screen, instruct family/caregiver to ask for assistance with transferring infant if caregiver noted to have fall risk factors  11/21/2022 0851 by Kimberly Crawford, RN  Outcome: Progressing  4 [facility] (Taken 11/20/2022 2008 by Libertad Freitas RN)  Free From Fall Injury:   Instruct family/caregiver on patient safety   Based on caregiver fall risk screen, instruct family/caregiver to ask for assistance with transferring infant if caregiver noted to have fall risk factors

## 2022-11-22 NOTE — PLAN OF CARE
Problem: Chronic Conditions and Co-morbidities  Goal: Patient's chronic conditions and co-morbidity symptoms are monitored and maintained or improved  Outcome: Progressing  Flowsheets (Taken 11/21/2022 2000 by Luke Mares, RN)  Care Plan - Patient's Chronic Conditions and Co-Morbidity Symptoms are Monitored and Maintained or Improved:   Monitor and assess patient's chronic conditions and comorbid symptoms for stability, deterioration, or improvement   Collaborate with multidisciplinary team to address chronic and comorbid conditions and prevent exacerbation or deterioration   Update acute care plan with appropriate goals if chronic or comorbid symptoms are exacerbated and prevent overall improvement and discharge     Problem: Discharge Planning  Goal: Discharge to home or other facility with appropriate resources  Outcome: Progressing  Flowsheets (Taken 11/21/2022 2000 by Luke Mares, RN)  Discharge to home or other facility with appropriate resources:   Identify barriers to discharge with patient and caregiver   Arrange for needed discharge resources and transportation as appropriate   Identify discharge learning needs (meds, wound care, etc)   Arrange for interpreters to assist at discharge as needed   Refer to discharge planning if patient needs post-hospital services based on physician order or complex needs related to functional status, cognitive ability or social support system     Problem: Pain  Goal: Verbalizes/displays adequate comfort level or baseline comfort level  Outcome: Progressing  Flowsheets (Taken 11/21/2022 2000 by Luke Mares, RN)  Verbalizes/displays adequate comfort level or baseline comfort level: Encourage patient to monitor pain and request assistance     Problem: ABCDS Injury Assessment  Goal: Absence of physical injury  Outcome: Progressing  Flowsheets (Taken 11/21/2022 2115 by Luke Mares, RN)  Absence of Physical Injury: Implement safety measures based on patient assessment     Problem: Skin/Tissue Integrity  Goal: Absence of new skin breakdown  Description: 1. Monitor for areas of redness and/or skin breakdown  2. Assess vascular access sites hourly  3. Every 4-6 hours minimum:  Change oxygen saturation probe site  4. Every 4-6 hours:  If on nasal continuous positive airway pressure, respiratory therapy assess nares and determine need for appliance change or resting period.   Outcome: Progressing     Problem: Safety - Adult  Goal: Free from fall injury  Outcome: Progressing  Flowsheets (Taken 11/21/2022 2115 by Kaylan Booth RN)  Free From Fall Injury:   Instruct family/caregiver on patient safety   Based on caregiver fall risk screen, instruct family/caregiver to ask for assistance with transferring infant if caregiver noted to have fall risk factors

## 2022-11-22 NOTE — PROGRESS NOTES
PRE-OP NOTE  Department of Surgery  Resident Note     Procedure: left groin wound flap closure, adjacent tissue transfer     Consent: Informed consent signed    Labs      Recent Labs     11/21/22  0949 11/21/22 2135 11/22/22  0339   WBC 15.4*  --  12.6*   HGB 8.0* 9.0* 8.8*   HCT 24.0* 26.7* 26.4*   MCV 88.0  --  88.7     --  117*        Recent Labs     11/21/22  0318 11/21/22  0949 11/22/22  0339   * 135* 136   K 5.5* 5.9* 5.2*    107 111*   CO2 15* 17* 16*   PHOS 5.4*  --  4.4   BUN 17 19 25*   CREATININE 1.5* 1.7* 2.2*        Recent Labs     11/20/22  0526   AST 29   ALT 14   BILITOT 0.3   ALKPHOS 84      No results for input(s): LIPASE, AMYLASE in the last 72 hours. Recent Labs     11/20/22  0456 11/20/22  0526   PROT  --  5.8*   INR 1.10  --       No results for input(s): CKTOTAL, CKMB, CKMBINDEX, TROPONINI in the last 72 hours.     CXR: 11/20 no air space disease  EKG: pending    Orders:   Diet: NPO,  IVF @   Pre op Medications:  continue cefepime, vanc   Labs to be drawn: Type and Screen, Renal panel, CBC, INR  EKG  CXR   Anesthesia to see patient    Man Gavin DO  PGY1, General Surgery  11/22/22  5:14 AM  [employer]  Pager: [phone]

## 2022-11-22 NOTE — PROGRESS NOTES
Plastic Surgery   Daily Progress Note  Patient: Cece Sims      CC: Wound closure    SUBJECTIVE:   Patient rested well overnight and remained HDS. Blood glucose 69 from 255 this AM after 8u insuline last PM. Patient denies pain in groin region, has stable LN in medial left groin. Had some feelings of paralysis last night in LLE, has resolved. No complaints at this time. ROS:   A 14 point review of systems was conducted, significant findings as noted above. All other systems negative. OBJECTIVE:    PHYSICAL EXAM:    Vitals:    11/22/22 0310 11/22/22 0315 11/22/22 0320 11/22/22 0400   BP:       Pulse: 74 80 79    Resp: 17 17 17    Temp:    97.9 °F (36.6 °C)   TempSrc:    Temporal   SpO2:    95%   Weight:    114 lb 13.8 oz (52.1 kg)   Height:           General appearance: alert, no acute distress, grooming appropriate  Eyes: No scleral icterus, EOM grossly intact  Neck: trachea midline, no JVD, no lymphadenopathy, neck supple  Chest/Lungs: CTAB, no crackles/rales, wheezes/rhonchi, normal effort with no accessory muscle use on 1L NC  Cardiovascular: afib rate in 70s  Abdomen: Soft, non-tender, non-distended, no rebound, guarding, or rigidity. Skin: warm and dry, no rashes  Extremities: no edema, no cyanosis. L brachial mepilex with high amount of strike through, non tender to palpation, some ecchymosis but no sign of hematoma. L groin with firm LN on medial groin, unchanged from prior exam, non tender. Groin mepilex with minor strike through. WV to suction with good seal -100. Groin and incision sites are soft without tenderness and no signs of hematoma or active bleeding. Sensation and mobility intact b/l LE. DP/PT palpable b/l. L  to palpation   Genitourinary:  Wagner in place with clear yellow urine  Neuro: A&Ox3, no focal deficits, sensation intact    LABS:   Recent Labs     11/21/22  0949 11/21/22 2135 11/22/22  0339   WBC 15.4*  --  12.6*   HGB 8.0* 9.0* 8.8*   HCT 24.0* 26.7* 26.4*   MCV 88.0 --  88.7     --  117*          Recent Labs     11/21/22  0318 11/21/22  0949 11/22/22  0339   * 135* 136   K 5.5* 5.9* 5.2*    107 111*   CO2 15* 17* 16*   PHOS 5.4*  --  4.4   BUN 17 19 25*   CREATININE 1.5* 1.7* 2.2*          Recent Labs     11/20/22  0526   AST 29   ALT 14   BILITOT 0.3   ALKPHOS 84        No results for input(s): LIPASE, AMYLASE in the last 72 hours. Recent Labs     11/20/22  0456 11/20/22  0526   PROT  --  5.8*   INR 1.10  --         No results for input(s): CKTOTAL, CKMB, CKMBINDEX, TROPONINI in the last 72 hours.       ASSESSMENT & PLAN:   This is a 80y.o. 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

## 2022-11-22 NOTE — PROGRESS NOTES
Patient was given heparin SQ at 0909. Began to feel lethargic at this time. States that she has felt like this with previous blood thinners, but it passed within an hour. No further complaints from patient.

## 2022-11-22 NOTE — PROGRESS NOTES
Patient returned from surgery. Tele monitor box not pulling over monitor info to main monitor. Called [employer] to troubleshoot. Tech to come to bedside to evaluate. Patient distressed and tearful on arrival. Home health nurse Ingred is at bedside to help console her at this time.

## 2022-11-22 NOTE — PROGRESS NOTES
ICU VASCULAR SURGERY DAILY PROGRESS NOTE    CC: excision of infected ilofemoral bypass graft with harvest of left basilic vein and left external iliac artery to femoral bypass through obturator canal    SUBJECTIVE:   Interval Hx:   Patient had a complaint of feeling paralyzed in the L leg over night. She states this has happened before in the L foot but not the whole leg. Even lasted about two hours. Currently has no concerns regarding the event and is able to move L LE and foot. DP and PT pulses palpable b/l now. Patient refused SQH last night. Active in bed, conversational, has some pain in the L foot but not at incision site. EKG ordered this AM showing normal sinus rhythm, discordant with tele. Blood glucose 69 from 255 this AM after 8u insuline dose. Patient in Afib on monitor in room. ROS: A 14 point review of systems was conducted, significant findings as noted above. All other systems negative. OBJECTIVE:   Vitals:   Vitals:    11/22/22 0310 11/22/22 0315 11/22/22 0320 11/22/22 0400   BP:       Pulse: 74 80 79    Resp: 17 17 17    Temp:    97.9 °F (36.6 °C)   TempSrc:    Temporal   SpO2:    95%   Weight:    114 lb 13.8 oz (52.1 kg)   Height:           I/O:   Intake/Output Summary (Last 24 hours) at 11/22/2022 1954  Last data filed at 11/22/2022 0500  Gross per 24 hour   Intake 3007.8 ml   Output 1300 ml   Net 1707.8 ml       I/O last 3 completed shifts:   In: 6410.8 [I.V.:3852.9; Blood:1082.5; IV Piggyback:1475.3]  Out: 7580 [Urine:615; Drains:300; Blood:675]    Diet: Diet NPO Exceptions are: Sips of Water with Meds      Physical Examination:   General appearance: alert, no acute distress, grooming appropriate  Eyes: No scleral icterus, EOM grossly intact  Neck: trachea midline, no JVD, no lymphadenopathy, neck supple  Chest/Lungs: CTAB, no crackles/rales, wheezes/rhonchi, normal effort with no accessory muscle use on 1L NC  Cardiovascular: afib rate in 70s  Abdomen: Soft, non-tender, non-distended, no rebound, guarding, or rigidity. Skin: warm and dry, no rashes  Extremities: no edema, no cyanosis. L brachial mepilex with high amount of strike through, non tender to palpation, some ecchymosis but no sign of hematoma. L groin with firm LN on medial groin, unchanged from prior exam, non tender. Groin mepilex with minor strike through. WV to suction with good seal -100. Groin and incision sites are soft without tenderness and no signs of hematoma or active bleeding. Sensation and mobility intact b/l LE. DP/PT palpable b/l. L  to palpation   Genitourinary: Wagner in place with clear yellow urine  Neuro: A&Ox3, no focal deficits, sensation intact    Pulses    Rad Fem Pop PT DP   Right + + + + +   Left + x + + +       Labs:  CBC:   Recent Labs     11/21/22 0318 11/21/22  0949 11/21/22  2135 11/22/22  0339   WBC 16.4* 15.4*  --  12.6*   HGB 8.9* 8.0* 9.0* 8.8*   HCT 27.2* 24.0* 26.7* 26.4*    143  --  117*         BMP:   Recent Labs     11/21/22 0318 11/21/22  0949 11/22/22  0339   * 135* 136   K 5.5* 5.9* 5.2*    107 111*   CO2 15* 17* 16*   BUN 17 19 25*   CREATININE 1.5* 1.7* 2.2*   GLUCOSE 199* 169* 69*       LFT's:   Recent Labs     11/20/22  0526   AST 29   ALT 14   BILITOT 0.3   ALKPHOS 84       Troponin: No results for input(s): TROPONINI in the last 72 hours. BNP: No results for input(s): BNP in the last 72 hours. ABGs: No results for input(s): PHART, YYS5WHV, PO2ART in the last 72 hours. INR:   Recent Labs     11/20/22  0456   INR 1.10         U/A:No results for input(s): NITRITE, COLORU, PHUR, LABCAST, WBCUA, RBCUA, MUCUS, TRICHOMONAS, YEAST, BACTERIA, CLARITYU, SPECGRAV, LEUKOCYTESUR, UROBILINOGEN, BILIRUBINUR, BLOODU, GLUCOSEU, AMORPHOUS in the last 72 hours. Invalid input(s): Esaw Speck     Rad:   XR CHEST PORTABLE   Final Result   Impression:   1. No airspace disease. CTA ABDOMINAL AORTA W BILAT RUNOFF W CONTRAST   Final Result   Impression:      1.  Small amount of hemorrhage in the left groin without evidence of active extravasation. 2. 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 3. 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 4. Loculated fluid collection of the medial left thigh which could represent abscess or sterile postoperative fluid collection. 5. Subcutaneous edema and fluid lower extremities. 6. Left pleural effusion. ASSESSMENT AND PLAN:   This is a 80y.o. 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  No prior patient history    Pulmonary:   - 1LNC, wean as tolerated  - IS, acapella, Duo nebs, aggressive RT therapy    GI:   - NPO for now, can restart CLD post operatively     FEN:   - K 5.2 from 5.9, 5.5, 5.4  - Mg 2.5 from 3.00, 1.5   - change  to D5 1/2NS 100  - replace lytes as indicated    /Renal:   - patient urine output 735 past 24hr  - 735/50/24= 0.6cc/kg/hr (appropriate)  - Patient lasix dependent at home, held this AM    Hem/ID:   - continue abx; cefepime, vanc  - patient had 2u PRBC post op, 1u PRBC POD1  - HgB 8.8 from 9.0, 8.0, 8.9 (stable)    Endo:   - hyperglycemic, hx of T2DM  - blood glucose 69 from 255 after 8u insuline, will switch to medium dose sliding scale  - strict management for improved wound healing potential    Consults:  - plastic surgery today  - will consult cardiology today    Prophylaxis:   - SQH    Access:  Peripheral Access: 3x pIV  Arterial Line Access: R radial (11/20)                                Wagner Date placed: 11/20    Mobility:  - OOB if tolerates   - PTOT Wednesday     Dispo:   ICU    SW/C:  - will discuss [address] and wound management after plastic surgery, anticipated date 11/22    Code Status: DNR-CCA  -----------------------------    Eddie Sanchez DO  PGY1, General Surgery  11/22/22  6:28 AM  [place]  Pager: [phone]

## 2022-11-22 NOTE — PROGRESS NOTES
Patient called RN into room @0200. Patient stated \"My leg feels half-paralyzed. It feels like cement. \" Upon assessment pulses present with doppler, extremity warm to touch, patient able to wiggle toes. MD called to bedside to assess. Patient is now refusing all medication. @0300 patient able to raise leg one foot off of bed.

## 2022-11-22 NOTE — PROGRESS NOTES
Physician Progress Note      Casandra Moon  [facility] #:                  [id number]  :                       1929  ADMIT DATE:       2022 3:24 AM  DISCH DATE:  RESPONDING  PROVIDER #:        Sánchez OCONNOR          QUERY TEXT:    Pt admitted for \"excision of infected iliofemoral bypass graft with harvest of   left basilic vein and left external iliac artery to femoral bypass through   obturator canal\" and has anemia documented. If possible, please document in   progress notes and discharge summary further specificity regarding the acuity   and type of anemia:      The medical record reflects the following:  Risk Factors: s/p \"excision of infected iliofemoral bypass graft with harvest   of left basilic vein and left external iliac artery to  femoral bypass through obturator canal\"  Clinical Indicators: Hgb on  @ 04:56 =9.1, Hgb on  @ 03:18=5.0; OP   note EBL=200cc  Treatment: Serial CBC, 2 units PRBC transfusions, V/S and I/o monitoring per   unit protocol  Options provided:  -- Anemia due to acute blood loss  -- Anemia due to acute on chronic blood loss  -- Anemia due to postoperative blood loss  -- Other - I will add my own diagnosis  -- Disagree - Not applicable / Not valid  -- Disagree - Clinically unable to determine / Unknown  -- Refer to Clinical Documentation Reviewer    PROVIDER RESPONSE TEXT:    This patient has anemia due to postoperative blood loss. Query created by: Fish Hernandez on 2022 5:48 PM      QUERY TEXT:    Patient admitted with ***. If possible, please document in progress notes and   discharge summary if you are evaluating and /or treating any of the following:       The medical record reflects the following:  Risk Factors: advanced age, multiple co-morbidities  Clinical Indicators: Ht.=162.6 cm, Wt.=50.5 kg, Calculated BMI=19.11  Treatment: CBC, CMP, V/S, I/O monitoring per unit protocol, Regular diet, 3   carb choices (45gm/meal)    ASPEN Criteria:    https://aspenjournals. onlinelibrary. hutchinson. com/doi/full/10.1177/589796334844643  5  Options provided:  -- Underweight with BMI 19.11  -- Other - I will add my own diagnosis  -- Disagree - Not applicable / Not valid  -- Disagree - Clinically unable to determine / Unknown  -- Refer to Clinical Documentation Reviewer    PROVIDER RESPONSE TEXT:    This patient is underweight with a BMI of 19.11.     Query created by: Chely Ramirez on 11/21/2022 5:53 PM      Electronically signed by:  Ninoska Stephenson 11/22/2022 6:22 AM

## 2022-11-22 NOTE — PROGRESS NOTES
Clinical Pharmacy Progress Note    Vancomycin - Management by Pharmacy    Consult Date(s): 11/20/22  Consulting Provider(s): Jamila Mike    Assessment / Plan  SSTI/ Possible Infected Graft Infection- Vancomycin  Concurrent Antimicrobials: Cefepime - day #3  Day of Vanc Therapy / Ordered Duration: 3 / 7  Current Dosing Method: Intermittent via levels  Therapeutic Goal: Trough levels ~15 mg/L  Current Dose / Plan:   Currently dosing vancomycin intermittently via levels due to JASE. Scr continues to increase, from 1.7 yesterday to 2.3 today. Baseline appears to be ~0.9-1.2.  UOP had dropped in the past 24h, but patient did show improved output overnight (1.1 mL/kg/hr out). Will continue to dose via levles until renal function stabilizes. Random level today = 16.6 mg/L after 750mg IV x1 yesterday. Will order 500mg IV x1 for today. Plan to obtain a random 11/23 AM, and reassess at that time. Will continue to monitor clinical condition and make adjustments to regimen as appropriate. Thank you for consulting Pharmacy! Justice Huggins PharmD., [facility]   11/22/2022 7:58 AM  Wireless: [phone]      Interval Update:   Planning return to OR today with Plastics. Scr continues to increase. ID following. Subjective/Objective:   Jac Barker is a 80 y.o. female with a PMHx significant for CAD, HTN, HLP, mitral regurgitation, DM type II, cerebral infarct/TIA, PAD s/p LLE femoral bypass with replacement of distal L external iliac and common femoral artery with Dacron graft with resultant continued draining. Presents to the ED with bleeding from L groin site. Started on antibiotics for infected ileofemoral bypass graft. S/p excision of infected ileofemoral bypass graft with Vasc on 11/20 with wound vac placed. Pharmacy is consulted to dose vancomycin.     Ht Readings from Last 1 Encounters:   11/21/22 5' 4\" (1.626 m)     Wt Readings from Last 1 Encounters:   11/22/22 114 lb 13.8 oz (52.1 kg)     Current & Prior Antimicrobial Regimen(s):  Cefepime (11/20 - Current)  Vancomycin   1250mg IV x1 (11/20)   (11/21)  Intermittent via levels (11/21-current)  Date: Vanc Level: Vanc Dose:   11/20  1250mg   11/21 9.4 mg/L 750mg   11/22 16.6 mg/L Ordered 500mg   11/23 Ordered        Vancomycin Level(s) / Doses:    Date Time Dose Type of Level / Level Interpretation                 Note: Serum levels collected for AUC-based dosing may be high if collected in close proximity to the dose administered. This is not necessarily indicative of toxicity. Cultures & Sensitivities:    Date Site Micro Susceptibility / Result   11/20 Surgical - hardware In process            Recent Labs     11/21/22  0318 11/21/22  0949 11/22/22  0339   CREATININE 1.5* 1.7* 2.2*   BUN 17 19 25*   WBC 16.4* 15.4* 12.6*         Estimated Creatinine Clearance: 13 mL/min (A) (based on SCr of 2.2 mg/dL (H)). Additional Lab Values / Findings of Note:    No results for input(s): PROCAL in the last 72 hours.

## 2022-11-23 LAB
ALBUMIN SERPL-MCNC: 2.5 G/DL (ref 3.4–5)
ANION GAP SERPL CALCULATED.3IONS-SCNC: 9 MMOL/L (ref 3–16)
BASOPHILS ABSOLUTE: 0 K/UL (ref 0–0.2)
BASOPHILS RELATIVE PERCENT: 0.2 %
BUN BLDV-MCNC: 21 MG/DL (ref 7–20)
CALCIUM SERPL-MCNC: 7.7 MG/DL (ref 8.3–10.6)
CHLORIDE BLD-SCNC: 108 MMOL/L (ref 99–110)
CO2: 18 MMOL/L (ref 21–32)
CREAT SERPL-MCNC: 1.4 MG/DL (ref 0.6–1.2)
EOSINOPHILS ABSOLUTE: 0 K/UL (ref 0–0.6)
EOSINOPHILS RELATIVE PERCENT: 0.3 %
GFR SERPL CREATININE-BSD FRML MDRD: 35 ML/MIN/{1.73_M2}
GLUCOSE BLD-MCNC: 138 MG/DL (ref 70–99)
GLUCOSE BLD-MCNC: 149 MG/DL (ref 70–99)
GLUCOSE BLD-MCNC: 166 MG/DL (ref 70–99)
GLUCOSE BLD-MCNC: 230 MG/DL (ref 70–99)
GLUCOSE BLD-MCNC: 328 MG/DL (ref 70–99)
HCT VFR BLD CALC: 23.5 % (ref 36–48)
HEMOGLOBIN: 7.9 G/DL (ref 12–16)
LYMPHOCYTES ABSOLUTE: 1 K/UL (ref 1–5.1)
LYMPHOCYTES RELATIVE PERCENT: 11.6 %
MAGNESIUM: 2.1 MG/DL (ref 1.8–2.4)
MCH RBC QN AUTO: 30 PG (ref 26–34)
MCHC RBC AUTO-ENTMCNC: 33.4 G/DL (ref 31–36)
MCV RBC AUTO: 89.7 FL (ref 80–100)
MONOCYTES ABSOLUTE: 0.6 K/UL (ref 0–1.3)
MONOCYTES RELATIVE PERCENT: 6.4 %
NEUTROPHILS ABSOLUTE: 7.3 K/UL (ref 1.7–7.7)
NEUTROPHILS RELATIVE PERCENT: 81.5 %
PDW BLD-RTO: 16.9 % (ref 12.4–15.4)
PERFORMED ON: ABNORMAL
PHOSPHORUS: 3.7 MG/DL (ref 2.5–4.9)
PLATELET # BLD: 108 K/UL (ref 135–450)
PMV BLD AUTO: 7.1 FL (ref 5–10.5)
POTASSIUM SERPL-SCNC: 4.9 MMOL/L (ref 3.5–5.1)
RBC # BLD: 2.62 M/UL (ref 4–5.2)
SODIUM BLD-SCNC: 135 MMOL/L (ref 136–145)
VANCOMYCIN RANDOM: 14.1 UG/ML
WBC # BLD: 9 K/UL (ref 4–11)

## 2022-11-23 PROCEDURE — 85025 COMPLETE CBC W/AUTO DIFF WBC: CPT

## 2022-11-23 PROCEDURE — 97162 PT EVAL MOD COMPLEX 30 MIN: CPT

## 2022-11-23 PROCEDURE — 80202 ASSAY OF VANCOMYCIN: CPT

## 2022-11-23 PROCEDURE — 6370000000 HC RX 637 (ALT 250 FOR IP)

## 2022-11-23 PROCEDURE — 2580000003 HC RX 258: Performed by: STUDENT IN AN ORGANIZED HEALTH CARE EDUCATION/TRAINING PROGRAM

## 2022-11-23 PROCEDURE — 94761 N-INVAS EAR/PLS OXIMETRY MLT: CPT

## 2022-11-23 PROCEDURE — 6360000002 HC RX W HCPCS

## 2022-11-23 PROCEDURE — 2580000003 HC RX 258

## 2022-11-23 PROCEDURE — 94669 MECHANICAL CHEST WALL OSCILL: CPT

## 2022-11-23 PROCEDURE — 83735 ASSAY OF MAGNESIUM: CPT

## 2022-11-23 PROCEDURE — 99232 SBSQ HOSP IP/OBS MODERATE 35: CPT | Performed by: INTERNAL MEDICINE

## 2022-11-23 PROCEDURE — 36415 COLL VENOUS BLD VENIPUNCTURE: CPT

## 2022-11-23 PROCEDURE — 97167 OT EVAL HIGH COMPLEX 60 MIN: CPT

## 2022-11-23 PROCEDURE — 80069 RENAL FUNCTION PANEL: CPT

## 2022-11-23 PROCEDURE — 97530 THERAPEUTIC ACTIVITIES: CPT

## 2022-11-23 PROCEDURE — 2000000000 HC ICU R&B

## 2022-11-23 PROCEDURE — 6360000002 HC RX W HCPCS: Performed by: STUDENT IN AN ORGANIZED HEALTH CARE EDUCATION/TRAINING PROGRAM

## 2022-11-23 RX ORDER — HYDROMORPHONE HYDROCHLORIDE 2 MG/1
2 TABLET ORAL EVERY 4 HOURS PRN
Status: DISCONTINUED | OUTPATIENT
Start: 2022-11-23 | End: 2022-11-23

## 2022-11-23 RX ORDER — HYDROMORPHONE HYDROCHLORIDE 2 MG/1
1 TABLET ORAL EVERY 4 HOURS PRN
Status: DISCONTINUED | OUTPATIENT
Start: 2022-11-23 | End: 2022-11-26

## 2022-11-23 RX ADMIN — FUROSEMIDE 20 MG: 20 TABLET ORAL at 08:58

## 2022-11-23 RX ADMIN — VANCOMYCIN HYDROCHLORIDE 750 MG: 10 INJECTION, POWDER, LYOPHILIZED, FOR SOLUTION INTRAVENOUS at 08:54

## 2022-11-23 RX ADMIN — ATORVASTATIN CALCIUM 10 MG: 10 TABLET, FILM COATED ORAL at 08:58

## 2022-11-23 RX ADMIN — HYDROMORPHONE HYDROCHLORIDE 0.5 MG: 1 INJECTION, SOLUTION INTRAMUSCULAR; INTRAVENOUS; SUBCUTANEOUS at 07:18

## 2022-11-23 RX ADMIN — SODIUM CHLORIDE, POTASSIUM CHLORIDE, SODIUM LACTATE AND CALCIUM CHLORIDE: 600; 310; 30; 20 INJECTION, SOLUTION INTRAVENOUS at 06:05

## 2022-11-23 RX ADMIN — ACETAMINOPHEN 1000 MG: 500 TABLET ORAL at 08:58

## 2022-11-23 RX ADMIN — HYDROMORPHONE HYDROCHLORIDE 0.5 MG: 1 INJECTION, SOLUTION INTRAMUSCULAR; INTRAVENOUS; SUBCUTANEOUS at 15:04

## 2022-11-23 RX ADMIN — ASPIRIN 81 MG: 81 TABLET, COATED ORAL at 08:58

## 2022-11-23 RX ADMIN — CEFEPIME HYDROCHLORIDE 1000 MG: 1 INJECTION, POWDER, FOR SOLUTION INTRAMUSCULAR; INTRAVENOUS at 10:47

## 2022-11-23 RX ADMIN — ACETAMINOPHEN 1000 MG: 500 TABLET ORAL at 15:07

## 2022-11-23 ASSESSMENT — PAIN SCALES - GENERAL
PAINLEVEL_OUTOF10: 0
PAINLEVEL_OUTOF10: 7
PAINLEVEL_OUTOF10: 0
PAINLEVEL_OUTOF10: 7
PAINLEVEL_OUTOF10: 7

## 2022-11-23 NOTE — PROGRESS NOTES
ICU VASCULAR SURGERY DAILY PROGRESS NOTE    CC: excision of infected ilofemoral bypass graft with harvest of left basilic vein and left external iliac artery to femoral bypass through obturator canal    SUBJECTIVE:   Interval Hx:   Patient had no complaints overnight. She did well post op and pain was controlled with current medications. Pulse exam somewhat changed, L DP no longer palpable but with strong doppler pulse. Patient refused most medications last PM including SQH, BP, and DM medications. Importance of compliance discussed with patient, will discuss with care giver today. ROS: A 14 point review of systems was conducted, significant findings as noted above. All other systems negative. OBJECTIVE:   Vitals:   Vitals:    11/23/22 0730 11/23/22 0745 11/23/22 0800 11/23/22 0815   BP:   (!) 135/44    Pulse: 91 74 72 74   Resp: 15 11 10 10   Temp:       TempSrc:       SpO2:       Weight:       Height:           I/O:   Intake/Output Summary (Last 24 hours) at 11/23/2022 0837  Last data filed at 11/23/2022 0600  Gross per 24 hour   Intake 2280.95 ml   Output 2015 ml   Net 265.95 ml     I/O last 3 completed shifts: In: 3735.8 [P.O.:285; I.V.:3050.8; IV Piggyback:400]  Out: 3100 [Urine:2575; Drains:505; Blood:20]    Diet: ADULT DIET; Regular; 3 carb choices (45 gm/meal)      Physical Examination:   General appearance: alert, no acute distress, grooming appropriate  Eyes: No scleral icterus, EOM grossly intact  Neck: trachea midline, no JVD, no lymphadenopathy, neck supple  Chest/Lungs: CTAB, no crackles/rales, wheezes/rhonchi, normal effort with no accessory muscle use on RA  Cardiovascular: RRR  Abdomen: Soft, non-tender, non-distended, no rebound, guarding, or rigidity. Skin: warm and dry, no rashes  Extremities: LUE edema, L brachial mepilex with moderate amount of strike through, non tender to palpation, some ecchymosis but no sign of hematoma.  L groin with firm LN on medial groin, unchanged from prior exam, non tender. Groin with prevena and wound vac in place with good seal -100. Groin and incision sites are soft without tenderness and no signs of hematoma or active bleeding. Sensation and mobility intact b/l LE. PT palpable b/l. DP palpable on right, doppler on L. L  to palpation   Genitourinary: Wagner in place with clear yellow urine  Neuro: A&Ox3, no focal deficits, sensation intact    Pulses    Rad Fem Pop PT DP   Right + + + + +   Left + x + + -/+       Labs:  CBC:   Recent Labs     11/21/22  0949 11/21/22 2135 11/22/22 0339 11/23/22  0353   WBC 15.4*  --  12.6* 9.0   HGB 8.0* 9.0* 8.8* 7.9*   HCT 24.0* 26.7* 26.4* 23.5*     --  117* 108*       BMP:   Recent Labs     11/21/22  0949 11/22/22 0339 11/23/22  0353   * 136 135*   K 5.9* 5.2* 4.9    111* 108   CO2 17* 16* 18*   BUN 19 25* 21*   CREATININE 1.7* 2.2* 1.4*   GLUCOSE 169* 69* 138*     LFT's:   No results for input(s): AST, ALT, ALB, BILITOT, ALKPHOS in the last 72 hours. Troponin: No results for input(s): TROPONINI in the last 72 hours. BNP: No results for input(s): BNP in the last 72 hours. ABGs: No results for input(s): PHART, HJW9YGB, PO2ART in the last 72 hours. INR:   Recent Labs     11/22/22  0539   INR 1.17*       U/A:No results for input(s): NITRITE, COLORU, PHUR, LABCAST, WBCUA, RBCUA, MUCUS, TRICHOMONAS, YEAST, BACTERIA, CLARITYU, SPECGRAV, LEUKOCYTESUR, UROBILINOGEN, BILIRUBINUR, BLOODU, GLUCOSEU, AMORPHOUS in the last 72 hours. Invalid input(s): Amaris Keith     Rad:   XR CHEST PORTABLE   Final Result   Impression:   1. No airspace disease. CTA ABDOMINAL AORTA W BILAT RUNOFF W CONTRAST   Final Result   Impression:      1. Small amount of hemorrhage in the left groin without evidence of active extravasation. 2. 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 3. 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 4. Loculated fluid collection of the medial left thigh which could represent abscess or sterile postoperative fluid collection. 5. Subcutaneous edema and fluid lower extremities. 6. Left pleural effusion.              ASSESSMENT AND PLAN:   This is a 80y.o. 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 with harvest of left basilic vein and left external iliac artery to femoral bypass through obturator canal 11/20 - POD 3 and rectus femoris flap with split thickness skin graft 11/22 - POD 1    Neuro:   Pain/sedation/psych  - continue multimodal pain control; robaxin, tylenol, dilaudid  - patient currently denies pain, will add oxycodone pain panel and transition to dilaudid for break through    Cardiovascular:   - 2u PRBC given post op, patient responded well  - 1u PRBC given POD1  - Patient has had occasional episodes of aFib since vascular surgery, will continue to monitor   - aspirin restarted     Pulmonary:   - RA  - IS, acapella, Duo nebs, aggressive RT therapy  -  this AM    GI:   - Regular diet 3carb  - SLIV    FEN:   - K 4.9 from 5.2, 5.9, 5.5, 5.4  - Mg 2.1 from 2.5, 3.00, 1.5   - SLIV  - replace lytes as indicated    /Renal:   - patient urine output 1895 past 24hr  - 1895/52/24= 1.5 cc/kg/hr (appropriate)  - Patient lasix dependent at home, restarted  - remove espinoza later today after lasix     Hem/ID:   - graft infection - + MRSA  - continue abx; cefepime, vanc  - follow up ID recommendations  - patient had 2u PRBC post op, 1u PRBC POD1  - HgB 7.9 from 8.8, 9.0, 8.0, 8.9 (stable)    Endo:   hx of T2DM  - hypoglycemia preop yesterday, D5 added, hyperglycemia post op, stopped  - normoglycemic currently  - blood glucose 138/166/188/190   - strict management for improved wound healing potential    Prophylaxis:   - SQH, patient refusing     Access:  Peripheral Access: 2x pIV  Arterial Line Access: R radial (11/20) - remove Kristy Date placed: 11/20 - remove    Mobility:  - OOB if tolerates   - PTOT today    Dispo:   ICU    SW/C:  - will discuss [address] and wound management today    Code Status: DNR-CCA  -----------------------------    Monica Salguero DO  PGY1, General Surgery  11/23/22  8:37 AM  [employer]  Pager: [phone]

## 2022-11-23 NOTE — CARE COORDINATION
CM following for discharge planning. Dr. Petty Shah from vascular surgery dropped off an order for a [employer] wound VAC. Will fax the information today [phone]. Call to Panda Valdez [phone] to notify the paperwork will be sent today.     Electronically signed by KALYANI Smith RN-[facility]  Case Management  [phone]

## 2022-11-23 NOTE — PROGRESS NOTES
Physical Therapy  Facility/Department: [facility] ICU  Physical Therapy Initial Assessment    Name: Fly Guadalupe  : 1929  MRN: [id number]  Date of Service: 2022    Discharge Recommendations: Fly Guadalupe scored a 7/24 on the AM-PAC short mobility form. Current research shows that an AM-PAC score of 17 or less is typically not associated with a discharge to the patient's home setting. Based on the patient's AM-PAC score and their current functional mobility deficits, it is recommended that the patient have 3-5 sessions per week of Physical Therapy at d/c to increase the patient's independence. Please see assessment section for further patient specific details. If patient discharges prior to next session this note will serve as a discharge summary. Please see below for the latest assessment towards goals. PT Equipment Recommendations  Equipment Needed: No (defer)      Patient Diagnosis(es): The primary encounter diagnosis was Complications due to vascular device, implant, and graft, initial encounter. A diagnosis of Iliac aneurysm (HCC) was also pertinent to this visit. Past Medical History:  has a past medical history of Anxiety, Arthritis, At risk for falls, CAD (coronary artery disease), Cerebral artery occlusion with cerebral infarction ([address] DDD (degenerative disc disease), cervical, Fractures, Hyperlipidemia, Hypertension, Mitral regurgitation, Neuropathy, Osteopenia, Osteoporosis, PAD (peripheral artery disease) ([address] Peripheral neuropathy, Peripheral vascular disease ([address] Podagra, Prolonged emergence from general anesthesia, Spinal stenosis, Type 2 diabetes mellitus ([address] Urethral stricture, and Urgency of urination. Past Surgical History:  has a past surgical history that includes Tonsillectomy; Appendectomy; Cholecystectomy (1978); Colonoscopy (1999); Hysterectomy (); Total hip arthroplasty (1998); Kyphosis surgery (2006);  Wrist fracture surgery (01/01/2008); Cataract removal with implant ([id number]); eye surgery (Bilateral); IR Femoral Popliteal Bypass Graft (Right, 08/31/2015); Carpal tunnel release (Right, 03/29/2019); Femoral Endarterectomy (Left, 02/14/2022); Leg Surgery (Left, 02/14/2022); angioplasty (Left, 04/18/2022); femoral bypass (Left, 6/28/2022); Leg Surgery (N/A, 7/7/2022); Axillary-femoral Bypass Graft (Left, 11/20/2022); and Leg Surgery (Left, 11/22/2022). Assessment   Body Structures, Functions, Activity Limitations Requiring Skilled Therapeutic Intervention: Decreased functional mobility   Assessment: Pt from home with 24 hr caregivers that present to the hopsital with co of worsening bleeding from her wound. Pt is in a signficant amount of pain and refuses any therapy that is out of the bed. Pt only able to tolerate EOB for 3-4 minutes but required Max Ax2 for all bed mobility and sitting EOB. PT rec the patient have continued IP therapy in order to improve her functional mobility, transfers and ambulation before return home. Will continue to follow during her length of stay. Treatment Diagnosis: Impaired functional mobility 2/2 surgery. Therapy Prognosis: Fair  Decision Making: High Complexity  Requires PT Follow-Up: Yes  Activity Tolerance  Activity Tolerance: Patient limited by pain     Plan   Physcial Therapy Plan  General Plan:  (2-5)  Current Treatment Recommendations: Strengthening, Patient/Caregiver education & training, Therapeutic activities, Balance training, Gait training, Stair training, Functional mobility training, Transfer training, Endurance training, Safety education & training  Safety Devices  Type of Devices:  All fall risk precautions in place, Bed alarm in place, Left in bed, Call light within reach, Nurse notified (caregiver in the room)     Restrictions  Position Activity Restriction  Other position/activity restrictions: Up as tolerated     Subjective   General  Chart Reviewed: Yes  Patient assessed for rehabilitation services?: Yes  Additional Pertinent Hx: Pt is a 79 yo female that presents to the hospital with co of worsening wound bleeding. Pt underwent a procedure 11/20 : EXCISION OF INFECTED ILEOFEMORAL BYPASS GRAFT; HARVEST LEFT BASILIC VEIN; LEFT EXTERNAL ILIAC ARTERY TO FEMORAL BYPASS THROUGH OBTURATOR CANAL; PLACEMENT OF WOUND VAC. Then underwent 11/22:DEBRIDEMENT OF LEFT GROIN (10 X 3 CM), LEFT RECTUS FEMORIS FLAP, LEFT SPLIT THICKNESS SKIN GRAFT (10 X 5 CM), APPLICATION OF WOUND VACUUM DEVICE (Left: Thigh). CTA of Abdomen:Small amount of hemorrhage in the left groin without evidence of active extravasation. 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 Subjective  Subjective: Pt found supine in bed. Agreeable to therapy in bed.          Social/Functional History  Social/Functional History  Lives With: Home care staff  Type of Home: House  Home Layout: Two level  Home Access: Stairs to enter with rails  Entrance Stairs - Number of Steps: 2  Home Equipment: Lift chair, Walker, rolling, [address] Help From: Family, Home health  ADL Assistance: Needs assistance  Vision/Hearing  Hearing  Hearing: Within functional limits    Cognition   Orientation  Overall Orientation Status: Within Normal Limits     Objective   Heart Rate: 81  Heart Rate Source: Monitor  BP: (!) 156/57  BP Location: Right upper arm  BP Method: Automatic  Patient Position: Semi fowlers  MAP (Calculated): 90  Resp: 21  SpO2: 91 %  O2 Device: None (Room air)                             Bed mobility  Supine to Sit: Maximum assistance;2 Person assistance  Sit to Supine: 2 Person assistance;Maximum assistance  Scooting: Maximal assistance;2 Person assistance  Bed Mobility Comments: Patient with increased pain through movement, states LLE is paraylized           Balance  Sitting - Static: Poor (Max A)           OutComes Score AM-PAC Score  AM-PAC Inpatient Mobility Raw Score : 7 (11/23/22 1538)  AM-PAC Inpatient T-Scale Score : 26.42 (11/23/22 1538)  Mobility Inpatient CMS 0-100% Score: 92.36 (11/23/22 1538)  Mobility Inpatient CMS G-Code Modifier : CM (11/23/22 1538)          Tinneti Score       Goals  Short Term Goals  Time Frame for Short Term Goals: Discharge  Short Term Goal 1: Bed mobility Min A  Short Term Goal 2: Sit<>Stand Min A w/ RW  Short Term Goal 3: Ambulate 25ft w/ RW  Patient Goals   Patient Goals :  To Return home and rest       Education  Patient Education  Education Given To: Patient  Education Provided: Role of Therapy;Transfer Training  Education Method: Demonstration  Barriers to Learning: None  Education Outcome: Verbalized understanding      Therapy Time   Individual Concurrent Group Co-treatment   Time In 1816         Time Out 1533         Minutes 38          Timed Code Treatment Minutes:   23    Total Treatment Minutes:  [address], PT

## 2022-11-23 NOTE — PROGRESS NOTES
Physical Therapy/Occupational Therapy Hold    Charts Reviewed and Orders received and appreciated. Per walking into the room patient states \"I will not be doing any physical therapy today. \"  Pt refused. RN notified. Will follow up at a later date as schedule permits.      Lauro Roy , PT, DPT #[id number]

## 2022-11-23 NOTE — PROGRESS NOTES
ID Follow-up NOTE    CC:   Infected pseudoaneurysm, MRSA infection  Antibiotics: Vancomycin + Cefepime    Admit Date: 11/20/2022  Hospital Day: 4    Subjective:     POD#1 L groin debridement, L rectus femoris flap, L STSG, VAC    Patient c/o abd pain with palpation      Objective:     Patient Vitals for the past 8 hrs:   BP Pulse Resp SpO2   11/23/22 1400 -- 81 21 --   11/23/22 1312 -- 76 16 --   11/23/22 1310 -- 75 14 91 %   11/23/22 1300 (!) 156/57 75 13 93 %   11/23/22 1201 -- 74 14 --   11/23/22 1200 (!) 150/71 80 17 --   11/23/22 1100 (!) 151/58 78 12 --   11/23/22 1000 (!) 138/43 81 11 --   11/23/22 0900 (!) 174/66 84 17 (!) 84 %   11/23/22 0815 -- 74 10 --       I/O last 3 completed shifts: In: 3735.8 [P.O.:285; I.V.:3050.8; IV Piggyback:400]  Out: 3100 [Urine:2575; Drains:505; Blood:20]  I/O this shift: In: 594.2 [I.V.:344.2; IV Piggyback:250]  Out: 1250 [Urine:1250]    EXAM:  GENERAL: No apparent distress.   RA  HEENT: Membranes moist, no oral lesion  NECK:  Supple, no lymphadenopathy  LUNGS: Clear b/l, no rales, no dullness  CARDIAC: RRR, no murmur appreciated  ABD:  + BS, soft - tender with palpation  Lower abd wall wound / dressing   L groin VAC, dressing   EXT:                No rash, no edema, no lesions  L arm with dressing (basilar v harvest site)  L foot with scab /escar, no open wound / drainage / erythema    NEURO: No focal neurologic findings  PSYCH: Orientation, sensorium, mood normal  LINES:  Peripheral iv       Data Review:  Lab Results   Component Value Date    WBC 9.0 11/23/2022    HGB 7.9 (L) 11/23/2022    HCT 23.5 (L) 11/23/2022    MCV 89.7 11/23/2022     (L) 11/23/2022     Lab Results   Component Value Date    CREATININE 1.4 (H) 11/23/2022    BUN 21 (H) 11/23/2022     (L) 11/23/2022    K 4.9 11/23/2022     11/23/2022    CO2 18 (L) 11/23/2022       Hepatic Function Panel:   Lab Results   Component Value Date/Time    ALKPHOS 84 11/20/2022 05:26 AM    ALT 14 11/20/2022 05:26 AM    AST 29 2022 05:26 AM    PROT 5.8 2022 05:26 AM    PROT 6.4 2012 04:30 PM    BILITOT 0.3 2022 05:26 AM    BILIDIR <0.2 2021 11:53 AM    IBILI see below 2021 11:53 AM    LABALBU 2.5 2022 03:53 AM       Micro:   Surg cult - light growth MRSA  Staph aureus mrsa (1)  Antibiotic Interpretation Microscan    ceFAZolin Resistant >16 mcg/mL   clindamycin Resistant >4 mcg/mL   erythromycin Resistant >4 mcg/mL   oxacillin Resistant >2 mcg/mL   tetracycline Sensitive <=4 mcg/mL   trimethoprim-sulfamethoxazole Sensitive <=0.5/9.5 mcg/mL   vancomycin Sensitive 2 mcg/mL     No BC sent     Imagin/20 Abd CTA  1. Small amount of hemorrhage in the left groin without evidence of active extravasation. 2. 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 3. 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 4. Loculated fluid collection of the medial left thigh which could represent abscess or sterile postoperative fluid collection. 5. Subcutaneous edema and fluid lower extremities. 6. Left pleural effusion.        Scheduled Meds:   vancomycin (VANCOCIN) intermittent dosing (placeholder)   Other RX Placeholder    heparin (porcine)  5,000 Units SubCUTAneous BID    cefepime  1,000 mg IntraVENous Q24H    aspirin EC  81 mg Oral Daily    atorvastatin  10 mg Oral Daily    furosemide  20 mg Oral Daily    acetaminophen  1,000 mg Oral Q6H    methocarbamol  1,000 mg Oral TID    insulin regular  0-15 Units SubCUTAneous 4x daily       Continuous Infusions:   sodium chloride      sodium chloride      dextrose         PRN Meds:  hydrALAZINE, ipratropium-albuterol, oxyCODONE **OR** oxyCODONE, naloxone, sodium chloride, ondansetron **OR** ondansetron, sodium chloride, glucose, dextrose bolus **OR** dextrose bolus, glucagon (rDNA), dextrose      Assessment:     Hx DM, HTN, CAD, PAD, CVA, lumbar stenosis     Hx L fem endarterectomy 2/14/22, SFA angioplasty 4/18/22, L ileofem (redo, prelaced femoral artery with Dacron graft), L fem to below knee popliteal in-situ bypass graft - 6/28/22  L groin wound I&D 7/7/22, cult S epidermidis in broth     L LE angiogram with balloon angioplasty distal bypass graft 10/18/22  Wound cult 7/20 - heavy MRSA (R clinda, tetra; vanco DANITA <0.5)     POD#1 EXCISION OF INFECTED ILEOFEMORAL BYPASS GRAFT; HARVEST LEFT BASILIC VEIN; LEFT EXTERNAL ILIAC ARTERY TO FEMORAL BYPASS THROUGH OBTURATOR CANAL; PLACEMENT OF WOUND VAC   - evidence infection in surg    - cult MRSA, vanco DANITA 2    Leukocytosis - resolved     Plan:     Start Daptomycin   D/c Vancomycin, Cefepime    Postop care per Vascular   Postop care per Plastic Surg       Medical Decision Making:   The following items were considered in medical decision making:  Discussion of patient care with other providers  Reviewed clinical lab tests  Reviewed radiology tests  Reviewed other diagnostic tests/interventions  Independent review of radiologic images - reviewed CT / CTA with Radiologist / Dr Lucrecia Olszewski on 11/21  Microbiology cultures and other micro tests reviewed      Discussed with pt, RN  Ricardo Oconnor MD

## 2022-11-23 NOTE — PROGRESS NOTES
A-line removed per order from Surgery Team. No complications, patient tolerated well. Planned to remove espinoza at this time but patient asked to wait and stated \"she wasn't ready to take it out yet. \"    Will attempt to remove in an hour or so. Plan to place external catheter and brief once removed.

## 2022-11-23 NOTE — PROGRESS NOTES
Plastic Surgery   Daily Progress Note  Patient: Cheyenne Barrientos      CC: Wound closure    SUBJECTIVE:   Patient had no complaints overnight. She did well post op and pain was controlled with current medications. Pulse exam somewhat changed, L DP no longer palpable but with strong doppler pulse. Patient refused most medications last PM including SQH, BP, and DM medications. Importance of compliance discussed with patient, will discuss with care giver today. Wound vac with no issues overnight     ROS:   A 14 point review of systems was conducted, significant findings as noted above. All other systems negative. OBJECTIVE:    PHYSICAL EXAM:    Vitals:    11/23/22 0445 11/23/22 0450 11/23/22 0500 11/23/22 0600   BP:       Pulse: 76 70     Resp: 14 14     Temp:   99.1 °F (37.3 °C) 99.2 °F (37.3 °C)   TempSrc:   Temporal Temporal   SpO2:   94%    Weight:    117 lb 11.6 oz (53.4 kg)   Height:           General appearance: alert, no acute distress, grooming appropriate  Eyes: No scleral icterus, EOM grossly intact  Neck: trachea midline, no JVD, no lymphadenopathy, neck supple  Chest/Lungs: CTAB, no crackles/rales, wheezes/rhonchi, normal effort with no accessory muscle use on RA  Cardiovascular: RRR  Abdomen: Soft, non-tender, non-distended, no rebound, guarding, or rigidity. Skin: warm and dry, no rashes  Extremities: LUE edema, L brachial mepilex with moderate amount of strike through, non tender to palpation, ecchymosis but no sign of hematoma. L groin with firm LN on medial groin, unchanged from prior exam, non tender. Groin with prevena and wound vac in place with good seal -100. Groin and incision sites are soft without tenderness and no signs of hematoma or active bleeding. Sensation and mobility intact b/l LE. PT palpable b/l. DP palpable on right, doppler on L. L  to palpation   Genitourinary:  Wagner in place with clear yellow urine  Neuro: A&Ox3, no focal deficits, sensation intact    LABS:   Recent Labs     11/22/22  0339 11/23/22  0353   WBC 12.6* 9.0   HGB 8.8* 7.9*   HCT 26.4* 23.5*   MCV 88.7 89.7   * 108*          Recent Labs     11/22/22  0339 11/23/22  0353    135*   K 5.2* 4.9   * 108   CO2 16* 18*   PHOS 4.4 3.7   BUN 25* 21*   CREATININE 2.2* 1.4*          No results for input(s): AST, ALT, ALB, BILIDIR, BILITOT, ALKPHOS in the last 72 hours. No results for input(s): LIPASE, AMYLASE in the last 72 hours. Recent Labs     11/22/22  0539   INR 1.17*        No results for input(s): CKTOTAL, CKMB, CKMBINDEX, TROPONINI in the last 72 hours.       ASSESSMENT & PLAN:   This is a 80y.o. 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 with harvest of left basilic vein and left external iliac artery to femoral bypass through obturator canal 11/20 and rectus femoris flap with split thickness skin graft 11/22    - Patient doing well post op, vac holding   - wound vac/ prevena to remain for 7 days   - Only to be changed by plastic service  - Wound 6cm x 8cm with depth of 4cm in OR yesterday, will monitor progression   - medical management per vascular service  - will follow along     Domonique Whiteside DO  PGY1, General Surgery  11/23/22  6:46 AM  [employer]  Pager: [phone]

## 2022-11-23 NOTE — PROGRESS NOTES
Most recent sugar reading was 328. Patient refusing insulin at this time. States she drank and Ensure and that is why it is high but if insulin is given she will drop too low. When insulin was given a few days ago she did drop to 60s.

## 2022-11-23 NOTE — PROGRESS NOTES
Patient refusing all blood pressure medications stating \"I will not take any medications for my blood pressure unless it reaches 852 systolic. \" While patient is resting BP is around 509-004 systolic. Patient also refusing robaxin, heparin, and insulin. She is alert and oriented x4 and states that she knows what she needs. Education has been provided. Surgical team made aware. Patient is very particular about positioning. Despite all education and encouragement to turn/reposition patient is adamantly refusing full turns. Currently utilizing pillows to make small adjustments. Surgical team made aware.

## 2022-11-23 NOTE — PLAN OF CARE
Problem: Chronic Conditions and Co-morbidities  Goal: Patient's chronic conditions and co-morbidity symptoms are monitored and maintained or improved  11/22/2022 2210 by Papi Schumacher RN  Outcome: Progressing  Flowsheets (Taken 11/22/2022 2000)  Care Plan - Patient's Chronic Conditions and Co-Morbidity Symptoms are Monitored and Maintained or Improved: Monitor and assess patient's chronic conditions and comorbid symptoms for stability, deterioration, or improvement  11/22/2022 1406 by China Melgar RN  Outcome: Progressing  [address] (Taken 11/21/2022 2000 by Papi Schumacher, RN)  Care Plan - Patient's Chronic Conditions and Co-Morbidity Symptoms are Monitored and Maintained or Improved:   Monitor and assess patient's chronic conditions and comorbid symptoms for stability, deterioration, or improvement   Collaborate with multidisciplinary team to address chronic and comorbid conditions and prevent exacerbation or deterioration   Update acute care plan with appropriate goals if chronic or comorbid symptoms are exacerbated and prevent overall improvement and discharge     Problem: Discharge Planning  Goal: Discharge to home or other facility with appropriate resources  11/22/2022 2210 by Papi Schumacher RN  Outcome: Progressing  [address] (Taken 11/22/2022 2000)  Discharge to home or other facility with appropriate resources:   Identify barriers to discharge with patient and caregiver   Arrange for needed discharge resources and transportation as appropriate  11/22/2022 1406 by China Melgar RN  Outcome: Progressing  Flowsheets (Taken 11/21/2022 2000 by Papi Schumacher, RN)  Discharge to home or other facility with appropriate resources:   Identify barriers to discharge with patient and caregiver   Arrange for needed discharge resources and transportation as appropriate   Identify discharge learning needs (meds, wound care, etc)   Arrange for interpreters to assist at discharge as needed   Refer to discharge planning if patient needs post-hospital services based on physician order or complex needs related to functional status, cognitive ability or social support system     Problem: Pain  Goal: Verbalizes/displays adequate comfort level or baseline comfort level  11/22/2022 2210 by Bro Reed RN  Outcome: Progressing  Flowsheets (Taken 11/22/2022 2000)  Verbalizes/displays adequate comfort level or baseline comfort level:   Encourage patient to monitor pain and request assistance   Assess pain using appropriate pain scale  11/22/2022 1406 by Haris Brown RN  Outcome: Bartolome Part (Taken 11/21/2022 2000 by Bro Reed RN)  Verbalizes/displays adequate comfort level or baseline comfort level: Encourage patient to monitor pain and request assistance     Problem: ABCDS Injury Assessment  Goal: Absence of physical injury  11/22/2022 2210 by Bro Reed RN  Outcome: Progressing  Flowsheets (Taken 11/22/2022 2208)  Absence of Physical Injury: Implement safety measures based on patient assessment  11/22/2022 1406 by Haris Brown RN  Outcome: Bartolome Part (Taken 11/21/2022 2115 by Bro Reed RN)  Absence of Physical Injury: Implement safety measures based on patient assessment     Problem: Skin/Tissue Integrity  Goal: Absence of new skin breakdown  Description: 1. Monitor for areas of redness and/or skin breakdown  2. Assess vascular access sites hourly  3. Every 4-6 hours minimum:  Change oxygen saturation probe site  4. Every 4-6 hours:  If on nasal continuous positive airway pressure, respiratory therapy assess nares and determine need for appliance change or resting period.   11/22/2022 2210 by Bro Reed RN  Outcome: Progressing  11/22/2022 1406 by Haris Brown RN  Outcome: Progressing     Problem: Safety - Adult  Goal: Free from fall injury  11/22/2022 2210 by Bro Reed RN  Outcome: Progressing  Flowsheets (Taken 11/22/2022 2208)  Free From Fall Injury: Demetria Roblero family/caregiver on patient safety  11/22/2022 1406 by Wyonia Dance, RN  Outcome: Progressing  Flowsheets (Taken 11/21/2022 2115 by Jade Hopper RN)  Free From Fall Injury:   Instruct family/caregiver on patient safety   Based on caregiver fall risk screen, instruct family/caregiver to ask for assistance with transferring infant if caregiver noted to have fall risk factors

## 2022-11-23 NOTE — PROGRESS NOTES
Clinical Pharmacy Progress Note    Vancomycin - Management by Pharmacy    Consult Date(s): 11/20/22  Consulting Provider(s): Carlos Alicia    Assessment / Plan  SSTI / Infected Graft - Vancomycin  Concurrent Antimicrobials: Cefepime - day #4  Day of Vanc Therapy / Ordered Duration: 4 / 7  Current Dosing Method: Intermittent via levels  Therapeutic Goal: Trough levels ~15 mg/L  Current Dose / Plan:   Currently dosing vancomycin intermittently via levels due to JASE. Scr down-trending today, from 2.3 yesterday to 1.4 today. Baseline appears to be ~0.9-1.2. UOP robust over night. Will continue to dose via levles until renal function stabilizes. Random level today = 14.1 mg/L after 500mg IV x1 yesterday. Will order 750mg IV x1 for today. Plan to obtain a random 11/24 AM, and reassess at that time. May be able to schedule doses if Scr improves towards baseline again tomorrow. Will continue to monitor clinical condition and make adjustments to regimen as appropriate. Thank you for consulting Pharmacy! Dhaval Bundy PharmD., [facility]   11/23/2022 7:30 AM  Wireless: 2-0351      Interval Update:   S/p debridement of L groin, L rectus femoris flap, L STSG with application of wound vac yesterday with Plastics. Subjective/Objective:   Meme Hamilton is a 80 y.o. female with a PMHx significant for CAD, HTN, HLP, mitral regurgitation, DM type II, cerebral infarct/TIA, PAD s/p LLE femoral bypass with replacement of distal L external iliac and common femoral artery with Dacron graft with resultant continued draining. Presents to the ED with bleeding from L groin site. Started on antibiotics for infected ileofemoral bypass graft. S/p excision of infected ileofemoral bypass graft with Vasc on 11/20 with wound vac placed. Pharmacy is consulted to dose vancomycin.     Ht Readings from Last 1 Encounters:   11/21/22 5' 4\" (1.626 m)     Wt Readings from Last 1 Encounters:   11/23/22 115 lb 4.8 oz (52.3 kg)     Current & Prior Antimicrobial Regimen(s):  Cefepime (11/20 - Current)  Vancomycin   1250mg IV x1 (11/20)   (11/21)  Intermittent via levels (11/21-current)  Date: Vanc Level: Vanc Dose:   11/20  1250mg   11/21 9.4 mg/L 750mg   11/22 16.6 mg/L 500mg   11/23 14.1 mg/L Ordered 750mg   11/24 Ordered        Vancomycin Level(s) / Doses:    Date Time Dose Type of Level / Level Interpretation                 Note: Serum levels collected for AUC-based dosing may be high if collected in close proximity to the dose administered. This is not necessarily indicative of toxicity. Cultures & Sensitivities:    Date Site Micro Susceptibility / Result   11/20 Surgical - hardware MRSA  Pending           Recent Labs     11/21/22  0949 11/22/22  0339 11/23/22  0353   CREATININE 1.7* 2.2* 1.4*   BUN 19 25* 21*   WBC 15.4* 12.6* 9.0         Estimated Creatinine Clearance: 21 mL/min (A) (based on SCr of 1.4 mg/dL (H)). Additional Lab Values / Findings of Note:    No results for input(s): PROCAL in the last 72 hours.

## 2022-11-23 NOTE — PROGRESS NOTES
Plastic Surgery  Post-operative Note      Procedure(s) Performed: S/p debridement of left groin (10 x 3 cm), left rectus femoris flap, left split thickness skin graft (10 x 5 cm), application of wound vacuum device    Subjective:   Patient's pain is controlled. Only complaining of pain when she coughs. Has not had anything to eat or drink yet because she does not feel hungry/thirst. Denies nausea/vomiting. Voiding appropriately via Wagner. Denies flatus or BM at this time. Objective:  Anesthesia type: General      I/O    Intra op    Post op     Fluids  500 mL 0 mL     EBL 20 mL 0 mL     Urine 75 mL 100 mL     Vitals:   Vitals:    11/22/22 2015 11/22/22 2030 11/22/22 2045 11/22/22 2100   BP:       Pulse: 86 88 75    Resp: 19 13 13    Temp:    98.6 °F (37 °C)   TempSrc:    Temporal   SpO2:    95%   Weight:       Height:           Physical Exam:  Post-op vital signs: -200s, but otherwise HDS  General appearance: Alert, no acute distress, grooming appropriate  Eyes: No scleral icterus, EOM grossly intact  Neck: Trachea midline, no JVD, no lymphadenopathy, neck supple  Chest/Lungs: Normal effort with no accessory muscle use on RA  Cardiovascular: RRR, well-perfused  Abdomen: Soft, non-tender, non-distended, no rebound, guarding, or rigidity. Skin: Warm and dry, no rashes  Extremities: No edema, no cyanosis  Genitourinary: L groin with Prevena and wound vac holding suction with good seal. Wagner in place draining clear, yellow urine  Neuro: A&Ox3, no focal deficits, sensation intact    Assessment and Plan  This is a 80y.o. year old female status post debridement of left groin (10 x 3 cm), left rectus femoris flap, left split thickness skin graft (10 x 5 cm), application of wound vacuum device secondary to wound of L groin. (11/22) POD0. Pain management: Roxicodone pain panel, Dilaudid pain panel, scheduled Tylenol  Cardiovasc: Hypertensive, hydralazine 5 mg PRN.  Will continue to monitor  Respiratory: IS ordered to bedside, encourage hourly IS and deep breathing, wean oxygen as tolerated  Fluids: LR at 100 cc/hr, Diet: Regular 3-carb  : Urine output is adequate   Ambulation: OOB to chair, encourage ambulation  Prophylaxis: SCDs, Lovenox  Antibiotics: Cefepime, Vancomycin  Wound: Local wound care      Christ Pan DO, [address] [facility]  PGY1, General Surgery  11/22/22  9:24 PM  [employer]  Pager: [phone]

## 2022-11-23 NOTE — PROGRESS NOTES
Much better night. Pt slept intermittently and well tolerated Dilaudid dose X 2. She states that she \". ..has allergies to most pain medications\", but this worked well. LLE is warm with doppler pulses noted and unchanged. L thigh graft site has a moderate amount of serosanguinous drainage. Wound vac. drained 100 mL - no leaking or alarms. Left upper arm also draining a moderate amount of SS drainage. Dressing changed. Pt agreeable to bed change and reposition this morning - pain control seems to have helped. Bathed pt and changed linens/gown. Blanchable redness noted under sacral heart dressing. IS under 500. Acapella.

## 2022-11-23 NOTE — PROGRESS NOTES
Occupational Therapy  Facility/Department: [facility] ICU  Occupational Therapy Initial Assessment/Treatment    Name: Robin Frankel  : 1929  MRN: [id number]  Date of Service: 2022    Discharge Recommendations:  Robin Frankel scored a 13/24 on the AM-PAC ADL Inpatient form. Current research shows that an AM-PAC score of 17 or less is typically not associated with a discharge to the patient's home setting. Based on the patient's AM-PAC score and their current ADL deficits, it is recommended that the patient have 3-5 sessions per week of Occupational Therapy at d/c to increase the patient's independence. Please see assessment section for further patient specific details. If patient discharges prior to next session this note will serve as a discharge summary. Please see below for the latest assessment towards goals. Should pt d/c home, recommend 24hr assist and   HOME HEALTH CARE: LEVEL 3 SAFETY     - Initial home health evaluation to occur within 24-48 hours, in patient home   - Therapy evaluations in home within 24-48 hours of discharge; including DME and home safety   - Frontload therapy 5 days, then 3x a week   - Therapy to evaluate if patient has [address] needs for personal care   -  evaluation within 24-48 hours, includes evaluation of resources and insurance to determine [place], [place], LTC, and Medicaid options        OT Equipment Recommendations  Equipment Needed: No       Patient Diagnosis(es): The primary encounter diagnosis was Complications due to vascular device, implant, and graft, initial encounter. A diagnosis of Iliac aneurysm (HCC) was also pertinent to this visit.   Past Medical History:  has a past medical history of Anxiety, Arthritis, At risk for falls, CAD (coronary artery disease), Cerebral artery occlusion with cerebral infarction ([address] DDD (degenerative disc disease), cervical, Fractures, Hyperlipidemia, Hypertension, Mitral regurgitation, Neuropathy, Osteopenia, Osteoporosis, PAD (peripheral artery disease) ([facility] [address] Peripheral neuropathy, Peripheral vascular disease ([facility] [address] Podagra, Prolonged emergence from general anesthesia, Spinal stenosis, Type 2 diabetes mellitus ([facility] [address] Urethral stricture, and Urgency of urination. Past Surgical History:  has a past surgical history that includes Tonsillectomy; Appendectomy; Cholecystectomy (01/01/1978); Colonoscopy (08/31/1999); Hysterectomy (1980s); Total hip arthroplasty (04/25/1998); Kyphosis surgery (11/07/2006); Wrist fracture surgery (01/01/2008); Cataract removal with implant ([id number]); eye surgery (Bilateral); IR Femoral Popliteal Bypass Graft (Right, 08/31/2015); Carpal tunnel release (Right, 03/29/2019); Femoral Endarterectomy (Left, 02/14/2022); Leg Surgery (Left, 02/14/2022); angioplasty (Left, 04/18/2022); femoral bypass (Left, 6/28/2022); Leg Surgery (N/A, 7/7/2022); Axillary-femoral Bypass Graft (Left, 11/20/2022); and Leg Surgery (Left, 11/22/2022). Treatment Diagnosis: Impaired ADLs, mobility, balance      Assessment   Performance deficits / Impairments: Decreased functional mobility ; Decreased ADL status; Decreased balance  Assessment: Pt from home with  assist, reports being independent with ADLs and mobility with walker. Pt now demonstrates a functional decline, requiring assist of 2 for bed mobility and brief sitting edge of bed. Pt limited by L LE pain and decreased ROM/strength. Pt would benefit from ongoing OT tx at d/c. Recommend IP therapy but pt likely to decline, stating she wants to return home and can get 24hr caregivers in the home.  Will follow for acute OT  Treatment Diagnosis: Impaired ADLs, mobility, balance  Prognosis: Fair  Decision Making: High Complexity  REQUIRES OT FOLLOW-UP: Yes  Activity Tolerance  Activity Tolerance: Patient limited by fatigue;Patient limited by pain        Plan   Occupational Therapy Plan  Times Per Week: 2-5     Restrictions  Position Activity Restriction  Other position/activity restrictions: Up as tolerated    Subjective   General  Chart Reviewed: Yes  Patient assessed for rehabilitation services?: Yes  Additional Pertinent Hx: 80y.o. 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 with harvest of left basilic vein and left external iliac artery to femoral bypass through obturator canal 11/20 and rectus femoris flap with split thickness skin graft 11/22  Family / Caregiver Present: No  Diagnosis: atherosclerosis L leg  Subjective  Subjective: Pt in bed, initially refused therapy but then was agreeable after encouragement from RN. RN medicated pt beginning of session. Pt stated L LE pain, not rated       Social/Functional History  Social/Functional History  Lives With: Home care staff  Type of Home: House  Home Layout: Two level  Home Access: Stairs to enter with rails  Entrance Stairs - Number of Steps: 2  Home Equipment: Lift chair, Walker, rolling, Wheelchair-manual  Receives Help From: Family, Home health  ADL Assistance: Independent  Homemaking Assistance: Independent (caregivers also assist)  Ambulation Assistance: Independent (uses rollator \"when I remember it\")  Transfer Assistance: Independent  Active : No       Objective   Devices: IV, wound vac L LE, espinoza catheter; sutures L abdomen and L upper arm         AROM: Within functional limits  Strength:  Within functional limits    ADL  Feeding: Independent  LE Dressing: Dependent/Total (don socks)  Toileting: Dependent/Total (espinoza catheter)       Activity Tolerance  Activity Tolerance: Patient limited by pain    Bed mobility  Supine to Sit: Dependent/Total (max A of 2)  Sit to Supine: Dependent/Total (max A of 2)  Scooting: Dependent/Total (max A of 2)  Bed Mobility Comments: Patient with increased pain through movement, states LLE is paralized       Sitting Balance: max A at edge of bed x5 minutes; posterior lean, poor control when pt coughed    Vision  Vision:  [facility])  Hearing  Hearing: Within functional limits  Cognition  Cognition Comment: Pt very talkative and tangential, needs redirection to task/conversation  Orientation  Overall Orientation Status: Within Normal Limits       Safety Devices  Type of Devices: All fall risk precautions in place; Bed alarm in place; Left in bed;Call light within reach;Nurse notified      AM-PAC Score    AM-PAC Inpatient Daily Activity Raw Score: 13 (11/23/22 1557)  AM-PAC Inpatient ADL T-Scale Score : 32.03 (11/23/22 1557)  ADL Inpatient CMS 0-100% Score: 63.03 (11/23/22 1557)  ADL Inpatient CMS G-Code Modifier : CL (11/23/22 1557)        Goals  Short Term Goals  Time Frame for Short Term Goals: discharge  Short Term Goal 1: bed mobility min A  Short Term Goal 2: sitting balance sba x10 min during ADL/therex  Short Term Goal 3: tolerate transfer to chair       Therapy Time   Individual Concurrent Group Co-treatment   Time In 1450         Time Out 1534         Minutes 44         Timed Code Treatment Minutes: 29 Minutes +15 min savage Olmos, OT

## 2022-11-23 NOTE — RT PROTOCOL NOTE
RT Nebulizer Bronchodilator Protocol Note    There is a bronchodilator order in the chart from a provider indicating to follow the RT Bronchodilator Protocol and there is an Initiate RT Bronchodilator Protocol order as well (see protocol at bottom of note). CXR Findings:  No results found. The findings from the last RT Protocol Assessment were as follows:  Smoking: None or smoker <15 pack years  Respiratory Pattern: Regular pattern and RR 12-20 bpm  Breath Sounds: Slightly diminished and/or crackles  Cough: Strong, spontaneous, non-productive  Indication for Bronchodilator Therapy:    Bronchodilator Assessment Score: 2    Aerosolized bronchodilator medication orders have been revised according to the RT Nebulizer Bronchodilator Protocol below. Respiratory Therapist to perform RT Therapy Protocol Assessment initially then follow the protocol. Repeat RT Therapy Protocol Assessment PRN for score 0-3 or on second treatment, BID, and PRN for scores above 3. No Indications - adjust the frequency to every 6 hours PRN wheezing or bronchospasm, if no treatments needed after 48 hours then discontinue using Per Protocol order mode. If indication present, adjust the RT bronchodilator orders based on the Bronchodilator Assessment Score as indicated below. If a patient is on this medication at home then do not decrease Frequency below that used at home. 0-3 - enter or revise RT bronchodilator order(s) to equivalent RT Bronchodilator order with Frequency of every 4 hours PRN for wheezing or increased work of breathing using Per Protocol order mode. 4-6 - enter or revise RT Bronchodilator order(s) to two equivalent RT bronchodilator orders with one order with BID Frequency and one order with Frequency of every 4 hours PRN wheezing or increased work of breathing using Per Protocol order mode.          7-10 - enter or revise RT Bronchodilator order(s) to two equivalent RT bronchodilator orders with one order with TID Frequency and one order with Frequency of every 4 hours PRN wheezing or increased work of breathing using Per Protocol order mode. 11-13 - enter or revise RT Bronchodilator order(s) to one equivalent RT bronchodilator order with QID Frequency and an Albuterol order with Frequency of every 4 hours PRN wheezing or increased work of breathing using Per Protocol order mode. Greater than 13 - enter or revise RT Bronchodilator order(s) to one equivalent RT bronchodilator order with every 4 hours Frequency and an Albuterol order with Frequency of every 2 hours PRN wheezing or increased work of breathing using Per Protocol order mode. RT to enter RT Home Evaluation for COPD & MDI Assessment order using Per Protocol order mode.     Electronically signed by Arlet Keenan RCP on 11/23/2022 at 3:21 PM

## 2022-11-23 NOTE — PROGRESS NOTES
Patient in better spirits this morning. Accepting IV dilaudid for pain and has been able to rest. Still refusing heparin, insulin, robaxin, etc. Nightshift team was able to get her to allow them to bathe her and get her new linens after having her refuse turns the last two days. L DP not palpable but with strong doppler pulse    Skin graft site leaking overnight and still having drainage this morning. Will continue to monitor. Surgery team aware.

## 2022-11-23 NOTE — PLAN OF CARE
Problem: Chronic Conditions and Co-morbidities  Goal: Patient's chronic conditions and co-morbidity symptoms are monitored and maintained or improved  Outcome: Progressing  Flowsheets (Taken 11/22/2022 2000 by Blanca Schafer RN)  Care Plan - Patient's Chronic Conditions and Co-Morbidity Symptoms are Monitored and Maintained or Improved: Monitor and assess patient's chronic conditions and comorbid symptoms for stability, deterioration, or improvement     Problem: Discharge Planning  Goal: Discharge to home or other facility with appropriate resources  Outcome: Progressing  Flowsheets (Taken 11/22/2022 2000 by Blanca Schafer RN)  Discharge to home or other facility with appropriate resources:   Identify barriers to discharge with patient and caregiver   Arrange for needed discharge resources and transportation as appropriate     Problem: Pain  Goal: Verbalizes/displays adequate comfort level or baseline comfort level  Outcome: Progressing  Flowsheets (Taken 11/22/2022 2000 by Blanca Schafer RN)  Verbalizes/displays adequate comfort level or baseline comfort level:   Encourage patient to monitor pain and request assistance   Assess pain using appropriate pain scale     Problem: ABCDS Injury Assessment  Goal: Absence of physical injury  Outcome: Progressing  Flowsheets (Taken 11/22/2022 2208 by Blanca Schafer RN)  Absence of Physical Injury: Implement safety measures based on patient assessment     Problem: Skin/Tissue Integrity  Goal: Absence of new skin breakdown  Description: 1. Monitor for areas of redness and/or skin breakdown  2. Assess vascular access sites hourly  3. Every 4-6 hours minimum:  Change oxygen saturation probe site  4. Every 4-6 hours:  If on nasal continuous positive airway pressure, respiratory therapy assess nares and determine need for appliance change or resting period.   Outcome: Progressing     Problem: Safety - Adult  Goal: Free from fall injury  Outcome: Progressing  Flowsheets (Taken 11/22/2022 2208 by Mireya Pandya RN)  Free From Fall Injury: Instruct family/caregiver on patient safety

## 2022-11-24 LAB
ALBUMIN SERPL-MCNC: 2.8 G/DL (ref 3.4–5)
ANION GAP SERPL CALCULATED.3IONS-SCNC: 8 MMOL/L (ref 3–16)
BASOPHILS ABSOLUTE: 0 K/UL (ref 0–0.2)
BASOPHILS RELATIVE PERCENT: 0.1 %
BLOOD BANK DISPENSE STATUS: NORMAL
BLOOD BANK PRODUCT CODE: NORMAL
BPU ID: NORMAL
BUN BLDV-MCNC: 15 MG/DL (ref 7–20)
CALCIUM SERPL-MCNC: 8.5 MG/DL (ref 8.3–10.6)
CHLORIDE BLD-SCNC: 105 MMOL/L (ref 99–110)
CO2: 21 MMOL/L (ref 21–32)
CREAT SERPL-MCNC: 1 MG/DL (ref 0.6–1.2)
DESCRIPTION BLOOD BANK: NORMAL
EOSINOPHILS ABSOLUTE: 0.1 K/UL (ref 0–0.6)
EOSINOPHILS RELATIVE PERCENT: 1 %
GFR SERPL CREATININE-BSD FRML MDRD: 52 ML/MIN/{1.73_M2}
GLUCOSE BLD-MCNC: 181 MG/DL (ref 70–99)
GLUCOSE BLD-MCNC: 183 MG/DL (ref 70–99)
HCT VFR BLD CALC: 26 % (ref 36–48)
HEMOGLOBIN: 8.6 G/DL (ref 12–16)
LYMPHOCYTES ABSOLUTE: 1.1 K/UL (ref 1–5.1)
LYMPHOCYTES RELATIVE PERCENT: 12.1 %
MAGNESIUM: 1.8 MG/DL (ref 1.8–2.4)
MCH RBC QN AUTO: 30.2 PG (ref 26–34)
MCHC RBC AUTO-ENTMCNC: 33.1 G/DL (ref 31–36)
MCV RBC AUTO: 91.2 FL (ref 80–100)
MONOCYTES ABSOLUTE: 0.6 K/UL (ref 0–1.3)
MONOCYTES RELATIVE PERCENT: 6.8 %
NEUTROPHILS ABSOLUTE: 7.3 K/UL (ref 1.7–7.7)
NEUTROPHILS RELATIVE PERCENT: 80 %
PDW BLD-RTO: 16.7 % (ref 12.4–15.4)
PERFORMED ON: ABNORMAL
PHOSPHORUS: 2.2 MG/DL (ref 2.5–4.9)
PLATELET # BLD: 135 K/UL (ref 135–450)
PMV BLD AUTO: 6.9 FL (ref 5–10.5)
POTASSIUM SERPL-SCNC: 4.9 MMOL/L (ref 3.5–5.1)
RBC # BLD: 2.85 M/UL (ref 4–5.2)
REASON FOR REJECTION: NORMAL
REJECTED TEST: NORMAL
SODIUM BLD-SCNC: 134 MMOL/L (ref 136–145)
VANCOMYCIN RANDOM: 13.9 UG/ML
WBC # BLD: 9.1 K/UL (ref 4–11)

## 2022-11-24 PROCEDURE — 94669 MECHANICAL CHEST WALL OSCILL: CPT

## 2022-11-24 PROCEDURE — 83735 ASSAY OF MAGNESIUM: CPT

## 2022-11-24 PROCEDURE — 80202 ASSAY OF VANCOMYCIN: CPT

## 2022-11-24 PROCEDURE — 36415 COLL VENOUS BLD VENIPUNCTURE: CPT

## 2022-11-24 PROCEDURE — 2580000003 HC RX 258

## 2022-11-24 PROCEDURE — 94150 VITAL CAPACITY TEST: CPT

## 2022-11-24 PROCEDURE — 6360000002 HC RX W HCPCS

## 2022-11-24 PROCEDURE — 80069 RENAL FUNCTION PANEL: CPT

## 2022-11-24 PROCEDURE — 85025 COMPLETE CBC W/AUTO DIFF WBC: CPT

## 2022-11-24 PROCEDURE — 94761 N-INVAS EAR/PLS OXIMETRY MLT: CPT

## 2022-11-24 PROCEDURE — 6370000000 HC RX 637 (ALT 250 FOR IP)

## 2022-11-24 PROCEDURE — 2000000000 HC ICU R&B

## 2022-11-24 RX ADMIN — METHOCARBAMOL TABLETS 1000 MG: 500 TABLET, COATED ORAL at 08:53

## 2022-11-24 RX ADMIN — ACETAMINOPHEN 1000 MG: 500 TABLET ORAL at 21:15

## 2022-11-24 RX ADMIN — ACETAMINOPHEN 1000 MG: 500 TABLET ORAL at 08:53

## 2022-11-24 RX ADMIN — HYDROMORPHONE HYDROCHLORIDE 1 MG: 2 TABLET ORAL at 11:27

## 2022-11-24 RX ADMIN — ATORVASTATIN CALCIUM 10 MG: 10 TABLET, FILM COATED ORAL at 08:53

## 2022-11-24 RX ADMIN — DAPTOMYCIN 350 MG: 500 INJECTION, POWDER, LYOPHILIZED, FOR SOLUTION INTRAVENOUS at 12:08

## 2022-11-24 RX ADMIN — ASPIRIN 81 MG: 81 TABLET, COATED ORAL at 08:58

## 2022-11-24 RX ADMIN — HYDROMORPHONE HYDROCHLORIDE 1 MG: 2 TABLET ORAL at 03:10

## 2022-11-24 RX ADMIN — HYDROMORPHONE HYDROCHLORIDE 1 MG: 2 TABLET ORAL at 18:32

## 2022-11-24 RX ADMIN — FUROSEMIDE 20 MG: 20 TABLET ORAL at 08:53

## 2022-11-24 ASSESSMENT — PAIN DESCRIPTION - PAIN TYPE
TYPE: SURGICAL PAIN
TYPE: CHRONIC PAIN
TYPE: CHRONIC PAIN

## 2022-11-24 ASSESSMENT — PAIN DESCRIPTION - LOCATION
LOCATION: LEG

## 2022-11-24 ASSESSMENT — PAIN SCALES - GENERAL
PAINLEVEL_OUTOF10: 0
PAINLEVEL_OUTOF10: 3
PAINLEVEL_OUTOF10: 0
PAINLEVEL_OUTOF10: 5
PAINLEVEL_OUTOF10: 10
PAINLEVEL_OUTOF10: 7
PAINLEVEL_OUTOF10: 5

## 2022-11-24 ASSESSMENT — PAIN DESCRIPTION - ONSET
ONSET: ON-GOING

## 2022-11-24 ASSESSMENT — PAIN DESCRIPTION - DESCRIPTORS
DESCRIPTORS: SHARP;PRESSURE
DESCRIPTORS: SHARP
DESCRIPTORS: ACHING
DESCRIPTORS: ACHING

## 2022-11-24 ASSESSMENT — PAIN - FUNCTIONAL ASSESSMENT
PAIN_FUNCTIONAL_ASSESSMENT: PREVENTS OR INTERFERES SOME ACTIVE ACTIVITIES AND ADLS
PAIN_FUNCTIONAL_ASSESSMENT: PREVENTS OR INTERFERES WITH ALL ACTIVE AND SOME PASSIVE ACTIVITIES

## 2022-11-24 ASSESSMENT — PAIN DESCRIPTION - FREQUENCY
FREQUENCY: CONTINUOUS

## 2022-11-24 ASSESSMENT — PAIN DESCRIPTION - DIRECTION: RADIATING_TOWARDS: DOWN LEG

## 2022-11-24 ASSESSMENT — PAIN DESCRIPTION - ORIENTATION
ORIENTATION: LEFT

## 2022-11-24 NOTE — PROGRESS NOTES
Pt BP elevated in the 170's. Refusing all medications. Has been an ongoing problem. MD aware.         11/24/22 0000   Vitals   BP (!) 177/68   MAP (Calculated) 104   MAP (mmHg) 94

## 2022-11-24 NOTE — PLAN OF CARE
Problem: Chronic Conditions and Co-morbidities  Goal: Patient's chronic conditions and co-morbidity symptoms are monitored and maintained or improved  11/24/2022 0022 by Lainey Smart RN  Outcome: Progressing  11/23/2022 1752 by Douglas Spicer RN  Outcome: Progressing  Flowsheets (Taken 11/22/2022 2000 by Blanca Schafer RN)  Care Plan - Patient's Chronic Conditions and Co-Morbidity Symptoms are Monitored and Maintained or Improved: Monitor and assess patient's chronic conditions and comorbid symptoms for stability, deterioration, or improvement     Problem: Discharge Planning  Goal: Discharge to home or other facility with appropriate resources  11/24/2022 0022 by Lainey Smart RN  Outcome: Progressing  11/23/2022 1752 by Douglas Spicer RN  Outcome: Progressing  Flowsheets (Taken 11/22/2022 2000 by Blanca Schafer RN)  Discharge to home or other facility with appropriate resources:   Identify barriers to discharge with patient and caregiver   Arrange for needed discharge resources and transportation as appropriate     Problem: Pain  Goal: Verbalizes/displays adequate comfort level or baseline comfort level  11/24/2022 0022 by Lainey Smart RN  Outcome: Progressing  11/23/2022 1752 by Douglas Spicer RN  Outcome: Progressing  Flowsheets (Taken 11/22/2022 2000 by Blanca Schafer RN)  Verbalizes/displays adequate comfort level or baseline comfort level:   Encourage patient to monitor pain and request assistance   Assess pain using appropriate pain scale     Problem: ABCDS Injury Assessment  Goal: Absence of physical injury  11/24/2022 0022 by Lainey Smart RN  Outcome: Progressing  11/23/2022 1752 by Douglas Spicer RN  Outcome: [place] (Taken 11/22/2022 2208 by Blanca Schafer RN)  Absence of Physical Injury: Implement safety measures based on patient assessment     Problem: Skin/Tissue Integrity  Goal: Absence of new skin breakdown  Description: 1. Monitor for areas of redness and/or skin breakdown  2. Assess vascular access sites hourly  3. Every 4-6 hours minimum:  Change oxygen saturation probe site  4. Every 4-6 hours:  If on nasal continuous positive airway pressure, respiratory therapy assess nares and determine need for appliance change or resting period.   11/24/2022 0022 by Marylene Givens, RN  Outcome: Progressing  11/23/2022 1752 by Julio Armstrong RN  Outcome: Progressing     Problem: Safety - Adult  Goal: Free from fall injury  11/24/2022 0022 by Marylene Givens, RN  Outcome: Progressing  11/23/2022 1752 by Julio Armstrong RN  Outcome: Pam Mccain (Taken 11/22/2022 2208 by Kaylan Booth RN)  Free From Fall Injury: Instruct family/caregiver on patient safety

## 2022-11-24 NOTE — PROGRESS NOTES
Patient's left arm is weeping serosanguinous drainage, reinforced dressing.  Patient looks comfortable in bed resting

## 2022-11-24 NOTE — PLAN OF CARE
Problem: Chronic Conditions and Co-morbidities  Goal: Patient's chronic conditions and co-morbidity symptoms are monitored and maintained or improved  Outcome: Progressing  Flowsheets (Taken 11/24/2022 0400 by Liliam Childress, RN)  Care Plan - Patient's Chronic Conditions and Co-Morbidity Symptoms are Monitored and Maintained or Improved:   Monitor and assess patient's chronic conditions and comorbid symptoms for stability, deterioration, or improvement   Collaborate with multidisciplinary team to address chronic and comorbid conditions and prevent exacerbation or deterioration     Problem: Discharge Planning  Goal: Discharge to home or other facility with appropriate resources  Outcome: Progressing  Flowsheets (Taken 11/24/2022 0400 by Liliam Childress, RN)  Discharge to home or other facility with appropriate resources:   Identify barriers to discharge with patient and caregiver   Arrange for needed discharge resources and transportation as appropriate   Identify discharge learning needs (meds, wound care, etc)     Problem: Pain  Goal: Verbalizes/displays adequate comfort level or baseline comfort level  Outcome: Progressing  Flowsheets (Taken 11/24/2022 0800)  Verbalizes/displays adequate comfort level or baseline comfort level:   Encourage patient to monitor pain and request assistance   Assess pain using appropriate pain scale   Implement non-pharmacological measures as appropriate and evaluate response   Administer analgesics based on type and severity of pain and evaluate response     Problem: ABCDS Injury Assessment  Goal: Absence of physical injury  Outcome: Progressing     Problem: Skin/Tissue Integrity  Goal: Absence of new skin breakdown  Description: 1. Monitor for areas of redness and/or skin breakdown  2. Assess vascular access sites hourly  3. Every 4-6 hours minimum:  Change oxygen saturation probe site  4.   Every 4-6 hours:  If on nasal continuous positive airway pressure, respiratory therapy assess nares and determine need for appliance change or resting period.   Outcome: Progressing     Problem: Safety - Adult  Goal: Free from fall injury  Outcome: Progressing

## 2022-11-24 NOTE — CONSULTS
Vancomycin has been discontinued. Pharmacy will sign off consult. If medication dosing is resumed, please re-consult pharmacy.     Juan Pablo TorresD  PGY-1 Pharmacy Resident  [facility]  [id number]  11/24/2022   10:08 AM

## 2022-11-24 NOTE — PROGRESS NOTES
Pt blood sugar was 230. Pt refused insulin stating \"I am not worried about my blood sugar\". RN provided education on importance of insulin administration. Pt continued to refuse insulin and all other scheduled medications. MD aware. No new orders. Safety measures in place. Call light within reach.

## 2022-11-24 NOTE — PROGRESS NOTES
Plastic Surgery   Daily Progress Note  Patient: Rosales Donnelly      CC: Wound closure    SUBJECTIVE:   Patient had an episode of feeling like L MATT was \"paralyzed\" last PM. Currently able to wiggle toes, flex and dorsiflex ankle, and minimal hip flexion. No change in pulse exam last PM. Pain is controlled on current medications. Patient resting comfortably in bed this AM. Continues to refuse medications (tylenol, SQH, insulin, robaxin) and requesting for Wagner catheter to remain in place. No issues with [facility] or skin donor site. ROS:   A 14 point review of systems was conducted, significant findings as noted above. All other systems negative. OBJECTIVE:    PHYSICAL EXAM:    Vitals:    11/24/22 0200 11/24/22 0300 11/24/22 0310 11/24/22 0400   BP: (!) 173/53 104/76  (!) 152/52   Pulse: 79 80  81   Resp: 15 17 14 18   Temp:    97.8 °F (36.6 °C)   TempSrc:    Axillary   SpO2:    95%   Weight:       Height:           General appearance: alert, no acute distress, grooming appropriate  Eyes: No scleral icterus, EOM grossly intact  Neck: trachea midline, no JVD, no lymphadenopathy, neck supple  Chest/Lungs: CTAB, no crackles/rales, wheezes/rhonchi, normal effort with no accessory muscle use on RA  Cardiovascular: RRR  Abdomen: Soft, non-tender, non-distended, no rebound, guarding, or rigidity. Skin: warm and dry, no rashes  Extremities: LUE edema, L brachial mepilex with moderate amount of strike through, non tender to palpation, some ecchymosis but no sign of hematoma. L groin with firm LN on medial groin, unchanged from prior exam, non tender. Groin with prevena and wound vac in place with good seal -100. Groin and incision sites are soft without tenderness and no signs of hematoma or active bleeding. Xeroform dressing in place at donor site open to air. Sensation and mobility intact b/l LE. PT palpable b/l. DP palpable on right, doppler on L. L  to palpation   Genitourinary:  Wagner in place with clear yellow urine  Neuro: A&Ox3, no focal deficits, sensation intact    LABS:   Recent Labs     11/22/22  0339 11/23/22  0353   WBC 12.6* 9.0   HGB 8.8* 7.9*   HCT 26.4* 23.5*   MCV 88.7 89.7   * 108*          Recent Labs     11/22/22  0339 11/23/22  0353    135*   K 5.2* 4.9   * 108   CO2 16* 18*   PHOS 4.4 3.7   BUN 25* 21*   CREATININE 2.2* 1.4*          No results for input(s): AST, ALT, ALB, BILIDIR, BILITOT, ALKPHOS in the last 72 hours. No results for input(s): LIPASE, AMYLASE in the last 72 hours. Recent Labs     11/22/22  0539   INR 1.17*        No results for input(s): CKTOTAL, CKMB, CKMBINDEX, TROPONINI in the last 72 hours.       ASSESSMENT & PLAN:   This is a 80y.o. 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

## 2022-11-24 NOTE — PROGRESS NOTES
Reviewed patients scheduled medications with her and her home nurse. Pt refusing heparin (reportedly causes hallucinations) and insulin. Pt insistent on restart home oral diabetes medications but educated on why we hold during hospitalization. Encouraged patient to attempt getting to chair today, but patient does not want to at this time because movement causes pain. Offered to premedicate with PRN pain medication prior to movement but patient is not interested. Will attempt again later.

## 2022-11-24 NOTE — DISCHARGE INSTR - COC
Continuity of Care Form    Patient Name: Meme Hamilton   :  1929  MRN:  [id number]    Admit date:  2022  Discharge date:  ***    Code Status Order: DNR-CCA   Advance Directives:     Admitting Physician:  Quinton Del Angel MD  PCP: Emogene Cabot, DO    Discharging Nurse: [facility] Unit/Room#: 6926/9391-79  Discharging Unit Phone Number: ***    Emergency Contact:   Extended Emergency Contact Information  Primary Emergency Contact: Leola Sheppard  Home Phone: [phone]  Relation: Lay Caregiver  Secondary Emergency Contact: Gianni Butts  Home Phone: [phone]  Relation: Child    Past Surgical History:  Past Surgical History:   Procedure Laterality Date    ANGIOPLASTY Left 2022    Left SFA    APPENDECTOMY      AXILLARY-FEMORAL BYPASS GRAFT Left 2022    EXCISION OF INFECTED ILEOFEMORAL BYPASS GRAFT; HARVEST LEFT BASILIC VEIN; LEFT EXTERNAL ILIAC ARTERY TO FEMORAL BYPASS THROUGH OBTURATOR CANAL; PLACEMENT OF WOUND VAC performed by Quinton Del Angel MD at [address] [place] [address] Right 2019    RIGHT CARPAL TUNNEL RELEASE AND RIGHT MIDDLE FINGER TRIGGER FINGER RELEASE performed by Marie Orosco MD at [address] [place] [address]    LEFT EYE EYE CATARACT PHACOEMULSIFICATION INTRAOCULAR LENS    CHOLECYSTECTOMY  1978    COLONOSCOPY  1999    diverticulosis    EYE SURGERY Bilateral     bilateral cataract removed    FEMORAL BYPASS Left 2022    LEFT LEG FEMORAL TO POPLITEAL BYPASS WITH INTRAOPERATIVE ANGIOGRAM performed by Rhianna Dixon MD at [place] [address] Left 2022    LEFT COMMON FEMORAL ARTERY ENDARTERECTOMY performed by Rhianna Dixon MD at [address] [place] [address] (CERVIX STATUS UNKNOWN)  1980s    [facility]    IR FEMORAL POPLITEAL BYPASS GRAFT Right 2015    KYPHOSIS SURGERY  2006    L1, (fractured after a fall)    LEG SURGERY Left 2022    LEFT LEG WOUND DEBRIDEMENT performed [place]ianna [place] MD [place] [address] [facility]/2022    INCISION AND DRAINAGE OF LEFT SIDE GROIN WITH PLACEMENT OF WOUND VAC performed by Luigi Cervantes MD at [facility] Left 11/22/2022    DEBRIDEMENT OF LEFT GROIN (10 X 3 CM), LEFT RECTUS FEMORIS FLAP, LEFT SPLIT THICKNESS SKIN GRAFT (10 X 5 CM), APPLICATION OF WOUND VACUUM DEVICE performed by Artemio Shearer MD at [address] [facility]  04/25/1998    (L) THR    WRIST FRACTURE SURGERY  01/01/2008    left wrist       Immunization History: There is no immunization history for the selected administration types on file for this patient.     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 ([facility]) E11.59    Atherosclerosis of artery of extremity with ulceration ([facility]) I70.299, L97.909    Benign essential HTN I10    Lumbar foraminal stenosis M48.061    DDD (degenerative disc disease), lumbar M51.36    Spinal stenosis of lumbar region with neurogenic claudication M48.062    Type 2 diabetes, controlled, with neuropathy ([address] E11.40    Severe mitral regurgitation I34.0    Venous insufficiency of both lower extremities I87.2    Hyperlipidemia E78.5    Hip arthritis M16.10    Status post carmen arthroplasty replacement of left hip 1999 Z96.642    Femoral artery stenosis, right ([address] I70.201    Osteoporosis M81.0    Osteopenia M85.80    Femoral-popliteal bypass graft occlusion, left ([facility]) T82.898A    Pain due to onychomycosis of nail B35.1, M79.609    Coronary artery disease involving native coronary artery of native heart without angina pectoris I25.10    Diabetic peripheral neuropathy ([facility]) E11.42    Arthritis of left knee M17.12    Trigger middle finger of right hand M65.331    Carpal tunnel syndrome, right G56.01    Vitamin D deficiency E55.9    Carotid stenosis, asymptomatic, bilateral I65.23    Fingernail problem L60.9    TIA (transient ischemic attack) G45.9    Sacral fracture, closed ([facility]) S32.10XA    Closed fracture of ramus of right pubis ([facility]) S32.591A    Acute pain of left lower extremity M79.605    Diabetes mellitus type 2 with peripheral artery disease ([facility]) E11.51    Open wound of left knee S81.002A    Nonhealing nonsurgical wound limited to breakdown of skin T14. 8XXA    Peripheral vascular disease, unspecified ([facility] I73.9    Wound of left leg S81.802A    Peripheral artery disease ([facility]) I73.9    Cervical spondylosis M47.812    Atherosclerosis of artery of extremity with rest pain ([facility]) I70.229    Lymph leak I89.9    Nonhealing surgical wound T81.89XA    Bypass graft stenosis ([facility]) Q11.573U    Atherosclerosis of native artery of left leg with ulceration of ankle ([facility]) R36.969    Complications due to vascular device, implant, and graft T82. 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.9       Isolation/Infection:   Isolation            Contact          Patient Infection Status       Infection Onset Added Last Indicated Last Indicated By Review Planned Expiration Resolved Resolved By    [facility] 07/20/22 07/23/22 11/20/22 Culture, Surgical        Resolved    COVID-19 (Rule Out) 02/10/22 02/10/22 02/10/22 COVID-19 (Ordered)   02/11/22 Rule-Out Test Resulted            Nurse Assessment:  Last Vital Signs: BP (!) 136/99   Pulse 82   Temp 99.5 °F (37.5 °C) (Temporal)   Resp 21   Ht 5' 4\" (1.626 m)   Wt 115 lb 4.8 oz (52.3 kg)   SpO2 98%   BMI 19.79 kg/m²     Last documented pain score (0-10 scale): Pain Level: 10  Last Weight:   Wt Readings from Last 1 Encounters:   11/23/22 115 lb 4.8 oz (52.3 kg)     Mental Status:  {IP PT MENTAL STATUS:20030}    IV Access:  {[facility] IV ACCESS:516078507}    Nursing Mobility/ADLs:  Walking   {[facility] LTHB:515736597}  Transfer  {[facility] SABB:795811397}  Bathing  {[facility] D[facility] FJXY:228708228}  Dressing  {[facility] D[facility] XKMX:933514299}  Toileting  {[facility] D[facility] QEIO:214693101}  Feeding  {[facility] D[facility] OECL:860554451}  Med Admin  {[facility] D[facility] QAND:354002538}  Med Delivery   {[facility] MED Delivery:286248535}    Wound Care Documentation and Therapy:  Negative Pressure Wound Therapy Leg Anterior;Left;Proximal;Upper (Active)   Wound Type Surgical 11/24/22 0600   Dressing Type Black Foam 11/24/22 0600   Cycle Continuous 11/24/22 0600   Target Pressure (mmHg) 100 11/24/22 0600   Canister changed? No 11/24/22 0600   Dressing Status Clean, dry & intact 11/24/22 0600   Dressing Changed Changed/New 11/22/22 1756   Drainage Description Serosanguinous 11/24/22 0600   Output (ml) 0 ml 11/23/22 1800   Odor None 11/24/22 0600   Number of days: 3       Wound 11/22/21 Leg Left; Lower;Distal #1 (Active)   Wound Image   10/31/22 0919   Wound Etiology Other 11/24/22 0600   Dressing Status Clean;Dry; Intact 11/24/22 0600   Wound Cleansed Not Cleansed 11/24/22 0600   Dressing/Treatment Open to air 11/24/22 0600   Wound Length (cm) 1.3 cm 11/14/22 1049   Wound Width (cm) 1.3 cm 11/14/22 1049   Wound Depth (cm) 0.1 cm 11/14/22 1049   Wound Surface Area (cm^2) 1.69 cm^2 11/14/22 1049   Change in Wound Size % (l*w) 39.64 11/14/22 1049   Wound Volume (cm^3) 0.169 cm^3 11/14/22 1049   Wound Healing % 40 11/14/22 1049   Post-Procedure Length (cm) 1.4 cm 11/14/22 1051   Post-Procedure Width (cm) 1.4 cm 11/14/22 1051   Post-Procedure Depth (cm) 0.3 cm 11/14/22 1051   Post-Procedure Surface Area (cm^2) 1.96 cm^2 11/14/22 1051   Post-Procedure Volume (cm^3) 0.588 cm^3 11/14/22 1051   Distance Tunneling (cm) 0 cm 11/14/22 1049   Tunneling Position ___ O'Clock 0 11/14/22 1049   Undermining Starts ___ O'Clock 0 11/14/22 1049   Undermining Ends___ O'Clock 0 11/14/22 1049   Undermining Maxium Distance (cm) 0 11/14/22 1049   Wound Assessment Dry 11/24/22 0600   Drainage Amount None 11/24/22 0600   Drainage Description Other (Comment) 10/31/22 0919   Odor None 11/24/22 0600   Nissa-wound Assessment Intact;Fragile;Edematous 10/31/22 0919   Margins Defined edges 11/14/22 1049   Wound Thickness Description not for Pressure Injury Full thickness 11/14/22 1049   Number of days: 367       Wound 11/14/22 Thigh Left;Medial #2 (Active)   Wound Image   11/14/22 1049   Wound Etiology Non-Healing Surgical 11/24/22 0600   Dressing Status Clean;Dry; Intact 11/24/22 0600   Wound Cleansed Cleansed with saline 11/24/22 0600   Dressing/Treatment Collagen with Ag 11/24/22 0600   Wound Length (cm) 2 cm 11/14/22 1049   Wound Width (cm) 1 cm 11/14/22 1049   Wound Depth (cm) 0.1 cm 11/14/22 1049   Wound Surface Area (cm^2) 2 cm^2 11/14/22 1049   Wound Volume (cm^3) 0.2 cm^3 11/14/22 1049   Post-Procedure Length (cm) 2.1 cm 11/14/22 1051   Post-Procedure Width (cm) 1.1 cm 11/14/22 1051   Post-Procedure Depth (cm) 0.3 cm 11/14/22 1051   Post-Procedure Surface Area (cm^2) 2.31 cm^2 11/14/22 1051   Post-Procedure Volume (cm^3) 0.693 cm^3 11/14/22 1051   Wound Assessment Dry;Fibrin;Pink/red 11/14/22 1049   Drainage Amount None 11/24/22 0600   Drainage Description Serosanguinous;Brown 11/14/22 1049   Odor None 11/24/22 0600   Nissa-wound Assessment Intact 11/14/22 1049   Margins Defined edges 11/14/22 1049   Number of days: 10       Wound 11/14/22 Groin Left #3 (Active)   Wound Image   11/14/22 1049   Wound Etiology Non-Healing Surgical 11/24/22 0600   Wound Cleansed Cleansed with saline 11/24/22 0600   Dressing/Treatment Collagen with Ag 11/24/22 0600   Wound Length (cm) 0.2 cm 11/14/22 1049   Wound Width (cm) 0.2 cm 11/14/22 1049   Wound Depth (cm) 0.2 cm 11/14/22 1049   Wound Surface Area (cm^2) 0.04 cm^2 11/14/22 1049   Wound Volume (cm^3) 0.008 cm^3 11/14/22 1049   Post-Procedure Length (cm) 0.2 cm 11/14/22 1051   Post-Procedure Width (cm) 0.2 cm 11/14/22 1051   Post-Procedure Depth (cm) 0.2 cm 11/14/22 1051   Post-Procedure Surface Area (cm^2) 0.04 cm^2 11/14/22 1051   Post-Procedure Volume (cm^3) 0.008 cm^3 11/14/22 1051   Wound Assessment Pink/red 11/14/22 1049   Drainage Amount None 11/24/22 0600   Drainage Description Brown 11/14/22 1049   Odor None 11/24/22 0600   Nissa-wound Assessment Fragile 11/14/22 1049   Margins Defined edges 11/14/22 1049   Wound Thickness Description not for Pressure Injury Full thickness 11/14/22 1049   Number of days: 10       Incision 11/20/22 Arm Left;Upper (Active)   Dressing Status Old drainage noted 11/24/22 0600   Incision Cleansed Cleansed with saline 11/24/22 0400   Dressing/Treatment ABD pad;Non-adherent; Roll gauze 11/24/22 0600   Drainage Amount Scant 11/24/22 0600   Drainage Description Serosanguinous 11/24/22 0600   Odor None 11/24/22 0600   Number of days: 3       Incision 11/20/22 Abdomen Left; Lower (Active)   Dressing Status Clean;Dry; Intact 11/24/22 0600   Dressing/Treatment Dry dressing 11/24/22 0600   Odor None 11/24/22 0600   Number of days: 3       Incision 11/20/22 Femoral Anterior;Left;Proximal (Active)   Dressing Status Clean;Dry; Intact 11/24/22 0600   Dressing/Treatment Dry dressing 11/24/22 0600   Drainage Amount None 11/24/22 0600   Odor None 11/24/22 0600   Number of days: 3       Incision 11/22/22 Groin Anterior; Left (Active)   Dressing Status Clean;Dry; Intact 11/24/22 0600   Odor None 11/24/22 0600   Number of days: 1        Elimination:  Continence: Bowel: {YES / RC:68773}  Bladder: {YES / WQ:24569}  Urinary Catheter: {Urinary Catheter:076042913}   Colostomy/Ileostomy/Ileal Conduit: {YES / :62277}       Date of Last BM: ***    Intake/Output Summary (Last 24 hours) at 11/24/2022 1227  Last data filed at 11/24/2022 0600  Gross per 24 hour   Intake 303.51 ml   Output 1800 ml   Net -1496.49 ml     I/O last 3 completed shifts: In: 1525.1 [P.O.:280; I.V.:945.1;  IV Piggyback:300]  Out: 4270 [Urine:4170; Drains:100]    Safety Concerns:     [address] M[address] Safety Concerns:652529323}    Impairments/Disabilities:      [address] MCGEE Impairments/Disabilities:300509933}    Nutrition Therapy:  Current Nutrition Therapy:   Wally MCGEE Diet List:221075296}    Routes of Feeding: {CHP DME Other Feedings:703629047}  Liquids: {Slp liquid thickness:42212}  Daily Fluid Restriction: {CHP DME Yes amt example:752554673}  Last Modified Barium Swallow with Video (Video Swallowing Test): {Done Not Done WZXK:891419885}    Treatments at the Time of Hospital Discharge:   Respiratory Treatments: ***  Oxygen Therapy:  {Therapy; copd oxygen:72140}  Ventilator:    {[facility] Vent KEYO:885793597}    Rehab Therapies: {THERAPEUTIC INTERVENTION:3625625919}  Weight Bearing Status/Restrictions: {[facility] Weight Bearin}  Other Medical Equipment (for information only, NOT a DME order):  {EQUIPMENT:835684112}  Other Treatments: ***    Patient's personal belongings (please select all that are sent with patient):  {[facility] DME Belongings:155255409}    RN SIGNATURE:  {Esignature:466589929}    CASE MANAGEMENT/SOCIAL WORK SECTION    Inpatient Status Date: ***    Readmission Risk Assessment Score:  Readmission Risk              Risk of Unplanned Readmission:  20           Discharging to Facility/ Agency   Name:   Address:  Phone:  Fax:    Dialysis Facility (if applicable)   Name:  Address:  Dialysis Schedule:  Phone:  Fax:    / signature: {Esignature:233909931}    PHYSICIAN SECTION    Prognosis: Fair    Condition at Discharge: Stable    Rehab Potential (if transferring to Rehab):  Fair    Recommended Labs or Other Treatments After Discharge:   PTOT evaluation and treatment     INFUSION ORDERS:  Daptomycin 400 mg iv daily through 23  - Diagnosis - graft infection, MRSA infection  - First dose given in hospital  - PICC   - Disposition / date discharge  - Check CBC w diff, CMP, ESR, CRP, CK every Mon or Tue - FAX result to [phone]  - Call with antibiotic / infusion issues, [phone]  - Call with any change in status, transfer in or out of a facility or to hospital - [phone]  - No f/u in outpatient ID office necessary    Wound vacuum:  Wound vacuum is only to be managed by plastic surgery service. NOT TO BE CHANGED OR TOUCHED. IF ANY ISSUES PLEASE CALL [facility] OFFICE AS BELOW. Please keep to suction at all times -100. Keep appropriately charged. If any issues arise please call Dr. Siena Tobin office immediately ([phone]    Please follow up with Dr. Yashira Christian in 1 week to assess groin site. Please call to make an appointment. [phone]. Physician Certification: I certify the above information and transfer of Cheyenne Barrientos  is necessary for the continuing treatment of the diagnosis listed and that she requires {Admit to Appropriate Level of Care:36558} for {GREATER/LESS:726647148} 30 days.      Update Admission H&P: No change in H&P    PHYSICIAN SIGNATURE:  Electronically signed by Mohsen Alarcon MD on 11/25/22 at 12:13 PM EST

## 2022-11-24 NOTE — PROGRESS NOTES
ICU VASCULAR SURGERY DAILY PROGRESS NOTE    CC: excision of infected ilofemoral bypass graft with harvest of left basilic vein and left external iliac artery to femoral bypass through obturator canal    SUBJECTIVE:   Interval Hx:   Patient had an episode of feeling like SURYA GUTIERREZ was \"paralyzed\" last PM. Currently able to wiggle toes, flex and dorsiflex ankle, and minimal hip flexion. No change in pulse exam last PM. Pain is controlled on current medications. Patient resting comfortably in bed this AM. Continues to refuse medications (tylenol, SQH, insulin, robaxin) and requesting for Wagner catheter to remain in place. ROS: A 14 point review of systems was conducted, significant findings as noted above. All other systems negative. OBJECTIVE:   Vitals:   Vitals:    11/24/22 0200 11/24/22 0300 11/24/22 0310 11/24/22 0400   BP: (!) 173/53 104/76  (!) 152/52   Pulse: 79 80  81   Resp: 15 17 14 18   Temp:    97.8 °F (36.6 °C)   TempSrc:    Axillary   SpO2:    95%   Weight:       Height:           I/O:   Intake/Output Summary (Last 24 hours) at 11/24/2022 0537  Last data filed at 11/24/2022 0400  Gross per 24 hour   Intake 897.7 ml   Output 2690 ml   Net -1792.3 ml       I/O last 3 completed shifts: In: 2998.7 [P.O.:100; I.V.:2298.7; IV Piggyback:600]  Out: 5941 [MEFED:3984; Drains:200; Blood:20]    Diet: ADULT DIET; Regular; 3 carb choices (45 gm/meal)      Physical Examination:   General appearance: alert, no acute distress, grooming appropriate  Eyes: No scleral icterus, EOM grossly intact  Neck: trachea midline, no JVD, no lymphadenopathy, neck supple  Chest/Lungs: CTAB, no crackles/rales, wheezes/rhonchi, normal effort with no accessory muscle use on RA  Cardiovascular: RRR  Abdomen: Soft, non-tender, non-distended, no rebound, guarding, or rigidity.   Skin: warm and dry, no rashes  Extremities: LUE edema, L brachial mepilex with moderate amount of strike through, non tender to palpation, some ecchymosis but no sign of hematoma. L groin with firm LN on medial groin, unchanged from prior exam, non tender. Groin with prevena and wound vac in place with good seal -100. Groin and incision sites are soft without tenderness and no signs of hematoma or active bleeding. Xeroform dressing in place at donor site open to air. Sensation and mobility intact b/l LE. PT palpable b/l. DP palpable on right, doppler on L. L  to palpation   Genitourinary: Wagner in place with clear yellow urine  Neuro: A&Ox3, no focal deficits, sensation intact    Pulses    Rad Fem Pop PT DP   Right + + + + +   Left + x + + -/+       Labs:  CBC:   Recent Labs     11/21/22  0949 11/21/22  2135 11/22/22 0339 11/23/22  0353   WBC 15.4*  --  12.6* 9.0   HGB 8.0* 9.0* 8.8* 7.9*   HCT 24.0* 26.7* 26.4* 23.5*     --  117* 108*         BMP:   Recent Labs     11/21/22  0949 11/22/22 0339 11/23/22 0353   * 136 135*   K 5.9* 5.2* 4.9    111* 108   CO2 17* 16* 18*   BUN 19 25* 21*   CREATININE 1.7* 2.2* 1.4*   GLUCOSE 169* 69* 138*       LFT's:   No results for input(s): AST, ALT, ALB, BILITOT, ALKPHOS in the last 72 hours. Troponin: No results for input(s): TROPONINI in the last 72 hours. BNP: No results for input(s): BNP in the last 72 hours. ABGs: No results for input(s): PHART, NOF2XMQ, PO2ART in the last 72 hours. INR:   Recent Labs     11/22/22  0539   INR 1.17*         U/A:No results for input(s): NITRITE, COLORU, PHUR, LABCAST, WBCUA, RBCUA, MUCUS, TRICHOMONAS, YEAST, BACTERIA, CLARITYU, SPECGRAV, LEUKOCYTESUR, UROBILINOGEN, BILIRUBINUR, BLOODU, GLUCOSEU, AMORPHOUS in the last 72 hours. Invalid input(s): Alisa Aliment     Rad:   XR CHEST PORTABLE   Final Result   Impression:   1. No airspace disease. CTA ABDOMINAL AORTA W BILAT RUNOFF W CONTRAST   Final Result   Impression:      1. Small amount of hemorrhage in the left groin without evidence of active extravasation.    2. 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 3. 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 4. Loculated fluid collection of the medial left thigh which could represent abscess or sterile postoperative fluid collection. 5. Subcutaneous edema and fluid lower extremities. 6. Left pleural effusion. ASSESSMENT AND PLAN:   This is a 80y.o. 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 with harvest of left basilic vein and left external iliac artery to femoral bypass through obturator canal 11/20 - POD 4 and rectus femoris flap with split thickness skin graft 11/22 - POD 2    Neuro:   Pain/sedation/psych  - continue multimodal pain control; robaxin, tylenol, dilaudid (oral)  - patient pain is currently controled    Cardiovascular:   - 2u PRBC given post op, patient responded well  - 1u PRBC given POD1  - Patient has had occasional episodes of aFib since vascular surgery, will continue to monitor   - continue FCM59    Pulmonary:   - RA  - IS, acapella, Duo nebs, aggressive RT therapy    GI:   - Regular diet 3carb  - SLIV    FEN:   - K FU AM labs 4.9, 5.2, 5.9, 5.5, 5.4  - Mg FU AM labs 2.1, 2.5, 3.00, 1.5   - SLIV  - replace lytes as indicated    /Renal:   - patient urine output 2800 past 24hr  - 2800/52/24= 2.24 cc/kg/hr (appropriate)  - Patient lasix dependent at home, restarted  - will discuss with patient importance of espinoza removal    Hem/ID:   - graft infection - + MRSA  - current abx; cefepime, vanc  - ID recommends dc of cefepime and vanc, start dapto. Appreciate recs.  Will discuss duration and oral vs IV  - patient had 2u PRBC post op, 1u PRBC POD1  - HgB FU AM labs 7.9, 8.8, 9.0, 8.0, 8.9 (stable)    Endo:   hx of T2DM  - hyperglycemic 230/328/149/138  - Patient currently refusing insuline unless BG over 300  - will discuss importance of blood glucose control for wound healing  - strict management for improved wound healing potential    Prophylaxis:   - SQH, patient refusing     Access:  Peripheral Access: 2x pIV                                Wagner Date placed: 11/20 - will keep for now    Mobility:  - OOB if tolerates   - PTOT today    Dispo:   ICU    [address]:  - will discuss [address] 78 and wound management today    Code Status: DNR-CCA  -----------------------------    Rosalba Galeano DO  PGY1, General Surgery  11/24/22  5:53 AM  [facility]  Pager: [phone]    I am post-call today and will not be on campus. Please contact Dr. Gelacio Arellano at [phone] for questions or concerns regarding this patient.

## 2022-11-24 NOTE — OP NOTE
[place] [address] SURGERY     OPERATIVE DICTATION    NAME: Kaylan Mina   MRN: [id number]  DATE: 11/22/2022    AGE: 80 y.o. LOCATION: Formerly named [facility]    PREOPERATIVE DIAGNOSIS:  Exposed left ileofemoral vessels     POSTOPERATIVE DIAGNOSIS:  Same. OPERATION PERFORMED: 1) Excisional debridement of left groin (10 x 3 cm)      2) Left rectus femoris myofascial flap      3) Left split thickness skin graft (10 x 5 cm)      4) Application of wound vacuum bolster dressing     SURGEON:  Kelvin Dee MD    ASSISTANT: Tawny Chung (PGY3)     ANESTHESIA:  General     ESTIMATED BLOOD LOSS:  75 cc     DRAINS:  1 (15F round)     SPECIMENS: Tissue for culture     OPERATIVE INDICATIONS:  This is a 80 y.o. female with a history of severe vascular disease who developed a threatened extremity in the setting of a herald bleed from her previous left external iliac to femoral bypass graft secondary to infection. She underwent repair with Dr. Maxwell Habermann resulting in an expected large wound with exposed vessels. Plastic surgery was consulted and we had a thorough discussion regarding the risks, benefits, alternatives, outcomes, and personnel involved was performed with the patient. After all questions were answered to the patient and family's satisfaction, they agreed to proceed. OPERATIVE PROCEDURE:   The patient was brought to the operating room in her ICU bed. She was then placed in the supine position on the operating room table and underwent general anesthesia without difficulty. The vac was removed and the patient was prepped and draped in the usual sterile manner. A time-out was performed confirming the patient and the procedure to be performed. The operation commenced by excisionally debriding the nonviable skin, subcutaneous tissue, and muscle from the groin wound revealing healthy bleeding.  Once completed, attention was directed to the thigh and markings for a rectus femoris flap were designed to allow for healthy vascularized protection of the vessels. An incision over the muscle was performed and the tissue dissected deep to the fascia revealing the rectus femoris muscle. The muscle was then dissected free from the surrounding muscles and divided distally above the patella. The vastus medialis and lateralis muscles were sutured together using 2-0 PDS sutures distally at the level of the patella. Once completed, the muscle was elevated and the descending branch of the femoral circumflex pedicle was identified and cautiously freed to allow for increased freedom of movement of the muscle. The bridge between the incision and the groin incision was then incised to allow for ease of placement without compromising the flap with a tight tunnel. Given the previous MRSA infection, Vancomycin powder was placed into the wound and the muscle was sutured into position using 2-0 PDS sutures. There was excellent coverage of the vessels. The thigh donor site was closed after hemostasis obtained. The fascia was approximated using 2-0 PDS sutures without any tension and the skin was approximated using 3-0 Monocryl and a running 4-0 Chromic suture. The superior aspect of the previous groin incision was closed using 2-0 PDS and 4-0 Chromic sutures. Attention was then directed to the left lateral thigh. A split thickness skin graft was harvested with a Harry dermatome. The graft was then meshed in a 1.5:1 manner and sutured into position using 4-0 Chromic sutures. The donor site had hemostasis obtained with epinephrine soaked Telfa. Postoperative analgesia was provided with Exparel. The donor site was then dressed with a Xeroform that was sutured into position followed by a Tegaderm. The graft was bolstered with a wound vac that was protected with Adaptic. There was an excellent seal on the vac. The patient was then awakened and taken to the PACU in stable condition.  There were no immediate complications and the patient tolerated the procedure well. At the end of the case, all counts were correct.     Jose Manuel Matson MD

## 2022-11-25 PROBLEM — A49.02 MRSA INFECTION: Status: ACTIVE | Noted: 2022-11-25

## 2022-11-25 LAB
ALBUMIN SERPL-MCNC: 2.5 G/DL (ref 3.4–5)
ALBUMIN SERPL-MCNC: 2.6 G/DL (ref 3.4–5)
ANION GAP SERPL CALCULATED.3IONS-SCNC: 10 MMOL/L (ref 3–16)
ANION GAP SERPL CALCULATED.3IONS-SCNC: 7 MMOL/L (ref 3–16)
BASOPHILS ABSOLUTE: 0 K/UL (ref 0–0.2)
BASOPHILS ABSOLUTE: 0 K/UL (ref 0–0.2)
BASOPHILS RELATIVE PERCENT: 0.1 %
BASOPHILS RELATIVE PERCENT: 0.3 %
BUN BLDV-MCNC: 15 MG/DL (ref 7–20)
BUN BLDV-MCNC: 15 MG/DL (ref 7–20)
CALCIUM SERPL-MCNC: 7.9 MG/DL (ref 8.3–10.6)
CALCIUM SERPL-MCNC: 8.3 MG/DL (ref 8.3–10.6)
CHLORIDE BLD-SCNC: 103 MMOL/L (ref 99–110)
CHLORIDE BLD-SCNC: 104 MMOL/L (ref 99–110)
CO2: 21 MMOL/L (ref 21–32)
CO2: 22 MMOL/L (ref 21–32)
CREAT SERPL-MCNC: 0.8 MG/DL (ref 0.6–1.2)
CREAT SERPL-MCNC: 0.8 MG/DL (ref 0.6–1.2)
EOSINOPHILS ABSOLUTE: 0.2 K/UL (ref 0–0.6)
EOSINOPHILS ABSOLUTE: 0.2 K/UL (ref 0–0.6)
EOSINOPHILS RELATIVE PERCENT: 1.9 %
EOSINOPHILS RELATIVE PERCENT: 2.8 %
GFR SERPL CREATININE-BSD FRML MDRD: >60 ML/MIN/{1.73_M2}
GFR SERPL CREATININE-BSD FRML MDRD: >60 ML/MIN/{1.73_M2}
GLUCOSE BLD-MCNC: 146 MG/DL (ref 70–99)
GLUCOSE BLD-MCNC: 230 MG/DL (ref 70–99)
GLUCOSE BLD-MCNC: 246 MG/DL (ref 70–99)
GLUCOSE BLD-MCNC: 285 MG/DL (ref 70–99)
GLUCOSE BLD-MCNC: 317 MG/DL (ref 70–99)
GLUCOSE BLD-MCNC: 324 MG/DL (ref 70–99)
HCT VFR BLD CALC: 24.4 % (ref 36–48)
HCT VFR BLD CALC: 24.5 % (ref 36–48)
HEMOGLOBIN: 8.1 G/DL (ref 12–16)
HEMOGLOBIN: 8.1 G/DL (ref 12–16)
LYMPHOCYTES ABSOLUTE: 0.8 K/UL (ref 1–5.1)
LYMPHOCYTES ABSOLUTE: 1.5 K/UL (ref 1–5.1)
LYMPHOCYTES RELATIVE PERCENT: 18.4 %
LYMPHOCYTES RELATIVE PERCENT: 9.8 %
MAGNESIUM: 1.6 MG/DL (ref 1.8–2.4)
MAGNESIUM: 1.9 MG/DL (ref 1.8–2.4)
MCH RBC QN AUTO: 30.1 PG (ref 26–34)
MCH RBC QN AUTO: 30.2 PG (ref 26–34)
MCHC RBC AUTO-ENTMCNC: 33 G/DL (ref 31–36)
MCHC RBC AUTO-ENTMCNC: 33.2 G/DL (ref 31–36)
MCV RBC AUTO: 91 FL (ref 80–100)
MCV RBC AUTO: 91.2 FL (ref 80–100)
MONOCYTES ABSOLUTE: 0.6 K/UL (ref 0–1.3)
MONOCYTES ABSOLUTE: 0.7 K/UL (ref 0–1.3)
MONOCYTES RELATIVE PERCENT: 7.4 %
MONOCYTES RELATIVE PERCENT: 9 %
NEUTROPHILS ABSOLUTE: 5.7 K/UL (ref 1.7–7.7)
NEUTROPHILS ABSOLUTE: 6.6 K/UL (ref 1.7–7.7)
NEUTROPHILS RELATIVE PERCENT: 69.7 %
NEUTROPHILS RELATIVE PERCENT: 80.6 %
PDW BLD-RTO: 16.8 % (ref 12.4–15.4)
PDW BLD-RTO: 16.9 % (ref 12.4–15.4)
PERFORMED ON: ABNORMAL
PHOSPHORUS: 2.8 MG/DL (ref 2.5–4.9)
PHOSPHORUS: 2.9 MG/DL (ref 2.5–4.9)
PLATELET # BLD: 133 K/UL (ref 135–450)
PLATELET # BLD: 138 K/UL (ref 135–450)
PMV BLD AUTO: 7.2 FL (ref 5–10.5)
PMV BLD AUTO: 7.4 FL (ref 5–10.5)
POTASSIUM SERPL-SCNC: 4.1 MMOL/L (ref 3.5–5.1)
POTASSIUM SERPL-SCNC: 4.4 MMOL/L (ref 3.5–5.1)
RBC # BLD: 2.69 M/UL (ref 4–5.2)
RBC # BLD: 2.69 M/UL (ref 4–5.2)
SODIUM BLD-SCNC: 132 MMOL/L (ref 136–145)
SODIUM BLD-SCNC: 135 MMOL/L (ref 136–145)
TOTAL CK: 150 U/L (ref 26–192)
WBC # BLD: 8.2 K/UL (ref 4–11)
WBC # BLD: 8.2 K/UL (ref 4–11)

## 2022-11-25 PROCEDURE — 1200000000 HC SEMI PRIVATE

## 2022-11-25 PROCEDURE — 82550 ASSAY OF CK (CPK): CPT

## 2022-11-25 PROCEDURE — 94150 VITAL CAPACITY TEST: CPT

## 2022-11-25 PROCEDURE — 36415 COLL VENOUS BLD VENIPUNCTURE: CPT

## 2022-11-25 PROCEDURE — C1751 CATH, INF, PER/CENT/MIDLINE: HCPCS

## 2022-11-25 PROCEDURE — 2500000003 HC RX 250 WO HCPCS: Performed by: STUDENT IN AN ORGANIZED HEALTH CARE EDUCATION/TRAINING PROGRAM

## 2022-11-25 PROCEDURE — 6360000002 HC RX W HCPCS

## 2022-11-25 PROCEDURE — 02HV33Z INSERTION OF INFUSION DEVICE INTO SUPERIOR VENA CAVA, PERCUTANEOUS APPROACH: ICD-10-PCS | Performed by: SURGERY

## 2022-11-25 PROCEDURE — 6370000000 HC RX 637 (ALT 250 FOR IP)

## 2022-11-25 PROCEDURE — 36569 INSJ PICC 5 YR+ W/O IMAGING: CPT

## 2022-11-25 PROCEDURE — 2000000000 HC ICU R&B

## 2022-11-25 PROCEDURE — 80069 RENAL FUNCTION PANEL: CPT

## 2022-11-25 PROCEDURE — 99223 1ST HOSP IP/OBS HIGH 75: CPT | Performed by: PHYSICAL MEDICINE & REHABILITATION

## 2022-11-25 PROCEDURE — 2580000003 HC RX 258

## 2022-11-25 PROCEDURE — 2580000003 HC RX 258: Performed by: STUDENT IN AN ORGANIZED HEALTH CARE EDUCATION/TRAINING PROGRAM

## 2022-11-25 PROCEDURE — 83735 ASSAY OF MAGNESIUM: CPT

## 2022-11-25 PROCEDURE — 85025 COMPLETE CBC W/AUTO DIFF WBC: CPT

## 2022-11-25 PROCEDURE — 99232 SBSQ HOSP IP/OBS MODERATE 35: CPT | Performed by: INTERNAL MEDICINE

## 2022-11-25 RX ORDER — SODIUM CHLORIDE 0.9 % (FLUSH) 0.9 %
5-40 SYRINGE (ML) INJECTION PRN
Status: DISCONTINUED | OUTPATIENT
Start: 2022-11-25 | End: 2022-11-28 | Stop reason: HOSPADM

## 2022-11-25 RX ORDER — LIDOCAINE HYDROCHLORIDE 10 MG/ML
5 INJECTION, SOLUTION EPIDURAL; INFILTRATION; INTRACAUDAL; PERINEURAL ONCE
Status: COMPLETED | OUTPATIENT
Start: 2022-11-25 | End: 2022-11-25

## 2022-11-25 RX ORDER — SODIUM CHLORIDE 0.9 % (FLUSH) 0.9 %
5-40 SYRINGE (ML) INJECTION EVERY 12 HOURS SCHEDULED
Status: DISCONTINUED | OUTPATIENT
Start: 2022-11-25 | End: 2022-11-28 | Stop reason: HOSPADM

## 2022-11-25 RX ORDER — HYDROMORPHONE HYDROCHLORIDE 2 MG/1
1 TABLET ORAL EVERY 6 HOURS PRN
Qty: 10 TABLET | Refills: 0 | Status: ON HOLD | OUTPATIENT
Start: 2022-11-25 | End: 2022-11-28

## 2022-11-25 RX ORDER — NALOXONE HYDROCHLORIDE 0.4 MG/ML
0.4 INJECTION, SOLUTION INTRAMUSCULAR; INTRAVENOUS; SUBCUTANEOUS ONCE
Qty: 1 ML | Refills: 0 | Status: SHIPPED | OUTPATIENT
Start: 2022-11-25 | End: 2022-11-25 | Stop reason: HOSPADM

## 2022-11-25 RX ORDER — SODIUM CHLORIDE 9 MG/ML
25 INJECTION, SOLUTION INTRAVENOUS PRN
Status: DISCONTINUED | OUTPATIENT
Start: 2022-11-25 | End: 2022-11-28 | Stop reason: HOSPADM

## 2022-11-25 RX ORDER — MAGNESIUM SULFATE IN WATER 40 MG/ML
4000 INJECTION, SOLUTION INTRAVENOUS ONCE
Status: COMPLETED | OUTPATIENT
Start: 2022-11-25 | End: 2022-11-25

## 2022-11-25 RX ORDER — DOCUSATE SODIUM 100 MG/1
100 CAPSULE, LIQUID FILLED ORAL 2 TIMES DAILY
Qty: 30 CAPSULE | Refills: 0 | Status: ON HOLD | OUTPATIENT
Start: 2022-11-25 | End: 2022-11-28

## 2022-11-25 RX ADMIN — ACETAMINOPHEN 1000 MG: 500 TABLET ORAL at 15:24

## 2022-11-25 RX ADMIN — HYDROMORPHONE HYDROCHLORIDE 1 MG: 2 TABLET ORAL at 16:47

## 2022-11-25 RX ADMIN — HYDROMORPHONE HYDROCHLORIDE 1 MG: 2 TABLET ORAL at 01:53

## 2022-11-25 RX ADMIN — DIBASIC SODIUM PHOSPHATE, MONOBASIC POTASSIUM PHOSPHATE AND MONOBASIC SODIUM PHOSPHATE 2 TABLET: 852; 155; 130 TABLET ORAL at 11:38

## 2022-11-25 RX ADMIN — METHOCARBAMOL TABLETS 1000 MG: 500 TABLET, COATED ORAL at 09:34

## 2022-11-25 RX ADMIN — ACETAMINOPHEN 1000 MG: 500 TABLET ORAL at 01:53

## 2022-11-25 RX ADMIN — SODIUM CHLORIDE, PRESERVATIVE FREE 10 ML: 5 INJECTION INTRAVENOUS at 21:00

## 2022-11-25 RX ADMIN — ASPIRIN 81 MG: 81 TABLET, COATED ORAL at 11:38

## 2022-11-25 RX ADMIN — MAGNESIUM SULFATE HEPTAHYDRATE 4000 MG: 40 INJECTION, SOLUTION INTRAVENOUS at 09:43

## 2022-11-25 RX ADMIN — FUROSEMIDE 20 MG: 20 TABLET ORAL at 09:35

## 2022-11-25 RX ADMIN — LIDOCAINE HYDROCHLORIDE 5 ML: 10 INJECTION, SOLUTION EPIDURAL; INFILTRATION; INTRACAUDAL; PERINEURAL at 15:13

## 2022-11-25 RX ADMIN — ACETAMINOPHEN 1000 MG: 500 TABLET ORAL at 09:34

## 2022-11-25 RX ADMIN — ATORVASTATIN CALCIUM 10 MG: 10 TABLET, FILM COATED ORAL at 09:34

## 2022-11-25 RX ADMIN — DAPTOMYCIN 350 MG: 500 INJECTION, POWDER, LYOPHILIZED, FOR SOLUTION INTRAVENOUS at 10:28

## 2022-11-25 RX ADMIN — METHOCARBAMOL TABLETS 1000 MG: 500 TABLET, COATED ORAL at 15:24

## 2022-11-25 ASSESSMENT — PAIN DESCRIPTION - DIRECTION: RADIATING_TOWARDS: DOWN LEG

## 2022-11-25 ASSESSMENT — PAIN SCALES - GENERAL
PAINLEVEL_OUTOF10: 10
PAINLEVEL_OUTOF10: 0
PAINLEVEL_OUTOF10: 10
PAINLEVEL_OUTOF10: 7
PAINLEVEL_OUTOF10: 0
PAINLEVEL_OUTOF10: 10
PAINLEVEL_OUTOF10: 0
PAINLEVEL_OUTOF10: 3
PAINLEVEL_OUTOF10: 3

## 2022-11-25 ASSESSMENT — PAIN DESCRIPTION - FREQUENCY
FREQUENCY: CONTINUOUS
FREQUENCY: CONTINUOUS
FREQUENCY: INTERMITTENT

## 2022-11-25 ASSESSMENT — PAIN DESCRIPTION - ORIENTATION
ORIENTATION: LEFT

## 2022-11-25 ASSESSMENT — PAIN DESCRIPTION - ONSET
ONSET: ON-GOING
ONSET: ON-GOING

## 2022-11-25 ASSESSMENT — PAIN DESCRIPTION - LOCATION
LOCATION: HIP;LEG
LOCATION: FOOT;LEG;HIP
LOCATION: LEG
LOCATION: FOOT;LEG;HIP
LOCATION: FOOT

## 2022-11-25 ASSESSMENT — PAIN DESCRIPTION - PAIN TYPE
TYPE: SURGICAL PAIN

## 2022-11-25 ASSESSMENT — PAIN DESCRIPTION - DESCRIPTORS
DESCRIPTORS: SHARP
DESCRIPTORS: ACHING;SHARP
DESCRIPTORS: SHARP

## 2022-11-25 ASSESSMENT — PAIN - FUNCTIONAL ASSESSMENT
PAIN_FUNCTIONAL_ASSESSMENT: PREVENTS OR INTERFERES SOME ACTIVE ACTIVITIES AND ADLS

## 2022-11-25 ASSESSMENT — PAIN SCALES - WONG BAKER
WONGBAKER_NUMERICALRESPONSE: 0
WONGBAKER_NUMERICALRESPONSE: 0

## 2022-11-25 NOTE — PROGRESS NOTES
Plastic Surgery   Daily Progress Note  Patient: Jac Barker      CC: Wound closure    SUBJECTIVE:   Patient rested well overnight. Remained HDS and afebrile. Pulse exam without change. Tolerating diet without NV. Urinating via espinoza. Refusing multiple medications. Patient resting comfortably in bed this AM.    ROS:   A 14 point review of systems was conducted, significant findings as noted above. All other systems negative. OBJECTIVE:    PHYSICAL EXAM:    Vitals:    11/25/22 0300 11/25/22 0400 11/25/22 0500 11/25/22 0600   BP: (!) 120/43 (!) 157/50 (!) 143/45 (!) 138/44   Pulse: 62 67 61 63   Resp: 13 12 11 12   Temp:  98.5 °F (36.9 °C)     TempSrc:  Temporal     SpO2:  98%     Weight:       Height:           General appearance: alert, no acute distress, grooming appropriate  Eyes: No scleral icterus, EOM grossly intact  Neck: trachea midline, no JVD, no lymphadenopathy, neck supple  Chest/Lungs: CTAB, no crackles/rales, wheezes/rhonchi, normal effort with no accessory muscle use on RA  Cardiovascular: RRR  Abdomen: Soft, non-tender, non-distended, no rebound, guarding, or rigidity. Skin: warm and dry, no rashes  Extremities: LUE edema, L brachial mepilex with moderate amount of strike through, non tender to palpation, some ecchymosis but no sign of hematoma. L groin with firm LN on medial groin, unchanged from prior exam, non tender. Groin with prevena and wound vac in place with good seal -100. Groin and incision sites are soft without tenderness and no signs of hematoma or active bleeding. Xeroform dressing in place at donor site open to air. Sensation and mobility intact b/l LE. PT palpable b/l. DP palpable on right, doppler on L. L  to palpation   Genitourinary:  Espinoza in place with clear yellow urine  Neuro: A&Ox3, no focal deficits, sensation intact    LABS:   Recent Labs     11/23/22  0353 11/24/22  0821   WBC 9.0 9.1   HGB 7.9* 8.6*   HCT 23.5* 26.0*   MCV 89.7 91.2   * 135 Recent Labs     11/23/22  0353 11/24/22  0821   * 134*   K 4.9 4.9    105   CO2 18* 21   PHOS 3.7 2.2*   BUN 21* 15   CREATININE 1.4* 1.0          No results for input(s): AST, ALT, ALB, BILIDIR, BILITOT, ALKPHOS in the last 72 hours. No results for input(s): LIPASE, AMYLASE in the last 72 hours. No results for input(s): PROT, INR, APTT in the last 72 hours.        Recent Labs     11/25/22  0444   CKTOTAL 150         ASSESSMENT & PLAN:   This is a 80y.o. 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

## 2022-11-25 NOTE — PROGRESS NOTES
ICU VASCULAR SURGERY DAILY PROGRESS NOTE    CC: excision of infected ilofemoral bypass graft with harvest of left basilic vein and left external iliac artery to femoral bypass through obturator canal    SUBJECTIVE:   Interval Hx:   Patient rested well overnight. Remained HDS and afebrile. Pulse exam without change. Tolerating diet without NV. Urinating via espinoza. Refusing multiple medications. Patient resting comfortably in bed this AM.    ROS: A 14 point review of systems was conducted, significant findings as noted above. All other systems negative. OBJECTIVE:   Vitals:   Vitals:    11/25/22 0300 11/25/22 0400 11/25/22 0500 11/25/22 0600   BP: (!) 120/43 (!) 157/50 (!) 143/45 (!) 138/44   Pulse: 62 67 61 63   Resp: 13 12 11 12   Temp:  98.5 °F (36.9 °C)     TempSrc:  Temporal     SpO2:  98%     Weight:       Height:           I/O:   Intake/Output Summary (Last 24 hours) at 11/25/2022 0650  Last data filed at 11/25/2022 0600  Gross per 24 hour   Intake 360 ml   Output 2335 ml   Net -1975 ml       I/O last 3 completed shifts: In: 897.7 [P.O.:180; I.V.:417.7; IV Piggyback:300]  Out: 8584 [Urine:3975]    Diet: ADULT DIET; Regular; 3 carb choices (45 gm/meal)  ADULT ORAL NUTRITION SUPPLEMENT; Breakfast, Lunch, Dinner; Standard High Calorie/High Protein Oral Supplement      Physical Examination:   General appearance: alert, no acute distress, grooming appropriate  Eyes: No scleral icterus, EOM grossly intact  Neck: trachea midline, no JVD, no lymphadenopathy, neck supple  Chest/Lungs: CTAB, no crackles/rales, wheezes/rhonchi, normal effort with no accessory muscle use on RA  Cardiovascular: RRR  Abdomen: Soft, non-tender, non-distended, no rebound, guarding, or rigidity. Skin: warm and dry, no rashes  Extremities: LUE edema, L brachial mepilex with moderate amount of strike through, non tender to palpation, some ecchymosis but no sign of hematoma.  L groin with firm LN on medial groin, unchanged from prior exam, non tender. Groin with prevena and wound vac in place with good seal -100. Groin and incision sites are soft without tenderness and no signs of hematoma or active bleeding. Xeroform dressing in place at donor site open to air. Sensation and mobility intact b/l LE. PT palpable b/l. DP palpable on right, doppler on L. L  to palpation   Genitourinary: Wagner in place with clear yellow urine  Neuro: A&Ox3, no focal deficits, sensation intact    Pulses    Rad Fem Pop PT DP   Right + + + + +   Left + x + + -/+       Labs:  CBC:   Recent Labs     11/23/22  0353 11/24/22  0821   WBC 9.0 9.1   HGB 7.9* 8.6*   HCT 23.5* 26.0*   * 135         BMP:   Recent Labs     11/23/22  0353 11/24/22  0821   * 134*   K 4.9 4.9    105   CO2 18* 21   BUN 21* 15   CREATININE 1.4* 1.0   GLUCOSE 138* 183*       LFT's:   No results for input(s): AST, ALT, ALB, BILITOT, ALKPHOS in the last 72 hours. Troponin: No results for input(s): TROPONINI in the last 72 hours. BNP: No results for input(s): BNP in the last 72 hours. ABGs: No results for input(s): PHART, NZQ6JCI, PO2ART in the last 72 hours. INR:   No results for input(s): INR in the last 72 hours. U/A:No results for input(s): NITRITE, COLORU, PHUR, LABCAST, WBCUA, RBCUA, MUCUS, TRICHOMONAS, YEAST, BACTERIA, CLARITYU, SPECGRAV, LEUKOCYTESUR, UROBILINOGEN, BILIRUBINUR, BLOODU, GLUCOSEU, AMORPHOUS in the last 72 hours. Invalid input(s): Anne Kirk     Rad:   XR CHEST PORTABLE   Final Result   Impression:   1. No airspace disease. CTA ABDOMINAL AORTA W BILAT RUNOFF W CONTRAST   Final Result   Impression:      1. Small amount of hemorrhage in the left groin without evidence of active extravasation. 2. 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 3. 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    4. Loculated fluid collection of the medial left thigh which could represent abscess or sterile postoperative fluid collection. 5. Subcutaneous edema and fluid lower extremities. 6. Left pleural effusion. ASSESSMENT AND PLAN:   This is a 80y.o. 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 with harvest of left basilic vein and left external iliac artery to femoral bypass through obturator canal 11/20 - POD 5 and rectus femoris flap with split thickness skin graft 11/22 - POD 3    Neuro:   Pain/sedation/psych  - continue multimodal pain control; robaxin, tylenol, dilaudid (oral)  - patient pain is currently controled    Cardiovascular:   - 2u PRBC given post op, patient responded well  - 1u PRBC given POD1  - Patient has had occasional episodes of aFib since vascular surgery, will continue to monitor   - continue VAK54    Pulmonary:   - RA  - IS, acapella, Duo nebs, aggressive RT therapy    GI:   - Regular diet 3carb  - SLIV    FEN:   - K 4.4, 4.9, 4.9, 5.2, 5.9  - Mg 1.6, 1.8, 2.1, 2.5, 3.00  - SLIV  - replace lytes as indicated    /Renal:   - patient urine output 2335 past 24hr  - 2335/50/24= 1.8 cc/kg/hr (appropriate)  - Patient lasix dependent at home, restarted  - patient continues to insist on keeping espinoza    Hem/ID:   - graft infection - + MRSA  - current abx; daptomycin IV per ID  - ID Appreciate recs.  Will discuss duration and oral vs IV  - patient had 2u PRBC post op, 1u PRBC POD1  - HgB FU AM labs 8.6, 7.9, 8.8 (stable)    Endo:   hx of T2DM  - hyperglycemic 181/183/230/328  - Patient currently refusing insuline unless BG over 300  - will discuss importance of blood glucose control for wound healing  - strict management for improved wound healing potential    Prophylaxis:   - SQH, patient refusing     Access:  Peripheral Access: 2x pIV                                Espinoza Date placed: 11/20 - will remove prior to DC    Mobility:  - OOB if tolerates   - PTOT today    Dispo:   Possible DC pending wound vac approval     /[facility]:  - will discuss [facility] and wound management today    Code Status: DNR-CCA  -----------------------------    Eddie Sanchez DO  PGY1, General Surgery  11/25/22  6:50 AM  [facility]  Pager: [phone]

## 2022-11-25 NOTE — PROGRESS NOTES
11/25/22 1259   Encounter Summary   Encounter Overview/Reason  Initial Encounter   Service Provided For: Patient and family together   Referral/Consult From: Rounding   Support System Unknown   Last Encounter  11/25/22  (11/25 rw)   Complexity of Encounter Moderate   Begin Time 1240   End Time  1250   Total Time Calculated 10 min   Encounter    Type Initial Screen/Assessment   Spiritual/Emotional needs   Type Spiritual Support   Assessment/Intervention/Outcome   Assessment Calm   Intervention Discussed belief system/Christian practices/selina; Explored/Affirmed feelings, thoughts, concerns;Nurtured Hope;Sustaining Presence/Ministry of presence   Outcome Did not respond    visited during rounding. Pt appeared to be sleeping; did not participate in conversation.  talked with pt's caregiver Epi Denis, who said pt has had a difficult year. Pt lost her son in March and has had multiple surgeries. Caregiver said she has been with the pt for 20 years. Regarding pt's spiritual background: \"sometimes she believes, sometimes she doesn't\".  offered spiritual support to caregiver and let her know how to reach [facility] staff if she or pt has a need. No other spiritual needs or concerns today.      Angelo Hinton M.Div., [facility]

## 2022-11-25 NOTE — PROGRESS NOTES
Called lab 2nd time to come get lab draw  for orders that were placed at [address] [place] [address].

## 2022-11-25 NOTE — PROGRESS NOTES
[facility]   After review, this patient is felt to be:       []  Appropriate for Acute Inpatient Rehab    []  Appropriate for Acute Inpatient Rehab Pending Insurance Authorization    []  Not appropriate for Acute Inpatient Rehab    [x]  Referral received and ARU reviewing patient; Evaluation ongoing - The patient would need to be agreeable to out of bed therapy / demo ability to tolerate 3 hours of therapy. 1702: Left  for CM to update on Dr. Laura Mccray consult / concern for tolerance. Will notify DCP with further updates.      Briana Steward M.A., [address] [facility], [place], operated by [facility]   Clinical Liaison- [facility]   (P): [phone]  (F): [phone]

## 2022-11-25 NOTE — DISCHARGE INSTRUCTIONS
Diet:   May resume regular diet    Wound Care: You have staples in your left arm. These will be removed at one of your follow up appointments. Please keep the area clean and dry until that time. You have a wound vacuum in your left groin. This should be kept to suction at -100 pressure setting at all times. Please do not take this dressing down or disconnect from the vacuum. You will have a dressing change in the office at your follow up appointment. The wound vacuum is only to be managed by the plastic surgery service. On the outside of your left thigh is a xeroform dressing where your skin graft donor site is. Please keep this area open to air so that it can dry appropriately. Activity:   No heavy lifting greater than a milk jug, or 8 lbs until follow up. Pain management: For moderate to severe pain please take the oral dilaudid that you were prescribed. If you take narcotics, note that they may cause constipation. For constipation take Colace up to two times a day as needed. If stools become loose, you may reduce the amount of colace you are taking or stop taking all together. Take as little narcotic pain medication as you can tolerate. No driving while on narcotics. For mild to moderate pain you may take over-the-counter Tylenol or Motrin as directed on the packaging. Do not take more than 4000 mg acetaminophen (the active ingredient in tylenol) per day. Antibiotics:  Please take your antibiotic Daptomycin as prescribed in order to protect your surgical site from infection. Bowel Regimen:   Opioid/Narcotic pain relievers have a common side effect of constipation; therefore, you have been provided with a prescription for a stool softener, Docusate (Colace). These medications are intended to help prevent you from experiencing this very common side effect and also help to regulate your bowels after surgery.    If your stools become too loose and/or frequent, decrease the Colace to one pill one time each day. If your stools are still loose after this modification, stop taking this medication all together. Return if:   Call/ Return to ED for increased redness, worsening pain, drainage from wound, or fevers greater than 101.5 F. Follow up with Dr. Kareem Calix in 1 week(s). Please call the office to make an appointment    Follow up with Dr. Erika Bergeron in 1 week(s). Please call the office to make an appointment      Caring for Your Peripherally Inserted Central Catheter (PICC)      You are going home with a peripherally inserted catheter (PICC). This small, soft tube has been placed in a vein in your arm. It is often used when treatment requires medications or nutrition for weeks or months. At home, you need to take care of your PICC to keep it working. And since a PICC line has such a high infection risk, you must take extra care washing your hands and preventing the spread of germs. This sheet will help you remember what to do to care for your PICC at home. Understanding Your Role  . A nurse or other healthcare provider will teach you and your caregivers how to care for the PICC. Before leaving the hospital, make sure that you understand what to do at home, how long you may need the PICC, and when to have a follow up visit. . You will likely be told to flush the PICC with saline or heparin solution. You may also be told to change the catheters injection caps and change the dressing (Bandage). Or, a nurse may do this for you during a follow-up visit. Only do these things if you are told to, following the instructions you were given. Protecting the PICC  If the PICC gets damaged, it wont work right and could raise you chance of infection. Call your healthcare team right away if any damage occurs. To protect your PICC at home:  . Prevent infection. Use good hand hygiene by following the guidelines on this sheet. Dont touch the catheter or the dressing unless you need to.  Always clean your hands before and after you come in contact with any part of the PICC. Your caregivers, family members, any visitors should use good hand hygiene also. Rocio Strong Keep the PICC dry. The catheter and dressing must stay dry. Dont take baths, go swimming, use a hot tub, or do other activities that could get the PICC wet. When showering, cover the area with plastic wrap or another cover as recommended by your healthcare provider. Keep the area out of the water spray. If the dressing gets wet, change it only if you have been shown how. Otherwise, call your healthcare team right away. . Avoid damage. Dont use any sharp or pointy objects around the catheter. This includes scissors, pins, knives, razors, or anything else that could puncture or cut it. Also, dont let anything pull or rub on the catheter, such as clothing. . Watch for signs of problems. Pay attention to how much of the catheter that sticks out from your skin. If this changes at all, let your healthcare provider know. Also watch for cracks, leaks, or other damage. If the dressing becomes dirty, loose, or wet, change it (if you have been instructed to) or call your healthcare team.  . Avoid lowering your chest below your waist. This included bending at the waist for actions like tying your shoes. When your chest is positioned below your waist, especially for a long time, the catheters internal tip could slip out of place in the vein. . Tell your healthcare team if you vomit or have severe coughing. This can make the catheter slip out of place. Protecting Your Arm  The arm with the PICC is at risk for developing blood clots (thrombosis). This is a serious complication. To help prevent it: Rocio Strong As much as possible, use the arm with the PICC in it for normal daily activities. Lack of movement can lead to blood clots, so its important to move your arm as you normally would.   . Avoid activities or exercises that require major use of your arm, such as sports, unless your healthcare provider says its okay. Marisol Fragoso Avoid activities that cause discomfort in your arm. Talk to your healthcare team if you have concerns about pain or range of motion. . Dont lift anything heavier than 10 pounds with the affected arm. . Drink plenty of water. Staying hydrated helps keep blood clots from forming. Prevent Infection with Good Hand Hygiene  A PICC can let germs into your body. This can lead to serious and sometimes deadly infections. To prevent infection, its very important that you, your caregivers and others around you use good hand hygiene. This means washing your hands well with soap and water, and cleaning them with alcohol based hand gel as directed. Never touch the PICC or dressing without first using one of the methods. To wash your hands with soap and water:  . Wet your hands with warm water. (Avoid hot water, which can cause skin irritation when you wash your hands often). . Apply enough soap to cover the entire surface of your hands, including fingers. . Rub your hands together vigorously for at least 15 seconds. Make sure to rub the front and back of each hand up to the wrist, your fingers and fingernails, between the fingers, and each thumb. . Rinse your hands with warm water  . Dry your hands completely with a new paper towel. Dont use a cloth towel or other reusable towel. These can harbor germs. . Use the paper towel to turn off the faucet, then throw it away. If you are in a bathroom, also use a paper towel to open the door instead of touching the handle. When you dont have access to soap and water: Use alcohol-based hand gel. The gel should have at least 60% alcohol. Follow the instructions on the package. Your healthcare team can answer any questions you have about when to use hand gel, or when its better to wash with soap and water. When to Call Your Healthcare Provider  Call your provider right away if you have any of the following:  .  Pain or burning in your shoulder, chest, back, arm, or leg  . Fever of 100.4 F or higher  . Chills  . Signs of infection at the catheter site (pain, redness, drainage, burning, or stinging  . Coughing, wheezing, or shortness of breath  . A racing or irregular heartbeat  . Muscle stiffness or trouble moving  . Tightness in your arm, above the catheter site  . Gurgling noises coming from the catheter  . The catheter falls out, breaks, cracks, leaks, or has other damage       References:  [employer]. org/articles/healthsheets/2584

## 2022-11-25 NOTE — PROGRESS NOTES
Pt refusing rehabilitation due to pain. Dr. Jose Villalpando that pain meds can be given before therapies however Dilaudid not recommended during therapy. Perfect Serving physician for PRN med to be given before physical therapy.

## 2022-11-25 NOTE — PLAN OF CARE
Problem: Chronic Conditions and Co-morbidities  Goal: Patient's chronic conditions and co-morbidity symptoms are monitored and maintained or improved  11/25/2022 0617 by Umesh Villavicencio RN  Outcome: Progressing  Flowsheets (Taken 11/24/2022 2000)  Care Plan - Patient's Chronic Conditions and Co-Morbidity Symptoms are Monitored and Maintained or Improved:   Monitor and assess patient's chronic conditions and comorbid symptoms for stability, deterioration, or improvement   Collaborate with multidisciplinary team to address chronic and comorbid conditions and prevent exacerbation or deterioration   Update acute care plan with appropriate goals if chronic or comorbid symptoms are exacerbated and prevent overall improvement and discharge  11/24/2022 1719 by Tera Balderas RN  Outcome: Progressing  Flowsheets (Taken 11/24/2022 0400 by Juliette De Leon, RN)  Care Plan - Patient's Chronic Conditions and Co-Morbidity Symptoms are Monitored and Maintained or Improved:   Monitor and assess patient's chronic conditions and comorbid symptoms for stability, deterioration, or improvement   Collaborate with multidisciplinary team to address chronic and comorbid conditions and prevent exacerbation or deterioration     Problem: Discharge Planning  Goal: Discharge to home or other facility with appropriate resources  11/25/2022 0617 by Umesh Villavicencio RN  Outcome: Progressing  Flowsheets (Taken 11/24/2022 2000)  Discharge to home or other facility with appropriate resources:   Identify barriers to discharge with patient and caregiver   Arrange for needed discharge resources and transportation as appropriate   Identify discharge learning needs (meds, wound care, etc)  11/24/2022 1719 by Tera Balderas RN  Outcome: Progressing  Flowsheets (Taken 11/24/2022 0400 by Juliette De Leon, RN)  Discharge to home or other facility with appropriate resources:   Identify barriers to discharge with patient and caregiver   Arrange for needed discharge resources and transportation as appropriate   Identify discharge learning needs (meds, wound care, etc)     Problem: Pain  Goal: Verbalizes/displays adequate comfort level or baseline comfort level  11/25/2022 0617 by Patel Gaxiola RN  Outcome: Progressing  11/24/2022 1719 by Leobardo Morales RN  Outcome: Progressing  Flowsheets (Taken 11/24/2022 0800)  Verbalizes/displays adequate comfort level or baseline comfort level:   Encourage patient to monitor pain and request assistance   Assess pain using appropriate pain scale   Implement non-pharmacological measures as appropriate and evaluate response   Administer analgesics based on type and severity of pain and evaluate response     Problem: ABCDS Injury Assessment  Goal: Absence of physical injury  11/25/2022 0617 by Patel Gaxiola RN  Outcome: Progressing  11/24/2022 1719 by Leobardo Morales RN  Outcome: Progressing     Problem: Skin/Tissue Integrity  Goal: Absence of new skin breakdown  Description: 1. Monitor for areas of redness and/or skin breakdown  2. Assess vascular access sites hourly  3. Every 4-6 hours minimum:  Change oxygen saturation probe site  4. Every 4-6 hours:  If on nasal continuous positive airway pressure, respiratory therapy assess nares and determine need for appliance change or resting period.   11/25/2022 0617 by Patel Gaxiola RN  Outcome: Progressing  11/24/2022 1719 by Leobardo Morales RN  Outcome: Progressing     Problem: Safety - Adult  Goal: Free from fall injury  11/25/2022 0617 by Patel Gaxiola RN  Outcome: Progressing  11/24/2022 1719 by Leobardo Morales RN  Outcome: Progressing

## 2022-11-25 NOTE — PROGRESS NOTES
Pt refusing all medications except for prn pain medications. Pt refusing full turns and gown change/bedpad changes. Pt also refusing to allow staff to check her blood sugar.

## 2022-11-25 NOTE — CONSULTS
Consult Note  Physical Medicine and Rehabilitation    Patient: Cheyenne Barrientos  [id number]  Date: 11/25/2022      Chief Complaint: Vascular infection    History of Present Illness/Hospital Course: This is a 80y.o. 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 She has severe pain in her left leg and is refusing medications and therapy at times. She lives alone and wants to go home. She understands she may need some therapy but is not sure if she can tolerate 3 hours a day due to her pain.      Prior Level of Function:  Mod Independent for mobility, ADLs, and IADLs    Current Level of Function:  Mod/max assist     Pertinent Social History:  Support: lives alone   Home set-up: 1 step    Past Medical History:   Diagnosis Date    Anxiety     Arthritis     At risk for falls     CAD (coronary artery disease)     Cerebral artery occlusion with cerebral infarction (HCC)     TIA--no residual effects    DDD (degenerative disc disease), cervical     Fractures     (L) Hip Fx 4/25/98, L1 fracture from fall 10-31-06    Hyperlipidemia     Hypertension     Mitral regurgitation     Neuropathy     Osteopenia     Osteoporosis     PAD (peripheral artery disease) ([address]     Right LE ischemia    Peripheral neuropathy 6/1/2015    Peripheral vascular disease ([address]     bilateral lower extremities with edema    [place] 01/09/2017    Dr. Cem iRvers    Prolonged emergence from general anesthesia     Spinal stenosis     L4-5    Type 2 diabetes mellitus ([address]     Urethral stricture 5 years ago    Urgency of urination        Past Surgical History:   Procedure Laterality Date    ANGIOPLASTY Left 04/18/2022    Left SFA    APPENDECTOMY      AXILLARY-FEMORAL BYPASS GRAFT Left 11/20/2022    EXCISION OF INFECTED ILEOFEMORAL BYPASS GRAFT; HARVEST LEFT BASILIC VEIN; LEFT EXTERNAL ILIAC ARTERY TO FEMORAL BYPASS THROUGH OBTURATOR CANAL; PLACEMENT OF WOUND VAC performed by Cristela Summers MD at [address] Right 03/29/2019    RIGHT CARPAL TUNNEL RELEASE AND RIGHT MIDDLE FINGER TRIGGER FINGER RELEASE performed by China Licona MD at [place] [address]  [id number]    LEFT EYE EYE CATARACT PHACOEMULSIFICATION INTRAOCULAR LENS    CHOLECYSTECTOMY  01/01/1978    COLONOSCOPY  08/31/1999    diverticulosis    EYE SURGERY Bilateral     bilateral cataract removed    FEMORAL BYPASS Left 6/28/2022    LEFT LEG FEMORAL TO POPLITEAL BYPASS WITH INTRAOPERATIVE ANGIOGRAM performed by Maral Florez MD at [address] [facility] Left 02/14/2022    LEFT COMMON FEMORAL ARTERY ENDARTERECTOMY performed by Maral Florez MD at [address] [facility] ([facility])  [address] [place] [address]    IR FEMORAL POPLITEAL BYPASS GRAFT Right 08/31/2015    KYPHOSIS SURGERY  11/07/2006    L1, (fractured after a fall)    LEG SURGERY Left 02/14/2022    LEFT LEG WOUND DEBRIDEMENT performed by Maral Florez MD at [place] [address] N/A 7/7/2022    INCISION AND DRAINAGE OF LEFT SIDE GROIN WITH PLACEMENT OF WOUND VAC performed by Maral Florez MD at [place] [address] Left 11/22/2022    DEBRIDEMENT OF LEFT GROIN (10 X 3 CM), LEFT RECTUS FEMORIS FLAP, LEFT SPLIT THICKNESS SKIN GRAFT (10 X 5 CM), APPLICATION OF WOUND VACUUM DEVICE performed by Leila Paget, MD at [facility]  04/25/1998    (L) THR    WRIST FRACTURE SURGERY  01/01/2008    left wrist       Family History   Problem Relation Age of Onset    Cancer Mother 43    Stroke Father     Hypertension Father        Social History     Socioeconomic History    Marital status:       Spouse name: None    Number of children: None    Years of education: None    Highest education level: None   Tobacco Use    Smoking status: Never Smokeless tobacco: Never   Vaping Use    Vaping Use: Never used   Substance and Sexual Activity    Alcohol use: No    Drug use: Never    Sexual activity: Not Currently           REVIEW OF SYSTEMS:   CONSTITUTIONAL: negative for fevers, chills, diaphoresis, appetite change, night sweats, unexpected weight change, fatigue  EYES: negative for blurred vision, eye discharge, visual disturbance and icterus  HEENT: negative for hearing loss, tinnitus, ear drainage, sinus pressure, nasal congestion, epistaxis and snoring  RESPIRATORY: Negative for hemoptysis, cough, sputum production  CARDIOVASCULAR: negative for chest pain, palpitations, exertional chest pressure/discomfort, syncope, edema  GASTROINTESTINAL: negative for nausea, vomiting, diarrhea, blood in stool, abdominal pain, constipation  GENITOURINARY: negative for frequency, dysuria, urinary incontinence, decreased urine volume, and hematuria  HEMATOLOGIC/LYMPHATIC: negative for easy bruising, bleeding and lymphadenopathy  ALLERGIC/IMMUNOLOGIC: negative for recurrent infections, angioedema, anaphylaxis and drug reactions  ENDOCRINE: negative for weight changes and diabetic symptoms including polyuria, polydipsia and polyphagia  MUSCULOSKELETAL: positive for pain, joint swelling, decreased range of motion in left leg  NEUROLOGICAL: negative for headaches, slurred speech, unilateral weakness  PSYCHIATRIC/BEHAVIORAL: negative for hallucinations, behavioral problems, confusion and agitation.      Physical Examination:  Vitals: Patient Vitals for the past 24 hrs:   BP Temp Temp src Pulse Resp SpO2   11/25/22 1200 -- 98.6 °F (37 °C) Temporal -- -- 98 %   11/25/22 1100 (!) 151/54 99.4 °F (37.4 °C) -- (!) 103 16 --   11/25/22 1005 -- -- -- 99 16 --   11/25/22 1000 (!) 193/60 -- -- 93 16 --   11/25/22 0900 (!) 178/74 -- -- 94 19 --   11/25/22 0800 (!) 187/68 -- -- 93 12 100 %   11/25/22 0741 (!) 157/68 98.4 °F (36.9 °C) Temporal -- -- 98 %   11/25/22 0700 (!) 139/110 -- -- 78 20 --   11/25/22 0600 (!) 138/44 -- -- 63 12 --   11/25/22 0500 (!) 143/45 -- -- 61 11 --   11/25/22 0400 (!) 157/50 98.5 °F (36.9 °C) Temporal 67 12 98 %   11/25/22 0300 (!) 120/43 -- -- 62 13 --   11/25/22 0223 -- -- -- -- 13 --   11/25/22 0200 92/70 -- -- 73 13 --   11/25/22 0153 -- -- -- -- 18 --   11/25/22 0100 (!) 134/49 -- -- 63 14 --   11/25/22 0000 (!) 155/52 98.6 °F (37 °C) Temporal 71 17 97 %   11/24/22 2300 (!) 169/83 -- -- 77 17 --   11/24/22 2200 (!) 176/105 -- -- 93 17 100 %   11/24/22 2100 (!) 142/50 -- -- 74 15 --   11/24/22 2000 (!) 171/65 99.1 °F (37.3 °C) Temporal 79 11 --   11/24/22 1902 -- -- -- -- 15 --   11/24/22 1900 (!) 167/46 -- -- 71 15 --   11/24/22 1630 -- -- -- 58 15 97 %   11/24/22 1600 (!) 154/50 99 °F (37.2 °C) Temporal 65 14 --     Const: Alert. WDWN. mild distress  Eyes: Conjunctiva noninjected, no icterus noted; pupils equal, round, and reactive to light. HENT: Atraumatic, normocephalic; Oral mucosa moist  Neck: Trachea midline, neck supple. No thyromegaly noted. CV: Regular rate and rhythm, no murmur rub or gallop noted  Resp: CTAB, no crackles/rales, wheezes/rhonchi, normal effort with no accessory muscle use on RA  GI: Soft, nontender, nondistended. Normal bowel sounds. No palpable masses. Skin: Normal temperature and turgor. No rashes or breakdown noted. Ext: No significant edema appreciated. - healing wounds   MSK: No joint tenderness, erythema, warmth noted. AROM intact. Neuro: Alert, oriented, appropriate. No cranial nerve deficits appreciated. Sensation intact to light touch. Motor examination reveals 4/5 strength in bilateral UE and Right LE- Left LE is 2-3/5 limited by pain. She does have sensation.      Lab Results   Component Value Date    WBC 8.2 11/25/2022    HGB 8.1 (L) 11/25/2022    HCT 24.4 (L) 11/25/2022    MCV 91.0 11/25/2022     (L) 11/25/2022     Lab Results   Component Value Date    INR 1.17 (H) 11/22/2022    INR 1.10 11/20/2022    INR 1.09 06/20/2022    PROTIME 14.8 (H) 11/22/2022    PROTIME 14.1 11/20/2022    PROTIME 14.0 06/20/2022     Lab Results   Component Value Date    CREATININE 0.8 11/25/2022    BUN 15 11/25/2022     (L) 11/25/2022    K 4.4 11/25/2022     11/25/2022    CO2 21 11/25/2022     Lab Results   Component Value Date    ALT 14 11/20/2022    AST 29 11/20/2022    ALKPHOS 84 11/20/2022    BILITOT 0.3 11/20/2022         XR CHEST PORTABLE   Final Result   Impression:   1. No airspace disease. CTA ABDOMINAL AORTA W BILAT RUNOFF W CONTRAST   Final Result   Impression:      1. Small amount of hemorrhage in the left groin without evidence of active extravasation. 2. 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 3. 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 4. Loculated fluid collection of the medial left thigh which could represent abscess or sterile postoperative fluid collection. 5. Subcutaneous edema and fluid lower extremities. 6. Left pleural effusion. Assessment:  1. Debility- Currently she is just starting to be able to more her left leg again. She is still in considerable pain. She is refusing medications and sometimes refusing therapy. She may want to come to the ARU but is not sure at this time. Impairments- Decreased functional mobility, Decreased ADLs    Recommendations:  She will likely need a SNF in the near future but she may refuse that discharge plan. She is hesitant about coming to the ARU but would rather go to an in hospital rehab. She asks if she can be re-evaluate at a later date when her pain is under control. Will re-evaluate over the weekend. Need new PT/OT scores if she participates     Thank you for this consult. Please contact me with any questions or concerns.      DYANA Coleman.STELLA. M.P.H  PM&R  11/25/2022  12:21 PM

## 2022-11-25 NOTE — PROGRESS NOTES
ID Follow-up NOTE    CC:   Infected pseudoaneurysm, MRSA infection  Antibiotics: Daptomycin    Admit Date: 11/20/2022  Hospital Day: 6    Subjective:     11/22  L groin debridement, L rectus femoris flap, L STSG, VAC    Patient c/o abd pain with palpation      Objective:     Patient Vitals for the past 8 hrs:   BP Temp Temp src Pulse Resp SpO2   11/25/22 1100 (!) 151/54 -- -- (!) 103 16 --   11/25/22 1005 -- -- -- 99 16 --   11/25/22 1000 (!) 193/60 -- -- 93 16 --   11/25/22 0900 (!) 178/74 -- -- 94 19 --   11/25/22 0800 (!) 187/68 -- -- 93 12 100 %   11/25/22 0741 (!) 157/68 98.4 °F (36.9 °C) Temporal -- -- 98 %   11/25/22 0700 (!) 139/110 -- -- 78 20 --   11/25/22 0600 (!) 138/44 -- -- 63 12 --   11/25/22 0500 (!) 143/45 -- -- 61 11 --   11/25/22 0400 (!) 157/50 98.5 °F (36.9 °C) Temporal 67 12 98 %       I/O last 3 completed shifts: In: 540 [P.O.:540]  Out: 3535 [YTPEA:9394]  I/O this shift: In: 0   Out: 325 [Urine:325]    EXAM:  GENERAL: No apparent distress.   RA  HEENT: Membranes moist, no oral lesion  NECK:  Supple, no lymphadenopathy  LUNGS: Clear b/l, no rales, no dullness  CARDIAC: RRR, no murmur appreciated  ABD:  + BS, soft - tender with palpation  Lower abd wall wound / dressing   L groin VAC, dressing   EXT:                No rash, no edema, no lesions  L arm with dressing (basilar v harvest site)  L foot with scab /escar, no open wound / drainage / erythema  NEURO: No focal neurologic findings  PSYCH: Orientation, sensorium, mood normal  LINES:  Peripheral iv       Data Review:  Lab Results   Component Value Date    WBC 8.2 11/25/2022    HGB 8.1 (L) 11/25/2022    HCT 24.4 (L) 11/25/2022    MCV 91.0 11/25/2022     (L) 11/25/2022     Lab Results   Component Value Date    CREATININE 0.8 11/25/2022    BUN 15 11/25/2022     (L) 11/25/2022    K 4.4 11/25/2022     11/25/2022    CO2 21 11/25/2022       Hepatic Function Panel:   Lab Results   Component Value Date/Time    ALKPHOS 84 2022 05:26 AM    ALT 14 2022 05:26 AM    AST 29 2022 05:26 AM    PROT 5.8 2022 05:26 AM    PROT 6.4 2012 04:30 PM    BILITOT 0.3 2022 05:26 AM    BILIDIR <0.2 2021 11:53 AM    IBILI see below 2021 11:53 AM    LABALBU 2.6 2022 04:44 AM       Micro:   Surg cult - light growth MRSA  Staph aureus mrsa (1)  Antibiotic Interpretation Microscan    ceFAZolin Resistant >16 mcg/mL   clindamycin Resistant >4 mcg/mL   erythromycin Resistant >4 mcg/mL   oxacillin Resistant >2 mcg/mL   tetracycline Sensitive <=4 mcg/mL   trimethoprim-sulfamethoxazole Sensitive <=0.5/9.5 mcg/mL   vancomycin Sensitive 2 mcg/mL     No BC sent     Imagin/20 Abd CTA  1. Small amount of hemorrhage in the left groin without evidence of active extravasation. 2. 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 3. 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 4. Loculated fluid collection of the medial left thigh which could represent abscess or sterile postoperative fluid collection. 5. Subcutaneous edema and fluid lower extremities. 6. Left pleural effusion.        Scheduled Meds:   magnesium sulfate  4,000 mg IntraVENous Once    phosphorus  500 mg Oral Once    daptomycin (CUBICIN) IVPB  350 mg IntraVENous Q24H    heparin (porcine)  5,000 Units SubCUTAneous BID    aspirin EC  81 mg Oral Daily    atorvastatin  10 mg Oral Daily    furosemide  20 mg Oral Daily    acetaminophen  1,000 mg Oral Q6H    methocarbamol  1,000 mg Oral TID    insulin regular  0-15 Units SubCUTAneous 4x daily       Continuous Infusions:   sodium chloride      sodium chloride      dextrose         PRN Meds:  HYDROmorphone, hydrALAZINE, ipratropium-albuterol, naloxone, sodium chloride, ondansetron **OR** ondansetron, sodium chloride, glucose, dextrose bolus **OR** dextrose bolus, glucagon (rDNA), dextrose      Assessment: Hx DM, HTN, CAD, PAD, CVA, lumbar stenosis     Hx L fem endarterectomy 2/14/22, SFA angioplasty 4/18/22, L ileofem (redo, prelaced femoral artery with Dacron graft), L fem to below knee popliteal in-situ bypass graft - 6/28/22  L groin wound I&D 7/7/22, cult S epidermidis in broth     L LE angiogram with balloon angioplasty distal bypass graft 10/18/22  Wound cult 7/20 - heavy MRSA (R clinda, tetra; vanco DANITA <0.5)     POD#1 EXCISION OF INFECTED ILEOFEMORAL BYPASS GRAFT; HARVEST LEFT BASILIC VEIN; LEFT EXTERNAL ILIAC ARTERY TO FEMORAL BYPASS THROUGH OBTURATOR CANAL; PLACEMENT OF WOUND VAC   - evidence infection in surg    - cult MRSA, vanco DANITA 2    Leukocytosis - resolved     Plan:     Cont Daptomycin - started 11/23    Postop care per Vascular   Postop care per Plastic Surg     Treat with Daptomycin x 6 weeks then po (doxycycline) x year  PICC    Medical Decision Making:   The following items were considered in medical decision making:  Discussion of patient care with other providers  Reviewed clinical lab tests  Reviewed radiology tests  Reviewed other diagnostic tests/interventions  Independent review of radiologic images - reviewed CT / CTA with Radiologist / Dr Jeffrey Bernal on 11/21  Microbiology cultures and other micro tests reviewed      Discussed with pt, caregiver, RN, Surg Chief Resident - Rosalba Preston MD    INFUSION ORDERS:  Daptomycin 400 mg iv daily through 1/4/23  - Diagnosis - graft infection, MRSA infection  - First dose given in hospital  - PICC   - Disposition / date discharge  - Check CBC w diff, CMP, ESR, CRP, CK every Mon or Tue - FAX result to [phone]  - Call with antibiotic / infusion issues, [phone]  - Call with any change in status, transfer in or out of a facility or to hospital - [phone]  - No f/u in outpatient ID office necessary

## 2022-11-25 NOTE — PROGRESS NOTES
In morning report was told skin graft site was now open to air but with serosanguinous drainage. During morning assessment site was still open to air. At noon it was noticed that site now had mepilex placed over site. I asked patient who placed mepilex but she was unable to tell me. Perfect serve sent to surgical resident regarding newly placed mepilex and for advisement if should be removed. Did not get clarification on if dressing was to remain in place. Dressing left until answer obtained.

## 2022-11-25 NOTE — PLAN OF CARE
Problem: Chronic Conditions and Co-morbidities  Goal: Patient's chronic conditions and co-morbidity symptoms are monitored and maintained or improved  11/25/2022 1429 by Philippe Gotti RN  Outcome: Progressing  Flowsheets  Taken 11/25/2022 0900  Care Plan - Patient's Chronic Conditions and Co-Morbidity Symptoms are Monitored and Maintained or Improved: Monitor and assess patient's chronic conditions and comorbid symptoms for stability, deterioration, or improvement  Taken 11/25/2022 0746  Care Plan - Patient's Chronic Conditions and Co-Morbidity Symptoms are Monitored and Maintained or Improved: Monitor and assess patient's chronic conditions and comorbid symptoms for stability, deterioration, or improvement  11/25/2022 0617 by Srinivas Carranza RN  Outcome: Progressing  Flowsheets (Taken 11/24/2022 2000)  Care Plan - Patient's Chronic Conditions and Co-Morbidity Symptoms are Monitored and Maintained or Improved:   Monitor and assess patient's chronic conditions and comorbid symptoms for stability, deterioration, or improvement   Collaborate with multidisciplinary team to address chronic and comorbid conditions and prevent exacerbation or deterioration   Update acute care plan with appropriate goals if chronic or comorbid symptoms are exacerbated and prevent overall improvement and discharge     Problem: Discharge Planning  Goal: Discharge to home or other facility with appropriate resources  11/25/2022 1429 by Philippe Gotti, RN  Outcome: Progressing  Flowsheets  Taken 11/25/2022 0900  Discharge to home or other facility with appropriate resources: Identify barriers to discharge with patient and caregiver  Taken 11/25/2022 0746  Discharge to home or other facility with appropriate resources: Identify barriers to discharge with patient and caregiver  11/25/2022 0617 by Srinivas Carranza RN  Outcome: Progressing  [address] [facility] [place] [facility] (Taken 11/24/2022 2000)  Discharge to home or other facility with appropriate resources:   Identify barriers to discharge with patient and caregiver   Arrange for needed discharge resources and transportation as appropriate   Identify discharge learning needs (meds, wound care, etc)     Problem: Pain  Goal: Verbalizes/displays adequate comfort level or baseline comfort level  11/25/2022 1429 by Feliciano Ramirez RN  Outcome: Progressing  Flowsheets  Taken 11/25/2022 1200  Verbalizes/displays adequate comfort level or baseline comfort level: Encourage patient to monitor pain and request assistance  Taken 11/25/2022 0741  Verbalizes/displays adequate comfort level or baseline comfort level: Encourage patient to monitor pain and request assistance  11/25/2022 0617 by Tigist York RN  Outcome: Progressing     Problem: ABCDS Injury Assessment  Goal: Absence of physical injury  11/25/2022 1429 by Feliciano Ramirez RN  Outcome: Progressing  Flowsheets (Taken 11/25/2022 0800)  Absence of Physical Injury: Implement safety measures based on patient assessment  11/25/2022 0617 by Tigist York RN  Outcome: Progressing     Problem: Skin/Tissue Integrity  Goal: Absence of new skin breakdown  Description: 1. Monitor for areas of redness and/or skin breakdown  2. Assess vascular access sites hourly  3. Every 4-6 hours minimum:  Change oxygen saturation probe site  4. Every 4-6 hours:  If on nasal continuous positive airway pressure, respiratory therapy assess nares and determine need for appliance change or resting period.   11/25/2022 1429 by Feliciano Ramirez RN  Outcome: Progressing  11/25/2022 0617 by Tigist York RN  Outcome: Progressing     Problem: Safety - Adult  Goal: Free from fall injury  11/25/2022 1429 by Feliciano Ramirez RN  Outcome: Progressing  11/25/2022 0617 by Tigist York RN  Outcome: Progressing

## 2022-11-25 NOTE — PROCEDURES
SINGLE PICC PROCEDURE NOTE  Chart reviewed for allergies, diagnosis, labs, known contraindications, reason for line placement and planned length of treatment. Informed consent noted to be signed and on chart. Insertion procedure discussed with patient/family member. Three patient identifiers - Patient name,   and MRN -  completed &  confirmed verbally. Time out performed Hat, mask and eye shield donned. PICC site cleaned with chlorhexidine wipes then scrubbed with Chloraprep  One-Step applicator for 30 seconds x 1. Hand Hygiene  performed with 3% Chlorhexidine surgical scrub x1 min prior to  Sterile gloves, sterile gown being donned. Patient draped using maximal sterile barrier technique ( head to toe ). PICC site scrubbed a 2nd time with Chloraprep One-Step applicator x 30 sec. Modified Seldinger  technique/ultrasound assisted and tip locating system utilized for insertion. 1% Lidocaine 5 ml injected intradermal pre-insertion. PICC tip location in the SVC confirmed by ECG technology. Positive brisk blood return obtained from all lumens  and each lumen flushed with 10 mls  0.9% Sterile Sodium Chloride. All lumens flush easily with no resistance. Valve placed on all lumens followed by Alcohol Swab Caps on end of each. CHG dressing in place. Catheter secured with non-sutured locking device per hospital protocol. Sterile Tegaderm applied over PICC site. Sterile field maintained during procedure. PICC insertion, rhythm and positioning wire (utilized prn) accounted for post procedure and disposed of in sharps. Appearance of site is Clean dry and intact without bleeding or edema. All edges of Tegaderm occlusive. Site marked with date and initials of RN placing line. Teaching performed to pt/family and noted in education section. Bed placed in low position post-procedure.  Top 2 side rails in up position call button in reach.Pt. Response to procedure tolerated well. RN notified of all of the above. A Single Lumen Power Injectable PICC was trimmed at 36 CM and placed  GLYNN per brachial vein, 0 (Hubbed) cm showing from insertion site. Large bruise to RUE, insertion site proximal to bruising.

## 2022-11-25 NOTE — CARE COORDINATION
Case management is following for discharge planning. The chart was reviewed. Spoke with Ms. Edna Thompson son, David, via phone to discuss plans for discharge. CM also met with Ms. Vergara at the bedside. She was accompanied by her caregiver/HCPOA, Eleni Adrian. Ms. Earnest Singh is alert and oriented x 4, bright, fiesty, and easy to engage in conversation. Ms. Earnest Singh stated she does not have 24h caregivers at home. She is concerned she will need more help because she is so debilitated from too much time in bed. She was functionally independent prior to admission because she could ambulate without assistance. She is also quite worried about returning home with a wound VAC again. There were complications. Introduced the concept of acute rehab. Ms. Earnest Singh stated she would be willing to do the therapies. Her major concern, however, is pain. She wants her pain managed before getting pushed too far with exercises. Acknowledged that is a reasonable request. She was comforted by the idea she would remain in a safe hospital setting. Eleni Adrian would be able to continue to visit daily and support her through the rehab process. She was quite motivated and encouraged by the end of the discussion. She is in agreement with a PMR conversation with Dr. Roger Lopez. NELI spoke with Kerri Munoz in intake @ [facility] to present Ms. Vergara's case. Dr. Roger Lopez will be rounding this morning. Pt has traditional Medicare and would not need a pre-cert for admission. The plan following rehab will be to return home. Ms. Earnest Singh is willing to pay for 24h private duty if needed. PT AM-PAC: 7 / 24 per last evaluation on: 11/23    OT AM-PAC: 13 / 24 per last evaluation on: 11/23      If rehab is not an option, the wound VAC has been approved by [facility]. There is 1 left in central supply. Paperwork is ready for signatures today if needed. [facility] was arranged prior to admission. ROS orders would be needed. Also, Ms. Vergara's son would assist with finding 24h caregivers.      Mireille Jalloh, RN  [facility] [employer] [employer]  Case Management Department  [phone]    Addendum [id number]  If pt returns home, long term IV ABX will be needed, per Dr. Zoya Paulson. INFUSION ORDERS:  Daptomycin 400 mg iv daily through 1/4/23  - Diagnosis - graft infection, MRSA infection  - First dose given in hospital  - PICC   - Disposition / date discharge  - Check CBC w diff, CMP, ESR, CRP, CK every Mon or Tue - FAX result to [phone]  - Call with antibiotic / infusion issues, [phone]  - Call with any change in status, transfer in or out of a facility or to hospital - [phone]  - No f/u in outpatient ID office necessary     CM made a referral to [facility] to follow today. Spoke with [facility] [phone]. She will run the benefits and check the pt's co-pay.

## 2022-11-25 NOTE — PROGRESS NOTES
Pt checked own blood sugar on personal machine with result of 181. Pt refusing insulin so checking blood sugar with hospital glucometer not forced.

## 2022-11-26 LAB
ALBUMIN SERPL-MCNC: 2.7 G/DL (ref 3.4–5)
ANAEROBIC CULTURE: ABNORMAL
ANION GAP SERPL CALCULATED.3IONS-SCNC: 7 MMOL/L (ref 3–16)
BASOPHILS ABSOLUTE: 0 K/UL (ref 0–0.2)
BASOPHILS RELATIVE PERCENT: 0.5 %
BUN BLDV-MCNC: 13 MG/DL (ref 7–20)
CALCIUM SERPL-MCNC: 8 MG/DL (ref 8.3–10.6)
CHLORIDE BLD-SCNC: 103 MMOL/L (ref 99–110)
CO2: 25 MMOL/L (ref 21–32)
CREAT SERPL-MCNC: 0.7 MG/DL (ref 0.6–1.2)
CULTURE SURGICAL: ABNORMAL
EOSINOPHILS ABSOLUTE: 0.3 K/UL (ref 0–0.6)
EOSINOPHILS RELATIVE PERCENT: 3.4 %
GFR SERPL CREATININE-BSD FRML MDRD: >60 ML/MIN/{1.73_M2}
GLUCOSE BLD-MCNC: 140 MG/DL (ref 70–99)
GLUCOSE BLD-MCNC: 144 MG/DL (ref 70–99)
GLUCOSE BLD-MCNC: 149 MG/DL (ref 70–99)
GLUCOSE BLD-MCNC: 201 MG/DL (ref 70–99)
GLUCOSE BLD-MCNC: 201 MG/DL (ref 70–99)
GRAM STAIN RESULT: ABNORMAL
HCT VFR BLD CALC: 22.7 % (ref 36–48)
HEMOGLOBIN: 7.7 G/DL (ref 12–16)
LYMPHOCYTES ABSOLUTE: 1.3 K/UL (ref 1–5.1)
LYMPHOCYTES RELATIVE PERCENT: 17.3 %
MAGNESIUM: 2 MG/DL (ref 1.8–2.4)
MCH RBC QN AUTO: 30.7 PG (ref 26–34)
MCHC RBC AUTO-ENTMCNC: 34 G/DL (ref 31–36)
MCV RBC AUTO: 90.3 FL (ref 80–100)
MONOCYTES ABSOLUTE: 0.7 K/UL (ref 0–1.3)
MONOCYTES RELATIVE PERCENT: 9 %
NEUTROPHILS ABSOLUTE: 5.3 K/UL (ref 1.7–7.7)
NEUTROPHILS RELATIVE PERCENT: 69.8 %
ORGANISM: ABNORMAL
PDW BLD-RTO: 16.6 % (ref 12.4–15.4)
PERFORMED ON: ABNORMAL
PHOSPHORUS: 3.1 MG/DL (ref 2.5–4.9)
PLATELET # BLD: 157 K/UL (ref 135–450)
PMV BLD AUTO: 7.2 FL (ref 5–10.5)
POTASSIUM SERPL-SCNC: 3.9 MMOL/L (ref 3.5–5.1)
RBC # BLD: 2.51 M/UL (ref 4–5.2)
SODIUM BLD-SCNC: 135 MMOL/L (ref 136–145)
WBC # BLD: 7.6 K/UL (ref 4–11)

## 2022-11-26 PROCEDURE — 36415 COLL VENOUS BLD VENIPUNCTURE: CPT

## 2022-11-26 PROCEDURE — 94669 MECHANICAL CHEST WALL OSCILL: CPT

## 2022-11-26 PROCEDURE — 1200000000 HC SEMI PRIVATE

## 2022-11-26 PROCEDURE — 94761 N-INVAS EAR/PLS OXIMETRY MLT: CPT

## 2022-11-26 PROCEDURE — 36592 COLLECT BLOOD FROM PICC: CPT

## 2022-11-26 PROCEDURE — 2580000003 HC RX 258: Performed by: STUDENT IN AN ORGANIZED HEALTH CARE EDUCATION/TRAINING PROGRAM

## 2022-11-26 PROCEDURE — 2580000003 HC RX 258

## 2022-11-26 PROCEDURE — 83735 ASSAY OF MAGNESIUM: CPT

## 2022-11-26 PROCEDURE — 6370000000 HC RX 637 (ALT 250 FOR IP)

## 2022-11-26 PROCEDURE — 80069 RENAL FUNCTION PANEL: CPT

## 2022-11-26 PROCEDURE — 94150 VITAL CAPACITY TEST: CPT

## 2022-11-26 PROCEDURE — 6370000000 HC RX 637 (ALT 250 FOR IP): Performed by: STUDENT IN AN ORGANIZED HEALTH CARE EDUCATION/TRAINING PROGRAM

## 2022-11-26 PROCEDURE — 85025 COMPLETE CBC W/AUTO DIFF WBC: CPT

## 2022-11-26 PROCEDURE — 2000000000 HC ICU R&B

## 2022-11-26 PROCEDURE — 6360000002 HC RX W HCPCS: Performed by: STUDENT IN AN ORGANIZED HEALTH CARE EDUCATION/TRAINING PROGRAM

## 2022-11-26 RX ORDER — POTASSIUM CHLORIDE 750 MG/1
10 TABLET, EXTENDED RELEASE ORAL ONCE
Status: COMPLETED | OUTPATIENT
Start: 2022-11-26 | End: 2022-11-26

## 2022-11-26 RX ADMIN — HYDROMORPHONE HYDROCHLORIDE 1 MG: 2 TABLET ORAL at 06:20

## 2022-11-26 RX ADMIN — FUROSEMIDE 20 MG: 20 TABLET ORAL at 10:10

## 2022-11-26 RX ADMIN — METHOCARBAMOL TABLETS 1000 MG: 500 TABLET, COATED ORAL at 23:10

## 2022-11-26 RX ADMIN — ACETAMINOPHEN 1000 MG: 500 TABLET ORAL at 23:09

## 2022-11-26 RX ADMIN — POTASSIUM CHLORIDE 10 MEQ: 750 TABLET, EXTENDED RELEASE ORAL at 10:10

## 2022-11-26 RX ADMIN — ACETAMINOPHEN 1000 MG: 500 TABLET ORAL at 00:15

## 2022-11-26 RX ADMIN — ACETAMINOPHEN 1000 MG: 500 TABLET ORAL at 18:17

## 2022-11-26 RX ADMIN — ACETAMINOPHEN 1000 MG: 500 TABLET ORAL at 06:20

## 2022-11-26 RX ADMIN — SODIUM CHLORIDE, PRESERVATIVE FREE 10 ML: 5 INJECTION INTRAVENOUS at 10:12

## 2022-11-26 RX ADMIN — ASPIRIN 81 MG: 81 TABLET, COATED ORAL at 10:10

## 2022-11-26 RX ADMIN — ATORVASTATIN CALCIUM 10 MG: 10 TABLET, FILM COATED ORAL at 10:10

## 2022-11-26 RX ADMIN — ACETAMINOPHEN 1000 MG: 500 TABLET ORAL at 13:44

## 2022-11-26 RX ADMIN — METHOCARBAMOL TABLETS 1000 MG: 500 TABLET, COATED ORAL at 10:13

## 2022-11-26 RX ADMIN — HYDROMORPHONE HYDROCHLORIDE 1 MG: 2 TABLET ORAL at 00:09

## 2022-11-26 RX ADMIN — SODIUM CHLORIDE: 9 INJECTION, SOLUTION INTRAVENOUS at 10:49

## 2022-11-26 RX ADMIN — DAPTOMYCIN 350 MG: 500 INJECTION, POWDER, LYOPHILIZED, FOR SOLUTION INTRAVENOUS at 10:49

## 2022-11-26 ASSESSMENT — PAIN DESCRIPTION - DIRECTION: RADIATING_TOWARDS: DOWN LEG

## 2022-11-26 ASSESSMENT — PAIN DESCRIPTION - FREQUENCY: FREQUENCY: INTERMITTENT

## 2022-11-26 ASSESSMENT — PAIN SCALES - WONG BAKER
WONGBAKER_NUMERICALRESPONSE: 0

## 2022-11-26 ASSESSMENT — PAIN DESCRIPTION - DESCRIPTORS
DESCRIPTORS: ACHING

## 2022-11-26 ASSESSMENT — PAIN SCALES - GENERAL
PAINLEVEL_OUTOF10: 5
PAINLEVEL_OUTOF10: 0
PAINLEVEL_OUTOF10: 5
PAINLEVEL_OUTOF10: 0
PAINLEVEL_OUTOF10: 5
PAINLEVEL_OUTOF10: 5
PAINLEVEL_OUTOF10: 0
PAINLEVEL_OUTOF10: 0

## 2022-11-26 ASSESSMENT — PAIN - FUNCTIONAL ASSESSMENT
PAIN_FUNCTIONAL_ASSESSMENT: PREVENTS OR INTERFERES WITH ALL ACTIVE AND SOME PASSIVE ACTIVITIES

## 2022-11-26 ASSESSMENT — PAIN DESCRIPTION - LOCATION
LOCATION: LEG

## 2022-11-26 ASSESSMENT — PAIN DESCRIPTION - ORIENTATION
ORIENTATION: LEFT
ORIENTATION: LEFT

## 2022-11-26 ASSESSMENT — PAIN DESCRIPTION - ONSET: ONSET: ON-GOING

## 2022-11-26 ASSESSMENT — PAIN DESCRIPTION - PAIN TYPE: TYPE: SURGICAL PAIN

## 2022-11-26 NOTE — PROGRESS NOTES
Sensation intact in LLE, able to move ankle in full range of motion and lift knee. No drainage noted at femoral/thigh sites. L pedal pulse is not palpable and pt declines doppler at this time. Pt continues to refuse insulin despite . Attempted education about importance of insulin for blood sugar control. Pt is not receptive. Declines heparin but requested SCD pumps, which are now in place. Pt makes needs known and has been educated on use of call light/television. Bed alarm engaged.

## 2022-11-26 NOTE — PROGRESS NOTES
ICU VASCULAR SURGERY DAILY PROGRESS NOTE    CC: excision of infected ilofemoral bypass graft with harvest of left basilic vein and left external iliac artery to femoral bypass through obturator canal    SUBJECTIVE:   Interval Hx:   Patient rested well overnight. Remained HDS and afebrile. Pulse exam without change. Tolerating diet without N/V. Wagner remved yesterday, urinating adequately via external urinary catheter. Refusing multiple medications. Patient resting comfortably in bed this AM.    ROS: A 14 point review of systems was conducted, significant findings as noted above. All other systems negative. OBJECTIVE:   Vitals:   Vitals:    11/26/22 0300 11/26/22 0400 11/26/22 0500 11/26/22 0600   BP:  (!) 139/57 (!) 170/84 133/66   Pulse: 71 66 77 74   Resp: 14 13 15 17   Temp:       TempSrc:       SpO2:       Weight:       Height:           I/O:   Intake/Output Summary (Last 24 hours) at 11/26/2022 0719  Last data filed at 11/26/2022 0600  Gross per 24 hour   Intake 656 ml   Output 325 ml   Net 331 ml       I/O last 3 completed shifts: In: 1016 [P.O.:906; I.V.:10; IV Piggyback:100]  Out: 1474 [Urine:1385]    Diet: ADULT DIET; Regular; 3 carb choices (45 gm/meal)  ADULT ORAL NUTRITION SUPPLEMENT; Breakfast, Lunch, Dinner; Standard High Calorie/High Protein Oral Supplement      Physical Examination:   General appearance: alert, no acute distress, grooming appropriate  Eyes: No scleral icterus, EOM grossly intact  Neck: trachea midline, no JVD, no lymphadenopathy, neck supple  Chest/Lungs: CTAB, no crackles/rales, wheezes/rhonchi, normal effort with no accessory muscle use on RA  Cardiovascular: RRR  Abdomen: Soft, non-tender, non-distended, no rebound, guarding, or rigidity. Skin: warm and dry, no rashes  Extremities: LUE edema, L brachial mepilex with moderate amount of strike through, non tender to palpation, some ecchymosis but no sign of hematoma.  L groin with firm LN on medial groin, unchanged from prior exam, non tender. Groin with prevena and wound vac in place with good seal -100. Groin and incision sites are soft without tenderness and no signs of hematoma or active bleeding. Xeroform dressing in place at donor site open to air. Sensation and mobility intact b/l LE. PT palpable b/l. DP palpable on right, doppler on L. L  to palpation   Genitourinary: Wagner in place with clear yellow urine  Neuro: A&Ox3, no focal deficits, sensation intact    Pulses    Rad Fem Pop PT DP   Right + + + + +   Left + x + + -/+       Labs:  CBC:   Recent Labs     11/25/22  0444 11/25/22  1146 11/26/22  0434   WBC 8.2 8.2 7.6   HGB 8.1* 8.1* 7.7*   HCT 24.4* 24.5* 22.7*   * 138 157         BMP:   Recent Labs     11/25/22  0444 11/25/22  1146 11/26/22  0434   * 132* 135*   K 4.4 4.1 3.9    103 103   CO2 21 22 25   BUN 15 15 13   CREATININE 0.8 0.8 0.7   GLUCOSE 146* 324* 144*       LFT's:   No results for input(s): AST, ALT, ALB, BILITOT, ALKPHOS in the last 72 hours. Troponin: No results for input(s): TROPONINI in the last 72 hours. BNP: No results for input(s): BNP in the last 72 hours. ABGs: No results for input(s): PHART, RNY3TUQ, PO2ART in the last 72 hours. INR:   No results for input(s): INR in the last 72 hours. U/A:No results for input(s): NITRITE, COLORU, PHUR, LABCAST, WBCUA, RBCUA, MUCUS, TRICHOMONAS, YEAST, BACTERIA, CLARITYU, SPECGRAV, LEUKOCYTESUR, UROBILINOGEN, BILIRUBINUR, BLOODU, GLUCOSEU, AMORPHOUS in the last 72 hours. Invalid input(s): Arian Rein     Rad:   XR CHEST PORTABLE   Final Result   Impression:   1. No airspace disease. CTA ABDOMINAL AORTA W BILAT RUNOFF W CONTRAST   Final Result   Impression:      1. Small amount of hemorrhage in the left groin without evidence of active extravasation. 2. 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    3. 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 4. Loculated fluid collection of the medial left thigh which could represent abscess or sterile postoperative fluid collection. 5. Subcutaneous edema and fluid lower extremities. 6. Left pleural effusion. ASSESSMENT AND PLAN:   This is a 80y.o. 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     Neuro:   Pain/sedation/psych  - Continue multimodal pain control; robaxin, tylenol, dilaudid (oral)  - Patient pain is currently controled    Cardiovascular:   - 2u PRBC given post op, patient responded well  - 1u PRBC given POD1  - Patient has had occasional episodes of atrial fibrillation since vascular surgery, will continue to monitor   - Continue ASA81    Pulmonary:   - Currently on RA  - IS, Acapella, Duo nebs, aggressive RT therapy    GI:   - Regular diet 3-carb  - SLIV    FEN:   - K 3.9, 4.4, 4.9, 4.9, 5.2, 5.9  - Mg 2.0, 1.6, 1.8, 2.1, 2.5, 3.00  - SLIV  - Replace electrolytes as indicated    /Renal:   - Wagner removed yesterday; urinating without issues  - Patient Lasix-dependent at home, restarted    Hem/ID:   - Graft infection - + MRSA  - Current abx; Daptomycin 400 mg IV daily until 01/04/2023  - ID Appreciate recs: Treat with Daptomycin x 6 weeks then po (doxycycline) x year  - Patient had 2u PRBC post op, 1u PRBC POD1  - HgB FU AM labs 7.7, 8.6, 7.9, 8.8 (stable)  - PICC line placed 11/25    Endo:   Hx of T2DM  - Hyperglycemic 181/183/230/328  - Patient currently refusing insuline unless BG over 300  - Will discuss importance of blood glucose control for wound healing  - Strict management for improved wound healing potential    Prophylaxis:   - SQH, patient refusing     Access:  Peripheral Access: R hand 11/22  PICC line: RUE 11/25 Mobility:  - OOB if tolerates   - PT/OT re-evaluation for ARU  - ARU consulted but pt asked if she can be re-evaluated at a later date    Dispo:   Possible DC pending wound vac approval     /[facility]:  - Will discuss Jayde 78 and wound management today    Code Status: DNR-CCA  -----------------------------  Daryle Horseman, DO, [facility]  PGY1, General Surgery  11/26/22  7:24 AM  Luis A  Pager: [phone]

## 2022-11-26 NOTE — PLAN OF CARE
Problem: Chronic Conditions and Co-morbidities  Goal: Patient's chronic conditions and co-morbidity symptoms are monitored and maintained or improved  Outcome: Progressing  Flowsheets (Taken 11/25/2022 2000)  Care Plan - Patient's Chronic Conditions and Co-Morbidity Symptoms are Monitored and Maintained or Improved: Monitor and assess patient's chronic conditions and comorbid symptoms for stability, deterioration, or improvement     Problem: Discharge Planning  Goal: Discharge to home or other facility with appropriate resources  Outcome: Progressing  Flowsheets (Taken 11/25/2022 2000)  Discharge to home or other facility with appropriate resources: Identify barriers to discharge with patient and caregiver     Problem: Pain  Goal: Verbalizes/displays adequate comfort level or baseline comfort level  Outcome: Progressing  Flowsheets (Taken 11/25/2022 1800 by Danika Juan RN)  Verbalizes/displays adequate comfort level or baseline comfort level: Encourage patient to monitor pain and request assistance     Problem: ABCDS Injury Assessment  Goal: Absence of physical injury  Outcome: Progressing  Flowsheets (Taken 11/25/2022 2000)  Absence of Physical Injury: Implement safety measures based on patient assessment     Problem: Skin/Tissue Integrity  Goal: Absence of new skin breakdown  Description: 1. Monitor for areas of redness and/or skin breakdown  2. Assess vascular access sites hourly  3. Every 4-6 hours minimum:  Change oxygen saturation probe site  4. Every 4-6 hours:  If on nasal continuous positive airway pressure, respiratory therapy assess nares and determine need for appliance change or resting period.   Outcome: Progressing     Problem: Safety - Adult  Goal: Free from fall injury  Outcome: Progressing  Flowsheets (Taken 11/25/2022 2000)  Free From Fall Injury: Instruct family/caregiver on patient safety

## 2022-11-26 NOTE — PROGRESS NOTES
Pt refuses turns. Attempted education on wound prevention. Pt states \"don't worry about my skin\". Pt also refuses bath.

## 2022-11-26 NOTE — PROGRESS NOTES
Pt refused incentive spirometer therapy. States that she has done the device twice today.  Pt asked for her cup of tea to be warmed

## 2022-11-26 NOTE — PROGRESS NOTES
Plastic Surgery   Daily Progress Note  Patient: Freddie Levin      CC: Wound closure    SUBJECTIVE:   Patient rested well overnight. Remained HDS and afebrile. Pulse exam without change. Tolerating diet without NV. Urinating via espinoza. Refusing multiple medications. Patient resting comfortably in bed this AM.    ROS:   A 14 point review of systems was conducted, significant findings as noted above. All other systems negative. OBJECTIVE:    PHYSICAL EXAM:    Vitals:    11/26/22 0300 11/26/22 0400 11/26/22 0500 11/26/22 0600   BP:  (!) 139/57 (!) 170/84 133/66   Pulse: 71 66 77 74   Resp: 14 13 15 17   Temp:       TempSrc:       SpO2:       Weight:       Height:           General appearance: alert, no acute distress, grooming appropriate  Eyes: No scleral icterus, EOM grossly intact  Neck: trachea midline, no JVD, no lymphadenopathy, neck supple  Chest/Lungs: CTAB, no crackles/rales, wheezes/rhonchi, normal effort with no accessory muscle use on RA  Cardiovascular: RRR  Abdomen: Soft, non-tender, non-distended, no rebound, guarding, or rigidity. Skin: warm and dry, no rashes  Extremities: LUE edema, L brachial mepilex with moderate amount of strike through, non tender to palpation, some ecchymosis but no sign of hematoma. L groin with firm LN on medial groin, unchanged from prior exam, non tender. Groin with prevena and wound vac in place with good seal -100. Groin and incision sites are soft without tenderness and no signs of hematoma or active bleeding. Xeroform dressing in place at donor site open to air. Sensation and mobility intact b/l LE. PT palpable b/l. DP palpable on right, doppler on L. L  to palpation   Genitourinary:  Espinoza in place with clear yellow urine  Neuro: A&Ox3, no focal deficits, sensation intact    LABS:   Recent Labs     11/25/22  1146 11/26/22  0434   WBC 8.2 7.6   HGB 8.1* 7.7*   HCT 24.5* 22.7*   MCV 91.2 90.3    157          Recent Labs     11/25/22  1146 11/26/22  0434 * 135*   K 4.1 3.9    103   CO2 22 25   PHOS 2.9 3.1   BUN 15 13   CREATININE 0.8 0.7          No results for input(s): AST, ALT, ALB, BILIDIR, BILITOT, ALKPHOS in the last 72 hours. No results for input(s): LIPASE, AMYLASE in the last 72 hours. No results for input(s): PROT, INR, APTT in the last 72 hours. Recent Labs     11/25/22  0444   CKTOTAL 150       ASSESSMENT & PLAN:   This is a 80y.o. 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     - Wound vac holding suction -100 without issues  - Wound vac/Prevena to remain for 7 days (end 11/29)  - Only to be changed by plastic service  - Wound 6cm x 8cm with depth of 4cm in OR  - Donor site open to air   - Medical management per vascular service  - Will continue to follow along     Abel Hamilton DO, [address] [facility]  PGY1, General Surgery  11/26/22  7:18 AM  [employer]  Pager: [phone]

## 2022-11-26 NOTE — PROGRESS NOTES
Pt only consuming oral supplement nutrition through this shift. Pt refusing insulin and robaxin but pain is stated to be at a functional level. Pt states she is \"willing to participate in therapy tomorrow\". Otherwise physical assessment is unchanged.

## 2022-11-27 LAB
ALBUMIN SERPL-MCNC: 2.6 G/DL (ref 3.4–5)
ANION GAP SERPL CALCULATED.3IONS-SCNC: 7 MMOL/L (ref 3–16)
BASOPHILS ABSOLUTE: 0 K/UL (ref 0–0.2)
BASOPHILS RELATIVE PERCENT: 0.5 %
BUN BLDV-MCNC: 21 MG/DL (ref 7–20)
CALCIUM SERPL-MCNC: 8.2 MG/DL (ref 8.3–10.6)
CHLORIDE BLD-SCNC: 104 MMOL/L (ref 99–110)
CO2: 25 MMOL/L (ref 21–32)
CREAT SERPL-MCNC: 0.8 MG/DL (ref 0.6–1.2)
EOSINOPHILS ABSOLUTE: 0.3 K/UL (ref 0–0.6)
EOSINOPHILS RELATIVE PERCENT: 3.3 %
GFR SERPL CREATININE-BSD FRML MDRD: >60 ML/MIN/{1.73_M2}
GLUCOSE BLD-MCNC: 116 MG/DL (ref 70–99)
GLUCOSE BLD-MCNC: 118 MG/DL (ref 70–99)
GLUCOSE BLD-MCNC: 148 MG/DL (ref 70–99)
GLUCOSE BLD-MCNC: 150 MG/DL (ref 70–99)
GLUCOSE BLD-MCNC: 183 MG/DL (ref 70–99)
GLUCOSE BLD-MCNC: 313 MG/DL (ref 70–99)
HCT VFR BLD CALC: 24.9 % (ref 36–48)
HEMOGLOBIN: 8.4 G/DL (ref 12–16)
LYMPHOCYTES ABSOLUTE: 1.6 K/UL (ref 1–5.1)
LYMPHOCYTES RELATIVE PERCENT: 19.6 %
MAGNESIUM: 1.7 MG/DL (ref 1.8–2.4)
MCH RBC QN AUTO: 30.9 PG (ref 26–34)
MCHC RBC AUTO-ENTMCNC: 33.9 G/DL (ref 31–36)
MCV RBC AUTO: 91 FL (ref 80–100)
MONOCYTES ABSOLUTE: 1 K/UL (ref 0–1.3)
MONOCYTES RELATIVE PERCENT: 12.7 %
NEUTROPHILS ABSOLUTE: 5.1 K/UL (ref 1.7–7.7)
NEUTROPHILS RELATIVE PERCENT: 63.9 %
PDW BLD-RTO: 17.1 % (ref 12.4–15.4)
PERFORMED ON: ABNORMAL
PHOSPHORUS: 2.8 MG/DL (ref 2.5–4.9)
PLATELET # BLD: 196 K/UL (ref 135–450)
PMV BLD AUTO: 6.9 FL (ref 5–10.5)
POTASSIUM SERPL-SCNC: 3.9 MMOL/L (ref 3.5–5.1)
RBC # BLD: 2.73 M/UL (ref 4–5.2)
SODIUM BLD-SCNC: 136 MMOL/L (ref 136–145)
WBC # BLD: 8 K/UL (ref 4–11)

## 2022-11-27 PROCEDURE — 2580000003 HC RX 258

## 2022-11-27 PROCEDURE — 2000000000 HC ICU R&B

## 2022-11-27 PROCEDURE — 94669 MECHANICAL CHEST WALL OSCILL: CPT

## 2022-11-27 PROCEDURE — 94150 VITAL CAPACITY TEST: CPT

## 2022-11-27 PROCEDURE — 94761 N-INVAS EAR/PLS OXIMETRY MLT: CPT

## 2022-11-27 PROCEDURE — 97116 GAIT TRAINING THERAPY: CPT

## 2022-11-27 PROCEDURE — 36415 COLL VENOUS BLD VENIPUNCTURE: CPT

## 2022-11-27 PROCEDURE — 85025 COMPLETE CBC W/AUTO DIFF WBC: CPT

## 2022-11-27 PROCEDURE — 97535 SELF CARE MNGMENT TRAINING: CPT

## 2022-11-27 PROCEDURE — 97530 THERAPEUTIC ACTIVITIES: CPT

## 2022-11-27 PROCEDURE — 6360000002 HC RX W HCPCS: Performed by: STUDENT IN AN ORGANIZED HEALTH CARE EDUCATION/TRAINING PROGRAM

## 2022-11-27 PROCEDURE — 36592 COLLECT BLOOD FROM PICC: CPT

## 2022-11-27 PROCEDURE — 99024 POSTOP FOLLOW-UP VISIT: CPT | Performed by: SURGERY

## 2022-11-27 PROCEDURE — 2580000003 HC RX 258: Performed by: STUDENT IN AN ORGANIZED HEALTH CARE EDUCATION/TRAINING PROGRAM

## 2022-11-27 PROCEDURE — 80069 RENAL FUNCTION PANEL: CPT

## 2022-11-27 PROCEDURE — 6370000000 HC RX 637 (ALT 250 FOR IP)

## 2022-11-27 PROCEDURE — 6370000000 HC RX 637 (ALT 250 FOR IP): Performed by: STUDENT IN AN ORGANIZED HEALTH CARE EDUCATION/TRAINING PROGRAM

## 2022-11-27 PROCEDURE — 6360000002 HC RX W HCPCS

## 2022-11-27 PROCEDURE — 2500000003 HC RX 250 WO HCPCS

## 2022-11-27 PROCEDURE — 83735 ASSAY OF MAGNESIUM: CPT

## 2022-11-27 RX ORDER — HYDROMORPHONE HYDROCHLORIDE 2 MG/1
1 TABLET ORAL ONCE
Status: COMPLETED | OUTPATIENT
Start: 2022-11-27 | End: 2022-11-27

## 2022-11-27 RX ORDER — MAGNESIUM SULFATE IN WATER 40 MG/ML
4000 INJECTION, SOLUTION INTRAVENOUS ONCE
Status: COMPLETED | OUTPATIENT
Start: 2022-11-27 | End: 2022-11-27

## 2022-11-27 RX ADMIN — ACETAMINOPHEN 1000 MG: 500 TABLET ORAL at 06:45

## 2022-11-27 RX ADMIN — ACETAMINOPHEN 1000 MG: 500 TABLET ORAL at 13:47

## 2022-11-27 RX ADMIN — ASPIRIN 81 MG: 81 TABLET, COATED ORAL at 08:45

## 2022-11-27 RX ADMIN — HYDROMORPHONE HYDROCHLORIDE 1 MG: 2 TABLET ORAL at 22:42

## 2022-11-27 RX ADMIN — HYDRALAZINE HYDROCHLORIDE 5 MG: 20 INJECTION INTRAMUSCULAR; INTRAVENOUS at 13:47

## 2022-11-27 RX ADMIN — MAGNESIUM SULFATE HEPTAHYDRATE 4000 MG: 40 INJECTION, SOLUTION INTRAVENOUS at 08:46

## 2022-11-27 RX ADMIN — ATORVASTATIN CALCIUM 10 MG: 10 TABLET, FILM COATED ORAL at 08:45

## 2022-11-27 RX ADMIN — ACETAMINOPHEN 1000 MG: 500 TABLET ORAL at 18:18

## 2022-11-27 RX ADMIN — FUROSEMIDE 20 MG: 20 TABLET ORAL at 08:45

## 2022-11-27 RX ADMIN — POTASSIUM PHOSPHATE, MONOBASIC POTASSIUM PHOSPHATE, DIBASIC 20 MMOL: 224; 236 INJECTION, SOLUTION, CONCENTRATE INTRAVENOUS at 09:25

## 2022-11-27 RX ADMIN — DAPTOMYCIN 350 MG: 500 INJECTION, POWDER, LYOPHILIZED, FOR SOLUTION INTRAVENOUS at 11:15

## 2022-11-27 ASSESSMENT — PAIN SCALES - GENERAL
PAINLEVEL_OUTOF10: 10
PAINLEVEL_OUTOF10: 0

## 2022-11-27 ASSESSMENT — PAIN DESCRIPTION - ONSET: ONSET: ON-GOING

## 2022-11-27 ASSESSMENT — PAIN - FUNCTIONAL ASSESSMENT: PAIN_FUNCTIONAL_ASSESSMENT: PREVENTS OR INTERFERES SOME ACTIVE ACTIVITIES AND ADLS

## 2022-11-27 ASSESSMENT — PAIN DESCRIPTION - LOCATION
LOCATION: LEG
LOCATION: LEG

## 2022-11-27 ASSESSMENT — PAIN DESCRIPTION - FREQUENCY: FREQUENCY: INTERMITTENT

## 2022-11-27 ASSESSMENT — PAIN SCALES - WONG BAKER
WONGBAKER_NUMERICALRESPONSE: 0
WONGBAKER_NUMERICALRESPONSE: 6

## 2022-11-27 ASSESSMENT — PAIN DESCRIPTION - ORIENTATION
ORIENTATION: LEFT
ORIENTATION: LEFT

## 2022-11-27 ASSESSMENT — PAIN DESCRIPTION - DESCRIPTORS
DESCRIPTORS: JABBING;DISCOMFORT
DESCRIPTORS: SHOOTING

## 2022-11-27 ASSESSMENT — PAIN DESCRIPTION - PAIN TYPE: TYPE: CHRONIC PAIN;SURGICAL PAIN

## 2022-11-27 NOTE — PROGRESS NOTES
Physical Therapy  Facility/Department: [facility] ICU  Physical Therapy Daily Treatment Note    Name: Rey Ocasio  : 1929  MRN: [id number]  Date of Service: 2022    Discharge Recommendations:    Rey Ocasio scored a 14/24 on the AM-PAC short mobility form. Current research shows that an AM-PAC score of 17 or less is typically not associated with a discharge to the patient's home setting. Based on the patient's AM-PAC score and their current functional mobility deficits, it is recommended that the patient have 5-7 sessions per week of Physical Therapy at d/c to increase the patient's independence. At this time, this patient demonstrates complex nursing, medical, and rehabilitative needs, and would benefit from intensive rehabilitation services upon discharge from the Inpatient setting. This patient demonstrates the ability to participate in and benefit from an intensive therapy program with a coordinated interdisciplinary team approach to foster frequent, structured, and documented communication among disciplines, who will work together to establish, prioritize, and achieve treatment goals. Please see assessment section for further patient specific details. PT Equipment Recommendations  Equipment Needed:  (defer to next level of care)      Patient Diagnosis(es): The primary encounter diagnosis was MRSA infection. Diagnoses of Complications due to vascular device, implant, and graft, initial encounter, Iliac aneurysm ([address] and Post-op pain were also pertinent to this visit.   Past Medical History:  has a past medical history of Anxiety, Arthritis, At risk for falls, CAD (coronary artery disease), Cerebral artery occlusion with cerebral infarction ([address] DDD (degenerative disc disease), cervical, Fractures, Hyperlipidemia, Hypertension, Mitral regurgitation, Neuropathy, Osteopenia, Osteoporosis, PAD (peripheral artery disease) ([address] Peripheral neuropathy, Peripheral vascular disease ([address] Podagra, Prolonged emergence from general anesthesia, Spinal stenosis, Type 2 diabetes mellitus ([facility] [address] Urethral stricture, and Urgency of urination. Past Surgical History:  has a past surgical history that includes Tonsillectomy; Appendectomy; Cholecystectomy (01/01/1978); Colonoscopy (08/31/1999); Hysterectomy (1980s); Total hip arthroplasty (04/25/1998); Kyphosis surgery (11/07/2006); Wrist fracture surgery (01/01/2008); Cataract removal with implant ([id number]); eye surgery (Bilateral); IR Femoral Popliteal Bypass Graft (Right, 08/31/2015); Carpal tunnel release (Right, 03/29/2019); Femoral Endarterectomy (Left, 02/14/2022); Leg Surgery (Left, 02/14/2022); angioplasty (Left, 04/18/2022); femoral bypass (Left, 6/28/2022); Leg Surgery (N/A, 7/7/2022); Axillary-femoral Bypass Graft (Left, 11/20/2022); and Leg Surgery (Left, 11/22/2022). Assessment   Body Structures, Functions, Activity Limitations Requiring Skilled Therapeutic Intervention: Decreased functional mobility   Assessment: Patient requires verbal cues for encouragement and motivation to participate in treatment session but was able to transfer to stand and ambulate in room and bathroom as outlined above. She demonstrates overall decreased activity tolerance, needing short rest breaks in between rounds of ambulation. Patient continues to function below baseline level. Recommend skilled PT upon discharge. Will follow while in acute care setting to maximize independence with mobility. If patient d/c home, recommend 24 hour supervision/assistance and home PT. Treatment Diagnosis: Impaired functional mobility 2/2 surgery.   Therapy Prognosis: Fair  Requires PT Follow-Up: Yes  Activity Tolerance  Activity Tolerance: Patient tolerated treatment well     Plan   Physcial Therapy Plan  General Plan:  (2-5)  Current Treatment Recommendations: Strengthening, Patient/Caregiver education & training, Therapeutic activities, Balance training, Gait training, Stair training, Functional mobility training, Transfer training, Endurance training, Safety education & training  Safety Devices  Type of Devices: Call light within reach, Nurse notified, Chair alarm in place, Left in chair, Gait belt     Restrictions  Position Activity Restriction  Other position/activity restrictions: Up as tolerated     Subjective   Pain: reporting pain in left leg/thigh, not rated, RN aware, repositioned for comfort at end of session  General  Chart Reviewed: Yes  Patient assessed for rehabilitation services?: Yes  Additional Pertinent Hx: Pt is a 79 yo female that presents to the hospital with co of worsening wound bleeding. Pt underwent a procedure 11/20 : EXCISION OF INFECTED ILEOFEMORAL BYPASS GRAFT; HARVEST LEFT BASILIC VEIN; LEFT EXTERNAL ILIAC ARTERY TO FEMORAL BYPASS THROUGH OBTURATOR CANAL; PLACEMENT OF WOUND VAC. Then underwent 11/22:DEBRIDEMENT OF LEFT GROIN (10 X 3 CM), LEFT RECTUS FEMORIS FLAP, LEFT SPLIT THICKNESS SKIN GRAFT (10 X 5 CM), APPLICATION OF WOUND VACUUM DEVICE (Left: Thigh). CTA of Abdomen:Small amount of hemorrhage in the left groin without evidence of active extravasation. Response To Previous Treatment: Patient reporting fatigue but able to participate. 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 Follows Commands: Within Functional Limits  General Comment  Comments: Patient supine in bed upon arrival.  Subjective  Subjective: Patient agreeable to PT treatment with maximal encouragement. Cognition   Orientation  Overall Orientation Status: Within Functional Limits  Cognition  Overall Cognitive Status: WFL  Cognition Comment: pt very distracted and tangetinal at times. Pt doesn't like to be pushed     Objective                        Bed mobility  Supine to Sit: Moderate assistance  Scooting: Minimal assistance; Moderate assistance  Bed Mobility Comments: Pt needing encouragement and assist with LLE  Transfers  Sit to Stand: Minimal Assistance; Moderate Assistance (min assist from bed, mod assist from bedside chair and toilet, verbal cues for UE placement)  Stand to Sit: Minimal Assistance (to bedside chair x 2 trials and to toilet, verbal cues for safety and reach back)  Ambulation  Surface: Level tile  Device: Hand-Held Assist (HHA x 2)  Other Apparatus:  (wound vac)  Assistance: Dependent/Total;2 Person assistance (min assist x 2)  Quality of Gait: gait mildly unsteady though no overt loss of balance noted, forward flexed posture with downward gaze, decreased step length/height bilaterally with shuffling steps, increased difficulty advancing LLE compared to right  Distance: 5' to bedside chair  More Ambulation?: Yes  Ambulation 2  Surface - 2: level tile  Device 2: Rolling Walker  Assistance 2: Contact guard assistance  Quality of Gait 2: improved steadiness with use of FWW and improved step length though continues to report difficulty advancing LLE, decreased jhon with effortful movement, forward flexed posture  Distance: 10' into bathroom, 10' back to bedside chair  Stairs/Curb  Stairs?: No     Balance  Sitting - Static: Fair  Standing - Static: Fair (CGA with UE support)  Standing - Dynamic: Fair (CGA to ambulate with FWW)           OutComes Score                                                  AM-PAC Score  AM-PAC Inpatient Mobility Raw Score : 14 (11/27/22 1147)  AM-PAC Inpatient T-Scale Score : 38.1 (11/27/22 1147)  Mobility Inpatient CMS 0-100% Score: 61.29 (11/27/22 1147)  Mobility Inpatient CMS G-Code Modifier : CL (11/27/22 1147)          Tinneti Score       Goals  Short Term Goals  Time Frame for Short Term Goals: Discharge  Short Term Goal 1: Bed mobility Min A - ongoing 11/27  Short Term Goal 2: Sit<>Stand Min A w/ RW - ongoing 11/27  Short Term Goal 3: Ambulate 25ft w/ RW - ongoing 11/27  Patient Goals   Patient Goals :  To Return home and rest       Education  Patient Education  Education Given To: Patient  Education Provided: Role of Therapy;Transfer Training  Education Method: Verbal  Barriers to Learning: None  Education Outcome: Verbalized understanding      Therapy Time   Individual Concurrent Group Co-treatment   Time In 0922         Time Out 1022         Minutes 60         Timed Code Treatment Minutes: 60 Minutes     If patient discharges prior to next session this note will serve as a discharge summary. Please see below for the latest assessment towards goals.     [address]

## 2022-11-27 NOTE — PROGRESS NOTES
Assessment unchanged. Passive ROM performed in bilateral legs and pt states there is improvement in ROM compared to before. Pt is unwilling to be turned d/t pain. Able to sleep for a few hours at a time tonight. Pt was willing to take Tylenol and Robaxin.

## 2022-11-27 NOTE — PLAN OF CARE
Problem: Chronic Conditions and Co-morbidities  Goal: Patient's chronic conditions and co-morbidity symptoms are monitored and maintained or improved  11/27/2022 0723 by Rosa Daily RN  Outcome: Progressing  11/27/2022 0407 by Westley Estevez RN  Outcome: Progressing     Problem: Discharge Planning  Goal: Discharge to home or other facility with appropriate resources  11/27/2022 0723 by Rosa Daily RN  Outcome: Progressing  11/27/2022 0407 by Westley Estevez RN  Outcome: Progressing     Problem: Pain  Goal: Verbalizes/displays adequate comfort level or baseline comfort level  11/27/2022 0723 by Rosa Daily RN  Outcome: Progressing  11/27/2022 0407 by Westley Estevez RN  Outcome: Progressing  Flowsheets (Taken 11/26/2022 1600 by Cinda Perez RN)  Verbalizes/displays adequate comfort level or baseline comfort level: Assess pain using appropriate pain scale     Problem: ABCDS Injury Assessment  Goal: Absence of physical injury  11/27/2022 0723 by Rosa Daily RN  Outcome: Progressing  11/27/2022 0407 by Westley Estevez RN  Outcome: Progressing  Flowsheets (Taken 11/26/2022 2000)  Absence of Physical Injury: Implement safety measures based on patient assessment     Problem: Skin/Tissue Integrity  Goal: Absence of new skin breakdown  Description: 1. Monitor for areas of redness and/or skin breakdown  2. Assess vascular access sites hourly  3. Every 4-6 hours minimum:  Change oxygen saturation probe site  4. Every 4-6 hours:  If on nasal continuous positive airway pressure, respiratory therapy assess nares and determine need for appliance change or resting period.   11/27/2022 0723 by Rosa Daily RN  Outcome: Progressing  11/27/2022 0407 by Westley Estevez RN  Outcome: Progressing     Problem: Safety - Adult  Goal: Free from fall injury  11/27/2022 0723 by Rosa Daily RN  Outcome: Progressing  11/27/2022 0407 by Wsetley Estevez RN  Outcome: Progressing  Flowsheets (Taken 11/26/2022 2000)  Free From Fall Injury: Instruct family/caregiver on patient safety

## 2022-11-27 NOTE — PROGRESS NOTES
Occupational Therapy  Facility/Department: [facility] ICU  Occupational Therapy Treatment Note     Name: Glenny Myles  : 1929  MRN: [id number]  Date of Service: 2022    Discharge Recommendations:Marcelina Parsons scored a 15/24 on the AM-PAC ADL Inpatient form. Current research shows that an AM-PAC score of 17 or less is typically not associated with a discharge to the patient's home setting. Based on the patient's AM-PAC score and their current ADL deficits, it is recommended that the patient have 5-7 sessions per week of Occupational Therapy at d/c to increase the patient's independence. At this time, this patient demonstrates complex nursing, medical, and rehabilitative needs, and would benefit from intensive rehabilitation services upon discharge from the Inpatient setting. This patient demonstrates the ability to participate in and benefit from an intensive therapy program with a coordinated interdisciplinary team approach to foster frequent, structured, and documented communication among disciplines, who will work together to establish, prioritize, and achieve treatment goals. Please see assessment section for further patient specific details. If patient discharges prior to next session this note will serve as a discharge summary. Please see below for the latest assessment towards goals. OT Equipment Recommendations  Equipment Needed: No  Other: has all needed equipment       Patient Diagnosis(es): The primary encounter diagnosis was MRSA infection. Diagnoses of Complications due to vascular device, implant, and graft, initial encounter, Iliac aneurysm ([address] and Post-op pain were also pertinent to this visit.   Past Medical History:  has a past medical history of Anxiety, Arthritis, At risk for falls, CAD (coronary artery disease), Cerebral artery occlusion with cerebral infarction ([address] DDD (degenerative disc disease), cervical, Fractures, Hyperlipidemia, Hypertension, Mitral regurgitation, Neuropathy, Osteopenia, Osteoporosis, PAD (peripheral artery disease) ([facility] [address] Peripheral neuropathy, Peripheral vascular disease ([address] Podagra, Prolonged emergence from general anesthesia, Spinal stenosis, Type 2 diabetes mellitus ([address] Urethral stricture, and Urgency of urination. Past Surgical History:  has a past surgical history that includes Tonsillectomy; Appendectomy; Cholecystectomy (01/01/1978); Colonoscopy (08/31/1999); Hysterectomy (1980s); Total hip arthroplasty (04/25/1998); Kyphosis surgery (11/07/2006); Wrist fracture surgery (01/01/2008); Cataract removal with implant ([id number]); eye surgery (Bilateral); IR Femoral Popliteal Bypass Graft (Right, 08/31/2015); Carpal tunnel release (Right, 03/29/2019); Femoral Endarterectomy (Left, 02/14/2022); Leg Surgery (Left, 02/14/2022); angioplasty (Left, 04/18/2022); femoral bypass (Left, 6/28/2022); Leg Surgery (N/A, 7/7/2022); Axillary-femoral Bypass Graft (Left, 11/20/2022); and Leg Surgery (Left, 11/22/2022). Treatment Diagnosis: Impaired ADLs, functional mobility / transfers and decreased endurance 2/2 LLE sx      Assessment   Performance deficits / Impairments: Decreased functional mobility ; Decreased ADL status; Decreased balance  Assessment: Pt demo increased activity tolerance and overall participation. Pt continues to function well below baseline and would benefit from aggressive inpt OT at d/c to maximize functional level. 1/3 goals met - new goals added. If home pt would need 24hr assist initially and HHOT. Pt has all needed DME. Will follow as inpt.   Treatment Diagnosis: Impaired ADLs, functional mobility / transfers and decreased endurance 2/2 LLE sx  Prognosis: Good  REQUIRES OT FOLLOW-UP: Yes  Activity Tolerance  Activity Tolerance: Patient Tolerated treatment well  Activity Tolerance Comments: Pt at times agitated with therapist when pushing to maximize functional potential.  Needs encouragement to maximize effort        Plan Occupational Therapy Plan  Times Per Week: 2-5     Restrictions  Position Activity Restriction  Other position/activity restrictions: Up as tolerated    Subjective   General  Chart Reviewed: Yes  Patient assessed for rehabilitation services?: Yes  Additional Pertinent Hx: 80y.o. 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 with harvest of left basilic vein and left external iliac artery to femoral bypass through obturator canal 11/20 and rectus femoris flap with split thickness skin graft 11/22  Family / Caregiver Present: Yes (bhupinder  caretaker / HPOA)  Diagnosis: atherosclerosis L leg  Subjective  Subjective: \" I can't move my Left leg except for this \" pt found resting in bed -agreeable for OOB/OT tx. Pt reports she needs to rest until things are feeling better. Pt edu and encouraged to increase activity. Verb understanding     Social/Functional History  Social/Functional History  Lives With: Home care staff  Type of Home: House  Home Layout: Two level  Home Access: Stairs to enter with rails  Entrance Stairs - Number of Steps: 2  Home Equipment: Lift chair, Walker, rolling, [address] Help From: Family, Home health  ADL Assistance: Independent  Homemaking Assistance: Independent (caregivers also assist)  Ambulation Assistance: Independent (uses rollator \"when I remember it\")  Transfer Assistance: Independent  Active : No       Objective        Safety Devices  Type of Devices: Call light within reach;Nurse notified; Chair alarm in place; Left in chair     Toilet Transfers  Toilet - Technique: Ambulating  Equipment Used: Standard toilet  Toilet Transfer: Moderate assistance  Toilet Transfers Comments: with two hand holds -pt needing cues for using rail vs walker for leverage  AROM: Within functional limits  Strength:  Within functional limits  Coordination: Within functional limits  Tone: Normal  Sensation: Intact  ADL  Feeding: Setup; Independent  Grooming: Setup; Increased time to complete  Grooming Skilled Clinical Factors: in sitting - declines attempts to perform at sink  LE Dressing: Maximum assistance;Dependent/Total  LE Dressing Skilled Clinical Factors: reaching past knees -_ Unable to bend L knee completely  Toileting: Maximum assistance; Moderate assistance  Toileting Skilled Clinical Factors: pt able to perform pericare with setup / needing Max A with threading brief over BLE / hips 2/2 wound vac in place        Bed mobility  Supine to Sit: Moderate assistance  Scooting: Minimal assistance; Moderate assistance  Bed Mobility Comments: Pt needing encouragement and assist with LLE  Transfers  Sit to stand: Moderate assistance  Stand to sit: Moderate assistance  Transfer Comments: Min A from bed height  Plopped into chair -- Pt edu on safe transfer tech - needing cues for perform. Pt requires Mod A from chair / commode  Vision  Vision: Within Functional Limits  Cognition  Overall Cognitive Status: WFL  Cognition Comment: pt very distracted and tangential at times.  Pt doesn't like to be pushed  Orientation  Overall Orientation Status: Within Functional Limits     Education Given To: Patient;Caregiver  Education Provided: Role of Therapy;Plan of Care;ADL Adaptive Strategies;IADL Safety  Education Method: Demonstration;Verbal  Barriers to Learning: None  Education Outcome: Continued education needed        AM-PAC Score   AM-PAC Inpatient Daily Activity Raw Score: 15 (11/27/22 1147)  AM-PAC Inpatient ADL T-Scale Score : 34.69 (11/27/22 1147)  ADL Inpatient CMS 0-100% Score: 56.46 (11/27/22 1147)  ADL Inpatient CMS G-Code Modifier : CK (11/27/22 1147)    Goals  Short Term Goals  Time Frame for Short Term Goals: discharge  Short Term Goal 1: bed mobility min A - not met  Short Term Goal 2: sitting balance SBA x10 min during ADL/therex - not met  Short Term Goal 3: New goal Functional transfers with Supervision  Short Term Goal 4: New Goal LE dressing with CGA and AE prn  Short Term Goal 5: New Goal - Chair pushups x 5 reps with supervision.        Therapy Time   Individual Concurrent Group Co-treatment   Time In 0921         Time Out 1022         Minutes 61             Timed Code Treatment Minutes:   61 mins     Total Treatment Minutes:  61 mins       Rosalie Yu, OT

## 2022-11-27 NOTE — PROGRESS NOTES
Plastic Surgery   Daily Progress Note  Patient: Glenny Smoker      CC: Wound closure    SUBJECTIVE:   Patient rested well overnight. Remained afebrile and HDS. Pulse exam without change. Tolerating diet without NV. Patient urinating appropriately via external catheter. Continues to refuse medications. Comfortable in bed. Continues to complain of paralyzed left leg, able to move more today. Refuses to get OOB, work with PTOT    ROS:   A 14 point review of systems was conducted, significant findings as noted above. All other systems negative. OBJECTIVE:    PHYSICAL EXAM:    Vitals:    11/27/22 0400 11/27/22 0500 11/27/22 0600 11/27/22 0700   BP: (!) 151/52 (!) 171/69 (!) 151/44 (!) 126/114   Pulse: 65 75 66 77   Resp: 14 15 18 16   Temp: 98.4 °F (36.9 °C)      TempSrc: Temporal      SpO2: 100%      Weight:       Height:           General appearance: alert, no acute distress, grooming appropriate  Eyes: No scleral icterus, EOM grossly intact  Neck: trachea midline, no JVD, no lymphadenopathy, neck supple  Chest/Lungs: CTAB, no crackles/rales, wheezes/rhonchi, normal effort with no accessory muscle use on RA  Cardiovascular: RRR  Abdomen: Soft, non-tender, non-distended, no rebound, guarding, or rigidity. Skin: warm and dry, no rashes  Extremities: LUE edema, L brachial mepilex with moderate amount of strike through, non tender to palpation, some ecchymosis but no sign of hematoma. L groin with firm LN on medial groin, unchanged from prior exam, non tender. Groin with prevena and wound vac in place with good seal -100. Groin and incision sites are soft without tenderness and no signs of hematoma or active bleeding. Xeroform dressing in place at donor site open to air. Sensation and mobility intact b/l LE. PT palpable b/l. DP palpable on right, doppler on L. L  to palpation   Genitourinary:  Wagner in place with clear yellow urine  Neuro: A&Ox3, no focal deficits, sensation intact    LABS:   Recent Labs 11/26/22 0434 11/27/22 0327   WBC 7.6 8.0   HGB 7.7* 8.4*   HCT 22.7* 24.9*   MCV 90.3 91.0    196          Recent Labs     11/26/22 0434 11/27/22 0327   * 136   K 3.9 3.9    104   CO2 25 25   PHOS 3.1 2.8   BUN 13 21*   CREATININE 0.7 0.8          No results for input(s): AST, ALT, ALB, BILIDIR, BILITOT, ALKPHOS in the last 72 hours. No results for input(s): LIPASE, AMYLASE in the last 72 hours. No results for input(s): PROT, INR, APTT in the last 72 hours. Recent Labs     11/25/22 0444   CKTOTAL 150       ASSESSMENT & PLAN:   This is a 80y.o. 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 - Wound vac holding suction -100 without issues  - Wound vac/Prevena to remain for 7 days (end 11/29)  - Only to be changed by plastic service  - Wound 6cm x 8cm with depth of 4cm in OR  - Donor site open to air   - Medical management per vascular service  - Will continue to follow along     Cindy Ruiz DO  PGY1, General Surgery  11/27/22  7:49 AM  [employer]  Pager: [phone]    I saw and independently examined the patient today. I agree with the history of present illness, past medical/surgical histories, family history, social history, medication list and allergies as listed. The review of systems is as noted above. My physical exam confirms the findings listed above. Review of labs, pathology reports, radiology reports and medical records confirm the findings noted above. I edited the note where appropriate in italics, strikethrough font, or underline. Disposition to ARU.   Vac can come down once 7 days from OR (Tuesday)    Joseph Ruiz MD  [address] Reconstructive Surgery  [phone]  11/27/22

## 2022-11-27 NOTE — PROGRESS NOTES
ICU VASCULAR SURGERY DAILY PROGRESS NOTE    CC: excision of infected ilofemoral bypass graft with harvest of left basilic vein and left external iliac artery to femoral bypass through obturator canal    SUBJECTIVE:   Interval Hx:   Patient rested well overnight. Remained afebrile and HDS. Pulse exam without change. Tolerating diet without NV. Patient urinating appropriately via external catheter. Continues to refuse medications. Comfortable in bed. Continues to complain of paralyzed left leg, able to move more today. Refuses to get OOB, work with PTOT    ROS: A 14 point review of systems was conducted, significant findings as noted above. All other systems negative. OBJECTIVE:   Vitals:   Vitals:    11/27/22 0400 11/27/22 0500 11/27/22 0600 11/27/22 0700   BP: (!) 151/52 (!) 171/69 (!) 151/44 (!) 126/114   Pulse: 65 75 66 77   Resp: 14 15 18 16   Temp: 98.4 °F (36.9 °C)      TempSrc: Temporal      SpO2: 100%      Weight:       Height:           I/O:   Intake/Output Summary (Last 24 hours) at 11/27/2022 0737  Last data filed at 11/27/2022 0600  Gross per 24 hour   Intake 531.29 ml   Output 450 ml   Net 81.29 ml       I/O last 3 completed shifts: In: 741.3 [P.O.:380; I.V.:261.3; IV Piggyback:100]  Out: 450 [Urine:450]    Diet: ADULT DIET; Regular; 3 carb choices (45 gm/meal)  ADULT ORAL NUTRITION SUPPLEMENT; Breakfast, Lunch, Dinner; Standard High Calorie/High Protein Oral Supplement  ADULT ORAL NUTRITION SUPPLEMENT; Breakfast, Lunch, Dinner, HS Snack; Diabetic Oral Supplement      Physical Examination:   General appearance: alert, no acute distress, grooming appropriate  Eyes: No scleral icterus, EOM grossly intact  Neck: trachea midline, no JVD, no lymphadenopathy, neck supple  Chest/Lungs: CTAB, no crackles/rales, wheezes/rhonchi, normal effort with no accessory muscle use on RA  Cardiovascular: RRR  Abdomen: Soft, non-tender, non-distended, no rebound, guarding, or rigidity.   Skin: warm and dry, no rashes  Extremities: LUE edema, L brachial mepilex with moderate amount of strike through, non tender to palpation, some ecchymosis but no sign of hematoma. L groin with firm LN on medial groin, unchanged from prior exam, non tender. Groin with prevena and wound vac in place with good seal -100. Groin and incision sites are soft without tenderness and no signs of hematoma or active bleeding. Xeroform dressing in place at donor site open to air. Sensation and mobility intact b/l LE. PT palpable b/l. DP palpable on right, doppler on L. L  to palpation   Genitourinary: Wagner in place with clear yellow urine  Neuro: A&Ox3, no focal deficits, sensation intact    Pulses    Rad Fem Pop PT DP   Right + + + + +   Left + x + + -/+       Labs:  CBC:   Recent Labs     11/25/22  1146 11/26/22  0434 11/27/22  0327   WBC 8.2 7.6 8.0   HGB 8.1* 7.7* 8.4*   HCT 24.5* 22.7* 24.9*    157 196         BMP:   Recent Labs     11/25/22  1146 11/26/22  0434 11/27/22  0327   * 135* 136   K 4.1 3.9 3.9    103 104   CO2 22 25 25   BUN 15 13 21*   CREATININE 0.8 0.7 0.8   GLUCOSE 324* 144* 118*       LFT's:   No results for input(s): AST, ALT, ALB, BILITOT, ALKPHOS in the last 72 hours. Troponin: No results for input(s): TROPONINI in the last 72 hours. BNP: No results for input(s): BNP in the last 72 hours. ABGs: No results for input(s): PHART, ZPU8MOP, PO2ART in the last 72 hours. INR:   No results for input(s): INR in the last 72 hours. U/A:No results for input(s): NITRITE, COLORU, PHUR, LABCAST, WBCUA, RBCUA, MUCUS, TRICHOMONAS, YEAST, BACTERIA, CLARITYU, SPECGRAV, LEUKOCYTESUR, UROBILINOGEN, BILIRUBINUR, BLOODU, GLUCOSEU, AMORPHOUS in the last 72 hours. Invalid input(s): Maharaj Hallie     Rad:   XR CHEST PORTABLE   Final Result   Impression:   1. No airspace disease. CTA ABDOMINAL AORTA W BILAT RUNOFF W CONTRAST   Final Result   Impression:      1.  Small amount of hemorrhage in the left groin without evidence of active extravasation. 2. 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 3. 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 4. Loculated fluid collection of the medial left thigh which could represent abscess or sterile postoperative fluid collection. 5. Subcutaneous edema and fluid lower extremities. 6. Left pleural effusion. ASSESSMENT AND PLAN:   This is a 80y.o. 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 with harvest of left basilic vein and left external iliac artery to femoral bypass through obturator canal 11/20 - POD 7 and rectus femoris flap with split thickness skin graft 11/22 - POD 5.     Neuro:   Pain/sedation/psych  - Continue multimodal pain control; robaxin, tylenol, dilaudid (oral)  - Patient pain is currently controlled    Cardiovascular:   - HDS  - 2u PRBC given post op, patient responded well  - 1u PRBC given POD1  - Patient has had occasional episodes of atrial fibrillation since vascular surgery, will continue to monitor   - Continue ASA81    Pulmonary:   - Currently on RA  - IS, Acapella, Duo nebs, aggressive RT therapy    GI:   - Regular diet 3-carb  - SLIV    FEN:   - SLIV  - Replace electrolytes as indicated    /Renal:   - urinating without issues  - Patient Hershall Grise at home, restarted    Hem/ID:   - Graft infection - + MRSA  - Current abx; Daptomycin 400 mg IV daily until 01/04/2023  - ID Appreciate recs: Treat with Daptomycin x 6 weeks then po (doxycycline) x year  - Patient had 2u PRBC post op, 1u PRBC POD1  - HgB FU AM labs 8.0, 7.7, 8.6, 7.9, 8.8 (stable)  - PICC line placed 11/25    Endo:   Hx of T2DM  - Hyperglycemic 118/148/149/201  - Patient currently refusing insuline unless BG over 300  - Will discuss importance of blood glucose control for wound healing  - Strict management for improved wound healing potential    Prophylaxis:   - SQH, patient refusing     Access:  Peripheral Access: R hand 11/22  PICC line: RUE 11/25                                Mobility:  - OOB if tolerates   - PT/OT re-evaluation for ARU  - ARU consulted but pt asked if she can be re-evaluated at a later date    Dispo:   - Possible DC pending wound vac approval   - ARU if PTOT scores appropriate, will refuse SNF, therefore home with home PTOT, however refusing home until able to walk    [address]:  - Will discuss [address] and wound management today    Code Status: DNR-CCA  -----------------------------  Vivi Mooney DO  PGY1, General Surgery  11/27/22  7:40 AM  [employer]  Pager: [phone]

## 2022-11-27 NOTE — PLAN OF CARE
Problem: Chronic Conditions and Co-morbidities  Goal: Patient's chronic conditions and co-morbidity symptoms are monitored and maintained or improved  Outcome: Progressing     Problem: Discharge Planning  Goal: Discharge to home or other facility with appropriate resources  Outcome: Progressing     Problem: Pain  Goal: Verbalizes/displays adequate comfort level or baseline comfort level  Outcome: Progressing  Flowsheets (Taken 11/26/2022 1600 by Tracy Cohn RN)  Verbalizes/displays adequate comfort level or baseline comfort level: Assess pain using appropriate pain scale     Problem: ABCDS Injury Assessment  Goal: Absence of physical injury  Outcome: Progressing  Flowsheets (Taken 11/26/2022 2000)  Absence of Physical Injury: Implement safety measures based on patient assessment     Problem: Skin/Tissue Integrity  Goal: Absence of new skin breakdown  Description: 1. Monitor for areas of redness and/or skin breakdown  2. Assess vascular access sites hourly  3. Every 4-6 hours minimum:  Change oxygen saturation probe site  4. Every 4-6 hours:  If on nasal continuous positive airway pressure, respiratory therapy assess nares and determine need for appliance change or resting period.   Outcome: Progressing     Problem: Safety - Adult  Goal: Free from fall injury  Outcome: Progressing  Flowsheets (Taken 11/26/2022 2000)  Free From Fall Injury: Instruct family/caregiver on patient safety

## 2022-11-28 ENCOUNTER — HOSPITAL ENCOUNTER (OUTPATIENT)
Dept: WOUND CARE | Age: 87
Discharge: HOME | End: 2022-11-28

## 2022-11-28 ENCOUNTER — HOSPITAL ENCOUNTER (INPATIENT)
Age: 87
DRG: 949 | End: 2022-11-28
Attending: PHYSICAL MEDICINE & REHABILITATION | Admitting: PHYSICAL MEDICINE & REHABILITATION
Payer: MEDICARE

## 2022-11-28 VITALS
DIASTOLIC BLOOD PRESSURE: 76 MMHG | BODY MASS INDEX: 18.33 KG/M2 | HEIGHT: 64 IN | OXYGEN SATURATION: 100 % | SYSTOLIC BLOOD PRESSURE: 115 MMHG | WEIGHT: 107.36 LBS | RESPIRATION RATE: 12 BRPM | TEMPERATURE: 98 F | HEART RATE: 72 BPM

## 2022-11-28 DIAGNOSIS — T82.7XXA VASCULAR GRAFT INFECTION, INITIAL ENCOUNTER (HCC): Primary | ICD-10-CM

## 2022-11-28 PROBLEM — R53.81 DEBILITY: Status: ACTIVE | Noted: 2022-11-28

## 2022-11-28 LAB
ALBUMIN SERPL-MCNC: 2.7 G/DL (ref 3.4–5)
ANION GAP SERPL CALCULATED.3IONS-SCNC: 11 MMOL/L (ref 3–16)
BASOPHILS ABSOLUTE: 0.1 K/UL (ref 0–0.2)
BASOPHILS RELATIVE PERCENT: 0.6 %
BUN BLDV-MCNC: 29 MG/DL (ref 7–20)
CALCIUM SERPL-MCNC: 8.5 MG/DL (ref 8.3–10.6)
CHLORIDE BLD-SCNC: 103 MMOL/L (ref 99–110)
CO2: 24 MMOL/L (ref 21–32)
CREAT SERPL-MCNC: 0.8 MG/DL (ref 0.6–1.2)
EOSINOPHILS ABSOLUTE: 0.2 K/UL (ref 0–0.6)
EOSINOPHILS RELATIVE PERCENT: 2.7 %
GFR SERPL CREATININE-BSD FRML MDRD: >60 ML/MIN/{1.73_M2}
GLUCOSE BLD-MCNC: 119 MG/DL (ref 70–99)
GLUCOSE BLD-MCNC: 130 MG/DL (ref 70–99)
GLUCOSE BLD-MCNC: 133 MG/DL (ref 70–99)
GLUCOSE BLD-MCNC: 142 MG/DL (ref 70–99)
GLUCOSE BLD-MCNC: 183 MG/DL (ref 70–99)
HCT VFR BLD CALC: 25.2 % (ref 36–48)
HEMOGLOBIN: 8.6 G/DL (ref 12–16)
LYMPHOCYTES ABSOLUTE: 1.5 K/UL (ref 1–5.1)
LYMPHOCYTES RELATIVE PERCENT: 17.7 %
MAGNESIUM: 2.1 MG/DL (ref 1.8–2.4)
MCH RBC QN AUTO: 31 PG (ref 26–34)
MCHC RBC AUTO-ENTMCNC: 34.1 G/DL (ref 31–36)
MCV RBC AUTO: 91 FL (ref 80–100)
MONOCYTES ABSOLUTE: 0.9 K/UL (ref 0–1.3)
MONOCYTES RELATIVE PERCENT: 10.4 %
NEUTROPHILS ABSOLUTE: 5.8 K/UL (ref 1.7–7.7)
NEUTROPHILS RELATIVE PERCENT: 68.6 %
PDW BLD-RTO: 18 % (ref 12.4–15.4)
PERFORMED ON: ABNORMAL
PHOSPHORUS: 3.7 MG/DL (ref 2.5–4.9)
PLATELET # BLD: 224 K/UL (ref 135–450)
PMV BLD AUTO: 6.9 FL (ref 5–10.5)
POTASSIUM SERPL-SCNC: 4.2 MMOL/L (ref 3.5–5.1)
RBC # BLD: 2.77 M/UL (ref 4–5.2)
SARS-COV-2, NAAT: NOT DETECTED
SODIUM BLD-SCNC: 138 MMOL/L (ref 136–145)
WBC # BLD: 8.5 K/UL (ref 4–11)

## 2022-11-28 PROCEDURE — 87635 SARS-COV-2 COVID-19 AMP PRB: CPT

## 2022-11-28 PROCEDURE — 1280000000 HC REHAB R&B

## 2022-11-28 PROCEDURE — 2580000003 HC RX 258: Performed by: PHYSICAL MEDICINE & REHABILITATION

## 2022-11-28 PROCEDURE — 2580000003 HC RX 258: Performed by: STUDENT IN AN ORGANIZED HEALTH CARE EDUCATION/TRAINING PROGRAM

## 2022-11-28 PROCEDURE — 36415 COLL VENOUS BLD VENIPUNCTURE: CPT

## 2022-11-28 PROCEDURE — 99232 SBSQ HOSP IP/OBS MODERATE 35: CPT | Performed by: PHYSICAL MEDICINE & REHABILITATION

## 2022-11-28 PROCEDURE — 6370000000 HC RX 637 (ALT 250 FOR IP): Performed by: PHYSICAL MEDICINE & REHABILITATION

## 2022-11-28 PROCEDURE — 6370000000 HC RX 637 (ALT 250 FOR IP)

## 2022-11-28 PROCEDURE — 85025 COMPLETE CBC W/AUTO DIFF WBC: CPT

## 2022-11-28 PROCEDURE — 6360000002 HC RX W HCPCS: Performed by: STUDENT IN AN ORGANIZED HEALTH CARE EDUCATION/TRAINING PROGRAM

## 2022-11-28 PROCEDURE — 99232 SBSQ HOSP IP/OBS MODERATE 35: CPT | Performed by: INTERNAL MEDICINE

## 2022-11-28 PROCEDURE — 80069 RENAL FUNCTION PANEL: CPT

## 2022-11-28 PROCEDURE — 83735 ASSAY OF MAGNESIUM: CPT

## 2022-11-28 PROCEDURE — 94761 N-INVAS EAR/PLS OXIMETRY MLT: CPT

## 2022-11-28 RX ORDER — SODIUM CHLORIDE 0.9 % (FLUSH) 0.9 %
5-40 SYRINGE (ML) INJECTION PRN
Status: DISCONTINUED | OUTPATIENT
Start: 2022-11-28 | End: 2022-12-05 | Stop reason: HOSPADM

## 2022-11-28 RX ORDER — IPRATROPIUM BROMIDE AND ALBUTEROL SULFATE 2.5; .5 MG/3ML; MG/3ML
1 SOLUTION RESPIRATORY (INHALATION) EVERY 4 HOURS PRN
Status: DISCONTINUED | OUTPATIENT
Start: 2022-11-28 | End: 2022-12-05 | Stop reason: HOSPADM

## 2022-11-28 RX ORDER — GINSENG 100 MG
CAPSULE ORAL PRN
Status: DISCONTINUED | OUTPATIENT
Start: 2022-11-28 | End: 2022-11-28 | Stop reason: HOSPADM

## 2022-11-28 RX ORDER — HYDRALAZINE HYDROCHLORIDE 20 MG/ML
5 INJECTION INTRAMUSCULAR; INTRAVENOUS EVERY 6 HOURS PRN
Status: CANCELLED | OUTPATIENT
Start: 2022-11-28

## 2022-11-28 RX ORDER — ATORVASTATIN CALCIUM 10 MG/1
10 TABLET, FILM COATED ORAL DAILY
Status: DISCONTINUED | OUTPATIENT
Start: 2022-11-29 | End: 2022-12-05 | Stop reason: HOSPADM

## 2022-11-28 RX ORDER — SODIUM CHLORIDE 0.9 % (FLUSH) 0.9 %
5-40 SYRINGE (ML) INJECTION EVERY 12 HOURS SCHEDULED
Status: DISCONTINUED | OUTPATIENT
Start: 2022-11-28 | End: 2022-12-05 | Stop reason: HOSPADM

## 2022-11-28 RX ORDER — ATORVASTATIN CALCIUM 10 MG/1
10 TABLET, FILM COATED ORAL DAILY
Status: CANCELLED | OUTPATIENT
Start: 2022-11-29

## 2022-11-28 RX ORDER — FUROSEMIDE 20 MG/1
20 TABLET ORAL DAILY
Status: DISCONTINUED | OUTPATIENT
Start: 2022-11-29 | End: 2022-12-05 | Stop reason: HOSPADM

## 2022-11-28 RX ORDER — DEXTROSE MONOHYDRATE 100 MG/ML
INJECTION, SOLUTION INTRAVENOUS CONTINUOUS PRN
Status: DISCONTINUED | OUTPATIENT
Start: 2022-11-28 | End: 2022-12-05 | Stop reason: HOSPADM

## 2022-11-28 RX ORDER — ASPIRIN 81 MG/1
81 TABLET ORAL DAILY
Status: DISCONTINUED | OUTPATIENT
Start: 2022-11-29 | End: 2022-12-05 | Stop reason: HOSPADM

## 2022-11-28 RX ORDER — ONDANSETRON 4 MG/1
4 TABLET, ORALLY DISINTEGRATING ORAL EVERY 8 HOURS PRN
Status: CANCELLED | OUTPATIENT
Start: 2022-11-28

## 2022-11-28 RX ORDER — ACETAMINOPHEN 325 MG/1
650 TABLET ORAL EVERY 4 HOURS PRN
Status: CANCELLED | OUTPATIENT
Start: 2022-11-28

## 2022-11-28 RX ORDER — ONDANSETRON 2 MG/ML
4 INJECTION INTRAMUSCULAR; INTRAVENOUS EVERY 6 HOURS PRN
Status: DISCONTINUED | OUTPATIENT
Start: 2022-11-28 | End: 2022-12-05 | Stop reason: HOSPADM

## 2022-11-28 RX ORDER — IPRATROPIUM BROMIDE AND ALBUTEROL SULFATE 2.5; .5 MG/3ML; MG/3ML
1 SOLUTION RESPIRATORY (INHALATION) EVERY 4 HOURS PRN
Status: CANCELLED | OUTPATIENT
Start: 2022-11-28

## 2022-11-28 RX ORDER — POLYETHYLENE GLYCOL 3350 17 G/17G
17 POWDER, FOR SOLUTION ORAL DAILY PRN
Status: CANCELLED | OUTPATIENT
Start: 2022-11-28

## 2022-11-28 RX ORDER — ASPIRIN 81 MG/1
81 TABLET ORAL DAILY
Status: CANCELLED | OUTPATIENT
Start: 2022-11-29

## 2022-11-28 RX ORDER — SODIUM CHLORIDE 0.9 % (FLUSH) 0.9 %
5-40 SYRINGE (ML) INJECTION PRN
Status: CANCELLED | OUTPATIENT
Start: 2022-11-28

## 2022-11-28 RX ORDER — FUROSEMIDE 20 MG/1
20 TABLET ORAL DAILY
Status: CANCELLED | OUTPATIENT
Start: 2022-11-29

## 2022-11-28 RX ORDER — DEXTROSE MONOHYDRATE 100 MG/ML
INJECTION, SOLUTION INTRAVENOUS CONTINUOUS PRN
Status: CANCELLED | OUTPATIENT
Start: 2022-11-28

## 2022-11-28 RX ORDER — HYDRALAZINE HYDROCHLORIDE 20 MG/ML
5 INJECTION INTRAMUSCULAR; INTRAVENOUS EVERY 6 HOURS PRN
Status: DISCONTINUED | OUTPATIENT
Start: 2022-11-28 | End: 2022-12-05 | Stop reason: HOSPADM

## 2022-11-28 RX ORDER — ACETAMINOPHEN 500 MG
1000 TABLET ORAL EVERY 6 HOURS
Status: DISCONTINUED | OUTPATIENT
Start: 2022-11-28 | End: 2022-12-05 | Stop reason: HOSPADM

## 2022-11-28 RX ORDER — METHOCARBAMOL 500 MG/1
1000 TABLET, FILM COATED ORAL 3 TIMES DAILY
Status: DISCONTINUED | OUTPATIENT
Start: 2022-11-28 | End: 2022-12-05 | Stop reason: HOSPADM

## 2022-11-28 RX ORDER — ONDANSETRON 2 MG/ML
4 INJECTION INTRAMUSCULAR; INTRAVENOUS EVERY 6 HOURS PRN
Status: CANCELLED | OUTPATIENT
Start: 2022-11-28

## 2022-11-28 RX ORDER — ACETAMINOPHEN 325 MG/1
650 TABLET ORAL EVERY 4 HOURS PRN
Status: DISCONTINUED | OUTPATIENT
Start: 2022-11-28 | End: 2022-12-05 | Stop reason: HOSPADM

## 2022-11-28 RX ORDER — METHOCARBAMOL 500 MG/1
1000 TABLET, FILM COATED ORAL 3 TIMES DAILY
Status: CANCELLED | OUTPATIENT
Start: 2022-11-28

## 2022-11-28 RX ORDER — ACETAMINOPHEN 500 MG
1000 TABLET ORAL EVERY 6 HOURS
Status: CANCELLED | OUTPATIENT
Start: 2022-11-28

## 2022-11-28 RX ORDER — GINSENG 100 MG
CAPSULE ORAL PRN
Status: DISCONTINUED | OUTPATIENT
Start: 2022-11-28 | End: 2022-11-29

## 2022-11-28 RX ORDER — SODIUM CHLORIDE 0.9 % (FLUSH) 0.9 %
5-40 SYRINGE (ML) INJECTION EVERY 12 HOURS SCHEDULED
Status: CANCELLED | OUTPATIENT
Start: 2022-11-28

## 2022-11-28 RX ORDER — GINSENG 100 MG
CAPSULE ORAL PRN
Status: CANCELLED | OUTPATIENT
Start: 2022-11-28

## 2022-11-28 RX ORDER — ONDANSETRON 4 MG/1
4 TABLET, ORALLY DISINTEGRATING ORAL EVERY 8 HOURS PRN
Status: DISCONTINUED | OUTPATIENT
Start: 2022-11-28 | End: 2022-12-05 | Stop reason: HOSPADM

## 2022-11-28 RX ORDER — POLYETHYLENE GLYCOL 3350 17 G/17G
17 POWDER, FOR SOLUTION ORAL DAILY PRN
Status: DISCONTINUED | OUTPATIENT
Start: 2022-11-28 | End: 2022-12-05 | Stop reason: HOSPADM

## 2022-11-28 RX ADMIN — METHOCARBAMOL TABLETS 1000 MG: 500 TABLET, COATED ORAL at 09:47

## 2022-11-28 RX ADMIN — ACETAMINOPHEN 1000 MG: 500 TABLET ORAL at 06:17

## 2022-11-28 RX ADMIN — ACETAMINOPHEN 650 MG: 325 TABLET ORAL at 19:47

## 2022-11-28 RX ADMIN — SODIUM CHLORIDE, PRESERVATIVE FREE 10 ML: 5 INJECTION INTRAVENOUS at 09:48

## 2022-11-28 RX ADMIN — DAPTOMYCIN 350 MG: 500 INJECTION, POWDER, LYOPHILIZED, FOR SOLUTION INTRAVENOUS at 10:56

## 2022-11-28 RX ADMIN — ACETAMINOPHEN 1000 MG: 500 TABLET ORAL at 23:40

## 2022-11-28 RX ADMIN — ASPIRIN 81 MG: 81 TABLET, COATED ORAL at 09:48

## 2022-11-28 RX ADMIN — SODIUM CHLORIDE, PRESERVATIVE FREE 10 ML: 5 INJECTION INTRAVENOUS at 22:55

## 2022-11-28 RX ADMIN — ACETAMINOPHEN 1000 MG: 500 TABLET ORAL at 12:00

## 2022-11-28 RX ADMIN — ATORVASTATIN CALCIUM 10 MG: 10 TABLET, FILM COATED ORAL at 09:47

## 2022-11-28 RX ADMIN — FUROSEMIDE 20 MG: 20 TABLET ORAL at 09:48

## 2022-11-28 ASSESSMENT — PAIN DESCRIPTION - DESCRIPTORS
DESCRIPTORS: ACHING

## 2022-11-28 ASSESSMENT — PAIN DESCRIPTION - ONSET
ONSET: ON-GOING
ONSET: ON-GOING
ONSET: GRADUAL

## 2022-11-28 ASSESSMENT — PAIN SCALES - GENERAL
PAINLEVEL_OUTOF10: 5
PAINLEVEL_OUTOF10: 9
PAINLEVEL_OUTOF10: 10

## 2022-11-28 ASSESSMENT — PAIN DESCRIPTION - ORIENTATION
ORIENTATION: LEFT

## 2022-11-28 ASSESSMENT — PAIN DESCRIPTION - FREQUENCY
FREQUENCY: INTERMITTENT

## 2022-11-28 ASSESSMENT — PAIN DESCRIPTION - PAIN TYPE
TYPE: CHRONIC PAIN;SURGICAL PAIN
TYPE: CHRONIC PAIN;SURGICAL PAIN

## 2022-11-28 ASSESSMENT — PAIN DESCRIPTION - LOCATION
LOCATION: LEG

## 2022-11-28 NOTE — PLAN OF CARE
ARU PATIENT TREATMENT PLAN  [address], [address] [place] [address] Pkw[address]    : 1929  Acct #: [de-identified]  MRN: [id number]  PHYSICIAN:  Brandon Bennett DO  Primary Problem    Patient Active Problem List   Diagnosis    Urethral stricture    Spinal stenosis    Lumbar stenosis    Spondylolisthesis of lumbosacral region    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 ([facility]    Atherosclerosis of artery of extremity with ulceration (HCC)    Benign essential HTN    Lumbar foraminal stenosis    DDD (degenerative disc disease), lumbar    Spinal stenosis of lumbar region with neurogenic claudication    Type 2 diabetes, controlled, with neuropathy (HCC)    Severe mitral regurgitation    Venous insufficiency of both lower extremities    Hyperlipidemia    Hip arthritis    Status post carmen arthroplasty replacement of left hip     Femoral artery stenosis, right (HCC)    Osteoporosis    Osteopenia    Femoral-popliteal bypass graft occlusion, left (HCC)    Pain due to onychomycosis of nail    Coronary artery disease involving native coronary artery of native heart without angina pectoris    Diabetic peripheral neuropathy (HCC)    Arthritis of left knee    Trigger middle finger of right hand    Carpal tunnel syndrome, right    Vitamin D deficiency    Carotid stenosis, asymptomatic, bilateral    Fingernail problem    TIA (transient ischemic attack)    Sacral fracture, closed (HCC)    Closed fracture of ramus of right pubis (HCC)    Acute pain of left lower extremity    Diabetes mellitus type 2 with peripheral artery disease (HCC)    Open wound of left knee    Nonhealing nonsurgical wound limited to breakdown of skin    Peripheral vascular disease, unspecified ([facility]    Wound of left leg    Peripheral artery disease (HCC)    Cervical spondylosis    Atherosclerosis of artery of extremity with rest pain (HCC)    Lymph leak    Nonhealing surgical wound    Bypass graft stenosis (HCC)    Atherosclerosis of native artery of left leg with ulceration of ankle (HCC)    Complications due to vascular device, implant, and graft    Pseudoaneurysm (HCC)    MRSA infection    Debility       Rehabilitation Diagnosis:  16.0, Debility (non-cardiac, non-pulmonary  ADMIT DATE:11/28/2022    Patient Goals: mod I  Admitting Impairments: decreased ADLs  Activity Restrictions: none  Participation Limitations: none   CARE PLAN   NURSING:  Cheral Necessary while on this unit will:  [x] Be continent of bowel and bladder     [x] Have an adequate number of bowel movements  [x] Urinate with no urinary retention >300ml in bladder  [x] Complete bladder protocol with espinoza removal  [x] Maintain O2 SATs at _>90__%  [x] Have pain managed while on ARU       [] Be pain free by discharge   [x] Have no skin breakdown while on ARU  [x] Have improved skin integrity via wound measurements  [x] Have no signs/symptoms of infection at the wound site  [x] Be free from injury during hospitalization   [x] Complete education with patient/family with understanding demonstrated for:  [x] Adjustment   [] Other:     Nursing Interventions will include:  [x] bowel/bladder training   [x] education for medical assistive devices   [x] medication education   [x] O2 saturation management   [x] energy conservation   [x] stress management techniques   [x] fall prevention   [x] alarms protocol   [x] seating and positioning   [x] skin/wound care   [x] pressure relief instruction   [x] dressing changes     [x] infection protection   [x] DVT prophylaxis  [x] assistance with in room safety with transfers to bed, toilet, wheelchair, shower   [x] bathroom activities and hygiene  [] Other:    Patient/Caregiver Education for:  [x] Disease/sustained injury/management     [x] Medication Use  [x] Surgical intervention  [x] Safety  [x] Body mechanics and or joint protection  [x] Health maintenance     [] Other:     PHYSICAL THERAPY:  Goals: These goals were reviewed with this patient at the time of assessment and Charles Belts is in agreement. Plan of Care: Pt to be seen 5 out of 7 days per week per ARU protocol (90 minutes with PT)                       OCCUPATIONAL THERAPY:  Goals:               :    :  These goals were reviewed with this patient at the time of assessment and Charles Martes is in agreement    Plan of Care:  Pt to be seen 5 out of 7 days per week per ARU protocol (90 minutes with OT)       SPEECH THERAPY: Goals will be left blank if speech is not following this patient. Goals:                                                                             Plan of Care:  Pt to be seen 5 out of 7 days per week per ARU protocol (0 minutes with SLP)    Therapy Treatments will include:  [x]  therapeutic exercises    [x]  gait training     [x]  neuromuscular re-ed                            [x]  transfer training             [x] community reintegration    [x] bed mobility                          [x]  w/c mobility and training  [x]  self care    [x]home mgmt    []  cognitive training            [x]  energy conservation        []  dysphagia tx    []  speech/language/communication therapy   []  group therapy    [x]  patient/family education    [] Other:    CASE MANAGEMENT:  Goals: Assist patient/family with discharge planning, patient/family counseling, and coordination with insurance during ARU stay.     Admission Period/Goal QM SCORES  QM Admit/Goal Score   Eating   /     Oral Hygiene   /     Shower/Bathing   /     UB Dressing   /     LB Dressing   /     Putting on/off Footwear   /     Toileting Hygiene   /     Bladder Continence      Bowel Continence      Toilet Transfers   /     Shower/Bathe Self    /     Rolling Left and Right   /     Sit to Lying   /     Lying to Sitting on Bedside   /     Sit to Stand   /     Chair/Bed to Chair Transfer   /     Car Transfers   /     Walk 10 Feet   /     Walk 50 Feet with Two Turns /     Walk 150 Feet   /     Walk 10 Feet on Uneven Surfaces   /     1 Step (Curb)   /     4 Steps   /     12 Steps   /     Picking up Object from Floor   /     Wheel 50 Feet with 2 Turns   /     Type         [] Manual        [] Motorized        [] N/A   Wheel 150 Feet   /     Type         [] Manual        [] Motorized        [] N/A        Fly Guadalupe will be seen a minimum of 3 hours of therapy per day, a minimum of 5 out of 7 days per week (please see above for specific treatment plan per PT/OT/SLP). [] In this rare instance due to the nature of this patient's medical involvement, this patient will be seen 15 hours per week (900 minutes within a 7day period). In addition, dietician/nutritionist may monitor calorie count as well as intake and collaboratively work with SLP on dietary upgrades. Neuropsychology/Psychology may evaluate and provide necessary support. Medical issues being managed closely and that require 24hour availability of a physician:  [] Swallowing Precautions  [x] Bowel/Bladder Fx  [] Weight bearing precautions  [x] Wound Care    [x] Pain Mgmt   [x] Infection Protection  [x] DVT Prophylaxis   [x] Fall Precautions  [x] Fluid/Electrolyte/Nutrition Balance  [] Voice Protection   [] Respiratory  [] Other:    Medical Prognosis: [] Good  [x] Fair    [] Guarded   Total expected IRF days 10  Anticipated discharge destination:   [] Home Independently   [] Home Modified Independent  [x] Home with supervision    []SNF     [] Other                                           Physician anticipated functional outcomes:PT will progress to a level of supervision to MIN A for all functional mobility and ADLs to allow for a safe return home with caregivers. IPOC brief synthesis:  This is a 80y.o. 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 with harvest of left basilic vein and left external iliac artery to femoral bypass through obturator canal

## 2022-11-28 NOTE — PROGRESS NOTES
Progress Note  Physical Medicine and Rehabilitation    Patient: Shara Form  [id number]  Date: 11/28/2022      Chief Complaint: Vascular infection    History of Present Illness/Hospital Course: This is a 80y.o. 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 She has severe pain in her left leg and is refusing medications and therapy at times. She lives alone and wants to go home. She understands she may need some therapy but is not sure if she can tolerate 3 hours a day due to her pain. Interval history: Doing better today in therapy. She wants to come down to the ARU for further therapy. No new complaints. Refusing lovenox or any anticoagulation.      Prior Level of Function:  Mod Independent for mobility, ADLs, and IADLs    Current Level of Function:  Mod/max assist     Pertinent Social History:  Support: lives alone   Home set-up: 1 step    Past Medical History:   Diagnosis Date    Anxiety     Arthritis     At risk for falls     CAD (coronary artery disease)     Cerebral artery occlusion with cerebral infarction (HCC)     TIA--no residual effects    DDD (degenerative disc disease), cervical     Fractures     (L) Hip Fx 4/25/98, L1 fracture from fall 10-31-06    Hyperlipidemia     Hypertension     Mitral regurgitation     Neuropathy     Osteopenia     Osteoporosis     PAD (peripheral artery disease) ([address]     Right LE ischemia    Peripheral neuropathy 6/1/2015    Peripheral vascular disease ([address]     bilateral lower extremities with edema    Podagra 01/09/2017    Dr. Robert Mena    Prolonged emergence from general anesthesia     Spinal stenosis     L4-5    Type 2 diabetes mellitus ([address]     Urethral stricture 5 years ago    Urgency of urination        Past Surgical History:   Procedure Laterality Date    ANGIOPLASTY Left 04/18/2022    Left SFA    APPENDECTOMY      AXILLARY-FEMORAL BYPASS GRAFT Left 11/20/2022    EXCISION OF INFECTED ILEOFEMORAL BYPASS GRAFT; HARVEST LEFT BASILIC VEIN; LEFT EXTERNAL ILIAC ARTERY TO FEMORAL BYPASS THROUGH OBTURATOR CANAL; PLACEMENT OF WOUND VAC performed by Kassandra Higgins MD at [address] Right 03/29/2019    RIGHT CARPAL TUNNEL RELEASE AND RIGHT MIDDLE FINGER TRIGGER FINGER RELEASE performed by Sonia Estevez MD at [place] [address]  [id number]    LEFT EYE EYE CATARACT PHACOEMULSIFICATION INTRAOCULAR LENS    CHOLECYSTECTOMY  01/01/1978    COLONOSCOPY  08/31/1999    diverticulosis    EYE SURGERY Bilateral     bilateral cataract removed    FEMORAL BYPASS Left 6/28/2022    LEFT LEG FEMORAL TO POPLITEAL BYPASS WITH INTRAOPERATIVE ANGIOGRAM performed by Ankita Zhu MD at [place] [address] Left 02/14/2022    LEFT COMMON FEMORAL ARTERY ENDARTERECTOMY performed by Ankita Zhu MD at [address] [place] [address] ([address] [place] [address])  [address] [place] [address]    IR FEMORAL POPLITEAL BYPASS GRAFT Right 08/31/2015    KYPHOSIS SURGERY  11/07/2006    L1, (fractured after a fall)    LEG SURGERY Left 02/14/2022    LEFT LEG WOUND DEBRIDEMENT performed by Ankita Zhu MD at [place] [address] N/A 7/7/2022    INCISION AND DRAINAGE OF LEFT SIDE GROIN WITH PLACEMENT OF WOUND VAC performed by Ankita Zhu MD at [place] [address] Left 11/22/2022    DEBRIDEMENT OF LEFT GROIN (10 X 3 CM), LEFT RECTUS FEMORIS FLAP, LEFT SPLIT THICKNESS SKIN GRAFT (10 X 5 CM), APPLICATION OF WOUND VACUUM DEVICE performed by Kathryn Watson MD at [facility]  04/25/1998    (L) THR    WRIST FRACTURE SURGERY  01/01/2008    left wrist       Family History   Problem Relation Age of Onset    Cancer Mother 43    Stroke Father     Hypertension Father        Social History     Socioeconomic History    Marital status:      Spouse name: None    Number of children: None    Years of education: None    Highest education level: None   Tobacco Use    Smoking status: Never    Smokeless tobacco: Never   Vaping Use    Vaping Use: Never used   Substance and Sexual Activity    Alcohol use: No    Drug use: Never    Sexual activity: Not Currently           REVIEW OF SYSTEMS:   CONSTITUTIONAL: negative for fevers, chills, diaphoresis, appetite change, night sweats, unexpected weight change, fatigue  EYES: negative for blurred vision, eye discharge, visual disturbance and icterus  HEENT: negative for hearing loss, tinnitus, ear drainage, sinus pressure, nasal congestion, epistaxis and snoring  RESPIRATORY: Negative for hemoptysis, cough, sputum production  CARDIOVASCULAR: negative for chest pain, palpitations, exertional chest pressure/discomfort, syncope, edema  GASTROINTESTINAL: negative for nausea, vomiting, diarrhea, blood in stool, abdominal pain, constipation  GENITOURINARY: negative for frequency, dysuria, urinary incontinence, decreased urine volume, and hematuria  HEMATOLOGIC/LYMPHATIC: negative for easy bruising, bleeding and lymphadenopathy  ALLERGIC/IMMUNOLOGIC: negative for recurrent infections, angioedema, anaphylaxis and drug reactions  ENDOCRINE: negative for weight changes and diabetic symptoms including polyuria, polydipsia and polyphagia  MUSCULOSKELETAL: positive for pain, joint swelling, decreased range of motion in left leg  NEUROLOGICAL: negative for headaches, slurred speech, unilateral weakness  PSYCHIATRIC/BEHAVIORAL: negative for hallucinations, behavioral problems, confusion and agitation.      Physical Examination:  Vitals: Patient Vitals for the past 24 hrs:   BP Temp Temp src Pulse Resp SpO2 Weight   11/28/22 1000 -- -- -- -- -- -- 107 lb 5.8 oz (48.7 kg)   11/28/22 0900 (!) 118/59 -- -- 75 13 -- --   11/28/22 0800 (!) 144/45 98.2 °F (36.8 °C) -- 90 27 99 % --   11/28/22 0700 (!) 140/48 -- -- 66 16 -- --   11/28/22 0503 (!) 138/57 -- -- 65 18 -- --   11/28/22 0500 -- -- -- 76 25 -- --   11/28/22 0400 (!) 144/52 98.5 °F (36.9 °C) Temporal 76 15 100 % --   11/28/22 0300 (!) 153/77 -- -- 94 21 -- --   11/28/22 0200 (!) 145/50 -- -- 72 16 -- --   11/28/22 0100 (!) 127/40 -- -- 67 15 -- --   11/28/22 0000 131/72 98.1 °F (36.7 °C) Temporal 93 (!) 31 -- --   11/27/22 2312 -- -- -- -- 16 -- --   11/27/22 2300 (!) 159/125 -- -- 87 18 -- --   11/27/22 2200 (!) 133/45 -- -- 80 16 -- --   11/27/22 2100 (!) 123/41 -- -- 70 23 -- --   11/27/22 2000 (!) 120/53 98 °F (36.7 °C) Axillary 71 18 100 % --   11/27/22 1900 (!) 124/35 -- -- 67 17 100 % --   11/27/22 1800 (!) 124/38 -- -- 80 21 96 % --   11/27/22 1728 -- -- -- 76 20 99 % --   11/27/22 1700 (!) 118/40 -- -- 79 16 99 % --   11/27/22 1600 (!) 146/50 97.4 °F (36.3 °C) Temporal 89 16 100 % --   11/27/22 1500 (!) 140/53 -- -- 99 19 100 % --   11/27/22 1400 (!) 154/54 -- -- 87 24 100 % --   11/27/22 1347 (!) 170/57 -- -- -- -- -- --   11/27/22 1304 -- -- -- 73 17 -- --   11/27/22 1303 -- -- -- -- -- 100 % --   11/27/22 1300 (!) 178/60 -- -- 87 18 -- --       Const: Alert. WDWN. mild distress  Eyes: Conjunctiva noninjected, no icterus noted; pupils equal, round, and reactive to light. HENT: Atraumatic, normocephalic; Oral mucosa moist  Neck: Trachea midline, neck supple. No thyromegaly noted. CV: Regular rate and rhythm, no murmur rub or gallop noted  Resp: CTAB, no crackles/rales, wheezes/rhonchi, normal effort with no accessory muscle use on RA  GI: Soft, nontender, nondistended. Normal bowel sounds. No palpable masses. Skin: Normal temperature and turgor. No rashes or breakdown noted. Ext: No significant edema appreciated. - healing wounds   MSK: No joint tenderness, erythema, warmth noted. AROM intact. Neuro: Alert, oriented, appropriate. No cranial nerve deficits appreciated. Sensation intact to light touch.  Motor examination reveals 4/5 strength in bilateral UE and Right LE- Left LE is 2-3/5 limited by pain. She does have sensation. Lab Results   Component Value Date    WBC 8.5 11/28/2022    HGB 8.6 (L) 11/28/2022    HCT 25.2 (L) 11/28/2022    MCV 91.0 11/28/2022     11/28/2022     Lab Results   Component Value Date    INR 1.17 (H) 11/22/2022    INR 1.10 11/20/2022    INR 1.09 06/20/2022    PROTIME 14.8 (H) 11/22/2022    PROTIME 14.1 11/20/2022    PROTIME 14.0 06/20/2022     Lab Results   Component Value Date    CREATININE 0.8 11/28/2022    BUN 29 (H) 11/28/2022     11/28/2022    K 4.2 11/28/2022     11/28/2022    CO2 24 11/28/2022     Lab Results   Component Value Date    ALT 14 11/20/2022    AST 29 11/20/2022    ALKPHOS 84 11/20/2022    BILITOT 0.3 11/20/2022         XR CHEST PORTABLE   Final Result   Impression:   1. No airspace disease. CTA ABDOMINAL AORTA W BILAT RUNOFF W CONTRAST   Final Result   Impression:      1. Small amount of hemorrhage in the left groin without evidence of active extravasation. 2. 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 3. 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 4. Loculated fluid collection of the medial left thigh which could represent abscess or sterile postoperative fluid collection. 5. Subcutaneous edema and fluid lower extremities. 6. Left pleural effusion. Assessment:  1. Debility- Currently she is just starting to be able to more her left leg again. She is still in considerable pain. She is refusing medications and sometimes refusing therapy. She may want to come to the ARU but is not sure at this time. Wants to come to the ARU for weakness     Impairments- Decreased functional mobility, Decreased ADLs    Recommendations:  Plan to admit to ARU due to improved participation in therapy. Thank you for this consult. Please contact me with any questions or concerns. ANNALISE MoultonP.H  PM&R  11/28/2022  12:00 PM

## 2022-11-28 NOTE — PLAN OF CARE
Problem: Chronic Conditions and Co-morbidities  Goal: Patient's chronic conditions and co-morbidity symptoms are monitored and maintained or improved  Outcome: Progressing  Flowsheets (Taken 11/27/2022 2000)  Care Plan - Patient's Chronic Conditions and Co-Morbidity Symptoms are Monitored and Maintained or Improved:   Monitor and assess patient's chronic conditions and comorbid symptoms for stability, deterioration, or improvement   Collaborate with multidisciplinary team to address chronic and comorbid conditions and prevent exacerbation or deterioration   Update acute care plan with appropriate goals if chronic or comorbid symptoms are exacerbated and prevent overall improvement and discharge     Problem: Discharge Planning  Goal: Discharge to home or other facility with appropriate resources  Outcome: Progressing  Flowsheets (Taken 11/27/2022 2000)  Discharge to home or other facility with appropriate resources:   Identify barriers to discharge with patient and caregiver   Arrange for needed discharge resources and transportation as appropriate   Identify discharge learning needs (meds, wound care, etc)   Arrange for interpreters to assist at discharge as needed   Refer to discharge planning if patient needs post-hospital services based on physician order or complex needs related to functional status, cognitive ability or social support system     Problem: Pain  Goal: Verbalizes/displays adequate comfort level or baseline comfort level  Outcome: Progressing  Flowsheets (Taken 11/27/2022 2000)  Verbalizes/displays adequate comfort level or baseline comfort level:   Encourage patient to monitor pain and request assistance   Assess pain using appropriate pain scale   Administer analgesics based on type and severity of pain and evaluate response   Implement non-pharmacological measures as appropriate and evaluate response   Notify Licensed Independent Practitioner if interventions unsuccessful or patient reports new pain   Consider cultural and social influences on pain and pain management     Problem: ABCDS Injury Assessment  Goal: Absence of physical injury  Outcome: Progressing  Flowsheets (Taken 11/28/2022 0044)  Absence of Physical Injury: Implement safety measures based on patient assessment     Problem: Skin/Tissue Integrity  Goal: Absence of new skin breakdown  Description: 1. Monitor for areas of redness and/or skin breakdown  2. Assess vascular access sites hourly  3. Every 4-6 hours minimum:  Change oxygen saturation probe site  4. Every 4-6 hours:  If on nasal continuous positive airway pressure, respiratory therapy assess nares and determine need for appliance change or resting period.   Outcome: Progressing     Problem: Safety - Adult  Goal: Free from fall injury  Outcome: Progressing  Flowsheets (Taken 11/28/2022 0044)  Free From Fall Injury:   Based on caregiver fall risk screen, instruct family/caregiver to ask for assistance with transferring infant if caregiver noted to have fall risk factors   Instruct family/caregiver on patient safety

## 2022-11-28 NOTE — PROGRESS NOTES
Assessment completed as charted, see flow sheet. Awake, alert. VAC intact left groin/thigh. PICC in place right upper arm. Neurovascular checks to left leg stable.

## 2022-11-28 NOTE — CARE COORDINATION
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 9XXA]  Pseudoaneurysm ([facility] [I72.9]   Date / Time: 11/20/2022  3:24 AM    Current PCP: Gertrude Herzog DO    Advance Directives:  Code Status: DNR-CCA    Financial:  Payor: MEDICARE / Plan: MEDICARE PART A AND B / Product Type: *No Product type* /      [facility]    [facility] [address] [phone] [place] [phone]  [address] [place] [address]  Phone: [phone] Fax: [phone]    [facility] [id number] - [id number], [address] [phone] [place] [phone]  [facility] [address]  Phone: [phone] Fax: [phone]    ADLS:  Current PT AM-PAC Score: 14 /24  Current OT AM-PAC Score: 15 /24    DISCHARGE Disposition: [facility]    IMM Completed:   Not Indicated    Transportation:  Intrahospital transportation    Home Care: Active with [facility] prior to admission. She will need ROS orders at [place]. Plan for a nurse/PT/OT. Nurse to assist with wound VAC (possibly) and IV ABX. Holly Burk from [place] [address]   Was consulted and ran the benefits. Plan IV ABX @ [facility] Daptomycin 400mg IV through 1/4/2023      The Patient and/or patient representative Jose Rosario and her family were provided with a choice of provider and agrees with the discharge plan Yes    Freedom of choice list was provided with basic dialogue that supports the patient's individualized plan of care/goals and shares the quality data associated with the providers.  Yes      Leonard Parekh RN  [facility]  Case Management Department  [phone]

## 2022-11-28 NOTE — PROGRESS NOTES
ID Follow-up NOTE    CC:   Infected pseudoaneurysm, MRSA infection  Antibiotics: Daptomycin    Admit Date: 11/20/2022  Hospital Day: 9    Subjective:     11/22  L groin debridement, L rectus femoris flap, L STSG, VAC    Patient c/o L groin pain with palpation      Objective:     Patient Vitals for the past 8 hrs:   BP Temp Temp src Pulse Resp SpO2 Weight   11/28/22 1000 -- -- -- -- -- -- 107 lb 5.8 oz (48.7 kg)   11/28/22 0900 (!) 118/59 -- -- 75 13 -- --   11/28/22 0800 (!) 144/45 98.2 °F (36.8 °C) -- 90 27 99 % --   11/28/22 0700 (!) 140/48 -- -- 66 16 -- --   11/28/22 0503 (!) 138/57 -- -- 65 18 -- --   11/28/22 0500 -- -- -- 76 25 -- --   11/28/22 0400 (!) 144/52 98.5 °F (36.9 °C) Temporal 76 15 100 % --       I/O last 3 completed shifts: In: 1031.3 [P.O.:625; I.V.:406.3]  Out: 1500 [Urine:1500]  I/O this shift:  In: 150 [P.O.:150]  Out: 200 [Urine:200]    EXAM:  GENERAL: No apparent distress.   RA  HEENT: Membranes moist, no oral lesion  NECK:  Supple, no lymphadenopathy  LUNGS: Clear b/l, no rales, no dullness  CARDIAC: RRR, no murmur appreciated  ABD:  + BS, soft - tender with palpation  Lower abd wall wound / dressing   L groin VAC, dressing   EXT:                No rash, no edema, no lesions  L arm with dressing (basilar v harvest site)  L foot with scab /escar, no open wound / drainage / erythema  NEURO: No focal neurologic findings  PSYCH: Orientation, sensorium, mood normal  LINES:  R PICC, placed 11/25       Data Review:  Lab Results   Component Value Date    WBC 8.5 11/28/2022    HGB 8.6 (L) 11/28/2022    HCT 25.2 (L) 11/28/2022    MCV 91.0 11/28/2022     11/28/2022     Lab Results   Component Value Date    CREATININE 0.8 11/28/2022    BUN 29 (H) 11/28/2022     11/28/2022    K 4.2 11/28/2022     11/28/2022    CO2 24 11/28/2022       Hepatic Function Panel:   Lab Results   Component Value Date/Time    ALKPHOS 84 11/20/2022 05:26 AM    ALT 14 11/20/2022 05:26 AM    AST 29 11/20/2022 05:26 AM    PROT 5.8 2022 05:26 AM    PROT 6.4 2012 04:30 PM    BILITOT 0.3 2022 05:26 AM    BILIDIR <0.2 2021 11:53 AM    IBILI see below 2021 11:53 AM    LABALBU 2.7 2022 03:03 AM       Micro:   Surg cult - light growth MRSA  Staph aureus mrsa (1)  Antibiotic Interpretation Microscan    ceFAZolin Resistant >16 mcg/mL   clindamycin Resistant >4 mcg/mL   erythromycin Resistant >4 mcg/mL   oxacillin Resistant >2 mcg/mL   tetracycline Sensitive <=4 mcg/mL   trimethoprim-sulfamethoxazole Sensitive <=0.5/9.5 mcg/mL   vancomycin Sensitive 2 mcg/mL     No BC sent     Imagin/20 Abd CTA  1. Small amount of hemorrhage in the left groin without evidence of active extravasation. 2. 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 3. 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 4. Loculated fluid collection of the medial left thigh which could represent abscess or sterile postoperative fluid collection. 5. Subcutaneous edema and fluid lower extremities. 6. Left pleural effusion.        Scheduled Meds:   sodium chloride flush  5-40 mL IntraVENous 2 times per day    daptomycin (CUBICIN) IVPB  350 mg IntraVENous Q24H    heparin (porcine)  5,000 Units SubCUTAneous BID    aspirin EC  81 mg Oral Daily    atorvastatin  10 mg Oral Daily    furosemide  20 mg Oral Daily    acetaminophen  1,000 mg Oral Q6H    methocarbamol  1,000 mg Oral TID    insulin regular  0-15 Units SubCUTAneous 4x daily       Continuous Infusions:   sodium chloride      sodium chloride 10 mL/hr at 22 1049    sodium chloride      dextrose         PRN Meds:  bacitracin, sodium chloride flush, sodium chloride, hydrALAZINE, ipratropium-albuterol, naloxone, sodium chloride, ondansetron **OR** ondansetron, sodium chloride, glucose, dextrose bolus **OR** dextrose bolus, glucagon (rDNA), dextrose      Assessment:     Hx DM, HTN, CAD, PAD, CVA, lumbar stenosis     Hx L fem endarterectomy 2/14/22, SFA angioplasty 4/18/22, L ileofem (redo, prelaced femoral artery with Dacron graft), L fem to below knee popliteal in-situ bypass graft - 6/28/22  L groin wound I&D 7/7/22, cult S epidermidis in broth     L LE angiogram with balloon angioplasty distal bypass graft 10/18/22  Wound cult 7/20 - heavy MRSA (R clinda, tetra; vanco DANITA <0.5)     POD#1 EXCISION OF INFECTED ILEOFEMORAL BYPASS GRAFT; HARVEST LEFT BASILIC VEIN; LEFT EXTERNAL ILIAC ARTERY TO FEMORAL BYPASS THROUGH OBTURATOR CANAL; PLACEMENT OF WOUND VAC   - evidence infection in surg    - cult MRSA, vanco DANITA 2    Leukocytosis - resolved     Plan:     Cont Daptomycin - started 11/23    Postop care per Vascular   Postop care per Plastic Surg     Treat with Daptomycin x 6 weeks then po (doxycycline) x year  See ARELY    Medical Decision Making:   The following items were considered in medical decision making:  Discussion of patient care with other providers  Reviewed clinical lab tests  Reviewed radiology tests  Reviewed other diagnostic tests/interventions  Independent review of radiologic images - reviewed CT / CTA with Radiologist / Dr Luiz Julian on 11/21  Microbiology cultures and other micro tests reviewed      Discussed with pt  Discussed with caregiver, RN, Surg Chief Resident - Kali Cavazos on 11/25  Angelina Sanchez MD    INFUSION ORDERS:  Daptomycin 400 mg iv daily through 1/4/23  - Diagnosis - graft infection, MRSA infection  - First dose given in hospital  - PICC   - Disposition / date discharge  - Check CBC w diff, CMP, ESR, CRP, CK every Mon or [address] result to [phone]  - Call with antibiotic / infusion issues, [phone]  - Call with any change in status, transfer in or out of a facility or to hospital - [phone]  - No f/u in outpatient ID office necessary

## 2022-11-28 NOTE — PROGRESS NOTES
Comprehensive Nutrition Assessment    RECOMMENDATIONS:  PO Diet: Continue Regular; 3 carb choice diet  ONS: Continue Glucerna TID  Nutrition Education: Education not indicated   Monitor nutrition adequacy, pertinent labs, bowel habits, wt changes, and clinical progress    NUTRITION ASSESSMENT:   Nutritional summary & status: LOS: Pt on Regular; 3 carb choice diet, receiving Glucerna TID. Pt has consumed 100% x1 meal and 100% of multiple Glucernas. Wt has been stable for the past year. Wound vac in place. Pt has been refusing insulin unless BG >300, will continue carb restriction on diet and diabetic ONS. Will continue to monitor. Admission/PMH: iliac aneurysm s/p excision of infected ilofemoral bypass graft with harvest of left basilic vein and left external iliac artery to femoral bypass through obturator canal, T2DM    MALNUTRITION ASSESSMENT  Context of Malnutrition: Acute Illness   Malnutrition Status: Insufficient data  Findings of the 6 clinical characteristics of malnutrition (Minimum of 2 out of 6 clinical characteristics is required to make the diagnosis of moderate or severe Protein Calorie Malnutrition based on AND/ASPEN Guidelines):  Energy Intake:  No significant decrease in energy intake  Weight Loss:  No significant weight loss     Body Fat Loss:  Unable to assess     Muscle Mass Loss:  Unable to assess    Fluid Accumulation:  No significant fluid accumulation        NUTRITION DIAGNOSIS   Increased nutrient needs related to increase demand for energy/nutrients as evidenced by wounds    Nutrition Monitoring and Evaluation:   Food/Nutrient Intake Outcomes:  Food and Nutrient Intake, Supplement Intake  Physical Signs/Symptoms Outcomes:  Skin, GI Status, Biochemical Data     OBJECTIVE DATA: Significant to nutrition assessment  Nutrition Related Findings: . Hypoactive bowel sounds.   Wounds: Surgical Incision, Wound Vac  Nutrition Goals: Meet at least 75% of estimated needs, prior to discharge CURRENT NUTRITION THERAPIES  ADULT DIET; Regular; 3 carb choices (45 gm/meal)  ADULT ORAL NUTRITION SUPPLEMENT; Breakfast, Lunch, Dinner; Diabetic Oral Supplement  PO Intake: %   PO Supplement Intake:%  Additional Sources of Calories/IVF:N/A     ANTHROPOMETRICS  Current Height: 5' 4\" (162.6 cm)  Current Weight: 107 lb 5.8 oz (48.7 kg)    Admission weight: 110 lb 8 oz (50.1 kg)  Ideal Body Weight (IBW): 120 lbs  (55 kg)    BMI: 18.5    COMPARATIVE STANDARDS  Energy (kcal):  4175-5516 (30-35 kcal/kg CBW)     Protein (g):  74-98 (1.5-2.0 g/kg CBW)       Fluid (mL/day):  1 mL/kcal or per MD    The patient will be monitored per nutrition standards of care. Consult dietitian if additional nutrition interventions are needed prior to RD reassessment.      Lupillo Best, [address] [place] [address], [address] [place] [address] Drive:  [phone]  Office:  [phone]

## 2022-11-28 NOTE — PROGRESS NOTES
ICU VASCULAR SURGERY DAILY PROGRESS NOTE    CC: excision of infected ilofemoral bypass graft with harvest of left basilic vein and left external iliac artery to femoral bypass through obturator canal    SUBJECTIVE:   Interval Hx:   Patient rested well overnight. Remained afebrile and HDS. Pulse exam unchanged from prior exam. Patient continues to have intermittent inability to flex hip. Able to wiggle toes and has sensation throughout lower extremity. Continues to refuse several medications including SQH. Worked with PT/OT yesterday, scored 14/15 respectively. Voiding appropriately via external catheter. ROS: A 14 point review of systems was conducted, significant findings as noted above. All other systems negative. OBJECTIVE:   Vitals:   Vitals:    11/28/22 0300 11/28/22 0400 11/28/22 0500 11/28/22 0503   BP: (!) 153/77 (!) 144/52  (!) 138/57   Pulse: 94 76 76 65   Resp: 21 15 25 18   Temp:  98.5 °F (36.9 °C)     TempSrc:  Temporal     SpO2:  100%     Weight:       Height:           I/O:   Intake/Output Summary (Last 24 hours) at 11/28/2022 0633  Last data filed at 11/28/2022 0244  Gross per 24 hour   Intake 820 ml   Output 1300 ml   Net -480 ml       I/O last 3 completed shifts: In: 1351.3 [P.O.:905; I.V.:446.3]  Out: 1200 [Urine:1200]    Diet: ADULT DIET; Regular; 3 carb choices (45 gm/meal)  ADULT ORAL NUTRITION SUPPLEMENT; Breakfast, Lunch, Dinner; Standard High Calorie/High Protein Oral Supplement  ADULT ORAL NUTRITION SUPPLEMENT; Breakfast, Lunch, Dinner, HS Snack; Diabetic Oral Supplement      Physical Examination:   General appearance: alert, no acute distress, grooming appropriate  Eyes: No scleral icterus, EOM grossly intact  Neck: trachea midline, no JVD, no lymphadenopathy, neck supple  Chest/Lungs: CTAB, no crackles/rales, wheezes/rhonchi, normal effort with no accessory muscle use on RA  Cardiovascular: RRR  Abdomen: Soft, non-tender, non-distended, no rebound, guarding, or rigidity.   Skin: warm and dry, no rashes  Extremities: LUE incision with jez in place wrapped in kerlix. Some strike through of dressing, edema improved. L groin with firm LN on medial groin, unchanged from prior exam, non tender. Groin with prevena and wound vac in place with good seal -100. Groin and incision sites are soft without tenderness and no signs of hematoma or active bleeding. Xeroform dressing in place at donor site open to air. Sensation and mobility intact b/l LE. PT palpable b/l. DP palpable on right, doppler on L. L  to palpation   Genitourinary: external catheter in place to wall suction, clear yellow urine. Neuro: A&Ox3, no focal deficits, sensation intact    Pulses    Rad Fem Pop PT DP   Right + + + + +   Left + x + + -/+       Labs:  CBC:   Recent Labs     11/26/22 0434 11/27/22 0327 11/28/22  0303   WBC 7.6 8.0 8.5   HGB 7.7* 8.4* 8.6*   HCT 22.7* 24.9* 25.2*    196 224         BMP:   Recent Labs     11/26/22 0434 11/27/22 0327 11/28/22  0303   * 136 138   K 3.9 3.9 4.2    104 103   CO2 25 25 24   BUN 13 21* 29*   CREATININE 0.7 0.8 0.8   GLUCOSE 144* 118* 119*       LFT's:   No results for input(s): AST, ALT, ALB, BILITOT, ALKPHOS in the last 72 hours. Troponin: No results for input(s): TROPONINI in the last 72 hours. BNP: No results for input(s): BNP in the last 72 hours. ABGs: No results for input(s): PHART, PXJ4CRI, PO2ART in the last 72 hours. INR:   No results for input(s): INR in the last 72 hours. U/A:No results for input(s): NITRITE, COLORU, PHUR, LABCAST, WBCUA, RBCUA, MUCUS, TRICHOMONAS, YEAST, BACTERIA, CLARITYU, SPECGRAV, LEUKOCYTESUR, UROBILINOGEN, BILIRUBINUR, BLOODU, GLUCOSEU, AMORPHOUS in the last 72 hours. Invalid input(s): Judeth Plate     Rad:   XR CHEST PORTABLE   Final Result   Impression:   1. No airspace disease. CTA ABDOMINAL AORTA W BILAT RUNOFF W CONTRAST   Final Result   Impression:      1.  Small amount of hemorrhage in the left groin without evidence of active extravasation. 2. 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 3. 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 4. Loculated fluid collection of the medial left thigh which could represent abscess or sterile postoperative fluid collection. 5. Subcutaneous edema and fluid lower extremities. 6. Left pleural effusion. ASSESSMENT AND PLAN:   This is a 80y.o. 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     Neuro:   Pain/sedation/psych  - Continue multimodal pain control  - Patient pain is currently controlled    Cardiovascular:   - HDS  - 2u PRBC given post op, patient responded well  - 1u PRBC given POD1  - Patient has had occasional episodes of atrial fibrillation since vascular surgery, will continue to monitor  - Continue ASA81    Pulmonary:   - Currently on RA  - IS, Acapella, Duo nebs, aggressive RT therapy    GI:   - Regular diet 3-carb  - SLIV    FEN:   - SLIV  - Replace electrolytes as indicated    /Renal:   - urinating without issues  - Patient Tura Sniff at home, restarted    Hem/ID:   - Graft infection - + MRSA  - Current abx; Daptomycin 400 mg IV daily until 01/04/2023  - ID Appreciate recs: Treat with Daptomycin x 6 weeks then po (doxycycline) x year  - Patient had 2u PRBC post op, 1u PRBC POD1  - HgB FU AM labs 8.6 from 8.0, 7.7, 8.6, 7.9, 8.8 (stable)  - PICC line placed 11/25    Endo:   Hx of T2DM  - Hyperglycemic 119/150/183/313  - Patient currently refusing insuline unless BG over 300  - Will discuss importance of blood glucose control for wound healing  - Strict management for improved wound healing potential    Prophylaxis:   - SQH, patient refusing     Access:  Peripheral Access: R hand 11/22  PICC line: RUE 11/25                                Mobility:  - OOB if tolerates   - PT/OT re-evaluation;14/15 respectively 11/27  - ARU consulted, to be assessed today    Dispo:   - Possible DC pending wound vac approval   - ARU if PTOT scores appropriate, will refuse SNF, therefore home with home PTOT, however refusing home until able to walk    [address]:  - Will discuss [address] and wound management today    Code Status: DNR-CCA  -----------------------------  Quinten Cho DO  PGY1, General Surgery  11/28/22  6:53 AM  [employer]  Pager: [phone]    I am post-call today and will not be on campus. Please contact Dr. Daryle Horseman at [phone] for questions or concerns regarding this patient.

## 2022-11-28 NOTE — PROGRESS NOTES
Report called to ARU. Pt discharged to ARU in stable condition per WC. Belongings sent with pt and caregiver, Kiara Mccray.

## 2022-11-28 NOTE — PROGRESS NOTES
Plastic Surgery   Daily Progress Note  Patient: Freddie Levin      CC: Wound closure    SUBJECTIVE:   Patient rested well overnight. Remained afebrile and HDS. Pulse exam unchanged from prior exam. Patient continues to have intermittent inability to flex hip. Able to wiggle toes and has sensation throughout lower extremity. Continues to refuse several medications including SQH and insuline unless blood glucose over 300. Worked with PT/OT yesterday, scored 14/15 respectively. Voiding appropriately via external catheter. Wound vac to good seal.     ROS:   A 14 point review of systems was conducted, significant findings as noted above. All other systems negative. OBJECTIVE:    PHYSICAL EXAM:    Vitals:    11/28/22 0300 11/28/22 0400 11/28/22 0500 11/28/22 0503   BP: (!) 153/77 (!) 144/52  (!) 138/57   Pulse: 94 76 76 65   Resp: 21 15 25 18   Temp:  98.5 °F (36.9 °C)     TempSrc:  Temporal     SpO2:  100%     Weight:       Height:           General appearance: alert, no acute distress, grooming appropriate  Eyes: No scleral icterus, EOM grossly intact  Neck: trachea midline, no JVD, no lymphadenopathy, neck supple  Chest/Lungs: CTAB, no crackles/rales, wheezes/rhonchi, normal effort with no accessory muscle use on RA  Cardiovascular: RRR  Abdomen: Soft, non-tender, non-distended, no rebound, guarding, or rigidity. Skin: warm and dry, no rashes  Extremities: LUE incision with jez in place wrapped in kerlix. Some strike through of dressing, edema improved. L groin with firm LN on medial groin, unchanged from prior exam, non tender. Groin with prevena and wound vac in place with good seal -100. Groin and incision sites are soft without tenderness and no signs of hematoma or active bleeding. Xeroform dressing in place at donor site open to air, mild seepage. Sensation and mobility intact b/l LE. PT palpable b/l.  DP palpable on right, doppler on L. L  to palpation   Genitourinary: external catheter in place to wall suction, clear yellow urine. Neuro: A&Ox3, no focal deficits, sensation intact    LABS:   Recent Labs     11/27/22  0327 11/28/22  0303   WBC 8.0 8.5   HGB 8.4* 8.6*   HCT 24.9* 25.2*   MCV 91.0 91.0    224          Recent Labs     11/27/22  0327 11/28/22  0303    138   K 3.9 4.2    103   CO2 25 24   PHOS 2.8 3.7   BUN 21* 29*   CREATININE 0.8 0.8          No results for input(s): AST, ALT, ALB, BILIDIR, BILITOT, ALKPHOS in the last 72 hours. No results for input(s): LIPASE, AMYLASE in the last 72 hours. No results for input(s): PROT, INR, APTT in the last 72 hours. No results for input(s): CKTOTAL, CKMB, CKMBINDEX, TROPONINI in the last 72 hours. ASSESSMENT & PLAN:   This is a 80y.o. 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 - Wound vac holding suction -100 without issues  - Wound vac/Prevena to remain for 7 days (end 11/29)  - Only to be changed by plastic service  - Wound 6cm x 8cm with depth of 4cm in OR  - Donor site open to air   - Medical management per vascular service  - Will reiterate importance of proper blood glucose management for proper wound healing   - Will continue to follow along     Janes Walters DO  PGY1, General Surgery  11/28/22  6:52 AM  [employer]  Pager: [phone]    I am post-call today and will not be on campus. Please contact Dr. Promise Pitts at [phone] for questions or concerns regarding this patient.

## 2022-11-28 NOTE — PROGRESS NOTES
ARU Admission Assessment    Ethnicity  \"Are you of , /a, or [place] origin? \"  Check all that apply:  [x] A. No, not of , /a, or [place] Origin  [] B.  Yes, [place], [place] American, Chicano/a  [] C.  Yes, [address] [place] [address]  [] D.  Yes, [place]  [] E.  Yes, another , , or [place] origin  [] X. Patient unable to respond  [] Y. Patient declines to respond    Race  \"What is your race? \"  Check all that apply:  [x] A. White  [] B. Black or   [] C. American [place] [facility] or [place] Native  [] D.  [place]  [] E. [place]  [] F. Mont[place]grin  [] G. [place]  [] Rasheed Ricks  [] I. [place]  [] J.  Other   [] K.   [] L. [place]ese or [place]  [] M. Malagasy  [] N. Other [place]  [] X. Patient unable to respond  [] Y. Patient declines to respond  [] Z. None of the above    Language  A. \"What is your preferred language? \"   Verdis Cullens. \"Do you need or want an  to communicate with a doctor or health care staff? \"  Check only one:  [x] 0. No  [] 1. Yes  [] 9. Unable to determine    Transportation  \"Has lack of transportation kept you from medical appointments, meetings, work, or from getting things needed for daily living? \"Check all that apply:  [] A.  Yes, it has kept me from medical appointments or from getting my medications  [] B.  Yes, it has kept me from non-medical meetings, appointments, work, or from getting things that I need  [x] C.  No  [] X. Patient unable to respond  [] Y. Patient declines to respond    Hearing  Ability to hear (with hearing aid or hearing appliances if normally used)  []  0. Adequate - no difficulty in normal conversation, social interaction, listening to TV  [x]  1. Minimal difficulty - difficulty in some environments (e.g. when person speaks softly or setting is noisy)  []  2. Moderate difficulty - speaker has to increase volume and speak distinctly   []  3.   Highly impaired - absence of useful hearing    Vision  Ability to see in adequate light (with glasses or other visual appliances)  [x]  0. Adequate - sees fine detail, such as regular print in newspapers/books  []  1. Impaired - sees large print, but not regular print in newspapers/books  []  2. Moderately impaired - limited vision; not able to see newspaper headlines but can identify objects  []  3. Highly impaired - object identification in question, but eyes appear to follow objects  []  4. Severely impaired - no vision or sees only light, colors, or shapes; eyes do not appear to follow objects    Health Literacy  \"How often do you need to have someone help you when you read instructions, pamphlets, or other written material from your doctor or pharmacy? \"  []  0. Never  [x]  1. Rarely  []  2. Sometimes  []  3. Often  []  4. Always  []  8. Patient unable to respond    BIMS - **Must be completed in the flowsheet at admission prior to proceeding with Delirium Assessment**  [x] BIMS completed in flowsheet at admission    Signs and Symptoms of Delirium  A. Acute Onset Mental Status Change - Is there evidence of an acute change in mental status from the patient's baseline? [x] 0. No  [] 1. Yes    B. Inattention - Did the patient have difficulty focusing attention, for example being easily distractible or having difficulty keeping track of what was being said? [x]  0. Behavior not present  []  1. Behavior continuously present, does not fluctuate  []  2. Behavior present, fluctuates (comes and goes, changes in severity)    C. Disorganized thinking - Was the patient's thinking disorganized or incoherent (rambling or irrelevant conversation, unclear or illogical flow of ideas, or unpredictable switching from subject to subject)? [x]  0. Behavior not present  []  1. Behavior continuously present, does not fluctuate  []  2. Behavior present, fluctuates (comes and goes, changes in severity)    D.   Altered level of consciousness - Did the patient have altered level of consciousness as indicated by any of the following criteria? Vigilant - startled easily to any sound or touch  Lethargic - repeatedly dozed off while being asked questions, but responded to voice or touch  Stuporous - very difficulty to arouse and keep aroused for the interview  Comatose - could not be aroused  [x]  0. Behavior not present  []  1. Behavior continuously present, does not fluctuate  []  2. Behavior present, fluctuates (comes and goes, changes in severity)    Mood    \"Over the last 2 weeks, have you been bothered by any of the following problems?\" 1. Symptom Presence    0 = No  1 = Yes  9 = No Response 2. Symptom Frequency    0 = Never or 1 day  1 = 2-6 days (several days)  2 = 7-11 days (half or more of the days)  3 = 12-14 days (nearly every day)  **Leave blank if 'No Reponse'**      Enter scores in boxes    Column 1 Column 2   Little interest or pleasure in doing things   1 3   Feeling down, depressed, or hopeless   1 3   **If either A or B in column 2 is coded 2 or 3, CONTINUE asking the questions below. If not, END the interview. **     Trouble falling or staying asleep, or sleeping too much   1 3   Feeling tired or having little energy   1 3   Poor appetite or overeating   1 3   Feeling bad about yourself - or that you are a failure or have let yourself or your family down   0 0   Trouble concentrating on things, such as reading the newspaper or watching television   0 0   Moving or speaking so slowly that other people could have noticed. Or the opposite- being so fidgety or restless that you have been moving around a lot more than usual.   0 0   Thoughts that you would be better off dead, or of hurting yourself in some way. 1 1   Total Severity: Add scores for all frequency responses in column 2 (possible score 0-27, or enter 99 if unable to complete (if symptom frequency (column 2) is blank for 3 or more items).         Social Isolation  \"How often do you feel lonely or isolated from those around you? \"  [x] 0. Never  [] 1. Rarely  [] 2. Sometimes  [] 3. Often  [] 4. Always  [] 7. Patient declines to respond  [] 8. Patient unable to respond    Pain Effect on Sleep  \"Over the past 5 days, how much of the time has pain made it hard for you to sleep at night? \"  []  0. Does not apply - I have not had any pain or hurting in the past 5 days  []  1. Rarely or not at all  []  2. Occasionally  [x]  3. Frequently  []  4. Almost constantly  []  8. Unable to answer    **If the patient answers \"0. Does not apply\" to this question, skip the next two \"Pain Effect. Peter Medin Peter Medin \" questions**    Pain Interference with Therapy Activities  \"Over the past 5 days, how often have you limited your participation in rehabilitation therapy sessions due to pain? \"  [x]  0. Does not apply - I have not received rehabilitation therapy in the past 5 days  []  1. Rarely or not at all  []  2. Occasionally  []  3. Frequently  []  4. Almost constantly  []  8. Unable to answer    Pain Interference with Day-to-Day Activities: \"Over the past 5 days, how often have you limited your day-to-day activities (excluding rehabilitation therapy session)? \"  []  1. Rarely or not at all  []  2. Occasionally  [x]  3. Frequently  []  4. Almost constantly  []  8. Unable to answer    Nutritional Approaches  Check all of the following nutritional approaches that apply on admission:  []  A. Parenteral/IV feeding (including IV fluids if needed for hydration, but not as part of dialysis/chemo)  []  B. Feeding tube (e.g., nasogastric or abdominal (PEG))  []  C. Mechanically altered diet - requires change in texture of food or liquids (e.g., pureed food, thickened liquids)  [x]  D. Therapeutic diet (e.g., low salt, diabetic, low cholesterol)    []Z.   None of the above    High Risk Drug Classes:  Use and Indication    Is taking: Check if the pt is taking any medications by pharmacological classification, not how it is used, in the following classes  Indication noted: If column 1 is checked, check if there is an indication noted for all meds in the drug class Is taking  (check all that apply) Indication noted (check all that apply)   Antipsychotic [] []   Anticoagulant [] []   Antibiotic [] []   Opioid [x] [x]   Antiplatelet [x] [x]   Hypoglycemic (including insulin) [] []   None of the above []     Special Treatments, Procedures, and Programs    Check all of the following treatments, procedures, and programs that apply on admission. On admission (check all that apply)   Cancer Treatments   A1. Chemotherapy []           A2. IV []           A3. Oral []           A10. Other []   B1. Radiation []   Respiratory Therapies   C1. Oxygen Therapy [x]           C2. Continuous (continuously for at least 14 hours per day) []           C3. Intermittent [x]           C4. High-concentration []   D1. Suctioning (Does not include oral suctioning) []           D2. Scheduled []           D3. As needed []   E1. Tracheostomy Care []   F1. Invasive Mechanical Ventilator (ventilator or respirator) []   G1. Non-invasive Mechanical Ventilator []           G2. BiPAP []           G3. CPAP []   Other   H1. IV Medications (Do not include sub Q pumps, flushes, Dextrose 50% or lactated ringers) []           H2. Vasoactive medications []           H3. Antibiotics []           H4. Anticoagulation []           H10. Other []   I1. Transfusions []   J1. Dialysis []           J2. Hemodialysis []           J3. Peritoneal dialysis []   O1. IV access (including a catheter in a vein) []           O2. Peripheral [x]           O3. Midline []           O4. Central (PICC, tunneled, port) []      None of the above (select if no Cancer, Respiratory, or Other boxes are checked) []     The above items have been reviewed and updated as necessary, and are accurate for the admission assessment period.     Reviewing RN:  Mary Jane Nunes

## 2022-11-28 NOTE — PROGRESS NOTES
NURSING ASSESSMENT: ARU ADMISSION  The [address] [place] [address] Dx/Hx: Héctor Childers   :1929  OPE:[id number]  Date of Admit: 2022  Room #: 3109/3109-01    Subjective:   Patient admitted to room 3109 @1655 from ICU via [facility]. Alert and oriented x4. Oriented to room and call light system. Oriented to rehab routine and therapy schedules. Informed about care conferences and ordering of meals. Drug / Medication Review:   Medications were reviewed by RN at time of admission  [x]  No potential or actual clinically significant medication issues were noted.      []   Yes, a clinically significant medication issue was identified                 []  Adverse Drug Event:                  []  Allergy:                  []  Side Effect:                  []  Ineffective Therapy:                  []  Drug Interaction:                 []  Duplicated Therapy:                 []  Untreated Indication:                  []  Non-adherence:                 []  Other:                  Nursing/Pharmacy contacted the physician:     Date:              Time:                  Actions recommended by physician were completed:   Date :            Time:    4 Eyes Skin Assessment   The patient is being assessed for: Admission     I agree that 2 RN's have performed a thorough Head to Toe Skin Assessment on the patient. ALL assessment sites listed below have been assessed.        Areas assessed by both nurses:   [x]   Head, Face, and Ears   [x]   Shoulders, Back, and Chest, Abdomen  [x]   Arms, Elbows, and Hands   [x]   Coccyx, Sacrum, and Ischium  [x]   Legs, Feet, and Heel     Does the Patient have Skin Breakdown?    -Non-healing wound LLE   -LUE incision site (SAURAV due to dressing in place)  -Abdominal incision with staples with redness around staples  --L thigh incision with wound vac that extends to groin  - tear to L medial thigh at edge of wound vac near groin  -Skin graft site L thigh next to wound vac  -abrasions and bruising scattered   -abrasion/scabbing L medial ankle R knee x2  -blanching redness to coccyx  -hemorrhoids observed externally           Juan Francisco Prevention initiated:  Yes / No  Wound Care Orders initiated:  Yes / Not Applicable      WO nurse consulted for Pressure Injury (Stage 3,4, Unstageable, DTI, NWPT, Complex wounds)and New or Established Ostomies:  Yes / Not Applicable    Primary Nurse eSignature: Vernon Pitts RN  [id number] 11/29/2022  Co-signer eSignature:  Reggie Lay RN 1830 11/29/2022

## 2022-11-29 LAB
GLUCOSE BLD-MCNC: 136 MG/DL (ref 70–99)
GLUCOSE BLD-MCNC: 170 MG/DL (ref 70–99)
GLUCOSE BLD-MCNC: 196 MG/DL (ref 70–99)
GLUCOSE BLD-MCNC: 201 MG/DL (ref 70–99)
PERFORMED ON: ABNORMAL

## 2022-11-29 PROCEDURE — 94669 MECHANICAL CHEST WALL OSCILL: CPT

## 2022-11-29 PROCEDURE — 97116 GAIT TRAINING THERAPY: CPT | Performed by: PHYSICAL THERAPIST

## 2022-11-29 PROCEDURE — 6370000000 HC RX 637 (ALT 250 FOR IP): Performed by: NURSE PRACTITIONER

## 2022-11-29 PROCEDURE — 94761 N-INVAS EAR/PLS OXIMETRY MLT: CPT

## 2022-11-29 PROCEDURE — 97530 THERAPEUTIC ACTIVITIES: CPT | Performed by: PHYSICAL THERAPIST

## 2022-11-29 PROCEDURE — 97163 PT EVAL HIGH COMPLEX 45 MIN: CPT | Performed by: PHYSICAL THERAPIST

## 2022-11-29 PROCEDURE — 99222 1ST HOSP IP/OBS MODERATE 55: CPT | Performed by: PHYSICAL MEDICINE & REHABILITATION

## 2022-11-29 PROCEDURE — 97166 OT EVAL MOD COMPLEX 45 MIN: CPT

## 2022-11-29 PROCEDURE — 94150 VITAL CAPACITY TEST: CPT

## 2022-11-29 PROCEDURE — 6360000002 HC RX W HCPCS: Performed by: PHYSICAL MEDICINE & REHABILITATION

## 2022-11-29 PROCEDURE — 1280000000 HC REHAB R&B

## 2022-11-29 PROCEDURE — 97535 SELF CARE MNGMENT TRAINING: CPT

## 2022-11-29 PROCEDURE — 2580000003 HC RX 258: Performed by: PHYSICAL MEDICINE & REHABILITATION

## 2022-11-29 PROCEDURE — 6370000000 HC RX 637 (ALT 250 FOR IP): Performed by: PHYSICAL MEDICINE & REHABILITATION

## 2022-11-29 RX ORDER — TRAMADOL HYDROCHLORIDE 50 MG/1
25 TABLET ORAL EVERY 8 HOURS PRN
Status: DISCONTINUED | OUTPATIENT
Start: 2022-11-29 | End: 2022-12-05 | Stop reason: HOSPADM

## 2022-11-29 RX ORDER — GINSENG 100 MG
CAPSULE ORAL DAILY
Status: DISCONTINUED | OUTPATIENT
Start: 2022-11-29 | End: 2022-12-05 | Stop reason: HOSPADM

## 2022-11-29 RX ADMIN — ACETAMINOPHEN 1000 MG: 500 TABLET ORAL at 18:12

## 2022-11-29 RX ADMIN — BACITRACIN: 500 OINTMENT TOPICAL at 10:49

## 2022-11-29 RX ADMIN — METHOCARBAMOL TABLETS 1000 MG: 500 TABLET, COATED ORAL at 10:03

## 2022-11-29 RX ADMIN — DAPTOMYCIN 350 MG: 500 INJECTION, POWDER, LYOPHILIZED, FOR SOLUTION INTRAVENOUS at 10:48

## 2022-11-29 RX ADMIN — FUROSEMIDE 20 MG: 20 TABLET ORAL at 10:03

## 2022-11-29 RX ADMIN — ACETAMINOPHEN 1000 MG: 500 TABLET ORAL at 12:36

## 2022-11-29 RX ADMIN — SODIUM CHLORIDE, PRESERVATIVE FREE 10 ML: 5 INJECTION INTRAVENOUS at 10:19

## 2022-11-29 RX ADMIN — SODIUM CHLORIDE, PRESERVATIVE FREE 10 ML: 5 INJECTION INTRAVENOUS at 20:01

## 2022-11-29 RX ADMIN — ATORVASTATIN CALCIUM 10 MG: 10 TABLET, FILM COATED ORAL at 10:03

## 2022-11-29 RX ADMIN — ASPIRIN 81 MG: 81 TABLET, COATED ORAL at 10:03

## 2022-11-29 RX ADMIN — TRAMADOL HYDROCHLORIDE 25 MG: 50 TABLET, COATED ORAL at 20:01

## 2022-11-29 RX ADMIN — TRAMADOL HYDROCHLORIDE 25 MG: 50 TABLET, COATED ORAL at 10:04

## 2022-11-29 ASSESSMENT — PAIN DESCRIPTION - DESCRIPTORS
DESCRIPTORS: BURNING;DISCOMFORT
DESCRIPTORS: BURNING
DESCRIPTORS: DISCOMFORT;BURNING

## 2022-11-29 ASSESSMENT — PAIN DESCRIPTION - LOCATION
LOCATION: LEG

## 2022-11-29 ASSESSMENT — PAIN SCALES - GENERAL
PAINLEVEL_OUTOF10: 9
PAINLEVEL_OUTOF10: 9
PAINLEVEL_OUTOF10: 0

## 2022-11-29 ASSESSMENT — PAIN DESCRIPTION - ORIENTATION
ORIENTATION: LEFT
ORIENTATION: LEFT;LOWER
ORIENTATION: LEFT;MID

## 2022-11-29 NOTE — H&P
Department of Physical Medicine & Rehabilitation  History & Physical      Patient Identification:  Kaylan Mina  : 1929  Admit date: 2022   Attending provider: Paige Luna DO        Primary care provider: Liana Camarena DO     Chief Complaint: Vascular infection     History of Present Illness/Hospital Course: This is a 80y.o. 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 with harvest of left basilic vein and left external iliac artery to femoral bypass through obturator canal  -  rectus femoris flap with split thickness skin graft . She has severe pain in her left leg and is refusing medications and therapy at times. She lives alone and wants to go home. She understands she may need some therapy but is not sure if she can tolerate 3 hours a day due to her pain.         Prior Level of Function:  Independent for mobility, ADLs, and IADLs    Current Level of Function:  Mod assist     Pertinent Social History:  Support: lives alone   Home set-up: 1 step    Past Medical History:   Diagnosis Date    Anxiety     Arthritis     At risk for falls     CAD (coronary artery disease)     Cerebral artery occlusion with cerebral infarction ([address]     TIA--no residual effects    DDD (degenerative disc disease), cervical     Fractures     (L) Hip Fx 98, L1 fracture from fall 10-31-06    Hyperlipidemia     Hypertension     Mitral regurgitation     Neuropathy     Osteopenia     Osteoporosis     PAD (peripheral artery disease) ([address]     Right LE ischemia    Peripheral neuropathy 2015    Peripheral vascular disease ([address]     bilateral lower extremities with edema    Pod[place] 2017    Dr. Monica Ramirez    Prolonged emergence from general anesthesia     Spinal stenosis     L4-5    Type 2 diabetes mellitus ([address]     Urethral stricture 5 years ago    Urgency of urination      Fall in past year: yes    Past Surgical History:   Procedure Laterality Date ANGIOPLASTY Left 04/18/2022    Left SFA    APPENDECTOMY      AXILLARY-FEMORAL BYPASS GRAFT Left 11/20/2022    EXCISION OF INFECTED ILEOFEMORAL BYPASS GRAFT; HARVEST LEFT BASILIC VEIN; LEFT EXTERNAL ILIAC ARTERY TO FEMORAL BYPASS THROUGH OBTURATOR CANAL; PLACEMENT OF WOUND VAC performed by Gordon Lance MD at [address] [place] [address] Right 03/29/2019    RIGHT CARPAL TUNNEL RELEASE AND RIGHT MIDDLE FINGER TRIGGER FINGER RELEASE performed by Jorge Nino MD at [place] [address]  [id number]    LEFT EYE EYE CATARACT PHACOEMULSIFICATION INTRAOCULAR LENS    CHOLECYSTECTOMY  01/01/1978    COLONOSCOPY  08/31/1999    diverticulosis    EYE SURGERY Bilateral     bilateral cataract removed    FEMORAL BYPASS Left 6/28/2022    LEFT LEG FEMORAL TO POPLITEAL BYPASS WITH INTRAOPERATIVE ANGIOGRAM performed by Haydee Beard MD at [place] [address] Left 02/14/2022    LEFT COMMON FEMORAL ARTERY ENDARTERECTOMY performed by Haydee Beard MD at [facility] ([address] [place] [address])  [address] [place] [address]    IR FEMORAL POPLITEAL BYPASS GRAFT Right 08/31/2015    KYPHOSIS SURGERY  11/07/2006    L1, (fractured after a fall)    LEG SURGERY Left 02/14/2022    LEFT LEG WOUND DEBRIDEMENT performed by Haydee Beard MD at [address] [place] [address] N/A 7/7/2022    INCISION AND DRAINAGE OF LEFT SIDE GROIN WITH PLACEMENT OF WOUND VAC performed by Haydee Beard MD at [address] [place] [address] Left 11/22/2022    DEBRIDEMENT OF LEFT GROIN (10 X 3 CM), LEFT RECTUS FEMORIS FLAP, LEFT SPLIT THICKNESS SKIN GRAFT (10 X 5 CM), APPLICATION OF WOUND VACUUM DEVICE performed by Román Barrios MD at [facility]  04/25/1998    (L) THR    WRIST FRACTURE SURGERY  01/01/2008    left wrist     Major Surgery in past 100 days: yes    Family History   Problem Relation Age of Onset    Cancer Mother 43    Stroke Father     Hypertension Father        Social History     Socioeconomic History    Marital status:     Tobacco Use    Smoking status: Never    Smokeless tobacco: Never   Vaping Use    Vaping Use: Never used   Substance and Sexual Activity    Alcohol use: No    Drug use: Never    Sexual activity: Not Currently       Allergies   Allergen Reactions    Aricept [Donepezil Hydrochloride] Other (See Comments)     Pt unable to recall reaction    Doxycycline Nausea Only    Ketorolac Tromethamine Other (See Comments)     Pt unable to recall reaction    Donepezil Hcl          Current Facility-Administered Medications   Medication Dose Route Frequency Provider Last Rate Last Admin    traMADol (ULTRAM) tablet 25 mg  25 mg Oral Q8H PRN Lorenzo LONDON Heis, DO   25 mg at 11/29/22 1004    bacitracin ointment   Topical Daily ROGELIO Lafleur - SHAHLA        acetaminophen (TYLENOL) tablet 1,000 mg  1,000 mg Oral Q6H Lorenzo LONDON Heis, DO   1,000 mg at 11/28/22 2340    aspirin EC tablet 81 mg  81 mg Oral Daily Lorenzo LONDON Heis, DO   81 mg at 11/29/22 1003    atorvastatin (LIPITOR) tablet 10 mg  10 mg Oral Daily Lorenzo LONDON Heis, DO   10 mg at 11/29/22 1003    DAPTOmycin (CUBICIN) 350 mg in sodium chloride 0.9 % 50 mL IVPB  350 mg IntraVENous Q24H Lorenzo Beaulieuis, DO        furosemide (LASIX) tablet 20 mg  20 mg Oral Daily Lorenzo LONDON Heis, DO   20 mg at 11/29/22 1003    hydrALAZINE (APRESOLINE) injection 5 mg  5 mg IntraVENous Q6H PRN Lorenzo Beaulieuis, DO        insulin regular (HUMULIN R;NOVOLIN R) injection 0-15 Units  0-15 Units SubCUTAneous 4x Daily  Lorenzo Beaulieuis, DO        ipratropium-albuterol (DUONEB) nebulizer solution 1 ampule  1 ampule Inhalation Q4H PRN Lorenzo Beaulieuis, DO        methocarbamol (ROBAXIN) tablet 1,000 mg  1,000 mg Oral TID Lorenzo Beaulieuis, DO   1,000 mg at 11/29/22 1003    ondansetron (ZOFRAN-ODT) disintegrating tablet 4 mg  4 mg Oral Q8H PRN Lorenzo LONDON Heis, DO        Or    ondansetron (ZOFRAN) injection 4 mg  4 mg IntraVENous Q6H PRN Lorenzo Beaulieuis, DO        sodium chloride flush 0.9 % injection 5-40 mL  5-40 mL IntraVENous 2 times per day Lorenzo L Heis, DO   10 mL at 11/29/22 1019    sodium chloride flush 0.9 % injection 5-40 mL  5-40 mL IntraVENous PRN Lorenzo L Heis, DO        glucose chewable tablet 16 g  4 tablet Oral PRN Lorenzo L Heis, DO        dextrose bolus 10% 125 mL  125 mL IntraVENous PRN Lorenzo L Heis, DO        Or    dextrose bolus 10% 250 mL  250 mL IntraVENous PRN Lorenzo L Heis, DO        glucagon (rDNA) injection 1 mg  1 mg SubCUTAneous PRN Lorenzo L Heis, DO        dextrose 10 % infusion   IntraVENous Continuous PRN Lorenzo L Heis, DO        acetaminophen (TYLENOL) tablet 650 mg  650 mg Oral Q4H PRN Lorenzo L Heis, DO   650 mg at 11/28/22 1947    bisacodyl (DULCOLAX) EC tablet 5 mg  5 mg Oral Daily Lorenzo L Heis, DO   5 mg at 11/29/22 1003    magnesium hydroxide (MILK OF MAGNESIA) 400 MG/5ML suspension 30 mL  30 mL Oral Daily PRN Lorenzo L Heis, DO        polyethylene glycol (GLYCOLAX) packet 17 g  17 g Oral Daily PRN Lorenzo L Heis, DO             REVIEW OF SYSTEMS:   CONSTITUTIONAL: negative for fevers, chills, diaphoresis, appetite change, night sweats, unexpected weight change, +fatigue. EYES: negative for blurred vision, eye discharge, visual disturbance and icterus. HEENT: negative for hearing loss, tinnitus, ear drainage, sinus pressure, nasal congestion, epistaxis and snoring. RESPIRATORY: Negative for hemoptysis, cough, sputum production. CARDIOVASCULAR: negative for chest pain, palpitations, exertional chest pressure/discomfort, syncope, edema   GASTROINTESTINAL: negative for nausea, vomiting, diarrhea, blood in stool, abdominal pain, constipation. GENITOURINARY: negative for frequency, dysuria, urinary incontinence, decreased urine volume, and hematuria. HEMATOLOGIC/LYMPHATIC: negative for easy bruising, bleeding and lymphadenopathy. ALLERGIC/IMMUNOLOGIC: negative for recurrent infections, angioedema, anaphylaxis and drug reactions.    ENDOCRINE: negative for weight changes and diabetic symptoms including polyuria, polydipsia and polyphagia. MUSCULOSKELETAL: positive for pain, joint swelling, decreased range of motion. NEUROLOGICAL: negative for headaches, slurred speech, unilateral weakness. PSYCHIATRIC/BEHAVIORAL: negative for hallucinations, behavioral problems, confusion and agitation. All pertinent positives are noted in the HPI. Physical Examination:  Vitals: Patient Vitals for the past 24 hrs:   BP Temp Temp src Pulse Resp SpO2   11/29/22 0735 -- -- -- 84 18 99 %   11/28/22 2236 (!) 142/51 98.3 °F (36.8 °C) Oral 78 16 97 %   11/28/22 1710 130/70 97.6 °F (36.4 °C) Oral 84 14 94 %       Const: Alert. WDWN. mild distress  Eyes: Conjunctiva noninjected, no icterus noted; pupils equal, round, and reactive to light. HENT: Atraumatic, normocephalic; Oral mucosa moist  Neck: Trachea midline, neck supple. No thyromegaly noted. CV: Regular rate and rhythm, no murmur rub or gallop noted  Resp: CTAB, no crackles/rales, wheezes/rhonchi, normal effort with no accessory muscle use on RA  GI: Soft, nontender, nondistended. Normal bowel sounds. No palpable masses. Skin: Normal temperature and turgor. No rashes or breakdown noted. Ext: No significant edema appreciated. - healing wounds   MSK: No joint tenderness, erythema, warmth noted. AROM intact. Neuro: Alert, oriented, appropriate. No cranial nerve deficits appreciated. Sensation intact to light touch. Motor examination reveals 4/5 strength in bilateral UE and Right LE- Left LE is 2-3/5 limited by pain. She does have sensation.      Lab Results   Component Value Date    WBC 8.5 11/28/2022    HGB 8.6 (L) 11/28/2022    HCT 25.2 (L) 11/28/2022    MCV 91.0 11/28/2022     11/28/2022     Lab Results   Component Value Date    INR 1.17 (H) 11/22/2022    INR 1.10 11/20/2022    INR 1.09 06/20/2022    PROTIME 14.8 (H) 11/22/2022    PROTIME 14.1 11/20/2022    PROTIME 14.0 06/20/2022     Lab Results Component Value Date    CREATININE 0.8 11/28/2022    BUN 29 (H) 11/28/2022     11/28/2022    K 4.2 11/28/2022     11/28/2022    CO2 24 11/28/2022     Lab Results   Component Value Date    ALT 14 11/20/2022    AST 29 11/20/2022    ALKPHOS 84 11/20/2022    BILITOT 0.3 11/20/2022         No orders to display           The above laboratory data have been reviewed. The above imaging data have been reviewed. The above medical testing have been reviewed. There is no height or weight on file to calculate BMI. POST ADMISSION PHYSICIAN EVALUATION  The patient has agreed to being admitted to our comprehensive inpatient rehabilitation facility and can tolerate the intensity of service consisting of at least:  --180 minutes of therapy a day, 5 out of 7 days a week. OR  --15 hours of intensive therapy within a 7 consecutive day period. The patient/family has a good understanding of our discharge process and will benefit from an interdisciplinary inpatient rehabilitation program. The patient has potential to make improvement and is in need of at least two of the following multidisciplinary therapies including but not limited to physical, occupational, respiratory, and speech, nutritional services, wound care, and prosthetics and orthotics. Given the patients complex condition and risk of further medical complications, rehabilitation services cannot be safely provided at a lower level of care such as a skilled nursing facility. All of the goals listed below were reviewed with the patient and he/she is in agreement. I have compared the patients medical and functional status at the time of the preadmission screening and the same on this date, and there are no significant changes.     By signing this document, I acknowledge that I have personally performed a full physical examination on this patient within 24 hours of admission to this inpatient rehabilitation facility and have determined the patient to be able to tolerate the above course of treatment at an intensive level for a reasonable period of time. I will be completing a detailed individualized  Plan of Care for this patient by day four of the patients stay based upon the Preadmission Screen, this Post-Admission Evaluation, and the therapy evaluations. Barriers: Decreased functional mobility, medical comorbidities  Services Required: PT, OT  Goals: mod I  Prognosis: Good  Anticipated Dispo: home  ELOS: TBD    Rehabilitation Diagnosis:   16.0, Debility (non-cardiac, non-pulmonary      Assessment and Plan:  1. Debility- Currently she is just starting to be able to more her left leg again. She is still in considerable pain. She is refusing medications and sometimes refusing therapy. She may want to come to the ARU but is not sure at this time. 2. Surgical leg wound- followed by surgery  - pain control     Impairments: Decreased functional mobility, Decreased ADLs    Bladder - high risk retention - Monitor PVRs, SC prn >300cc    Bowel - high risk constipation - colace BID, PRN miralax and MoM. follow bowel movements. Enema or suppository if needed.      Safety - fall precautions    PPx  DVT: refused   GI: pantoprazole    DNR arrest CODE    ANNALISE [email or web address]  PM&R  11/29/2022  10:29 AM

## 2022-11-29 NOTE — DISCHARGE INSTR - COC
Continuity of Care Form    Patient Name: Ivette Pizano   :  1929  MRN:  [id number]    Admit date:  2022  Discharge date:  22     Code Status Order: DNR-CCA   Advance Directives:     Admitting Physician:  Shahriar Summers DO  PCP: Rashida Ritchie DO    Discharging Nurse: [facility] Unit/Room#: 4557/1599-45  Discharging Unit Phone Number: [phone]    Emergency Contact:   Extended Emergency Contact Information  Primary Emergency Contact: Leola Sheppard  Home Phone: [phone]  Relation: Lay Caregiver  Secondary Emergency Contact: Gianni Butts  Home Phone: [phone]  Relation: Child    Past Surgical History:  Past Surgical History:   Procedure Laterality Date    ANGIOPLASTY Left 2022    Left SFA    APPENDECTOMY      AXILLARY-FEMORAL BYPASS GRAFT Left 2022    EXCISION OF INFECTED ILEOFEMORAL BYPASS GRAFT; HARVEST LEFT BASILIC VEIN; LEFT EXTERNAL ILIAC ARTERY TO FEMORAL BYPASS THROUGH OBTURATOR CANAL; PLACEMENT OF WOUND VAC performed by Wicho Perera MD at [email or web address] [place] Right 2019    RIGHT CARPAL TUNNEL RELEASE AND RIGHT MIDDLE FINGER TRIGGER FINGER RELEASE performed by Jennifer Rojas MD at [place] [address]  [id number]    LEFT EYE EYE CATARACT PHACOEMULSIFICATION INTRAOCULAR LENS    CHOLECYSTECTOMY  1978    COLONOSCOPY  1999    diverticulosis    EYE SURGERY Bilateral     bilateral cataract removed    FEMORAL BYPASS Left 2022    LEFT LEG FEMORAL TO POPLITEAL BYPASS WITH INTRAOPERATIVE ANGIOGRAM performed by Gavin Govea MD at [place] [address] Left 2022    LEFT COMMON FEMORAL ARTERY ENDARTERECTOMY performed by Gavin Govea MD at [address] [place] [address] (CERVIX STATUS UNKNOWN)  1980s    [facility]    IR FEMORAL POPLITEAL BYPASS GRAFT Right 2015    KYPHOSIS SURGERY  2006    L1, (fractured after a fall)    LEG SURGERY Left 2022    LEFT LEG WOUND DEBRIDEMENT performed by Bhumi Salas MD at [place] [address] N/A 7/7/2022    INCISION AND DRAINAGE OF LEFT SIDE GROIN WITH PLACEMENT OF WOUND VAC performed by Bhumi Salas MD at [place] [address] Left 11/22/2022    DEBRIDEMENT OF LEFT GROIN (10 X 3 CM), LEFT RECTUS FEMORIS FLAP, LEFT SPLIT THICKNESS SKIN GRAFT (10 X 5 CM), APPLICATION OF WOUND VACUUM DEVICE performed by Adria Headley MD at [facility]  04/25/1998    (L) THR    WRIST FRACTURE SURGERY  01/01/2008    left wrist       Immunization History: There is no immunization history for the selected administration types on file for this patient.     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 ([facility]) E11.59    Atherosclerosis of artery of extremity with ulceration ([facility]) I70.299, L97.909    Benign essential HTN I10    Lumbar foraminal stenosis M48.061    DDD (degenerative disc disease), lumbar M51.36    Spinal stenosis of lumbar region with neurogenic claudication M48.062    Type 2 diabetes, controlled, with neuropathy ([address] E11.40    Severe mitral regurgitation I34.0    Venous insufficiency of both lower extremities I87.2    Hyperlipidemia E78.5    Hip arthritis M16.10    Status post carmen arthroplasty replacement of left hip 1999 Z96.642    Femoral artery stenosis, right ([address] I70.201    Osteoporosis M81.0    Osteopenia M85.80    Femoral-popliteal bypass graft occlusion, left ([facility]) T82.898A    Pain due to onychomycosis of nail B35.1, M79.609    Coronary artery disease involving native coronary artery of native heart without angina pectoris I25.10    Diabetic peripheral neuropathy ([facility]) E11.42    Arthritis of left knee M17.12    Trigger middle finger of right hand M65.331    Carpal tunnel syndrome, right G56.01    Vitamin D deficiency E55.9    Carotid stenosis, asymptomatic, bilateral I65.23    Fingernail problem L60.9    TIA (transient ischemic attack) G45.9    Sacral fracture, closed ([facility]) S32.10XA    Closed fracture of ramus of right pubis ([facility]) S32.591A    Acute pain of left lower extremity M79.605    Diabetes mellitus type 2 with peripheral artery disease ([facility]) E11.51    Open wound of left knee S81.002A    Nonhealing nonsurgical wound limited to breakdown of skin T14. 8XXA    Peripheral vascular disease, unspecified ([facility] I73.9    Wound of left leg S81.802A    Peripheral artery disease ([facility]) I73.9    Cervical spondylosis M47.812    Atherosclerosis of artery of extremity with rest pain ([facility]) I70.229    Lymph leak I89.9    Nonhealing surgical wound T81.89XA    Bypass graft stenosis ([facility]) C00.071G    Atherosclerosis of native artery of left leg with ulceration of ankle ([facility]) D51.272    Complications due to vascular device, implant, and graft T82. 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.9    MRSA infection A49.02    Debility R53.81       Isolation/Infection:   Isolation            Contact          Patient Infection Status       Infection Onset Added Last Indicated Last Indicated By Review Planned Expiration Resolved Resolved By    MRSA 07/20/22 07/23/22 11/20/22 Culture, Surgical        Resolved    COVID-19 (Rule Out) 02/10/22 02/10/22 02/10/22 COVID-19 (Ordered)   02/11/22 Rule-Out Test Resulted            Nurse Assessment:  Last Vital Signs: BP (!) 142/51   Pulse 84   Temp 98.3 °F (36.8 °C) (Oral)   Resp 18   SpO2 99%     Last documented pain score (0-10 scale): Pain Level: 0  Last Weight:   Wt Readings from Last 1 Encounters:   11/28/22 107 lb 5.8 oz (48.7 kg)     Mental Status:  oriented and alert    IV Access:  - None    Nursing Mobility/ADLs:  Walking   Assisted  Transfer  Assisted  Bathing  Assisted  Dressing  Assisted  Toileting  Assisted  Feeding  Assisted  Med Admin  Assisted  Med Delivery   whole    Wound Care Documentation and Therapy:  Negative Pressure Wound Therapy Leg Anterior;Left;Proximal;Upper (Active)   Wound Type Surgical 11/28/22 1200   Dressing Type Other (Comment) 11/28/22 1200   Cycle Continuous 11/28/22 1200   Target Pressure (mmHg) 100 11/26/22 1600   Canister changed? Yes 11/25/22 1800   Dressing Status Clean, dry & intact 11/28/22 1200   Dressing Changed Changed/New 11/22/22 1756   Drainage Amount None 11/27/22 1600   Drainage Description Serosanguinous 11/25/22 1600   Output (ml) 0 ml 11/27/22 1400   Odor None 11/25/22 1600   Number of days: 8       Wound 11/22/21 Leg Left; Lower;Distal #1 (Active)   Wound Image   10/31/22 0919   Wound Etiology Other 11/25/22 1600   Dressing Status New drainage noted 11/25/22 1800   Wound Cleansed Not Cleansed 11/25/22 1600   Dressing/Treatment Open to air 11/26/22 1600   Wound Length (cm) 1.3 cm 11/14/22 1049   Wound Width (cm) 1.3 cm 11/14/22 1049   Wound Depth (cm) 0.1 cm 11/14/22 1049   Wound Surface Area (cm^2) 1.69 cm^2 11/14/22 1049   Change in Wound Size % (l*w) 39.64 11/14/22 1049   Wound Volume (cm^3) 0.169 cm^3 11/14/22 1049   Wound Healing % 40 11/14/22 1049   Post-Procedure Length (cm) 1.4 cm 11/14/22 1051   Post-Procedure Width (cm) 1.4 cm 11/14/22 1051   Post-Procedure Depth (cm) 0.3 cm 11/14/22 1051   Post-Procedure Surface Area (cm^2) 1.96 cm^2 11/14/22 1051   Post-Procedure Volume (cm^3) 0.588 cm^3 11/14/22 1051   Distance Tunneling (cm) 0 cm 11/14/22 1049   Tunneling Position ___ O'Clock 0 11/14/22 1049   Undermining Starts ___ O'Clock 0 11/14/22 1049   Undermining Ends___ O'Clock 0 11/14/22 1049   Undermining Maxium Distance (cm) 0 11/14/22 1049   Wound Assessment Dry 11/26/22 0400   Drainage Amount None 11/26/22 0400   Drainage Description Other (Comment) 10/31/22 0919   Odor None 11/25/22 1800   Nissa-wound Assessment Intact;Fragile;Edematous 10/31/22 0919   Margins Defined edges 11/14/22 1049   Wound Thickness Description not for Pressure Injury Full thickness 11/14/22 1049   Number of days: 371       Wound 11/14/22 Thigh Left;Medial #2 (Active)   Wound Image   11/14/22 1049   Wound Etiology Non-Healing Surgical 11/26/22 1600   Dressing Status Clean;Dry; Intact 11/26/22 0906   Wound Cleansed Cleansed with saline 11/25/22 0600   Dressing/Treatment Collagen with Ag 11/25/22 0600   Wound Length (cm) 2 cm 11/14/22 1049   Wound Width (cm) 1 cm 11/14/22 1049   Wound Depth (cm) 0.1 cm 11/14/22 1049   Wound Surface Area (cm^2) 2 cm^2 11/14/22 1049   Wound Volume (cm^3) 0.2 cm^3 11/14/22 1049   Post-Procedure Length (cm) 2.1 cm 11/14/22 1051   Post-Procedure Width (cm) 1.1 cm 11/14/22 1051   Post-Procedure Depth (cm) 0.3 cm 11/14/22 1051   Post-Procedure Surface Area (cm^2) 2.31 cm^2 11/14/22 1051   Post-Procedure Volume (cm^3) 0.693 cm^3 11/14/22 1051   Wound Assessment Dry;Fibrin;Pink/red 11/14/22 1049   Drainage Amount None 11/25/22 1800   Drainage Description Serosanguinous;Brown 11/25/22 1600   Odor None 11/25/22 1800   Nissa-wound Assessment Intact 11/25/22 1600   Margins Defined edges 11/25/22 1600   Number of days: 14       Incision 11/20/22 Arm Left;Upper (Active)   Dressing Status Clean;Dry; Intact 11/28/22 1200   Incision Cleansed Wound cleanser 11/28/22 1200   Dressing/Treatment ABD pad;Non-adherent; Roll gauze 11/28/22 1200   Closure Staples 11/28/22 1200   Margins Approximated 11/28/22 1200   Incision Assessment Dry 11/28/22 1200   Drainage Amount Scant 11/28/22 1200   Drainage Description Serosanguinous 11/28/22 1200   Odor None 11/28/22 1200   Number of days: 8       Incision 11/20/22 Abdomen Left; Lower (Active)   Dressing Status Clean;Dry; Intact 11/28/22 1200   Dressing/Treatment Open to air 11/28/22 1200   Closure Staples 11/28/22 1200   Margins Approximated 11/28/22 1200   Incision Assessment Dry 11/28/22 1200   Odor None 11/28/22 1200   Number of days: 8       Incision 11/20/22 Femoral Anterior;Left;Proximal (Active)   Dressing Status Clean;Dry; Intact 11/28/22 1200 Dressing/Treatment Dry dressing 11/25/22 1600   Closure Other (Comment) 11/26/22 0400   Drainage Amount None 11/25/22 1800   Odor None 11/25/22 1800   Number of days: 8       Incision 11/22/22 Groin Anterior; Left (Active)   Dressing Status Clean;Dry; Intact 11/27/22 1600   Closure Other (Comment) 11/26/22 0400   Odor None 11/25/22 1800   Number of days: 6        Elimination:  Continence: Bowel: Yes  Bladder: Yes  Urinary Catheter: None   Colostomy/Ileostomy/Ileal Conduit: No       Date of Last BM: 12/4      Intake/Output Summary (Last 24 hours) at 11/29/2022 0913  Last data filed at 11/28/2022 2319  Gross per 24 hour   Intake 50 ml   Output --   Net 50 ml     I/O last 3 completed shifts: In: 48 [P.O.:50]  Out: -     Safety Concerns: At Risk for Falls    Impairments/Disabilities:      None    Nutrition Therapy:  Current Nutrition Therapy:   - Oral Diet:  Carb Control 3 carbs/meal (1500kcals/day)    Routes of Feeding: Oral  Liquids: Thin Liquids  Daily Fluid Restriction: no  Last Modified Barium Swallow with Video (Video Swallowing Test): not done    Treatments at the Time of Hospital Discharge:   Respiratory Treatments: nebulizers q 4 prn  Oxygen Therapy:  is not on home oxygen therapy.   Ventilator:    - No ventilator support    Rehab Therapies: Physical Therapy and Occupational Therapy  Weight Bearing Status/Restrictions: No weight bearing restrictions  Other Medical Equipment (for information only, NOT a DME order):  walker  Other Treatments: na    Patient's personal belongings (please select all that are sent with patient):  Glasses, Dentures upper; cell phone, , clothing    RN SIGNATURE:  Electronically signed by Sofía Hernandez RN on 12/5/22 at 12:15 PM EST    CASE MANAGEMENT/SOCIAL WORK SECTION    Inpatient Status Date: ***    Readmission Risk Assessment Score:  Readmission Risk              Risk of Unplanned Readmission:  20           Discharging to Facility/ Agency   Name: Address:  Phone:  Fax:    Dialysis Facility (if applicable)   Name:  Address:  Dialysis Schedule:  Phone:  Fax:    / signature: {Esignature:[id number]}    PHYSICIAN SECTION    Prognosis: Good    Condition at Discharge: Stable    Rehab Potential (if transferring to Rehab): Good    Recommended Labs or Other Treatments After Discharge:   place bacitracin and adaptic to graft and suture line daily. Follow up with Dr Adan Camacho in 1 week for post-op evaluation and wound check    Steri strips to incisional will fall off when ready. No need to remove. Please follow up with Dr. Aravind Gary in 2 weeks. Call [phone] to schedule your appointment. Physician Certification: I certify the above information and transfer of Rey Ocasio  is necessary for the continuing treatment of the diagnosis listed and that she requires Home Care for greater 30 days.      Update Admission H&P: No change in H&P    PHYSICIAN SIGNATURE:  Electronically signed by Johnathon Burgess DO on 12/5/22 at 9:56 AM EST

## 2022-11-29 NOTE — PLAN OF CARE
Problem: Skin/Tissue Integrity  Goal: Absence of new skin breakdown  Description: 1. Monitor for areas of redness and/or skin breakdown  2. Assess vascular access sites hourly  3. Every 4-6 hours minimum:  Change oxygen saturation probe site  4. Every 4-6 hours:  If on nasal continuous positive airway pressure, respiratory therapy assess nares and determine need for appliance change or resting period. 11/29/2022 1612 by Paty Sanchez RN  Outcome: Not Progressing  Note: Pt refusing to turn to side and keep bilateral heels floated   11/29/2022 0218 by Richard Bullock RN  Outcome: Progressing  Note: Surgical site observed for signs/symptoms of infection. Vitals performed routinely, labs observed. Will monitor pt while on ARU     See Juan Francisco scale. Encourage/assist pt to turn and reposition every two hours and as needed. Heels elevated off bed. Protective barrier placed as needed. Patient kept clean and dry. Pillows used for positioning. Will continue to monitor for skin breakdown. Problem: Skin/Tissue Integrity  Goal: Absence of new skin breakdown  Description: 1. Monitor for areas of redness and/or skin breakdown  2. Assess vascular access sites hourly  3. Every 4-6 hours minimum:  Change oxygen saturation probe site  4. Every 4-6 hours:  If on nasal continuous positive airway pressure, respiratory therapy assess nares and determine need for appliance change or resting period. 11/29/2022 1612 by Paty Sanchez RN  Outcome: Not Progressing  Note: Pt refusing to turn to side and keep bilateral heels floated   11/29/2022 0218 by Richard Bullock RN  Outcome: Progressing  Note: Surgical site observed for signs/symptoms of infection. Vitals performed routinely, labs observed. Will monitor pt while on ARU     See Juan Francisco scale. Encourage/assist pt to turn and reposition every two hours and as needed. Heels elevated off bed. Protective barrier placed as needed. Patient kept clean and dry. Pillows used for positioning. Will continue to monitor for skin breakdown.

## 2022-11-29 NOTE — PROGRESS NOTES
Pt assessment and vitals completed. Medications administered as ordered. Pt  repositioned, resting comfortably in bed. Will continue to monitor.

## 2022-11-29 NOTE — PROGRESS NOTES
Comprehensive Nutrition Assessment    RECOMMENDATIONS:  PO Diet: Regular, 3 carb choices  ONS: Glucerna tid  Nutrition Education: Education not indicated       NUTRITION ASSESSMENT:   Nutritional summary & status: Consult for ONS. Pt new to ARU. Receiving a Regular; 3 carb choices diet with % x 1 meal intake. Limited data due to new admit. Pt occupied at time of visit.  lbs. Noted wt of 93 lbs(11/02/21) per EMR, indicating gain of 7 lbs in past year. Receiving Glucerna tid to promote adequate nutrition and assist with  healing of skin. Numerous incisions related  to recent surgery. Wound vac in place. Will continue to monitor. 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 with harvest of left basilic vein and left external iliac artery to femoral bypass through obturator canal, T2DM    MALNUTRITION ASSESSMENT  Context of Malnutrition: Acute Illness   Malnutrition Status: Insufficient data  Findings of the 6 clinical characteristics of malnutrition (Minimum of 2 out of 6 clinical characteristics is required to make the diagnosis of moderate or severe Protein Calorie Malnutrition based on AND/ASPEN Guidelines):  Energy Intake:  No significant decrease in energy intake  Weight Loss:  No significant weight loss     Body Fat Loss:  Unable to assess     Muscle Mass Loss:  Unable to assess    Fluid Accumulation:  No significant fluid accumulation     Strength:  Not Performed    NUTRITION DIAGNOSIS   Increased nutrient needs related to increase demand for energy/nutrients as evidenced by wounds    Nutrition Monitoring and Evaluation:   Food/Nutrient Intake Outcomes:  Food and Nutrient Intake, Supplement Intake  Physical Signs/Symptoms Outcomes:  GI Status, Skin, Biochemical Data     OBJECTIVE DATA: Significant to nutrition assessment  Nutrition Related Findings: LBM11/26. LLE non-pitting. Glu 201.  Meds reviewed: lasix, insulin  Wounds: Surgical Incision, Wound Vac  Nutrition Goals: PO intake 50% or greater, prior to discharge     [facility] DIET; Regular; 3 carb choices (45 gm/meal)  ADULT ORAL NUTRITION SUPPLEMENT; Breakfast, Lunch, Dinner; Diabetic Oral Supplement  PO Intake: %, 51-75%   PO Supplement Intake:%  Additional Sources of Calories/IVF:n/a     ANTHROPOMETRICS  Current Height: 5' 4\" (162.6 cm)  Current  107 lbs  Admission weight:    Ideal Body Weight (IBW): 120 lbs  (55 kg)    BMI:  18    COMPARATIVE STANDARDS  Energy (kcal):  6003-1956 (30-35 kcal/CBW/48.7 kg)     Protein (g):  73-97 (1.5-2.0 gm/CBW/48.7 kg)       Fluid (mL/day):  7778-6243 ml (1ml/kcal) or per provider    The patient will be monitored per nutrition standards of care. Consult dietitian if additional nutrition interventions are needed prior to RD reassessment.      Deonte Toure, [facility]:  [phone]  Office:  [phone]

## 2022-11-29 NOTE — PROGRESS NOTES
Pt is stating they took a \"pink\" pill that aided pain while on other floor.  Pharmacy and this RN were unable to identify medications from prior med list.

## 2022-11-29 NOTE — PROGRESS NOTES
Plastic Surgery   Daily Progress Note  Patient: Meme Hamilton      CC: Wound closure    SUBJECTIVE:   Patient rested well overnight. Remained afebrile and HDS. Transferred to ARU yesterday. Patient states graft site continues to drain. ROS:   A 14 point review of systems was conducted, significant findings as noted above. All other systems negative. OBJECTIVE:    PHYSICAL EXAM:    Vitals:    11/28/22 1710 11/28/22 2236   BP: 130/70 (!) 142/51   Pulse: 84 78   Resp: 14 16   Temp: 97.6 °F (36.4 °C) 98.3 °F (36.8 °C)   TempSrc: Oral Oral   SpO2: 94% 97%       General appearance: alert, no acute distress, grooming appropriate  Eyes: No scleral icterus, EOM grossly intact  Neck: trachea midline, no JVD, no lymphadenopathy, neck supple  Chest/Lungs: CTAB, no crackles/rales, wheezes/rhonchi, normal effort with no accessory muscle use on RA  Cardiovascular: RRR  Abdomen: Soft, non-tender, non-distended, no rebound, guarding, or rigidity. Skin: warm and dry, no rashes  Extremities: LUE incision with jez in place wrapped in kerlix. Some strike through of dressing, edema improved. L groin with firm LN on medial groin, unchanged from prior exam, non tender. Groin with prevena and wound vac in place with good seal -100. Groin and incision sites are soft without tenderness and no signs of hematoma or active bleeding. Xeroform dressing in place at donor site open to air, mild seepage. Sensation and mobility intact b/l LE. PT palpable b/l. DP palpable on right, doppler on L. L  to palpation   Genitourinary: external catheter in place to wall suction, clear yellow urine.    Neuro: A&Ox3, no focal deficits, sensation intact    LABS:   Recent Labs     11/27/22 0327 11/28/22  0303   WBC 8.0 8.5   HGB 8.4* 8.6*   HCT 24.9* 25.2*   MCV 91.0 91.0    224          Recent Labs     11/27/22 0327 11/28/22  0303    138   K 3.9 4.2    103   CO2 25 24   PHOS 2.8 3.7   BUN 21* 29*   CREATININE 0.8 0.8 No results for input(s): AST, ALT, ALB, BILIDIR, BILITOT, ALKPHOS in the last 72 hours. No results for input(s): LIPASE, AMYLASE in the last 72 hours. No results for input(s): PROT, INR, APTT in the last 72 hours. No results for input(s): CKTOTAL, CKMB, CKMBINDEX, TROPONINI in the last 72 hours. ASSESSMENT & PLAN:   This is a 80y.o. 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 - Wound vac holding suction -100 without issues  - Will discuss with staff removal of wound vacs today. Will place bacitracin and adaptic to graft and suture line if removed.  Will need to be changed daily after.  - Donor site open to air   - Will reiterate importance of proper blood glucose management for proper wound healing   - Further care per [id number], DO PGY-3  General Surgery  11/29/22  6:48 AM  [phone]

## 2022-11-29 NOTE — PLAN OF CARE
Problem: Pain  Goal: Verbalizes/displays adequate comfort level or baseline comfort level  11/29/2022 0218 by Gal Dixon RN  Outcome: Progressing  Note: Pt voices pain needs appropriately, pain is assessed during shift. Scheduled tylenol in place     Problem: Safety - Adult  Goal: Free from fall injury  11/29/2022 0218 by Gal Dixon RN  Outcome: Progressing  Note: Client remains free from falls, bed/chair alarm in place, door open, encouraged to use call light for needs, call light is within reach, bed locked in lowest position,  Will continue to monitor. Problem: Skin/Tissue Integrity  Goal: Absence of new skin breakdown  Description: 1. Monitor for areas of redness and/or skin breakdown  2. Assess vascular access sites hourly  3. Every 4-6 hours minimum:  Change oxygen saturation probe site  4. Every 4-6 hours:  If on nasal continuous positive airway pressure, respiratory therapy assess nares and determine need for appliance change or resting period. Outcome: Progressing  Note: Surgical site observed for signs/symptoms of infection. Vitals performed routinely, labs observed. Will monitor pt while on ARU     See Juan Francisco scale. Encourage/assist pt to turn and reposition every two hours and as needed. Heels elevated off bed. Protective barrier placed as needed. Patient kept clean and dry. Pillows used for positioning. Will continue to monitor for skin breakdown.

## 2022-11-29 NOTE — PROGRESS NOTES
Point of Care Note   Plastic Surgery        Time: 2631  Date: 11/29/22    Wound vacuum removal.  Wound vacuum was turned off and the dressing taken down. Care was taken at the Prevena/ hydrocolloid edges and the black sponge overlying the skin graft. The skin graft was noted to be intact without areas of necrosis. The incision sites and graft site were covered in bacitracin and Xeroform. Patient tolerated the procedure without any immediate complications. Plastic surgery team will continue to put bacitracin and xeroform on the wound daily. Dressing to only be managed by Plastic surgery team at this time. Rosalba Galeano DO  PGY1, General Surgery  11/29/22  2:28 PM  [employer]  Pager: [phone]    I saw and independently examined the patient today. I agree with the history of present illness, past medical/surgical histories, family history, social history, medication list and allergies as listed. The review of systems is as noted above. My physical exam confirms the findings listed above. Review of labs, pathology reports, radiology reports and medical records confirm the findings noted above. I edited the note where appropriate in italics, strikethrough font, or underline. Muscle viable and graft thus far with satisfactory take. Local care to incisions with Bacitracin/Xeroform. Will continue to follow while in house.     Ankita Deleon MD  [address] [place] [address] 187 Reconstructive Surgery  [phone]  11/29/22

## 2022-11-29 NOTE — PROGRESS NOTES
Occupational Therapy  Facility/Department: [facility] ACUTE REHAB UNIT  Occupational Therapy Initial Assessment    Name: Emily Smyth  : 1929  MRN: [id number]  Date of Service: 2022    Discharge Recommendations:  Continue to assess pending progress, Home with Home health OT, 24 hour supervision or assist  OT Equipment Recommendations  Equipment Needed: No  Other: shower chair, GB by toilet, handicap height toilet, pt may benefit from adaptive equipment for footwear cont to assess       Patient Diagnosis(es): There were no encounter diagnoses. Past Medical History:  has a past medical history of Anxiety, Arthritis, At risk for falls, CAD (coronary artery disease), Cerebral artery occlusion with cerebral infarction ([address] DDD (degenerative disc disease), cervical, Fractures, Hyperlipidemia, Hypertension, Mitral regurgitation, Neuropathy, Osteopenia, Osteoporosis, PAD (peripheral artery disease) ([address] Peripheral neuropathy, Peripheral vascular disease ([address] Podagra, Prolonged emergence from general anesthesia, Spinal stenosis, Type 2 diabetes mellitus ([address] Urethral stricture, and Urgency of urination. Past Surgical History:  has a past surgical history that includes Tonsillectomy; Appendectomy; Cholecystectomy (1978); Colonoscopy (1999); Hysterectomy (); Total hip arthroplasty (1998); Kyphosis surgery (2006); Wrist fracture surgery (2008); Cataract removal with implant (752250); eye surgery (Bilateral); IR Femoral Popliteal Bypass Graft (Right, 2015); Carpal tunnel release (Right, 2019); Femoral Endarterectomy (Left, 2022); Leg Surgery (Left, 2022); angioplasty (Left, 2022); femoral bypass (Left, 2022); Leg Surgery (N/A, 2022); Axillary-femoral Bypass Graft (Left, 2022); and Leg Surgery (Left, 2022).     Treatment Diagnosis: Impaired ADLs, functional mobility / transfers and decreased endurance 2/2 LLE sx      Assessment Performance deficits / Impairments: Decreased functional mobility ; Decreased ADL status; Decreased balance;Decreased strength;Decreased high-level IADLs;Decreased endurance  Assessment: Pt is a 81 yo female admitted to ARU w/ debility s/p the excision of infected ilofemoral bypass graft. Prior to hospitalization pt was living with 24 hour caregivers and independent w/ ADLs and was provided assistance w/ IADLs. Pt is now requiring Mod A for toilet transfers and LB dressing. Pt is also requiring CGA for LB bathing and SBA for UB bathing. Pt is motivated and would cont to benefit from skilled occupational therapy to increase her independence to her PLOF. Cont OT per POC. Treatment Diagnosis: Impaired ADLs, functional mobility / transfers and decreased endurance 2/2 LLE sx  Prognosis: Good  Decision Making: Medium Complexity  Activity Tolerance  Activity Tolerance: Patient Tolerated treatment well;Patient limited by pain        Plan   Occupational Therapy Plan  Times Per Week: 5 days a week 90 minutes a day  Current Treatment Recommendations: Strengthening, Balance training, Functional mobility training, Endurance training, Pain management, Self-Care / ADL, Home management training     Restrictions  Position Activity Restriction  Other position/activity restrictions: Up as tolerated    Subjective   General  Chart Reviewed: Yes  Patient assessed for rehabilitation services?: Yes  Additional Pertinent Hx: 80y.o. 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 Admitted to ARU on 11/28. Family / Caregiver Present: No  Referring Practitioner: Dr. Roger Lopez  Diagnosis: Debility  Subjective  Subjective: Pt supine in bed upon arrival, pleasent and agreeable to OT.      Social/Functional History  Social/Functional History  Lives With: Home care staff (Caregiver coming 24 hrs but lives alone)  Type of Home: House  Home Layout: Two level (Has electric chair to get to second story w/ WC at the top)  Home Access: Stairs to enter with rails  Entrance Stairs - Number of Steps: Uses w/c on ramp , has stair lift to go from 1st floor to the 2nd  Entrance Stairs - Rails: Both  Bathroom Shower/Tub: Tub only, Shower chair with back (Always does sponge baths)  Bathroom Toilet: Handicap height  Bathroom Equipment: Grab bars around toilet  Bathroom Accessibility: Walker accessible, Wheelchair accessible, Accessible  Home Equipment: Lift chair, Walker, rolling, Wheelchair-manual (Multple RW, 2  w/c , transport  w/c)  Has the patient had two or more falls in the past year or any fall with injury in the past year?: No  Receives Help From: Family, Home health  ADL Assistance: [address] [place] [address] Avenue: Independent (Caregiver assists)  Homemaking Responsibilities: Yes (Caregiver assists)  Meal Prep Responsibility: Primary  Ambulation Assistance: Independent (with walker when she remembered it)  Active : No  Patient's  Info: DRiven by caregivers  Mode of Transportation: [facility]  Type of Occupation: model, selling cosmetics, rugs, real estate etc.  Leisure & Hobbies: Listen to music , watch very little TV, read       Objective   Heart Rate: (!) 101  Heart Rate Source: Monitor  BP: 128/78  BP Location: Right lower arm  BP Method: Automatic  Patient Position: Up in chair  MAP (Calculated): 95  Resp: 17  SpO2: 97 %  O2 Device: None (Room air)          Observation/Palpation  Posture: Good  Observation: stitches in stomach, L leg covered by wound vac  Safety Devices  Type of Devices: Call light within reach;Nurse notified; Chair alarm in place; Left in chair  Balance  Sitting: Intact  Standing: With support  Gait  Overall Level of Assistance: Contact-guard assistance  Base of Support: Narrowed  Speed/Patricia: Pace decreased (< 100 feet/min)  Distance (ft):  (to bathroom)  Assistive Device: Walker, rolling  Toilet Transfers  Toilet - Technique: Ambulating  Equipment Used: Standard toilet  Toilet Transfer: Moderate assistance  Toilet Transfers Comments: Pt demo'd stand to sit w/ RW to toilet w/ CGA; Pt demo'd sit to stand toilet to RW w/ L arm on GB and R arm on center of RW w/ Mod A  AROM: Within functional limits (Based off of ADLs)  PROM: Within functional limits  Strength: Within functional limits (Based off ADLs)  Coordination: Within functional limits (Based off ADLs)  Tone: Normal  Sensation: Intact  ADL  UE Bathing: Stand by assistance;Verbal cueing; Increased time to complete  UE Bathing Skilled Clinical Factors: Pt seated in WC w/ warm wash cloth took a sponge bath 4/4 components of UB. Pt unaware of residual stool on RUE after performing pericare requiring VCs to clean. Pt required + time to complete. LE Bathing: Contact guard assistance; Increased time to complete  LE Bathing Skilled Clinical Factors: Pt in stance at toilet used warm wash cloth to wipe/clean her bottom w/ LUE supported on GB. Pt seated in wc washed all component of LB bathing flexing at the hip to wash BLEs. UE Dressing: Setup; Increased time to complete  UE Dressing Skilled Clinical Factors: Pt doffed/donned a sweater w/ setup seated in wc. Pt seated in wc doffed/donned gown w/ assist to tie/untie gown. LE Dressing: Moderate assistance; Increased time to complete  LE Dressing Skilled Clinical Factors: Pt seated on toilet required assistance to get briefs off BLE. Pt seated in WC flexed at the hips requiring assist to thread LLE into brief. Pt threaded RLE. Pt in stance managed brief up/down over hips. Pt seated recliner doffed/donned RLE shoe w/ + time independently, pt denied doffing/donning LLE shoe/sock due to pain and inability to reach foot. Pt would required assistance for donning/doffing LLE shoe/sock.   Toileting: Contact guard assistance  Toileting Skilled Clinical Factors: Pt managed pants down over hips w/ CGA. Pt seated on toilet continent of urine and a bowel movement. Pt w/ +++ time to perform pericare seated on toilet. Bed mobility  Supine to Sit:  (Pt supine in bed attempted to move LLE w/ BUE but ultimately required assist to lower LLE off the side of the bed and assist for trunk ascension)  Transfers  Sit to stand: Contact guard assistance (EOB > RW CGA; WC > RW CGA)  Stand to sit: Contact guard assistance  Transfer Comments: Pt required VCs for hand placement  Vision - Basic Assessment  Prior Vision: No visual deficits  Vision  Vision: Within Functional Limits (Has readers only)  Hearing  Hearing: Within functional limits  Cognition  Overall Cognitive Status: WFL  Cognition Comment: pt very distracted and tangetinal at times.  Pt doesn't like to be pushed  Orientation  Overall Orientation Status: Within Functional Limits  Perception  Overall Perceptual Status: WFL               Education Given To: Patient  Education Provided: Role of Therapy;Plan of Care;ADL Adaptive Strategies  Education Method: Verbal  Barriers to Learning: None  Education Outcome: Continued education needed             Goals  Short Term Goals  Time Frame for Short Term Goals: 8 days - all ongoing  Short Term Goal 1: Pt will demo full body dressing with mod I  Short Term Goal 2: Pt will demo toilet transfer w/ mod I  Short Term Goal 3: Pt will demo toileting w/ Mod I  Short Term Goal 4: Pt will complete a simple meal prep task w/ supervision  Short Term Goal 5: Pt will demo independence w/ verbalizing 3 fall prevention strategies  Patient Goals   Patient goals : Just to walk       Therapy Time   Individual Concurrent Group Co-treatment   Time In 0730         Time Out 0900         Minutes 90         Timed Code Treatment Minutes: [address]/OT

## 2022-11-29 NOTE — PROGRESS NOTES
Offered PRN tramadol to pt. Pt did not want to take at this time. Pt is concerned it may cause allergy or hallucinations.

## 2022-11-29 NOTE — DISCHARGE INSTRUCTIONS
[facility]    Sharron Yee MD  [address] ([address], [address]  [phone]    Post-op Instructions  ___________________________________________    Skin Graft     The following instructions will help you know what to expect in the days following your surgery. Do not, however, hesitate to call if you have questions or concerns. Activities  - Sleep in a manner that ensures that you do not apply pressure to the area with the dressing.    - Driving is prohibited for 1 to 2 weeks. Do not drive while taking pain medications. - Avoid heavy lifting (no more than 5 pounds) and vigorous activity for the first 3 weeks. - Do not engage in sports, aerobics, or vacuuming.   - Smoking (or ANY nicotine containing product) is prohibited for a minimum of 6 weeks as it interferes with healing and can lead to significant - and avoidable - complications    Diet  - Resume your preoperative diet as tolerated. Pain Control/Medications  - You will receive a prescription for pain medication that can be taken as directed if needed for pain control. The pain medication may cause constipation and does impair your ability to drive or make important decisions.          - You may also be given a muscle relaxant that can help with muscle spasms. Do not take the pain medication and muscle relaxant at the same time        - There is absolutely NO driving while on pain medication or Valium® or Flexoril® . - Additionally do NOT take any of the following products:    Aspirin/low-dose aspirin    Ibuprofen (Advil®, Motrin®    Naprosyn or Naproxen (Aleve®)    Vitamin E (even small amounts in multiple vitamins)    Herbals or homeopathic medicines or green tea    Growth hormone    Diet pills (Meridia®, Metabolife®, etc)         - You may also be prescribed an antibiotic, make sure to complete the prescribed  course.  You can also take a probiotic yogurt (Activia®) to help with your bowel function while on these medications. - Take your medications as prescribed. Antibiotic to prevent infection:    Pain medication, if needed:    Valium to prevent muscle spasm:    Other: Lactobacillus (Culturelle) probiotic while taking antibiotics. You can  obtain this over the counter or within any yogurt that specifically has the active  ingredient: L. acidophilus (Lactobacillus acidophilus). Wound Care  - You may have some swelling or bruising. This is normal and will lessen over the next 1 to 3 weeks. - You may notice a change in sensation or numbness of your skin. This is common after surgery and should improve gradually over time. - Depending on the location of your dressing, you can resume daily showers in 24 hours, but avoid very hot water. If the dressing is on your head, you can shower, but do not wet your head. If the dressing is on an extremity, you can leave that extremity out of the shower, or cover the entire extremity in plastic prior to your shower. - Do NOT submerge your dressing (e.g. no swimming or baths submerging until instructed)  - Place bacitracin and Xeroform to graft and suture line daily  - Leave the donor site open to air    -your abdominal and arm incision have steri-strips in place. Do no scrub or peel these areas. They will be evaluated for removal at follow up. If the edges start to roll up in a week or so, you  may trim the edges with scissors          Follow-up  Call your doctor's office at the first sign of:  Excessive worsening pain. Bleeding under the vac dressing. Redness, drainage or odor from the vac. Fever or chills. Shortness of breath. Do not hesitate to call if you have any questions or concerns    Please follow up with Dr. Shahriar Villalta in 1 week after discharge. Please call [phone] to make your appointment. Vascular surgery:  Please follow up with Dr. Abbie Kirk in 2 weeks. Call [phone] to schedule your appointment.

## 2022-11-29 NOTE — PROGRESS NOTES
Pt resting in chair, discomfort noted pt stated in 9 out of 10 pain, discussed pain regimen with pt and POA\, pt was agreeable to take prn tramadol. Pt refused dulcolax stated gives her diarrhea, will notify Dr Jose Toure to discontinue. Pt needs reassurance and continuing education about process of inpatient rehab and the therapy. Needs encouragement to do task as independently as possible. Pt is being evaluated by therapy today, skin breakdown prevention added to care plans, encourage pt to turn q 2 hours and float heels. No further needs at time, caregiver at pt bedside and call light within reach.

## 2022-11-30 LAB
ANION GAP SERPL CALCULATED.3IONS-SCNC: 8 MMOL/L (ref 3–16)
BASOPHILS ABSOLUTE: 0 K/UL (ref 0–0.2)
BASOPHILS RELATIVE PERCENT: 0.5 %
BUN BLDV-MCNC: 35 MG/DL (ref 7–20)
CALCIUM SERPL-MCNC: 8.5 MG/DL (ref 8.3–10.6)
CHLORIDE BLD-SCNC: 103 MMOL/L (ref 99–110)
CO2: 28 MMOL/L (ref 21–32)
CREAT SERPL-MCNC: 0.8 MG/DL (ref 0.6–1.2)
EOSINOPHILS ABSOLUTE: 0.3 K/UL (ref 0–0.6)
EOSINOPHILS RELATIVE PERCENT: 3.8 %
GFR SERPL CREATININE-BSD FRML MDRD: >60 ML/MIN/{1.73_M2}
GLUCOSE BLD-MCNC: 121 MG/DL (ref 70–99)
GLUCOSE BLD-MCNC: 157 MG/DL (ref 70–99)
GLUCOSE BLD-MCNC: 184 MG/DL (ref 70–99)
GLUCOSE BLD-MCNC: 251 MG/DL (ref 70–99)
HCT VFR BLD CALC: 23.8 % (ref 36–48)
HEMOGLOBIN: 8.1 G/DL (ref 12–16)
LYMPHOCYTES ABSOLUTE: 1.1 K/UL (ref 1–5.1)
LYMPHOCYTES RELATIVE PERCENT: 15.7 %
MAGNESIUM: 1.7 MG/DL (ref 1.8–2.4)
MCH RBC QN AUTO: 31 PG (ref 26–34)
MCHC RBC AUTO-ENTMCNC: 33.9 G/DL (ref 31–36)
MCV RBC AUTO: 91.4 FL (ref 80–100)
MONOCYTES ABSOLUTE: 0.7 K/UL (ref 0–1.3)
MONOCYTES RELATIVE PERCENT: 10.1 %
NEUTROPHILS ABSOLUTE: 5 K/UL (ref 1.7–7.7)
NEUTROPHILS RELATIVE PERCENT: 69.9 %
PDW BLD-RTO: 19.2 % (ref 12.4–15.4)
PERFORMED ON: ABNORMAL
PHOSPHORUS: 3.3 MG/DL (ref 2.5–4.9)
PLATELET # BLD: 240 K/UL (ref 135–450)
PMV BLD AUTO: 6.6 FL (ref 5–10.5)
POTASSIUM REFLEX MAGNESIUM: 3.8 MMOL/L (ref 3.5–5.1)
RBC # BLD: 2.6 M/UL (ref 4–5.2)
SODIUM BLD-SCNC: 139 MMOL/L (ref 136–145)
WBC # BLD: 7.2 K/UL (ref 4–11)

## 2022-11-30 PROCEDURE — 2580000003 HC RX 258: Performed by: PHYSICAL MEDICINE & REHABILITATION

## 2022-11-30 PROCEDURE — 94669 MECHANICAL CHEST WALL OSCILL: CPT

## 2022-11-30 PROCEDURE — 84100 ASSAY OF PHOSPHORUS: CPT

## 2022-11-30 PROCEDURE — 97530 THERAPEUTIC ACTIVITIES: CPT

## 2022-11-30 PROCEDURE — 97116 GAIT TRAINING THERAPY: CPT

## 2022-11-30 PROCEDURE — 6370000000 HC RX 637 (ALT 250 FOR IP): Performed by: PHYSICAL MEDICINE & REHABILITATION

## 2022-11-30 PROCEDURE — 80048 BASIC METABOLIC PNL TOTAL CA: CPT

## 2022-11-30 PROCEDURE — 97535 SELF CARE MNGMENT TRAINING: CPT

## 2022-11-30 PROCEDURE — 1280000000 HC REHAB R&B

## 2022-11-30 PROCEDURE — 85025 COMPLETE CBC W/AUTO DIFF WBC: CPT

## 2022-11-30 PROCEDURE — 6360000002 HC RX W HCPCS: Performed by: STUDENT IN AN ORGANIZED HEALTH CARE EDUCATION/TRAINING PROGRAM

## 2022-11-30 PROCEDURE — 99232 SBSQ HOSP IP/OBS MODERATE 35: CPT | Performed by: PHYSICAL MEDICINE & REHABILITATION

## 2022-11-30 PROCEDURE — 94150 VITAL CAPACITY TEST: CPT

## 2022-11-30 PROCEDURE — 6360000002 HC RX W HCPCS: Performed by: PHYSICAL MEDICINE & REHABILITATION

## 2022-11-30 PROCEDURE — 83735 ASSAY OF MAGNESIUM: CPT

## 2022-11-30 PROCEDURE — 6370000000 HC RX 637 (ALT 250 FOR IP): Performed by: STUDENT IN AN ORGANIZED HEALTH CARE EDUCATION/TRAINING PROGRAM

## 2022-11-30 PROCEDURE — 97110 THERAPEUTIC EXERCISES: CPT

## 2022-11-30 RX ORDER — POTASSIUM CHLORIDE 1.5 G/1.77G
20 POWDER, FOR SOLUTION ORAL ONCE
Status: DISCONTINUED | OUTPATIENT
Start: 2022-11-30 | End: 2022-11-30 | Stop reason: SDUPTHER

## 2022-11-30 RX ORDER — MAGNESIUM SULFATE 1 G/100ML
2000 INJECTION INTRAVENOUS ONCE
Status: DISCONTINUED | OUTPATIENT
Start: 2022-11-30 | End: 2022-11-30

## 2022-11-30 RX ORDER — MAGNESIUM SULFATE IN WATER 40 MG/ML
2000 INJECTION, SOLUTION INTRAVENOUS ONCE
Status: COMPLETED | OUTPATIENT
Start: 2022-11-30 | End: 2022-11-30

## 2022-11-30 RX ADMIN — ATORVASTATIN CALCIUM 10 MG: 10 TABLET, FILM COATED ORAL at 08:42

## 2022-11-30 RX ADMIN — SODIUM CHLORIDE, PRESERVATIVE FREE 10 ML: 5 INJECTION INTRAVENOUS at 21:55

## 2022-11-30 RX ADMIN — TRAMADOL HYDROCHLORIDE 25 MG: 50 TABLET, COATED ORAL at 04:36

## 2022-11-30 RX ADMIN — DAPTOMYCIN 350 MG: 500 INJECTION, POWDER, LYOPHILIZED, FOR SOLUTION INTRAVENOUS at 11:54

## 2022-11-30 RX ADMIN — TRAMADOL HYDROCHLORIDE 25 MG: 50 TABLET, COATED ORAL at 22:00

## 2022-11-30 RX ADMIN — METFORMIN HYDROCHLORIDE 500 MG: 500 TABLET ORAL at 17:15

## 2022-11-30 RX ADMIN — METHOCARBAMOL TABLETS 1000 MG: 500 TABLET, COATED ORAL at 15:13

## 2022-11-30 RX ADMIN — BACITRACIN: 500 OINTMENT TOPICAL at 08:47

## 2022-11-30 RX ADMIN — SODIUM CHLORIDE, PRESERVATIVE FREE 10 ML: 5 INJECTION INTRAVENOUS at 08:42

## 2022-11-30 RX ADMIN — ACETAMINOPHEN 1000 MG: 500 TABLET ORAL at 11:56

## 2022-11-30 RX ADMIN — METHOCARBAMOL TABLETS 1000 MG: 500 TABLET, COATED ORAL at 21:54

## 2022-11-30 RX ADMIN — MAGNESIUM SULFATE HEPTAHYDRATE 2000 MG: 40 INJECTION, SOLUTION INTRAVENOUS at 17:22

## 2022-11-30 RX ADMIN — ASPIRIN 81 MG: 81 TABLET, COATED ORAL at 08:42

## 2022-11-30 RX ADMIN — METHOCARBAMOL TABLETS 1000 MG: 500 TABLET, COATED ORAL at 08:41

## 2022-11-30 RX ADMIN — ACETAMINOPHEN 1000 MG: 500 TABLET ORAL at 17:14

## 2022-11-30 RX ADMIN — POTASSIUM BICARBONATE 20 MEQ: 782 TABLET, EFFERVESCENT ORAL at 15:13

## 2022-11-30 RX ADMIN — FUROSEMIDE 20 MG: 20 TABLET ORAL at 08:42

## 2022-11-30 RX ADMIN — ACETAMINOPHEN 1000 MG: 500 TABLET ORAL at 04:36

## 2022-11-30 ASSESSMENT — PAIN SCALES - GENERAL
PAINLEVEL_OUTOF10: 10
PAINLEVEL_OUTOF10: 9
PAINLEVEL_OUTOF10: 10
PAINLEVEL_OUTOF10: 10
PAINLEVEL_OUTOF10: 9
PAINLEVEL_OUTOF10: 10
PAINLEVEL_OUTOF10: 0
PAINLEVEL_OUTOF10: 9

## 2022-11-30 ASSESSMENT — PAIN DESCRIPTION - DESCRIPTORS
DESCRIPTORS: BURNING;DISCOMFORT
DESCRIPTORS: ACHING
DESCRIPTORS: BURNING
DESCRIPTORS: ACHING
DESCRIPTORS: BURNING

## 2022-11-30 ASSESSMENT — PAIN DESCRIPTION - LOCATION
LOCATION: LEG

## 2022-11-30 ASSESSMENT — PAIN DESCRIPTION - ORIENTATION
ORIENTATION: LEFT

## 2022-11-30 ASSESSMENT — PAIN DESCRIPTION - PAIN TYPE: TYPE: CHRONIC PAIN;SURGICAL PAIN

## 2022-11-30 ASSESSMENT — PAIN DESCRIPTION - FREQUENCY: FREQUENCY: CONTINUOUS

## 2022-11-30 ASSESSMENT — PAIN SCALES - WONG BAKER: WONGBAKER_NUMERICALRESPONSE: 10

## 2022-11-30 ASSESSMENT — PAIN DESCRIPTION - ONSET: ONSET: ON-GOING

## 2022-11-30 NOTE — CARE COORDINATION
Case Management Assessment  Initial Evaluation    Date/Time of Evaluation: 11/30/2022 3:40 PM  Assessment Completed by: YOANDY Healy    If patient is discharged prior to next notation, then this note serves as note for discharge by case management. Patient Name: Charles Soto                   YOB: 1929  Diagnosis: Debility [R53.81]                   Date / Time: 11/28/2022  4:51 PM    Patient Admission Status: REHAB IP   Readmission Risk (Low < 19, Mod (19-27), High > 27): Readmission Risk Score: 22.8    Current PCP: Bin Farah, DO  PCP verified by CM? yes    Chart Reviewed: Yes      History Provided by: Pt  Patient Orientation: alert & oriented  Patient Cognition:     Hospitalization in the last 30 days (Readmission):  No    If yes, Readmission Assessment in CM Navigator will be completed. Advance Directives:      Code Status: DNR-CCA   Patient's Primary Decision Maker is: Named in [address] [place] [address]    Primary Decision Maker: Dm Lopez - [phone]    Supplemental (Other) Decision Maker: Libertad Soler [facility] [phone]    Discharge Planning:    Patient lives with: Other (Comment) (other) alone but is hiring private duty 24 hr care at [place]. Type of Home: House  Primary Care Giver: private caregiver  Patient Support Systems include: Home Care Staff, Family Members   Current Financial resources:   Current community resources:   Current services prior to admission: Home Care, Durable Medical Equipment            Current DME: walker; wheelchair            Type of Home Care services:  [place], PT, Nursing Services;  Home IV ABX    ADLS  Prior functional level: independent  Current functional level:      PT AM-PAC:   /24  OT AM-PAC:   /24    Family can provide assistance at DC: private caregiver  Plans to Return to Present Housing: yes  Other Identified Issues/Barriers to RETURNING to current housing:   Potential Assistance needed at discharge: Skilled Nursing Facility, Home Care, Outpatient PT/OT            Potential DME: TBD  Patient expects to discharge to: House  Plan for transportation at discharge: private caregiver    Financial    Payor: Mainor Strange / Plan: MEDICARE PART A AND B / Product Type: *No Product type* /     Does insurance require precert for SNF: No    Potential assistance Purchasing Medications:    Meds-to-Beds request:        Rossana Bain [facility], [address] [phone] Nate Mireles [phone] [address] [facility] [address]  Phone: [phone] Fax: [phone]    [facility] [id number] - [id number], [address] [phone] Nate Mireles [phone]  [facility] [address] [id number]  Phone: [phone] Fax: [phone]      Notes: Additional Case Management Notes:   Team Conference will be tomorrow AM. ETELVINA will inform Pt and private caregiver of DC date after Conference. ETELVINA is aware per Dr. Joana Newman that Pt will need Home IV ABX until 1/4/2023 and [facility] already has referral.         The Plan for Transition of Care is related to the following treatment goals of Debility [S75.06]    IF APPLICABLE: The Patient and/or patient representative Jennie Gee and her family were provided with a choice of provider and agrees with the discharge plan. Freedom of choice list with basic dialogue that supports the patient's individualized plan of care/goals and shares the quality data associated with the providers was provided to:     Patient Representative Name:       The Patient and/or Patient Representative Agree with the Discharge Plan?       Stefany Armendariz [facility]  Case Management Department  Ph: [phone]

## 2022-11-30 NOTE — PLAN OF CARE
Problem: Pain  Goal: Verbalizes/displays adequate comfort level or baseline comfort level  11/30/2022 0826 by Felipe Pratt RN  Flowsheets (Taken 11/30/2022 2418)  Verbalizes/displays adequate comfort level or baseline comfort level:   Encourage patient to monitor pain and request assistance   Assess pain using appropriate pain scale   Implement non-pharmacological measures as appropriate and evaluate response  11/30/2022 0209 by Atlee Kayser, RN  Flowsheets (Taken 11/30/2022 0209)  Verbalizes/displays adequate comfort level or baseline comfort level:   Encourage patient to monitor pain and request assistance   Assess pain using appropriate pain scale   Administer analgesics based on type and severity of pain and evaluate response     Problem: Safety - Adult  Goal: Free from fall injury  Flowsheets (Taken 11/30/2022 0826)  Free From Fall Injury: Instruct family/caregiver on patient safety     Problem: Nutrition Deficit:  Goal: Optimize nutritional status  11/30/2022 0826 by Felipe Pratt RN  Flowsheets (Taken 11/30/2022 1821)  Nutrient intake appropriate for improving, restoring, or maintaining nutritional needs:   Assess nutritional status and recommend course of action   Monitor oral intake, labs, and treatment plans   Recommend appropriate diets, oral nutritional supplements, and vitamin/mineral supplements   Order, calculate, and assess calorie counts as needed  11/30/2022 0209 by Atlee Kayser, RN  Flowsheets  Taken 11/30/2022 0209 by Atlee Kayser, RN  Nutrient intake appropriate for improving, restoring, or maintaining nutritional needs:   Assess nutritional status and recommend course of action   Monitor oral intake, labs, and treatment plans  Taken 11/29/2022 1249 by Marlene Mercedes RD  Nutrient intake appropriate for improving, restoring, or maintaining nutritional needs:   Assess nutritional status and recommend course of action   Monitor oral intake, labs, and treatment plans Recommend appropriate diets, oral nutritional supplements, and vitamin/mineral supplements

## 2022-11-30 NOTE — PROGRESS NOTES
Occupational Therapy  Facility/Department: [facility] ACUTE REHAB UNIT  Rehabilitation Occupational Therapy Daily Treatment Note    Date: 22  Patient Name: Elliot La       Room: 3109/3109-01  MRN: [id number]  Account: [de-identified]   : 1929  (80 y.o.) Gender: female                    Past Medical History:  has a past medical history of Anxiety, Arthritis, At risk for falls, CAD (coronary artery disease), Cerebral artery occlusion with cerebral infarction ([address] DDD (degenerative disc disease), cervical, Fractures, Hyperlipidemia, Hypertension, Mitral regurgitation, Neuropathy, Osteopenia, Osteoporosis, PAD (peripheral artery disease) ([address] Peripheral neuropathy, Peripheral vascular disease ([address] Podagra, Prolonged emergence from general anesthesia, Spinal stenosis, Type 2 diabetes mellitus ([address] Urethral stricture, and Urgency of urination. Past Surgical History:   has a past surgical history that includes Tonsillectomy; Appendectomy; Cholecystectomy (1978); Colonoscopy (1999); Hysterectomy (); Total hip arthroplasty (1998); Kyphosis surgery (2006); Wrist fracture surgery (2008); Cataract removal with implant ([id number]); eye surgery (Bilateral); IR Femoral Popliteal Bypass Graft (Right, 2015); Carpal tunnel release (Right, 2019); Femoral Endarterectomy (Left, 2022); Leg Surgery (Left, 2022); angioplasty (Left, 2022); femoral bypass (Left, 2022); Leg Surgery (N/A, 2022); Axillary-femoral Bypass Graft (Left, 2022); and Leg Surgery (Left, 2022). Restrictions  Other position/activity restrictions: Up as tolerated    Subjective  Subjective: Pt seated in recliner upon arrival w/ RN present. Objective     Cognition  Overall Cognitive Status: Exceptions  Arousal/Alertness: Appropriate responses to stimuli  Following Commands:  Follows one step commands with increased time  Attention Span: Attends with cues to redirect  Memory: Decreased short term memory  Problem Solving: Decreased awareness of errors  Insights: Decreased awareness of deficits  Initiation: Does not require cues  Sequencing: Requires cues for some  Cognition Comment: Pt tends to be very set in \"her ways\" and does not appear to be receptive to recommendations made by therapist. Pt w/ decreased awareness of deficits stating she has not been incontinent and does not shuffle her feet when walking. Orientation  Overall Orientation Status: Within Functional Limits         ADL  Upper Extremity Dressing  Assistance Level: Minimal assistance  Skilled Clinical Factors: Pt seated on toilet required assist to untie gown to doff gown. Pt seated on toilet donned gown required assist to tie gown. Toileting  Assistance Level: Increased time to complete;Contact guard assist  Skilled Clinical Factors: Pt in stance managed briefs up/down over hips w/ CGA. Pt seated on toilet required ++ time to be continent of urine and bowel movement. Pt seated on toilet performed pericare w/ toilet paper and wipes w/ +++ time. Pt also applied vaseline to buttocks while seated on toilet. Toilet Transfers  Technique:  (ambulating w/ RW)  Equipment: Standard toilet  Assistance Level: Moderate assistance; Increased time to complete  Skilled Clinical Factors: Pt demo'd stand > sit RW > toilet w/ CGA w/ LUE supported on GB and RUE supported on center of RW. Pt seated on toilet demo'd sit to stand toilet > RW w/ LUE supported on GB and RUE supported on center of RW w/ Mod A. ++ time for toilet transfer and increased effort          Functional Mobility  Device: Rolling walker  Activity: To/From bathroom  Assistance Level: Contact guard assist  Skilled Clinical Factors: Pt w/ shuffled steps and  decreased pace when ambulating w/ RW to/from bathroom. Pt provided w/ VCs for upright posture.  Pt stated \"I have osteoperosis honey\" standing up taller for a couple of second then flexing forward as she advanced. Sit to Supine  Assistance Level: Moderate assistance  Skilled Clinical Factors: Pt seated EOB w/ ++ time and ++ encouragement to attempt to lift LLE ultimately pt required assist to lift LLE up onto the bed. Pt w/ RLE pushing required assist to scoot fully up to top of the bed. HOB flat, use of R GB when scooting up in bed. Transfers  Surface: From chair with arms;Standard toilet; To bed  Device: Walker  Sit to Stand  Assistance Level: Contact guard assist  Skilled Clinical Factors: Recliner > RW; VCs for hand placement  Stand to Sit  Assistance Level: Contact guard assist  Skilled Clinical Factors: RW > EOB; VCs for hand placement     First Session:   Pt seated in recliner w/ caregiver bhupinder present, requiring increased encouragement to participate in therapy. Transfers:   Sit to stand:   Recliner > RW CGA Vcs for hand placement  Stand to sit:   RW > Recliner CGA Vcs for hand placement     Functional Mobility:   Pt ambulated to/from bathroom w/ RW w/ CGA w/ a shuffled gait and decreased pace. Toilet transfer:   Stand to sit RW > toilet w/ LUE on GB and RUE in center of RW pt required CGA. Pt sit to stand toilet > RW w/ LUE on GB and RUE on center of RW required Mod A to stand. Pt w/ increased effort and ++ time to complete toilet transfers. Toileting:   Pt in stance managed brief up/down over hips w/ CGA for standing balance. Pt seated on toilet w/ ++ time voided urine. Pt seated on toilet performed pericare. Pt seated on toilet required assist to remove brief and thread new brief onto BLEs due to time constraint. OT intended to practice LB dressing w/ adaptive equipment however due to ++ time and increased effort during toileting could not due to time constraint. Pt left seated in recliner w/ call light within reach and chair alarm in place. Assessment  Assessment  Assessment: Pt demo'd CGA for toilet hygiene.  Pt demo'd CGA for all transfers excepts Mod A for sit to stand from the toilet. Pt continues to be very tangential and often has her way of doing things at home. Pt w/ decreased strength, activity tolerance, and endurance. Pt cont to benefit from skilled OT to increased pts independence to PLOF. Cont OT per POC. Activity Tolerance: Patient limited by pain; Patient limited by fatigue;Patient limited by endurance  Discharge Recommendations: Continue to assess pending progress;Home with Home health OT;24 hour supervision or assist  OT Equipment Recommendations  Other: shower chair, GB by toilet, handicap height toilet, pt may benefit from adaptive equipment for footwear cont to assess  Safety Devices  Safety Devices in place: Yes  Type of devices: Bed alarm in place; Left in bed;Nurse notified;Call light within reach    Patient Education  Education  Education Given To: Patient  Education Provided: Role of Therapy;Plan of Care;Safety;ADL Function; Fall Prevention Strategies  Education Provided Comments: Pt supine in bed w/ HOB elevated educated on fall prevention strategies for improved safety when performing functional mobility and ADLs. Pt educated on tips on how to set up pts house to prevent falls including removing all throw rugs or heavy rugs w/ edges lifting, having strong lighting throughout the house, having nightlights or keeping lights on at night, and having nonskid mats on the stairs. Pt verbalized that she utilizes a lot of these strategies and that the only time she has fallen is when she was \"going to fast and being careless\". Pt was then educated on slowing down and making sure to take her time throughout activities because when rushing occurs is when accidents happen. Pt then answered fall prevention and risk questionnaire and OT educated the pt on specific fall prevention based off the clients answers.  Pt was educated on wearing shoes while in her home, taking her time throughout activities, sitting EOB prior to getting up to prevent from getting dizzy or lightheaded, and more. Pt verbalized her understanding and was instructed to review the papers and to be able to state 3 fall prevention strategies for next session. Pt left w/ handouts. Education Method: Verbal;Printed Information/Hand-outs  Barriers to Learning: Cognition  Education Outcome: Verbalized understanding;Continued education needed    Plan  Occupational Therapy Plan  Times Per Week: 5 days a week 90 minutes a day  Current Treatment Recommendations: Strengthening;Balance training;Functional mobility training; Endurance training;Pain management;Self-Care / ADL; Home management training    Goals  Patient Goals   Patient goals : Just to walk  Short Term Goals  Time Frame for Short Term Goals: 8 days - all ongoing  Short Term Goal 1: Pt will demo full body dressing with mod I  Short Term Goal 2: Pt will demo toilet transfer w/ mod I  Short Term Goal 3: Pt will demo toileting w/ Mod I  Short Term Goal 4: Pt will complete a simple meal prep task w/ supervision  Short Term Goal 5: Pt will demo independence w/ verbalizing 3 fall prevention strategies    AM-PAC Score               Therapy Time   Individual First Session Group Co-treatment   Time In 0124  900       Time Out 1345  930       Minutes 60  30       Timed Code Treatment Minutes: 60 Minutes + 30 Minutes = 90 Minutes       Shellie Anderson S/OT

## 2022-11-30 NOTE — PATIENT CARE CONFERENCE
Inpatient Rehabilitation  Weekly Team Conference Note  The [address]  [phone]  Patient Name: Ancelmo Do        MRN: [id number]    : 1929  (80 y.o.)  Gender: female   Referring Practitioner: Dr. 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 The team conference for this patient was held on 2022 at 10:30am by:  Brandon Morton.  DO Sabrina    Current/Goal QM SCORES  QM Current/Goal Score   Eating CARE Score: 5 / Discharge Goal: Independent   Oral Hygiene CARE Score: 5 / Discharge Goal: Independent   Shower/Bathing CARE Score: 4 / Discharge Goal: Independent   UB Dressing CARE Score: 5 / Discharge Goal: Independent   LB Dressing CARE Score: 88 / Discharge Goal: Independent   Putting on/off Footwear CARE Score: 3 / Discharge Goal: Independent   Toileting Hygiene CARE Score: 4 / Discharge Goal: Independent   Bladder Continence      Bowel Continence      Toilet Transfers CARE Score: 3 / Discharge Goal: Independent   Shower/Bathe Self  CARE Score: 4 / Discharge Goal: Independent   Rolling Left and Right CARE Score: 3 / Discharge Goal: Independent   Sit to Lying CARE Score: 3 / Discharge Goal: Independent   Lying to Sitting on Bedside CARE Score: 3 / Discharge Goal: Independent   Sit to Stand CARE Score: 3 / Discharge Goal: Supervision or touching assistance   Chair/Bed to Chair Transfer CARE Score: 3 / Discharge Goal: Supervision or touching assistance   Car Transfers CARE Score: 3 / Discharge Goal: Supervision or touching assistance   Walk 10 Feet CARE Score: 4 / Discharge Goal: Independent   Walk 50 Feet with Two Turns CARE Score: 88 / Discharge Goal: Supervision or touching assistance   Walk 150 Feet CARE Score: 88 / Discharge Goal: Not Attempted   Walk 10 Feet on Uneven Surfaces CARE Score: 88 / Discharge Goal: Supervision or touching assistance   1 Step (Curb) CARE Score: 80 / Discharge Goal: Not Applicable   4 Steps CARE Score: 88 / Discharge Goal: Not Applicable   12 Steps CARE Score: 88 / Discharge Goal: Not Applicable   Picking up Object from Floor CARE Score: 88 / Discharge Goal: Not Attempted   Wheel 50 Feet with 2 Turns CARE Score: 88 / Discharge Goal: Not Applicable   Type         [] Manual        [] Motorized        [x] N/A   Wheel 150 Feet CARE Score: 88 / Discharge Goal: Not Applicable   Type         [] Manual        [] Motorized        [x] N/A     NURSING:  A&Ox: Level of Consciousness: Alert (0)  Hamilton Fall Risk Score: Hamilton Total Score: 70  Admission BIMS: 13  Wounds/Incisions/Ulcers: Surgical left leg wound, Left thigh medial, left leg Distal, and Left upper arm incision. Medication Review: reviewed with patient  Pain: pain can get up to \"9/10\" but improves with ordered pain medication such as ordered Ultram and scheduled Robaxin. Consultations: Vascular, Infectious Disease, general surgery, PT/OT, Dietitian, plastic surgery, physical medicine and rehab. Imaging:  No orders to display   Active Comorbid Conditions:  Systems Review:   Renal: , Dialysis:  Type: N/A, Frequency: N/A  Neurological: WNL  Musculoskeletal:   Respiratory: WNL  Cardiac/Circulatory/Peripheral Vascular:   Abnormal/Relevant Test Results:   Abnormal/Relevant Lab Values:   CBC:   Recent Labs     11/30/22  0630 12/01/22  0502   WBC 7.2 7.3   HGB 8.1* 8.2*   HCT 23.8* 23.6*   MCV 91.4 91.7    249     BMP:   Recent Labs     11/30/22  0630 12/01/22  0502    136   K 3.8 3.9    102   CO2 28 27   PHOS 3.3 2.9   BUN 35* 36*   CREATININE 0.8 0.7     PT/INR: No results for input(s): PROTIME, INR in the last 72 hours. APTT: No results for input(s): APTT in the last 72 hours.   Liver Profile:  Lab Results   Component Value Date/Time    AST 29 11/20/2022 05:26 AM    ALT 14 11/20/2022 05:26 AM    BILIDIR <0.2 05/13/2021 11:53 AM    BILITOT 0.3 11/20/2022 05:26 AM ALKPHOS 84 11/20/2022 05:26 AM     Lab Results   Component Value Date/Time    CHOL 99 07/26/2022 12:07 PM    HDL 50 07/26/2022 12:07 PM    HDL 57 01/30/2012 11:20 AM    TRIG 125 07/26/2022 12:07 PM     Recent Labs     11/30/22  0630 12/01/22  0502   WBC 7.2 7.3   HGB 8.1* 8.2*   HCT 23.8* 23.6*    249   MCV 91.4 91.7     Recent Labs     11/30/22  0630 12/01/22  0502    136   K 3.8 3.9    102   CO2 28 27   PHOS 3.3 2.9   BUN 35* 36*   CREATININE 0.8 0.7     No results for input(s): AST, ALT, ALB, BILIDIR, BILITOT, ALKPHOS in the last 72 hours. Recent Labs     11/30/22  0630 12/01/22  0502   MG 1.70* 2.10     Recent Labs     11/30/22 0630 12/01/22  0502   WBC 7.2 7.3   HGB 8.1* 8.2*   HCT 23.8* 23.6*    249     Recent Labs     11/30/22  0630 12/01/22  0502    136   K 3.8 3.9    102   CO2 28 27   BUN 35* 36*   CREATININE 0.8 0.7   CALCIUM 8.5 8.6   PHOS 3.3 2.9   No results for input(s): AST, ALT, BILIDIR, BILITOT, ALKPHOS in the last 72 hours. No results for input(s): INR in the last 72 hours. No results for input(s): Ramesh Adrian in the last 72 hours. PHYSICAL THERAPY:  Bed Mobility: Scooting: Dependent/Total (Dependent in supine for caudal<>cephalo)    Transfers:  Sit to Stand: Minimal Assistance, Moderate Assistance (Min / mod assist from bedside chair and w/c.  Req VC for hand and foot placement)  Stand to Sit: Minimal Assistance (to bedside chair x 2 trials and to toilet, verbal cues for safety and reach back)  Bed to Chair: Moderate assistance, Minimal assistance (Initaiting stance req mod/ min but once pt is standing, pt able to step with UE support on AD)    WB Status: No WB restrictions noted in chart  Ambulation  Surface: Level tile  Device: Rolling Walker  Other Apparatus: Wheelchair follow (wound vac)  Assistance:  (Min A x1 and then A req for transport of wound vac with w/c follow ( pt repeatedly indicated that she was \"going to pass out\" ))  Quality of Gait: gait mildly unsteady though no overt loss of balance noted, forward flexed posture with downward gaze, decreased step length/height bilaterally with shuffling steps, increased difficulty advancing LLE compared to right  Gait Deviations: Slow Patricia, Decreased step length, Decreased step height, Shuffles  Distance: 10', 3 '  More Ambulation?: No    OCCUPATIONAL THERAPY:  ADL  UE Bathing: Stand by assistance, Verbal cueing, Increased time to complete  UE Bathing Skilled Clinical Factors: Pt seated in WC w/ warm wash cloth took a sponge bath 4/4 components of UB. Pt unaware of residual stool on RUE after performing pericare requiring VCs to clean. Pt required + time to complete. LE Bathing: Contact guard assistance, Increased time to complete  LE Bathing Skilled Clinical Factors: Pt in stance at toilet used warm wash cloth to wipe/clean her bottom w/ LUE supported on GB. Pt seated in wc washed all component of LB bathing flexing at the hip to wash BLEs. UE Dressing: Setup, Increased time to complete  UE Dressing Skilled Clinical Factors: Pt doffed/donned a sweater w/ setup seated in wc. Pt seated in wc doffed/donned gown w/ assist to tie/untie gown. LE Dressing: Moderate assistance, Increased time to complete  LE Dressing Skilled Clinical Factors: Pt seated on toilet required assistance to get briefs off BLE. Pt seated in WC flexed at the hips requiring assist to thread LLE into brief. Pt threaded RLE. Pt in stance managed brief up/down over hips. Pt seated recliner doffed/donned RLE shoe w/ + time independently, pt denied doffing/donning LLE shoe/sock due to pain and inability to reach foot. Pt would required assistance for donning/doffing LLE shoe/sock. Toileting: Contact guard assistance  Toileting Skilled Clinical Factors: Pt managed pants down over hips w/ CGA. Pt seated on toilet continent of urine and a bowel movement. Pt w/ +++ time to perform pericare seated on toilet. Toilet Transfers:   Toilet Transfers  Toilet - Technique: Ambulating  Equipment Used: Standard toilet  Toilet Transfer: Moderate assistance  Toilet Transfers Comments: Pt demo'd stand to sit w/ RW to toilet w/ CGA; Pt demo'd sit to stand toilet to RW w/ L arm on GB and R arm on center of RW w/ Mod A    NUTRITION:    Current diet order: Current diet and supplement order: ADULT DIET; Regular; 3 carb choices (45 gm/meal)  ADULT ORAL NUTRITION SUPPLEMENT; Breakfast, Lunch, Dinner; Diabetic Oral Supplement     Feeding: Needs set up, Needs assist  Room Service: Selective   NSG Recorded PO: PO Meals Eaten (%): 0% PO Supplement (%): 0%  Malnutrition Status Malnutrition Status: Insufficient data    CASE MANAGEMENT:  Psychosocial/Behavioral Issues: none  Assessment:  Pt is from home with a caregiver. Pt will have 24 hr private caregivers at [place]. Pt will need Home IV ABX through 1/4/23. Patient/Family Education provided by team:  Role of PT/OT, HEP, safety with mobility, need for 24 hr assist at [place]    Patient Goals for Rehab stay:  1. walk again   2. To go home     Rehab Team Goals for patient for the Upcoming Week:  1. increase independence with functional mobility   2. Increase independence with ADLs    Barriers to Progress/Attainment of Goals & Efforts/Interventions to remove Barriers:  1. Resistant to new ideas and safety strategies - continue education  2. Pain - continue pain mgmt strategies  3.  Decreased endurance - continue PT/OT    Discharge Plan:  Estimated Length of Stay: 10 days  Destination: home health  Services at Discharge: [address] [place] [address] and Occupational Therapy 2x week  Equipment at Discharge: None  Community Resources: home health aide  Factors facilitating achievement of predicted outcomes: Caregiver support, Pleasant, Good insight into deficits, Has needed Durable Medical Equipment at home, and Home is wheelchair accessible  Barriers to the achievement of predicted outcomes: Pain, Limited participation, Decreased endurance, Upper extremity weakness, and Lower extremity weakness    Special Needs in the Upcoming Week:   [x] Family/Caregiver Education  [] Home visit  []Therapeutic Pass [] Consults:_______   [] Family/Caregiver Training  [] Stroke Risk Factor Education     [] Other;_______     TEAM SUMMARY: Will continue with current poc & goals until anticipated d/c date of 12/8/2022. MD:   Stroke Risk Factors:   [] N/A for this patient [x] HTN  [x]  Diabetes  [] Hyperlipidemia  []Obesity BMI >25  [] Atrial Fibrillation [] Smoker (current)  [] Smoker (quit in last 12 months)  [] Sleep Apnea [] Other:     Risk for Readmission: High (20 or greater)  Critical Items: If High Risk, consider the following recommendations:. Follow up with PCP within one week of discharge.     Justification for Continued Stay:   Criteria for continued IRF stay:  Based on my medical assessment of the patient and review of information from the interdisciplinary team, as part of this weekly team conference, the patient continues to meet the following criteria for IRF level of care:  [x] The patient requires 24-hour rehabilitation nursing care   [x] The patient requires an intensive rehabilitation therapy program  [x] The patient requires active and ongoing intervention of multiple therapy disciplines  [x] The patient requires continued physician supervision by a rehabilitation physician  [x] The patient requires an intensive and coordinated interdisciplinary team approach to the delivery of rehabilitative care    Medical Necessity-continued close physician medical management is required for:   [] Cardiac/Circulatory dysfunction  [] Respiratory/Pulmonary dysfunction  [] Integumentary complications  [x] Peripheral Vascular dysfunction  [x] Musculoskeletal dysfunction  [] Neurological dysfunction d/t:  [] CVA  [] SCI  [] TBI  [] Other: __________  [] Renal dysfunction  [] Hematologic dysfunction    [] Endocrine disorders  [] GI disorders     [] Genito-Urinary dysfunction    Assessment/Plan:  [x] The patient is making good progression towards their LTG's, is actively participating in, and has a reasonable expectation to continue to benefit from the intensive rehabilitation program.  [] The estimated discharge date has been changed from initial team conference due to:   [] The estimated discharge destination has been changed from initial team conference due to:     Rehab Team Members in attendance for Team Conference:  Therapy Manager/ARU :  Hannah Benson, PT, DPT    Social Work:  Marifer Wilkinson [place]    Nursing:  Benedicto Sahu RN    Therapy:  Juan Francisco Ayala, PT, DPT  Torito Noel PT, DPT     Ale Parekh, OTR/L  [address] [place] [address], Ne, [address]    Nutrition:  Alexei Bernal, [address] [facility]: [phone]  Office:  [phone]    I approve the established interdisciplinary plan of care as documented within the medical record of Alesha Santos.     MD Margot Castro D.O. M.P.H  PM&R  12/1/2022  12:32 PM

## 2022-11-30 NOTE — PROGRESS NOTES
Physical Therapy  Facility/Department: [facility] ACUTE REHAB UNIT  Rehabilitation Physical Therapy Treatment Note    NAME: Emily Smyth  : 1929 (80 y.o.)  MRN: [id number]  CODE STATUS: DNR-CCA    Date of Service: 22       Restrictions:  Position Activity Restriction  Other position/activity restrictions: Up as tolerated     SUBJECTIVE  Subjective  Subjective: Pt seated in recliner alert and agreeable to session. Pt reports needs to put dentures in . PT assisted  Pain: Pt reports pain on left leg/ thigh with all movement, No number given        Post Treatment Pain Screening         OBJECTIVE  Cognition  Overall Cognitive Status: [facility]  Cognition Comment: Pt tends to be very set in [employer]" and does not  receptive to recommendations made by therapist.  Orientation  Overall Orientation Status: Within Functional Limits  Orientation Level: Oriented X4    Functional Mobility  Balance  Sitting Balance: Supervision  Standing Balance: Contact guard assistance  Standing Balance  Activity: Pt able to stand at RW with CGA and maintain balance . No LOB noted. Pt reports feeling light headed with standing . Pt /72 , HR 76 . RN aware  Transfers  Surface: From chair with arms; Wheelchair  Additional Factors: Increased time to complete;Hand placement cues  Device: Walker  Sit to Stand  Assistance Level: Contact guard assist;Minimal assistance  Skilled Clinical Factors: Pt sit <> stand from recliner and WC with RW and CGA- Aster with cues for set up. ++ time to complete due to pain and pt needs max cues to stay on task . Pt very tangental speech  Stand to Sit  Assistance Level: Contact guard assist  Skilled Clinical Factors: cues for reaching back and controlled descent  Car Transfer  Assistance Level:  Moderate assistance  Skilled Clinical Factors: via stand pivot with RW , cues for sequencing, assist to lift LLE into car. ++ time      Environmental Mobility  Ambulation  Surface: Level surface  Device: Rolling walker  Distance: 15ft, 10ft  Activity: Within Room  Activity Comments: Cues for participation and staying on task. Distance limited by fatigue and needs cues to stay on task  Additional Factors: Verbal cues; Increased time to complete  Assistance Level: Contact guard assist;Minimal assistance  Gait Deviations: Decreased step length bilateral;Slow jhon;Decreased weight shift left;Decreased heel strike right;Decreased heel strike left  Skilled Clinical Factors: cues for increased step length , step height, and posture. Pt with very limited carry over. Second Session : Pt seated in chair upon arrival. Pt reports she does not want to get out of chair or move her left left. With education pt agreeable to exercises in chair only. Pt performed 2x10 BLE glut sets, quad sets, hip ER/IR , and ankle pumps. Cues to stay on task and multiple rest breaks to complete. Educated pt on SLR and hip abd/ ADD , and heel slides. Pt refused to attempt. Discussed dc planning and need for 24 hr assist at dc . Pt agreeable and reports her CG already knows that she will need increased care to 24 hr at dc. Pt left seated in recliner with alarm on and all needs met. RN notified. ASSESSMENT/PROGRESS TOWARDS GOALS       Assessment  Assessment: Pt tolerated session fair. Limited by pain and activity tolerance. Pt resistant to reccomendations for changes in transfers and gait but very receptive to HEP. Pt remains below baseline and requries continuede skilled PT services in order to maximize rehab potential  Activity Tolerance: Patient limited by pain; Patient limited by fatigue;Patient limited by endurance  Discharge Recommendations: Home with Home health PT;24 hour supervision or assist  PT Equipment Recommendations  Equipment Needed: No  Other: Pt reports that she has excessive equipment at home from past medical episodes    Goals  Patient Goals   Patient Goals :  To Return home and rest  Short Term Goals  Time Frame for Short Term Goals: 8 days  Short Term Goal 1: Ind with bed mobility  Short Term Goal 2: Transf with S excluding floor transf  Short Term Goal 3: Ambulate with S x  50'  with LRAD on level surfaces  Short Term Goal 4: MI w/c propulsion x 50' on level surfaces  Additional Goals?: No    PLAN OF CARE/SAFETY  Physcial Therapy Plan  Days Per Week: 5 Days  Hours Per Day: 1.5 hours  Current Treatment Recommendations: Strengthening;Patient/Caregiver education & training; Therapeutic activities;Balance training;Gait training;Stair training;Functional mobility training;Transfer training; Endurance training; Safety education & training; Wheelchair mobility training;Pain management;Positioning  Safety Devices  Type of Devices: Call light within reach;Nurse notified; Chair alarm in place; Left in chair    EDUCATION  Education  Education Given To: Patient  Education Provided: Role of Therapy;Plan of Care;Precautions; Mobility Training; Safety;Transfer Training;Energy Conservation; Fall Prevention Strategies; Home Exercise Program  Education Method: Verbal  Education Outcome: Verbalized understanding;Continued education needed  Skilled Clinical Factors: need cues to stay on task        Therapy Time   Individual Concurrent Group Co-treatment   Time In 0730         Time Out 0830         Minutes 60            Second Session Therapy Time:   Individual Concurrent Group Co-treatment   Time In 1100         Time Out 1125         Minutes 30           Timed Code Treatment Minutes:  60+30    Total Treatment Minutes:  90         Linda East PT, 11/30/22 at 10:59 AM

## 2022-11-30 NOTE — PROGRESS NOTES
This RN assuming care of pt. Pt a/o x4, wounds to LLE redressed with xeroform and antibacterial ointment. Pt c/o pain to her LLE, asking for nursing staff to hold her leg in the air. Pt received scheduled Robaxin. POC discussed with pt and caregiver, questions/concerns addressed at this time, fall px in place.

## 2022-11-30 NOTE — PROGRESS NOTES
11/30/22 1009   Encounter Summary   Encounter Overview/Reason  Initial Encounter   Service Provided For: Patient  (professional caregiver in room)   Referral/Consult From: [address] [place] [address] Other (Comment)  (professional caregiver. caregiver said that pt has children, but pt spoke only of how wonderful the professional caregiver is)   Last Encounter    (es 11/30)   Complexity of Encounter Moderate   Begin Time 1005   End Time  1012   Total Time Calculated 7 min   Assessment/Intervention/Outcome   Assessment Coping; Hopeful   Intervention Active listening;Discussed belief system/Rastafari practices/selina;Discussed meaning/purpose;Explored Coping Skills/Resources   Outcome Coping;Engaged in conversation;Receptive   Plan and Referrals   Plan/Referrals Other (Comment)  (as needed)

## 2022-11-30 NOTE — PROGRESS NOTES
Plastic Surgery   Daily Progress Note  Patient: Kalie Bolanos      CC: Wound closure    SUBJECTIVE:   Patient rested well overnight. Remained afebrile and HDS. Working with physical therapy. ROS:   A 14 point review of systems was conducted, significant findings as noted above. All other systems negative. OBJECTIVE:    PHYSICAL EXAM:    Vitals:    11/29/22 0735 11/29/22 1003 11/29/22 1249 11/29/22 2000   BP:  128/78  (!) 144/70   Pulse: 84 (!) 101  69   Resp: 18 17  16   Temp:  98.1 °F (36.7 °C)  97.7 °F (36.5 °C)   TempSrc:  Oral  Oral   SpO2: 99% 97%  96%   Height:   5' 4\" (1.626 m)        General appearance: alert, no acute distress, grooming appropriate  Eyes: No scleral icterus, EOM grossly intact  Neck: trachea midline, no JVD, no lymphadenopathy, neck supple  Chest/Lungs: CTAB, no crackles/rales, wheezes/rhonchi, normal effort with no accessory muscle use on RA  Cardiovascular: RRR  Abdomen: Soft, non-tender, non-distended, no rebound, guarding, or rigidity. Skin: warm and dry, no rashes  Extremities: LUE incision with jez in place wrapped in kerlix. Some strike through of dressing, edema improved. L groin with firm LN on medial groin, unchanged from prior exam, non tender. Groin with prevena and wound vac in place with good seal -100. Groin and incision sites are soft without tenderness and no signs of hematoma or active bleeding. Xeroform dressing in place at donor site open to air, mild seepage. Sensation and mobility intact b/l LE. PT palpable b/l. DP palpable on right, doppler on L. L  to palpation   Genitourinary: external catheter in place to wall suction, clear yellow urine.    Neuro: A&Ox3, no focal deficits, sensation intact    LABS:   Recent Labs     11/28/22  0303 11/30/22  0630   WBC 8.5 7.2   HGB 8.6* 8.1*   HCT 25.2* 23.8*   MCV 91.0 91.4    240          Recent Labs     11/28/22  0303      K 4.2      CO2 24   PHOS 3.7   BUN 29*   CREATININE 0.8          No results for input(s): AST, ALT, ALB, BILIDIR, BILITOT, ALKPHOS in the last 72 hours. No results for input(s): LIPASE, AMYLASE in the last 72 hours. No results for input(s): PROT, INR, APTT in the last 72 hours. No results for input(s): CKTOTAL, CKMB, CKMBINDEX, TROPONINI in the last 72 hours. ASSESSMENT & PLAN:   This is a 80y.o. 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     - Wound vac removed with viable muscle and take of skin graft  - Bacitracin and xeroform to graft and suture line  - Donor site open to air   - Will reiterate importance of proper blood glucose management for proper wound healing   - Further care per [id number], DO PGY-3  General Surgery  11/30/22  6:51 AM  [phone]

## 2022-11-30 NOTE — PROGRESS NOTES
Vascular Surgery   Daily Progress Note  Frantz Santiago  CC: L iliac aneurysm     Subjective : Pt resting in chair. States she has been working with therapy. She has feelings of \"10 bees stinging her leg, or it feels like it is being electrocuted. \" She states it is painful when moving. She does feel she is getting stronger in that LLE. She states she is unable to take solid food due to being intubated but is taking protein drinks. Voidng, having BM's. Objective    Infusions:   dextrose          I/O:I/O last 3 completed shifts: In: 65 [P.O.:650; I.V.:10]  Out: -            Wt Readings from Last 1 Encounters:   11/28/22 107 lb 5.8 oz (48.7 kg)                 LABS:    Recent Labs     11/28/22  0303 11/30/22  0630   WBC 8.5 7.2   HGB 8.6* 8.1*   HCT 25.2* 23.8*   MCV 91.0 91.4    240        Recent Labs     11/28/22 0303 11/30/22  0630    139   K 4.2 3.8    103   CO2 24 28   PHOS 3.7  --    BUN 29* 35*   CREATININE 0.8 0.8      No results for input(s): AST, ALT, ALB, BILIDIR, BILITOT, ALKPHOS in the last 72 hours. No results for input(s): LIPASE, AMYLASE in the last 72 hours. No results for input(s): PROT, INR, APTT in the last 72 hours. No results for input(s): CKTOTAL, CKMB, CKMBINDEX, TROPONINI in the last 72 hours. Exam:BP (!) 148/58   Pulse (!) 102   Temp 97.8 °F (36.6 °C) (Oral)   Resp 18   Ht 5' 4\" (1.626 m)   SpO2 97%   BMI 18.43 kg/m²   General appearance: alert, no acute distress, grooming appropriate  Eyes: No scleral icterus, EOM grossly intact  Neck: trachea midline, no JVD, no lymphadenopathy, neck supple  Chest/Lungs: CTAB, no crackles/rales, wheezes/rhonchi, normal effort with no accessory muscle use on RA  Cardiovascular: RRR  Abdomen: Soft, non-tender, non-distended, no rebound, guarding, or rigidity. Skin: warm and dry, no rashes  Extremities: LUE incision with jez in place.   L groin with firm LN on medial groin, unchanged from prior exam, non tender. Groin and incision sites are soft without tenderness and no signs of hematoma or active bleeding. Xeroform dressing in place at donor site open to air, mild seepage. Sensation and mobility intact b/l LE. PT palpable b/l. DP palpable on right, doppler on L. L leg tender to palpation   Genitourinary: external catheter in place to wall suction, clear yellow urine.    Neuro: A&Ox3, no focal deficits, sensation intact    Pulses     Rad Fem Pop PT DP   Right + + + + +   Left + NA + + -/+       ASSESSMENT/PLAN: Pt. is a 80 y.o. female s/p excision of infected ilofemoral bypass graft with harvest of left basilic vein and left external iliac artery to femoral bypass through obturator canal 11/20 and rectus femoris flap with split thickness skin graft 11/22.    - Cont IV abx per ID  - Cont ASA  81 mg daily  - discussed close control of BG for wound healing  - pt with distal pulses to LLE.   - Wound care per plastics  - management per VENKAT Bella CNP  11/30/2022  10:44 AM  jase

## 2022-11-30 NOTE — PLAN OF CARE
Problem: Pain  Goal: Verbalizes/displays adequate comfort level or baseline comfort level  Flowsheets (Taken 11/30/2022 0209)  Verbalizes/displays adequate comfort level or baseline comfort level:   Encourage patient to monitor pain and request assistance   Assess pain using appropriate pain scale   Administer analgesics based on type and severity of pain and evaluate response         Problem: Nutrition Deficit:  Goal: Optimize nutritional status  Flowsheets  Taken 11/30/2022 0209 by Haider Leger RN  Nutrient intake appropriate for improving, restoring, or maintaining nutritional needs:   Assess nutritional status and recommend course of action   Monitor oral intake, labs, and treatment plans  Taken 11/29/2022 1249 by Joselin Herrmann RD  Nutrient intake appropriate for improving, restoring, or maintaining nutritional needs:   Assess nutritional status and recommend course of action   Monitor oral intake, labs, and treatment plans   Recommend appropriate diets, oral nutritional supplements, and vitamin/mineral supplements

## 2022-11-30 NOTE — PROGRESS NOTES
Department of Physical Medicine & Rehabilitation  Progress Note    Patient Identification:  Shara Form  [id number]  : 1929  Admit date: 2022    Chief Complaint: Debility    Subjective:   No acute events overnight. Patient seen this am. She is seeing the surgeon this morning. Working in therapy but continues to have pain when you touch her left leg. No other the issues. Refusing her insulin and her dulcolax. ROS: No f/c, n/v, cp     Objective:  Patient Vitals for the past 24 hrs:   BP Temp Temp src Pulse Resp SpO2 Height   22 0845 (!) 148/58 97.8 °F (36.6 °C) Oral (!) 102 18 97 % --   22 2000 (!) 144/70 97.7 °F (36.5 °C) Oral 69 16 96 % --   22 1249 -- -- -- -- -- -- 5' 4\" (1.626 m)     Const: Alert. No distress, pleasant. HEENT: Normocephalic, atraumatic. Normal sclera/conjunctiva. MMM. CV: Regular rate and rhythm. Resp: No respiratory distress. Lungs CTAB. Abd: Soft, nontender, nondistended, NABS+   Ext: No edema. Neuro: Alert, oriented, appropriately interactive. Psych: Cooperative, appropriate mood and affect    Laboratory data: Available via EMR.    Last 24 hour lab  Recent Results (from the past 24 hour(s))   POCT Glucose    Collection Time: 22 12:06 PM   Result Value Ref Range    POC Glucose 201 (H) 70 - 99 mg/dl    Performed on ACCU-CHEK    POCT Glucose    Collection Time: 22  4:28 PM   Result Value Ref Range    POC Glucose 136 (H) 70 - 99 mg/dl    Performed on ACCU-CHEK    POCT Glucose    Collection Time: 22  7:48 PM   Result Value Ref Range    POC Glucose 196 (H) 70 - 99 mg/dl    Performed on ACCU-CHEK    Basic Metabolic Panel w/ Reflex to MG    Collection Time: 22  6:30 AM   Result Value Ref Range    Sodium 139 136 - 145 mmol/L    Potassium reflex Magnesium 3.8 3.5 - 5.1 mmol/L    Chloride 103 99 - 110 mmol/L    CO2 28 21 - 32 mmol/L    Anion Gap 8 3 - 16    Glucose 121 (H) 70 - 99 mg/dL    BUN 35 (H) 7 - 20 mg/dL    Creatinine 0.8 0.6 - 1.2 mg/dL    Est, Glom Filt Rate >60 >60    Calcium 8.5 8.3 - 10.6 mg/dL   CBC auto differential    Collection Time: 11/30/22  6:30 AM   Result Value Ref Range    WBC 7.2 4.0 - 11.0 K/uL    RBC 2.60 (L) 4.00 - 5.20 M/uL    Hemoglobin 8.1 (L) 12.0 - 16.0 g/dL    Hematocrit 23.8 (L) 36.0 - 48.0 %    MCV 91.4 80.0 - 100.0 fL    MCH 31.0 26.0 - 34.0 pg    MCHC 33.9 31.0 - 36.0 g/dL    RDW 19.2 (H) 12.4 - 15.4 %    Platelets 866 090 - 376 K/uL    MPV 6.6 5.0 - 10.5 fL    Neutrophils % 69.9 %    Lymphocytes % 15.7 %    Monocytes % 10.1 %    Eosinophils % 3.8 %    Basophils % 0.5 %    Neutrophils Absolute 5.0 1.7 - 7.7 K/uL    Lymphocytes Absolute 1.1 1.0 - 5.1 K/uL    Monocytes Absolute 0.7 0.0 - 1.3 K/uL    Eosinophils Absolute 0.3 0.0 - 0.6 K/uL    Basophils Absolute 0.0 0.0 - 0.2 K/uL   POCT Glucose    Collection Time: 11/30/22  8:36 AM   Result Value Ref Range    POC Glucose 184 (H) 70 - 99 mg/dl    Performed on ACCU-CHEK        Therapy progress:  PT  Position Activity Restriction  Other position/activity restrictions: Up as tolerated  Objective     Sit to Stand: Minimal Assistance, Moderate Assistance (Min / mod assist from bedside chair and w/c.  Req VC for hand and foot placement)  Stand to Sit: Minimal Assistance (to bedside chair x 2 trials and to toilet, verbal cues for safety and reach back)  Bed to Chair: Moderate assistance, Minimal assistance (Initaiting stance req mod/ min but once pt is standing, pt able to step with UE support on AD)  Device: Rolling Walker  Other Apparatus: Wheelchair follow (wound vac)  Assistance:  (Min A x1 and then A req for transport of wound vac with w/c follow ( pt repeatedly indicated that she was \"going to pass out\" ))  Distance: 10', 3 '  OT  PT Equipment Recommendations  Equipment Needed: No  Other: Pt reports that she has excessive equipment at home from past medical episodes  Toilet - Technique: Ambulating  Equipment Used: Standard toilet  Toilet Transfers Comments: Pt demo'd stand to sit w/ RW to toilet w/ CGA; Pt demo'd sit to stand toilet to RW w/ L arm on GB and R arm on center of RW w/ Mod A  Assessment                  Body mass index is 18.43 kg/m². Assessment and Plan:  1. Debility- Currently she is just starting to be able to more her left leg again. She is still in considerable pain. She is refusing medications and sometimes refusing therapy. She may want to come to the ARU but is not sure at this time. 2. Surgical leg wound- followed by surgery  - pain control     3.  DM2  - restart home metformin     Rehab Progress: Improving  Anticipated Dispo: home  Services/DME: ANGELA  ELOS: ANGELA Mc D.O. M.P.H  PM&R  11/30/2022  11:04 AM

## 2022-12-01 LAB
ALBUMIN SERPL-MCNC: 2.9 G/DL (ref 3.4–5)
ANION GAP SERPL CALCULATED.3IONS-SCNC: 7 MMOL/L (ref 3–16)
BASOPHILS ABSOLUTE: 0 K/UL (ref 0–0.2)
BASOPHILS RELATIVE PERCENT: 0.5 %
BUN BLDV-MCNC: 36 MG/DL (ref 7–20)
CALCIUM SERPL-MCNC: 8.6 MG/DL (ref 8.3–10.6)
CHLORIDE BLD-SCNC: 102 MMOL/L (ref 99–110)
CO2: 27 MMOL/L (ref 21–32)
CREAT SERPL-MCNC: 0.7 MG/DL (ref 0.6–1.2)
EOSINOPHILS ABSOLUTE: 0.2 K/UL (ref 0–0.6)
EOSINOPHILS RELATIVE PERCENT: 3.3 %
GFR SERPL CREATININE-BSD FRML MDRD: >60 ML/MIN/{1.73_M2}
GLUCOSE BLD-MCNC: 127 MG/DL (ref 70–99)
GLUCOSE BLD-MCNC: 95 MG/DL (ref 70–99)
HCT VFR BLD CALC: 23.6 % (ref 36–48)
HEMOGLOBIN: 8.2 G/DL (ref 12–16)
LYMPHOCYTES ABSOLUTE: 1.2 K/UL (ref 1–5.1)
LYMPHOCYTES RELATIVE PERCENT: 17.1 %
MAGNESIUM: 2.1 MG/DL (ref 1.8–2.4)
MCH RBC QN AUTO: 32 PG (ref 26–34)
MCHC RBC AUTO-ENTMCNC: 34.9 G/DL (ref 31–36)
MCV RBC AUTO: 91.7 FL (ref 80–100)
MONOCYTES ABSOLUTE: 0.7 K/UL (ref 0–1.3)
MONOCYTES RELATIVE PERCENT: 9.9 %
NEUTROPHILS ABSOLUTE: 5.1 K/UL (ref 1.7–7.7)
NEUTROPHILS RELATIVE PERCENT: 69.2 %
PDW BLD-RTO: 19.3 % (ref 12.4–15.4)
PERFORMED ON: ABNORMAL
PHOSPHORUS: 2.9 MG/DL (ref 2.5–4.9)
PLATELET # BLD: 249 K/UL (ref 135–450)
PMV BLD AUTO: 6.7 FL (ref 5–10.5)
POTASSIUM SERPL-SCNC: 3.9 MMOL/L (ref 3.5–5.1)
RBC # BLD: 2.57 M/UL (ref 4–5.2)
SODIUM BLD-SCNC: 136 MMOL/L (ref 136–145)
WBC # BLD: 7.3 K/UL (ref 4–11)

## 2022-12-01 PROCEDURE — 94669 MECHANICAL CHEST WALL OSCILL: CPT

## 2022-12-01 PROCEDURE — 83735 ASSAY OF MAGNESIUM: CPT

## 2022-12-01 PROCEDURE — 97535 SELF CARE MNGMENT TRAINING: CPT

## 2022-12-01 PROCEDURE — 2580000003 HC RX 258: Performed by: PHYSICAL MEDICINE & REHABILITATION

## 2022-12-01 PROCEDURE — 36592 COLLECT BLOOD FROM PICC: CPT

## 2022-12-01 PROCEDURE — 6370000000 HC RX 637 (ALT 250 FOR IP): Performed by: PHYSICAL MEDICINE & REHABILITATION

## 2022-12-01 PROCEDURE — 94664 DEMO&/EVAL PT USE INHALER: CPT

## 2022-12-01 PROCEDURE — 97116 GAIT TRAINING THERAPY: CPT | Performed by: PHYSICAL THERAPIST

## 2022-12-01 PROCEDURE — 1280000000 HC REHAB R&B

## 2022-12-01 PROCEDURE — 80069 RENAL FUNCTION PANEL: CPT

## 2022-12-01 PROCEDURE — 97110 THERAPEUTIC EXERCISES: CPT

## 2022-12-01 PROCEDURE — 97530 THERAPEUTIC ACTIVITIES: CPT | Performed by: PHYSICAL THERAPIST

## 2022-12-01 PROCEDURE — 6360000002 HC RX W HCPCS: Performed by: PHYSICAL MEDICINE & REHABILITATION

## 2022-12-01 PROCEDURE — 85025 COMPLETE CBC W/AUTO DIFF WBC: CPT

## 2022-12-01 PROCEDURE — 99233 SBSQ HOSP IP/OBS HIGH 50: CPT | Performed by: PHYSICAL MEDICINE & REHABILITATION

## 2022-12-01 PROCEDURE — 94150 VITAL CAPACITY TEST: CPT

## 2022-12-01 PROCEDURE — 94761 N-INVAS EAR/PLS OXIMETRY MLT: CPT

## 2022-12-01 PROCEDURE — 97530 THERAPEUTIC ACTIVITIES: CPT

## 2022-12-01 RX ADMIN — METHOCARBAMOL TABLETS 1000 MG: 500 TABLET, COATED ORAL at 08:39

## 2022-12-01 RX ADMIN — ACETAMINOPHEN 1000 MG: 500 TABLET ORAL at 17:07

## 2022-12-01 RX ADMIN — ACETAMINOPHEN 1000 MG: 500 TABLET ORAL at 05:25

## 2022-12-01 RX ADMIN — METFORMIN HYDROCHLORIDE 500 MG: 500 TABLET ORAL at 08:39

## 2022-12-01 RX ADMIN — ACETAMINOPHEN 1000 MG: 500 TABLET ORAL at 22:48

## 2022-12-01 RX ADMIN — TRAMADOL HYDROCHLORIDE 25 MG: 50 TABLET, COATED ORAL at 17:07

## 2022-12-01 RX ADMIN — ASPIRIN 81 MG: 81 TABLET, COATED ORAL at 08:39

## 2022-12-01 RX ADMIN — FUROSEMIDE 20 MG: 20 TABLET ORAL at 08:39

## 2022-12-01 RX ADMIN — ATORVASTATIN CALCIUM 10 MG: 10 TABLET, FILM COATED ORAL at 11:20

## 2022-12-01 RX ADMIN — SODIUM CHLORIDE, PRESERVATIVE FREE 10 ML: 5 INJECTION INTRAVENOUS at 11:15

## 2022-12-01 RX ADMIN — TRAMADOL HYDROCHLORIDE 25 MG: 50 TABLET, COATED ORAL at 08:38

## 2022-12-01 RX ADMIN — ACETAMINOPHEN 1000 MG: 500 TABLET ORAL at 12:21

## 2022-12-01 RX ADMIN — BACITRACIN: 500 OINTMENT TOPICAL at 09:47

## 2022-12-01 RX ADMIN — DAPTOMYCIN 350 MG: 500 INJECTION, POWDER, LYOPHILIZED, FOR SOLUTION INTRAVENOUS at 11:18

## 2022-12-01 RX ADMIN — METFORMIN HYDROCHLORIDE 500 MG: 500 TABLET ORAL at 17:08

## 2022-12-01 RX ADMIN — METHOCARBAMOL TABLETS 1000 MG: 500 TABLET, COATED ORAL at 22:48

## 2022-12-01 RX ADMIN — SODIUM CHLORIDE, PRESERVATIVE FREE 10 ML: 5 INJECTION INTRAVENOUS at 05:27

## 2022-12-01 RX ADMIN — SODIUM CHLORIDE, PRESERVATIVE FREE 10 ML: 5 INJECTION INTRAVENOUS at 22:48

## 2022-12-01 ASSESSMENT — PAIN DESCRIPTION - DESCRIPTORS
DESCRIPTORS: ACHING;BURNING
DESCRIPTORS: BURNING;SHOOTING
DESCRIPTORS: ACHING;BURNING
DESCRIPTORS: SHOOTING;BURNING

## 2022-12-01 ASSESSMENT — PAIN DESCRIPTION - LOCATION
LOCATION: LEG

## 2022-12-01 ASSESSMENT — PAIN SCALES - GENERAL
PAINLEVEL_OUTOF10: 10
PAINLEVEL_OUTOF10: 2
PAINLEVEL_OUTOF10: 10
PAINLEVEL_OUTOF10: 1
PAINLEVEL_OUTOF10: 7

## 2022-12-01 ASSESSMENT — PAIN DESCRIPTION - PAIN TYPE
TYPE: ACUTE PAIN
TYPE: ACUTE PAIN
TYPE: SURGICAL PAIN

## 2022-12-01 ASSESSMENT — PAIN - FUNCTIONAL ASSESSMENT: PAIN_FUNCTIONAL_ASSESSMENT: PREVENTS OR INTERFERES SOME ACTIVE ACTIVITIES AND ADLS

## 2022-12-01 ASSESSMENT — PAIN DESCRIPTION - ONSET
ONSET: ON-GOING

## 2022-12-01 ASSESSMENT — PAIN SCALES - WONG BAKER: WONGBAKER_NUMERICALRESPONSE: 4

## 2022-12-01 ASSESSMENT — PAIN DESCRIPTION - ORIENTATION
ORIENTATION: LEFT

## 2022-12-01 ASSESSMENT — PAIN DESCRIPTION - DIRECTION: RADIATING_TOWARDS: DOWN LEG

## 2022-12-01 ASSESSMENT — PAIN DESCRIPTION - FREQUENCY
FREQUENCY: CONTINUOUS

## 2022-12-01 NOTE — PROGRESS NOTES
Patient up to chair, private care companion in room visiting, IV antibiotic given this shift via PICC line. Reviewed wound care orders and discussed with  and patient. Poor intake today but prioritizing Glucerna for each meal as patient declining to eat actual meals, reporting that she just can't due to throat being sore. Declines offer to adjust meals to something soft. Able to swallow pills with thin liquids.

## 2022-12-01 NOTE — PROGRESS NOTES
Occupational Therapy  Facility/Department: [facility] ACUTE REHAB UNIT  Rehabilitation Occupational Therapy Daily Treatment Note    Date: 22  Patient Name: Jacqueline Stokes       Room: 3109/3109-01  MRN: [id number]  Account: [de-identified]   : 1929  (80 y.o.) Gender: female            Past Medical History:  has a past medical history of Anxiety, Arthritis, At risk for falls, CAD (coronary artery disease), Cerebral artery occlusion with cerebral infarction ([address] DDD (degenerative disc disease), cervical, Fractures, Hyperlipidemia, Hypertension, Mitral regurgitation, Neuropathy, Osteopenia, Osteoporosis, PAD (peripheral artery disease) ([address] Peripheral neuropathy, Peripheral vascular disease ([address] Podagra, Prolonged emergence from general anesthesia, Spinal stenosis, Type 2 diabetes mellitus ([address] Urethral stricture, and Urgency of urination. Past Surgical History:   has a past surgical history that includes Tonsillectomy; Appendectomy; Cholecystectomy (1978); Colonoscopy (1999); Hysterectomy (); Total hip arthroplasty (1998); Kyphosis surgery (2006); Wrist fracture surgery (2008); Cataract removal with implant (083303); eye surgery (Bilateral); IR Femoral Popliteal Bypass Graft (Right, 2015); Carpal tunnel release (Right, 2019); Femoral Endarterectomy (Left, 2022); Leg Surgery (Left, 2022); angioplasty (Left, 2022); femoral bypass (Left, 2022); Leg Surgery (N/A, 2022); Axillary-femoral Bypass Graft (Left, 2022); and Leg Surgery (Left, 2022). Restrictions  Other position/activity restrictions: Up as tolerated    Subjective  Subjective: Pt seated in recliner upon arrival w/ caregiver present. Pt requiring increased time and encouragement for participation           Objective     Cognition  Overall Cognitive Status: Exceptions  Arousal/Alertness: Appropriate responses to stimuli  Following Commands:  Follows one step commands with increased time; Follows one step commands with repetition  Attention Span: Attends with cues to redirect  Memory: Decreased short term memory  Problem Solving: Decreased awareness of errors  Insights: Decreased awareness of deficits  Initiation: Does not require cues  Sequencing: Requires cues for some  Cognition Comment: Pt compaining about most requests for out of chair activity but able to complete some tasks with heavy encouragment  Orientation  Overall Orientation Status: Within Normal Limits  Orientation Level: Oriented X4         ADL  All ADL tasks requiring +++increased time, encouragement and negotiation about how best to/or if pt should complete task. Grooming/Oral Hygiene  Assistance Level: Contact guard assist  Skilled Clinical Factors: Pt washing hands in stance at sink with heavy encouragment. Upper Extremity Dressing  Assistance Level: Contact guard assist  Skilled Clinical Factors: Pt seated in recliner chair doffing gown and donning sweater with increased time and encouragment. Care giver completing set up. Lower Extremity Dressing  Assistance Level: Minimal assistance  Skilled Clinical Factors: Pt donning pants and briefs requiring assist for threading LLE and slight assist for waist band adjustment in stance  Putting On/Taking Off Footwear  Assistance Level: Minimal assistance  Skilled Clinical Factors: donning socks with figure 4 requiring SBA and donning slip on shoes with Min A  Toileting  Assistance Level: Increased time to complete;Contact guard assist  Skilled Clinical Factors: Pt having bowel movement and urination with +++time seated on toilet. Pt requiring CGA for steadying stance to pull up pants. Pt completing buttocks care seated with increased time and SBA  Toilet Transfers  Equipment: Standard toilet  Assistance Level: Moderate assistance; Increased time to complete;Minimal assistance  Skilled Clinical Factors: Transfer completed with Min A for eccentric control.  Pt requiring VCs for hand placement and safe positioning. Functional Mobility  Device: Rolling walker  Activity: To/From bathroom  Assistance Level: Contact guard assist  Skilled Clinical Factors: Pt with flexed forward posture and shuffling gait from recliner chair to couch then from couch to toilet. Pt breifly responsive to cues for upright stance and increasing step length but quickly requiring reminders. Pt with many reason why she is unable to stand up straight and increase step length. Scooting  Assistance Level: Minimal assistance  Skilled Clinical Factors: Pt able to scoot back in chair leaning side to side but requiring assist for scooting RHip back due to increased pain when wieght shifting to L hip. Transfers  Surface: To chair with arms; To chair without arms;Raised toilet Seat  Additional Factors: Verbal cues; Increased time to complete;Hand placement cues  Device: Walker  Sit to Stand  Assistance Level: Contact guard assist;Minimal assistance  Skilled Clinical Factors: Pt completing transfer from recliner chair to couch with CGA for stance from recliner then Min A for eccentric control to couch and MIn A for stance from couch. Stand to Sit  Assistance Level: Contact guard assist;Minimal assistance       Second Session:  Pt seated in recliner upon arrival, requiring ++ encouragement to participate stating she would not get up on her leg. ADL:  Pt educated on the use of a sock aid for increased independence w/ donning footwear. Pt provided modeling and verbal instructions on how to use the sock aid. Pt denying the need to use one however when attempting to doff sock pt unable to reach LLE and unable to perform figure 4 independently. Pt then attempted to don hospital sock over personal sock. Pt attempting to hold figure 4 requested her caregiver hold her leg for her. Pt educated that the goal of the sock aid was to make her independent in donning her socks and decrease pain.  Pt unable to don sock. Pt provided w/ sock aid and began to put sock over sock aid stating \"I am to weak for this\". Pt then w/ sock fully applied to sock aid successfully donned her sock w/ Vcs to pull the sock aid up to fully don sock. Pt then stated she would not use the sock aid and would have someone put her socks on for her until she could. Exercises:   Pt denying getting out of chair. Pt performed the following UE exercises for increased strength for ADLs. Pt seated in chair w/ 2lb dowel bar performed 2 sets of 10 for each exercise w/ short rest breaks in between. Pt performed bicep curls, chest pressed, and shoulder presses. Pt was provided verbal directions and modeling on how to perform each exercise along w/ some Tcs. Pt performed each exercise w/ good form and no complaints of pain. Pt left seated in recliner w/ chair alarm on, call light in place, and pts caregiver present. Assessment  Assessment  Assessment: Pt demonstrating functional mobility to and from bathroom with CGA and RW with flexed forward posture and shuffling gait. Heavy encouragment needed for participation in all tasks. UE dressing completed with CGA and LE dressing requiring Min A. Pt with decreased strength and endurance for all taks. Plan is for pt to return home with caregiver. Continue OT per POC. Activity Tolerance: Patient limited by pain; Patient limited by fatigue;Patient limited by endurance  Discharge Recommendations: Continue to assess pending progress;Home with Home health OT;24 hour supervision or assist  OT Equipment Recommendations  Other: shower chair, GB by toilet, handicap height toilet, pt need practice with long handle shoe horn (may benefit from use)  Safety Devices  Safety Devices in place: Yes  Type of devices: Left in chair;Nurse notified;Call light within reach; Chair alarm in place;Gait belt (caregiver present upon exit)    Patient Education  Education  Education Given To: Patient  Education Provided: Role of Therapy;Plan of Care;Safety;ADL Function;Mobility Training;Transfer Training  Education Provided Comments: Pt educated on therapy schedule and need for consistant participaiton in therapy. Barriers to Learning: Cognition  Education Outcome: Verbalized understanding;Continued education needed    Plan  Occupational Therapy Plan  Times Per Week: 5 days a week 90 minutes a day  Current Treatment Recommendations: Strengthening;Balance training;Functional mobility training; Endurance training;Pain management;Self-Care / ADL; Home management training    Goals (as determined and assessed by primary OT)   Patient Goals   Patient goals : Just to walk  Short Term Goals  Time Frame for Short Term Goals: 8 days - all ongoing  Short Term Goal 1: Pt will demo full body dressing with mod I  Short Term Goal 2: Pt will demo toilet transfer w/ mod I  Short Term Goal 3: Pt will demo toileting w/ Mod I  Short Term Goal 4: Pt will complete a simple meal prep task w/ supervision  Short Term Goal 5: Pt will demo independence w/ verbalizing 3 fall prevention strategies                 Therapy Time   Individual Second Session Group Co-treatment   Time In 0950  1245       Time Out 1050  1315       Minutes 60  30       Timed Code Treatment Minutes: 60 Minutes + 30 Minutes = 90 Minutes       [address] [place] [address], Ne, LADD/L (first session)  Fartun Foy S/OT (second session)

## 2022-12-01 NOTE — PLAN OF CARE
Problem: Safety - Adult  Goal: Free from fall injury  12/1/2022 1336 by Melvin Schafer RN  Outcome: Progressing   Patient at risk for falls. Patient resting quietly in chair with alarm on. Bedside table and call light within reach. Patient instructed to call for assistance. Patient verbalized understanding. Will continue to monitor. Problem: Skin/Tissue Integrity  Goal: Absence of new skin breakdown  Description: 1. Monitor for areas of redness and/or skin breakdown  2. Assess vascular access sites hourly  3. Every 4-6 hours minimum:  Change oxygen saturation probe site  4. Every 4-6 hours:  If on nasal continuous positive airway pressure, respiratory therapy assess nares and determine need for appliance change or resting period.   12/1/2022 1336 by Melvin Schafer RN  Outcome: Progressing

## 2022-12-01 NOTE — PROGRESS NOTES
Department of Physical Medicine & Rehabilitation  Progress Note    Patient Identification:  Meme Hamilton  [id number]  : 1929  Admit date: 2022    Chief Complaint: Debility    Subjective:   Continues to have severe left leg pain. She is not sure she can handle 3 hours of therapy a day. Asking for an easier therapy program. Seen by surgery. She is healing with Abx. Team conference today. Seen in her room this morning. ROS: No f/c, n/v, cp     Objective:  Patient Vitals for the past 24 hrs:   BP Temp Temp src Pulse Resp SpO2   22 0733 (!) 158/54 97.6 °F (36.4 °C) Oral 71 18 98 %   22 2152 (!) 136/59 98.1 °F (36.7 °C) Oral 72 18 98 %   22 2125 -- -- -- -- -- 98 %   22 1546 -- -- -- 98 18 98 %       Const: Alert. No distress, pleasant. HEENT: Normocephalic, atraumatic. Normal sclera/conjunctiva. MMM. CV: Regular rate and rhythm. Resp: No respiratory distress. Lungs CTAB. Abd: Soft, nontender, nondistended, NABS+   Ext: No edema. Neuro: Alert, oriented, appropriately interactive. Psych: Cooperative, appropriate mood and affect    Laboratory data: Available via EMR.    Last 24 hour lab  Recent Results (from the past 24 hour(s))   POCT Glucose    Collection Time: 22 12:06 PM   Result Value Ref Range    POC Glucose 251 (H) 70 - 99 mg/dl    Performed on ACCU-CHEK    POCT Glucose    Collection Time: 22  4:23 PM   Result Value Ref Range    POC Glucose 157 (H) 70 - 99 mg/dl    Performed on ACCU-CHEK    CBC with Auto Differential    Collection Time: 22  5:02 AM   Result Value Ref Range    WBC 7.3 4.0 - 11.0 K/uL    RBC 2.57 (L) 4.00 - 5.20 M/uL    Hemoglobin 8.2 (L) 12.0 - 16.0 g/dL    Hematocrit 23.6 (L) 36.0 - 48.0 %    MCV 91.7 80.0 - 100.0 fL    MCH 32.0 26.0 - 34.0 pg    MCHC 34.9 31.0 - 36.0 g/dL    RDW 19.3 (H) 12.4 - 15.4 %    Platelets 409 422 - 173 K/uL    MPV 6.7 5.0 - 10.5 fL    Neutrophils % 69.2 %    Lymphocytes % 17.1 %    Monocytes % 9.9 % Eosinophils % 3.3 %    Basophils % 0.5 %    Neutrophils Absolute 5.1 1.7 - 7.7 K/uL    Lymphocytes Absolute 1.2 1.0 - 5.1 K/uL    Monocytes Absolute 0.7 0.0 - 1.3 K/uL    Eosinophils Absolute 0.2 0.0 - 0.6 K/uL    Basophils Absolute 0.0 0.0 - 0.2 K/uL   Renal Function Panel    Collection Time: 12/01/22  5:02 AM   Result Value Ref Range    Sodium 136 136 - 145 mmol/L    Potassium 3.9 3.5 - 5.1 mmol/L    Chloride 102 99 - 110 mmol/L    CO2 27 21 - 32 mmol/L    Anion Gap 7 3 - 16    Glucose 95 70 - 99 mg/dL    BUN 36 (H) 7 - 20 mg/dL    Creatinine 0.7 0.6 - 1.2 mg/dL    Est, Glom Filt Rate >60 >60    Calcium 8.6 8.3 - 10.6 mg/dL    Phosphorus 2.9 2.5 - 4.9 mg/dL    Albumin 2.9 (L) 3.4 - 5.0 g/dL   Magnesium    Collection Time: 12/01/22  5:02 AM   Result Value Ref Range    Magnesium 2.10 1.80 - 2.40 mg/dL   POCT Glucose    Collection Time: 12/01/22  8:54 AM   Result Value Ref Range    POC Glucose 127 (H) 70 - 99 mg/dl    Performed on ACCU-CHEK        Therapy progress:  PT  Position Activity Restriction  Other position/activity restrictions: Up as tolerated  Objective     Sit to Stand: Minimal Assistance, Moderate Assistance (Min / mod assist from bedside chair and w/c.  Req VC for hand and foot placement)  Stand to Sit: Minimal Assistance (to bedside chair x 2 trials and to toilet, verbal cues for safety and reach back)  Bed to Chair: Moderate assistance, Minimal assistance (Initaiting stance req mod/ min but once pt is standing, pt able to step with UE support on AD)  Device: Rolling Walker  Other Apparatus: Wheelchair follow (wound vac)  Assistance:  (Min A x1 and then A req for transport of wound vac with w/c follow ( pt repeatedly indicated that she was \"going to pass out\" ))  Distance: 10', 3 '  OT  PT Equipment Recommendations  Equipment Needed: No  Other: Pt reports that she has excessive equipment at home from past medical episodes  Toilet - Technique: Ambulating  Equipment Used: Standard toilet  Toilet Transfers Comments: Pt demo'd stand to sit w/ RW to toilet w/ CGA; Pt demo'd sit to stand toilet to RW w/ L arm on GB and R arm on center of RW w/ Mod A  Assessment                  Body mass index is 18.43 kg/m². Assessment and Plan:  1. Debility- Currently she is just starting to be able to more her left leg again. She is still in considerable pain. She is refusing medications and sometimes refusing therapy. She may want to come to the ARU but is not sure at this time. 6 day therapy program      2. Surgical leg wound- followed by surgery  - pain control     3. DM2  - restart home metformin       Team conference was held today on the patient and discussed directly with the patient utilizing their entire treatment team. Please see separate team note for details. Total treatment time for today's care >35 min. >50% of time spent counseling with patient and coordinating care.        Rehab Progress: Improving  Anticipated Dispo: home  Services/DME: ANGELA  ELOS: ANGELA Kent D.O. M.P.H  PM&R  12/1/2022  10:09 AM

## 2022-12-01 NOTE — CARE COORDINATION
SW met w/Pt and Pt's caregiver at bedside. Pt has a caregiver 9a,-2pm and they are working on hiring someone for overnight. Pt was active w/formerly [facility] for [facility] and will resume at [place]. SW discussed home infusion, pt is in agreement w/[facility]. Pt and Pt's caregiver are aware of DC date 12/8. SW updated white board. Pt's caregiver will transport her home. Pt has referral to [facility] care for IV Abx. Josh Solano at [facility] is following for [facility] for DC on 12/8.      Electronically signed by LUKE Leary, LSW on 12/1/2022 at 2:03 PM  [phone]

## 2022-12-01 NOTE — PROGRESS NOTES
Physical Therapy  Facility/Department: [facility] ACUTE REHAB UNIT  Rehabilitation Physical Therapy Treatment Note    NAME: Rodney Long  : 1929 (80 y.o.)  MRN: [id number]  CODE STATUS: DNR-CCA    Date of Service: 22       Restrictions:  Position Activity Restriction  Other position/activity restrictions: Up as tolerated     SUBJECTIVE  Subjective  Subjective: Pt positioned supine in bed when PT arrived. Pt reports that she is having a terrible time with multiple things. ( c/o L thigh//hip pain, lips being dry, diarrhea etc)  Pain: Pt cont to  report pain with any movement of L leg. Pt did not rate pain but was very expressive with groans and screams  while grabbing her leg        Post Treatment Pain Screening         OBJECTIVE  Cognition  Overall Cognitive Status: Exceptions  Arousal/Alertness: Appropriate responses to stimuli  Following Commands: Follows one step commands with increased time; Follows one step commands with repetition  Attention Span: Attends with cues to redirect  Memory: Decreased short term memory  Problem Solving: Decreased awareness of errors  Insights: Decreased awareness of deficits  Initiation: Does not require cues  Sequencing: Requires cues for some  Cognition Comment: Pt tends to be very set in \"her ways\" and does not appear to be receptive to recommendations made by therapist.  Orientation  Overall Orientation Status: Within Functional Limits    Functional Mobility  Bridging  Assistance Level: Supervision (1/2 bridging w/ RLE to scoot laterally)  Skilled Clinical Factors: VC for a more efficient technique  Supine to Sit  Skilled Clinical Factors: HOB elevated and pt used the bedrails. Req A with maneuvering the LLE off of the bed. Req significant time 2/2 to pain and the anticipation of pain  Scooting  Assistance Level: Minimal assistance  Skilled Clinical Factors:  In short sitting req additional time with A to support LLE during lat WS  Balance  Sitting Balance: Supervision  Standing Balance: Contact guard assistance  Standing Balance  Activity: Pt able to stand at RW with CGA/min A ( performed in the bathroom both for doffing briefs and for donning clean briefs plus pericare.)Pt attempted pericare in standing but demo an ant/ post WS resulting in near LOB. ( req ~3 minutes in standing for thorough pericare following loose stools)  Transfers  Surface: From bed;Raised toilet Seat;From chair with arms; To chair with arms  Additional Factors: Increased time to complete;Hand placement cues  Device: Walker (RW)  Sit to Stand  Assistance Level: Contact guard assist;Minimal assistance  Skilled Clinical Factors: Pt sit <> stand from bed / RTS and BS chair with RW and CGA/ Aster with cues for set up. Stand to Sit  Assistance Level: Contact guard assist  Skilled Clinical Factors: VC for hand placement and controlled descent      Environmental Mobility  Ambulation  Surface: Level surface  Device: Rolling walker  Distance: 15' x2  Activity: Within Room  Activity Comments: Pt with increased anxiety 2/2 to diarrhea. Pt moved at a quicker pace than previously noted  Additional Factors: Verbal cues  Assistance Level: Contact guard assist;Minimal assistance  Gait Deviations: Decreased step length bilateral;Decreased weight shift left;Decreased heel strike right;Decreased heel strike left; Slow jhon  Skilled Clinical Factors: VC/ TC for an erect posture and increased step length. Pain level limits carryover  Second session:( 9065)Pt sitting in BS chair when PT arrived. Pt immediately reported that \"she is unable to do anything else\" Pt ststes that she has walked 3 x to and from the BR and that \"its too much\" Pt declined therapy at this time  (3765)Nsg just came out of pt's room and reported how challenging it was getting pt from chair to bed. Pt again fatigued and appeared sleeping when PT arrived.    Variance time: 40 min                ASSESSMENT/PROGRESS TOWARDS GOALS Assessment  Assessment: Pt is well below baseline and would benefit from continued therapy. However, a barrier to pt's progress is the decreased  receptiveness to recommendations to optimize her situation. Pt repeatedly \"verbalizes their way of doing things\"Pt's am session was primarily spent on bowel incontinence and the clean up. Activity Tolerance: Patient limited by pain; Patient limited by fatigue;Patient limited by endurance  Discharge Recommendations: Home with Home health PT;24 hour supervision or assist  PT Equipment Recommendations  Equipment Needed: No  Other: Pt reports that she has excessive equipment at home from past medical episodes    Goals  Patient Goals   Patient Goals : To Return home and rest  Short Term Goals  Time Frame for Short Term Goals: 8 days  Short Term Goal 1: Ind with bed mobility  Short Term Goal 2: Transf with S excluding floor transf  Short Term Goal 3: Ambulate with S x  50'  with LRAD on level surfaces  Short Term Goal 4: MI w/c propulsion x 50' on level surfaces  Additional Goals?: No    PLAN OF CARE/SAFETY  Physcial Therapy Plan  Days Per Week: 5 Days  Hours Per Day: 1.5 hours  Current Treatment Recommendations: Strengthening;Patient/Caregiver education & training; Therapeutic activities;Balance training;Gait training;Stair training;Functional mobility training;Transfer training; Endurance training; Safety education & training; Wheelchair mobility training;Pain management;Positioning  Safety Devices  Type of Devices: Call light within reach;Nurse notified; Chair alarm in place; Left in chair    EDUCATION  Education  Education Given To: Patient  Education Provided: Precautions; Mobility Training; Safety;Transfer Training;Energy Conservation; Fall Prevention Strategies; Home Exercise Program  Education Provided Comments: PT eduacted pt on postioning for pain management.  Pt resistive to recommendations 2/2 to \"that's not how  we do it\" (referencing the CG and herself)  Education Method: Verbal  Education Outcome: Verbalized understanding;Continued education needed  Skilled Clinical Factors: need cues to stay on task        Therapy Time   Individual Concurrent Group Co-treatment   Time In 0730         Time Out 0820         Minutes 50             Total Treatment Minutes:  50   Variance Minutes: [address] JELENA Michael, 12/01/22 at 9:07 AM

## 2022-12-01 NOTE — PLAN OF CARE
Problem: Discharge Planning  Goal: Discharge to home or other facility with appropriate resources  Outcome: Progressing     Problem: Pain  Goal: Verbalizes/displays adequate comfort level or baseline comfort level  Outcome: Progressing     Problem: Safety - Adult  Goal: Free from fall injury  Outcome: Progressing     Problem: Skin/Tissue Integrity  Goal: Absence of new skin breakdown  Description: 1. Monitor for areas of redness and/or skin breakdown  2. Assess vascular access sites hourly  3. Every 4-6 hours minimum:  Change oxygen saturation probe site  4. Every 4-6 hours:  If on nasal continuous positive airway pressure, respiratory therapy assess nares and determine need for appliance change or resting period.   Outcome: Progressing     Problem: Nutrition Deficit:  Goal: Optimize nutritional status  Outcome: Progressing     Problem: Chronic Conditions and Co-morbidities  Goal: Patient's chronic conditions and co-morbidity symptoms are monitored and maintained or improved  Outcome: Progressing

## 2022-12-01 NOTE — PROGRESS NOTES
Pt. Lying quietly in bed,A & O X 4, no s/sx of distress noted, bed in lowest position , side rails X3 up, call light and over bed table remains in place, hourly, rounding and frequent visual checks in place.

## 2022-12-02 ENCOUNTER — APPOINTMENT (OUTPATIENT)
Dept: CT IMAGING | Age: 87
DRG: 949 | End: 2022-12-02
Attending: PHYSICAL MEDICINE & REHABILITATION
Payer: MEDICARE

## 2022-12-02 PROBLEM — T82.7XXA VASCULAR GRAFT INFECTION (HCC): Status: ACTIVE | Noted: 2022-12-02

## 2022-12-02 PROBLEM — E43 SEVERE MALNUTRITION (HCC): Chronic | Status: ACTIVE | Noted: 2022-12-02

## 2022-12-02 LAB
ANION GAP SERPL CALCULATED.3IONS-SCNC: 9 MMOL/L (ref 3–16)
BASOPHILS ABSOLUTE: 0 K/UL (ref 0–0.2)
BASOPHILS RELATIVE PERCENT: 0.6 %
BUN BLDV-MCNC: 36 MG/DL (ref 7–20)
CALCIUM SERPL-MCNC: 8.7 MG/DL (ref 8.3–10.6)
CHLORIDE BLD-SCNC: 103 MMOL/L (ref 99–110)
CO2: 28 MMOL/L (ref 21–32)
CREAT SERPL-MCNC: 0.7 MG/DL (ref 0.6–1.2)
EOSINOPHILS ABSOLUTE: 0.3 K/UL (ref 0–0.6)
EOSINOPHILS RELATIVE PERCENT: 3.9 %
GFR SERPL CREATININE-BSD FRML MDRD: >60 ML/MIN/{1.73_M2}
GLUCOSE BLD-MCNC: 118 MG/DL (ref 70–99)
GLUCOSE BLD-MCNC: 77 MG/DL (ref 70–99)
HCT VFR BLD CALC: 24.5 % (ref 36–48)
HEMOGLOBIN: 8.4 G/DL (ref 12–16)
LYMPHOCYTES ABSOLUTE: 1.3 K/UL (ref 1–5.1)
LYMPHOCYTES RELATIVE PERCENT: 18.5 %
MCH RBC QN AUTO: 31.6 PG (ref 26–34)
MCHC RBC AUTO-ENTMCNC: 34.1 G/DL (ref 31–36)
MCV RBC AUTO: 92.6 FL (ref 80–100)
MONOCYTES ABSOLUTE: 0.6 K/UL (ref 0–1.3)
MONOCYTES RELATIVE PERCENT: 9 %
NEUTROPHILS ABSOLUTE: 4.8 K/UL (ref 1.7–7.7)
NEUTROPHILS RELATIVE PERCENT: 68 %
PDW BLD-RTO: 20.2 % (ref 12.4–15.4)
PERFORMED ON: ABNORMAL
PLATELET # BLD: 272 K/UL (ref 135–450)
PMV BLD AUTO: 7 FL (ref 5–10.5)
POTASSIUM REFLEX MAGNESIUM: 3.8 MMOL/L (ref 3.5–5.1)
RBC # BLD: 2.64 M/UL (ref 4–5.2)
SODIUM BLD-SCNC: 140 MMOL/L (ref 136–145)
TOTAL CK: 323 U/L (ref 26–192)
WBC # BLD: 7 K/UL (ref 4–11)

## 2022-12-02 PROCEDURE — 99232 SBSQ HOSP IP/OBS MODERATE 35: CPT | Performed by: PHYSICAL MEDICINE & REHABILITATION

## 2022-12-02 PROCEDURE — 94150 VITAL CAPACITY TEST: CPT

## 2022-12-02 PROCEDURE — 36415 COLL VENOUS BLD VENIPUNCTURE: CPT

## 2022-12-02 PROCEDURE — 6360000004 HC RX CONTRAST MEDICATION: Performed by: INTERNAL MEDICINE

## 2022-12-02 PROCEDURE — 94669 MECHANICAL CHEST WALL OSCILL: CPT

## 2022-12-02 PROCEDURE — 85025 COMPLETE CBC W/AUTO DIFF WBC: CPT

## 2022-12-02 PROCEDURE — 75635 CT ANGIO ABDOMINAL ARTERIES: CPT

## 2022-12-02 PROCEDURE — 82550 ASSAY OF CK (CPK): CPT

## 2022-12-02 PROCEDURE — 6360000002 HC RX W HCPCS: Performed by: PHYSICAL MEDICINE & REHABILITATION

## 2022-12-02 PROCEDURE — 6370000000 HC RX 637 (ALT 250 FOR IP): Performed by: PHYSICAL MEDICINE & REHABILITATION

## 2022-12-02 PROCEDURE — 36592 COLLECT BLOOD FROM PICC: CPT

## 2022-12-02 PROCEDURE — 99232 SBSQ HOSP IP/OBS MODERATE 35: CPT | Performed by: INTERNAL MEDICINE

## 2022-12-02 PROCEDURE — 97535 SELF CARE MNGMENT TRAINING: CPT

## 2022-12-02 PROCEDURE — 2580000003 HC RX 258: Performed by: PHYSICAL MEDICINE & REHABILITATION

## 2022-12-02 PROCEDURE — 80048 BASIC METABOLIC PNL TOTAL CA: CPT

## 2022-12-02 PROCEDURE — 94761 N-INVAS EAR/PLS OXIMETRY MLT: CPT

## 2022-12-02 PROCEDURE — 1280000000 HC REHAB R&B

## 2022-12-02 RX ADMIN — ACETAMINOPHEN 1000 MG: 500 TABLET ORAL at 23:29

## 2022-12-02 RX ADMIN — ACETAMINOPHEN 1000 MG: 500 TABLET ORAL at 13:53

## 2022-12-02 RX ADMIN — SODIUM CHLORIDE, PRESERVATIVE FREE 10 ML: 5 INJECTION INTRAVENOUS at 09:03

## 2022-12-02 RX ADMIN — TRAMADOL HYDROCHLORIDE 25 MG: 50 TABLET, COATED ORAL at 10:41

## 2022-12-02 RX ADMIN — METHOCARBAMOL TABLETS 500 MG: 500 TABLET, COATED ORAL at 21:26

## 2022-12-02 RX ADMIN — ACETAMINOPHEN 1000 MG: 500 TABLET ORAL at 06:08

## 2022-12-02 RX ADMIN — METFORMIN HYDROCHLORIDE 500 MG: 500 TABLET ORAL at 09:00

## 2022-12-02 RX ADMIN — METHOCARBAMOL TABLETS 1000 MG: 500 TABLET, COATED ORAL at 08:59

## 2022-12-02 RX ADMIN — TRAMADOL HYDROCHLORIDE 25 MG: 50 TABLET, COATED ORAL at 23:30

## 2022-12-02 RX ADMIN — ACETAMINOPHEN 1000 MG: 500 TABLET ORAL at 18:08

## 2022-12-02 RX ADMIN — DAPTOMYCIN 350 MG: 500 INJECTION, POWDER, LYOPHILIZED, FOR SOLUTION INTRAVENOUS at 10:52

## 2022-12-02 RX ADMIN — METHOCARBAMOL TABLETS 1000 MG: 500 TABLET, COATED ORAL at 13:54

## 2022-12-02 RX ADMIN — SODIUM CHLORIDE, PRESERVATIVE FREE 10 ML: 5 INJECTION INTRAVENOUS at 21:25

## 2022-12-02 RX ADMIN — ATORVASTATIN CALCIUM 10 MG: 10 TABLET, FILM COATED ORAL at 09:00

## 2022-12-02 RX ADMIN — BACITRACIN: 500 OINTMENT TOPICAL at 09:07

## 2022-12-02 RX ADMIN — IOPAMIDOL 100 ML: 755 INJECTION, SOLUTION INTRAVENOUS at 16:30

## 2022-12-02 ASSESSMENT — PAIN DESCRIPTION - LOCATION
LOCATION: LEG
LOCATION: HIP
LOCATION: LEG
LOCATION: HIP
LOCATION: LEG
LOCATION: LEG

## 2022-12-02 ASSESSMENT — PAIN DESCRIPTION - ORIENTATION
ORIENTATION: LEFT
ORIENTATION: RIGHT
ORIENTATION: LEFT
ORIENTATION: RIGHT

## 2022-12-02 ASSESSMENT — PAIN SCALES - GENERAL
PAINLEVEL_OUTOF10: 10

## 2022-12-02 ASSESSMENT — PAIN DESCRIPTION - FREQUENCY
FREQUENCY: CONTINUOUS
FREQUENCY: CONTINUOUS

## 2022-12-02 ASSESSMENT — PAIN DESCRIPTION - DESCRIPTORS
DESCRIPTORS: BURNING

## 2022-12-02 ASSESSMENT — PAIN DESCRIPTION - ONSET
ONSET: ON-GOING
ONSET: ON-GOING

## 2022-12-02 ASSESSMENT — PAIN - FUNCTIONAL ASSESSMENT: PAIN_FUNCTIONAL_ASSESSMENT: PREVENTS OR INTERFERES SOME ACTIVE ACTIVITIES AND ADLS

## 2022-12-02 ASSESSMENT — PAIN DESCRIPTION - PAIN TYPE: TYPE: ACUTE PAIN;SURGICAL PAIN

## 2022-12-02 NOTE — PROGRESS NOTES
Occupational Therapy  Facility/Department: [facility] ACUTE REHAB UNIT  Rehabilitation Occupational Therapy Daily Treatment Note    Date: 22  Patient Name: Fly Guadalupe       Room: 3109/3109-01  MRN: [id number]  Account: [de-identified]   : 1929  (80 y.o.) Gender: female                    Past Medical History:  has a past medical history of Anxiety, Arthritis, At risk for falls, CAD (coronary artery disease), Cerebral artery occlusion with cerebral infarction ([address] DDD (degenerative disc disease), cervical, Fractures, Hyperlipidemia, Hypertension, Mitral regurgitation, Neuropathy, Osteopenia, Osteoporosis, PAD (peripheral artery disease) ([address] Peripheral neuropathy, Peripheral vascular disease ([address] Podagra, Prolonged emergence from general anesthesia, Spinal stenosis, Type 2 diabetes mellitus ([address] Urethral stricture, and Urgency of urination. Past Surgical History:   has a past surgical history that includes Tonsillectomy; Appendectomy; Cholecystectomy (1978); Colonoscopy (1999); Hysterectomy (); Total hip arthroplasty (1998); Kyphosis surgery (2006); Wrist fracture surgery (2008); Cataract removal with implant ([id number]); eye surgery (Bilateral); IR Femoral Popliteal Bypass Graft (Right, 2015); Carpal tunnel release (Right, 2019); Femoral Endarterectomy (Left, 2022); Leg Surgery (Left, 2022); angioplasty (Left, 2022); femoral bypass (Left, 2022); Leg Surgery (N/A, 2022); Axillary-femoral Bypass Graft (Left, 2022); and Leg Surgery (Left, 2022). Restrictions  Other position/activity restrictions: Up as tolerated    Subjective  Subjective: Pt supine in bed upon arrival, caregiver arrived as session began. Pt requiring max encouragement and increased time to get out of bed and participate.                 Objective     Cognition  Overall Cognitive Status: Exceptions  Arousal/Alertness: Appropriate responses to stimuli  Following Commands: Follows one step commands with increased time; Follows one step commands with repetition  Attention Span: Attends with cues to redirect  Memory: Decreased short term memory  Problem Solving: Decreased awareness of errors  Insights: Decreased awareness of deficits  Initiation: Does not require cues  Sequencing: Requires cues for some  Orientation  Overall Orientation Status: Within Normal Limits         ADL  Grooming/Oral Hygiene  Assistance Level: Modified independent  Skilled Clinical Factors: Pt seated in  used mouthwash and wiped her dentures w/ a paper towel covered in mouthwash. Pt seated in  washed bilateral hands scrubbing at the nails/soaking them to get the blood out w/ ++ time. Upper Extremity Bathing  Assistance Level: Increased time to complete;Supervision  Skilled Clinical Factors: Pt seated in  demo'd sponge bath washing 4/4 components of UB w/ warm soapy wash cloth. Pt then dried 4/4 components w/ towel. Upper Extremity Dressing  Assistance Level: Stand by assist  Skilled Clinical Factors: Pt seated in  doffed gown w/ assist to untie. Pt donned sweater w/ increased time. Caregiver completed setup. Lower Extremity Dressing  Assistance Level: Minimal assistance; Increased time to complete  Skilled Clinical Factors: Pt donning pants requiring assist for threading LLE. Pt threaded RLE and managed pants up over her hips w/ CGA. Pt required ++ time to complete. Putting On/Taking Off Footwear  Assistance Level: Minimal assistance; Increased time to complete  Skilled Clinical Factors: Pt seated in recliner doffed bilateral slippers. Pt required assist to don L slipper and donned R slipper through figure 4. Pt required ++ time to complete. Toileting  Assistance Level: Increased time to complete;Contact guard assist  Skilled Clinical Factors: Pt completed pants management down over hips w/ max encouragement from therapist and caregiver.  Pt having bowel movement and urination with +++time seated on toilet. Pt requiring CGA for steadying stance to pull up pants. Pt completing buttocks care seated with increased time and SBA. Pt seated on toilet applied vaseoline to buttocks. Toilet Transfers  Technique:  (ambulating w/ RW)  Equipment: Standard toilet  Assistance Level: Minimal assistance; Increased time to complete  Skilled Clinical Factors: Transfer completed with Min A for eccentric control and to perform sit to stand. Pt requiring VCs for hand placement and safe positioning. Pt demo'd sit <> stand from RW <> toilet w/ L hand on GB and R hand center of the RW. Functional Mobility  Device: Rolling walker  Activity: To/From bathroom  Assistance Level: Contact guard assist  Skilled Clinical Factors: Pt with flexed forward posture and shuffling gait from bed to toilet. Pt w/ decreased pace taking increased time to get to bathroom. Supine to Sit  Assistance Level: Minimal assistance  Skilled Clinical Factors: Pt supine in bed, caregiver provided assist to guide legs off the edge of the bed. Pt then w/ HOB slightly elevated and no use of GB sat EOB. Pt scooted forward w/ SBA. Transfers  Surface: From bed; Wheelchair; To chair with arms  Additional Factors: Verbal cues; Increased time to complete;Hand placement cues  Device: Walker  Sit to Stand  Assistance Level: Contact guard assist  Skilled Clinical Factors: EOB > RW; WC > RW; VCs for hand placement  Stand to Sit  Assistance Level: Contact guard assist  Skilled Clinical Factors: RW > WC; RW > Recliner; VCs for hand placement         Assessment  Assessment  Assessment: Pt demonstrating functional mobility to and from bathroom with CGA and RW with flexed forward posture and shuffling gait. Heavy encouragment needed for participation in all tasks. Pt demo'd oral hygiene w/ mod I seated in  and UB sponge bathing seated in wc w/ supervision. UE dressing completed with SBA and LE dressing and footwear requiring Min A.  Pt with decreased strength and endurance for all tasks. Plan is for pt to return home with caregiver, however they are still setting up 24 hr care. Continue OT per POC. Activity Tolerance: Patient limited by pain; Patient limited by fatigue;Patient limited by endurance  Discharge Recommendations: Continue to assess pending progress;Home with Home health OT;24 hour supervision or assist  OT Equipment Recommendations  Other: shower chair, GB by toilet, handicap height toilet, pt need practice with long handle shoe horn (may benefit from use)  Safety Devices  Safety Devices in place: Yes  Type of devices: Left in chair;Nurse notified;Call light within reach; Chair alarm in place (caregiver present EOS)    Patient Education  Education  Education Given To: Patient  Education Provided: Role of Therapy;Plan of Care;Safety;ADL Function;Mobility Training;Transfer Training  Education Method: Verbal  Barriers to Learning: Cognition  Education Outcome: Verbalized understanding;Continued education needed    Plan  Occupational Therapy Plan  Times Per Week: 5 days a week 90 minutes a day  Current Treatment Recommendations: Strengthening;Balance training;Functional mobility training; Endurance training;Pain management;Self-Care / ADL; Home management training    Goals  Patient Goals   Patient goals : Just to walk  Short Term Goals  Time Frame for Short Term Goals: 8 days - all ongoing  Short Term Goal 1: Pt will demo full body dressing with mod I  Short Term Goal 2: Pt will demo toilet transfer w/ mod I  Short Term Goal 3: Pt will demo toileting w/ Mod I  Short Term Goal 4: Pt will complete a simple meal prep task w/ supervision  Short Term Goal 5: Pt will demo independence w/ verbalizing 3 fall prevention strategies    AM-PAC Score               Therapy Time   Individual Concurrent Group Co-treatment   Time In 0915         Time Out 1030         Minutes 75         Timed Code Treatment Minutes: 75 Minutes       Ebony Campos S/OT

## 2022-12-02 NOTE — PROGRESS NOTES
Plastic Surgery   Daily Progress Note  Patient: Ivette Pizano      CC: Wound closure    SUBJECTIVE:   Patient tired from working with physical therapy today. Having some leg pain - feels like \"Electrocution\". Nursing doing dressing changes    ROS:   A 14 point review of systems was conducted, significant findings as noted above. All other systems negative. OBJECTIVE:    PHYSICAL EXAM:    Vitals:    11/30/22 2125 11/30/22 2152 12/01/22 0733 12/01/22 1103   BP:  (!) 136/59 (!) 158/54    Pulse:  72 71    Resp:  18 18    Temp:  98.1 °F (36.7 °C) 97.6 °F (36.4 °C)    TempSrc:  Oral Oral    SpO2: 98% 98% 98% 98%   Height:           General appearance: alert, no acute distress, resting in bed  Eyes: No scleral icterus, EOM grossly intact  Neck: trachea midline, no JVD  Chest/Lungs: normal effort with no accessory muscle use on RA  Cardiovascular: RRR  Abdomen: Soft, non-tender, non-distended, no rebound, guarding, or rigidity. Skin: warm and dry, no rashes  Extremities: LUE incision with jez in place wrapped in kerlix. Some strike through of dressing, edema improved. L groin with firm LN on medial groin, unchanged from prior exam, non tender. Groin and incision sites are soft without tenderness and no signs of hematoma or active bleeding. Xeroform over Xeroform dressing in place at donor site open to air, mild seepage. Sensation and mobility intact b/l LE. PT palpable b/l. Genitourinary: external catheter in place to wall suction, clear yellow urine. Neuro: A&Ox3, no focal deficits, sensation intact    LABS:   Recent Labs     11/30/22  0630 12/01/22  0502   WBC 7.2 7.3   HGB 8.1* 8.2*   HCT 23.8* 23.6*   MCV 91.4 91.7    249          Recent Labs     11/30/22  0630 12/01/22  0502    136   K 3.8 3.9    102   CO2 28 27   PHOS 3.3 2.9   BUN 35* 36*   CREATININE 0.8 0.7          No results for input(s): AST, ALT, ALB, BILIDIR, BILITOT, ALKPHOS in the last 72 hours.      No results for input(s): LIPASE, AMYLASE in the last 72 hours. No results for input(s): PROT, INR, APTT in the last 72 hours. No results for input(s): CKTOTAL, CKMB, CKMBINDEX, TROPONINI in the last 72 hours.     ASSESSMENT & PLAN:   This is a 80y.o. 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      - Bacitracin and xeroform to graft and suture line daily by nursing  - Donor site open to air   - Will reiterate importance of proper blood glucose management for proper wound healing   - Further care per [address], DO  PGY4, General Surgery  12/01/22  7:53 PM

## 2022-12-02 NOTE — PROGRESS NOTES
ID Follow-up NOTE    CC:   Infected pseudoaneurysm, MRSA infection  Antibiotics: Daptomycin    Admit Date: 11/28/2022  Hospital Day: 5    Subjective:     11/22  L groin debridement, L rectus femoris flap, L STSG, VAC    Patient c/o L groin pain with palpation      Objective:     Patient Vitals for the past 8 hrs:   Temp Temp src Pulse Resp SpO2   12/02/22 0854 97.5 °F (36.4 °C) Oral 69 16 95 %       I/O last 3 completed shifts: In: 80 [P.O.:470; I.V.:10; IV Piggyback:100]  Out: 800 [Urine:800]  No intake/output data recorded. EXAM:  GENERAL: No apparent distress.   RA  HEENT: Membranes moist, no oral lesion  NECK:  Supple, no lymphadenopathy  LUNGS: Clear b/l, no rales, no dullness  CARDIAC: RRR, no murmur appreciated  ABD:  + BS, soft - tender with palpation  Lower abd wall wound w staples  L groin wound with staples, closed  L thigh graft donor site with xeroform  EXT:                No rash, no edema, no lesions  L arm with staples (basilar v harvest site)  L foot with scab /escar, no open wound / drainage / erythema  NEURO: No focal neurologic findings  PSYCH: Orientation, sensorium, mood normal  LINES:  R PICC, placed 11/25       Data Review:  Lab Results   Component Value Date    WBC 7.0 12/02/2022    HGB 8.4 (L) 12/02/2022    HCT 24.5 (L) 12/02/2022    MCV 92.6 12/02/2022     12/02/2022     Lab Results   Component Value Date    CREATININE 0.7 12/02/2022    BUN 36 (H) 12/02/2022     12/02/2022    K 3.8 12/02/2022     12/02/2022    CO2 28 12/02/2022       Hepatic Function Panel:   Lab Results   Component Value Date/Time    ALKPHOS 84 11/20/2022 05:26 AM    ALT 14 11/20/2022 05:26 AM    AST 29 11/20/2022 05:26 AM    PROT 5.8 11/20/2022 05:26 AM    PROT 6.4 07/26/2012 04:30 PM    BILITOT 0.3 11/20/2022 05:26 AM    BILIDIR <0.2 05/13/2021 11:53 AM    IBILI see below 05/13/2021 11:53 AM    LABALBU 2.9 12/01/2022 05:02 AM       Micro:  11/20 Surg cult - light growth MRSA  Staph aureus mrsa (1)  Antibiotic Interpretation Microscan    ceFAZolin Resistant >16 mcg/mL   clindamycin Resistant >4 mcg/mL   erythromycin Resistant >4 mcg/mL   oxacillin Resistant >2 mcg/mL   tetracycline Sensitive <=4 mcg/mL   trimethoprim-sulfamethoxazole Sensitive <=0.5/9.5 mcg/mL   vancomycin Sensitive 2 mcg/mL     No BC sent     Imagin/20 Abd CTA  1. Small amount of hemorrhage in the left groin without evidence of active extravasation. 2. 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 3. 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 4. Loculated fluid collection of the medial left thigh which could represent abscess or sterile postoperative fluid collection. 5. Subcutaneous edema and fluid lower extremities. 6. Left pleural effusion.        Scheduled Meds:   metFORMIN  500 mg Oral BID WC    bacitracin   Topical Daily    acetaminophen  1,000 mg Oral Q6H    aspirin EC  81 mg Oral Daily    atorvastatin  10 mg Oral Daily    daptomycin (CUBICIN) IVPB  350 mg IntraVENous Q24H    furosemide  20 mg Oral Daily    methocarbamol  1,000 mg Oral TID    sodium chloride flush  5-40 mL IntraVENous 2 times per day       Continuous Infusions:   dextrose         PRN Meds:  traMADol, hydrALAZINE, ipratropium-albuterol, ondansetron **OR** ondansetron, sodium chloride flush, glucose, dextrose bolus **OR** dextrose bolus, glucagon (rDNA), dextrose, acetaminophen, magnesium hydroxide, polyethylene glycol      Assessment:     Hx DM, HTN, CAD, PAD, CVA, lumbar stenosis     Hx L fem endarterectomy 22, SFA angioplasty 22, L ileofem (redo, prelaced femoral artery with Dacron graft), L fem to below knee popliteal in-situ bypass graft - 22  L groin wound I&D 22, cult S epidermidis in broth     L LE angiogram with balloon angioplasty distal bypass graft 10/18/22  Wound cult  - heavy MRSA (R clinda, tetra; vanco DANITA <0.5)     POD#1 EXCISION OF INFECTED ILEOFEMORAL BYPASS GRAFT; HARVEST LEFT BASILIC VEIN; LEFT EXTERNAL ILIAC ARTERY TO FEMORAL BYPASS THROUGH OBTURATOR CANAL; PLACEMENT OF WOUND VAC   - evidence infection in surg    - cult MRSA, vanco DANITA 2    Leukocytosis - resolved     Plan:     Cont Daptomycin - started 11/23    Postop care per Vascular   Postop care per Plastic Surg     Treat with Daptomycin x 6 weeks then po (doxycycline) x year  See ARELY    Medical Decision Making:   The following items were considered in medical decision making:  Discussion of patient care with other providers  Reviewed clinical lab tests  Reviewed radiology tests  Reviewed other diagnostic tests/interventions  Independent review of radiologic images - reviewed CT / CTA with Radiologist / Dr Denisse Ames   Microbiology cultures and other micro tests reviewed      Discussed with pt, caregiver, RN  Esperanza Ward MD    INFUSION ORDERS:  Daptomycin 400 mg iv daily through 1/4/23  - Diagnosis - graft infection, MRSA infection  - First dose given in hospital  - PICC   - Disposition / date discharge  - Check CBC w diff, CMP, ESR, CRP, CK every Mon or Tue - FAX result to [phone]  - Call with antibiotic / infusion issues, [phone]  - Call with any change in status, transfer in or out of a facility or to hospital - [phone]  - No f/u in outpatient ID office necessary

## 2022-12-02 NOTE — PROGRESS NOTES
Department of Physical Medicine & Rehabilitation  Progress Note    Patient Identification:  Jacqueline Stokes  [id number]  : 1929  Admit date: 2022    Chief Complaint: Debility    Subjective:   Not participating very well in therapy. She is resting in her chair today. It is difficult for her to transfer from the bed to the chair. She does have 24 hours help at home. She is going to think about discharging early if she cannot tolerate the therapy. ROS: No f/c, n/v, cp     Objective:  Patient Vitals for the past 24 hrs:   BP Temp Temp src Pulse Resp SpO2   22 0854 -- 97.5 °F (36.4 °C) Oral 69 16 95 %   22 1950 (!) 142/55 97.9 °F (36.6 °C) Oral 79 16 --   22 1103 -- -- -- -- -- 98 %       Const: Alert. No distress, pleasant. HEENT: Normocephalic, atraumatic. Normal sclera/conjunctiva. MMM. CV: Regular rate and rhythm. Resp: No respiratory distress. Lungs CTAB. Abd: Soft, nontender, nondistended, NABS+   Ext: No edema. Neuro: Alert, oriented, appropriately interactive. Psych: Cooperative, appropriate mood and affect    Laboratory data: Available via EMR.    Last 24 hour lab  Recent Results (from the past 24 hour(s))   Basic Metabolic Panel w/ Reflex to MG    Collection Time: 22  7:10 AM   Result Value Ref Range    Sodium 140 136 - 145 mmol/L    Potassium reflex Magnesium 3.8 3.5 - 5.1 mmol/L    Chloride 103 99 - 110 mmol/L    CO2 28 21 - 32 mmol/L    Anion Gap 9 3 - 16    Glucose 77 70 - 99 mg/dL    BUN 36 (H) 7 - 20 mg/dL    Creatinine 0.7 0.6 - 1.2 mg/dL    Est, Glom Filt Rate >60 >60    Calcium 8.7 8.3 - 10.6 mg/dL   CBC auto differential    Collection Time: 22  7:10 AM   Result Value Ref Range    WBC 7.0 4.0 - 11.0 K/uL    RBC 2.64 (L) 4.00 - 5.20 M/uL    Hemoglobin 8.4 (L) 12.0 - 16.0 g/dL    Hematocrit 24.5 (L) 36.0 - 48.0 %    MCV 92.6 80.0 - 100.0 fL    MCH 31.6 26.0 - 34.0 pg    MCHC 34.1 31.0 - 36.0 g/dL    RDW 20.2 (H) 12.4 - 15.4 %    Platelets 007 699 - 450 K/uL    MPV 7.0 5.0 - 10.5 fL    Neutrophils % 68.0 %    Lymphocytes % 18.5 %    Monocytes % 9.0 %    Eosinophils % 3.9 %    Basophils % 0.6 %    Neutrophils Absolute 4.8 1.7 - 7.7 K/uL    Lymphocytes Absolute 1.3 1.0 - 5.1 K/uL    Monocytes Absolute 0.6 0.0 - 1.3 K/uL    Eosinophils Absolute 0.3 0.0 - 0.6 K/uL    Basophils Absolute 0.0 0.0 - 0.2 K/uL       Therapy progress:  PT  Position Activity Restriction  Other position/activity restrictions: Up as tolerated  Objective     Sit to Stand: Minimal Assistance, Moderate Assistance (Min / mod assist from bedside chair and w/c. Req VC for hand and foot placement)  Stand to Sit: Minimal Assistance (to bedside chair x 2 trials and to toilet, verbal cues for safety and reach back)  Bed to Chair: Moderate assistance, Minimal assistance (Initaiting stance req mod/ min but once pt is standing, pt able to step with UE support on AD)  Device: Rolling Walker  Other Apparatus: Wheelchair follow (wound vac)  Assistance:  (Min A x1 and then A req for transport of wound vac with w/c follow ( pt repeatedly indicated that she was \"going to pass out\" ))  Distance: 10', 3 '  OT  PT Equipment Recommendations  Equipment Needed: No  Other: Pt reports that she has excessive equipment at home from past medical episodes  Toilet - Technique: Ambulating  Equipment Used: Standard toilet  Toilet Transfers Comments: Pt demo'd stand to sit w/ RW to toilet w/ CGA; Pt demo'd sit to stand toilet to RW w/ L arm on GB and R arm on center of RW w/ Mod A  Assessment                  Body mass index is 18.43 kg/m². Assessment and Plan:  1. Debility- Currently she is just starting to be able to more her left leg again. She is still in considerable pain. She is refusing medications and sometimes refusing therapy. Continues to have pain. 6 day therapy program      2. Surgical leg wound- followed by surgery  - pain control     3.  DM2  - restart home metformin         Rehab Progress: Improving  Anticipated Dispo: home  Services/DME: TBD  ELOS: TBD- possible DC this weekend       ANNALISE Pinto.P.H  PM&R  12/2/2022  10:14 AM

## 2022-12-02 NOTE — PLAN OF CARE
Problem: Discharge Planning  Goal: Discharge to home or other facility with appropriate resources  Flowsheets (Taken 12/2/2022 0434)  Discharge to home or other facility with appropriate resources:   Identify barriers to discharge with patient and caregiver   Identify discharge learning needs (meds, wound care, etc)     Problem: Pain  Goal: Verbalizes/displays adequate comfort level or baseline comfort level  Outcome: Progressing  Flowsheets (Taken 12/2/2022 0434)  Verbalizes/displays adequate comfort level or baseline comfort level:   Encourage patient to monitor pain and request assistance   Assess pain using appropriate pain scale   Administer analgesics based on type and severity of pain and evaluate response   Implement non-pharmacological measures as appropriate and evaluate response     Problem: Safety - Adult  Goal: Free from fall injury  Outcome: Progressing  Note: Fall precautions in place. Bed is in low and locked position. Bed alarm set.  Call light and bedside table with reach

## 2022-12-02 NOTE — PROGRESS NOTES
Patient has complained of pain in right leg \"from hip to toes\". She describes the pain as a burning sharp pain and rates her pain 10 on pian scale. She is receiving scheduled Tylenol and Robaxin. She declined any Tramadol stating that it doesn't help. She states that the only thing that helps is to change her position frequently.  Sleeping at intervals

## 2022-12-02 NOTE — PROGRESS NOTES
Physical Therapy  Facility/Department: [facility] ACUTE REHAB UNIT  Rehabilitation Physical Therapy Treatment Note    NAME: Charles Soto  : 1929 (80 y.o.)  MRN: [id number]  CODE STATUS: DNR-CCA  PT arrived to pt's room at sched treatment time. (6321)Pt sitting in BS chair . Pt immediately reported to therapist that she has been up and down multiple times 2/2  to going to/ from the bathroom to the [facility] and also the bedside chair . Pt states that she is unable to participate in therapy at this time. Pt again stated that at home \"she only has 1 therapy a day. Pt stated that more than that is too much. \"  PT went back ~1.5 hours later (6101)and pt declined again. PT notified nsg.  No PT received on this date  [employer]ariance minutes: Vida Ramirez, PT, 22 at 2:48 PM

## 2022-12-02 NOTE — PROGRESS NOTES
Comprehensive Nutrition Assessment    RECOMMENDATIONS:  PO Diet: Regular; 3 carb choices  ONS: Glucerna tid  Nutrition Education: No recommendation at this time       NUTRITION ASSESSMENT:   Nutritional summary & status: Follow up. Pt continues to on a Regular; 3 carb choice diet with 0% intake of meal. Continues to complain of sore throat and only able to tolerate sugar free pudding, soups, and soft foods brought in by caregiver. Did state that soreness was decreasing. Suggestions given for menu selection. Receiving Glucerna shakes tid with 1-25% consumed. Encouraged to drink 100% of glucerna shake for  adequate nutrition due to minimal po intake at meals. Will continue to follow. 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 with harvest of left basilic vein and left external iliac artery to femoral bypass through obturator canal, T2DM    MALNUTRITION ASSESSMENT  Context of Malnutrition: Acute Illness   Malnutrition Status: Severe malnutrition  Findings of the 6 clinical characteristics of malnutrition (Minimum of 2 out of 6 clinical characteristics is required to make the diagnosis of moderate or severe Protein Calorie Malnutrition based on AND/ASPEN Guidelines):  Energy Intake:  75% or less of estimated energy requirements for 7 or more days  Weight Loss:  No significant weight loss     Body Fat Loss: Moderate body fat loss Orbital, Buccal region, Triceps   Muscle Mass Loss:   Moderate muscle mass loss Temples (temporalis), Clavicles (pectoralis & deltoids)  Fluid Accumulation:  No significant fluid accumulation     Strength:  Not Performed    NUTRITION DIAGNOSIS   Severe malnutrition related to inadequate protein-energy intake, swallowing difficulty as evidenced by wounds, intake 0-25%, Criteria as identified in malnutrition assessment, moderate loss of subcutaneous fat, moderate muscle loss    Nutrition Monitoring and Evaluation:   Food/Nutrient Intake Outcomes:  Food and Nutrient Intake, Supplement Intake  Physical Signs/Symptoms Outcomes:  GI Status, Skin, Weight, Biochemical Data     OBJECTIVE DATA: Significant to nutrition assessment  Nutrition Related Findings: LBM12/02. Glu 77. Lytes wnl. No edema. Wounds: Surgical Incision, Wound Vac  Nutrition Goals: PO intake 50% or greater, by next RD assessment     CURRENT NUTRITION THERAPIES  ADULT DIET; Regular; 3 carb choices (45 gm/meal)  ADULT ORAL NUTRITION SUPPLEMENT; Breakfast, Lunch, Dinner; Diabetic Oral Supplement  PO Intake: 0%   PO Supplement Intake:1-25%  Additional Sources of Calories/IVF:n/a     ANTHROPOMETRICS  Current Height: 5' 4\" (162.6 cm)  Current      Admission weight:    Ideal Body Weight (IBW): 120 lbs  (55 kg)    Usual Bodyweight     BMI:      COMPARATIVE STANDARDS  Energy (kcal):  4160-7872 (30-35 kcal/CBW/48.7 kg)     Protein (g):  73-97 (1.5-2.0 gm/CBW/48.7 kg)       Fluid (mL/day):  8350-4203 ml (1ml/kcal) or per provider    The patient will be monitored per nutrition standards of care. Consult dietitian if additional nutrition interventions are needed prior to RD reassessment.      Ross Saldana, [address]:  [phone]  Office:  [phone]

## 2022-12-03 LAB
GLUCOSE BLD-MCNC: 79 MG/DL (ref 70–99)
PERFORMED ON: NORMAL

## 2022-12-03 PROCEDURE — 2580000003 HC RX 258: Performed by: PHYSICAL MEDICINE & REHABILITATION

## 2022-12-03 PROCEDURE — 97110 THERAPEUTIC EXERCISES: CPT

## 2022-12-03 PROCEDURE — 6370000000 HC RX 637 (ALT 250 FOR IP): Performed by: PHYSICAL MEDICINE & REHABILITATION

## 2022-12-03 PROCEDURE — 99232 SBSQ HOSP IP/OBS MODERATE 35: CPT | Performed by: INTERNAL MEDICINE

## 2022-12-03 PROCEDURE — 97530 THERAPEUTIC ACTIVITIES: CPT | Performed by: PHYSICAL THERAPIST

## 2022-12-03 PROCEDURE — 99232 SBSQ HOSP IP/OBS MODERATE 35: CPT | Performed by: PHYSICAL MEDICINE & REHABILITATION

## 2022-12-03 PROCEDURE — 97535 SELF CARE MNGMENT TRAINING: CPT

## 2022-12-03 PROCEDURE — 1280000000 HC REHAB R&B

## 2022-12-03 PROCEDURE — 97110 THERAPEUTIC EXERCISES: CPT | Performed by: PHYSICAL THERAPIST

## 2022-12-03 RX ORDER — DOXYCYCLINE 100 MG/1
100 CAPSULE ORAL EVERY 12 HOURS SCHEDULED
Status: DISCONTINUED | OUTPATIENT
Start: 2022-12-03 | End: 2022-12-05 | Stop reason: HOSPADM

## 2022-12-03 RX ADMIN — ACETAMINOPHEN 1000 MG: 500 TABLET ORAL at 12:43

## 2022-12-03 RX ADMIN — FUROSEMIDE 20 MG: 20 TABLET ORAL at 08:48

## 2022-12-03 RX ADMIN — ATORVASTATIN CALCIUM 10 MG: 10 TABLET, FILM COATED ORAL at 08:47

## 2022-12-03 RX ADMIN — BACITRACIN: 500 OINTMENT TOPICAL at 08:48

## 2022-12-03 RX ADMIN — METHOCARBAMOL TABLETS 1000 MG: 500 TABLET, COATED ORAL at 12:42

## 2022-12-03 RX ADMIN — TRAMADOL HYDROCHLORIDE 25 MG: 50 TABLET, COATED ORAL at 16:34

## 2022-12-03 RX ADMIN — TRAMADOL HYDROCHLORIDE 25 MG: 50 TABLET, COATED ORAL at 08:59

## 2022-12-03 RX ADMIN — SODIUM CHLORIDE, PRESERVATIVE FREE 10 ML: 5 INJECTION INTRAVENOUS at 21:29

## 2022-12-03 RX ADMIN — ACETAMINOPHEN 1000 MG: 500 TABLET ORAL at 06:57

## 2022-12-03 RX ADMIN — ASPIRIN 81 MG: 81 TABLET, COATED ORAL at 08:48

## 2022-12-03 RX ADMIN — SODIUM CHLORIDE, PRESERVATIVE FREE 10 ML: 5 INJECTION INTRAVENOUS at 08:48

## 2022-12-03 ASSESSMENT — PAIN DESCRIPTION - LOCATION
LOCATION: LEG

## 2022-12-03 ASSESSMENT — PAIN - FUNCTIONAL ASSESSMENT
PAIN_FUNCTIONAL_ASSESSMENT: PREVENTS OR INTERFERES SOME ACTIVE ACTIVITIES AND ADLS

## 2022-12-03 ASSESSMENT — PAIN DESCRIPTION - ORIENTATION
ORIENTATION: LEFT

## 2022-12-03 ASSESSMENT — PAIN SCALES - GENERAL
PAINLEVEL_OUTOF10: 10

## 2022-12-03 ASSESSMENT — PAIN DESCRIPTION - DESCRIPTORS
DESCRIPTORS: BURNING
DESCRIPTORS: ACHING;THROBBING
DESCRIPTORS: BURNING
DESCRIPTORS: ACHING;THROBBING

## 2022-12-03 ASSESSMENT — PAIN DESCRIPTION - ONSET: ONSET: ON-GOING

## 2022-12-03 ASSESSMENT — PAIN DESCRIPTION - PAIN TYPE: TYPE: ACUTE PAIN;SURGICAL PAIN

## 2022-12-03 ASSESSMENT — PAIN DESCRIPTION - FREQUENCY: FREQUENCY: CONTINUOUS

## 2022-12-03 NOTE — PROGRESS NOTES
Pertinent Medications: Metformin 500 mg PO BID    Patient has received IV contrast.  Per [facility] policy, Metformin will be held for 48 hours. Metformin will be automatically restarted 48hr after contrast if serum creatinine is within FDA approved guidelines (SCr < 1.4 in women / SCr < 1.5 in men). If patient is discharged prior to 48hr, Metformin will need to be addressed as part of the medication reconciliation process. Please call with any questions. Thanks!    Guillermo Boston [facility]  12/2/2022 8:23 PM

## 2022-12-03 NOTE — PROGRESS NOTES
Occupational Therapy  Facility/Department: [facility] ACUTE REHAB UNIT  Rehabilitation Occupational Therapy Daily Treatment Note    Date: 12/3/22  Patient Name: Rosales Donnelly       Room: 3109/3109-01  MRN: [id number]  Account: [de-identified]   : 1929  (80 y.o.) Gender: female            Past Medical History:  has a past medical history of Anxiety, Arthritis, At risk for falls, CAD (coronary artery disease), Cerebral artery occlusion with cerebral infarction ([address] DDD (degenerative disc disease), cervical, Fractures, Hyperlipidemia, Hypertension, Mitral regurgitation, Neuropathy, Osteopenia, Osteoporosis, PAD (peripheral artery disease) ([address] Peripheral neuropathy, Peripheral vascular disease ([address] Podagra, Prolonged emergence from general anesthesia, Spinal stenosis, Type 2 diabetes mellitus ([address] Urethral stricture, and Urgency of urination. Past Surgical History:   has a past surgical history that includes Tonsillectomy; Appendectomy; Cholecystectomy (1978); Colonoscopy (1999); Hysterectomy (); Total hip arthroplasty (1998); Kyphosis surgery (2006); Wrist fracture surgery (2008); Cataract removal with implant ([id number]); eye surgery (Bilateral); IR Femoral Popliteal Bypass Graft (Right, 2015); Carpal tunnel release (Right, 2019); Femoral Endarterectomy (Left, 2022); Leg Surgery (Left, 2022); angioplasty (Left, 2022); femoral bypass (Left, 2022); Leg Surgery (N/A, 2022); Axillary-femoral Bypass Graft (Left, 2022); and Leg Surgery (Left, 2022). Restrictions  Other position/activity restrictions: Up as tolerated    Subjective  Subjective: Pt seated on bedside commode at OT arrival w/ caregiver. Pt requiring max encouragement to participate and initiate therapy session.  During session pt states, \"You guys just keep pushing me too hard,\" \"I just can't do that today,\" & \"I'm too tired honey I need to rest and get well.\"      Objective     Cognition  Overall Cognitive Status: Exceptions  Arousal/Alertness: Appropriate responses to stimuli  Following Commands: Follows one step commands with increased time; Follows one step commands with repetition  Attention Span: Attends with cues to redirect  Memory: Decreased short term memory  Problem Solving: Decreased awareness of errors  Insights: Decreased awareness of deficits  Initiation: Requires cues for some  Sequencing: Requires cues for some  Cognition Comment: Requires heavy encouragement to participate in all activity this date. Orientation  Overall Orientation Status: Within Normal Limits  Orientation Level: Oriented X4         ADL  Lower Extremity Dressing  Assistance Level: Minimal assistance; Increased time to complete  Skilled Clinical Factors: Donning pants seated EOB w/ assist for threading LLE as pt reports she is unable to lift. CGA in stance to pull pants over hips with RW. +++ time to complete threading process. OT provided encouragement to utilize reacher for LE threading as she reports difficulty with trunk flexion this date, however, pt adamatly stating that reacher does not work for her and will not try during therapy this date. Putting On/Taking Off Footwear  Assistance Level: Minimal assistance  Skilled Clinical Factors: Pt donned R slipper and OT assist with L slipper. OT attempted to educate and encourage pt on utilization of AE to assist, however, pt declined. Toileting  Assistance Level: Increased time to complete;Contact guard assist  Skilled Clinical Factors: Pt completed brief management with max encouragement from OT, continent of bladder this date with ++ time to complete. CGA in stance w/ RW to pull brief over hips. Functional Mobility  Device: Rolling walker  Activity:  (from bed to recliner chair)  Assistance Level: Contact guard assist  Skilled Clinical Factors: Forward flexed posture, OT providing cues for upright stance.  Pt declining to ambulate additionally secondary to pain  Scooting  Assistance Level: Minimal assistance  Skilled Clinical Factors: to scoot back in chair w/ weight shifting; assist for scooting Rhip  Transfers  Surface: From bed; To chair with arms  Additional Factors: Verbal cues; Increased time to complete;Hand placement cues  Device: Walker  Sit to Stand  Assistance Level: Contact guard assist  Skilled Clinical Factors: w/ RW  Stand to Sit  Assistance Level: Contact guard assist  Skilled Clinical Factors: w/ RW     OT Exercises  Exercise Treatment: Pt completed the following exercises to promote improved strength for functional transfers and participation in ADL routine. Resistive Exercises: Pt completed 2x10 shoulder flexion and bicep curls with 1# dowel, requiring seated rest breaks between each set. Breaks ranging from 3-5 minutes w/ pt continuously stating \"I am just too tired honey,\" or \"I think thats enough for now\" requiring encouragement to continue. ; Pt also compelted 2x10 chest pulls, 2x10 punches, and 2x10 diagonals all with seated rest breaks between each set. Postural Correction Exercises: Pt with c/o neck pain, OT observed cervical R lateral flexion is somewhat fixed position for pt. OT instructed pt to complete cervical flexion, extension, lateral flexion to L side to promote improved head positioning. Pt completed 2 sets, holding each stretch approx 10 secs due to tolerance. Pt also completed scapular retraction exercises 2x10 to promote improved shoulder positioning for posture. Assessment  Assessment  Assessment: Pt continues to require max encouragement to participate in full therapy session and to initiate all tasks. Pt demo'd various UE, cervical, and postural exercises to promote improved posture and UE strength for functional transfers and engagement in ADL routin this date with increased time to complete and prolonged seated rest breaks between each exercise with verbal cues for encouragment. Completed toileting w/ CGA and LBD with min A. Remains limited by functional endurance and fatigue, would benefit from continued skilled OT services to maximize functiona outcomes, cont OT POC  Activity Tolerance: Patient limited by pain; Patient limited by fatigue;Patient limited by endurance  Discharge Recommendations: Continue to assess pending progress;Home with Home health OT;24 hour supervision or assist  OT Equipment Recommendations  Other: shower chair, GB by toilet, handicap height toilet, pt need practice with long handle shoe horn (may benefit from use)  Safety Devices  Safety Devices in place: Yes  Type of devices: Left in chair;Nurse notified;Call light within reach; Chair alarm in place (caregiver present)    Patient Education  Education  Education Given To: Patient  Education Provided: Role of Therapy;Plan of Care;Safety;ADL Function;Mobility Training;Transfer Training  Education Provided Comments: Pt educated on therapy schedule, need for consistent participation, as well as therapy progression and how this impacts daily life at discharge. OT also discussed energy conservation technqiues with pt this date for implementation into daily routine as pt is requiring increased resk breaks between every task. Education Method: Verbal  Barriers to Learning: Cognition  Education Outcome: Verbalized understanding;Continued education needed    Plan  Occupational Therapy Plan  Times Per Week: 5 days a week 90 minutes a day  Current Treatment Recommendations: Strengthening;Balance training;Functional mobility training; Endurance training;Pain management;Self-Care / ADL; Home management training    Goals  Patient Goals   Patient goals : Just to walk  Short Term Goals  Time Frame for Short Term Goals: 8 days - all ongoing  Short Term Goal 1: Pt will demo full body dressing with mod I  Short Term Goal 2: Pt will demo toilet transfer w/ mod I  Short Term Goal 3: Pt will demo toileting w/ Mod I  Short Term Goal 4: Pt will complete a simple meal prep task w/ supervision  Short Term Goal 5: Pt will demo independence w/ verbalizing 3 fall prevention strategies          Therapy Time   Individual Concurrent Group Co-treatment   Time In 0930         Time Out 1045         Minutes 75         Timed Code Treatment Minutes: Two [facility] Hunt OT

## 2022-12-03 NOTE — PROGRESS NOTES
Physical Therapy  Facility/Department: [facility] ACUTE REHAB UNIT  Rehabilitation Physical Therapy Treatment Note    NAME: Kaylan Mina  : 1929 (80 y.o.)  MRN: [id number]  CODE STATUS: DNR-CCA    Date of Service: 12/3/22       Restrictions:  Position Activity Restriction  Other position/activity restrictions: Up as tolerated     SUBJECTIVE  Subjective  Subjective: Pt sitting in recliner when PT arrived. Pt agreeable to therapy. Pain: Pt cont to  report pain with any movement of L leg. Pt did not rate pain but was very expressive with groans and screams  while grabbing her leg        Post Treatment Pain Screening         OBJECTIVE  Cognition  Overall Cognitive Status: Exceptions  Arousal/Alertness: Appropriate responses to stimuli  Following Commands: Follows one step commands with increased time; Follows one step commands with repetition  Attention Span: Attends with cues to redirect  Memory: Decreased short term memory  Problem Solving: Decreased awareness of errors  Insights: Decreased awareness of deficits  Initiation: Requires cues for some  Sequencing: Requires cues for some  Cognition Comment: Requires heavy encouragement to participate in all activity this date. Orientation  Overall Orientation Status: Within Normal Limits  Orientation Level: Oriented X4    PT instructed pt with the following ex performed in pt's recliner with LES positioned on multiple pillows req heavy encouragement to take pillow out and obtain optimal positioning. 1. AP/ QS/ GS  ( combination isometrics)  2. Hip IR/ ER with knees extended  3. Alternating hip/knee flex/ext with   4. Hip abd with knee extended  5. SLRs    Layo 10 reps of each with AAROM to the LLE  but AROM to the R.                     ASSESSMENT/PROGRESS TOWARDS GOALS       Assessment  Assessment: Pt is well below baseline and would benefit from continued therapy.  However, a barrier to pt's progress is the decreased  receptiveness to recommendations to optimize her situation. Pt agreed to ex in the chair but declined getting out of the chair for any transf or walking. Pt req significant rests between each activity, Pt req AAROM to the LLE with pt demo very guarded behavior. Pt performed the ex but at an extremely slow pace. Pt req additional time for obtaining her \"comfortable positioning in chair. \" PT cont to encourage pt for increased activity. Activity Tolerance: Patient limited by pain; Patient limited by fatigue;Patient limited by endurance  Discharge Recommendations: Home with Home health PT;24 hour supervision or assist  PT Equipment Recommendations  Equipment Needed: No  Other: Pt reports that she has excessive equipment at home from past medical episodes    Goals  Patient Goals   Patient Goals : To Return home and rest  Short Term Goals  Time Frame for Short Term Goals: 8 days  Short Term Goal 1: Ind with bed mobility  Short Term Goal 2: Transf with S excluding floor transf  Short Term Goal 3: Ambulate with S x  50'  with LRAD on level surfaces  Short Term Goal 4: MI w/c propulsion x 50' on level surfaces  Additional Goals?: No    PLAN OF CARE/SAFETY  Physcial Therapy Plan  Days Per Week: 5 Days  Hours Per Day: 1.5 hours  Current Treatment Recommendations: Strengthening;Patient/Caregiver education & training; Therapeutic activities;Balance training;Gait training;Stair training;Functional mobility training;Transfer training; Endurance training; Safety education & training; Wheelchair mobility training;Pain management;Positioning  Safety Devices  Type of Devices: Call light within reach;Nurse notified; Chair alarm in place; Left in chair    EDUCATION  Education  Education Given To: Patient  Education Provided: Precautions; Mobility Training; Safety;Transfer Training;Energy Conservation; Fall Prevention Strategies; Home Exercise Program  Education Provided Comments: PT eduacted pt on postioning for pain management.  Pt resistive to recommendations 2/2 to \"that's not how  we do it\" (referencing the CG and herself)  Education Method: Verbal  Education Outcome: Verbalized understanding;Continued education needed  Skilled Clinical Factors: VC for focusing on task        Therapy Time   Individual Concurrent Group Co-treatment   Time In 1300         Time Out 1355         Minutes 55              Variance: 20  Reason: Refusal    Tyrone Courtney, PT, 12/03/22 at 3:39 PM

## 2022-12-03 NOTE — PLAN OF CARE
Problem: Discharge Planning  Goal: Discharge to home or other facility with appropriate resources  12/3/2022 0547 by Luiza Mittal RN  Outcome: Progressing  Flowsheets (Taken 12/3/2022 9550)  Discharge to home or other facility with appropriate resources:   Identify barriers to discharge with patient and caregiver   Identify discharge learning needs (meds, wound care, etc)     Problem: Pain  Goal: Verbalizes/displays adequate comfort level or baseline comfort level  12/3/2022 0547 by Luiza Mittal RN  Outcome: Progressing  Flowsheets (Taken 12/3/2022 0547)  Verbalizes/displays adequate comfort level or baseline comfort level:   Encourage patient to monitor pain and request assistance   Assess pain using appropriate pain scale   Administer analgesics based on type and severity of pain and evaluate response   Implement non-pharmacological measures as appropriate and evaluate response     Problem: Skin/Tissue Integrity  Goal: Absence of new skin breakdown  12/3/2022 0547 by Luiza Mittal RN  Outcome: Progressing  Note: Scattered areas of redness and ecchymosis. Sutures intact to surgical incisions. Skin surrounding incisions pink. No drainage noted. Patient refused to turn to her side.  Patient encourage to shift and off load weight frequently Verbalized understanding

## 2022-12-03 NOTE — PROGRESS NOTES
ID Follow-up NOTE    CC:   Infected pseudoaneurysm, MRSA infection  Antibiotics: Daptomycin    Admit Date: 11/28/2022  Hospital Day: 6    Subjective:     Patient c/o L groin pain with palpation  Pt is 'exhausted', therapy is 'too much'  Has loose stool, attributes to Daptomycin    Objective:     Patient Vitals for the past 8 hrs:   BP Temp Temp src Pulse Resp SpO2   12/03/22 0859 -- -- -- -- 18 --   12/03/22 0830 (!) 165/72 97.7 °F (36.5 °C) Oral 78 18 96 %       I/O last 3 completed shifts:  In: -   Out: 300 [Urine:300]  No intake/output data recorded. EXAM:  GENERAL: No apparent distress.   RA  HEENT: Membranes moist, no oral lesion  NECK:  Supple, no lymphadenopathy  LUNGS: Clear b/l, no rales, no dullness  CARDIAC: RRR, no murmur appreciated  ABD:  + BS, soft - tender with palpation  Lower abd wall wound w staples  L groin wound with staples, closed  L thigh graft donor site with xeroform  EXT:                No rash, no edema, no lesions  L arm with staples (basilar v harvest site)  L foot with scab /escar, no open wound / drainage / erythema  NEURO: No focal neurologic findings  PSYCH: Orientation, sensorium, mood normal  LINES:  R PICC, placed 11/25       Data Review:  Lab Results   Component Value Date    WBC 7.0 12/02/2022    HGB 8.4 (L) 12/02/2022    HCT 24.5 (L) 12/02/2022    MCV 92.6 12/02/2022     12/02/2022     Lab Results   Component Value Date    CREATININE 0.7 12/02/2022    BUN 36 (H) 12/02/2022     12/02/2022    K 3.8 12/02/2022     12/02/2022    CO2 28 12/02/2022       Hepatic Function Panel:   Lab Results   Component Value Date/Time    ALKPHOS 84 11/20/2022 05:26 AM    ALT 14 11/20/2022 05:26 AM    AST 29 11/20/2022 05:26 AM    PROT 5.8 11/20/2022 05:26 AM    PROT 6.4 07/26/2012 04:30 PM    BILITOT 0.3 11/20/2022 05:26 AM    BILIDIR <0.2 05/13/2021 11:53 AM    IBILI see below 05/13/2021 11:53 AM    LABALBU 2.9 12/01/2022 05:02 AM       Micro:  11/20 Surg cult - light growth MRSA  Staph aureus mrsa (1)  Antibiotic Interpretation Microscan    ceFAZolin Resistant >16 mcg/mL   clindamycin Resistant >4 mcg/mL   erythromycin Resistant >4 mcg/mL   oxacillin Resistant >2 mcg/mL   tetracycline Sensitive <=4 mcg/mL   trimethoprim-sulfamethoxazole Sensitive <=0.5/9.5 mcg/mL   vancomycin Sensitive 2 mcg/mL     No BC sent     Imagin/20 Abd CTA  1. Small amount of hemorrhage in the left groin without evidence of active extravasation. 2. 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 3. 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 4. Loculated fluid collection of the medial left thigh which could represent abscess or sterile postoperative fluid collection. 5. Subcutaneous edema and fluid lower extremities. 6. Left pleural effusion.        Scheduled Meds:   [Held by provider] metFORMIN  500 mg Oral BID WC    bacitracin   Topical Daily    acetaminophen  1,000 mg Oral Q6H    aspirin EC  81 mg Oral Daily    atorvastatin  10 mg Oral Daily    daptomycin (CUBICIN) IVPB  350 mg IntraVENous Q24H    furosemide  20 mg Oral Daily    methocarbamol  1,000 mg Oral TID    sodium chloride flush  5-40 mL IntraVENous 2 times per day       Continuous Infusions:   dextrose         PRN Meds:  traMADol, hydrALAZINE, ipratropium-albuterol, ondansetron **OR** ondansetron, sodium chloride flush, glucose, dextrose bolus **OR** dextrose bolus, glucagon (rDNA), dextrose, acetaminophen, magnesium hydroxide, polyethylene glycol      Assessment:     Hx DM, HTN, CAD, PAD, CVA, lumbar stenosis     Hx L fem endarterectomy 22, SFA angioplasty 22, L ileofem (redo, prelaced femoral artery with Dacron graft), L fem to below knee popliteal in-situ bypass graft - 22  L groin wound I&D 22, cult S epidermidis in broth     L LE angiogram with balloon angioplasty distal bypass graft 10/18/22  Wound cult  - heavy MRSA (R clinda, tetra; vanco DANITA <0.5)     POD#1 EXCISION OF INFECTED ILEOFEMORAL BYPASS GRAFT; HARVEST LEFT BASILIC VEIN; LEFT EXTERNAL ILIAC ARTERY TO FEMORAL BYPASS THROUGH OBTURATOR CANAL; PLACEMENT OF WOUND VAC   - evidence infection in surg    - cult MRSA, vanco DANITA 2    Leukocytosis - resolved     Elevated CK on Daptomycin, poss symptomatic    Plan:     D/c Daptomycin   Start Doxycycline po   F/u CK next week    Postop care per Vascular   Postop care per Plastic Surg - will ask residents about staples     Medical Decision Making:   The following items were considered in medical decision making:  Discussion of patient care with other providers  Reviewed clinical lab tests  Reviewed radiology tests  Reviewed other diagnostic tests/interventions  Independent review of radiologic images - reviewed CT / CTA with Radiologist / Dr Meagan Musa   Microbiology cultures and other micro tests reviewed      Discussed with pt, caregiver, RN  Sacha Barrera MD

## 2022-12-03 NOTE — PROGRESS NOTES
Patient alert and oriented shift assessment done as charted. Caregiver at bedside for good portion of day. Patient rates pain 10/10 with movement, but repositioning helps and denies any pain most of time if not moving. Frequent repositioning of leg happened throughout day. Patient c/o diarrhea today has had 3-5 loose BM per patient and caregiver report Gabby King took her a couple of times. And concerned abx causing it. Dr. Fawad Yusuf contacted and he stated would not change it it should not be causing the diarrhea. Patient asked again and nursing reported that abx unchanged, so patient stated she would start refusing it. Patient also concerned with CK levels, Dr contacted about that he stated he was aware and that if patient would be refusing abx he would change the abx. Patient also been taking one of 2 robaxin pills as ordered, pulling one of out cup and refusing second one. Patient this morning when CT was ordered refused, dr. Lucas Penaloza, and came to speak with patient. After speaking with  Patient stated she did not refuse it, \"I only said I couldn't do it then! I can do it later today\" patient c/o exhaustion with therapy and stated pushed too hard, requested CT between 4 and 5 pm and it was done.  CT called to request metformin be put on hold for next 48 hrs. 1700 dose held and passed along in report to evening nurse

## 2022-12-03 NOTE — PROGRESS NOTES
Patient continues to complain of severe burning pain from right groin down to foot. Xeroform gauze in place to left groin surgical site. Pulse palpable. Dressing in place to left upper arm surgical site. No drainage noted. Patient was medicated with scheduled Tylenol and Tramadol prn. Frequently repositioned. Legs elevated off bed.

## 2022-12-03 NOTE — PROGRESS NOTES
Department of Physical Medicine & Rehabilitation  Progress Note    Patient Identification:  Tish Patel  [id number]  : 1929  Admit date: 2022    Chief Complaint: Debility    Subjective:   Continues to struggle in therapy. She is upset about her antibiotics and is reluctant to take them. She is attempting therapy with 30/10 pain in her left leg. Plan to monitor over the weekend. If she has trouble with 3 hours of therapy she may need to DC home early next week. ROS: No f/c, n/v, cp     Objective:  Patient Vitals for the past 24 hrs:   BP Temp Temp src Pulse Resp SpO2   22 0859 -- -- -- -- 18 --   22 0830 (!) 165/72 97.7 °F (36.5 °C) Oral 78 18 96 %   22 2125 (!) 149/60 97.6 °F (36.4 °C) Oral 67 16 99 %   22 1156 -- -- -- -- -- 95 %       Const: Alert. No distress, pleasant. HEENT: Normocephalic, atraumatic. Normal sclera/conjunctiva. MMM. CV: Regular rate and rhythm. Resp: No respiratory distress. Lungs CTAB. Abd: Soft, nontender, nondistended, NABS+   Ext: No edema. Neuro: Alert, oriented, appropriately interactive. Psych: Cooperative, appropriate mood and affect    Laboratory data: Available via EMR. Last 24 hour lab  Recent Results (from the past 24 hour(s))   CK    Collection Time: 22  2:29 PM   Result Value Ref Range    Total  (H) 26 - 192 U/L   POCT Glucose    Collection Time: 22  8:58 PM   Result Value Ref Range    POC Glucose 118 (H) 70 - 99 mg/dl    Performed on ACCU-CHEK    POCT Glucose    Collection Time: 22  7:49 AM   Result Value Ref Range    POC Glucose 79 70 - 99 mg/dl    Performed on ACCU-CHEK        Therapy progress:  PT  Position Activity Restriction  Other position/activity restrictions: Up as tolerated  Objective     Sit to Stand: Minimal Assistance, Moderate Assistance (Min / mod assist from bedside chair and w/c.  Req VC for hand and foot placement)  Stand to Sit: Minimal Assistance (to bedside chair x 2 trials and to toilet, verbal cues for safety and reach back)  Bed to Chair: Moderate assistance, Minimal assistance (Initaiting stance req mod/ min but once pt is standing, pt able to step with UE support on AD)  Device: Rolling Walker  Other Apparatus: Wheelchair follow (wound vac)  Assistance:  (Min A x1 and then A req for transport of wound vac with w/c follow ( pt repeatedly indicated that she was \"going to pass out\" ))  Distance: 10', 3 '  OT  PT Equipment Recommendations  Equipment Needed: No  Other: Pt reports that she has excessive equipment at home from past medical episodes  Toilet - Technique: Ambulating  Equipment Used: Standard toilet  Toilet Transfers Comments: Pt demo'd stand to sit w/ RW to toilet w/ CGA; Pt demo'd sit to stand toilet to RW w/ L arm on GB and R arm on center of RW w/ Mod A  Assessment                  Body mass index is 18.43 kg/m². Assessment and Plan:  1. Debility- Currently she is just starting to be able to more her left leg again. She is still in considerable pain. She is refusing medications and sometimes refusing therapy. Continues to have pain. 6 day therapy program      2. Surgical leg wound- followed by surgery  - pain control     3.  DM2  - restart home metformin         Rehab Progress: Improving  Anticipated Dispo: home  Services/DME: home 24 hour care  ELOS: TBD- possible DC [facility] next week       Brenden Haney D.O. M.P.H  PM&R  12/3/2022  11:18 AM

## 2022-12-03 NOTE — PLAN OF CARE
Problem: Discharge Planning  Goal: Discharge to home or other facility with appropriate resources  Outcome: Progressing  Flowsheets (Taken 12/2/2022 0434 by Meche Garibay RN)  Discharge to home or other facility with appropriate resources:   Identify barriers to discharge with patient and caregiver   Identify discharge learning needs (meds, wound care, etc)     Problem: Pain  Goal: Verbalizes/displays adequate comfort level or baseline comfort level  Outcome: Progressing  Flowsheets (Taken 12/2/2022 0434 by Meche Garibay RN)  Verbalizes/displays adequate comfort level or baseline comfort level:   Encourage patient to monitor pain and request assistance   Assess pain using appropriate pain scale   Administer analgesics based on type and severity of pain and evaluate response   Implement non-pharmacological measures as appropriate and evaluate response     Problem: Safety - Adult  Goal: Free from fall injury  Outcome: Progressing  Flowsheets (Taken 11/30/2022 0826 by Héctor Herrera RN)  Free From Fall Injury: Instruct family/caregiver on patient safety     Problem: Skin/Tissue Integrity  Goal: Absence of new skin breakdown  Description: 1. Monitor for areas of redness and/or skin breakdown  2. Assess vascular access sites hourly  3. Every 4-6 hours minimum:  Change oxygen saturation probe site  4. Every 4-6 hours:  If on nasal continuous positive airway pressure, respiratory therapy assess nares and determine need for appliance change or resting period.   Outcome: Progressing     Problem: Nutrition Deficit:  Goal: Optimize nutritional status  Outcome: Progressing  Flowsheets (Taken 12/2/2022 6145 by Grover Pires RD)  Nutrient intake appropriate for improving, restoring, or maintaining nutritional needs:   Assess nutritional status and recommend course of action   Monitor oral intake, labs, and treatment plans   Recommend appropriate diets, oral nutritional supplements, and vitamin/mineral supplements Problem: Chronic Conditions and Co-morbidities  Goal: Patient's chronic conditions and co-morbidity symptoms are monitored and maintained or improved  Outcome: Progressing  Flowsheets (Taken 12/2/2022 2014)  Care Plan - Patient's Chronic Conditions and Co-Morbidity Symptoms are Monitored and Maintained or Improved: Monitor and assess patient's chronic conditions and comorbid symptoms for stability, deterioration, or improvement     Problem: ABCDS Injury Assessment  Goal: Absence of physical injury  Outcome: Progressing

## 2022-12-04 VITALS
TEMPERATURE: 97.9 F | HEIGHT: 64 IN | OXYGEN SATURATION: 98 % | DIASTOLIC BLOOD PRESSURE: 73 MMHG | WEIGHT: 108 LBS | RESPIRATION RATE: 18 BRPM | SYSTOLIC BLOOD PRESSURE: 157 MMHG | HEART RATE: 75 BPM | BODY MASS INDEX: 18.44 KG/M2

## 2022-12-04 LAB
GLUCOSE BLD-MCNC: 207 MG/DL (ref 70–99)
GLUCOSE BLD-MCNC: 224 MG/DL (ref 70–99)
GLUCOSE BLD-MCNC: 88 MG/DL (ref 70–99)
PERFORMED ON: ABNORMAL
PERFORMED ON: ABNORMAL
PERFORMED ON: NORMAL

## 2022-12-04 PROCEDURE — 94669 MECHANICAL CHEST WALL OSCILL: CPT

## 2022-12-04 PROCEDURE — 6370000000 HC RX 637 (ALT 250 FOR IP): Performed by: PHYSICAL MEDICINE & REHABILITATION

## 2022-12-04 PROCEDURE — 97530 THERAPEUTIC ACTIVITIES: CPT

## 2022-12-04 PROCEDURE — 99232 SBSQ HOSP IP/OBS MODERATE 35: CPT | Performed by: PHYSICAL MEDICINE & REHABILITATION

## 2022-12-04 PROCEDURE — 6370000000 HC RX 637 (ALT 250 FOR IP): Performed by: INTERNAL MEDICINE

## 2022-12-04 PROCEDURE — 94761 N-INVAS EAR/PLS OXIMETRY MLT: CPT

## 2022-12-04 PROCEDURE — 1280000000 HC REHAB R&B

## 2022-12-04 PROCEDURE — 2580000003 HC RX 258: Performed by: PHYSICAL MEDICINE & REHABILITATION

## 2022-12-04 PROCEDURE — 97535 SELF CARE MNGMENT TRAINING: CPT

## 2022-12-04 PROCEDURE — 97116 GAIT TRAINING THERAPY: CPT

## 2022-12-04 PROCEDURE — 97110 THERAPEUTIC EXERCISES: CPT

## 2022-12-04 RX ADMIN — ATORVASTATIN CALCIUM 10 MG: 10 TABLET, FILM COATED ORAL at 10:32

## 2022-12-04 RX ADMIN — ACETAMINOPHEN 1000 MG: 500 TABLET ORAL at 23:49

## 2022-12-04 RX ADMIN — METHOCARBAMOL TABLETS 1000 MG: 500 TABLET, COATED ORAL at 15:50

## 2022-12-04 RX ADMIN — TRAMADOL HYDROCHLORIDE 25 MG: 50 TABLET, COATED ORAL at 00:22

## 2022-12-04 RX ADMIN — DOXYCYCLINE 100 MG: 100 CAPSULE ORAL at 22:08

## 2022-12-04 RX ADMIN — ASPIRIN 81 MG: 81 TABLET, COATED ORAL at 10:31

## 2022-12-04 RX ADMIN — ACETAMINOPHEN 650 MG: 325 TABLET ORAL at 15:49

## 2022-12-04 RX ADMIN — SODIUM CHLORIDE, PRESERVATIVE FREE 10 ML: 5 INJECTION INTRAVENOUS at 22:09

## 2022-12-04 RX ADMIN — FUROSEMIDE 20 MG: 20 TABLET ORAL at 10:31

## 2022-12-04 RX ADMIN — SODIUM CHLORIDE, PRESERVATIVE FREE 10 ML: 5 INJECTION INTRAVENOUS at 10:33

## 2022-12-04 RX ADMIN — TRAMADOL HYDROCHLORIDE 25 MG: 50 TABLET, COATED ORAL at 09:17

## 2022-12-04 RX ADMIN — ACETAMINOPHEN 1000 MG: 500 TABLET ORAL at 00:22

## 2022-12-04 RX ADMIN — TRAMADOL HYDROCHLORIDE 25 MG: 50 TABLET, COATED ORAL at 23:49

## 2022-12-04 RX ADMIN — METHOCARBAMOL TABLETS 1000 MG: 500 TABLET, COATED ORAL at 22:08

## 2022-12-04 RX ADMIN — TRAMADOL HYDROCHLORIDE 25 MG: 50 TABLET, COATED ORAL at 15:50

## 2022-12-04 RX ADMIN — BACITRACIN: 500 OINTMENT TOPICAL at 10:32

## 2022-12-04 ASSESSMENT — PAIN SCALES - GENERAL
PAINLEVEL_OUTOF10: 10
PAINLEVEL_OUTOF10: 10
PAINLEVEL_OUTOF10: 0
PAINLEVEL_OUTOF10: 10
PAINLEVEL_OUTOF10: 7
PAINLEVEL_OUTOF10: 10

## 2022-12-04 ASSESSMENT — PAIN - FUNCTIONAL ASSESSMENT
PAIN_FUNCTIONAL_ASSESSMENT: ACTIVITIES ARE NOT PREVENTED
PAIN_FUNCTIONAL_ASSESSMENT: PREVENTS OR INTERFERES SOME ACTIVE ACTIVITIES AND ADLS
PAIN_FUNCTIONAL_ASSESSMENT: PREVENTS OR INTERFERES SOME ACTIVE ACTIVITIES AND ADLS

## 2022-12-04 ASSESSMENT — PAIN DESCRIPTION - LOCATION
LOCATION: LEG

## 2022-12-04 ASSESSMENT — PAIN DESCRIPTION - ORIENTATION
ORIENTATION: LEFT

## 2022-12-04 ASSESSMENT — PAIN DESCRIPTION - PAIN TYPE: TYPE: CHRONIC PAIN;SURGICAL PAIN

## 2022-12-04 ASSESSMENT — PAIN DESCRIPTION - DESCRIPTORS
DESCRIPTORS: THROBBING
DESCRIPTORS: ACHING;THROBBING
DESCRIPTORS: BURNING
DESCRIPTORS: ACHING;THROBBING

## 2022-12-04 ASSESSMENT — PAIN DESCRIPTION - ONSET: ONSET: ON-GOING

## 2022-12-04 ASSESSMENT — PAIN DESCRIPTION - FREQUENCY: FREQUENCY: CONTINUOUS

## 2022-12-04 NOTE — PROGRESS NOTES
Department of Physical Medicine & Rehabilitation  Progress Note    Patient Identification:  Freddie Levin  [id number]  : 1929  Admit date: 2022    Chief Complaint: Debility    Subjective:   Refusing some meds today. She is also refusing some therapy. She just wants to go home. Discussed with her caregiver and plan for discharge tomorrow afternoon. She wants to make sure IV abx will continue at home but is refusing them in the hospital.     ROS: No f/c, n/v, cp     Objective:  Patient Vitals for the past 24 hrs:   BP Temp Temp src Pulse Resp SpO2   22 1015 (!) 149/76 97.2 °F (36.2 °C) Axillary 80 18 97 %   22 0917 -- -- -- -- 18 --   22 0734 -- -- -- -- -- 99 %   22 2115 (!) 158/72 97.8 °F (36.6 °C) Oral 85 18 96 %   22 1634 -- -- -- -- 18 --   22 1233 (!) 155/63 97.9 °F (36.6 °C) Oral 77 18 96 %       Const: Alert. No distress, pleasant. HEENT: Normocephalic, atraumatic. Normal sclera/conjunctiva. MMM. CV: Regular rate and rhythm. Resp: No respiratory distress. Lungs CTAB. Abd: Soft, nontender, nondistended, NABS+   Ext: No edema. Neuro: Alert, oriented, appropriately interactive. Psych: Cooperative, appropriate mood and affect    Laboratory data: Available via EMR. Last 24 hour lab  Recent Results (from the past 24 hour(s))   POCT Glucose    Collection Time: 22  8:29 AM   Result Value Ref Range    POC Glucose 88 70 - 99 mg/dl    Performed on ACCU-CHEK        Therapy progress:  PT  Position Activity Restriction  Other position/activity restrictions: Up as tolerated  Objective     Sit to Stand: Minimal Assistance, Moderate Assistance (Min / mod assist from bedside chair and w/c.  Req VC for hand and foot placement)  Stand to Sit: Minimal Assistance (to bedside chair x 2 trials and to toilet, verbal cues for safety and reach back)  Bed to Chair: Moderate assistance, Minimal assistance (Initaiting stance req mod/ min but once pt is standing, pt able to step with UE support on AD)  Device: Rolling Walker  Other Apparatus: Wheelchair follow (wound vac)  Assistance:  (Min A x1 and then A req for transport of wound vac with w/c follow ( pt repeatedly indicated that she was \"going to pass out\" ))  Distance: 10', 3 '  OT  PT Equipment Recommendations  Equipment Needed: No  Other: Pt reports that she has excessive equipment at home from past medical episodes  Toilet - Technique: Ambulating  Equipment Used: Standard toilet  Toilet Transfers Comments: Pt demo'd stand to sit w/ RW to toilet w/ CGA; Pt demo'd sit to stand toilet to RW w/ L arm on GB and R arm on center of RW w/ Mod A  Assessment                  Body mass index is 18.43 kg/m². Assessment and Plan:  1. Debility- Currently she is just starting to be able to more her left leg again. She is still in considerable pain. She is refusing medications and sometimes refusing therapy. Continues to have pain. 6 day therapy program      2. Surgical leg wound- followed by surgery  - pain control     3.  DM2  - restart home metformin         Rehab Progress: Improving  Anticipated Dispo: home  Services/DME: home 24 hour care  ELOS: 12/5      Wicho Merritt D.O. M.P.H  PM&R  12/4/2022  12:04 PM

## 2022-12-04 NOTE — PROGRESS NOTES
Physical Therapy  Facility/Department: [facility] ACUTE REHAB UNIT  Rehabilitation Physical Therapy Treatment Note    NAME: Lourdes Quintero  : 1929 (80 y.o.)  MRN: [id number]  CODE STATUS: [id number]    Date of Service: 22       Restrictions:  Position Activity Restriction  Other position/activity restrictions: Up as tolerated     SUBJECTIVE  Subjective  Subjective: Pt sitting in recliner when PT arrived. Pt agreeable to therapy. \"What can I do here in the chair? \"  Pain: Pt cont to  report pain with any movement of L leg. Pt did not rate pain but was very expressive with groans and screams  while grabbing her leg        Post Treatment Pain Screening         OBJECTIVE  Cognition  Overall Cognitive Status: WFL  Arousal/Alertness: Appropriate responses to stimuli  Following Commands: Follows one step commands with increased time; Follows one step commands with repetition  Attention Span: Attends with cues to redirect  Memory: Decreased short term memory  Problem Solving: Decreased awareness of errors  Insights: Decreased awareness of deficits  Initiation: Requires cues for some  Sequencing: Requires cues for some  Cognition Comment: Requires heavy encouragement to participate in activity this date; refusing after minimal mobility. Orientation  Overall Orientation Status: Within Normal Limits  Orientation Level: Oriented X4    Functional Mobility  Scooting  Assistance Level: Minimal assistance (back into her recliner)  Balance  Sitting Balance: Supervision  Standing Balance: Contact guard assistance  Standing Balance  Activity: Pt able to stand at RW with CGA/min A ( performed in the bathroom both for doffing briefs and for donning clean briefs.)  Transfers  Surface: From bed;Raised toilet Seat;From chair with arms; To chair with arms  Additional Factors: Increased time to complete;Hand placement cues  Device: Walker  Sit to Stand  Assistance Level: Minimal assistance (CGA from recliner and min A from toilet)  Skilled Clinical Factors: cueing for hand placement and weight shifting anteriorly for standing  Stand to Sit  Assistance Level: Contact guard assist  Skilled Clinical Factors: VC for hand placement and controlled descent      Environmental Mobility  Ambulation  Surface: Level surface  Device: Rolling walker  Distance: 12' x 2  Activity: Within Room  Additional Factors: Verbal cues  Assistance Level: Contact guard assist;Minimal assistance  Gait Deviations: Decreased step length bilateral;Decreased weight shift left;Decreased heel strike right;Decreased heel strike left; Slow jhon  Skilled Clinical Factors: VC/ TC for an erect posture and increased step length. Pain level limits carryover    PT Exercises  Exercise Treatment: supine in recliner: AP, knee add iso, hip abd, SLR, quad sets, knee flex (minimal ROM on L) x 10 huber      ASSESSMENT/PROGRESS TOWARDS GOALS       Assessment  Assessment: Pt with limited mobility this date 2/2 pain and fatigue. She states that she can only tolerate so much therapy. She demonstrates LE weakness, impaired balance, decreased activity tolerance, and gait impairments. She has difficulty with transfers and ambulation requiring physical assist at times. The pt is not receptive to direction from therapist though she is well below baseline and would benefit from further therapy to improve independence and safety with mobility. Activity Tolerance: Patient limited by pain; Patient limited by fatigue;Patient limited by endurance  Discharge Recommendations: Home with Home health PT;24 hour supervision or assist  PT Equipment Recommendations  Equipment Needed: No  Other: Pt reports that she has excessive equipment at home from past medical episodes    Goals  Patient Goals   Patient Goals :  To Return home and rest  Short Term Goals  Time Frame for Short Term Goals: 8 days  Short Term Goal 1: Ind with bed mobility  Short Term Goal 2: Transf with S excluding floor transf  Short Term Goal 3: Ambulate with S x  50'  with LRAD on level surfaces  Short Term Goal 4: MI w/c propulsion x 50' on level surfaces  Additional Goals?: No    PLAN OF CARE/SAFETY  Physcial Therapy Plan  Days Per Week: 5 Days  Hours Per Day: 1.5 hours  Current Treatment Recommendations: Strengthening;Patient/Caregiver education & training; Therapeutic activities;Balance training;Gait training;Stair training;Functional mobility training;Transfer training; Endurance training; Safety education & training; Wheelchair mobility training;Pain management;Positioning  Safety Devices  Type of Devices: Call light within reach; Chair alarm in place; Left in chair    EDUCATION  Education  Education Given To: Patient  Education Provided: Precautions; Mobility Training; Safety;Transfer Training;Energy Conservation; Fall Prevention Strategies; Home Exercise Program  Education Provided Comments: pt educated on the benefits of mobility  Education Method: Verbal  Barriers to Learning: Other (Comment)  Education Outcome: Verbalized understanding;Continued education needed  Skilled Clinical Factors: VC for focusing on task        Therapy Time   Individual Concurrent Group Co-treatment   Time In 1333         Time Out 1413         Minutes 40           Timed Code Treatment Minutes: 40 Minutes  Variance: 35 due to pt refusal 2/2 pain and fatigue    Calixto Wood, PT, 12/04/22 at 2:35 PM

## 2022-12-04 NOTE — PROGRESS NOTES
Spoke to Jordan covering the house today. Stated that patient is on the list to discharge to home tomorrow on 12/5/22. Made SW aware that patient needs daily dressing changes and is refusing to take ATB. She stated that she will arrange [address]. Stated she spoke to family members and they agreed to increase her private pay care home as well. Patient aware of discharge tomorrow.

## 2022-12-04 NOTE — PROGRESS NOTES
[facility]U Admission Assessment    Ethnicity  \"Are you of , /a, or [place] origin? \"  Check all that apply:  [x] A. No, not of , /a, or [place] Origin  [] B.  Yes, [place] American, Chicano/a  [] C.  Yes, [address] [place] [address]  [] D.  Yes, [place]  [] E.  Yes, another , , or [place] origin  [] X. Patient unable to respond  [] Y. Patient declines to respond    Race  \"What is your race? \"  Check all that apply:  [x] A. White  [] B. Black or   [] C. American [facility] or [place] Native  [] D.  [facility]  [] E. [place]  [] F. Kitt[place]  [] G. [place]  [] Juluis Louann  [] I. [place]  [] J.  Other   [] K.   [] L. Macanese or [place]  [] M. [place]ian  [] N. Other [place]  [] X. Patient unable to respond  [] Y. Patient declines to respond  [] Z. None of the above    Language  A. \"What is your preferred language? \"   English    B. \"Do you need or want an  to communicate with a doctor or health care staff? \"  Check only one:  [x] 0. No  [] 1. Yes  [] 9. Unable to determine    Transportation  \"Has lack of transportation kept you from medical appointments, meetings, work, or from getting things needed for daily living? \"Check all that apply:  [] A.  Yes, it has kept me from medical appointments or from getting my medications  [] B.  Yes, it has kept me from non-medical meetings, appointments, work, or from getting things that I need  [x] C.  No  [] X. Patient unable to respond  [] Y. Patient declines to respond    Hearing  Ability to hear (with hearing aid or hearing appliances if normally used)  []  0. Adequate - no difficulty in normal conversation, social interaction, listening to TV  [x]  1. Minimal difficulty - difficulty in some environments (e.g. when person speaks softly or setting is noisy)  []  2. Moderate difficulty - speaker has to increase volume and speak distinctly   []  3.   Highly impaired - absence of useful hearing    Vision  Ability to see in adequate light (with glasses or other visual appliances)  [x]  0. Adequate - sees fine detail, such as regular print in newspapers/books  []  1. Impaired - sees large print, but not regular print in newspapers/books  []  2. Moderately impaired - limited vision; not able to see newspaper headlines but can identify objects  []  3. Highly impaired - object identification in question, but eyes appear to follow objects  []  4. Severely impaired - no vision or sees only light, colors, or shapes; eyes do not appear to follow objects    Health Literacy  \"How often do you need to have someone help you when you read instructions, pamphlets, or other written material from your doctor or pharmacy? \"  []  0. Never  []  1. Rarely  []  2. Sometimes  [x]  3. Often  []  4. Always  []  8. Patient unable to respond    BIMS - **Must be completed in the flowsheet at admission prior to proceeding with Delirium Assessment**  [x] BIMS completed in flowsheet at admission    Signs and Symptoms of Delirium  A. Acute Onset Mental Status Change - Is there evidence of an acute change in mental status from the patient's baseline? [x] 0. No  [] 1. Yes    B. Inattention - Did the patient have difficulty focusing attention, for example being easily distractible or having difficulty keeping track of what was being said?  []  0. Behavior not present  [x]  1. Behavior continuously present, does not fluctuate  []  2. Behavior present, fluctuates (comes and goes, changes in severity)    C. Disorganized thinking - Was the patient's thinking disorganized or incoherent (rambling or irrelevant conversation, unclear or illogical flow of ideas, or unpredictable switching from subject to subject)? []  0. Behavior not present  [x]  1. Behavior continuously present, does not fluctuate  []  2. Behavior present, fluctuates (comes and goes, changes in severity)    D.   Altered level of consciousness - Did the patient have altered level of consciousness as indicated by any of the following criteria? Vigilant - startled easily to any sound or touch  Lethargic - repeatedly dozed off while being asked questions, but responded to voice or touch  Stuporous - very difficulty to arouse and keep aroused for the interview  Comatose - could not be aroused  [x]  0. Behavior not present  []  1. Behavior continuously present, does not fluctuate  []  2. Behavior present, fluctuates (comes and goes, changes in severity)    Mood    \"Over the last 2 weeks, have you been bothered by any of the following problems?\" 1. Symptom Presence    0 = No  1 = Yes  9 = No Response 2. Symptom Frequency    0 = Never or 1 day  1 = 2-6 days (several days)  2 = 7-11 days (half or more of the days)  3 = 12-14 days (nearly every day)  **Leave blank if 'No Reponse'**      Enter scores in boxes    Column 1 Column 2   Little interest or pleasure in doing things   0 0   Feeling down, depressed, or hopeless   0 0   **If either A or B in column 2 is coded 2 or 3, CONTINUE asking the questions below. If not, END the interview. **     Trouble falling or staying asleep, or sleeping too much   1 1   Feeling tired or having little energy   0 0   Poor appetite or overeating   1 1   Feeling bad about yourself - or that you are a failure or have let yourself or your family down   0 0   Trouble concentrating on things, such as reading the newspaper or watching television   1 1   Moving or speaking so slowly that other people could have noticed. Or the opposite- being so fidgety or restless that you have been moving around a lot more than usual.   0 0   Thoughts that you would be better off dead, or of hurting yourself in some way. 0 0   Total Severity: Add scores for all frequency responses in column 2 (possible score 0-27, or enter 99 if unable to complete (if symptom frequency (column 2) is blank for 3 or more items).    3     Social Isolation  \"How often do you feel lonely or isolated from those around you? \"  [x] 0. Never  [] 1. Rarely  [] 2. Sometimes  [] 3. Often  [] 4. Always  [] 7. Patient declines to respond  [] 8. Patient unable to respond    Pain Effect on Sleep  \"Over the past 5 days, how much of the time has pain made it hard for you to sleep at night? \"  []  0. Does not apply - I have not had any pain or hurting in the past 5 days  []  1. Rarely or not at all  []  2. Occasionally  []  3. Frequently  [x]  4. Almost constantly  []  8. Unable to answer    **If the patient answers \"0. Does not apply\" to this question, skip the next two \"Pain Effect. Rajani Ruffing Rajani Ruffing \" questions**    Pain Interference with Therapy Activities  \"Over the past 5 days, how often have you limited your participation in rehabilitation therapy sessions due to pain? \"  []  0. Does not apply - I have not received rehabilitation therapy in the past 5 days  []  1. Rarely or not at all  []  2. Occasionally  []  3. Frequently  [x]  4. Almost constantly  []  8. Unable to answer    Pain Interference with Day-to-Day Activities: \"Over the past 5 days, how often have you limited your day-to-day activities (excluding rehabilitation therapy session)? \"  []  1. Rarely or not at all  []  2. Occasionally  []  3. Frequently  [x]  4. Almost constantly  []  8. Unable to answer    Nutritional Approaches  Check all of the following nutritional approaches that apply on admission:  []  A. Parenteral/IV feeding (including IV fluids if needed for hydration, but not as part of dialysis/chemo)  []  B. Feeding tube (e.g., nasogastric or abdominal (PEG))  []  C. Mechanically altered diet - requires change in texture of food or liquids (e.g., pureed food, thickened liquids)  []  D. Therapeutic diet (e.g., low salt, diabetic, low cholesterol)  [x]  Z.   None of the above    High Risk Drug Classes:  Use and Indication    Is taking: Check if the pt is taking any medications by pharmacological classification, not how it is used, in the following classes  Indication noted: If column 1 is checked, check if there is an indication noted for all meds in the drug class Is taking  (check all that apply) Indication noted (check all that apply)   Antipsychotic [] []   Anticoagulant [x] [x]   Antibiotic [x] [x]   Opioid [] []   Antiplatelet [] []   Hypoglycemic (including insulin) [] []   None of the above []     Special Treatments, Procedures, and Programs    Check all of the following treatments, procedures, and programs that apply on admission. On admission (check all that apply)   Cancer Treatments   A1. Chemotherapy []           A2. IV []           A3. Oral []           A10. Other []   B1. Radiation []   Respiratory Therapies   C1. Oxygen Therapy            C2. Continuous (continuously for at least 14 hours per day) []           C3. Intermittent []           C4. High-concentration []   D1. Suctioning (Does not include oral suctioning) []           D2. Scheduled []           D3. As needed []   E1. Tracheostomy Care []   F1. Invasive Mechanical Ventilator (ventilator or respirator) []   G1. Non-invasive Mechanical Ventilator []           G2. BiPAP []           G3. CPAP []   Other   H1. IV Medications (Do not include sub Q pumps, flushes, Dextrose 50% or lactated ringers) []           H2. Vasoactive medications []           H3. Antibiotics [x]           H4. Anticoagulation [x]           H10. Other []   I1. Transfusions []   J1. Dialysis []           J2. Hemodialysis []           J3. Peritoneal dialysis []   O1. IV access (including a catheter in a vein) []           O2. Peripheral [x]           O3. Midline []           O4. Central (PICC, tunneled, port) []      None of the above (select if no Cancer, Respiratory, or Other boxes are checked) []     The above items have been reviewed and updated as necessary, and are accurate for the admission assessment period. Reviewing RN: BRITNEY Byrd RN

## 2022-12-04 NOTE — PLAN OF CARE
Problem: Pain  Goal: Verbalizes/displays adequate comfort level or baseline comfort level  Flowsheets  Taken 12/4/2022 0220 by Vivek Resendez RN  Verbalizes/displays adequate comfort level or baseline comfort level:   Encourage patient to monitor pain and request assistance   Assess pain using appropriate pain scale   Administer analgesics based on type and severity of pain and evaluate response  Taken 12/3/2022 1233 by Wilfrido Yu RN  Verbalizes/displays adequate comfort level or baseline comfort level: Encourage patient to monitor pain and request assistance     Problem: Nutrition Deficit:  Goal: Optimize nutritional status  Flowsheets (Taken 12/4/2022 0220)  Nutrient intake appropriate for improving, restoring, or maintaining nutritional needs:   Assess nutritional status and recommend course of action   Monitor oral intake, labs, and treatment plans

## 2022-12-04 NOTE — PROGRESS NOTES
A&O.  Rates pain 10/10 continuously. With movement and at rest.  Received prn Tylenol and Tramadol. States no relief from pain at LLE wound site. Refuses to take anything stronger. Refused po ATB this am.  Refused Robaxin this am.  Caregiver at bedside. Dr Amber Elias in to see patient. Assessment complete today. In chair, safety measures in place.

## 2022-12-04 NOTE — PROGRESS NOTES
Patient refused to take Doxycycline. Patient already refused to take during the day and ID was made aware.

## 2022-12-04 NOTE — PLAN OF CARE
Problem: Safety - Adult  Goal: Free from fall injury  Outcome: Progressing  Flowsheets (Taken 12/4/2022 1115)  Free From Fall Injury: Instruct family/caregiver on patient safety  Note: Remains free from falls or injury while on ARU

## 2022-12-04 NOTE — PROGRESS NOTES
Occupational Therapy  Facility/Department: [facility]  Rehabilitation Occupational Therapy Daily Treatment Note    Date: 22  Patient Name: Rozina Holt       Room: 3109/3109-01  MRN: [id number]  Account: [de-identified]   : 1929  (80 y.o.) Gender: female     Past Medical History:  has a past medical history of Anxiety, Arthritis, At risk for falls, CAD (coronary artery disease), Cerebral artery occlusion with cerebral infarction ([address] DDD (degenerative disc disease), cervical, Fractures, Hyperlipidemia, Hypertension, Mitral regurgitation, Neuropathy, Osteopenia, Osteoporosis, PAD (peripheral artery disease) ([address] Peripheral neuropathy, Peripheral vascular disease ([address] Podagra, Prolonged emergence from general anesthesia, Spinal stenosis, Type 2 diabetes mellitus ([address] Urethral stricture, and Urgency of urination. Past Surgical History:   has a past surgical history that includes Tonsillectomy; Appendectomy; Cholecystectomy (1978); Colonoscopy (1999); Hysterectomy (); Total hip arthroplasty (1998); Kyphosis surgery (2006); Wrist fracture surgery (2008); Cataract removal with implant ([id number]); eye surgery (Bilateral); IR Femoral Popliteal Bypass Graft (Right, 2015); Carpal tunnel release (Right, 2019); Femoral Endarterectomy (Left, 2022); Leg Surgery (Left, 2022); angioplasty (Left, 2022); femoral bypass (Left, 2022); Leg Surgery (N/A, 2022); Axillary-femoral Bypass Graft (Left, 2022); and Leg Surgery (Left, 2022). Restrictions  Other position/activity restrictions: Up as tolerated    Subjective  Subjective: Pt semi-supine on OT approach, agreed to tx.     Objective     Cognition  Overall Cognitive Status: WFL  Insights: Decreased awareness of deficits  Orientation  Overall Orientation Status: Within Normal Limits         ADL  Grooming/Oral Hygiene  Skilled Clinical Factors: Pt declined grooming - oral care, face wash, hand hygiene while in bathroom - stating she would use her hand-  Toileting  Assistance Level: Increased time to complete;Minimal assistance  Skilled Clinical Factors: continent bladder void on toilet. pt completed sherrill-hygiene (anterior and posterior) SUPV. Pt pulled brief down CGA in stance, req ModA in pulling brief up d/t positioning of would dressings and pt not wanting brief to touch/snag dressing. Pt require extensive time on toilet for complete void \"my plumbings all messed up\"  Toilet Transfers  Technique:  (ambulating: RW, GB)  Equipment: Standard toilet;Grab bars  Assistance Level: Minimal assistance;Verbal cues  Skilled Clinical Factors: stand>sit RW>toilet CGA w/ grab-bars. sit>stand toilet>RW w/ grab-bars Zion          Functional Mobility  Device: Rolling walker (GB)  Activity: To/From bathroom  Assistance Level: Contact guard assist  Skilled Clinical Factors: flxd posture - pt reports hx spinal stenosis and scoliosis  Bed Mobility  Overall Assistance Level: Supervision  Supine to Sit  Assistance Level: Supervision  Skilled Clinical Factors: HOB raised, using bed-rail. no phys assist needed this session  Transfers  Surface:  (EOB>RW, RW>toilet, toilet>RW, RW>revliner)  Additional Factors: Verbal cues; Increased time to complete;Hand placement cues  Device: Walker (RW GB)  Sit to Stand  Assistance Level: Contact guard assist  Skilled Clinical Factors: sit>stand EOB>RW CGA  Stand to Sit  Skilled Clinical Factors: stand>sit RW>recliner CGA w/ cues for hand placement         Assessment  Assessment: Pt c/o pain in LLE at start of session, RN to room and provided pain medication. Pt agreeable to tx for this session, and requesting need to toilet at start of session. Bulk of session spent in bathroom on toilet as pt needing increased time to complete void and hygiene. Educ in session, and during toileting regarding safety w/ fxl mobilty tasks and typical home needs/set-up.  Completed transfers w/ RW and GB CGA, exception being toilet transfer and pt required Zion to stand from toilet to [address] [place] [address]. Pt may benefit from cont'd skilled OT while in rehab setting, and after anticipated d/c to home to maximize IND and safety w/ ADL and fxl mobility. Will cont to follow per [address]  Activity Tolerance: Patient limited by pain; Patient limited by fatigue;Patient limited by endurance  Discharge Recommendations: Continue to assess pending progress;Home with Home health OT;24 hour supervision or assist    Safety Devices  Type of devices: Left in chair;Nurse notified;Call light within reach (private duty aide in room attending to pt set-up in chair on OT exit.)  Restraints  Initially in place: No    Patient Education  Education  Education Given To: Patient  Education Provided: Role of Therapy;Plan of Care;Safety;Transfer Training;Mobility Training  Education Provided Comments: Educ in session, and during toileting regarding safety w/ fxl mobilty tasks and typical home needs/set-up. Plan  Occupational Therapy Plan  Times Per Week: 5 days a week 90 minutes a day  Current Treatment Recommendations: Strengthening;Balance training;Functional mobility training; Endurance training;Pain management;Self-Care / ADL; Home management training    Goals  Patient Goals   Patient goals : Just to walk  Short Term Goals  Time Frame for Short Term Goals: 8 days - all ongoing  Short Term Goal 1: Pt will demo full body dressing with mod I  Short Term Goal 2: Pt will demo toilet transfer w/ mod I  Short Term Goal 3: Pt will demo toileting w/ Mod I  Short Term Goal 4: Pt will complete a simple meal prep task w/ supervision  Short Term Goal 5: Pt will demo independence w/ verbalizing 3 fall prevention strategies      Therapy Time   Individual Concurrent Group Co-treatment   Time In 0900         Time Out 1000         Minutes 60         Timed Code Treatment Minutes: [address], MOT-OTR/L [id number]

## 2022-12-05 VITALS
WEIGHT: 108 LBS | TEMPERATURE: 97.7 F | HEIGHT: 64 IN | RESPIRATION RATE: 18 BRPM | BODY MASS INDEX: 18.44 KG/M2 | SYSTOLIC BLOOD PRESSURE: 154 MMHG | HEART RATE: 63 BPM | DIASTOLIC BLOOD PRESSURE: 57 MMHG | OXYGEN SATURATION: 98 %

## 2022-12-05 LAB
ALBUMIN SERPL-MCNC: 2.9 G/DL (ref 3.4–5)
ANION GAP SERPL CALCULATED.3IONS-SCNC: 8 MMOL/L (ref 3–16)
BASOPHILS ABSOLUTE: 0 K/UL (ref 0–0.2)
BASOPHILS RELATIVE PERCENT: 0.7 %
BUN BLDV-MCNC: 24 MG/DL (ref 7–20)
CALCIUM SERPL-MCNC: 8.8 MG/DL (ref 8.3–10.6)
CHLORIDE BLD-SCNC: 99 MMOL/L (ref 99–110)
CO2: 29 MMOL/L (ref 21–32)
CREAT SERPL-MCNC: 0.9 MG/DL (ref 0.6–1.2)
EOSINOPHILS ABSOLUTE: 0.3 K/UL (ref 0–0.6)
EOSINOPHILS RELATIVE PERCENT: 3.9 %
GFR SERPL CREATININE-BSD FRML MDRD: 59 ML/MIN/{1.73_M2}
GLUCOSE BLD-MCNC: 125 MG/DL (ref 70–99)
GLUCOSE BLD-MCNC: 95 MG/DL (ref 70–99)
HCT VFR BLD CALC: 25.5 % (ref 36–48)
HEMOGLOBIN: 8.6 G/DL (ref 12–16)
LYMPHOCYTES ABSOLUTE: 1.4 K/UL (ref 1–5.1)
LYMPHOCYTES RELATIVE PERCENT: 20.2 %
MAGNESIUM: 1.5 MG/DL (ref 1.8–2.4)
MCH RBC QN AUTO: 31.5 PG (ref 26–34)
MCHC RBC AUTO-ENTMCNC: 33.8 G/DL (ref 31–36)
MCV RBC AUTO: 93.3 FL (ref 80–100)
MONOCYTES ABSOLUTE: 0.7 K/UL (ref 0–1.3)
MONOCYTES RELATIVE PERCENT: 10 %
NEUTROPHILS ABSOLUTE: 4.5 K/UL (ref 1.7–7.7)
NEUTROPHILS RELATIVE PERCENT: 65.2 %
PDW BLD-RTO: 19.8 % (ref 12.4–15.4)
PERFORMED ON: ABNORMAL
PHOSPHORUS: 3.6 MG/DL (ref 2.5–4.9)
PLATELET # BLD: 261 K/UL (ref 135–450)
PMV BLD AUTO: 6.8 FL (ref 5–10.5)
POTASSIUM SERPL-SCNC: 3.9 MMOL/L (ref 3.5–5.1)
PREALBUMIN: 12.4 MG/DL (ref 20–40)
PROCALCITONIN: 0.24 NG/ML (ref 0–0.15)
RBC # BLD: 2.73 M/UL (ref 4–5.2)
SODIUM BLD-SCNC: 136 MMOL/L (ref 136–145)
TOTAL CK: 390 U/L (ref 26–192)
TRANSFERRIN: 167 MG/DL (ref 200–360)
WBC # BLD: 6.9 K/UL (ref 4–11)

## 2022-12-05 PROCEDURE — 97530 THERAPEUTIC ACTIVITIES: CPT | Performed by: PHYSICAL THERAPIST

## 2022-12-05 PROCEDURE — 6370000000 HC RX 637 (ALT 250 FOR IP): Performed by: INTERNAL MEDICINE

## 2022-12-05 PROCEDURE — 84145 PROCALCITONIN (PCT): CPT

## 2022-12-05 PROCEDURE — 84134 ASSAY OF PREALBUMIN: CPT

## 2022-12-05 PROCEDURE — 80069 RENAL FUNCTION PANEL: CPT

## 2022-12-05 PROCEDURE — 82550 ASSAY OF CK (CPK): CPT

## 2022-12-05 PROCEDURE — 84466 ASSAY OF TRANSFERRIN: CPT

## 2022-12-05 PROCEDURE — 6370000000 HC RX 637 (ALT 250 FOR IP): Performed by: PHYSICAL MEDICINE & REHABILITATION

## 2022-12-05 PROCEDURE — 97116 GAIT TRAINING THERAPY: CPT | Performed by: PHYSICAL THERAPIST

## 2022-12-05 PROCEDURE — 97535 SELF CARE MNGMENT TRAINING: CPT

## 2022-12-05 PROCEDURE — 99239 HOSP IP/OBS DSCHRG MGMT >30: CPT | Performed by: PHYSICAL MEDICINE & REHABILITATION

## 2022-12-05 PROCEDURE — 83735 ASSAY OF MAGNESIUM: CPT

## 2022-12-05 PROCEDURE — 97110 THERAPEUTIC EXERCISES: CPT

## 2022-12-05 PROCEDURE — 85025 COMPLETE CBC W/AUTO DIFF WBC: CPT

## 2022-12-05 PROCEDURE — 36592 COLLECT BLOOD FROM PICC: CPT

## 2022-12-05 PROCEDURE — 2580000003 HC RX 258: Performed by: PHYSICAL MEDICINE & REHABILITATION

## 2022-12-05 RX ORDER — POLYETHYLENE GLYCOL 3350 17 G/17G
17 POWDER, FOR SOLUTION ORAL DAILY PRN
Qty: 527 G | Refills: 1 | Status: SHIPPED | OUTPATIENT
Start: 2022-12-05 | End: 2023-01-04

## 2022-12-05 RX ORDER — METHOCARBAMOL 1000 MG/1
1000 TABLET, COATED ORAL 3 TIMES DAILY
Qty: 60 TABLET | Refills: 1 | Status: SHIPPED | OUTPATIENT
Start: 2022-12-05 | End: 2022-12-15

## 2022-12-05 RX ORDER — TRAMADOL HYDROCHLORIDE 50 MG/1
25 TABLET ORAL EVERY 8 HOURS PRN
Qty: 30 TABLET | Refills: 0 | Status: SHIPPED | OUTPATIENT
Start: 2022-12-05 | End: 2022-12-08

## 2022-12-05 RX ORDER — GINSENG 100 MG
CAPSULE ORAL
Qty: 28 G | Refills: 1 | Status: SHIPPED | OUTPATIENT
Start: 2022-12-05 | End: 2022-12-15

## 2022-12-05 RX ORDER — DOXYCYCLINE 100 MG/1
100 CAPSULE ORAL EVERY 12 HOURS SCHEDULED
Qty: 28 CAPSULE | Refills: 0 | Status: SHIPPED | OUTPATIENT
Start: 2022-12-05

## 2022-12-05 RX ADMIN — ACETAMINOPHEN 1000 MG: 500 TABLET ORAL at 12:55

## 2022-12-05 RX ADMIN — METHOCARBAMOL TABLETS 1000 MG: 500 TABLET, COATED ORAL at 08:36

## 2022-12-05 RX ADMIN — ACETAMINOPHEN 1000 MG: 500 TABLET ORAL at 05:22

## 2022-12-05 RX ADMIN — METFORMIN HYDROCHLORIDE 500 MG: 500 TABLET ORAL at 08:37

## 2022-12-05 RX ADMIN — DOXYCYCLINE 100 MG: 100 CAPSULE ORAL at 08:39

## 2022-12-05 RX ADMIN — ATORVASTATIN CALCIUM 10 MG: 10 TABLET, FILM COATED ORAL at 08:38

## 2022-12-05 RX ADMIN — BACITRACIN: 500 OINTMENT TOPICAL at 08:38

## 2022-12-05 RX ADMIN — FUROSEMIDE 20 MG: 20 TABLET ORAL at 08:37

## 2022-12-05 RX ADMIN — SODIUM CHLORIDE, PRESERVATIVE FREE 10 ML: 5 INJECTION INTRAVENOUS at 08:39

## 2022-12-05 RX ADMIN — ASPIRIN 81 MG: 81 TABLET, COATED ORAL at 08:37

## 2022-12-05 RX ADMIN — TRAMADOL HYDROCHLORIDE 25 MG: 50 TABLET, COATED ORAL at 08:34

## 2022-12-05 ASSESSMENT — PAIN - FUNCTIONAL ASSESSMENT
PAIN_FUNCTIONAL_ASSESSMENT: PREVENTS OR INTERFERES SOME ACTIVE ACTIVITIES AND ADLS

## 2022-12-05 ASSESSMENT — PAIN DESCRIPTION - ORIENTATION
ORIENTATION: LEFT

## 2022-12-05 ASSESSMENT — PAIN SCALES - GENERAL
PAINLEVEL_OUTOF10: 10

## 2022-12-05 ASSESSMENT — PAIN DESCRIPTION - LOCATION
LOCATION: LEG

## 2022-12-05 ASSESSMENT — PAIN DESCRIPTION - DESCRIPTORS
DESCRIPTORS: BURNING;ACHING
DESCRIPTORS: ACHING;BURNING
DESCRIPTORS: ACHING;BURNING

## 2022-12-05 NOTE — CARE COORDINATION
[facility]    Patient aware and agreeable to services.  Faxed orders to [facility] for [facility] by 12/7    Daphney Martinez LPN  Care Transition Nurse  [address]  [phone]

## 2022-12-05 NOTE — PROGRESS NOTES
Occupational Therapy  Facility/Department: [facility] ACUTE REHAB UNIT  Rehabilitation Occupational Therapy Daily Treatment Note/Discharge    Date: 22  Patient Name: Lavell Harrell       Room: 3109/3109-01  MRN: [id number]  Account: [de-identified]   : 1929  (80 y.o.) Gender: female                    Past Medical History:  has a past medical history of Anxiety, Arthritis, At risk for falls, CAD (coronary artery disease), Cerebral artery occlusion with cerebral infarction ([address] DDD (degenerative disc disease), cervical, Fractures, Hyperlipidemia, Hypertension, Mitral regurgitation, Neuropathy, Osteopenia, Osteoporosis, PAD (peripheral artery disease) ([address] Peripheral neuropathy, Peripheral vascular disease ([address] Podagra, Prolonged emergence from general anesthesia, Spinal stenosis, Type 2 diabetes mellitus ([address] Urethral stricture, and Urgency of urination. Past Surgical History:   has a past surgical history that includes Tonsillectomy; Appendectomy; Cholecystectomy (1978); Colonoscopy (1999); Hysterectomy (); Total hip arthroplasty (1998); Kyphosis surgery (2006); Wrist fracture surgery (2008); Cataract removal with implant ([id number]); eye surgery (Bilateral); IR Femoral Popliteal Bypass Graft (Right, 2015); Carpal tunnel release (Right, 2019); Femoral Endarterectomy (Left, 2022); Leg Surgery (Left, 2022); angioplasty (Left, 2022); femoral bypass (Left, 2022); Leg Surgery (N/A, 2022); Axillary-femoral Bypass Graft (Left, 2022); and Leg Surgery (Left, 2022). Restrictions  Other position/activity restrictions: Up as tolerated    Subjective  Subjective: Pt seated on BSC w/ aid present, agreeable to OT. Objective     Cognition  Overall Cognitive Status: WFL  Arousal/Alertness: Appropriate responses to stimuli  Following Commands: Follows one step commands with increased time; Follows one step commands with repetition  Attention Span: Attends with cues to redirect  Memory: Decreased short term memory  Problem Solving: Decreased awareness of errors  Insights: Decreased awareness of deficits  Initiation: Requires cues for some  Sequencing: Requires cues for some  Cognition Comment: Requires heavy encouragement to participate in activity this date  Orientation  Overall Orientation Status: Within Normal Limits         ADL  Grooming/Oral Hygiene  Assistance Level: Modified independent  Skilled Clinical Factors: Pt seated EOB performed oral care brushing her gums and dentures. Upper Extremity Bathing  Assistance Level: Modified independent; Increased time to complete  Skilled Clinical Factors: Pt seated on BSC performed sponge bath and washed 4/4 components w/ warm bath towel. Lower Extremity Bathing  Assistance Level: Supervision; Increased time to complete  Skilled Clinical Factors: Pt seated on BSC washed BLE and bottom w/ supervision. Upper Extremity Dressing  Assistance Level: Modified independent; Increased time to complete  Skilled Clinical Factors: Pt seated on BSC doffed sweater and donned new sweater w/ increased time. Caregiver completed setup. Lower Extremity Dressing  Assistance Level: Minimal assistance; Increased time to complete  Skilled Clinical Factors: Pt seated on BSC required assist to thread LLE and then threaded RLE flexing at the hip. Pt managed pants up over hips w/ CGA. Caregiver placed gauze over L hip incision to not get anything on her clothes. Pt required + time to complete. Putting On/Taking Off Footwear  Assistance Level: Stand by assist  Skilled Clinical Factors: Pt doffed bilateral slippers flexing at the hip on BSC. Pt seated EOB slipped on flats w/ SBA. Toileting  Assistance Level: Contact guard assist;Increased time to complete  Skilled Clinical Factors: Pt seated on toilet continent of urine. Pt performed pericare w/ wipes setup. Pt in stance managed pants up over hips w/ CGA.   Toilet Transfers  Equipment: Beside commode  Assistance Level: Contact guard assist  Skilled Clinical Factors: Sit > Stand, BSC > RW w/ CGA. Pt used hand rails to perform sit to stand. Functional Mobility  Device: Rolling walker  Assistance Level: Contact guard assist  Skilled Clinical Factors: Pt ambulated from bed to recliner ~5 ft  Sit to Stand  Assistance Level: Contact guard assist  Skilled Clinical Factors: EOB > RW; VCs for hand placement  Stand to Sit  Assistance Level: Contact guard assist  Skilled Clinical Factors: RW > Recliner   OT Exercises  Exercise Treatment: Pt completed the following exercise to increase BUE strength for functional transfers and participation in ADLs. Pt seated in recliner demo'd 10 of each of the following exercises w/ 1lb and 2lb dumbells: 2lb internal rotation/external rotation; 1lb punches, 1lb tricep press (flexing/extending elbow w/ shoulder flexed and arm in the air). Pt stating she could feel the exercises working and wanted to strengthen her arms. Pt after provided verbal cues and modeling demo'd each exercise w/ good form and no complaints of pain. Pt provided w/ HEP and pt stated she has dumbells at home she can do the exercises w/.     Assessment  Assessment  Assessment: Pt demo'd fair participation in therapy during her stay in ARU. The pt is performing oral hygiene, UE bathing, UE dressing w/ independence. Pt is performing LE bathing w/ supervision. Pt demo'd improvement in footwear now requring SBA, Min A for LE dressing, and CGA for toileting and toilet transfer. Pt will d/c home w/ 24 hr caregiver assistance, however pt would continue to benefit from skilled OT at d/c for improved independence w/ ADLs, functional mobility and improved safety. Activity Tolerance: Patient limited by pain; Patient limited by fatigue;Patient limited by endurance  Discharge Recommendations: Home with Home health OT;24 hour supervision or assist  OT Equipment Recommendations  Equipment Needed: No  Safety Devices  Safety Devices in place: Yes  Type of devices: Left in chair;Nurse notified;Call light within reach; Chair alarm in place (private duty aide in room and NPs present to remove staples.)    Patient Education  Education  Education Given To: Patient;Caregiver  Education Provided: Role of Therapy;Plan of Care;Safety;Transfer Training;Mobility Training  Education Provided Comments: Pt educated on dumbell HEP  Education Method: Verbal  Barriers to Learning: Cognition  Education Outcome: Verbalized understanding;Continued education needed    Plan  Occupational Therapy Plan  Times Per Week: 6 days a week 75 minutes a day  Current Treatment Recommendations: Strengthening;Balance training;Functional mobility training; Endurance training;Pain management;Self-Care / ADL; Home management training    Goals  Patient Goals   Patient goals : Just to walk  Short Term Goals  Time Frame for Short Term Goals: 8 days - all ongoing  Short Term Goal 1: Pt will demo full body dressing with mod I - Not Met 12/5  Short Term Goal 2: Pt will demo toilet transfer w/ mod I - Not met 12/5  Short Term Goal 3: Pt will demo toileting w/ Mod I - Not met 12/5  Short Term Goal 4: Pt will complete a simple meal prep task w/ supervision - not met 12/5  Short Term Goal 5: Pt will demo independence w/ verbalizing 3 fall prevention strategies - not met 12/5    AM-PAC Score               Therapy Time   Individual Concurrent Group Co-treatment   Time In 0915         Time Out 1008         Minutes 53         Timed Code Treatment Minutes: 53 Minutes  Variance: 22 due to NPs coming in to remove josefina    Yordan Barillas S/OT

## 2022-12-05 NOTE — PROGRESS NOTES
Pt in bed, alert and oriented. Vitals stable, pain 10\10 in left leg, does not change. Call light within reach. Will continue to monitor.     Vitals:    12/04/22 2145   BP: (!) 157/73   Pulse: 75   Resp: 18   Temp: 97.9 °F (36.6 °C)   SpO2: 98%

## 2022-12-05 NOTE — PLAN OF CARE
Problem: Discharge Planning  Goal: Discharge to home or other facility with appropriate resources  Outcome: Progressing  Flowsheets (Taken 12/4/2022 1853 by Irais Parada, RN)  Discharge to home or other facility with appropriate resources: Identify barriers to discharge with patient and caregiver     Problem: Pain  Goal: Verbalizes/displays adequate comfort level or baseline comfort level  Outcome: Progressing  Flowsheets (Taken 12/4/2022 1015 by Irais Parada, RN)  Verbalizes/displays adequate comfort level or baseline comfort level: Encourage patient to monitor pain and request assistance     Problem: Safety - Adult  Goal: Free from fall injury  12/4/2022 2224 by Jocelyne Parks RN  Outcome: Progressing     Problem: Skin/Tissue Integrity  Goal: Absence of new skin breakdown  Description: 1. Monitor for areas of redness and/or skin breakdown  2. Assess vascular access sites hourly  3. Every 4-6 hours minimum:  Change oxygen saturation probe site  4. Every 4-6 hours:  If on nasal continuous positive airway pressure, respiratory therapy assess nares and determine need for appliance change or resting period.   Outcome: Progressing     Problem: Nutrition Deficit:  Goal: Optimize nutritional status  Outcome: Progressing     Problem: Chronic Conditions and Co-morbidities  Goal: Patient's chronic conditions and co-morbidity symptoms are monitored and maintained or improved  Outcome: Progressing  Flowsheets (Taken 12/4/2022 1853 by Irais Parada, RN)  Care Plan - Patient's Chronic Conditions and Co-Morbidity Symptoms are Monitored and Maintained or Improved: Monitor and assess patient's chronic conditions and comorbid symptoms for stability, deterioration, or improvement     Problem: ABCDS Injury Assessment  Goal: Absence of physical injury  Outcome: Progressing  Flowsheets (Taken 12/4/2022 1115 by Irais Parada, RN)  Absence of Physical Injury: Implement safety measures based on patient

## 2022-12-05 NOTE — PROGRESS NOTES
Vascular Surgery   Daily Progress Note  Julio Hess  CC: L iliac aneurysm     Subjective :   Complaining of burning in leg this am.       Objective    Infusions:   dextrose          I/O:I/O last 3 completed shifts: In: 65 [P.O.:840; I.V.:10]  Out: 500 [Urine:500]           Wt Readings from Last 1 Encounters:   12/05/22 108 lb (49 kg)                 LABS:    Recent Labs     12/02/22  0710 12/05/22  0552   WBC 7.0 6.9   HGB 8.4* 8.6*   HCT 24.5* 25.5*   MCV 92.6 93.3    261          Recent Labs     12/02/22  0710 12/05/22  0552    136   K 3.8 3.9    99   CO2 28 29   PHOS  --  3.6   BUN 36* 24*   CREATININE 0.7 0.9        No results for input(s): AST, ALT, ALB, BILIDIR, BILITOT, ALKPHOS in the last 72 hours. No results for input(s): LIPASE, AMYLASE in the last 72 hours. No results for input(s): PROT, INR, APTT in the last 72 hours. Recent Labs     12/02/22  1429 12/05/22  0552   CKTOTAL 323* 390*               Exam:BP (!) 157/73   Pulse 75   Temp 97.9 °F (36.6 °C) (Oral)   Resp 18   Ht 5' 4\" (1.626 m)   Wt 108 lb (49 kg)   SpO2 98%   BMI 18.54 kg/m²   General appearance: no acute distress,   Neuro: A&Ox3, no focal deficits, sensation intact  Eyes: No scleral icterus, EOM grossly intact  Neck: trachea midline, no JVD  Chest/Lungs:  no accessory muscle use on RA  Cardiovascular: RRR  Abdomen: Soft, non-tender, non-distended, no rebound, guarding, or rigidity. LLQ incision with staples, minimal erythema and no drainage. Skin: warm and dry, no rashes  Extremities: LUE incision with staples C/D/I. L groin with firm LN on medial groin, unchanged from prior exam, non tender. Groin and incision sites are soft without tenderness and no signs of hematoma or active bleeding. Xeroform dressing in place at donor site open to air, mild seepage. L leg tender to palpation with hyperesthesia. Graft site with xeroform in place with good take. Left thigh incision with erythema.     Genitourinary: external catheter in place to wall suction, clear yellow urine. ASSESSMENT/PLAN: Pt. is a 80 y.o. female s/p excision of infected ilofemoral bypass graft with harvest of left basilic vein and left external iliac artery to femoral bypass through obturator canal 11/20 and rectus femoris flap with split thickness skin graft 11/22.    - Cont IV abx per ID  - Cont ASA  81 mg daily  - discussed close control of BG for wound healing  - pt with distal pulses to LLE.   - Wound care per plastics  - Pt likely to d/c to home today. Will discuss CT results with pt. Will f/u on home care needs.      Fide Valle DO   PGY1, General Surgery  12/05/22  7:08 AM  Pager # [phone]

## 2022-12-05 NOTE — PROGRESS NOTES
[facility] Discharge Assessment    Transportation  \"Has lack of transportation kept you from medical appointments, meetings, work, or from getting things needed for daily living? \"Check all that apply:  [] A.  Yes, it has kept me from medical appointments or from getting my medications  [] B.  Yes, it has kept me from non-medical meetings, appointments, work, or from getting things that I need  [x] C.  No  [] X. Patient unable to respond  [] Y. Patient declines to respond    Provision of Current Reconciled Medication List to Subsequent Provider at Discharge  [] No, current reconciled medication list not provided to the subsequent provider. [x] Yes, current reconciled medication list provided to the subsequent provider. (**Select route of transmission below**)   [x] Via Electronic Health Record   [] [employer]  [] Verbal (e.g. in person, telephone, video conferencing)  [x] Paper-based (e.g. fax, copies, printouts)   [] Other Methods (e.g. texting, email, CDs)    Provision of Current Reconciled Medication List to Patient at Discharge  [] No, current reconciled medication list not provided to the patient, family and/or caregiver. [x] Yes, current reconciled medication list provided to the patient, family and/or caregiver.  (**Select route of transmission below**)   [x] Via Electronic Health Record (e.g., electronic access to patient portal)   [] [employer]  [] Verbal (e.g. in person, telephone, video conferencing)  [x] Paper-based (e.g. fax, copies, printouts)   [] Other Methods (e.g. texting, email, CDs)    Health Literacy  \"How often do you need to have someone help you when you read instructions, pamphlets, or other written material from your doctor or pharmacy? \"  []  0. Never  []  1. Rarely  []  2. Sometimes  [x]  3. Often  []  4. Always  []  8.   Patient unable to respond    BIMS - **Must be completed in the flowsheet at discharge prior to proceeding with Delirium Assessment**  [x] BIMS completed in flowsheet at discharge    Signs and Symptoms of Delirium  A. Acute Onset Mental Status Change - Is there evidence of an acute change in mental status from the patient's baseline? [x] 0. No  [] 1. Yes    B. Inattention - Did the patient have difficulty focusing attention, for example being easily distractible or having difficulty keeping track of what was being said? [x]  0. Behavior not present  []  1. Behavior continuously present, does not fluctuate  []  2. Behavior present, fluctuates (comes and goes, changes in severity)    C. Disorganized thinking - Was the patient's thinking disorganized or incoherent (rambling or irrelevant conversation, unclear or illogical flow of ideas, or unpredictable switching from subject to subject)? [x]  0. Behavior not present  []  1. Behavior continuously present, does not fluctuate  []  2. Behavior present, fluctuates (comes and goes, changes in severity)    D. Altered level of consciousness - Did the patient have altered level of consciousness as indicated by any of the following criteria? Vigilant - startled easily to any sound or touch  Lethargic - repeatedly dozed off while being asked questions, but responded to voice or touch  Stuporous - very difficulty to arouse and keep aroused for the interview  Comatose - could not be aroused  [x]  0. Behavior not present  []  1. Behavior continuously present, does not fluctuate  []  2. Behavior present, fluctuates (comes and goes, changes in severity)    Mood    \"Over the last 2 weeks, have you been bothered by any of the following problems?\" 1. Symptom Presence    0 = No  1 = Yes  9 = No Response 2.  Symptom Frequency    0 = Never or 1 day  1 = 2-6 days (several days)  2 = 7-11 days (half or more of the days)  3 = 12-14 days (nearly every day)  **Leave blank if 'No Reponse'**      Enter scores in boxes    Column 1 Column 2   Little interest or pleasure in doing things   0    Feeling down, depressed, or hopeless   0    **If either A or B in column 2 is coded 2 or 3, CONTINUE asking the questions below. If not, END the interview. **     Trouble falling or staying asleep, or sleeping too much       Feeling tired or having little energy       Poor appetite or overeating       Feeling bad about yourself - or that you are a failure or have let yourself or your family down       Trouble concentrating on things, such as reading the newspaper or watching television       Moving or speaking so slowly that other people could have noticed. Or the opposite- being so fidgety or restless that you have been moving around a lot more than usual.       Thoughts that you would be better off dead, or of hurting yourself in some way. Total Severity: Add scores for all frequency responses in column 2 (possible score 0-27, or enter 99 if unable to complete (if symptom frequency (column 2) is blank for 3 or more items). Social Isolation  \"How often do you feel lonely or isolated from those around you? \"  [x] 0. Never  [] 1. Rarely  [] 2. Sometimes  [] 3. Often  [] 4. Always  [] 7. Patient declines to respond  [] 8. Patient unable to respond    Pain Effect on Sleep  \"Over the past 5 days, how much of the time has pain made it hard for you to sleep at night? \"  []  0. Does not apply - I have not had any pain or hurting in the past 5 days  []  1. Rarely or not at all  []  2. Occasionally  [x]  3. Frequently  []  4. Almost constantly  []  8. Unable to answer    **If the patient answers \"0. Does not apply\" to this question, skip the next two \"Pain Effect. Chika Balling Chika Balling \" questions**    Pain Interference with Therapy Activities  \"Over the past 5 days, how often have you limited your participation in rehabilitation therapy sessions due to pain? \"  []  0. Does not apply - I have not received rehabilitation therapy in the past 5 days  []  1. Rarely or not at all  []  2. Occasionally  []  3. Frequently  [x]  4. Almost constantly  []  8. Unable to answer    Pain Interference with Day-to-Day Activities: \"Over the past 5 days, how often have you limited your day-to-day activities (excluding rehabilitation therapy session)? \"  []  1. Rarely or not at all  []  2. Occasionally  []  3. Frequently  [x]  4. Almost constantly  []  8. Unable to answer    Nutritional Approaches  Last 7 Days - Check all of the following nutritional approaches that were received within the last 7 days:  []  A. Parenteral/IV feeding  []  B. Feeding tube (e.g., nasogastric or abdominal (PEG))  []  C. Mechanically altered diet - requires change in texture of food or liquids (e.g., pureed food, thickened liquids)  [x]  D. Therapeutic diet (e.g., low salt, diabetic, low cholesterol)  []  Z. None of the above    At time of Discharge - Check all of the following nutritional approaches that were being received at discharge:  []  A. Parenteral/IV feeding (including IV fluids if needed for hydration, but not as part of dialysis/chemo)  []  B. Feeding tube (e.g., nasogastric or abdominal (PEG))  []  C. Mechanically altered diet - requires change in texture of food or liquids (e.g., pureed food, thickened liquids)  [x]  D. Therapeutic diet (e.g., low salt, diabetic, low cholesterol  []  Z. None of the above    High Risk Drug Classes:  Use and Indication    Is taking: Check if the pt is taking any medications by pharmacological classification, not how it is used, in the following classes  Indication noted:  If column 1 is checked, check if there is an indication noted for all meds in the drug class Is taking  (check all that apply) Indication noted (check all that apply)   Antipsychotic [] []   Anticoagulant [] []   Antibiotic [x] [x]   Opioid [x] [x]   Antiplatelet [x] [x]   Hypoglycemic (including insulin) [] []   None of the above []     Special Treatments, Procedures, and Programs    Check all of the following treatments, procedures, and programs that apply at discharge. At Discharge (check all that apply)   Cancer Treatments   A1. Chemotherapy []           A2. IV []           A3. Oral []           A10. Other []   B1. Radiation []   Respiratory Therapies   C1. Oxygen Therapy []           C2. Continuous (continuously for at least 14 hours per day) []           C3. Intermittent []           C4. High-concentration []   D1. Suctioning (Does not include oral suctioning) []           D2. Scheduled []           D3. As needed []   E1. Tracheostomy Care []   F1. Invasive Mechanical Ventilator (ventilator or respirator) []   G1. Non-invasive Mechanical Ventilator []           G2. BiPAP []           G3. CPAP []   Other   H1. IV Medications (Do not include sub Q pumps, flushes, Dextrose 50% or lactated ringers) []           H2. Vasoactive medications []           H3. Antibiotics []           H4. Anticoagulation []           H10. Other []   I1. Transfusions []   J1. Dialysis []           J2. Hemodialysis []           J3. Peritoneal dialysis []   O1. IV access (including a catheter in a vein) []           O2. Peripheral []           O3. Midline []           O4.  Central (PICC, tunneled, port) []      None of the above (select if no Cancer, Respiratory, or Other boxes are checked) []

## 2022-12-05 NOTE — PROGRESS NOTES
Staple removed from L arm, LLQ, and L groin. Steri strips placed on all locations. Pt tolerated removal well.      Saul Preciado CNP  12/5/2022  10:36 AM  jase

## 2022-12-05 NOTE — CARE COORDINATION
Case Management Assessment            Discharge Note                    Date / Time of Note: 12/5/2022 11:37 AM                  Discharge Note Completed by: YOANDY Estrada    Patient Name: Kwan Bledsoe   YOB: 1929  Diagnosis: Debility [R53.81]   Date / Time: 11/28/2022  4:51 PM    Current PCP: Emmanuel Loomis DO  Clinic patient: Yes    Hospitalization in the last 30 days: No    Advance Directives:  Code Status: DNR-CCA  [facility] DNR form completed and on chart: No    Financial:  Payor: Juany Aguilar / Plan: MEDICARE PART A AND B / Product Type: *No Product type* /      Pharmacy:    Lucila Fenton [facility], [address] [phone] [facility] [phone]  [address] [place] [phone]  Phone: [phone] Fax: [phone]    [facility] [id number] - [id number], [address] [phone] [facility] [phone]  [facility]. [address] [address]  Phone: [phone] Fax: [phone]      Assistance purchasing medications?:    Assistance provided by Case Management: None at this time    Does patient want to participate in local refill/ meds to beds program?:      Meds To Beds General Rules:  1. Can ONLY be done Monday- Friday between 8:30am-5pm  2. Prescription(s) must be in pharmacy by 3pm to be filled same day  3. Copy of patient's insurance/ prescription drug card and patient face sheet must be sent along with the prescription(s)  4. Cost of Rx cannot be added to hospital bill. If financial assistance is needed, please contact unit  or ;  or  CANNOT provide pharmacy voucher for patients co-pays  5.  Patients can then  the prescription on their way out of the hospital at discharge, or pharmacy can deliver to the bedside if staff is available. (payment due at time of pick-up or delivery - cash, check, or card accepted)     Able to afford home medications/ co-pay costs: Yes    ADLS:  Current PT AM-PAC Score:   /24  Current OT AM-PAC Score:   /24      DISCHARGE Disposition: Home with Home Health Care: [facility]     LOC at discharge: Not Applicable  ARELY Completed: Yes    Notification completed in HENS/PAS?:  Not Applicable    IMM Completed:   No-Pt is refusing to discuss and sign. Transportation:  Transportation PLAN for discharge: private caregiver  Mode of Transport: car    Home Care:  [address] ordered at discharge: Yes  [address]: Yusef Austin  Phone: [phone]  Fax: [phone]  Orders faxed: Yes by Pillo Doris Equipment:  DME Provider:   Equipment obtained during hospitalization: none    Home Oxygen and Respiratory Equipment:  Oxygen needed at discharge?: No    Dialysis:  Dialysis patient: No    Additional CM Notes:   Per Dr. Braeden Petty, Pt will DC home on PO ABX. Alcides Davis from [place] [address] is aware. Pt has private 24 hr caregivers arranged. The Plan for Transition of Care is related to the following treatment goals of Debility [R53.81]    The Patient and/or patient representative Olean Severin and her family were provided with a choice of provider and agrees with the discharge plan Yes    Freedom of choice list was provided with basic dialogue that supports the patient's individualized plan of care/goals and shares the quality data associated with the providers.  Yes    Care Transitions patient: No    Nancy Nguyen  Case Management Department  Ph: [phone]

## 2022-12-05 NOTE — PROGRESS NOTES
Discharged home per [facility] transport, accompanied by caregiver/POA, and PT, with belongings and instructions. Will receive [facility]. Both the patient and the caregiver verbalized understanding of instructions. Left groin surgical site CDI/MARÍA with steri-strips in place. DSD to LLE CDI. Left thigh surgical site with small amount of sherrill-incisional redness CDI/MARÍA. Left upper arm surgical site CDI/MARÍA with steri-strips in place. Persistent pain of LLE at 9/10 and tolerable at this time. Given scheduled extra strength tylenol prior to [address] removed. Bleeding controlled. To remain of PO ABX at discharge.

## 2022-12-05 NOTE — PROGRESS NOTES
Physical Therapy  Facility/Department: [facility] ACUTE REHAB UNIT  Rehabilitation Physical Therapy Treatment Note/ Discharge summary ( limited  to pt's decreased participation)     NAME: Cece Sims  : 1929 (80 y.o.)  MRN: [id number]  CODE STATUS: DNR-CCA    Date of Service: 22       Restrictions:        SUBJECTIVE  Subjective  Subjective: Pt sitting in recliner when PT arrived. Pt agreeable to certain things in therapy since she reports that she is \"going home ASAP\"  Pain: Pt cont to  report pain with any movement of L leg. Pt did not rate pain but was very expressive with groans and screams  while grabbing her leg        Post Treatment Pain Screening         OBJECTIVE  Cognition  Overall Cognitive Status: WFL  Arousal/Alertness: Appropriate responses to stimuli  Following Commands: Follows one step commands with increased time; Follows one step commands with repetition  Attention Span: Attends with cues to redirect  Memory: Decreased short term memory  Problem Solving: Decreased awareness of errors  Insights: Decreased awareness of deficits  Initiation: Requires cues for some  Sequencing: Requires cues for some  Cognition Comment: Requires heavy encouragement to participate in activity this date  Orientation  Overall Orientation Status: Within Normal Limits    Functional Mobility  Scooting  Assistance Level: Minimal assistance (back into her recliner)  Balance  Standing Balance: Contact guard assistance  Standing Balance  Activity: Pt able to stand at RW with CGA ( performed for B clothing management. Req additional time and maintains 1 UE support on the commode( req additional time  Transfers  Surface: Raised toilet Seat;From chair with arms; To chair with arms; Wheelchair  Additional Factors: Increased time to complete;Hand placement cues; Verbal cues  Device: Walker  Sit to Stand  Assistance Level: Contact guard assist (CGA from recliner, w/c  and BS commode)  Skilled Clinical Factors: cueing for hand placement and weight shifting anteriorly for standing  Stand to Sit  Assistance Level: Contact guard assist  Skilled Clinical Factors: VC for hand placement and controlled descent  Stand Pivot  Assistance Level: Contact guard assist  Skilled Clinical Factors: VC for hand placement. BS chair <> BS commode doing a SPT  Car Transfer  Assistance Level: Minimal assistance  Skilled Clinical Factors: via stand pivot with RW , cues for sequencing, min  assist to lift LLE into car. ++ time      Environmental Mobility  Ambulation  Surface: Level surface  Device: Rolling walker  Distance: 10' x1  Activity: Within Room  Additional Factors: Verbal cues  Assistance Level: Contact guard assist  Gait Deviations: Decreased step length bilateral;Decreased weight shift left;Decreased heel strike right;Decreased heel strike left; Slow jhon  Skilled Clinical Factors: VC/ TC for an erect posture and increased step length. Pain level limits carryover                    ASSESSMENT/PROGRESS TOWARDS GOALS       Assessment  Assessment: Pt cont to present with  LE weakness, impaired balance, decreased activity tolerance, and gait impairments. Pt has improved with transfers and ambulation requiring physical assist at times. Pt's progress is limited by decreased daily participation in therapy and her insistance on\" this is the way we do it at home\" Pt is  below baseline and would benefit from further therapy to improve independence and safety with mobility. Activity Tolerance: Patient limited by pain; Patient limited by fatigue;Patient limited by endurance; Other (comment) (Decreased participation)  Discharge Recommendations: Home with Home health PT;24 hour supervision or assist  PT Equipment Recommendations  Equipment Needed: No  Other: Pt reports that she has excessive equipment at home from past medical episodes    Goals  Patient Goals   Patient Goals :  To Return home and rest  Short Term Goals  Time Frame for Short Term Goals: 8 days  Short Term Goal 1: Ind with bed mobility. Goal not achieved  Short Term Goal 2: Transf with S excluding floor transf. Goal not achieved  Short Term Goal 3: Ambulate with S x  50'  with LRAD on level surfaces. Goal not achieved  Short Term Goal 4: MI w/c propulsion x 50' on level surfaces. Goal not achieved  Additional Goals?: No    PLAN OF CARE/SAFETY  Physcial Therapy Plan  Additional Comments: d/c home today per pt's request 2/2 to having 24/7 care at home and \"this being too much for her\"  Safety Devices  Type of Devices:  (PT assisted pt from w/c to car. Pt going home with CG)    EDUCATION  Education  Education Given To: Patient  Education Provided: Precautions; Mobility Training; Safety;Transfer Training;Energy Conservation; Fall Prevention Strategies; Home Exercise Program  Education Provided Comments: PT cont to  educate pt  on the benefits of mobility  Education Method: Verbal  Barriers to Learning: Other (Comment)  Education Outcome: Verbalized understanding;Continued education needed  Skilled Clinical Factors: VC for focusing on task        Therapy Time   Individual Concurrent Group Co-treatment   Time In 1315         Time Out 1341         Minutes 26              Variance: 49  Reason: Refusal    Argenis Stein, PT, 12/05/22 at 4:20 PM

## 2022-12-05 NOTE — DISCHARGE SUMMARY
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 ([address]    Atherosclerosis of artery of extremity with ulceration ([address]    Benign essential HTN    Lumbar foraminal stenosis    DDD (degenerative disc disease), lumbar    Spinal stenosis of lumbar region with neurogenic claudication    Type 2 diabetes, controlled, with neuropathy ([address]    Severe mitral regurgitation    Venous insufficiency of both lower extremities    Hyperlipidemia    Hip arthritis    Status post carmen arthroplasty replacement of left hip     Femoral artery stenosis, right ([address]    Osteoporosis    Osteopenia    Femoral-popliteal bypass graft occlusion, left (HCC)    Pain due to onychomycosis of nail    Coronary artery disease involving native coronary artery of native heart without angina pectoris    Diabetic peripheral neuropathy (HCC)    Arthritis of left knee    Trigger middle finger of right hand    Carpal tunnel syndrome, right    Vitamin D deficiency    Carotid stenosis, asymptomatic, bilateral    Fingernail problem    TIA (transient ischemic attack)    Sacral fracture, closed (HCC)    Closed fracture of ramus of right pubis (HCC)    Acute pain of left lower extremity    Diabetes mellitus type 2 with peripheral artery disease (HCC)    Open wound of left knee    Nonhealing nonsurgical wound limited to breakdown of skin    Peripheral vascular disease, unspecified ([address]    Wound of left leg    Peripheral artery disease ([address]    Cervical spondylosis    Atherosclerosis of artery of extremity with rest pain (HCC)    Lymph leak Nonhealing surgical wound    Bypass graft stenosis (HCC)    Atherosclerosis of native artery of left leg with ulceration of ankle (HCC)    Complications due to vascular device, implant, and graft    Pseudoaneurysm (HCC)    MRSA infection    Debility    Vascular graft infection ([facility]    Severe malnutrition ([facility]       History of Present Illness/Acute Hospital Course: This is a 80y.o. 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 She has severe pain in her left leg and is refusing medications and therapy at times. She lives alone and wants to go home. She understands she may need some therapy but is not sure if she can tolerate 3 hours a day due to her pain. Inpatient Rehabilitation Course:   Chuck Telles is a 80 y.o. female admitted to inpatient rehabilitation on 11/28/2022 with Debility. The patient participated in an aggressive multidisciplinary inpatient rehabilitation program involving 3 hours of therapy per day, at least 5 days per week. Impairments: Decreased functional mobility    Medical Management:  1. Debility- Currently she is just starting to be able to more her left leg again. She is still in considerable pain. She is refusing medications and sometimes refusing therapy. She may want to come to the ARU but is not sure at this time. 2. Surgical leg wound- followed by surgery  - pain control   - Abx        Discharge Exam:  Constitutional: Alert, WDWN, Pleasant, no distress  Head: Normocephalic, atruamatic, MMM  Eyes: Conjunctiva noninjected, no icterus, no drainage  Pulm: CTA bilat. Respirations non-labored. CV: No murmurs noted. RRR. Abd: Soft, nontender.  NABS+  Ext: No edema, no varicosities  Neuro: Alert, fully oriented, appropriate   MSK: No joint abnormalities noted       Discharge Functional Status:    Physical therapy:  Bed Mobility: Scooting: Dependent/Total (Dependent in supine for caudal<>cephalo)  Transfers: Sit to Stand: Minimal Assistance, Moderate Assistance (Min / mod assist from bedside chair and w/c. Req VC for hand and foot placement)  Stand to Sit: Minimal Assistance (to bedside chair x 2 trials and to toilet, verbal cues for safety and reach back)  Bed to Chair: Moderate assistance, Minimal assistance (Initaiting stance req mod/ min but once pt is standing, pt able to step with UE support on AD), WB Status: No WB restrictions noted in chart  Ambulation  Surface: Level tile  Device: Rolling Walker  Other Apparatus: Wheelchair follow (wound vac)  Assistance:  (Min A x1 and then A req for transport of wound vac with w/c follow ( pt repeatedly indicated that she was \"going to pass out\" ))  Quality of Gait: gait mildly unsteady though no overt loss of balance noted, forward flexed posture with downward gaze, decreased step length/height bilaterally with shuffling steps, increased difficulty advancing LLE compared to right  Gait Deviations: Slow Patricia, Decreased step length, Decreased step height, Shuffles  Distance: 10', 3 '  More Ambulation?: No,    Mobility:  , PT Equipment Recommendations  Equipment Needed: No  Other: Pt reports that she has excessive equipment at home from past medical episodes, Assessment: This is a 80y.o. 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 She has severe pain in her left leg Pt presents with signifantly decreased functional mobility 2/2 to pain and weakness. Pt has a private CG ( with 21 year hx . Pt will be able to have 24/7 care as needed.  Am session was limited by the extreme fatigue from shower, and BM. The pm session was interrupted by severe pain following removal of wound vac. Pt is well below baseline and would benefit from continued therapy to maximize potential and increase functional mobility towards PLOF  to allow for a safer d/c to home    Occupational therapy:  ,  , Assessment: Pt is a 79 yo female admitted to ARU w/ debility s/p the excision of infected ilofemoral bypass graft. Prior to hospitalization pt was living with 24 hour caregivers and independent w/ ADLs and was provided assistance w/ IADLs. Pt is now requiring Mod A for toilet transfers and LB dressing. Pt is also requiring CGA for LB bathing and SBA for UB bathing. Pt is motivated and would cont to benefit from skilled occupational therapy to increase her independence to her PLOF. Cont OT per POC. Speech therapy:         Significant Diagnostics:   Lab Results   Component Value Date    CREATININE 0.9 12/05/2022    BUN 24 (H) 12/05/2022     12/05/2022    K 3.9 12/05/2022    CL 99 12/05/2022    CO2 29 12/05/2022       Lab Results   Component Value Date    WBC 6.9 12/05/2022    HGB 8.6 (L) 12/05/2022    HCT 25.5 (L) 12/05/2022    MCV 93.3 12/05/2022     12/05/2022       Disposition:  home    Services:PT/OT  DME: walker     Discharge Condition: Stable    Follow-up:  See after visit summary from hospitalization    Discharge Medications:     Medication List        START taking these medications      bacitracin 500 UNIT/GM ointment  Apply topically 2 times daily. doxycycline monohydrate 100 MG capsule  Commonly known as: MONODOX  Take 1 capsule by mouth every 12 hours     Methocarbamol 1000 MG Tabs  Take 1,000 mg by mouth 3 times daily for 10 days     polyethylene glycol 17 g packet  Commonly known as: GLYCOLAX  Take 17 g by mouth daily as needed for Constipation     traMADol 50 MG tablet  Commonly known as: ULTRAM  Take 0.5 tablets by mouth every 8 hours as needed for Pain for up to 3 days.             CONTINUE taking these medications      acetaminophen 325 MG tablet  Commonly known as: TYLENOL     aspirin EC 81 MG EC tablet  Take 1 tablet by mouth daily     atorvastatin 10 MG tablet  Commonly known as: LIPITOR  Take 1 tablet by mouth daily     furosemide 20 MG tablet  Commonly known as: LASIX  TAKE ONE TABLET BY MOUTH DAILY     glimepiride 1 MG tablet  Commonly known as: AMARYL  TAKE ONE TABLET BY MOUTH EVERY MORNING BEFORE BREAKFAST     Lite Touch Lancets Misc  Use twice daily when testing blood sugars. metFORMIN 500 MG tablet  Commonly known as: GLUCOPHAGE  TAKE ONE TABLET BY MOUTH DAILY     OneTouch Ultra strip  Generic drug: blood glucose test strips  USE TO TEST BLOOD SUGAR TWICE DAILY     therapeutic multivitamin-minerals tablet     Turmeric 450 MG Caps     Vitamin D3 50 MCG (2000 UT) Caps            STOP taking these medications      lisinopril 10 MG tablet  Commonly known as: PRINIVIL;ZESTRIL               Where to Get Your Medications        These medications were sent to [facility] [id number] [facility], [place] - [address] [facility] [phone] Gautam Ruiz [phone] [address], [facility] [address]      Phone: [phone]   bacitracin 500 UNIT/GM ointment  doxycycline monohydrate 100 MG capsule  Methocarbamol 1000 MG Tabs  polyethylene glycol 17 g packet  traMADol 50 MG tablet           I spent over 35 minutes on this discharge encounter between counseling, coordination of care, and medication reconciliation. To comply with [facility] lamont ROmkarII.4.1:   Discharge order placed in advance to facilitate patients discharge needs.       Lorenzo Bennett,

## 2022-12-05 NOTE — PROGRESS NOTES
Plastic Surgery   Daily Progress Note  Alesha Danielle  CC: L iliac aneurysm     Subjective :   Complaining of burning in leg this am. No acute events overnight, wants to go home, still not able to fully utilize her LLE. Onl semi-participating in rehab. Objective    Infusions:   dextrose          I/O:I/O last 3 completed shifts: In: 65 [P.O.:840; I.V.:10]  Out: 500 [Urine:500]           Wt Readings from Last 1 Encounters:   12/05/22 108 lb (49 kg)                 LABS:    Recent Labs     12/05/22  0552   WBC 6.9   HGB 8.6*   HCT 25.5*   MCV 93.3             Recent Labs     12/05/22  0552      K 3.9   CL 99   CO2 29   PHOS 3.6   BUN 24*   CREATININE 0.9        No results for input(s): AST, ALT, ALB, BILIDIR, BILITOT, ALKPHOS in the last 72 hours. No results for input(s): LIPASE, AMYLASE in the last 72 hours. No results for input(s): PROT, INR, APTT in the last 72 hours. Recent Labs     12/02/22  1429 12/05/22  0552   CKTOTAL 323* 390*                 Exam:BP (!) 157/73   Pulse 75   Temp 97.9 °F (36.6 °C) (Oral)   Resp 18   Ht 5' 4\" (1.626 m)   Wt 108 lb (49 kg)   SpO2 98%   BMI 18.54 kg/m²   General appearance: no acute distress,   Neuro: A&Ox3, no focal deficits, sensation intact  Eyes: No scleral icterus, EOM grossly intact  Neck: trachea midline, no JVD  Chest/Lungs:  no accessory muscle use on RA  Cardiovascular: RRR  Abdomen: Soft, non-tender, non-distended, no rebound, guarding, or rigidity. LLQ incision with staples, minimal erythema and no drainage. Skin: warm and dry, no rashes  Extremities: LUE incision with staples C/D/I. L groin with firm LN on medial groin, unchanged from prior exam, non tender. Groin and incision sites are soft without tenderness and no signs of hematoma or active bleeding. Xeroform dressing in place at donor site open to air, mild seepage. L leg tender to palpation with hyperesthesia. Graft site with xeroform in place with good take.  Left thigh incision with erythema. Genitourinary: external catheter in place to wall suction, clear yellow urine. ASSESSMENT/PLAN: Pt. is a 80 y.o. female s/p excision of infected ilofemoral bypass graft with harvest of left basilic vein and left external iliac artery to femoral bypass through obturator canal 11/20 and rectus femoris flap with split thickness skin graft 11/22.     - Plan for doxycycline PO on discharge per ID   - Cont ASA  81 mg daily per vascular  - Continue Bacitracin and xeroform to graft and suture line daily by nursing, and donor site open to air  - follow up with Dr. Kera Ramon in 1 week  - Pt wants to go home, ARU planning on d/c today     Ami Rodriguez MD   Gen Surg PGY 5  12/5/2022  8:30 AM  [phone]

## 2022-12-05 NOTE — PROGRESS NOTES
Physician Progress Note      Henry Rod  CSN #:                  [id number]  :                       1929  ADMIT DATE:       2022 3:24 AM  DISCH DATE:        2022 4:30 PM  RESPONDING  PROVIDER #:        Quinton Del Angel MD          QUERY TEXT:    Pt admitted Infected pseudoaneurysm. Pt noted to have SIRS on admission. If   possible, please document in the progress notes and discharge summary if you   are evaluating and /or treating any of the following: The medical record reflects the following:  Risk Factors:  Infected pseudoaneurysm  Clinical Indicators: SIRS- WBC=16.4 Left shift=14.3%, HR=97, RR=30, wound   culture +MRSA  Treatment: CBC, CMP, Pan culture, Vascular surgery consult, Infectious disease   consult, s/p excision of infected ilofemoral bypass graft with harvest of   left basilic vein and left external iliac artery to femoral bypass through   obturator canal, IV Cefepime, IV Daptomycin, IV Vancomycin, 1L of LR IV bolus,   V/S and I/O monitoring per unit protocol  Options provided:  -- MRSA Sepsis due to Infected pseudoaneurysm, present on admission now   resolved  -- Sepsis was ruled out  -- Other - I will add my own diagnosis  -- Disagree - Not applicable / Not valid  -- Disagree - Clinically unable to determine / Unknown  -- Refer to Clinical Documentation Reviewer    PROVIDER RESPONSE TEXT:    This patient has MRSA sepsis due to Infected pseudoaneurysm which was present   on admission now resolved.     Query created by: Luzma Nunes on 2022 8:22 AM      Electronically signed by:  Quinton Del Angel MD 2022 9:06 AM

## 2022-12-06 ENCOUNTER — CARE COORDINATION (OUTPATIENT)
Dept: CASE MANAGEMENT | Age: 87
End: 2022-12-06

## 2022-12-06 DIAGNOSIS — R53.81 DEBILITY: Primary | ICD-10-CM

## 2022-12-06 DIAGNOSIS — E11.40 TYPE 2 DIABETES, CONTROLLED, WITH NEUROPATHY (HCC): ICD-10-CM

## 2022-12-06 DIAGNOSIS — M51.36 DDD (DEGENERATIVE DISC DISEASE), LUMBAR: ICD-10-CM

## 2022-12-06 DIAGNOSIS — M48.061 SPINAL STENOSIS OF LUMBAR REGION, UNSPECIFIED WHETHER NEUROGENIC CLAUDICATION PRESENT: ICD-10-CM

## 2022-12-06 DIAGNOSIS — I87.2 VENOUS INSUFFICIENCY OF BOTH LOWER EXTREMITIES: ICD-10-CM

## 2022-12-06 DIAGNOSIS — I10 BENIGN ESSENTIAL HTN: ICD-10-CM

## 2022-12-06 DIAGNOSIS — N35.92 STRICTURE OF FEMALE URETHRA, UNSPECIFIED STRICTURE TYPE: ICD-10-CM

## 2022-12-06 DIAGNOSIS — I70.229 ATHEROSCLEROSIS OF ARTERY OF EXTREMITY WITH REST PAIN (HCC): ICD-10-CM

## 2022-12-06 DIAGNOSIS — M48.061 SPINAL STENOSIS OF LUMBAR REGION WITHOUT NEUROGENIC CLAUDICATION: ICD-10-CM

## 2022-12-06 PROCEDURE — 1111F DSCHRG MED/CURRENT MED MERGE: CPT | Performed by: INTERNAL MEDICINE

## 2022-12-06 NOTE — CARE COORDINATION
[facility] Care Transitions Initial Follow Up Call    Call within 2 business days of discharge: Yes    LPN Care Coordinator contacted the caregiver by telephone to perform post hospital discharge assessment. Verified name and  with caregiver as identifiers. Provided introduction to self, and explanation of the LPN Care Coordinator role. Patient: Cheyenne Barrientos Patient : 1929   MRN: [id number]  Reason for Admission: Debility  Discharge Date: 22 RARS: Readmission Risk Score: 21.3      Last Discharge  [address]       Date Complaint Diagnosis Description Type Department Provider    22  Vascular graft infection, initial encounter [facility]) Admission (Discharged) [address], DO            Was this an external facility discharge? No Discharge Facility: [facility] to be reviewed by the provider   Additional needs identified to be addressed with provider: No  none               Method of communication with provider: none. Spoke to Dong, patients POA/caregiver. She reports patient is weak, fatigued and having pain. She slept for 4 hours last night. Appetite and fluid intake is poor. She is trying to get her to take more in. No bladder issues. Patient hasn't had a bowel movement yet. [facility] is due out today. Follow up is scheduled. Patient uses a walker for mobility with someone with her. Medication review and 1111f completed. No questions or needs voiced. Agreeable to future calls. Will continue to follow. LPN Care Coordinator reviewed discharge instructions, medical action plan, and red flags with caregiver who verbalized understanding. The caregiver was given an opportunity to ask questions and does not have any further questions or concerns at this time. Were discharge instructions available to patient? Yes.  Reviewed appropriate site of care based on symptoms and resources available to patient including: PCP  Specialist  Home health  When to call 911. The caregiver agrees to contact the PCP office for questions related to their healthcare. Advance Care Planning:   Does patient have an Advance Directive: reviewed and current. Medication reconciliation was performed with caregiver, who verbalizes understanding of administration of home medications. Medications reviewed, 1111F entered: yes    Was patient discharged with a pulse oximeter? Has one    Non-face-to-face services provided:  Obtained and reviewed discharge summary and/or continuity of care documents    Offered patient enrollment in the Remote Patient Monitoring (RPM) program for in-home monitoring:  no .    Care Transitions 24 Hour Call    Do you have a copy of your discharge instructions?: Yes  Do you have all of your prescriptions and are they filled?: Yes  Have you been contacted by a [facility] Pharmacist?: No  Have you scheduled your follow up appointment?: Yes  How are you going to get to your appointment?: Car - family or friend to transport  [address]: [address] (Comment: private pay caregivers)  Patient DME: Wheelchair, Oneal Donna, Other, Shower chair  Do you have support at home?: Alone  Do you feel like you have everything you need to keep you well at home?: Yes  Care Transitions Interventions         Follow Up  Future Appointments   Date Time Provider Gilbert Frey   12/12/2022 12:15 PM Gabrielle Hi MD PLASTICS/REC [facility]   12/20/2022  9:45 AM Jose Luis Gutierrez MD [address] [place] [address] provided contact information. Plan for follow-up call in 5-7 days based on severity of symptoms and risk factors.   Plan for next call: symptom management-pain,fatigue  follow-up appointment-     Barbara Sánchez LPN

## 2022-12-07 ENCOUNTER — TELEPHONE (OUTPATIENT)
Dept: SURGERY | Age: 87
End: 2022-12-07

## 2022-12-07 ENCOUNTER — CARE COORDINATION (OUTPATIENT)
Dept: CARE COORDINATION | Age: 87
End: 2022-12-07

## 2022-12-07 ENCOUNTER — TELEPHONE (OUTPATIENT)
Dept: INTERNAL MEDICINE CLINIC | Age: 87
End: 2022-12-07

## 2022-12-07 NOTE — DISCHARGE SUMMARY
Physician Discharge Summary     Patient ID:  Robin Frankel  [id number]  81 y.o.  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 9XXA]  Pseudoaneurysm ([facility] [address] [I72.9]    Discharge Diagnoses: same    Admission Condition: serious    Discharged Condition: stable    Indication for Admission: Pt was admitted after emergent Excision of infected ileofemoral bypass graft, harvest left basilic vein, left external iliac artery to femoral bypass through obturator canal, placement of wound vac. Hospital Course: Pt presented to [facility] ED with complaints of LLE hemorrhage from graft site. A CT was obtained which again revealed active extravasation from L groin along with a pseudoaneurysm of L common fem, patent femorofemoral pop grafts, and loculated fluid collections in medial thigh. The pt was emergently taken to the OR for the procedure above. The incisions to her left groin were unable to be closed and a wound vac was applied. Plastic surgery was consulted for closer of the defect along with ID for abx recommendations. The pt was started on daptomycin for an extended treatment until 1/4/23. The pt was then taken back to surgery on POD 2 for debridement of left groin, left rectus femoris flap, left split thickness skin graft, application of wound vacuum device. The pt was transferred out of the ICU the next day. She did require some blood transfusions after both procedures. The pt recovered well and on POD 5 ARU was consulted for continued recovery. The pt did refuse BG checks along with insulin coverage. The pt was discharge to ARU after she was approved by her insurance.      Consults: ID, plastics, rehab, pharmacy    Treatments: surgery: as above    Discharge Exam:  Pulses     Rad Fem Pop PT DP   Right + + + + +   Left + x + + -/+     General appearance: alert, no acute distress, grooming appropriate  Eyes: No scleral icterus, EOM grossly intact  Neck: trachea midline, no JVD, no lymphadenopathy, neck supple  Chest/Lungs: CTAB, no crackles/rales, wheezes/rhonchi, normal effort with no accessory muscle use on RA  Cardiovascular: RRR  Abdomen: Soft, non-tender, non-distended, no rebound, guarding, or rigidity. Skin: warm and dry, no rashes  Extremities: LUE incision with jez in place wrapped in kerlix. Some strike through of dressing, edema improved. L groin with firm LN on medial groin, unchanged from prior exam, non tender. Groin with prevena and wound vac in place with good seal -100. Groin and incision sites are soft without tenderness and no signs of hematoma or active bleeding. Xeroform dressing in place at donor site open to air. Sensation and mobility intact b/l LE. PT palpable b/l. DP palpable on right, doppler on L. L  to palpation   Genitourinary: external catheter in place to wall suction, clear yellow urine. Neuro: A&Ox3, no focal deficits, sensation intact    Disposition: ARU    In process/preliminary results:  Outstanding Order Results       No orders found from 10/22/2022 to 11/21/2022. Patient Instructions:   Discharge Medication List as of 11/28/2022  4:17 PM        START taking these medications    Details   HYDROmorphone (DILAUDID) 2 MG tablet Take 0.5 tablets by mouth every 6 hours as needed for Pain for up to 5 days. , Disp-10 tablet, R-0Print      docusate sodium (COLACE) 100 MG capsule Take 1 capsule by mouth 2 times daily for 15 days, Disp-30 capsule, R-0Print           CONTINUE these medications which have NOT CHANGED    Details   blood glucose test strips (ONETOUCH ULTRA) strip USE TO TEST BLOOD SUGAR TWICE DAILY, Disp-100 strip, R-1Normal      atorvastatin (LIPITOR) 10 MG tablet Take 1 tablet by mouth daily, Disp-30 tablet, R-3Normal      lisinopril (PRINIVIL;ZESTRIL) 10 MG tablet Take 1 tablet by mouth daily, Disp-30 tablet, R-0Normal      sulfamethoxazole-trimethoprim (BACTRIM;SEPTRA) 400-80 MG per tablet Take 1 tablet by mouth 2 times daily Per Dr Joe Davey, Disp-60 tablet, R-5Normal      Multiple Vitamins-Minerals (THERAPEUTIC MULTIVITAMIN-MINERALS) tablet Take 1 tablet by mouth dailyHistorical Med      furosemide (LASIX) 20 MG tablet TAKE ONE TABLET BY MOUTH DAILY, Disp-90 tablet, R-1Normal      glimepiride (AMARYL) 1 MG tablet TAKE ONE TABLET BY MOUTH EVERY MORNING BEFORE BREAKFAST, Disp-30 tablet, R-2Normal      metFORMIN (GLUCOPHAGE) 500 MG tablet TAKE ONE TABLET BY MOUTH DAILY, Disp-90 tablet, R-3Normal      acetaminophen (TYLENOL) 325 MG tablet Take 650 mg by mouth every 6 hours as needed for PainHistorical Med      Lite Touch Lancets MISC Disp-100 each, R-5, NormalUse twice daily when testing blood sugars.       Turmeric 450 MG CAPS Take 450 mg by mouth dailyHistorical Med      Cholecalciferol (VITAMIN D3) 2000 UNITS CAPS Take 1 capsule by mouth daily      aspirin EC 81 MG EC tablet Take 1 tablet by mouth daily, Disp-30 tablet, R-3           STOP taking these medications       naloxone (NARCAN) 0.4 MG/ML injection Comments:   Reason for Stopping:             Chiquita Denver, CNP  12/7/2022  12:52 PM  jase

## 2022-12-07 NOTE — TELEPHONE ENCOUNTER
Kan Mccormick from [facility] called to update the team on the patient status:    She was just received back from acute rehab where she was sent home with doxycycline. Kan Mccormick did notice on her left side that there is some redness along her sutures. Her heart rate is good, no fever, and blood pressure is safe.     If needed, Kan Mccormick can be reached at [phone]

## 2022-12-07 NOTE — TELEPHONE ENCOUNTER
Spoke with Zakia Melendrez about Natacha Baldwin. He stated that he wanted to update the team about her skin graft. A member of the PT team is planning to see her in her home tomorrow to check on her. She is also scheduled as a follow up with our office on 12/12/2022. Fanny will call if her status changes.

## 2022-12-07 NOTE — TELEPHONE ENCOUNTER
----- Message from Zenaida Montgomery sent at 12/7/2022 10:27 AM EST -----  Subject: Message to Provider    QUESTIONS  Information for Provider? Dayna Putnam from [facility] care called in   regards to Pt being discharged from Acute rehab on 12/5/2022, please reach   out to Dayna Putnam to verify if Yon Mcwilliams will follow Pt. and sign off on   paperwork. Personal Cell, secure VM, Dayna Putnam stated. ---------------------------------------------------------------------------  --------------  Magda Boswell INFO  [phone]; OK to leave message on voicemail  ---------------------------------------------------------------------------  --------------  SCRIPT ANSWERS  Relationship to Patient? Third Party  Third Party Type? Home Health Care? Representative Name?  [address] [place] [address]

## 2022-12-07 NOTE — TELEPHONE ENCOUNTER
Krissy, the Physical Therapist, called to update the team:    The patient's redness has increased. Pain is worse, 8/10. The patient does not want to come in for a visit and she does not want to go to the ER. So, they've called dispatch in to do an evaluation. Also, it was noted today that the doxycycline was supposed to be given to her two times a day, but it was misread and only given one time a day. Today will be the first day that she's having two pills a day.      Babar Gotti can be reached at [phone]

## 2022-12-07 NOTE — TELEPHONE ENCOUNTER
Jennifer Washington from [facility] called in regards to the patient  being discharged from Acute rehab on 12/5/2022. Will you sign off on the paperwork?

## 2022-12-08 NOTE — TELEPHONE ENCOUNTER
[facility]    Called and left VM on PT phone. If her pain has worsened and there is increased redness, I would like to see her either in office or at the hospital.  Additionally, if there is a way that we can virtually eval (even with a picture), that would be helpful in the interim. Thanks!   NK

## 2022-12-09 NOTE — CARE COORDINATION
Care Transitions Initial Follow Up Call    Call within 2 business days of discharge: Yes     Patient: Rodney Long Patient : 1929 MRN: [id number]    Last Discharge [address]       Date Complaint Diagnosis Description Type Department Provider    22  Vascular graft infection, initial encounter [facility]) Admission (Discharged) [facility] VENK[facility] Bennett,             RARS: Readmission Risk Score: 21.3       Spoke with: Patient and Ki Thomas  Reports not doing well   Not sleeping well   Not eating well   Dressing changes daily  Have not received new supplies   Has supplies from pre hosp stay , running low  Discussed palliative care , bhupinder reports she doesn't need a monthly visit , needs care everyday. Taking tramadol and tyleno for pain     Has appt Mon w surgeon  Will discuss w him at [facility]    Call to [facility] , per records delivered on  via [address] [employer] aware that it was not there. Neftali Ortega will contact pt re supplies.     PLAN  Will cont outreach      Discharge department/facility: [facility]    Non-face-to-face services provided:  Scheduled appointment with [address] [place] [address] on Mon  Obtained and reviewed discharge summary and/or continuity of care documents  Communication with home health agencies or other community services the patient is currently using-[facility] for supplies Med Line sent  via [employer] [place], listed as arriving 22    Follow Up  Future Appointments   Date Time Provider Gilbert Frey   2022 12:15 PM Kelly Moser MD PLASTICS/REC [facility]   2022  9:45 AM MD Rowdy Hassan [facility]       Yeimi Duran RN

## 2022-12-12 ENCOUNTER — CARE COORDINATION (OUTPATIENT)
Dept: CARE COORDINATION | Age: 87
End: 2022-12-12

## 2022-12-12 ENCOUNTER — OFFICE VISIT (OUTPATIENT)
Dept: SURGERY | Age: 87
End: 2022-12-12

## 2022-12-12 VITALS
TEMPERATURE: 98 F | DIASTOLIC BLOOD PRESSURE: 66 MMHG | SYSTOLIC BLOOD PRESSURE: 155 MMHG | HEIGHT: 64 IN | WEIGHT: 104 LBS | OXYGEN SATURATION: 100 % | BODY MASS INDEX: 17.75 KG/M2 | HEART RATE: 65 BPM

## 2022-12-12 DIAGNOSIS — Z09 POSTOP CHECK: ICD-10-CM

## 2022-12-12 DIAGNOSIS — E43 SEVERE MALNUTRITION (HCC): ICD-10-CM

## 2022-12-12 DIAGNOSIS — E43 SEVERE MALNUTRITION (HCC): Primary | ICD-10-CM

## 2022-12-12 PROBLEM — D69.2 PURPURA (HCC): Status: ACTIVE | Noted: 2018-09-17

## 2022-12-12 PROBLEM — L23.3 ALLERGIC CONTACT DERMATITIS DUE TO DRUGS IN CONTACT WITH SKIN: Status: ACTIVE | Noted: 2017-09-18

## 2022-12-12 PROBLEM — E83.19 HEMOSIDEROSIS: Status: ACTIVE | Noted: 2018-09-17

## 2022-12-12 PROBLEM — S80.12XA HEMATOMA OF LEFT LOWER LEG: Status: ACTIVE | Noted: 2021-10-18

## 2022-12-12 LAB
ANION GAP SERPL CALCULATED.3IONS-SCNC: 15 MMOL/L (ref 3–16)
BASOPHILS ABSOLUTE: 0 K/UL (ref 0–0.2)
BASOPHILS RELATIVE PERCENT: 0.7 %
BUN BLDV-MCNC: 20 MG/DL (ref 7–20)
CALCIUM SERPL-MCNC: 9.4 MG/DL (ref 8.3–10.6)
CHLORIDE BLD-SCNC: 96 MMOL/L (ref 99–110)
CO2: 25 MMOL/L (ref 21–32)
CREAT SERPL-MCNC: 0.9 MG/DL (ref 0.6–1.2)
EOSINOPHILS ABSOLUTE: 0.1 K/UL (ref 0–0.6)
EOSINOPHILS RELATIVE PERCENT: 1.7 %
GFR SERPL CREATININE-BSD FRML MDRD: 59 ML/MIN/{1.73_M2}
GLUCOSE BLD-MCNC: 116 MG/DL (ref 70–99)
HCT VFR BLD CALC: 32.8 % (ref 36–48)
HEMOGLOBIN: 10.8 G/DL (ref 12–16)
LYMPHOCYTES ABSOLUTE: 1.4 K/UL (ref 1–5.1)
LYMPHOCYTES RELATIVE PERCENT: 23.7 %
MCH RBC QN AUTO: 31.3 PG (ref 26–34)
MCHC RBC AUTO-ENTMCNC: 32.9 G/DL (ref 31–36)
MCV RBC AUTO: 95.1 FL (ref 80–100)
MONOCYTES ABSOLUTE: 0.5 K/UL (ref 0–1.3)
MONOCYTES RELATIVE PERCENT: 8.5 %
NEUTROPHILS ABSOLUTE: 3.9 K/UL (ref 1.7–7.7)
NEUTROPHILS RELATIVE PERCENT: 65.4 %
PDW BLD-RTO: 19.7 % (ref 12.4–15.4)
PLATELET # BLD: 271 K/UL (ref 135–450)
PMV BLD AUTO: 7.6 FL (ref 5–10.5)
POTASSIUM SERPL-SCNC: 5.2 MMOL/L (ref 3.5–5.1)
RBC # BLD: 3.45 M/UL (ref 4–5.2)
SODIUM BLD-SCNC: 136 MMOL/L (ref 136–145)
WBC # BLD: 5.9 K/UL (ref 4–11)

## 2022-12-12 PROCEDURE — 99024 POSTOP FOLLOW-UP VISIT: CPT | Performed by: SURGERY

## 2022-12-12 RX ORDER — CEPHALEXIN 500 MG/1
CAPSULE ORAL
COMMUNITY
Start: 2022-12-09

## 2022-12-12 RX ORDER — AMIODARONE HYDROCHLORIDE 200 MG/1
TABLET ORAL
COMMUNITY
Start: 2022-11-12

## 2022-12-12 RX ORDER — SULFAMETHOXAZOLE AND TRIMETHOPRIM 800; 160 MG/1; MG/1
TABLET ORAL
COMMUNITY
Start: 2022-12-08

## 2022-12-12 RX ORDER — LISINOPRIL 20 MG/1
TABLET ORAL
COMMUNITY
Start: 2022-11-19

## 2022-12-12 NOTE — PROGRESS NOTES
[facility]    PROCEDURE: 1) Excisional debridement of left groin (10 x 3 cm)                                                  2) Left rectus femoris myofascial flap                                                  3) Left split thickness skin graft (10 x 5 cm)                                                  4) Application of wound vacuum bolster dressing  DATE: 11/22/22    Charles Soto has been recovering satisfactorily since her procedure. Pain has been well controlled without pain medications. She notes that there is persistent pain in the left leg - and that she has had this since her hospitalization and even prior to the reconstruction. EXAM    BP (!) 155/66   Pulse 65   Temp 98 °F (36.7 °C)   Ht 5' 4\" (1.626 m)   Wt 104 lb (47.2 kg)   SpO2 100%   BMI 17.85 kg/m²     GEN: NAD   EXT: Flap viable with good take from the STSG  Incision intact with some surrounding skin changes from the Chromic - no evidence of infection    IMP: 80 y. o.female s/p rectus femoris flap with STSG  PLAN: Will obtain labs (patient desire) and continue with local wound care. Will return in 1 week. She is to maintain her antibiotics as prescribed by her other physicians.      Waldemar Roland MD  [address] Reconstructive Surgery  [phone]  12/12/22

## 2022-12-12 NOTE — TELEPHONE ENCOUNTER
The patient was in the office to see Dr. Jake Patel today. The patient is scheduled to see Dr. Emperatriz San in office 12-. I will close this phone note.

## 2022-12-12 NOTE — CARE COORDINATION
Care Coordination Episodes    Type: Amb Care Management  Episode: complex care  Noted: 6/17/2022      Per chart review   Appt w plastic surgeon today as below    Freddie Levin has been recovering satisfactorily since her procedure. Pain has been well controlled without pain medications. She notes that there is persistent pain in the left leg - and that she has had this since her hospitalization and even prior to the reconstruction. EXAM     BP (!) 155/66   Pulse 65   Temp 98 °F (36.7 °C)   Ht 5' 4\" (1.626 m)   Wt 104 lb (47.2 kg)   SpO2 100%   BMI 17.85 kg/m²      GEN: NAD   EXT: Flap viable with good take from the STSG                   Incision intact with some surrounding skin changes from the Chromic - no evidence of infection     IMP: 80 y. o.female s/p rectus femoris flap with STSG  PLAN: Will obtain labs (patient desire) and continue with local wound care. Will return in 1 week. She is to maintain her antibiotics as prescribed by her other physicians.                will cont outreach

## 2022-12-14 ENCOUNTER — CARE COORDINATION (OUTPATIENT)
Dept: CASE MANAGEMENT | Age: 87
End: 2022-12-14

## 2022-12-15 RX ORDER — BLOOD SUGAR DIAGNOSTIC
STRIP MISCELLANEOUS
Qty: 100 STRIP | Refills: 1 | Status: SHIPPED | OUTPATIENT
Start: 2022-12-15

## 2022-12-20 DIAGNOSIS — T82.7XXA VASCULAR GRAFT INFECTION, INITIAL ENCOUNTER (HCC): ICD-10-CM

## 2022-12-20 NOTE — TELEPHONE ENCOUNTER
Last appointment: 11/9/2022  Next appointment: Visit date not found  Last refill: 12/5/2022  #30 x0    No return date on disposition.  Please advise when patient is to return

## 2022-12-20 NOTE — TELEPHONE ENCOUNTER
----- Message from Willie Arreguin sent at 12/20/2022 12:08 PM EST -----  Subject: Refill Request    QUESTIONS  Name of Medication? traMADol (ULTRAM) 50 MG tablet  Patient-reported dosage and instructions? 50 MG take three times a day   depending on pain   How many days do you have left? 1  Preferred Pharmacy? [facility] [id number]  Pharmacy phone number (if available)? [phone]  ---------------------------------------------------------------------------  --------------  Bethany ROWLAND  What is the best way for the office to contact you? OK to leave message on   voicemail  Preferred Call Back Phone Number? [phone]  ---------------------------------------------------------------------------  --------------  SCRIPT ANSWERS  Relationship to Patient? Third Party  Third Party Type? Other  Other Third Party Type? In home caregiver Afshan Lamb Name?  Liliana Baldwin

## 2022-12-21 RX ORDER — TRAMADOL HYDROCHLORIDE 50 MG/1
25 TABLET ORAL EVERY 8 HOURS PRN
Qty: 30 TABLET | Refills: 0 | Status: SHIPPED | OUTPATIENT
Start: 2022-12-21 | End: 2022-12-22 | Stop reason: SDUPTHER

## 2022-12-22 ENCOUNTER — OFFICE VISIT (OUTPATIENT)
Dept: VASCULAR SURGERY | Age: 87
End: 2022-12-22

## 2022-12-22 ENCOUNTER — OFFICE VISIT (OUTPATIENT)
Dept: SURGERY | Age: 87
End: 2022-12-22

## 2022-12-22 VITALS
DIASTOLIC BLOOD PRESSURE: 62 MMHG | TEMPERATURE: 98.6 F | SYSTOLIC BLOOD PRESSURE: 120 MMHG | HEART RATE: 63 BPM | BODY MASS INDEX: 17.07 KG/M2 | HEIGHT: 64 IN | WEIGHT: 100 LBS

## 2022-12-22 DIAGNOSIS — T81.89XA NON-HEALING SURGICAL WOUND, INITIAL ENCOUNTER: Primary | ICD-10-CM

## 2022-12-22 DIAGNOSIS — T82.7XXA VASCULAR GRAFT INFECTION, INITIAL ENCOUNTER (HCC): ICD-10-CM

## 2022-12-22 PROCEDURE — 99024 POSTOP FOLLOW-UP VISIT: CPT

## 2022-12-22 PROCEDURE — 99024 POSTOP FOLLOW-UP VISIT: CPT | Performed by: SURGERY

## 2022-12-22 RX ORDER — TRAMADOL HYDROCHLORIDE 50 MG/1
25 TABLET ORAL EVERY 8 HOURS PRN
Qty: 21 TABLET | Refills: 0 | Status: SHIPPED | OUTPATIENT
Start: 2022-12-22 | End: 2023-01-05

## 2022-12-22 NOTE — PROGRESS NOTES
[facility]    PROCEDURE: 1) Excisional debridement of left groin (10 x 3 cm)                                                  2) Left rectus femoris myofascial flap                                                  3) Left split thickness skin graft (10 x 5 cm)                                                  4) Application of wound vacuum bolster dressing  DATE: 11/22/22    Kalie Bolanos has been recovering satisfactorily since her procedure. Pain has been well controlled without pain medications. She notes that there is persistent pain in the left leg - and that she has had this since her hospitalization and even prior to the reconstruction. EXAM  /62 P 63 Temp 98.6  lb    GEN: NAD   EXT: Flap viable with good take from the STSG  Incision intact with some surrounding skin changes from the Chromic - no evidence of infection  Some increased swelling from last week and erythema surrounding tissue. IMP: 80 y. o.female s/p rectus femoris flap with STSG  PLAN: Will obtain wound culture (patient desire) and continue with local wound care. Will return in 2 week to ensure wound healing. She is to maintain her antibiotics as prescribed by her other physicians. She will call with any additional concerns in the interim.      Dago Locke, APRN-CNP  [address] [place] [address] [address] Reconstructive Surgery  [phone]  12/22/22

## 2022-12-22 NOTE — PROGRESS NOTES
2022     Emily Smyth (:  1929) is a 80 y.o. female,Established patient, here for evaluation of the following chief complaint(s):  Post-Op Check (Post Op follow up / Dr. Fartun Carranza would like to see the patient as well he will be in the OR but wants to walk over)        ASSESSMENT/PLAN:  S/p Excision of infected/ruptured Left Iliofemoral graft with Left EIA to femoral bypass via obturator canal  S/p Rectus femoris muscle flap and skin graft Wesley Moon)    --Rx:   traMADol (ULTRAM) 50 MG tablet; Take 0.5 tablets by mouth every 8 hours as needed for Pain for up to 14 days. Max Daily Amount: 75 mg, Disp-21 tablet, R-0Normal  -- Her abdominal incision looks good and lower extremity is well-perfused with multiphasic signal in the bypass graft as well as the dorsalis pedis. Would not recommend any further evaluation at this time unless she should develop any future wounds were other problems with the leg. She appears quite frail today and I think less is best for now. --She will continue to follow-up with Dr. Fartun Carranza for the muscle flap and skin graft sites. --Encouraged her to continue physical therapy and conditioning exercises  --She reports poor appetite and states she will discuss appetite stimulants with Dr. Violet Zeng. I encourage this. --I will be happy to see her at any time on an as-needed basis. No follow-ups on file. SUBJECTIVE/OBJECTIVE:  Olean Severin is one month s/p Excision of infected L iliofemoral bypass and Left EIA to femoral bypass via obturator canal 22 and L rectus femoris flap w/ STSG 22. Physical Exam  Vitals:    22 0940   BP: 120/62   Site: Right Upper Arm   Pulse: 63   Temp: 98.6 °F (37 °C)   TempSrc: Temporal   Weight: 100 lb (45.4 kg)   Height: 5' 4\" (1.626 m)     Left leg:   Left lateral hip skin graft donor site healed. Approximately 50% take of STSG over rectus flap and groin. Superior medially, the wound is covered with exudate. No cellulitic changes.   The anterior lateral thigh incision from the rectus femoris muscle harvest site has a central area of soft tissue necrosis which is stable. No drainage. Vascular: There is a palpable and biphasic Doppler pulse in the thigh bypass graft. Biphasic Doppler signal at the dorsalis pedis. The foot is warm and well-perfused. An electronic signature was used to authenticate this note.     --Wicho Perera MD

## 2022-12-25 LAB
ANAEROBIC CULTURE: ABNORMAL
GRAM STAIN RESULT: ABNORMAL
ORGANISM: ABNORMAL
WOUND/ABSCESS: ABNORMAL

## 2022-12-27 ENCOUNTER — CARE COORDINATION (OUTPATIENT)
Dept: CARE COORDINATION | Age: 87
End: 2022-12-27

## 2022-12-27 NOTE — CARE COORDINATION
Ambulatory Care Coordination Note  12/27/2022    ACC: Lencho Dear, RN  Call to patient, spoke w Noe Pap doing OK, spirits OK   Not much appetite  Really only takes pain meds when she wants to sleep- taking Tramadol and tylenol     Had follow up 12/20   w Vasc Surgeon Parminder Anguiano) and   Plastics Surg ( Lazara Michaels) where wound culture was obtained. Current Antbx   Bactrim 800 mg  Cephalexin 500 mg   She has about 4 days of each left. She is wondering if the antbxs she is currently taking are sensitive to the bacteria  She has not yet heard from Dr. Ravi Nye office      Wound care being provided by [facility] and caregiver, Finn Mckoy   She is wondering if she should see [facility] as well? She has not been successful in reaching them      Future Appointments   Date Time Provider Gilbert Frey   1/5/2023  2:15 PM ROGELIO Faustin - CNP PLASTICS/REC MMA        PLAN   Appetite stimulant? Antbx- OK based on culture? Return to [facility]? Lab Results       None            Care Coordination Interventions    Referral from Primary Care Provider: No  Suggested Interventions and Community Resources  Other Services or Interventions: caregiver daily          Goals Addressed    None         Prior to Admission medications    Medication Sig Start Date End Date Taking? Authorizing Provider   traMADol (ULTRAM) 50 MG tablet Take 0.5 tablets by mouth every 8 hours as needed for Pain for up to 14 days.  Max Daily Amount: 75 mg 12/22/22 1/5/23 Yes Jose Ramon Alcaraz MD   blood glucose test strips (ONETOUCH ULTRA) strip USE TO TEST BLOOD SUGAR TWICE DAILY 12/15/22  Yes Paulo Forman DO   sulfamethoxazole-trimethoprim (BACTRIM DS;SEPTRA DS) 800-160 MG per tablet  12/8/22  Yes Historical Provider, MD   mupirocin (BACTROBAN) 2 % ointment  12/9/22  Yes Historical Provider, MD   lisinopril (PRINIVIL;ZESTRIL) 20 MG tablet  11/19/22  Yes Historical Provider, MD   cephALEXin (KEFLEX) 500 MG capsule  12/9/22  Yes Historical Provider, MD   amiodarone (CORDARONE) 200 MG tablet  11/12/22  Yes Historical Provider, MD   atorvastatin (LIPITOR) 10 MG tablet Take 1 tablet by mouth daily 11/3/22  Yes Paulo Forman DO   Multiple Vitamins-Minerals (THERAPEUTIC MULTIVITAMIN-MINERALS) tablet Take 1 tablet by mouth daily   Yes Historical Provider, MD   furosemide (LASIX) 20 MG tablet TAKE ONE TABLET BY MOUTH DAILY 7/29/22  Yes Cody Leonardo MD   glimepiride (AMARYL) 1 MG tablet TAKE ONE TABLET BY MOUTH EVERY MORNING BEFORE BREAKFAST 7/29/22  Yes Paulo Forman DO   metFORMIN (GLUCOPHAGE) 500 MG tablet TAKE ONE TABLET BY MOUTH DAILY 6/28/22  Yes Paulo Forman DO   acetaminophen (TYLENOL) 325 MG tablet Take 650 mg by mouth every 6 hours as needed for Pain   Yes Historical Provider, MD   Lite Touch Lancets MISC Use twice daily when testing blood sugars.  3/28/22  Yes Paulo Forman DO   Turmeric 450 MG CAPS Take 450 mg by mouth daily   Yes Historical Provider, MD   Cholecalciferol (VITAMIN D3) 2000 UNITS CAPS Take 1 capsule by mouth daily   Yes Historical Provider, MD   aspirin EC 81 MG EC tablet Take 1 tablet by mouth daily 5/13/15  Yes Paulo Forman DO   doxycycline monohydrate (MONODOX) 100 MG capsule Take 1 capsule by mouth every 12 hours  Patient not taking: Reported on 12/27/2022 12/5/22   Zen Horton DO       Future Appointments   Date Time Provider Gilbert Frey   1/5/2023  2:15 PM ROGELIO Remy - SHAHLA PLASTICS/REC MMA

## 2022-12-28 NOTE — CARE COORDINATION
Call to patient, spoke w pt and caregiver Mariya Azulalexis today. Pt reports no problem w her appetite. \" I had an egg and a piece of toast today\". Would like to discuss w DR Jamir Ortega  per Mariya Salmeron. Telephone visit scheduled. Aware that DR Jamir Ortega deferred wound to DR Houston Hernandez. Also reports a tiny spot on her buttocks, she will discuss w wound care nurse at Friday visit re ordering some tegaderm.     Future Appointments   Date Time Provider Gilbert Frey   1/3/2023 11:45 AM DO OCTAVIO Vizcarra Cinniurka - DYDYANA   1/5/2023  2:15 PM ROGELIO Walton - CNP PLASTICS/REC MMA

## 2022-12-29 ENCOUNTER — HOSPITAL ENCOUNTER (OUTPATIENT)
Dept: WOUND CARE | Age: 87
Discharge: HOME | End: 2022-12-29
Payer: MEDICARE

## 2022-12-29 VITALS
DIASTOLIC BLOOD PRESSURE: 71 MMHG | RESPIRATION RATE: 18 BRPM | TEMPERATURE: 97 F | SYSTOLIC BLOOD PRESSURE: 169 MMHG | HEART RATE: 123 BPM

## 2022-12-29 DIAGNOSIS — S81.802D WOUND OF LEFT LOWER EXTREMITY, SUBSEQUENT ENCOUNTER: ICD-10-CM

## 2022-12-29 DIAGNOSIS — E44.0 MODERATE PROTEIN-CALORIE MALNUTRITION (HCC): ICD-10-CM

## 2022-12-29 DIAGNOSIS — I73.9 PERIPHERAL ARTERY DISEASE (HCC): Primary | ICD-10-CM

## 2022-12-29 DIAGNOSIS — S81.802A WOUND OF LEFT LOWER EXTREMITY, INITIAL ENCOUNTER: ICD-10-CM

## 2022-12-29 PROBLEM — E46 MALNUTRITION (HCC): Status: ACTIVE | Noted: 2022-12-29

## 2022-12-29 PROCEDURE — 99213 OFFICE O/P EST LOW 20 MIN: CPT

## 2022-12-29 PROCEDURE — 11042 DBRDMT SUBQ TIS 1ST 20SQCM/<: CPT

## 2022-12-29 PROCEDURE — 11042 DBRDMT SUBQ TIS 1ST 20SQCM/<: CPT | Performed by: SPECIALIST

## 2022-12-29 PROCEDURE — 6370000000 HC RX 637 (ALT 250 FOR IP): Performed by: SPECIALIST

## 2022-12-29 PROCEDURE — 99202 OFFICE O/P NEW SF 15 MIN: CPT | Performed by: SPECIALIST

## 2022-12-29 RX ORDER — LIDOCAINE 40 MG/G
CREAM TOPICAL ONCE
OUTPATIENT
Start: 2022-12-29 | End: 2022-12-29

## 2022-12-29 RX ORDER — DOXYCYCLINE HYCLATE 100 MG/1
100 CAPSULE ORAL 2 TIMES DAILY
COMMUNITY

## 2022-12-29 RX ORDER — LIDOCAINE HYDROCHLORIDE 20 MG/ML
JELLY TOPICAL ONCE
OUTPATIENT
Start: 2022-12-29 | End: 2022-12-29

## 2022-12-29 RX ORDER — BETAMETHASONE DIPROPIONATE 0.05 %
OINTMENT (GRAM) TOPICAL ONCE
OUTPATIENT
Start: 2022-12-29 | End: 2022-12-29

## 2022-12-29 RX ORDER — GINSENG 100 MG
CAPSULE ORAL ONCE
OUTPATIENT
Start: 2022-12-29 | End: 2022-12-29

## 2022-12-29 RX ORDER — BACITRACIN ZINC AND POLYMYXIN B SULFATE 500; 1000 [USP'U]/G; [USP'U]/G
OINTMENT TOPICAL ONCE
OUTPATIENT
Start: 2022-12-29 | End: 2022-12-29

## 2022-12-29 RX ORDER — LIDOCAINE HYDROCHLORIDE 40 MG/ML
SOLUTION TOPICAL ONCE
OUTPATIENT
Start: 2022-12-29 | End: 2022-12-29

## 2022-12-29 RX ORDER — LIDOCAINE 50 MG/G
OINTMENT TOPICAL ONCE
OUTPATIENT
Start: 2022-12-29 | End: 2022-12-29

## 2022-12-29 RX ORDER — CLOBETASOL PROPIONATE 0.5 MG/G
OINTMENT TOPICAL ONCE
OUTPATIENT
Start: 2022-12-29 | End: 2022-12-29

## 2022-12-29 RX ORDER — GENTAMICIN SULFATE 1 MG/G
OINTMENT TOPICAL ONCE
OUTPATIENT
Start: 2022-12-29 | End: 2022-12-29

## 2022-12-29 RX ORDER — BACITRACIN, NEOMYCIN, POLYMYXIN B 400; 3.5; 5 [USP'U]/G; MG/G; [USP'U]/G
OINTMENT TOPICAL ONCE
OUTPATIENT
Start: 2022-12-29 | End: 2022-12-29

## 2022-12-29 RX ORDER — LIDOCAINE HYDROCHLORIDE 40 MG/ML
SOLUTION TOPICAL ONCE
Status: COMPLETED | OUTPATIENT
Start: 2022-12-29 | End: 2022-12-29

## 2022-12-29 RX ADMIN — LIDOCAINE HYDROCHLORIDE: 40 SOLUTION TOPICAL at 09:29

## 2022-12-29 ASSESSMENT — PAIN DESCRIPTION - DESCRIPTORS: DESCRIPTORS: BURNING

## 2022-12-29 ASSESSMENT — PAIN DESCRIPTION - LOCATION: LOCATION: LEG

## 2022-12-29 ASSESSMENT — PAIN SCALES - GENERAL: PAINLEVEL_OUTOF10: 8

## 2022-12-29 ASSESSMENT — PAIN DESCRIPTION - ORIENTATION: ORIENTATION: LEFT

## 2022-12-29 ASSESSMENT — PAIN DESCRIPTION - FREQUENCY: FREQUENCY: CONTINUOUS

## 2022-12-29 ASSESSMENT — PAIN DESCRIPTION - PAIN TYPE: TYPE: CHRONIC PAIN

## 2022-12-29 NOTE — PROGRESS NOTES
[facility]  Progress Note and Procedure Note      Rodney Long  MEDICAL RECORD NUMBER:  [id number]  AGE: 80 y.o. GENDER: female  : 1929  EPISODE DATE:  2022      Subjective:     Chief Complaint   Patient presents with    Wound Check     Left lower leg         HISTORY of PRESENT ILLNESS HPI     Rodney Long is a 80 y.o. female who presents today for wound evaluation. History of Wound Context: Patient had been seen for some time here in the wound clinic for an arterial ulcer of left anterior knee. Unfortunately she underwent emergent resection of infected iliofemoral bypass graft with subsequent harvest of left basilic vein and left external iliac artery to femoral bypass through the obturator canal 2022. At that time a rectus femoris flap with split-thickness skin graft was performed. She was then subsequently admitted to the rehab unit for therapy. She still complains about severe left lower extremity pain. She does state the arterial ulcer that had been treated here for some time is healed but now she has wounds at the grafted surgical site. Also complains of pain overlying the sacrum.   Only she was recently seen in follow-up by plastic surgery to evaluate flap and skin graft  Wound Pain Timing/Severity: constant  Quality of pain: sharp, burning  Severity:  4 / 10   Modifying Factors: None  Associated Signs/Symptoms: pain    Wound Identification:  Wound Type: non-healing surgical  Contributing Factors: decreased mobility and arterial insufficiency    Acute Wound: N/A not an acute wound        PAST MEDICAL HISTORY        Diagnosis Date    Anxiety     Arthritis     At risk for falls     CAD (coronary artery disease)     Cerebral artery occlusion with cerebral infarction (HCC)     TIA--no residual effects    DDD (degenerative disc disease), cervical     Fractures     (L) Hip Fx 98, L1 fracture from fall 10-31-06    Hyperlipidemia     Hypertension Mitral regurgitation     Neuropathy     Osteopenia     Osteoporosis     PAD (peripheral artery disease) ([address]     Right LE ischemia    Peripheral neuropathy 6/1/2015    Peripheral vascular disease ([address]     bilateral lower extremities with edema    Podagra 01/09/2017    Dr. Yeimi Conrad    Prolonged emergence from general anesthesia     Spinal stenosis     L4-5    Type 2 diabetes mellitus ([address]     Urethral stricture 5 years ago    Urgency of urination        PAST SURGICAL HISTORY    Past Surgical History:   Procedure Laterality Date    ANGIOPLASTY Left 04/18/2022    Left SFA    APPENDECTOMY      AXILLARY-FEMORAL BYPASS GRAFT Left 11/20/2022    EXCISION OF INFECTED ILEOFEMORAL BYPASS GRAFT; HARVEST LEFT BASILIC VEIN; LEFT EXTERNAL ILIAC ARTERY TO FEMORAL BYPASS THROUGH OBTURATOR CANAL; PLACEMENT OF WOUND VAC performed by Kassandra Higgins MD at [address] Right 03/29/2019    RIGHT CARPAL TUNNEL RELEASE AND RIGHT MIDDLE FINGER TRIGGER FINGER RELEASE performed by Sonia Estevez MD at [place] [address]  [id number]    LEFT EYE EYE CATARACT PHACOEMULSIFICATION INTRAOCULAR LENS    CHOLECYSTECTOMY  01/01/1978    COLONOSCOPY  08/31/1999    diverticulosis    EYE SURGERY Bilateral     bilateral cataract removed    FEMORAL BYPASS Left 6/28/2022    LEFT LEG FEMORAL TO POPLITEAL BYPASS WITH INTRAOPERATIVE ANGIOGRAM performed by Ena Ivan MD at [place] [address] Left 02/14/2022    LEFT COMMON FEMORAL ARTERY ENDARTERECTOMY performed by Ena Ivan MD at [facility] ([facility])  [address] [place] [address]    [facility] GRAFT Right 08/31/2015    KYPHOSIS SURGERY  11/07/2006    L1, (fractured after a fall)    LEG SURGERY Left 02/14/2022    LEFT LEG WOUND DEBRIDEMENT performed by Ena Ivan MD at [place] [address] N/A 7/7/2022    INCISION AND DRAINAGE OF LEFT SIDE GROIN WITH PLACEMENT OF WOUND VAC performed by Ena Ivan MD at [address] [place] [address] LEG SURGERY Left 11/22/2022    DEBRIDEMENT OF LEFT GROIN (10 X 3 CM), LEFT RECTUS FEMORIS FLAP, LEFT SPLIT THICKNESS SKIN GRAFT (10 X 5 CM), APPLICATION OF WOUND VACUUM DEVICE performed by Kelvin Dee MD at [facility]  04/25/1998    (L) THR    WRIST FRACTURE SURGERY  01/01/2008    left wrist       FAMILY HISTORY    Family History   Problem Relation Age of Onset    Cancer Mother 43    Stroke Father     Hypertension Father        SOCIAL HISTORY    Social History     Tobacco Use    Smoking status: Never    Smokeless tobacco: Never   Vaping Use    Vaping Use: Never used   Substance Use Topics    Alcohol use: No    Drug use: Never       ALLERGIES    Allergies   Allergen Reactions    Aricept [Donepezil Hydrochloride] Other (See Comments)     Pt unable to recall reaction    Doxycycline Nausea Only    Ketorolac Tromethamine Other (See Comments)     Pt unable to recall reaction    Donepezil Hcl        MEDICATIONS    Current Outpatient Medications on File Prior to Encounter   Medication Sig Dispense Refill    doxycycline hyclate (VIBRAMYCIN) 100 MG capsule Take 100 mg by mouth 2 times daily      traMADol (ULTRAM) 50 MG tablet Take 0.5 tablets by mouth every 8 hours as needed for Pain for up to 14 days.  Max Daily Amount: 75 mg 21 tablet 0    blood glucose test strips (ONETOUCH ULTRA) strip USE TO TEST BLOOD SUGAR TWICE DAILY 100 strip 1    sulfamethoxazole-trimethoprim (BACTRIM DS;SEPTRA DS) 800-160 MG per tablet Take 1 tablet by mouth 2 times daily      mupirocin (BACTROBAN) 2 % ointment       lisinopril (PRINIVIL;ZESTRIL) 20 MG tablet Take 10 mg by mouth daily      cephALEXin (KEFLEX) 500 MG capsule       amiodarone (CORDARONE) 200 MG tablet       atorvastatin (LIPITOR) 10 MG tablet Take 1 tablet by mouth daily 30 tablet 3    Multiple Vitamins-Minerals (THERAPEUTIC MULTIVITAMIN-MINERALS) tablet Take 1 tablet by mouth daily      furosemide (LASIX) 20 MG tablet TAKE ONE TABLET BY MOUTH DAILY 90 tablet 1    glimepiride (AMARYL) 1 MG tablet TAKE ONE TABLET BY MOUTH EVERY MORNING BEFORE BREAKFAST 30 tablet 2    metFORMIN (GLUCOPHAGE) 500 MG tablet TAKE ONE TABLET BY MOUTH DAILY 90 tablet 3    acetaminophen (TYLENOL) 325 MG tablet Take 650 mg by mouth every 6 hours as needed for Pain (Patient not taking: Reported on 12/29/2022)      Lite Touch Lancets MISC Use twice daily when testing blood sugars. 100 each 5    Turmeric 450 MG CAPS Take 450 mg by mouth daily      Cholecalciferol (VITAMIN D3) 2000 UNITS CAPS Take 1 capsule by mouth daily      aspirin EC 81 MG EC tablet Take 1 tablet by mouth daily 30 tablet 3     No current facility-administered medications on file prior to encounter.        REVIEW OF SYSTEMS    Constitutional: negative except for fatigue  Respiratory: negative for shortness of breath  Cardiovascular: negative for chest pain  Gastrointestinal: negative for abdominal pain  Musculoskeletal:negative except for muscle weakness and myalgias      Last B0V if applicable:   Hemoglobin A1C   Date Value Ref Range Status   07/26/2022 5.5 See comment % Final     Comment:     Comment:  Diagnosis of Diabetes: > or = 6.5%  Increased risk of diabetes (Prediabetes): 5.7-6.4%  Glycemic Control: Nonpregnant Adults: <7.0%                    Pregnant: <6.0%           Objective:      BP (!) 169/71   Pulse (!) 123   Temp 97 °F (36.1 °C) (Temporal)   Resp 18     Wt Readings from Last 3 Encounters:   12/22/22 100 lb (45.4 kg)   12/12/22 104 lb (47.2 kg)   12/05/22 108 lb (49 kg)       PHYSICAL EXAM    General Appearance: in no acute distress and frail-appearing  Head: normocephalic and atraumatic  Pulmonary/Chest: clear to auscultation bilaterally- no wheezes, rales or rhonchi, normal air movement, no respiratory distress  Cardiovascular: normal rate, normal S1 and S2, and no gallops  Abdomen: soft, non-tender, non-distended, normal bowel sounds, no masses or organomegaly  Extremities: no cyanosis, clubbing or edema and following wounds identified: Small scab over previous identified left anterior knee ulceration. scabbed over surgical incision left anterior thigh at flap closure site. Partial take of split-thickness skin graft overlying flap left lower extremity; full-thickness wound containing fibrin and slough where skin graft has not taken identified as wound #2. Well-healed donor site  Well-healed surgical incision at left basilic vein harvest site          Assessment:     1. Peripheral artery disease ([address]    2. Wound of left lower extremity, subsequent encounter    3. Wound of left lower extremity, initial encounter          Procedure Note  Indications:  Based on my examination of this patient's wound(s) today, sharp excision is required to promote healing and evaluate the extent healing. Performed by: Doroteo Hernandes MD    Consent obtained: Yes    Time out taken:  Yes    Pain Control: Anesthetic  Anesthetic: 4% Lidocaine Liquid Topical       Debridement:Excisional Debridement    Using curette the wound(s) was/were sharply debrided down through and including the removal of epidermis, dermis, and subcutaneous tissue.         Devitalized Tissue Debrided:  fibrin and slough    Pre Debridement Measurements:  Are located in the Wound Documentation Flow Sheet    Wound #: 2     Post  Debridement Measurements:  Wound 11/14/22 Thigh Left;Medial #2 (Active)   Wound Image   11/14/22 1049   Wound Etiology Surgical 12/29/22 0933   Dressing Status Intact 12/05/22 0830   Wound Cleansed Cleansed with saline 12/29/22 0933   Dressing/Treatment Xeroform 12/05/22 0830   Wound Length (cm) 3.5 cm 12/29/22 0933   Wound Width (cm) 5 cm 12/29/22 0933   Wound Depth (cm) 0.1 cm 12/29/22 0933   Wound Surface Area (cm^2) 17.5 cm^2 12/29/22 0933   Change in Wound Size % (l*w) -775 12/29/22 0933   Wound Volume (cm^3) 1.75 cm^3 12/29/22 0933   Wound Healing % -775 12/29/22 0933   Post-Procedure Length (cm) 3.6 cm 12/29/22 1014 Post-Procedure Width (cm) 5.1 cm 12/29/22 1014   Post-Procedure Depth (cm) 0.3 cm 12/29/22 1014   Post-Procedure Surface Area (cm^2) 18.36 cm^2 12/29/22 1014   Post-Procedure Volume (cm^3) 5.508 cm^3 12/29/22 1014   Distance Tunneling (cm) 0 cm 12/29/22 0933   Tunneling Position ___ O'Clock 0 12/29/22 0933   Undermining Starts ___ O'Clock 0 12/29/22 0933   Undermining Ends___ O'Clock 0 12/29/22 0933   Undermining Maxium Distance (cm) 0 12/29/22 0933   Wound Assessment Fibrin;Slough 12/29/22 0933   Drainage Amount Small 12/04/22 2145   Drainage Description Serosanguinous 12/05/22 0830   Odor None 12/29/22 1014   Nissa-wound Assessment Intact 12/29/22 1014   Margins Defined edges 12/29/22 0933   Wound Thickness Description not for Pressure Injury Full thickness 12/29/22 0933   Number of days: 45          Percent of Wound Debrided: 100%    Total Surface Area Debrided:  18 sq cm     Bleeding:  Minimal    Hemostasis Achieved:  by pressure    Procedural Pain:  0  / 10     Post Procedural Pain:  0 / 10     Response to treatment:  With complaints of pain. Plan:   Patient does have appointment with follow-up plastic surgeon. Patient also wishes to be followed here in wound clinic. Treatment Note please see attached Discharge Instructions    New Medication(s) at this visit:   New Prescriptions    No medications on file       Other orders at this visit:   Orders Placed This Encounter   Procedures    Initiate Outpatient Wound Care Protocol       Weight Management: No. N/A    Smoking Cessation: Counseling given: Not Answered        Discharge Instructions          [facility] Physician Orders and Discharge Instructions  [address] [place] [address] [address] [place] [address]  Telephone: [phone]  NAME:  Kathleen Raman  YOB: 1929  MEDICAL RECORD NUMBER:  [id number]  DATE: 12/29/22     Return Appointment:  Return Appointment: With Dr. Laila Rey  in  2 Week(s)  [] Return Appointment for a Wound Assessment with the nurse on:     Future Appointments   Date Time Provider Gilbert Frey   1/3/2023 11:45 AM [facility] [address] [address]   1/5/2023  2:15 PM ROGELIO Delaney CNP PLASTICS/REC [address]: [address]:  Phone: [phone]  Fax: [phone]   Medically necessary services: [x] Skilled Nursing [] PT (Eval & Treat) [] OT (Eval & Treat) [] Social Work [] Dietician  [] Other:    Wound care instructions: If you smoke we ask that you refrain from smoking. Smoking inhibits wounds from healing. When taking antibiotics take the entire prescription as ordered. Do not stop taking until medication is all gone unless otherwise instructed. Exercise as tolerated. Keep weight off wounds and reposition every 2 hours if applicable. Do not get wounds wet in the bath or shower unless otherwise instructed by your physician. If your wound is on your foot or leg, you may purchase a cast bag. Please ask at the pharmacy. Wash hands with soap and water prior to and after every dressing change. [x]Wash wounds with: 0.9% normal saline  [x]Nissa wound Topical Treatments: Do not apply lotions, creams, or ointments to the skin around the wound bed unless directed as followed:   Apply around the wound: Nothing         [x]Wound Location: left groin/ upper leg   Apply Primary Dressing to wound:  xeroform  Secondary Dressing: 4X4 gauze pad   Avoid contact of tape with skin if possible. When to change Dressing: 3 times per week- Monday, Wednesday, Friday      To coccyx apply triad cream ( thin layer) daily- Turn and reposition every 2 hours, try to stay off  coccyx/buttocks    Dietary:  Important dietary reminders:  1. Increase Protein intake (i.e. Lean meats, fish, eggs, legumes, and yogurt)  2. No added salt  3.  If diabetic, follow a diabetic diet and check glucose prior to meals or as instructed by your physician. Dietary Supplements(Take twice a day unless instructed otherwise):  [] Liliana Quyen  [] 30ml ProStat [] Kasey Severe [] Ensure Max/Premier [] Other:    Your nurse  is:  Jean Sauceda     Electronically signed by Jody Kothari RN on 12/29/2022 at 10:16 AM     [facility] Information: Should you experience any significant changes in your wound(s) or have questions about your wound care, please contact the [address] [place] [address] at [phone]. We are open from 8:00am - 4:30p Monday, Thursday and Friday; 11:00am - 5pm on Tuesday and CLOSED Wednesday. Please give us 24-48 business hours to return your call. Call your doctor now or seek immediate medical care if:    You have symptoms of infection, such as: Increased pain, swelling, warmth, or redness. Red streaks leading from the area. Pus draining from the area. A fever.          [] Patient unable to sign Discharge Instructions given to ECF/Transportation/POA         Electronically signed by Viktor Gonzalez MD on 12/29/2022 at 12:18 PM

## 2022-12-29 NOTE — DISCHARGE INSTRUCTIONS
[facility] Physician Orders and Discharge Instructions  [address] [place] [address] [address] [address] [facility]. [facility] [place]  Telephone: [phone]  NAME:  Marga Miner OF BIRTH:  4/4/1929  MEDICAL RECORD NUMBER:  [id number]  DATE: 12/29/22     Return Appointment:  Return Appointment: With Dr. Rajinder Burris  in  [facility])  [] Return Appointment for a Wound Assessment with the nurse on:     Future Appointments   Date Time Provider Gilbert Frey   1/3/2023 11:45 AM Kari Roy [place] [address] [address]   1/5/2023  2:15 PM ROGELIO Fiore - CNP PLASTICS/REC [address]: [facility] [place]:  Phone: [phone]  Fax: [phone]   Medically necessary services: [x] Skilled Nursing [] PT (Eval & Treat) [] OT (Eval & Treat) [] Social Work [] Dietician  [] Other:    Wound care instructions: If you smoke we ask that you refrain from smoking. Smoking inhibits wounds from healing. When taking antibiotics take the entire prescription as ordered. Do not stop taking until medication is all gone unless otherwise instructed. Exercise as tolerated. Keep weight off wounds and reposition every 2 hours if applicable. Do not get wounds wet in the bath or shower unless otherwise instructed by your physician. If your wound is on your foot or leg, you may purchase a cast bag. Please ask at the pharmacy. Wash hands with soap and water prior to and after every dressing change. [x]Wash wounds with: 0.9% normal saline  [x]Nissa wound Topical Treatments: Do not apply lotions, creams, or ointments to the skin around the wound bed unless directed as followed:   Apply around the wound: Nothing         [x]Wound Location: left groin/ upper leg   Apply Primary Dressing to wound:  xeroform  Secondary Dressing: 4X4 gauze pad   Avoid contact of tape with skin if possible.   When to change Dressing: 3 times per week- Monday, Wednesday, Friday      To coccyx apply triad cream ( thin layer) daily- Turn and reposition every 2 hours, try to stay off  coccyx/buttocks    Dietary:  Important dietary reminders:  1. Increase Protein intake (i.e. Lean meats, fish, eggs, legumes, and yogurt)  2. No added salt  3. If diabetic, follow a diabetic diet and check glucose prior to meals or as instructed by your physician. Dietary Supplements(Take twice a day unless instructed otherwise):  [] Joelene Ohs  [] 30ml ProStat [] Altamese Anger [] Ensure Max/Premier [] Other:    Your nurse  is:  Tejal Sands     Electronically signed by Buck Sam RN on 12/29/2022 at 10:16 AM     [address] [facility] Information: Should you experience any significant changes in your wound(s) or have questions about your wound care, please contact the [address] [place] [address] at [phone]. We are open from 8:00am - 4:30p Monday, Thursday and Friday; 11:00am - 5pm on Tuesday and CLOSED Wednesday. Please give us 24-48 business hours to return your call. Call your doctor now or seek immediate medical care if:    You have symptoms of infection, such as: Increased pain, swelling, warmth, or redness. Red streaks leading from the area. Pus draining from the area. A fever.          [] Patient unable to sign Discharge Instructions given to ECF/Transportation/POA

## 2022-12-30 ENCOUNTER — TELEPHONE (OUTPATIENT)
Dept: INTERNAL MEDICINE CLINIC | Age: 87
End: 2022-12-30

## 2022-12-30 ENCOUNTER — TELEPHONE (OUTPATIENT)
Dept: SURGERY | Age: 87
End: 2022-12-30

## 2022-12-30 RX ORDER — CIPROFLOXACIN 500 MG/1
250 TABLET, FILM COATED ORAL 2 TIMES DAILY
Qty: 10 TABLET | Refills: 0 | Status: SHIPPED | OUTPATIENT
Start: 2022-12-30 | End: 2023-01-09

## 2022-12-30 NOTE — TELEPHONE ENCOUNTER
Attempted to order Cipro 500 mg as instructed by MELINA Kraus CNP. Epic flagged the dose as exceeding recommended dose by 34%. Ilan Spann CNP notified. New orders received for  Cipro 250 mg BID. Spoke with [facility] caregiver in regards to adding a new antibiotic.  She is scheduled to follow up with Ilan Spann on 01/05/2023.    ----- Message from Monique Thompson, [address] [facility] sent at 12/29/2022  5:45 PM EST -----  Culture does show bacteria, can we start her on Cipro 500 mg BID  Mahi Passer   ----- Message -----  From: Dee Dee Grant Incoming Lab Results From Soft (Epic Adt)  Sent: 12/27/2022  10:58 AM EST  To: ROGELIO Guo CNP

## 2022-12-30 NOTE — TELEPHONE ENCOUNTER
----- Message from [facility] sent at 12/30/2022  3:25 PM EST -----  Subject: Message to Provider    QUESTIONS  Information for Provider? [address] called to receive   verbal orders to continue wound care for pt twice a week. The call back   number is [phone]  ---------------------------------------------------------------------------  --------------  Eben ROWLAND  [phone]; OK to leave message on voicemail  ---------------------------------------------------------------------------  --------------  SCRIPT ANSWERS  Relationship to Patient?  Self

## 2022-12-30 NOTE — TELEPHONE ENCOUNTER
[address] called to receive verbal orders to continue wound care for patient twice a week. Is this okay?

## 2023-01-03 ENCOUNTER — SCHEDULED TELEPHONE ENCOUNTER (OUTPATIENT)
Dept: INTERNAL MEDICINE CLINIC | Age: 88
End: 2023-01-03

## 2023-01-03 DIAGNOSIS — G89.29 CHRONIC NEUROPATHIC PAIN: Primary | ICD-10-CM

## 2023-01-03 DIAGNOSIS — D63.8 ANEMIA IN CHRONIC ILLNESS: ICD-10-CM

## 2023-01-03 DIAGNOSIS — A49.8 PSEUDOMONAS INFECTION: ICD-10-CM

## 2023-01-03 DIAGNOSIS — E11.649 TYPE 2 DIABETES MELLITUS WITH HYPOGLYCEMIA WITHOUT COMA, WITHOUT LONG-TERM CURRENT USE OF INSULIN (HCC): ICD-10-CM

## 2023-01-03 DIAGNOSIS — M79.2 CHRONIC NEUROPATHIC PAIN: Primary | ICD-10-CM

## 2023-01-03 DIAGNOSIS — T81.49XA POSTOPERATIVE WOUND INFECTION: ICD-10-CM

## 2023-01-03 DIAGNOSIS — T82.7XXA VASCULAR GRAFT INFECTION, INITIAL ENCOUNTER (HCC): ICD-10-CM

## 2023-01-03 DIAGNOSIS — T82.7XXD VASCULAR GRAFT INFECTION, SUBSEQUENT ENCOUNTER: ICD-10-CM

## 2023-01-03 RX ORDER — GLIMEPIRIDE 1 MG/1
0.5 TABLET ORAL
Qty: 30 TABLET | Refills: 2
Start: 2023-01-03

## 2023-01-03 RX ORDER — TRAMADOL HYDROCHLORIDE 50 MG/1
50 TABLET ORAL EVERY 8 HOURS PRN
Qty: 90 TABLET | Refills: 0 | Status: SHIPPED | OUTPATIENT
Start: 2023-01-03 | End: 2023-02-02

## 2023-01-03 SDOH — ECONOMIC STABILITY: FOOD INSECURITY: WITHIN THE PAST 12 MONTHS, THE FOOD YOU BOUGHT JUST DIDN'T LAST AND YOU DIDN'T HAVE MONEY TO GET MORE.: NEVER TRUE

## 2023-01-03 SDOH — ECONOMIC STABILITY: FOOD INSECURITY: WITHIN THE PAST 12 MONTHS, YOU WORRIED THAT YOUR FOOD WOULD RUN OUT BEFORE YOU GOT MONEY TO BUY MORE.: NEVER TRUE

## 2023-01-03 ASSESSMENT — PATIENT HEALTH QUESTIONNAIRE - PHQ9
SUM OF ALL RESPONSES TO PHQ QUESTIONS 1-9: 0
1. LITTLE INTEREST OR PLEASURE IN DOING THINGS: 0
2. FEELING DOWN, DEPRESSED OR HOPELESS: 0
SUM OF ALL RESPONSES TO PHQ QUESTIONS 1-9: 0
SUM OF ALL RESPONSES TO PHQ QUESTIONS 1-9: 0
SUM OF ALL RESPONSES TO PHQ9 QUESTIONS 1 & 2: 0
SUM OF ALL RESPONSES TO PHQ QUESTIONS 1-9: 0

## 2023-01-03 ASSESSMENT — SOCIAL DETERMINANTS OF HEALTH (SDOH): HOW HARD IS IT FOR YOU TO PAY FOR THE VERY BASICS LIKE FOOD, HOUSING, MEDICAL CARE, AND HEATING?: NOT HARD AT ALL

## 2023-01-03 NOTE — PROGRESS NOTES
Patrica Nunes is a 80 y.o. female evaluated via telephone on 1/3/2023 for Wound Infection, Medication Problem, and Leg Pain    [facility]) Physicians  Internal Medicine  Patient Encounter  Samanta Thibodeaux D.O., [facility]    Chief Complaint   Patient presents with    Wound Infection    Medication Problem    Leg Pain       HPI: Malena Gustafson is a complicated [id number]  female being evaluated today with multiple concerns. The patient is not able to get into the office due to her debilitated state. Patient has been struggling with her left lower extremity. She has had several operations to address her arterial insufficiency. She has had wound complications. Her most recent procedure was performed on 11/20/2022 by Dr. Jaciel Camargo. 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    On 11/22/2022 Dr. Moy Sanchez performed an excisional debridement of the left groin wound with a left rectus femoris myofascial flap and left split thickness graft and placement of a wound VAC. On 12/22/2022 a wound culture was obtained and yielded Pseudomonas aeruginosa. Patient is currently on Doxy and Bactrim. The culture indicated intermediate sensitivity to Cipro and resistance to Levaquin. Patient states that she is under the care of infectious disease, but I do not see any entries in the electronic health record. According to the health record she had been referred to ID but she never followed up. She had another wound debridement on 12/29/2022. Patient states her blood sugars have been on the low side ranging from . She is currently on glimepiride 2 mg daily. She has been resistant to changing her medication. She is now agreeable. Patient is receiving home care. Today, she is bitterly complaining of severe pain in the left leg.   She is also complaining of weakness and she is requesting lab work. Patient is also requesting a refill of her tramadol. She states it does not work very well but she will not take any other medications. She also uses this with Tylenol which seems to add a little bit more benefit        Documentation:  I communicated with the patient and/or health care decision maker about See below. Details of this discussion including any medical advice provided: See below    Encounter Diagnoses   Name Primary? Chronic neuropathic pain Yes    Vascular graft infection, subsequent encounter     Postoperative wound infection     Pseudomonas infection     Type 2 diabetes mellitus with hypoglycemia without coma, without long-term current use of insulin (HCC)     Vascular graft infection, initial encounter ([facility] [address]     Anemia in chronic illness        PLAN:  #1 CBC/BMP  #2 recommend she follow-up with ID if indeed she is following with them (Dr. Zachary Guillory), but it does not appear that she is. #3 follow-up with wound care as well as respective specialists including vascular and plastic surgery  #4 stop glimepiride  #5 refill tramadol. We did briefly review the potential adverse side effects and habit-forming nature of the medication. She tolerates the medication well. Total Time: minutes: 21-30 minutes (25 minutes)    Yuni Newman was evaluated through a synchronous (real-time) audio encounter. Patient identification was verified at the start of the visit. She (or guardian if applicable) is aware that this is a billable service, which includes applicable co-pays. This visit was conducted with the patient's (and/or legal guardian's) verbal consent. She has not had a related appointment within my department in the past 7 days or scheduled within the next 24 hours. The patient was located at Home: [address] [facility] [address]. The provider was located at [facility] (Appt Dept): [address] [facility],  [address] [place] [address].     Note: not billable if this call serves to triage the patient into an appointment for the relevant concern    [address] [facility], DO

## 2023-01-05 ENCOUNTER — OFFICE VISIT (OUTPATIENT)
Dept: SURGERY | Age: 88
End: 2023-01-05

## 2023-01-05 VITALS
OXYGEN SATURATION: 97 % | DIASTOLIC BLOOD PRESSURE: 62 MMHG | BODY MASS INDEX: 17.16 KG/M2 | TEMPERATURE: 97.9 F | WEIGHT: 100 LBS | SYSTOLIC BLOOD PRESSURE: 166 MMHG | HEART RATE: 60 BPM

## 2023-01-05 DIAGNOSIS — T82.7XXD VASCULAR GRAFT INFECTION, SUBSEQUENT ENCOUNTER: Primary | ICD-10-CM

## 2023-01-05 DIAGNOSIS — D63.8 ANEMIA IN CHRONIC ILLNESS: ICD-10-CM

## 2023-01-05 DIAGNOSIS — E11.649 TYPE 2 DIABETES MELLITUS WITH HYPOGLYCEMIA WITHOUT COMA, WITHOUT LONG-TERM CURRENT USE OF INSULIN (HCC): ICD-10-CM

## 2023-01-05 LAB
ANION GAP SERPL CALCULATED.3IONS-SCNC: 14 MMOL/L (ref 3–16)
BASOPHILS ABSOLUTE: 0 K/UL (ref 0–0.2)
BASOPHILS RELATIVE PERCENT: 0.4 %
BUN BLDV-MCNC: 57 MG/DL (ref 7–20)
CALCIUM SERPL-MCNC: 9.6 MG/DL (ref 8.3–10.6)
CHLORIDE BLD-SCNC: 95 MMOL/L (ref 99–110)
CO2: 23 MMOL/L (ref 21–32)
CREAT SERPL-MCNC: 1.3 MG/DL (ref 0.6–1.2)
EOSINOPHILS ABSOLUTE: 0.1 K/UL (ref 0–0.6)
EOSINOPHILS RELATIVE PERCENT: 1.2 %
GFR SERPL CREATININE-BSD FRML MDRD: 38 ML/MIN/{1.73_M2}
GLUCOSE BLD-MCNC: 81 MG/DL (ref 70–99)
HCT VFR BLD CALC: 33.2 % (ref 36–48)
HEMOGLOBIN: 10.7 G/DL (ref 12–16)
LYMPHOCYTES ABSOLUTE: 1.5 K/UL (ref 1–5.1)
LYMPHOCYTES RELATIVE PERCENT: 22.4 %
MCH RBC QN AUTO: 31.1 PG (ref 26–34)
MCHC RBC AUTO-ENTMCNC: 32.2 G/DL (ref 31–36)
MCV RBC AUTO: 96.5 FL (ref 80–100)
MONOCYTES ABSOLUTE: 0.5 K/UL (ref 0–1.3)
MONOCYTES RELATIVE PERCENT: 7.2 %
NEUTROPHILS ABSOLUTE: 4.6 K/UL (ref 1.7–7.7)
NEUTROPHILS RELATIVE PERCENT: 68.8 %
PDW BLD-RTO: 17 % (ref 12.4–15.4)
PLATELET # BLD: 220 K/UL (ref 135–450)
PMV BLD AUTO: 7.2 FL (ref 5–10.5)
POTASSIUM SERPL-SCNC: 5.8 MMOL/L (ref 3.5–5.1)
RBC # BLD: 3.44 M/UL (ref 4–5.2)
SODIUM BLD-SCNC: 132 MMOL/L (ref 136–145)
WBC # BLD: 6.7 K/UL (ref 4–11)

## 2023-01-05 PROCEDURE — 99024 POSTOP FOLLOW-UP VISIT: CPT

## 2023-01-05 NOTE — PROGRESS NOTES
[facility]    PROCEDURE: 1) Excisional debridement of left groin (10 x 3 cm)                                                  2) Left rectus femoris myofascial flap                                                  3) Left split thickness skin graft (10 x 5 cm)                                                  4) Application of wound vacuum bolster dressing  DATE: 11/22/22    Boy Gaona has been recovering satisfactorily since her procedure. Pain has been well controlled without pain medications. She notes that there is persistent pain in the left leg - and that she has had this since her hospitalization and even prior to the reconstruction. Since her last evaluation she has seen multiple physicians for her leg wound including [facility] wound care. They performed a in office debridement of graft site 12/29/22. She also was supposed to follow up with ID after being discharged from the hospital for antibiotic management but she has refused to follow up with them. She is also receiving home care treatment and has caregiver present today. EXAM  /62 P 63 Temp 98.6  lb    GEN: NAD   EXT: Flap viable with good take from the STSG, has some exudate on upper aspect of STSG. Donor site is well healed. Lower leg Incision intact with some surrounding skin changes and erythema along edges. Both areas however improved since last visit. IMP: 80 y. o.female s/p rectus femoris flap with STSG  PLAN: Patient refuses to take antibiotics based on wound culture. However, they requested another culture to be taken today. She also refuses to see ID for proper management of antibiotics especially with patients age. Wound has shown improvement however, patient states it has not improved and she is in increased pain. She is not compliant in nutrition or wound care.  Have informed care giver to have patient go to the ED if increased swelling, warmth or red streaks develop from the wound or she develops a fever. We will continue local wound care with xeroform and dry guaze to graft site and have her irrigate lower leg wound with vashe and apply dry guaze and to avoid tape to skin. She will follow up in 2 weeks to see Dr. Elaine Ozuna to ensure wound healing.      Grace Griffin, APRN-CNP  [address] [place] [address] [facility]  [phone]  01/05/23

## 2023-01-06 DIAGNOSIS — N17.9 AKI (ACUTE KIDNEY INJURY) (HCC): Primary | ICD-10-CM

## 2023-01-06 DIAGNOSIS — E87.5 HYPERKALEMIA: ICD-10-CM

## 2023-01-08 LAB
ANAEROBIC CULTURE: ABNORMAL
GRAM STAIN RESULT: ABNORMAL
ORGANISM: ABNORMAL
ORGANISM: ABNORMAL
WOUND/ABSCESS: ABNORMAL
WOUND/ABSCESS: ABNORMAL

## 2023-01-09 ENCOUNTER — HOSPITAL ENCOUNTER (OUTPATIENT)
Dept: WOUND CARE | Age: 88
Discharge: HOME | End: 2023-01-09
Payer: MEDICARE

## 2023-01-09 ENCOUNTER — TELEPHONE (OUTPATIENT)
Dept: SURGERY | Age: 88
End: 2023-01-09

## 2023-01-09 VITALS
TEMPERATURE: 97 F | HEART RATE: 74 BPM | RESPIRATION RATE: 16 BRPM | DIASTOLIC BLOOD PRESSURE: 72 MMHG | SYSTOLIC BLOOD PRESSURE: 189 MMHG

## 2023-01-09 DIAGNOSIS — T82.7XXD VASCULAR GRAFT INFECTION, SUBSEQUENT ENCOUNTER: Primary | ICD-10-CM

## 2023-01-09 DIAGNOSIS — S81.802A WOUND OF LEFT LOWER EXTREMITY, INITIAL ENCOUNTER: Primary | ICD-10-CM

## 2023-01-09 DIAGNOSIS — I25.10 CORONARY ARTERY DISEASE INVOLVING NATIVE CORONARY ARTERY OF NATIVE HEART WITHOUT ANGINA PECTORIS: ICD-10-CM

## 2023-01-09 DIAGNOSIS — E87.5 HYPERKALEMIA: ICD-10-CM

## 2023-01-09 DIAGNOSIS — N17.9 AKI (ACUTE KIDNEY INJURY) (HCC): ICD-10-CM

## 2023-01-09 DIAGNOSIS — I70.299 ATHEROSCLEROSIS OF ARTERY OF EXTREMITY WITH ULCERATION (HCC): ICD-10-CM

## 2023-01-09 DIAGNOSIS — L97.909 ATHEROSCLEROSIS OF ARTERY OF EXTREMITY WITH ULCERATION (HCC): ICD-10-CM

## 2023-01-09 DIAGNOSIS — I10 HTN (HYPERTENSION), BENIGN: ICD-10-CM

## 2023-01-09 DIAGNOSIS — I73.9 PERIPHERAL ARTERY DISEASE (HCC): ICD-10-CM

## 2023-01-09 DIAGNOSIS — E78.5 HYPERLIPIDEMIA, UNSPECIFIED HYPERLIPIDEMIA TYPE: ICD-10-CM

## 2023-01-09 DIAGNOSIS — I87.2 VENOUS INSUFFICIENCY OF BOTH LOWER EXTREMITIES: ICD-10-CM

## 2023-01-09 LAB
ANION GAP SERPL CALCULATED.3IONS-SCNC: 13 MMOL/L (ref 3–16)
BASOPHILS ABSOLUTE: 0 K/UL (ref 0–0.2)
BASOPHILS RELATIVE PERCENT: 0.6 %
BUN BLDV-MCNC: 51 MG/DL (ref 7–20)
CALCIUM SERPL-MCNC: 9.8 MG/DL (ref 8.3–10.6)
CHLORIDE BLD-SCNC: 92 MMOL/L (ref 99–110)
CO2: 24 MMOL/L (ref 21–32)
CREAT SERPL-MCNC: 1.2 MG/DL (ref 0.6–1.2)
EOSINOPHILS ABSOLUTE: 0 K/UL (ref 0–0.6)
EOSINOPHILS RELATIVE PERCENT: 0.8 %
GFR SERPL CREATININE-BSD FRML MDRD: 42 ML/MIN/{1.73_M2}
GLUCOSE BLD-MCNC: 75 MG/DL (ref 70–99)
HCT VFR BLD CALC: 35.1 % (ref 36–48)
HEMOGLOBIN: 11.3 G/DL (ref 12–16)
LYMPHOCYTES ABSOLUTE: 1.6 K/UL (ref 1–5.1)
LYMPHOCYTES RELATIVE PERCENT: 25.4 %
MCH RBC QN AUTO: 31 PG (ref 26–34)
MCHC RBC AUTO-ENTMCNC: 32.2 G/DL (ref 31–36)
MCV RBC AUTO: 96.3 FL (ref 80–100)
MONOCYTES ABSOLUTE: 0.4 K/UL (ref 0–1.3)
MONOCYTES RELATIVE PERCENT: 6.9 %
NEUTROPHILS ABSOLUTE: 4.1 K/UL (ref 1.7–7.7)
NEUTROPHILS RELATIVE PERCENT: 66.3 %
PDW BLD-RTO: 16.4 % (ref 12.4–15.4)
PLATELET # BLD: 238 K/UL (ref 135–450)
PMV BLD AUTO: 7.2 FL (ref 5–10.5)
POTASSIUM SERPL-SCNC: 5.8 MMOL/L (ref 3.5–5.1)
RBC # BLD: 3.64 M/UL (ref 4–5.2)
SODIUM BLD-SCNC: 129 MMOL/L (ref 136–145)
WBC # BLD: 6.2 K/UL (ref 4–11)

## 2023-01-09 PROCEDURE — 11042 DBRDMT SUBQ TIS 1ST 20SQCM/<: CPT | Performed by: SPECIALIST

## 2023-01-09 PROCEDURE — 11042 DBRDMT SUBQ TIS 1ST 20SQCM/<: CPT

## 2023-01-09 PROCEDURE — 6370000000 HC RX 637 (ALT 250 FOR IP): Performed by: SPECIALIST

## 2023-01-09 RX ORDER — CLOBETASOL PROPIONATE 0.5 MG/G
OINTMENT TOPICAL ONCE
OUTPATIENT
Start: 2023-01-09 | End: 2023-01-09

## 2023-01-09 RX ORDER — LIDOCAINE 40 MG/G
CREAM TOPICAL ONCE
OUTPATIENT
Start: 2023-01-09 | End: 2023-01-09

## 2023-01-09 RX ORDER — LIDOCAINE HYDROCHLORIDE 20 MG/ML
JELLY TOPICAL ONCE
OUTPATIENT
Start: 2023-01-09 | End: 2023-01-09

## 2023-01-09 RX ORDER — GENTAMICIN SULFATE 1 MG/G
OINTMENT TOPICAL ONCE
OUTPATIENT
Start: 2023-01-09 | End: 2023-01-09

## 2023-01-09 RX ORDER — BACITRACIN, NEOMYCIN, POLYMYXIN B 400; 3.5; 5 [USP'U]/G; MG/G; [USP'U]/G
OINTMENT TOPICAL ONCE
OUTPATIENT
Start: 2023-01-09 | End: 2023-01-09

## 2023-01-09 RX ORDER — BETAMETHASONE DIPROPIONATE 0.05 %
OINTMENT (GRAM) TOPICAL ONCE
OUTPATIENT
Start: 2023-01-09 | End: 2023-01-09

## 2023-01-09 RX ORDER — BACITRACIN ZINC AND POLYMYXIN B SULFATE 500; 1000 [USP'U]/G; [USP'U]/G
OINTMENT TOPICAL ONCE
OUTPATIENT
Start: 2023-01-09 | End: 2023-01-09

## 2023-01-09 RX ORDER — LIDOCAINE HYDROCHLORIDE 40 MG/ML
SOLUTION TOPICAL ONCE
OUTPATIENT
Start: 2023-01-09 | End: 2023-01-09

## 2023-01-09 RX ORDER — LIDOCAINE HYDROCHLORIDE 40 MG/ML
SOLUTION TOPICAL ONCE
Status: COMPLETED | OUTPATIENT
Start: 2023-01-09 | End: 2023-01-09

## 2023-01-09 RX ORDER — GINSENG 100 MG
CAPSULE ORAL ONCE
OUTPATIENT
Start: 2023-01-09 | End: 2023-01-09

## 2023-01-09 RX ORDER — LIDOCAINE 50 MG/G
OINTMENT TOPICAL ONCE
OUTPATIENT
Start: 2023-01-09 | End: 2023-01-09

## 2023-01-09 RX ADMIN — LIDOCAINE HYDROCHLORIDE: 40 SOLUTION TOPICAL at 10:44

## 2023-01-09 ASSESSMENT — PAIN DESCRIPTION - DESCRIPTORS: DESCRIPTORS: BURNING

## 2023-01-09 ASSESSMENT — PAIN SCALES - GENERAL: PAINLEVEL_OUTOF10: 8

## 2023-01-09 ASSESSMENT — PAIN DESCRIPTION - FREQUENCY: FREQUENCY: CONTINUOUS

## 2023-01-09 ASSESSMENT — PAIN DESCRIPTION - ORIENTATION: ORIENTATION: LEFT

## 2023-01-09 ASSESSMENT — PAIN DESCRIPTION - LOCATION: LOCATION: LEG

## 2023-01-09 NOTE — DISCHARGE INSTRUCTIONS
[facility] Physician Orders and Discharge Instructions  [address] [address] [address] [address] [facility]. [facility]. [place]  Telephone: [phone]  NAME:  Viridiana Choudhary OF BIRTH:  4/4/1929  MEDICAL RECORD NUMBER:  [id number]  DATE: 1/9/23     Return Appointment:  Return Appointment: With Abram Osler, MD  in  [facility])  [] Return Appointment for a Wound Assessment with the nurse on:     Future Appointments   Date Time Provider Gilbert Frey   1/12/2023  8:45 AM Marika Tubbs  [address]   1/18/2023 12:45 PM Andreia Preston MD PLASTICS/REC [address]: [address]:  Phone: [phone]  Fax: [phone]   Medically necessary services: [x] Skilled Nursing [] PT (Eval & Treat) [] OT (Eval & Treat) [] Social Work [] Dietician  [] Other:    Wound care instructions: If you smoke we ask that you refrain from smoking. Smoking inhibits wounds from healing. When taking antibiotics take the entire prescription as ordered. Do not stop taking until medication is all gone unless otherwise instructed. Exercise as tolerated. Keep weight off wounds and reposition every 2 hours if applicable. Do not get wounds wet in the bath or shower unless otherwise instructed by your physician. If your wound is on your foot or leg, you may purchase a cast bag. Please ask at the pharmacy. Wash hands with soap and water prior to and after every dressing change. [x]Wash wounds with: Vashe wash - Apply enough Vashe to soak a piece of gauze and place on wound bed for 5-10 minutes. No need to rinse after soaking.   [x]Nissa wound Topical Treatments: Do not apply lotions, creams, or ointments to the skin around the wound bed unless directed as followed:   Apply around the wound: Nothing         [x]Wound Location: left groin and left thigh   Apply Primary Dressing to wound: Honey gel  Secondary Dressing: 4X4 gauze pad   Avoid contact of tape with skin if possible. When to change Dressing: Every other day      [x] Edema Control:     Elevate leg(s) above the level of the heart for 30 minutes 4-5 times a day and/or when sitting. Avoid prolonged standing in one place. Dietary:  Important dietary reminders:  1. Increase Protein intake (i.e. Lean meats, fish, eggs, legumes, and yogurt)  2. No added salt  3. If diabetic, follow a diabetic diet and check glucose prior to meals or as instructed by your physician. Dietary Supplements(Take twice a day unless instructed otherwise):  [] Florin Arias  [] 30ml ProStat [] Khris Hancock [] Ensure Max/Premier [] Other:    Your nurse  is:  Amrik Winters     Electronically signed by Zeina Finn RN on 1/9/2023 at 11:53 AM     [address] Information: Should you experience any significant changes in your wound(s) or have questions about your wound care, please contact the [address] [place] [address] at [phone]. We are open from 8:00am - 4:30p Monday, Thursday and Friday; 11:00am - 5pm on Tuesday and CLOSED Wednesday. Please give us 24-48 business hours to return your call. Call your doctor now or seek immediate medical care if:    You have symptoms of infection, such as: Increased pain, swelling, warmth, or redness. Red streaks leading from the area. Pus draining from the area. A fever.          [] Patient unable to sign Discharge Instructions given to ECF/Transportation/POA

## 2023-01-09 NOTE — TELEPHONE ENCOUNTER
Spoke with Jake Goncalves the caregiver and explained the plan with the referral to Dr. Chavo Franz as below. Number to Dr. Brenna Patel office given to Jake Goncalves.       ----- Message from ROGELIO Rodrigeuz CNP sent at 1/9/2023  9:54 AM EST -----  Her culture came back again with the same bacteria. Due to her age and complexity will refer her to ID. She did not want to see the ID she saw at [facility] I sent a referral to Dr. Chavo Franz. Please let patient or care giver know.    Philomena Kehr   ----- Message -----  From: Manley Aschoff Incoming Lab Results From Soft (Epic Adt)  Sent: 1/8/2023   6:53 AM EST  To: ROGELIO Rodriguez CNP

## 2023-01-09 NOTE — PROGRESS NOTES
[address]  Progress Note and Procedure Note      Freddie Levin  MEDICAL RECORD NUMBER:  [id number]  AGE: 80 y.o. GENDER: female  : 1929  EPISODE DATE:  2023    Subjective:     Chief Complaint   Patient presents with    Wound Check     Left groin         HISTORY of PRESENT ILLNESS HPI     Freddie Levin is a 80 y.o. female who presents today for wound/ulcer evaluation. History of Wound Context: Patient had been seen for some time here in the wound clinic for an arterial ulcer of left anterior knee. Unfortunately she underwent emergent resection of infected iliofemoral bypass graft with subsequent harvest of left basilic vein and left external iliac artery to femoral bypass through the obturator canal 2022. At that time a rectus femoris flap with split-thickness skin graft was performed. She was then subsequently admitted to the rehab unit for therapy.   She still complains about severe left lower extremity pain  Wound/Ulcer Pain Timing/Severity: constant  Quality of pain: sharp, burning  Severity:   10   Modifying Factors: None  Associated Signs/Symptoms: pain    Ulcer Identification:  Ulcer Type: non-healing surgical    Contributing Factors: decreased mobility and arterial insufficiency    Acute Wound: N/A not an acute wound    PAST MEDICAL HISTORY        Diagnosis Date    Anxiety     Arthritis     At risk for falls     CAD (coronary artery disease)     Cerebral artery occlusion with cerebral infarction (HCC)     TIA--no residual effects    DDD (degenerative disc disease), cervical     Fractures     (L) Hip Fx 98, L1 fracture from fall 10-31-06    Hyperlipidemia     Hypertension     Mitral regurgitation     Neuropathy     Osteopenia     Osteoporosis     PAD (peripheral artery disease) ([facility] [address]     Right LE ischemia    Peripheral neuropathy 2015    Peripheral vascular disease (HCC)     bilateral lower extremities with edema    Podagra 2017     Roof    Prolonged emergence from general anesthesia     Spinal stenosis     L4-5    Type 2 diabetes mellitus (HCC)     Urethral stricture 5 years ago    Urgency of urination        PAST SURGICAL HISTORY    Past Surgical History:   Procedure Laterality Date    ANGIOPLASTY Left 04/18/2022    Left SFA    APPENDECTOMY      AXILLARY-FEMORAL BYPASS GRAFT Left 11/20/2022    EXCISION OF INFECTED ILEOFEMORAL BYPASS GRAFT; HARVEST LEFT BASILIC VEIN; LEFT EXTERNAL ILIAC ARTERY TO FEMORAL BYPASS THROUGH OBTURATOR CANAL; PLACEMENT OF WOUND VAC performed by Mariaelena Guajardo MD at [facility] Right 03/29/2019    RIGHT CARPAL TUNNEL RELEASE AND RIGHT MIDDLE FINGER TRIGGER FINGER RELEASE performed by Francisco Segal MD at [address] [id number]    LEFT EYE EYE CATARACT PHACOEMULSIFICATION INTRAOCULAR LENS    CHOLECYSTECTOMY  01/01/1978    COLONOSCOPY  08/31/1999    diverticulosis    EYE SURGERY Bilateral     bilateral cataract removed    FEMORAL BYPASS Left 6/28/2022    LEFT LEG FEMORAL TO POPLITEAL BYPASS WITH INTRAOPERATIVE ANGIOGRAM performed by Claudetta Feather, MD at [place] [address] Left 02/14/2022    LEFT COMMON FEMORAL ARTERY ENDARTERECTOMY performed by Claudetta Feather, MD at [facility] ([facility])  [address] [place] [address]    IR FEMORAL POPLITEAL BYPASS GRAFT Right 08/31/2015    KYPHOSIS SURGERY  11/07/2006    L1, (fractured after a fall)    LEG SURGERY Left 02/14/2022    LEFT LEG WOUND DEBRIDEMENT performed by Claudetta Feather, MD at [address] 7/7/2022    INCISION AND DRAINAGE OF LEFT SIDE GROIN WITH PLACEMENT OF WOUND VAC performed by Claudetta Feather, MD at [address] 11/22/2022    DEBRIDEMENT OF LEFT GROIN (10 X 3 CM), LEFT RECTUS FEMORIS FLAP, LEFT SPLIT THICKNESS SKIN GRAFT (10 X 5 CM), APPLICATION OF WOUND VACUUM DEVICE performed by Lucio Capellan MD at [facility] 04/25/1998    (L) THR    WRIST FRACTURE SURGERY  01/01/2008    left wrist       FAMILY HISTORY    Family History   Problem Relation Age of Onset    Cancer Mother 43    Stroke Father     Hypertension Father        SOCIAL HISTORY    Social History     Tobacco Use    Smoking status: Never    Smokeless tobacco: Never   Vaping Use    Vaping Use: Never used   Substance Use Topics    Alcohol use: No    Drug use: Never       ALLERGIES    Allergies   Allergen Reactions    Aricept [Donepezil Hydrochloride] Other (See Comments)     Pt unable to recall reaction    Doxycycline Nausea Only    Ketorolac Tromethamine Other (See Comments)     Pt unable to recall reaction    Donepezil Hcl        MEDICATIONS    Current Outpatient Medications on File Prior to Encounter   Medication Sig Dispense Refill    glimepiride (AMARYL) 1 MG tablet Take 0.5 tablets by mouth every morning (before breakfast) 30 tablet 2    traMADol (ULTRAM) 50 MG tablet Take 1 tablet by mouth every 8 hours as needed for Pain for up to 30 days. Max Daily Amount: 150 mg 90 tablet 0    doxycycline hyclate (VIBRAMYCIN) 100 MG capsule Take 100 mg by mouth 2 times daily      blood glucose test strips (ONETOUCH ULTRA) strip USE TO TEST BLOOD SUGAR TWICE DAILY 100 strip 1    mupirocin (BACTROBAN) 2 % ointment       lisinopril (PRINIVIL;ZESTRIL) 20 MG tablet Take 10 mg by mouth daily      amiodarone (CORDARONE) 200 MG tablet       atorvastatin (LIPITOR) 10 MG tablet Take 1 tablet by mouth daily 30 tablet 3    Multiple Vitamins-Minerals (THERAPEUTIC MULTIVITAMIN-MINERALS) tablet Take 1 tablet by mouth daily      furosemide (LASIX) 20 MG tablet TAKE ONE TABLET BY MOUTH DAILY (Patient not taking: Reported on 1/9/2023) 90 tablet 1    metFORMIN (GLUCOPHAGE) 500 MG tablet TAKE ONE TABLET BY MOUTH DAILY 90 tablet 3    acetaminophen (TYLENOL) 325 MG tablet Take 650 mg by mouth every 6 hours as needed for Pain      Lite Touch Lancets MISC Use twice daily when testing blood sugars. 100 each 5    Turmeric 450 MG CAPS Take 450 mg by mouth daily      Cholecalciferol (VITAMIN D3) 2000 UNITS CAPS Take 1 capsule by mouth daily      aspirin EC 81 MG EC tablet Take 1 tablet by mouth daily 30 tablet 3     No current facility-administered medications on file prior to encounter. REVIEW OF SYSTEMS  Review of Systems    Pertinent items are noted in HPI. Objective:      BP (!) 189/72   Pulse 74   Temp 97 °F (36.1 °C) (Temporal)   Resp 16     Wt Readings from Last 3 Encounters:   01/05/23 100 lb (45.4 kg)   12/22/22 100 lb (45.4 kg)   12/12/22 104 lb (47.2 kg)       PHYSICAL EXAM    General Appearance: in no acute distress and frail-appearing  Extremities: no cyanosis, clubbing or edema full-thickness wound overlying site of grafted thigh flap of left groin containing fibrin granulation tissue minimal slough designated as #2 large lymphocele present medial to graft site left groin  Full-thickness wound left medial thigh at flap closure site containing eschar designated as #3        Assessment:      1. Wound of left lower extremity, initial encounter    2. Peripheral artery disease ([facility] [address]         Procedure Note  Indications:  Based on my examination of this patient's wound(s) today, sharp excision is required to promote healing and evaluate the extent healing. Performed by: Mireya Delgado MD    Consent obtained? Yes    Time out taken: Yes    Pain Control: Anesthetic: 4% Lidocaine Liquid Topical     Debridement:Excisional Debridement    Using curette the wound was sharply debrided    down through and including the removal of  epidermis, dermis, and subcutaneous tissue.     Devitalized Tissue Debrided:  fibrin and necrotic/eschar      Pre Debridement Measurements:  Are located in the Wound Documentation Flow Sheet   Wound #: 2 and 3     Post  Debridement Measurements:  Wound 11/14/22 Other (Comment) Left #2 left groin (Active)   Wound Image   01/09/23 1044   Wound Etiology Surgical 01/09/23 1044 Wound Cleansed Cleansed with saline 01/09/23 1044   Dressing/Treatment Honey gel/honey paste 01/09/23 1143   Wound Length (cm) 3 cm 01/09/23 1044   Wound Width (cm) 3 cm 01/09/23 1044   Wound Depth (cm) 0.1 cm 01/09/23 1044   Wound Surface Area (cm^2) 9 cm^2 01/09/23 1044   Change in Wound Size % (l*w) -350 01/09/23 1044   Wound Volume (cm^3) 0.9 cm^3 01/09/23 1044   Wound Healing % -350 01/09/23 1044   Post-Procedure Length (cm) 3.1 cm 01/09/23 1143   Post-Procedure Width (cm) 3.1 cm 01/09/23 1143   Post-Procedure Depth (cm) 0.3 cm 01/09/23 1143   Post-Procedure Surface Area (cm^2) 9.61 cm^2 01/09/23 1143   Post-Procedure Volume (cm^3) 2.883 cm^3 01/09/23 1143   Distance Tunneling (cm) 0 cm 01/09/23 1044   Tunneling Position ___ O'Clock 0 01/09/23 1044   Undermining Starts ___ O'Clock 0 01/09/23 1044   Undermining Ends___ O'Clock 0 01/09/23 1044   Undermining Maxium Distance (cm) 0 01/09/23 1044   Wound Assessment Dry;Pink/red;Slough 01/09/23 1044   Drainage Amount Small 01/09/23 1044   Drainage Description Yellow 01/09/23 1044   Odor None 01/09/23 1044   Nissa-wound Assessment Fragile 01/09/23 1044   Margins Defined edges 01/09/23 1044   Wound Thickness Description not for Pressure Injury Full thickness 01/09/23 1044   Number of days: 56       Wound 01/09/23 Thigh Left #3 medial (Active)   Wound Image   01/09/23 1044   Wound Etiology Surgical 01/09/23 1044   Wound Cleansed Cleansed with saline 01/09/23 1044   Dressing/Treatment Honey gel/honey paste 01/09/23 1143   Wound Length (cm) 4 cm 01/09/23 1044   Wound Width (cm) 1 cm 01/09/23 1044   Wound Depth (cm) 0.1 cm 01/09/23 1044   Wound Surface Area (cm^2) 4 cm^2 01/09/23 1044   Wound Volume (cm^3) 0.4 cm^3 01/09/23 1044   Post-Procedure Length (cm) 4.1 cm 01/09/23 1143   Post-Procedure Width (cm) 1.1 cm 01/09/23 1143   Post-Procedure Depth (cm) 0.3 cm 01/09/23 1143   Post-Procedure Surface Area (cm^2) 4.51 cm^2 01/09/23 1143   Post-Procedure Volume (cm^3) 1. [facility] cm^3 01/09/23 1143   Distance Tunneling (cm) 0 cm 01/09/23 1044   Tunneling Position ___ O'Clock 0 01/09/23 1044   Undermining Starts ___ O'Clock 0 01/09/23 1044   Undermining Ends___ O'Clock 0 01/09/23 1044   Undermining Maxium Distance (cm) 0 01/09/23 1044   Wound Assessment Fibrinous 01/09/23 1044   Drainage Amount Scant 01/09/23 1044   Drainage Description Serosanguinous 01/09/23 1044   Odor None 01/09/23 1044   Nissa-wound Assessment Fragile 01/09/23 1044   Margins Defined edges 01/09/23 1044   Wound Thickness Description not for Pressure Injury Full thickness 01/09/23 1044   Number of days: 0          Percent of Wound Debrided: 100%    Total Surface Area Debrided:  14 sq cm    Diabetic/Pressure/Non Pressure Ulcers only:  Ulcer: Non-Pressure ulcer, fat layer exposed    Bleeding: Minimal    Hemostasis Achieved: by pressure    Procedural Pain: 0  / 10     Post Procedural Pain: 0 / 10     Response to treatment:  Well tolerated by patient. Plan:     Treatment Note: Please see attached Discharge Instructions. These instructions were given and signed by the patient or POA    New Prescriptions    No medications on file       Orders Placed This Encounter   Procedures    Initiate Outpatient Wound Care Protocol       Discharge Instructions          [facility] Physician Orders and Discharge Instructions  [address] [place] [address]  Telephone: [phone]  NAME:  Viridiana Choudhary OF BIRTH:  4/4/1929  MEDICAL RECORD NUMBER:  [id number]  DATE: 1/9/23     Return Appointment:  Return Appointment: With Abram Osler, MD  in  [facility])  [] Return Appointment for a Wound Assessment with the nurse on:     Future Appointments   Date Time Provider Gilbert Frey   1/12/2023  8:45 AM Marika Tubbs  [address]    1/18/2023 12:45 PM Andreia Preston MD PLASTICS/REC [address] Bernie Fall: [address]:  Phone: [phone]  Fax: [phone]   Medically necessary services: [x] Skilled Nursing [] PT (Eval & Treat) [] OT (Eval & Treat) [] Social Work [] Dietician  [] Other:    Wound care instructions: If you smoke we ask that you refrain from smoking. Smoking inhibits wounds from healing. When taking antibiotics take the entire prescription as ordered. Do not stop taking until medication is all gone unless otherwise instructed. Exercise as tolerated. Keep weight off wounds and reposition every 2 hours if applicable. Do not get wounds wet in the bath or shower unless otherwise instructed by your physician. If your wound is on your foot or leg, you may purchase a cast bag. Please ask at the pharmacy. Wash hands with soap and water prior to and after every dressing change. [x]Wash wounds with: Vashe wash - Apply enough Vashe to soak a piece of gauze and place on wound bed for 5-10 minutes. No need to rinse after soaking. [x]Nissa wound Topical Treatments: Do not apply lotions, creams, or ointments to the skin around the wound bed unless directed as followed:   Apply around the wound: Nothing         [x]Wound Location: left groin and left thigh   Apply Primary Dressing to wound: Honey gel  Secondary Dressing: 4X4 gauze pad   Avoid contact of tape with skin if possible. When to change Dressing: Every other day      [x] Edema Control:     Elevate leg(s) above the level of the heart for 30 minutes 4-5 times a day and/or when sitting. Avoid prolonged standing in one place. Dietary:  Important dietary reminders:  1. Increase Protein intake (i.e. Lean meats, fish, eggs, legumes, and yogurt)  2. No added salt  3. If diabetic, follow a diabetic diet and check glucose prior to meals or as instructed by your physician.     Dietary Supplements(Take twice a day unless instructed otherwise):  [] Eura Barter  [] 30ml ProStat [] Chasity Learn [] Ensure Max/Premier [] Other:    Your nurse  is:  Tray Hyatt     Electronically signed by Joel Shirley RN on 1/9/2023 at 11:53 AM     [address] Information: Should you experience any significant changes in your wound(s) or have questions about your wound care, please contact the [address] [place] [address] at [phone]. We are open from 8:00am - 4:30p Monday, Thursday and Friday; 11:00am - 5pm on Tuesday and CLOSED Wednesday. Please give us 24-48 business hours to return your call. Call your doctor now or seek immediate medical care if:    You have symptoms of infection, such as: Increased pain, swelling, warmth, or redness. Red streaks leading from the area. Pus draining from the area. A fever.          [] Patient unable to sign Discharge Instructions given to ECF/Transportation/POA         Electronically signed by Sean Ta MD on 1/9/2023 at 12:53 PM

## 2023-01-12 ENCOUNTER — TELEPHONE (OUTPATIENT)
Dept: INTERNAL MEDICINE CLINIC | Age: 88
End: 2023-01-12

## 2023-01-13 ENCOUNTER — SCHEDULED TELEPHONE ENCOUNTER (OUTPATIENT)
Dept: INTERNAL MEDICINE CLINIC | Age: 88
End: 2023-01-13

## 2023-01-13 DIAGNOSIS — E87.1 HYPONATREMIA: Primary | ICD-10-CM

## 2023-01-13 DIAGNOSIS — G89.29 CHRONIC PAIN OF LEFT LOWER EXTREMITY: ICD-10-CM

## 2023-01-13 DIAGNOSIS — E87.5 HYPERKALEMIA: ICD-10-CM

## 2023-01-13 DIAGNOSIS — M79.2 CHRONIC NEUROPATHIC PAIN: ICD-10-CM

## 2023-01-13 DIAGNOSIS — T81.49XA POSTOPERATIVE WOUND INFECTION: ICD-10-CM

## 2023-01-13 DIAGNOSIS — G89.29 CHRONIC NEUROPATHIC PAIN: ICD-10-CM

## 2023-01-13 DIAGNOSIS — M79.605 CHRONIC PAIN OF LEFT LOWER EXTREMITY: ICD-10-CM

## 2023-01-13 RX ORDER — GABAPENTIN 100 MG/1
100 CAPSULE ORAL NIGHTLY
COMMUNITY

## 2023-01-13 RX ORDER — ACETAMINOPHEN AND CODEINE PHOSPHATE 300; 30 MG/1; MG/1
1 TABLET ORAL EVERY 6 HOURS PRN
Qty: 28 TABLET | Refills: 0 | Status: SHIPPED | OUTPATIENT
Start: 2023-01-13 | End: 2023-01-20

## 2023-01-13 NOTE — PROGRESS NOTES
Emily Smyth is a 80 y.o. female evaluated via telephone on 1/13/2023 for Medication Check (Patient would like to discuss obtaining a script for  Duragesic pain patch that was advised to her by ICU nurse )    [facility]  Internal Medicine  Patient Encounter  Irlanda Larsen D.O., [facility]    Chief Complaint   Patient presents with    Medication Check     Patient would like to discuss obtaining a script for  Duragesic pain patch that was advised to her by ICU nurse        HPI: Olean Severin is a complicated [id number]-HZL  female being evaluated today bitterly complaining of left leg pain. The patient is not able to get into the office due to her debilitated state. Patient has been struggling with her left lower extremity for quite a while now. She has had several operations to address her arterial insufficiency and has had postoperative wound complications. She has been under the care of wound management that started with a nonhealing wound involving the left anterior tibial region. Her most recent procedure was performed on 11/20/2022 by Dr. John Mcburney. 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    On 11/22/2022 Dr. Fartun Carranza performed an excisional debridement of the left groin wound with a left rectus femoris myofascial flap and left split thickness graft and placement of a wound VAC. On 12/22/2022 a wound culture was obtained and yielded Pseudomonas aeruginosa. Patient is currently on Doxy and Bactrim. The culture indicated intermediate sensitivity to Cipro and resistance to Levaquin. Patient states that she is under the care of infectious disease, but I do not see any entries in the electronic health record. According to the health record she had been referred to ID but she never followed up.   It looks like she was referred to ID after being discharged from a hospital stay. She had another wound debridement on 12/29/2022. She had a repeat wound culture which yielded the same organism-Pseudomonas aeruginosa as well as Candida parapsilosis. She was, yet again referred to ID. Patient did not want to see the infectious disease specialist at [facility]. She was referred to Dr. Mary Ellen vincent at [facility]. They are waiting to get an appointment. At her last visit we obtained some lab work. Her potassium was elevated  Her renal function was worse as well. I advised that she stop the Bactrim. Certainly she is going to need additional treatment for Pseudomonas. Unfortunately, the organism has intermediate sensitivity to Cipro and resistance to Levaquin. She continues with weekly wound care. Her caregiver states she she got her to start taking gabapentin at 100 mg nightly. She is taking this the last couple of nights and she already has improvement. Patient was requesting that she start Duragesic patch. Unfortunately the patient has not been on short acting opioid therapy other than as needed tramadol which she does not use that often anyway. I explained to her that a Duragesic patch would not be appropriate at this time. She is agreeable to trying another pain medication. She does not want Percocet or hydrocodone. She states she does not tolerate these and she gets hallucinations. Documentation:  I communicated with the patient and/or health care decision maker about See below. Details of this discussion including any medical advice provided: See below    Encounter Diagnoses   Name Primary? Hyponatremia Yes    Hyperkalemia     Chronic neuropathic pain     Chronic pain of left lower extremity     Postoperative wound infection          PLAN:  #1 we will try another analgesic-Tylenol #3 1 tab every 6 as needed. We reviewed potential adverse side effects and habit-forming nature of the medication.   If she does well and continues on with this therapy, we will have her sign a medication agreement. She is to stop the tramadol. #2 strongly encouraged patient to continue with the gabapentin and gradually increase the dose to 100 mg twice daily and then 3 times a day over the next several weeks. It sounds like it is already giving her some relief  #3 recheck CBC, basic metabolic, sed rate, CRP  #4 patient needs to see the ID specialist to address the Pseudomonas infection. It sounds like she may need a PICC line with IV antibiotics. The referral has already been placed    Total Time: minutes: 21-30 minutes (25 minutes)    Elen Mendez was evaluated through a synchronous (real-time) audio encounter. Patient identification was verified at the start of the visit. She (or guardian if applicable) is aware that this is a billable service, which includes applicable co-pays. This visit was conducted with the patient's (and/or legal guardian's) verbal consent. She has not had a related appointment within my department in the past 7 days or scheduled within the next 24 hours. The patient was located at Home: [address] [place] [id number]. The provider was located at [facility] (Appt Dept): [address] [facility],  [address] [place] [address].     Note: not billable if this call serves to triage the patient into an appointment for the relevant concern    [address] [place] [address],

## 2023-01-16 ENCOUNTER — HOSPITAL ENCOUNTER (OUTPATIENT)
Dept: WOUND CARE | Age: 88
Discharge: HOME | End: 2023-01-16
Payer: MEDICARE

## 2023-01-16 VITALS
HEART RATE: 59 BPM | SYSTOLIC BLOOD PRESSURE: 160 MMHG | TEMPERATURE: 97.5 F | DIASTOLIC BLOOD PRESSURE: 60 MMHG | RESPIRATION RATE: 16 BRPM

## 2023-01-16 DIAGNOSIS — S81.802D WOUND OF LEFT LOWER EXTREMITY, SUBSEQUENT ENCOUNTER: ICD-10-CM

## 2023-01-16 DIAGNOSIS — I73.9 PERIPHERAL ARTERY DISEASE (HCC): Primary | ICD-10-CM

## 2023-01-16 DIAGNOSIS — S81.802A WOUND OF LEFT LOWER EXTREMITY, INITIAL ENCOUNTER: ICD-10-CM

## 2023-01-16 PROCEDURE — 11042 DBRDMT SUBQ TIS 1ST 20SQCM/<: CPT | Performed by: SPECIALIST

## 2023-01-16 PROCEDURE — 11042 DBRDMT SUBQ TIS 1ST 20SQCM/<: CPT

## 2023-01-16 PROCEDURE — 6370000000 HC RX 637 (ALT 250 FOR IP): Performed by: SPECIALIST

## 2023-01-16 RX ORDER — LIDOCAINE HYDROCHLORIDE 20 MG/ML
JELLY TOPICAL ONCE
OUTPATIENT
Start: 2023-01-16 | End: 2023-01-16

## 2023-01-16 RX ORDER — GENTAMICIN SULFATE 1 MG/G
OINTMENT TOPICAL ONCE
OUTPATIENT
Start: 2023-01-16 | End: 2023-01-16

## 2023-01-16 RX ORDER — LIDOCAINE HYDROCHLORIDE 40 MG/ML
SOLUTION TOPICAL ONCE
Status: COMPLETED | OUTPATIENT
Start: 2023-01-16 | End: 2023-01-16

## 2023-01-16 RX ORDER — GINSENG 100 MG
CAPSULE ORAL ONCE
OUTPATIENT
Start: 2023-01-16 | End: 2023-01-16

## 2023-01-16 RX ORDER — LIDOCAINE 50 MG/G
OINTMENT TOPICAL ONCE
OUTPATIENT
Start: 2023-01-16 | End: 2023-01-16

## 2023-01-16 RX ORDER — BETAMETHASONE DIPROPIONATE 0.05 %
OINTMENT (GRAM) TOPICAL ONCE
OUTPATIENT
Start: 2023-01-16 | End: 2023-01-16

## 2023-01-16 RX ORDER — BACITRACIN, NEOMYCIN, POLYMYXIN B 400; 3.5; 5 [USP'U]/G; MG/G; [USP'U]/G
OINTMENT TOPICAL ONCE
OUTPATIENT
Start: 2023-01-16 | End: 2023-01-16

## 2023-01-16 RX ORDER — BACITRACIN ZINC AND POLYMYXIN B SULFATE 500; 1000 [USP'U]/G; [USP'U]/G
OINTMENT TOPICAL ONCE
OUTPATIENT
Start: 2023-01-16 | End: 2023-01-16

## 2023-01-16 RX ORDER — CLOBETASOL PROPIONATE 0.5 MG/G
OINTMENT TOPICAL ONCE
OUTPATIENT
Start: 2023-01-16 | End: 2023-01-16

## 2023-01-16 RX ORDER — LIDOCAINE 40 MG/G
CREAM TOPICAL ONCE
OUTPATIENT
Start: 2023-01-16 | End: 2023-01-16

## 2023-01-16 RX ORDER — LIDOCAINE HYDROCHLORIDE 40 MG/ML
SOLUTION TOPICAL ONCE
OUTPATIENT
Start: 2023-01-16 | End: 2023-01-16

## 2023-01-16 RX ADMIN — COLLAGENASE SANTYL: 250 OINTMENT TOPICAL at 09:32

## 2023-01-16 RX ADMIN — LIDOCAINE HYDROCHLORIDE: 40 SOLUTION TOPICAL at 09:31

## 2023-01-16 ASSESSMENT — PAIN DESCRIPTION - LOCATION: LOCATION: GROIN

## 2023-01-16 ASSESSMENT — PAIN DESCRIPTION - DESCRIPTORS: DESCRIPTORS: ACHING

## 2023-01-16 ASSESSMENT — PAIN DESCRIPTION - ORIENTATION: ORIENTATION: LEFT

## 2023-01-16 ASSESSMENT — PAIN SCALES - GENERAL: PAINLEVEL_OUTOF10: 8

## 2023-01-16 NOTE — PROGRESS NOTES
[facility]  Progress Note and Procedure Note      Veda Mcqueen  MEDICAL RECORD NUMBER:  [id number]  AGE: 80 y.o. GENDER: female  : 1929  EPISODE DATE:  2023    Subjective:     Chief Complaint   Patient presents with    Wound Check     Left groin         HISTORY of PRESENT ILLNESS HPI     Veda Mcqueen is a 80 y.o. female who presents today for wound/ulcer evaluation. History of Wound Context: Patient had been seen for some time here in the wound clinic for an arterial ulcer of left anterior knee. Unfortunately she underwent emergent resection of infected iliofemoral bypass graft with subsequent harvest of left basilic vein and left external iliac artery to femoral bypass through the obturator canal 2022. At that time a rectus femoris flap with split-thickness skin graft was performed. Continues treatment here at the wound care for her wounds over the split-thickness skin graft as well as partial dehiscence of flap closure site.   Continues to complain of pain but is on gabapentin with somewhat relief  Wound/Ulcer Pain Timing/Severity: constant  Quality of pain: sharp, burning  Severity:  3 / 10   Modifying Factors: None  Associated Signs/Symptoms: pain    Ulcer Identification:  Ulcer Type: non-healing surgical    Contributing Factors: decreased mobility and arterial insufficiency    Acute Wound: N/A not an acute wound    PAST MEDICAL HISTORY        Diagnosis Date    Anxiety     Arthritis     At risk for falls     CAD (coronary artery disease)     Cerebral artery occlusion with cerebral infarction (HCC)     TIA--no residual effects    DDD (degenerative disc disease), cervical     Fractures     (L) Hip Fx 98, L1 fracture from fall 10-31-06    Hyperlipidemia     Hypertension     Mitral regurgitation     Neuropathy     Osteopenia     Osteoporosis     PAD (peripheral artery disease) ([facility] [address]     Right LE ischemia    Peripheral neuropathy 2015    Peripheral vascular disease ([facility] [address]     bilateral lower extremities with edema    Podagra 01/09/2017    Dr. Patito Falk    Prolonged emergence from general anesthesia     Spinal stenosis     L4-5    Type 2 diabetes mellitus ([facility] [address]     Urethral stricture 5 years ago    Urgency of urination        PAST SURGICAL HISTORY    Past Surgical History:   Procedure Laterality Date    ANGIOPLASTY Left 04/18/2022    Left SFA    APPENDECTOMY      AXILLARY-FEMORAL BYPASS GRAFT Left 11/20/2022    EXCISION OF INFECTED ILEOFEMORAL BYPASS GRAFT; HARVEST LEFT BASILIC VEIN; LEFT EXTERNAL ILIAC ARTERY TO FEMORAL BYPASS THROUGH OBTURATOR CANAL; PLACEMENT OF WOUND VAC performed by Bob Figueroa MD at [address] Right 03/29/2019    RIGHT CARPAL TUNNEL RELEASE AND RIGHT MIDDLE FINGER TRIGGER FINGER RELEASE performed by Govind Francis MD at [place] [address]  [id number]    LEFT EYE EYE CATARACT PHACOEMULSIFICATION INTRAOCULAR LENS    CHOLECYSTECTOMY  01/01/1978    COLONOSCOPY  08/31/1999    diverticulosis    EYE SURGERY Bilateral     bilateral cataract removed    FEMORAL BYPASS Left 6/28/2022    LEFT LEG FEMORAL TO POPLITEAL BYPASS WITH INTRAOPERATIVE ANGIOGRAM performed by Hallie Wright MD at [place] [address] Left 02/14/2022    LEFT COMMON FEMORAL ARTERY ENDARTERECTOMY performed by Hallie Wright MD at [facility] ([facility])  [address] [place] [address]    IR FEMORAL POPLITEAL BYPASS GRAFT Right 08/31/2015    KYPHOSIS SURGERY  11/07/2006    L1, (fractured after a fall)    LEG SURGERY Left 02/14/2022    LEFT LEG WOUND DEBRIDEMENT performed by Hallie Wright MD at [place] [address] N/A 7/7/2022    INCISION AND DRAINAGE OF LEFT SIDE GROIN WITH PLACEMENT OF WOUND VAC performed by Hallie Wright MD at [place] [address] Left 11/22/2022    DEBRIDEMENT OF LEFT GROIN (10 X 3 CM), LEFT RECTUS FEMORIS FLAP, LEFT SPLIT THICKNESS SKIN GRAFT (10 X 5 CM), APPLICATION OF WOUND VACUUM DEVICE performed by Stephanie Carpenter MD at [address] [facility]  04/25/1998    (L) THR    WRIST FRACTURE SURGERY  01/01/2008    left wrist       FAMILY HISTORY    Family History   Problem Relation Age of Onset    Cancer Mother 43    Stroke Father     Hypertension Father        SOCIAL HISTORY    Social History     Tobacco Use    Smoking status: Never    Smokeless tobacco: Never   Vaping Use    Vaping Use: Never used   Substance Use Topics    Alcohol use: No    Drug use: Never       ALLERGIES    Allergies   Allergen Reactions    Aricept [Donepezil Hydrochloride] Other (See Comments)     Pt unable to recall reaction    Doxycycline Nausea Only    Ketorolac Tromethamine Other (See Comments)     Pt unable to recall reaction    Donepezil Hcl        MEDICATIONS    Current Outpatient Medications on File Prior to Encounter   Medication Sig Dispense Refill    gabapentin (NEURONTIN) 100 MG capsule Take 100 mg by mouth at bedtime. acetaminophen-codeine (TYLENOL #3) 300-30 MG per tablet Take 1 tablet by mouth every 6 hours as needed for Pain for up to 7 days.  Max Daily Amount: 4 tablets 28 tablet 0    glimepiride (AMARYL) 1 MG tablet Take 0.5 tablets by mouth every morning (before breakfast) 30 tablet 2    doxycycline hyclate (VIBRAMYCIN) 100 MG capsule Take 100 mg by mouth 2 times daily (Patient not taking: Reported on 1/16/2023)      blood glucose test strips (ONETOUCH ULTRA) strip USE TO TEST BLOOD SUGAR TWICE DAILY 100 strip 1    mupirocin (BACTROBAN) 2 % ointment       lisinopril (PRINIVIL;ZESTRIL) 20 MG tablet Take 10 mg by mouth daily      amiodarone (CORDARONE) 200 MG tablet       atorvastatin (LIPITOR) 10 MG tablet Take 1 tablet by mouth daily 30 tablet 3    Multiple Vitamins-Minerals (THERAPEUTIC MULTIVITAMIN-MINERALS) tablet Take 1 tablet by mouth daily      furosemide (LASIX) 20 MG tablet TAKE ONE TABLET BY MOUTH DAILY 90 tablet 1    metFORMIN (GLUCOPHAGE) 500 MG tablet TAKE ONE TABLET BY MOUTH DAILY 90 tablet 3    acetaminophen (TYLENOL) 325 MG tablet Take 650 mg by mouth every 6 hours as needed for Pain      Lite Touch Lancets MISC Use twice daily when testing blood sugars. 100 each 5    Turmeric 450 MG CAPS Take 450 mg by mouth daily      Cholecalciferol (VITAMIN D3) 2000 UNITS CAPS Take 1 capsule by mouth daily      aspirin EC 81 MG EC tablet Take 1 tablet by mouth daily 30 tablet 3     No current facility-administered medications on file prior to encounter. REVIEW OF SYSTEMS  Review of Systems    Pertinent items are noted in HPI. Objective:      BP (!) 160/60   Pulse 59   Temp 97.5 °F (36.4 °C) (Temporal)   Resp 16     Wt Readings from Last 3 Encounters:   01/05/23 100 lb (45.4 kg)   12/22/22 100 lb (45.4 kg)   12/12/22 104 lb (47.2 kg)       PHYSICAL EXAM    General Appearance: in no acute distress and frail-appearing  Extremities: no cyanosis, clubbing or edema and full thickness wound overlying site of grafted thigh flap left groin containing fibrin and granulation tissue, minimal slough designated as wound #2 large lymphocele present medial to graft site. Full-thickness desiccated wound left medial thigh at mid portion flap closure site containing fibrin slough and minimal eschar designated as #3  Assessment:      1. Peripheral artery disease ([address]    2. Wound of left lower extremity, subsequent encounter    3. Wound of left lower extremity, initial encounter         Procedure Note  Indications:  Based on my examination of this patient's wound(s) today, sharp excision is required to promote healing and evaluate the extent healing. Performed by: Adolph Alexandre MD    Consent obtained? Yes    Time out taken: Yes    Pain Control: Anesthetic: 4% Lidocaine Cream     Debridement:Excisional Debridement    Using curette, scissors, and forceps the wound was sharply debrided    down through and including the removal of  epidermis, dermis, and subcutaneous tissue.     Devitalized Tissue Debrided:  fibrin and biofilm      Pre Debridement Measurements:  Are located in the Wound Documentation Flow Sheet   Wound #: 2 and 3     Post  Debridement Measurements:  Wound 11/14/22 Other (Comment) Left #2 left groin (Active)   Wound Image   01/09/23 1044   Wound Etiology Surgical 01/09/23 1044   Wound Cleansed Cleansed with saline 01/16/23 0853   Dressing/Treatment Honey gel/honey paste 01/16/23 0931   Wound Length (cm) 3.3 cm 01/16/23 0853   Wound Width (cm) 3 cm 01/16/23 0853   Wound Depth (cm) 0.1 cm 01/16/23 0853   Wound Surface Area (cm^2) 9.9 cm^2 01/16/23 0853   Change in Wound Size % (l*w) -395 01/16/23 0853   Wound Volume (cm^3) 0.99 cm^3 01/16/23 0853   Wound Healing % -395 01/16/23 0853   Post-Procedure Length (cm) 3.4 cm 01/16/23 0912   Post-Procedure Width (cm) 3.1 cm 01/16/23 0912   Post-Procedure Depth (cm) 0.3 cm 01/16/23 0912   Post-Procedure Surface Area (cm^2) 10.54 cm^2 01/16/23 0912   Post-Procedure Volume (cm^3) 3.162 cm^3 01/16/23 0912   Distance Tunneling (cm) 0 cm 01/09/23 1044   Tunneling Position ___ O'Clock 0 01/09/23 1044   Undermining Starts ___ O'Clock 0 01/09/23 1044   Undermining Ends___ O'Clock 0 01/09/23 1044   Undermining Maxium Distance (cm) 0 01/09/23 1044   Wound Assessment Dry;Pink/red;Slough 01/16/23 0853   Drainage Amount Small 01/16/23 0853   Drainage Description Yellow 01/16/23 0853   Odor None 01/16/23 0853   Nissa-wound Assessment Fragile 01/16/23 0853   Margins Defined edges 01/16/23 0853   Wound Thickness Description not for Pressure Injury Full thickness 01/16/23 0853   Number of days: 62       Wound 01/09/23 Thigh Left #3 medial (Active)   Wound Image   01/09/23 1044   Wound Etiology Surgical 01/09/23 1044   Wound Cleansed Cleansed with saline 01/16/23 0853   Dressing/Treatment Pharmaceutical agent (see MAR) 01/16/23 0931   Wound Length (cm) 4.7 cm 01/16/23 0853   Wound Width (cm) 1.5 cm 01/16/23 0853   Wound Depth (cm) 0.1 cm 01/16/23 0853   Wound Surface Area (cm^2) 7.05 cm^2 01/16/23 0853   Change in Wound Size % (l*w) -76.25 01/16/23 0853   Wound Volume (cm^3) 0.705 cm^3 01/16/23 0853   Wound Healing % -76 01/16/23 0853   Post-Procedure Length (cm) 4.8 cm 01/16/23 0912   Post-Procedure Width (cm) 1.6 cm 01/16/23 0912   Post-Procedure Depth (cm) 0.3 cm 01/16/23 0912   Post-Procedure Surface Area (cm^2) 7.68 cm^2 01/16/23 0912   Post-Procedure Volume (cm^3) 2. 304 cm^3 01/16/23 0912   Distance Tunneling (cm) 0 cm 01/09/23 1044   Tunneling Position ___ O'Clock 0 01/09/23 1044   Undermining Starts ___ O'Clock 0 01/09/23 1044   Undermining Ends___ O'Clock 0 01/09/23 1044   Undermining Maxium Distance (cm) 0 01/09/23 1044   Wound Assessment Eschar dry;Slough 01/16/23 0853   Drainage Amount Scant 01/16/23 0853   Drainage Description Serosanguinous 01/16/23 0853   Odor None 01/16/23 0853   Nissa-wound Assessment Fragile 01/16/23 0853   Margins Defined edges 01/16/23 0853   Wound Thickness Description not for Pressure Injury Full thickness 01/16/23 0853   Number of days: 6          Percent of Wound Debrided: 100%    Total Surface Area Debrided:  18 sq cm    Diabetic/Pressure/Non Pressure Ulcers only:  Ulcer: Non-Pressure ulcer, fat layer exposed    Bleeding: Minimal    Hemostasis Achieved: by pressure    Procedural Pain: 3  / 10     Post Procedural Pain: 0 / 10     Response to treatment:  With complaints of pain. Plan:     Treatment Note: Please see attached Discharge Instructions. These instructions were given and signed by the patient or POA    New Prescriptions    No medications on file       Orders Placed This Encounter   Procedures    Initiate Outpatient Wound Care Protocol       Discharge Instructions          [facility] Physician Orders and Discharge Instructions  [address] [place] [address]. [place] [address]  Telephone: [phone]  NAME:  Severiano England  YOB: 1929  MEDICAL RECORD NUMBER: [id number]  DATE: 1/16/23     Return Appointment:  Return Appointment: With Fabricio Gayle MD  in  [facility])  [] Return Appointment for a Wound Assessment with the nurse on:     Future Appointments   Date Time Provider Gilbert Menendezisti   1/18/2023 12:45 PM Chary Garay MD PLASTICS/REC MMA   1/30/2023 10:00 AM DO OCTAVIO Lozada [place] - DYD   2/14/2023 10:40 AM Hazel Devine MD FF INFCT DIS [address]: [address]:  Phone: [phone]  Fax: [phone]   Medically necessary services: [x] Skilled Nursing [] PT (Eval & Treat) [] OT (Eval & Treat) [] Social Work [] Dietician  [] Other:    Wound care instructions: If you smoke we ask that you refrain from smoking. Smoking inhibits wounds from healing. When taking antibiotics take the entire prescription as ordered. Do not stop taking until medication is all gone unless otherwise instructed. Exercise as tolerated. Keep weight off wounds and reposition every 2 hours if applicable. Do not get wounds wet in the bath or shower unless otherwise instructed by your physician. If your wound is on your foot or leg, you may purchase a cast bag. Please ask at the pharmacy. Wash hands with soap and water prior to and after every dressing change. [x]Wash wounds with: 0.9% normal saline  [x]Nissa wound Topical Treatments: Do not apply lotions, creams, or ointments to the skin around the wound bed unless directed as followed:   Apply around the wound: Nothing         [x]Wound Location: left groin   Apply Primary Dressing to wound: Honey gel  Secondary Dressing: 4X4 gauze pad   Avoid contact of tape with skin if possible.   When to change Dressing: Every other day    [x]Wound Location: left upper thigh/leg   Apply Primary Dressing to wound: Pharmaceutical medication: santyl nickel size thickness  Secondary Dressing: 4X4 gauze pad lightly moisten with normal saline and cover with dry dressing and paper tape   Avoid contact of tape with skin if possible. When to change Dressing: Daily    [x] Edema Control:      Elevate leg(s) above the level of the heart for 30 minutes 4-5 times a day and/or when sitting. Avoid prolonged standing in one place. Dietary:  Important dietary reminders:  1. Increase Protein intake (i.e. Lean meats, fish, eggs, legumes, and yogurt)  2. No added salt  3. If diabetic, follow a diabetic diet and check glucose prior to meals or as instructed by your physician. Dietary Supplements(Take twice a day unless instructed otherwise):  [] Jana Panda  [] 30ml ProStat [] Jud Shelby [] Ensure Max/Premier [] Other:    Your nurse  is:  Shoshana Ybarra     Electronically signed by Myesha Moise RN on 1/16/2023 at 9:17 AM     [address] [facility] Information: Should you experience any significant changes in your wound(s) or have questions about your wound care, please contact the [address] [place] [address] at [phone]. We are open from 8:00am - 4:30p Monday, Thursday and Friday; 11:00am - 5pm on Tuesday and CLOSED Wednesday. Please give us 24-48 business hours to return your call. Call your doctor now or seek immediate medical care if:    You have symptoms of infection, such as: Increased pain, swelling, warmth, or redness. Red streaks leading from the area. Pus draining from the area. A fever.          [] Patient unable to sign Discharge Instructions given to ECF/Transportation/POA         Electronically signed by Ottoniel Jansen MD on 1/16/2023 at 10:23 AM

## 2023-01-16 NOTE — DISCHARGE INSTRUCTIONS
[facility] Physician Orders and Discharge Instructions  [address] [place] [address] [address] [address] [facility]. [place]. [place]  Telephone: [phone]  NAME:  Tai Lujan OF BIRTH:  4/4/1929  MEDICAL RECORD NUMBER:  [id number]  DATE: 1/16/23     Return Appointment:  Return Appointment: With Adalid Kwon MD  in  1 [facility])  [] Return Appointment for a Wound Assessment with the nurse on:     Future Appointments   Date Time Provider Gilbert Frey   1/18/2023 12:45 PM Jaskaran Wagner MD PLASTICS/REC MMA   1/30/2023 10:00 AM DO OCTAVIO Lozada [place] - DYD   2/14/2023 10:40 AM Zoe Newton MD FF INFCT DIS [address] [place] [address] [place]: [address]:  Phone: [phone]  Fax: [phone]   Medically necessary services: [x] Skilled Nursing [] PT (Eval & Treat) [] OT (Eval & Treat) [] Social Work [] Dietician  [] Other:    Wound care instructions: If you smoke we ask that you refrain from smoking. Smoking inhibits wounds from healing. When taking antibiotics take the entire prescription as ordered. Do not stop taking until medication is all gone unless otherwise instructed. Exercise as tolerated. Keep weight off wounds and reposition every 2 hours if applicable. Do not get wounds wet in the bath or shower unless otherwise instructed by your physician. If your wound is on your foot or leg, you may purchase a cast bag. Please ask at the pharmacy. Wash hands with soap and water prior to and after every dressing change. [x]Wash wounds with: 0.9% normal saline  [x]Nissa wound Topical Treatments: Do not apply lotions, creams, or ointments to the skin around the wound bed unless directed as followed:   Apply around the wound: Nothing         [x]Wound Location: left groin   Apply Primary Dressing to wound: Honey gel  Secondary Dressing: 4X4 gauze pad   Avoid contact of tape with skin if possible.   When to change Dressing: Every other day    [x]Wound Location: left upper thigh/leg   Apply Primary Dressing to wound: Pharmaceutical medication: santyl nickel size thickness  Secondary Dressing: 4X4 gauze pad lightly moisten with normal saline and cover with dry dressing and paper tape   Avoid contact of tape with skin if possible. When to change Dressing: Daily    [x] Edema Control:      Elevate leg(s) above the level of the heart for 30 minutes 4-5 times a day and/or when sitting. Avoid prolonged standing in one place. Dietary:  Important dietary reminders:  1. Increase Protein intake (i.e. Lean meats, fish, eggs, legumes, and yogurt)  2. No added salt  3. If diabetic, follow a diabetic diet and check glucose prior to meals or as instructed by your physician. Dietary Supplements(Take twice a day unless instructed otherwise):  [] Thornell Saurabh  [] 30ml ProStat [] Jacky Mala [] Ensure Max/Premier [] Other:    Your nurse  is:  Nirmala Mckee     Electronically signed by Maximo Hauser RN on 2023 at 9:17 AM     [facility] Information: Should you experience any significant changes in your wound(s) or have questions about your wound care, please contact the [address] [place] [address] at [phone]. We are open from 8:00am - 4:30p Monday, Thursday and Friday; 11:00am - 5pm on Tuesday and CLOSED Wednesday. Please give us 24-48 business hours to return your call. Call your doctor now or seek immediate medical care if:    You have symptoms of infection, such as: Increased pain, swelling, warmth, or redness. Red streaks leading from the area. Pus draining from the area. A fever.          [] Patient unable to sign Discharge Instructions given to ECF/Transportation/POA

## 2023-01-17 ENCOUNTER — TELEPHONE (OUTPATIENT)
Dept: CARDIOTHORACIC SURGERY | Age: 88
End: 2023-01-17

## 2023-01-18 ENCOUNTER — OFFICE VISIT (OUTPATIENT)
Dept: SURGERY | Age: 88
End: 2023-01-18

## 2023-01-18 VITALS
HEIGHT: 64 IN | DIASTOLIC BLOOD PRESSURE: 59 MMHG | SYSTOLIC BLOOD PRESSURE: 160 MMHG | OXYGEN SATURATION: 98 % | BODY MASS INDEX: 17.16 KG/M2 | HEART RATE: 80 BPM

## 2023-01-18 DIAGNOSIS — T81.89XA NON-HEALING SURGICAL WOUND, INITIAL ENCOUNTER: ICD-10-CM

## 2023-01-18 DIAGNOSIS — T82.7XXD VASCULAR GRAFT INFECTION, SUBSEQUENT ENCOUNTER: Primary | ICD-10-CM

## 2023-01-18 DIAGNOSIS — Z09 POSTOP CHECK: ICD-10-CM

## 2023-01-18 PROCEDURE — 99024 POSTOP FOLLOW-UP VISIT: CPT | Performed by: SURGERY

## 2023-01-18 NOTE — PROGRESS NOTES
[facility]    PROCEDURE: 1) Excisional debridement of left groin (10 x 3 cm)                                                  2) Left rectus femoris myofascial flap                                                  3) Left split thickness skin graft (10 x 5 cm)                                                  4) Application of wound vacuum bolster dressing  DATE: 11/22/22    Luis Manuel Maharaj has been recovering satisfactorily since her procedure. Pain has been well controlled without pain medications. She notes that there is persistent pain in the left leg - and that she has had this since her hospitalization and even prior to the reconstruction. She states that she has developed a mass in the medial aspect of her thigh at the distal aspect of her incision. EXAM    BP (!) 160/59   Pulse 80   Ht 5' 4\" (1.626 m)   SpO2 98%   BMI 17.16 kg/m²     GEN: NAD   EXT: Flap viable with good take from the STSG with some area of epidermolysis (likely secondary to contact with her pannus)  Incision healing with some fat necrosis distally  Donor site healing appropriately  Medial aspect of the thigh with a firm mass    IMP: 80 y. o.female s/p rectus femoris flap with STSG  PLAN: Likely with lymphocele - will obtain US for confirmation. Will then discuss with the patient if she would desire to have excision of the mass. Potentially, would also plan for revision of the distal rectus femoral incision. However, will have her return and discuss options.     Ramakrishna Boston MD  [address] [place] [address] [address] Reconstructive Surgery  [phone]  01/18/23

## 2023-01-19 ENCOUNTER — TELEPHONE (OUTPATIENT)
Dept: WOUND CARE | Age: 88
End: 2023-01-19

## 2023-01-23 ENCOUNTER — APPOINTMENT (OUTPATIENT)
Dept: CT IMAGING | Age: 88
End: 2023-01-23
Payer: MEDICARE

## 2023-01-23 ENCOUNTER — HOSPITAL ENCOUNTER (EMERGENCY)
Age: 88
Discharge: ANOTHER HOSPITAL | End: 2023-01-23
Attending: STUDENT IN AN ORGANIZED HEALTH CARE EDUCATION/TRAINING PROGRAM
Payer: MEDICARE

## 2023-01-23 ENCOUNTER — TELEPHONE (OUTPATIENT)
Dept: INTERNAL MEDICINE CLINIC | Age: 88
End: 2023-01-23

## 2023-01-23 ENCOUNTER — APPOINTMENT (OUTPATIENT)
Dept: GENERAL RADIOLOGY | Age: 88
End: 2023-01-23
Payer: MEDICARE

## 2023-01-23 ENCOUNTER — CARE COORDINATION (OUTPATIENT)
Dept: CARE COORDINATION | Age: 88
End: 2023-01-23

## 2023-01-23 ENCOUNTER — HOSPITAL ENCOUNTER (OUTPATIENT)
Dept: WOUND CARE | Age: 88
Discharge: HOME | End: 2023-01-23

## 2023-01-23 VITALS
HEART RATE: 66 BPM | OXYGEN SATURATION: 100 % | WEIGHT: 86.1 LBS | HEIGHT: 64 IN | BODY MASS INDEX: 14.7 KG/M2 | DIASTOLIC BLOOD PRESSURE: 46 MMHG | TEMPERATURE: 98.2 F | SYSTOLIC BLOOD PRESSURE: 138 MMHG | RESPIRATION RATE: 12 BRPM

## 2023-01-23 DIAGNOSIS — N17.9 AKI (ACUTE KIDNEY INJURY) (HCC): ICD-10-CM

## 2023-01-23 DIAGNOSIS — S22.41XA CLOSED FRACTURE OF MULTIPLE RIBS OF RIGHT SIDE, INITIAL ENCOUNTER: ICD-10-CM
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
## 2023-01-23 DIAGNOSIS — S09.90XA INJURY OF HEAD, INITIAL ENCOUNTER: ICD-10-CM

## 2023-01-23 DIAGNOSIS — J94.2 HEMOTHORAX ON RIGHT: Primary | ICD-10-CM

## 2023-01-23 LAB
A/G RATIO: 1.6 (ref 1.1–2.2)
ALBUMIN SERPL-MCNC: 3.3 G/DL (ref 3.4–5)
ALP BLD-CCNC: 62 U/L (ref 40–129)
ALT SERPL-CCNC: 21 U/L (ref 10–40)
ANION GAP SERPL CALCULATED.3IONS-SCNC: 13 MMOL/L (ref 3–16)
AST SERPL-CCNC: 26 U/L (ref 15–37)
BASE EXCESS VENOUS: -1.2 MMOL/L (ref -2–3)
BASOPHILS ABSOLUTE: 0 K/UL (ref 0–0.2)
BASOPHILS RELATIVE PERCENT: 0.2 %
BILIRUB SERPL-MCNC: <0.2 MG/DL (ref 0–1)
BUN BLDV-MCNC: 48 MG/DL (ref 7–20)
CALCIUM SERPL-MCNC: 8.9 MG/DL (ref 8.3–10.6)
CARBOXYHEMOGLOBIN: 1.1 % (ref 0–1.5)
CHLORIDE BLD-SCNC: 95 MMOL/L (ref 99–110)
CO2: 24 MMOL/L (ref 21–32)
CREAT SERPL-MCNC: 1.9 MG/DL (ref 0.6–1.2)
EOSINOPHILS ABSOLUTE: 0 K/UL (ref 0–0.6)
EOSINOPHILS RELATIVE PERCENT: 0.1 %
GFR SERPL CREATININE-BSD FRML MDRD: 24 ML/MIN/{1.73_M2}
GLUCOSE BLD-MCNC: 177 MG/DL (ref 70–99)
HCO3 VENOUS: 25.8 MMOL/L (ref 24–28)
HCT VFR BLD CALC: 22.6 % (ref 36–48)
HEMOGLOBIN, VEN, REDUCED: 66.1 %
HEMOGLOBIN: 7.5 G/DL (ref 12–16)
LACTIC ACID: 3.4 MMOL/L (ref 0.4–2)
LYMPHOCYTES ABSOLUTE: 1.3 K/UL (ref 1–5.1)
LYMPHOCYTES RELATIVE PERCENT: 15.4 %
MCH RBC QN AUTO: 32.6 PG (ref 26–34)
MCHC RBC AUTO-ENTMCNC: 33.4 G/DL (ref 31–36)
MCV RBC AUTO: 97.7 FL (ref 80–100)
METHEMOGLOBIN VENOUS: 0.4 % (ref 0–1.5)
MONOCYTES ABSOLUTE: 0.9 K/UL (ref 0–1.3)
MONOCYTES RELATIVE PERCENT: 10.6 %
NEUTROPHILS ABSOLUTE: 6.5 K/UL (ref 1.7–7.7)
NEUTROPHILS RELATIVE PERCENT: 73.7 %
O2 SAT, VEN: 33 %
PCO2, VEN: 54.3 MMHG (ref 41–51)
PDW BLD-RTO: 15.5 % (ref 12.4–15.4)
PH VENOUS: 7.29 (ref 7.35–7.45)
PLATELET # BLD: 215 K/UL (ref 135–450)
PMV BLD AUTO: 7.4 FL (ref 5–10.5)
PO2, VEN: <30 MMHG (ref 25–40)
POTASSIUM REFLEX MAGNESIUM: 5.1 MMOL/L (ref 3.5–5.1)
RBC # BLD: 2.31 M/UL (ref 4–5.2)
SODIUM BLD-SCNC: 132 MMOL/L (ref 136–145)
TCO2 CALC VENOUS: 28 MMOL/L
TOTAL PROTEIN: 5.4 G/DL (ref 6.4–8.2)
WBC # BLD: 8.8 K/UL (ref 4–11)

## 2023-01-23 PROCEDURE — 80053 COMPREHEN METABOLIC PANEL: CPT

## 2023-01-23 PROCEDURE — 82803 BLOOD GASES ANY COMBINATION: CPT

## 2023-01-23 PROCEDURE — 85025 COMPLETE CBC W/AUTO DIFF WBC: CPT

## 2023-01-23 PROCEDURE — 72125 CT NECK SPINE W/O DYE: CPT

## 2023-01-23 PROCEDURE — 83605 ASSAY OF LACTIC ACID: CPT

## 2023-01-23 PROCEDURE — 70450 CT HEAD/BRAIN W/O DYE: CPT

## 2023-01-23 PROCEDURE — 2580000003 HC RX 258

## 2023-01-23 PROCEDURE — 36415 COLL VENOUS BLD VENIPUNCTURE: CPT

## 2023-01-23 PROCEDURE — 71250 CT THORAX DX C-: CPT

## 2023-01-23 PROCEDURE — 71101 X-RAY EXAM UNILAT RIBS/CHEST: CPT

## 2023-01-23 PROCEDURE — 6370000000 HC RX 637 (ALT 250 FOR IP)

## 2023-01-23 PROCEDURE — 99285 EMERGENCY DEPT VISIT HI MDM: CPT

## 2023-01-23 RX ORDER — LIDOCAINE 4 G/G
1 PATCH TOPICAL DAILY
Status: DISCONTINUED | OUTPATIENT
Start: 2023-01-23 | End: 2023-01-23 | Stop reason: HOSPADM

## 2023-01-23 RX ORDER — SODIUM CHLORIDE, SODIUM LACTATE, POTASSIUM CHLORIDE, AND CALCIUM CHLORIDE .6; .31; .03; .02 G/100ML; G/100ML; G/100ML; G/100ML
500 INJECTION, SOLUTION INTRAVENOUS ONCE
Status: COMPLETED | OUTPATIENT
Start: 2023-01-23 | End: 2023-01-23

## 2023-01-23 RX ORDER — HYDROCODONE BITARTRATE AND ACETAMINOPHEN 5; 325 MG/1; MG/1
1 TABLET ORAL ONCE
Status: DISCONTINUED | OUTPATIENT
Start: 2023-01-23 | End: 2023-01-23 | Stop reason: HOSPADM

## 2023-01-23 RX ADMIN — SODIUM CHLORIDE, POTASSIUM CHLORIDE, SODIUM LACTATE AND CALCIUM CHLORIDE 500 ML: 600; 310; 30; 20 INJECTION, SOLUTION INTRAVENOUS at 12:37

## 2023-01-23 ASSESSMENT — ENCOUNTER SYMPTOMS
RHINORRHEA: 0
WHEEZING: 0
SHORTNESS OF BREATH: 0
EYE DISCHARGE: 0
DIARRHEA: 0
EYE ITCHING: 0
EYE PAIN: 0
BACK PAIN: 0
VOMITING: 0
COUGH: 0
ABDOMINAL PAIN: 0
SORE THROAT: 0
CONSTIPATION: 0

## 2023-01-23 ASSESSMENT — PAIN DESCRIPTION - DESCRIPTORS: DESCRIPTORS: STABBING;ACHING

## 2023-01-23 ASSESSMENT — PAIN - FUNCTIONAL ASSESSMENT: PAIN_FUNCTIONAL_ASSESSMENT: 0-10

## 2023-01-23 ASSESSMENT — PAIN SCALES - GENERAL: PAINLEVEL_OUTOF10: 7

## 2023-01-23 ASSESSMENT — PAIN DESCRIPTION - PAIN TYPE: TYPE: ACUTE PAIN

## 2023-01-23 ASSESSMENT — PAIN DESCRIPTION - FREQUENCY: FREQUENCY: CONTINUOUS

## 2023-01-23 ASSESSMENT — PAIN DESCRIPTION - ORIENTATION: ORIENTATION: RIGHT

## 2023-01-23 NOTE — ED PROVIDER NOTES
[address] [facility] [address] ENCOUNTER          PHYSICIAN ASSISTANT NOTE       Date of evaluation: 1/23/2023    Chief Complaint     Fall (Right hip, Right rib, confusion)      History of Present Illness     Jessica Pizarro is a 80 y.o. female referred vascular disease, malnutrition, type 2 diabetes, hypertension, degenerative disc disease, coronary artery disease, carotid stenosis, TIA who presents to the emergency department with chief complaint of a fall, head injury, right rib pain. The patient lives at home alone, however does have 24/7 care. She reportedly fell at 2:30 AM on Sunday, yesterday. The patient states that she was walking to the bathroom and was grabbing hold of the doorknob, however the door moved and she subsequently fell backwards, hitting the back of her head. She is also complaining of neck pain. She also does have a \"knot\" on the top of her head. She did not lose consciousness. She is not on blood thinners. She also states that she fell pretty hard on the hardwood floor and subsequently has right posterior rib pain. She has been taking Motrin and gabapentin and has not had any improvement of the pain. She does not have any pain in her back, lower extremities. Of note the patient does have a history of a femoropopliteal bypass in November and has had some issues with wound healing and infection. She has been following closely with her vascular surgery team and plastics and is due to follow-up with infectious disease here soon. She is not currently on antibiotics. Her daughter who is at bedside states that she called EMS but the patient refused transport to the hospital.  She states that she feels like her mother is slightly confused as she woke up the next morning and thought she was at the hospital.  Otherwise the patient is at her baseline. She is not complaining of any chest pain, abdominal pain, nausea, vomiting, diarrhea, constipation, fever.   The left lower extremity wounds are about the same as they have been and no worse than usual.    Review of Systems     Review of Systems   Constitutional:  Negative for chills, fatigue and fever. HENT:  Negative for congestion, rhinorrhea and sore throat. Eyes:  Negative for pain, discharge and itching. Respiratory:  Negative for cough, shortness of breath and wheezing. Cardiovascular:  Negative for chest pain, palpitations and leg swelling. Gastrointestinal:  Negative for abdominal pain, constipation, diarrhea and vomiting. Genitourinary:  Negative for dysuria, hematuria and urgency. Musculoskeletal:  Positive for neck pain. Negative for back pain. Right posterior rib pain, chronic left lower extremity pain   Neurological:  Negative for dizziness, syncope and light-headedness. Psychiatric/Behavioral:  Negative for confusion. Past Medical, Surgical, Family, and Social History     She has a past medical history of Anxiety, Arthritis, At risk for falls, CAD (coronary artery disease), Cerebral artery occlusion with cerebral infarction ([address] DDD (degenerative disc disease), cervical, Fractures, Hyperlipidemia, Hypertension, Mitral regurgitation, Neuropathy, Osteopenia, Osteoporosis, PAD (peripheral artery disease) ([address] Peripheral neuropathy, Peripheral vascular disease ([address] Podagra, Prolonged emergence from general anesthesia, Spinal stenosis, Type 2 diabetes mellitus ([address] Urethral stricture, and Urgency of urination. She has a past surgical history that includes Tonsillectomy; Appendectomy; Cholecystectomy (01/01/1978); Colonoscopy (08/31/1999); Hysterectomy (1980s); Total hip arthroplasty (04/25/1998); Kyphosis surgery (11/07/2006); Wrist fracture surgery (01/01/2008); Cataract removal with implant ([id number]); eye surgery (Bilateral); IR Femoral Popliteal Bypass Graft (Right, 08/31/2015); Carpal tunnel release (Right, 03/29/2019); Femoral Endarterectomy (Left, 02/14/2022);  Leg Surgery (Left, 02/14/2022); angioplasty (Left, 04/18/2022); femoral bypass (Left, 6/28/2022); Leg Surgery (N/A, 7/7/2022); Axillary-femoral Bypass Graft (Left, 11/20/2022); and Leg Surgery (Left, 11/22/2022). Her family history includes Cancer (age of onset: 43) in her mother; Hypertension in her father; Stroke in her father. She reports that she has never smoked. She has never used smokeless tobacco. She reports that she does not drink alcohol and does not use drugs. Medications     Previous Medications    ACETAMINOPHEN (TYLENOL) 325 MG TABLET    Take 650 mg by mouth every 6 hours as needed for Pain    AMIODARONE (CORDARONE) 200 MG TABLET        ASPIRIN EC 81 MG EC TABLET    Take 1 tablet by mouth daily    ATORVASTATIN (LIPITOR) 10 MG TABLET    Take 1 tablet by mouth daily    BLOOD GLUCOSE TEST STRIPS (ONETOUCH ULTRA) STRIP    USE TO TEST BLOOD SUGAR TWICE DAILY    CHOLECALCIFEROL (VITAMIN D3) 2000 UNITS CAPS    Take 1 capsule by mouth daily    DOXYCYCLINE HYCLATE (VIBRAMYCIN) 100 MG CAPSULE    Take 100 mg by mouth 2 times daily    FUROSEMIDE (LASIX) 20 MG TABLET    TAKE ONE TABLET BY MOUTH DAILY    GABAPENTIN (NEURONTIN) 100 MG CAPSULE    Take 100 mg by mouth at bedtime. GLIMEPIRIDE (AMARYL) 1 MG TABLET    Take 0.5 tablets by mouth every morning (before breakfast)    LISINOPRIL (PRINIVIL;ZESTRIL) 20 MG TABLET    Take 10 mg by mouth daily    LITE TOUCH LANCETS MISC    Use twice daily when testing blood sugars. METFORMIN (GLUCOPHAGE) 500 MG TABLET    TAKE ONE TABLET BY MOUTH DAILY    MULTIPLE VITAMINS-MINERALS (THERAPEUTIC MULTIVITAMIN-MINERALS) TABLET    Take 1 tablet by mouth daily    MUPIROCIN (BACTROBAN) 2 % OINTMENT        TURMERIC 450 MG CAPS    Take 450 mg by mouth daily       Allergies     She is allergic to donepezil hcl, ketorolac tromethamine, and doxycycline.     Physical Exam     INITIAL VITALS: BP: (!) 171/110, Temp: 98.2 °F (36.8 °C), Heart Rate: 88, Resp: 18, SpO2: 100 %  Physical Exam  Constitutional: General: She is not in acute distress. Appearance: Normal appearance. She is not ill-appearing, toxic-appearing or diaphoretic. HENT:      Head:      Comments: Approximately 2 cm hematoma on the vertex of the scalp     Right Ear: External ear normal.      Left Ear: External ear normal.      Nose: Nose normal.      Mouth/Throat:      Mouth: Mucous membranes are moist.   Eyes:      Extraocular Movements: Extraocular movements intact. Pupils: Pupils are equal, round, and reactive to light. Cardiovascular:      Rate and Rhythm: Normal rate and regular rhythm. Pulses: Normal pulses. Heart sounds: Normal heart sounds. No murmur heard. No gallop. Pulmonary:      Effort: Pulmonary effort is normal. No respiratory distress. Breath sounds: Normal breath sounds. No wheezing, rhonchi or rales. Abdominal:      General: Abdomen is flat. Bowel sounds are normal. There is no distension. Palpations: Abdomen is soft. Tenderness: There is no abdominal tenderness. There is no guarding or rebound. Musculoskeletal:      Cervical back: Normal range of motion and neck supple. Tenderness present. Comments: 2+ right dorsalis pedis pulse, 1+ left dorsalis pedis pulse. Left lower extremity is cooler to touch than the right, which is baseline for the patient  Midline C-spine tenderness. No midline T/L-spine tenderness. Tenderness to palpation of the right posterior inferior ribs. No tenderness to palpation of the remainder of the chest, abdomen, pelvis, bilateral lower extremities, bilateral upper extremities   Skin:     Capillary Refill: Capillary refill takes less than 2 seconds. Comments: Several wounds in the left upper anterior thigh that appear to be chronic, however not acutely infected with some overlying slough   Neurological:      Mental Status: She is alert. Comments: Alert and oriented x4. Cranial nerves II through XII are gross intact bilaterally.   Pupils are equal and reactive to light, extraocular movements are intact. Extremity strength is equal throughout. Sensation is intact. Psychiatric:         Mood and Affect: Mood normal.         Behavior: Behavior normal.       Diagnostic Results     RADIOLOGY:  CT CHEST WO CONTRAST   Final Result      Fractures of the right ninth through 12th ribs with a moderate right hemothorax. XR RIBS RIGHT INCLUDE CHEST (MIN 3 VIEWS)   Final Result      1. Mildly displaced fractures of the right ninth through 12th ribs. 2.  No visible pneumothorax on a supine chest x-ray. 3.  Hazy opacity throughout the right hemithorax compatible with pleural effusion, also visualized on the CT of the cervical spine. CT CSpine W/O Contrast   Final Result      1. No acute fracture or traumatic subluxation. 2.  Advanced degenerative disc and facet arthropathy without evidence of bony spinal stenosis. 3.  Grade 1 anterolisthesis of C3 on C4, C4 on C5, and C5 on C6.   4.  Right pleural effusion. CT Head W/O Contrast   Final Result      No acute intracranial process. Chronic small vessel ischemic changes. Remote right cerebellar infarct.           LABS:   Results for orders placed or performed during the hospital encounter of 01/23/23   CBC with Auto Differential   Result Value Ref Range    WBC 8.8 4.0 - 11.0 K/uL    RBC 2.31 (L) 4.00 - 5.20 M/uL    Hemoglobin 7.5 (L) 12.0 - 16.0 g/dL    Hematocrit 22.6 (L) 36.0 - 48.0 %    MCV 97.7 80.0 - 100.0 fL    MCH 32.6 26.0 - 34.0 pg    MCHC 33.4 31.0 - 36.0 g/dL    RDW 15.5 (H) 12.4 - 15.4 %    Platelets 232 907 - 594 K/uL    MPV 7.4 5.0 - 10.5 fL    Neutrophils % 73.7 %    Lymphocytes % 15.4 %    Monocytes % 10.6 %    Eosinophils % 0.1 %    Basophils % 0.2 %    Neutrophils Absolute 6.5 1.7 - 7.7 K/uL    Lymphocytes Absolute 1.3 1.0 - 5.1 K/uL    Monocytes Absolute 0.9 0.0 - 1.3 K/uL    Eosinophils Absolute 0.0 0.0 - 0.6 K/uL    Basophils Absolute 0.0 0.0 - 0.2 K/uL   CMP w/ Reflex to MG   Result Value Ref Range    Sodium 132 (L) 136 - 145 mmol/L    Potassium reflex Magnesium 5.1 3.5 - 5.1 mmol/L    Chloride 95 (L) 99 - 110 mmol/L    CO2 24 21 - 32 mmol/L    Anion Gap 13 3 - 16    Glucose 177 (H) 70 - 99 mg/dL    BUN 48 (H) 7 - 20 mg/dL    Creatinine 1.9 (H) 0.6 - 1.2 mg/dL    Est, Glom Filt Rate 24 (A) >60    Calcium 8.9 8.3 - 10.6 mg/dL    Total Protein 5.4 (L) 6.4 - 8.2 g/dL    Albumin 3.3 (L) 3.4 - 5.0 g/dL    Albumin/Globulin Ratio 1.6 1.1 - 2.2    Total Bilirubin <0.2 0.0 - 1.0 mg/dL    Alkaline Phosphatase 62 40 - 129 U/L    ALT 21 10 - 40 U/L    AST 26 15 - 37 U/L   Lactic Acid   Result Value Ref Range    Lactic Acid 3.4 (H) 0.4 - 2.0 mmol/L   Blood gas, venous (Lab)   Result Value Ref Range    pH, Real 7.286 (L) 7.350 - 7.450    pCO2, Real 54.3 (H) 41.0 - 51.0 mmHg    pO2, Real <30.0 25.0 - 40.0 mmHg    HCO3, Venous 25.8 24.0 - 28.0 mmol/L    Base Excess, Real -1.2 -2.0 - 3.0 mmol/L    O2 Sat, Real 33 Not established %    Carboxyhemoglobin 1.1 0.0 - 1.5 %    MetHgb, Real 0.4 0.0 - 1.5 %    TC02 (Calc), Real 28 mmol/L    Hemoglobin, Real, Reduced 66.10 %       ED BEDSIDE ULTRASOUND:  No results found. RECENT VITALS:  BP: (!) 117/45, Temp: 98.2 °F (36.8 °C), Heart Rate: 72, Resp: 14, SpO2: 99 %     Procedures     none    ED Course     Nursing Notes, Past Medical Hx,Past Surgical Hx, Social Hx, Allergies, and Family Hx were reviewed.     ED Course as of 01/23/23 1227   Mon Jan 23, 2023   1202 CT Head W/O Contrast [EI]      ED Course User Index  [EI] Hans Chung MD       The patient was given the following medications:  Orders Placed This Encounter   Medications    lidocaine 4 % external patch 1 patch    HYDROcodone-acetaminophen (NORCO) 5-325 MG per tablet 1 tablet    lactated ringers bolus       CONSULTS:  None    MEDICAL [id number] CRNICOLEDUE / ASSESSMENT / Jennifer Martha is a 80 y.o. female with a history of type 2 diabetes, peripheral vascular disease that presents to the emergency department with chief complaint of a fall, head injury, right rib pain.  Of note, the patient is recently status post femoropopliteal bypass in November and has been dealing with some chronic wound healing issues and infections.  Is currently at baseline and has an appointment coming up with infectious disease.  Denies any other infectious symptoms.  On my exam, the patient is hypertensive but otherwise vital signs are normal.  She is cachectic but otherwise well-appearing and in no acute distress.  She has tenderness to palpation of the right lower posterior ribs without overlying skin changes, bony crepitus or step-off.  She has no abdominal tenderness, chest tenderness.  She does have a small hematoma on the vertex of her head and midline C-spine tenderness.  She also has chronic wounds of the left lower extremity, which do not appear to be acutely infected.  She has palpable pulses bilaterally.    To further evaluate the patient's injuries I will obtain a CT head, CT C-spine, x-ray of the right ribs and chest.  Patient will be given Norco, Lidoderm patch.  Also the daughter at bedside states that she seems slightly confused and therefore I will obtain laboratory work-up to include CBC, CMP, VBG, lactate, urinalysis.  CT head does not show any acute intracranial abnormality.  CT cervical spine shows no acute cervical pathology, does show grade 1 anterolisthesis at multiple levels but no acute fractures.  Chest x-ray shows mildly displaced right ninth through 12th rib fractures with a small pleural effusion, therefore I did obtain a CT chest    Labs are notable for an acute anemia with a hemoglobin of 7.5, most recent hemoglobin was 11.3, 2 weeks ago.  This in the setting of right pleural effusion after chest trauma, this is concerning for hemothorax.  Additionally, BMP is notable for creatinine of 1.9, increased from baseline of about 1.2.  She was given 500 mL of lactated Ringer's.  CT chest did  confirm a moderate hemothorax and also did confirm 9 through 12 rib fractures. At this time, the patient will need to be transferred to a trauma center for further care. She reportedly was put on 1 L of nasal cannula when the patient fell asleep, however this is her baseline. She otherwise is hemodynamically stable. I spoke with Dr. Janelle Marsh with the emergency department and Dr. Alicia Caban with trauma surgery, they both accepted the patient for transfer. She will be transferred via [address] [facility] ambulance transport. The patient was updated in agreement to the plan. She will be accompanied by family. Additionally, the patient is a DNR, which was also communicated to the UC team.  I spoke to our radiology department to have the images pushed over to the [facility] system as well. The patient will be cared for in the ED until transport can be arranged. Medical Decision Making  Amount and/or Complexity of Data Reviewed  Labs: ordered. Decision-making details documented in ED Course. Radiology: ordered and independent interpretation performed. Decision-making details documented in ED Course. Risk  OTC drugs. Prescription drug management. This patient was also evaluated by the attending physician. All care plans were discussed and agreed upon. Clinical Impression     1. Hemothorax on right    2. Closed fracture of multiple ribs of right side, initial encounter    3. Injury of head, initial encounter    4. Fall, initial encounter    5. JASE (acute kidney injury) ([facility] [address]        Disposition     PATIENT REFERRED TO:  No follow-up provider specified.     DISCHARGE MEDICATIONS:  New Prescriptions    No medications on file       DISPOSITION Decision To Transfer 01/23/2023 12:04:45 PM  Transferred to [place], [place]  01/23/23 1821

## 2023-01-23 NOTE — ED NOTES
Patient off of the unit to [facility] ED.      Leticia Jacobo RN  01/23/23 0770

## 2023-01-23 NOTE — ED PROVIDER NOTES
ED Attending Attestation Note     Date of evaluation: 1/23/2023    This patient was seen by the advance practice provider. I have seen and examined the patient, agree with the workup, evaluation, management and diagnosis. The care plan has been discussed. 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 Patient with a fall yesterday after tripping and hitting the right side of her chest.  Patient with decreased breath sounds on the right and on 1 L of oxygen. She is resting comfortably on my assessment. She is noted to have a low hemoglobin compared to her most recent 1 2 weeks ago about 4 units and she has findings concerning for hemothorax on her CT chest.  Patient not on any blood thinners aside from antiplatelet agent of aspirin. She is with her power of  and at this time is noted to be a DNR. Patient is amenable to blood transfusion and chest tube another evaluation and given her injuries will be transferred to a trauma center. No evidence of any acute intracranial hemorrhage. She has some cervical anterolisthesis noted on her imaging as well.      Skylar Baum MD  01/23/23 2103

## 2023-01-23 NOTE — TELEPHONE ENCOUNTER
Patient fell over the weekend, went to ER and is being transferred to  trauma center.      Can follow up with Charity Thapa once she is released from the hospital.

## 2023-01-25 NOTE — CARE COORDINATION
1/23 ER [facility] - Fall  1/23 Transfer to [address] [place] [address]   1/24 Transfer to  HOC     Due to Hospice admission, ACM no longer will be following

## 2023-02-09 RX ORDER — FUROSEMIDE 20 MG/1
TABLET ORAL
Qty: 90 TABLET | Refills: 1 | OUTPATIENT
Start: 2023-02-09

## 2023-03-31 NOTE — PROGRESS NOTES
Call Center TCM Note    Flowsheet Row Responses   [facility], [place], operated by [facility] patient discharged from? Non-BH   Does the patient have one of the following disease processes/diagnoses(primary or secondary)? Other   TCM attempt successful? No   Unsuccessful attempts Attempt 2          Nata Alvarado RN    3/31/2023, 13:32 EDT       Physical Therapy  Facility/Department: [facility] ACUTE REHAB UNIT  Rehabilitation Physical Therapy Initial Assessment    NAME: Alesha Santos  : 1929 (80 y.o.)  MRN: [id number]  CODE STATUS: DNR-CCA    Date of Service: 22      Past Medical History:   Diagnosis Date    Anxiety     Arthritis     At risk for falls     CAD (coronary artery disease)     Cerebral artery occlusion with cerebral infarction ([facility] [address]     TIA--no residual effects    DDD (degenerative disc disease), cervical     Fractures     (L) Hip Fx 98, L1 fracture from fall 10-31-06    Hyperlipidemia     Hypertension     Mitral regurgitation     Neuropathy     Osteopenia     Osteoporosis     PAD (peripheral artery disease) ([facility] [address]     Right LE ischemia    Peripheral neuropathy 2015    Peripheral vascular disease ([facility] [address]     bilateral lower extremities with edema    Podagra 2017    Dr. Rigoberto Bear    Prolonged emergence from general anesthesia     Spinal stenosis     L4-5    Type 2 diabetes mellitus ([facility] [address]     Urethral stricture 5 years ago    Urgency of urination      Past Surgical History:   Procedure Laterality Date    ANGIOPLASTY Left 2022    Left SFA    APPENDECTOMY      AXILLARY-FEMORAL BYPASS GRAFT Left 2022    EXCISION OF INFECTED ILEOFEMORAL BYPASS GRAFT; HARVEST LEFT BASILIC VEIN; LEFT EXTERNAL ILIAC ARTERY TO FEMORAL BYPASS THROUGH OBTURATOR CANAL; PLACEMENT OF WOUND VAC performed by Ryan Spivey MD at [place] [address] Right 2019    RIGHT CARPAL TUNNEL RELEASE AND RIGHT MIDDLE FINGER TRIGGER FINGER RELEASE performed by Mony Wang MD at [place] [address]  [id number]    LEFT EYE EYE CATARACT PHACOEMULSIFICATION INTRAOCULAR LENS    CHOLECYSTECTOMY  1978    COLONOSCOPY  1999    diverticulosis    EYE SURGERY Bilateral     bilateral cataract removed    FEMORAL BYPASS Left 2022    LEFT LEG FEMORAL TO POPLITEAL BYPASS WITH INTRAOPERATIVE ANGIOGRAM performed by Valentine Rubio Benedicto Mac MD at [facility] 2 Left 02/14/2022    LEFT COMMON FEMORAL ARTERY ENDARTERECTOMY performed by Zonia Chacko MD at [facility] [address] [facility] ([address] [place] [address])  [address] [place] [address]    IR FEMORAL POPLITEAL BYPASS GRAFT Right 08/31/2015    KYPHOSIS SURGERY  11/07/2006    L1, (fractured after a fall)    LEG SURGERY Left 02/14/2022    LEFT LEG WOUND DEBRIDEMENT performed by Zonia Chacko MD at [address] N/A 7/7/2022    INCISION AND DRAINAGE OF LEFT SIDE GROIN WITH PLACEMENT OF WOUND VAC performed by Zonia Chacko MD at [address] Left 11/22/2022    DEBRIDEMENT OF LEFT GROIN (10 X 3 CM), LEFT RECTUS FEMORIS FLAP, LEFT SPLIT THICKNESS SKIN GRAFT (10 X 5 CM), APPLICATION OF WOUND VACUUM DEVICE performed by China Whalen MD at [facility]  04/25/1998    (L) THR    WRIST FRACTURE SURGERY  01/01/2008    left wrist       Chart Reviewed: Yes  Patient assessed for rehabilitation services?: Yes  Additional Pertinent Hx: Pt is a 79 yo female that presents to the hospital with co of worsening wound bleeding. Pt underwent a procedure 11/20 : EXCISION OF INFECTED ILEOFEMORAL BYPASS GRAFT; HARVEST LEFT BASILIC VEIN; LEFT EXTERNAL ILIAC ARTERY TO FEMORAL BYPASS THROUGH OBTURATOR CANAL; PLACEMENT OF WOUND VAC. Then underwent 11/22:DEBRIDEMENT OF LEFT GROIN (10 X 3 CM), LEFT RECTUS FEMORIS FLAP, LEFT SPLIT THICKNESS SKIN GRAFT (10 X 5 CM), APPLICATION OF WOUND VACUUM DEVICE (Left: Thigh). CTA of Abdomen:Small amount of hemorrhage in the left groin without evidence of active extravasation. Family / Caregiver Present: Yes (care giver)  Referring Practitioner: Dr. 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 General Comment  Comments: Pt sitting up in BS chair when PT arrived. Pt reports that she is extremely tired.  Pt states that she just had a BM which req her to move STS multiple times to get cleaned up. Pt agreeable to therapy reporting that Sandra Easons will do whatever she could.  \"    Restrictions:  Position Activity Restriction  Other position/activity restrictions: Up as tolerated     SUBJECTIVE  Subjective: Pt reports that \"her L leg is paralyzed\" Pt c/o significant pain with any movement of LLE       Post Treatment Pain Screening       Prior Level of Function:  Social/Functional History  Lives With: Home care staff (Caregiver coming 24 hrs but lives alone)  Type of Home: House  Home Layout: Two level (Has electric chair to get to second story w/ WC at the top)  Home Access: Stairs to enter with rails  Entrance Stairs - Number of Steps: Uses w/c on ramp , has stair lift to go from 1st floor to the 2nd  Entrance Stairs - Rails: Both  Bathroom Shower/Tub: Tub only, Shower chair with back (Always does sponge baths)  Bathroom Toilet: Handicap height  Bathroom Equipment: Grab bars around toilet  Bathroom Accessibility: Walker accessible, Wheelchair accessible, Accessible  Home Equipment: Lift chair, Walker, rolling, Wheelchair-manual (Multple RW, 2  w/c , transport  w/c)  Receives Help From: Family, Home health  ADL Assistance: Independent  Homemaking Assistance: Independent (Caregiver assists)  Homemaking Responsibilities: Yes (Caregiver assists)  Meal Prep Responsibility: Primary  Ambulation Assistance: Independent (with walker when she remembered it)  Active : No  Patient's  Info: Transported  by caregivers  Mode of Transportation: [facility]  Occupation: Retired  Type of Occupation: model, selling cosmetics, rugs, real estate etc.  Leisure & Hobbies: Listens to music , watches very little TV, reads  Additional Comments: Pt states that she had been receiving home health including PT 2 x week      OBJECTIVE  Vision  Vision: Within Functional Limits (Has readers only)    Hearing  Hearing: Within functional limits    Cognition  Overall Cognitive Status: WFL  Cognition Comment: Pt tends to be very set in [employer] and does not appear receptive to recommendations made by therapist. Alexis Malloy tells therapist that they ( she and her CG) have their own way of doing things\" Req additional cues to stay focused on task    ROM  AROM RLE (degrees)  RLE AROM: WFL  AROM LLE (degrees)  LLE AROM : Exceptions  LLE General AROM: Pt reports that hse is unable to [facility] leg\" however, pt initiated hip flex, abd and add in supine and standing position( AEB advancing the LLE when walking). ROM limitations 2/2 to pain and decreased strength    Strength  Strength RLE  Strength RLE: WFL  Strength LLE  Strength LLE: Exception    Quality of Movement  Tone RLE  RLE Tone: Normotonic  Tone LLE  LLE Tone: Normotonic    Sensation  Overall Sensation Status: WNL (LLE appears hypersensitive AEB pt's response with movement. Pt describes it like feeling like a bunch of bees stinging her at  one time. \")    Functional Mobility  Bed mobility  Bridging:  (min A to position LLE . Only partial range obtained with the LLE for bridging)  Sit to Supine: Minimal assistance; Moderate assistance (Req assist to maneuver LLE onto bed with step by step VC for sequencing activity)  Scooting: Dependent/Total (Dependent in supine for caudal<>cephalo)  Bed Mobility Comments: Bed initially positioned flat with bedrails lowered. Pt unable to alpesh and requested the use of the bedrails and HOB elevated  Transfers  Sit to Stand: Minimal Assistance; Moderate Assistance (Min / mod assist from bedside chair and w/c. Req VC for hand and foot placement)  Stand to Sit: Minimal Assistance (to bedside chair x 2 trials and to toilet, verbal cues for safety and reach back)  Bed to Chair: Moderate assistance;Minimal assistance (Initaiting stance req mod/ min but once pt is standing, pt able to step with UE support on AD)  Stand Pivot Transfers:  Moderate Assistance;Minimal Assistance  Balance  Sitting - Static: Fair (Sat on EOB with UE support)  Standing - Static: Poor;+ (Dependent on AD . Increased UE dependence)  Standing - Dynamic: Poor (AEB stepping with RW)    Environmental Mobility  Ambulation  WB Status: No WB restrictions noted in chart  Ambulation  Surface: Level tile  Device: Rolling Walker  Other Apparatus: Wheelchair follow (wound vac)  Assistance:  (Min A x1 and then A req for transport of wound vac with w/c follow ( pt repeatedly indicated that she was \"going to pass out\" ))  Quality of Gait: gait mildly unsteady though no overt loss of balance noted, forward flexed posture with downward gaze, decreased step length/height bilaterally with shuffling steps, increased difficulty advancing LLE compared to right  Gait Deviations: Slow Patricia;Decreased step length;Decreased step height;Shuffles  Distance: 10', 3 '  More Ambulation?: No  Stairs/Curb  Stairs?: No  Wheelchair Activities  Wheelchair Type: Standard (Obtained a 16\" w/c with elevating leg rest)  Wheelchair Cushion: Pressure Relieving  Wheelchair Parts Management: No  Propulsion: No    Second session: Pt sleeping soundly in bed when PT arrived. Pt agreeable to therapy. Pt immediately reported that she was very \"light headed. \" BP ( taken in R forearm) 133/ 56, O2 96, . Functional Mobility  Bed mobility  Bridging:  (min A to position LLE . Only partial range obtained with the LLE for bridging)  Rolling to Left: Minimal assistance (Used the bedrail and req min A to complete)  Rolling to Right: Minimal assistance (Used the bedrail and req min/mod  A to complete with the LLE)  Supine to Sit: Moderate assistance (Assist req for trunk ascension and to maneuver the LLE off of the bed)  Sit to Supine: Minimal assistance; Moderate assistance (Req assist to maneuver LLE onto bed with step by step VC for sequencing activity)  Scooting: Dependent/Total (Dependent in supine for caudal<>cephalo)  Transfers  Bed to Chair: Moderate assistance;Minimal assistance (Initaiting stance req mod/ min but once pt is standing, pt able to step with UE support on AD)Transf back and forth to bed 2/2 to the [facility] team arriving and requesting pt return to bed. Wound team removed wound vac applied ointment. Pt in severe pain with the procedure and moved extremely slow after it. Pt was incontinent of urine . Pt was dependent on removal of soiled brief, and donning a clean brief. Pt reported that she was in too much pain and too fatigued to get back up. \"  Pt repositioned in bed in supine position with LLE supported on pillow. Call light placed within reach with bed alarm reactivated. ASSESSMENT       Activity Tolerance  Activity Tolerance: Patient limited by pain; Patient limited by fatigue;Patient limited by endurance    Assessment  Assessment: This is a 80y.o. 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 She has severe pain in her left leg Pt presents with signifantly decreased functional mobility 2/2 to pain and weakness. Pt has a private CG ( with 21 year hx . Pt will be able to have 24/7 care as needed. Am session was limited by the extreme fatigue from shower, and BM. The pm session was interrupted by severe pain following removal of wound vac. Pt is well below baseline and would benefit from continued therapy to maximize potential and increase functional mobility towards PLOF  to allow for a safer d/c to home  Treatment Diagnosis: Impaired functional mobility 2/2 surgery. Therapy Prognosis: Fair  Decision Making: High Complexity  Barriers to Learning: \"set ways \" learned with 21 year hx of CG in which they do things a ceratin way.  \"  Treatment Initiated : Eval,gt training, transf training and bed mobility skills  Discharge Recommendations: Home with Home health PT;24 hour supervision or assist  PT D/C Equipment  Other: Pt reports that she has excessive equipment at home from past medical episodes  PT Equipment Recommendations  Equipment Needed: No  Other: Pt reports that she has excessive equipment at home from past medical episodes    CLINICAL IMPRESSION   This is a 80y.o. 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 She has severe pain in her left leg Pt presents with signifantly decreased functional mobility 2/2 to pain and weakness. Pt has a private CG ( with 21 year hx . Pt will be able to have 24/7 care as needed. Am session was limited by the extreme fatigue from shower, and BM. The pm session was interrupted by severe pain following removal of wound vac. Pt is well below baseline and would benefit from continued therapy to maximize potential and increase functional mobility towards PLOF  to allow for a safer d/c to home    GOALS  Patient Goals   Patient Goals : To Return home and rest  Short Term Goals  Time Frame for Short Term Goals: 8 days  Short Term Goal 1: Ind with bed mobility  Short Term Goal 2: Transf with S excluding floor transf  Short Term Goal 3: Ambulate with S x  50'  with LRAD on level surfaces  Short Term Goal 4: MI w/c propulsion x 50' on level surfaces  Additional Goals?: No    PLAN OF CARE  Frequency: 1-2 treatment sessions per day, 5-7 days per week  Physcial Therapy Plan  Days Per Week: 5 Days  Hours Per Day: 1.5 hours  Current Treatment Recommendations: Strengthening;Patient/Caregiver education & training; Therapeutic activities;Balance training;Gait training;Stair training;Functional mobility training;Transfer training; Endurance training; Safety education & training; Wheelchair mobility training;Pain management;Positioning  Safety Devices  Type of Devices: Call light within reach;Nurse notified; Patient at risk for falls; Left in bed; All fall risk precautions in place; Bed alarm in place    EDUCATION  Education  Education Given To: Patient; Other (CG)  Education Provided: Role of Therapy;Plan of Care;Precautions; Mobility Training; Safety;Transfer Training;Energy Conservation; Fall Prevention Strategies  Education Method: Verbal  Barriers to Learning: Other (Comment)  Education Outcome: Verbalized understanding;Continued education needed    ELOS:          Therapy Time   Individual Concurrent Group Co-treatment   Time In  1100         Time Out  1148         Minutes  48         Second Session Therapy Time:   Individual Concurrent Group Co-treatment   Time In  9022         Time Out  1435         Minutes  50           Timed Code Treatment Minutes:  56+85    Total Treatment Minutes:   [address] [facility], PT, 11/29/22 at 4:48 PM

## 2023-08-25 NOTE — TELEPHONE ENCOUNTER
Form has been filled and placed in Dr. Tracey Bowden basket to be signed and I can fax over
Okay to refer to home health.   What agency question
Pt stated that she discussed home health care with Dr. Angela Villafuerte today during appointment and decided that she would like to move forward with that. Pt states she needs nighttime help, if possible, from 1pm-10pm. Please contact pt caregiver Bing Sue at [phone].
[phone]

## 2023-09-19 NOTE — PROGRESS NOTES
PEG: , on 09/21/2023  Previous:  8, on 09/07/2022     ORT: , on 09/21/2023  Previous: 0, on 09/07/2022     Oswestry Disability: %, on 09/21/2023  Previous: 62%, on 02/24/2022        HPI INITIAL (Portions of this note are brought forward from Dwight Yan MD initial note on 12/03/2019; reviewed and edited as appropriate):  Beto Ervin is a 48 year old male with chronic abdominal pain as described below. Referred by Rian Bernal MD for consultation and management.     Pain Score (0-10): Current 10/10; Worst 10/10;  Least 5/10;  Average 8/10;  Acceptable 3/10  Duration: 20 years  Context of pain: His abdomen pain started insidiously without event or trauma that the patient can recall. Gamaliel reports initially developed abdominal pain and underwent a diagnostic laparoscopy that was benign with the exception of significant scar tissue. He has undergone extensive GI work up including EGDs, colonoscopy, gastric emptying study. He has been managed by Dr. Hector Schafer and has been on TPN for the past 6 years. He has previously managed by Dr. Rian Bernal who has left the practice and DAMON Del Castillo using multiple opioid medications. He has undergone a spinal cord stimulator trial and subsequent implant that provided 50% relief for two years. His spinal cord stimulator has been off for the past 6-7 years.  He was also taking liquid Valium for sleep that he is currently weaning to discontinue.  He presents for evaluation today to discuss interventional options with the hope of discontinuing his opioid medications in the future.  Location:  Left-sided abdomen  Radiation: left groin  Quality: aching, burning, shooting, stabbing  Exacerbates:? eating, drinking, walking, standing   Improves: Medication     Numbness/Tingling: none  Weakness: none  Sleep: Interrupted  Mood: frustrated   ?  Bowel and bladder - Denies new onset incontinence, or saddle anesthesia.           INTERVENTIONS (from last visit with  Subjective:      Patient ID: Beverly Berrios is a 80 y.o. female    CC: Fatigue, Mitral regurgitation, htn, cad    HPI:  Moustapha Moran is an 80year old female with HX of CAD, HTN, PAD, and Mitral regurgitation. . Last Echo. No regional wall motion abnormalities are seen.   Diastolic filling parameters suggests grade I diastolic dysfunction. Bi-atrial enlargement. severe mitral regurgitation. This is a change from 2015 echo that showed moderate MR. Patient feels well and has no chest pain nor SOB. No orthopnea nor PND. She states that she is not quite as active as she used to be and has some fatigue. doing well. Has some LE edema but has TIA and was recently in the hospital with 50% left ICA stenosis that is unchanged from prior. Allergies   Allergen Reactions    Aricept [Donepezil Hydrochloride] Other (See Comments)     intolerant    Doxycycline Nausea Only    Ketorolac Tromethamine Other (See Comments)     Pt unable to recall reaction       Social History     Socioeconomic History    Marital status:       Spouse name: None    Number of children: None    Years of education: None    Highest education level: None   Occupational History    None   Social Needs    Financial resource strain: None    Food insecurity     Worry: None     Inability: None    Transportation needs     Medical: None     Non-medical: None   Tobacco Use    Smoking status: Never Smoker    Smokeless tobacco: Never Used   Substance and Sexual Activity    Alcohol use: No    Drug use: No    Sexual activity: Not Currently   Lifestyle    Physical activity     Days per week: None     Minutes per session: None    Stress: None   Relationships    Social connections     Talks on phone: None     Gets together: None     Attends [employer] service: None     Active member of club or organization: None     Attends meetings of clubs or organizations: None     Relationship status: None    Intimate partner violence     Fear of current or ex partner: None     Emotionally abused: None     Physically abused: None     Forced sexual activity: None   Other Topics Concern    None   Social History Narrative    None       Family History   Problem Relation Age of Onset    Stroke Father     Hypertension Father         has a past medical history of Anxiety, Arthritis, At risk for falls, CAD (coronary artery disease), CTS (carpal tunnel syndrome), DDD (degenerative disc disease), cervical, Fractures, Hypertension, Mitral regurgitation, Neuropathy, Osteopenia, Osteoporosis, PAD (peripheral artery disease) ([address] Peripheral neuropathy, Peripheral vascular disease ([address] Podagra, Spinal stenosis, Type 2 diabetes mellitus ([address] Urethral stricture, and Urgency of urination. Review of Systems   Constitutional: Positive for activity change and fatigue. Negative for appetite change, chills, diaphoresis, fever and unexpected weight change. HENT: Negative. Respiratory: Negative for apnea, cough, choking, chest tightness and shortness of breath. Cardiovascular: Negative for chest pain, palpitations and leg swelling. Gastrointestinal: Negative. Endocrine: Negative. Genitourinary: Negative. Musculoskeletal: Negative. Skin: Negative. Allergic/Immunologic: Negative. Neurological: Negative. Hematological: Negative. Psychiatric/Behavioral: Negative. Vitals:    04/26/21 1314   BP: 138/72   Pulse: 64          Objective:   Physical Exam   Constitutional: She is oriented to person, place, and time. She appears well-developed and well-nourished. HENT:   Head: Normocephalic and atraumatic. Eyes: Pupils are equal, round, and reactive to light. Conjunctivae and EOM are normal. Right eye exhibits no discharge. Left eye exhibits no discharge. No scleral icterus. Neck: Normal range of motion. Neck supple. No tracheal deviation present. No thyromegaly present. Cardiovascular: Normal rate and regular rhythm.  Exam reveals no gallop and updates):     1. Injections:    - Bilateral TAP block at [facility]   - Spinal cord stimulator trial (Medtronic)  ? 11/15/2019 Abdominal cutaneous nerve block and hydrodissection, Left mid abdomen, 40% relief in office, then 0% long term actually caused a flare for three days.  ? 03/03/2021 - pain pump trial     2. Physical Therapy: Not currently in a PT program   Completed 1 session of pelvic PT in 2019.     3. Surgeries (Spine, Joint, related to pain):   • 2001 - diagnostic laparoscopy   • 2002- laparoscopic appendectomy  • 2003 - laparoscopic cholecystomy   • Anal sphincterotomy   • 08/04/2020 - Explant Medtronic spinal cord stimulator with two leads and IPG. Implant Nevro spinal cord stimulator with, 2 leads-, T5 and T6 and IPG with neuromonitoring   • 08/04/2021 - Medtronic pain pump 20 ml permanent implant.     4. Medications (pain/mood/depression): (with doses, duration of use, side effects, etc...)    Current    • Intrathecal pain pump Hydromorphone 15 mg/mL and Bupivacaine 10 mg/mL   • Tizanidine 4 mg q8h PRN   • Narcan 12/6/22    Previous    • Fentanyl 100 mcg q 48 hrs  • Oxycodone 5 m/5 ml q 4 hrs PRN   • Fentanyl 50 mcg q 48 hrs   • Lyrica  • Nortriptyline - SI   • Amitriptyline  - SI   • Flexeril   • Gabapentin          If on contract (update)  • Urine tox screen: 10/01/2020 - to be updated on 09/21/2023  • Prescription Drug Monitoring Program verified this visit.  • Opioid contract: Yes - to be updated on 09/21/2023   and systolic function with an   estimated ejection fraction of 55-60%.   No regional wall motion abnormalities are seen.   Moderate mitral regurgitation is present.  [place] is moderate tricuspid regurgitation with RVSP estimated at 42 mmHg.      Signature      ------------------------------------------------------------------   Electronically signed by Kenneth Cooks, MD (Interpreting   [email or web address]) on 07/27/2015 at 11:38 AM   ------------------------------------------------------------------    Echo 8/23/18  Summary   Concentric left ventricular hypertrophy. The left ventricle appears normal   in size. Normal left ventricular systolic function with an estimated   ejection fraction of 55-60%. No regional wall motion abnormalities are seen.   Diastolic filling parameters suggests grade I diastolic dysfunction .   Bi-atrial enlargement.   Severe mitral regurgitation.   Trace aortic insufficiency.   Mild pulmonic insufficiency.   Moderate tricuspid regurgitation.   Estimated pulmonary artery systolic pressure is 32 mmHg assuming a right   atrial pressure of 3 mmHg.      Signature      ------------------------------------------------------------------   Electronically signed by Steven Jorgensen MD   (Interpreting physician) on 08/23/2018 at 12:30 PM   ------------------------------------------------------------------  Assessment:       Diagnosis Orders   1. Coronary artery disease involving native coronary artery of native heart without angina pectoris     2. HTN (hypertension), benign     3. Hyperlipidemia, unspecified hyperlipidemia type            Plan:      CAD- Lipid panel 8/13/20 LDL 43, HDL 61   HTN- Lisinopril and controlled. Severe MR:  Clinically unchanged. shes taking 10 mg lisinopril PAD stable. HLP:  Controlled with diet but started on 10 mg atorva for 50% ICA stenosis. Clinically stable.

## 2024-01-01 NOTE — PROGRESS NOTES
TheraSkin Treatment Note      Goal:  Patient will receive safe and proper application of skin substitute. Patient will comply with caring for dressing, offloading and reporting complications. [x] Expiration date of TheraSkin checked immediately prior to use. [x] Package intact prior to use and no damage noted. [x] Transport temperature controlled and acceptable. TheraSkin was prepared for application by removing from package. TheraSkin was rinsed 2 times in room temperature normal saline for 5 seconds each time. A 2nd saline rinse was left on the TheraSkin until the provider was ready to apply it within 120 minutes of thawing. White side placed against ulcer bed. [x] Theraskin was applied to wound # 1 and affixed with steri-strips by the provider. [x] Secondary dressing applied as ordered. [x] Patient/caregiver was instructed not to remove dressing and to keep it clean and dry. See AVS for further instructions given to patient/caregiver. Theraskin may be applied a total of 10 times per wound over a 12 week period. Additionally Theraskin may only be used every 12 months per wound. Date of first application of Theraskin for this current wound is May 16, 2022.      Application # 3           Guidelines followed      Electronically signed by Kelvin Linton RN on 9/6/2022 at 9:28 AM “You can access the [facility] Patient Portal, offered by [facility], by registering with the following website: [email or web address]” .

## (undated) DEVICE — INTENDED TO BE USED TO OCCLUDE, RETRACT AND IDENTIFY ARTERIES, VEINS, TENDONS AND NERVES IN SURGICAL PROCEDURES: Brand: STERION®  VESSEL LOOP

## (undated) DEVICE — E-Z CLEAN, NON-STICK, PTFE COATED, ELECTROSURGICAL BLADE ELECTRODE, 2.5 INCH (6.35 CM): Brand: EZ CLEAN

## (undated) DEVICE — DRAPE,REIN 53X77,STERILE: Brand: MEDLINE

## (undated) DEVICE — 3M™ STERI-DRAPE™ ISOLATION BAG, 10 PER CARTON / 4 CARTONS PER CASE, 1003: Brand: 3M™ STERI-DRAPE™

## (undated) DEVICE — LOOP,VESSEL,MAXI,BLUE,2/PK,STERILE: Brand: MEDLINE

## (undated) DEVICE — AGENT HEMSTAT FIBRIN SEAL HUM AIRLESS SPR ACC EVICEL

## (undated) DEVICE — SUTURE PROL SZ 6-0 L24IN NONABSORBABLE BLU L13MM C-1 3/8 8726H

## (undated) DEVICE — BLANKET WRM W29.9XL79.1IN UP BODY FORC AIR MISTRAL-AIR

## (undated) DEVICE — MAJOR SET UP PK

## (undated) DEVICE — PREVENA PLUS 125 THERAPY UNITPREVENA PLUS 125 THERAPY UNIT WITH PREVENA PLUS 150ML CANISTER: Brand: PREVENA PLUS™

## (undated) DEVICE — DRAIN SURG L18IN DIA025IN 100% SIL RADPQ FOR CLS WND DRNAGE

## (undated) DEVICE — SYSTEM SKIN CLSR 22CM DERMBND PRINEO

## (undated) DEVICE — DECANTER FLD 9IN ST BG FOR ASEP TRNSF OF FLD

## (undated) DEVICE — SUTURE CHROMIC GUT SZ 3-0 L27IN ABSRB BRN L26MM SH 1/2 CIR G122H

## (undated) DEVICE — COVER LT HNDL BLU PLAS

## (undated) DEVICE — SWAB CULT SGL AMIES W/O CHAR FOR THRT VAG SKIN HRT CULTSWAB

## (undated) DEVICE — Z INACTIVE - UOM CHANGE USE 2850087 - CLIP INT SM WIDE RED TI TRNSVRS GRV CHEVRON SHP W PRECIS

## (undated) DEVICE — CLIP LIG M BLU TI HRT SHP WIRE HORZ 600 PER BX

## (undated) DEVICE — TUBING, SUCTION, 1/4" X 12', STRAIGHT: Brand: MEDLINE

## (undated) DEVICE — TAPE UMB W1 8XL24IN POLY

## (undated) DEVICE — SUTURE PERMAHAND SZ 2-0 L12X18IN NONABSORBABLE BLK SILK A185H

## (undated) DEVICE — ZIMMER® STERILE DISPOSABLE TOURNIQUET CUFF WITH PLC, DUAL PORT, SINGLE BLADDER, 18 IN. (46 CM)

## (undated) DEVICE — TOTAL TRAY, 16FR 10ML SIL FOLEY, URN: Brand: MEDLINE

## (undated) DEVICE — TOWEL,OR,DSP,ST,BLUE,STD,4/PK,20PK/CS: Brand: MEDLINE

## (undated) DEVICE — STERILE POLYISOPRENE POWDER-FREE SURGICAL GLOVES: Brand: PROTEXIS

## (undated) DEVICE — TIP SGL SPRY BIOMATERIALS OPN AND ENDO CONCL SPRY OUTPT

## (undated) DEVICE — DRESSING FOAM W4XL4IN SIL FACE BORD ADH PD SUP ABSRB COR

## (undated) DEVICE — GLOVE SURG SZ 6 L11.2IN FNGR THK9.8MIL STRW LTX POLYMER

## (undated) DEVICE — CLEANER,CAUTERY TIP,2X2",STERILE: Brand: MEDLINE

## (undated) DEVICE — SUTURE PROL SZ 5 0 L30IN NONABSORBABLE BLU C 1 L13MM 3 8 CIR EPH7477H

## (undated) DEVICE — NEEDLE HYPO 18GA L1.5IN THN WALL PIVOTING SHLD BVL ORIENTED

## (undated) DEVICE — TOWEL,OR,DSP,ST,BLUE,DLX,8/PK,10PK/CS: Brand: MEDLINE

## (undated) DEVICE — SUTURE NONABSORBABLE MONOFILAMENT 5-0 C-1 1X24 IN PROLENE 8725H

## (undated) DEVICE — DRAPE,SPLIT ,77X120: Brand: MEDLINE

## (undated) DEVICE — PAD,NON-ADHERENT,3X8,STERILE,LF,1/PK: Brand: MEDLINE

## (undated) DEVICE — SPONGE LAP W18XL18IN WHT COT 4 PLY FLD STRUNG RADPQ DISP ST

## (undated) DEVICE — 3M™ IOBAN™ 2 ANTIMICROBIAL INCISE DRAPE 6648EZ: Brand: IOBAN™ 2

## (undated) DEVICE — SUTURE CHROMIC GUT SZ 4-0 L27IN ABSRB BRN FS-2 L19MM 3/8 635H

## (undated) DEVICE — APPLIER CLP L9.38IN M LIG TI DISP STR RNG HNDL LIGACLP

## (undated) DEVICE — PICO 7 10CM X 20CM: Brand: PICO™ 7

## (undated) DEVICE — CATHETER ADAPTER: Brand: ADDTO

## (undated) DEVICE — SOLUTION IRRIG 1000ML 0.9% SOD CHL USP POUR PLAS BTL

## (undated) DEVICE — SYRINGE MED 10ML LUERLOCK TIP W/O SFTY DISP

## (undated) DEVICE — SUTURE PDS II SZ 3-0 L27IN ABSRB VLT L26MM SH 1/2 CIR Z316H

## (undated) DEVICE — SUTURE CHROMIC GUT SZ 4-0 L27IN ABSRB BRN L17MM RB-1 1/2 U203H

## (undated) DEVICE — GOWN SIRUS NONREIN XL W/TWL: Brand: MEDLINE INDUSTRIES, INC.

## (undated) DEVICE — GAUZE,SPONGE,4"X4",16PLY,XRAY,STRL,LF: Brand: MEDLINE

## (undated) DEVICE — DRESSING NEG PRSS SM 10X7.5X3.3CM POLYUR FOR WND THER VAC

## (undated) DEVICE — SOLUTION IV 1000ML 0.9% SOD CHL PH 5 INJ USP VIAFLX PLAS

## (undated) DEVICE — BLADE CLIPPER GEN PURP NS

## (undated) DEVICE — BLADE SURG NO 11 CONVENTIONAL STD UNPROTECTED W/O HNDL S

## (undated) DEVICE — HANDPIECE SET WITH HIGH FLOW TIP AND SUCTION TUBE: Brand: INTERPULSE

## (undated) DEVICE — ELECTRODE PT RET AD L9FT HI MOIST COND ADH HYDRGEL CORDED

## (undated) DEVICE — SUTURE VCRL SZ 3-0 L27IN ABSRB UD L26MM SH 1/2 CIR J416H

## (undated) DEVICE — GLOVE SURG SZ 8 L12IN FNGR THK94MIL STD WHT LTX SYN POLYMER

## (undated) DEVICE — SUTURE PROL SZ 7-0 L18IN NONABSORBABLE BLU L9.3MM BV-1 3/8 8701H

## (undated) DEVICE — INTENDED FOR TISSUE SEPARATION, AND OTHER PROCEDURES THAT REQUIRE A SHARP SURGICAL BLADE TO PUNCTURE OR CUT.: Brand: BARD-PARKER ® STAINLESS STEEL BLADES

## (undated) DEVICE — NEEDLE HYPO 16GA L1.5IN PUR POLYPR HUB S STL REG BVL STR

## (undated) DEVICE — GLOVE SURG SZ 65 L12IN FNGR THK87MIL WHT LTX FREE

## (undated) DEVICE — INTENDED FOR TISSUE SEPARATION, AND OTHER PROCEDURES THAT REQUIRE A SHARP SURGICAL BLADE TO PUNCTURE OR CUT.: Brand: BARD-PARKER ® CARBON RIB-BACK BLADES

## (undated) DEVICE — DERMATOME BLADES: Brand: DERMATOME

## (undated) DEVICE — SUTURE ETHLN SZ 3-0 L30IN NONABSORBABLE BLK L36MM FSLX 3/8 1673BH

## (undated) DEVICE — SUTURE MCRYL SZ 3-0 L27IN ABSRB UD L19MM PS-2 3/8 CIR PRIM Y427H

## (undated) DEVICE — SUTURE BOOT: Brand: DEROYAL

## (undated) DEVICE — 39" SINGLE PATIENT USE HOVERMATT: Brand: SINGLE PATIENT USE HOVERMATT

## (undated) DEVICE — DRAPE HND W114XL142IN BLU POLYPR W O PCH FEN CRD AND TB HLDR

## (undated) DEVICE — 3M™ TEGADERM™ TRANSPARENT FILM DRESSING FRAME STYLE, 1628, 6 IN X 8 IN (15 CM X 20 CM), 10/CT 8CT/CASE: Brand: 3M™ TEGADERM™

## (undated) DEVICE — SURE SET-DOUBLE BASIN-LF: Brand: MEDLINE INDUSTRIES, INC.

## (undated) DEVICE — SUTURE MCRYL SZ 4-0 L27IN ABSRB UD L19MM PS-2 1/2 CIR PRIM Y426H

## (undated) DEVICE — PLEDGET SURG W3.5XL7MM THK1.5MM WHT PTFE RECT SFT TFE

## (undated) DEVICE — SUTURE PDS II SZ 2-0 L27IN ABSRB VLT L26MM CT-2 1/2 CIR Z333H

## (undated) DEVICE — CANNULA PERF L1.3IN TIP L2MM S STL POLYUR TB ARTOTMY BLB

## (undated) DEVICE — NEEDLE HYPO 25GA L1.5IN BVL ORIENTED ECLIPSE

## (undated) DEVICE — APPLICATOR PREP 26ML 0.7% IOD POVACRYLEX 74% ISO ALC ST

## (undated) DEVICE — SUTURE ABSRB BRAID COAT VLT SH 3-0 27IN VCRL J311H

## (undated) DEVICE — GOWN,SIRUS,POLYRNF,BRTHSLV,XL,30/CS: Brand: MEDLINE

## (undated) DEVICE — DRAPE 70X60IN SPLIT IMPERV ADHES STRIP

## (undated) DEVICE — SUTURE PERMAHAND SZ 2-0 L30IN NONABSORBABLE BLK SILK W/O A305H

## (undated) DEVICE — COLLECTOR SPEC RAYON LIQ STUART DBL FOR THRT VAG SKIN WND

## (undated) DEVICE — SUTURE PERMAHAND SZ 2-0 L18IN NONABSORBABLE BLK L26MM SH C012D

## (undated) DEVICE — GLOVE ORANGE PI 7 1/2   MSG9075

## (undated) DEVICE — SUTURE PDS II SZ 2-0 L18IN ABSRB VLT SH L26MM 1/2 CIR TAPR Z775D

## (undated) DEVICE — SOLUTION IV 1000ML 0.9% SOD CHL

## (undated) DEVICE — SUTURE PROL SZ 6-0 L24IN NONABSORBABLE BLU L9.3MM BV-1 3/8 8805H

## (undated) DEVICE — SCANLAN® SUTURE BOOT™ INSTRUMENT JAW COVERS - ORIGINAL YELLOW, STANDARD PKG (5 PAIR/CARTRIDGE, 1 CARTRIDGE/PKG): Brand: SCANLAN® SUTURE BOOT™ INSTRUMENT JAW COVERS

## (undated) DEVICE — MERCY FAIRFIELD TURNOVER KIT: Brand: MEDLINE INDUSTRIES, INC.

## (undated) DEVICE — VALVULOTOME ANGIOSCOPIC W/ CATH INTRO CUT HD TRUINCISE TIVK2030] TRUE INCISE]

## (undated) DEVICE — SOLUTION IV IRRIG 250ML ST LF 0.9% SODIUM 2F7122

## (undated) DEVICE — SCANLAN® SURG-I-LOOP® SILICONE LOOPS - RED MINI, 1.5X.88 MM, 16"/40 CM LONG (2/PKG): Brand: SCANLAN® SURG-I-LOOP® SILICONE LOOPS

## (undated) DEVICE — ADHESIVE SKIN CLSR 0.7ML TOP DERMBND ADV

## (undated) DEVICE — LOOP VES W25MM THK1MM MAXI YEL SIL FLD REPELLENT 100 PER

## (undated) DEVICE — TOWEL,STOP FLAG GOLD N-W: Brand: MEDLINE

## (undated) DEVICE — SUTURE NONABSORBABLE MONOFILAMENT 6-0 BV-1 1X30 IN PROLENE 8709H

## (undated) DEVICE — LINER SUCTION

## (undated) DEVICE — SUTURE PROL SZ 6-0 L18IN NONABSORBABLE BLU L13MM C-1 3/8 8718H

## (undated) DEVICE — FOGARTY - HYDRAGRIP SURGICAL - CLAMP INSERTS: Brand: FOGARTY SOFTJAW

## (undated) DEVICE — 3 TO 1 DERMACARRIER: Brand: MESHGRAFTTM II TISSUE EXPANSION SYSTEM

## (undated) DEVICE — ADHESIVE SKIN CLOSURE 0.7CC TOP MICROBIAL APPL DERMBND ADV

## (undated) DEVICE — APPLIER LIG CLP M L11IN TI STR RNG HNDL FOR 20 CLP DISP

## (undated) DEVICE — SUTURE PROL SZ 2-0 L36IN NONABSORBABLE BLU SH L26MM 1/2 CIR 8523H

## (undated) DEVICE — SUTURE VCRL + SZ 2-0 L36IN ABSRB UD L36MM CT-1 1/2 CIR VCP945H

## (undated) DEVICE — SUTURE NONABSORBABLE MONOFILAMENT 4-0 RB-1 36 IN BLU PROLENE 8557H

## (undated) DEVICE — SUTURE VCRL SZ 3-0 L18IN ABSRB UD L26MM SH 1/2 CIR J864D

## (undated) DEVICE — GLOVE SURG SZ 7 CRM LTX FREE POLYISOPRENE POLYMER BEAD ANTI

## (undated) DEVICE — SUTURE VCRL + SZ 3-0 L36IN ABSRB UD L36MM CT-1 1/2 CIR VCP944H

## (undated) DEVICE — FOGARTY EMBOLECTOMY CATHETER: Brand: FOGARTY

## (undated) DEVICE — SUTURE PROL SZ 6-0 L24IN NONABSORBABLE BLU BV-1 L9.3MM 3/8 M8805

## (undated) DEVICE — SUTURE NONABSORBABLE MONOFILAMENT 7-0 BV-1 1X24 IN PROLENE 8702H

## (undated) DEVICE — AGENT HEMSTAT W4XL8IN OXIDIZED REGENERATED CELOS ABSRB

## (undated) DEVICE — SPONGE GZ W4XL4IN COT 12 PLY TYP VII WVN C FLD DSGN

## (undated) DEVICE — SYRINGE, LUER LOCK, 30ML: Brand: MEDLINE

## (undated) DEVICE — SUTURE PERMAHAND SZ 4-0 L12X30IN NONABSORBABLE BLK SILK A303H

## (undated) DEVICE — Z DISCONTINUED USE 2275676 GLOVE SURG SZ 65 L12IN FNGR THK87MIL DK GRN LTX FREE ISOLEX

## (undated) DEVICE — SOLUTION IV IRRIG POUR BRL 0.9% SODIUM CHL 2F7124

## (undated) DEVICE — SUTURE VCRL + SZ 3-0 L18IN ABSRB UD SH 1/2 CIR TAPERCUT NDL VCP864D

## (undated) DEVICE — MEDICINE CUP, GRADUATED, STER: Brand: MEDLINE

## (undated) DEVICE — GLOVE SURG SZ 65 L12IN FNGR THK79MIL GRN LTX FREE

## (undated) DEVICE — SPONGE,LAP,18"X18",DLX,XR,ST,5/PK,40/PK: Brand: MEDLINE

## (undated) DEVICE — SYRINGE, LUER LOCK, 10ML: Brand: MEDLINE

## (undated) DEVICE — SUTURE MCRYL + SZ 4-0 L27IN ABSRB UD L19MM PS-2 3/8 CIR MCP426H

## (undated) DEVICE — LOOP VES W1.3MM THK0.9MM MINI YEL SIL FLD REPELLENT

## (undated) DEVICE — SUTURE ETHLN SZ 3-0 L18IN NONABSORBABLE BLK L24MM PS-1 3/8 1663G

## (undated) DEVICE — CANISTER NEG PRSS 1000ML W/ GEL INFOVAC

## (undated) DEVICE — GENERAL: Brand: MEDLINE INDUSTRIES, INC.

## (undated) DEVICE — MAJOR VASCULAR: Brand: MEDLINE INDUSTRIES, INC.

## (undated) DEVICE — SUTURE PERMAHAND SZ 4-0 L18IN NONABSORBABLE BLK SILK BRAID A183H

## (undated) DEVICE — BANDAGE COBAN 4 IN COMPR W4INXL5YD FOAM COHESIVE QUIK STK SELF ADH SFT

## (undated) DEVICE — DRESSING PETRO W3XL3IN OIL EMUL N ADH GZ KNIT IMPREG CELOS

## (undated) DEVICE — ELECTROSURGICAL PENCIL ROCKER SWITCH NON COATED BLADE ELECTRODE 10 FT (3 M) CORD HOLSTER: Brand: MEGADYNE

## (undated) DEVICE — SYRINGE 20ML LL S/C 50

## (undated) DEVICE — DRESSING FOAM W4XL12IN SIL RECT ADH WTRPRF FLM BK W/ BORD

## (undated) DEVICE — CHLORAPREP 26ML ORANGE

## (undated) DEVICE — 1.5 TO 1 DERMACARRIER: Brand: MESHGRAFTTM  II TISSUE EXPANSION SYSTEM

## (undated) DEVICE — DRESSING FOAM ABSORBENT 8X4 IN BORDER SELF ADH MEPILEX

## (undated) DEVICE — TOWEL,OR,DSP,ST,WHITE,DLX,XR,4/PK,20PK/C: Brand: MEDLINE

## (undated) DEVICE — SUTURE NONABSORBABLE MONOFILAMENT 4-0 PS-2 18 IN BLK ETHILON 1667G

## (undated) DEVICE — PROVE COVER: Brand: UNBRANDED

## (undated) DEVICE — UNDERGLOVE SURG SZ 8 FNGR THK0.21MIL GRN LTX BEAD CUF

## (undated) DEVICE — BANDAGE COMPR W2INXL5YD TAN BRTH SELF ADH WRP W/ HND TEAR

## (undated) DEVICE — FOGARTY SPRING CLIPS 6MM: Brand: FOGARTY SOFTJAW

## (undated) DEVICE — SUTURE VCRL + SZ 4-0 L27IN ABSRB WHT FS-2 3/8 CIR REV CUT VCP422H

## (undated) DEVICE — PACK,UNIVERSAL,NO GOWNS: Brand: MEDLINE

## (undated) DEVICE — DECANTER BAG 9": Brand: MEDLINE INDUSTRIES, INC.

## (undated) DEVICE — APPLIER CLP L9.375IN APER 2.1MM CLS L3.8MM 20 SM TI CLP

## (undated) DEVICE — BANDAGE COMPR W4INXL12FT E DISP ESMARCH EVEN

## (undated) DEVICE — 3M™ TEGADERM™ TRANSPARENT FILM DRESSING FRAME STYLE, 1626W, 4 IN X 4-3/4 IN (10 CM X 12 CM), 50/CT 4CT/CASE: Brand: 3M™ TEGADERM™

## (undated) DEVICE — SAFESECURE,SECUREMENT,FOLEY CATH,STERILE: Brand: MEDLINE

## (undated) DEVICE — STAPLER SKIN H3.9MM WIRE DIA0.58MM CRWN 6.9MM 35 STPL ROT

## (undated) DEVICE — SUTURE MCRYL SZ 2-0 L27IN ABSRB UD SH L26MM TAPERPOINT NDL Y417H

## (undated) DEVICE — SUTURE PROL SZ 5-0 L24IN NONABSORBABLE BLU L9.3MM BV-1 3/8 9702H

## (undated) DEVICE — MINOR SET UP PK

## (undated) DEVICE — SHEET,DRAPE,53X77,STERILE: Brand: MEDLINE

## (undated) DEVICE — TOWEL,OR,DSP,ST,BLUE,STD,6/PK,12PK/CS: Brand: MEDLINE

## (undated) DEVICE — SUTURE PERMAHAND SZ 3-0 L18IN NONABSORBABLE BLK L26MM SH C013D

## (undated) DEVICE — SUTURE VCRL + SZ 3-0 L27IN ABSRB WHT CT-1 1/2 CIR VCP258H

## (undated) DEVICE — CLIP SM RED INTERN HMOCLP TITAN LIGATING

## (undated) DEVICE — SPONGE,PEANUT,XRAY,ST,SM,3/8",5/CARD: Brand: MEDLINE INDUSTRIES, INC.

## (undated) DEVICE — SYRINGE MED 3ML CLR PLAS STD N CTRL LUERLOCK TIP DISP

## (undated) DEVICE — SHEET, T, LAPAROTOMY, STERILE: Brand: MEDLINE

## (undated) DEVICE — E-Z CLEAN, NON-STICK, PTFE COATED, ELECTROSURGICAL BLADE ELECTRODE, 4 INCH (10.2 CM): Brand: MEGADYNE

## (undated) DEVICE — DRESSING,GAUZE,XEROFORM,CURAD,5"X9",ST: Brand: CURAD

## (undated) DEVICE — BLADE ES ELASTOMERIC COAT INSUL DURABLE BEND UPTO 90DEG